# Patient Record
Sex: FEMALE | Race: WHITE | NOT HISPANIC OR LATINO | Employment: OTHER | URBAN - METROPOLITAN AREA
[De-identification: names, ages, dates, MRNs, and addresses within clinical notes are randomized per-mention and may not be internally consistent; named-entity substitution may affect disease eponyms.]

---

## 2017-01-13 ENCOUNTER — PATIENT MESSAGE (OUTPATIENT)
Dept: INTERNAL MEDICINE | Facility: CLINIC | Age: 52
End: 2017-01-13

## 2017-01-13 ENCOUNTER — TELEPHONE (OUTPATIENT)
Dept: INTERNAL MEDICINE | Facility: CLINIC | Age: 52
End: 2017-01-13

## 2017-01-13 NOTE — TELEPHONE ENCOUNTER
----- Message from Harjit Adhikari sent at 1/13/2017  1:47 PM CST -----  Contact: Jyothi from [facility]   States she is calling rg needing a 90 day rx on losartin 50mg and can be reached at [phone]//thanks/dbw

## 2017-01-13 NOTE — TELEPHONE ENCOUNTER
Pharmacy is calling stating that pt is requesting to get a 90 day supply on the losartan 50mg... Do not have this in her current meds. Show that she is on losartan-HCTZ 100-25mg... Which is she needing to be on?

## 2017-01-15 RX ORDER — LOSARTAN POTASSIUM 50 MG/1
50 TABLET ORAL DAILY
Qty: 90 TABLET | Refills: 3 | Status: SHIPPED | OUTPATIENT
Start: 2017-01-15 | End: 2017-11-15

## 2017-01-15 RX ORDER — LOSARTAN POTASSIUM AND HYDROCHLOROTHIAZIDE 25; 100 MG/1; MG/1
1 TABLET ORAL DAILY
Qty: 90 TABLET | Refills: 3 | Status: SHIPPED | OUTPATIENT
Start: 2017-01-15 | End: 2017-01-15

## 2017-01-15 NOTE — TELEPHONE ENCOUNTER
I sent in the losartan 100 with hctz 25 earlier, but I will discontinue and send in the losartan 50 now.

## 2017-02-02 DIAGNOSIS — M79.7 FIBROMYALGIA: ICD-10-CM

## 2017-02-02 RX ORDER — PREGABALIN 150 MG/1
CAPSULE ORAL
Qty: 90 CAPSULE | Refills: 5 | Status: SHIPPED | OUTPATIENT
Start: 2017-02-02 | End: 2017-07-29 | Stop reason: SDUPTHER

## 2017-03-07 DIAGNOSIS — J01.90 SINUSITIS, ACUTE: ICD-10-CM

## 2017-03-07 RX ORDER — AZELASTINE HYDROCHLORIDE AND FLUTICASONE PROPIONATE 137; 50 UG/1; UG/1
SPRAY, METERED NASAL
Qty: 23 G | Refills: 11 | Status: SHIPPED | OUTPATIENT
Start: 2017-03-07 | End: 2018-04-04 | Stop reason: SDUPTHER

## 2017-03-13 ENCOUNTER — PATIENT MESSAGE (OUTPATIENT)
Dept: INTERNAL MEDICINE | Facility: CLINIC | Age: 52
End: 2017-03-13

## 2017-03-17 ENCOUNTER — PATIENT MESSAGE (OUTPATIENT)
Dept: INTERNAL MEDICINE | Facility: CLINIC | Age: 52
End: 2017-03-17

## 2017-04-03 ENCOUNTER — OFFICE VISIT (OUTPATIENT)
Dept: GASTROENTEROLOGY | Facility: CLINIC | Age: 52
End: 2017-04-03
Payer: COMMERCIAL

## 2017-04-03 ENCOUNTER — LAB VISIT (OUTPATIENT)
Dept: LAB | Facility: HOSPITAL | Age: 52
End: 2017-04-03
Attending: INTERNAL MEDICINE
Payer: COMMERCIAL

## 2017-04-03 VITALS
WEIGHT: 186.75 LBS | HEIGHT: 68 IN | DIASTOLIC BLOOD PRESSURE: 82 MMHG | BODY MASS INDEX: 28.3 KG/M2 | HEART RATE: 66 BPM | SYSTOLIC BLOOD PRESSURE: 114 MMHG

## 2017-04-03 DIAGNOSIS — R14.0 ABDOMINAL BLOATING: ICD-10-CM

## 2017-04-03 DIAGNOSIS — K50.018 CROHN'S DISEASE OF SMALL INTESTINE WITH OTHER COMPLICATION: ICD-10-CM

## 2017-04-03 DIAGNOSIS — R12 HEARTBURN: ICD-10-CM

## 2017-04-03 DIAGNOSIS — K62.5 RECTAL BLEEDING: ICD-10-CM

## 2017-04-03 DIAGNOSIS — R19.7 DIARRHEA, UNSPECIFIED TYPE: Primary | ICD-10-CM

## 2017-04-03 LAB
ALBUMIN SERPL BCP-MCNC: 3.9 G/DL
ALP SERPL-CCNC: 97 U/L
ALT SERPL W/O P-5'-P-CCNC: 24 U/L
ANION GAP SERPL CALC-SCNC: 9 MMOL/L
AST SERPL-CCNC: 22 U/L
BASOPHILS # BLD AUTO: 0.04 K/UL
BASOPHILS NFR BLD: 0.7 %
BILIRUB SERPL-MCNC: 0.2 MG/DL
BUN SERPL-MCNC: 11 MG/DL
CALCIUM SERPL-MCNC: 9.3 MG/DL
CHLORIDE SERPL-SCNC: 90 MMOL/L
CO2 SERPL-SCNC: 27 MMOL/L
CREAT SERPL-MCNC: 0.9 MG/DL
CRP SERPL-MCNC: 5.9 MG/L
DIFFERENTIAL METHOD: NORMAL
EOSINOPHIL # BLD AUTO: 0.1 K/UL
EOSINOPHIL NFR BLD: 0.9 %
ERYTHROCYTE [DISTWIDTH] IN BLOOD BY AUTOMATED COUNT: 12.2 %
ERYTHROCYTE [SEDIMENTATION RATE] IN BLOOD BY WESTERGREN METHOD: 8 MM/HR
EST. GFR  (AFRICAN AMERICAN): >60 ML/MIN/1.73 M^2
EST. GFR  (NON AFRICAN AMERICAN): >60 ML/MIN/1.73 M^2
GLUCOSE SERPL-MCNC: 81 MG/DL
HCT VFR BLD AUTO: 37 %
HGB BLD-MCNC: 12.6 G/DL
LYMPHOCYTES # BLD AUTO: 1.9 K/UL
LYMPHOCYTES NFR BLD: 34.3 %
MCH RBC QN AUTO: 30.6 PG
MCHC RBC AUTO-ENTMCNC: 34.1 %
MCV RBC AUTO: 90 FL
MONOCYTES # BLD AUTO: 0.5 K/UL
MONOCYTES NFR BLD: 9.4 %
NEUTROPHILS # BLD AUTO: 3.1 K/UL
NEUTROPHILS NFR BLD: 54.5 %
PLATELET # BLD AUTO: 329 K/UL
PMV BLD AUTO: 9.6 FL
POTASSIUM SERPL-SCNC: 3.9 MMOL/L
PROT SERPL-MCNC: 7.3 G/DL
RBC # BLD AUTO: 4.12 M/UL
SODIUM SERPL-SCNC: 126 MMOL/L
WBC # BLD AUTO: 5.66 K/UL

## 2017-04-03 PROCEDURE — 3074F SYST BP LT 130 MM HG: CPT | Mod: S$GLB,,, | Performed by: INTERNAL MEDICINE

## 2017-04-03 PROCEDURE — 3079F DIAST BP 80-89 MM HG: CPT | Mod: S$GLB,,, | Performed by: INTERNAL MEDICINE

## 2017-04-03 PROCEDURE — 36415 COLL VENOUS BLD VENIPUNCTURE: CPT | Mod: PO

## 2017-04-03 PROCEDURE — 80053 COMPREHEN METABOLIC PANEL: CPT

## 2017-04-03 PROCEDURE — 86140 C-REACTIVE PROTEIN: CPT

## 2017-04-03 PROCEDURE — 99999 PR PBB SHADOW E&M-EST. PATIENT-LVL III: CPT | Mod: PBBFAC,,, | Performed by: INTERNAL MEDICINE

## 2017-04-03 PROCEDURE — 99214 OFFICE O/P EST MOD 30 MIN: CPT | Mod: S$GLB,,, | Performed by: INTERNAL MEDICINE

## 2017-04-03 PROCEDURE — 85025 COMPLETE CBC W/AUTO DIFF WBC: CPT

## 2017-04-03 PROCEDURE — 85651 RBC SED RATE NONAUTOMATED: CPT | Mod: PO

## 2017-04-03 PROCEDURE — 1160F RVW MEDS BY RX/DR IN RCRD: CPT | Mod: S$GLB,,, | Performed by: INTERNAL MEDICINE

## 2017-04-03 RX ORDER — PANTOPRAZOLE SODIUM 40 MG/1
40 TABLET, DELAYED RELEASE ORAL DAILY
Qty: 30 TABLET | Refills: 11 | Status: SHIPPED | OUTPATIENT
Start: 2017-04-03 | End: 2017-08-11 | Stop reason: SDUPTHER

## 2017-04-03 NOTE — PROGRESS NOTES
Clinic Follow Up:  [facility] Follow Up Note    Reason for Follow Up:  The primary encounter diagnosis was Diarrhea, unspecified type. Diagnoses of Heartburn, Crohn's disease of small intestine with other complication, Abdominal bloating, and Rectal bleeding were also pertinent to this visit.    PCP: Esthela Hu       HPI:  This is a 52 y.o. female here for follow up of the above issues.  This my first time seeing her in almost 9 months.  She has a long history of Crohn's disease.  She continues to have issues with diarrhea and abdominal pain.  She is taking Pentasa 1000mg 4 times daily.  She felt like this was helping for the first several months of taking it but over the last few months she hasn't had much improvement.  On bad days she is having up to 10 bowel movements a day.  She also frequently has right lower quadrant abdominal pain.  Recently she's been having more diarrhea and she feels like her hemorrhoids have become irritated and there has been a small amount of rectal bleeding.  Last year she was given Entocort which seemed to provide some improvement but she did not follow up to discuss other treatment options.  She also reports burning at the back of her tongue and in her throat when she eats.  She reports a burning sensation also in the upper chest.  She has tried omeprazole and Prevacid which helps some but she still has some symptoms.  Eating ice typically helps.  She has a frequent sour taste in the back of her mouth.  She has also noticed that she has a lot of abdominal distention after eating.  She has a very hard time tolerating high-fiber foods.    Review of Systems:  CONSTITUTIONAL: Denies weight change,  fatigue, fevers, chills, night sweats.  CARDIOVASCULAR: Denies chest pain, shortness of breath, orthopnea and edema.  RESPIRATORY: Denies cough, hemoptysis, dyspnea, and wheezing.  GI: See HPI.  : Denies dysuria and hematuria    Medical History:  Past Medical  History:   Diagnosis Date    Allergic rhinitis     Asthma     Crohn's disease     Ileal involvement, previously on Remicade, Asacol, Prednisone    Fibromyalgia     Migraine     Sciatica        Surgical History:   Past Surgical History:   Procedure Laterality Date    BLADDER SURGERY      sling was created by her muscles      SECTION      FINGER SURGERY      joint relpacement, left hand index finger    HYSTERECTOMY      WISDOM TOOTH EXTRACTION         Family History:   Family History   Problem Relation Age of Onset    Hypertension Mother     Kidney disease Father     Hypertension Brother     Cancer Paternal Grandmother 70     colon       Social History:   Social History   Substance Use Topics    Smoking status: Never Smoker    Smokeless tobacco: None    Alcohol use No       Allergies: Reviewed    Home Medications:  Medication List with Changes/Refills   New Medications    PANTOPRAZOLE (PROTONIX) 40 MG TABLET    Take 1 tablet (40 mg total) by mouth once daily.   Current Medications    ALBUTEROL 90 MCG/ACTUATION INHALER    Inhale 2 puffs into the lungs every 4 to 6 hours as needed for Wheezing.    BREO ELLIPTA 100-25 MCG/DOSE DISKUS INHALER        DULOXETINE (CYMBALTA) 60 MG CAPSULE    Take 60 mg by mouth 2 (two) times daily.    DYMISTA 137-50 MCG/SPRAY SPRY NASSAL SPRAY    instill 1 spray into each nostril twice a day    ELETRIPTAN (RELPAX) 40 MG TABLET    Take 1 tablet (40 mg total) by mouth as needed.    ESTRADIOL (ESTRACE) 2 MG TABLET    Take 2 mg by mouth once daily.    LOSARTAN (COZAAR) 50 MG TABLET    Take 1 tablet (50 mg total) by mouth once daily.    LYRICA 150 MG CAPSULE    take 1 capsule by mouth three times a day    MESALAMINE (PENTASA) 500 MG CR CAPSULE    Take 2 capsules (1,000 mg total) by mouth 4 (four) times daily.    MONTELUKAST (SINGULAIR) 10 MG TABLET    take 1 tablet by mouth every evening    NORTRIPTYLINE (PAMELOR) 25 MG CAPSULE    take 2 capsules by mouth once daily     "VERAPAMIL (VERELAN PM) 300 MG 24 HR CAPSULE    Take 300 mg by mouth once daily.    ZOLPIDEM (AMBIEN) 5 MG TAB    Take 1 tablet (5 mg total) by mouth nightly as needed.   Discontinued Medications    CLONIDINE (CATAPRES) 0.1 MG TABLET    Take 1 tablet (0.1 mg total) by mouth 2 (two) times daily as needed. For Top #'s >160       Physical Exam:  Vital Signs:  /82  Pulse 66  Ht 5' 8.4" (1.737 m)  Wt 84.7 kg (186 lb 11.7 oz)  BMI 28.06 kg/m2  Body mass index is 28.06 kg/(m^2).      GENERAL: No acute distress, Alert and oriented x 4  EYES: Anicteric, no pallor noted.  ENT: Oropharynx is clear  NECK: Supple, no masses, no thyromegally.  CHEST: Equal breath sounds bilaterally, no wheezing.  CARDIOVASCULAR: Regular rate and rhythm. Murmurs, rubs and gallops absent.  ABDOMEN: soft, non-tender, non-distended, normal bowel sounds.  EXTREMITIES: No clubbing, cyanosis or edema.      Labs: Pertinent labs reviewed.  No recent labs available    Endoscopy:  Never done    CRC Screening: Up-to-date    Assessment:  1. Diarrhea, unspecified type    2. Heartburn    3. Crohn's disease of small intestine with other complication    4. Abdominal bloating    5. Rectal bleeding        Recommendations:  1.  Crohn's disease: She had Crohn's-related ileitis that was fairly mild on colonoscopy but she continues to have a lot of symptoms.  In the past her ESR and CRP have been normal.  Today I recommend that we repeat labs including a CBC, CMP, ESR, CRP.  We will also arrange for a CT enterography given her significant abdominal bloating to rule out the possibility of a stricture.  This will also allow us to determine if there is evidence of active disease.  After these studies we will need to consider whether to proceed with a capsule endoscopy to evaluate the rest of her small bowel for active disease or if we need to consider a colonoscopy to better assess.  When we have all of the information available we will plan to develop a " treatment plan as I do not feel like Pentasa is keeping her symptoms under control.  She has been on Humira in the past with good results but she has a lot of concerns over side effect risks.  We also discussed the use of Entyvio which may be an alternative option with fewer potential side effects.  She has intermittent TPMT activity so immunomodulators are a possibility but would need to be monitored closely.    2.  Heartburn: Her symptoms are mostly consistent with reflux.  Today I sent in a prescription for pantoprazole 40 mg daily.  This could simply be regular reflux but given the significant abdominal bloating it raises the question of whether she may have some mild partially obstructive symptoms.  We will likely need to consider an upper endoscopy in the future but we will try to coordinate this with the remainder of her evaluation.    3.  Rectal bleeding: Likely from hemorrhoids and exacerbated by diarrhea.  We'll focus on her Crohn's disease.  We may need to perform a colonoscopy at some point in the near future.    Return to Clinic:    Follow up based on the above evaluation.

## 2017-04-03 NOTE — MR AVS SNAPSHOT
[facility]  [address] [place] [address]  Phone: [phone]  Fax: [phone]                  Jaylin Murguia   4/3/2017 10:00 AM   Office Visit    Description:  Female : 1965   Provider:  Kin Dyer MD   Department:  [facility] - Gastroenterology           Reason for Visit     Crohn's Disease     Abdominal Pain           Diagnoses this Visit        Comments    Diarrhea, unspecified type    -  Primary     Heartburn         Crohn's disease of small intestine with other complication         Abdominal bloating         Rectal bleeding                To Do List           Future Appointments        Provider Department Dept Phone    4/3/2017 11:45 AM LAB, SAME DAY [facility] [phone]    4/10/2017 9:30 AM SUM CT1 LIMIT 500 LBS [facility] [phone]    2017 2:00 PM [facility] [phone]      Goals (5 Years of Data)     None       These Medications        Disp Refills Start End    pantoprazole (PROTONIX) 40 MG tablet 30 tablet 11 4/3/2017 4/3/2018    Take 1 tablet (40 mg total) by mouth once daily. - Oral    [facility] [place] [address]  #: [phone]         [facility] On Call     [facility] Nurse Care Line - 24/ Assistance  Unless otherwise directed by your provider, please contact [facility], our nurse care line that is available for 24/ assistance.     Registered nurses in the [facility] provide: appointment scheduling, clinical advisement, health education, and other advisory services.  Call: [phone] (toll free)               Medications           START taking these NEW medications        Refills    pantoprazole (PROTONIX) 40 MG tablet 11    Sig: Take 1 tablet (40 mg total) by mouth once daily.    Class: Normal    Route: Oral      STOP taking these medications     cloNIDine (CATAPRES) 0.1  "MG tablet Take 1 tablet (0.1 mg total) by mouth 2 (two) times daily as needed. For Top #'s >160           Verify that the below list of medications is an accurate representation of the medications you are currently taking.  If none reported, the list may be blank. If incorrect, please contact your healthcare provider. Carry this list with you in case of emergency.           Current Medications     albuterol 90 mcg/actuation inhaler Inhale 2 puffs into the lungs every 4 to 6 hours as needed for Wheezing.    BREO ELLIPTA 100-25 mcg/dose diskus inhaler     duloxetine (CYMBALTA) 60 MG capsule Take 60 mg by mouth 2 (two) times daily.    DYMISTA 137-50 mcg/spray [place] nassal spray instill 1 spray into each nostril twice a day    eletriptan (RELPAX) 40 MG tablet Take 1 tablet (40 mg total) by mouth as needed.    estradiol (ESTRACE) 2 MG tablet Take 2 mg by mouth once daily.    losartan (COZAAR) 50 MG tablet Take 1 tablet (50 mg total) by mouth once daily.    LYRICA 150 mg capsule take 1 capsule by mouth three times a day    mesalamine (PENTASA) 500 MG CR capsule Take 2 capsules (1,000 mg total) by mouth 4 (four) times daily.    montelukast (SINGULAIR) 10 mg tablet take 1 tablet by mouth every evening    nortriptyline (PAMELOR) 25 MG capsule take 2 capsules by mouth once daily    verapamil (VERELAN PM) 300 mg 24 hr capsule Take 300 mg by mouth once daily.    zolpidem (AMBIEN) 5 MG Tab Take 1 tablet (5 mg total) by mouth nightly as needed.    pantoprazole (PROTONIX) 40 MG tablet Take 1 tablet (40 mg total) by mouth once daily.           Clinical Reference Information           Your Vitals Were     BP Pulse Height Weight BMI    114/82 66 5' 8.4" (1.737 m) 84.7 kg (186 lb 11.7 oz) 28.06 kg/m2      Blood Pressure          Most Recent Value    BP  114/82      Allergies as of 4/3/2017     No Known Allergies      Immunizations Administered on Date of Encounter - 4/3/2017     None      Orders Placed During Today's Visit     Future " Labs/Procedures Expected by Expires    C-reactive protein  4/3/2017 6/2/2018    CBC auto differential  4/3/2017 6/2/2018    Comprehensive metabolic panel  4/3/2017 6/2/2018    CT Enterography Abd_Pelvis With Contrast  4/3/2017 4/3/2018    ESR (SEDIMENTATION RATE, MANUAL)  4/3/2017 6/2/2018      Language Assistance Services     ATTENTION: Language assistance services are available, free of charge. Please call [phone].      ATENCIÓN: Si habla español, tiene a ann disposición servicios gratuitos de asistencia lingüística. Llame al [phone].     CHÚ Ý: N?u b?n nói Ti?ng Vi?t, có các d?ch v? h? tr? ngôn ng? mi?n phí dành cho b?n. G?i s? [phone].         [facility] complies with applicable Federal civil rights laws and does not discriminate on the basis of race, color, national origin, age, disability, or sex.

## 2017-04-04 ENCOUNTER — PATIENT MESSAGE (OUTPATIENT)
Dept: GASTROENTEROLOGY | Facility: HOSPITAL | Age: 52
End: 2017-04-04

## 2017-04-04 ENCOUNTER — TELEPHONE (OUTPATIENT)
Dept: GASTROENTEROLOGY | Facility: CLINIC | Age: 52
End: 2017-04-04

## 2017-04-04 DIAGNOSIS — E87.1 HYPONATREMIA: Primary | ICD-10-CM

## 2017-04-04 NOTE — TELEPHONE ENCOUNTER
Ms Murguia to get labs and urine tomorrow. Wanted me to let you know her father  of kidney failure, also has a nephew with a kidney disorder that she could not remember the name of.

## 2017-04-05 ENCOUNTER — LAB VISIT (OUTPATIENT)
Dept: LAB | Facility: HOSPITAL | Age: 52
End: 2017-04-05
Attending: INTERNAL MEDICINE
Payer: COMMERCIAL

## 2017-04-05 DIAGNOSIS — E87.1 HYPONATREMIA: ICD-10-CM

## 2017-04-05 LAB
ANION GAP SERPL CALC-SCNC: 8 MMOL/L
BUN SERPL-MCNC: 6 MG/DL
CALCIUM SERPL-MCNC: 9 MG/DL
CHLORIDE SERPL-SCNC: 94 MMOL/L
CO2 SERPL-SCNC: 26 MMOL/L
CORTIS SERPL-MCNC: 7.4 UG/DL
CREAT SERPL-MCNC: 0.8 MG/DL
EST. GFR  (AFRICAN AMERICAN): >60 ML/MIN/1.73 M^2
EST. GFR  (NON AFRICAN AMERICAN): >60 ML/MIN/1.73 M^2
GLUCOSE SERPL-MCNC: 67 MG/DL
OSMOLALITY SERPL: 262 MOSM/KG
POTASSIUM SERPL-SCNC: 4 MMOL/L
SODIUM SERPL-SCNC: 128 MMOL/L
TSH SERPL DL<=0.005 MIU/L-ACNC: 1.18 UIU/ML

## 2017-04-05 PROCEDURE — 80048 BASIC METABOLIC PNL TOTAL CA: CPT

## 2017-04-05 PROCEDURE — 84443 ASSAY THYROID STIM HORMONE: CPT

## 2017-04-05 PROCEDURE — 82533 TOTAL CORTISOL: CPT

## 2017-04-05 PROCEDURE — 36415 COLL VENOUS BLD VENIPUNCTURE: CPT | Mod: PO

## 2017-04-05 PROCEDURE — 83930 ASSAY OF BLOOD OSMOLALITY: CPT

## 2017-04-06 ENCOUNTER — PATIENT OUTREACH (OUTPATIENT)
Dept: ADMINISTRATIVE | Facility: HOSPITAL | Age: 52
End: 2017-04-06

## 2017-04-07 ENCOUNTER — TELEPHONE (OUTPATIENT)
Dept: RADIOLOGY | Facility: HOSPITAL | Age: 52
End: 2017-04-07

## 2017-04-07 ENCOUNTER — LAB VISIT (OUTPATIENT)
Dept: LAB | Facility: HOSPITAL | Age: 52
End: 2017-04-07
Attending: INTERNAL MEDICINE
Payer: COMMERCIAL

## 2017-04-07 DIAGNOSIS — Z01.419 ROUTINE GYNECOLOGICAL EXAMINATION: Primary | ICD-10-CM

## 2017-04-07 PROCEDURE — 36415 COLL VENOUS BLD VENIPUNCTURE: CPT | Mod: PO

## 2017-04-07 PROCEDURE — 83036 HEMOGLOBIN GLYCOSYLATED A1C: CPT

## 2017-04-08 LAB
ESTIMATED AVG GLUCOSE: 111 MG/DL
HBA1C MFR BLD HPLC: 5.5 %

## 2017-04-10 ENCOUNTER — PATIENT MESSAGE (OUTPATIENT)
Dept: GASTROENTEROLOGY | Facility: CLINIC | Age: 52
End: 2017-04-10

## 2017-04-10 DIAGNOSIS — E87.1 HYPONATREMIA WITH DECREASED SERUM OSMOLALITY: Primary | ICD-10-CM

## 2017-04-10 NOTE — TELEPHONE ENCOUNTER
Continued hypotonic hyponatremia. Normal TSH/Cortisol. Urine very dilute. Could be due to diarrhea, but no signs of significant volume depletion and would expect urine to be more concentrated. Will refer to nephrology for further evaluation.    Results sent to patient through [facility].

## 2017-04-11 ENCOUNTER — PATIENT MESSAGE (OUTPATIENT)
Dept: INTERNAL MEDICINE | Facility: CLINIC | Age: 52
End: 2017-04-11

## 2017-04-11 ENCOUNTER — TELEPHONE (OUTPATIENT)
Dept: GASTROENTEROLOGY | Facility: CLINIC | Age: 52
End: 2017-04-11

## 2017-04-12 ENCOUNTER — TELEPHONE (OUTPATIENT)
Dept: RADIOLOGY | Facility: HOSPITAL | Age: 52
End: 2017-04-12

## 2017-04-13 ENCOUNTER — HOSPITAL ENCOUNTER (OUTPATIENT)
Dept: RADIOLOGY | Facility: HOSPITAL | Age: 52
Discharge: HOME | End: 2017-04-13
Attending: INTERNAL MEDICINE
Payer: COMMERCIAL

## 2017-04-13 DIAGNOSIS — R14.0 ABDOMINAL BLOATING: ICD-10-CM

## 2017-04-13 DIAGNOSIS — K50.018 CROHN'S DISEASE OF SMALL INTESTINE WITH OTHER COMPLICATION: ICD-10-CM

## 2017-04-13 DIAGNOSIS — R19.7 DIARRHEA, UNSPECIFIED TYPE: ICD-10-CM

## 2017-04-13 PROCEDURE — 25500020 PHARM REV CODE 255: Mod: PO | Performed by: INTERNAL MEDICINE

## 2017-04-13 PROCEDURE — 74177 CT ABD & PELVIS W/CONTRAST: CPT | Mod: TC,PO

## 2017-04-13 PROCEDURE — 74177 CT ABD & PELVIS W/CONTRAST: CPT | Mod: 26,,, | Performed by: RADIOLOGY

## 2017-04-13 RX ADMIN — IOHEXOL 130 ML: 350 INJECTION, SOLUTION INTRAVENOUS at 10:04

## 2017-04-14 ENCOUNTER — PATIENT MESSAGE (OUTPATIENT)
Dept: GASTROENTEROLOGY | Facility: HOSPITAL | Age: 52
End: 2017-04-14

## 2017-04-14 DIAGNOSIS — K50.00 CROHN'S DISEASE OF SMALL INTESTINE WITHOUT COMPLICATION: ICD-10-CM

## 2017-04-14 DIAGNOSIS — R19.7 DIARRHEA, UNSPECIFIED TYPE: ICD-10-CM

## 2017-04-14 DIAGNOSIS — K76.9 LIVER LESION: Primary | ICD-10-CM

## 2017-04-14 RX ORDER — SODIUM, POTASSIUM,MAG SULFATES 17.5-3.13G
1 SOLUTION, RECONSTITUTED, ORAL ORAL ONCE
Qty: 1 BOTTLE | Refills: 0 | Status: SHIPPED | OUTPATIENT
Start: 2017-04-14 | End: 2017-04-14

## 2017-04-14 NOTE — TELEPHONE ENCOUNTER
CTE noted. Possible active dz at TI. Will get colonoscopy. Will get US to evaluate liver lesions. Given the questionable stricture in the mid ileum I would prefer to avoid capsule at this time, but may consider a SBFT in the future or try a sizing capsule first.  Results sent to patient through [facility].

## 2017-04-17 ENCOUNTER — PATIENT MESSAGE (OUTPATIENT)
Dept: INTERNAL MEDICINE | Facility: CLINIC | Age: 52
End: 2017-04-17

## 2017-04-17 ENCOUNTER — PATIENT MESSAGE (OUTPATIENT)
Dept: GASTROENTEROLOGY | Facility: CLINIC | Age: 52
End: 2017-04-17

## 2017-04-17 DIAGNOSIS — K50.00 CROHN'S DISEASE OF SMALL INTESTINE WITHOUT COMPLICATION: ICD-10-CM

## 2017-04-17 DIAGNOSIS — R19.7 DIARRHEA, UNSPECIFIED TYPE: ICD-10-CM

## 2017-04-17 DIAGNOSIS — R12 HEARTBURN: Primary | ICD-10-CM

## 2017-04-18 ENCOUNTER — PATIENT MESSAGE (OUTPATIENT)
Dept: NEPHROLOGY | Facility: CLINIC | Age: 52
End: 2017-04-18

## 2017-04-18 ENCOUNTER — TELEPHONE (OUTPATIENT)
Dept: RHEUMATOLOGY | Facility: CLINIC | Age: 52
End: 2017-04-18

## 2017-04-18 ENCOUNTER — OFFICE VISIT (OUTPATIENT)
Dept: NEPHROLOGY | Facility: CLINIC | Age: 52
End: 2017-04-18
Payer: COMMERCIAL

## 2017-04-18 VITALS
SYSTOLIC BLOOD PRESSURE: 130 MMHG | HEIGHT: 67 IN | WEIGHT: 189.38 LBS | HEART RATE: 80 BPM | BODY MASS INDEX: 29.72 KG/M2 | DIASTOLIC BLOOD PRESSURE: 70 MMHG

## 2017-04-18 DIAGNOSIS — R19.7 DIARRHEA DUE TO MALABSORPTION: ICD-10-CM

## 2017-04-18 DIAGNOSIS — K90.9 DIARRHEA DUE TO MALABSORPTION: ICD-10-CM

## 2017-04-18 DIAGNOSIS — I10 ESSENTIAL (PRIMARY) HYPERTENSION: ICD-10-CM

## 2017-04-18 DIAGNOSIS — R60.0 BILATERAL EDEMA OF LOWER EXTREMITY: ICD-10-CM

## 2017-04-18 DIAGNOSIS — M79.7 FIBROMYALGIA: ICD-10-CM

## 2017-04-18 DIAGNOSIS — E87.1 HYPONATREMIA: Primary | ICD-10-CM

## 2017-04-18 DIAGNOSIS — G43.109 MIGRAINE WITH AURA AND WITHOUT STATUS MIGRAINOSUS, NOT INTRACTABLE: ICD-10-CM

## 2017-04-18 PROCEDURE — 99999 PR PBB SHADOW E&M-EST. PATIENT-LVL IV: CPT | Mod: PBBFAC,,, | Performed by: INTERNAL MEDICINE

## 2017-04-18 PROCEDURE — 3078F DIAST BP <80 MM HG: CPT | Mod: S$GLB,,, | Performed by: INTERNAL MEDICINE

## 2017-04-18 PROCEDURE — 99354 PR PROLONGED SVC, OUPT, 1ST HR: CPT | Mod: S$GLB,,, | Performed by: INTERNAL MEDICINE

## 2017-04-18 PROCEDURE — 99204 OFFICE O/P NEW MOD 45 MIN: CPT | Mod: S$GLB,,, | Performed by: INTERNAL MEDICINE

## 2017-04-18 PROCEDURE — 3075F SYST BP GE 130 - 139MM HG: CPT | Mod: S$GLB,,, | Performed by: INTERNAL MEDICINE

## 2017-04-18 PROCEDURE — 1160F RVW MEDS BY RX/DR IN RCRD: CPT | Mod: S$GLB,,, | Performed by: INTERNAL MEDICINE

## 2017-04-18 RX ORDER — ERGOCALCIFEROL 1.25 MG/1
50000 CAPSULE ORAL
Qty: 12 CAPSULE | Refills: 5 | Status: SHIPPED | OUTPATIENT
Start: 2017-04-18 | End: 2017-07-17

## 2017-04-18 RX ORDER — SIMVASTATIN 20 MG/1
20 TABLET, FILM COATED ORAL NIGHTLY
Qty: 90 TABLET | Refills: 3 | Status: SHIPPED | OUTPATIENT
Start: 2017-04-18 | End: 2018-03-19 | Stop reason: SDUPTHER

## 2017-04-18 RX ORDER — SODIUM, POTASSIUM,MAG SULFATES 17.5-3.13G
SOLUTION, RECONSTITUTED, ORAL ORAL
Qty: 354 ML | Refills: 0 | Status: ON HOLD | OUTPATIENT
Start: 2017-04-18 | End: 2017-05-09 | Stop reason: HOSPADM

## 2017-04-18 RX ORDER — PROPRANOLOL HYDROCHLORIDE 40 MG/1
40 TABLET ORAL 3 TIMES DAILY
Qty: 90 TABLET | Refills: 11 | Status: SHIPPED | OUTPATIENT
Start: 2017-04-18 | End: 2018-05-03 | Stop reason: ALTCHOICE

## 2017-04-18 NOTE — TELEPHONE ENCOUNTER
----- Message from Lynda Jack LPN sent at 4/18/2017 12:14 PM CDT -----  Regarding: Consult  Dr. Pierce would like patient to be seen soon. First available is not until August. Do you have a sooner appointment?

## 2017-04-18 NOTE — MR AVS SNAPSHOT
[facility]  [address] [address]  Phone: [phone]  Fax: [phone]                  Jaylin Murguia   2017 11:00 AM   Office Visit    Description:  Female : 1965   Provider:  Asher Pierce MD   Department:  [facility] Ne[facility]rology           Reason for Visit     hyponatremia           Diagnoses this Visit        Comments    Hyponatremia    -  Primary     Essential (primary) hypertension         Fibromyalgia         Bilateral edema of lower extremity         Migraine with aura and without status migrainosus, not intractable         Diarrhea due to malabsorption                To Do List           Future Appointments        Provider Department Dept Phone    2017 9:15 AM [facility] US1 [facility] [phone]    2017 11:20 AM LABORATORY, [facility] [address] [facility] [phone]    2017 12:30 PM Asher Pierce MD [facility] Nephrology [phone]    2017 10:00 AM Virgil Edwarsd MD [facility] - Rheumatology [phone]      Your Future Surgeries/Procedures     May 09, 2017   Surgery with Kin Dyer MD   [facility]    [address] [address] [phone]              Goals (5 Years of Data)     None      Follow-Up and Disposition     Return in about 4 weeks (around 2017).       These Medications        Disp Refills Start End    propranolol (INDERAL) 40 MG tablet 90 tablet 11 2017    Take 1 tablet (40 mg total) by mouth 3 (three) times daily. - Oral    [facility] [place]  [facility]. Ph #: [phone]       ergocalciferol (ERGOCALCIFEROL) 50,000 unit Cap 12 capsule 5 2017    Take 1 capsule (50,000 Units total) by mouth every 7 days. - Oral    [facility] [place]  [facility] [place]. Ph #:  [phone]       simvastatin (ZOCOR) 20 MG tablet 90 tablet 3 4/18/2017 4/18/2018    Take 1 tablet (20 mg total) by mouth every evening. - Oral    [facility] [facility] - ALYSE BARAJAS - [address] [facility]. Ph #: [phone]         [facility] On Call     [facility] On Call Nurse Care Line - 24/7 Assistance  Unless otherwise directed by your provider, please contact [facility], our nurse care line that is available for 24/7 assistance.     Registered nurses in the [facility] provide: appointment scheduling, clinical advisement, health education, and other advisory services.  Call: [phone] (toll free)               Medications           START taking these NEW medications        Refills    propranolol (INDERAL) 40 MG tablet 11    Sig: Take 1 tablet (40 mg total) by mouth 3 (three) times daily.    Class: Normal    Route: Oral    ergocalciferol (ERGOCALCIFEROL) 50,000 unit Cap 5    Sig: Take 1 capsule (50,000 Units total) by mouth every 7 days.    Class: Normal    Route: Oral    simvastatin (ZOCOR) 20 MG tablet 3    Sig: Take 1 tablet (20 mg total) by mouth every evening.    Class: Normal    Route: Oral      STOP taking these medications     verapamil (VERELAN PM) 300 mg 24 hr capsule Take 300 mg by mouth once daily.           Verify that the below list of medications is an accurate representation of the medications you are currently taking.  If none reported, the list may be blank. If incorrect, please contact your healthcare provider. Carry this list with you in case of emergency.           Current Medications     BREO ELLIPTA 100-25 mcg/dose diskus inhaler     duloxetine (CYMBALTA) 60 MG capsule Take 60 mg by mouth 2 (two) times daily.    DYMISTA 137-50 mcg/spray [place] nassal spray instill 1 spray into each nostril twice a day    eletriptan (RELPAX) 40 MG tablet Take 1 tablet (40 mg total) by mouth as needed.    estradiol (ESTRACE) 2 MG tablet Take 2 mg by mouth once daily.     "losartan (COZAAR) 50 MG tablet Take 1 tablet (50 mg total) by mouth once daily.    LYRICA 150 mg capsule take 1 capsule by mouth three times a day    mesalamine (PENTASA) 500 MG CR capsule Take 2 capsules (1,000 mg total) by mouth 4 (four) times daily.    montelukast (SINGULAIR) 10 mg tablet take 1 tablet by mouth every evening    nortriptyline (PAMELOR) 25 MG capsule take 2 capsules by mouth once daily    pantoprazole (PROTONIX) 40 MG tablet Take 1 tablet (40 mg total) by mouth once daily.    sodium,potassium,mag sulfates (SUPREP BOWEL PREP KIT) 17.5-3.13-1.6 gram SolR As directed    zolpidem (AMBIEN) 5 MG Tab Take 1 tablet (5 mg total) by mouth nightly as needed.    albuterol 90 mcg/actuation inhaler Inhale 2 puffs into the lungs every 4 to 6 hours as needed for Wheezing.    ergocalciferol (ERGOCALCIFEROL) 50,000 unit Cap Take 1 capsule (50,000 Units total) by mouth every 7 days.    propranolol (INDERAL) 40 MG tablet Take 1 tablet (40 mg total) by mouth 3 (three) times daily.    simvastatin (ZOCOR) 20 MG tablet Take 1 tablet (20 mg total) by mouth every evening.           Clinical Reference Information           Your Vitals Were     BP Pulse Height Weight BMI    130/70 80 5' 7" (1.702 m) 85.9 kg (189 lb 6 oz) 29.66 kg/m2      Blood Pressure          Most Recent Value    BP  130/70      Allergies as of 4/18/2017     No Known Allergies      Immunizations Administered on Date of Encounter - 4/18/2017     None      Orders Placed During Today's Visit      Normal Orders This Visit    Ambulatory consult to Rheumatology     Future Labs/Procedures Expected by Expires    Vitamin D  4/18/2017 6/17/2018    Recurring Lab Work Interval Expires    Renal function panel   6/17/2018      Language Assistance Services     ATTENTION: Language assistance services are available, free of charge. Please call [phone].      ATENCIÓN: Si habla español, tiene a ann disposición servicios gratuitos de asistencia lingüística. Llame al " [phone].     MATA Ý: N?u b?n nói Ti?ng Vi?t, có các d?ch v? h? tr? ngôn ng? mi?n phí dành cho b?n. G?i s? [phone].         [facility] complies with applicable Federal civil rights laws and does not discriminate on the basis of race, color, national origin, age, disability, or sex.

## 2017-04-18 NOTE — LETTER
April 18, 2017      Kin Dyer MD  [address] [place] [address] [address] [place] [address]           [facility]  [address] [place] [address] [place] [address]  Phone: [phone]  Fax: [phone]          Patient: Jaylin Murguia   MR Number: [id number]   YOB: 1965   Date of Visit: 4/18/2017       Dear Dr. Kin Dyer:    Thank you for referring Jaylin Murguia to me for evaluation. Attached you will find relevant portions of my assessment and plan of care.    If you have questions, please do not hesitate to call me. I look forward to following Jaylin Murguia along with you.    Sincerely,    Asher Pierce MD    Enclosure  CC:  Esthela Hu MD    If you would like to receive this communication electronically, please contact [email or web address] or [phone] to request more information on [employer] Link access.    For providers and/or their staff who would like to refer a patient to [facility], please contact us through our one-stop-shop provider referral line, [facility], at [phone].    If you feel you have received this communication in error or would no longer like to receive these types of communications, please e-mail [email or web address]

## 2017-04-18 NOTE — TELEPHONE ENCOUNTER
Reviewed her chart and noted that she has difficult to treat fibromyalgia resistant to SNRI, Lyrica and Elavil. I wouldn't recommend her to wait to see us since our next available appointment in not until August - because of unexpected departure of providers.   I would request  to refer her to  at [facility] who treats our treatment resistant fibromyalgia patients with multiple interventions including Cognitive behavioral therapy and opioids.   Thanks for the referral.

## 2017-04-18 NOTE — PATIENT INSTRUCTIONS
1.  Hyponatremia with hypochloremia: Patient is not on any diuretics.  Most likely complication of diarrhea due to malabsorption/Crohn's disease.    2.  Essential hypertension with edema: Stop the calcium channel blocker.  Add Inderal for blood pressure control and migraine prophylaxis    3.  Recurrent migraine: Add Inderal, vitamin D and Zocor.  Lengthy discussion with the patient about prophylaxis against migraine.  Literature evidence for prophylactic role of Zocor and vitamin D provided to the patient from annals of neurology 2015.    4.  Fibromyalgia: Continue with Elavil, Cymbalta, Lyrica.  Consult rheumatology

## 2017-04-18 NOTE — PROGRESS NOTES
Subjective:       Patient ID: Jaylin Murguia is a 52 y.o. White female who presents for new evaluation of hyponatremia    HPI       Patient is a 52-year-old female with history of fibromyalgia, Crohn's disease and essential hypertension.  Had normal sodium last year.  Today comes in for consultation for new onset of hyponatremia which is accompanied by low levels of chloride.  No history of hypokalemia and no history of diuretic therapy      Review of Systems   Constitutional: Negative for activity change, appetite change, chills, diaphoresis, fatigue, fever and unexpected weight change.   HENT: Negative for congestion, dental problem, drooling, postnasal drip, rhinorrhea and voice change.    Eyes: Negative for discharge.   Respiratory: Negative for apnea, cough, choking, chest tightness, shortness of breath, wheezing and stridor.    Cardiovascular: Negative for chest pain, palpitations and leg swelling.   Gastrointestinal: Negative for abdominal distention, blood in stool, constipation, diarrhea, nausea, rectal pain and vomiting.   Endocrine: Negative for cold intolerance, heat intolerance, polydipsia and polyuria.   Genitourinary: Negative for decreased urine volume, difficulty urinating, dysuria, enuresis, flank pain, frequency, hematuria and urgency.   Musculoskeletal: Positive for arthralgias, back pain, myalgias and neck stiffness. Negative for gait problem and joint swelling.        Severe muscle and joint pains worse in the morning with early morning stiffness   Skin: Negative for rash.   Allergic/Immunologic: Negative for food allergies and immunocompromised state.   Neurological: Positive for headaches. Negative for dizziness, tremors, syncope and numbness.        Off-and-on migraine with aura at least 2 times a month   Hematological: Does not bruise/bleed easily.   Psychiatric/Behavioral: Positive for sleep disturbance. Negative for agitation, behavioral problems and self-injury. The patient is not  "nervous/anxious and is not hyperactive.         Severe insomnia with some help by taking Ambien or melatonin   All other systems reviewed and are negative.      Objective:   /70  Pulse 80  Ht 5' 7" (1.702 m)  Wt 85.9 kg (189 lb 6 oz)  BMI 29.66 kg/m2     Physical Exam   Constitutional: She is oriented to person, place, and time. No distress.   HENT:   Head: Normocephalic and atraumatic.   Nose: Nose normal.   Eyes: Conjunctivae and EOM are normal. Pupils are equal, round, and reactive to light.   Neck: Normal range of motion. No JVD present. No tracheal deviation present. No thyromegaly present.   Cardiovascular: Normal rate, regular rhythm, normal heart sounds and intact distal pulses.  Exam reveals no gallop and no friction rub.    No murmur heard.  Pulmonary/Chest: Effort normal and breath sounds normal. No respiratory distress. She has no wheezes. She has no rales. She exhibits no tenderness.   Abdominal: Soft. Bowel sounds are normal. She exhibits no distension and no mass. There is no tenderness. No hernia.   Musculoskeletal: Normal range of motion. She exhibits no edema, tenderness or deformity.   Neurological: She is alert and oriented to person, place, and time. She has normal reflexes. She displays normal reflexes. No cranial nerve deficit. She exhibits normal muscle tone. Coordination normal.   Skin: Skin is warm. She is not diaphoretic. No erythema. No pallor.   Psychiatric: She has a normal mood and affect. Her behavior is normal. Judgment and thought content normal.   Nursing note and vitals reviewed.        Lab Results   Component Value Date    CREATININE 0.8 04/05/2017    BUN 6 04/05/2017     (L) 04/05/2017    K 4.0 04/05/2017    CL 94 (L) 04/05/2017    CO2 26 04/05/2017     Lab Results   Component Value Date    WBC 5.66 04/03/2017    HGB 12.6 04/03/2017    HCT 37.0 04/03/2017    MCV 90 04/03/2017     04/03/2017     Lab Results   Component Value Date    CALCIUM 9.0 04/05/2017 "         Assessment:    )    1. Hyponatremia    2. Essential (primary) hypertension    3. Fibromyalgia    4. Bilateral edema of lower extremity    5. Migraine with aura and without status migrainosus, not intractable    6. Diarrhea due to malabsorption        Plan:         1.  Hyponatremia with hypochloremia: Patient is not on any diuretics.  Most likely complication of diarrhea due to malabsorption/Crohn's disease.    2.  Essential hypertension with edema: Stop the calcium channel blocker.  Add Inderal for blood pressure control and migraine prophylaxis    3.  Recurrent migraine: Add Inderal, vitamin D and Zocor.  Lengthy discussion with the patient about prophylaxis against migraine.  Literature evidence for prophylactic role of Zocor and vitamin D provided to the patient from annals of neurology 2015.    4.  Fibromyalgia: Continue with Elavil, Cymbalta, Lyrica.  Consult rheumatology      Extensive discussion about all the above medical problems including therapeutic options.  Extended time spent is about 45 minutes in addition to regular time required for consultation.  Majority of the extended time was to discuss therapeutic options.    Follow-up 1 month

## 2017-04-19 ENCOUNTER — HOSPITAL ENCOUNTER (OUTPATIENT)
Dept: RADIOLOGY | Facility: HOSPITAL | Age: 52
Discharge: HOME | End: 2017-04-19
Attending: INTERNAL MEDICINE
Payer: COMMERCIAL

## 2017-04-19 DIAGNOSIS — K76.9 LIVER LESION: ICD-10-CM

## 2017-04-19 PROCEDURE — 76705 ECHO EXAM OF ABDOMEN: CPT | Mod: 26,,, | Performed by: RADIOLOGY

## 2017-04-19 PROCEDURE — 76705 ECHO EXAM OF ABDOMEN: CPT | Mod: TC,PO

## 2017-05-08 ENCOUNTER — LAB VISIT (OUTPATIENT)
Dept: LAB | Facility: HOSPITAL | Age: 52
End: 2017-05-08
Attending: INTERNAL MEDICINE
Payer: COMMERCIAL

## 2017-05-08 DIAGNOSIS — E87.1 HYPONATREMIA: ICD-10-CM

## 2017-05-08 DIAGNOSIS — R19.7 DIARRHEA DUE TO MALABSORPTION: ICD-10-CM

## 2017-05-08 DIAGNOSIS — I10 ESSENTIAL (PRIMARY) HYPERTENSION: ICD-10-CM

## 2017-05-08 DIAGNOSIS — K90.9 DIARRHEA DUE TO MALABSORPTION: ICD-10-CM

## 2017-05-08 DIAGNOSIS — G43.109 MIGRAINE WITH AURA AND WITHOUT STATUS MIGRAINOSUS, NOT INTRACTABLE: ICD-10-CM

## 2017-05-08 DIAGNOSIS — R60.0 BILATERAL EDEMA OF LOWER EXTREMITY: ICD-10-CM

## 2017-05-08 DIAGNOSIS — M79.7 FIBROMYALGIA: ICD-10-CM

## 2017-05-08 LAB
25(OH)D3+25(OH)D2 SERPL-MCNC: 45 NG/ML
ALBUMIN SERPL BCP-MCNC: 3.6 G/DL
ANION GAP SERPL CALC-SCNC: 10 MMOL/L
BUN SERPL-MCNC: 8 MG/DL
CALCIUM SERPL-MCNC: 8.8 MG/DL
CHLORIDE SERPL-SCNC: 97 MMOL/L
CO2 SERPL-SCNC: 23 MMOL/L
CREAT SERPL-MCNC: 0.9 MG/DL
EST. GFR  (AFRICAN AMERICAN): >60 ML/MIN/1.73 M^2
EST. GFR  (NON AFRICAN AMERICAN): >60 ML/MIN/1.73 M^2
GLUCOSE SERPL-MCNC: 85 MG/DL
PHOSPHATE SERPL-MCNC: 2.8 MG/DL
POTASSIUM SERPL-SCNC: 3.5 MMOL/L
SODIUM SERPL-SCNC: 130 MMOL/L

## 2017-05-08 PROCEDURE — 80069 RENAL FUNCTION PANEL: CPT

## 2017-05-08 PROCEDURE — 82306 VITAMIN D 25 HYDROXY: CPT

## 2017-05-08 PROCEDURE — 36415 COLL VENOUS BLD VENIPUNCTURE: CPT | Mod: PO

## 2017-05-09 ENCOUNTER — ANESTHESIA (OUTPATIENT)
Dept: ENDOSCOPY | Facility: HOSPITAL | Age: 52
End: 2017-05-09
Payer: COMMERCIAL

## 2017-05-09 ENCOUNTER — HOSPITAL ENCOUNTER (OUTPATIENT)
Facility: HOSPITAL | Age: 52
Discharge: HOME | End: 2017-05-09
Attending: INTERNAL MEDICINE | Admitting: INTERNAL MEDICINE
Payer: COMMERCIAL

## 2017-05-09 ENCOUNTER — ANESTHESIA EVENT (OUTPATIENT)
Dept: ENDOSCOPY | Facility: HOSPITAL | Age: 52
End: 2017-05-09
Payer: COMMERCIAL

## 2017-05-09 ENCOUNTER — SURGERY (OUTPATIENT)
Age: 52
End: 2017-05-09

## 2017-05-09 VITALS
TEMPERATURE: 99 F | WEIGHT: 185 LBS | RESPIRATION RATE: 18 BRPM | HEART RATE: 72 BPM | SYSTOLIC BLOOD PRESSURE: 136 MMHG | DIASTOLIC BLOOD PRESSURE: 72 MMHG | HEIGHT: 67 IN | OXYGEN SATURATION: 92 % | BODY MASS INDEX: 29.03 KG/M2

## 2017-05-09 VITALS — RESPIRATION RATE: 11 BRPM

## 2017-05-09 DIAGNOSIS — R19.7 DIARRHEA: ICD-10-CM

## 2017-05-09 PROCEDURE — 88305 TISSUE EXAM BY PATHOLOGIST: CPT | Mod: 26,,, | Performed by: PATHOLOGY

## 2017-05-09 PROCEDURE — 88305 TISSUE EXAM BY PATHOLOGIST: CPT | Performed by: PATHOLOGY

## 2017-05-09 PROCEDURE — 37000009 HC ANESTHESIA EA ADD 15 MINS: Performed by: INTERNAL MEDICINE

## 2017-05-09 PROCEDURE — 43239 EGD BIOPSY SINGLE/MULTIPLE: CPT | Mod: 51,,, | Performed by: INTERNAL MEDICINE

## 2017-05-09 PROCEDURE — 45380 COLONOSCOPY AND BIOPSY: CPT | Performed by: INTERNAL MEDICINE

## 2017-05-09 PROCEDURE — 25000003 PHARM REV CODE 250: Performed by: NURSE ANESTHETIST, CERTIFIED REGISTERED

## 2017-05-09 PROCEDURE — 25000003 PHARM REV CODE 250: Performed by: INTERNAL MEDICINE

## 2017-05-09 PROCEDURE — 27201012 HC FORCEPS, HOT/COLD, DISP: Performed by: INTERNAL MEDICINE

## 2017-05-09 PROCEDURE — 37000008 HC ANESTHESIA 1ST 15 MINUTES: Performed by: INTERNAL MEDICINE

## 2017-05-09 PROCEDURE — 45380 COLONOSCOPY AND BIOPSY: CPT | Mod: ,,, | Performed by: INTERNAL MEDICINE

## 2017-05-09 PROCEDURE — 43239 EGD BIOPSY SINGLE/MULTIPLE: CPT | Performed by: INTERNAL MEDICINE

## 2017-05-09 PROCEDURE — 63600175 PHARM REV CODE 636 W HCPCS: Performed by: NURSE ANESTHETIST, CERTIFIED REGISTERED

## 2017-05-09 RX ORDER — PROPOFOL 10 MG/ML
VIAL (ML) INTRAVENOUS
Status: DISCONTINUED | OUTPATIENT
Start: 2017-05-09 | End: 2017-05-09

## 2017-05-09 RX ORDER — SODIUM CHLORIDE, SODIUM LACTATE, POTASSIUM CHLORIDE, CALCIUM CHLORIDE 600; 310; 30; 20 MG/100ML; MG/100ML; MG/100ML; MG/100ML
INJECTION, SOLUTION INTRAVENOUS CONTINUOUS
Status: DISCONTINUED | OUTPATIENT
Start: 2017-05-09 | End: 2017-05-09 | Stop reason: HOSPADM

## 2017-05-09 RX ORDER — LIDOCAINE HCL/PF 100 MG/5ML
SYRINGE (ML) INTRAVENOUS
Status: DISCONTINUED | OUTPATIENT
Start: 2017-05-09 | End: 2017-05-09

## 2017-05-09 RX ADMIN — PROPOFOL 60 MG: 10 INJECTION, EMULSION INTRAVENOUS at 02:05

## 2017-05-09 RX ADMIN — SODIUM CHLORIDE, SODIUM LACTATE, POTASSIUM CHLORIDE, AND CALCIUM CHLORIDE: 600; 310; 30; 20 INJECTION, SOLUTION INTRAVENOUS at 02:05

## 2017-05-09 RX ADMIN — PROPOFOL 30 MG: 10 INJECTION, EMULSION INTRAVENOUS at 02:05

## 2017-05-09 RX ADMIN — LIDOCAINE HYDROCHLORIDE 40 MG: 20 INJECTION, SOLUTION INTRAVENOUS at 02:05

## 2017-05-09 RX ADMIN — SODIUM CHLORIDE, SODIUM LACTATE, POTASSIUM CHLORIDE, AND CALCIUM CHLORIDE: 600; 310; 30; 20 INJECTION, SOLUTION INTRAVENOUS at 01:05

## 2017-05-09 RX ADMIN — PROPOFOL 120 MG: 10 INJECTION, EMULSION INTRAVENOUS at 02:05

## 2017-05-09 RX ADMIN — PROPOFOL 40 MG: 10 INJECTION, EMULSION INTRAVENOUS at 02:05

## 2017-05-09 NOTE — OR NURSING
Final time out is completed and agreed by all staff.  Patient is sedated adequately for procedure. EGD and Colonoscopy

## 2017-05-09 NOTE — DISCHARGE SUMMARY
[facility] -   Brief Operative Note     SUMMARY     Surgery Date: 5/9/2017     Surgeon(s) and Role:     * Kin Dyer MD - Primary    Assisting Surgeon: None    Pre-op Diagnosis:  Heartburn [R12]  Crohn's disease of small intestine without complication [K50.00]  Diarrhea, unspecified type [R19.7]    Post-op Diagnosis:  Post-Op Diagnosis Codes:     * Heartburn [R12]     * Crohn's disease of small intestine without complication [K50.00]     * Diarrhea, unspecified type [R19.7]    Procedure(s) (LRB):  ESOPHAGOGASTRODUODENOSCOPY (EGD) (N/A)  COLONOSCOPY (N/A)    Anesthesia: Monitor Anesthesia Care    Description of the findings of the procedure: Procedure completed. See Procedure note for details.     Findings/Key Components: Procedure completed. See Procedure note for details.     Prosthesis/Implants: None    Estimated Blood Loss: less than 10         Specimens:   Specimen (12h ago through future)    Start     Ordered    05/09/17 1422  Specimen to Pathology - Surgery  Once     Comments:  1. Duodenal erosions Bx  2. Gastric Bx R/O H Pylori  3. ileum Bx R/O  Ileitis  4. Colon Bx R/o colitis  5. Colon polyp    05/09/17 1441          Discharge Note    SUMMARY     Admit Date: 5/9/2017    Discharge Date and Time:  05/09/2017 2:42 PM    Hospital Course (synopsis of major diagnoses, care, treatment, and services provided during the course of the hospital stay): Procedure completed. See Procedure note for details.      Final Diagnosis: Post-Op Diagnosis Codes:     * Heartburn [R12]     * Crohn's disease of small intestine without complication [K50.00]     * Diarrhea, unspecified type [R19.7]    Disposition: Home or Self Care    Follow Up/Patient Instructions:     Medications:  Reconciled Home Medications:   Current Discharge Medication List      CONTINUE these medications which have NOT CHANGED    Details   BREO ELLIPTA 100-25 mcg/dose diskus inhaler Refills: 0      duloxetine (CYMBALTA) 60 MG capsule Take  60 mg by mouth 2 (two) times daily.      DYMISTA 137-50 mcg/spray [place] nassal spray instill 1 spray into each nostril twice a day  Qty: 23 g, Refills: 11    Associated Diagnoses: Sinusitis, acute      eletriptan (RELPAX) 40 MG tablet Take 1 tablet (40 mg total) by mouth as needed.  Qty: 12 tablet, Refills: 2      ergocalciferol (ERGOCALCIFEROL) 50,000 unit Cap Take 1 capsule (50,000 Units total) by mouth every 7 days.  Qty: 12 capsule, Refills: 5      estradiol (ESTRACE) 2 MG tablet Take 2 mg by mouth once daily.      losartan (COZAAR) 50 MG tablet Take 1 tablet (50 mg total) by mouth once daily.  Qty: 90 tablet, Refills: 3      LYRICA 150 mg capsule take 1 capsule by mouth three times a day  Qty: 90 capsule, Refills: 5    Associated Diagnoses: Fibromyalgia      mesalamine (PENTASA) 500 MG CR capsule Take 2 capsules (1,000 mg total) by mouth 4 (four) times daily.  Qty: 720 capsule, Refills: 3    Associated Diagnoses: Crohn's colitis, without complications      montelukast (SINGULAIR) 10 mg tablet take 1 tablet by mouth every evening  Qty: 90 tablet, Refills: 3      nortriptyline (PAMELOR) 25 MG capsule take 2 capsules by mouth once daily  Qty: 180 capsule, Refills: 1      pantoprazole (PROTONIX) 40 MG tablet Take 1 tablet (40 mg total) by mouth once daily.  Qty: 30 tablet, Refills: 11      propranolol (INDERAL) 40 MG tablet Take 1 tablet (40 mg total) by mouth 3 (three) times daily.  Qty: 90 tablet, Refills: 11      simvastatin (ZOCOR) 20 MG tablet Take 1 tablet (20 mg total) by mouth every evening.  Qty: 90 tablet, Refills: 3      zolpidem (AMBIEN) 5 MG Tab Take 1 tablet (5 mg total) by mouth nightly as needed.  Qty: 30 tablet, Refills: 5      albuterol 90 mcg/actuation inhaler Inhale 2 puffs into the lungs every 4 to 6 hours as needed for Wheezing.  Qty: 8.5 g, Refills: 0         STOP taking these medications       sodium,potassium,mag sulfates (SUPREP BOWEL PREP KIT) 17.5-3.13-1.6 gram SolR Comments:   Reason for  Stopping:               Discharge Procedure Orders  Diet general     Activity as tolerated       Follow-up Information     Follow up with Esthela Hu MD.    Specialty:  Family Medicine    Contact information:    [address] [address]  [place] [address] [phone]

## 2017-05-09 NOTE — IP AVS SNAPSHOT
[address] [place] [address]           Patient Discharge Instructions   Our goal is to set you up for success. This packet includes information on your condition, medications, and your home care.  It will help you care for yourself to prevent having to return to the hospital.     Please ask your nurse if you have any questions.      There are many details to remember when preparing to leave the hospital. Here is what you will need to do:    1. Take your medicine. If you are prescribed medications, review your Medication List on the following pages. You may have new medications to  at the pharmacy and others that you'll need to stop taking. Review the instructions for how and when to take your medications. Talk with your doctor or nurses if you are unsure of what to do.     2. Go to your follow-up appointments. Specific follow-up information is listed in the following pages. Your may be contacted by a nurse or clinical provider about future appointments. Be sure we have all of the phone numbers to reach you. Please contact your provider's office if you are unable to make an appointment.     3. Watch for warning signs. Your doctor or nurse will give you detailed warning signs to watch for and when to call for assistance. These instructions may also include educational information about your condition. If you experience any of warning signs to your health, call your doctor.               ** Verify the list of medication(s) below is accurate and up to date. Carry this with you in case of emergency. If your medications have changed, please notify your healthcare provider.             Medication List      CONTINUE taking these medications        Additional Info                      albuterol 90 mcg/actuation inhaler   Quantity:  8.5 g   Refills:  0   Dose:  2 puff    Instructions:  Inhale 2 puffs into the lungs every 4 to 6 hours as needed for Wheezing.     Begin  Date    AM    Noon    PM    Bedtime       BREO ELLIPTA 100-25 mcg/dose diskus inhaler   Refills:  0   Generic drug:  fluticasone-vilanterol      Begin Date    AM    Noon    PM    Bedtime       duloxetine 60 MG capsule   Commonly known as:  CYMBALTA   Refills:  0   Dose:  60 mg    Instructions:  Take 60 mg by mouth 2 (two) times daily.     Begin Date    AM    Noon    PM    Bedtime       DYMISTA 137-50 mcg/spray [place] nassal spray   Quantity:  23 g   Refills:  11   Generic drug:  azelastine-fluticasone    Instructions:  instill 1 spray into each nostril twice a day     Begin Date    AM    Noon    PM    Bedtime       eletriptan 40 MG tablet   Commonly known as:  RELPAX   Quantity:  12 tablet   Refills:  2   Dose:  40 mg    Instructions:  Take 1 tablet (40 mg total) by mouth as needed.     Begin Date    AM    Noon    PM    Bedtime       ergocalciferol 50,000 unit Cap   Commonly known as:  ERGOCALCIFEROL   Quantity:  12 capsule   Refills:  5   Dose:  32758 Units    Instructions:  Take 1 capsule (50,000 Units total) by mouth every 7 days.     Begin Date    AM    Noon    PM    Bedtime       estradiol 2 MG tablet   Commonly known as:  ESTRACE   Refills:  0   Dose:  2 mg    Instructions:  Take 2 mg by mouth once daily.     Begin Date    AM    Noon    PM    Bedtime       losartan 50 MG tablet   Commonly known as:  COZAAR   Quantity:  90 tablet   Refills:  3   Dose:  50 mg    Instructions:  Take 1 tablet (50 mg total) by mouth once daily.     Begin Date    AM    Noon    PM    Bedtime       LYRICA 150 MG capsule   Quantity:  90 capsule   Refills:  5   Generic drug:  pregabalin    Instructions:  take 1 capsule by mouth three times a day     Begin Date    AM    Noon    PM    Bedtime       mesalamine 500 MG CR capsule   Commonly known as:  PENTASA   Quantity:  720 capsule   Refills:  3   Dose:  1000 mg    Instructions:  Take 2 capsules (1,000 mg total) by mouth 4 (four) times daily.     Begin Date    AM    Noon    PM    Bedtime        montelukast 10 mg tablet   Commonly known as:  SINGULAIR   Quantity:  90 tablet   Refills:  3    Instructions:  take 1 tablet by mouth every evening     Begin Date    AM    Noon    PM    Bedtime       nortriptyline 25 MG capsule   Commonly known as:  PAMELOR   Quantity:  180 capsule   Refills:  1    Instructions:  take 2 capsules by mouth once daily     Begin Date    AM    Noon    PM    Bedtime       pantoprazole 40 MG tablet   Commonly known as:  PROTONIX   Quantity:  30 tablet   Refills:  11   Dose:  40 mg    Instructions:  Take 1 tablet (40 mg total) by mouth once daily.     Begin Date    AM    Noon    PM    Bedtime       propranolol 40 MG tablet   Commonly known as:  INDERAL   Quantity:  90 tablet   Refills:  11   Dose:  40 mg    Instructions:  Take 1 tablet (40 mg total) by mouth 3 (three) times daily.     Begin Date    AM    Noon    PM    Bedtime       simvastatin 20 MG tablet   Commonly known as:  ZOCOR   Quantity:  90 tablet   Refills:  3   Dose:  20 mg    Instructions:  Take 1 tablet (20 mg total) by mouth every evening.     Begin Date    AM    Noon    PM    Bedtime       zolpidem 5 MG Tab   Commonly known as:  AMBIEN   Quantity:  30 tablet   Refills:  5   Dose:  5 mg    Instructions:  Take 1 tablet (5 mg total) by mouth nightly as needed.     Begin Date    AM    Noon    PM    Bedtime         STOP taking these medications     sodium,potassium,mag sulfates 17.5-3.13-1.6 gram Solr   Commonly known as:  SUPREP BOWEL PREP KIT                  Please bring to all follow up appointments:    1. A copy of your discharge instructions.  2. All medicines you are currently taking in their original bottles.  3. Identification and insurance card.    Please arrive 15 minutes ahead of scheduled appointment time.    Please call 24 hours in advance if you must reschedule your appointment and/or time.        Your Scheduled Appointments     May 11, 2017 12:30 PM CDT   Established Patient Visit with Asher Pierce MD    [facility] ([facility])    [address]  [place] [address]   [phone]            Aug 04, 2017 10:00 AM CDT   Consult with Virgil Edwards MD   [facility] ([facility])    [address] [place] [id number] [phone]              Follow-up Information     Follow up with Esthela Hu MD.    Specialty:  Family Medicine    Contact information:    [address]  [place] [address]  [phone]          Discharge Instructions     Future Orders    Activity as tolerated     Diet general     Questions:    Total calories:      Fat restriction, if any:      Protein restriction, if any:      Na restriction, if any:      Fluid restriction:      Additional restrictions:          Discharge Instructions         Understanding Colon and Rectal Polyps    The colon (also called the large intestine) is a muscular tube that forms the last part of the digestive tract. It absorbs water and stores food waste. The colon is about 4 to 6 feet long. The rectum is the last 6 inches of the colon. The colon and rectum have a smooth lining composed of millions of cells. Changes in these cells can lead to growths in the colon that can become cancerous and should be removed. Multiple tests are available to screen for colon cancer, but the colonoscopy is the most recommended test. During colonoscopy, these polyps can be removed. How often you need this test depends on many things including your condition, your family history, symptoms, and what the findings were at the previous colonoscopy.   When the colon lining changes  Changes that happen in the cells that line the colon or rectum can lead to growths called polyps. Over a period of years, polyps can turn cancerous. Removing polyps early may prevent cancer from ever forming.  Polyps  Polyps are fleshy clumps of tissue that form on the lining of the colon or rectum. Small polyps are usually benign (not  cancerous). However, over time, cells in a polyp can change and become cancerous. Certain types of polyps known as adenomatous polyps are premalignant. The risk for invasive cancer increases with the size of the polyp and certain cell and gene features. This means that they can become cancerous if they're not removed. Hyperplastic polyps are benign. They can grow quite large and not turn cancerous.   Cancer  Almost all colorectal cancers start when polyp cells begin growing abnormally. As a cancerous tumor grows, it may involve more and more of the colon or rectum. In time, cancer can also grow beyond the colon or rectum and spread to nearby organs or to glands called lymph nodes. The cells can also travel to other parts of the body. This is known as metastasis. The earlier a cancerous tumor is removed, the better the chance of preventing its spread.    Date Last Reviewed: 8/1/2016  © 4426-7247 [employer]. [address] [place] [address]. All rights reserved. This information is not intended as a substitute for professional medical care. Always follow your healthcare professional's instructions.        Gastritis (Adult)    Gastritis is inflammation and irritation of the stomach lining. It can be present for a short time (acute) or be long lasting (chronic). Gastritis is often caused by infection with bacteria called H pylori. More than a third of people in the [place] have this bacteria in their bodies. In many cases, H pylori causes no problems or symptoms. In some people, though, the infection irritates the stomach lining and causes gastritis. Other causes of stomach irritation include drinking alcohol or taking pain-relieving medicines called NSAIDs (such as aspirin or ibuprofen).   Symptoms of gastritis can include:  · Abdominal pain or bloating  · Loss of appetite  · Nausea or vomiting  · Vomiting blood or having black stools  · Feeling more tired than usual  An inflamed and irritated  stomach lining is more likely to develop a sore called an ulcer. To help prevent this, gastritis should be treated.  Home care  If needed, medicines may be prescribed. If you have H pylori infection, treating it will likely relieve your symptoms. Other changes can help reduce stomach irritation and help it heal.  · If you have been prescribed medicines for H pylori infection, take them as directed. Take all of the medicine until it is finished or your healthcare provider tells you to stop, even if you feel better.  · Your healthcare provider may recommend avoiding NSAIDs. If you take daily aspirin for your heart or other medical reasons, do not stop without talking to your healthcare provider first.  · Avoid drinking alcohol.  · Stop smoking. Smoking can irritate the stomach and delay healing. As much as possible, stay away from second hand smoke.  Follow-up care  Follow up with your healthcare provider, or as advised by our staff. Testing may be needed to check for inflammation or an ulcer.  When to seek medical advice  Call your healthcare provider for any of the following:  · Stomach pain that gets worse or moves to the lower right abdomen (appendix area)  · Chest pain that appears or gets worse, or spreads to the back, neck, shoulder, or arm  · Frequent vomiting (cant keep down liquids)  · Blood in the stool or vomit (red or black in color)  · Feeling weak or dizzy  · Fever of 100.4ºF (38ºC) or higher, or as directed by your healthcare provider  Date Last Reviewed: 6/22/2015  © 7983-4598 [employer]. [address] [place] [address], [place] [address]. All rights reserved. This information is not intended as a substitute for professional medical care. Always follow your healthcare professional's instructions.            Admission Information     Date & Time Provider Department CSN    5/9/2017 12:25 PM Kin Dyer MD [facility] [id number]      Care Providers     Provider Role Specialty  "Primary office phone    Kin Dyer MD Attending Provider Gastroenterology [phone]    Kin Dyer MD Surgeon  Gastroenterology [phone]      Your Vitals Were     BP Pulse Temp Resp Height Weight    135/86 66 98.6 °F (37 °C) 14 5' 7" (1.702 m) 83.9 kg (185 lb)    SpO2 BMI             96% 28.98 kg/m2         Recent Lab Values        4/7/2017                          11:20 AM           A1C 5.5           Comment for A1C at 11:20 AM on 4/7/2017:  According to ADA guidelines, hemoglobin A1C <7.0% represents  optimal control in non-pregnant diabetic patients.  Different  metrics may apply to specific populations.   Standards of Medical Care in Diabetes - 2016.  For the purpose of screening for the presence of diabetes:  <5.7%     Consistent with the absence of diabetes  5.7-6.4%  Consistent with increasing risk for diabetes   (prediabetes)  >or=6.5%  Consistent with diabetes  Currently no consensus exists for use of hemoglobin A1C  for diagnosis of diabetes for children.        Pending Labs     Order Current Status    Specimen to Pathology - Surgery Collected (05/09/17 1441)      Allergies as of 5/9/2017     No Known Allergies      [facility]     [facility] Nurse Care Line - 24/7 Assistance  Unless otherwise directed by your provider, please contact [facility], our nurse care line that is available for 24/7 assistance.     Registered nurses in the [facility] provide clinical advisement, health education, appointment booking, and other advisory services.  Call for this free service at [phone].        Advance Directives     An advance directive is a document which, in the event you are no longer able to make decisions for yourself, tells your healthcare team what kind of treatment you do or do not want to receive, or who you would like to make those decisions for you.  If you do not currently have an advance directive, [facility] encourages you to create one.  For more " information call:  [phone]), [phone] ([phone]),  or log on to [email or web address].        Language Assistance Services     ATTENTION: Language assistance services are available, free of charge. Please call [phone].      ATENCIÓN: Si habla español, tiene a ann disposición servicios gratuitos de asistencia lingüística. Llame al [phone].     CHÚ Ý: N?u b?n nói Ti?ng Vi?t, có các d?ch v? h? tr? ngôn ng? mi?n phí dành cho b?n. G?i s? [phone].         [facility] complies with applicable Federal civil rights laws and does not discriminate on the basis of race, color, national origin, age, disability, or sex.

## 2017-05-09 NOTE — ANESTHESIA PREPROCEDURE EVALUATION
05/09/2017  Jaylin Murguia is a 52 y.o., female.    Anesthesia Evaluation    I have reviewed the Patient Summary Reports.    I have reviewed the Nursing Notes.   I have reviewed the Medications.     Review of Systems  Anesthesia Hx:  No problems with previous Anesthesia    Social:  Non-Smoker    Hematology/Oncology:  Hematology Normal   Oncology Normal     EENT/Dental:   chronic allergic rhinitis   Cardiovascular:   Hypertension, well controlled    Pulmonary:   Asthma moderate    Hepatic/GI:   Bowel Prep.  Bowel Conditions:  Inflammatory Bowel Disease, Crohns    Musculoskeletal:  Muscle Disorders: Fibromyalgia    Neurological:   Neuromuscular Disease, Headaches    Dermatological:  Skin Normal    Psych:  Psychiatric Normal           Physical Exam  General:  Obesity    Airway/Jaw/Neck:  Airway Findings: Mouth Opening: Normal Tongue: Normal  General Airway Assessment: Adult       Chest/Lungs:  Chest/Lungs Findings: Clear to auscultation     Heart/Vascular:  Heart Findings: Rate: Normal             Anesthesia Plan  Type of Anesthesia, risks & benefits discussed:  Anesthesia Type:  MAC  Patient's Preference:   Intra-op Monitoring Plan:   Intra-op Monitoring Plan Comments:   Post Op Pain Control Plan:   Post Op Pain Control Plan Comments:   Induction:   IV  Beta Blocker:  Patient is not currently on a Beta-Blocker (No further documentation required).       Informed Consent: Patient understands risks and agrees with Anesthesia plan.  Questions answered. Anesthesia consent signed with patient.  ASA Score: 2     Day of Surgery Review of History & Physical: I have interviewed and examined the patient. I have reviewed the patient's H&P dated:  There are no significant changes.          Ready For Surgery From Anesthesia Perspective.

## 2017-05-09 NOTE — ANESTHESIA RELEASE NOTE
"Anesthesia Release from PACU Note    Patient: Jaylin Murguia    Procedure(s) Performed: Procedure(s) (LRB):  ESOPHAGOGASTRODUODENOSCOPY (EGD) (N/A)  COLONOSCOPY (N/A)    Anesthesia type: MAC    Post pain: Adequate analgesia    Post assessment: no apparent anesthetic complications    Last Vitals:   Visit Vitals    /86    Pulse 66    Temp 37 °C (98.6 °F)    Resp 14    Ht 5' 7" (1.702 m)    Wt 83.9 kg (185 lb)    SpO2 96%    Breastfeeding No    BMI 28.98 kg/m2       Post vital signs: stable    Level of consciousness: awake    Nausea/Vomiting: no nausea/no vomiting    Complications: none    Airway Patency: patent    Respiratory: unassisted    Cardiovascular: stable and blood pressure at baseline    Hydration: euvolemic  "

## 2017-05-09 NOTE — OR NURSING
++++ EGD    Z-line at 38cm. Gastritis Bxs taken.Patient tolerated procedure well.      ++++Colonoscopy      3. ileum Bx R/O ileitis.4. Colon Bx R/o colitis. 5. Colon polyp. Patient tolerated procedure well.

## 2017-05-09 NOTE — H&P
Short Stay Endoscopy History and Physical    PCP - Esthela Hu MD    Procedure - EGD and colonoscopy  ASA - 2  Mallampati - per anesthesia  History of Anesthesia problems - no  Family history Anesthesia problems -  no     HPI:  This is a 52 y.o. female here for evaluation of :     EGD  Reflux - yes  Dysphagia - no  Abdominal pain - no  Diarrhea - no  Anemia - no  GI bleeding - no  Other - no    Colonoscopy  Screening - no  History of polyps - no  Diarrhea - yes  Anemia - no  Blood in stools - no  Abdominal pain - no  Other - no    ROS:  CONSTITUTIONAL: Denies weight change,  fatigue, fevers, chills, night sweats.  CARDIOVASCULAR: Denies chest pain, shortness of breath, orthopnea and edema.  RESPIRATORY: Denies cough, hemoptysis, dyspnea, and wheezing.  GI: See HPI.    Medical History:   Past Medical History:   Diagnosis Date    Allergic rhinitis     Asthma     Crohn's disease     Ileal involvement, previously on Remicade, Asacol, Prednisone    Fibromyalgia     Hypertension     Migraine     Sciatica        Surgical History:   Past Surgical History:   Procedure Laterality Date    BLADDER SURGERY      sling was created by her muscles      SECTION      FINGER SURGERY      joint relpacement, left hand index finger    HYSTERECTOMY      WISDOM TOOTH EXTRACTION         Family History:   Family History   Problem Relation Age of Onset    Hypertension Mother     Kidney disease Father     Scleroderma Father     Hypertension Brother     Cancer Paternal Grandmother 70     colon       Social History:   Social History   Substance Use Topics    Smoking status: Never Smoker    Smokeless tobacco: Never Used    Alcohol use No       Allergies: Reviewed    Medications:   No current facility-administered medications on file prior to encounter.      Current Outpatient Prescriptions on File Prior to Encounter   Medication Sig Dispense Refill    BREO ELLIPTA 100-25 mcg/dose diskus inhaler   0     duloxetine (CYMBALTA) 60 MG capsule Take 60 mg by mouth 2 (two) times daily.      DYMISTA 137-50 mcg/spray Kevin nassal spray instill 1 spray into each nostril twice a day 23 g 11    eletriptan (RELPAX) 40 MG tablet Take 1 tablet (40 mg total) by mouth as needed. 12 tablet 2    ergocalciferol (ERGOCALCIFEROL) 50,000 unit Cap Take 1 capsule (50,000 Units total) by mouth every 7 days. 12 capsule 5    estradiol (ESTRACE) 2 MG tablet Take 2 mg by mouth once daily.      losartan (COZAAR) 50 MG tablet Take 1 tablet (50 mg total) by mouth once daily. 90 tablet 3    LYRICA 150 mg capsule take 1 capsule by mouth three times a day 90 capsule 5    mesalamine (PENTASA) 500 MG CR capsule Take 2 capsules (1,000 mg total) by mouth 4 (four) times daily. 720 capsule 3    montelukast (SINGULAIR) 10 mg tablet take 1 tablet by mouth every evening 90 tablet 3    nortriptyline (PAMELOR) 25 MG capsule take 2 capsules by mouth once daily 180 capsule 1    pantoprazole (PROTONIX) 40 MG tablet Take 1 tablet (40 mg total) by mouth once daily. 30 tablet 11    propranolol (INDERAL) 40 MG tablet Take 1 tablet (40 mg total) by mouth 3 (three) times daily. 90 tablet 11    simvastatin (ZOCOR) 20 MG tablet Take 1 tablet (20 mg total) by mouth every evening. 90 tablet 3    sodium,potassium,mag sulfates (SUPREP BOWEL PREP KIT) 17.5-3.13-1.6 gram SolR As directed 354 mL 0    zolpidem (AMBIEN) 5 MG Tab Take 1 tablet (5 mg total) by mouth nightly as needed. 30 tablet 5    albuterol 90 mcg/actuation inhaler Inhale 2 puffs into the lungs every 4 to 6 hours as needed for Wheezing. 8.5 g 0       Physical Exam:  Vital Signs:   Vitals:    05/09/17 1314   BP: 117/75   Pulse: 60   Resp: 18   Temp: 97.9 °F (36.6 °C)     General Appearance: Well appearing in no acute distress  ENT: OP clear  Chest: CTA B  CV: RRR, no m/r/g  Abd: s/nt/nd/nabs  Ext: no edema    Labs:Reviewed    Plan:   I have explained the risks and benefits of upper endoscopy and  colonoscopy to the patient including but not limited to bleeding, perforation, infection, and death. The patient wishes to proceed.

## 2017-05-09 NOTE — DISCHARGE INSTRUCTIONS
Understanding Colon and Rectal Polyps    The colon (also called the large intestine) is a muscular tube that forms the last part of the digestive tract. It absorbs water and stores food waste. The colon is about 4 to 6 feet long. The rectum is the last 6 inches of the colon. The colon and rectum have a smooth lining composed of millions of cells. Changes in these cells can lead to growths in the colon that can become cancerous and should be removed. Multiple tests are available to screen for colon cancer, but the colonoscopy is the most recommended test. During colonoscopy, these polyps can be removed. How often you need this test depends on many things including your condition, your family history, symptoms, and what the findings were at the previous colonoscopy.   When the colon lining changes  Changes that happen in the cells that line the colon or rectum can lead to growths called polyps. Over a period of years, polyps can turn cancerous. Removing polyps early may prevent cancer from ever forming.  Polyps  Polyps are fleshy clumps of tissue that form on the lining of the colon or rectum. Small polyps are usually benign (not cancerous). However, over time, cells in a polyp can change and become cancerous. Certain types of polyps known as adenomatous polyps are premalignant. The risk for invasive cancer increases with the size of the polyp and certain cell and gene features. This means that they can become cancerous if they're not removed. Hyperplastic polyps are benign. They can grow quite large and not turn cancerous.   Cancer  Almost all colorectal cancers start when polyp cells begin growing abnormally. As a cancerous tumor grows, it may involve more and more of the colon or rectum. In time, cancer can also grow beyond the colon or rectum and spread to nearby organs or to glands called lymph nodes. The cells can also travel to other parts of the body. This is known as metastasis. The earlier a cancerous  tumor is removed, the better the chance of preventing its spread.    Date Last Reviewed: 8/1/2016  © 8253-3100 [employer]. [address] [place] [address], [place] [address]. All rights reserved. This information is not intended as a substitute for professional medical care. Always follow your healthcare professional's instructions.        Gastritis (Adult)    Gastritis is inflammation and irritation of the stomach lining. It can be present for a short time (acute) or be long lasting (chronic). Gastritis is often caused by infection with bacteria called H pylori. More than a third of people in the [place] have this bacteria in their bodies. In many cases, H pylori causes no problems or symptoms. In some people, though, the infection irritates the stomach lining and causes gastritis. Other causes of stomach irritation include drinking alcohol or taking pain-relieving medicines called NSAIDs (such as aspirin or ibuprofen).   Symptoms of gastritis can include:  · Abdominal pain or bloating  · Loss of appetite  · Nausea or vomiting  · Vomiting blood or having black stools  · Feeling more tired than usual  An inflamed and irritated stomach lining is more likely to develop a sore called an ulcer. To help prevent this, gastritis should be treated.  Home care  If needed, medicines may be prescribed. If you have H pylori infection, treating it will likely relieve your symptoms. Other changes can help reduce stomach irritation and help it heal.  · If you have been prescribed medicines for H pylori infection, take them as directed. Take all of the medicine until it is finished or your healthcare provider tells you to stop, even if you feel better.  · Your healthcare provider may recommend avoiding NSAIDs. If you take daily aspirin for your heart or other medical reasons, do not stop without talking to your healthcare provider first.  · Avoid drinking alcohol.  · Stop smoking. Smoking can irritate the stomach and delay  healing. As much as possible, stay away from second hand smoke.  Follow-up care  Follow up with your healthcare provider, or as advised by our staff. Testing may be needed to check for inflammation or an ulcer.  When to seek medical advice  Call your healthcare provider for any of the following:  · Stomach pain that gets worse or moves to the lower right abdomen (appendix area)  · Chest pain that appears or gets worse, or spreads to the back, neck, shoulder, or arm  · Frequent vomiting (cant keep down liquids)  · Blood in the stool or vomit (red or black in color)  · Feeling weak or dizzy  · Fever of 100.4ºF (38ºC) or higher, or as directed by your healthcare provider  Date Last Reviewed: 6/22/2015 © 2000-2016 [employer]. [address] [place] [address], [place] [address]. All rights reserved. This information is not intended as a substitute for professional medical care. Always follow your healthcare professional's instructions.

## 2017-05-09 NOTE — ANESTHESIA POSTPROCEDURE EVALUATION
"Anesthesia Post Evaluation    Patient: Jaylin Murguia    Procedure(s) Performed: Procedure(s) (LRB):  ESOPHAGOGASTRODUODENOSCOPY (EGD) (N/A)  COLONOSCOPY (N/A)    Final Anesthesia Type: MAC  Patient location during evaluation: PACU  Patient participation: Yes- Able to Participate  Level of consciousness: awake and alert  Post-procedure vital signs: reviewed and stable  Pain management: adequate  Airway patency: patent  PONV status at discharge: No PONV  Anesthetic complications: no      Cardiovascular status: blood pressure returned to baseline  Respiratory status: unassisted  Hydration status: euvolemic  Follow-up needed         Visit Vitals    /86    Pulse 66    Temp 37 °C (98.6 °F)    Resp 14    Ht 5' 7" (1.702 m)    Wt 83.9 kg (185 lb)    SpO2 96%    Breastfeeding No    BMI 28.98 kg/m2       Pain/Ramya Score: Pain Assessment Performed: Yes (5/9/2017  1:16 PM)  Presence of Pain: complains of pain/discomfort (5/9/2017  1:16 PM)  Ramya Score: 9 (5/9/2017  2:47 PM)      "

## 2017-05-16 ENCOUNTER — PATIENT MESSAGE (OUTPATIENT)
Dept: GASTROENTEROLOGY | Facility: HOSPITAL | Age: 52
End: 2017-05-16

## 2017-05-16 ENCOUNTER — PATIENT MESSAGE (OUTPATIENT)
Dept: INTERNAL MEDICINE | Facility: CLINIC | Age: 52
End: 2017-05-16

## 2017-05-16 DIAGNOSIS — K50.00 CROHN'S DISEASE OF SMALL INTESTINE WITHOUT COMPLICATION: Primary | ICD-10-CM

## 2017-05-16 NOTE — TELEPHONE ENCOUNTER
Bxs sent to pt. Recommend advancing tx. Pt expressed preference for Humira. Will need to get HBV serologies as well as PPD.  Results sent to patient through [facility].

## 2017-05-18 ENCOUNTER — TELEPHONE (OUTPATIENT)
Dept: GASTROENTEROLOGY | Facility: CLINIC | Age: 52
End: 2017-05-18

## 2017-05-18 ENCOUNTER — PATIENT MESSAGE (OUTPATIENT)
Dept: NEPHROLOGY | Facility: CLINIC | Age: 52
End: 2017-05-18

## 2017-05-19 ENCOUNTER — TELEPHONE (OUTPATIENT)
Dept: GASTROENTEROLOGY | Facility: CLINIC | Age: 52
End: 2017-05-19

## 2017-05-19 ENCOUNTER — TELEPHONE (OUTPATIENT)
Dept: GASTROENTEROLOGY | Facility: CLINIC | Age: 52
End: 2017-05-19
Payer: COMMERCIAL

## 2017-05-19 DIAGNOSIS — K50.00 CROHN'S DISEASE OF SMALL INTESTINE WITHOUT COMPLICATION: Primary | ICD-10-CM

## 2017-05-19 NOTE — TELEPHONE ENCOUNTER
----- Message from Erin Pan sent at 5/18/2017  2:56 PM CDT -----  Contact: pt  Pt states she is returning a missed call, pt can be reached at [phone]///thxMW

## 2017-05-22 ENCOUNTER — LAB VISIT (OUTPATIENT)
Dept: LAB | Facility: HOSPITAL | Age: 52
End: 2017-05-22
Attending: INTERNAL MEDICINE
Payer: COMMERCIAL

## 2017-05-22 ENCOUNTER — CLINICAL SUPPORT (OUTPATIENT)
Dept: INTERNAL MEDICINE | Facility: CLINIC | Age: 52
End: 2017-05-22
Payer: COMMERCIAL

## 2017-05-22 DIAGNOSIS — K50.00 CROHN'S DISEASE OF SMALL INTESTINE WITHOUT COMPLICATION: ICD-10-CM

## 2017-05-22 PROCEDURE — 86580 TB INTRADERMAL TEST: CPT | Mod: S$GLB,,,

## 2017-05-22 PROCEDURE — 36415 COLL VENOUS BLD VENIPUNCTURE: CPT | Mod: PO

## 2017-05-22 PROCEDURE — 99999 PR PBB SHADOW E&M-EST. PATIENT-LVL II: CPT | Mod: PBBFAC,,,

## 2017-05-22 PROCEDURE — 86706 HEP B SURFACE ANTIBODY: CPT

## 2017-05-22 PROCEDURE — 86704 HEP B CORE ANTIBODY TOTAL: CPT

## 2017-05-22 PROCEDURE — 87340 HEPATITIS B SURFACE AG IA: CPT

## 2017-05-22 PROCEDURE — 86790 VIRUS ANTIBODY NOS: CPT

## 2017-05-23 LAB
HBV CORE AB SERPL QL IA: NEGATIVE
HBV SURFACE AB SER-ACNC: NEGATIVE M[IU]/ML
HBV SURFACE AG SERPL QL IA: NEGATIVE
HEPATITIS A ANTIBODY, IGG: NEGATIVE

## 2017-05-23 NOTE — TELEPHONE ENCOUNTER
Spoke with patient. She states having the TB test done on 05/22/2017 at [facility] in [place] and will be resulted on 05/24/17. Will inform Dr. Dyer, she verbalized understanding, JIMMY.

## 2017-05-23 NOTE — TELEPHONE ENCOUNTER
Left message on answering machine to return a call to Dr. Dyer's office at [facility], schedule TB injection. JIMMY

## 2017-05-25 ENCOUNTER — CLINICAL SUPPORT (OUTPATIENT)
Dept: INTERNAL MEDICINE | Facility: CLINIC | Age: 52
End: 2017-05-25
Payer: COMMERCIAL

## 2017-05-25 LAB
TB INDURATION - 48 HR READ: 0 MM
TB INDURATION - 48 HR READ: 0 MM
TB INDURATION - 72 HR READ: 0 MM
TB INDURATION - 72 HR READ: 0 MM
TB SKIN TEST - 48 HR READ: NEGATIVE
TB SKIN TEST - 48 HR READ: NEGATIVE
TB SKIN TEST - 72 HR READ: NEGATIVE
TB SKIN TEST - 72 HR READ: NEGATIVE

## 2017-05-25 PROCEDURE — 86580 TB INTRADERMAL TEST: CPT | Mod: S$GLB,,, | Performed by: INTERNAL MEDICINE

## 2017-05-26 ENCOUNTER — PATIENT MESSAGE (OUTPATIENT)
Dept: GASTROENTEROLOGY | Facility: CLINIC | Age: 52
End: 2017-05-26

## 2017-05-27 NOTE — TELEPHONE ENCOUNTER
Long discussion of treatment options for Crohn's disease. Discussed the use of TNF inhibitors, Entyvio, Stelara. Risks of infection, malignancy, antibody development, injection reactions discussed with pt. She would like to try Humira. Rx sent to pharmacy for loading and for maintenance.

## 2017-05-29 ENCOUNTER — TELEPHONE (OUTPATIENT)
Dept: PHARMACY | Facility: CLINIC | Age: 52
End: 2017-05-29

## 2017-06-01 NOTE — TELEPHONE ENCOUNTER
FOR DOCUMENTATION ONLY:  Humira prior authorization approved x 3 fills  5/1/17 through 8/29/17  Case ID#: [id number]     Humira co pay card:  RxBIN: [id number]  RxPCN: [id number]  RxGRP: [id number]  RxID: [id number]  Suf: 01  $5 co pay     Patient must use [facility].   [phone] Fax

## 2017-06-06 NOTE — TELEPHONE ENCOUNTER
Spoke with patient. Informed her Dr. Dyer has sent in the necessary medical information for her medication,JIMMY.

## 2017-06-19 ENCOUNTER — PATIENT MESSAGE (OUTPATIENT)
Dept: GASTROENTEROLOGY | Facility: CLINIC | Age: 52
End: 2017-06-19

## 2017-06-21 ENCOUNTER — PATIENT MESSAGE (OUTPATIENT)
Dept: GASTROENTEROLOGY | Facility: CLINIC | Age: 52
End: 2017-06-21

## 2017-06-21 RX ORDER — BUTALBITAL, ACETAMINOPHEN AND CAFFEINE 50; 325; 40 MG/1; MG/1; MG/1
TABLET ORAL
Qty: 30 TABLET | Refills: 1 | Status: SHIPPED | OUTPATIENT
Start: 2017-06-21 | End: 2017-11-21 | Stop reason: SDUPTHER

## 2017-06-23 ENCOUNTER — TELEPHONE (OUTPATIENT)
Dept: NEPHROLOGY | Facility: CLINIC | Age: 52
End: 2017-06-23

## 2017-06-23 NOTE — TELEPHONE ENCOUNTER
----- Message from Harjit Adhikari sent at 6/23/2017  9:10 AM CDT -----  Contact: rachna - [facility]   States her insurance is req a 90 day refill propanolol 40mg and is calling to get that approved and can be reached at [phone]//thanks/dbw

## 2017-06-29 DIAGNOSIS — K50.019 CROHN'S DISEASE OF SMALL INTESTINE WITH COMPLICATION: Primary | ICD-10-CM

## 2017-07-05 ENCOUNTER — OFFICE VISIT (OUTPATIENT)
Dept: NEPHROLOGY | Facility: CLINIC | Age: 52
End: 2017-07-05
Payer: COMMERCIAL

## 2017-07-05 VITALS
BODY MASS INDEX: 31.56 KG/M2 | HEIGHT: 67 IN | SYSTOLIC BLOOD PRESSURE: 122 MMHG | HEART RATE: 74 BPM | WEIGHT: 201.06 LBS | DIASTOLIC BLOOD PRESSURE: 80 MMHG

## 2017-07-05 DIAGNOSIS — E87.1 HYPONATREMIA: Primary | ICD-10-CM

## 2017-07-05 DIAGNOSIS — R60.0 BILATERAL EDEMA OF LOWER EXTREMITY: ICD-10-CM

## 2017-07-05 DIAGNOSIS — I10 ESSENTIAL (PRIMARY) HYPERTENSION: ICD-10-CM

## 2017-07-05 DIAGNOSIS — G43.109 MIGRAINE WITH AURA AND WITHOUT STATUS MIGRAINOSUS, NOT INTRACTABLE: ICD-10-CM

## 2017-07-05 DIAGNOSIS — M79.7 FIBROMYALGIA: ICD-10-CM

## 2017-07-05 PROCEDURE — 99999 PR PBB SHADOW E&M-EST. PATIENT-LVL III: CPT | Mod: PBBFAC,,, | Performed by: INTERNAL MEDICINE

## 2017-07-05 PROCEDURE — 99214 OFFICE O/P EST MOD 30 MIN: CPT | Mod: S$GLB,,, | Performed by: INTERNAL MEDICINE

## 2017-07-05 NOTE — PROGRESS NOTES
Subjective:       Patient ID: Jaylin Murguia is a 52 y.o. White female who presents for follow up evaluation of hyponatremia; Headache; and Hypertension    Headache    Associated symptoms include back pain. Pertinent negatives include no coughing, dizziness, fever, nausea, numbness, rhinorrhea or vomiting.   Hypertension   Associated symptoms include headaches. Pertinent negatives include no chest pain, palpitations or shortness of breath.          Patient is a 52-year-old female with history of fibromyalgia, Crohn's disease and essential hypertension.  Had normal sodium last year.     4/2017  Today comes in for consultation for new onset of hyponatremia which is accompanied by low levels of chloride.  No history of hypokalemia and no history of diuretic therapy. CCB stopped. Inderal, Vit D and zocor added       7/2017 starting Humira for Crohn's --dr. Dyer     Review of Systems   Constitutional: Negative for activity change, appetite change, chills, diaphoresis, fatigue, fever and unexpected weight change.   HENT: Negative for congestion, dental problem, drooling, postnasal drip, rhinorrhea and voice change.    Eyes: Negative for discharge.   Respiratory: Negative for apnea, cough, choking, chest tightness, shortness of breath, wheezing and stridor.    Cardiovascular: Negative for chest pain, palpitations and leg swelling.   Gastrointestinal: Negative for abdominal distention, blood in stool, constipation, diarrhea, nausea, rectal pain and vomiting.   Endocrine: Negative for cold intolerance, heat intolerance, polydipsia and polyuria.   Genitourinary: Negative for decreased urine volume, difficulty urinating, dysuria, enuresis, flank pain, frequency, hematuria and urgency.   Musculoskeletal: Positive for arthralgias, back pain, myalgias and neck stiffness. Negative for gait problem and joint swelling.        Severe muscle and joint pains worse in the morning with early morning stiffness   Skin: Negative for  "rash.   Allergic/Immunologic: Negative for food allergies and immunocompromised state.   Neurological: Positive for headaches. Negative for dizziness, tremors, syncope and numbness.        Off-and-on migraine with aura at least 2 times a month which is now improved with no attack in last 3 months on current meds    Hematological: Does not bruise/bleed easily.   Psychiatric/Behavioral: Positive for sleep disturbance. Negative for agitation, behavioral problems and self-injury. The patient is not nervous/anxious and is not hyperactive.         Severe insomnia with some help by taking Ambien or melatonin   All other systems reviewed and are negative.      Objective:   /80   Pulse 74   Ht 5' 7" (1.702 m)   Wt 91.2 kg (201 lb 1 oz)   BMI 31.49 kg/m²      Physical Exam   Constitutional: She is oriented to person, place, and time. No distress.   HENT:   Head: Normocephalic and atraumatic.   Nose: Nose normal.   Eyes: Conjunctivae and EOM are normal. Pupils are equal, round, and reactive to light.   Neck: Normal range of motion. No JVD present. No tracheal deviation present. No thyromegaly present.   Cardiovascular: Normal rate, regular rhythm, normal heart sounds and intact distal pulses.  Exam reveals no gallop and no friction rub.    No murmur heard.  Pulmonary/Chest: Effort normal and breath sounds normal. No respiratory distress. She has no wheezes. She has no rales. She exhibits no tenderness.   Abdominal: Soft. Bowel sounds are normal. She exhibits no distension and no mass. There is no tenderness. No hernia.   Musculoskeletal: Normal range of motion. She exhibits no edema, tenderness or deformity.   Neurological: She is alert and oriented to person, place, and time. She has normal reflexes. She displays normal reflexes. No cranial nerve deficit. She exhibits normal muscle tone. Coordination normal.   Skin: Skin is warm. She is not diaphoretic. No erythema. No pallor.   Psychiatric: She has a normal mood " and affect. Her behavior is normal. Judgment and thought content normal.   Nursing note and vitals reviewed.        Lab Results   Component Value Date    CREATININE 0.9 05/08/2017    BUN 8 05/08/2017     (L) 05/08/2017    K 3.5 05/08/2017    CL 97 05/08/2017    CO2 23 05/08/2017     Lab Results   Component Value Date    WBC 5.66 04/03/2017    HGB 12.6 04/03/2017    HCT 37.0 04/03/2017    MCV 90 04/03/2017     04/03/2017     Lab Results   Component Value Date    CALCIUM 8.8 05/08/2017    PHOS 2.8 05/08/2017         Assessment:    )    1. Hyponatremia    2. Essential (primary) hypertension    3. Fibromyalgia    4. Bilateral edema of lower extremity    5. Migraine with aura and without status migrainosus, not intractable        Plan:         1.  Hyponatremia with hypochloremia: Patient is not on any diuretics.  Most likely complication of diarrhea due to malabsorption/Crohn's disease. GI evaluation noted. Plans for Humira noted.     2.  Essential hypertension with edema: controlled on Inderal.     3.  Recurrent migraine: Add Inderal, vitamin D and Zocor.  Much better     4.  Fibromyalgia: Continue with Elavil, Cymbalta, Lyrica. D/w patient about water therapy, Yoga and turmeric.   Consult rheumatology    5. Edema: much better off Norvasc       fup 6 months

## 2017-07-08 ENCOUNTER — PATIENT MESSAGE (OUTPATIENT)
Dept: GASTROENTEROLOGY | Facility: CLINIC | Age: 52
End: 2017-07-08

## 2017-07-10 RX ORDER — BUDESONIDE 3 MG/1
9 CAPSULE, COATED PELLETS ORAL DAILY
Qty: 90 CAPSULE | Refills: 1 | Status: SHIPPED | OUTPATIENT
Start: 2017-07-10 | End: 2017-08-09

## 2017-07-10 NOTE — TELEPHONE ENCOUNTER
Spoke with the pt. Will stop Pentasa and start Entocort 9mg for a month, 6mg for a month, then 3mg for a month. F/U in clinic in 2 months.

## 2017-07-11 ENCOUNTER — TELEPHONE (OUTPATIENT)
Dept: GASTROENTEROLOGY | Facility: CLINIC | Age: 52
End: 2017-07-11

## 2017-07-11 NOTE — TELEPHONE ENCOUNTER
----- Message from Jaswinder Holden sent at 7/11/2017 11:41 AM CDT -----  Pt is requesting a call from nurse to verify her medication direction.        Please call pt back at [phone]

## 2017-07-29 DIAGNOSIS — M79.7 FIBROMYALGIA: ICD-10-CM

## 2017-07-31 RX ORDER — PREGABALIN 150 MG/1
CAPSULE ORAL
Qty: 90 CAPSULE | Refills: 3 | Status: SHIPPED | OUTPATIENT
Start: 2017-07-31 | End: 2017-11-15 | Stop reason: SDUPTHER

## 2017-08-03 ENCOUNTER — PATIENT MESSAGE (OUTPATIENT)
Dept: GASTROENTEROLOGY | Facility: CLINIC | Age: 52
End: 2017-08-03

## 2017-08-03 DIAGNOSIS — R53.83 FATIGUE, UNSPECIFIED TYPE: Primary | ICD-10-CM

## 2017-08-03 DIAGNOSIS — K50.80 CROHN'S DISEASE OF BOTH SMALL AND LARGE INTESTINE WITHOUT COMPLICATION: ICD-10-CM

## 2017-08-04 ENCOUNTER — LAB VISIT (OUTPATIENT)
Dept: LAB | Facility: HOSPITAL | Age: 52
End: 2017-08-04
Attending: INTERNAL MEDICINE
Payer: COMMERCIAL

## 2017-08-04 ENCOUNTER — TELEPHONE (OUTPATIENT)
Dept: GASTROENTEROLOGY | Facility: CLINIC | Age: 52
End: 2017-08-04

## 2017-08-04 DIAGNOSIS — R53.83 FATIGUE, UNSPECIFIED TYPE: ICD-10-CM

## 2017-08-04 DIAGNOSIS — K50.80 CROHN'S DISEASE OF BOTH SMALL AND LARGE INTESTINE WITHOUT COMPLICATION: ICD-10-CM

## 2017-08-04 LAB
ALBUMIN SERPL BCP-MCNC: 3.8 G/DL
ALP SERPL-CCNC: 78 U/L
ALT SERPL W/O P-5'-P-CCNC: 23 U/L
ANION GAP SERPL CALC-SCNC: 12 MMOL/L
AST SERPL-CCNC: 25 U/L
BASOPHILS # BLD AUTO: 0.06 K/UL
BASOPHILS NFR BLD: 1 %
BILIRUB SERPL-MCNC: 0.2 MG/DL
BUN SERPL-MCNC: 11 MG/DL
CALCIUM SERPL-MCNC: 9 MG/DL
CHLORIDE SERPL-SCNC: 92 MMOL/L
CO2 SERPL-SCNC: 22 MMOL/L
CREAT SERPL-MCNC: 1 MG/DL
CRP SERPL-MCNC: 2.7 MG/L
DIFFERENTIAL METHOD: ABNORMAL
EOSINOPHIL # BLD AUTO: 0.2 K/UL
EOSINOPHIL NFR BLD: 2.9 %
ERYTHROCYTE [DISTWIDTH] IN BLOOD BY AUTOMATED COUNT: 12.2 %
ERYTHROCYTE [SEDIMENTATION RATE] IN BLOOD BY WESTERGREN METHOD: 7 MM/HR
EST. GFR  (AFRICAN AMERICAN): >60 ML/MIN/1.73 M^2
EST. GFR  (NON AFRICAN AMERICAN): >60 ML/MIN/1.73 M^2
FERRITIN SERPL-MCNC: 99 NG/ML
GLUCOSE SERPL-MCNC: 90 MG/DL
HCT VFR BLD AUTO: 34.9 %
HGB BLD-MCNC: 12.3 G/DL
IRON SERPL-MCNC: 61 UG/DL
LYMPHOCYTES # BLD AUTO: 3.2 K/UL
LYMPHOCYTES NFR BLD: 51.9 %
MCH RBC QN AUTO: 31.1 PG
MCHC RBC AUTO-ENTMCNC: 35.2 G/DL
MCV RBC AUTO: 88 FL
MONOCYTES # BLD AUTO: 0.6 K/UL
MONOCYTES NFR BLD: 10.3 %
NEUTROPHILS # BLD AUTO: 2.1 K/UL
NEUTROPHILS NFR BLD: 33.7 %
PLATELET # BLD AUTO: 294 K/UL
PMV BLD AUTO: 9.3 FL
POTASSIUM SERPL-SCNC: 3.2 MMOL/L
PROT SERPL-MCNC: 6.9 G/DL
RBC # BLD AUTO: 3.95 M/UL
SATURATED IRON: 18 %
SODIUM SERPL-SCNC: 126 MMOL/L
TOTAL IRON BINDING CAPACITY: 334 UG/DL
TRANSFERRIN SERPL-MCNC: 226 MG/DL
TSH SERPL DL<=0.005 MIU/L-ACNC: 1.71 UIU/ML
VIT B12 SERPL-MCNC: 1660 PG/ML
WBC # BLD AUTO: 6.21 K/UL

## 2017-08-04 PROCEDURE — 36415 COLL VENOUS BLD VENIPUNCTURE: CPT | Mod: PO

## 2017-08-04 PROCEDURE — 86140 C-REACTIVE PROTEIN: CPT

## 2017-08-04 PROCEDURE — 84443 ASSAY THYROID STIM HORMONE: CPT

## 2017-08-04 PROCEDURE — 83540 ASSAY OF IRON: CPT

## 2017-08-04 PROCEDURE — 85651 RBC SED RATE NONAUTOMATED: CPT

## 2017-08-04 PROCEDURE — 85025 COMPLETE CBC W/AUTO DIFF WBC: CPT

## 2017-08-04 PROCEDURE — 82607 VITAMIN B-12: CPT

## 2017-08-04 PROCEDURE — 82728 ASSAY OF FERRITIN: CPT

## 2017-08-04 PROCEDURE — 80053 COMPREHEN METABOLIC PANEL: CPT

## 2017-08-04 NOTE — TELEPHONE ENCOUNTER
Please set up her labs and see if she can come to [place] next Wed in the morning or come early on Monday. Thanks.

## 2017-08-09 ENCOUNTER — OFFICE VISIT (OUTPATIENT)
Dept: GASTROENTEROLOGY | Facility: CLINIC | Age: 52
End: 2017-08-09
Payer: COMMERCIAL

## 2017-08-09 VITALS
BODY MASS INDEX: 30.69 KG/M2 | DIASTOLIC BLOOD PRESSURE: 86 MMHG | WEIGHT: 195.56 LBS | SYSTOLIC BLOOD PRESSURE: 120 MMHG | HEART RATE: 76 BPM | HEIGHT: 67 IN

## 2017-08-09 DIAGNOSIS — R42 POSTURAL DIZZINESS WITH PRESYNCOPE: ICD-10-CM

## 2017-08-09 DIAGNOSIS — R51.9 HEADACHE, UNSPECIFIED HEADACHE TYPE: ICD-10-CM

## 2017-08-09 DIAGNOSIS — E87.1 HYPONATREMIA: ICD-10-CM

## 2017-08-09 DIAGNOSIS — K50.018 CROHN'S DISEASE OF SMALL INTESTINE WITH OTHER COMPLICATION: Primary | ICD-10-CM

## 2017-08-09 DIAGNOSIS — I10 ESSENTIAL HYPERTENSION: Primary | ICD-10-CM

## 2017-08-09 DIAGNOSIS — R55 POSTURAL DIZZINESS WITH PRESYNCOPE: ICD-10-CM

## 2017-08-09 DIAGNOSIS — R07.0 THROAT BURNING: ICD-10-CM

## 2017-08-09 PROCEDURE — 3008F BODY MASS INDEX DOCD: CPT | Mod: S$GLB,,, | Performed by: INTERNAL MEDICINE

## 2017-08-09 PROCEDURE — 99999 PR PBB SHADOW E&M-EST. PATIENT-LVL II: CPT | Mod: PBBFAC,,, | Performed by: INTERNAL MEDICINE

## 2017-08-09 PROCEDURE — 99214 OFFICE O/P EST MOD 30 MIN: CPT | Mod: S$GLB,,, | Performed by: INTERNAL MEDICINE

## 2017-08-09 PROCEDURE — 3079F DIAST BP 80-89 MM HG: CPT | Mod: S$GLB,,, | Performed by: INTERNAL MEDICINE

## 2017-08-09 PROCEDURE — 3074F SYST BP LT 130 MM HG: CPT | Mod: S$GLB,,, | Performed by: INTERNAL MEDICINE

## 2017-08-09 NOTE — Clinical Note
Wanted to touch base with you guys about her.   Her Crohn's is under much better control. She was started on Humira 4 weeks ago. Diarrhea has been gone for 4 weeks. She is having some other issues that I wanted to touch base with you guys about.  Miguelina: She has been having throat and tongue burning and facial flushing. This started before she started Humira but has been getting worse. I didn't see anything on physical exam. Wanted to get your input.  Naseer: In spite of her diarrhea improving, labs from last week show that her hyponatremia persists (Na = 126). Didn't know if you wanted to follow up with her or had any other thoughts.  Thanks, Woody

## 2017-08-09 NOTE — PROGRESS NOTES
Clinic Follow Up:  [facility] Follow Up Note    Reason for Follow Up:  The primary encounter diagnosis was Crohn's disease of small intestine with other complication. Diagnoses of Headache, unspecified headache type, Postural dizziness with presyncope, Throat burning, and Hyponatremia were also pertinent to this visit.    PCP: Esthela Hu       HPI:  This is a 52 y.o. female here for follow up of the above issues.  She's doing well from a Crohn's disease standpoint but has had multiple other issues recently.  She started Humira on July 11.  She took 160 mg between July 11 and July 12, then 80 mg on July 25 and took her first dose of 40 mg yesterday.  She reports that she was having headaches after the first 2 doses but has not had a headache today.  She also has been having issues with burning in her throat and tongue as well as flushing of her face that lasts for several hours.  She's been eating crushed ice to help with the discomfort.  She remembers the symptoms actually starting prior to beginning Humira but has noticed that they have gotten worse in the last few weeks.  Last week she also had an episode of dizziness and almost fainted when getting out of the bathtub.  She reports that her diarrhea has completely resolved.  This actually resolved prior to starting Humira after she was initiated on Entocort.  She continues take Entocort as well.  She is about to decrease the dose to 6 mg daily.  She actually has been having some constipation issues.  Her abdominal pain still persists slightly but is much better than it was prior to starting treatment.      Review of Systems:  CONSTITUTIONAL: Denies weight change,  fatigue, fevers, chills, night sweats.  CARDIOVASCULAR: Denies chest pain, shortness of breath, orthopnea and edema.  RESPIRATORY: Denies cough, hemoptysis, dyspnea, and wheezing.  GI: See HPI.  : Denies dysuria and hematuria    Medical History:  Past Medical History:    Diagnosis Date    Allergic rhinitis     Asthma     Crohn's disease     Ileal involvement, previously on Remicade, Asacol, Prednisone    Fibromyalgia     Hypertension     Migraine     Sciatica        Surgical History:   Past Surgical History:   Procedure Laterality Date    BLADDER SURGERY      sling was created by her muscles      SECTION      COLONOSCOPY N/A 2017    Procedure: COLONOSCOPY;  Surgeon: Kin Dyer MD;  Location: [facility];  Service: Endoscopy;  Laterality: N/A;    FINGER SURGERY      joint relpacement, left hand index finger    HYSTERECTOMY      WISDOM TOOTH EXTRACTION         Family History:   Family History   Problem Relation Age of Onset    Hypertension Mother     Kidney disease Father     Scleroderma Father     Hypertension Brother     Cancer Paternal Grandmother 70     colon       Social History:   Social History   Substance Use Topics    Smoking status: Never Smoker    Smokeless tobacco: Never Used    Alcohol use No       Allergies: Reviewed    Home Medications:  Medication List with Changes/Refills   Current Medications    ADALIMUMAB (HUMIRA PEN CROHN'S-UC-HS START) PNKT INJECTION    Take 80mg (2 shots) on day 1 and day 2, Then take 80mg (2 shots) on day 14.    ADALIMUMAB (HUMIRA) PNKT INJECTION    Inject 0.8 mLs (40 mg total) into the skin every 14 (fourteen) days.    ALBUTEROL 90 MCG/ACTUATION INHALER    Inhale 2 puffs into the lungs every 4 to 6 hours as needed for Wheezing.    BREO ELLIPTA 100-25 MCG/DOSE DISKUS INHALER        BUDESONIDE (ENTOCORT EC) 3 MG CAPSULE    Take 3 capsules (9 mg total) by mouth once daily.    BUTALBITAL-ACETAMINOPHEN-CAFFEINE -40 MG (FIORICET, ESGIC) -40 MG PER TABLET    TAKE 1 TABLET BY MOUTH EVERY 4 HOURS AS NEEDED FOR PAIN OR HEADACHES.    DULOXETINE (CYMBALTA) 60 MG CAPSULE    Take 60 mg by mouth 2 (two) times daily.    DYMISTA 137-50 MCG/SPRAY SPRY NASSAL SPRAY    instill 1 spray into each nostril twice a  "day    ELETRIPTAN (RELPAX) 40 MG TABLET    Take 1 tablet (40 mg total) by mouth as needed.    ESTRADIOL (ESTRACE) 2 MG TABLET    Take 2 mg by mouth once daily.    LOSARTAN (COZAAR) 50 MG TABLET    Take 1 tablet (50 mg total) by mouth once daily.    LYRICA 150 MG CAPSULE    take 1 capsule by mouth three times a day    MESALAMINE (PENTASA) 500 MG CR CAPSULE    Take 2 capsules (1,000 mg total) by mouth 4 (four) times daily.    MONTELUKAST (SINGULAIR) 10 MG TABLET    take 1 tablet by mouth every evening    NORTRIPTYLINE (PAMELOR) 25 MG CAPSULE    take 2 capsules by mouth once daily    PANTOPRAZOLE (PROTONIX) 40 MG TABLET    Take 1 tablet (40 mg total) by mouth once daily.    PROPRANOLOL (INDERAL) 40 MG TABLET    Take 1 tablet (40 mg total) by mouth 3 (three) times daily.    SIMVASTATIN (ZOCOR) 20 MG TABLET    Take 1 tablet (20 mg total) by mouth every evening.    ZOLPIDEM (AMBIEN) 5 MG TAB    Take 1 tablet (5 mg total) by mouth nightly as needed.       Physical Exam:  Vital Signs:  /86 (BP Location: Left arm, Patient Position: Sitting, BP Method: Manual)   Pulse 76   Ht 5' 7" (1.702 m)   Wt 88.7 kg (195 lb 8.8 oz)   BMI 30.63 kg/m²   Body mass index is 30.63 kg/m².  Orthostatic vital signs: Supine blood pressure 130/84, standing blood pressure 116/82    GENERAL: No acute distress, Alert and oriented x 4  EYES: Anicteric, no pallor noted.  ENT: Oropharynx is clear, right ear canal/TM normal.  NECK: Supple, no masses, no thyromegally.  CHEST: Equal breath sounds bilaterally, no wheezing.  CARDIOVASCULAR: Regular rate and rhythm. Murmurs, rubs and gallops absent.  ABDOMEN: soft, non-tender, non-distended, normal bowel sounds.  EXTREMITIES: No clubbing, cyanosis or edema.  SKIN: Without lesion.  LYMPH: No cervical, axillary lymphadenopathy palpable.   NEUROLOGICAL: Grossly normal.    Labs: Pertinent labs reviewed.  Normal CBC.  ESR and CRP are normal.  Normal B12.  Iron saturation minimally low.  Sodium remains " low.    Endoscopy:  Previous report reviewed    CRC Screening: Up-to-date    Assessment:  1. Crohn's disease of small intestine with other complication    2. Headache, unspecified headache type    3. Postural dizziness with presyncope    4. Throat burning    5. Hyponatremia        Recommendations:  1.  Crohn's disease: Symptoms are much improved on more aggressive treatment.  I recommend that she continue taking Humira.  Headaches can be a side effect of Humira but hopefully with the decreasing dose she will have less issues.  I did recommend that she start decreasing the dose of Entocort somewhat sooner.  I advised her to start 6 mg for 2 weeks followed by 3 mg for 2 weeks, then stop the medication.  We will follow-up in about 6 weeks and see how she is doing.  At that time it would probably be worthwhile to check Humira levels at lab core.  At some point in the future we may need to consider a small bowel follow-through to evaluate for any possible small bowel strictures and if this is unrevealing consider a capsule endoscopy to assess disease activity.    2.  Headache: Likely related to higher doses of Humira.  Seems to have improved with the lower dose of Humira.  Continue to monitor.    3.  Throat/tongue burning/facial flushing: I will notify the patient's primary care physician of this.  This does not seem to be a side effect of Humira as it was going on prior to taking Humira.    4.  Hyponatremia: This persists in spite of resolution of her diarrhea.  I will notify Dr. Pierce for another opinion.      Return to Clinic:    Return in about 6 weeks (around 9/20/2017).

## 2017-08-11 ENCOUNTER — TELEPHONE (OUTPATIENT)
Dept: GASTROENTEROLOGY | Facility: CLINIC | Age: 52
End: 2017-08-11

## 2017-08-11 DIAGNOSIS — R12 HEARTBURN: ICD-10-CM

## 2017-08-11 DIAGNOSIS — K50.919 CROHN'S DISEASE WITH COMPLICATION, UNSPECIFIED GASTROINTESTINAL TRACT LOCATION: Primary | ICD-10-CM

## 2017-08-11 DIAGNOSIS — K50.919 CROHN'S DISEASE WITH COMPLICATION, UNSPECIFIED GASTROINTESTINAL TRACT LOCATION: ICD-10-CM

## 2017-08-11 RX ORDER — PANTOPRAZOLE SODIUM 40 MG/1
40 TABLET, DELAYED RELEASE ORAL DAILY
Qty: 90 TABLET | Refills: 3 | Status: SHIPPED | OUTPATIENT
Start: 2017-08-11 | End: 2019-07-18 | Stop reason: SDUPTHER

## 2017-08-11 RX ORDER — PANTOPRAZOLE SODIUM 40 MG/1
TABLET, DELAYED RELEASE ORAL
Qty: 30 TABLET | Refills: 11 | Status: SHIPPED | OUTPATIENT
Start: 2017-08-11 | End: 2017-08-11 | Stop reason: SDUPTHER

## 2017-08-31 ENCOUNTER — LAB VISIT (OUTPATIENT)
Dept: LAB | Facility: HOSPITAL | Age: 52
End: 2017-08-31
Attending: INTERNAL MEDICINE
Payer: COMMERCIAL

## 2017-08-31 DIAGNOSIS — R60.0 BILATERAL EDEMA OF LOWER EXTREMITY: ICD-10-CM

## 2017-08-31 DIAGNOSIS — I10 ESSENTIAL (PRIMARY) HYPERTENSION: ICD-10-CM

## 2017-08-31 DIAGNOSIS — E87.1 HYPONATREMIA: ICD-10-CM

## 2017-08-31 DIAGNOSIS — I10 ESSENTIAL HYPERTENSION: ICD-10-CM

## 2017-08-31 DIAGNOSIS — K90.9 DIARRHEA DUE TO MALABSORPTION: ICD-10-CM

## 2017-08-31 DIAGNOSIS — R19.7 DIARRHEA DUE TO MALABSORPTION: ICD-10-CM

## 2017-08-31 DIAGNOSIS — G43.109 MIGRAINE WITH AURA AND WITHOUT STATUS MIGRAINOSUS, NOT INTRACTABLE: ICD-10-CM

## 2017-08-31 DIAGNOSIS — M79.7 FIBROMYALGIA: ICD-10-CM

## 2017-08-31 LAB
ALBUMIN SERPL BCP-MCNC: 3.8 G/DL
ALBUMIN SERPL BCP-MCNC: 3.8 G/DL
ANION GAP SERPL CALC-SCNC: 7 MMOL/L
ANION GAP SERPL CALC-SCNC: 7 MMOL/L
BUN SERPL-MCNC: 9 MG/DL
BUN SERPL-MCNC: 9 MG/DL
CALCIUM SERPL-MCNC: 9.4 MG/DL
CALCIUM SERPL-MCNC: 9.4 MG/DL
CHLORIDE SERPL-SCNC: 106 MMOL/L
CHLORIDE SERPL-SCNC: 106 MMOL/L
CO2 SERPL-SCNC: 25 MMOL/L
CO2 SERPL-SCNC: 25 MMOL/L
CREAT SERPL-MCNC: 1 MG/DL
CREAT SERPL-MCNC: 1 MG/DL
EST. GFR  (AFRICAN AMERICAN): >60 ML/MIN/1.73 M^2
EST. GFR  (AFRICAN AMERICAN): >60 ML/MIN/1.73 M^2
EST. GFR  (NON AFRICAN AMERICAN): >60 ML/MIN/1.73 M^2
EST. GFR  (NON AFRICAN AMERICAN): >60 ML/MIN/1.73 M^2
GLUCOSE SERPL-MCNC: 94 MG/DL
GLUCOSE SERPL-MCNC: 94 MG/DL
OSMOLALITY SERPL: 291 MOSM/KG
PHOSPHATE SERPL-MCNC: 3.3 MG/DL
PHOSPHATE SERPL-MCNC: 3.3 MG/DL
POTASSIUM SERPL-SCNC: 3.7 MMOL/L
POTASSIUM SERPL-SCNC: 3.7 MMOL/L
SODIUM SERPL-SCNC: 138 MMOL/L
SODIUM SERPL-SCNC: 138 MMOL/L
URATE SERPL-MCNC: 3.7 MG/DL

## 2017-08-31 PROCEDURE — 80069 RENAL FUNCTION PANEL: CPT

## 2017-08-31 PROCEDURE — 83930 ASSAY OF BLOOD OSMOLALITY: CPT

## 2017-08-31 PROCEDURE — 84550 ASSAY OF BLOOD/URIC ACID: CPT

## 2017-08-31 PROCEDURE — 36415 COLL VENOUS BLD VENIPUNCTURE: CPT | Mod: PO

## 2017-09-06 ENCOUNTER — PATIENT MESSAGE (OUTPATIENT)
Dept: GASTROENTEROLOGY | Facility: CLINIC | Age: 52
End: 2017-09-06

## 2017-09-11 ENCOUNTER — OFFICE VISIT (OUTPATIENT)
Dept: GASTROENTEROLOGY | Facility: CLINIC | Age: 52
End: 2017-09-11
Payer: COMMERCIAL

## 2017-09-11 VITALS
SYSTOLIC BLOOD PRESSURE: 130 MMHG | HEART RATE: 68 BPM | DIASTOLIC BLOOD PRESSURE: 78 MMHG | BODY MASS INDEX: 30.71 KG/M2 | WEIGHT: 202.63 LBS | HEIGHT: 68 IN

## 2017-09-11 DIAGNOSIS — J45.909 UNCOMPLICATED ASTHMA, UNSPECIFIED ASTHMA SEVERITY: ICD-10-CM

## 2017-09-11 DIAGNOSIS — R10.31 ABDOMINAL PAIN, RLQ (RIGHT LOWER QUADRANT): ICD-10-CM

## 2017-09-11 DIAGNOSIS — K50.018 CROHN'S DISEASE OF SMALL INTESTINE WITH OTHER COMPLICATION: Primary | ICD-10-CM

## 2017-09-11 DIAGNOSIS — R21 RASH: ICD-10-CM

## 2017-09-11 DIAGNOSIS — F32.A DEPRESSION, UNSPECIFIED DEPRESSION TYPE: ICD-10-CM

## 2017-09-11 DIAGNOSIS — R93.5 ABNORMAL CT OF THE ABDOMEN: ICD-10-CM

## 2017-09-11 PROCEDURE — 3008F BODY MASS INDEX DOCD: CPT | Mod: S$GLB,,, | Performed by: INTERNAL MEDICINE

## 2017-09-11 PROCEDURE — 3078F DIAST BP <80 MM HG: CPT | Mod: S$GLB,,, | Performed by: INTERNAL MEDICINE

## 2017-09-11 PROCEDURE — 3075F SYST BP GE 130 - 139MM HG: CPT | Mod: S$GLB,,, | Performed by: INTERNAL MEDICINE

## 2017-09-11 PROCEDURE — 99214 OFFICE O/P EST MOD 30 MIN: CPT | Mod: S$GLB,,, | Performed by: INTERNAL MEDICINE

## 2017-09-11 PROCEDURE — 99999 PR PBB SHADOW E&M-EST. PATIENT-LVL III: CPT | Mod: PBBFAC,,, | Performed by: INTERNAL MEDICINE

## 2017-09-11 RX ORDER — CETIRIZINE HYDROCHLORIDE 5 MG/1
5 TABLET, CHEWABLE ORAL 2 TIMES DAILY
COMMUNITY
End: 2020-07-08

## 2017-09-11 NOTE — PROGRESS NOTES
Clinic Follow Up:  [facility] Follow Up Note    Reason for Follow Up:  The primary encounter diagnosis was Crohn's disease of small intestine with other complication. Diagnoses of Rash, Abdominal pain, RLQ (right lower quadrant), Abnormal CT of the abdomen, Uncomplicated asthma, unspecified asthma severity, and Depression, unspecified depression type were also pertinent to this visit.    PCP: Esthela Hu       HPI:  This is a 52 y.o. female here for follow up of the above issues.  She's been doing pretty well.  She still has occasional intermittent diarrhea.  She typically has about 4 soft to pasty bowel movements per day.  She's noticed that her stool consistency has been a little bit looser and more frequent since she stop the Entocort about a week and a half ago.  She continues to have some discomfort in the right lower quadrant.  It is not as bad as prior to starting Humira but it does continue to be an issue.  She reports that after she eats most meals she has a lot of abdominal bloating.  She has also noticed that she is having some issues with injection site reactions.  She notes that there is a significant amount of swelling and erythema for a few days after her injections.  She's tried hydrocortisone creams as well as other topical therapies without any significant improvement.  She takes Zyrtec every day because of allergy issues.  The headaches she was having when she initially started on Humira have resolved.  She denies any new problems today.  She does report a lot of issues with depression.  She denies any suicidal or homicidal ideations.  She was curious as to whether Humira may cause issues with depression.  She also has ongoing issues with asthma.  She takes Breo twice a day on most days.  Occasionally she takes it only once a day.  She also takes a nasal steroid for allergy issues.  She recently stopped Entocort.    Review of Systems:  CONSTITUTIONAL: Denies weight change,   fatigue, fevers, chills, night sweats.  CARDIOVASCULAR: Denies chest pain, shortness of breath, orthopnea and edema.  RESPIRATORY: Denies cough, hemoptysis, dyspnea, and wheezing.  GI: See HPI.  : Denies dysuria and hematuria  MUSCULOSKELETAL: Denies joint pain or swelling, back pain and muscle pain.  SKIN: Denies rashes.  NEUROLOGIC: Denies headaches, seizures and numbness.  PSYCHIATRIC: Positive for depression.    Medical History:  Past Medical History:   Diagnosis Date    Allergic rhinitis     Asthma     Crohn's disease     Ileal involvement, previously on Remicade, Asacol, Prednisone    Fibromyalgia     Hypertension     Migraine     Sciatica        Surgical History:   Past Surgical History:   Procedure Laterality Date    BLADDER SURGERY      sling was created by her muscles      SECTION      COLONOSCOPY N/A 2017    Procedure: COLONOSCOPY;  Surgeon: Kin Dyer MD;  Location: [facility];  Service: Endoscopy;  Laterality: N/A;    FINGER SURGERY      joint relpacement, left hand index finger    HYSTERECTOMY      WISDOM TOOTH EXTRACTION         Family History:   Family History   Problem Relation Age of Onset    Hypertension Mother     Kidney disease Father     Scleroderma Father     Hypertension Brother     Cancer Paternal Grandmother 70     colon       Social History:   Social History   Substance Use Topics    Smoking status: Never Smoker    Smokeless tobacco: Never Used    Alcohol use No       Allergies: Reviewed    Home Medications:  Medication List with Changes/Refills   Current Medications    ADALIMUMAB (HUMIRA) PNKT INJECTION    Inject 0.8 mLs (40 mg total) into the skin every 14 (fourteen) days.    ALBUTEROL 90 MCG/ACTUATION INHALER    Inhale 2 puffs into the lungs every 4 to 6 hours as needed for Wheezing.    BREO ELLIPTA 100-25 MCG/DOSE DISKUS INHALER        BUTALBITAL-ACETAMINOPHEN-CAFFEINE -40 MG (FIORICET, ESGIC) -40 MG PER TABLET    TAKE 1 TABLET BY  "MOUTH EVERY 4 HOURS AS NEEDED FOR PAIN OR HEADACHES.    CETIRIZINE (ZYRTEC) 5 MG CHEWABLE TABLET    Take 5 mg by mouth once daily.    DULOXETINE (CYMBALTA) 60 MG CAPSULE    Take 60 mg by mouth 2 (two) times daily.    DYMISTA 137-50 MCG/SPRAY SPRY NASSAL SPRAY    instill 1 spray into each nostril twice a day    ELETRIPTAN (RELPAX) 40 MG TABLET    Take 1 tablet (40 mg total) by mouth as needed.    ESTRADIOL (ESTRACE) 2 MG TABLET    Take 2 mg by mouth once daily.    LOSARTAN (COZAAR) 50 MG TABLET    Take 1 tablet (50 mg total) by mouth once daily.    LYRICA 150 MG CAPSULE    take 1 capsule by mouth three times a day    MONTELUKAST (SINGULAIR) 10 MG TABLET    take 1 tablet by mouth every evening    NORTRIPTYLINE (PAMELOR) 25 MG CAPSULE    take 2 capsules by mouth once daily    PANTOPRAZOLE (PROTONIX) 40 MG TABLET    Take 1 tablet (40 mg total) by mouth once daily.    PROPRANOLOL (INDERAL) 40 MG TABLET    Take 1 tablet (40 mg total) by mouth 3 (three) times daily.    SIMVASTATIN (ZOCOR) 20 MG TABLET    Take 1 tablet (20 mg total) by mouth every evening.    ZOLPIDEM (AMBIEN) 5 MG TAB    Take 1 tablet (5 mg total) by mouth nightly as needed.   Discontinued Medications    ADALIMUMAB (HUMIRA PEN CROHN'S-UC-HS START) PNKT INJECTION    Take 80mg (2 shots) on day 1 and day 2, Then take 80mg (2 shots) on day 14.    MESALAMINE (PENTASA) 500 MG CR CAPSULE    Take 2 capsules (1,000 mg total) by mouth 4 (four) times daily.       Physical Exam:  Vital Signs:  /78   Pulse 68   Ht 5' 8.4" (1.737 m)   Wt 91.9 kg (202 lb 9.6 oz)   BMI 30.45 kg/m²   Body mass index is 30.45 kg/m².      GENERAL: No acute distress, Alert and oriented x 4  EYES: Anicteric, no pallor noted.  ENT: Oropharynx is clear  NECK: Supple, no masses, no thyromegally.  CHEST: Equal breath sounds bilaterally, no wheezing.  CARDIOVASCULAR: Regular rate and rhythm. Murmurs, rubs and gallops absent.  ABDOMEN: soft, non-tender, non-distended, normal bowel " sounds.  EXTREMITIES: No clubbing, cyanosis or edema.  SKIN: Small area of erythema around recent injection site  LYMPH: No cervical, axillary lymphadenopathy palpable.   NEUROLOGICAL: Grossly normal.    Labs: Pertinent labs reviewed.  Recent CBC, CMP, ESR, CRP, iron studies were essentially normal.    Endoscopy:  Not applicable    CRC Screening: Up-to-date.    Imaging: CT enterography report from earlier this year was reviewed.    Assessment:  1. Crohn's disease of small intestine with other complication    2. Rash    3. Abdominal pain, RLQ (right lower quadrant)    4. Abnormal CT of the abdomen    5. Uncomplicated asthma, unspecified asthma severity    6. Depression, unspecified depression type        Recommendations:  1.  Crohn's disease: It sounds like overall her symptoms are under fairly good control with Humira.  I recommend that she continue to take this.  Her injection site reaction is fairly minimal so I don't recommend stopping the medication for this reason.  She does have some continued ongoing right lower quadrant pain as well as bloating issues.  Her previous CT enterography raises the question of a possible stricture in the ileum.  She also had notable inflammatory changes in the TI but this was prior to advancing her therapy.  Today we discussed options for further evaluation of her disease activity.  Given her recent CTE I would recommend alternative imaging (to limit radiation exposure) such as MR enterography versus small bowel follow-through and if no stricture was noted a capsule endoscopy to visualize the mucosa.  I did explain to the patient that small bowel follow-through is not as good as MR enterography for identifying strictures and active inflammatory bowel disease and there would be a potential risk of the capsule not passing requiring possible surgery.  Based on this we will proceed with an MR enterography.  If there is evidence of active disease then we will check adalimumab levels  and antibodies to optimize dosing.  We may also need to consider adding an immunomodulator to her regimen.    2.  Asthma: Referral to Dr. Lira. I explained to her that she should only be using Breo once daily and if she feels that she needs more of this then she likely needs a change in her maintenance therapy.    3.  Depression: May be related to side effects of steroids. Entocort has been stopped. Consider stopping steroid nasal spray.    Return to Clinic:    Follow up based on the above evaluation.

## 2017-09-12 ENCOUNTER — TELEPHONE (OUTPATIENT)
Dept: PULMONOLOGY | Facility: CLINIC | Age: 52
End: 2017-09-12

## 2017-09-12 DIAGNOSIS — J45.20 MILD INTERMITTENT ASTHMA WITHOUT COMPLICATION: Primary | ICD-10-CM

## 2017-09-12 NOTE — TELEPHONE ENCOUNTER
----- Message from Neela Sexton LPN sent at 9/11/2017 12:18 PM CDT -----  Regarding: Need appt  Can you please schedule her an apt with Dr. Lira? Dr. Dyer wants her to be seen to re-look at her current asthma meds. She also needs to establish care. For some reason, I can't schedule it. Thank you.

## 2017-09-22 ENCOUNTER — PATIENT MESSAGE (OUTPATIENT)
Dept: INTERNAL MEDICINE | Facility: CLINIC | Age: 52
End: 2017-09-22

## 2017-09-22 RX ORDER — ZOLPIDEM TARTRATE 5 MG/1
5 TABLET ORAL NIGHTLY PRN
Qty: 30 TABLET | Refills: 5 | Status: SHIPPED | OUTPATIENT
Start: 2017-09-22 | End: 2018-04-16 | Stop reason: SDUPTHER

## 2017-09-22 RX ORDER — ALBUTEROL SULFATE 90 UG/1
2 AEROSOL, METERED RESPIRATORY (INHALATION)
Qty: 8.5 G | Refills: 1 | Status: SHIPPED | OUTPATIENT
Start: 2017-09-22 | End: 2017-10-11 | Stop reason: SDUPTHER

## 2017-09-30 RX ORDER — NORTRIPTYLINE HYDROCHLORIDE 25 MG/1
CAPSULE ORAL
Qty: 180 CAPSULE | Refills: 0 | Status: SHIPPED | OUTPATIENT
Start: 2017-09-30 | End: 2017-12-28 | Stop reason: SDUPTHER

## 2017-10-04 ENCOUNTER — TELEPHONE (OUTPATIENT)
Dept: GASTROENTEROLOGY | Facility: CLINIC | Age: 52
End: 2017-10-04

## 2017-10-06 ENCOUNTER — PATIENT MESSAGE (OUTPATIENT)
Dept: GASTROENTEROLOGY | Facility: CLINIC | Age: 52
End: 2017-10-06

## 2017-10-11 ENCOUNTER — OFFICE VISIT (OUTPATIENT)
Dept: URGENT CARE | Facility: CLINIC | Age: 52
End: 2017-10-11
Payer: COMMERCIAL

## 2017-10-11 VITALS
SYSTOLIC BLOOD PRESSURE: 132 MMHG | WEIGHT: 204.81 LBS | HEIGHT: 67 IN | BODY MASS INDEX: 32.15 KG/M2 | OXYGEN SATURATION: 99 % | DIASTOLIC BLOOD PRESSURE: 92 MMHG | TEMPERATURE: 98 F | HEART RATE: 68 BPM

## 2017-10-11 DIAGNOSIS — J32.9 SINUSITIS, UNSPECIFIED CHRONICITY, UNSPECIFIED LOCATION: Primary | ICD-10-CM

## 2017-10-11 DIAGNOSIS — J45.909 ASTHMA, UNSPECIFIED ASTHMA SEVERITY, UNSPECIFIED WHETHER COMPLICATED, UNSPECIFIED WHETHER PERSISTENT: ICD-10-CM

## 2017-10-11 PROCEDURE — 99999 PR PBB SHADOW E&M-EST. PATIENT-LVL V: CPT | Mod: PBBFAC,,, | Performed by: NURSE PRACTITIONER

## 2017-10-11 PROCEDURE — 99214 OFFICE O/P EST MOD 30 MIN: CPT | Mod: S$GLB,,, | Performed by: NURSE PRACTITIONER

## 2017-10-11 RX ORDER — LEVALBUTEROL INHALATION SOLUTION 1.25 MG/3ML
1 SOLUTION RESPIRATORY (INHALATION) EVERY 4 HOURS PRN
Qty: 30 ML | Refills: 0 | Status: SHIPPED | OUTPATIENT
Start: 2017-10-11 | End: 2018-08-30 | Stop reason: SDUPTHER

## 2017-10-11 RX ORDER — ALBUTEROL SULFATE 90 UG/1
2 AEROSOL, METERED RESPIRATORY (INHALATION)
Qty: 8.5 G | Refills: 1 | Status: SHIPPED | OUTPATIENT
Start: 2017-10-11 | End: 2019-03-13 | Stop reason: SDUPTHER

## 2017-10-11 RX ORDER — DOXYCYCLINE 100 MG/1
100 CAPSULE ORAL EVERY 12 HOURS
Qty: 14 CAPSULE | Refills: 0 | Status: SHIPPED | OUTPATIENT
Start: 2017-10-11 | End: 2017-10-18

## 2017-10-11 NOTE — PROGRESS NOTES
Subjective:       Patient ID: Jaylin Murguia is a 52 y.o. female.    Chief Complaint: Cough    Pt is a 52 year old female to clinic today with complaints of cough, HA and nasal congestion that began 5-7 days ago.       Cough   This is a new problem. The current episode started in the past 7 days. The problem has been gradually worsening. The problem occurs every few minutes. The cough is non-productive. Associated symptoms include chills, ear congestion, headaches, nasal congestion, postnasal drip, rhinorrhea, shortness of breath and wheezing. Pertinent negatives include no chest pain, ear pain, fever, heartburn, hemoptysis, myalgias, rash, sore throat, sweats or weight loss. The symptoms are aggravated by exercise. Treatments tried: xopenex. The treatment provided moderate relief. Her past medical history is significant for asthma and bronchitis.     Review of Systems   Constitutional: Positive for chills. Negative for diaphoresis, fatigue, fever and weight loss.   HENT: Positive for congestion, postnasal drip, rhinorrhea and sinus pressure. Negative for ear discharge, ear pain, sinus pain, sneezing, sore throat and trouble swallowing.    Eyes: Negative for pain.   Respiratory: Positive for cough, shortness of breath and wheezing. Negative for hemoptysis and chest tightness.    Cardiovascular: Negative for chest pain and palpitations.   Gastrointestinal: Negative for abdominal pain, diarrhea, heartburn, nausea and vomiting.   Genitourinary: Negative for dysuria.   Musculoskeletal: Negative for back pain, myalgias and neck pain.   Skin: Negative for rash.   Neurological: Positive for headaches. Negative for dizziness, light-headedness and numbness.       Objective:      Physical Exam   Constitutional: She is oriented to person, place, and time. She appears well-developed and well-nourished. No distress.   HENT:   Head: Normocephalic.   Right Ear: Tympanic membrane, external ear and ear canal normal.   Left Ear:  Tympanic membrane, external ear and ear canal normal.   Nose: Mucosal edema present. No rhinorrhea. Right sinus exhibits maxillary sinus tenderness and frontal sinus tenderness. Left sinus exhibits maxillary sinus tenderness and frontal sinus tenderness.   Mouth/Throat: Uvula is midline, oropharynx is clear and moist and mucous membranes are normal. No oropharyngeal exudate, posterior oropharyngeal edema or posterior oropharyngeal erythema.   Eyes: Conjunctivae and EOM are normal. Pupils are equal, round, and reactive to light.   Neck: Normal range of motion. Neck supple.   Cardiovascular: Normal rate, regular rhythm, S1 normal, S2 normal and normal heart sounds.  Exam reveals no gallop and no friction rub.    No murmur heard.  Pulmonary/Chest: Effort normal and breath sounds normal. No accessory muscle usage. No apnea, no tachypnea and no bradypnea. No respiratory distress. She has no decreased breath sounds. She has no wheezes. She has no rhonchi. She has no rales.   Lymphadenopathy:        Head (right side): No submental, no submandibular and no tonsillar adenopathy present.        Head (left side): No submental, no submandibular and no tonsillar adenopathy present.     She has no cervical adenopathy.   Neurological: She is alert and oriented to person, place, and time.   Skin: Skin is warm and dry. No rash noted. She is not diaphoretic.   Psychiatric: She has a normal mood and affect. Her speech is normal and behavior is normal. Thought content normal.   Nursing note and vitals reviewed.      Assessment:       1. Sinusitis, unspecified chronicity, unspecified location    2. Asthma, unspecified asthma severity, unspecified whether complicated, unspecified whether persistent        Plan:   Sinusitis, unspecified chronicity, unspecified location  -     doxycycline (VIBRAMYCIN) 100 MG Cap; Take 1 capsule (100 mg total) by mouth every 12 (twelve) hours.  Dispense: 14 capsule; Refill: 0    Asthma, unspecified asthma  severity, unspecified whether complicated, unspecified whether persistent  -     albuterol 90 mcg/actuation inhaler; Inhale 2 puffs into the lungs every 4 to 6 hours as needed for Wheezing.  Dispense: 8.5 g; Refill: 1  -     levalbuterol (XOPENEX) 1.25 mg/3 mL nebulizer solution; Take 3 mLs (1.25 mg total) by nebulization every 4 (four) hours as needed for Wheezing. Rescue  Dispense: 30 mL; Refill: 0      · Rest and increase fluids.   · May apply warm compresses as needed.   · Saline nasal spray or saline irrigation (Neti pot) to loosen nasal congestion.  · Flonase or Nasacort to reduce inflammation in the sinus cavities.  · Take antibiotics exactly as prescribed. Make sure to complete the entire course of antibiotics even if you start feeling better. This will prevent recurrence of your infection and bacterial resistance.   · Do not drive, drink alcohol, or take any other sedating medications or substances while taking cough syrup.   · Follow up with your primary care provider or with ENT if not improved within a few days or sooner for any new or worsening symptoms.   · Go to the ER for any fever that does not improve with Tylenol/Ibuprofen, neck stiffness, rash, severe headache, vision changes, shortness of breath, chest pain, severe facial pain or swelling, or for any other new and concerning symptoms.

## 2017-10-11 NOTE — PATIENT INSTRUCTIONS
Sinusitis (Antibiotic Treatment)    The sinuses are air-filled spaces within the bones of the face. They connect to the inside of the nose. Sinusitis is an inflammation of the tissue lining the sinus cavity. Sinus inflammation can occur during a cold. It can also be due to allergies to pollens and other particles in the air. Sinusitis can cause symptoms of sinus congestion and fullness. A sinus infection causes fever, headache and facial pain. There is often green or yellow drainage from the nose or into the back of the throat (post-nasal drip). You have been given antibiotics to treat this condition.  Home care:  · Take the full course of antibiotics as instructed. Do not stop taking them, even if you feel better.  · Drink plenty of water, hot tea, and other liquids. This may help thin mucus. It also may promote sinus drainage.  · Heat may help soothe painful areas of the face. Use a towel soaked in hot water. Or,  the shower and direct the hot spray onto your face. Using a vaporizer along with a menthol rub at night may also help.   · An expectorant containing guaifenesin may help thin the mucus and promote drainage from the sinuses.  · Over-the-counter decongestants may be used unless a similar medicine was prescribed. Nasal sprays work the fastest. Use one that contains phenylephrine or oxymetazoline. First blow the nose gently. Then use the spray. Do not use these medicines more often than directed on the label or symptoms may get worse. You may also use tablets containing pseudoephedrine. Avoid products that combine ingredients, because side effects may be increased. Read labels. You can also ask the pharmacist for help. (NOTE: Persons with high blood pressure should not use decongestants. They can raise blood pressure.)  · Over-the-counter antihistamines may help if allergies contributed to your sinusitis.    · Do not use nasal rinses or irrigation during an acute sinus infection, unless told to by  your health care provider. Rinsing may spread the infection to other sinuses.  · Use acetaminophen or ibuprofen to control pain, unless another pain medicine was prescribed. (If you have chronic liver or kidney disease or ever had a stomach ulcer, talk with your doctor before using these medicines. Aspirin should never be used in anyone under 18 years of age who is ill with a fever. It may cause severe liver damage.)  · Don't smoke. This can worsen symptoms.  Follow-up care  Follow up with your healthcare provider or our staff if you are not improving within the next week.  When to seek medical advice  Call your healthcare provider if any of these occur:  · Facial pain or headache becoming more severe  · Stiff neck  · Unusual drowsiness or confusion  · Swelling of the forehead or eyelids  · Vision problems, including blurred or double vision  · Fever of 100.4ºF (38ºC) or higher, or as directed by your healthcare provider  · Seizure  · Breathing problems  · Symptoms not resolving within 10 days  Date Last Reviewed: 4/13/2015  © 2456-2733 [employer] [address] [place] [address], [place] [address]. All rights reserved. This information is not intended as a substitute for professional medical care. Always follow your healthcare professional's instructions.

## 2017-10-13 ENCOUNTER — PATIENT MESSAGE (OUTPATIENT)
Dept: INTERNAL MEDICINE | Facility: CLINIC | Age: 52
End: 2017-10-13

## 2017-10-16 ENCOUNTER — PATIENT MESSAGE (OUTPATIENT)
Dept: GASTROENTEROLOGY | Facility: CLINIC | Age: 52
End: 2017-10-16

## 2017-10-16 RX ORDER — FLUCONAZOLE 150 MG/1
150 TABLET ORAL DAILY
Qty: 1 TABLET | Refills: 1 | Status: SHIPPED | OUTPATIENT
Start: 2017-10-16 | End: 2017-10-17

## 2017-10-26 RX ORDER — MONTELUKAST SODIUM 10 MG/1
TABLET ORAL
Qty: 90 TABLET | Refills: 3 | Status: SHIPPED | OUTPATIENT
Start: 2017-10-26 | End: 2019-02-04 | Stop reason: SDUPTHER

## 2017-10-26 NOTE — TELEPHONE ENCOUNTER
Called pt and let know that Dr. Hu filled medication, will need an appt prior to further refills.

## 2017-11-07 ENCOUNTER — PATIENT MESSAGE (OUTPATIENT)
Dept: INTERNAL MEDICINE | Facility: CLINIC | Age: 52
End: 2017-11-07

## 2017-11-07 DIAGNOSIS — E53.8 VITAMIN B12 DEFICIENCY DISEASE: ICD-10-CM

## 2017-11-07 DIAGNOSIS — Z00.00 WELLNESS EXAMINATION: ICD-10-CM

## 2017-11-07 DIAGNOSIS — M79.7 FIBROMYALGIA: ICD-10-CM

## 2017-11-07 DIAGNOSIS — G43.109 MIGRAINE WITH AURA AND WITHOUT STATUS MIGRAINOSUS, NOT INTRACTABLE: Primary | ICD-10-CM

## 2017-11-07 DIAGNOSIS — K50.018 CROHN'S DISEASE OF SMALL INTESTINE WITH OTHER COMPLICATION: ICD-10-CM

## 2017-11-07 DIAGNOSIS — G47.01 INSOMNIA DUE TO MEDICAL CONDITION: ICD-10-CM

## 2017-11-07 DIAGNOSIS — D84.9 IMMUNOCOMPROMISED: ICD-10-CM

## 2017-11-07 DIAGNOSIS — E61.1 IRON DEFICIENCY: ICD-10-CM

## 2017-11-07 DIAGNOSIS — E55.9 VITAMIN D DEFICIENCY DISEASE: ICD-10-CM

## 2017-11-07 DIAGNOSIS — I10 ESSENTIAL (PRIMARY) HYPERTENSION: ICD-10-CM

## 2017-11-08 NOTE — TELEPHONE ENCOUNTER
Can book for an early am appt in nov for physical. Labs canbe prior.    Not sure if she is needing refill on losartan or any other meds. Is maybe she wanting propranolol or clonidine for BP? Can message back.

## 2017-11-14 ENCOUNTER — LAB VISIT (OUTPATIENT)
Dept: LAB | Facility: HOSPITAL | Age: 52
End: 2017-11-14
Attending: FAMILY MEDICINE
Payer: COMMERCIAL

## 2017-11-14 DIAGNOSIS — Z00.00 WELLNESS EXAMINATION: ICD-10-CM

## 2017-11-14 DIAGNOSIS — G47.01 INSOMNIA DUE TO MEDICAL CONDITION: ICD-10-CM

## 2017-11-14 DIAGNOSIS — K50.018 CROHN'S DISEASE OF SMALL INTESTINE WITH OTHER COMPLICATION: ICD-10-CM

## 2017-11-14 DIAGNOSIS — D84.9 IMMUNOCOMPROMISED: ICD-10-CM

## 2017-11-14 DIAGNOSIS — E53.8 VITAMIN B12 DEFICIENCY DISEASE: ICD-10-CM

## 2017-11-14 DIAGNOSIS — G43.109 MIGRAINE WITH AURA AND WITHOUT STATUS MIGRAINOSUS, NOT INTRACTABLE: ICD-10-CM

## 2017-11-14 DIAGNOSIS — M79.7 FIBROMYALGIA: ICD-10-CM

## 2017-11-14 DIAGNOSIS — E55.9 VITAMIN D DEFICIENCY DISEASE: ICD-10-CM

## 2017-11-14 DIAGNOSIS — I10 ESSENTIAL (PRIMARY) HYPERTENSION: ICD-10-CM

## 2017-11-14 DIAGNOSIS — E61.1 IRON DEFICIENCY: ICD-10-CM

## 2017-11-14 LAB
25(OH)D3+25(OH)D2 SERPL-MCNC: 24 NG/ML
ALBUMIN SERPL BCP-MCNC: 3.6 G/DL
ALP SERPL-CCNC: 85 U/L
ALT SERPL W/O P-5'-P-CCNC: 43 U/L
ANION GAP SERPL CALC-SCNC: 7 MMOL/L
AST SERPL-CCNC: 37 U/L
BASOPHILS # BLD AUTO: 0.06 K/UL
BASOPHILS NFR BLD: 1.2 %
BILIRUB SERPL-MCNC: 0.4 MG/DL
BUN SERPL-MCNC: 9 MG/DL
CALCIUM SERPL-MCNC: 8.6 MG/DL
CHLORIDE SERPL-SCNC: 108 MMOL/L
CHOLEST SERPL-MCNC: 155 MG/DL
CHOLEST/HDLC SERPL: 2.6 {RATIO}
CO2 SERPL-SCNC: 24 MMOL/L
CREAT SERPL-MCNC: 0.8 MG/DL
DIFFERENTIAL METHOD: ABNORMAL
EOSINOPHIL # BLD AUTO: 0.2 K/UL
EOSINOPHIL NFR BLD: 4.6 %
ERYTHROCYTE [DISTWIDTH] IN BLOOD BY AUTOMATED COUNT: 12.7 %
EST. GFR  (AFRICAN AMERICAN): >60 ML/MIN/1.73 M^2
EST. GFR  (NON AFRICAN AMERICAN): >60 ML/MIN/1.73 M^2
ESTIMATED AVG GLUCOSE: 103 MG/DL
GLUCOSE SERPL-MCNC: 80 MG/DL
HBA1C MFR BLD HPLC: 5.2 %
HCT VFR BLD AUTO: 39.7 %
HDLC SERPL-MCNC: 59 MG/DL
HDLC SERPL: 38.1 %
HGB BLD-MCNC: 12.6 G/DL
IMM GRANULOCYTES # BLD AUTO: 0.01 K/UL
IMM GRANULOCYTES NFR BLD AUTO: 0.2 %
INSULIN COLLECTION INTERVAL: NORMAL
INSULIN SERPL-ACNC: 11.2 UU/ML
IRON SERPL-MCNC: 98 UG/DL
LDLC SERPL CALC-MCNC: 75.2 MG/DL
LYMPHOCYTES # BLD AUTO: 2.5 K/UL
LYMPHOCYTES NFR BLD: 50.1 %
MCH RBC QN AUTO: 31.5 PG
MCHC RBC AUTO-ENTMCNC: 31.7 G/DL
MCV RBC AUTO: 99 FL
MONOCYTES # BLD AUTO: 0.4 K/UL
MONOCYTES NFR BLD: 8.6 %
NEUTROPHILS # BLD AUTO: 1.8 K/UL
NEUTROPHILS NFR BLD: 35.3 %
NONHDLC SERPL-MCNC: 96 MG/DL
NRBC BLD-RTO: 0 /100 WBC
PLATELET # BLD AUTO: 255 K/UL
PMV BLD AUTO: 10.6 FL
POTASSIUM SERPL-SCNC: 3.9 MMOL/L
PROT SERPL-MCNC: 6.6 G/DL
RBC # BLD AUTO: 4 M/UL
SATURATED IRON: 29 %
SODIUM SERPL-SCNC: 139 MMOL/L
T4 FREE SERPL-MCNC: 0.82 NG/DL
TOTAL IRON BINDING CAPACITY: 333 UG/DL
TRANSFERRIN SERPL-MCNC: 225 MG/DL
TRIGL SERPL-MCNC: 104 MG/DL
TSH SERPL DL<=0.005 MIU/L-ACNC: 0.86 UIU/ML
VIT B12 SERPL-MCNC: 933 PG/ML
WBC # BLD AUTO: 4.99 K/UL

## 2017-11-14 PROCEDURE — 85025 COMPLETE CBC W/AUTO DIFF WBC: CPT

## 2017-11-14 PROCEDURE — 80061 LIPID PANEL: CPT

## 2017-11-14 PROCEDURE — 83525 ASSAY OF INSULIN: CPT

## 2017-11-14 PROCEDURE — 83036 HEMOGLOBIN GLYCOSYLATED A1C: CPT

## 2017-11-14 PROCEDURE — 83540 ASSAY OF IRON: CPT

## 2017-11-14 PROCEDURE — 82306 VITAMIN D 25 HYDROXY: CPT

## 2017-11-14 PROCEDURE — 84439 ASSAY OF FREE THYROXINE: CPT

## 2017-11-14 PROCEDURE — 84443 ASSAY THYROID STIM HORMONE: CPT

## 2017-11-14 PROCEDURE — 82607 VITAMIN B-12: CPT

## 2017-11-14 PROCEDURE — 36415 COLL VENOUS BLD VENIPUNCTURE: CPT | Mod: PO

## 2017-11-14 PROCEDURE — 80053 COMPREHEN METABOLIC PANEL: CPT

## 2017-11-15 ENCOUNTER — OFFICE VISIT (OUTPATIENT)
Dept: INTERNAL MEDICINE | Facility: CLINIC | Age: 52
End: 2017-11-15
Payer: COMMERCIAL

## 2017-11-15 VITALS
TEMPERATURE: 99 F | WEIGHT: 204.81 LBS | HEART RATE: 76 BPM | HEIGHT: 67 IN | BODY MASS INDEX: 32.15 KG/M2 | DIASTOLIC BLOOD PRESSURE: 82 MMHG | SYSTOLIC BLOOD PRESSURE: 134 MMHG

## 2017-11-15 DIAGNOSIS — G47.01 INSOMNIA DUE TO MEDICAL CONDITION: ICD-10-CM

## 2017-11-15 DIAGNOSIS — M79.7 FIBROMYALGIA: ICD-10-CM

## 2017-11-15 DIAGNOSIS — K50.018 CROHN'S DISEASE OF SMALL INTESTINE WITH OTHER COMPLICATION: ICD-10-CM

## 2017-11-15 DIAGNOSIS — G43.109 MIGRAINE WITH AURA AND WITHOUT STATUS MIGRAINOSUS, NOT INTRACTABLE: ICD-10-CM

## 2017-11-15 DIAGNOSIS — Z00.00 ROUTINE GENERAL MEDICAL EXAMINATION AT A HEALTH CARE FACILITY: Primary | ICD-10-CM

## 2017-11-15 DIAGNOSIS — I10 ESSENTIAL (PRIMARY) HYPERTENSION: ICD-10-CM

## 2017-11-15 DIAGNOSIS — Z79.890 HORMONE REPLACEMENT THERAPY (POSTMENOPAUSAL): ICD-10-CM

## 2017-11-15 DIAGNOSIS — Z79.60 LONG-TERM USE OF IMMUNOSUPPRESSANT MEDICATION: ICD-10-CM

## 2017-11-15 DIAGNOSIS — J30.89 CHRONIC NON-SEASONAL ALLERGIC RHINITIS, UNSPECIFIED TRIGGER: ICD-10-CM

## 2017-11-15 PROCEDURE — 90471 IMMUNIZATION ADMIN: CPT | Mod: S$GLB,,, | Performed by: FAMILY MEDICINE

## 2017-11-15 PROCEDURE — 99396 PREV VISIT EST AGE 40-64: CPT | Mod: 25,S$GLB,, | Performed by: FAMILY MEDICINE

## 2017-11-15 PROCEDURE — 90686 IIV4 VACC NO PRSV 0.5 ML IM: CPT | Mod: S$GLB,,, | Performed by: FAMILY MEDICINE

## 2017-11-15 PROCEDURE — 99999 PR PBB SHADOW E&M-EST. PATIENT-LVL III: CPT | Mod: PBBFAC,,, | Performed by: FAMILY MEDICINE

## 2017-11-15 RX ORDER — PREGABALIN 150 MG/1
150 CAPSULE ORAL 3 TIMES DAILY
Qty: 90 CAPSULE | Refills: 5 | Status: SHIPPED | OUTPATIENT
Start: 2017-11-15 | End: 2018-05-21 | Stop reason: SDUPTHER

## 2017-11-15 RX ORDER — LOSARTAN POTASSIUM AND HYDROCHLOROTHIAZIDE 12.5; 1 MG/1; MG/1
1 TABLET ORAL DAILY
Qty: 90 TABLET | Refills: 3 | Status: SHIPPED | OUTPATIENT
Start: 2017-11-15 | End: 2018-09-15 | Stop reason: SDUPTHER

## 2017-11-19 PROBLEM — Z79.60 LONG-TERM USE OF IMMUNOSUPPRESSANT MEDICATION: Status: ACTIVE | Noted: 2017-11-19

## 2017-11-19 PROBLEM — Z79.890 HORMONE REPLACEMENT THERAPY (POSTMENOPAUSAL): Status: ACTIVE | Noted: 2017-11-19

## 2017-11-19 NOTE — PROGRESS NOTES
Subjective:      Patient ID: Jaylin Murguia is a 52 y.o. female.    Chief Complaint: Annual Exam    Patient's coming in today for multiple issues and prevention exam.  She's now back in her house.  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  She really enjoys her job.  She has started wearing compression hose because she does note that sometimes her legs are getting more swollen.  She's also noted at times that her blood pressure has been higher.  She's been mainly on only the ARB without the diuretic.  She does have some however at home which she believes is a combination pill.  She's interested in using it if it's needed.    She currently still has fibromyalgia. Lately it's been more tolerable.  Overall she would like to get off some medication but currently she must take her Lyrica 3 times a day or she feels it.  She is also on high-dose Cymbalta at 120 mg.  She has to use Ambien to help her to sleep at night.     From her Crohn's disease she's doing much better now that she is on Humira.  She's been seeing Dr. Dyer.  She's uncertain who she will need to be able to follow-up with since he is leaving however she is happy to stay with him if he does take her insurance to [facility].     Blood pressures have been slightly elevated see above.  With also some swelling at times but improved with compression hose.     Chronic headaches have actually been better as well.  Currently on Fioricet.  Tolerating well.  With a history of migraines.     Does have seasonal ALLERGIES for which she will take Zyrtec.     She does recently feel however that she started wheezing.  She does have asthma.  She has her Breo at home.  She has been having use her inhalers a little bit more.          Lab Results   Component Value Date    WBC 4.99 11/14/2017    HGB 12.6 11/14/2017    HCT 39.7 11/14/2017     11/14/2017    CHOL 155 11/14/2017    TRIG 104 11/14/2017    HDL 59  11/14/2017    ALT 43 11/14/2017    AST 37 11/14/2017     11/14/2017    K 3.9 11/14/2017     11/14/2017    CREATININE 0.8 11/14/2017    BUN 9 11/14/2017    CO2 24 11/14/2017    TSH 0.863 11/14/2017    INR 1.0 11/20/2014    HGBA1C 5.2 11/14/2017       Review of Systems   Constitutional: Positive for activity change and unexpected weight change. Negative for appetite change, chills and fatigue.   HENT: Negative for hearing loss, rhinorrhea and trouble swallowing.    Eyes: Negative for discharge and visual disturbance.   Respiratory: Positive for chest tightness and wheezing.    Cardiovascular: Positive for leg swelling. Negative for chest pain and palpitations.   Gastrointestinal: Negative for blood in stool, constipation and diarrhea.   Endocrine: Positive for polydipsia and polyuria.   Genitourinary: Negative for difficulty urinating, dysuria, hematuria and menstrual problem.   Musculoskeletal: Positive for myalgias. Negative for arthralgias, back pain, gait problem and joint swelling.   Neurological: Positive for headaches. Negative for light-headedness.   Psychiatric/Behavioral: Positive for sleep disturbance. Negative for confusion and dysphoric mood. The patient is not nervous/anxious.      Objective:     Physical Exam   Constitutional: She is oriented to person, place, and time. She appears well-developed and well-nourished.   HENT:   Head: Normocephalic and atraumatic.   Right Ear: External ear normal.   Left Ear: External ear normal.   Mouth/Throat: Oropharynx is clear and moist.   Eyes: EOM are normal.   Neck: Normal range of motion. Neck supple. No thyromegaly present.   Cardiovascular: Normal rate and regular rhythm.  Exam reveals no gallop and no friction rub.    No murmur heard.  Pulmonary/Chest: Effort normal. No respiratory distress. She has no wheezes. She has no rales.   Abdominal: Soft. Bowel sounds are normal. She exhibits no distension. There is no tenderness. There is no rebound.    Musculoskeletal: Normal range of motion. She exhibits no edema.   Lymphadenopathy:     She has no cervical adenopathy.   Neurological: She is alert and oriented to person, place, and time.   Skin: Skin is warm and dry. No rash noted.   Psychiatric: She has a normal mood and affect. Her behavior is normal. Judgment and thought content normal.   Vitals reviewed.    Assessment:     1. Routine general medical examination at a health care facility    2. Fibromyalgia    3. Essential (primary) hypertension    4. Migraine with aura and without status migrainosus, not intractable    5. Insomnia due to medical condition    6. Crohn's disease of small intestine with other complication    7. Chronic non-seasonal allergic rhinitis, unspecified trigger    8. Hormone replacement therapy (postmenopausal)    9. Long-term use of immunosuppressant medication      Plan:   Jaylin was seen today for annual exam.    Diagnoses and all orders for this visit:    Routine general medical examination at a health care facility-labs reviewed discussed health maintenance issues up-to-date with flu shot today.    Fibromyalgia-stable currently using Lyrica 3 times daily and Cymbalta at 120 mg.  Continue on this regimen at this time also with Ambien at night help with sleep.  If symptoms and improvement of mood and daily activity improve over the next 3 months will start to work on decreasing dosing so we can help with some weight loss.  -     pregabalin (LYRICA) 150 MG capsule; Take 1 capsule (150 mg total) by mouth 3 (three) times daily.    Essential (primary) hypertension -not controlled will increase to losartan 112.5 mg diuretic.  Continue with compression hose to help with some swelling    Migraine with aura and without status migrainosus, not intractable-stable with intermittent Fioricet and migraine 5 Ht Maxalt.    Insomnia due to medical condition stable with use of Ambien    Crohn's disease of small intestine with other complication-stable  with [employer] will be interested in seeing Dr. Dyer at [facility] if he takes her insurance    Chronic non-seasonal allergic rhinitis, unspecified trigger-stable with ALLERGY medicine    Hormone replacement therapy (postmenopausal)-followed by her gynecologist    Long-term use of immunosuppressant medication-on Humira for Crohn's disease    Other orders  -     losartan-hydrochlorothiazide 100-12.5 mg (HYZAAR) 100-12.5 mg Tab; Take 1 tablet by mouth once daily.  -     Influenza - Quadrivalent (3 years & older) (PF)            Return in about 1 year (around 11/15/2018) for physical with Dr DAMON.

## 2017-11-22 RX ORDER — RIZATRIPTAN BENZOATE 10 MG/1
TABLET ORAL
Qty: 10 TABLET | Refills: 11 | Status: SHIPPED | OUTPATIENT
Start: 2017-11-22 | End: 2019-01-05 | Stop reason: SDUPTHER

## 2017-11-22 RX ORDER — BUTALBITAL, ACETAMINOPHEN AND CAFFEINE 50; 325; 40 MG/1; MG/1; MG/1
TABLET ORAL
Qty: 30 TABLET | Refills: 1 | Status: SHIPPED | OUTPATIENT
Start: 2017-11-22 | End: 2018-08-28 | Stop reason: SDUPTHER

## 2017-12-13 ENCOUNTER — OFFICE VISIT (OUTPATIENT)
Dept: URGENT CARE | Facility: CLINIC | Age: 52
End: 2017-12-13
Payer: COMMERCIAL

## 2017-12-13 VITALS
HEART RATE: 68 BPM | SYSTOLIC BLOOD PRESSURE: 128 MMHG | BODY MASS INDEX: 32.15 KG/M2 | TEMPERATURE: 98 F | DIASTOLIC BLOOD PRESSURE: 80 MMHG | OXYGEN SATURATION: 98 % | WEIGHT: 204.81 LBS | HEIGHT: 67 IN

## 2017-12-13 DIAGNOSIS — B37.9 ANTIBIOTIC-INDUCED YEAST INFECTION: ICD-10-CM

## 2017-12-13 DIAGNOSIS — R11.0 NAUSEA: ICD-10-CM

## 2017-12-13 DIAGNOSIS — G43.011 INTRACTABLE MIGRAINE WITHOUT AURA AND WITH STATUS MIGRAINOSUS: Primary | ICD-10-CM

## 2017-12-13 DIAGNOSIS — J01.90 ACUTE NON-RECURRENT SINUSITIS, UNSPECIFIED LOCATION: ICD-10-CM

## 2017-12-13 DIAGNOSIS — T36.95XA ANTIBIOTIC-INDUCED YEAST INFECTION: ICD-10-CM

## 2017-12-13 PROCEDURE — 96372 THER/PROPH/DIAG INJ SC/IM: CPT | Mod: S$GLB,,, | Performed by: FAMILY MEDICINE

## 2017-12-13 PROCEDURE — 99214 OFFICE O/P EST MOD 30 MIN: CPT | Mod: 25,S$GLB,, | Performed by: NURSE PRACTITIONER

## 2017-12-13 PROCEDURE — 99999 PR PBB SHADOW E&M-EST. PATIENT-LVL V: CPT | Mod: PBBFAC,,, | Performed by: NURSE PRACTITIONER

## 2017-12-13 RX ORDER — KETOROLAC TROMETHAMINE 30 MG/ML
60 INJECTION, SOLUTION INTRAMUSCULAR; INTRAVENOUS
Status: COMPLETED | OUTPATIENT
Start: 2017-12-13 | End: 2017-12-13

## 2017-12-13 RX ORDER — FLUCONAZOLE 150 MG/1
150 TABLET ORAL ONCE
Qty: 2 TABLET | Refills: 0 | Status: SHIPPED | OUTPATIENT
Start: 2017-12-13 | End: 2017-12-13

## 2017-12-13 RX ORDER — AMOXICILLIN AND CLAVULANATE POTASSIUM 875; 125 MG/1; MG/1
1 TABLET, FILM COATED ORAL 2 TIMES DAILY
Qty: 20 TABLET | Refills: 0 | Status: SHIPPED | OUTPATIENT
Start: 2017-12-13 | End: 2017-12-23

## 2017-12-13 RX ORDER — ONDANSETRON 4 MG/1
4 TABLET, ORALLY DISINTEGRATING ORAL EVERY 8 HOURS PRN
Qty: 6 TABLET | Refills: 0 | Status: SHIPPED | OUTPATIENT
Start: 2017-12-13 | End: 2018-02-09

## 2017-12-13 RX ADMIN — KETOROLAC TROMETHAMINE 60 MG: 30 INJECTION, SOLUTION INTRAMUSCULAR; INTRAVENOUS at 08:12

## 2017-12-13 NOTE — PROGRESS NOTES
"Subjective:       Patient ID: Jaylin Murguia is a 52 y.o. female.    Chief Complaint: Migraine ("migraine x's 3 days, meds not helping")    Migraine    This is a new problem. Episode onset: 3 days. The problem occurs constantly. The problem has been gradually worsening. The pain is located in the occipital and retro-orbital region. The pain does not radiate. The pain quality is similar to prior headaches. The quality of the pain is described as aching and stabbing. The pain is at a severity of 9/10. Associated symptoms include anorexia, nausea, phonophobia, photophobia and sinus pressure. Pertinent negatives include no abdominal pain, abnormal behavior, back pain, blurred vision, coughing, dizziness, drainage, ear pain, eye pain, eye redness, eye watering, facial sweating, fever, hearing loss, insomnia, loss of balance, muscle aches, neck pain, numbness, rhinorrhea, scalp tenderness, seizures, sore throat, swollen glands, tingling, tinnitus, visual change, vomiting, weakness or weight loss. The symptoms are aggravated by bright light and noise (exertion). Treatments tried: fioricet, maxalt. The treatment provided no relief. Her past medical history is significant for immunosuppression, migraine headaches and sinus disease.       /80 (BP Location: Right arm, Patient Position: Sitting, BP Method: Large (Manual))   Pulse 68   Temp 97.5 °F (36.4 °C) (Tympanic)   Ht 5' 7" (1.702 m)   Wt 92.9 kg (204 lb 12.9 oz)   SpO2 98%   BMI 32.08 kg/m²     Review of Systems   Constitutional: Positive for activity change and appetite change. Negative for chills, diaphoresis, fatigue, fever, unexpected weight change and weight loss.   HENT: Positive for congestion, postnasal drip, sinus pain and sinus pressure. Negative for dental problem, drooling, ear discharge, ear pain, hearing loss, mouth sores, nosebleeds, rhinorrhea, sneezing, sore throat, tinnitus, trouble swallowing and voice change.    Eyes: Positive for " photophobia. Negative for blurred vision, pain, discharge and redness.   Respiratory: Negative for cough, choking, chest tightness, shortness of breath and wheezing.    Cardiovascular: Negative for chest pain, palpitations and leg swelling.   Gastrointestinal: Positive for anorexia and nausea. Negative for abdominal pain, diarrhea and vomiting.   Endocrine: Negative for cold intolerance and heat intolerance.   Genitourinary: Negative for dysuria.   Musculoskeletal: Negative for arthralgias, back pain, joint swelling, myalgias and neck pain.   Skin: Negative for rash.   Allergic/Immunologic: Positive for environmental allergies. Negative for food allergies and immunocompromised state.   Neurological: Positive for headaches. Negative for dizziness, tingling, seizures, syncope, weakness, light-headedness, numbness and loss of balance.   Psychiatric/Behavioral: The patient does not have insomnia.        Objective:      Physical Exam   Constitutional: She is oriented to person, place, and time. She appears well-developed and well-nourished. No distress.   HENT:   Head: Normocephalic and atraumatic.   Right Ear: Tympanic membrane, external ear and ear canal normal.   Left Ear: Tympanic membrane, external ear and ear canal normal.   Nose: Mucosal edema present. No rhinorrhea. Right sinus exhibits maxillary sinus tenderness and frontal sinus tenderness. Left sinus exhibits maxillary sinus tenderness and frontal sinus tenderness.   Mouth/Throat: Uvula is midline, oropharynx is clear and moist and mucous membranes are normal. No oropharyngeal exudate, posterior oropharyngeal edema or posterior oropharyngeal erythema.   Eyes: Conjunctivae, EOM and lids are normal. Pupils are equal, round, and reactive to light. Right eye exhibits no discharge. Left eye exhibits no discharge.   Neck: Normal range of motion.   Cardiovascular: Normal rate, regular rhythm and normal heart sounds.    No murmur heard.  Pulmonary/Chest: Effort normal  and breath sounds normal. No accessory muscle usage. No respiratory distress. She has no decreased breath sounds. She has no wheezes. She has no rhonchi. She has no rales. She exhibits no tenderness.   Abdominal: Soft. She exhibits no distension.   Musculoskeletal: Normal range of motion.   Neurological: She is alert and oriented to person, place, and time. She has normal strength. She is not disoriented. No cranial nerve deficit or sensory deficit. Coordination normal.   Skin: Skin is warm and dry. She is not diaphoretic.   Psychiatric: She has a normal mood and affect. Her behavior is normal.   Nursing note and vitals reviewed.      Assessment:       1. Intractable migraine without aura and with status migrainosus    2. Acute non-recurrent sinusitis, unspecified location    3. Antibiotic-induced yeast infection    4. Nausea        Plan:       Jaylin was seen today for migraine.    Diagnoses and all orders for this visit:    Intractable migraine without aura and with status migrainosus  -     ketorolac injection 60 mg; Inject 2 mLs (60 mg total) into the muscle one time.    Acute non-recurrent sinusitis, unspecified location  -     amoxicillin-clavulanate 875-125mg (AUGMENTIN) 875-125 mg per tablet; Take 1 tablet by mouth 2 (two) times daily.    Antibiotic-induced yeast infection  -     fluconazole (DIFLUCAN) 150 MG Tab; Take 1 tablet (150 mg total) by mouth once. Repeat in 3 days if symptoms still persist.    Nausea  -     ondansetron (ZOFRAN-ODT) 4 MG TbDL; Take 1 tablet (4 mg total) by mouth every 8 (eight) hours as needed.    probiotics discussed  Rest  Drink plenty of clear fluids  Nasal saline spray to clear nasal drainage and help with nasal congestion  Zyrtec or Claritin to help dry mucus and post nasal drip  Mucinex or Mucinex DM for cough and chest congestion  No more ibuprofen today  Warm salt water gargles for throat comfort  Chloraseptic spray or lozenges for throat comfort  See Primary Care Physician  or go to ER if symptoms worsen of fail to improve with treatment.

## 2017-12-13 NOTE — PATIENT INSTRUCTIONS
Acute Sinusitis    Acute sinusitis is irritation and swelling of the sinuses. It is usually caused by a viral infection after a common cold. Your doctor can help you find relief.  What is acute sinusitis?  Sinuses are air-filled spaces in the skull behind the face. They are kept moist and clean by a lining of mucosa. Things such as pollen, smoke, and chemical fumes can irritate the mucosa. It can then swell up. As a response to irritation, the mucosa makes more mucus and other fluids. Tiny hairlike cilia cover the mucosa. Cilia help carry mucus toward the opening of the sinus. Too much mucus may cause the cilia to stop working. This blocks the sinus opening. A buildup of fluid in the sinuses then causes pain and pressure. It can also encourage bacteria to grow in the sinuses.  Common symptoms of acute sinusitis  You may have:  · Facial soreness pain  · Headache  · Fever  · Fluid draining in the back of the throat (postnasal drip)  · Congestion  · Drainage that is thick and colored, instead of clear  · Cough  Diagnosing acute sinusitis  Your doctor will ask about your symptoms and health history. He or she will look at your ear, nose, and throat. You usually won't need to have X-rays taken.    The doctor may take a sample of mucus to check for bacteria. If you have sinusitis that keeps coming back, you may need imaging tests such as X-rays or CAT scans. This will help your doctor check for a structural problem that may be causing the infection.  Treating acute sinusitis  Treatment is aimed at unblocking the sinus opening and helping the cilia work again. You may need to take antihistamine and decongestant medicine. These can reduce inflammation and decrease the amount of fluid your sinuses make. If you have a bacterial infection, you will need to take antibiotic medicine for 10 to 14 days. Take this medicine until it is gone, even if you feel better.  Date Last Reviewed: 10/1/2016  © 6605-9417 [employer] [address] [place] [address]. All rights reserved. This information is not intended as a substitute for professional medical care. Always follow your healthcare professional's instructions.

## 2017-12-28 RX ORDER — NORTRIPTYLINE HYDROCHLORIDE 25 MG/1
CAPSULE ORAL
Qty: 180 CAPSULE | Refills: 0 | Status: SHIPPED | OUTPATIENT
Start: 2017-12-28 | End: 2018-03-23 | Stop reason: SDUPTHER

## 2018-01-07 ENCOUNTER — PATIENT MESSAGE (OUTPATIENT)
Dept: INTERNAL MEDICINE | Facility: CLINIC | Age: 53
End: 2018-01-07

## 2018-01-08 RX ORDER — LOSARTAN POTASSIUM 50 MG/1
TABLET ORAL
Qty: 90 TABLET | Refills: 3 | OUTPATIENT
Start: 2018-01-08

## 2018-02-04 ENCOUNTER — OFFICE VISIT (OUTPATIENT)
Dept: URGENT CARE | Facility: CLINIC | Age: 53
End: 2018-02-04
Payer: COMMERCIAL

## 2018-02-04 VITALS
TEMPERATURE: 103 F | WEIGHT: 201.94 LBS | HEART RATE: 84 BPM | DIASTOLIC BLOOD PRESSURE: 72 MMHG | BODY MASS INDEX: 31.7 KG/M2 | OXYGEN SATURATION: 96 % | HEIGHT: 67 IN | SYSTOLIC BLOOD PRESSURE: 118 MMHG

## 2018-02-04 DIAGNOSIS — R50.9 FEVER, UNSPECIFIED FEVER CAUSE: ICD-10-CM

## 2018-02-04 DIAGNOSIS — J10.1 INFLUENZA B: ICD-10-CM

## 2018-02-04 DIAGNOSIS — J01.10 ACUTE FRONTAL SINUSITIS, RECURRENCE NOT SPECIFIED: ICD-10-CM

## 2018-02-04 PROCEDURE — 3008F BODY MASS INDEX DOCD: CPT | Mod: S$GLB,,, | Performed by: INTERNAL MEDICINE

## 2018-02-04 PROCEDURE — 99214 OFFICE O/P EST MOD 30 MIN: CPT | Mod: S$GLB,,, | Performed by: INTERNAL MEDICINE

## 2018-02-04 PROCEDURE — 99999 PR PBB SHADOW E&M-EST. PATIENT-LVL V: CPT | Mod: PBBFAC,,,

## 2018-02-04 RX ORDER — OSELTAMIVIR PHOSPHATE 75 MG/1
75 CAPSULE ORAL 2 TIMES DAILY
Qty: 10 CAPSULE | Refills: 0 | Status: SHIPPED | OUTPATIENT
Start: 2018-02-04 | End: 2018-02-09 | Stop reason: ALTCHOICE

## 2018-02-04 RX ORDER — FLUCONAZOLE 150 MG/1
150 TABLET ORAL DAILY
Qty: 1 TABLET | Refills: 0 | Status: SHIPPED | OUTPATIENT
Start: 2018-02-04 | End: 2018-02-05

## 2018-02-04 RX ORDER — AMOXICILLIN AND CLAVULANATE POTASSIUM 875; 125 MG/1; MG/1
1 TABLET, FILM COATED ORAL EVERY 12 HOURS
Qty: 14 TABLET | Refills: 0 | Status: SHIPPED | OUTPATIENT
Start: 2018-02-04 | End: 2018-02-09 | Stop reason: ALTCHOICE

## 2018-02-04 NOTE — PATIENT INSTRUCTIONS
The Flu (Influenza)     The virus that causes the flu spreads through the air in droplets when someone who has the flu coughs, sneezes, laughs, or talks.   The flu (influenza) is an infection that affects your respiratory tract. This tract is made up of your mouth, nose, and lungs, and the passages between them. Unlike a cold, the flu can make you very ill. And it can lead to pneumonia, a serious lung infection. The flu can have serious complications and even cause death.  Who is at risk for the flu?  Anyone can get the flu. But you are more likely to become infected if you:  · Have a weakened immune system  · Work in a healthcare setting where you may be exposed to flu germs  · Live or work with someone who has the flu  · Havent had an annual flu shot  How does the flu spread?  The flu is caused by a virus. The virus spreads through the air in droplets when someone who has the flu coughs, sneezes, laughs, or talks. You can become infected when you inhale these viruses directly. You can also become infected when you touch a surface on which the droplets have landed and then transfer the germs to your eyes, nose, or mouth. Touching used tissues, or sharing utensils, drinking glasses, or a toothbrush from an infected person can expose you to flu viruses, too.  What are the symptoms of the flu?  Flu symptoms tend to come on quickly and may last a few days to a few weeks. They include:  · Fever usually higher than 100.4°F  (38°C) and chills  · Sore throat and headache  · Dry cough  · Runny nose  · Tiredness and weakness  · Muscle aches  Who is at risk for flu complications?  For some people, the flu can be very serious. The risk for complications is greater for:  · Children younger than age 5  · Adults ages 65 and older  · People with a chronic illness such as diabetes or heart, kidney, or lung disease  · People who live in a nursing home or long-term care facility   How is the flu treated?  The flu usually gets  better after 7 days or so. In some cases, your healthcare provider may prescribe an antiviral medicine. This may help you get well a little sooner. For the medicine to help, you need to take it as soon as possible (ideally within 48 hours) after your symptoms start. If you develop pneumonia or other serious illness, you may need to stay in the hospital.  Easing flu symptoms  · Drink lots of fluids such as water, juice, and warm soup. A good rule is to drink enough so that you urinate your normal amount.  · Get plenty of rest.  · Ask your healthcare provider what to take for fever and pain.  · Call your provider if your fever is 100.4°F (38°C) or higher, or you become dizzy, lightheaded, or short of breath.  Taking steps to protect others  · Wash your hands often, especially after coughing or sneezing. Or clean your hands with an alcohol-based hand  containing at least 60% alcohol.  · Cough or sneeze into a tissue. Then throw the tissue away and wash your hands. If you dont have a tissue, cough and sneeze into your elbow.  · Stay home until at least 24 hours after you no longer have a fever or chills. Be sure the fever isnt being hidden by fever-reducing medicine.  · Dont share food, utensils, drinking glasses, or a toothbrush with others.  · Ask your healthcare provider if others in your household should get antiviral medicine to help them avoid infection.  How can the flu be prevented?  · One of the best ways to avoid the flu is to get a flu vaccine each year. The virus that causes the flu changes from year to year. For that reason, healthcare providers recommend getting the flu vaccine each year, as soon as it's available in your area. The vaccine is given as a shot. Your healthcare provider can tell you which vaccine is right for you. A nasal spray is also available but is not recommended for the 7832-2179 flu season. The CDC says this is because the nasal spray did not seem to protect against the flu  over the last several flu seasons. In the past, it was meant for people ages 2 to 49.  · Wash your hands often. Frequent handwashing is a proven way to help prevent infection.  · Carry an alcohol-based hand gel containing at least 60% alcohol. Use it when you can't use soap and water. Then wash your hands as soon as you can.  · Avoid touching your eyes, nose, and mouth.  · At home and work, clean phones, computer keyboards, and toys often with disinfectant wipes.  · If possible, avoid close contact with others who have the flu or symptoms of the flu.  Handwashing tips  Handwashing is one of the best ways to prevent many common infections. If you are caring for or visiting someone with the flu, wash your hands each time you enter and leave the room. Follow these steps:  · Use warm water and plenty of soap. Rub your hands together well.  · Clean the whole hand, including under your nails, between your fingers, and up the wrists.  · Wash for at least 15 seconds.  · Rinse, letting the water run down your fingers, not up your wrists.  · Dry your hands well. Use a paper towel to turn off the faucet and open the door.  Using alcohol-based hand   Alcohol-based hand  are also a good choice. Use them when you can't use soap and water. Follow these steps:  · Squeeze about a tablespoon of gel into the palm of one hand.  · Rub your hands together briskly, cleaning the backs of your hands, the palms, between your fingers, and up the wrists.  · Rub until the gel is gone and your hands are completely dry.  Preventing the flu in healthcare settings  The flu is a special concern for people in hospitals and long-term care facilities. To help prevent the spread of flu, many hospitals and nursing homes take these steps:  · Healthcare providers wash their hands or use an alcohol-based hand  before and after treating each patient.  · People with the flu have private rooms and bathrooms or share a room with someone  with the same infection.  · People who are at high risk for the flu but don't have it are encouraged to get the flu and pneumonia vaccines.  · All healthcare workers are encouraged or required to get flu shots.   Date Last Reviewed: 12/1/2016 © 2000-2017 [employer]. [address] [place] [address]. All rights reserved. This information is not intended as a substitute for professional medical care. Always follow your healthcare professional's instructions.        Understanding Your Sinuses  Your sinuses are air-filled spaces between the bones in your head. They have small openings that connect to the nasal cavity. The sinuses make mucus that drains into the nose. This helps keep the nose moist and free of dust and germs.      Parts of the nasal cavity  · The septum is the wall of cartilage and bone in the center of the nasal cavity.  · The middle meatus is the intersection between the sinuses.  · Turbinates are ridges on the sides of the nasal cavity.  Cilia keep sinuses clear    Air circulates freely though healthy sinuses. Tiny, hairlike structures called cilia line the sinuses. Cilia move the thin, watery mucus through the sinuses and into the nose. Sinuses are healthy when they drain freely. Sinus drainage can be blocked if the sinus lining is swollen or if mucus is too thick. Cilia that are damaged or dont work correctly can also lead to problems with drainage.  Date Last Reviewed: 10/1/2016  © 3089-9301 [employer]. [address] [place] [address]. All rights reserved. This information is not intended as a substitute for professional medical care. Always follow your healthcare professional's instructions.

## 2018-02-04 NOTE — PROGRESS NOTES
Subjective:    Patient ID:  Jaylin Murguia is a 52 y.o. female who presents for follow-up of Fever; Dizziness; and Generalized Body Aches      HPI   Ms Murguia is a 52 year old female who presents to Urgent care clinic today with complaints of Fever, Chills, Body aches and headache that started 2-3 days ago. Denies associated symptoms of chest pain, shortness of breath and palpitations. Tenp 102.7 today in clinic. She has been experiencing difficult sleeping as well.       Review of Systems   Constitution: Positive for chills, fever, weakness and malaise/fatigue. Negative for diaphoresis, weight gain and weight loss.   HENT: Positive for sore throat. Negative for congestion and nosebleeds.         Sinus pressure    Eyes: Negative for blurred vision and double vision.   Cardiovascular: Negative for chest pain, claudication, cyanosis, dyspnea on exertion, irregular heartbeat, leg swelling, near-syncope, orthopnea, palpitations, paroxysmal nocturnal dyspnea and syncope.   Respiratory: Negative for cough, hemoptysis, shortness of breath, sputum production and wheezing.    Hematologic/Lymphatic: Negative for bleeding problem. Does not bruise/bleed easily.   Skin: Negative for rash.   Musculoskeletal: Negative for back pain, falls, joint pain, joint swelling and neck pain.   Gastrointestinal: Negative for abdominal pain, heartburn and vomiting.   Genitourinary: Negative for dysuria, frequency and hematuria.   Neurological: Positive for dizziness and headaches. Negative for difficulty with concentration, focal weakness, light-headedness, numbness and seizures.   Psychiatric/Behavioral: Negative for depression, memory loss and substance abuse. The patient has insomnia.    Allergic/Immunologic: Negative for HIV exposure and hives.           Past Medical History:   Diagnosis Date    Allergic rhinitis     Asthma     Crohn's disease     Ileal involvement, previously on Remicade, Asacol, Prednisone    Fibromyalgia      Hypertension     Migraine     Sciatica      Past Surgical History:   Procedure Laterality Date    BLADDER SURGERY      sling was created by her muscles      SECTION      COLONOSCOPY N/A 2017    Procedure: COLONOSCOPY;  Surgeon: Kin Dyer MD;  Location: [facility];  Service: Endoscopy;  Laterality: N/A;    FINGER SURGERY      joint relpacement, left hand index finger    HYSTERECTOMY      WISDOM TOOTH EXTRACTION       Family History   Problem Relation Age of Onset    Hypertension Mother     Kidney disease Father     Scleroderma Father     Hypertension Brother     Cancer Paternal Grandmother 70     colon     Social History     Social History    Marital status:      Spouse name: N/A    Number of children: 2    Years of education: N/A     Occupational History    teacher      Social History Main Topics    Smoking status: Never Smoker    Smokeless tobacco: Never Used    Alcohol use No    Drug use: No    Sexual activity: Yes     Partners: Male     Other Topics Concern    None     Social History Narrative    None     Review of patient's allergies indicates:  No Known Allergies  Current Outpatient Prescriptions on File Prior to Visit   Medication Sig Dispense Refill    adalimumab (HUMIRA) PnKt injection Inject 0.8 mLs (40 mg total) into the skin every 14 (fourteen) days. 4.8 mL 3    BREO ELLIPTA 100-25 mcg/dose diskus inhaler   0    butalbital-acetaminophen-caffeine -40 mg (FIORICET, ESGIC) -40 mg per tablet take 1 tablet by mouth every 4 hours if needed for headache 30 tablet 1    cetirizine (ZYRTEC) 5 MG chewable tablet Take 5 mg by mouth once daily.      duloxetine (CYMBALTA) 60 MG capsule Take 60 mg by mouth 2 (two) times daily.      DYMISTA 137-50 mcg/spray [place] nassal spray instill 1 spray into each nostril twice a day 23 g 11    eletriptan (RELPAX) 40 MG tablet Take 1 tablet (40 mg total) by mouth as needed. 12 tablet 2    estradiol (ESTRACE) 2 MG  tablet Take 2 mg by mouth once daily.      levalbuterol (XOPENEX) 1.25 mg/3 mL nebulizer solution Take 3 mLs (1.25 mg total) by nebulization every 4 (four) hours as needed for Wheezing. Rescue 30 mL 0    losartan-hydrochlorothiazide 100-12.5 mg (HYZAAR) 100-12.5 mg Tab Take 1 tablet by mouth once daily. 90 tablet 3    montelukast (SINGULAIR) 10 mg tablet take 1 tablet by mouth every evening 90 tablet 3    nortriptyline (PAMELOR) 25 MG capsule TAKE 2 CAPSULES BY MOUTH ONCE DAILY 180 capsule 0    ondansetron (ZOFRAN-ODT) 4 MG TbDL Take 1 tablet (4 mg total) by mouth every 8 (eight) hours as needed. 6 tablet 0    pantoprazole (PROTONIX) 40 MG tablet Take 1 tablet (40 mg total) by mouth once daily. 90 tablet 3    pregabalin (LYRICA) 150 MG capsule Take 1 capsule (150 mg total) by mouth 3 (three) times daily. 90 capsule 5    propranolol (INDERAL) 40 MG tablet Take 1 tablet (40 mg total) by mouth 3 (three) times daily. 90 tablet 11    rizatriptan (MAXALT) 10 MG tablet take 1 tablet by mouth if needed for migraines MAX DOSE 2 TABLETS IN 24 HOURS 10 tablet 11    simvastatin (ZOCOR) 20 MG tablet Take 1 tablet (20 mg total) by mouth every evening. 90 tablet 3    zolpidem (AMBIEN) 5 MG Tab Take 1 tablet (5 mg total) by mouth nightly as needed. 30 tablet 5    albuterol 90 mcg/actuation inhaler Inhale 2 puffs into the lungs every 4 to 6 hours as needed for Wheezing. 8.5 g 1     No current facility-administered medications on file prior to visit.      .       Objective:    Physical Exam   Constitutional: She appears well-developed and well-nourished.   HENT:   Head: Normocephalic and atraumatic.   Nose: Right sinus exhibits frontal sinus tenderness. Left sinus exhibits maxillary sinus tenderness and frontal sinus tenderness.   Eyes: Right eye exhibits no discharge. Left eye exhibits no discharge.   Neck: No JVD present.   Cardiovascular: Exam reveals no gallop and no friction rub.    No murmur heard.  Pulmonary/Chest:  Effort normal and breath sounds normal. No respiratory distress. She has no wheezes. She has no rales. She exhibits no tenderness.   Abdominal: Soft. Bowel sounds are normal. She exhibits no distension and no mass. There is no tenderness. There is no rebound and no guarding.   Musculoskeletal: She exhibits deformity. She exhibits no edema or tenderness.   Nursing note and vitals reviewed.          Assessment:       1. Influenza B    2. Acute frontal sinusitis, recurrence not specified    3. Fever, unspecified fever cause         Plan:     Augmentin 875 BID   Tamaflu 75 mg BID   Tylenol 650 mg every 6-8 hours as needed temp     Rest and increase fluids.   Follow up with your primary care provider if symptoms do not improved  within a few days or sooner for any new or worsening symptoms.  Go to the ER for shortness of breath, chest pain, severe headache, vision changes severe pain or swelling, or for any other new and concerning symptoms.

## 2018-02-05 ENCOUNTER — PATIENT MESSAGE (OUTPATIENT)
Dept: INTERNAL MEDICINE | Facility: CLINIC | Age: 53
End: 2018-02-05

## 2018-02-08 ENCOUNTER — PATIENT MESSAGE (OUTPATIENT)
Dept: INTERNAL MEDICINE | Facility: CLINIC | Age: 53
End: 2018-02-08

## 2018-02-09 ENCOUNTER — OFFICE VISIT (OUTPATIENT)
Dept: INTERNAL MEDICINE | Facility: CLINIC | Age: 53
End: 2018-02-09
Payer: COMMERCIAL

## 2018-02-09 ENCOUNTER — TELEPHONE (OUTPATIENT)
Dept: GASTROENTEROLOGY | Facility: CLINIC | Age: 53
End: 2018-02-09

## 2018-02-09 ENCOUNTER — TELEPHONE (OUTPATIENT)
Dept: INTERNAL MEDICINE | Facility: CLINIC | Age: 53
End: 2018-02-09

## 2018-02-09 VITALS
TEMPERATURE: 99 F | HEIGHT: 67 IN | DIASTOLIC BLOOD PRESSURE: 80 MMHG | BODY MASS INDEX: 31.42 KG/M2 | HEART RATE: 72 BPM | SYSTOLIC BLOOD PRESSURE: 122 MMHG | WEIGHT: 200.19 LBS

## 2018-02-09 DIAGNOSIS — Z79.60 LONG-TERM USE OF IMMUNOSUPPRESSANT MEDICATION: ICD-10-CM

## 2018-02-09 DIAGNOSIS — B00.9 HSV INFECTION: Primary | ICD-10-CM

## 2018-02-09 PROCEDURE — 99999 PR PBB SHADOW E&M-EST. PATIENT-LVL IV: CPT | Mod: PBBFAC,,, | Performed by: PHYSICIAN ASSISTANT

## 2018-02-09 PROCEDURE — 3008F BODY MASS INDEX DOCD: CPT | Mod: S$GLB,,, | Performed by: PHYSICIAN ASSISTANT

## 2018-02-09 PROCEDURE — 99213 OFFICE O/P EST LOW 20 MIN: CPT | Mod: SA,S$GLB,, | Performed by: PHYSICIAN ASSISTANT

## 2018-02-09 RX ORDER — HYDROXYZINE PAMOATE 25 MG/1
25 CAPSULE ORAL EVERY 8 HOURS PRN
Qty: 20 CAPSULE | Refills: 0 | Status: SHIPPED | OUTPATIENT
Start: 2018-02-09 | End: 2018-07-27 | Stop reason: ALTCHOICE

## 2018-02-09 RX ORDER — VALACYCLOVIR HYDROCHLORIDE 1 G/1
1000 TABLET, FILM COATED ORAL 3 TIMES DAILY
Qty: 21 TABLET | Refills: 0 | Status: SHIPPED | OUTPATIENT
Start: 2018-02-09 | End: 2018-07-27 | Stop reason: ALTCHOICE

## 2018-02-09 NOTE — TELEPHONE ENCOUNTER
CHAUNCEY Story is requesting the patient be seen next week for eval of Crohns Disease.  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  Please contact patient to schedule a sooner appt.

## 2018-02-09 NOTE — TELEPHONE ENCOUNTER
I offered her to schedule a appointment withDr Dumont for the 19th and she stated that she was off and wanted to take care of this next week.

## 2018-02-12 ENCOUNTER — OFFICE VISIT (OUTPATIENT)
Dept: GASTROENTEROLOGY | Facility: CLINIC | Age: 53
End: 2018-02-12
Payer: COMMERCIAL

## 2018-02-12 ENCOUNTER — LAB VISIT (OUTPATIENT)
Dept: LAB | Facility: HOSPITAL | Age: 53
End: 2018-02-12
Attending: NURSE PRACTITIONER
Payer: COMMERCIAL

## 2018-02-12 VITALS
BODY MASS INDEX: 29.73 KG/M2 | SYSTOLIC BLOOD PRESSURE: 112 MMHG | DIASTOLIC BLOOD PRESSURE: 82 MMHG | WEIGHT: 196.19 LBS | HEART RATE: 66 BPM | HEIGHT: 68 IN

## 2018-02-12 DIAGNOSIS — R21 RASH: ICD-10-CM

## 2018-02-12 DIAGNOSIS — R19.7 DIARRHEA, UNSPECIFIED TYPE: ICD-10-CM

## 2018-02-12 DIAGNOSIS — K50.018 CROHN'S DISEASE OF SMALL INTESTINE WITH OTHER COMPLICATION: ICD-10-CM

## 2018-02-12 DIAGNOSIS — R21 RASH: Primary | ICD-10-CM

## 2018-02-12 LAB
ALBUMIN SERPL BCP-MCNC: 3 G/DL
ALP SERPL-CCNC: 153 U/L
ALT SERPL W/O P-5'-P-CCNC: 42 U/L
ANION GAP SERPL CALC-SCNC: 9 MMOL/L
AST SERPL-CCNC: 29 U/L
BASOPHILS # BLD AUTO: 0.04 K/UL
BASOPHILS NFR BLD: 0.4 %
BILIRUB SERPL-MCNC: 0.3 MG/DL
BUN SERPL-MCNC: 9 MG/DL
CALCIUM SERPL-MCNC: 9.2 MG/DL
CHLORIDE SERPL-SCNC: 101 MMOL/L
CO2 SERPL-SCNC: 25 MMOL/L
CREAT SERPL-MCNC: 0.9 MG/DL
CRP SERPL-MCNC: 53.4 MG/L
DIFFERENTIAL METHOD: ABNORMAL
EOSINOPHIL # BLD AUTO: 0.2 K/UL
EOSINOPHIL NFR BLD: 1.8 %
ERYTHROCYTE [DISTWIDTH] IN BLOOD BY AUTOMATED COUNT: 12.9 %
ERYTHROCYTE [SEDIMENTATION RATE] IN BLOOD BY WESTERGREN METHOD: 84 MM/HR
EST. GFR  (AFRICAN AMERICAN): >60 ML/MIN/1.73 M^2
EST. GFR  (NON AFRICAN AMERICAN): >60 ML/MIN/1.73 M^2
GLUCOSE SERPL-MCNC: 89 MG/DL
HCT VFR BLD AUTO: 36.9 %
HGB BLD-MCNC: 12.1 G/DL
IMM GRANULOCYTES # BLD AUTO: 0.07 K/UL
IMM GRANULOCYTES NFR BLD AUTO: 0.8 %
LYMPHOCYTES # BLD AUTO: 2.9 K/UL
LYMPHOCYTES NFR BLD: 31.4 %
MCH RBC QN AUTO: 30.6 PG
MCHC RBC AUTO-ENTMCNC: 32.8 G/DL
MCV RBC AUTO: 93 FL
MONOCYTES # BLD AUTO: 0.8 K/UL
MONOCYTES NFR BLD: 8.4 %
NEUTROPHILS # BLD AUTO: 5.3 K/UL
NEUTROPHILS NFR BLD: 57.2 %
NRBC BLD-RTO: 0 /100 WBC
PLATELET # BLD AUTO: 488 K/UL
PMV BLD AUTO: 9 FL
POTASSIUM SERPL-SCNC: 4.3 MMOL/L
PROT SERPL-MCNC: 7.7 G/DL
RBC # BLD AUTO: 3.95 M/UL
SODIUM SERPL-SCNC: 135 MMOL/L
WBC # BLD AUTO: 9.25 K/UL

## 2018-02-12 PROCEDURE — 86140 C-REACTIVE PROTEIN: CPT

## 2018-02-12 PROCEDURE — 99214 OFFICE O/P EST MOD 30 MIN: CPT | Mod: SA,S$GLB,, | Performed by: NURSE PRACTITIONER

## 2018-02-12 PROCEDURE — 36415 COLL VENOUS BLD VENIPUNCTURE: CPT

## 2018-02-12 PROCEDURE — 80053 COMPREHEN METABOLIC PANEL: CPT

## 2018-02-12 PROCEDURE — 3008F BODY MASS INDEX DOCD: CPT | Mod: S$GLB,,, | Performed by: NURSE PRACTITIONER

## 2018-02-12 PROCEDURE — 99999 PR PBB SHADOW E&M-EST. PATIENT-LVL III: CPT | Mod: PBBFAC,,, | Performed by: NURSE PRACTITIONER

## 2018-02-12 PROCEDURE — 85025 COMPLETE CBC W/AUTO DIFF WBC: CPT

## 2018-02-12 PROCEDURE — 85651 RBC SED RATE NONAUTOMATED: CPT

## 2018-02-12 NOTE — PROGRESS NOTES
Clinic Follow Up:  [facility] Follow Up Note    Reason for Follow Up:  The primary encounter diagnosis was Rash. A diagnosis of Crohn's disease of small intestine with other complication was also pertinent to this visit.    PCP: Esthela Hu   [address] / [place] [id number]    HPI:  This is a 52 y.o. female here for follow up of the above. She is a previous patient of Dr. Cabello but this is my first time seeing her. She has a history of Crohn's disease affecting the small bowel and is being treated with Humira every other week. She presents to clinic today with a potential allergic reaction to Humira. She was recently diagnosed with influenza associated with a high fever on 2/4/18. She was placed on Tamilfu and Amoxil. She developed a localized rash on right side of neck. Also noted on anterior bilateral lower extremities. Onset was sudden 4 days ago. Rash is associated with pruritis. She was seen by primary care on 2/9/18 who diagnosed her with an HSV rash secondary to being immunocompromised from Humira.     She has a long history of crohn's disease and has been under the care of Dr. Dyer. Previously, was a patient of Dr. Randall at [facility]. She was started on Humira in July 2017 after she had CT enterography that more extensive disease into the small bowel. It showed active inflammation in the TI and an area narrowed further into the small intestine the radiologist felt was most likely a contraction but could potentially be a stricture. colonoscopy was done in May 2017 (prior to starting Humira) and showed crohn's disease with ileitis. She was very hesitant to start Humira as it carries the risk for developing cancer. MR enterography was recommended for further evaluation of potential stricture in the ileum. This was never completed.   She reports having mild local reactions to Humira but feels that was improving. She reports having normal bowels the week after getting her  Humira injection but develops diarrhea the second week prior to getting infection. She is due for her next injection this Thursday. No hematochezia or melena. She is very concerned about having lymphoma from being on Humira. She believes she was on Remicade in the remote past but did not work.     Review of Systems   Constitutional: Negative for activity change and appetite change.        As per interval history above   HENT: Negative for sore throat and trouble swallowing.    Eyes: Negative for pain and discharge.   Respiratory: Negative for cough, chest tightness and shortness of breath.    Cardiovascular: Negative for chest pain and palpitations.   Gastrointestinal: Positive for abdominal pain and diarrhea. Negative for abdominal distention, anal bleeding, blood in stool, constipation, nausea and rectal pain.   Genitourinary: Negative for dysuria, frequency and hematuria.   Skin: Positive for rash. Negative for color change.   Neurological: Negative for speech difficulty, weakness and headaches.   Psychiatric/Behavioral: Negative for confusion and sleep disturbance.       Medical History:  Past Medical History:   Diagnosis Date    Allergic rhinitis     Asthma     Crohn's disease     Ileal involvement, previously on Remicade, Asacol, Prednisone    Fibromyalgia     Hypertension     Migraine     Sciatica        Surgical History:   Past Surgical History:   Procedure Laterality Date    BLADDER SURGERY      sling was created by her muscles      SECTION      COLONOSCOPY N/A 2017    Procedure: COLONOSCOPY;  Surgeon: Kin Dyer MD;  Location: [facility];  Service: Endoscopy;  Laterality: N/A;    FINGER SURGERY      joint relpacement, left hand index finger    HYSTERECTOMY      WISDOM TOOTH EXTRACTION         Family History:   Family History   Problem Relation Age of Onset    Hypertension Mother     Kidney disease Father     Scleroderma Father     Hypertension Brother     Cancer  Paternal Grandmother 70     colon       Social History:   Social History   Substance Use Topics    Smoking status: Never Smoker    Smokeless tobacco: Never Used    Alcohol use No       Allergies: Review of patient's allergies indicates:  No Known Allergies    Home Medications:  Current Outpatient Prescriptions on File Prior to Visit   Medication Sig Dispense Refill    adalimumab (HUMIRA) PnKt injection Inject 0.8 mLs (40 mg total) into the skin every 14 (fourteen) days. 4.8 mL 3    albuterol 90 mcg/actuation inhaler Inhale 2 puffs into the lungs every 4 to 6 hours as needed for Wheezing. 8.5 g 1    BREO ELLIPTA 100-25 mcg/dose diskus inhaler   0    butalbital-acetaminophen-caffeine -40 mg (FIORICET, ESGIC) -40 mg per tablet take 1 tablet by mouth every 4 hours if needed for headache 30 tablet 1    cetirizine (ZYRTEC) 5 MG chewable tablet Take 5 mg by mouth once daily.      duloxetine (CYMBALTA) 60 MG capsule Take 60 mg by mouth 2 (two) times daily.      DYMISTA 137-50 mcg/spray [place] nassal spray instill 1 spray into each nostril twice a day 23 g 11    eletriptan (RELPAX) 40 MG tablet Take 1 tablet (40 mg total) by mouth as needed. 12 tablet 2    estradiol (ESTRACE) 2 MG tablet Take 2 mg by mouth once daily.      hydrOXYzine pamoate (VISTARIL) 25 MG Cap Take 1 capsule (25 mg total) by mouth every 8 (eight) hours as needed. 20 capsule 0    levalbuterol (XOPENEX) 1.25 mg/3 mL nebulizer solution Take 3 mLs (1.25 mg total) by nebulization every 4 (four) hours as needed for Wheezing. Rescue 30 mL 0    losartan-hydrochlorothiazide 100-12.5 mg (HYZAAR) 100-12.5 mg Tab Take 1 tablet by mouth once daily. 90 tablet 3    montelukast (SINGULAIR) 10 mg tablet take 1 tablet by mouth every evening 90 tablet 3    nortriptyline (PAMELOR) 25 MG capsule TAKE 2 CAPSULES BY MOUTH ONCE DAILY 180 capsule 0    pantoprazole (PROTONIX) 40 MG tablet Take 1 tablet (40 mg total) by mouth once daily. 90 tablet 3     "pregabalin (LYRICA) 150 MG capsule Take 1 capsule (150 mg total) by mouth 3 (three) times daily. 90 capsule 5    propranolol (INDERAL) 40 MG tablet Take 1 tablet (40 mg total) by mouth 3 (three) times daily. 90 tablet 11    rizatriptan (MAXALT) 10 MG tablet take 1 tablet by mouth if needed for migraines MAX DOSE 2 TABLETS IN 24 HOURS 10 tablet 11    simvastatin (ZOCOR) 20 MG tablet Take 1 tablet (20 mg total) by mouth every evening. 90 tablet 3    valACYclovir (VALTREX) 1000 MG tablet Take 1 tablet (1,000 mg total) by mouth 3 (three) times daily. 21 tablet 0    zolpidem (AMBIEN) 5 MG Tab Take 1 tablet (5 mg total) by mouth nightly as needed. 30 tablet 5     No current facility-administered medications on file prior to visit.        Physical Exam:  Vital Signs:  /82   Pulse 66   Ht 5' 8.4" (1.737 m)   Wt 89 kg (196 lb 3.4 oz)   BMI 29.49 kg/m²   Body mass index is 29.49 kg/m².  Physical Exam   Constitutional: She is oriented to person, place, and time and well-developed, well-nourished, and in no distress. No distress.   HENT:   Head: Normocephalic.   Eyes: Conjunctivae are normal. Pupils are equal, round, and reactive to light.   Cardiovascular: Normal rate, regular rhythm and normal heart sounds.    Pulmonary/Chest: Effort normal and breath sounds normal. No respiratory distress.   Abdominal: Soft. Bowel sounds are normal. She exhibits no distension. There is tenderness in the right lower quadrant. There is no rebound and no guarding.   Neurological: She is alert and oriented to person, place, and time. No cranial nerve deficit.   Skin: Skin is warm and dry. No rash noted.   Erythematous rash noted to right side of neck and anterior lower extremities.    Psychiatric: Mood and affect normal.       Labs: Pertinent labs reviewed.    CRC Screening: See HPI    Assessment:  1. Rash    2. Crohn's disease of small intestine with other complication        Recommendations:  Rash  - unclear etiology of rash. " Possible HSV rash vs allergic reaction (Humira or other recent medications) vs viral with having the flu recently.   - will discuss case with collaborating physician to see if medication changes need to be made.   -     CBC auto differential; Future; Expected date: 02/12/2018  -     Comprehensive metabolic panel; Future; Expected date: 02/12/2018  -     ESR (SEDIMENTATION RATE, MANUAL); Future; Expected date: 02/12/2018  -     C-reactive protein; Future; Expected date: 02/12/2018    Crohn's disease of small intestine with other complication  - possible ileal stricture. Will get MR enterography for further evaluation.   - currently on Humira every other week  - having diarrhea the week before the next dose is due  - will get Humira antibodies and drug levels. Next Humira dose due Thursday so will have labs drawn on Wednesday  - she is concerned about having lymphoma and would not like to wait until wednesday to have all labs drawn.  Will draw labs minus the Humira levels today.   - if no stricture noted on MR enterography, will recommend VCE for evaluation of crohn's disease of small bowel.   - again, will discuss treatment of her crohn's disease with collaborating physician.   -     CBC auto differential; Future; Expected date: 02/12/2018  -     Comprehensive metabolic panel; Future; Expected date: 02/12/2018  -     ESR (SEDIMENTATION RATE, MANUAL); Future; Expected date: 02/12/2018  -     C-reactive protein; Future; Expected date: 02/12/2018  -     MRI ENTEROGRAPHY; Future; Expected date: 02/12/2018    Return to Clinic:  Follow up to be determined after results.    Thank you for the opportunity to participate in the care of this patient.  YURI Wharton

## 2018-02-12 NOTE — Clinical Note
I need consultation for a previous Crohn's patient of Dr. Cheung. Recent rash that was thought to be HSV rash secondary to immunosuppression with Humira. Unsure of etiology of rash. Also previously noted on CT enterography possible ileal stricture vs contractions. Getting MR enterography and if no stricture would recommend getting VCE for small bowel involvement. Also getting diarrhea week before infection due. Will get Humira labs on Wednesday as next injection is due Thursday. She is concerned about lymphoma and would like other labs be done today. Please advise on whether she should continue Humira until further evaluation can be done. Would you recommend anything else for evaluation of crohn's?

## 2018-02-12 NOTE — PROGRESS NOTES
Subjective:       Patient ID: Jaylin Murguia is a 52 y.o. female.    Chief Complaint: sores in throat, around neck/ on lip    HPI  Patient comes in today for lesions on lip throat and rash on neck     Started a few days ago   Rash is somewhat painful and somewhat itchy     She has crohn's disease and is on Humira     No f/c/ns. Vitals normal   No sore throat, no trouble swallowing. No lymphadenopathy, no neck pain     Health Maintenance Due   Topic Date Due    Mammogram  01/01/2018       Past Medical History:   Diagnosis Date    Allergic rhinitis     Asthma     Crohn's disease     Ileal involvement, previously on Remicade, Asacol, Prednisone    Fibromyalgia     Hypertension     Migraine     Sciatica        Current Outpatient Prescriptions   Medication Sig Dispense Refill    adalimumab (HUMIRA) PnKt injection Inject 0.8 mLs (40 mg total) into the skin every 14 (fourteen) days. 4.8 mL 3    BREO ELLIPTA 100-25 mcg/dose diskus inhaler   0    butalbital-acetaminophen-caffeine -40 mg (FIORICET, ESGIC) -40 mg per tablet take 1 tablet by mouth every 4 hours if needed for headache 30 tablet 1    cetirizine (ZYRTEC) 5 MG chewable tablet Take 5 mg by mouth once daily.      duloxetine (CYMBALTA) 60 MG capsule Take 60 mg by mouth 2 (two) times daily.      DYMISTA 137-50 mcg/spray [place] nassal spray instill 1 spray into each nostril twice a day 23 g 11    eletriptan (RELPAX) 40 MG tablet Take 1 tablet (40 mg total) by mouth as needed. 12 tablet 2    estradiol (ESTRACE) 2 MG tablet Take 2 mg by mouth once daily.      levalbuterol (XOPENEX) 1.25 mg/3 mL nebulizer solution Take 3 mLs (1.25 mg total) by nebulization every 4 (four) hours as needed for Wheezing. Rescue 30 mL 0    losartan-hydrochlorothiazide 100-12.5 mg (HYZAAR) 100-12.5 mg Tab Take 1 tablet by mouth once daily. 90 tablet 3    montelukast (SINGULAIR) 10 mg tablet take 1 tablet by mouth every evening 90 tablet 3    nortriptyline  "(PAMELOR) 25 MG capsule TAKE 2 CAPSULES BY MOUTH ONCE DAILY 180 capsule 0    pantoprazole (PROTONIX) 40 MG tablet Take 1 tablet (40 mg total) by mouth once daily. 90 tablet 3    pregabalin (LYRICA) 150 MG capsule Take 1 capsule (150 mg total) by mouth 3 (three) times daily. 90 capsule 5    propranolol (INDERAL) 40 MG tablet Take 1 tablet (40 mg total) by mouth 3 (three) times daily. 90 tablet 11    rizatriptan (MAXALT) 10 MG tablet take 1 tablet by mouth if needed for migraines MAX DOSE 2 TABLETS IN 24 HOURS 10 tablet 11    simvastatin (ZOCOR) 20 MG tablet Take 1 tablet (20 mg total) by mouth every evening. 90 tablet 3    zolpidem (AMBIEN) 5 MG Tab Take 1 tablet (5 mg total) by mouth nightly as needed. 30 tablet 5    albuterol 90 mcg/actuation inhaler Inhale 2 puffs into the lungs every 4 to 6 hours as needed for Wheezing. 8.5 g 1    hydrOXYzine pamoate (VISTARIL) 25 MG Cap Take 1 capsule (25 mg total) by mouth every 8 (eight) hours as needed. 20 capsule 0    valACYclovir (VALTREX) 1000 MG tablet Take 1 tablet (1,000 mg total) by mouth 3 (three) times daily. 21 tablet 0     No current facility-administered medications for this visit.        Review of Systems   Constitutional: Negative for fatigue, fever and unexpected weight change.   HENT: Negative for congestion.    Respiratory: Negative for cough, chest tightness and shortness of breath.    Cardiovascular: Negative for chest pain and palpitations.   Gastrointestinal: Negative for abdominal distention and abdominal pain.   Musculoskeletal: Positive for myalgias (fibro ).   Skin: Positive for rash.   Allergic/Immunologic: Positive for immunocompromised state.   Neurological: Negative for dizziness, speech difficulty and weakness.   Hematological: Negative for adenopathy. Does not bruise/bleed easily.       Objective:   /80   Pulse 72   Temp 99.2 °F (37.3 °C) (Tympanic)   Ht 5' 6.5" (1.689 m)   Wt 90.8 kg (200 lb 2.8 oz)   BMI 31.83 kg/m²    "   Physical Exam   Constitutional: She appears well-developed and well-nourished. No distress.   HENT:   Head: Normocephalic and atraumatic.   Right Ear: External ear normal.   Left Ear: External ear normal.   Mouth/Throat: Uvula is midline, oropharynx is clear and moist and mucous membranes are normal.       Neck:             Lab Results   Component Value Date    WBC 4.99 11/14/2017    HGB 12.6 11/14/2017    HCT 39.7 11/14/2017     11/14/2017    CHOL 155 11/14/2017    TRIG 104 11/14/2017    HDL 59 11/14/2017    ALT 43 11/14/2017    AST 37 11/14/2017     11/14/2017    K 3.9 11/14/2017     11/14/2017    CREATININE 0.8 11/14/2017    BUN 9 11/14/2017    CO2 24 11/14/2017    TSH 0.863 11/14/2017    INR 1.0 11/20/2014    HGBA1C 5.2 11/14/2017       Assessment:       1. HSV infection    2. Long-term use of immunosuppressant medication        Plan:   HSV infection    Long-term use of immunosuppressant medication    Other orders  -     hydrOXYzine pamoate (VISTARIL) 25 MG Cap; Take 1 capsule (25 mg total) by mouth every 8 (eight) hours as needed.  Dispense: 20 capsule; Refill: 0  -     valACYclovir (VALTREX) 1000 MG tablet; Take 1 tablet (1,000 mg total) by mouth 3 (three) times daily.  Dispense: 21 tablet; Refill: 0    seems to be HSV infection, exacerbated by humira use   Needs follow up next week to insure resolution   vistaril for itching     No Follow-up on file.

## 2018-02-12 NOTE — LETTER
February 12, 2018      CHAUNCEY Garcia  [address]  [facility] [address]           [facility]  [id number] [place] [id number]  Phone: [phone]  Fax: [phone]          Patient: Jaylin Murguia   MR Number: [id number]   YOB: 1965   Date of Visit: 2/12/2018       Dear Prabha Ontiveros:    Thank you for referring Jaylin Murguia to me for evaluation. Attached you will find relevant portions of my assessment and plan of care.    If you have questions, please do not hesitate to call me. I look forward to following Jaylin Murguia along with you.    Sincerely,    Bia Witt, NP    Enclosure  CC:  No Recipients    If you would like to receive this communication electronically, please contact [email or web address] or [phone] to request more information on [facility] Link access.    For providers and/or their staff who would like to refer a patient to [facility], please contact us through our one-stop-shop provider referral line, [facility] , at [phone].    If you feel you have received this communication in error or would no longer like to receive these types of communications, please e-mail [email or web address]

## 2018-02-13 ENCOUNTER — PATIENT MESSAGE (OUTPATIENT)
Dept: GASTROENTEROLOGY | Facility: CLINIC | Age: 53
End: 2018-02-13

## 2018-02-13 ENCOUNTER — OFFICE VISIT (OUTPATIENT)
Dept: URGENT CARE | Facility: CLINIC | Age: 53
End: 2018-02-13
Payer: COMMERCIAL

## 2018-02-13 ENCOUNTER — TELEPHONE (OUTPATIENT)
Dept: INTERNAL MEDICINE | Facility: CLINIC | Age: 53
End: 2018-02-13

## 2018-02-13 VITALS
OXYGEN SATURATION: 98 % | RESPIRATION RATE: 12 BRPM | HEIGHT: 67 IN | WEIGHT: 198 LBS | SYSTOLIC BLOOD PRESSURE: 112 MMHG | DIASTOLIC BLOOD PRESSURE: 70 MMHG | HEART RATE: 64 BPM | TEMPERATURE: 99 F | BODY MASS INDEX: 31.08 KG/M2

## 2018-02-13 DIAGNOSIS — R21 RASH: Primary | ICD-10-CM

## 2018-02-13 DIAGNOSIS — K50.80 CROHN'S DISEASE OF BOTH SMALL AND LARGE INTESTINE WITHOUT COMPLICATION: Primary | ICD-10-CM

## 2018-02-13 PROCEDURE — 3008F BODY MASS INDEX DOCD: CPT | Mod: S$GLB,,, | Performed by: NURSE PRACTITIONER

## 2018-02-13 PROCEDURE — 99999 PR PBB SHADOW E&M-EST. PATIENT-LVL V: CPT | Mod: PBBFAC,,, | Performed by: NURSE PRACTITIONER

## 2018-02-13 PROCEDURE — 99213 OFFICE O/P EST LOW 20 MIN: CPT | Mod: SA,S$GLB,, | Performed by: NURSE PRACTITIONER

## 2018-02-13 NOTE — PROGRESS NOTES
Subjective:       Patient ID: Jaylin Murguia is a 52 y.o. female.    Chief Complaint: Rash    52 year old female presents to Urgent Care with reports of rash to right neck area that is spreading just below her right eye. Patient denies any other problems or concerns at this time. But is concerned because she is immunocompromised.      Rash   This is a new problem. The current episode started in the past 7 days. The affected locations include the neck (just below right eye). The rash is characterized by redness and itchiness. She was exposed to nothing. Pertinent negatives include no diarrhea, fever, shortness of breath, sore throat or vomiting. Past treatments include nothing. Improvement on treatment: tried benadryl cream.     Review of Systems   Constitutional: Negative for appetite change, chills and fever.   HENT: Negative for ear pain, sinus pressure, sore throat and trouble swallowing.    Eyes: Negative for visual disturbance.   Respiratory: Negative for shortness of breath.    Cardiovascular: Negative for chest pain.   Gastrointestinal: Negative for abdominal pain, diarrhea, nausea and vomiting.   Endocrine: Negative for cold intolerance, polyphagia and polyuria.   Genitourinary: Negative for decreased urine volume and dysuria.   Musculoskeletal: Negative for back pain.   Skin: Positive for rash.   Allergic/Immunologic: Negative for environmental allergies and food allergies.   Neurological: Negative for dizziness, tremors, weakness and numbness.   Hematological: Does not bruise/bleed easily.   Psychiatric/Behavioral: Negative for confusion and hallucinations. The patient is not nervous/anxious and is not hyperactive.    All other systems reviewed and are negative.      Objective:     Physical Exam   Constitutional: She is oriented to person, place, and time. She appears well-developed and well-nourished.   HENT:   Head: Normocephalic and atraumatic.   Right Ear: External ear normal.   Left Ear: External  ear normal.   Nose: Nose normal.   Mouth/Throat: Oropharynx is clear and moist.   Eyes: Conjunctivae and EOM are normal. Pupils are equal, round, and reactive to light.   Neck: Normal range of motion. Neck supple.   Cardiovascular: Normal rate, regular rhythm, normal heart sounds and intact distal pulses.    No murmur heard.  Pulmonary/Chest: Effort normal and breath sounds normal. She has no wheezes.   Abdominal: Soft. Bowel sounds are normal. There is no tenderness.   Musculoskeletal: Normal range of motion.   Neurological: She is alert and oriented to person, place, and time. She has normal reflexes.   Skin: Skin is warm and dry. No rash noted.        Psychiatric: She has a normal mood and affect. Her behavior is normal. Judgment and thought content normal.   Nursing note and vitals reviewed.    Assessment:     No diagnosis found.  Plan:   There are no diagnoses linked to this encounter.

## 2018-02-13 NOTE — TELEPHONE ENCOUNTER
----- Message from Rosa Jama sent at 2/13/2018 11:08 AM CST -----  Contact: pt  States she saw Ms Bia Witt and she told her to f/u with her PCP today. Please call pt at [phone]. Thank you

## 2018-02-13 NOTE — TELEPHONE ENCOUNTER
Called pt, spoke with her. She has a rash on face and Bia Witt reccommended that she been seen today by primary care. Dr. Hu is out of office and other providers here today are completely booked. Offered to look at a different location. She declined and stated that she would come in and see UC here today.

## 2018-02-13 NOTE — PROGRESS NOTES
Per patient e-mail this AM, suspected rash has spread to face. She went to urgent care today but they were not sure what type of rash this was. She was referred to external dermatology. She has an appointment at 4:00 today. Humira should be held until rash resolves. Patient is scheduled for MR enterography tomorrow in [place]. She states she will not be able to get Humira labs drawn tomorrow but states she can do it Thursday morning. She was told to follow up with me in 1 week. Labs show some abnormalities. Will recheck in 1 month. Patient will update me on dermatologist input.

## 2018-02-14 ENCOUNTER — HOSPITAL ENCOUNTER (OUTPATIENT)
Dept: RADIOLOGY | Facility: HOSPITAL | Age: 53
Discharge: HOME | End: 2018-02-14
Attending: NURSE PRACTITIONER
Payer: COMMERCIAL

## 2018-02-14 DIAGNOSIS — K50.018 CROHN'S DISEASE OF SMALL INTESTINE WITH OTHER COMPLICATION: ICD-10-CM

## 2018-02-14 PROCEDURE — 72197 MRI PELVIS W/O & W/DYE: CPT | Mod: 26,,, | Performed by: RADIOLOGY

## 2018-02-14 PROCEDURE — 25500020 PHARM REV CODE 255: Performed by: NURSE PRACTITIONER

## 2018-02-14 PROCEDURE — A9585 GADOBUTROL INJECTION: HCPCS | Performed by: NURSE PRACTITIONER

## 2018-02-14 PROCEDURE — 74183 MRI ABD W/O CNTR FLWD CNTR: CPT | Mod: 26,,, | Performed by: RADIOLOGY

## 2018-02-14 PROCEDURE — 72197 MRI PELVIS W/O & W/DYE: CPT | Mod: TC

## 2018-02-14 RX ORDER — GADOBUTROL 604.72 MG/ML
10 INJECTION INTRAVENOUS
Status: COMPLETED | OUTPATIENT
Start: 2018-02-14 | End: 2018-02-14

## 2018-02-14 RX ADMIN — GADOBUTROL 10 ML: 604.72 INJECTION INTRAVENOUS at 11:02

## 2018-02-15 ENCOUNTER — LAB VISIT (OUTPATIENT)
Dept: LAB | Facility: HOSPITAL | Age: 53
End: 2018-02-15
Attending: NURSE PRACTITIONER
Payer: COMMERCIAL

## 2018-02-15 DIAGNOSIS — K50.80 CROHN'S DISEASE OF BOTH SMALL AND LARGE INTESTINE WITHOUT COMPLICATION: ICD-10-CM

## 2018-02-15 PROCEDURE — 36415 COLL VENOUS BLD VENIPUNCTURE: CPT | Mod: PO

## 2018-02-15 PROCEDURE — 80299 QUANTITATIVE ASSAY DRUG: CPT

## 2018-02-16 ENCOUNTER — PATIENT MESSAGE (OUTPATIENT)
Dept: GASTROENTEROLOGY | Facility: CLINIC | Age: 53
End: 2018-02-16

## 2018-02-16 ENCOUNTER — TELEPHONE (OUTPATIENT)
Dept: GASTROENTEROLOGY | Facility: CLINIC | Age: 53
End: 2018-02-16

## 2018-02-20 LAB — ADALIMUMAB CONCENTRATION & ANTI-ADALIMUMAB ANTIBODY: NORMAL

## 2018-02-22 ENCOUNTER — PATIENT MESSAGE (OUTPATIENT)
Dept: GASTROENTEROLOGY | Facility: CLINIC | Age: 53
End: 2018-02-22

## 2018-02-28 ENCOUNTER — PATIENT MESSAGE (OUTPATIENT)
Dept: GASTROENTEROLOGY | Facility: CLINIC | Age: 53
End: 2018-02-28

## 2018-02-28 DIAGNOSIS — K50.018 CROHN'S DISEASE OF SMALL INTESTINE WITH OTHER COMPLICATION: Primary | ICD-10-CM

## 2018-02-28 NOTE — PROGRESS NOTES
Case discussed with Dr. Dumont and Dr. Velarde. Recommendations as follows:  - rash should be completely resolved and no additional signs of infection  - will need a repeat colonoscopy to make sure no progression of active disease  - Humira labs showed low amount of antibodies to the drug. Humira should be increased to weekly dosing and the addition of 6-MP. Her last TPMT was done 2 years ago and was low. Will need to repeat lab prior to initiation of 6-MP and consider QOD dosing initially and frequent lab monitoring.   - CRP may have been elevated secondary to rash. Recommend repeating this to get true baseline.  - also will need Humira drug level and antibodies in 3 months.   - she has a follow up with the GI department next week to discuss further.   - attempted to contact patient to get labs ordered. Left voicemail to return call. Email was also sent.

## 2018-03-05 ENCOUNTER — LAB VISIT (OUTPATIENT)
Dept: LAB | Facility: HOSPITAL | Age: 53
End: 2018-03-05
Attending: NURSE PRACTITIONER
Payer: COMMERCIAL

## 2018-03-05 DIAGNOSIS — K50.018 CROHN'S DISEASE OF SMALL INTESTINE WITH OTHER COMPLICATION: ICD-10-CM

## 2018-03-05 LAB — CRP SERPL-MCNC: 6 MG/L

## 2018-03-05 PROCEDURE — 82657 ENZYME CELL ACTIVITY: CPT

## 2018-03-05 PROCEDURE — 86140 C-REACTIVE PROTEIN: CPT

## 2018-03-09 ENCOUNTER — PATIENT MESSAGE (OUTPATIENT)
Dept: ENDOSCOPY | Facility: HOSPITAL | Age: 53
End: 2018-03-09

## 2018-03-09 ENCOUNTER — LAB VISIT (OUTPATIENT)
Dept: LAB | Facility: HOSPITAL | Age: 53
End: 2018-03-09
Attending: INTERNAL MEDICINE
Payer: COMMERCIAL

## 2018-03-09 ENCOUNTER — OFFICE VISIT (OUTPATIENT)
Dept: GASTROENTEROLOGY | Facility: CLINIC | Age: 53
End: 2018-03-09
Payer: COMMERCIAL

## 2018-03-09 VITALS
DIASTOLIC BLOOD PRESSURE: 82 MMHG | HEIGHT: 67 IN | WEIGHT: 201.5 LBS | BODY MASS INDEX: 31.63 KG/M2 | SYSTOLIC BLOOD PRESSURE: 120 MMHG | HEART RATE: 60 BPM

## 2018-03-09 DIAGNOSIS — K50.00 CROHN'S DISEASE INVOLVING TERMINAL ILEUM: Primary | ICD-10-CM

## 2018-03-09 DIAGNOSIS — K50.00 CROHN'S DISEASE INVOLVING TERMINAL ILEUM: ICD-10-CM

## 2018-03-09 LAB
25(OH)D3+25(OH)D2 SERPL-MCNC: 33 NG/ML
6-METHYLMERCAPTOPURINE RIBOSIDE: 7.31 NMOL/ML/H (ref 5.04–9.57)
6-METHYLMERCAPTOPURINE: 3.21 NMOL/ML/H (ref 3–6.66)
6-METHYLTHIOGUANINE RIBOSIDE: 4.46 NMOL/ML/H (ref 2.7–5.84)
ALBUMIN SERPL BCP-MCNC: 3.8 G/DL
ALP SERPL-CCNC: 114 U/L
ALT SERPL W/O P-5'-P-CCNC: 62 U/L
ANION GAP SERPL CALC-SCNC: 9 MMOL/L
AST SERPL-CCNC: 40 U/L
BASOPHILS # BLD AUTO: 0.06 K/UL
BASOPHILS NFR BLD: 0.8 %
BILIRUB SERPL-MCNC: 0.4 MG/DL
BUN SERPL-MCNC: 12 MG/DL
CALCIUM SERPL-MCNC: 9.4 MG/DL
CHLORIDE SERPL-SCNC: 103 MMOL/L
CO2 SERPL-SCNC: 24 MMOL/L
CREAT SERPL-MCNC: 0.9 MG/DL
CRP SERPL-MCNC: 4.5 MG/L
DIFFERENTIAL METHOD: ABNORMAL
EOSINOPHIL # BLD AUTO: 0.2 K/UL
EOSINOPHIL NFR BLD: 2.2 %
ERYTHROCYTE [DISTWIDTH] IN BLOOD BY AUTOMATED COUNT: 14.2 %
ERYTHROCYTE [SEDIMENTATION RATE] IN BLOOD BY WESTERGREN METHOD: 13 MM/HR
EST. GFR  (AFRICAN AMERICAN): >60 ML/MIN/1.73 M^2
EST. GFR  (NON AFRICAN AMERICAN): >60 ML/MIN/1.73 M^2
FERRITIN SERPL-MCNC: 93 NG/ML
GLUCOSE SERPL-MCNC: 88 MG/DL
HCT VFR BLD AUTO: 39.2 %
HGB BLD-MCNC: 12.4 G/DL
IMM GRANULOCYTES # BLD AUTO: 0.03 K/UL
IMM GRANULOCYTES NFR BLD AUTO: 0.4 %
IRON SERPL-MCNC: 84 UG/DL
LYMPHOCYTES # BLD AUTO: 2.8 K/UL
LYMPHOCYTES NFR BLD: 36.3 %
MCH RBC QN AUTO: 29.9 PG
MCHC RBC AUTO-ENTMCNC: 31.6 G/DL
MCV RBC AUTO: 95 FL
MONOCYTES # BLD AUTO: 0.8 K/UL
MONOCYTES NFR BLD: 10.2 %
NEUTROPHILS # BLD AUTO: 3.8 K/UL
NEUTROPHILS NFR BLD: 50.1 %
NRBC BLD-RTO: 0 /100 WBC
PLATELET # BLD AUTO: 313 K/UL
PMV BLD AUTO: 9.8 FL
POTASSIUM SERPL-SCNC: 4.1 MMOL/L
PROT SERPL-MCNC: 7.6 G/DL
RBC # BLD AUTO: 4.15 M/UL
SATURATED IRON: 24 %
SODIUM SERPL-SCNC: 136 MMOL/L
TOTAL IRON BINDING CAPACITY: 352 UG/DL
TPMT INTERPRETATION: NORMAL
TPMT REVIEWED BY: NORMAL
TRANSFERRIN SERPL-MCNC: 238 MG/DL
WBC # BLD AUTO: 7.65 K/UL

## 2018-03-09 PROCEDURE — 82657 ENZYME CELL ACTIVITY: CPT

## 2018-03-09 PROCEDURE — 36415 COLL VENOUS BLD VENIPUNCTURE: CPT

## 2018-03-09 PROCEDURE — 83540 ASSAY OF IRON: CPT

## 2018-03-09 PROCEDURE — 99999 PR PBB SHADOW E&M-EST. PATIENT-LVL III: CPT | Mod: PBBFAC,,, | Performed by: INTERNAL MEDICINE

## 2018-03-09 PROCEDURE — 86140 C-REACTIVE PROTEIN: CPT

## 2018-03-09 PROCEDURE — 82728 ASSAY OF FERRITIN: CPT

## 2018-03-09 PROCEDURE — 3074F SYST BP LT 130 MM HG: CPT | Mod: S$GLB,,, | Performed by: INTERNAL MEDICINE

## 2018-03-09 PROCEDURE — 81479 UNLISTED MOLECULAR PATHOLOGY: CPT

## 2018-03-09 PROCEDURE — 99214 OFFICE O/P EST MOD 30 MIN: CPT | Mod: S$GLB,,, | Performed by: INTERNAL MEDICINE

## 2018-03-09 PROCEDURE — 85651 RBC SED RATE NONAUTOMATED: CPT

## 2018-03-09 PROCEDURE — 82306 VITAMIN D 25 HYDROXY: CPT

## 2018-03-09 PROCEDURE — 80053 COMPREHEN METABOLIC PANEL: CPT

## 2018-03-09 PROCEDURE — 3079F DIAST BP 80-89 MM HG: CPT | Mod: S$GLB,,, | Performed by: INTERNAL MEDICINE

## 2018-03-09 PROCEDURE — 85025 COMPLETE CBC W/AUTO DIFF WBC: CPT

## 2018-03-09 RX ORDER — SODIUM, POTASSIUM,MAG SULFATES 17.5-3.13G
1 SOLUTION, RECONSTITUTED, ORAL ORAL ONCE
Qty: 1 BOTTLE | Refills: 0 | Status: SHIPPED | OUTPATIENT
Start: 2018-03-09 | End: 2018-03-09

## 2018-03-09 NOTE — PROGRESS NOTES
Subjective:    PCP: Esthela Hu MD    Referring physician: Lynnereferral Self      Patient ID: Jaylin Murguia is a 53 y.o. female.    Chief Complaint: Results      HPI   Jaylin Murguia is a 53 y.o. /White female here today for follow up of Crohn's disease     She has been followed by Dr. Dyer and Bia Witt previously and is new to me.     She was diagnosed with small intestinal Crohn's about 18 yrs ago. In past she has been on Pentasa or Asacol until May 2017. She was started on Humira for active ileitis.   In last 2000's she had been on Remicade for about a year. Her symptoms improved but she had recurrent infections and subsequently built antibodies and was taken off of it.     She reports she was doing well (less pain and decrease in diarrhea) while on Humira until recently after she had the FLu and and a rash. She is concerned about oppurtunistic infections she has had while on Humira and previously on remicade.   Therefore she is hesitant to resume Humira. She has never taken Imuran or 6-MP    Has rash resolved about two weeks ago.  Her last dose of Humira was 2/1/18.     For the past week she reports a change in her bowel pattern. She has been alternating with constipation and diarrhea. Prior to this she was having 4-5 bowel movements daily, some nocturnal awakening with bowels for past year.   No blood in stool. No perianal discharge. She has right lower quadrant pain, intermittent, no radiation. On her last CT and recent MR enterography there has been concern for TI stricture.     Has fibromyalgia and no recent worsening.  No visual complaints    She denies NSAID intake. Non smoker      Past Medical History:   Diagnosis Date    Allergic rhinitis     Asthma     Crohn's disease     Ileal involvement, previously on Remicade, Asacol, Prednisone    Fibromyalgia     Hypertension     Migraine     Sciatica        Past Surgical History:   Procedure Laterality Date    BLADDER SURGERY       sling was created by her muscles      SECTION      COLONOSCOPY N/A 2017    Procedure: COLONOSCOPY;  Surgeon: Kin Dyer MD;  Location: [facility];  Service: Endoscopy;  Laterality: N/A;    FINGER SURGERY      joint relpacement, left hand index finger    HYSTERECTOMY      WISDOM TOOTH EXTRACTION         Family History   Problem Relation Age of Onset    Hypertension Mother     Kidney disease Father     Scleroderma Father     Hypertension Brother     Cancer Paternal Grandmother 70     colon       Social History     Social History    Marital status:      Spouse name: N/A    Number of children: 2    Years of education: N/A     Occupational History    teacher      Social History Main Topics    Smoking status: Never Smoker    Smokeless tobacco: Never Used    Alcohol use No    Drug use: No    Sexual activity: Yes     Partners: Male     Other Topics Concern    None     Social History Narrative    None       Review of patient's allergies indicates:  No Known Allergies    Current Outpatient Prescriptions   Medication Sig Dispense Refill    BREO ELLIPTA 100-25 mcg/dose diskus inhaler   0    butalbital-acetaminophen-caffeine -40 mg (FIORICET, ESGIC) -40 mg per tablet take 1 tablet by mouth every 4 hours if needed for headache 30 tablet 1    cetirizine (ZYRTEC) 5 MG chewable tablet Take 5 mg by mouth once daily.      duloxetine (CYMBALTA) 60 MG capsule Take 60 mg by mouth 2 (two) times daily.      DYMISTA 137-50 mcg/spray [place] nassal spray instill 1 spray into each nostril twice a day 23 g 11    eletriptan (RELPAX) 40 MG tablet Take 1 tablet (40 mg total) by mouth as needed. 12 tablet 2    estradiol (ESTRACE) 2 MG tablet Take 2 mg by mouth once daily.      hydrOXYzine pamoate (VISTARIL) 25 MG Cap Take 1 capsule (25 mg total) by mouth every 8 (eight) hours as needed. 20 capsule 0    losartan-hydrochlorothiazide 100-12.5 mg (HYZAAR) 100-12.5 mg Tab Take 1  tablet by mouth once daily. 90 tablet 3    montelukast (SINGULAIR) 10 mg tablet take 1 tablet by mouth every evening 90 tablet 3    nortriptyline (PAMELOR) 25 MG capsule TAKE 2 CAPSULES BY MOUTH ONCE DAILY 180 capsule 0    pantoprazole (PROTONIX) 40 MG tablet Take 1 tablet (40 mg total) by mouth once daily. 90 tablet 3    pregabalin (LYRICA) 150 MG capsule Take 1 capsule (150 mg total) by mouth 3 (three) times daily. 90 capsule 5    propranolol (INDERAL) 40 MG tablet Take 1 tablet (40 mg total) by mouth 3 (three) times daily. 90 tablet 11    rizatriptan (MAXALT) 10 MG tablet take 1 tablet by mouth if needed for migraines MAX DOSE 2 TABLETS IN 24 HOURS 10 tablet 11    simvastatin (ZOCOR) 20 MG tablet Take 1 tablet (20 mg total) by mouth every evening. 90 tablet 3    zolpidem (AMBIEN) 5 MG Tab Take 1 tablet (5 mg total) by mouth nightly as needed. 30 tablet 5    adalimumab (HUMIRA) PnKt injection Inject 0.8 mLs (40 mg total) into the skin every 14 (fourteen) days. 4.8 mL 3    albuterol 90 mcg/actuation inhaler Inhale 2 puffs into the lungs every 4 to 6 hours as needed for Wheezing. 8.5 g 1    levalbuterol (XOPENEX) 1.25 mg/3 mL nebulizer solution Take 3 mLs (1.25 mg total) by nebulization every 4 (four) hours as needed for Wheezing. Rescue 30 mL 0    mesalamine (PENTASA) 250 mg CpSR Take 4 capsules (1,000 mg total) by mouth 4 (four) times daily. 480 capsule 11    sodium,potassium,mag sulfates (SUPREP BOWEL PREP KIT) 17.5-3.13-1.6 gram SolR Take 1 kit by mouth once. For colonoscopy preparation 1 Bottle 0    valACYclovir (VALTREX) 1000 MG tablet Take 1 tablet (1,000 mg total) by mouth 3 (three) times daily. 21 tablet 0     No current facility-administered medications for this visit.        Review of Systems   Constitutional: Negative for activity change, appetite change, chills, diaphoresis, fatigue and fever.   HENT: Negative for congestion, ear pain, facial swelling, mouth sores, nosebleeds, rhinorrhea,  "sore throat and voice change.    Eyes: Negative for photophobia, pain, discharge, redness and visual disturbance.   Respiratory: Positive for shortness of breath. Negative for apnea, cough, choking, chest tightness and wheezing.    Cardiovascular: Negative for chest pain, palpitations and leg swelling.   Gastrointestinal:        As per HPI   Genitourinary: Negative for decreased urine volume, difficulty urinating, dysuria, frequency and hematuria.   Musculoskeletal: Positive for arthralgias. Negative for back pain, myalgias, neck pain and neck stiffness.   Skin: Negative for pallor and rash.   Neurological: Positive for headaches. Negative for tremors, seizures, syncope and weakness.   Hematological: Negative for adenopathy. Does not bruise/bleed easily.   Psychiatric/Behavioral: Negative for behavioral problems, confusion, hallucinations and sleep disturbance. The patient is not nervous/anxious.        Objective:   /82   Pulse 60   Ht 5' 7" (1.702 m)   Wt 91.4 kg (201 lb 8 oz)   BMI 31.56 kg/m²   Wt Readings from Last 1 Encounters:   03/09/18 0732 91.4 kg (201 lb 8 oz)       Physical Exam   Constitutional: She is oriented to person, place, and time. She appears well-developed and well-nourished. No distress.   HENT:   Head: Normocephalic and atraumatic.   Nose: Nose normal.   Mouth/Throat: Oropharynx is clear and moist.   Eyes: EOM are normal. Pupils are equal, round, and reactive to light. Right eye exhibits no discharge. Left eye exhibits no discharge. No scleral icterus.   Neck: Normal range of motion. Neck supple. No tracheal deviation present.   Cardiovascular: Normal rate, regular rhythm, normal heart sounds and intact distal pulses.  Exam reveals no gallop and no friction rub.    No murmur heard.  Pulmonary/Chest: Effort normal and breath sounds normal. No stridor. No respiratory distress. She has no wheezes. She has no rales. She exhibits no tenderness.   Abdominal: Soft. Bowel sounds are normal. " She exhibits no distension and no mass. There is no tenderness. There is no rebound and no guarding. No hernia.   Musculoskeletal: Normal range of motion. She exhibits no edema or tenderness.   Lymphadenopathy:     She has no cervical adenopathy.   Neurological: She is alert and oriented to person, place, and time. She has normal reflexes.   Skin: Skin is warm and dry. She is not diaphoretic.   Psychiatric: She has a normal mood and affect. Her behavior is normal. Judgment and thought content normal.       Lab Results   Component Value Date    WBC 9.25 02/12/2018    HGB 12.1 02/12/2018    HCT 36.9 (L) 02/12/2018    MCV 93 02/12/2018     (H) 02/12/2018       Chemistry        Component Value Date/Time     (L) 02/12/2018 0841    K 4.3 02/12/2018 0841     02/12/2018 0841    CO2 25 02/12/2018 0841    BUN 9 02/12/2018 0841    CREATININE 0.9 02/12/2018 0841    GLU 89 02/12/2018 0841        Component Value Date/Time    CALCIUM 9.2 02/12/2018 0841    ALKPHOS 153 (H) 02/12/2018 0841    AST 29 02/12/2018 0841    ALT 42 02/12/2018 0841    BILITOT 0.3 02/12/2018 0841    ESTGFRAFRICA >60.0 02/12/2018 0841    EGFRNONAA >60.0 02/12/2018 0841          No results found for: APTT  Lab Results   Component Value Date    INR 1.0 11/20/2014       No components found for: [id number]    Lab Results   Component Value Date    TSH 0.863 11/14/2017     Lab Results   Component Value Date    HGBA1C 5.2 11/14/2017       No results found for: AMYLASE  No results found for: LIPASE    Lab Results   Component Value Date    HEPBCAB Negative 05/22/2017     Lab Results   Component Value Date    PPD Negative 05/25/2017    PPD Negative 05/25/2017       No results found for: OCCULTBLOOD    Lab Results   Component Value Date    IRON 98 11/14/2017    TIBC 333 11/14/2017    FERRITIN 99 08/04/2017       Assessment:      1. Crohn's disease involving terminal ileum         Plan:     Crohn's disease involving terminal ileum  -     Case request GI:  COLONOSCOPY  -     CBC auto differential; Future; Expected date: 03/09/2018  -     Comprehensive metabolic panel; Future; Expected date: 03/09/2018  -     sodium,potassium,mag sulfates (SUPREP BOWEL PREP KIT) 17.5-3.13-1.6 gram SolR; Take 1 kit by mouth once. For colonoscopy preparation  Dispense: 1 Bottle; Refill: 0  -     Thiopurine Methyltrans, RBC; Future; Expected date: 03/09/2018  -     Thiopurine Methyltrans (TPMT) Genotyping; Future; Expected date: 03/09/2018  -     C-reactive protein; Future; Expected date: 03/09/2018  -     Sedimentation rate, manual; Future; Expected date: 03/09/2018  -     Quantiferon Gold TB; Future; Expected date: 03/09/2018  -     Vitamin D; Future; Expected date: 03/09/2018  -     IRON AND TIBC; Future; Expected date: 03/09/2018  -     FERRITIN; Future; Expected date: 03/09/2018  -     mesalamine (PENTASA) 250 mg CpSR; Take 4 capsules (1,000 mg total) by mouth 4 (four) times daily.  Dispense: 480 capsule; Refill: 11      Reassess disease activity with endoscopy   Low TPMT activity. Discussed risks such as Bone marrow suppression and hepatotoxicity with Imuran. May start low dose Imuran and check weekly labs  Restart pentasa since she is reluctant to resuming biologics at present. Readdress medications after colonoscopy.      Follow-up in about 4 weeks (around 4/6/2018), or if symptoms worsen or fail to improve.      Kyra Vallecillo MD

## 2018-03-13 ENCOUNTER — LAB VISIT (OUTPATIENT)
Dept: LAB | Facility: HOSPITAL | Age: 53
End: 2018-03-13
Attending: INTERNAL MEDICINE
Payer: COMMERCIAL

## 2018-03-13 DIAGNOSIS — K50.018 CROHN'S DISEASE OF SMALL INTESTINE WITH OTHER COMPLICATION: Primary | ICD-10-CM

## 2018-03-13 DIAGNOSIS — K50.018 CROHN'S DISEASE OF SMALL INTESTINE WITH OTHER COMPLICATION: ICD-10-CM

## 2018-03-13 PROCEDURE — 86480 TB TEST CELL IMMUN MEASURE: CPT

## 2018-03-13 PROCEDURE — 36415 COLL VENOUS BLD VENIPUNCTURE: CPT | Mod: PO

## 2018-03-14 LAB
NUDT15 GENOTYPE: NORMAL
NUDT15 PHENOTYPE: NORMAL
TPMT ADDITIONAL INFORMATION: NORMAL
TPMT DISCLAIMER: NORMAL
TPMT GENOTYPE RESULT: NORMAL
TPMT INTERPRETATION: NORMAL
TPMT METHOD: NORMAL
TPMT PHENOTYPE: NORMAL
TPMT REVIEWED BY: NORMAL

## 2018-03-15 LAB
6-METHYLMERCAPTOPURINE RIBOSIDE: 6.63 NMOL/ML/H (ref 5.04–9.57)
6-METHYLMERCAPTOPURINE: 3.36 NMOL/ML/H (ref 3–6.66)
6-METHYLTHIOGUANINE RIBOSIDE: 4.63 NMOL/ML/H (ref 2.7–5.84)
MITOGEN NIL: >10 IU/ML
NIL: 0.08 IU/ML
TB ANTIGEN NIL: -0.01 IU/ML
TB ANTIGEN: 0.07 IU/ML
TB GOLD: NEGATIVE
TPMT INTERPRETATION: NORMAL
TPMT REVIEWED BY: NORMAL

## 2018-03-19 RX ORDER — SIMVASTATIN 20 MG/1
TABLET, FILM COATED ORAL
Qty: 90 TABLET | Refills: 3 | Status: SHIPPED | OUTPATIENT
Start: 2018-03-19 | End: 2019-04-22 | Stop reason: SDUPTHER

## 2018-03-23 RX ORDER — NORTRIPTYLINE HYDROCHLORIDE 25 MG/1
50 CAPSULE ORAL DAILY
Qty: 180 CAPSULE | Refills: 2 | Status: SHIPPED | OUTPATIENT
Start: 2018-03-23 | End: 2019-01-23 | Stop reason: SDUPTHER

## 2018-03-27 ENCOUNTER — ANESTHESIA (OUTPATIENT)
Dept: ENDOSCOPY | Facility: HOSPITAL | Age: 53
End: 2018-03-27
Payer: COMMERCIAL

## 2018-03-27 ENCOUNTER — PATIENT MESSAGE (OUTPATIENT)
Dept: NEPHROLOGY | Facility: CLINIC | Age: 53
End: 2018-03-27

## 2018-03-27 ENCOUNTER — SURGERY (OUTPATIENT)
Age: 53
End: 2018-03-27

## 2018-03-27 ENCOUNTER — HOSPITAL ENCOUNTER (OUTPATIENT)
Facility: HOSPITAL | Age: 53
Discharge: HOME | End: 2018-03-27
Attending: INTERNAL MEDICINE | Admitting: INTERNAL MEDICINE
Payer: COMMERCIAL

## 2018-03-27 ENCOUNTER — ANESTHESIA EVENT (OUTPATIENT)
Dept: ENDOSCOPY | Facility: HOSPITAL | Age: 53
End: 2018-03-27
Payer: COMMERCIAL

## 2018-03-27 VITALS
HEIGHT: 67 IN | BODY MASS INDEX: 30.45 KG/M2 | RESPIRATION RATE: 18 BRPM | DIASTOLIC BLOOD PRESSURE: 85 MMHG | TEMPERATURE: 98 F | WEIGHT: 194 LBS | OXYGEN SATURATION: 100 % | HEART RATE: 57 BPM | SYSTOLIC BLOOD PRESSURE: 139 MMHG

## 2018-03-27 DIAGNOSIS — G47.33 OSA (OBSTRUCTIVE SLEEP APNEA): Primary | ICD-10-CM

## 2018-03-27 DIAGNOSIS — K50.90 CROHN'S DISEASE: ICD-10-CM

## 2018-03-27 PROCEDURE — 37000009 HC ANESTHESIA EA ADD 15 MINS: Performed by: INTERNAL MEDICINE

## 2018-03-27 PROCEDURE — 45380 COLONOSCOPY AND BIOPSY: CPT | Mod: ,,, | Performed by: INTERNAL MEDICINE

## 2018-03-27 PROCEDURE — 25000003 PHARM REV CODE 250: Performed by: INTERNAL MEDICINE

## 2018-03-27 PROCEDURE — 63600175 PHARM REV CODE 636 W HCPCS: Performed by: NURSE ANESTHETIST, CERTIFIED REGISTERED

## 2018-03-27 PROCEDURE — 27201012 HC FORCEPS, HOT/COLD, DISP: Performed by: INTERNAL MEDICINE

## 2018-03-27 PROCEDURE — 88305 TISSUE EXAM BY PATHOLOGIST: CPT | Performed by: PATHOLOGY

## 2018-03-27 PROCEDURE — 88305 TISSUE EXAM BY PATHOLOGIST: CPT | Mod: 26,,, | Performed by: PATHOLOGY

## 2018-03-27 PROCEDURE — 25000003 PHARM REV CODE 250: Performed by: NURSE ANESTHETIST, CERTIFIED REGISTERED

## 2018-03-27 PROCEDURE — 45380 COLONOSCOPY AND BIOPSY: CPT | Performed by: INTERNAL MEDICINE

## 2018-03-27 PROCEDURE — 37000008 HC ANESTHESIA 1ST 15 MINUTES: Performed by: INTERNAL MEDICINE

## 2018-03-27 RX ORDER — LIDOCAINE HCL/PF 100 MG/5ML
SYRINGE (ML) INTRAVENOUS
Status: DISCONTINUED | OUTPATIENT
Start: 2018-03-27 | End: 2018-03-27

## 2018-03-27 RX ORDER — PROPOFOL 10 MG/ML
INJECTION, EMULSION INTRAVENOUS
Status: DISCONTINUED | OUTPATIENT
Start: 2018-03-27 | End: 2018-03-27

## 2018-03-27 RX ORDER — SODIUM CHLORIDE, SODIUM LACTATE, POTASSIUM CHLORIDE, CALCIUM CHLORIDE 600; 310; 30; 20 MG/100ML; MG/100ML; MG/100ML; MG/100ML
INJECTION, SOLUTION INTRAVENOUS CONTINUOUS
Status: DISCONTINUED | OUTPATIENT
Start: 2018-03-27 | End: 2018-03-27 | Stop reason: HOSPADM

## 2018-03-27 RX ORDER — SODIUM CHLORIDE, SODIUM LACTATE, POTASSIUM CHLORIDE, CALCIUM CHLORIDE 600; 310; 30; 20 MG/100ML; MG/100ML; MG/100ML; MG/100ML
INJECTION, SOLUTION INTRAVENOUS CONTINUOUS PRN
Status: DISCONTINUED | OUTPATIENT
Start: 2018-03-27 | End: 2018-03-27

## 2018-03-27 RX ADMIN — PROPOFOL 40 MG: 10 INJECTION, EMULSION INTRAVENOUS at 09:03

## 2018-03-27 RX ADMIN — LIDOCAINE HYDROCHLORIDE 100 MG: 20 INJECTION, SOLUTION INTRAVENOUS at 09:03

## 2018-03-27 RX ADMIN — PROPOFOL 30 MG: 10 INJECTION, EMULSION INTRAVENOUS at 09:03

## 2018-03-27 RX ADMIN — SODIUM CHLORIDE, SODIUM LACTATE, POTASSIUM CHLORIDE, AND CALCIUM CHLORIDE: .6; .31; .03; .02 INJECTION, SOLUTION INTRAVENOUS at 08:03

## 2018-03-27 RX ADMIN — PROPOFOL 50 MG: 10 INJECTION, EMULSION INTRAVENOUS at 09:03

## 2018-03-27 RX ADMIN — PROPOFOL 140 MG: 10 INJECTION, EMULSION INTRAVENOUS at 09:03

## 2018-03-27 RX ADMIN — SODIUM CHLORIDE, SODIUM LACTATE, POTASSIUM CHLORIDE, AND CALCIUM CHLORIDE: 600; 310; 30; 20 INJECTION, SOLUTION INTRAVENOUS at 08:03

## 2018-03-27 RX ADMIN — PROPOFOL 60 MG: 10 INJECTION, EMULSION INTRAVENOUS at 09:03

## 2018-03-27 NOTE — INTERVAL H&P NOTE
The patient has been examined and the H&P has been reviewed:    I concur with the findings and no changes have occurred since H&P was written.    Anesthesia/Surgery risks, benefits and alternative options discussed and understood by patient/family.          Active Hospital Problems    Diagnosis  POA    Crohn's disease [K50.90]  Yes      Resolved Hospital Problems    Diagnosis Date Resolved POA   No resolved problems to display.

## 2018-03-27 NOTE — H&P (VIEW-ONLY)
Subjective:    PCP: Esthela Hu MD    Referring physician: Lynnereferral Self      Patient ID: Jaylin Murguia is a 53 y.o. female.    Chief Complaint: Results      HPI   Jaylin Murguia is a 53 y.o. /White female here today for follow up of Crohn's disease     She has been followed by Dr. Dyer and Bia Witt previously and is new to me.     She was diagnosed with small intestinal Crohn's about 18 yrs ago. In past she has been on Pentasa or Asacol until May 2017. She was started on Humira for active ileitis.   In last 2000's she had been on Remicade for about a year. Her symptoms improved but she had recurrent infections and subsequently built antibodies and was taken off of it.     She reports she was doing well (less pain and decrease in diarrhea) while on Humira until recently after she had the FLu and and a rash. She is concerned about oppurtunistic infections she has had while on Humira and previously on remicade.   Therefore she is hesitant to resume Humira. She has never taken Imuran or 6-MP    Has rash resolved about two weeks ago.  Her last dose of Humira was 2/1/18.     For the past week she reports a change in her bowel pattern. She has been alternating with constipation and diarrhea. Prior to this she was having 4-5 bowel movements daily, some nocturnal awakening with bowels for past year.   No blood in stool. No perianal discharge. She has right lower quadrant pain, intermittent, no radiation. On her last CT and recent MR enterography there has been concern for TI stricture.     Has fibromyalgia and no recent worsening.  No visual complaints    She denies NSAID intake. Non smoker      Past Medical History:   Diagnosis Date    Allergic rhinitis     Asthma     Crohn's disease     Ileal involvement, previously on Remicade, Asacol, Prednisone    Fibromyalgia     Hypertension     Migraine     Sciatica        Past Surgical History:   Procedure Laterality Date    BLADDER SURGERY       sling was created by her muscles      SECTION      COLONOSCOPY N/A 2017    Procedure: COLONOSCOPY;  Surgeon: Kin Dyer MD;  Location: [facility];  Service: Endoscopy;  Laterality: N/A;    FINGER SURGERY      joint relpacement, left hand index finger    HYSTERECTOMY      WISDOM TOOTH EXTRACTION         Family History   Problem Relation Age of Onset    Hypertension Mother     Kidney disease Father     Scleroderma Father     Hypertension Brother     Cancer Paternal Grandmother 70     colon       Social History     Social History    Marital status:      Spouse name: N/A    Number of children: 2    Years of education: N/A     Occupational History    teacher      Social History Main Topics    Smoking status: Never Smoker    Smokeless tobacco: Never Used    Alcohol use No    Drug use: No    Sexual activity: Yes     Partners: Male     Other Topics Concern    None     Social History Narrative    None       Review of patient's allergies indicates:  No Known Allergies    Current Outpatient Prescriptions   Medication Sig Dispense Refill    BREO ELLIPTA 100-25 mcg/dose diskus inhaler   0    butalbital-acetaminophen-caffeine -40 mg (FIORICET, ESGIC) -40 mg per tablet take 1 tablet by mouth every 4 hours if needed for headache 30 tablet 1    cetirizine (ZYRTEC) 5 MG chewable tablet Take 5 mg by mouth once daily.      duloxetine (CYMBALTA) 60 MG capsule Take 60 mg by mouth 2 (two) times daily.      DYMISTA 137-50 mcg/spray [place] nassal spray instill 1 spray into each nostril twice a day 23 g 11    eletriptan (RELPAX) 40 MG tablet Take 1 tablet (40 mg total) by mouth as needed. 12 tablet 2    estradiol (ESTRACE) 2 MG tablet Take 2 mg by mouth once daily.      hydrOXYzine pamoate (VISTARIL) 25 MG Cap Take 1 capsule (25 mg total) by mouth every 8 (eight) hours as needed. 20 capsule 0    losartan-hydrochlorothiazide 100-12.5 mg (HYZAAR) 100-12.5 mg Tab Take 1  tablet by mouth once daily. 90 tablet 3    montelukast (SINGULAIR) 10 mg tablet take 1 tablet by mouth every evening 90 tablet 3    nortriptyline (PAMELOR) 25 MG capsule TAKE 2 CAPSULES BY MOUTH ONCE DAILY 180 capsule 0    pantoprazole (PROTONIX) 40 MG tablet Take 1 tablet (40 mg total) by mouth once daily. 90 tablet 3    pregabalin (LYRICA) 150 MG capsule Take 1 capsule (150 mg total) by mouth 3 (three) times daily. 90 capsule 5    propranolol (INDERAL) 40 MG tablet Take 1 tablet (40 mg total) by mouth 3 (three) times daily. 90 tablet 11    rizatriptan (MAXALT) 10 MG tablet take 1 tablet by mouth if needed for migraines MAX DOSE 2 TABLETS IN 24 HOURS 10 tablet 11    simvastatin (ZOCOR) 20 MG tablet Take 1 tablet (20 mg total) by mouth every evening. 90 tablet 3    zolpidem (AMBIEN) 5 MG Tab Take 1 tablet (5 mg total) by mouth nightly as needed. 30 tablet 5    adalimumab (HUMIRA) PnKt injection Inject 0.8 mLs (40 mg total) into the skin every 14 (fourteen) days. 4.8 mL 3    albuterol 90 mcg/actuation inhaler Inhale 2 puffs into the lungs every 4 to 6 hours as needed for Wheezing. 8.5 g 1    levalbuterol (XOPENEX) 1.25 mg/3 mL nebulizer solution Take 3 mLs (1.25 mg total) by nebulization every 4 (four) hours as needed for Wheezing. Rescue 30 mL 0    mesalamine (PENTASA) 250 mg CpSR Take 4 capsules (1,000 mg total) by mouth 4 (four) times daily. 480 capsule 11    sodium,potassium,mag sulfates (SUPREP BOWEL PREP KIT) 17.5-3.13-1.6 gram SolR Take 1 kit by mouth once. For colonoscopy preparation 1 Bottle 0    valACYclovir (VALTREX) 1000 MG tablet Take 1 tablet (1,000 mg total) by mouth 3 (three) times daily. 21 tablet 0     No current facility-administered medications for this visit.        Review of Systems   Constitutional: Negative for activity change, appetite change, chills, diaphoresis, fatigue and fever.   HENT: Negative for congestion, ear pain, facial swelling, mouth sores, nosebleeds, rhinorrhea,  "sore throat and voice change.    Eyes: Negative for photophobia, pain, discharge, redness and visual disturbance.   Respiratory: Positive for shortness of breath. Negative for apnea, cough, choking, chest tightness and wheezing.    Cardiovascular: Negative for chest pain, palpitations and leg swelling.   Gastrointestinal:        As per HPI   Genitourinary: Negative for decreased urine volume, difficulty urinating, dysuria, frequency and hematuria.   Musculoskeletal: Positive for arthralgias. Negative for back pain, myalgias, neck pain and neck stiffness.   Skin: Negative for pallor and rash.   Neurological: Positive for headaches. Negative for tremors, seizures, syncope and weakness.   Hematological: Negative for adenopathy. Does not bruise/bleed easily.   Psychiatric/Behavioral: Negative for behavioral problems, confusion, hallucinations and sleep disturbance. The patient is not nervous/anxious.        Objective:   /82   Pulse 60   Ht 5' 7" (1.702 m)   Wt 91.4 kg (201 lb 8 oz)   BMI 31.56 kg/m²   Wt Readings from Last 1 Encounters:   03/09/18 0732 91.4 kg (201 lb 8 oz)       Physical Exam   Constitutional: She is oriented to person, place, and time. She appears well-developed and well-nourished. No distress.   HENT:   Head: Normocephalic and atraumatic.   Nose: Nose normal.   Mouth/Throat: Oropharynx is clear and moist.   Eyes: EOM are normal. Pupils are equal, round, and reactive to light. Right eye exhibits no discharge. Left eye exhibits no discharge. No scleral icterus.   Neck: Normal range of motion. Neck supple. No tracheal deviation present.   Cardiovascular: Normal rate, regular rhythm, normal heart sounds and intact distal pulses.  Exam reveals no gallop and no friction rub.    No murmur heard.  Pulmonary/Chest: Effort normal and breath sounds normal. No stridor. No respiratory distress. She has no wheezes. She has no rales. She exhibits no tenderness.   Abdominal: Soft. Bowel sounds are normal. " She exhibits no distension and no mass. There is no tenderness. There is no rebound and no guarding. No hernia.   Musculoskeletal: Normal range of motion. She exhibits no edema or tenderness.   Lymphadenopathy:     She has no cervical adenopathy.   Neurological: She is alert and oriented to person, place, and time. She has normal reflexes.   Skin: Skin is warm and dry. She is not diaphoretic.   Psychiatric: She has a normal mood and affect. Her behavior is normal. Judgment and thought content normal.       Lab Results   Component Value Date    WBC 9.25 02/12/2018    HGB 12.1 02/12/2018    HCT 36.9 (L) 02/12/2018    MCV 93 02/12/2018     (H) 02/12/2018       Chemistry        Component Value Date/Time     (L) 02/12/2018 0841    K 4.3 02/12/2018 0841     02/12/2018 0841    CO2 25 02/12/2018 0841    BUN 9 02/12/2018 0841    CREATININE 0.9 02/12/2018 0841    GLU 89 02/12/2018 0841        Component Value Date/Time    CALCIUM 9.2 02/12/2018 0841    ALKPHOS 153 (H) 02/12/2018 0841    AST 29 02/12/2018 0841    ALT 42 02/12/2018 0841    BILITOT 0.3 02/12/2018 0841    ESTGFRAFRICA >60.0 02/12/2018 0841    EGFRNONAA >60.0 02/12/2018 0841          No results found for: APTT  Lab Results   Component Value Date    INR 1.0 11/20/2014       No components found for: [id number]    Lab Results   Component Value Date    TSH 0.863 11/14/2017     Lab Results   Component Value Date    HGBA1C 5.2 11/14/2017       No results found for: AMYLASE  No results found for: LIPASE    Lab Results   Component Value Date    HEPBCAB Negative 05/22/2017     Lab Results   Component Value Date    PPD Negative 05/25/2017    PPD Negative 05/25/2017       No results found for: OCCULTBLOOD    Lab Results   Component Value Date    IRON 98 11/14/2017    TIBC 333 11/14/2017    FERRITIN 99 08/04/2017       Assessment:      1. Crohn's disease involving terminal ileum         Plan:     Crohn's disease involving terminal ileum  -     Case request GI:  COLONOSCOPY  -     CBC auto differential; Future; Expected date: 03/09/2018  -     Comprehensive metabolic panel; Future; Expected date: 03/09/2018  -     sodium,potassium,mag sulfates (SUPREP BOWEL PREP KIT) 17.5-3.13-1.6 gram SolR; Take 1 kit by mouth once. For colonoscopy preparation  Dispense: 1 Bottle; Refill: 0  -     Thiopurine Methyltrans, RBC; Future; Expected date: 03/09/2018  -     Thiopurine Methyltrans (TPMT) Genotyping; Future; Expected date: 03/09/2018  -     C-reactive protein; Future; Expected date: 03/09/2018  -     Sedimentation rate, manual; Future; Expected date: 03/09/2018  -     Quantiferon Gold TB; Future; Expected date: 03/09/2018  -     Vitamin D; Future; Expected date: 03/09/2018  -     IRON AND TIBC; Future; Expected date: 03/09/2018  -     FERRITIN; Future; Expected date: 03/09/2018  -     mesalamine (PENTASA) 250 mg CpSR; Take 4 capsules (1,000 mg total) by mouth 4 (four) times daily.  Dispense: 480 capsule; Refill: 11      Reassess disease activity with endoscopy   Low TPMT activity. Discussed risks such as Bone marrow suppression and hepatotoxicity with Imuran. May start low dose Imuran and check weekly labs  Restart pentasa since she is reluctant to resuming biologics at present. Readdress medications after colonoscopy.      Follow-up in about 4 weeks (around 4/6/2018), or if symptoms worsen or fail to improve.      Kyra Vallecillo MD

## 2018-03-27 NOTE — ANESTHESIA PREPROCEDURE EVALUATION
03/27/2018  Jaylin Murguia is a 53 y.o., female.    Anesthesia Evaluation    I have reviewed the Patient Summary Reports.    I have reviewed the Nursing Notes.   I have reviewed the Medications.     Review of Systems  Anesthesia Hx:  No problems with previous Anesthesia    Social:  Non-Smoker, No Alcohol Use    Hematology/Oncology:     Oncology Normal    -- Anemia:   EENT/Dental:   chronic allergic rhinitis   Cardiovascular:   Hypertension, well controlled ECG has been reviewed. Normal sinus rhythm  Normal ECG  No previous ECGs available  Confirmed by DARLING VELASQUEZ MD ([id number]) on 11/24/2014 4:44:21 PM   Pulmonary:   Asthma mild Snore   Renal/:  Renal/ Normal     Hepatic/GI:   Bowel Prep. 0530 last drink of fluid.   Musculoskeletal:  Musculoskeletal Normal    Neurological:   Neuromuscular Disease, Headaches    Endocrine:  Endocrine Normal    Dermatological:  Skin Normal    Psych:  Psychiatric Normal           Physical Exam  General:  Well nourished, Obesity    Airway/Jaw/Neck:  Airway Findings: Mallampati: III                Anesthesia Plan  Type of Anesthesia, risks & benefits discussed:  Anesthesia Type:  MAC  Patient's Preference:   Intra-op Monitoring Plan:   Intra-op Monitoring Plan Comments:   Post Op Pain Control Plan:   Post Op Pain Control Plan Comments:   Induction:   IV  Beta Blocker:  Patient is on a Beta-Blocker and has received one dose within the past 24 hours (No further documentation required).       Informed Consent: Patient understands risks and agrees with Anesthesia plan.  Questions answered. Anesthesia consent signed with patient.  ASA Score: 2     Day of Surgery Review of History & Physical: I have interviewed and examined the patient. I have reviewed the patient's H&P dated: 03/27/18. There are no significant changes.  H&P update referred to the provider.         Ready For Surgery  From Anesthesia Perspective.

## 2018-03-27 NOTE — ANESTHESIA RELEASE NOTE
"Anesthesia Release from PACU Note    Patient: Jaylin Murguia    Procedure(s) Performed: Procedure(s) (LRB):  COLONOSCOPY (N/A)    Anesthesia type: MAC    Post pain: Adequate analgesia    Post assessment: no apparent anesthetic complications, tolerated procedure well and no evidence of recall    Last Vitals:   Visit Vitals  /68 (BP Location: Left arm, Patient Position: Lying)   Pulse 75   Temp 36.8 °C (98.2 °F) (Oral)   Resp 17   Ht 5' 7" (1.702 m)   Wt 88 kg (194 lb)   SpO2 97%   Breastfeeding? No   BMI 30.38 kg/m²       Post vital signs: stable    Level of consciousness: awake, alert  and oriented    Nausea/Vomiting: no nausea/no vomiting    Complications: none    Airway Patency: patent    Respiratory: unassisted, spontaneous ventilation, room air    Cardiovascular: stable    Hydration: euvolemic  "

## 2018-03-27 NOTE — DISCHARGE INSTRUCTIONS

## 2018-03-27 NOTE — TRANSFER OF CARE
"Anesthesia Transfer of Care Note    Patient: Jaylin Murguia    Procedure(s) Performed: Procedure(s) (LRB):  COLONOSCOPY (N/A)    Patient location: PACU    Anesthesia Type: MAC    Transport from OR: Transported from OR on room air with adequate spontaneous ventilation    Post pain: adequate analgesia    Post assessment: no apparent anesthetic complications    Post vital signs: stable    Level of consciousness: awake and alert    Nausea/Vomiting: no nausea/vomiting    Complications: none    Transfer of care protocol was followed      Last vitals:   Visit Vitals  /68 (BP Location: Left arm, Patient Position: Lying)   Pulse 75   Temp 36.8 °C (98.2 °F) (Oral)   Resp 17   Ht 5' 7" (1.702 m)   Wt 88 kg (194 lb)   SpO2 97%   Breastfeeding? No   BMI 30.38 kg/m²     "

## 2018-03-27 NOTE — ANESTHESIA POSTPROCEDURE EVALUATION
"Anesthesia Post Evaluation    Patient: Jaylin Murguia    Procedure(s) Performed: Procedure(s) (LRB):  COLONOSCOPY (N/A)    Final Anesthesia Type: MAC  Patient location during evaluation: PACU  Patient participation: Yes- Able to Participate  Level of consciousness: awake and alert and oriented  Post-procedure vital signs: reviewed and stable  Pain management: adequate  Airway patency: patent  PONV status at discharge: No PONV  Anesthetic complications: no      Cardiovascular status: blood pressure returned to baseline  Respiratory status: unassisted, room air and spontaneous ventilation  Hydration status: euvolemic  Follow-up not needed.        Visit Vitals  /68 (BP Location: Left arm, Patient Position: Lying)   Pulse 75   Temp 36.8 °C (98.2 °F) (Oral)   Resp 17   Ht 5' 7" (1.702 m)   Wt 88 kg (194 lb)   SpO2 97%   Breastfeeding? No   BMI 30.38 kg/m²       Pain/Ramya Score: Ramya Score: 8 (3/27/2018  9:23 AM)      "

## 2018-04-03 DIAGNOSIS — G47.33 OSA (OBSTRUCTIVE SLEEP APNEA): Primary | ICD-10-CM

## 2018-04-04 ENCOUNTER — OFFICE VISIT (OUTPATIENT)
Dept: SLEEP MEDICINE | Facility: CLINIC | Age: 53
End: 2018-04-04
Payer: COMMERCIAL

## 2018-04-04 VITALS
WEIGHT: 203.69 LBS | RESPIRATION RATE: 18 BRPM | SYSTOLIC BLOOD PRESSURE: 117 MMHG | HEIGHT: 67 IN | HEART RATE: 77 BPM | OXYGEN SATURATION: 98 % | BODY MASS INDEX: 31.97 KG/M2 | DIASTOLIC BLOOD PRESSURE: 74 MMHG

## 2018-04-04 DIAGNOSIS — J01.90 SINUSITIS, ACUTE: ICD-10-CM

## 2018-04-04 DIAGNOSIS — E66.9 OBESITY, CLASS I, BMI 30-34.9: ICD-10-CM

## 2018-04-04 DIAGNOSIS — G47.33 OSA (OBSTRUCTIVE SLEEP APNEA): Primary | ICD-10-CM

## 2018-04-04 PROCEDURE — 99999 PR PBB SHADOW E&M-EST. PATIENT-LVL IV: CPT | Mod: PBBFAC,,, | Performed by: NURSE PRACTITIONER

## 2018-04-04 PROCEDURE — 99244 OFF/OP CNSLTJ NEW/EST MOD 40: CPT | Mod: SA,S$GLB,, | Performed by: NURSE PRACTITIONER

## 2018-04-04 RX ORDER — AZELASTINE HYDROCHLORIDE AND FLUTICASONE PROPIONATE 137; 50 UG/1; UG/1
SPRAY, METERED NASAL
Qty: 23 G | Refills: 11 | Status: SHIPPED | OUTPATIENT
Start: 2018-04-04 | End: 2019-04-23 | Stop reason: SDUPTHER

## 2018-04-04 NOTE — LETTER
April 4, 2018      Asher Pierce MD  [address] [facility] [place] [address]           [facility]  [address] [place] [address]  Phone: [phone]          Patient: Jaylin Murguia   MR Number: [id number]   YOB: 1965   Date of Visit: 4/4/2018       Dear Dr. Asher Pierce:    Thank you for referring Jaylin Murguia to me for evaluation. Attached you will find relevant portions of my assessment and plan of care.    If you have questions, please do not hesitate to call me. I look forward to following Jaylin Murguia along with you.    Sincerely,    Elizabeth Lejeune, NP    Enclosure  CC:  No Recipients    If you would like to receive this communication electronically, please contact [email or web address] or [phone] to request more information on [employer] access.    For providers and/or their staff who would like to refer a patient to [facility], please contact us through our one-stop-shop provider referral line, [facility], at [phone].    If you feel you have received this communication in error or would no longer like to receive these types of communications, please e-mail [email or web address]

## 2018-04-04 NOTE — PROGRESS NOTES
Subjective:      Patient ID: Jaylin Murguia is a 53 y.o. female.    Chief Complaint: sleep consult    Patient presents to the office today for evaluation of sleep apnea.  Patient had a recent colonoscopy and was told that she stop breathing oxygen level dropped.  Patient has a history of snoring and waking up gasping for air of the last year.  She states she gained 25 pounds with worsening of symptoms.  She sleeps 10 - 11 hours nightly with addition of naps during the day. She also worries at night about her family and pain which may affect her sleep.     Patient Active Problem List:     Fibromyalgia     Migraine     Crohn's disease of small intestine     Sciatica     Allergic rhinitis     Diarrhea     Essential (primary) hypertension     Insomnia due to medical condition     Hormone replacement therapy (postmenopausal)     Long-term use of immunosuppressant medication     Influenza B     Acute frontal sinusitis     Fever     Crohn's disease    STOP - BANG Questionnaire:     1. Snoring : Do you snore loudly ?    Yes    2. Tired : Do you often feel tired, fatigued, or sleepy during daytime? Yes    3. Observed: Has anyone observed you stop breathing during your sleep?   Yes     4. Blood pressure : Do you have or are you being treated for high blood pressure?   Yes    5. BMI :BMI more than 35 kg/m2?   No    6. Age : Age over 50 yr old?   Yes    7. Neck circumference: Neck circumference greater than 40 cm?   No    8. Gender: Gender male?   No    High risk of TAMIKA: Yes 5 - 8  Intermediate risk of TAMIKA: Yes 3 - 4  Low risk of TAMIKA: Yes 0 - 2      References:   STOP Questionnaire   A Tool to Screen Patients for Obstructive Sleep Apnea: SHIRIN Peralta.C.P.C., KAREN Dumont.B.B.S., Mick Nina M.D.,Naheed Weston, Ph.D., KAREN Mcmahon.B.B.S.,_ Senait Hernández.,_ Judie Espinoza M.D., Magdaleno Mcguire F.R.C.P.C.; Anesthesiology 2008; 108:812-21 Copyright © 2008, the American Society of  "Anesthesiologists, Inc. [employer] Inc[employer]              /74   Pulse 77   Resp 18   Ht 5' 7" (1.702 m)   Wt 92.4 kg (203 lb 11.3 oz)   SpO2 98%   BMI 31.90 kg/m²   Body mass index is 31.9 kg/m².    Review of Systems   Respiratory: Positive for apnea, snoring and asthma nighttime symptoms.    Musculoskeletal: Positive for arthralgias and myalgias.   Neurological: Positive for headaches.   Psychiatric/Behavioral: Positive for sleep disturbance. The patient is nervous/anxious.    All other systems reviewed and are negative.    Objective:      Physical Exam   Constitutional: She is oriented to person, place, and time. She appears well-developed and well-nourished.   HENT:   Head: Normocephalic and atraumatic.   Mouth/Throat: Oropharynx is clear and moist. No oropharyngeal exudate.   Eyes: Right eye exhibits no discharge. Left eye exhibits no discharge.   Neck: Normal range of motion. Neck supple. No tracheal deviation present. No thyromegaly present.   Cardiovascular: Normal rate, regular rhythm and normal heart sounds.  Exam reveals no gallop and no friction rub.    No murmur heard.  Pulmonary/Chest: Effort normal and breath sounds normal. No stridor. No respiratory distress. She has no wheezes. She has no rales.   Abdominal: Soft. Bowel sounds are normal. She exhibits no distension and no mass. There is no tenderness.   Musculoskeletal: Normal range of motion. She exhibits no edema.   Lymphadenopathy:     She has no cervical adenopathy.   Neurological: She is alert and oriented to person, place, and time. Coordination normal.   Skin: Skin is warm and dry. No rash noted.   Psychiatric: She has a normal mood and affect.       Assessment:       1. TAMIKA (obstructive sleep apnea)    2. Obesity, Class I, BMI 30-34.9        Outpatient Encounter Prescriptions as of 4/4/2018   Medication Sig Dispense Refill    albuterol 90 mcg/actuation inhaler Inhale 2 puffs into the lungs every 4 to 6 hours as " needed for Wheezing. 8.5 g 1    BREO ELLIPTA 100-25 mcg/dose diskus inhaler   0    butalbital-acetaminophen-caffeine -40 mg (FIORICET, ESGIC) -40 mg per tablet take 1 tablet by mouth every 4 hours if needed for headache 30 tablet 1    cetirizine (ZYRTEC) 5 MG chewable tablet Take 5 mg by mouth once daily.      duloxetine (CYMBALTA) 60 MG capsule Take 60 mg by mouth 2 (two) times daily.      DYMISTA 137-50 mcg/spray [place] nassal spray instill 1 spray into each nostril twice a day 23 g 11    eletriptan (RELPAX) 40 MG tablet Take 1 tablet (40 mg total) by mouth as needed. 12 tablet 2    estradiol (ESTRACE) 2 MG tablet Take 2 mg by mouth once daily.      hydrOXYzine pamoate (VISTARIL) 25 MG Cap Take 1 capsule (25 mg total) by mouth every 8 (eight) hours as needed. 20 capsule 0    levalbuterol (XOPENEX) 1.25 mg/3 mL nebulizer solution Take 3 mLs (1.25 mg total) by nebulization every 4 (four) hours as needed for Wheezing. Rescue 30 mL 0    losartan-hydrochlorothiazide 100-12.5 mg (HYZAAR) 100-12.5 mg Tab Take 1 tablet by mouth once daily. 90 tablet 3    mesalamine (PENTASA) 250 mg CpSR Take 4 capsules (1,000 mg total) by mouth 4 (four) times daily. 480 capsule 11    montelukast (SINGULAIR) 10 mg tablet take 1 tablet by mouth every evening 90 tablet 3    nortriptyline (PAMELOR) 25 MG capsule Take 2 capsules (50 mg total) by mouth once daily. 180 capsule 2    pantoprazole (PROTONIX) 40 MG tablet Take 1 tablet (40 mg total) by mouth once daily. 90 tablet 3    pregabalin (LYRICA) 150 MG capsule Take 1 capsule (150 mg total) by mouth 3 (three) times daily. 90 capsule 5    propranolol (INDERAL) 40 MG tablet Take 1 tablet (40 mg total) by mouth 3 (three) times daily. 90 tablet 11    rizatriptan (MAXALT) 10 MG tablet take 1 tablet by mouth if needed for migraines MAX DOSE 2 TABLETS IN 24 HOURS 10 tablet 11    simvastatin (ZOCOR) 20 MG tablet take 1 tablet by mouth every evening 90 tablet 3     valACYclovir (VALTREX) 1000 MG tablet Take 1 tablet (1,000 mg total) by mouth 3 (three) times daily. 21 tablet 0    zolpidem (AMBIEN) 5 MG Tab Take 1 tablet (5 mg total) by mouth nightly as needed. 30 tablet 5    adalimumab (HUMIRA) PnKt injection Inject 0.8 mLs (40 mg total) into the skin every 14 (fourteen) days. 4.8 mL 3    [DISCONTINUED] DYMISTA 137-50 mcg/spray [place] nassal spray instill 1 spray into each nostril twice a day 23 g 11     No facility-administered encounter medications on file as of 4/4/2018.      Orders Placed This Encounter   Procedures    Polysomnogram (CPAP will be added if patient meets diagnostic criteria.)     Standing Status:   Future     Standing Expiration Date:   4/4/2019     Plan:   Weight loss and exercise to improve overall health.  Formal polysomnogram for evaluation of sleep apnea. Return to clinic when study is available for review.

## 2018-04-10 ENCOUNTER — OFFICE VISIT (OUTPATIENT)
Dept: GASTROENTEROLOGY | Facility: CLINIC | Age: 53
End: 2018-04-10
Payer: COMMERCIAL

## 2018-04-10 VITALS
DIASTOLIC BLOOD PRESSURE: 100 MMHG | HEART RATE: 60 BPM | BODY MASS INDEX: 31.73 KG/M2 | WEIGHT: 202.19 LBS | SYSTOLIC BLOOD PRESSURE: 160 MMHG | HEIGHT: 67 IN

## 2018-04-10 DIAGNOSIS — K50.018 CROHN'S DISEASE OF SMALL INTESTINE WITH OTHER COMPLICATION: ICD-10-CM

## 2018-04-10 DIAGNOSIS — M19.90 ARTHRITIS: Primary | ICD-10-CM

## 2018-04-10 PROCEDURE — 3077F SYST BP >= 140 MM HG: CPT | Mod: CPTII,S$GLB,, | Performed by: INTERNAL MEDICINE

## 2018-04-10 PROCEDURE — 3080F DIAST BP >= 90 MM HG: CPT | Mod: CPTII,S$GLB,, | Performed by: INTERNAL MEDICINE

## 2018-04-10 PROCEDURE — 99999 PR PBB SHADOW E&M-EST. PATIENT-LVL III: CPT | Mod: PBBFAC,,, | Performed by: INTERNAL MEDICINE

## 2018-04-10 PROCEDURE — 99213 OFFICE O/P EST LOW 20 MIN: CPT | Mod: S$GLB,,, | Performed by: INTERNAL MEDICINE

## 2018-04-10 NOTE — PROGRESS NOTES
Subjective:    PCP: Esthela Hu MD    Referring physician: No ref. provider found      Patient ID: Jaylin Murguia is a 53 y.o. female.    Chief Complaint: Follow-up (crohn's)      HPI   Jaylin Murguia is a 53 y.o. /White female here today for follow up of Crohn's disease    She had a colonoscopy to assess fro changes noted on MRE concerning for TI stricture and inflammation. Colonoscopy was normal with normal TI: no evidence of ulcer or stricture.  She has stopped Humira due to frequent infections. She was then started on Pentasa.    She denies abdominal pain, nausea, vomiting, heartburn, dysphagia, odynophagia, jaundice, hematochezia, melena, hematemesis, constipation or diarrhea.    She complains of joint pains especially her hands and has not been evaluated for this previously.    Past Medical History:   Diagnosis Date    Allergic rhinitis     Asthma     Crohn's disease     Ileal involvement, previously on Remicade, Asacol, Prednisone    Fibromyalgia     Hypertension     Migraine     Sciatica        Past Surgical History:   Procedure Laterality Date    BLADDER SURGERY      sling was created by her muscles      SECTION      COLONOSCOPY N/A 2017    Procedure: COLONOSCOPY;  Surgeon: Kin Dyer MD;  Location: [facility] ENDO;  Service: Endoscopy;  Laterality: N/A;    COLONOSCOPY N/A 3/27/2018    Procedure: COLONOSCOPY;  Surgeon: Kyra Vallecillo MD;  Location: [facility] ENDO;  Service: Endoscopy;  Laterality: N/A;    FINGER SURGERY      joint relpacement, left hand index finger    HYSTERECTOMY      WISDOM TOOTH EXTRACTION         Family History   Problem Relation Age of Onset    Hypertension Mother     Kidney disease Father     Scleroderma Father     Hypertension Brother     Cancer Paternal Grandmother 70     colon       Social History     Social History    Marital status:      Spouse name: N/A    Number of children: 2    Years of education: N/A      Occupational History    teacher      Social History Main Topics    Smoking status: Never Smoker    Smokeless tobacco: Never Used    Alcohol use No    Drug use: No    Sexual activity: Yes     Partners: Male     Other Topics Concern    None     Social History Narrative    None       Review of patient's allergies indicates:  No Known Allergies    Current Outpatient Prescriptions   Medication Sig Dispense Refill    adalimumab (HUMIRA) PnKt injection Inject 0.8 mLs (40 mg total) into the skin every 14 (fourteen) days. 4.8 mL 3    BREO ELLIPTA 100-25 mcg/dose diskus inhaler   0    butalbital-acetaminophen-caffeine -40 mg (FIORICET, ESGIC) -40 mg per tablet take 1 tablet by mouth every 4 hours if needed for headache 30 tablet 1    cetirizine (ZYRTEC) 5 MG chewable tablet Take 5 mg by mouth once daily.      duloxetine (CYMBALTA) 60 MG capsule Take 60 mg by mouth 2 (two) times daily.      DYMISTA 137-50 mcg/spray [place] nassal spray instill 1 spray into each nostril twice a day 23 g 11    eletriptan (RELPAX) 40 MG tablet Take 1 tablet (40 mg total) by mouth as needed. 12 tablet 2    estradiol (ESTRACE) 2 MG tablet Take 2 mg by mouth once daily.      hydrOXYzine pamoate (VISTARIL) 25 MG Cap Take 1 capsule (25 mg total) by mouth every 8 (eight) hours as needed. 20 capsule 0    levalbuterol (XOPENEX) 1.25 mg/3 mL nebulizer solution Take 3 mLs (1.25 mg total) by nebulization every 4 (four) hours as needed for Wheezing. Rescue 30 mL 0    losartan-hydrochlorothiazide 100-12.5 mg (HYZAAR) 100-12.5 mg Tab Take 1 tablet by mouth once daily. 90 tablet 3    mesalamine (PENTASA) 250 mg CpSR Take 4 capsules (1,000 mg total) by mouth 4 (four) times daily. 480 capsule 11    montelukast (SINGULAIR) 10 mg tablet take 1 tablet by mouth every evening 90 tablet 3    nortriptyline (PAMELOR) 25 MG capsule Take 2 capsules (50 mg total) by mouth once daily. 180 capsule 2    pantoprazole (PROTONIX) 40 MG tablet  Take 1 tablet (40 mg total) by mouth once daily. 90 tablet 3    pregabalin (LYRICA) 150 MG capsule Take 1 capsule (150 mg total) by mouth 3 (three) times daily. 90 capsule 5    propranolol (INDERAL) 40 MG tablet Take 1 tablet (40 mg total) by mouth 3 (three) times daily. 90 tablet 11    rizatriptan (MAXALT) 10 MG tablet take 1 tablet by mouth if needed for migraines MAX DOSE 2 TABLETS IN 24 HOURS 10 tablet 11    simvastatin (ZOCOR) 20 MG tablet take 1 tablet by mouth every evening 90 tablet 3    zolpidem (AMBIEN) 5 MG Tab Take 1 tablet (5 mg total) by mouth nightly as needed. 30 tablet 5    albuterol 90 mcg/actuation inhaler Inhale 2 puffs into the lungs every 4 to 6 hours as needed for Wheezing. 8.5 g 1    valACYclovir (VALTREX) 1000 MG tablet Take 1 tablet (1,000 mg total) by mouth 3 (three) times daily. 21 tablet 0     No current facility-administered medications for this visit.        Review of Systems   Constitutional: Negative for activity change, appetite change, chills, diaphoresis, fatigue and fever.   HENT: Negative for congestion, ear pain, facial swelling, mouth sores, nosebleeds, rhinorrhea, sore throat and voice change.    Eyes: Negative for photophobia, pain, discharge, redness and visual disturbance.   Respiratory: Positive for cough and shortness of breath. Negative for apnea, choking, chest tightness and wheezing.    Cardiovascular: Negative for chest pain, palpitations and leg swelling.   Gastrointestinal:        As per HPI   Genitourinary: Positive for frequency. Negative for decreased urine volume, difficulty urinating, dysuria and hematuria.   Musculoskeletal: Positive for arthralgias and back pain. Negative for myalgias, neck pain and neck stiffness.   Skin: Negative for pallor and rash.   Neurological: Negative for tremors, seizures, syncope, weakness and headaches.   Hematological: Negative for adenopathy. Does not bruise/bleed easily.   Psychiatric/Behavioral: Negative for behavioral  "problems, confusion, hallucinations and sleep disturbance. The patient is not nervous/anxious.        Objective:   BP (!) 160/100   Pulse 60   Ht 5' 7" (1.702 m)   Wt 91.7 kg (202 lb 2.6 oz)   BMI 31.66 kg/m²   Wt Readings from Last 1 Encounters:   04/10/18 1035 91.7 kg (202 lb 2.6 oz)       Physical Exam   Constitutional: She is oriented to person, place, and time. She appears well-developed and well-nourished. No distress.   HENT:   Head: Normocephalic and atraumatic.   Nose: Nose normal.   Mouth/Throat: Oropharynx is clear and moist.   Eyes: EOM are normal. Pupils are equal, round, and reactive to light. Right eye exhibits no discharge. Left eye exhibits no discharge. No scleral icterus.   Neck: Normal range of motion. Neck supple. No tracheal deviation present.   Cardiovascular: Normal rate, regular rhythm, normal heart sounds and intact distal pulses.  Exam reveals no gallop and no friction rub.    No murmur heard.  Pulmonary/Chest: Effort normal and breath sounds normal. No stridor. No respiratory distress. She has no wheezes. She has no rales. She exhibits no tenderness.   Abdominal: Soft. Bowel sounds are normal. She exhibits no distension and no mass. There is no tenderness. There is no rebound and no guarding. No hernia.   Musculoskeletal: Normal range of motion. She exhibits no edema or tenderness.   Lymphadenopathy:     She has no cervical adenopathy.   Neurological: She is alert and oriented to person, place, and time. She has normal reflexes.   Skin: Skin is warm and dry. She is not diaphoretic.   Psychiatric: She has a normal mood and affect. Her behavior is normal. Judgment and thought content normal.       Lab Results   Component Value Date    WBC 7.65 03/09/2018    HGB 12.4 03/09/2018    HCT 39.2 03/09/2018    MCV 95 03/09/2018     03/09/2018       Chemistry        Component Value Date/Time     03/09/2018 0843    K 4.1 03/09/2018 0843     03/09/2018 0843    CO2 24 03/09/2018 " 0843    BUN 12 03/09/2018 0843    CREATININE 0.9 03/09/2018 0843    GLU 88 03/09/2018 0843        Component Value Date/Time    CALCIUM 9.4 03/09/2018 0843    ALKPHOS 114 03/09/2018 0843    AST 40 03/09/2018 0843    ALT 62 (H) 03/09/2018 0843    BILITOT 0.4 03/09/2018 0843    ESTGFRAFRICA >60.0 03/09/2018 0843    EGFRNONAA >60.0 03/09/2018 0843          No results found for: APTT  Lab Results   Component Value Date    INR 1.0 11/20/2014       No components found for: [id number]    Lab Results   Component Value Date    TSH 0.863 11/14/2017     Lab Results   Component Value Date    HGBA1C 5.2 11/14/2017       No results found for: AMYLASE  No results found for: LIPASE    Lab Results   Component Value Date    HEPBCAB Negative 05/22/2017     Lab Results   Component Value Date    PPD Negative 05/25/2017    PPD Negative 05/25/2017       No results found for: OCCULTBLOOD    Lab Results   Component Value Date    IRON 84 03/09/2018    TIBC 352 03/09/2018    FERRITIN 93 03/09/2018       Assessment:      1. Arthritis    2. Crohn's disease of small intestine with other complication         Plan:     Arthritis  -     Ambulatory consult to Rheumatology    Crohn's disease of small intestine with other complication    Currently her symptoms are in remission with endoscopic remission.   Discussed the likelihood of recurrence of disease on Pentasa   She is not inclined to resume immunosuppressants/ biologics at present due to side effects of oppurtunistic infections in past .  Will continue to monitor.       Follow-up in about 3 months (around 7/10/2018).      Kyra Vallecillo MD

## 2018-04-12 ENCOUNTER — TELEPHONE (OUTPATIENT)
Dept: INTERNAL MEDICINE | Facility: CLINIC | Age: 53
End: 2018-04-12

## 2018-04-12 ENCOUNTER — PATIENT MESSAGE (OUTPATIENT)
Dept: INTERNAL MEDICINE | Facility: CLINIC | Age: 53
End: 2018-04-12

## 2018-04-12 RX ORDER — HYDROCHLOROTHIAZIDE 12.5 MG/1
12.5 TABLET ORAL DAILY
Qty: 30 TABLET | Refills: 11 | Status: SHIPPED | OUTPATIENT
Start: 2018-04-12 | End: 2019-03-18

## 2018-04-16 ENCOUNTER — TELEPHONE (OUTPATIENT)
Dept: PULMONOLOGY | Facility: CLINIC | Age: 53
End: 2018-04-16

## 2018-04-16 ENCOUNTER — LAB VISIT (OUTPATIENT)
Dept: LAB | Facility: HOSPITAL | Age: 53
End: 2018-04-16
Attending: INTERNAL MEDICINE
Payer: COMMERCIAL

## 2018-04-16 DIAGNOSIS — G43.109 MIGRAINE WITH AURA AND WITHOUT STATUS MIGRAINOSUS, NOT INTRACTABLE: ICD-10-CM

## 2018-04-16 DIAGNOSIS — G47.33 OSA (OBSTRUCTIVE SLEEP APNEA): Primary | ICD-10-CM

## 2018-04-16 DIAGNOSIS — E87.1 HYPONATREMIA: ICD-10-CM

## 2018-04-16 DIAGNOSIS — R19.7 DIARRHEA DUE TO MALABSORPTION: ICD-10-CM

## 2018-04-16 DIAGNOSIS — R60.0 BILATERAL EDEMA OF LOWER EXTREMITY: ICD-10-CM

## 2018-04-16 DIAGNOSIS — M79.7 FIBROMYALGIA: ICD-10-CM

## 2018-04-16 DIAGNOSIS — I10 ESSENTIAL (PRIMARY) HYPERTENSION: ICD-10-CM

## 2018-04-16 DIAGNOSIS — K90.9 DIARRHEA DUE TO MALABSORPTION: ICD-10-CM

## 2018-04-16 LAB
ALBUMIN SERPL BCP-MCNC: 3.8 G/DL
ANION GAP SERPL CALC-SCNC: 9 MMOL/L
BUN SERPL-MCNC: 9 MG/DL
CALCIUM SERPL-MCNC: 9.5 MG/DL
CHLORIDE SERPL-SCNC: 98 MMOL/L
CO2 SERPL-SCNC: 25 MMOL/L
CREAT SERPL-MCNC: 0.9 MG/DL
EST. GFR  (AFRICAN AMERICAN): >60 ML/MIN/1.73 M^2
EST. GFR  (NON AFRICAN AMERICAN): >60 ML/MIN/1.73 M^2
GLUCOSE SERPL-MCNC: 95 MG/DL
PHOSPHATE SERPL-MCNC: 3.3 MG/DL
POTASSIUM SERPL-SCNC: 4.1 MMOL/L
SODIUM SERPL-SCNC: 132 MMOL/L

## 2018-04-16 PROCEDURE — 36415 COLL VENOUS BLD VENIPUNCTURE: CPT | Mod: PO

## 2018-04-16 PROCEDURE — 80069 RENAL FUNCTION PANEL: CPT

## 2018-04-16 RX ORDER — ZOLPIDEM TARTRATE 5 MG/1
5 TABLET ORAL NIGHTLY PRN
Qty: 30 TABLET | Refills: 5 | Status: SHIPPED | OUTPATIENT
Start: 2018-04-16 | End: 2018-10-15 | Stop reason: SDUPTHER

## 2018-04-23 ENCOUNTER — OFFICE VISIT (OUTPATIENT)
Dept: NEPHROLOGY | Facility: CLINIC | Age: 53
End: 2018-04-23
Payer: COMMERCIAL

## 2018-04-23 VITALS
HEART RATE: 70 BPM | SYSTOLIC BLOOD PRESSURE: 134 MMHG | BODY MASS INDEX: 31.63 KG/M2 | WEIGHT: 201.5 LBS | DIASTOLIC BLOOD PRESSURE: 90 MMHG | HEIGHT: 67 IN

## 2018-04-23 DIAGNOSIS — G43.109 MIGRAINE WITH AURA AND WITHOUT STATUS MIGRAINOSUS, NOT INTRACTABLE: ICD-10-CM

## 2018-04-23 DIAGNOSIS — I10 ESSENTIAL HYPERTENSION: Primary | ICD-10-CM

## 2018-04-23 DIAGNOSIS — E87.1 HYPONATREMIA: ICD-10-CM

## 2018-04-23 DIAGNOSIS — R60.0 BILATERAL EDEMA OF LOWER EXTREMITY: ICD-10-CM

## 2018-04-23 PROCEDURE — 99999 PR PBB SHADOW E&M-EST. PATIENT-LVL IV: CPT | Mod: PBBFAC,,, | Performed by: INTERNAL MEDICINE

## 2018-04-23 PROCEDURE — 3080F DIAST BP >= 90 MM HG: CPT | Mod: CPTII,S$GLB,, | Performed by: INTERNAL MEDICINE

## 2018-04-23 PROCEDURE — 3075F SYST BP GE 130 - 139MM HG: CPT | Mod: CPTII,S$GLB,, | Performed by: INTERNAL MEDICINE

## 2018-04-23 PROCEDURE — 99214 OFFICE O/P EST MOD 30 MIN: CPT | Mod: S$GLB,,, | Performed by: INTERNAL MEDICINE

## 2018-04-23 NOTE — PROGRESS NOTES
Subjective:       Patient ID: Jaylin Murguia is a 53 y.o. White female who presents for follow up evaluation of Hypertension    Headache    Associated symptoms include back pain. Pertinent negatives include no coughing, dizziness, fever, nausea, numbness, rhinorrhea or vomiting.   Hypertension   Associated symptoms include headaches. Pertinent negatives include no chest pain, palpitations or shortness of breath.          Patient is a 53 year-old female with history of fibromyalgia, Crohn's disease and essential hypertension.  Had normal sodium last year.     4/2017  Today comes in for consultation for new onset of hyponatremia which is accompanied by low levels of chloride.  No history of hypokalemia and no history of diuretic therapy. CCB stopped. Inderal, Vit D and zocor added       7/2017 starting Humira for Crohn's --dr. Dyer       April 2018 patient seen for follow-up.  Records reviewed by gastroenterology, noted rheumatology referral, noted blood pressure issues with Dr. ascencio, noted workup by pulmonary    Review of Systems   Constitutional: Negative for activity change, appetite change, chills, diaphoresis, fatigue, fever and unexpected weight change.   HENT: Negative for congestion, dental problem, drooling, postnasal drip, rhinorrhea and voice change.    Eyes: Negative for discharge.   Respiratory: Negative for apnea, cough, choking, chest tightness, shortness of breath, wheezing and stridor.    Cardiovascular: Negative for chest pain, palpitations and leg swelling.   Gastrointestinal: Negative for abdominal distention, blood in stool, constipation, diarrhea, nausea, rectal pain and vomiting.   Endocrine: Negative for cold intolerance, heat intolerance, polydipsia and polyuria.   Genitourinary: Negative for decreased urine volume, difficulty urinating, dysuria, enuresis, flank pain, frequency, hematuria and urgency.   Musculoskeletal: Positive for arthralgias, back pain, myalgias and neck stiffness.  "Negative for gait problem and joint swelling.        Severe muscle and joint pains worse in the morning with early morning stiffness   Skin: Negative for rash.   Allergic/Immunologic: Negative for food allergies and immunocompromised state.   Neurological: Positive for headaches. Negative for dizziness, tremors, syncope and numbness.        Off-and-on migraine with aura at least 2 times a month which is now improved with no attack in last 3 months on current meds    Hematological: Does not bruise/bleed easily.   Psychiatric/Behavioral: Positive for sleep disturbance. Negative for agitation, behavioral problems and self-injury. The patient is not nervous/anxious and is not hyperactive.         Severe insomnia with some help by taking Ambien or melatonin   All other systems reviewed and are negative.      Objective:   BP (!) 134/90   Pulse 70   Ht 5' 7" (1.702 m)   Wt 91.4 kg (201 lb 8 oz)   BMI 31.56 kg/m²      Physical Exam   Constitutional: She is oriented to person, place, and time. No distress.   HENT:   Head: Normocephalic and atraumatic.   Nose: Nose normal.   Eyes: Conjunctivae and EOM are normal. Pupils are equal, round, and reactive to light.   Neck: Normal range of motion. No JVD present. No tracheal deviation present. No thyromegaly present.   Cardiovascular: Normal rate, regular rhythm, normal heart sounds and intact distal pulses.  Exam reveals no gallop and no friction rub.    No murmur heard.  Pulmonary/Chest: Effort normal and breath sounds normal. No respiratory distress. She has no wheezes. She has no rales. She exhibits no tenderness.   Abdominal: Soft. Bowel sounds are normal. She exhibits no distension and no mass. There is no tenderness. No hernia.   Musculoskeletal: Normal range of motion. She exhibits no edema, tenderness or deformity.   Neurological: She is alert and oriented to person, place, and time. She has normal reflexes. She displays normal reflexes. No cranial nerve deficit. She " exhibits normal muscle tone. Coordination normal.   Skin: Skin is warm. She is not diaphoretic. No erythema. No pallor.   Psychiatric: She has a normal mood and affect. Her behavior is normal. Judgment and thought content normal.   Nursing note and vitals reviewed.        Lab Results   Component Value Date    CREATININE 0.9 04/16/2018    BUN 9 04/16/2018     (L) 04/16/2018    K 4.1 04/16/2018    CL 98 04/16/2018    CO2 25 04/16/2018     Lab Results   Component Value Date    WBC 7.65 03/09/2018    HGB 12.4 03/09/2018    HCT 39.2 03/09/2018    MCV 95 03/09/2018     03/09/2018     Lab Results   Component Value Date    CALCIUM 9.5 04/16/2018    PHOS 3.3 04/16/2018         Assessment:    )    1. Essential hypertension    2. Hyponatremia    3. Bilateral edema of lower extremity    4. Migraine with aura and without status migrainosus, not intractable        Plan:         1.  Hyponatremia with hypochloremia: Patient is on  diuretics.  Most likely complication of diarrhea due to malabsorption/Crohn's disease.   As long as sodium is > 125 I am ok with HCTZ     2.  Essential hypertension with edema: watch on HCTZ     3.  Recurrent migraine:  Better on  Inderal, vitamin D and Zocor.     4.  Fibromyalgia: Continue with Elavil, Cymbalta, Lyrica. D/w patient about water therapy, Yoga and turmeric.  Pending Consult rheumatology    5. Edema: We will watch on small dose if HCTZ       fup 6 months

## 2018-04-30 ENCOUNTER — PATIENT MESSAGE (OUTPATIENT)
Dept: SLEEP MEDICINE | Facility: CLINIC | Age: 53
End: 2018-04-30

## 2018-05-01 ENCOUNTER — PROCEDURE VISIT (OUTPATIENT)
Dept: SLEEP MEDICINE | Facility: CLINIC | Age: 53
End: 2018-05-01
Payer: COMMERCIAL

## 2018-05-01 DIAGNOSIS — G47.33 OSA (OBSTRUCTIVE SLEEP APNEA): ICD-10-CM

## 2018-05-01 PROCEDURE — 95806 SLEEP STUDY UNATT&RESP EFFT: CPT | Mod: S$GLB,,, | Performed by: INTERNAL MEDICINE

## 2018-05-01 PROCEDURE — 99499 UNLISTED E&M SERVICE: CPT | Mod: S$GLB,,, | Performed by: INTERNAL MEDICINE

## 2018-05-01 NOTE — PROCEDURES
Home Sleep Studies  Date/Time: 5/1/2018 2:45 PM  Performed by: LINDA WAY  Authorized by: LEJEUNE, ELIZABETH       Assessment and Recommendations  Test duration was 7 hours 23 minutes. 32 minutes of respiratory signal was excluded from analysis. Heart rate  variability was present. Lowest oxygen saturation was 87%. Snoring recorded above 50 dB 93% of the time.  Apnea-hypopnea index: 5.1 events per hour. Total events 38.  Mild/borderline obstructive sleep apnea syndrome.  Treatment recommendations:  CPAP would be the guideline recommendation for first-line treatment for obstructive sleep apnea.  Either order in lab CPAP titration or AutoPAP device.  Follow-up evaluation and treatment in the sleep disorder clinic.  Other modalities for treatment of obstructive sleep apnea may be explored in patients with intolerant to CPAP including evaluation by ear nose  and throat, Hypoglosal nerve stimulator ( INSPIRE) mandibular advancement device, nonsupine sleep positioning device.  Significant weight loss is recommended to normal ranges.  Close follow-up to ensure resolution of symptoms.

## 2018-05-01 NOTE — PROGRESS NOTES
Assessment and Recommendations  Test duration was 7 hours 23 minutes. 32 minutes of respiratory signal was excluded from analysis. Heart rate  variability was present. Lowest oxygen saturation was 87%. Snoring recorded above 50 dB 93% of the time.  Apnea-hypopnea index: 5.1 events per hour. Total events 38.  Mild/borderline obstructive sleep apnea syndrome.  Treatment recommendations:  CPAP would be the guideline recommendation for first-line treatment for obstructive sleep apnea.  Either order in lab CPAP titration or AutoPAP device.  Follow-up evaluation and treatment in the sleep disorder clinic.  Other modalities for treatment of obstructive sleep apnea may be explored in patients with intolerant to CPAP including evaluation by ear nose  and throat, Hypoglosal nerve stimulator ( INSPIRE) mandibular advancement device, nonsupine sleep positioning device.  Significant weight loss is recommended to normal ranges.  Close follow-up to ensure resolution of symptoms.

## 2018-05-03 ENCOUNTER — OFFICE VISIT (OUTPATIENT)
Dept: PULMONOLOGY | Facility: CLINIC | Age: 53
End: 2018-05-03
Payer: COMMERCIAL

## 2018-05-03 VITALS
HEART RATE: 62 BPM | HEIGHT: 67 IN | DIASTOLIC BLOOD PRESSURE: 82 MMHG | RESPIRATION RATE: 18 BRPM | OXYGEN SATURATION: 98 % | WEIGHT: 207.44 LBS | SYSTOLIC BLOOD PRESSURE: 124 MMHG | BODY MASS INDEX: 32.56 KG/M2

## 2018-05-03 DIAGNOSIS — G47.10 HYPERSOMNIA: ICD-10-CM

## 2018-05-03 DIAGNOSIS — G47.33 OSA (OBSTRUCTIVE SLEEP APNEA): Primary | ICD-10-CM

## 2018-05-03 PROCEDURE — 3008F BODY MASS INDEX DOCD: CPT | Mod: CPTII,S$GLB,, | Performed by: NURSE PRACTITIONER

## 2018-05-03 PROCEDURE — 3079F DIAST BP 80-89 MM HG: CPT | Mod: CPTII,S$GLB,, | Performed by: NURSE PRACTITIONER

## 2018-05-03 PROCEDURE — 3074F SYST BP LT 130 MM HG: CPT | Mod: CPTII,S$GLB,, | Performed by: NURSE PRACTITIONER

## 2018-05-03 PROCEDURE — 99214 OFFICE O/P EST MOD 30 MIN: CPT | Mod: SA,S$GLB,, | Performed by: NURSE PRACTITIONER

## 2018-05-03 PROCEDURE — 99999 PR PBB SHADOW E&M-EST. PATIENT-LVL V: CPT | Mod: PBBFAC,,, | Performed by: NURSE PRACTITIONER

## 2018-05-03 NOTE — PROGRESS NOTES
"Subjective:      Patient ID: Jaylin Murguia is a 53 y.o. female.    Chief Complaint: Sleep Apnea    Patient presents to the office today for evaluation of sleep apnea.  Patient had a recent colonoscopy and was told that she stop breathing oxygen level dropped.  Patient has a history of snoring and waking up gasping for air .  She states she gained 25 pounds with worsening of symptoms.  She sleeps 10 - 11 hours nightly with addition of naps during the day. She also worries at night about her family and pain which may affect her sleep.     Patient Active Problem List:     Fibromyalgia     Migraine     Crohn's disease of small intestine     Sciatica     Allergic rhinitis     Diarrhea     Essential (primary) hypertension     Insomnia due to medical condition     Hormone replacement therapy (postmenopausal)     Long-term use of immunosuppressant medication     Influenza B     Acute frontal sinusitis     Fever     Crohn's disease            /82   Pulse 62   Resp 18   Ht 5' 7" (1.702 m)   Wt 94.1 kg (207 lb 7.3 oz)   SpO2 98%   BMI 32.49 kg/m²   Body mass index is 32.49 kg/m².    Review of Systems   Constitutional: Positive for weight gain.   HENT: Negative.    Respiratory: Positive for snoring, dyspnea on extertion and somnolence.    Musculoskeletal: Positive for arthralgias and myalgias.   Neurological: Negative.    Psychiatric/Behavioral: Positive for sleep disturbance.     Objective:      Physical Exam   Constitutional: She is oriented to person, place, and time. She appears well-developed and well-nourished.   HENT:   Head: Normocephalic and atraumatic.   Mouth/Throat: Oropharynx is clear and moist.   Neck: Normal range of motion. Neck supple.   Cardiovascular: Normal rate and regular rhythm.  Exam reveals no gallop.    No murmur heard.  Pulmonary/Chest: Effort normal and breath sounds normal.   Abdominal: Soft.   Musculoskeletal: Normal range of motion. She exhibits no edema.   Neurological: She is " alert and oriented to person, place, and time.   Skin: Skin is warm and dry.   Psychiatric: She has a normal mood and affect.     Personal Diagnostic Review  HST positive for TAMIKA    Assessment:       1. TAMIKA (obstructive sleep apnea)    2. Hypersomnia        Outpatient Encounter Prescriptions as of 5/3/2018   Medication Sig Dispense Refill    albuterol 90 mcg/actuation inhaler Inhale 2 puffs into the lungs every 4 to 6 hours as needed for Wheezing. 8.5 g 1    BREO ELLIPTA 100-25 mcg/dose diskus inhaler   0    butalbital-acetaminophen-caffeine -40 mg (FIORICET, ESGIC) -40 mg per tablet take 1 tablet by mouth every 4 hours if needed for headache 30 tablet 1    cetirizine (ZYRTEC) 5 MG chewable tablet Take 5 mg by mouth once daily.      duloxetine (CYMBALTA) 60 MG capsule Take 60 mg by mouth 2 (two) times daily.      DYMISTA 137-50 mcg/spray [place] nassal spray instill 1 spray into each nostril twice a day 23 g 11    estradiol (ESTRACE) 2 MG tablet Take 2 mg by mouth once daily.      hydroCHLOROthiazide (HYDRODIURIL) 12.5 MG Tab Take 1 tablet (12.5 mg total) by mouth once daily. 30 tablet 11    hydrOXYzine pamoate (VISTARIL) 25 MG Cap Take 1 capsule (25 mg total) by mouth every 8 (eight) hours as needed. 20 capsule 0    levalbuterol (XOPENEX) 1.25 mg/3 mL nebulizer solution Take 3 mLs (1.25 mg total) by nebulization every 4 (four) hours as needed for Wheezing. Rescue 30 mL 0    losartan-hydrochlorothiazide 100-12.5 mg (HYZAAR) 100-12.5 mg Tab Take 1 tablet by mouth once daily. 90 tablet 3    mesalamine (PENTASA) 250 mg CpSR Take 4 capsules (1,000 mg total) by mouth 4 (four) times daily. 480 capsule 11    montelukast (SINGULAIR) 10 mg tablet take 1 tablet by mouth every evening 90 tablet 3    nortriptyline (PAMELOR) 25 MG capsule Take 2 capsules (50 mg total) by mouth once daily. 180 capsule 2    pantoprazole (PROTONIX) 40 MG tablet Take 1 tablet (40 mg total) by mouth once daily. 90 tablet 3     pregabalin (LYRICA) 150 MG capsule Take 1 capsule (150 mg total) by mouth 3 (three) times daily. 90 capsule 5    simvastatin (ZOCOR) 20 MG tablet take 1 tablet by mouth every evening 90 tablet 3    zolpidem (AMBIEN) 5 MG Tab Take 1 tablet (5 mg total) by mouth nightly as needed. 30 tablet 5    eletriptan (RELPAX) 40 MG tablet Take 1 tablet (40 mg total) by mouth as needed. 12 tablet 2    rizatriptan (MAXALT) 10 MG tablet take 1 tablet by mouth if needed for migraines MAX DOSE 2 TABLETS IN 24 HOURS 10 tablet 11    valACYclovir (VALTREX) 1000 MG tablet Take 1 tablet (1,000 mg total) by mouth 3 (three) times daily. 21 tablet 0    [DISCONTINUED] adalimumab (HUMIRA) PnKt injection Inject 0.8 mLs (40 mg total) into the skin every 14 (fourteen) days. 4.8 mL 3    [DISCONTINUED] propranolol (INDERAL) 40 MG tablet Take 1 tablet (40 mg total) by mouth 3 (three) times daily. 90 tablet 11     No facility-administered encounter medications on file as of 5/3/2018.      Orders Placed This Encounter   Procedures    CPAP FOR HOME USE     Order Specific Question:   Type:     Answer:   Auto CPAP     Order Specific Question:   Auto CPAP pressure setting range (cmH20):     Answer:   4-16     Order Specific Question:   Length of need (1-99 months):     Answer:   99     Order Specific Question:   Humidification:     Answer:   Heated     Order Specific Question:   Type of mask:     Answer:   Nasal     Order Specific Question:   Headgear?     Answer:   Yes     Order Specific Question:   Tubing?     Answer:   Yes     Order Specific Question:   Humidifier chamber?     Answer:   Yes     Order Specific Question:   Chin strap?     Answer:   Yes     Order Specific Question:   Filters?     Answer:   Yes    Spirometry with/without bronchodilator     Standing Status:   Future     Standing Expiration Date:   5/3/2019     Plan:   AutoPAP 4-16 cm H2O and follow up in 8 weeks with download of data card and review of symptoms.

## 2018-05-07 PROBLEM — J01.10 ACUTE FRONTAL SINUSITIS: Status: RESOLVED | Noted: 2018-02-04 | Resolved: 2018-05-07

## 2018-05-20 ENCOUNTER — PATIENT MESSAGE (OUTPATIENT)
Dept: INTERNAL MEDICINE | Facility: CLINIC | Age: 53
End: 2018-05-20

## 2018-05-21 DIAGNOSIS — M79.7 FIBROMYALGIA: ICD-10-CM

## 2018-05-21 RX ORDER — PREGABALIN 150 MG/1
CAPSULE ORAL
Qty: 90 CAPSULE | Refills: 5 | Status: SHIPPED | OUTPATIENT
Start: 2018-05-21 | End: 2018-10-16 | Stop reason: SDUPTHER

## 2018-05-22 ENCOUNTER — PATIENT MESSAGE (OUTPATIENT)
Dept: INTERNAL MEDICINE | Facility: CLINIC | Age: 53
End: 2018-05-22

## 2018-06-10 RX ORDER — PROPRANOLOL HYDROCHLORIDE 40 MG/1
TABLET ORAL
Qty: 270 TABLET | Refills: 3 | Status: SHIPPED | OUTPATIENT
Start: 2018-06-10 | End: 2019-03-20

## 2018-06-24 ENCOUNTER — PATIENT MESSAGE (OUTPATIENT)
Dept: GASTROENTEROLOGY | Facility: CLINIC | Age: 53
End: 2018-06-24

## 2018-06-25 ENCOUNTER — PATIENT MESSAGE (OUTPATIENT)
Dept: GASTROENTEROLOGY | Facility: CLINIC | Age: 53
End: 2018-06-25

## 2018-06-25 DIAGNOSIS — K50.00 CROHN'S DISEASE INVOLVING TERMINAL ILEUM: ICD-10-CM

## 2018-07-20 ENCOUNTER — PATIENT MESSAGE (OUTPATIENT)
Dept: INTERNAL MEDICINE | Facility: CLINIC | Age: 53
End: 2018-07-20

## 2018-07-27 ENCOUNTER — OFFICE VISIT (OUTPATIENT)
Dept: PULMONOLOGY | Facility: CLINIC | Age: 53
End: 2018-07-27
Payer: COMMERCIAL

## 2018-07-27 ENCOUNTER — OFFICE VISIT (OUTPATIENT)
Dept: ALLERGY | Facility: CLINIC | Age: 53
End: 2018-07-27
Payer: COMMERCIAL

## 2018-07-27 ENCOUNTER — HOSPITAL ENCOUNTER (OUTPATIENT)
Dept: RADIOLOGY | Facility: HOSPITAL | Age: 53
Discharge: HOME | End: 2018-07-27
Attending: ALLERGY & IMMUNOLOGY
Payer: COMMERCIAL

## 2018-07-27 VITALS
SYSTOLIC BLOOD PRESSURE: 108 MMHG | DIASTOLIC BLOOD PRESSURE: 78 MMHG | HEART RATE: 70 BPM | TEMPERATURE: 98 F | WEIGHT: 199.75 LBS | HEIGHT: 67 IN | RESPIRATION RATE: 15 BRPM | BODY MASS INDEX: 31.35 KG/M2

## 2018-07-27 VITALS
SYSTOLIC BLOOD PRESSURE: 128 MMHG | RESPIRATION RATE: 18 BRPM | HEIGHT: 67 IN | OXYGEN SATURATION: 95 % | WEIGHT: 199.94 LBS | DIASTOLIC BLOOD PRESSURE: 76 MMHG | BODY MASS INDEX: 31.38 KG/M2 | HEART RATE: 65 BPM

## 2018-07-27 DIAGNOSIS — K21.9 GASTROESOPHAGEAL REFLUX DISEASE, ESOPHAGITIS PRESENCE NOT SPECIFIED: ICD-10-CM

## 2018-07-27 DIAGNOSIS — R51.9 FACIAL PAIN: ICD-10-CM

## 2018-07-27 DIAGNOSIS — J31.0 CHRONIC RHINITIS: ICD-10-CM

## 2018-07-27 DIAGNOSIS — G47.33 OSA (OBSTRUCTIVE SLEEP APNEA): Primary | ICD-10-CM

## 2018-07-27 DIAGNOSIS — M79.7 FIBROMYALGIA: ICD-10-CM

## 2018-07-27 DIAGNOSIS — J31.0 CHRONIC RHINITIS: Primary | ICD-10-CM

## 2018-07-27 DIAGNOSIS — H69.93 DYSFUNCTION OF BOTH EUSTACHIAN TUBES: ICD-10-CM

## 2018-07-27 DIAGNOSIS — G47.33 OSA (OBSTRUCTIVE SLEEP APNEA): ICD-10-CM

## 2018-07-27 DIAGNOSIS — K50.10 CROHN'S DISEASE OF LARGE INTESTINE WITHOUT COMPLICATION: ICD-10-CM

## 2018-07-27 DIAGNOSIS — G43.809 OTHER MIGRAINE WITHOUT STATUS MIGRAINOSUS, NOT INTRACTABLE: ICD-10-CM

## 2018-07-27 PROCEDURE — 3074F SYST BP LT 130 MM HG: CPT | Mod: CPTII,S$GLB,, | Performed by: ALLERGY & IMMUNOLOGY

## 2018-07-27 PROCEDURE — 99204 OFFICE O/P NEW MOD 45 MIN: CPT | Mod: S$GLB,,, | Performed by: ALLERGY & IMMUNOLOGY

## 2018-07-27 PROCEDURE — 99999 PR PBB SHADOW E&M-EST. PATIENT-LVL III: CPT | Mod: PBBFAC,,, | Performed by: ALLERGY & IMMUNOLOGY

## 2018-07-27 PROCEDURE — 3078F DIAST BP <80 MM HG: CPT | Mod: CPTII,S$GLB,, | Performed by: NURSE PRACTITIONER

## 2018-07-27 PROCEDURE — 3078F DIAST BP <80 MM HG: CPT | Mod: CPTII,S$GLB,, | Performed by: ALLERGY & IMMUNOLOGY

## 2018-07-27 PROCEDURE — 70220 X-RAY EXAM OF SINUSES: CPT | Mod: TC,FY,PO

## 2018-07-27 PROCEDURE — 3008F BODY MASS INDEX DOCD: CPT | Mod: CPTII,S$GLB,, | Performed by: NURSE PRACTITIONER

## 2018-07-27 PROCEDURE — 99213 OFFICE O/P EST LOW 20 MIN: CPT | Mod: S$GLB,,, | Performed by: NURSE PRACTITIONER

## 2018-07-27 PROCEDURE — 99999 PR PBB SHADOW E&M-EST. PATIENT-LVL V: CPT | Mod: PBBFAC,,, | Performed by: NURSE PRACTITIONER

## 2018-07-27 PROCEDURE — 3008F BODY MASS INDEX DOCD: CPT | Mod: CPTII,S$GLB,, | Performed by: ALLERGY & IMMUNOLOGY

## 2018-07-27 PROCEDURE — 3074F SYST BP LT 130 MM HG: CPT | Mod: CPTII,S$GLB,, | Performed by: NURSE PRACTITIONER

## 2018-07-27 PROCEDURE — 70220 X-RAY EXAM OF SINUSES: CPT | Mod: 26,,, | Performed by: RADIOLOGY
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
## 2018-07-27 NOTE — PROGRESS NOTES
"Subjective:      Patient ID: Jaylin Murguia is a 53 y.o. female.    Chief Complaint: Sleep Apnea    Presents to office for review of AutoPAP therapy. Patient states improved symptoms with use of AutoPAP. Falling asleep easier and Waking up feeling more refreshed. Improved daytime sleepiness. Patient states she is benefiting from use of the AutoPAP but continues to wake up quite often to mask shifting or worrying.           /76   Pulse 65   Resp 18   Ht 5' 7" (1.702 m)   Wt 90.7 kg (199 lb 15.3 oz)   SpO2 95%   BMI 31.32 kg/m²   Body mass index is 31.32 kg/m².    Review of Systems   Constitutional: Negative.    HENT: Negative.    Respiratory: Negative.    Cardiovascular: Negative.    Musculoskeletal: Negative.    Gastrointestinal: Negative.    Neurological: Negative.    Psychiatric/Behavioral: Negative.      Objective:      Physical Exam   Constitutional: She is oriented to person, place, and time. She appears well-developed and well-nourished.   HENT:   Head: Normocephalic and atraumatic.   Neck: Normal range of motion. Neck supple.   Musculoskeletal: Normal range of motion.   Neurological: She is alert and oriented to person, place, and time.   Skin: Skin is warm and dry.   Psychiatric: She has a normal mood and affect.     Personal Diagnostic Review  Compliance Information  Jaylin Murguia  Device: DreamStation Auto CPAP (500X110) ([id number])  YOB: 1965  Mask:  Compliance Summary  6/26/2018 - 7/25/2018 (30 days)  Days with Device Usage 29 days  Days without Device Usage 1 day  Percent Days with Device Usage 96.7%  Cumulative Usage 7 days 19 hrs. 21 mins. 6 secs.  Maximum Usage (1 Day) 10 hrs. 11 mins. 49 secs.  Average Usage (All Days) 6 hrs. 14 mins. 42 secs.  Average Usage (Days Used) 6 hrs. 27 mins. 37 secs.  Minimum Usage (1 Day) 2 hrs. 44 mins. 47 secs.  Percent of Days with Usage >= 4 Hours 93.3%  Percent of Days with Usage < 4 Hours 6.7%  Date Range  Total " [place] Time 10 days 17 hrs. 2 mins. 14 secs.  Average AHI 3.8  Auto-CPAP Summary  Auto-CPAP Mean Pressure 4.9 cmH2O  Auto-CPAP Peak Average Pressure 7.5 cmH2O  Average Device Pressure <= 90% of Time 5.9 cmH2O  Average Time in Large Leak Per Day 13 mins. 25 secs.            Assessment:       1. TAMIKA (obstructive sleep apnea)        Outpatient Encounter Prescriptions as of 7/27/2018   Medication Sig Dispense Refill    albuterol 90 mcg/actuation inhaler Inhale 2 puffs into the lungs every 4 to 6 hours as needed for Wheezing. 8.5 g 1    BREO ELLIPTA 100-25 mcg/dose diskus inhaler   0    butalbital-acetaminophen-caffeine -40 mg (FIORICET, ESGIC) -40 mg per tablet take 1 tablet by mouth every 4 hours if needed for headache 30 tablet 1    cetirizine (ZYRTEC) 5 MG chewable tablet Take 5 mg by mouth once daily.      duloxetine (CYMBALTA) 60 MG capsule Take 60 mg by mouth 2 (two) times daily.      DYMISTA 137-50 mcg/spray [place] nassal spray instill 1 spray into each nostril twice a day 23 g 11    eletriptan (RELPAX) 40 MG tablet Take 1 tablet (40 mg total) by mouth as needed. 12 tablet 2    estradiol (ESTRACE) 2 MG tablet Take 2 mg by mouth once daily.      hydroCHLOROthiazide (HYDRODIURIL) 12.5 MG Tab Take 1 tablet (12.5 mg total) by mouth once daily. 30 tablet 11    levalbuterol (XOPENEX) 1.25 mg/3 mL nebulizer solution Take 3 mLs (1.25 mg total) by nebulization every 4 (four) hours as needed for Wheezing. Rescue 30 mL 0    losartan-hydrochlorothiazide 100-12.5 mg (HYZAAR) 100-12.5 mg Tab Take 1 tablet by mouth once daily. 90 tablet 3    LYRICA 150 mg capsule take 1 capsule by mouth three times a day 90 capsule 5    mesalamine (PENTASA) 250 mg CpSR Take 4 capsules (1,000 mg total) by mouth 4 (four) times daily. 1440 capsule 3    montelukast (SINGULAIR) 10 mg tablet take 1 tablet by mouth every evening 90 tablet 3    nortriptyline (PAMELOR) 25 MG capsule Take 2 capsules (50 mg total) by mouth once  daily. 180 capsule 2    pantoprazole (PROTONIX) 40 MG tablet Take 1 tablet (40 mg total) by mouth once daily. 90 tablet 3    propranolol (INDERAL) 40 MG tablet take 1 tablet by mouth three times a day 270 tablet 3    rizatriptan (MAXALT) 10 MG tablet take 1 tablet by mouth if needed for migraines MAX DOSE 2 TABLETS IN 24 HOURS 10 tablet 11    simvastatin (ZOCOR) 20 MG tablet take 1 tablet by mouth every evening 90 tablet 3    zolpidem (AMBIEN) 5 MG Tab Take 1 tablet (5 mg total) by mouth nightly as needed. 30 tablet 5    [DISCONTINUED] hydrOXYzine pamoate (VISTARIL) 25 MG Cap Take 1 capsule (25 mg total) by mouth every 8 (eight) hours as needed. 20 capsule 0    [DISCONTINUED] valACYclovir (VALTREX) 1000 MG tablet Take 1 tablet (1,000 mg total) by mouth 3 (three) times daily. 21 tablet 0     No facility-administered encounter medications on file as of 7/27/2018.      No orders of the defined types were placed in this encounter.    Plan:   Doing well on PAP settings. I will lower the top setting (4-12) -she feels like pressure goes too high at times. May want to try nasal wisp mask. Patient is compliant. Follow up in 6 months with PAP data download or call earlier if any problems.

## 2018-08-07 ENCOUNTER — TELEPHONE (OUTPATIENT)
Dept: INTERNAL MEDICINE | Facility: CLINIC | Age: 53
End: 2018-08-07

## 2018-08-07 NOTE — TELEPHONE ENCOUNTER
----- Message from Tanya Tiwari sent at 8/7/2018 10:47 AM CDT -----  Contact: pt  She's calling in regards to Valsartan medication recall, wants to get another Rx for different brand, please advise [phone] (home)

## 2018-08-07 NOTE — TELEPHONE ENCOUNTER
Pt stated that she was notified that her valsartan was recalled. She would like to know if you can change the medication for her?

## 2018-08-08 ENCOUNTER — PATIENT MESSAGE (OUTPATIENT)
Dept: INTERNAL MEDICINE | Facility: CLINIC | Age: 53
End: 2018-08-08

## 2018-08-13 ENCOUNTER — PATIENT MESSAGE (OUTPATIENT)
Dept: NEPHROLOGY | Facility: CLINIC | Age: 53
End: 2018-08-13

## 2018-08-25 ENCOUNTER — OFFICE VISIT (OUTPATIENT)
Dept: URGENT CARE | Facility: CLINIC | Age: 53
End: 2018-08-25
Payer: COMMERCIAL

## 2018-08-25 VITALS
HEART RATE: 65 BPM | SYSTOLIC BLOOD PRESSURE: 120 MMHG | OXYGEN SATURATION: 99 % | HEIGHT: 67 IN | DIASTOLIC BLOOD PRESSURE: 78 MMHG | BODY MASS INDEX: 31.42 KG/M2 | RESPIRATION RATE: 18 BRPM | TEMPERATURE: 97 F | WEIGHT: 200.19 LBS

## 2018-08-25 DIAGNOSIS — J40 BRONCHITIS: ICD-10-CM

## 2018-08-25 DIAGNOSIS — J32.9 SINUSITIS, UNSPECIFIED CHRONICITY, UNSPECIFIED LOCATION: ICD-10-CM

## 2018-08-25 DIAGNOSIS — J02.9 SORE THROAT: Primary | ICD-10-CM

## 2018-08-25 LAB
CTP QC/QA: YES
S PYO RRNA THROAT QL PROBE: NEGATIVE

## 2018-08-25 PROCEDURE — 3074F SYST BP LT 130 MM HG: CPT | Mod: CPTII,S$GLB,, | Performed by: NURSE PRACTITIONER

## 2018-08-25 PROCEDURE — 3078F DIAST BP <80 MM HG: CPT | Mod: CPTII,S$GLB,, | Performed by: NURSE PRACTITIONER

## 2018-08-25 PROCEDURE — 87081 CULTURE SCREEN ONLY: CPT

## 2018-08-25 PROCEDURE — 99214 OFFICE O/P EST MOD 30 MIN: CPT | Mod: S$GLB,,, | Performed by: NURSE PRACTITIONER

## 2018-08-25 PROCEDURE — 87880 STREP A ASSAY W/OPTIC: CPT | Mod: QW,S$GLB,, | Performed by: NURSE PRACTITIONER

## 2018-08-25 PROCEDURE — 3008F BODY MASS INDEX DOCD: CPT | Mod: CPTII,S$GLB,, | Performed by: NURSE PRACTITIONER

## 2018-08-25 PROCEDURE — 99999 PR PBB SHADOW E&M-EST. PATIENT-LVL IV: CPT | Mod: PBBFAC,,, | Performed by: NURSE PRACTITIONER

## 2018-08-25 RX ORDER — DOXYCYCLINE 100 MG/1
100 CAPSULE ORAL EVERY 12 HOURS
Qty: 14 CAPSULE | Refills: 0 | Status: SHIPPED | OUTPATIENT
Start: 2018-08-25 | End: 2018-09-01

## 2018-08-25 RX ORDER — BENZONATATE 200 MG/1
200 CAPSULE ORAL 3 TIMES DAILY PRN
Qty: 30 CAPSULE | Refills: 0 | Status: SHIPPED | OUTPATIENT
Start: 2018-08-25 | End: 2019-07-18

## 2018-08-25 NOTE — PROGRESS NOTES
Subjective:       Patient ID: Jaylin Murguia is a 53 y.o. female.    Chief Complaint: Sore Throat (w/ coughing, headache x 3 days )    URI    The current episode started in the past 7 days. The problem has been gradually worsening. Associated symptoms include congestion, coughing, headaches, rhinorrhea, sinus pain and a sore throat. Pertinent negatives include no chest pain, ear pain, sneezing or wheezing. She has tried inhaler use and antihistamine for the symptoms.     Review of Systems   Constitutional: Negative for chills, diaphoresis, fatigue and fever.   HENT: Positive for congestion, postnasal drip, rhinorrhea, sinus pain and sore throat. Negative for ear discharge, ear pain, sinus pressure and sneezing.    Respiratory: Positive for cough. Negative for shortness of breath and wheezing.    Cardiovascular: Negative for chest pain and palpitations.   Musculoskeletal: Negative for back pain and myalgias.   Neurological: Positive for headaches.       Objective:      Physical Exam   Constitutional: She is oriented to person, place, and time. She appears well-developed and well-nourished. No distress.   HENT:   Head: Normocephalic.   Right Ear: External ear and ear canal normal. A middle ear effusion is present.   Left Ear: External ear and ear canal normal. A middle ear effusion is present.   Nose: Mucosal edema present. No rhinorrhea. Right sinus exhibits maxillary sinus tenderness and frontal sinus tenderness. Left sinus exhibits maxillary sinus tenderness and frontal sinus tenderness.   Mouth/Throat: Uvula is midline, oropharynx is clear and moist and mucous membranes are normal. No oropharyngeal exudate, posterior oropharyngeal edema or posterior oropharyngeal erythema.   Eyes: Conjunctivae and EOM are normal.   Neck: Normal range of motion. Neck supple.   Cardiovascular: Normal rate, regular rhythm and normal heart sounds.   Pulmonary/Chest: Effort normal. No accessory muscle usage. No apnea, no tachypnea  and no bradypnea. No respiratory distress. She has no decreased breath sounds. She has wheezes. She has no rhonchi. She has no rales.   Lymphadenopathy:        Head (right side): No submental, no submandibular and no tonsillar adenopathy present.        Head (left side): No submental, no submandibular and no tonsillar adenopathy present.     She has no cervical adenopathy.   Neurological: She is alert and oriented to person, place, and time.   Skin: Skin is warm and dry. She is not diaphoretic.       Assessment:       1. Sore throat    2. Sinusitis, unspecified chronicity, unspecified location    3. Bronchitis        Plan:       *Jaylin was seen today for sore throat.    Diagnoses and all orders for this visit:    Sore throat  -     POCT rapid strep A  -     Strep A culture, throat  -     benzonatate (TESSALON) 200 MG capsule; Take 1 capsule (200 mg total) by mouth 3 (three) times daily as needed for Cough.    Sinusitis, unspecified chronicity, unspecified location  -     doxycycline (VIBRAMYCIN) 100 MG Cap; Take 1 capsule (100 mg total) by mouth every 12 (twelve) hours. Note to Pharmacy: Can substitute for Monodox if needed  for 7 days    Bronchitis  -     benzonatate (TESSALON) 200 MG capsule; Take 1 capsule (200 mg total) by mouth 3 (three) times daily as needed for Cough.        -     Diagnosis and treatment discussed, AVS provided  -     Follow up with PCP or ER immediately for worsening, new or no improvement of symptoms.   -     Patient understands and agrees with plan  *

## 2018-08-25 NOTE — LETTER
August 25, 2018      [facility]  [address] [place] [address]  Phone: [phone]  Fax: [phone]       Patient: Jaylin Murguia   YOB: 1965  Date of Visit: 08/25/2018    To Whom It May Concern:    Judi Murguia  was at [facility] on 08/25/2018. Please excuse for 8/27/18-8/28/18. If you have any questions or concerns, or if I can be of further assistance, please do not hesitate to contact me.    Sincerely,    Jojo Guaman NP

## 2018-08-25 NOTE — PROGRESS NOTES
"Subjective:         Patient ID: Jaylin Murguia is a 53 y.o. female.  Patient's current PCP is Esthela Hu MD.       Chief Complaint: Sore Throat (w/ coughing, headache x 3 days )    HPI  Jaylin Murguia is a 53 y.o. White female presenting for *** new consultation/follow up for diabetes. Patient has been diagnosed with diabetes since *** and has the following complications from diabetes: {Diagnoses; complications diabetes:1215}, HTN, Hyperlipidemia. Blood glucose testing {Home glucose monitorin}. In the past *** patient reports blood glucose values to have approximately ranged from *** - *** fasting and{Desc; before/after:38164} meals. Condition: {DIABETES CONTROL:93047} She {Reports/denies:14403} recent hospital admissions, {Reports/denies:34009} emergency room visits, {Reports/denies:24322} hypoglycemia.      Height: 5' 7" (170.2 cm)  //  Weight: 90.8 kg (200 lb 2.8 oz), Body mass index is 31.35 kg/m².  Home Blood Glucose reading this AM: *** mg/dl fasting.  Her blood sugar in clinic today is: No results found for: POCGLU    Labs reviewed and are noted below.    Her most recent A1C is:  Lab Results   Component Value Date    HGBA1C 5.2 2017    HGBA1C 5.5 2017     No results found for: CPEPTIDE  No results found for: GLUTAMICACID  Lab Results   Component Value Date    WBC 7.65 2018    HGB 12.4 2018    HCT 39.2 2018     2018    CHOL 155 2017    TRIG 104 2017    HDL 59 2017    ALT 62 (H) 2018    AST 40 2018     (L) 2018    K 4.1 2018    CL 98 2018    CREATININE 0.9 2018    BUN 9 2018    CO2 25 2018    TSH 0.863 2017    INR 1.0 2014    HGBA1C 5.2 2017     CMP  Sodium   Date Value Ref Range Status   2018 132 (L) 136 - 145 mmol/L Final     Potassium   Date Value Ref Range Status   2018 4.1 3.5 - 5.1 mmol/L Final     Chloride   Date Value Ref Range Status "   04/16/2018 98 95 - 110 mmol/L Final     CO2   Date Value Ref Range Status   04/16/2018 25 23 - 29 mmol/L Final     Glucose   Date Value Ref Range Status   04/16/2018 95 70 - 110 mg/dL Final     BUN, Bld   Date Value Ref Range Status   04/16/2018 9 6 - 20 mg/dL Final     Creatinine   Date Value Ref Range Status   04/16/2018 0.9 0.5 - 1.4 mg/dL Final     Calcium   Date Value Ref Range Status   04/16/2018 9.5 8.7 - 10.5 mg/dL Final     Total Protein   Date Value Ref Range Status   03/09/2018 7.6 6.0 - 8.4 g/dL Final     Albumin   Date Value Ref Range Status   04/16/2018 3.8 3.5 - 5.2 g/dL Final     Total Bilirubin   Date Value Ref Range Status   03/09/2018 0.4 0.1 - 1.0 mg/dL Final     Comment:     For infants and newborns, interpretation of results should be based  on gestational age, weight and in agreement with clinical  observations.  Premature Infant recommended reference ranges:  Up to 24 hours.............<8.0 mg/dL  Up to 48 hours............<12.0 mg/dL  3-5 days..................<15.0 mg/dL  6-29 days.................<15.0 mg/dL       Alkaline Phosphatase   Date Value Ref Range Status   03/09/2018 114 55 - 135 U/L Final     AST   Date Value Ref Range Status   03/09/2018 40 10 - 40 U/L Final     ALT   Date Value Ref Range Status   03/09/2018 62 (H) 10 - 44 U/L Final     Anion Gap   Date Value Ref Range Status   04/16/2018 9 8 - 16 mmol/L Final     eGFR if    Date Value Ref Range Status   04/16/2018 >60.0 >60 mL/min/1.73 m^2 Final     eGFR if non    Date Value Ref Range Status   04/16/2018 >60.0 >60 mL/min/1.73 m^2 Final     Comment:     Calculation used to obtain the estimated glomerular filtration  rate (eGFR) is the CKD-EPI equation.            CURRENT DM MEDICATIONS:   Current Outpatient Medications   Medication Sig Dispense Refill    butalbital-acetaminophen-caffeine -40 mg (FIORICET, ESGIC) -40 mg per tablet take 1 tablet by mouth every 4 hours if needed for  headache 30 tablet 1    cetirizine (ZYRTEC) 5 MG chewable tablet Take 5 mg by mouth once daily.      duloxetine (CYMBALTA) 60 MG capsule Take 60 mg by mouth 2 (two) times daily.      DYMISTA 137-50 mcg/spray [place] nassal spray instill 1 spray into each nostril twice a day 23 g 11    eletriptan (RELPAX) 40 MG tablet Take 1 tablet (40 mg total) by mouth as needed. 12 tablet 2    estradiol (ESTRACE) 2 MG tablet Take 2 mg by mouth once daily.      hydroCHLOROthiazide (HYDRODIURIL) 12.5 MG Tab Take 1 tablet (12.5 mg total) by mouth once daily. 30 tablet 11    levalbuterol (XOPENEX) 1.25 mg/3 mL nebulizer solution Take 3 mLs (1.25 mg total) by nebulization every 4 (four) hours as needed for Wheezing. Rescue 30 mL 0    losartan-hydrochlorothiazide 100-12.5 mg (HYZAAR) 100-12.5 mg Tab Take 1 tablet by mouth once daily. 90 tablet 3    LYRICA 150 mg capsule take 1 capsule by mouth three times a day 90 capsule 5    mesalamine (PENTASA) 250 mg CpSR Take 4 capsules (1,000 mg total) by mouth 4 (four) times daily. 1440 capsule 3    montelukast (SINGULAIR) 10 mg tablet take 1 tablet by mouth every evening 90 tablet 3    nortriptyline (PAMELOR) 25 MG capsule Take 2 capsules (50 mg total) by mouth once daily. 180 capsule 2    pantoprazole (PROTONIX) 40 MG tablet Take 1 tablet (40 mg total) by mouth once daily. 90 tablet 3    propranolol (INDERAL) 40 MG tablet take 1 tablet by mouth three times a day 270 tablet 3    rizatriptan (MAXALT) 10 MG tablet take 1 tablet by mouth if needed for migraines MAX DOSE 2 TABLETS IN 24 HOURS 10 tablet 11    simvastatin (ZOCOR) 20 MG tablet take 1 tablet by mouth every evening 90 tablet 3    zolpidem (AMBIEN) 5 MG Tab Take 1 tablet (5 mg total) by mouth nightly as needed. 30 tablet 5    albuterol 90 mcg/actuation inhaler Inhale 2 puffs into the lungs every 4 to 6 hours as needed for Wheezing. 8.5 g 1    BREO ELLIPTA 100-25 mcg/dose diskus inhaler   0     No current  "facility-administered medications for this visit.        Health Maintenance   Topic Date Due    Mammogram  01/01/2018    Influenza Vaccine  08/01/2018    Lipid Panel  11/14/2022    Colonoscopy  03/27/2023    TETANUS VACCINE  02/18/2024    Pneumococcal PPSV23 (Medium Risk) (2) 02/17/2030    Hepatitis C Screening  Completed       STANDARDS OF CARE:  Current Ophthalmologist/Optometrist: ***.  Last exam as noted.   Current Dentist: recommended annually   Current Podiatrist: ***.  Last examination in {MONTHS OF THE YEAR:24006} {YEAR:03990::"2016"}.  She  {Blank multiple:30932::"is to be enrolled in diabetes education classes","has attended diabetes education in the past"}.     LIFESTYLE:  ACTIVITY LEVEL: {Blank single:68871::"Sedentary","Rarely Active","Moderately Active","Very Active"}  EXERCISE:  {ACTIVITY LEVEL MNT:17726}  MEAL PLANNING: Patient reports number of meals per day to be {1-5:72657} and number of snacks per day to be {1-5:52253}  BLOOD GLUCOSE TESTING: Patient reports testing on average a total of {1-5:91346} times per day with {[facility] DM METER TYPE:73936}.       Review of Systems      Objective:      Physical Exam    Assessment:       No diagnosis found.    Plan:   There are no diagnoses linked to this encounter.      BP- {DIABETES CONTROL:98317}  LDL- {DIABETES CONTROL:23692}    Additional Plan Details:    1.) Patient was instructed to monitor blood glucose *** 2 - 3 or 5 more x daily, fasting and ac dinner or at bedtime. Discussed ADA goal for fasting blood sugar, 80 - 130mg/dL; pp blood sugars below 180 mg/dl. Also, discussed prevention of hypoglycemia and the need to adjust goals to higher levels if persistent hypoglycemia.  Reminded to bring BG records or meter to each visit for review. Patient instructed to send in log weekly for review and potential medication adjustments.  2.) Reviewed pathophysiology of Type {1 or 2:82209} diabetes, complications related to the disease, importance of annual " dilated eye exam and daily foot examination.  3.) We discussed the ADA recommendations, which are as follows:  Hemoglobin A1c below 7.0 %. All patients with diabetes should be on statins unless contraindicated.  ACE or ARB therapy if not contraindicated.    4.) Continue medications as prescribed.  My [facility] e-mail or phone review in one week with BG records for adjustment of medication.  5.) Meal planning teaching: Reviewed carb counting, portion control, importance of spacing meals throughout the day to prevent post prandial elevations.  6.) Discussed activity with related benefits, methods, and precautions. Recommended patient start or continue some form of exercise and increase as tolerated to 30 minutes per day to aid in control of BGs.  7.) A1C, TSH, Lipid Panel, CMP with eGFR and Micro/Creatinine are utd or were ordered per ADA protocol.  8.) Return to clinic in {numbers 1-12:15662} {WEEKS/MONTHS EC:55740} for follow up. The patient was explained the above plan and given opportunity to ask questions.  She understands, chooses and consents to this plan and accepts all the risks, which include but are not limited to the risks mentioned above. She understands the alternative of having no testing, interventions or treatments at this time. She left content and without further questions.     A total of {30 60 90:91444} minutes was spent in face to face time, of which over 50% was spent in counseling patient on disease process, complications, treatment, and side effects of medications.

## 2018-08-25 NOTE — PATIENT INSTRUCTIONS
Bronchitis with Wheezing (Viral or Bacterial: Adult)    Bronchitis is an infection of the air passages. It often occurs during a cold and is usually caused by a virus. Symptoms include cough with mucus (phlegm) and low-grade fever. This illness is contagious during the first few days and is spread through the air by coughing and sneezing, or by direct contact (touching the sick person and then touching your own eyes, nose, or mouth).  If there is a lot of inflammation, air flow is restricted. The air passages may also go into spasm, especially if you have asthma. This causes wheezing and difficulty breathing even in people who do not have asthma.  Bronchitis usually lasts 7 to 14 days. The wheezing should improve with treatment during the first week. An inhaler is often prescribed to relax the air passages and stop wheezing. Antibiotics will be prescribed if your doctor thinks there is also a secondary bacterial infection.  Home care  · If symptoms are severe, rest at home for the first 2 to 3 days. When you go back to your usual activities, don't let yourself get too tired.  · Do not smoke. Also avoid being exposed to secondhand smoke.  · You may use over-the-counter medicine to control fever or pain, unless another medicine was prescribed. Note: If you have chronic liver or kidney disease or have ever had a stomach ulcer or gastrointestinal bleeding, talk with your healthcare provider before using these medicines. Also talk to your provider if you are taking medicine to prevent blood clots.) Aspirin should never be given to anyone younger than 18 years of age who is ill with a viral infection or fever. It may cause severe liver or brain damage.  · Your appetite may be poor, so a light diet is fine. Avoid dehydration by drinking 6 to 8 glasses of fluids per day (such as water, soft drinks, sports drinks, juices, tea, or soup). Extra fluids will help loosen secretions in the nose and lungs.  · Over-the-counter  cough, cold, and sore-throat medicines will not shorten the length of the illness, but they may be helpful to reduce symptoms. (Note: Do not use decongestants if you have high blood pressure.)  · If you were given an inhaler, use it exactly as directed. If you need to use it more often than prescribed, your condition may be worsening. If this happens, contact your healthcare provider.  · If prescribed, finish all antibiotic medicine, even if you are feeling better after only a few days.  Follow-up care  Follow up with your healthcare provider, or as advised. If you had an X-ray or ECG (electrocardiogram), a specialist will review it. You will be notified of any new findings that may affect your care.  Note: If you are age 65 or older, or if you have a chronic lung disease or condition that affects your immune system, or you smoke, talk to your healthcare provider about having a pneumococcal vaccinations and a yearly influenza vaccination (flu shot).  When to seek medical advice  Call your healthcare provider right away if any of these occur:  · Fever of 100.4°F (38°C) or higher  · Coughing up increasing amounts of colored sputum  · Weakness, drowsiness, headache, facial pain, ear pain, or a stiff neck  Call 911, or get immediate medical care  Contact emergency services right away if any of these occur.  · Coughing up blood  · Worsening weakness, drowsiness, headache, or stiff neck  · Increased wheezing not helped with medication, shortness of breath, or pain with breathing  Date Last Reviewed: 9/13/2015  © 4633-5418 [employer]. [address] [place] [address], [place] [address]. All rights reserved. This information is not intended as a substitute for professional medical care. Always follow your healthcare professional's instructions.        Acute Sinusitis    Acute sinusitis is irritation and swelling of the sinuses. It is usually caused by a viral infection after a common cold. Your doctor can help you find  relief.  What is acute sinusitis?  Sinuses are air-filled spaces in the skull behind the face. They are kept moist and clean by a lining of mucosa. Things such as pollen, smoke, and chemical fumes can irritate the mucosa. It can then swell up. As a response to irritation, the mucosa makes more mucus and other fluids. Tiny hairlike cilia cover the mucosa. Cilia help carry mucus toward the opening of the sinus. Too much mucus may cause the cilia to stop working. This blocks the sinus opening. A buildup of fluid in the sinuses then causes pain and pressure. It can also encourage bacteria to grow in the sinuses.  Common symptoms of acute sinusitis  You may have:  · Facial soreness pain  · Headache  · Fever  · Fluid draining in the back of the throat (postnasal drip)  · Congestion  · Drainage that is thick and colored, instead of clear  · Cough  Diagnosing acute sinusitis  Your doctor will ask about your symptoms and health history. He or she will look at your ear, nose, and throat. You usually won't need to have X-rays taken.    The doctor may take a sample of mucus to check for bacteria. If you have sinusitis that keeps coming back, you may need imaging tests such as X-rays or CAT scans. This will help your doctor check for a structural problem that may be causing the infection.  Treating acute sinusitis  Treatment is aimed at unblocking the sinus opening and helping the cilia work again. You may need to take antihistamine and decongestant medicine. These can reduce inflammation and decrease the amount of fluid your sinuses make. If you have a bacterial infection, you will need to take antibiotic medicine for 10 to 14 days. Take this medicine until it is gone, even if you feel better.  Date Last Reviewed: 10/1/2016  © 4882-4804 [employer] [address] [place] [address], [place] [address]. All rights reserved. This information is not intended as a substitute for professional medical care. Always follow your  healthcare professional's instructions.

## 2018-08-27 ENCOUNTER — PATIENT MESSAGE (OUTPATIENT)
Dept: INTERNAL MEDICINE | Facility: CLINIC | Age: 53
End: 2018-08-27

## 2018-08-27 LAB — BACTERIA THROAT CULT: NORMAL

## 2018-08-28 ENCOUNTER — PATIENT MESSAGE (OUTPATIENT)
Dept: INTERNAL MEDICINE | Facility: CLINIC | Age: 53
End: 2018-08-28

## 2018-08-28 ENCOUNTER — OFFICE VISIT (OUTPATIENT)
Dept: INTERNAL MEDICINE | Facility: CLINIC | Age: 53
End: 2018-08-28
Payer: COMMERCIAL

## 2018-08-28 VITALS
HEART RATE: 60 BPM | WEIGHT: 200.19 LBS | TEMPERATURE: 97 F | OXYGEN SATURATION: 98 % | SYSTOLIC BLOOD PRESSURE: 142 MMHG | DIASTOLIC BLOOD PRESSURE: 90 MMHG | BODY MASS INDEX: 31.42 KG/M2 | HEIGHT: 67 IN

## 2018-08-28 DIAGNOSIS — J40 BRONCHITIS: ICD-10-CM

## 2018-08-28 DIAGNOSIS — R05.9 COUGH: Primary | ICD-10-CM

## 2018-08-28 DIAGNOSIS — R06.2 WHEEZING: ICD-10-CM

## 2018-08-28 DIAGNOSIS — Z79.60 LONG-TERM USE OF IMMUNOSUPPRESSANT MEDICATION: ICD-10-CM

## 2018-08-28 PROBLEM — J10.1 INFLUENZA B: Status: RESOLVED | Noted: 2018-02-04 | Resolved: 2018-08-28

## 2018-08-28 PROBLEM — R50.9 FEVER: Status: RESOLVED | Noted: 2018-02-04 | Resolved: 2018-08-28

## 2018-08-28 LAB
CTP QC/QA: YES
FLUAV AG NPH QL: NEGATIVE
FLUBV AG NPH QL: NEGATIVE

## 2018-08-28 PROCEDURE — 99214 OFFICE O/P EST MOD 30 MIN: CPT | Mod: 25,S$GLB,, | Performed by: PHYSICIAN ASSISTANT

## 2018-08-28 PROCEDURE — 96372 THER/PROPH/DIAG INJ SC/IM: CPT | Mod: S$GLB,,, | Performed by: PHYSICIAN ASSISTANT

## 2018-08-28 PROCEDURE — 3077F SYST BP >= 140 MM HG: CPT | Mod: CPTII,S$GLB,, | Performed by: PHYSICIAN ASSISTANT

## 2018-08-28 PROCEDURE — 87804 INFLUENZA ASSAY W/OPTIC: CPT | Mod: QW,S$GLB,, | Performed by: PHYSICIAN ASSISTANT

## 2018-08-28 PROCEDURE — 3008F BODY MASS INDEX DOCD: CPT | Mod: CPTII,S$GLB,, | Performed by: PHYSICIAN ASSISTANT

## 2018-08-28 PROCEDURE — 99999 PR PBB SHADOW E&M-EST. PATIENT-LVL V: CPT | Mod: PBBFAC,,, | Performed by: PHYSICIAN ASSISTANT

## 2018-08-28 PROCEDURE — 3080F DIAST BP >= 90 MM HG: CPT | Mod: CPTII,S$GLB,, | Performed by: PHYSICIAN ASSISTANT

## 2018-08-28 RX ORDER — BUTALBITAL, ACETAMINOPHEN AND CAFFEINE 50; 325; 40 MG/1; MG/1; MG/1
TABLET ORAL
Qty: 30 TABLET | Refills: 1 | Status: SHIPPED | OUTPATIENT
Start: 2018-08-28 | End: 2019-04-24 | Stop reason: SDUPTHER

## 2018-08-28 RX ORDER — FLUTICASONE FUROATE AND VILANTEROL TRIFENATATE 100; 25 UG/1; UG/1
1 POWDER RESPIRATORY (INHALATION) DAILY
Qty: 180 EACH | Refills: 3 | Status: SHIPPED | OUTPATIENT
Start: 2018-08-28 | End: 2019-07-18 | Stop reason: SDUPTHER

## 2018-08-28 RX ORDER — TRIAMCINOLONE ACETONIDE 40 MG/ML
60 INJECTION, SUSPENSION INTRA-ARTICULAR; INTRAMUSCULAR
Status: COMPLETED | OUTPATIENT
Start: 2018-08-28 | End: 2018-08-28

## 2018-08-28 RX ADMIN — TRIAMCINOLONE ACETONIDE 60 MG: 40 INJECTION, SUSPENSION INTRA-ARTICULAR; INTRAMUSCULAR at 12:08

## 2018-08-28 NOTE — TELEPHONE ENCOUNTER
Can put in appt for pt's son, can be private slot. The forward me the request for the sertraline    Sent in breo for her

## 2018-08-28 NOTE — PROGRESS NOTES
Subjective:       Patient ID: Jaylin Murguia is a 53 y.o. female.    Chief Complaint: Bronchitis    Cough   This is a recurrent problem. The current episode started in the past 7 days. The problem has been unchanged. The cough is non-productive. Associated symptoms include ear congestion, headaches and shortness of breath. Pertinent negatives include no chest pain, chills, fever, heartburn, hemoptysis, myalgias, nasal congestion, postnasal drip, rash, sore throat, weight loss or wheezing. Treatments tried: was seen at , on cough med and abx        Health Maintenance Due   Topic Date Due    Mammogram  01/01/2018    Influenza Vaccine  08/01/2018       Past Medical History:   Diagnosis Date    Allergic rhinitis     Asthma     Crohn's disease     Ileal involvement, previously on Remicade, Asacol, Prednisone    Fibromyalgia     Hypertension     Migraine     Sciatica        Current Outpatient Medications   Medication Sig Dispense Refill    albuterol 90 mcg/actuation inhaler Inhale 2 puffs into the lungs every 4 to 6 hours as needed for Wheezing. 8.5 g 1    benzonatate (TESSALON) 200 MG capsule Take 1 capsule (200 mg total) by mouth 3 (three) times daily as needed for Cough. 30 capsule 0    cetirizine (ZYRTEC) 5 MG chewable tablet Take 5 mg by mouth once daily.      duloxetine (CYMBALTA) 60 MG capsule Take 60 mg by mouth 2 (two) times daily.      DYMISTA 137-50 mcg/spray [place] nassal spray instill 1 spray into each nostril twice a day 23 g 11    eletriptan (RELPAX) 40 MG tablet Take 1 tablet (40 mg total) by mouth as needed. 12 tablet 2    estradiol (ESTRACE) 2 MG tablet Take 2 mg by mouth once daily.      hydroCHLOROthiazide (HYDRODIURIL) 12.5 MG Tab Take 1 tablet (12.5 mg total) by mouth once daily. 30 tablet 11    losartan-hydrochlorothiazide 100-12.5 mg (HYZAAR) 100-12.5 mg Tab Take 1 tablet by mouth once daily. 90 tablet 3    LYRICA 150 mg capsule take 1 capsule by mouth three times a day 90  capsule 5    mesalamine (PENTASA) 250 mg CpSR Take 4 capsules (1,000 mg total) by mouth 4 (four) times daily. 1440 capsule 3    montelukast (SINGULAIR) 10 mg tablet take 1 tablet by mouth every evening 90 tablet 3    nortriptyline (PAMELOR) 25 MG capsule Take 2 capsules (50 mg total) by mouth once daily. 180 capsule 2    pantoprazole (PROTONIX) 40 MG tablet Take 1 tablet (40 mg total) by mouth once daily. 90 tablet 3    propranolol (INDERAL) 40 MG tablet take 1 tablet by mouth three times a day 270 tablet 3    rizatriptan (MAXALT) 10 MG tablet take 1 tablet by mouth if needed for migraines MAX DOSE 2 TABLETS IN 24 HOURS 10 tablet 11    simvastatin (ZOCOR) 20 MG tablet take 1 tablet by mouth every evening 90 tablet 3    zolpidem (AMBIEN) 5 MG Tab Take 1 tablet (5 mg total) by mouth nightly as needed. 30 tablet 5    azithromycin (ZITHROMAX Z-PURA) 250 MG tablet Take as directed, 2 tab on Day 1, 1 tab each additional day 2-4 6 tablet 0    BREO ELLIPTA 100-25 mcg/dose diskus inhaler Inhale 1 puff into the lungs once daily. 180 each 3    butalbital-acetaminophen-caffeine -40 mg (FIORICET, ESGIC) -40 mg per tablet take 1 tablet by mouth every 4 hours if needed for headache 30 tablet 1    hydrocodone-chlorpheniramine (TUSSIONEX) 10-8 mg/5 mL suspension Take 5 mLs by mouth every 12 (twelve) hours as needed for Cough. 115 mL 0    levalbuterol (XOPENEX) 1.25 mg/3 mL nebulizer solution Take 3 mLs (1.25 mg total) by nebulization every 4 (four) hours. Rescue 1 Box 11    predniSONE (DELTASONE) 20 MG tablet 3 tab po daily x 3 days, 2 tab po daily x 3 days, 1 tab po daily x 3 days, then 1/2 tab po daily x 3 days 20 tablet 0     No current facility-administered medications for this visit.        Review of Systems   Constitutional: Negative for activity change, appetite change, chills, fever, unexpected weight change and weight loss.   HENT: Positive for congestion and sinus pressure. Negative for postnasal  "drip, sore throat, trouble swallowing and voice change.    Eyes: Negative for photophobia and visual disturbance.   Respiratory: Positive for cough and shortness of breath. Negative for apnea, hemoptysis, choking and wheezing.    Cardiovascular: Negative for chest pain, palpitations and leg swelling.   Gastrointestinal: Negative for abdominal distention, abdominal pain and heartburn.   Endocrine: Negative for cold intolerance and heat intolerance.   Genitourinary: Negative for difficulty urinating, dyspareunia, menstrual problem and pelvic pain.   Musculoskeletal: Negative for arthralgias, back pain and myalgias.   Skin: Negative for rash and wound.   Allergic/Immunologic: Negative for immunocompromised state.   Neurological: Positive for headaches. Negative for dizziness, syncope and weakness.   Hematological: Negative for adenopathy. Does not bruise/bleed easily.   Psychiatric/Behavioral: Negative for sleep disturbance and suicidal ideas.       Objective:   BP (!) 142/90   Pulse 60   Temp 97.1 °F (36.2 °C) (Tympanic)   Ht 5' 7" (1.702 m)   Wt 90.8 kg (200 lb 2.8 oz)   SpO2 98%   BMI 31.35 kg/m²      Physical Exam   Constitutional: She is oriented to person, place, and time. She appears well-developed and well-nourished. No distress.   HENT:   Head: Normocephalic and atraumatic.   Right Ear: Hearing, tympanic membrane, external ear and ear canal normal.   Left Ear: Hearing, tympanic membrane, external ear and ear canal normal.   Nose: Nose normal.   Mouth/Throat: Oropharynx is clear and moist. No oropharyngeal exudate.   Hoarseness    Eyes: Conjunctivae and EOM are normal. Pupils are equal, round, and reactive to light.   Neck: Normal range of motion. No thyromegaly present.   Cardiovascular: Normal rate, regular rhythm, normal heart sounds and intact distal pulses.   Pulmonary/Chest: Effort normal and breath sounds normal. She has no wheezes.   Abdominal: Soft. Bowel sounds are normal.   Musculoskeletal: " Normal range of motion.   Neurological: She is alert and oriented to person, place, and time. She has normal reflexes.   Skin: Skin is warm.   Psychiatric: She has a normal mood and affect. Her behavior is normal. Judgment and thought content normal.   Vitals reviewed.        Lab Results   Component Value Date    WBC 7.65 03/09/2018    HGB 12.4 03/09/2018    HCT 39.2 03/09/2018     03/09/2018    CHOL 155 11/14/2017    TRIG 104 11/14/2017    HDL 59 11/14/2017    ALT 62 (H) 03/09/2018    AST 40 03/09/2018     (L) 04/16/2018    K 4.1 04/16/2018    CL 98 04/16/2018    CREATININE 0.9 04/16/2018    BUN 9 04/16/2018    CO2 25 04/16/2018    TSH 0.863 11/14/2017    INR 1.0 11/20/2014    HGBA1C 5.2 11/14/2017       Assessment:       1. Cough    2. Bronchitis    3. Wheezing    4. Long-term use of immunosuppressant medication        Plan:   Cough  -     POCT Influenza A/B    Bronchitis  -     POCT Influenza A/B    Wheezing    Long-term use of immunosuppressant medication    Other orders  -     triamcinolone acetonide injection 60 mg; Inject 1.5 mLs (60 mg total) into the muscle one time.      No wheezing heard at this time   Cont nebs and will get steroid inj today   Negative flu   Cont abx and follow up if no improvement   No Follow-up on file.

## 2018-08-30 ENCOUNTER — OFFICE VISIT (OUTPATIENT)
Dept: INTERNAL MEDICINE | Facility: CLINIC | Age: 53
End: 2018-08-30
Payer: COMMERCIAL

## 2018-08-30 ENCOUNTER — HOSPITAL ENCOUNTER (OUTPATIENT)
Dept: RADIOLOGY | Facility: HOSPITAL | Age: 53
Discharge: HOME | End: 2018-08-30
Attending: FAMILY MEDICINE
Payer: COMMERCIAL

## 2018-08-30 ENCOUNTER — TELEPHONE (OUTPATIENT)
Dept: INTERNAL MEDICINE | Facility: CLINIC | Age: 53
End: 2018-08-30

## 2018-08-30 VITALS
DIASTOLIC BLOOD PRESSURE: 80 MMHG | OXYGEN SATURATION: 98 % | HEIGHT: 67 IN | TEMPERATURE: 98 F | SYSTOLIC BLOOD PRESSURE: 108 MMHG | BODY MASS INDEX: 30.97 KG/M2 | HEART RATE: 64 BPM | WEIGHT: 197.31 LBS

## 2018-08-30 DIAGNOSIS — R05.9 COUGH: ICD-10-CM

## 2018-08-30 DIAGNOSIS — K50.018 CROHN'S DISEASE OF SMALL INTESTINE WITH OTHER COMPLICATION: ICD-10-CM

## 2018-08-30 DIAGNOSIS — J45.909 ASTHMA, UNSPECIFIED ASTHMA SEVERITY, UNSPECIFIED WHETHER COMPLICATED, UNSPECIFIED WHETHER PERSISTENT: ICD-10-CM

## 2018-08-30 DIAGNOSIS — J45.901 SEVERE ASTHMA WITH EXACERBATION, UNSPECIFIED WHETHER PERSISTENT: Primary | ICD-10-CM

## 2018-08-30 PROCEDURE — 99214 OFFICE O/P EST MOD 30 MIN: CPT | Mod: 25,S$GLB,, | Performed by: FAMILY MEDICINE

## 2018-08-30 PROCEDURE — 71046 X-RAY EXAM CHEST 2 VIEWS: CPT | Mod: TC,FY,PO

## 2018-08-30 PROCEDURE — 3079F DIAST BP 80-89 MM HG: CPT | Mod: CPTII,S$GLB,, | Performed by: FAMILY MEDICINE

## 2018-08-30 PROCEDURE — 99999 PR PBB SHADOW E&M-EST. PATIENT-LVL V: CPT | Mod: PBBFAC,,, | Performed by: FAMILY MEDICINE

## 2018-08-30 PROCEDURE — 96372 THER/PROPH/DIAG INJ SC/IM: CPT | Mod: S$GLB,,, | Performed by: FAMILY MEDICINE

## 2018-08-30 PROCEDURE — 71046 X-RAY EXAM CHEST 2 VIEWS: CPT | Mod: 26,,, | Performed by: RADIOLOGY

## 2018-08-30 PROCEDURE — 94640 AIRWAY INHALATION TREATMENT: CPT | Mod: 59,S$GLB,, | Performed by: FAMILY MEDICINE

## 2018-08-30 PROCEDURE — 3074F SYST BP LT 130 MM HG: CPT | Mod: CPTII,S$GLB,, | Performed by: FAMILY MEDICINE

## 2018-08-30 PROCEDURE — 3008F BODY MASS INDEX DOCD: CPT | Mod: CPTII,S$GLB,, | Performed by: FAMILY MEDICINE

## 2018-08-30 RX ORDER — BETAMETHASONE SODIUM PHOSPHATE AND BETAMETHASONE ACETATE 3; 3 MG/ML; MG/ML
12 INJECTION, SUSPENSION INTRA-ARTICULAR; INTRALESIONAL; INTRAMUSCULAR; SOFT TISSUE ONCE
Status: COMPLETED | OUTPATIENT
Start: 2018-08-30 | End: 2018-08-30

## 2018-08-30 RX ORDER — AZITHROMYCIN 250 MG/1
TABLET, FILM COATED ORAL
Qty: 6 TABLET | Refills: 0 | Status: SHIPPED | OUTPATIENT
Start: 2018-08-30 | End: 2019-01-31

## 2018-08-30 RX ORDER — LEVALBUTEROL INHALATION SOLUTION 1.25 MG/3ML
1.25 SOLUTION RESPIRATORY (INHALATION)
Status: COMPLETED | OUTPATIENT
Start: 2018-08-30 | End: 2018-08-30

## 2018-08-30 RX ORDER — PREDNISONE 20 MG/1
TABLET ORAL
Qty: 20 TABLET | Refills: 0 | Status: SHIPPED | OUTPATIENT
Start: 2018-08-30 | End: 2018-12-14

## 2018-08-30 RX ORDER — HYDROCODONE POLISTIREX AND CHLORPHENIRAMINE POLISTIREX 10; 8 MG/5ML; MG/5ML
5 SUSPENSION, EXTENDED RELEASE ORAL EVERY 12 HOURS PRN
Qty: 115 ML | Refills: 0 | Status: SHIPPED | OUTPATIENT
Start: 2018-08-30 | End: 2019-03-13 | Stop reason: ALTCHOICE

## 2018-08-30 RX ORDER — LEVALBUTEROL INHALATION SOLUTION 1.25 MG/3ML
1 SOLUTION RESPIRATORY (INHALATION) EVERY 4 HOURS
Qty: 1 BOX | Refills: 11 | Status: SHIPPED | OUTPATIENT
Start: 2018-08-30 | End: 2019-03-13 | Stop reason: SDUPTHER

## 2018-08-30 RX ADMIN — LEVALBUTEROL INHALATION SOLUTION 1.25 MG: 1.25 SOLUTION RESPIRATORY (INHALATION) at 11:08

## 2018-08-30 RX ADMIN — BETAMETHASONE SODIUM PHOSPHATE AND BETAMETHASONE ACETATE 12 MG: 3; 3 INJECTION, SUSPENSION INTRA-ARTICULAR; INTRALESIONAL; INTRAMUSCULAR; SOFT TISSUE at 11:08

## 2018-08-30 NOTE — PROGRESS NOTES
Subjective:      Patient ID: Jaylin Murguia is a 53 y.o. female.    Chief Complaint: Cough    Disclaimer:  This note is prepared using voice recognition software and as such is likely to have errors and has not been proof read. Please contact me for questions.     Pt here for 2 day followup of asthma exacerbation. Still having persistent Cough and feeling bad. Started  2 weeks ago. Slightly better this am, and only has 2 days of medication left for doxycycline 100mg. Has known asthma and on breo, xopenex, and singulair. Also on flonase. Was treated initially with tessalon pearles and doxycycline. Then was getting steroid shot. Not much improvement.  Still wheezing and coughing a lot.  Keeping her up at night even sleeping upright with 6 pillows.  Has Xopenex nebulizers and has been using them every 6 hr but can't seem to find much benefit.  Also has an albuterol inhaler but not benefitting her either.  Also has Crohn's disease for which she is on immunosuppressant.  Did return back to teach school for about 12 weeks and this is when she noticed that it started to come back on when she went back to school.  Just very fatigued and tired. Is also taking mucinex.     Is supposed to be scheduled with Dr. parra to have another bladder surgery for bladder tacking due to incontinence issues but with the coughing is been extremely bad to the point that she feels like she is having to wear an adult diaper.        Lab Results   Component Value Date    WBC 7.65 03/09/2018    HGB 12.4 03/09/2018    HCT 39.2 03/09/2018     03/09/2018    CHOL 155 11/14/2017    TRIG 104 11/14/2017    HDL 59 11/14/2017    ALT 62 (H) 03/09/2018    AST 40 03/09/2018     (L) 04/16/2018    K 4.1 04/16/2018    CL 98 04/16/2018    CREATININE 0.9 04/16/2018    BUN 9 04/16/2018    CO2 25 04/16/2018    TSH 0.863 11/14/2017    INR 1.0 11/20/2014    HGBA1C 5.2 11/14/2017       Review of Systems   Constitutional: Positive for activity change,  "appetite change, chills and fatigue. Negative for fever.   HENT: Positive for congestion, ear pain, postnasal drip, rhinorrhea and sore throat. Negative for ear discharge, sinus pressure, trouble swallowing and voice change.    Respiratory: Positive for cough, chest tightness and wheezing. Negative for shortness of breath.    Cardiovascular: Negative for chest pain and palpitations.   Genitourinary: Positive for frequency and urgency.   Neurological: Negative for headaches.   Psychiatric/Behavioral: Positive for sleep disturbance.     Objective:     Vitals:    08/30/18 1055   BP: 108/80   Pulse: 64   Temp: 97.6 °F (36.4 °C)   SpO2: 98%   Weight: 89.5 kg (197 lb 5 oz)   Height: 5' 7" (1.702 m)     Physical Exam   Constitutional: She appears well-developed and well-nourished.   HENT:   Head: Normocephalic and atraumatic.   Right Ear: Tympanic membrane normal.   Left Ear: Tympanic membrane normal.   Nose: Mucosal edema and rhinorrhea present.   Mouth/Throat: Posterior oropharyngeal erythema present.   Eyes: Conjunctivae and EOM are normal.   Neck: Normal range of motion. Neck supple.   Cardiovascular: Normal rate and regular rhythm.   Pulmonary/Chest: Effort normal. She has wheezes.   Vitals reviewed.    Assessment:     1. Cough    2. Severe asthma with exacerbation, unspecified whether persistent    3. Asthma, unspecified asthma severity, unspecified whether complicated, unspecified whether persistent      Plan:   Jaylin was seen today for cough.    Diagnoses and all orders for this visit:    Cough  -     X-Ray Chest PA And Lateral; Future    Severe asthma with exacerbation, unspecified whether persistent-not improving with doxycycline nor her Xopenex nor after the steroid injection.  At this time she is still significantly wheezing.  Will give her Celestone 12 mg IM today and start prednisone taper starting with 60 mg tapering every 3 days.  Also will give her hydrocodone cough syrup to use at night to help with sleep " as well.  Needs to start using Xopenex every 4 hr to break the cycle continue with her Singulair and Breo and Flonase.  Will change her over from doxy to azithromycin as well since she has not had improvement as well.  Chest x-ray does show mucous plugging as well as bronchial areas suggestive of bronchiolitis.  Advised her to follow up if not improving in the next 48 hr.  She was in agreement the plan.    Asthma, unspecified asthma severity, unspecified whether complicated, unspecified whether persistent  -     levalbuterol (XOPENEX) 1.25 mg/3 mL nebulizer solution; Take 3 mLs (1.25 mg total) by nebulization every 4 (four) hours. Rescue  Also in the setting of her Crohn's disease she does need boost of steroids.  Other orders  -     betamethasone acetate-betamethasone sodium phosphate injection 12 mg; Inject 2 mLs (12 mg total) into the muscle once.  -     predniSONE (DELTASONE) 20 MG tablet; 3 tab po daily x 3 days, 2 tab po daily x 3 days, 1 tab po daily x 3 days, then 1/2 tab po daily x 3 days  -     hydrocodone-chlorpheniramine (TUSSIONEX) 10-8 mg/5 mL suspension; Take 5 mLs by mouth every 12 (twelve) hours as needed for Cough.  -     azithromycin (ZITHROMAX Z-PURA) 250 MG tablet; Take as directed, 2 tab on Day 1, 1 tab each additional day 2-4  -     levalbuterol nebulizer solution 1.25 mg; Take 3 mLs (1.25 mg total) by nebulization one time.          Time spent: 25 minutes in face to face discussion concerning diagnosis, prognosis, review of lab and test results, benefits of treatment as well as management of disease, counseling of patient and coordination of care between various health care providers . Greater than half the time spent was used for coordination of care and counseling of patient.     Follow-up if symptoms worsen or fail to improve.

## 2018-09-17 RX ORDER — LOSARTAN POTASSIUM AND HYDROCHLOROTHIAZIDE 12.5; 1 MG/1; MG/1
1 TABLET ORAL DAILY
Qty: 90 TABLET | Refills: 0 | Status: SHIPPED | OUTPATIENT
Start: 2018-09-17 | End: 2018-12-10 | Stop reason: SDUPTHER

## 2018-10-15 RX ORDER — ZOLPIDEM TARTRATE 5 MG/1
TABLET ORAL
Qty: 30 TABLET | Refills: 5 | Status: SHIPPED | OUTPATIENT
Start: 2018-10-15 | End: 2019-04-12 | Stop reason: SDUPTHER

## 2018-10-16 ENCOUNTER — PATIENT MESSAGE (OUTPATIENT)
Dept: INTERNAL MEDICINE | Facility: CLINIC | Age: 53
End: 2018-10-16

## 2018-10-16 DIAGNOSIS — M79.7 FIBROMYALGIA: ICD-10-CM

## 2018-10-17 RX ORDER — PREGABALIN 150 MG/1
150 CAPSULE ORAL 3 TIMES DAILY
Qty: 90 CAPSULE | Refills: 5 | Status: SHIPPED | OUTPATIENT
Start: 2018-10-17 | End: 2019-05-27 | Stop reason: SDUPTHER

## 2018-12-10 RX ORDER — LOSARTAN POTASSIUM AND HYDROCHLOROTHIAZIDE 12.5; 1 MG/1; MG/1
1 TABLET ORAL DAILY
Qty: 90 TABLET | Refills: 0 | Status: SHIPPED | OUTPATIENT
Start: 2018-12-10 | End: 2019-03-18

## 2018-12-13 ENCOUNTER — PATIENT MESSAGE (OUTPATIENT)
Dept: GASTROENTEROLOGY | Facility: CLINIC | Age: 53
End: 2018-12-13

## 2018-12-14 ENCOUNTER — LAB VISIT (OUTPATIENT)
Dept: LAB | Facility: HOSPITAL | Age: 53
End: 2018-12-14
Attending: PHYSICIAN ASSISTANT
Payer: COMMERCIAL

## 2018-12-14 ENCOUNTER — OFFICE VISIT (OUTPATIENT)
Dept: GASTROENTEROLOGY | Facility: CLINIC | Age: 53
End: 2018-12-14
Payer: COMMERCIAL

## 2018-12-14 VITALS
SYSTOLIC BLOOD PRESSURE: 126 MMHG | WEIGHT: 204.38 LBS | DIASTOLIC BLOOD PRESSURE: 76 MMHG | HEIGHT: 67 IN | BODY MASS INDEX: 32.08 KG/M2 | HEART RATE: 78 BPM

## 2018-12-14 DIAGNOSIS — G43.109 MIGRAINE WITH AURA AND WITHOUT STATUS MIGRAINOSUS, NOT INTRACTABLE: ICD-10-CM

## 2018-12-14 DIAGNOSIS — R60.0 BILATERAL EDEMA OF LOWER EXTREMITY: ICD-10-CM

## 2018-12-14 DIAGNOSIS — K50.011 CROHN'S DISEASE OF SMALL INTESTINE WITH RECTAL BLEEDING: Primary | ICD-10-CM

## 2018-12-14 DIAGNOSIS — I10 ESSENTIAL HYPERTENSION: ICD-10-CM

## 2018-12-14 DIAGNOSIS — E87.1 HYPONATREMIA: ICD-10-CM

## 2018-12-14 DIAGNOSIS — K50.011 CROHN'S DISEASE OF SMALL INTESTINE WITH RECTAL BLEEDING: ICD-10-CM

## 2018-12-14 LAB
ALBUMIN SERPL BCP-MCNC: 4 G/DL
ALBUMIN SERPL BCP-MCNC: 4 G/DL
ALP SERPL-CCNC: 95 U/L
ALT SERPL W/O P-5'-P-CCNC: 34 U/L
ANION GAP SERPL CALC-SCNC: 10 MMOL/L
ANION GAP SERPL CALC-SCNC: 10 MMOL/L
AST SERPL-CCNC: 32 U/L
BASOPHILS # BLD AUTO: 0.06 K/UL
BASOPHILS NFR BLD: 0.9 %
BILIRUB SERPL-MCNC: 0.5 MG/DL
BUN SERPL-MCNC: 8 MG/DL
BUN SERPL-MCNC: 8 MG/DL
CALCIUM SERPL-MCNC: 9.4 MG/DL
CALCIUM SERPL-MCNC: 9.4 MG/DL
CHLORIDE SERPL-SCNC: 98 MMOL/L
CHLORIDE SERPL-SCNC: 98 MMOL/L
CO2 SERPL-SCNC: 28 MMOL/L
CO2 SERPL-SCNC: 28 MMOL/L
CREAT SERPL-MCNC: 1 MG/DL
CREAT SERPL-MCNC: 1 MG/DL
CRP SERPL-MCNC: 7.9 MG/L
DIFFERENTIAL METHOD: ABNORMAL
EOSINOPHIL # BLD AUTO: 0.1 K/UL
EOSINOPHIL NFR BLD: 2.1 %
ERYTHROCYTE [DISTWIDTH] IN BLOOD BY AUTOMATED COUNT: 12.6 %
EST. GFR  (AFRICAN AMERICAN): >60 ML/MIN/1.73 M^2
EST. GFR  (AFRICAN AMERICAN): >60 ML/MIN/1.73 M^2
EST. GFR  (NON AFRICAN AMERICAN): >60 ML/MIN/1.73 M^2
EST. GFR  (NON AFRICAN AMERICAN): >60 ML/MIN/1.73 M^2
GLUCOSE SERPL-MCNC: 98 MG/DL
GLUCOSE SERPL-MCNC: 98 MG/DL
HCT VFR BLD AUTO: 41.3 %
HGB BLD-MCNC: 13.1 G/DL
IMM GRANULOCYTES # BLD AUTO: 0.01 K/UL
IMM GRANULOCYTES NFR BLD AUTO: 0.2 %
LYMPHOCYTES # BLD AUTO: 2 K/UL
LYMPHOCYTES NFR BLD: 30.7 %
MCH RBC QN AUTO: 30.5 PG
MCHC RBC AUTO-ENTMCNC: 31.7 G/DL
MCV RBC AUTO: 96 FL
MONOCYTES # BLD AUTO: 0.7 K/UL
MONOCYTES NFR BLD: 10.8 %
NEUTROPHILS # BLD AUTO: 3.7 K/UL
NEUTROPHILS NFR BLD: 55.3 %
NRBC BLD-RTO: 0 /100 WBC
PHOSPHATE SERPL-MCNC: 2.9 MG/DL
PLATELET # BLD AUTO: 381 K/UL
PMV BLD AUTO: 9.9 FL
POTASSIUM SERPL-SCNC: 4 MMOL/L
POTASSIUM SERPL-SCNC: 4 MMOL/L
PROT SERPL-MCNC: 7.6 G/DL
RBC # BLD AUTO: 4.3 M/UL
SODIUM SERPL-SCNC: 136 MMOL/L
SODIUM SERPL-SCNC: 136 MMOL/L
WBC # BLD AUTO: 6.65 K/UL

## 2018-12-14 PROCEDURE — 99213 OFFICE O/P EST LOW 20 MIN: CPT | Mod: S$GLB,,, | Performed by: PHYSICIAN ASSISTANT

## 2018-12-14 PROCEDURE — 99999 PR PBB SHADOW E&M-EST. PATIENT-LVL IV: CPT | Mod: PBBFAC,,, | Performed by: PHYSICIAN ASSISTANT

## 2018-12-14 PROCEDURE — 85025 COMPLETE CBC W/AUTO DIFF WBC: CPT

## 2018-12-14 PROCEDURE — 3008F BODY MASS INDEX DOCD: CPT | Mod: CPTII,S$GLB,, | Performed by: PHYSICIAN ASSISTANT

## 2018-12-14 PROCEDURE — 3078F DIAST BP <80 MM HG: CPT | Mod: CPTII,S$GLB,, | Performed by: PHYSICIAN ASSISTANT

## 2018-12-14 PROCEDURE — 84100 ASSAY OF PHOSPHORUS: CPT

## 2018-12-14 PROCEDURE — 80053 COMPREHEN METABOLIC PANEL: CPT

## 2018-12-14 PROCEDURE — 86140 C-REACTIVE PROTEIN: CPT

## 2018-12-14 PROCEDURE — 36415 COLL VENOUS BLD VENIPUNCTURE: CPT

## 2018-12-14 PROCEDURE — 3074F SYST BP LT 130 MM HG: CPT | Mod: CPTII,S$GLB,, | Performed by: PHYSICIAN ASSISTANT

## 2018-12-14 RX ORDER — PREDNISONE 10 MG/1
10 TABLET ORAL DAILY
Qty: 50 TABLET | Refills: 0 | Status: SHIPPED | OUTPATIENT
Start: 2018-12-14 | End: 2019-04-01 | Stop reason: ALTCHOICE

## 2018-12-14 NOTE — PROGRESS NOTES
Subjective:      Patient ID: Jaylin Murguia is a 53 y.o. female.    Chief Complaint: Crohn's Disease    HPI  The patient has a history of Crohn's disease. She was previously seen by other providers in our department but presents for acute symptoms. She is currently taking Pentasa. She reports worsening bleeding, mucous and RLQ pain over the last week. She denies nausea, vomiting or change in appetite. Are bowel habits are described as alternating. Two days ago she had 7-8 loose stools and yesterday one and now feels constipated.   She drinks 5 cups of half-caff coffee a day.   She is not eating dairy.   Her last colonoscopy was 03/2018. She had no signs of Crohn's and no stricture. She was on Humira at that time.     Previous treatments have included:  Pentasa - current treatment  Remicade - helped for awhile but the stopped.  Humira - helped for awhile but the stopped. She also had frequent infections and antibodies. This was stopped around August 2018.   Steroid tapers - last one about a year ago.   ?Other oral meds in the past she doesn't remember.     IBD Activity:  Fever: No  Abdominal pain: Yes  Blood in the stool: Yes  Weight loss: No  Joint pain: Yes    Lab Results   Component Value Date    CRP 4.5 03/09/2018     No results found for: CALPROTECTIN  No results found for: CBC  No components found for: CMP    Risk Assessment:  Age at initial diagnosis: in her 30s   Anatomic involvement: the terminal ileum  Perianal and/or severe rectal disease: No  Superficial ulcers or deep ulcers: Unknown  Prior surgical resection: No  Stricture and/or penetrating behavior: No    Review of Systems  As per HPI.   She reports generalized swelling that gets worse as the day goes on. She says she has addressed with Dr. Hu.     Objective:     Physical Exam   Constitutional: She is oriented to person, place, and time. She appears well-developed and well-nourished. No distress.   HENT:   Head: Normocephalic and atraumatic.    Eyes: EOM are normal.   Cardiovascular: Normal rate and regular rhythm.   Pulmonary/Chest: Effort normal and breath sounds normal. No respiratory distress. She has no wheezes.   Abdominal: Soft. Bowel sounds are normal. She exhibits no distension. There is tenderness (with deep palpation) in the right lower quadrant.   Neurological: She is alert and oriented to person, place, and time. No cranial nerve deficit.   Skin: She is not diaphoretic.   Psychiatric: Her behavior is normal.       Assessment:     1. Crohn's disease of small intestine with rectal bleeding        Plan:        Orders Placed This Encounter   Procedures    CBC auto differential    Comprehensive metabolic panel    Calprotectin    C-reactive protein     Medications Ordered This Encounter   Medications    predniSONE (DELTASONE) 10 MG tablet     Sig: Take 1 tablet (10 mg total) by mouth once daily. Take 40 mg for five days then decrease by 10 mg every 5 days until gone.     Dispense:  50 tablet     Refill:  0     Will discuss treatment options with GI team.     Thank you for the opportunity to participate in the care of this patient.   Brian Pearce PA-C.

## 2018-12-17 ENCOUNTER — LAB VISIT (OUTPATIENT)
Dept: LAB | Facility: HOSPITAL | Age: 53
End: 2018-12-17
Attending: PHYSICIAN ASSISTANT
Payer: COMMERCIAL

## 2018-12-17 DIAGNOSIS — K50.011 CROHN'S DISEASE OF SMALL INTESTINE WITH RECTAL BLEEDING: ICD-10-CM

## 2018-12-17 PROCEDURE — 83993 ASSAY FOR CALPROTECTIN FECAL: CPT

## 2018-12-22 LAB — CALPROTECTIN STL-MCNT: 55.5 MCG/G

## 2018-12-27 ENCOUNTER — PATIENT MESSAGE (OUTPATIENT)
Dept: INTERNAL MEDICINE | Facility: CLINIC | Age: 53
End: 2018-12-27

## 2019-01-04 ENCOUNTER — TELEPHONE (OUTPATIENT)
Dept: PULMONOLOGY | Facility: CLINIC | Age: 54
End: 2019-01-04

## 2019-01-07 RX ORDER — RIZATRIPTAN BENZOATE 10 MG/1
TABLET ORAL
Qty: 10 TABLET | Refills: 3 | Status: SHIPPED | OUTPATIENT
Start: 2019-01-07 | End: 2019-08-26 | Stop reason: SDUPTHER

## 2019-01-15 RX ORDER — PREDNISONE 10 MG/1
TABLET ORAL
Qty: 50 TABLET | Refills: 0 | OUTPATIENT
Start: 2019-01-15

## 2019-01-23 RX ORDER — NORTRIPTYLINE HYDROCHLORIDE 25 MG/1
CAPSULE ORAL
Qty: 60 CAPSULE | Refills: 1 | Status: SHIPPED | OUTPATIENT
Start: 2019-01-23 | End: 2019-03-04 | Stop reason: SDUPTHER

## 2019-01-29 ENCOUNTER — OFFICE VISIT (OUTPATIENT)
Dept: SLEEP MEDICINE | Facility: CLINIC | Age: 54
End: 2019-01-29
Payer: COMMERCIAL

## 2019-01-29 VITALS
HEIGHT: 67 IN | BODY MASS INDEX: 32.32 KG/M2 | HEART RATE: 61 BPM | OXYGEN SATURATION: 98 % | DIASTOLIC BLOOD PRESSURE: 84 MMHG | RESPIRATION RATE: 18 BRPM | SYSTOLIC BLOOD PRESSURE: 126 MMHG | WEIGHT: 205.94 LBS

## 2019-01-29 DIAGNOSIS — G47.33 OSA (OBSTRUCTIVE SLEEP APNEA): Primary | ICD-10-CM

## 2019-01-29 DIAGNOSIS — E66.9 OBESITY, CLASS I, BMI 30-34.9: ICD-10-CM

## 2019-01-29 PROCEDURE — 99999 PR PBB SHADOW E&M-EST. PATIENT-LVL V: CPT | Mod: PBBFAC,,, | Performed by: NURSE PRACTITIONER

## 2019-01-29 PROCEDURE — 3079F PR MOST RECENT DIASTOLIC BLOOD PRESSURE 80-89 MM HG: ICD-10-PCS | Mod: CPTII,S$GLB,, | Performed by: NURSE PRACTITIONER

## 2019-01-29 PROCEDURE — 3074F PR MOST RECENT SYSTOLIC BLOOD PRESSURE < 130 MM HG: ICD-10-PCS | Mod: CPTII,S$GLB,, | Performed by: NURSE PRACTITIONER

## 2019-01-29 PROCEDURE — 3079F DIAST BP 80-89 MM HG: CPT | Mod: CPTII,S$GLB,, | Performed by: NURSE PRACTITIONER

## 2019-01-29 PROCEDURE — 99999 PR PBB SHADOW E&M-EST. PATIENT-LVL V: ICD-10-PCS | Mod: PBBFAC,,, | Performed by: NURSE PRACTITIONER

## 2019-01-29 PROCEDURE — 3008F BODY MASS INDEX DOCD: CPT | Mod: CPTII,S$GLB,, | Performed by: NURSE PRACTITIONER

## 2019-01-29 PROCEDURE — 99213 OFFICE O/P EST LOW 20 MIN: CPT | Mod: S$GLB,,, | Performed by: NURSE PRACTITIONER

## 2019-01-29 PROCEDURE — 3074F SYST BP LT 130 MM HG: CPT | Mod: CPTII,S$GLB,, | Performed by: NURSE PRACTITIONER

## 2019-01-29 PROCEDURE — 3008F PR BODY MASS INDEX (BMI) DOCUMENTED: ICD-10-PCS | Mod: CPTII,S$GLB,, | Performed by: NURSE PRACTITIONER

## 2019-01-29 PROCEDURE — 99213 PR OFFICE/OUTPT VISIT, EST, LEVL III, 20-29 MIN: ICD-10-PCS | Mod: S$GLB,,, | Performed by: NURSE PRACTITIONER

## 2019-01-29 NOTE — PROGRESS NOTES
"Subjective:      Patient ID: Jaylin Murguia is a 53 y.o. female.    Chief Complaint: Sleep Apnea    HPI  Presents to office for review of AutoPAP therapy. Patient states improved symptoms with use of AutoPAP. Sleeping more soundly. Waking up feeling more refreshed. Improved daytime sleepiness. Patient states she is benefiting from use of the AutoPAP.     Patient Active Problem List   Diagnosis    Fibromyalgia    Migraine    Crohn's disease of small intestine    Sciatica    Allergic rhinitis    Diarrhea    Essential (primary) hypertension    Insomnia due to medical condition    Hormone replacement therapy (postmenopausal)    Long-term use of immunosuppressant medication    Crohn's disease    TAMIKA (obstructive sleep apnea)       /84   Pulse 61   Resp 18   Ht 5' 7" (1.702 m)   Wt 93.4 kg (205 lb 14.6 oz)   SpO2 98%   BMI 32.25 kg/m²   Body mass index is 32.25 kg/m².    Review of Systems   HENT: Positive for congestion.    All other systems reviewed and are negative.    Objective:      Physical Exam   Constitutional: She is oriented to person, place, and time. She appears well-developed and well-nourished.   Obese   HENT:   Head: Normocephalic and atraumatic.   Neck: Normal range of motion. Neck supple.   Cardiovascular: Normal rate and regular rhythm.   Pulmonary/Chest: Effort normal and breath sounds normal.   Abdominal: Soft. Bowel sounds are normal.   Musculoskeletal: Normal range of motion. She exhibits no edema.   Neurological: She is alert and oriented to person, place, and time.   Skin: Skin is warm and dry.   Psychiatric: She has a normal mood and affect.     Personal Diagnostic Review    Compliance Information  Jaylin Murguia  Device: DreamStation Auto CPAP (500X110) ([id number])  12/29/2018 - 1/27/2019  YOB: 1965  Mask:  Compliance Summary  12/29/2018 - 1/27/2019 (30 days)  Days with Device Usage 27 days  Days without Device Usage 3 days  Percent Days " with Device Usage 90.0%  Cumulative Usage 7 days 18 hrs. 41 mins. 52 secs.  Maximum Usage (1 Day) 10 hrs. 8 mins. 40 secs.  Average Usage (All Days) 6 hrs. 13 mins. 23 secs.  Average Usage (Days Used) 6 hrs. 54 mins. 53 secs.  Minimum Usage (1 Day) 40 mins. 38 secs.  Percent of Days with Usage >= 4 Hours 80.0%  Percent of Days with Usage < 4 Hours 20.0%  Date Range  Total Blower Time 7 days 23 hrs. 9 mins. 8 secs.  Average AHI 2.1  Auto-CPAP Summary  Auto-CPAP Mean Pressure 4.8 cmH2O  Auto-CPAP Peak Average Pressure 5.9 cmH2O  Average Device Pressure <= 90% of Time 6.1 cmH2O  Average Time in Large Leak Per Day 1 mins. 24 secs.    Assessment:       1. TAMIKA (obstructive sleep apnea)    2. Obesity, Class I, BMI 30-34.9        Outpatient Encounter Medications as of 1/29/2019   Medication Sig Dispense Refill    azithromycin (ZITHROMAX Z-PURA) 250 MG tablet Take as directed, 2 tab on Day 1, 1 tab each additional day 2-4 6 tablet 0    benzonatate (TESSALON) 200 MG capsule Take 1 capsule (200 mg total) by mouth 3 (three) times daily as needed for Cough. 30 capsule 0    BREO ELLIPTA 100-25 mcg/dose diskus inhaler Inhale 1 puff into the lungs once daily. 180 each 3    butalbital-acetaminophen-caffeine -40 mg (FIORICET, ESGIC) -40 mg per tablet take 1 tablet by mouth every 4 hours if needed for headache 30 tablet 1    cetirizine (ZYRTEC) 5 MG chewable tablet Take 5 mg by mouth once daily.      duloxetine (CYMBALTA) 60 MG capsule Take 60 mg by mouth 2 (two) times daily.      DYMISTA 137-50 mcg/spray [place] nassal spray instill 1 spray into each nostril twice a day 23 g 11    eletriptan (RELPAX) 40 MG tablet Take 1 tablet (40 mg total) by mouth as needed. 12 tablet 2    estradiol (ESTRACE) 2 MG tablet Take 2 mg by mouth once daily.      hydroCHLOROthiazide (HYDRODIURIL) 12.5 MG Tab Take 1 tablet (12.5 mg total) by mouth once daily. 30 tablet 11    levalbuterol (XOPENEX) 1.25 mg/3 mL nebulizer solution Take 3  mLs (1.25 mg total) by nebulization every 4 (four) hours. Rescue 1 Box 11    losartan-hydrochlorothiazide 100-12.5 mg (HYZAAR) 100-12.5 mg Tab TAKE 1 TABLET BY MOUTH ONCE DAILY 90 tablet 0    mesalamine (PENTASA) 250 mg CpSR Take 4 capsules (1,000 mg total) by mouth 4 (four) times daily. 1440 capsule 3    montelukast (SINGULAIR) 10 mg tablet take 1 tablet by mouth every evening 90 tablet 3    nortriptyline (PAMELOR) 25 MG capsule TAKE 2 CAPSULES BY MOUTH EVERY DAY 60 capsule 1    pantoprazole (PROTONIX) 40 MG tablet Take 1 tablet (40 mg total) by mouth once daily. 90 tablet 3    pregabalin (LYRICA) 150 MG capsule Take 1 capsule (150 mg total) by mouth 3 (three) times daily. 90 capsule 5    propranolol (INDERAL) 40 MG tablet take 1 tablet by mouth three times a day 270 tablet 3    rizatriptan (MAXALT) 10 MG tablet TAKE 1 TABLET BY MOUTH IF NEEDED FOR MIGRAINES MAX 2 TAB IN 24 HOURS 10 tablet 3    simvastatin (ZOCOR) 20 MG tablet take 1 tablet by mouth every evening 90 tablet 3    zolpidem (AMBIEN) 5 MG Tab TAKE 1 TABLET BY MOUTH AT BEDTIME AS NEEDED 30 tablet 5    albuterol 90 mcg/actuation inhaler Inhale 2 puffs into the lungs every 4 to 6 hours as needed for Wheezing. 8.5 g 1    hydrocodone-chlorpheniramine (TUSSIONEX) 10-8 mg/5 mL suspension Take 5 mLs by mouth every 12 (twelve) hours as needed for Cough. 115 mL 0    predniSONE (DELTASONE) 10 MG tablet Take 1 tablet (10 mg total) by mouth once daily. Take 40 mg for five days then decrease by 10 mg every 5 days until gone. 50 tablet 0     No facility-administered encounter medications on file as of 1/29/2019.      Orders Placed This Encounter   Procedures    CPAP/BIPAP SUPPLIES     Order Specific Question:   Type of mask:     Answer:   Nasal     Order Specific Question:   Headgear?     Answer:   Yes     Order Specific Question:   Tubing?     Answer:   Yes     Order Specific Question:   Humidifier chamber?     Answer:   Yes     Order Specific Question:    Chin strap?     Answer:   Yes     Order Specific Question:   Filters?     Answer:   Yes     Order Specific Question:   Length of need (1-99 months):     Answer:   99     Plan:      Weight loss and exercise to improve overall health.  Doing well on PAP settings. Patient is compliant. Follow up in 12 months with PAP data download or call earlier if any problems.

## 2019-01-31 ENCOUNTER — OFFICE VISIT (OUTPATIENT)
Dept: NEPHROLOGY | Facility: CLINIC | Age: 54
End: 2019-01-31
Payer: COMMERCIAL

## 2019-01-31 ENCOUNTER — PATIENT MESSAGE (OUTPATIENT)
Dept: NEPHROLOGY | Facility: CLINIC | Age: 54
End: 2019-01-31

## 2019-01-31 VITALS
HEIGHT: 67 IN | BODY MASS INDEX: 31.97 KG/M2 | SYSTOLIC BLOOD PRESSURE: 132 MMHG | DIASTOLIC BLOOD PRESSURE: 84 MMHG | WEIGHT: 203.69 LBS | HEART RATE: 70 BPM

## 2019-01-31 DIAGNOSIS — G47.33 OSA (OBSTRUCTIVE SLEEP APNEA): ICD-10-CM

## 2019-01-31 DIAGNOSIS — I10 ESSENTIAL HYPERTENSION: Primary | ICD-10-CM

## 2019-01-31 DIAGNOSIS — E87.1 HYPONATREMIA: ICD-10-CM

## 2019-01-31 DIAGNOSIS — R60.0 BILATERAL EDEMA OF LOWER EXTREMITY: ICD-10-CM

## 2019-01-31 DIAGNOSIS — G43.109 MIGRAINE WITH AURA AND WITHOUT STATUS MIGRAINOSUS, NOT INTRACTABLE: ICD-10-CM

## 2019-01-31 DIAGNOSIS — M79.7 FIBROMYALGIA: ICD-10-CM

## 2019-01-31 PROCEDURE — 3075F SYST BP GE 130 - 139MM HG: CPT | Mod: CPTII,S$GLB,, | Performed by: INTERNAL MEDICINE

## 2019-01-31 PROCEDURE — 3075F PR MOST RECENT SYSTOLIC BLOOD PRESS GE 130-139MM HG: ICD-10-PCS | Mod: CPTII,S$GLB,, | Performed by: INTERNAL MEDICINE

## 2019-01-31 PROCEDURE — 99999 PR PBB SHADOW E&M-EST. PATIENT-LVL IV: CPT | Mod: PBBFAC,,, | Performed by: INTERNAL MEDICINE

## 2019-01-31 PROCEDURE — 99214 OFFICE O/P EST MOD 30 MIN: CPT | Mod: S$GLB,,, | Performed by: INTERNAL MEDICINE

## 2019-01-31 PROCEDURE — 3008F PR BODY MASS INDEX (BMI) DOCUMENTED: ICD-10-PCS | Mod: CPTII,S$GLB,, | Performed by: INTERNAL MEDICINE

## 2019-01-31 PROCEDURE — 3079F DIAST BP 80-89 MM HG: CPT | Mod: CPTII,S$GLB,, | Performed by: INTERNAL MEDICINE

## 2019-01-31 PROCEDURE — 3079F PR MOST RECENT DIASTOLIC BLOOD PRESSURE 80-89 MM HG: ICD-10-PCS | Mod: CPTII,S$GLB,, | Performed by: INTERNAL MEDICINE

## 2019-01-31 PROCEDURE — 3008F BODY MASS INDEX DOCD: CPT | Mod: CPTII,S$GLB,, | Performed by: INTERNAL MEDICINE

## 2019-01-31 PROCEDURE — 99214 PR OFFICE/OUTPT VISIT, EST, LEVL IV, 30-39 MIN: ICD-10-PCS | Mod: S$GLB,,, | Performed by: INTERNAL MEDICINE

## 2019-01-31 PROCEDURE — 99999 PR PBB SHADOW E&M-EST. PATIENT-LVL IV: ICD-10-PCS | Mod: PBBFAC,,, | Performed by: INTERNAL MEDICINE

## 2019-01-31 RX ORDER — MODAFINIL 200 MG/1
200 TABLET ORAL DAILY
Qty: 30 TABLET | Refills: 0 | Status: SHIPPED | OUTPATIENT
Start: 2019-01-31 | End: 2019-04-16 | Stop reason: SDUPTHER

## 2019-01-31 NOTE — PROGRESS NOTES
Subjective:       Patient ID: Jaylin Murguia is a 53 y.o. White female who presents for follow up evaluation of Hypertension; Edema; and Hyponatremia    Hypertension   Associated symptoms include headaches. Pertinent negatives include no chest pain, palpitations or shortness of breath.   Headache    Associated symptoms include back pain. Pertinent negatives include no coughing, dizziness, fever, nausea, numbness, rhinorrhea or vomiting.   Edema   Associated symptoms include arthralgias, headaches and myalgias. Pertinent negatives include no chest pain, chills, congestion, coughing, diaphoresis, fatigue, fever, joint swelling, nausea, numbness, rash or vomiting.          Patient is a 53 year-old female with history of fibromyalgia, Crohn's disease and essential hypertension.  Had normal sodium last year.     4/2017  Today comes in for consultation for new onset of hyponatremia which is accompanied by low levels of chloride.  No history of hypokalemia and no history of diuretic therapy. CCB stopped. Inderal, Vit D and zocor added       7/2017 starting Humira for Crohn's --dr. Dyer       April 2018 patient seen for follow-up.  Records reviewed by gastroenterology, noted rheumatology referral, noted blood pressure issues with Dr. ascencio, noted workup by pulmonary    January 2019:  Records reviewed.  s/p prednisone 10 mg for Crohn's per Gastroenterology. Flare up is better     Review of Systems   Constitutional: Negative for activity change, appetite change, chills, diaphoresis, fatigue, fever and unexpected weight change.   HENT: Negative for congestion, dental problem, drooling, postnasal drip, rhinorrhea and voice change.    Eyes: Negative for discharge.   Respiratory: Negative for apnea, cough, choking, chest tightness, shortness of breath, wheezing and stridor.    Cardiovascular: Negative for chest pain, palpitations and leg swelling.   Gastrointestinal: Negative for abdominal distention, blood in stool,  "constipation, diarrhea, nausea, rectal pain and vomiting.   Endocrine: Negative for cold intolerance, heat intolerance, polydipsia and polyuria.   Genitourinary: Negative for decreased urine volume, difficulty urinating, dysuria, enuresis, flank pain, frequency, hematuria and urgency.   Musculoskeletal: Positive for arthralgias, back pain, myalgias and neck stiffness. Negative for gait problem and joint swelling.        Severe muscle and joint pains worse in the morning with early morning stiffness   Skin: Negative for rash.   Allergic/Immunologic: Negative for food allergies and immunocompromised state.   Neurological: Positive for headaches. Negative for dizziness, tremors, syncope and numbness.        Off-and-on migraine with aura at least 2 times a month which is now improved with no attack in last 3 months on current meds    Hematological: Does not bruise/bleed easily.   Psychiatric/Behavioral: Positive for sleep disturbance. Negative for agitation, behavioral problems and self-injury. The patient is not nervous/anxious and is not hyperactive.         Severe insomnia with some help by taking Ambien or melatonin   All other systems reviewed and are negative.      Objective:   /84   Pulse 70   Ht 5' 7" (1.702 m)   Wt 92.4 kg (203 lb 11.3 oz)   BMI 31.90 kg/m²      Physical Exam   Constitutional: She is oriented to person, place, and time. No distress.   HENT:   Head: Normocephalic and atraumatic.   Nose: Nose normal.   Eyes: Conjunctivae and EOM are normal. Pupils are equal, round, and reactive to light.   Neck: Normal range of motion. No JVD present. No tracheal deviation present. No thyromegaly present.   Cardiovascular: Normal rate, regular rhythm, normal heart sounds and intact distal pulses. Exam reveals no gallop and no friction rub.   No murmur heard.  Pulmonary/Chest: Effort normal and breath sounds normal. No respiratory distress. She has no wheezes. She has no rales. She exhibits no " tenderness.   Abdominal: Soft. Bowel sounds are normal. She exhibits no distension and no mass. There is no tenderness. No hernia.   Musculoskeletal: Normal range of motion. She exhibits no edema, tenderness or deformity.   Neurological: She is alert and oriented to person, place, and time. She has normal reflexes. She displays normal reflexes. No cranial nerve deficit. She exhibits normal muscle tone. Coordination normal.   Skin: Skin is warm. She is not diaphoretic. No erythema. No pallor.   Psychiatric: She has a normal mood and affect. Her behavior is normal. Judgment and thought content normal.   Nursing note and vitals reviewed.        Lab Results   Component Value Date    CREATININE 1.0 12/14/2018    CREATININE 1.0 12/14/2018    BUN 8 12/14/2018    BUN 8 12/14/2018     12/14/2018     12/14/2018    K 4.0 12/14/2018    K 4.0 12/14/2018    CL 98 12/14/2018    CL 98 12/14/2018    CO2 28 12/14/2018    CO2 28 12/14/2018     Lab Results   Component Value Date    WBC 6.65 12/14/2018    HGB 13.1 12/14/2018    HCT 41.3 12/14/2018    MCV 96 12/14/2018     (H) 12/14/2018     Lab Results   Component Value Date    CALCIUM 9.4 12/14/2018    CALCIUM 9.4 12/14/2018    PHOS 2.9 12/14/2018         Assessment:    )    1. Essential hypertension    2. Hyponatremia    3. Bilateral edema of lower extremity    4. Migraine with aura and without status migrainosus, not intractable    5. TAMIKA (obstructive sleep apnea)    6. Fibromyalgia        Plan:         1.  Hyponatremia with hypochloremia: Patient is on  diuretics.  Most likely complication of diarrhea due to malabsorption/Crohn's disease.   As long as sodium is > 125 I am ok with HCTZ     2.  Essential hypertension with edema: stable     3.  Recurrent migraine:  Better on  Inderal, vitamin D and Zocor.     4.  Fibromyalgia: Continue with Elavil, Cymbalta, Lyrica. D/w patient about water therapy, Yoga and turmeric.  Pending Consult rheumatology    5. Edema: We will  watch on small dose if HCTZ     6. Obesity and TAMIKA : Provigil 200 mg       fup 6 months

## 2019-02-01 ENCOUNTER — TELEPHONE (OUTPATIENT)
Dept: NEPHROLOGY | Facility: CLINIC | Age: 54
End: 2019-02-01

## 2019-02-04 ENCOUNTER — PATIENT MESSAGE (OUTPATIENT)
Dept: NEPHROLOGY | Facility: CLINIC | Age: 54
End: 2019-02-04

## 2019-02-04 ENCOUNTER — PATIENT MESSAGE (OUTPATIENT)
Dept: INTERNAL MEDICINE | Facility: CLINIC | Age: 54
End: 2019-02-04

## 2019-02-04 RX ORDER — MONTELUKAST SODIUM 10 MG/1
10 TABLET ORAL NIGHTLY
Qty: 90 TABLET | Refills: 3 | Status: SHIPPED | OUTPATIENT
Start: 2019-02-04 | End: 2019-07-18 | Stop reason: SDUPTHER

## 2019-02-22 DIAGNOSIS — R52 PAIN: Primary | ICD-10-CM

## 2019-02-23 ENCOUNTER — PATIENT MESSAGE (OUTPATIENT)
Dept: NEPHROLOGY | Facility: CLINIC | Age: 54
End: 2019-02-23

## 2019-02-26 ENCOUNTER — OFFICE VISIT (OUTPATIENT)
Dept: ORTHOPEDICS | Facility: CLINIC | Age: 54
End: 2019-02-26
Payer: COMMERCIAL

## 2019-02-26 ENCOUNTER — HOSPITAL ENCOUNTER (OUTPATIENT)
Dept: RADIOLOGY | Facility: HOSPITAL | Age: 54
Discharge: HOME | End: 2019-02-26
Attending: FAMILY MEDICINE
Payer: COMMERCIAL

## 2019-02-26 VITALS
SYSTOLIC BLOOD PRESSURE: 130 MMHG | DIASTOLIC BLOOD PRESSURE: 88 MMHG | HEART RATE: 61 BPM | HEIGHT: 67 IN | WEIGHT: 199 LBS | BODY MASS INDEX: 31.23 KG/M2

## 2019-02-26 DIAGNOSIS — M17.11 PRIMARY OSTEOARTHRITIS OF RIGHT KNEE: ICD-10-CM

## 2019-02-26 DIAGNOSIS — M17.12 PRIMARY OSTEOARTHRITIS OF LEFT KNEE: ICD-10-CM

## 2019-02-26 DIAGNOSIS — M17.0 BILATERAL PRIMARY OSTEOARTHRITIS OF KNEE: Primary | ICD-10-CM

## 2019-02-26 DIAGNOSIS — R52 PAIN: ICD-10-CM

## 2019-02-26 PROCEDURE — 99214 PR OFFICE/OUTPT VISIT, EST, LEVL IV, 30-39 MIN: ICD-10-PCS | Mod: 25,S$GLB,, | Performed by: FAMILY MEDICINE

## 2019-02-26 PROCEDURE — 73564 X-RAY EXAM KNEE 4 OR MORE: CPT | Mod: TC,50

## 2019-02-26 PROCEDURE — 73564 X-RAY EXAM KNEE 4 OR MORE: CPT | Mod: 26,RT,, | Performed by: RADIOLOGY

## 2019-02-26 PROCEDURE — 3075F SYST BP GE 130 - 139MM HG: CPT | Mod: CPTII,S$GLB,, | Performed by: FAMILY MEDICINE

## 2019-02-26 PROCEDURE — 99999 PR PBB SHADOW E&M-EST. PATIENT-LVL III: ICD-10-PCS | Mod: PBBFAC,,, | Performed by: FAMILY MEDICINE

## 2019-02-26 PROCEDURE — 99999 PR PBB SHADOW E&M-EST. PATIENT-LVL III: CPT | Mod: PBBFAC,,, | Performed by: FAMILY MEDICINE

## 2019-02-26 PROCEDURE — 73564 XR KNEE ORTHO BILAT WITH FLEXION: ICD-10-PCS | Mod: 26,LT,, | Performed by: RADIOLOGY

## 2019-02-26 PROCEDURE — 3075F PR MOST RECENT SYSTOLIC BLOOD PRESS GE 130-139MM HG: ICD-10-PCS | Mod: CPTII,S$GLB,, | Performed by: FAMILY MEDICINE

## 2019-02-26 PROCEDURE — 3079F DIAST BP 80-89 MM HG: CPT | Mod: CPTII,S$GLB,, | Performed by: FAMILY MEDICINE

## 2019-02-26 PROCEDURE — 73564 X-RAY EXAM KNEE 4 OR MORE: CPT | Mod: 26,LT,, | Performed by: RADIOLOGY

## 2019-02-26 PROCEDURE — 3008F PR BODY MASS INDEX (BMI) DOCUMENTED: ICD-10-PCS | Mod: CPTII,S$GLB,, | Performed by: FAMILY MEDICINE

## 2019-02-26 PROCEDURE — 20610 LARGE JOINT ASPIRATION/INJECTION: R KNEE: ICD-10-PCS | Mod: RT,S$GLB,, | Performed by: FAMILY MEDICINE

## 2019-02-26 PROCEDURE — 20610 DRAIN/INJ JOINT/BURSA W/O US: CPT | Mod: RT,S$GLB,, | Performed by: FAMILY MEDICINE

## 2019-02-26 PROCEDURE — 99214 OFFICE O/P EST MOD 30 MIN: CPT | Mod: 25,S$GLB,, | Performed by: FAMILY MEDICINE

## 2019-02-26 PROCEDURE — 3008F BODY MASS INDEX DOCD: CPT | Mod: CPTII,S$GLB,, | Performed by: FAMILY MEDICINE

## 2019-02-26 PROCEDURE — 3079F PR MOST RECENT DIASTOLIC BLOOD PRESSURE 80-89 MM HG: ICD-10-PCS | Mod: CPTII,S$GLB,, | Performed by: FAMILY MEDICINE

## 2019-02-26 RX ORDER — TRIAMCINOLONE ACETONIDE 40 MG/ML
80 INJECTION, SUSPENSION INTRA-ARTICULAR; INTRAMUSCULAR
Status: DISCONTINUED | OUTPATIENT
Start: 2019-02-26 | End: 2019-02-26 | Stop reason: HOSPADM

## 2019-02-26 RX ADMIN — TRIAMCINOLONE ACETONIDE 80 MG: 40 INJECTION, SUSPENSION INTRA-ARTICULAR; INTRAMUSCULAR at 10:02

## 2019-02-26 NOTE — PROGRESS NOTES
Subjective:     Patient ID: Jaylin Murguia is a 54 y.o. female.    Chief Complaint: Pain of the Right Knee and Pain of the Left Knee    Patient is a 54-year-old female who presents clinic today complaining of bilateral knee pain (right worse than left) for the past several months.  Patient states that she is a  and is on her feet throughout the day.  Patient states that her knees have begun ache and swell.  States swelling is worse on the right side.  Denies any injuries or falls.  States that she has been taking over-the-counter anti-inflammatories which have helped some, but is still having significant symptoms.  Patient source is trying to lose weight and exercise more, but states she has limited due to her pain.  Denies any previous injections, surgeries, physical therapy, MRIs, fever, or chills.        Past Medical History:   Diagnosis Date    Allergic rhinitis     Asthma     Crohn's disease     Ileal involvement, previously on Remicade, Asacol, Prednisone    Fibromyalgia     Hypertension     Migraine     Sciatica      Past Surgical History:   Procedure Laterality Date    BLADDER SURGERY      sling was created by her muscles      SECTION      COLONOSCOPY N/A 3/27/2018    Performed by [facility]illo [facility] at [facility] [place] [facility]    COLONOSCOPY N/A 2017    [facility] Kin [facility]yer [facility] at [facility] [place] [facility]    COLONOSCOPY N/A 2015    [facility] at [facility] [place] [facility]    ESOPHAGOGASTRODUODENOSCOPY (EGD) N/A 2017    Performed by Kin [facility] at [facility] [place] [facility]    FINGER SURGERY      joint relpacement, left hand index finger    HYSTERECTOMY      WISDOM TOOTH EXTRACTION       Family History   Problem Relation Age of Onset    Hypertension Mother     Kidney disease Father     Scleroderma Father     Hypertension Brother     Cancer Paternal Grandmother 70        colon     Social History     Socioeconomic History    Marital status:      Spouse name:  Not on file    Number of children: 2    Years of education: Not on file    Highest education level: Not on file   Social Needs    Financial resource strain: Not on file    Food insecurity - worry: Not on file    Food insecurity - inability: Not on file    Transportation needs - medical: Not on file    Transportation needs - non-medical: Not on file   Occupational History    Occupation: teacher   Tobacco Use    Smoking status: Never Smoker    Smokeless tobacco: Never Used   Substance and Sexual Activity    Alcohol use: No    Drug use: No    Sexual activity: Yes     Partners: Male   Other Topics Concern    Not on file   Social History Narrative    Not on file     Medication List with Changes/Refills   Current Medications    ALBUTEROL 90 MCG/ACTUATION INHALER    Inhale 2 puffs into the lungs every 4 to 6 hours as needed for Wheezing.    BENZONATATE (TESSALON) 200 MG CAPSULE    Take 1 capsule (200 mg total) by mouth 3 (three) times daily as needed for Cough.    BREO ELLIPTA 100-25 MCG/DOSE DISKUS INHALER    Inhale 1 puff into the lungs once daily.    BUTALBITAL-ACETAMINOPHEN-CAFFEINE -40 MG (FIORICET, ESGIC) -40 MG PER TABLET    take 1 tablet by mouth every 4 hours if needed for headache    CETIRIZINE (ZYRTEC) 5 MG CHEWABLE TABLET    Take 5 mg by mouth once daily.    DULOXETINE (CYMBALTA) 60 MG CAPSULE    Take 60 mg by mouth 2 (two) times daily.    DYMISTA 137-50 MCG/SPRAY SPRY NASSAL SPRAY    instill 1 spray into each nostril twice a day    ELETRIPTAN (RELPAX) 40 MG TABLET    Take 1 tablet (40 mg total) by mouth as needed.    ESTRADIOL (ESTRACE) 2 MG TABLET    Take 2 mg by mouth once daily.    HYDROCHLOROTHIAZIDE (HYDRODIURIL) 12.5 MG TAB    Take 1 tablet (12.5 mg total) by mouth once daily.    HYDROCODONE-CHLORPHENIRAMINE (TUSSIONEX) 10-8 MG/5 ML SUSPENSION    Take 5 mLs by mouth every 12 (twelve) hours as needed for Cough.    LEVALBUTEROL (XOPENEX) 1.25 MG/3 ML NEBULIZER SOLUTION    Take 3  "mLs (1.25 mg total) by nebulization every 4 (four) hours. Rescue    LOSARTAN-HYDROCHLOROTHIAZIDE 100-12.5 MG (HYZAAR) 100-12.5 MG TAB    TAKE 1 TABLET BY MOUTH ONCE DAILY    MESALAMINE (PENTASA) 250 MG CPSR    Take 4 capsules (1,000 mg total) by mouth 4 (four) times daily.    MODAFINIL (PROVIGIL) 200 MG TAB    Take 1 tablet (200 mg total) by mouth once daily.    MONTELUKAST (SINGULAIR) 10 MG TABLET    Take 1 tablet (10 mg total) by mouth every evening.    NORTRIPTYLINE (PAMELOR) 25 MG CAPSULE    TAKE 2 CAPSULES BY MOUTH EVERY DAY    PANTOPRAZOLE (PROTONIX) 40 MG TABLET    Take 1 tablet (40 mg total) by mouth once daily.    PREDNISONE (DELTASONE) 10 MG TABLET    Take 1 tablet (10 mg total) by mouth once daily. Take 40 mg for five days then decrease by 10 mg every 5 days until gone.    PREGABALIN (LYRICA) 150 MG CAPSULE    Take 1 capsule (150 mg total) by mouth 3 (three) times daily.    PROPRANOLOL (INDERAL) 40 MG TABLET    take 1 tablet by mouth three times a day    RIZATRIPTAN (MAXALT) 10 MG TABLET    TAKE 1 TABLET BY MOUTH IF NEEDED FOR MIGRAINES MAX 2 TAB IN 24 HOURS    SIMVASTATIN (ZOCOR) 20 MG TABLET    take 1 tablet by mouth every evening    ZOLPIDEM (AMBIEN) 5 MG TAB    TAKE 1 TABLET BY MOUTH AT BEDTIME AS NEEDED     Review of patient's allergies indicates:  No Known Allergies  Review of Systems   Constitutional: Negative for chills and fever.   Cardiovascular: Negative for leg swelling.   Gastrointestinal: Negative for nausea and vomiting.   Musculoskeletal: Positive for joint pain. Negative for back pain, falls and myalgias.   Skin: Negative for rash.   Neurological: Negative for tingling, sensory change, focal weakness and weakness.        Objective:   Body mass index is 31.17 kg/m².  Vitals:    02/26/19 0940   BP: 130/88   Pulse: 61   Weight: 90.3 kg (199 lb)   Height: 5' 7" (1.702 m)   PainSc:   6   PainLoc: Knee           General    Nursing note and vitals reviewed.  Constitutional: She is oriented to " person, place, and time. She appears well-developed and well-nourished. No distress.   Eyes: Conjunctivae are normal. No scleral icterus.   Pulmonary/Chest: Effort normal.   Neurological: She is alert and oriented to person, place, and time.   Psychiatric: She has a normal mood and affect. Her behavior is normal. Judgment and thought content normal.     General Musculoskeletal Exam   Gait: normal       Right Knee Exam     Inspection   Erythema: absent  Effusion: present (mild)  Deformity: absent    Tenderness   The patient is tender to palpation of the medial joint line.    Range of Motion   The patient has normal right knee ROM.    Tests   Meniscus   Gwen:  Medial - positive Lateral - negative  Patella   Passive Patellar Tilt: neutral    Other   Sensation: normal    Left Knee Exam     Inspection   Erythema: absent  Effusion: absent  Deformity: absent    Tenderness   The patient tender to palpation of the medial joint line.    Range of Motion   The patient has normal left knee ROM.    Tests   Meniscus   Gwen:  Medial - negative Lateral - negative  Patella   Passive Patellar Tilt: neutral    Other   Sensation: normal    Muscle Strength   Right Lower Extremity   Quadriceps:  5/5   Left Lower Extremity   Quadriceps:  5/5       EXAMINATION:  XR KNEE ORTHO BILAT WITH FLEXION    CLINICAL HISTORY:  Pain, unspecified    TECHNIQUE:  AP standing of both knees, PA flexion standing views of both knees, and Merchant views of both knees were performed.  Lateral views of both knees were also performed.    COMPARISON:  None    FINDINGS:  No fracture or dislocation.  Mild medial compartment joint space narrowing is seen on either side with tiny patellofemoral osteophytes noted.  There is a right knee joint effusion.  Soft tissues are symmetric in appearance      Impression       As above      Electronically signed by: Sawyer Funes MD  Date: 02/26/2019  Time: 10:32           Jaylin was seen today for pain and  pain.    Diagnoses and all orders for this visit:    Bilateral primary osteoarthritis of knee  -     Large Joint Aspiration/Injection: R knee    Primary osteoarthritis of right knee  -     Large Joint Aspiration/Injection: R knee    Primary osteoarthritis of left knee    -discussed the clinical course and nature of osteoarthritis with patient.  At this time patient would like to conservative approach with cortisone injections, Tylenol, and physical therapy.  If patient does not get significant relief, would recommend viscosupplementation.  Discussed with patient that the definitive treatment for knee osteoarthritis is total knee replacement.  -bilateral knee Xray images were independently viewed and read by me showing no acute fractures or dislocations.  Mild medial compartment joint space loss bilaterally.  Moderate osteophyte formation noted around the patella.  -Formal read by radiologist is as described above  -Discussed findings with patient  -Treatment options and alternatives were discussed with the patient. Patient expressed understanding. Patient was given the opportunity to ask questions and be an active participant in their medical care. Patient had no further questions or concerns at this time.   -Patient is an overall moderate risk for health complications from their medical conditions.

## 2019-02-26 NOTE — PROCEDURES
Large Joint Aspiration/Injection: R knee  Date/Time: 2/26/2019 10:29 AM  Performed by: Sarthak Vincent MD  Authorized by: Sarthak Vincent MD     Consent Done?:  Yes (Verbal)  Indications:  Pain, diagnostic evaluation and joint swelling  Procedure site marked: Yes    Timeout: Prior to procedure the correct patient, procedure, and site was verified      Location:  Knee  Site:  R knee  Prep: Patient was prepped and draped in usual sterile fashion    Needle size:  25 G  Approach:  Anterolateral  Medications:  80 mg triamcinolone acetonide 40 mg/mL  Patient tolerance:  Patient tolerated the procedure well with no immediate complications    Additional Comments: Patient experienced minimal blood loss (< 3 cc) and clean bandage was applied. Post procedure care was provided to the patient verbally and with their AVS. Patient was instructed to take it easy for the next 24-48 hours and was informed that their pain may increase once the anaesthesia wears off and before the steroid begins to work. Patient was instructed to ice area for 15 minutes and may take Tylenol PRN if pain worsens. Patient was informed to contact clinic or go to the ED if they develop any fever, chills, redness, swelling, or other complications.

## 2019-03-03 ENCOUNTER — PATIENT MESSAGE (OUTPATIENT)
Dept: INTERNAL MEDICINE | Facility: CLINIC | Age: 54
End: 2019-03-03

## 2019-03-05 RX ORDER — NORTRIPTYLINE HYDROCHLORIDE 25 MG/1
CAPSULE ORAL
Qty: 60 CAPSULE | Refills: 0 | Status: SHIPPED | OUTPATIENT
Start: 2019-03-05 | End: 2019-05-07 | Stop reason: SDUPTHER

## 2019-03-13 ENCOUNTER — TELEPHONE (OUTPATIENT)
Dept: ORTHOPEDICS | Facility: CLINIC | Age: 54
End: 2019-03-13

## 2019-03-13 ENCOUNTER — HOSPITAL ENCOUNTER (OUTPATIENT)
Dept: RADIOLOGY | Facility: HOSPITAL | Age: 54
Discharge: HOME | End: 2019-03-13
Attending: NURSE PRACTITIONER
Payer: COMMERCIAL

## 2019-03-13 ENCOUNTER — OFFICE VISIT (OUTPATIENT)
Dept: URGENT CARE | Facility: CLINIC | Age: 54
End: 2019-03-13
Payer: COMMERCIAL

## 2019-03-13 VITALS
HEIGHT: 67 IN | DIASTOLIC BLOOD PRESSURE: 86 MMHG | SYSTOLIC BLOOD PRESSURE: 124 MMHG | RESPIRATION RATE: 16 BRPM | OXYGEN SATURATION: 98 % | WEIGHT: 201.75 LBS | BODY MASS INDEX: 31.66 KG/M2 | TEMPERATURE: 99 F | HEART RATE: 67 BPM

## 2019-03-13 DIAGNOSIS — K12.0 APHTHOUS ULCER: ICD-10-CM

## 2019-03-13 DIAGNOSIS — H66.92 LEFT OTITIS MEDIA, UNSPECIFIED OTITIS MEDIA TYPE: ICD-10-CM

## 2019-03-13 DIAGNOSIS — J45.909 ASTHMA, UNSPECIFIED ASTHMA SEVERITY, UNSPECIFIED WHETHER COMPLICATED, UNSPECIFIED WHETHER PERSISTENT: ICD-10-CM

## 2019-03-13 DIAGNOSIS — R05.9 COUGH: ICD-10-CM

## 2019-03-13 DIAGNOSIS — J10.1 INFLUENZA A: Primary | ICD-10-CM

## 2019-03-13 LAB
CTP QC/QA: YES
POC MOLECULAR INFLUENZA A AGN: POSITIVE
POC MOLECULAR INFLUENZA B AGN: NEGATIVE

## 2019-03-13 PROCEDURE — 3079F PR MOST RECENT DIASTOLIC BLOOD PRESSURE 80-89 MM HG: ICD-10-PCS | Mod: CPTII,S$GLB,, | Performed by: NURSE PRACTITIONER

## 2019-03-13 PROCEDURE — 99214 PR OFFICE/OUTPT VISIT, EST, LEVL IV, 30-39 MIN: ICD-10-PCS | Mod: S$GLB,,, | Performed by: NURSE PRACTITIONER

## 2019-03-13 PROCEDURE — 3074F SYST BP LT 130 MM HG: CPT | Mod: CPTII,S$GLB,, | Performed by: NURSE PRACTITIONER

## 2019-03-13 PROCEDURE — 3079F DIAST BP 80-89 MM HG: CPT | Mod: CPTII,S$GLB,, | Performed by: NURSE PRACTITIONER

## 2019-03-13 PROCEDURE — 71046 X-RAY EXAM CHEST 2 VIEWS: CPT | Mod: 26,,, | Performed by: RADIOLOGY

## 2019-03-13 PROCEDURE — 99999 PR PBB SHADOW E&M-EST. PATIENT-LVL V: ICD-10-PCS | Mod: PBBFAC,,, | Performed by: NURSE PRACTITIONER

## 2019-03-13 PROCEDURE — 99214 OFFICE O/P EST MOD 30 MIN: CPT | Mod: S$GLB,,, | Performed by: NURSE PRACTITIONER

## 2019-03-13 PROCEDURE — 87502 INFLUENZA DNA AMP PROBE: CPT | Mod: QW,S$GLB,, | Performed by: NURSE PRACTITIONER

## 2019-03-13 PROCEDURE — 71046 X-RAY EXAM CHEST 2 VIEWS: CPT | Mod: TC,FY,PO

## 2019-03-13 PROCEDURE — 3008F BODY MASS INDEX DOCD: CPT | Mod: CPTII,S$GLB,, | Performed by: NURSE PRACTITIONER

## 2019-03-13 PROCEDURE — 3008F PR BODY MASS INDEX (BMI) DOCUMENTED: ICD-10-PCS | Mod: CPTII,S$GLB,, | Performed by: NURSE PRACTITIONER

## 2019-03-13 PROCEDURE — 3074F PR MOST RECENT SYSTOLIC BLOOD PRESSURE < 130 MM HG: ICD-10-PCS | Mod: CPTII,S$GLB,, | Performed by: NURSE PRACTITIONER

## 2019-03-13 PROCEDURE — 71046 XR CHEST PA AND LATERAL: ICD-10-PCS | Mod: 26,,, | Performed by: RADIOLOGY

## 2019-03-13 PROCEDURE — 99999 PR PBB SHADOW E&M-EST. PATIENT-LVL V: CPT | Mod: PBBFAC,,, | Performed by: NURSE PRACTITIONER

## 2019-03-13 PROCEDURE — 87502 POCT INFLUENZA A/B MOLECULAR: ICD-10-PCS | Mod: QW,S$GLB,, | Performed by: NURSE PRACTITIONER

## 2019-03-13 RX ORDER — PROMETHAZINE HYDROCHLORIDE AND DEXTROMETHORPHAN HYDROBROMIDE 6.25; 15 MG/5ML; MG/5ML
5 SYRUP ORAL NIGHTLY PRN
Qty: 180 ML | Refills: 0 | Status: SHIPPED | OUTPATIENT
Start: 2019-03-13 | End: 2019-03-23

## 2019-03-13 RX ORDER — LEVALBUTEROL INHALATION SOLUTION 1.25 MG/3ML
1 SOLUTION RESPIRATORY (INHALATION) EVERY 4 HOURS
Qty: 1 BOX | Refills: 11 | Status: SHIPPED | OUTPATIENT
Start: 2019-03-13 | End: 2020-11-09 | Stop reason: SDUPTHER

## 2019-03-13 RX ORDER — OSELTAMIVIR PHOSPHATE 75 MG/1
75 CAPSULE ORAL 2 TIMES DAILY
Qty: 10 CAPSULE | Refills: 0 | Status: SHIPPED | OUTPATIENT
Start: 2019-03-13 | End: 2019-03-18

## 2019-03-13 RX ORDER — TRIAMCINOLONE ACETONIDE 0.25 MG/G
CREAM TOPICAL 2 TIMES DAILY
Qty: 80 G | Refills: 0 | Status: SHIPPED | OUTPATIENT
Start: 2019-03-13 | End: 2019-08-26 | Stop reason: SDUPTHER

## 2019-03-13 RX ORDER — ALBUTEROL SULFATE 90 UG/1
2 AEROSOL, METERED RESPIRATORY (INHALATION)
Qty: 8.5 G | Refills: 1 | Status: SHIPPED | OUTPATIENT
Start: 2019-03-13 | End: 2019-06-19 | Stop reason: SDUPTHER

## 2019-03-13 RX ORDER — AMOXICILLIN 875 MG/1
875 TABLET, FILM COATED ORAL EVERY 12 HOURS
Qty: 20 TABLET | Refills: 0 | Status: SHIPPED | OUTPATIENT
Start: 2019-03-13 | End: 2019-03-23

## 2019-03-13 NOTE — PROGRESS NOTES
"Subjective:      Patient ID: Jaylin Murguia is a 54 y.o. female.    Chief Complaint: Generalized Body Aches    Influenza   This is a new problem. The current episode started yesterday. The problem occurs constantly. The problem has been gradually worsening. Associated symptoms include chills, congestion, coughing, fatigue, a fever (t-max = 100.8, started last night), headaches, myalgias and a sore throat. Pertinent negatives include no abdominal pain, change in bowel habit, nausea, rash, urinary symptoms or vomiting. Nothing aggravates the symptoms. She has tried acetaminophen and NSAIDs (neb treatments, albuterol inhaler, neti pot) for the symptoms. The treatment provided mild relief.     Review of Systems   Constitutional: Positive for chills, fatigue and fever (t-max = 100.8, started last night).   HENT: Positive for congestion, ear pain (started with intense left ear pain this morning after doing neti pot), sinus pressure and sore throat.    Eyes: Negative.    Respiratory: Positive for cough and wheezing.    Cardiovascular: Negative.    Gastrointestinal: Negative for abdominal pain, change in bowel habit, nausea and vomiting.   Musculoskeletal: Positive for myalgias.   Skin: Negative for rash.   Neurological: Positive for headaches.   Hematological: Negative.        Objective:   /86 (BP Location: Right arm, Patient Position: Sitting)   Pulse 67   Temp 99.4 °F (37.4 °C) (Oral)   Resp 16   Ht 5' 7" (1.702 m)   Wt 91.5 kg (201 lb 11.5 oz)   SpO2 98%   BMI 31.59 kg/m²   Physical Exam   Constitutional: She is oriented to person, place, and time. She appears well-developed and well-nourished. No distress.   HENT:   Head: Normocephalic and atraumatic.   Right Ear: Tympanic membrane is erythematous (mild). A middle ear effusion is present.   Left Ear: Tympanic membrane is erythematous and bulging. A middle ear effusion is present.   Nose: Nose normal.   Mouth/Throat: Oropharynx is clear and moist. "       Eyes: Conjunctivae are normal.   Neck: Normal range of motion. Neck supple.   Cardiovascular: Normal rate, regular rhythm and normal heart sounds.   Pulmonary/Chest: Effort normal. No respiratory distress. She has wheezes (diffuse expiratory wheezing). She has no rales.   Lymphadenopathy:     She has cervical adenopathy.   Neurological: She is alert and oriented to person, place, and time.   Skin: Skin is warm and dry. She is not diaphoretic.   Nursing note and vitals reviewed.    Assessment:      1. Influenza A    2. Asthma, unspecified asthma severity, unspecified whether complicated, unspecified whether persistent    3. Left otitis media, unspecified otitis media type    4. Aphthous ulcer       Plan:   Influenza A  -     X-Ray Chest PA And Lateral; Future; Expected date: 03/13/2019  -     POCT Influenza A/B Molecular  -     oseltamivir (TAMIFLU) 75 MG capsule; Take 1 capsule (75 mg total) by mouth 2 (two) times daily. for 5 days  Dispense: 10 capsule; Refill: 0  -     promethazine-dextromethorphan (PROMETHAZINE-DM) 6.25-15 mg/5 mL Syrp; Take 5 mLs by mouth nightly as needed.  Dispense: 180 mL; Refill: 0    Asthma, unspecified asthma severity, unspecified whether complicated, unspecified whether persistent  -     albuterol (PROVENTIL/VENTOLIN HFA) 90 mcg/actuation inhaler; Inhale 2 puffs into the lungs every 4 to 6 hours as needed for Wheezing.  Dispense: 8.5 g; Refill: 1  -     levalbuterol (XOPENEX) 1.25 mg/3 mL nebulizer solution; Take 3 mLs (1.25 mg total) by nebulization every 4 (four) hours. Rescue  Dispense: 1 Box; Refill: 11    Left otitis media, unspecified otitis media type  -     amoxicillin (AMOXIL) 875 MG tablet; Take 1 tablet (875 mg total) by mouth every 12 (twelve) hours. for 10 days  Dispense: 20 tablet; Refill: 0    Aphthous ulcer  -     triamcinolone acetonide 0.025% (KENALOG) 0.025 % cream; Apply topically 2 (two) times daily.  Dispense: 80 g; Refill: 0    Instructions, follow up, and  supportive care as per AVS.

## 2019-03-13 NOTE — TELEPHONE ENCOUNTER
Called the patient about their appointment on 5/27/19 with Dr. Vincent. Informed the patient that we have to reschedule their appointment due to Dr. Vincent being out of the office on that day. Left a message for the patient to give the office a call back to reschedule.FP

## 2019-03-13 NOTE — LETTER
March 13, 2019      [facility]  [address] [place] [address]  Phone: [phone]  Fax: [phone]       Patient: Jaylin Murguia   YOB: 1965  Date of Visit: 03/13/2019    To Whom It May Concern:    Judi Murguia  was at [facility] on 03/13/2019. She may return to work/school on 3/18/19 with no restrictions. If you have any questions or concerns, or if I can be of further assistance, please do not hesitate to contact me.    Sincerely,    Tanya Marc NP

## 2019-03-13 NOTE — PATIENT INSTRUCTIONS
· Your symptoms are caused by the influenza virus. Viral infections will not improve with antibiotics. If your symptoms persist >10 days without improvement or if you have any new or worsening symptoms, follow up with your primary care provider.  · You are considered contagious for up to 24 hours until after your fever is resolved. Fever is considered any reading > 100.4. Fever many times lasts between 3-7 days with flu viruses. It is very important to remain home from work/school until you are no longer contagious in order to help prevent spreading of the flu virus. Also make sure to take part in frequent hand washing and always cover your cough.  · Getting plenty of rest is very important to fighting infections.  · Increase fluids.   · May apply warm compresses as needed for facial pain and congestion.   · Saline nasal spray to loosen nasal congestion.  · Flonase or Nasacort to reduce inflammation in the sinus cavities.  · You may take an over the counter antihistamine for allergy symptoms such as sneezing, itchy/watery eyes, scratchy throat, or congestion.  · Take Tylenol or Ibuprofen as needed for sore throat, body aches, or fever (fever is considered any temperature > 100.4).  · Don't drive, drink alcohol, or take any other medication or substance that causes sedation while taking cough syrup.   · Go to the ER for any fever that does not improve with Tylenol/Ibuprofen, neck stiffness, rash, severe headache, vision changes, shortness of breath, chest pain, facial swelling, severe facial pain, or any other new and concerning symptoms.       Influenza (Adult)    Influenza is also called the flu. It is a viral illness that affects the air passages of your lungs. It is different from the common cold. The flu can easily be passed from one to person to another. It may be spread through the air by coughing and sneezing. Or it can be spread by touching the sick person and then touching your own eyes, nose, or  mouth.  The flu starts 1 to 3 days after you are exposed to the flu virus. It may last for 1 to 2 weeks but many people feel tired or fatigued for many weeks afterward. You usually dont need to take antibiotics unless you have a complication. This might be an ear or sinus infection or pneumonia.  Symptoms of the flu may be mild or severe. They can include extreme tiredness (wanting to stay in bed all day), chills, fevers, muscle aches, soreness with eye movement, headache, and a dry, hacking cough.  Home care  Follow these guidelines when caring for yourself at home:  · Avoid being around cigarette smoke, whether yours or other peoples.  · Acetaminophen or ibuprofen will help ease your fever, muscle aches, and headache. Dont give aspirin to anyone younger than 18 who has the flu. Aspirin can harm the liver.  · Nausea and loss of appetite are common with the flu. Eat light meals. Drink 6 to 8 glasses of liquids every day. Good choices are water, sport drinks, soft drinks without caffeine, juices, tea, and soup. Extra fluids will also help loosen secretions in your nose and lungs.  · Over-the-counter cold medicines will not make the flu go away faster. But the medicines may help with coughing, sore throat, and congestion in your nose and sinuses. Dont use a decongestant if you have high blood pressure.  · Stay home until your fever has been gone for at least 24 hours without using medicine to reduce fever.  Follow-up care  Follow up with your healthcare provider, or as advised, if you are not getting better over the next week.  If you are age 65 or older, talk with your provider about getting a pneumococcal vaccine every 5 years. You should also get this vaccine if you have chronic asthma or COPD. All adults should get a flu vaccine every fall. Ask your provider about this.  When to seek medical advice  Call your healthcare provider right away if any of these occur:  · Cough with lots of colored mucus (sputum) or  blood in your mucus  · Chest pain, shortness of breath, wheezing, or trouble breathing  · Severe headache, or face, neck, or ear pain  · New rash with fever  · Fever of 100.4°F (38°C) or higher, or as directed by your healthcare provider  · Confusion, behavior change, or seizure  · Severe weakness or dizziness  · You get a new fever or cough after getting better for a few days  Date Last Reviewed: 1/1/2017  © 7071-2912 [employer]. [address] [place] [address]. All rights reserved. This information is not intended as a substitute for professional medical care. Always follow your healthcare professional's instructions.

## 2019-03-15 ENCOUNTER — HOSPITAL ENCOUNTER (OUTPATIENT)
Dept: RADIOLOGY | Facility: HOSPITAL | Age: 54
Discharge: HOME | End: 2019-03-15
Attending: NURSE PRACTITIONER
Payer: COMMERCIAL

## 2019-03-15 ENCOUNTER — OFFICE VISIT (OUTPATIENT)
Dept: URGENT CARE | Facility: CLINIC | Age: 54
End: 2019-03-15
Payer: COMMERCIAL

## 2019-03-15 VITALS
SYSTOLIC BLOOD PRESSURE: 120 MMHG | BODY MASS INDEX: 31.35 KG/M2 | DIASTOLIC BLOOD PRESSURE: 84 MMHG | HEIGHT: 67 IN | TEMPERATURE: 98 F | WEIGHT: 199.75 LBS

## 2019-03-15 DIAGNOSIS — R05.9 COUGH: ICD-10-CM

## 2019-03-15 DIAGNOSIS — J10.1 INFLUENZA A: ICD-10-CM

## 2019-03-15 DIAGNOSIS — R05.9 COUGH: Primary | ICD-10-CM

## 2019-03-15 DIAGNOSIS — M94.0 COSTOCHONDRITIS: ICD-10-CM

## 2019-03-15 PROCEDURE — 99214 PR OFFICE/OUTPT VISIT, EST, LEVL IV, 30-39 MIN: ICD-10-PCS | Mod: 25,S$GLB,, | Performed by: NURSE PRACTITIONER

## 2019-03-15 PROCEDURE — 99999 PR PBB SHADOW E&M-EST. PATIENT-LVL V: ICD-10-PCS | Mod: PBBFAC,,, | Performed by: NURSE PRACTITIONER

## 2019-03-15 PROCEDURE — 3074F PR MOST RECENT SYSTOLIC BLOOD PRESSURE < 130 MM HG: ICD-10-PCS | Mod: CPTII,S$GLB,, | Performed by: NURSE PRACTITIONER

## 2019-03-15 PROCEDURE — 99999 PR PBB SHADOW E&M-EST. PATIENT-LVL V: CPT | Mod: PBBFAC,,, | Performed by: NURSE PRACTITIONER

## 2019-03-15 PROCEDURE — 99214 OFFICE O/P EST MOD 30 MIN: CPT | Mod: 25,S$GLB,, | Performed by: NURSE PRACTITIONER

## 2019-03-15 PROCEDURE — 96372 PR INJECTION,THERAP/PROPH/DIAG2ST, IM OR SUBCUT: ICD-10-PCS | Mod: S$GLB,,, | Performed by: NURSE PRACTITIONER

## 2019-03-15 PROCEDURE — 3074F SYST BP LT 130 MM HG: CPT | Mod: CPTII,S$GLB,, | Performed by: NURSE PRACTITIONER

## 2019-03-15 PROCEDURE — 96372 THER/PROPH/DIAG INJ SC/IM: CPT | Mod: S$GLB,,, | Performed by: NURSE PRACTITIONER

## 2019-03-15 PROCEDURE — 3008F PR BODY MASS INDEX (BMI) DOCUMENTED: ICD-10-PCS | Mod: CPTII,S$GLB,, | Performed by: NURSE PRACTITIONER

## 2019-03-15 PROCEDURE — 71046 X-RAY EXAM CHEST 2 VIEWS: CPT | Mod: TC,FY,PO

## 2019-03-15 PROCEDURE — 71046 XR CHEST PA AND LATERAL: ICD-10-PCS | Mod: 26,,, | Performed by: RADIOLOGY

## 2019-03-15 PROCEDURE — 3079F PR MOST RECENT DIASTOLIC BLOOD PRESSURE 80-89 MM HG: ICD-10-PCS | Mod: CPTII,S$GLB,, | Performed by: NURSE PRACTITIONER

## 2019-03-15 PROCEDURE — 71046 X-RAY EXAM CHEST 2 VIEWS: CPT | Mod: 26,,, | Performed by: RADIOLOGY

## 2019-03-15 PROCEDURE — 3079F DIAST BP 80-89 MM HG: CPT | Mod: CPTII,S$GLB,, | Performed by: NURSE PRACTITIONER

## 2019-03-15 PROCEDURE — 3008F BODY MASS INDEX DOCD: CPT | Mod: CPTII,S$GLB,, | Performed by: NURSE PRACTITIONER

## 2019-03-15 RX ORDER — KETOROLAC TROMETHAMINE 30 MG/ML
30 INJECTION, SOLUTION INTRAMUSCULAR; INTRAVENOUS
Status: COMPLETED | OUTPATIENT
Start: 2019-03-15 | End: 2019-03-15

## 2019-03-15 RX ADMIN — KETOROLAC TROMETHAMINE 30 MG: 30 INJECTION, SOLUTION INTRAMUSCULAR; INTRAVENOUS at 04:03

## 2019-03-15 NOTE — PATIENT INSTRUCTIONS
· Your symptoms are caused by the influenza virus. Viral infections will not improve with antibiotics. If your symptoms persist >10 days without improvement or if you have any new or worsening symptoms, follow up with your primary care provider.  · You are considered contagious for up to 24 hours until after your fever is resolved. Fever is considered any reading > 100.4. Fever many times lasts between 3-7 days with flu viruses. It is very important to remain home from work/school until you are no longer contagious in order to help prevent spreading of the flu virus. Also make sure to take part in frequent hand washing and always cover your cough.  · Getting plenty of rest is very important to fighting infections.  · Increase fluids.   · May apply warm compresses as needed for facial pain and congestion.   · Saline nasal spray to loosen nasal congestion.  · Flonase or Nasacort to reduce inflammation in the sinus cavities.  · You may take an over the counter antihistamine for allergy symptoms such as sneezing, itchy/watery eyes, scratchy throat, or congestion.  · Take Tylenol or Ibuprofen as needed for sore throat, body aches, or fever (fever is considered any temperature > 100.4).  · Don't drive, drink alcohol, or take any other medication or substance that causes sedation while taking cough syrup.   · Go to the ER for any fever that does not improve with Tylenol/Ibuprofen, neck stiffness, rash, severe headache, vision changes, shortness of breath, chest pain, facial swelling, severe facial pain, or any other new and concerning symptoms.       Influenza (Adult)    Influenza is also called the flu. It is a viral illness that affects the air passages of your lungs. It is different from the common cold. The flu can easily be passed from one to person to another. It may be spread through the air by coughing and sneezing. Or it can be spread by touching the sick person and then touching your own eyes, nose, or  mouth.  The flu starts 1 to 3 days after you are exposed to the flu virus. It may last for 1 to 2 weeks but many people feel tired or fatigued for many weeks afterward. You usually dont need to take antibiotics unless you have a complication. This might be an ear or sinus infection or pneumonia.  Symptoms of the flu may be mild or severe. They can include extreme tiredness (wanting to stay in bed all day), chills, fevers, muscle aches, soreness with eye movement, headache, and a dry, hacking cough.  Home care  Follow these guidelines when caring for yourself at home:  · Avoid being around cigarette smoke, whether yours or other peoples.  · Acetaminophen or ibuprofen will help ease your fever, muscle aches, and headache. Dont give aspirin to anyone younger than 18 who has the flu. Aspirin can harm the liver.  · Nausea and loss of appetite are common with the flu. Eat light meals. Drink 6 to 8 glasses of liquids every day. Good choices are water, sport drinks, soft drinks without caffeine, juices, tea, and soup. Extra fluids will also help loosen secretions in your nose and lungs.  · Over-the-counter cold medicines will not make the flu go away faster. But the medicines may help with coughing, sore throat, and congestion in your nose and sinuses. Dont use a decongestant if you have high blood pressure.  · Stay home until your fever has been gone for at least 24 hours without using medicine to reduce fever.  Follow-up care  Follow up with your healthcare provider, or as advised, if you are not getting better over the next week.  If you are age 65 or older, talk with your provider about getting a pneumococcal vaccine every 5 years. You should also get this vaccine if you have chronic asthma or COPD. All adults should get a flu vaccine every fall. Ask your provider about this.  When to seek medical advice  Call your healthcare provider right away if any of these occur:  · Cough with lots of colored mucus (sputum) or  blood in your mucus  · Chest pain, shortness of breath, wheezing, or trouble breathing  · Severe headache, or face, neck, or ear pain  · New rash with fever  · Fever of 100.4°F (38°C) or higher, or as directed by your healthcare provider  · Confusion, behavior change, or seizure  · Severe weakness or dizziness  · You get a new fever or cough after getting better for a few days  Date Last Reviewed: 1/1/2017  © 9230-4802 [employer]. [address] [place] [id number]. All rights reserved. This information is not intended as a substitute for professional medical care. Always follow your healthcare professional's instructions.        Discharge Instructions for Asthma  You have been diagnosed with an asthma attack. With the help of your healthcare provider, you can keep your asthma under control and have less emergency department visits and stays in the hospital.    Managing asthma  · Take your asthma medicines exactly as your provider tells you. Do this even if you feel that your athma is under control.  · Learn how to monitor your asthma. Some people watch for early changes of symptoms getting worse. Some use a peak flow meter. Your healthcare provider may decide to give you an asthma action plan.  · Be sure to always have a quick-relief inhaler with you. If you were given a prescription, make sure you go to a pharmacy to get it filled as soon as possible.  Controlling asthma triggers  Triggers are those things that make your asthma symptoms worse or cause asthma attacks. Many people with asthma have allergies that can be triggers. Your healthcare provider may have you get allergy testing to find out what you are allergic to. This can help you stay away from triggers.  Dust or dust mites are a common asthma trigger. To avoid a dust mites, do the following:  · Use dust-proof covers on your mattress and pillows. Wash the sheets and blankets on your bed once a week in very hot water.  · Dont sleep  or lie on cloth-covered cushions or furniture.  · Ask someone else to vacuum and dust your house.  · If you do vacuum and dust yourself, wear a dust mask. You can buy them from the hardware store.  · Use a vacuum with a double-layered bag or HEPA (high-efficiency particulate air) filter.  Pets with fur or feathers are triggers for some people. If you must have pets, take these precautions:  · Keep pets out of your bedroom and off your bed. Keep the bedroom door closed.  · Cover the air vents in your bedroom with heavy material to filter the air.  · Don't use carpets or cloth-covered furniture in your home. If this is not possible, keep pets out of rooms with these items.  · Have someone bathe your pets every week. And brush them often.  If you smoke, do your best to quit.  · Enroll in a stop-smoking program to increase your chance of success.  · Ask your healthcare provider about medicines or other methods to help you quit.  · Ask family members to quit smoking as well.  · Dont allow anyone to smoke in your home, in your car, or around you.  Other steps to take  · Make sure you know what to do if exercise is a trigger for you. Many people use quick-relief inhalers before exercise or physical activity.  · Get a flu shot every year and get pneumonia shots as advised by your healthcare provider.  · Try to keep your windows closed during pollen seasons and when mold counts are high.  · On cold or windy days, cover your nose and mouth with a scarf.  · Try to stay away from people who are sick with colds or the flu. Wash your hands often or use a hand . If respiratory infections like colds or flu trigger your asthma, use your quick-relief medicines as soon as you begin to notice respiratory symptoms. They may include a runny or stuffy nose, sore throat, or a cough.  Follow-up care  Make a follow-up appointment as directed by our staff. Follow your asthma action plan if you were given one.  When to seek medical  attention  Call 911 right away if you have:  · Severe wheezing  · Shortness of breath that is not relieved by your quick-relief medication  · Trouble walking or talking because of shortness of breath  · Blue lips or fingernails  · If you monitor symptoms with a peak flow meter, readings less than 50% of your personal best   Date Last Reviewed: 2/3/2017  © 8829-2298 [employer]. [address] [place] [address], [place] [id number]. All rights reserved. This information is not intended as a substitute for professional medical care. Always follow your healthcare professional's instructions.

## 2019-03-16 ENCOUNTER — PATIENT MESSAGE (OUTPATIENT)
Dept: INTERNAL MEDICINE | Facility: CLINIC | Age: 54
End: 2019-03-16

## 2019-03-16 ENCOUNTER — PATIENT MESSAGE (OUTPATIENT)
Dept: NEPHROLOGY | Facility: CLINIC | Age: 54
End: 2019-03-16

## 2019-03-16 ENCOUNTER — NURSE TRIAGE (OUTPATIENT)
Dept: ADMINISTRATIVE | Facility: CLINIC | Age: 54
End: 2019-03-16

## 2019-03-16 NOTE — PROGRESS NOTES
"Subjective:      Patient ID: Jaylin Murguia is a 54 y.o. female.    Chief Complaint: Cough    Diagnosed with flu 2 days ago. Chest wall pain and pain with deep breathing has gotten worse. Cough is also worsening. Wheezing is only temporarily improved with xopenex. + SOB with exertion. Sinus symptoms are improving for the most part. Still has intermittent chills but hasn't felt feverish today.       Cough   This is a new problem. The current episode started in the past 7 days. The problem has been gradually worsening. The problem occurs constantly. The cough is productive of sputum. Associated symptoms include chills, headaches, myalgias, nasal congestion, shortness of breath and wheezing. Pertinent negatives include no hemoptysis. Treatments tried: xopenex inhaler, promethazine DM, tamiflu. The treatment provided no relief. Her past medical history is significant for asthma and bronchitis.     Review of Systems   Constitutional: Positive for chills and fatigue.   HENT: Positive for congestion.    Eyes: Negative.    Respiratory: Positive for cough, shortness of breath and wheezing. Negative for hemoptysis.    Cardiovascular: Negative.    Gastrointestinal: Negative.    Genitourinary: Negative.    Musculoskeletal: Positive for myalgias.   Neurological: Positive for headaches.       Objective:   /84 (BP Location: Right arm, Patient Position: Sitting, BP Method: Large (Manual))   Temp 98.2 °F (36.8 °C) (Oral)   Ht 5' 6.5" (1.689 m)   Wt 90.6 kg (199 lb 11.8 oz)   BMI 31.76 kg/m²   Physical Exam   Constitutional: She is oriented to person, place, and time. She appears well-developed and well-nourished. No distress.   HENT:   Nose: Nose normal.   Mouth/Throat: Oropharynx is clear and moist.   Eyes: Conjunctivae are normal.   Neck: Normal range of motion. Neck supple.   Cardiovascular: Normal rate, regular rhythm and normal heart sounds.   Pulmonary/Chest: Effort normal. No accessory muscle usage. No tachypnea. " No respiratory distress. She has decreased breath sounds in the right lower field and the left lower field. She has wheezes.   Lymphadenopathy:     She has no cervical adenopathy.   Neurological: She is alert and oriented to person, place, and time.   Skin: Skin is warm and dry. She is not diaphoretic.   Nursing note and vitals reviewed.    Assessment:      1. Cough    2. Influenza A    3. Costochondritis       Plan:   Cough  -     X-Ray Chest PA And Lateral; Future; Expected date: 03/15/2019    Influenza A    Costochondritis  -     ketorolac injection 30 mg    No pneumonia on x-ray.   Ms. Murguia declines neb treatment at this time.  Toradol here in clinic, then OTC NSAIDs for costocondritis PRN.   Will hold off on steroids due to flu diagnosis.   ER for worsening. PCP if not improving.  Instructions, follow up, and supportive care as per AVS.

## 2019-03-16 NOTE — TELEPHONE ENCOUNTER
Reason for Disposition   Caller requesting lab results    Protocols used: ST PCP CALL - NO TRIAGE-A-AH    Patient reviewed the results about her chest xray with the part that talks about her thoracic aorta. Patient informed that RN cannot discuss lab results with her, but a message would be sent to Dr. Hu to discuss the issue with her. Also, she is coughing despite taking all her respiratory drugs, so she was advised to see if with the medications she is taking if she could take Mucinex to thin out secretions. She verbalized understanding.

## 2019-03-18 ENCOUNTER — PATIENT MESSAGE (OUTPATIENT)
Dept: ORTHOPEDICS | Facility: CLINIC | Age: 54
End: 2019-03-18

## 2019-03-18 ENCOUNTER — PATIENT MESSAGE (OUTPATIENT)
Dept: INTERNAL MEDICINE | Facility: CLINIC | Age: 54
End: 2019-03-18

## 2019-03-18 RX ORDER — LOSARTAN POTASSIUM AND HYDROCHLOROTHIAZIDE 25; 100 MG/1; MG/1
1 TABLET ORAL DAILY
Qty: 90 TABLET | Refills: 3 | Status: SHIPPED | OUTPATIENT
Start: 2019-03-18 | End: 2019-07-18 | Stop reason: SDUPTHER

## 2019-03-19 ENCOUNTER — TELEPHONE (OUTPATIENT)
Dept: ORTHOPEDICS | Facility: CLINIC | Age: 54
End: 2019-03-19

## 2019-03-29 DIAGNOSIS — I10 HTN (HYPERTENSION): ICD-10-CM

## 2019-03-29 DIAGNOSIS — I10 ESSENTIAL HYPERTENSION: Primary | ICD-10-CM

## 2019-04-01 ENCOUNTER — CLINICAL SUPPORT (OUTPATIENT)
Dept: CARDIOLOGY | Facility: CLINIC | Age: 54
End: 2019-04-01
Attending: INTERNAL MEDICINE
Payer: COMMERCIAL

## 2019-04-01 ENCOUNTER — OFFICE VISIT (OUTPATIENT)
Dept: CARDIOLOGY | Facility: CLINIC | Age: 54
End: 2019-04-01
Payer: COMMERCIAL

## 2019-04-01 ENCOUNTER — CLINICAL SUPPORT (OUTPATIENT)
Dept: CARDIOLOGY | Facility: CLINIC | Age: 54
End: 2019-04-01
Payer: COMMERCIAL

## 2019-04-01 VITALS
HEIGHT: 66 IN | BODY MASS INDEX: 32.47 KG/M2 | DIASTOLIC BLOOD PRESSURE: 86 MMHG | SYSTOLIC BLOOD PRESSURE: 136 MMHG | WEIGHT: 202 LBS | HEART RATE: 65 BPM

## 2019-04-01 DIAGNOSIS — I77.1 TORTUOUS AORTA: ICD-10-CM

## 2019-04-01 DIAGNOSIS — I10 ESSENTIAL (PRIMARY) HYPERTENSION: Primary | ICD-10-CM

## 2019-04-01 DIAGNOSIS — G47.33 OSA (OBSTRUCTIVE SLEEP APNEA): ICD-10-CM

## 2019-04-01 DIAGNOSIS — E78.49 OTHER HYPERLIPIDEMIA: ICD-10-CM

## 2019-04-01 DIAGNOSIS — I10 ESSENTIAL HYPERTENSION: ICD-10-CM

## 2019-04-01 DIAGNOSIS — I10 HTN (HYPERTENSION): ICD-10-CM

## 2019-04-01 DIAGNOSIS — I10 ESSENTIAL (PRIMARY) HYPERTENSION: ICD-10-CM

## 2019-04-01 LAB
AORTIC VALVE REGURGITATION: NORMAL
DIASTOLIC DYSFUNCTION: NO
ESTIMATED PA SYSTOLIC PRESSURE: 28
MITRAL VALVE MOBILITY: NORMAL
MITRAL VALVE REGURGITATION: NORMAL
RETIRED EF AND QEF - SEE NOTES: 60 (ref 55–65)

## 2019-04-01 PROCEDURE — 93000 EKG 12-LEAD: ICD-10-PCS | Mod: S$GLB,,, | Performed by: NUCLEAR MEDICINE

## 2019-04-01 PROCEDURE — 99999 PR PBB SHADOW E&M-EST. PATIENT-LVL III: ICD-10-PCS | Mod: PBBFAC,,, | Performed by: INTERNAL MEDICINE

## 2019-04-01 PROCEDURE — 3075F SYST BP GE 130 - 139MM HG: CPT | Mod: CPTII,S$GLB,, | Performed by: INTERNAL MEDICINE

## 2019-04-01 PROCEDURE — 99214 OFFICE O/P EST MOD 30 MIN: CPT | Mod: S$GLB,,, | Performed by: INTERNAL MEDICINE

## 2019-04-01 PROCEDURE — 3079F DIAST BP 80-89 MM HG: CPT | Mod: CPTII,S$GLB,, | Performed by: INTERNAL MEDICINE

## 2019-04-01 PROCEDURE — 3008F PR BODY MASS INDEX (BMI) DOCUMENTED: ICD-10-PCS | Mod: CPTII,S$GLB,, | Performed by: INTERNAL MEDICINE

## 2019-04-01 PROCEDURE — 93306 2D ECHO WITH COLOR FLOW DOPPLER: ICD-10-PCS | Mod: S$GLB,,, | Performed by: NUCLEAR MEDICINE

## 2019-04-01 PROCEDURE — 93000 ELECTROCARDIOGRAM COMPLETE: CPT | Mod: S$GLB,,, | Performed by: NUCLEAR MEDICINE

## 2019-04-01 PROCEDURE — 3079F PR MOST RECENT DIASTOLIC BLOOD PRESSURE 80-89 MM HG: ICD-10-PCS | Mod: CPTII,S$GLB,, | Performed by: INTERNAL MEDICINE

## 2019-04-01 PROCEDURE — 99214 PR OFFICE/OUTPT VISIT, EST, LEVL IV, 30-39 MIN: ICD-10-PCS | Mod: S$GLB,,, | Performed by: INTERNAL MEDICINE

## 2019-04-01 PROCEDURE — 3008F BODY MASS INDEX DOCD: CPT | Mod: CPTII,S$GLB,, | Performed by: INTERNAL MEDICINE

## 2019-04-01 PROCEDURE — 3075F PR MOST RECENT SYSTOLIC BLOOD PRESS GE 130-139MM HG: ICD-10-PCS | Mod: CPTII,S$GLB,, | Performed by: INTERNAL MEDICINE

## 2019-04-01 PROCEDURE — 99999 PR PBB SHADOW E&M-EST. PATIENT-LVL III: CPT | Mod: PBBFAC,,, | Performed by: INTERNAL MEDICINE

## 2019-04-01 PROCEDURE — 93306 TTE W/DOPPLER COMPLETE: CPT | Mod: S$GLB,,, | Performed by: NUCLEAR MEDICINE

## 2019-04-01 NOTE — PROGRESS NOTES
Subjective:   Patient ID:  Jaylin Murguia is a 54 y.o. female who presents for cardiac consult of Hypertension (tortuous hrt on xray) and Hyperlipidemia      HPI  The patient came in today for cardiac consult of Hypertension (tortuous hrt on xray) and Hyperlipidemia    19  Jaylin Murguia is a 54 y.o. female with current medical conditions HTN, HLD, TAMIKA, asthma, Crohn's presents for initial CV evaluation of tortuous aorta, enlarged heart.     She had recent CXR with findings of enlarged heart with tortuous of dec thoracic aorta. She has leg swelling for a while, joints and legs mostly. HTN diagnosed since her 40s. She has elevated BPs at times 150s/90, feels facial flushing sensation. She does get occ SOB/palpitations thought it was due to asthma. Compliant with CPAP.     Patient feels no chest pain, no PND, no dizziness, no syncope, no CNS symptoms.    Patient has fairly good exercise tolerance. 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     Patient is compliant with medications.    ECG - NSR 1st deg AVB    FH- father - had scleroderma/HTN; brother - HTN    CXR  The lungs are clear and free of infiltrate.  No pleural effusion or pneumothorax. The heart is borderline enlarged.  There is mild tortuosity of the descending thoracic aorta.      Impression       1.  No acute cardiopulmonary process.         Past Medical History:   Diagnosis Date    Allergic rhinitis     Asthma     Crohn's disease     Ileal involvement, previously on Remicade, Asacol, Prednisone    Fibromyalgia     Hyperlipidemia     Hypertension     Migraine     Sciatica     Sleep apnea        Past Surgical History:   Procedure Laterality Date    BLADDER SURGERY      sling was created by her muscles      SECTION      COLONOSCOPY N/A 3/27/2018    Performed [facility]    COLONOSCOPY N/A 2017    [facility]yer [facility] at [facility]    COLONOSCOPY N/A 2015    [facility]    ESOPHAGOGASTRODUODENOSCOPY (EGD) N/A 5/9/2017    [facility] at [facility] END[facility]    FINGER SURGERY      joint relpacement, left hand index finger    HYSTERECTOMY      WISDOM TOOTH EXTRACTION         Social History     Tobacco Use    Smoking status: Never Smoker    Smokeless tobacco: Never Used   Substance Use Topics    Alcohol use: No    Drug use: No       Family History   Problem Relation Age of Onset    Hypertension Mother     Kidney disease Father     Scleroderma Father     Hypertension Brother     Cancer Paternal Grandmother 70        colon       Patient's Medications   New Prescriptions    No medications on file   Previous Medications    ALBUTEROL (PROVENTIL/VENTOLIN HFA) 90 MCG/ACTUATION INHALER    Inhale 2 puffs into the lungs every 4 to 6 hours as needed for Wheezing.    BENZONATATE (TESSALON) 200 MG CAPSULE    Take 1 capsule (200 mg total) by mouth 3 (three) times daily as needed for Cough.    BREO ELLIPTA 100-25 MCG/DOSE DISKUS INHALER    Inhale 1 puff into the lungs once daily.    BUTALBITAL-ACETAMINOPHEN-CAFFEINE -40 MG (FIORICET, ESGIC) -40 MG PER TABLET    take 1 tablet by mouth every 4 hours if needed for headache    CETIRIZINE (ZYRTEC) 5 MG CHEWABLE TABLET    Take 5 mg by mouth once daily.    DULOXETINE (CYMBALTA) 60 MG CAPSULE    Take 60 mg by mouth 2 (two) times daily.    DYMISTA 137-50 MCG/SPRAY SPRY NASSAL SPRAY    instill 1 spray into each nostril twice a day    ELETRIPTAN (RELPAX) 40 MG TABLET    Take 1 tablet (40 mg total) by mouth as needed.    ESTRADIOL (ESTRACE) 2 MG TABLET    Take 2 mg by mouth once daily.    LEVALBUTEROL (XOPENEX) 1.25 MG/3 ML NEBULIZER SOLUTION    Take 3 mLs (1.25 mg total) by nebulization every 4 (four) hours. Rescue    LOSARTAN-HYDROCHLOROTHIAZIDE 100-25 MG (HYZAAR) 100-25 MG PER TABLET    Take 1 tablet by mouth once daily.    MESALAMINE (PENTASA) 250 MG CPSR    Take 4 capsules (1,000 mg total) by mouth 4 (four)  times daily.    MONTELUKAST (SINGULAIR) 10 MG TABLET    Take 1 tablet (10 mg total) by mouth every evening.    NORTRIPTYLINE (PAMELOR) 25 MG CAPSULE    TAKE 2 CAPSULES BY MOUTH EVERY DAY    PANTOPRAZOLE (PROTONIX) 40 MG TABLET    Take 1 tablet (40 mg total) by mouth once daily.    PREGABALIN (LYRICA) 150 MG CAPSULE    Take 1 capsule (150 mg total) by mouth 3 (three) times daily.    PROPRANOLOL (INNOPRAN XL) 80 MG 24 HR CAPSULE    Take 1 capsule (80 mg total) by mouth every evening.    RIZATRIPTAN (MAXALT) 10 MG TABLET    TAKE 1 TABLET BY MOUTH IF NEEDED FOR MIGRAINES MAX 2 TAB IN 24 HOURS    SIMVASTATIN (ZOCOR) 20 MG TABLET    take 1 tablet by mouth every evening    TRIAMCINOLONE ACETONIDE 0.025% (KENALOG) 0.025 % CREAM    Apply topically 2 (two) times daily.    ZOLPIDEM (AMBIEN) 5 MG TAB    TAKE 1 TABLET BY MOUTH AT BEDTIME AS NEEDED   Modified Medications    No medications on file   Discontinued Medications    PREDNISONE (DELTASONE) 10 MG TABLET    Take 1 tablet (10 mg total) by mouth once daily. Take 40 mg for five days then decrease by 10 mg every 5 days until gone.       Review of Systems   Constitutional: Negative.    HENT: Negative.    Eyes: Negative.    Respiratory: Positive for shortness of breath.    Cardiovascular: Positive for palpitations and leg swelling.   Gastrointestinal: Negative.    Genitourinary: Negative.    Musculoskeletal: Negative.    Skin: Negative.    Neurological: Negative.    Endo/Heme/Allergies: Negative.    Psychiatric/Behavioral: Negative.    All 12 systems otherwise negative.      Wt Readings from Last 3 Encounters:   04/01/19 91.6 kg (202 lb)   03/15/19 90.6 kg (199 lb 11.8 oz)   03/13/19 91.5 kg (201 lb 11.5 oz)     Temp Readings from Last 3 Encounters:   03/15/19 98.2 °F (36.8 °C) (Oral)   03/13/19 99.4 °F (37.4 °C) (Oral)   08/30/18 97.6 °F (36.4 °C)     BP Readings from Last 3 Encounters:   04/01/19 136/86   03/15/19 120/84   03/13/19 124/86     Pulse Readings from Last 3  "Encounters:   04/01/19 65   03/13/19 67   02/26/19 61       /86 (BP Method: Large (Manual))   Pulse 65   Ht 5' 6" (1.676 m)   Wt 91.6 kg (202 lb)   BMI 32.60 kg/m²     Objective:   Physical Exam   Constitutional: She is oriented to person, place, and time. She appears well-developed and well-nourished. No distress.   HENT:   Head: Normocephalic and atraumatic.   Nose: Nose normal.   Mouth/Throat: Oropharynx is clear and moist.   Eyes: Conjunctivae and EOM are normal. No scleral icterus.   Neck: Normal range of motion. Neck supple. No JVD present. No thyromegaly present.   Cardiovascular: Normal rate, regular rhythm, S1 normal and S2 normal. Exam reveals no gallop, no S3, no S4 and no friction rub.   No murmur heard.  Pulmonary/Chest: Effort normal and breath sounds normal. No stridor. No respiratory distress. She has no wheezes. She has no rales. She exhibits no tenderness.   Abdominal: Soft. Bowel sounds are normal. She exhibits no distension and no mass. There is no tenderness. There is no rebound.   Genitourinary:   Genitourinary Comments: Deferred   Musculoskeletal: Normal range of motion. She exhibits no edema, tenderness or deformity.   Lymphadenopathy:     She has no cervical adenopathy.   Neurological: She is alert and oriented to person, place, and time. She exhibits normal muscle tone. Coordination normal.   Skin: Skin is warm and dry. No rash noted. She is not diaphoretic. No erythema. No pallor.   Psychiatric: She has a normal mood and affect. Her behavior is normal. Judgment and thought content normal.   Nursing note and vitals reviewed.      Lab Results   Component Value Date     12/14/2018     12/14/2018    K 4.0 12/14/2018    K 4.0 12/14/2018    CL 98 12/14/2018    CL 98 12/14/2018    CO2 28 12/14/2018    CO2 28 12/14/2018    BUN 8 12/14/2018    BUN 8 12/14/2018    CREATININE 1.0 12/14/2018    CREATININE 1.0 12/14/2018    GLU 98 12/14/2018    GLU 98 12/14/2018    HGBA1C 5.2 " 11/14/2017    AST 32 12/14/2018    ALT 34 12/14/2018    ALBUMIN 4.0 12/14/2018    ALBUMIN 4.0 12/14/2018    PROT 7.6 12/14/2018    BILITOT 0.5 12/14/2018    WBC 6.65 12/14/2018    HGB 13.1 12/14/2018    HCT 41.3 12/14/2018    MCV 96 12/14/2018     (H) 12/14/2018    INR 1.0 11/20/2014    TSH 0.863 11/14/2017    CHOL 155 11/14/2017    HDL 59 11/14/2017    LDLCALC 75.2 11/14/2017    TRIG 104 11/14/2017     Assessment:      1. Essential (primary) hypertension    2. TAMIKA (obstructive sleep apnea)    3. Other hyperlipidemia    4. Tortuous aorta        Plan:   1. HTN  - cont meds  - low salt diet    2. Tortuous Aorta  - check 2D ECHO  - cont statin    3. HLD  - cont statin    4. TAMIKA  - cont CPAP    5. Palpitations/SOB  - cont BB  - Holter if more severe     Thank you for allowing me to participate in this patient's care. Please do not hesitate to contact me with any questions or concerns. Consult note has been forwarded to the referral physician.

## 2019-04-01 NOTE — PATIENT INSTRUCTIONS
Low-Salt Diet  This diet removes foods that are high in salt. It also limits the amount of salt you use when cooking. It is most often used for people with high blood pressure, edema (fluid retention), and kidney, liver, or heart disease.  Table salt contains the mineral sodium. Your body needs sodium to work normally. But too much sodium can make your health problems worse. Your healthcare provider is recommending a low-salt (also called low-sodium) diet for you. Your total daily allowance of salt is 1,500 to 2,300 milligrams (mg). It is less than 1 teaspoon of table salt. This means you can have only about 500 to 700 mg of sodium at each meal. People with certain health problems should limit salt intake to the lower end of the recommended range.    When you cook, dont add much salt. If you can cook without using salt, even better. Dont add salt to your food at the table.  When shopping, read food labels. Salt is often called sodium on the label. Choose foods that are salt-free, low salt, or very low salt. Note that foods with reduced salt may not lower your salt intake enough.    Beans, potatoes, and pasta  Ok: Dry beans, split peas, lentils, potatoes, rice, macaroni, pasta, spaghetti without added salt  Avoid: Potato chips, tortilla chips, and similar products  Breads and cereals  Ok: Low-sodium breads, rolls, cereals, and cakes; low-salt crackers, matzo crackers  Avoid: Salted crackers, pretzels, popcorn, Serbian toast, pancakes, muffins  Dairy  Ok: Milk, chocolate milk, hot chocolate mix, low-salt cheeses, and yogurt  Avoid: Processed cheese and cheese spreads; Roquefort, Camembert, and cottage cheese; buttermilk, instant breakfast drink  Desserts  Ok: Ice cream, frozen yogurt, juice bars, gelatin, cookies and pies, sugar, honey, jelly, hard candy  Avoid: Most pies, cakes and cookies prepared or processed with salt; instant pudding  Drinks  Ok: Tea, coffee, fizzy (carbonated) drinks, juices  Avoid: Flavored  coffees, electrolyte replacement drinks, sports drinks  Meats  Ok: All fresh meat, fish, poultry, low-salt tuna, eggs, egg substitute  Avoid: Smoked, pickled, brine-cured, or salted meats and fish. This includes vega, chipped beef, corned beef, hot dogs, deli meats, ham, kosher meats, salt pork, sausage, canned tuna, salted codfish, smoked salmon, herring, sardines, or anchovies.  Seasonings and spices  Ok: Most seasonings are okay. Good substitutes for salt include: fresh herb blends, hot sauce, lemon, garlic, barajas, vinegar, dry mustard, parsley, cilantro, horseradish, tomato paste, regular margarine, mayonnaise, unsalted butter, cream cheese, vegetable oil, cream, low-salt salad dressing and gravy.  Avoid: Regular ketchup, relishes, pickles, soy sauce, teriyaki sauce, Worcestershire sauce, BBQ sauce, tartar sauce, meat tenderizer, chili sauce, regular gravy, regular salad dressing, salted butter  Soups  Ok: Low-salt soups and broths made with allowed foods  Avoid: Bouillon cubes, soups with smoked or salted meats, regular soup and broth  Vegetables  Ok: Most vegetables are okay; also low-salt tomato and vegetable juices  Avoid: Sauerkraut and other brine-soaked vegetables; pickles and other pickled vegetables; tomato juice, olives  Date Last Reviewed: 8/1/2016 © 2000-2017 [employer]. [address] [place] [address]. All rights reserved. This information is not intended as a substitute for professional medical care. Always follow your healthcare professional's instructions.          Established High Blood Pressure    High blood pressure (hypertension) is a chronic disease. Often, healthcare providers dont know what causes it. But it can be caused by certain health conditions and medicines.  If you have high blood pressure, you may not have any symptoms. If you do have symptoms, they may include headache, dizziness, changes in your vision, chest pain, and shortness of breath. But even  without symptoms, high blood pressure thats not treated raises your risk for heart attack and stroke. High blood pressure is a serious health risk and shouldnt be ignored.  A blood pressure reading is made up of two numbers: a higher number over a lower number. The top number is the systolic pressure. The bottom number is the diastolic pressure. A normal blood pressure is a systolic pressure of  less than 120 over a diastolic pressure of less than 80. You will see your blood pressure readings written together. For example, a person with a systolic pressure of 188 and a diastolic pressure of 78 will have 118/78 written in the medical record.  High blood pressure is when either the top number is 140 or higher, or the bottom number is 90 or higher. This must be the result when taking your blood pressure a number of times. The blood pressures between normal and high are called prehypertension.  Home care  If you have high blood pressure, you should do what is listed below to lower your blood pressure. If you are taking medicines for high blood pressure, these methods may reduce or end your need for medicines in the future.  · Begin a weight-loss program if you are overweight.  · Cut back on how much salt you get in your diet. Heres how to do this:  ¨ Dont eat foods that have a lot of salt. These include olives, pickles, smoked meats, and salted potato chips.  ¨ Dont add salt to your food at the table.  ¨ Use only small amounts of salt when cooking.  · Start an exercise program. Talk with your healthcare provider about the type of exercise program that would be best for you. It doesn't have to be hard. Even brisk walking for 20 minutes 3 times a week is a good form of exercise.  · Dont take medicines that stimulate the heart. This includes many over-the-counter cold and sinus decongestant pills and sprays, as well as diet pills. Check the warnings about hypertension on the label. Before buying any over-the-counter  medicines or supplements, always ask the pharmacist about the product's potential interaction with your high blood pressure and your high blood pressure medicines.  · Stimulants such as amphetamine or cocaine could be deadly for someone with high blood pressure. Never take these.  · Limit how much caffeine you get in your diet. Switch to caffeine-free products.  · Stop smoking. If you are a long-time smoker, this can be hard. Talk to your healthcare provider about medicines and nicotine replacement options to help you. Also, enroll in a stop-smoking program to make it more likely that you will quit for good.  · Learn how to handle stress. This is an important part of any program to lower blood pressure. Learn about relaxation methods like meditation, yoga, or biofeedback.  · If your provider prescribed medicines, take them exactly as directed. Missing doses may cause your blood pressure get out of control.  · If you miss a dose or doses, check with your healthcare provider or pharmacist about what to do.  · Consider buying an automatic blood pressure machine. Ask your provider for a recommendation. You can get one of these at most pharmacies.     The American Heart Association recommends the following guidelines for home blood pressure monitoring:  · Don't smoke or drink coffee for 30 minutes before taking your blood pressure.  · Go to the bathroom before the test.  · Relax for 5 minutes before taking the measurement.  · Sit with your back supported (don't sit on a couch or soft chair); keep your feet on the floor uncrossed. Place your arm on a solid flat surface (like a table) with the upper part of the arm at heart level. Place the middle of the cuff directly above the eye of the elbow. Check the monitor's instruction manual for an illustration.  · Take multiple readings. When you measure, take 2 to 3 readings one minute apart and record all of the results.  · Take your blood pressure at the same time every day,  or as your healthcare provider recommends.  · Record the date, time, and blood pressure reading.  · Take the record with you to your next medical appointment. If your blood pressure monitor has a built-in memory, simply take the monitor with you to your next appointment.  · Call your provider if you have several high readings. Don't be frightened by a single high blood pressure reading, but if you get several high readings, check in with your healthcare provider.  · Note: When blood pressure reaches a systolic (top number) of 180 or higher OR diastolic (bottom number) of 110 or higher, seek emergency medical treatment.  Follow-up care  You will need to see your healthcare provider regularly. This is to check your blood pressure and to make changes to your medicines. Make a follow-up appointment as directed. Bring the record of your home blood pressure readings to the appointment.  When to seek medical advice  Call your healthcare provider right away if any of these occur:  · Blood pressure reaches a systolic (upper number) of 180 or higher OR a diastolic (bottom number) of 110 or higher  · Chest pain or shortness of breath  · Severe headache  · Throbbing or rushing sound in the ears  · Nosebleed  · Sudden severe pain in your belly (abdomen)  · Extreme drowsiness, confusion, or fainting  · Dizziness or spinning sensation (vertigo)  · Weakness of an arm or leg or one side of the face  · You have problems speaking or seeing   Date Last Reviewed: 12/1/2016  © 4772-7143 [employer]. [address] [place] [address], [place] [address]. All rights reserved. This information is not intended as a substitute for professional medical care. Always follow your healthcare professional's instructions.

## 2019-04-12 ENCOUNTER — PATIENT MESSAGE (OUTPATIENT)
Dept: INTERNAL MEDICINE | Facility: CLINIC | Age: 54
End: 2019-04-12

## 2019-04-12 RX ORDER — ZOLPIDEM TARTRATE 5 MG/1
TABLET ORAL
Qty: 30 TABLET | Refills: 5 | Status: SHIPPED | OUTPATIENT
Start: 2019-04-12 | End: 2019-07-18 | Stop reason: SDUPTHER

## 2019-04-12 RX ORDER — ZOLPIDEM TARTRATE 5 MG/1
5 TABLET ORAL NIGHTLY PRN
Qty: 30 TABLET | Refills: 5 | OUTPATIENT
Start: 2019-04-12

## 2019-04-17 RX ORDER — MODAFINIL 200 MG/1
TABLET ORAL
Qty: 30 TABLET | Refills: 2 | Status: SHIPPED | OUTPATIENT
Start: 2019-04-17 | End: 2019-05-06

## 2019-04-22 DIAGNOSIS — J01.90 SINUSITIS, ACUTE: ICD-10-CM

## 2019-04-22 RX ORDER — SIMVASTATIN 20 MG/1
20 TABLET, FILM COATED ORAL NIGHTLY
Qty: 90 TABLET | Refills: 3 | Status: SHIPPED | OUTPATIENT
Start: 2019-04-22 | End: 2019-06-18 | Stop reason: SDUPTHER

## 2019-04-23 RX ORDER — AZELASTINE HYDROCHLORIDE, FLUTICASONE PROPIONATE 137; 50 UG/1; UG/1
1 SPRAY, METERED NASAL 2 TIMES DAILY
Qty: 23 G | Refills: 11 | Status: SHIPPED | OUTPATIENT
Start: 2019-04-23 | End: 2019-07-18 | Stop reason: SDUPTHER

## 2019-04-25 RX ORDER — BUTALBITAL, ACETAMINOPHEN AND CAFFEINE 50; 325; 40 MG/1; MG/1; MG/1
TABLET ORAL
Qty: 30 TABLET | Refills: 1 | Status: SHIPPED | OUTPATIENT
Start: 2019-04-25 | End: 2019-07-18 | Stop reason: SDUPTHER

## 2019-05-01 NOTE — PROVATION PATIENT INSTRUCTIONS
Discharge Summary/Instructions after an Endoscopic Procedure  Patient Name: Jaylin Murguia  Patient MRN: [id number]  Patient YOB: 1965 Tuesday, March 27, 2018 Kyra Vallecillo MD  RESTRICTIONS:  During your procedure today, you received medications for sedation.  These   medications may affect your judgment, balance and coordination.  Therefore,   for 24 hours, you have the following restrictions:   - DO NOT drive a car, operate machinery, make legal/financial decisions,   sign important papers or drink alcohol.    ACTIVITY:  The following day: return to full activity including work, except no heavy   lifting, straining or running for 3 days if polyps were removed.  DIET:  Eat and drink normally unless instructed otherwise.     TREATMENT FOR COMMON SIDE EFFECTS:  - Mild abdominal pain, nausea, belching, bloating or excessive gas:  rest,   eat lightly and use a heating pad.  - Sore Throat: treat with throat lozenges and/or gargle with warm salt   water.  - Because air was used during the procedure, expelling large amounts of air   from your rectum or belching is normal.  - If a bowel prep was taken, you may not have a bowel movement for 1-3 days.    This is normal.  SYMPTOMS TO WATCH FOR AND REPORT TO YOUR PHYSICIAN:  1. Abdominal pain or bloating, other than gas cramps.  2. Chest pain.  3. Back pain.  4. Signs of infection such as: chills or fever occurring within 24 hours   after the procedure.  5. Rectal bleeding, which would show as bright red, maroon, or black stools.   (A tablespoon of blood from the rectum is not serious, especially if   hemorrhoids are present.)  6. Vomiting.  7. Weakness or dizziness.  GO DIRECTLY TO THE NEAREST EMERGENCY ROOM IF YOU HAVE ANY OF THE FOLLOWING:      Difficulty breathing              Chills and/or fever over 101 F   Persistent vomiting and/or vomiting blood   Severe abdominal pain   Severe chest pain   Black, tarry stools   Bleeding- more than one  tablespoon   Any other symptom or condition that you feel may need urgent attention  Your doctor recommends these additional instructions:  If any biopsies were taken, your doctors clinic will contact you in 1 to 2   weeks with any results.  You have a contact number available for emergencies.  The signs and symptoms   of potential delayed complications were discussed with you.  You may return   to normal activities tomorrow.  Written discharge instructions were   provided to you.   Resume your previous diet.   Continue your present medications.   Your physician has recommended a repeat colonoscopy in five years for   screening purposes.   Return to your primary care physician as previously scheduled.  For questions, problems or results please call your physician Kyra Vallecillo MD at Work:  [phone]  If you have any questions about the above instructions, call the GI   department at [phone] or call the endoscopy unit at [phone]   from 7am until 3 pm.  [facility] [place], EMERGENCY ROOM PHONE NUMBER:   [phone]  IF A COMPLICATION OR EMERGENCY SITUATION ARISES AND YOU ARE UNABLE TO REACH   YOUR PHYSICIAN - GO DIRECTLY TO THE EMERGENCY ROOM.  I have read or have had read to me these discharge instructions for my   procedure and have received a written copy.  I understand these   instructions and will follow-up with my physician if I have any questions.     __________________________________       _____________________________________  Nurse Signature                                          Patient/Designated   Responsible Party Signature  Kyra Vallecillo MD  3/27/2018 9:24:10 AM  This report has been verified and signed electronically.   Biopsy Method: Dermablade

## 2019-05-06 ENCOUNTER — OFFICE VISIT (OUTPATIENT)
Dept: CARDIOLOGY | Facility: CLINIC | Age: 54
End: 2019-05-06
Payer: COMMERCIAL

## 2019-05-06 VITALS
WEIGHT: 198.19 LBS | DIASTOLIC BLOOD PRESSURE: 82 MMHG | SYSTOLIC BLOOD PRESSURE: 126 MMHG | HEART RATE: 68 BPM | BODY MASS INDEX: 31.85 KG/M2 | HEIGHT: 66 IN

## 2019-05-06 DIAGNOSIS — I35.1 MILD AORTIC INSUFFICIENCY: ICD-10-CM

## 2019-05-06 DIAGNOSIS — I77.1 TORTUOUS AORTA: ICD-10-CM

## 2019-05-06 DIAGNOSIS — G47.33 OSA (OBSTRUCTIVE SLEEP APNEA): ICD-10-CM

## 2019-05-06 DIAGNOSIS — R06.09 DYSPNEA ON EXERTION: Primary | ICD-10-CM

## 2019-05-06 DIAGNOSIS — E78.49 OTHER HYPERLIPIDEMIA: ICD-10-CM

## 2019-05-06 DIAGNOSIS — I10 ESSENTIAL (PRIMARY) HYPERTENSION: ICD-10-CM

## 2019-05-06 PROCEDURE — 3074F SYST BP LT 130 MM HG: CPT | Mod: CPTII,S$GLB,, | Performed by: INTERNAL MEDICINE

## 2019-05-06 PROCEDURE — 99214 PR OFFICE/OUTPT VISIT, EST, LEVL IV, 30-39 MIN: ICD-10-PCS | Mod: S$GLB,,, | Performed by: INTERNAL MEDICINE

## 2019-05-06 PROCEDURE — 99999 PR PBB SHADOW E&M-EST. PATIENT-LVL III: ICD-10-PCS | Mod: PBBFAC,,, | Performed by: INTERNAL MEDICINE

## 2019-05-06 PROCEDURE — 99214 OFFICE O/P EST MOD 30 MIN: CPT | Mod: S$GLB,,, | Performed by: INTERNAL MEDICINE

## 2019-05-06 PROCEDURE — 3079F PR MOST RECENT DIASTOLIC BLOOD PRESSURE 80-89 MM HG: ICD-10-PCS | Mod: CPTII,S$GLB,, | Performed by: INTERNAL MEDICINE

## 2019-05-06 PROCEDURE — 3008F PR BODY MASS INDEX (BMI) DOCUMENTED: ICD-10-PCS | Mod: CPTII,S$GLB,, | Performed by: INTERNAL MEDICINE

## 2019-05-06 PROCEDURE — 3008F BODY MASS INDEX DOCD: CPT | Mod: CPTII,S$GLB,, | Performed by: INTERNAL MEDICINE

## 2019-05-06 PROCEDURE — 3079F DIAST BP 80-89 MM HG: CPT | Mod: CPTII,S$GLB,, | Performed by: INTERNAL MEDICINE

## 2019-05-06 PROCEDURE — 3074F PR MOST RECENT SYSTOLIC BLOOD PRESSURE < 130 MM HG: ICD-10-PCS | Mod: CPTII,S$GLB,, | Performed by: INTERNAL MEDICINE

## 2019-05-06 PROCEDURE — 99999 PR PBB SHADOW E&M-EST. PATIENT-LVL III: CPT | Mod: PBBFAC,,, | Performed by: INTERNAL MEDICINE

## 2019-05-06 NOTE — PROGRESS NOTES
Subjective:   Patient ID:  Jaylin Murguai is a 54 y.o. female who presents for cardiac consult of Hypertension and Hyperlipidemia      Hypertension   Associated symptoms include palpitations and shortness of breath.   Hyperlipidemia   Associated symptoms include shortness of breath.     The patient came in today for cardiac consult of Hypertension and Hyperlipidemia      Jaylin Murguia is a 54 y.o. female with current medical conditions HTN, HLD, TAMIKA, asthma, Crohn's presents for follow up CV evaluation.      4/1/19  She had recent CXR with findings of enlarged heart with tortuous of dec thoracic aorta. She has leg swelling for a while, joints and legs mostly. HTN diagnosed since her 40s. She has elevated BPs at times 150s/90, feels facial flushing sensation. She does get occ SOB/palpitations thought it was due to asthma. Compliant with CPAP.     5/6/19  2D ECHO overall normal but mild AI. Has been checking BP frequently, a few times BP 140s/90s but overall normal. Has been taking meds BID/TID depending on BP. Occ dizziness. Has no energy at times with exertion/walking, does feel more palpitations. Once she goes back inside symptoms.     Patient feels no chest pain, no PND, no dizziness, no syncope, no CNS symptoms.    Patient has fairly good exercise tolerance. 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     Patient is compliant with medications.    ECG - NSR 1st deg AVB    FH- father - had scleroderma/HTN; brother - HTN    CXR  The lungs are clear and free of infiltrate.  No pleural effusion or pneumothorax. The heart is borderline enlarged.  There is mild tortuosity of the descending thoracic aorta.      Impression       1.  No acute cardiopulmonary process.         Past Medical History:   Diagnosis Date    Allergic rhinitis     Asthma     Crohn's disease     Ileal involvement, previously on Remicade, Asacol, Prednisone    Fibromyalgia     Hyperlipidemia     Hypertension     Migraine     Sciatica     Sleep apnea         Past Surgical History:   Procedure Laterality Date    BLADDER SURGERY      sling was created by her muscles      SECTION      COLONOSCOPY N/A 3/27/2018    [facility]    COLONOSCOPY N/A 2017    Performed by Kin Dyer MD at [facility] ENDO    COLONOSCOPY N/A 2015    [facility]    ESOPHAGOGASTRODUODENOSCOPY (EGD) N/A 2017    Per[facility] at [facility] END[facility]    FINGER SURGERY      joint relpacement, left hand index finger    HYSTERECTOMY      WISDOM TOOTH EXTRACTION         Social History     Tobacco Use    Smoking status: Never Smoker    Smokeless tobacco: Never Used   Substance Use Topics    Alcohol use: No    Drug use: No       Family History   Problem Relation Age of Onset    Hypertension Mother     Kidney disease Father     Scleroderma Father     Hypertension Brother     Cancer Paternal Grandmother 70        colon       Patient's Medications   New Prescriptions    No medications on file   Previous Medications    ALBUTEROL (PROVENTIL/VENTOLIN HFA) 90 MCG/ACTUATION INHALER    Inhale 2 puffs into the lungs every 4 to 6 hours as needed for Wheezing.    AZELASTINE-FLUTICASONE (DYMISTA) 137-50 MCG/SPRAY SPRY NASSAL SPRAY    1 spray by Each Nare route 2 (two) times daily.    BENZONATATE (TESSALON) 200 MG CAPSULE    Take 1 capsule (200 mg total) by mouth 3 (three) times daily as needed for Cough.    BREO ELLIPTA 100-25 MCG/DOSE DISKUS INHALER    Inhale 1 puff into the lungs once daily.    BUTALBITAL-ACETAMINOPHEN-CAFFEINE -40 MG (FIORICET, ESGIC) -40 MG PER TABLET    TAKE 1 TABLET BY MOUTH EVERY 4 HOURS IF NEEDED FOR HEADACHE    CETIRIZINE (ZYRTEC) 5 MG CHEWABLE TABLET    Take 5 mg by mouth once daily.    DULOXETINE (CYMBALTA) 60 MG CAPSULE    Take 60 mg by mouth 2 (two) times daily.    ELETRIPTAN (RELPAX) 40 MG TABLET    Take 1 tablet (40 mg total) by mouth as needed.     ESTRADIOL (ESTRACE) 2 MG TABLET    Take 2 mg by mouth once daily.    LEVALBUTEROL (XOPENEX) 1.25 MG/3 ML NEBULIZER SOLUTION    Take 3 mLs (1.25 mg total) by nebulization every 4 (four) hours. Rescue    LOSARTAN-HYDROCHLOROTHIAZIDE 100-25 MG (HYZAAR) 100-25 MG PER TABLET    Take 1 tablet by mouth once daily.    MESALAMINE (PENTASA) 250 MG CPSR    Take 4 capsules (1,000 mg total) by mouth 4 (four) times daily.    MONTELUKAST (SINGULAIR) 10 MG TABLET    Take 1 tablet (10 mg total) by mouth every evening.    NORTRIPTYLINE (PAMELOR) 25 MG CAPSULE    TAKE 2 CAPSULES BY MOUTH EVERY DAY    PANTOPRAZOLE (PROTONIX) 40 MG TABLET    Take 1 tablet (40 mg total) by mouth once daily.    PREGABALIN (LYRICA) 150 MG CAPSULE    Take 1 capsule (150 mg total) by mouth 3 (three) times daily.    PROPRANOLOL (INNOPRAN XL) 80 MG 24 HR CAPSULE    Take 1 capsule (80 mg total) by mouth every evening.    RIZATRIPTAN (MAXALT) 10 MG TABLET    TAKE 1 TABLET BY MOUTH IF NEEDED FOR MIGRAINES MAX 2 TAB IN 24 HOURS    SIMVASTATIN (ZOCOR) 20 MG TABLET    Take 1 tablet (20 mg total) by mouth every evening.    TRIAMCINOLONE ACETONIDE 0.025% (KENALOG) 0.025 % CREAM    Apply topically 2 (two) times daily.    ZOLPIDEM (AMBIEN) 5 MG TAB    TAKE 1 TABLET BY MOUTH EVERY DAY AT BEDTIME AS NEEDED   Modified Medications    No medications on file   Discontinued Medications    MODAFINIL (PROVIGIL) 200 MG TAB    TAKE 1 TABLET BY MOUTH EVERY DAY       Review of Systems   Constitutional: Negative.    HENT: Negative.    Eyes: Negative.    Respiratory: Positive for shortness of breath.    Cardiovascular: Positive for palpitations and leg swelling.   Gastrointestinal: Negative.    Genitourinary: Negative.    Musculoskeletal: Negative.    Skin: Negative.    Neurological: Negative.    Endo/Heme/Allergies: Negative.    Psychiatric/Behavioral: Negative.    All 12 systems otherwise negative.      Wt Readings from Last 3 Encounters:   05/06/19 89.9 kg (198 lb 3.1 oz)  "  04/01/19 91.6 kg (202 lb)   03/15/19 90.6 kg (199 lb 11.8 oz)     Temp Readings from Last 3 Encounters:   03/15/19 98.2 °F (36.8 °C) (Oral)   03/13/19 99.4 °F (37.4 °C) (Oral)   08/30/18 97.6 °F (36.4 °C)     BP Readings from Last 3 Encounters:   05/06/19 126/82   04/01/19 136/86   03/15/19 120/84     Pulse Readings from Last 3 Encounters:   05/06/19 68   04/01/19 65   03/13/19 67       /82 (BP Location: Left arm, Patient Position: Sitting, BP Method: Large (Manual))   Pulse 68   Ht 5' 6" (1.676 m)   Wt 89.9 kg (198 lb 3.1 oz)   BMI 31.99 kg/m²     Objective:   Physical Exam   Constitutional: She is oriented to person, place, and time. She appears well-developed and well-nourished. No distress.   HENT:   Head: Normocephalic and atraumatic.   Nose: Nose normal.   Mouth/Throat: Oropharynx is clear and moist.   Eyes: Conjunctivae and EOM are normal. No scleral icterus.   Neck: Normal range of motion. Neck supple. No JVD present. No thyromegaly present.   Cardiovascular: Normal rate, regular rhythm, S1 normal and S2 normal. Exam reveals no gallop, no S3, no S4 and no friction rub.   No murmur heard.  Pulmonary/Chest: Effort normal and breath sounds normal. No stridor. No respiratory distress. She has no wheezes. She has no rales. She exhibits no tenderness.   Abdominal: Soft. Bowel sounds are normal. She exhibits no distension and no mass. There is no tenderness. There is no rebound.   Genitourinary:   Genitourinary Comments: Deferred   Musculoskeletal: Normal range of motion. She exhibits no edema, tenderness or deformity.   Lymphadenopathy:     She has no cervical adenopathy.   Neurological: She is alert and oriented to person, place, and time. She exhibits normal muscle tone. Coordination normal.   Skin: Skin is warm and dry. No rash noted. She is not diaphoretic. No erythema. No pallor.   Psychiatric: She has a normal mood and affect. Her behavior is normal. Judgment and thought content normal.   Nursing " note and vitals reviewed.      Lab Results   Component Value Date     12/14/2018     12/14/2018    K 4.0 12/14/2018    K 4.0 12/14/2018    CL 98 12/14/2018    CL 98 12/14/2018    CO2 28 12/14/2018    CO2 28 12/14/2018    BUN 8 12/14/2018    BUN 8 12/14/2018    CREATININE 1.0 12/14/2018    CREATININE 1.0 12/14/2018    GLU 98 12/14/2018    GLU 98 12/14/2018    HGBA1C 5.2 11/14/2017    AST 32 12/14/2018    ALT 34 12/14/2018    ALBUMIN 4.0 12/14/2018    ALBUMIN 4.0 12/14/2018    PROT 7.6 12/14/2018    BILITOT 0.5 12/14/2018    WBC 6.65 12/14/2018    HGB 13.1 12/14/2018    HCT 41.3 12/14/2018    MCV 96 12/14/2018     (H) 12/14/2018    INR 1.0 11/20/2014    TSH 0.863 11/14/2017    CHOL 155 11/14/2017    HDL 59 11/14/2017    LDLCALC 75.2 11/14/2017    TRIG 104 11/14/2017     Assessment:      1. Dyspnea on exertion    2. Essential (primary) hypertension    3. Other hyperlipidemia    4. Tortuous aorta    5. TAMIKA (obstructive sleep apnea)    6. Mild aortic insufficiency        Plan:   1. HTN  - cont meds  - low salt diet    2. Tortuous Aorta with mild AI   - 2D ECHO - overall normal, mild AI  - cont statin    3. HLD  - cont statin    4. TAMIKA  - cont CPAP    5. Palpitations/SOB  - cont BB  - Holter if more severe   - stress test ordered with oxygen sats    Thank you for allowing me to participate in this patient's care. Please do not hesitate to contact me with any questions or concerns. Consult note has been forwarded to the referral physician.

## 2019-05-07 RX ORDER — NORTRIPTYLINE HYDROCHLORIDE 25 MG/1
CAPSULE ORAL
Qty: 60 CAPSULE | Refills: 0 | Status: SHIPPED | OUTPATIENT
Start: 2019-05-07 | End: 2019-05-30 | Stop reason: SDUPTHER

## 2019-05-08 ENCOUNTER — PATIENT MESSAGE (OUTPATIENT)
Dept: INTERNAL MEDICINE | Facility: CLINIC | Age: 54
End: 2019-05-08

## 2019-05-08 ENCOUNTER — PATIENT MESSAGE (OUTPATIENT)
Dept: CARDIOLOGY | Facility: CLINIC | Age: 54
End: 2019-05-08

## 2019-05-14 ENCOUNTER — CLINICAL SUPPORT (OUTPATIENT)
Dept: CARDIOLOGY | Facility: CLINIC | Age: 54
End: 2019-05-14
Attending: INTERNAL MEDICINE
Payer: COMMERCIAL

## 2019-05-14 DIAGNOSIS — I10 ESSENTIAL (PRIMARY) HYPERTENSION: ICD-10-CM

## 2019-05-14 DIAGNOSIS — R06.09 DYSPNEA ON EXERTION: ICD-10-CM

## 2019-05-14 LAB — DIASTOLIC DYSFUNCTION: NO

## 2019-05-14 PROCEDURE — 93015 CARDIAC TREADMILL STRESS TEST: ICD-10-PCS | Mod: S$GLB,,, | Performed by: INTERNAL MEDICINE

## 2019-05-14 PROCEDURE — 93015 CV STRESS TEST SUPVJ I&R: CPT | Mod: S$GLB,,, | Performed by: INTERNAL MEDICINE

## 2019-05-20 RX ORDER — NORTRIPTYLINE HYDROCHLORIDE 25 MG/1
CAPSULE ORAL
Qty: 60 CAPSULE | Refills: 0 | Status: SHIPPED | OUTPATIENT
Start: 2019-05-20 | End: 2019-07-02 | Stop reason: SDUPTHER

## 2019-05-23 ENCOUNTER — PATIENT MESSAGE (OUTPATIENT)
Dept: CARDIOLOGY | Facility: CLINIC | Age: 54
End: 2019-05-23

## 2019-05-27 ENCOUNTER — PATIENT MESSAGE (OUTPATIENT)
Dept: INTERNAL MEDICINE | Facility: CLINIC | Age: 54
End: 2019-05-27

## 2019-05-27 DIAGNOSIS — M79.7 FIBROMYALGIA: ICD-10-CM

## 2019-05-27 RX ORDER — PREGABALIN 150 MG/1
150 CAPSULE ORAL 3 TIMES DAILY
Qty: 90 CAPSULE | Refills: 0 | Status: SHIPPED | OUTPATIENT
Start: 2019-05-27 | End: 2019-06-28 | Stop reason: SDUPTHER

## 2019-05-28 ENCOUNTER — OFFICE VISIT (OUTPATIENT)
Dept: CARDIOLOGY | Facility: CLINIC | Age: 54
End: 2019-05-28
Payer: COMMERCIAL

## 2019-05-28 VITALS
SYSTOLIC BLOOD PRESSURE: 124 MMHG | WEIGHT: 202.19 LBS | BODY MASS INDEX: 32.49 KG/M2 | HEIGHT: 66 IN | DIASTOLIC BLOOD PRESSURE: 86 MMHG | HEART RATE: 68 BPM

## 2019-05-28 DIAGNOSIS — I10 ESSENTIAL (PRIMARY) HYPERTENSION: Primary | ICD-10-CM

## 2019-05-28 DIAGNOSIS — I77.1 TORTUOUS AORTA: ICD-10-CM

## 2019-05-28 DIAGNOSIS — G47.33 OSA (OBSTRUCTIVE SLEEP APNEA): ICD-10-CM

## 2019-05-28 DIAGNOSIS — M79.7 FIBROMYALGIA: ICD-10-CM

## 2019-05-28 DIAGNOSIS — I35.1 MILD AORTIC INSUFFICIENCY: ICD-10-CM

## 2019-05-28 DIAGNOSIS — E78.49 OTHER HYPERLIPIDEMIA: ICD-10-CM

## 2019-05-28 PROCEDURE — 3079F PR MOST RECENT DIASTOLIC BLOOD PRESSURE 80-89 MM HG: ICD-10-PCS | Mod: CPTII,S$GLB,, | Performed by: PHYSICIAN ASSISTANT

## 2019-05-28 PROCEDURE — 3074F SYST BP LT 130 MM HG: CPT | Mod: CPTII,S$GLB,, | Performed by: PHYSICIAN ASSISTANT

## 2019-05-28 PROCEDURE — 3008F PR BODY MASS INDEX (BMI) DOCUMENTED: ICD-10-PCS | Mod: CPTII,S$GLB,, | Performed by: PHYSICIAN ASSISTANT

## 2019-05-28 PROCEDURE — 99999 PR PBB SHADOW E&M-EST. PATIENT-LVL IV: ICD-10-PCS | Mod: PBBFAC,,, | Performed by: PHYSICIAN ASSISTANT

## 2019-05-28 PROCEDURE — 99999 PR PBB SHADOW E&M-EST. PATIENT-LVL IV: CPT | Mod: PBBFAC,,, | Performed by: PHYSICIAN ASSISTANT

## 2019-05-28 PROCEDURE — 3074F PR MOST RECENT SYSTOLIC BLOOD PRESSURE < 130 MM HG: ICD-10-PCS | Mod: CPTII,S$GLB,, | Performed by: PHYSICIAN ASSISTANT

## 2019-05-28 PROCEDURE — 99214 PR OFFICE/OUTPT VISIT, EST, LEVL IV, 30-39 MIN: ICD-10-PCS | Mod: S$GLB,,, | Performed by: PHYSICIAN ASSISTANT

## 2019-05-28 PROCEDURE — 99214 OFFICE O/P EST MOD 30 MIN: CPT | Mod: S$GLB,,, | Performed by: PHYSICIAN ASSISTANT

## 2019-05-28 PROCEDURE — 3079F DIAST BP 80-89 MM HG: CPT | Mod: CPTII,S$GLB,, | Performed by: PHYSICIAN ASSISTANT

## 2019-05-28 PROCEDURE — 3008F BODY MASS INDEX DOCD: CPT | Mod: CPTII,S$GLB,, | Performed by: PHYSICIAN ASSISTANT

## 2019-05-28 RX ORDER — AMLODIPINE BESYLATE 2.5 MG/1
2.5 TABLET ORAL DAILY
Qty: 30 TABLET | Refills: 3 | Status: SHIPPED | OUTPATIENT
Start: 2019-05-28 | End: 2019-05-28 | Stop reason: SDUPTHER

## 2019-05-28 RX ORDER — AMLODIPINE BESYLATE 2.5 MG/1
2.5 TABLET ORAL DAILY
Qty: 30 TABLET | Refills: 3 | Status: SHIPPED | OUTPATIENT
Start: 2019-05-28 | End: 2019-06-11 | Stop reason: SDUPTHER

## 2019-05-28 NOTE — PROGRESS NOTES
Subjective:    Patient ID:  Jaylin Murguia is a 54 y.o. female who presents for follow-up of Hypertension       HPI   Ms. Murguia is a 54 year old female patient whose current medical conditions include HTN, hyperlipidemia, TAMIKA, asthma, and Crohn's disease who presents today for follow-up. She returns today and states she is doing ok. Complains of elevated BP over the past few weeks. Notices spikes mostly in afternoon after lunch. Reviewed log. BP ranging from 140's-150's/80's-90's. Generally feels flushed when BP spikes and gets migraines/heachaches. Other associated symptoms include fatigue. Other than the above, patient seems to be stable from CV standpoint. Does admit to some SOB/congestion secondary to asthma. No recent chest pain episodes. Occasional positional dizziness. No near syncope or syncope. BP ok today in office. Patient reports compliance with her medications and is being mindful of her salt intake. Stress test and cardiac treadmill stress test done earlier this year WNL.    Review of Systems   Constitution: Positive for malaise/fatigue. Negative for chills, decreased appetite and fever.   HENT: Positive for congestion. Negative for hoarse voice and sore throat.    Eyes: Negative for blurred vision and discharge.   Cardiovascular: Negative for chest pain, claudication, cyanosis, dyspnea on exertion, irregular heartbeat, leg swelling, near-syncope, orthopnea, palpitations and paroxysmal nocturnal dyspnea.   Respiratory: Negative for cough, hemoptysis, shortness of breath, snoring, sputum production and wheezing.    Endocrine: Negative for cold intolerance and heat intolerance.   Hematologic/Lymphatic: Negative for bleeding problem. Does not bruise/bleed easily.   Skin: Negative for rash.   Musculoskeletal: Negative for arthritis, back pain, joint pain, joint swelling, muscle cramps, muscle weakness and myalgias.   Gastrointestinal: Negative for abdominal pain, constipation, diarrhea, heartburn,  "melena and nausea.   Genitourinary: Negative for hematuria.   Neurological: Positive for headaches. Negative for dizziness, focal weakness, light-headedness, loss of balance, numbness, paresthesias, seizures and weakness.   Psychiatric/Behavioral: Negative for memory loss. The patient does not have insomnia.    Allergic/Immunologic: Negative for hives.     /86 (BP Location: Right arm, Patient Position: Sitting, BP Method: Large (Manual))   Pulse 68   Ht 5' 6" (1.676 m)   Wt 91.7 kg (202 lb 2.6 oz)   BMI 32.63 kg/m²     Objective:    Physical Exam   Constitutional: She is oriented to person, place, and time. She appears well-developed and well-nourished. No distress.   HENT:   Head: Normocephalic and atraumatic.   Eyes: Pupils are equal, round, and reactive to light. Right eye exhibits no discharge. Left eye exhibits no discharge.   Neck: Neck supple. No JVD present.   Cardiovascular: Normal rate, regular rhythm, S1 normal, S2 normal and normal heart sounds.   No murmur heard.  Pulmonary/Chest: Effort normal and breath sounds normal. No respiratory distress. She has no wheezes. She has no rales.   Abdominal: Soft. She exhibits no distension.   Musculoskeletal: She exhibits no edema.   Neurological: She is alert and oriented to person, place, and time.   Skin: Skin is warm and dry. She is not diaphoretic. No erythema.   Psychiatric: She has a normal mood and affect. Her behavior is normal. Thought content normal.   Nursing note and vitals reviewed.    2D Echo CONCLUSIONS     1 - Mild left atrial enlargement.     2 - Concentric hypertrophy.     3 - No wall motion abnormalities.     4 - Normal left ventricular systolic function (EF 60-65%).     5 - Normal left ventricular diastolic function.     6 - Normal right ventricular systolic function .     7 - The estimated PA systolic pressure is 28 mmHg.     8 - Trivial to mild aortic regurgitation.     9 - Trivial to mild mitral regurgitation.     . The EKG portion " of this study is negative for ischemia at a moderate workload, and peak heart rate of 127 bpm (80% of predicted).   2. Exercise capacity is average.   3. Blood pressure response to exercise was normal (Presenting BP: 129/84 Peak BP: 150/78).   4. No significant arrhythmias were present.   5. There were no symptoms of chest discomfort or significant dyspnea throughout the protocol.   6. The Duke treadmill score was 7 suggesting a low probability for future cardiovascular events.  Assessment:       1. Essential (primary) hypertension    2. Mild aortic insufficiency    3. Other hyperlipidemia    4. Tortuous aorta    5. TAMIKA (obstructive sleep apnea)    6. Fibromyalgia      Patient presents for f/u. Doing ok but having issues with fluctuating/elevated BP. Discussed watchful/waiting vs initiation of new med. Patient feels bad and would like to try new medication. Start amlodipine 2.5 mg daily around noontime and assess response. Continue other meds and low salt diet.  Plan:   -Add amlodipine 2.5 mg daily  -Continue other meds  -Cardiac low salt diet  -Continue exercising  -RTC 4-5 weeks for re-check, bring BP log    Chart reviewed. Dr. Clark agrees with plan as outlined above.

## 2019-05-30 RX ORDER — NORTRIPTYLINE HYDROCHLORIDE 25 MG/1
CAPSULE ORAL
Qty: 60 CAPSULE | Refills: 2 | Status: SHIPPED | OUTPATIENT
Start: 2019-05-30 | End: 2019-06-26 | Stop reason: SDUPTHER

## 2019-06-07 DIAGNOSIS — J10.1 INFLUENZA A: ICD-10-CM

## 2019-06-10 ENCOUNTER — PATIENT MESSAGE (OUTPATIENT)
Dept: INTERNAL MEDICINE | Facility: CLINIC | Age: 54
End: 2019-06-10

## 2019-06-10 RX ORDER — PROMETHAZINE HYDROCHLORIDE AND DEXTROMETHORPHAN HYDROBROMIDE 6.25; 15 MG/5ML; MG/5ML
5 SYRUP ORAL NIGHTLY PRN
Qty: 180 ML | Refills: 0 | OUTPATIENT
Start: 2019-06-10 | End: 2019-06-20

## 2019-06-11 ENCOUNTER — TELEPHONE (OUTPATIENT)
Dept: CARDIOLOGY | Facility: CLINIC | Age: 54
End: 2019-06-11

## 2019-06-11 ENCOUNTER — OFFICE VISIT (OUTPATIENT)
Dept: ALLERGY | Facility: CLINIC | Age: 54
End: 2019-06-11
Payer: COMMERCIAL

## 2019-06-11 VITALS
SYSTOLIC BLOOD PRESSURE: 100 MMHG | BODY MASS INDEX: 31.49 KG/M2 | DIASTOLIC BLOOD PRESSURE: 70 MMHG | HEART RATE: 70 BPM | WEIGHT: 200.63 LBS | HEIGHT: 67 IN | RESPIRATION RATE: 15 BRPM | TEMPERATURE: 97 F

## 2019-06-11 DIAGNOSIS — K50.90 CROHN'S DISEASE WITHOUT COMPLICATION, UNSPECIFIED GASTROINTESTINAL TRACT LOCATION: ICD-10-CM

## 2019-06-11 DIAGNOSIS — I10 ESSENTIAL (PRIMARY) HYPERTENSION: ICD-10-CM

## 2019-06-11 DIAGNOSIS — G47.33 OSA (OBSTRUCTIVE SLEEP APNEA): ICD-10-CM

## 2019-06-11 DIAGNOSIS — J30.89 NON-SEASONAL ALLERGIC RHINITIS DUE TO OTHER ALLERGIC TRIGGER: ICD-10-CM

## 2019-06-11 DIAGNOSIS — K21.9 GASTROESOPHAGEAL REFLUX DISEASE, ESOPHAGITIS PRESENCE NOT SPECIFIED: ICD-10-CM

## 2019-06-11 DIAGNOSIS — J45.30 MILD PERSISTENT ASTHMA WITHOUT STATUS ASTHMATICUS WITHOUT COMPLICATION: ICD-10-CM

## 2019-06-11 DIAGNOSIS — R05.9 COUGH: Primary | ICD-10-CM

## 2019-06-11 PROCEDURE — 3008F BODY MASS INDEX DOCD: CPT | Mod: CPTII,S$GLB,, | Performed by: ALLERGY & IMMUNOLOGY

## 2019-06-11 PROCEDURE — 3078F PR MOST RECENT DIASTOLIC BLOOD PRESSURE < 80 MM HG: ICD-10-PCS | Mod: CPTII,S$GLB,, | Performed by: ALLERGY & IMMUNOLOGY

## 2019-06-11 PROCEDURE — 99999 PR PBB SHADOW E&M-EST. PATIENT-LVL III: ICD-10-PCS | Mod: PBBFAC,,, | Performed by: ALLERGY & IMMUNOLOGY

## 2019-06-11 PROCEDURE — 3074F SYST BP LT 130 MM HG: CPT | Mod: CPTII,S$GLB,, | Performed by: ALLERGY & IMMUNOLOGY

## 2019-06-11 PROCEDURE — 99999 PR PBB SHADOW E&M-EST. PATIENT-LVL III: CPT | Mod: PBBFAC,,, | Performed by: ALLERGY & IMMUNOLOGY

## 2019-06-11 PROCEDURE — 3008F PR BODY MASS INDEX (BMI) DOCUMENTED: ICD-10-PCS | Mod: CPTII,S$GLB,, | Performed by: ALLERGY & IMMUNOLOGY

## 2019-06-11 PROCEDURE — 3078F DIAST BP <80 MM HG: CPT | Mod: CPTII,S$GLB,, | Performed by: ALLERGY & IMMUNOLOGY

## 2019-06-11 PROCEDURE — 3074F PR MOST RECENT SYSTOLIC BLOOD PRESSURE < 130 MM HG: ICD-10-PCS | Mod: CPTII,S$GLB,, | Performed by: ALLERGY & IMMUNOLOGY

## 2019-06-11 PROCEDURE — 99215 OFFICE O/P EST HI 40 MIN: CPT | Mod: S$GLB,,, | Performed by: ALLERGY & IMMUNOLOGY

## 2019-06-11 PROCEDURE — 99215 PR OFFICE/OUTPT VISIT, EST, LEVL V, 40-54 MIN: ICD-10-PCS | Mod: S$GLB,,, | Performed by: ALLERGY & IMMUNOLOGY

## 2019-06-11 RX ORDER — AMLODIPINE BESYLATE 2.5 MG/1
TABLET ORAL
Qty: 30 TABLET | Refills: 2 | Status: SHIPPED | OUTPATIENT
Start: 2019-06-11 | End: 2019-07-18 | Stop reason: SDUPTHER

## 2019-06-11 NOTE — PROGRESS NOTES
Chief complaint:  Allergies, troublesome respiratory symptoms, cough    This note was dictated using voice recognition software and may contain errors.    History:    She had a 9:00 a.m. appointment on Tuesday June 11, 2019.  Information in her medical record regarding her past medical history, family history, and social history was reviewed and updated today.  Significant additions if any are as noted below.    She had an appointment with me in July 27, 2018.  Information in my note was reviewed and updated today.  Significant additions if any are as noted below.    She is experiencing a troublesome cough.  The cough may be productive of green sputum in the morning.  As the day progresses the sputum may become yellow in upon occasion become white.    She stated asthma was diagnosed 15 years ago.  Currently she is using Breo on a daily basis.    She is taking medication for treatment of GE reflux.    She is receiving CPAP therapy for treatment of her obstructive sleep apnea.    She has Crohn's disease which currently is not causing many symptoms.  In the past she received treatment with Humira.    Family history is positive for rhinitis.  Her son had an appointment with me in December of 2018.  Laboratory tests performed for him revealed that he has Hashimoto's thyroid disorder.    Review of systems:  See above.  Her respiratory symptoms are her greatest concern at this time.  She stated her treatment for GE reflux is helpful.    Past medical history:  See above.  In the past she received allergen immunotherapy from approximately 2013 until 2017.  She thought immunotherapy was helpful.  She is currently taking propranolol for treatment of cardiovascular concerns and migraine prevention.    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    Exam:    In general she is in no distress.  She is not toxic.  She is alert oriented  well-developed in good mood and attentive in well groomed  Gait steady  Head no swelling noted  Skin no rash noted  Eyes scleral white conjunctiva pink  Nose patent no polyp seen  Mouth no inflammation or swelling of the lips tongue or in the throat noted  Ears not inflamed tympanic membranes not inflamed  Neck no masses or thyromegaly noted  Lymph nodes no significant cervical or epitrochlear lymphadenopathy noted  Lungs clear to auscultation.  Occasional inspiratory squeaks are heard posteriorly, bilaterally.  No wheezing heard.  One inspiratory squeak heard over the right anterior chest.  Good air exchange present  Heart regular rhythm no murmurs heard  Extremities no swelling or inflammation of the hands legs noted    Impression:    1.  Cough, possibly multifactorial in origin  2.  Bronchial asthma  3.  GE reflux  4.  Migraine headaches  5.  Obstructive sleep apnea being treated with CPAP therapy  6.  Other health concerns as noted in her medical record    Assessment plan:    I told her it is my recommendation that a CT scan of the paranasal sinuses be performed to document the presence or absence of sinus disease at this time.  I reviewed with her that sinus disease may provoke coughing and wheezing and may make it more difficult to control asthma.  When the results of the CT scan are available to review with her I will do so.  Additional recommendations may be made at that time.    I emphasized to her that GE reflux may provoke respiratory symptoms, both upper and lower and some individuals.  I reviewed with her that GE reflux may provoke asthma and some individuals.  I emphasized the importance of keeping her GE reflux under good control.    As noted above, she does use CPAP therapy for treatment of her obstructive sleep apnea.    Using anatomical teaching models of the head neck chest and abdomen I reviewed anatomy and pathophysiology with her.    Her appointment was 70 min in duration spent entirely in  face-to-face contact.  More than 50% of the visit was spent in counseling and coordination of care.

## 2019-06-12 NOTE — TELEPHONE ENCOUNTER
----- Message from Margaret Cm sent at 6/12/2019  2:17 PM CDT -----  Contact: Patient  Type:  Patient Returning Call    Who Called:Patient  Who Left Message for Patient:nurse  Does the patient know what this is regarding?:  Would the patient rather a call back or a response via [facility]? call  Best Call Back Number:[phone]  Additional Information:

## 2019-06-12 NOTE — TELEPHONE ENCOUNTER
Spoke with pt states bp seems to be doing fine states she did have a couple days when bp was elevated but states she feels she's on the correct dose of amlodipine.       Pt states highest bp has gotten was 138/92 states that day she had a lot going on.

## 2019-06-13 ENCOUNTER — HOSPITAL ENCOUNTER (OUTPATIENT)
Dept: RADIOLOGY | Facility: HOSPITAL | Age: 54
Discharge: HOME | End: 2019-06-13
Attending: ALLERGY & IMMUNOLOGY
Payer: COMMERCIAL

## 2019-06-13 ENCOUNTER — TELEPHONE (OUTPATIENT)
Dept: ALLERGY | Facility: CLINIC | Age: 54
End: 2019-06-13

## 2019-06-13 DIAGNOSIS — J30.89 NON-SEASONAL ALLERGIC RHINITIS DUE TO OTHER ALLERGIC TRIGGER: ICD-10-CM

## 2019-06-13 DIAGNOSIS — R05.9 COUGH: ICD-10-CM

## 2019-06-13 DIAGNOSIS — J45.30 MILD PERSISTENT ASTHMA WITHOUT STATUS ASTHMATICUS WITHOUT COMPLICATION: ICD-10-CM

## 2019-06-13 PROCEDURE — 70486 CT MAXILLOFACIAL W/O DYE: CPT | Mod: 26,,, | Performed by: RADIOLOGY

## 2019-06-13 PROCEDURE — 70486 CT SINUSES WITHOUT CONTRAST: ICD-10-PCS | Mod: 26,,, | Performed by: RADIOLOGY

## 2019-06-13 PROCEDURE — 70486 CT MAXILLOFACIAL W/O DYE: CPT | Mod: TC

## 2019-06-13 NOTE — TELEPHONE ENCOUNTER
I completed my telephone conversation with her at 1:55 p.m. on Thursday June 13, 2019.  I reviewed the results of the CT scan of the paranasal sinuses performed earlier today with her.  The scan was interpreted as revealing pan sinusitis.  I suggested she consider obtaining ENT consultation.  This is agreeable with her.  She requested that I assist in arranging an appointment for consultation.  I told her I would be happy to do so and will speak with Dr. Manish Shaffer.    This note was dictated using voice recognition software and may contain errors.

## 2019-06-14 ENCOUNTER — TELEPHONE (OUTPATIENT)
Dept: ALLERGY | Facility: CLINIC | Age: 54
End: 2019-06-14

## 2019-06-14 DIAGNOSIS — J32.4 CHRONIC PANSINUSITIS: Primary | ICD-10-CM

## 2019-06-14 RX ORDER — AMOXICILLIN AND CLAVULANATE POTASSIUM 875; 125 MG/1; MG/1
1 TABLET, FILM COATED ORAL EVERY 12 HOURS
Qty: 42 TABLET | Refills: 0 | Status: SHIPPED | OUTPATIENT
Start: 2019-06-14 | End: 2019-06-24 | Stop reason: SINTOL

## 2019-06-14 NOTE — TELEPHONE ENCOUNTER
This note was dictated using voice recognition software and may contain errors.    Please refer to my note of today's date, June 14, 2019.  I spoke with her on the telephone this morning and reviewed the results of the CT scan of the paranasal sinuses.  The scan was performed yesterday, June 13.    She is leaving town this weekend and will be returning on Sunday June 16.  We have agreed on June 17 she will begin antibiotic treatment for her sinusitis.    In the past she has taken amoxicillin and Augmentin and has tolerated these medications.  Potential side effects of Augmentin therapy were discussed.  She stated in the past she does have a tendency to develop vaginal yeast infections when she takes antibiotics.    She requested that I issue a prescription today.  At her request a prescription for Augmentin 875 mg, 42 pills will be transmitted to her pharmacy.  On June 17 she will begin taking 1 pill every 12 hr.  Additional recommendations will be made based upon her clinical course.    Should she have additional questions or concerns she was instructed to call.

## 2019-06-14 NOTE — TELEPHONE ENCOUNTER
I completed my telephone conversation with her at 9:53 a.m. on Friday June 14, 2019.  I told her I did have the opportunity to speak with Dr. Shaffer and review her CT scan of the paranasal sinuses with him.  Initially, he has suggested antibiotic therapy for treatment of sinusitis.

## 2019-06-17 ENCOUNTER — TELEPHONE (OUTPATIENT)
Dept: ALLERGY | Facility: CLINIC | Age: 54
End: 2019-06-17

## 2019-06-17 NOTE — TELEPHONE ENCOUNTER
This note was dictated using voice recognition software and may contain errors.    I completed my telephone conversation with her at 1:34 p.m. on Monday June 17, 2019.  She stated that she has obtained her prescription for Augmentin.  She would begin taking the antibiotic today.  I requested she call June 21 and report upon her status, sooner if needed.

## 2019-06-18 RX ORDER — SIMVASTATIN 20 MG/1
20 TABLET, FILM COATED ORAL NIGHTLY
Qty: 90 TABLET | Refills: 3 | Status: SHIPPED | OUTPATIENT
Start: 2019-06-18 | End: 2019-07-18 | Stop reason: SDUPTHER

## 2019-06-19 ENCOUNTER — TELEPHONE (OUTPATIENT)
Dept: ALLERGY | Facility: CLINIC | Age: 54
End: 2019-06-19

## 2019-06-19 ENCOUNTER — PATIENT MESSAGE (OUTPATIENT)
Dept: GASTROENTEROLOGY | Facility: CLINIC | Age: 54
End: 2019-06-19

## 2019-06-19 DIAGNOSIS — J45.909 ASTHMA, UNSPECIFIED ASTHMA SEVERITY, UNSPECIFIED WHETHER COMPLICATED, UNSPECIFIED WHETHER PERSISTENT: ICD-10-CM

## 2019-06-19 RX ORDER — NORTRIPTYLINE HYDROCHLORIDE 25 MG/1
CAPSULE ORAL
Qty: 60 CAPSULE | Refills: 0 | Status: SHIPPED | OUTPATIENT
Start: 2019-06-19 | End: 2019-07-02 | Stop reason: SDUPTHER

## 2019-06-19 RX ORDER — ALBUTEROL SULFATE 90 UG/1
AEROSOL, METERED RESPIRATORY (INHALATION)
Qty: 18 INHALER | Refills: 1 | Status: SHIPPED | OUTPATIENT
Start: 2019-06-19 | End: 2023-10-11

## 2019-06-19 NOTE — TELEPHONE ENCOUNTER
----- Message from Nicole Nowak sent at 6/19/2019 10:02 AM CDT -----  Contact: pt   Type:  Needs Medical Advice    Who Called: Jaylin Murguia   Symptoms (please be specific): upset stomach   How long has patient had these symptoms:  mONDAY  Pharmacy name and phone #:      [id number] - [place] - [address] [place] [address] [address]  Phone: [phone] Fax: [phone]  Would the patient rather a call back or a response via [facility]? Call  Best Call Back Number: [phone]  Additional Information: Pt believes it is because of the antibiotic

## 2019-06-19 NOTE — TELEPHONE ENCOUNTER
This note was dictated using voice recognition software and may contain errors.    I completed my telephone conversation with her at 5:37 p.m. on Wednesday June 19, 2019.    She began taking Augmentin Monday morning June 17.  She stated she began to experience diarrhea the late afternoon or evening of June 17.  Diarrhea has persisted.  She all this morning to informed me of the above.  I recommended that she stop taking Augmentin.  I informed her of my absence from the clinic on June 20.  I requested she call the morning of June 21 and report upon her status.  She stated that she is attempting to contact staff in the gastroenterology department regarding her diarrhea.  She has a history of Crohn's disease.

## 2019-06-21 ENCOUNTER — TELEPHONE (OUTPATIENT)
Dept: ALLERGY | Facility: CLINIC | Age: 54
End: 2019-06-21

## 2019-06-21 NOTE — TELEPHONE ENCOUNTER
----- Message from Ana María Mccoy sent at 6/21/2019  8:46 AM CDT -----  Contact: Pt   Pt is calling .Type:  Needs Medical Advice    Who Called:  Pt   Symptoms (please be specific):  Pt is calling with an update ion medication amoxicillin-clavulanate 875-125mg (AUGMENTIN) 875-125 mg per tablet, to which Dr. Meek requested that pt call to give an update on medication   How long has patient had these symptoms:  Pt states that she is doing much bnetter off of the amoxicillin-clavulanate 875-125mg (AUGMENTIN) 875-125 mg per tablet and would like to speak with nurse in regards to starting a new medication.   Pharmacy name and phone #:  .  [facility]/pharmacy #[id number] - [place] - [address] [place] [address] [address] [address]  Phone: [phone] Fax: [phone]  Would the patient rather a call back or a response via [facility]? Call Back   Best Call Back Number:  [phone]         .Thank You  Rosa Mccoy

## 2019-06-21 NOTE — TELEPHONE ENCOUNTER
She called this morning.  I called her 1:45 p.m. on June 21, 2019 and spoke with her.  She stated her diarrhea has decreased significantly.  She is feeling much better.  I suggested she continue to recover over the upcoming weekend.  I told her I will contact her next week to re-evaluate her situation.    This note was dictated using voice recognition software and may contain errors.

## 2019-06-24 ENCOUNTER — TELEPHONE (OUTPATIENT)
Dept: ALLERGY | Facility: CLINIC | Age: 54
End: 2019-06-24

## 2019-06-24 RX ORDER — PREDNISONE 5 MG/1
TABLET ORAL
Qty: 56 TABLET | Refills: 0 | Status: SHIPPED | OUTPATIENT
Start: 2019-06-24 | End: 2019-07-18

## 2019-06-24 NOTE — TELEPHONE ENCOUNTER
This note was dictated using voice recognition software and may contain errors.    Please refer to my recent notes in her medical record.  I had a 15 min telephone conversation with her shortly after 6:00 p.m. on June 24.  Last week she took Augmentin and did not tolerate it.  She developed diarrhea.  She stopped the drug June 19.  She has improved.  She is no longer having diarrhea.    She has significant sinusitis.  Please refer to the report of the CT scan that was recently performed.  In the past she has taken doxycycline and has no recollection of it causing side effects for her.  We have agreed that she will take doxycycline 100 mg every 12 hr for the next 21 days.    When I spoke with her this evening she stated that her nasal obstruction is extremely troublesome.  In the past she has tolerated oral prednisone and is found to be helpful as an anti inflammatory agent.  We have agreed that she will take a short course of prednisone starting this evening.  She will take 20 mg this evening followed by 40 mg for 2 days then 30 mg for 3 days then 20 mg for 3 days then 10 mg for 3 days and then stop taking it.  The prescription for prednisone was transmitted to her pharmacy.    I called her pharmacy, [phone],[facility] in [place] and spoke with the pharmacist.  I issue the prescription for the doxycycline when I spoke with the pharmacist on the telephone.    No refills were permitted on either medication.  I informed her of my upcoming absence from the clinic.  In the past she is developed vaginal yeast infections in association with taking different antibiotics.  Should this occur during her anticipated use of doxycycline I recommended that she contact Dr. Hu for evaluation and treatment if needed.    We discussed the fact that an appointment has been made for her to be evaluated by Dr. Shaffer in ENT.  This appointment is to occur on July 18, 2019..

## 2019-06-26 RX ORDER — NORTRIPTYLINE HYDROCHLORIDE 25 MG/1
CAPSULE ORAL
Qty: 60 CAPSULE | Refills: 2 | Status: SHIPPED | OUTPATIENT
Start: 2019-06-26 | End: 2019-07-18 | Stop reason: SDUPTHER

## 2019-06-28 DIAGNOSIS — M79.7 FIBROMYALGIA: ICD-10-CM

## 2019-06-28 RX ORDER — PREGABALIN 150 MG/1
CAPSULE ORAL
Qty: 90 CAPSULE | Refills: 0 | Status: SHIPPED | OUTPATIENT
Start: 2019-06-28 | End: 2019-07-18

## 2019-07-02 ENCOUNTER — OFFICE VISIT (OUTPATIENT)
Dept: CARDIOLOGY | Facility: CLINIC | Age: 54
End: 2019-07-02
Payer: COMMERCIAL

## 2019-07-02 VITALS
WEIGHT: 202.81 LBS | HEART RATE: 72 BPM | BODY MASS INDEX: 32.25 KG/M2 | DIASTOLIC BLOOD PRESSURE: 72 MMHG | SYSTOLIC BLOOD PRESSURE: 104 MMHG

## 2019-07-02 DIAGNOSIS — I35.1 MILD AORTIC INSUFFICIENCY: ICD-10-CM

## 2019-07-02 DIAGNOSIS — I77.1 TORTUOUS AORTA: ICD-10-CM

## 2019-07-02 DIAGNOSIS — I10 ESSENTIAL (PRIMARY) HYPERTENSION: Primary | ICD-10-CM

## 2019-07-02 DIAGNOSIS — K50.011 CROHN'S DISEASE OF SMALL INTESTINE WITH RECTAL BLEEDING: ICD-10-CM

## 2019-07-02 DIAGNOSIS — M79.7 FIBROMYALGIA: ICD-10-CM

## 2019-07-02 DIAGNOSIS — G47.33 OSA (OBSTRUCTIVE SLEEP APNEA): ICD-10-CM

## 2019-07-02 DIAGNOSIS — E78.49 OTHER HYPERLIPIDEMIA: ICD-10-CM

## 2019-07-02 PROCEDURE — 3074F PR MOST RECENT SYSTOLIC BLOOD PRESSURE < 130 MM HG: ICD-10-PCS | Mod: CPTII,S$GLB,, | Performed by: PHYSICIAN ASSISTANT

## 2019-07-02 PROCEDURE — 3008F BODY MASS INDEX DOCD: CPT | Mod: CPTII,S$GLB,, | Performed by: PHYSICIAN ASSISTANT

## 2019-07-02 PROCEDURE — 99999 PR PBB SHADOW E&M-EST. PATIENT-LVL IV: ICD-10-PCS | Mod: PBBFAC,,, | Performed by: PHYSICIAN ASSISTANT

## 2019-07-02 PROCEDURE — 3074F SYST BP LT 130 MM HG: CPT | Mod: CPTII,S$GLB,, | Performed by: PHYSICIAN ASSISTANT

## 2019-07-02 PROCEDURE — 99213 PR OFFICE/OUTPT VISIT, EST, LEVL III, 20-29 MIN: ICD-10-PCS | Mod: S$GLB,,, | Performed by: PHYSICIAN ASSISTANT

## 2019-07-02 PROCEDURE — 99999 PR PBB SHADOW E&M-EST. PATIENT-LVL IV: CPT | Mod: PBBFAC,,, | Performed by: PHYSICIAN ASSISTANT

## 2019-07-02 PROCEDURE — 99213 OFFICE O/P EST LOW 20 MIN: CPT | Mod: S$GLB,,, | Performed by: PHYSICIAN ASSISTANT

## 2019-07-02 PROCEDURE — 3008F PR BODY MASS INDEX (BMI) DOCUMENTED: ICD-10-PCS | Mod: CPTII,S$GLB,, | Performed by: PHYSICIAN ASSISTANT

## 2019-07-02 PROCEDURE — 3078F DIAST BP <80 MM HG: CPT | Mod: CPTII,S$GLB,, | Performed by: PHYSICIAN ASSISTANT

## 2019-07-02 PROCEDURE — 3078F PR MOST RECENT DIASTOLIC BLOOD PRESSURE < 80 MM HG: ICD-10-PCS | Mod: CPTII,S$GLB,, | Performed by: PHYSICIAN ASSISTANT

## 2019-07-02 RX ORDER — DOXYCYCLINE 100 MG/1
100 CAPSULE ORAL EVERY 12 HOURS
Refills: 0 | COMMUNITY
Start: 2019-06-24 | End: 2019-07-18

## 2019-07-02 NOTE — PROGRESS NOTES
Subjective:    Patient ID:  Jaylin Murguia is a 54 y.o. female who presents for follow-up of Hypertension       HPI  Ms. Murguia is a 54 year old female patient whose current medical conditions include HTN, hyperlipidemia, TAMIKA, asthma, and Crohn's disease who presents today for follow-up. Patient previously seen by me and started on amlodipine for improved BP control. She returns today and states she is doing ok. Having some sinus issues, seeing ENT. Will likely need surgery. Cardiac wise, no real complaints. Some mild SOB due to sinus congestion. No chest pain. No lightheadedness, dizziness, near syncope, or syncope. BP controlled. Reviewed home log, BP much improved. Patient will continue to monitor at home. Compliant with meds. Being very mindful of her salt intake. Compliant with CPAP.    2D echo and cardiac treadmill stress test earlier this year WNL.    Review of Systems   Constitution: Positive for malaise/fatigue. Negative for chills, decreased appetite and fever.   HENT: Positive for congestion. Negative for hoarse voice and sore throat.    Eyes: Negative for blurred vision and discharge.   Cardiovascular: Negative for chest pain, claudication, cyanosis, dyspnea on exertion, irregular heartbeat, leg swelling, near-syncope, orthopnea, palpitations and paroxysmal nocturnal dyspnea.   Respiratory: Negative for cough, hemoptysis, shortness of breath, snoring, sputum production and wheezing.    Endocrine: Negative for cold intolerance and heat intolerance.   Hematologic/Lymphatic: Negative for bleeding problem. Does not bruise/bleed easily.   Skin: Negative for rash.   Musculoskeletal: Negative for arthritis, back pain, joint pain, joint swelling, muscle cramps, muscle weakness and myalgias.   Gastrointestinal: Negative for abdominal pain, constipation, diarrhea, heartburn, melena and nausea.   Genitourinary: Negative for hematuria.   Neurological: Negative for dizziness, focal weakness, headaches,  light-headedness, loss of balance, numbness, paresthesias, seizures and weakness.   Psychiatric/Behavioral: Negative for memory loss. The patient does not have insomnia.    Allergic/Immunologic: Negative for hives.     /72 (BP Location: Right arm, Patient Position: Sitting, BP Method: Large (Manual))   Pulse 72   Wt 92 kg (202 lb 13.2 oz)   BMI 32.25 kg/m²     Objective:    Physical Exam   Constitutional: She is oriented to person, place, and time. She appears well-developed and well-nourished. No distress.   HENT:   Head: Normocephalic and atraumatic.   Eyes: Pupils are equal, round, and reactive to light. Right eye exhibits no discharge. Left eye exhibits no discharge.   Neck: Neck supple. No JVD present.   Cardiovascular: Normal rate, regular rhythm, S1 normal, S2 normal and normal heart sounds.   No murmur heard.  Pulmonary/Chest: Effort normal and breath sounds normal. No respiratory distress. She has no wheezes. She has no rales.   Abdominal: Soft. She exhibits no distension.   Musculoskeletal: She exhibits no edema.   Neurological: She is alert and oriented to person, place, and time.   Skin: Skin is warm and dry. She is not diaphoretic. No erythema.   Psychiatric: She has a normal mood and affect. Her behavior is normal. Thought content normal.   Nursing note and vitals reviewed.    2D Echo CONCLUSIONS     1 - Mild left atrial enlargement.     2 - Concentric hypertrophy.     3 - No wall motion abnormalities.     4 - Normal left ventricular systolic function (EF 60-65%).     5 - Normal left ventricular diastolic function.     6 - Normal right ventricular systolic function .     7 - The estimated PA systolic pressure is 28 mmHg.     8 - Trivial to mild aortic regurgitation.     9 - Trivial to mild mitral regurgitation.      . The EKG portion of this study is negative for ischemia at a moderate workload, and peak heart rate of 127 bpm (80% of predicted).   2. Exercise capacity is average.   3. Blood  pressure response to exercise was normal (Presenting BP: 129/84 Peak BP: 150/78).   4. No significant arrhythmias were present.   5. There were no symptoms of chest discomfort or significant dyspnea throughout the protocol.   6. The Duke treadmill score was 7 suggesting a low probability for future cardiovascular   Assessment:       1. Essential (primary) hypertension    2. Mild aortic insufficiency    3. Other hyperlipidemia    4. Tortuous aorta    5. Crohn's disease of small intestine with rectal bleeding    6. Fibromyalgia    7. TAMIKA (obstructive sleep apnea)      Patient presents for f/u. Doing clinically well from CV standpoint. BP better controlled. Continue same meds/mgmt. Continue low salt diet. Patient will message me if BP spikes again when school starts.   Plan:   -Continue current medical management and risk factor modification  -Cardiac, low salt diet  -Monitor BP, contact me if any issues  -RTC 6 months, sooner should issues arise  -Labs checked by PCP    Chart reviewed. Dr. Clark agrees with plan as outlined above.

## 2019-07-09 ENCOUNTER — TELEPHONE (OUTPATIENT)
Dept: ORTHOPEDICS | Facility: CLINIC | Age: 54
End: 2019-07-09
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
## 2019-07-09 NOTE — TELEPHONE ENCOUNTER
----- Message from Carmela Porter sent at 7/9/2019  1:12 PM CDT -----  Contact: pt  Please call pt @ [phone] regarding appt today, [facility], the  will be coming, pt need know if she can still come

## 2019-07-18 ENCOUNTER — LAB VISIT (OUTPATIENT)
Dept: LAB | Facility: HOSPITAL | Age: 54
End: 2019-07-18
Attending: OTOLARYNGOLOGY
Payer: COMMERCIAL

## 2019-07-18 ENCOUNTER — OFFICE VISIT (OUTPATIENT)
Dept: OTOLARYNGOLOGY | Facility: CLINIC | Age: 54
End: 2019-07-18
Payer: COMMERCIAL

## 2019-07-18 ENCOUNTER — HOSPITAL ENCOUNTER (OUTPATIENT)
Dept: PREADMISSION TESTING | Facility: HOSPITAL | Age: 54
Discharge: HOME | End: 2019-07-18
Attending: OTOLARYNGOLOGY
Payer: COMMERCIAL

## 2019-07-18 ENCOUNTER — OFFICE VISIT (OUTPATIENT)
Dept: INTERNAL MEDICINE | Facility: CLINIC | Age: 54
End: 2019-07-18
Payer: COMMERCIAL

## 2019-07-18 VITALS
OXYGEN SATURATION: 98 % | TEMPERATURE: 98 F | HEIGHT: 67 IN | DIASTOLIC BLOOD PRESSURE: 82 MMHG | HEART RATE: 64 BPM | BODY MASS INDEX: 31.83 KG/M2 | RESPIRATION RATE: 16 BRPM | SYSTOLIC BLOOD PRESSURE: 121 MMHG | WEIGHT: 202.81 LBS

## 2019-07-18 VITALS
SYSTOLIC BLOOD PRESSURE: 124 MMHG | HEART RATE: 76 BPM | DIASTOLIC BLOOD PRESSURE: 82 MMHG | BODY MASS INDEX: 31.87 KG/M2 | HEIGHT: 67 IN | WEIGHT: 203.06 LBS | TEMPERATURE: 97 F

## 2019-07-18 VITALS
TEMPERATURE: 98 F | WEIGHT: 202.81 LBS | BODY MASS INDEX: 32.25 KG/M2 | SYSTOLIC BLOOD PRESSURE: 119 MMHG | DIASTOLIC BLOOD PRESSURE: 85 MMHG | HEART RATE: 69 BPM

## 2019-07-18 DIAGNOSIS — J32.4 CHRONIC PANSINUSITIS: Primary | ICD-10-CM

## 2019-07-18 DIAGNOSIS — K50.011 CROHN'S DISEASE OF SMALL INTESTINE WITH RECTAL BLEEDING: Primary | ICD-10-CM

## 2019-07-18 DIAGNOSIS — I10 ESSENTIAL (PRIMARY) HYPERTENSION: ICD-10-CM

## 2019-07-18 DIAGNOSIS — G47.33 OSA (OBSTRUCTIVE SLEEP APNEA): ICD-10-CM

## 2019-07-18 DIAGNOSIS — K50.90 CROHN'S DISEASE WITHOUT COMPLICATION, UNSPECIFIED GASTROINTESTINAL TRACT LOCATION: ICD-10-CM

## 2019-07-18 DIAGNOSIS — G47.01 INSOMNIA DUE TO MEDICAL CONDITION: ICD-10-CM

## 2019-07-18 DIAGNOSIS — I77.1 TORTUOUS AORTA: ICD-10-CM

## 2019-07-18 DIAGNOSIS — J32.4 CHRONIC PANSINUSITIS: ICD-10-CM

## 2019-07-18 DIAGNOSIS — Z01.818 PREOP EXAM FOR INTERNAL MEDICINE: Primary | ICD-10-CM

## 2019-07-18 DIAGNOSIS — K50.00 CROHN'S DISEASE INVOLVING TERMINAL ILEUM: ICD-10-CM

## 2019-07-18 DIAGNOSIS — J30.89 NON-SEASONAL ALLERGIC RHINITIS, UNSPECIFIED TRIGGER: ICD-10-CM

## 2019-07-18 DIAGNOSIS — K50.011 CROHN'S DISEASE OF SMALL INTESTINE WITH RECTAL BLEEDING: ICD-10-CM

## 2019-07-18 DIAGNOSIS — Z79.890 HORMONE REPLACEMENT THERAPY (POSTMENOPAUSAL): ICD-10-CM

## 2019-07-18 DIAGNOSIS — M79.7 FIBROMYALGIA: ICD-10-CM

## 2019-07-18 DIAGNOSIS — K50.919 CROHN'S DISEASE WITH COMPLICATION, UNSPECIFIED GASTROINTESTINAL TRACT LOCATION: ICD-10-CM

## 2019-07-18 DIAGNOSIS — I35.1 MILD AORTIC INSUFFICIENCY: ICD-10-CM

## 2019-07-18 DIAGNOSIS — G43.809 OTHER MIGRAINE WITHOUT STATUS MIGRAINOSUS, NOT INTRACTABLE: ICD-10-CM

## 2019-07-18 DIAGNOSIS — E78.49 OTHER HYPERLIPIDEMIA: ICD-10-CM

## 2019-07-18 LAB
BASOPHILS # BLD AUTO: 0.03 K/UL (ref 0–0.2)
BASOPHILS NFR BLD: 0.4 % (ref 0–1.9)
DIFFERENTIAL METHOD: ABNORMAL
EOSINOPHIL # BLD AUTO: 0.1 K/UL (ref 0–0.5)
EOSINOPHIL NFR BLD: 1.6 % (ref 0–8)
ERYTHROCYTE [DISTWIDTH] IN BLOOD BY AUTOMATED COUNT: 12.9 % (ref 11.5–14.5)
HCT VFR BLD AUTO: 36.9 % (ref 37–48.5)
HGB BLD-MCNC: 11.9 G/DL (ref 12–16)
IMM GRANULOCYTES # BLD AUTO: 0.02 K/UL (ref 0–0.04)
IMM GRANULOCYTES NFR BLD AUTO: 0.3 % (ref 0–0.5)
LYMPHOCYTES # BLD AUTO: 1.8 K/UL (ref 1–4.8)
LYMPHOCYTES NFR BLD: 26.6 % (ref 18–48)
MCH RBC QN AUTO: 30.4 PG (ref 27–31)
MCHC RBC AUTO-ENTMCNC: 32.2 G/DL (ref 32–36)
MCV RBC AUTO: 94 FL (ref 82–98)
MONOCYTES # BLD AUTO: 0.4 K/UL (ref 0.3–1)
MONOCYTES NFR BLD: 5.9 % (ref 4–15)
NEUTROPHILS # BLD AUTO: 4.4 K/UL (ref 1.8–7.7)
NEUTROPHILS NFR BLD: 65.2 % (ref 38–73)
NRBC BLD-RTO: 0 /100 WBC
PLATELET # BLD AUTO: 328 K/UL (ref 150–350)
PMV BLD AUTO: 9.8 FL (ref 9.2–12.9)
RBC # BLD AUTO: 3.92 M/UL (ref 4–5.4)
WBC # BLD AUTO: 6.77 K/UL (ref 3.9–12.7)

## 2019-07-18 PROCEDURE — 3079F PR MOST RECENT DIASTOLIC BLOOD PRESSURE 80-89 MM HG: ICD-10-PCS | Mod: CPTII,S$GLB,, | Performed by: OTOLARYNGOLOGY

## 2019-07-18 PROCEDURE — 3008F PR BODY MASS INDEX (BMI) DOCUMENTED: ICD-10-PCS | Mod: CPTII,S$GLB,, | Performed by: OTOLARYNGOLOGY

## 2019-07-18 PROCEDURE — 3074F PR MOST RECENT SYSTOLIC BLOOD PRESSURE < 130 MM HG: ICD-10-PCS | Mod: CPTII,S$GLB,, | Performed by: OTOLARYNGOLOGY

## 2019-07-18 PROCEDURE — 3008F PR BODY MASS INDEX (BMI) DOCUMENTED: ICD-10-PCS | Mod: CPTII,S$GLB,, | Performed by: FAMILY MEDICINE

## 2019-07-18 PROCEDURE — 31231 NASAL ENDOSCOPY DX: CPT | Mod: S$GLB,,, | Performed by: OTOLARYNGOLOGY

## 2019-07-18 PROCEDURE — 99215 PR OFFICE/OUTPT VISIT, EST, LEVL V, 40-54 MIN: ICD-10-PCS | Mod: S$GLB,,, | Performed by: FAMILY MEDICINE

## 2019-07-18 PROCEDURE — 99999 PR PBB SHADOW E&M-EST. PATIENT-LVL III: ICD-10-PCS | Mod: PBBFAC,,, | Performed by: FAMILY MEDICINE

## 2019-07-18 PROCEDURE — 85025 COMPLETE CBC W/AUTO DIFF WBC: CPT

## 2019-07-18 PROCEDURE — 3074F PR MOST RECENT SYSTOLIC BLOOD PRESSURE < 130 MM HG: ICD-10-PCS | Mod: CPTII,S$GLB,, | Performed by: FAMILY MEDICINE

## 2019-07-18 PROCEDURE — 3008F BODY MASS INDEX DOCD: CPT | Mod: CPTII,S$GLB,, | Performed by: OTOLARYNGOLOGY

## 2019-07-18 PROCEDURE — 3008F BODY MASS INDEX DOCD: CPT | Mod: CPTII,S$GLB,, | Performed by: FAMILY MEDICINE

## 2019-07-18 PROCEDURE — 31231 PR NASAL ENDOSCOPY, DX: ICD-10-PCS | Mod: S$GLB,,, | Performed by: OTOLARYNGOLOGY

## 2019-07-18 PROCEDURE — 99999 PR PBB SHADOW E&M-EST. PATIENT-LVL IV: CPT | Mod: PBBFAC,,, | Performed by: OTOLARYNGOLOGY

## 2019-07-18 PROCEDURE — 99204 PR OFFICE/OUTPT VISIT, NEW, LEVL IV, 45-59 MIN: ICD-10-PCS | Mod: 25,S$GLB,, | Performed by: OTOLARYNGOLOGY

## 2019-07-18 PROCEDURE — 3074F SYST BP LT 130 MM HG: CPT | Mod: CPTII,S$GLB,, | Performed by: OTOLARYNGOLOGY

## 2019-07-18 PROCEDURE — 99999 PR PBB SHADOW E&M-EST. PATIENT-LVL IV: ICD-10-PCS | Mod: PBBFAC,,, | Performed by: OTOLARYNGOLOGY

## 2019-07-18 PROCEDURE — 99999 PR PBB SHADOW E&M-EST. PATIENT-LVL III: CPT | Mod: PBBFAC,,, | Performed by: FAMILY MEDICINE

## 2019-07-18 PROCEDURE — 3079F PR MOST RECENT DIASTOLIC BLOOD PRESSURE 80-89 MM HG: ICD-10-PCS | Mod: CPTII,S$GLB,, | Performed by: FAMILY MEDICINE

## 2019-07-18 PROCEDURE — 36415 COLL VENOUS BLD VENIPUNCTURE: CPT

## 2019-07-18 PROCEDURE — 3079F DIAST BP 80-89 MM HG: CPT | Mod: CPTII,S$GLB,, | Performed by: FAMILY MEDICINE

## 2019-07-18 PROCEDURE — 99204 OFFICE O/P NEW MOD 45 MIN: CPT | Mod: 25,S$GLB,, | Performed by: OTOLARYNGOLOGY

## 2019-07-18 PROCEDURE — 3079F DIAST BP 80-89 MM HG: CPT | Mod: CPTII,S$GLB,, | Performed by: OTOLARYNGOLOGY

## 2019-07-18 PROCEDURE — 86003 ALLG SPEC IGE CRUDE XTRC EA: CPT | Mod: 59

## 2019-07-18 PROCEDURE — 3074F SYST BP LT 130 MM HG: CPT | Mod: CPTII,S$GLB,, | Performed by: FAMILY MEDICINE

## 2019-07-18 PROCEDURE — 99215 OFFICE O/P EST HI 40 MIN: CPT | Mod: S$GLB,,, | Performed by: FAMILY MEDICINE

## 2019-07-18 PROCEDURE — 86003 ALLG SPEC IGE CRUDE XTRC EA: CPT

## 2019-07-18 RX ORDER — PANTOPRAZOLE SODIUM 40 MG/1
40 TABLET, DELAYED RELEASE ORAL NIGHTLY
Qty: 90 TABLET | Refills: 3 | Status: SHIPPED | OUTPATIENT
Start: 2019-07-18 | End: 2020-05-11

## 2019-07-18 RX ORDER — ELETRIPTAN HYDROBROMIDE 40 MG/1
40 TABLET, FILM COATED ORAL
Qty: 12 TABLET | Refills: 3 | Status: SHIPPED | OUTPATIENT
Start: 2019-07-18 | End: 2020-02-07 | Stop reason: SDUPTHER

## 2019-07-18 RX ORDER — DULOXETIN HYDROCHLORIDE 60 MG/1
60 CAPSULE, DELAYED RELEASE ORAL 2 TIMES DAILY
Qty: 180 CAPSULE | Refills: 3 | Status: SHIPPED | OUTPATIENT
Start: 2019-07-18 | End: 2020-07-15

## 2019-07-18 RX ORDER — ZOLPIDEM TARTRATE 5 MG/1
TABLET ORAL
Qty: 90 TABLET | Refills: 1 | Status: SHIPPED | OUTPATIENT
Start: 2019-07-18 | End: 2020-01-21 | Stop reason: SDUPTHER

## 2019-07-18 RX ORDER — AMLODIPINE BESYLATE 5 MG/1
5 TABLET ORAL DAILY
Qty: 90 TABLET | Refills: 3 | Status: SHIPPED | OUTPATIENT
Start: 2019-07-18 | End: 2020-04-20

## 2019-07-18 RX ORDER — MONTELUKAST SODIUM 10 MG/1
10 TABLET ORAL NIGHTLY
Qty: 90 TABLET | Refills: 3 | Status: SHIPPED | OUTPATIENT
Start: 2019-07-18 | End: 2020-05-21

## 2019-07-18 RX ORDER — FLUTICASONE FUROATE AND VILANTEROL TRIFENATATE 100; 25 UG/1; UG/1
1 POWDER RESPIRATORY (INHALATION) DAILY
Qty: 180 EACH | Refills: 3 | Status: SHIPPED | OUTPATIENT
Start: 2019-07-18 | End: 2019-10-02

## 2019-07-18 RX ORDER — LOSARTAN POTASSIUM AND HYDROCHLOROTHIAZIDE 25; 100 MG/1; MG/1
1 TABLET ORAL DAILY
Qty: 90 TABLET | Refills: 3 | Status: SHIPPED | OUTPATIENT
Start: 2019-07-18 | End: 2019-09-20 | Stop reason: SDUPTHER

## 2019-07-18 RX ORDER — AZELASTINE HYDROCHLORIDE, FLUTICASONE PROPIONATE 137; 50 UG/1; UG/1
1 SPRAY, METERED NASAL 2 TIMES DAILY
Qty: 3 BOTTLE | Refills: 3 | Status: SHIPPED | OUTPATIENT
Start: 2019-07-18 | End: 2019-10-12

## 2019-07-18 RX ORDER — BUTALBITAL, ACETAMINOPHEN AND CAFFEINE 50; 325; 40 MG/1; MG/1; MG/1
TABLET ORAL
Qty: 90 TABLET | Refills: 3 | Status: SHIPPED | OUTPATIENT
Start: 2019-07-18 | End: 2020-10-05

## 2019-07-18 RX ORDER — PREGABALIN 150 MG/1
CAPSULE ORAL
Qty: 270 CAPSULE | Refills: 1 | Status: SHIPPED | OUTPATIENT
Start: 2019-07-18 | End: 2020-05-27

## 2019-07-18 RX ORDER — SIMVASTATIN 20 MG/1
20 TABLET, FILM COATED ORAL NIGHTLY
Qty: 90 TABLET | Refills: 3 | Status: SHIPPED | OUTPATIENT
Start: 2019-07-18 | End: 2019-10-02 | Stop reason: ALTCHOICE

## 2019-07-18 RX ORDER — NORTRIPTYLINE HYDROCHLORIDE 25 MG/1
50 CAPSULE ORAL DAILY
Qty: 180 CAPSULE | Refills: 3 | Status: SHIPPED | OUTPATIENT
Start: 2019-07-18 | End: 2019-07-22 | Stop reason: SDUPTHER

## 2019-07-18 NOTE — DISCHARGE INSTRUCTIONS
To confirm, Your doctor has instructed you that surgery is scheduled for 07/31/2019.     Please report to [facility].  Pre admit office will call afternoon prior to surgery with final arrival time    INSTRUCTIONS IMPORTANT!!!   Do not eat, drink, or smoke after 12 midnight-including water. OK to brush teeth, no gum, candy or mints!    ¨ Take only these medicines with a small swallow of water-morning of surgery.  Norvasc  Breo Ellipta Inhaler  Zyrtec  Cymbalta  Lyrica  Pentasa    Pre operative instructions:  Please review the Pre-Operative Instruction booklet that you were given.        Bathing Instructions--See page 6 in the Pre-operative booklet.      Prevention of surgical site infections:     -Keep incisions clean and dry.   -Do not soak/submerge incisions in water until completely healed.   -Do not apply lotions, powders, creams, or deodorants to site.   -Always make sure hands are cleaned with antibacterial soap/ alcohol-based                 prior to touching the surgical site.  (This includes doctors,                 nurses, staff, and yourself.)    Signs and symptoms:   -Redness and pain around the area where you had surgery   -Drainage of cloudy fluid from your surgical wound   -Fever over 100.4       I have read or had read and explained to me, and understand the above information.  Additional comments or instructions:  Received a copy of Pre-operative instructions booklet, FAQ surgical site infection sheet, and packets of hibiclens (if indicated).

## 2019-07-18 NOTE — PATIENT INSTRUCTIONS
Understanding Nasal Anatomy: Inside View  There is a lot happening under the surface of the nose. The bone and cartilage under the skin give the nose most of its size and shape. Other structures inside and behind the nose help you breathe. Learning the anatomy of the nose can help you further understand how the nose works.                   Understanding Nasal Obstruction (Septal Deviation and/or Turbinate Hypertrophy    Nasal obstruction may due to a variety of reasons.  The most common is a combination of factors.  Patient have a deviated nasal septum.  The dividing wall between the right and left nasal cavities is no longer straight.  The may be from trauma (even minor trauma) or simply a product of how you grew.  Also, the nasal turbinates may be enlarged.  This can be because the bone beneath is large or angled incorrectly or because the soft tissue around the turbinate is swollen (frequently from allergies).          Understanding Nasal Allergies  Nasal allergies (also called allergic rhinitis) are a common health problem. They may be seasonal.This means they cause symptoms only at certain times of the year. Or they may be perennial.This means they cause symptoms all year long. Other health problems, such as asthma, often occur along with allergies as well.    What Is an allergic reaction?  An allergy is a reaction to a substance called an allergen. Common allergens include:  · Wind-borne pollen  · Mold  · Dust mites  · Furry and feathered animals  · Cockroaches  Normally, allergens are harmless. But when a person has allergies, their body thinks they are harmful. Their body then attacks allergens with antibodies. Antibodies are attached to special cells called mast cells. Allergens stick to the antibodies. This makes the mast cells release histamine and other chemicals. This is an allergic reaction. The chemicals irritate nearby nasal tissue. This causes nasal allergy symptoms.  Common nasal allergy  symptoms  Allergies can cause nasal tissue to swell. This makes the air passages smaller. The nose may feel stuffed up. The nose may also make extra mucus, which can plug the nasal passages or drip out of the nose. Mucus can drip down the back of the throat (postnasal drip) as well. Sinus tissue can swell. This may cause pain and headache. Common allergy symptoms include:  · Runny nose with clear, watery discharge  · Stuffy nose (nasal congestion)  · Drainage down your throat (postnasal drip)  · Sneezing  · Red, watery eyes  · Itchy nose, eyes, ears, and throat  · Plugged-up ears (ear congestion)  · Sore throat  · Coughing  · Sinus pain and swelling  · Headache  It may not be allergies  Other health problems can cause symptoms like those of nasal allergies. These include:  · Nonallergic rhinitis and viruses such as colds  · Irritants and pollutants, such as strong odors or smoke  · Certain medications  · Changes in the weather         Understanding Sinusitis      What is a sinus infection?  A sinus infection, or sinusitis ([id number]), is a swelling of the lining in the sinuses. Acute sinusitis lasts for less than four weeks. Chronic sinusitis lasts for more than 12 weeks.    What causes sinus infections?  The most common cause is a virus, such as the common cold. When you catch a cold, your mucus becomes thick and sticky, and doesn't drain well. Bacteria can grow in the mucus trapped in your sinuses. This can lead to a bacterial sinus infection.  For patient with symptoms less than a week, 80% of the infections are due to viruses and will resolve without antibiotics.  Infections lasting longer than 1 week are more likely to be due to bacteria, and antibiotics (sometimes with steroids) may be needed to stop the infection.  Patients who have symptoms lasting several weeks may need more aggressive medical therapy and/or procedures to restore healthy sinuses.    Who gets them?  Anyone can get a sinus infection, but  people with nasal allergies, hay fever, or asthma have an increased risk. Other risk factors include exposure to cigarette smoke, nasal polyps (ANA-ips), and changes in pressure (such as during flying or scuba diving). Sinus infections can also be caused by a deviated septum, which is when the part of your nose that separates the nostrils is out of place.    What are the symptoms?  Headache  Pain or pressure in the forehead, cheeks, nose, or between the eyes  Fever  Nasal congestion and runny nose  Cough that may be worse at night  Sore throat  Decreased sense of smell and taste  Tiredness  Bad breath  How are they treated?  Only about two out of 100 people with cold symptoms will get a bacterial sinus infection. Antibiotics can treat bacterial infections, but not viral infections. Most people do not need antibiotics. Having a green or yellow nasal discharge does not necessarily mean that you need antibiotics.    If you have had symptoms for less than one week, try the following:  Drink plenty of fluids to keep your mucus thin  Sleep with your head propped up, or with the pain-free side of your face on the pillow  Inhale steam three or four times a day (for example, sit in the bathroom with a hot shower running)  Use a salt-water nasal spray or a nasal cup to loosen mucus  Use over-the-counter pain medicine to help with pain and headaches  Put a warm, wet towel against your face to help with pain  Take an over-the-counter decongestant to help your sinuses drain, but avoid antihistamines, which make mucus thick.      Chronic sinusitis is a common condition in which the cavities around nasal passages (sinuses) become inflamed and swollen -- for at least eight weeks, despite treatment attempts.    Also known as chronic rhinosinusitis, this condition interferes with drainage and causes mucus to build up. If you have chronic sinusitis, it may be difficult to breathe through your nose. The area around your eyes and face  may feel swollen, and you may have throbbing facial pain or a headache.    Chronic sinusitis may be caused by an infection, but it can also be caused by growths in the sinuses (nasal polyps) or by a deviated nasal septum. Chronic sinusitis most commonly affects young and middle-aged adults, but it also can affect children.  Most patients with chronic sinusitis with need to have the nose inspected with a camera to see if there is any obvious blockage of the sinus opening or pus draining from the sinuses.   However; this usually does now allow your doctor to look INTO the sinuses.  The best method for evaluating this area is a CT scan.  The combination of the two is the best method for evaluating chronic sinusitis from chronic severe allergies.      What about Smoking?  The respiratory tract is lined with special cells that move mucus out of the sinus cavities by moving in a sweeping action.      When the cells are exposed to smoke, the cilia become paralyzed.   This results in a chronic buildup of mucus in the sinus cavities that results in increased congestion and inflammation over time.  Because of this, treatment of sinus disease without smoking cessation is very rarely effective.     PLEASE PERFORM SINUS RINSES 5-7 TIMES DAILY UNTIL YOUR FIRST POSTOPERATIVE VISIT.          DIRECTIONS FOR SINUS SALINE RINSE To see demonstration: Enter [email or web address] into the browser address box, or go to You tube, and under the search box, enter sinus rinse. Click on [facility] Sinus Rinse Video    Step 1    Step 1 Please wash your hands. Fill the clean bottle with the designated volume of warm distilled water, filtered water or previously boiled water. You may warm the water in a microwave but we recommend that you warm it in increments of five seconds. This is to avoid excessive heating of the water and damage to the device or scalding your nasal passage.    Step 2    Step 2 Cut the SINUS RINSE  mixture packet at the corner and pour its contents into the bottle. Tighten the cap & tube on the bottle securely, place one finger over the tip of the cap and shake the bottle gently to dissolve the mixture.      Step 3    Step 3 Standing in front of a sink, bend forward to your comfort level and tilt your head down. Keeping your mouth open without holding your breath, place cap snugly against your nasal passage and SQUEEZE BOTTLE GENTLY until the solution starts draining from the OPPOSITE nasal passage or from your mouth. Keep squeezing the bottle GENTLY until at least 1/4 to 1/2 (60 to 120 mL) of the bottle is used for a proper rinse. Do not swallow the solution.    Step 4    Blow your nose gently, without pinching your nose completely because this will apply pressure on the    eardrums. If tolerable, sniff in any residual solution remaining in the nasal passage once or twice prior to    blowing your nose as this may clean out the posterior nasopharyngeal area (the area at the back of your    nasal passage). Some solution will reach the back of the throat, so please spit it out. To help improve    drainage of any residual solution, blow your nose gently while tilting your head to the opposite side of    the nasal passage that you just rinsed.    Step 5    Now repeat steps 3 & 4 for your other nasal passage.    Step 6 Air dry the Sinus Rinse bottle, cap, and tube on a clean paper towel, a glass plate to store the bottle cap and tube. If there is any solution leftover, please discard it. We recommend you make a fresh solution each time you rinse. Rinse 5 times each day, OR as directed by your physician. Warnings: DO NOT RINSE IF NASAL PASSAGE IS COMPLETELY BLOCKED OR IF YOU HAVE AN EAR INFECTION OR BLOCKED EARS. If you have had ear surgery, please contact your physician prior to irrigation. If you experience any pressure in your ears, stop the rinse and get further directions from your physician or contact our  office during regular business hours. To avoid any ear discomfort: Heat the solution to lukewarm, do not use hot, boiling or cold water. Keep your mouth open. Do not hold your breath and if possible make the sound JOSE....JOSE... Make sure to take the position as shown. Gently squeeze 1/4 of the bottle at a time (60mL / 2 ounces). Stop the rinse if you feel any solution sensation near the ears. You may rinse with a partially blocked nasal passage. Please do not use for any other purposes. Please rinse at least ONE HOUR PRIOR to bedtime, in order to avoid any residual solution dripping in the throat.    >> Before using the SINUS RINSE kit, please inspect the cap, tube and bottle carefully for wear and    tear. If any of the components appear discolored or cracked, please contact [employer] to obtain a    replacement. You must follow the cleaning instructions provided in this brochure or cleaning instruction    card prior to each use.    >> The SINUS RINSE bottle and mixture are to be used only for nasalirrigation. Do not use for any other    purposes.    >> We recommend that you use the rinse ONE HOUR PRIOR to bedtime in order to avoid any residual    solution dripping in the throat.    Tips to avoid ear discomfort while rinsing    If you have had ear surgery, please contact your physician prior to irrigation. Do not use if you have an    ear infection or blocked ears. Rinse with lukewarm water. Keep your mouth open. Do not hold your    breath while rinsing. While rinsing, make sure to tilt your. Gently squeeze the bottle while rinsing; do    not squeeze the bottle very forcefully. Stop the rinse if you feel a sensation of fluid near your ears.    Tips to avoid unexpected drainage after rinsing    In rare situations, especially if you have had sinus surgery, the saline solution can pool in the sinus    cavities and nasal passages and then drip from your nostrils hours after rinsing. To avoid this harmless    but  annoying inconvenience, take one extra step after rinsing: lean forward, tilt your head sideways and    gently blow your nose. Then, tilt your head to the other side and blow again. You may need to repeat    this several times. This will help rid your nasal passages of any excess mucus and remaining saline    solution. If you find yourself experiencing delayed drainage often, do not rinse right before leaving your    house or going to bed.              ENDOSCOPIC SINUS SURGERY POST-OP INSTRUCTIONS    Rarely, is nasal packing (absorbent bandage) required (less than 5% of the time). This will be remove before you go home, or in some cases, on your first post-up appointment.    Slight trickling of blood in the back of your throat, or on your drip pad in front of your nose, and/or crusting of secretions for the first few days, is to be expected. You should change your drip pad periodically during the day as needed. This may be necessary for the first 3-5 days after surgery. You may also clean the hair-baring area of the nose daily, as needed, using a Q-tip and hydrogen peroxide. Keep your fingers out of your nose.    Diet: You may experience nausea after surgery. Avoid heavy meals on that day. Wait 24 hours to gradually resume your normal diet. Drink plenty of fluids to maintain hydration (6-8 glasses daily minimum). Avoid alcohol or caffeine the first week after surgery.    Restricted Activities: the first week after surgery, your sinuses will feel very full and congested, this is due to the accumulation of dried blood and/or mucus crust. Do not plan to blow your nose, bend over or lift heavy objects (over 10 lbs) for the first week after surgery. This may result in a nose bleed. Rest and do not exert or overheat yourself with exercise or other physical activity.    Post-Operative pain: pain medication (Percocet or Acetaminophen with codeine) and/or anti-inflammatory medication (prednisone) may be ordered for  significant post-operative pain, (FOR HEADACHES). You can still take extra-strength acetaminophen if the pain is not too uncomfortable, to avoid the occasional side-affects of nausea, constipation, or grogginess, associated with all narcotics. Use stool softener such as: dulcolax or Miralax to prevent constipation. Do not drive or handle heavy machinery while on narcotics. Throat discomfort is not too uncommon after endotracheal intubation during general anesthesia. This should resolve on it's own within 5 days. Throat lozenges or gargles tend to soothe the discomfort. Plane travel is allowed after your first post-op visit.    Do not take aspirin or anti-coagulant therapy 7-10 days after surgery unless otherwise indicated by your surgeon at time of discharge. If you are on Coumadin you will likely restart your normal dosage 24 hours after surgery. However, check with your doctor first.    Post-operative Office Visits: The first visit will be 7-10 days after surgery. At this time the physician will clean your sinus cavity by removing dried blood and/or mucus crusting to promote healing, to minimize the chance for secondary bacterial infection, and relieves congestion and/or headaches. It is suggested that you take a couple of narcotics pills 1 hour prior to your first post op appointment. This is especially important for your first debridement, when your internal nose is still slightly swollen and tender.    If indicated by the physician, patient may start nasal irrigations, such as Sinus Rinse, or resume any other previous treatment.    Signs and symptoms that MUST be reported to the physician/nurse immediately by [phone]. *Fever over 102 degree F. *Persistent bleeding with more that ¼ cup of bright red blood within a short period of time (half hour). *Severe pain unrelieved by prescribed medication. *Mental or visual changes (confusion, slurred speech, double- vision, visual loss).    * If you  experience difficulty breathing, shortness of breath, or severe bleeding, call 911 or go to the nearest emergency room.

## 2019-07-18 NOTE — PROGRESS NOTES
Subjective:      Patient ID: Jaylin Murguia is a 54 y.o. female.    Chief Complaint: Annual Exam    Disclaimer:  This note is prepared using voice recognition software and as such is likely to have errors and has not been proof read. Please contact me for questions.     Patient's coming in today for preop clearnace for sinus surgery. Setup for July 31st with Dr Shaffer. Just finished prednisone and antibiotics.  Went in today with scope still with pus pockets. Took 4 weeks of antibiotics.  Feels drained.  Has completed doxycycline.  Stomach also bothers her at times as well.  Hoping that this will improve her symptoms.  Also currently on a CPAP for obstructive sleep apnea now as well.  Has known issues with Crohn's as well.    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  Supposed to start a new job at begininng of the year.     Doing the dymista, singulair. Asthma has had ups and down. All currently in the head. Still on the breo.     Has seen cards in the psat year for enlarged heart from BP problems.    Walking 3 times a week. Swimming some.     From an asthma standpoint currently with Singulair and Breo needing a new nebulizer kit for the tubing.    From a fibromyalgia standpoint on Lyrica Cymbalta and nortriptyline.  Ultimately would like to get off some medications once she has her surgery.    From a Crohn's disease standpoint now on Pentasa.  Previously when she was on Humira she reports that she developed antibodies to it.  Uncertain what she would do next.    Blood pressures have actually been better controlled has seeing Cardiology as well and a nuclear stress testing.  Is now also on amlodipine 5 and propanolol.  Along with ARB and HCTZ.  Also with obstructive sleep apnea now doing his CPAP.    Migraines have still been present but some these feel like there contributed due to the chronic sinusitis as well.  Does use Fioricet occasionally will use Relpax.  Needing refills of  all of her medications.          Lab Results   Component Value Date    WBC 6.65 12/14/2018    HGB 13.1 12/14/2018    HCT 41.3 12/14/2018     (H) 12/14/2018    CHOL 155 11/14/2017    TRIG 104 11/14/2017    HDL 59 11/14/2017    ALT 34 12/14/2018    AST 32 12/14/2018     12/14/2018     12/14/2018    K 4.0 12/14/2018    K 4.0 12/14/2018    CL 98 12/14/2018    CL 98 12/14/2018    CREATININE 1.0 12/14/2018    CREATININE 1.0 12/14/2018    BUN 8 12/14/2018    BUN 8 12/14/2018    CO2 28 12/14/2018    CO2 28 12/14/2018    TSH 0.863 11/14/2017    INR 1.0 11/20/2014    HGBA1C 5.2 11/14/2017       CT Sinuses without Contrast  Narrative: EXAMINATION:  CT SINUSES WITHOUT CONTRAST    CLINICAL HISTORY:  cough, asthma, allergic rhinitis;  Cough    TECHNIQUE:  Axial low-dose images, coronal and sagittal reformations were performed through the paranasal sinuses.  Contrast was not administered.    COMPARISON:  Radiographs of the sinuses from 07/27/2018.    FINDINGS:  Streak artifact from dental hardware is noted.  Orbits and orbital contents are maintained.  Skull remains intact without evidence of acute osseous abnormality.    There is bilateral occlusion of the ostiomeatal units.  The is left sided spurring of the nasal septum.  There is a plastic appearance of the right frontal sinus.  Left frontal sinuses are clear.  There is complete opacification of the left ethmoid air cells and mild mucoperiosteal thickening involving the anterior right ethmoid air cells.  There is near complete opacification of the left maxillary sinus with a very small residual aerated portion seen at the upper medial aspect of the sinus.  There is diffuse chronic thickening of the right maxillary sinus opacifying more than 70% of the sinus volume.  Hyper attenuation is seen along the inferior aspect of the right maxillary sinus.  For example, see image 36 of series 2.  This is nonspecific but can see be seen with inspissated secretions as  "well as other conditions..  There appears to be a single large central sphenoid sinus which is nearly completely opacified with less than 20% aeration of the sinus seen.  Impression: Pansinusitis changes as above.    Electronically signed by: Sawyer Funes MD  Date:    06/13/2019  Time:    08:49        Review of Systems   Constitutional: Negative for activity change and unexpected weight change.   HENT: Positive for rhinorrhea and trouble swallowing. Negative for hearing loss.    Eyes: Negative for discharge and visual disturbance.   Respiratory: Positive for chest tightness and wheezing.    Cardiovascular: Negative for chest pain and palpitations.   Gastrointestinal: Negative for blood in stool, constipation, diarrhea and vomiting.   Endocrine: Negative for polydipsia and polyuria.   Genitourinary: Negative for difficulty urinating, dysuria, hematuria and menstrual problem.   Musculoskeletal: Positive for arthralgias and joint swelling. Negative for neck pain.   Neurological: Positive for weakness and headaches.   Psychiatric/Behavioral: Positive for dysphoric mood. Negative for confusion.     Objective:     Vitals:    07/18/19 1307   BP: 124/82   Pulse: 76   Temp: 97 °F (36.1 °C)   TempSrc: Tympanic   Weight: 92.1 kg (203 lb 0.7 oz)   Height: 5' 7" (1.702 m)     Physical Exam   Constitutional: She is oriented to person, place, and time. She appears well-developed and well-nourished.   HENT:   Head: Normocephalic and atraumatic.   Right Ear: Tympanic membrane and external ear normal.   Left Ear: Tympanic membrane and external ear normal.   Nose: Right sinus exhibits maxillary sinus tenderness and frontal sinus tenderness. Left sinus exhibits maxillary sinus tenderness and frontal sinus tenderness.   Mouth/Throat: Oropharynx is clear and moist.   Eyes: EOM are normal.   Neck: Normal range of motion. Neck supple. No thyromegaly present.   Cardiovascular: Normal rate and regular rhythm. Exam reveals no gallop and no " friction rub.   No murmur heard.  Pulmonary/Chest: Effort normal. No respiratory distress. She has no wheezes. She has no rales.   Abdominal: Soft. Bowel sounds are normal. She exhibits no distension. There is no tenderness. There is no rebound.   Musculoskeletal: Normal range of motion. She exhibits no edema.   Lymphadenopathy:     She has no cervical adenopathy.   Neurological: She is alert and oriented to person, place, and time.   Skin: Skin is warm and dry. No rash noted.   Psychiatric: She has a normal mood and affect. Her behavior is normal. Judgment and thought content normal.   Vitals reviewed.    Assessment:     1. Preop exam for internal medicine    2. Essential (primary) hypertension    3. Fibromyalgia    4. Crohn's disease involving terminal ileum    5. Crohn's disease with complication, unspecified gastrointestinal tract location    6. TAMIKA (obstructive sleep apnea)    7. Other hyperlipidemia    8. Chronic pansinusitis    9. Other migraine without status migrainosus, not intractable    10. Hormone replacement therapy (postmenopausal)    11. Insomnia due to medical condition    12. Crohn's disease of small intestine with rectal bleeding    13. Tortuous aorta    14. Mild aortic insufficiency      Plan:   Jaylin was seen today for annual exam.    Diagnoses and all orders for this visit:    Preop exam for internal medicine I reviewed the patient's past medical, surgical, social and family history and with  physical exam findings and the proposed surgery and I make the following recommendations:     From a cardiac standpoint the patient she has been cleared by Cardiology.  Recently with nuclear stress testing performed.    From a pulmonary standpoint the patient presents as a good candidate as well. The patient has no history of lung disease or pulmonary symptoms. Good pulmonary toilet, incentive spirometry, early ambulation are all recommended to improve the pulmonary outcome. No further pulmonary workup as  noted prior to surgery.   DVT prophylaxis should be per standard. Venous compression devices are recommended. Early ambulation. Patient has been educated on signs and symptoms of both DVT and pulmonary embolus and instructed on what to do if there are symptoms postop.     The patient has been instructed to take  blood pressure medication the morning of surgery with a sip of water. Avoid any aspirin or anti-inflammatories between now and surgery.     If there is any further I can do to assist in the care of this patient please not hesitate to contact me. I will forward the lab results upon my receipt.        Essential (primary) hypertension - stable, Continue with current medications and interventions. Labs reviewed.     -     amLODIPine (NORVASC) 5 MG tablet; Take 1 tablet (5 mg total) by mouth once daily.    Fibromyalgia - stable, Continue with current medications and interventions. Labs reviewed.   -     pregabalin (LYRICA) 150 MG capsule; TAKE 1 CAPSULE (150 MG TOTAL) BY MOUTH 3 (THREE) TIMES DAILY.    Crohn's disease involving terminal ileum - stable, Continue with current medications and interventions. Labs reviewed.   -     mesalamine (PENTASA) 250 mg CpSR; Take 4 capsules (1,000 mg total) by mouth 4 (four) times daily.    Crohn's disease with complication, unspecified gastrointestinal tract location - stable, Continue with current medications and interventions. Labs reviewed.   -     pantoprazole (PROTONIX) 40 MG tablet; Take 1 tablet (40 mg total) by mouth every evening.    TAMIKA (obstructive sleep apnea) - stable, Continue with current medications and interventions. Labs reviewed.     Other hyperlipidemia - stable, Continue with current medications and interventions. Labs reviewed.     Chronic pansinusitis- going for surgery.   -     azelastine-fluticasone (DYMISTA) 137-50 mcg/spray Spry nassal spray; 1 spray by Each Nare route 2 (two) times daily.    Other migraine without status migrainosus, not intractable   - stable, Continue with current medications and interventions. Labs reviewed.     Hormone replacement therapy (postmenopausal) - stable, Continue with current medications and interventions. Labs reviewed.     Insomnia due to medical condition - stable, Continue with current medications and interventions. Labs reviewed.     Crohn's disease of small intestine with rectal bleeding - stable, Continue with current medications and interventions. Labs reviewed.     Tortuous aorta - stable, Continue with current medications and interventions. Labs reviewed.     Mild aortic insufficiency - stable, Continue with current medications and interventions. Labs reviewed.       We discussed polypharmacy and goals to reduce medications after surgery.     Other orders  -     nortriptyline (PAMELOR) 25 MG capsule; Take 2 capsules (50 mg total) by mouth once daily.  -     losartan-hydrochlorothiazide 100-25 mg (HYZAAR) 100-25 mg per tablet; Take 1 tablet by mouth once daily.  -     BREO ELLIPTA 100-25 mcg/dose diskus inhaler; Inhale 1 puff into the lungs once daily.  -     butalbital-acetaminophen-caffeine -40 mg (FIORICET, ESGIC) -40 mg per tablet; Take 1 tab po q4 hrs prn headache  -     DULoxetine (CYMBALTA) 60 MG capsule; Take 1 capsule (60 mg total) by mouth 2 (two) times daily.  -     eletriptan (RELPAX) 40 MG tablet; Take 1 tablet (40 mg total) by mouth as needed.  -     montelukast (SINGULAIR) 10 mg tablet; Take 1 tablet (10 mg total) by mouth every evening.  -     propranolol (INNOPRAN XL) 80 mg 24 hr capsule; Take 1 capsule (80 mg total) by mouth every evening.  -     simvastatin (ZOCOR) 20 MG tablet; Take 1 tablet (20 mg total) by mouth every evening.  -     zolpidem (AMBIEN) 5 MG Tab; TAKE 1 TABLET BY MOUTH EVERY DAY AT BEDTIME AS NEEDED            Follow up in about 3 months (around 10/18/2019) for chronic issues Dr Hu.    There are no Patient Instructions on file for this visit.          Time spent: 40  minutes in face to face discussion concerning diagnosis, prognosis, review of lab and test results, benefits of treatment as well as management of disease, counseling of patient and coordination of care between various health care providers . Greater than half the time spent was used for coordination of care and counseling of patient.

## 2019-07-18 NOTE — ASSESSMENT & PLAN NOTE
Patient has chronic Pansinusitis and is having FESS by Dr. Shaffer on 7/31/19.   Known risk factors for perioperative complications: None    Difficulty with intubation is not anticipated. Dr. Leiva examined patient in pre-op clinic    Cardiac Risk Estimation: LOW    1.) Preoperative workup as follows: none.  2.) Change in medication regimen before surgery: none, continue medication regimen including morning of surgery, with sip of water.  3.) Prophylaxis for cardiac events with perioperative beta-blockers: On propranolol.  4.) Invasive hemodynamic monitoring perioperatively: not indicated.  5.) Deep vein thrombosis prophylaxis postoperatively: regimen to be chosen by surgical team.  6.) Surveillance for postoperative MI with ECG immediately postoperatively and on postoperati ve days 1 and 2 AND troponin levels 24 hours postoperatively and on day 4 or hospital discharge (whichever comes first): not indicated.  7.) Current medications which may produce withdrawal symptoms if withheld perioperatively: Betablockers  8.) Other measures: NONE

## 2019-07-18 NOTE — H&P
Preoperative History and Physical                                                             Hospital Medicine      Chief Complaint: Preoperative evaluation     History of Present Illness:      Jaylin Murguia is a 54 y.o. female with PMHx of fibromyalgia, Chron's disease, chronic Pansinusitis, who presents to the office today for a preoperative consultation at the request of Dr. Shaffer, who plans on performing FESS on 2019.     Functional Status:      The patient is able to climb a flight of stairs. The patient is able to ambulate without difficulty. The patient's functional status is affected by the surgical problem. The patient's functional status is not affected by shortness of breath, chest pain, dyspnea on exertion and fatigue.    MET score greater than 4    Past Medical History:      Past Medical History:   Diagnosis Date    Allergic rhinitis     Asthma     Chronic pansinusitis     Crohn's disease     Ileal involvement, previously on Remicade, Asacol, Prednisone    Fibromyalgia     Hyperlipidemia     Hypertension     Migraine     Obstructive sleep apnea     CPAP at night    Sciatica         Past Surgical History:      Past Surgical History:   Procedure Laterality Date    BLADDER SURGERY      sling was created by her muscles      SECTION      COLONOSCOPY N/A 3/27/2018    Performed by Kyra [facility]lecillo [facility] at [facility]O    COLONOSCOPY N/A 2017    Performed by Kin Dyer MD at [facility] END[facility]    COLONOSCOPY N/A 2015    Performed by Kin Dyer MD at [facility] END[facility]    ESOPHAGOGASTRODUODENOSCOPY (EGD) N/A 2017    Performed by Kin Dyer MD at [facility] ENDO    FINGER SURGERY      joint relpacement, left hand index finger    HYSTERECTOMY      WISDOM TOOTH EXTRACTION          Social History:      Social History     Socioeconomic History    Marital status:      Spouse name: Not on file    Number of  children: 2    Years of education: Not on file    Highest education level: Not on file   Occupational History    Occupation: teacher   Social Needs    Financial resource strain: Not very hard    Food insecurity:     Worry: Never true     Inability: Never true    Transportation needs:     Medical: No     Non-medical: No   Tobacco Use    Smoking status: Never Smoker    Smokeless tobacco: Never Used   Substance and Sexual Activity    Alcohol use: No     Frequency: Never     Drinks per session: Patient refused     Binge frequency: Never    Drug use: No    Sexual activity: Yes     Partners: Male   Lifestyle    Physical activity:     Days per week: 3 days     Minutes per session: 40 min    Stress: To some extent   Relationships    Social connections:     Talks on phone: More than three times a week     Gets together: Once a week     Attends [employer] service: Not on file     Active member of club or organization: Yes     Attends meetings of clubs or organizations: More than 4 times per year     Relationship status:    Other Topics Concern    Not on file   Social History Narrative    Surrogate Decision Maker: , Cristobal Murguia, [phone]        Family History:      Family History   Problem Relation Age of Onset    Hypertension Mother     Kidney disease Father 64        ESRD on HD    Scleroderma Father     Hypertension Brother     Cancer Paternal Grandmother 70        colon    Heart attack Maternal Grandmother     COPD Maternal Grandmother 72       Allergies:      Review of patient's allergies indicates:  No Known Allergies    Medications:      Current Outpatient Medications   Medication Sig    amLODIPine (NORVASC) 2.5 MG tablet TAKE 1 TABLET BY MOUTH EVERY DAY    azelastine-fluticasone (DYMISTA) 137-50 mcg/spray Spry nassal spray 1 spray by Each Nare route 2 (two) times daily.    BREO ELLIPTA 100-25 mcg/dose diskus inhaler Inhale 1 puff into the lungs once daily.     butalbital-acetaminophen-caffeine -40 mg (FIORICET, ESGIC) -40 mg per tablet TAKE 1 TABLET BY MOUTH EVERY 4 HOURS IF NEEDED FOR HEADACHE    cetirizine (ZYRTEC) 5 MG chewable tablet Take 5 mg by mouth 2 (two) times daily.     duloxetine (CYMBALTA) 60 MG capsule Take 60 mg by mouth 2 (two) times daily.    eletriptan (RELPAX) 40 MG tablet Take 1 tablet (40 mg total) by mouth as needed.    estradiol (ESTRACE) 2 MG tablet Take 2 mg by mouth every evening.     levalbuterol (XOPENEX) 1.25 mg/3 mL nebulizer solution Take 3 mLs (1.25 mg total) by nebulization every 4 (four) hours. Rescue    losartan-hydrochlorothiazide 100-25 mg (HYZAAR) 100-25 mg per tablet Take 1 tablet by mouth once daily.    LYRICA 150 mg capsule TAKE 1 CAPSULE (150 MG TOTAL) BY MOUTH 3 (THREE) TIMES DAILY.    mesalamine (PENTASA) 250 mg CpSR Take 4 capsules (1,000 mg total) by mouth 4 (four) times daily.    montelukast (SINGULAIR) 10 mg tablet Take 1 tablet (10 mg total) by mouth every evening.    nortriptyline (PAMELOR) 25 MG capsule TAKE 2 CAPSULES BY MOUTH EVERY DAY    pantoprazole (PROTONIX) 40 MG tablet Take 1 tablet (40 mg total) by mouth once daily. (Patient taking differently: Take 40 mg by mouth every evening. )    propranolol (INNOPRAN XL) 80 mg 24 hr capsule Take 1 capsule (80 mg total) by mouth every evening.    rizatriptan (MAXALT) 10 MG tablet TAKE 1 TABLET BY MOUTH IF NEEDED FOR MIGRAINES MAX 2 TAB IN 24 HOURS    simvastatin (ZOCOR) 20 MG tablet Take 1 tablet (20 mg total) by mouth every evening.    triamcinolone acetonide 0.025% (KENALOG) 0.025 % cream Apply topically 2 (two) times daily.    VENTOLIN HFA 90 mcg/actuation inhaler INHALE 2 PUFFS INTO THE LUNGS EVERY 4 TO 6 HOURS AS NEEDED FOR WHEEZING.    zolpidem (AMBIEN) 5 MG Tab TAKE 1 TABLET BY MOUTH EVERY DAY AT BEDTIME AS NEEDED    benzonatate (TESSALON) 200 MG capsule Take 1 capsule (200 mg total) by mouth 3 (three) times daily as needed for Cough.     doxycycline (MONODOX) 100 MG capsule Take 100 mg by mouth every 12 (twelve) hours.    predniSONE (DELTASONE) 5 MG tablet Take orally.  4 pills on June 24, then 4 pills bid x 2d, 3 bid x 3d, 2 bid x 3d, 2 in AM x 2 days, stop.     No current facility-administered medications for this encounter.        Vitals:      Vitals:    07/18/19 1016   BP: 121/82   Pulse: 64   Resp: 16   Temp: 97.5 °F (36.4 °C)       Review of Systems:       Constitutional: Negative for fever, chills, weight loss, malaise/fatigue and diaphoresis.   HENT: Negative for hearing loss, ear pain, nosebleeds, sore throat, neck pain, tinnitus and ear discharge. Positive congestion    Eyes: Negative for blurred vision, double vision, photophobia, pain, discharge and redness.   Respiratory: Negative for hemoptysis, sputum production, shortness of breath, wheezing and stridor. Positive Cough  Cardiovascular: Negative for chest pain, palpitations, orthopnea, claudication, leg swelling and PND.   Gastrointestinal: Negative for nausea, vomiting, abdominal pain, diarrhea, constipation, blood in stool and melena. Positive heartburn  Genitourinary: Negative for dysuria, urgency, frequency, hematuria and flank pain.   Musculoskeletal: Negative for myalgias, back pain, joint pain and falls.   Skin: Negative for itching and rash.   Neurological: Negative for dizziness, tingling, tremors, sensory change, speech change, focal weakness, seizures, loss of consciousness, weakness and headaches.   Endo/Heme/Allergies: Negative for environmental allergies and polydipsia. Does not bruise/bleed easily.   Psychiatric/Behavioral: Negative for depression, suicidal ideas, hallucinations, memory loss and substance abuse. The patient is not nervous/anxious and does not have insomnia.    All 14 systems reviewed and negative except as noted above.    Physical Exam:      B/P 121/82, HR 64, Resp 16, O2 sat 98%, Temp 97.5    Constitutional: Appears well-developed, well-nourished and  in no acute distress.  Patient is oriented to person, place, and time.   Head: Normocephalic and atraumatic. Mucous membranes moist.  Neck: Neck supple no mass.   Cardiovascular: Normal rate and regular rhythm.  S1 S2 appreciated by ascultation.  Pulmonary/Chest: Effort normal and clear to auscultation bilaterally. No respiratory distress.   Abdomen: Soft. Non-tender and non-distended. Bowel sounds are normal.   Neurological: Patient is alert and oriented to person, place and time. Moves all extremities.  Skin: Warm and dry. No lesions.  Extremities: No clubbing, cyanosis or edema.    Laboratory data:      Reviewed and noted in plan where applicable. Please see chart for full laboratory data.    No results for input(s): CPK, CPKMB, TROPONINI, MB in the last 24 hours. No results for input(s): POCTGLUCOSE in the last 24 hours.     Lab Results   Component Value Date    INR 1.0 11/20/2014       Lab Results   Component Value Date    WBC 6.65 12/14/2018    HGB 13.1 12/14/2018    HCT 41.3 12/14/2018    MCV 96 12/14/2018     (H) 12/14/2018     CBC Pending 7/18/19  No results for input(s): GLU, NA, K, CL, CO2, BUN, CREATININE, CALCIUM, MG in the last 24 hours.    Predictors of intubation difficulty:       Morbid obesity? yes    Anatomically abnormal facies? no   Prominent incisors? no   Receding mandible? no   Short, thick neck? yes - Dr. Leiva, examined pt   Neck range of motion: normal   Dentition: No chipped, loose, or missing teeth.    Cardiographics:      ECG: normal sinus rhythm, no blocks or conduction defects, no ischemic changes  Echocardiogram: [facility] LVEF     ECHO CONCLUSIONS     1 - Mild left atrial enlargement.     2 - Concentric hypertrophy.     3 - No wall motion abnormalities.     4 - Normal left ventricular systolic function (EF 60-65%).     5 - Normal left ventricular diastolic function.     6 - Normal right ventricular systolic function .     7 - The estimated PA systolic pressure is 28 mmHg.     8 -  Trivial to mild aortic regurgitation.     9 - Trivial to mild mitral regurgitation.     Treadmill Stress Test:  1. The EKG portion of this study is negative for ischemia at a moderate workload, and peak heart rate of 127 bpm (80% of predicted).   2. Exercise capacity is average.   3. Blood pressure response to exercise was normal (Presenting BP: 129/84 Peak BP: 150/78).   4. No significant arrhythmias were present.   5. There were no symptoms of chest discomfort or significant dyspnea throughout the protocol.   6. The Duke treadmill score was 7 suggesting a low probability for future cardiovascular events.    Imaging:      Chest x-ray: No acute cardiopulmonary process 3/15/19     Assessment and Plan:      Chronic pansinusitis  Patient has chronic Pansinusitis and is having FESS by Dr. Shaffer on 7/31/19.   Known risk factors for perioperative complications: None    Difficulty with intubation is not anticipated. Dr. Leiva examined patient in pre-op clinic    Cardiac Risk Estimation: LOW    1.) Preoperative workup as follows: none.  2.) Change in medication regimen before surgery: none, continue medication regimen including morning of surgery, with sip of water.  3.) Prophylaxis for cardiac events with perioperative beta-blockers: On propranolol.  4.) Invasive hemodynamic monitoring perioperatively: not indicated.  5.) Deep vein thrombosis prophylaxis postoperatively: regimen to be chosen by surgical team.  6.) Surveillance for postoperative MI with ECG immediately postoperatively and on postoperati ve days 1 and 2 AND troponin levels 24 hours postoperatively and on day 4 or hospital discharge (whichever comes first): not indicated.  7.) Current medications which may produce withdrawal symptoms if withheld perioperatively: Betablockers  8.) Other measures: NONE    TAMIKA (obstructive sleep apnea)  Bring CPAP morning of surgery    Crohn's disease  Outpatient managment

## 2019-07-18 NOTE — PROGRESS NOTES
Referring Provider:    Donita Self  No address on file  Subjective:   Patient: Jaylin Murguia [id number], :1965   Visit date:2019 8:44 AM    Chief Complaint:  Other (discuss surgery)    HPI:  Jaylin is a 54 y.o. female who I was asked to see in consultation for evaluation of the following issue(s):    Sinonasal / Allergy: Jaylin has bilateral moderate nasal obstruction. She reports the the following sinonasal symptoms: allergies, asthma, facial pain, facial pressure, headaches, post-nasal drip, reduced sense of smell and nasal obstruction. She has been  on medications for these symptoms. Medications: NASAL STEROID SPRAY(S), NASAL ANTIHISTAMINE SPRAY(S), ORAL ANTIBIOTIC(S), Zyrtec and ORAL ANTILEUKOTRIENE(S) (ie. Singulair) which has been ineffective.  She recently completed 4 weeks of continuous antibiotics and steroids.  She uses Dymista BID.  She also uses saline irrigations.  None of this has been effective.     She has had continuous sinus infections in the past 12 months.     She has severe allergic rhinitis with symptoms including coughing, nasal congestion, nasal itchiness, nasal rhinorrhea and wheezing.      Allergy testing for this patient was done many years ago and result was positive for dogs, cats, mold grasses.    Current smoker:  No       There has been a recent imaging study of the sinuses (CT sinuses).  Results for orders placed during the hospital encounter of 19   CT Sinuses without Contrast    Narrative EXAMINATION:  CT SINUSES WITHOUT CONTRAST    CLINICAL HISTORY:  cough, asthma, allergic rhinitis;  Cough    TECHNIQUE:  Axial low-dose images, coronal and sagittal reformations were performed through the paranasal sinuses.  Contrast was not administered.    COMPARISON:  Radiographs of the sinuses from 2018.    FINDINGS:  Streak artifact from dental hardware is noted.  Orbits and orbital contents are maintained.  Skull remains intact without evidence of acute osseous  abnormality.    There is bilateral occlusion of the ostiomeatal units.  The is left sided spurring of the nasal septum.  There is a plastic appearance of the right frontal sinus.  Left frontal sinuses are clear.  There is complete opacification of the left ethmoid air cells and mild mucoperiosteal thickening involving the anterior right ethmoid air cells.  There is near complete opacification of the left maxillary sinus with a very small residual aerated portion seen at the upper medial aspect of the sinus.  There is diffuse chronic thickening of the right maxillary sinus opacifying more than 70% of the sinus volume.  Hyper attenuation is seen along the inferior aspect of the right maxillary sinus.  For example, see image 36 of series 2.  This is nonspecific but can see be seen with inspissated secretions as well as other conditions..  There appears to be a single large central sphenoid sinus which is nearly completely opacified with less than 20% aeration of the sinus seen.      Impression Pansinusitis changes as above.      Electronically signed by: Sawyer Funes MD  Date:    06/13/2019  Time:    08:49     No results found for this or any previous visit.      Review of Systems:  Negative unless checked off.  Gen:  []fever   []fatigue  HENT:  []nosebleeds  []dental problem   Eyes:  []photophobia  []visual disturbance  Resp:  [x]chest tightness [x]wheezing  Card:  []chest pain  []leg swelling  GI:  []abdominal pain []blood in stool  :  []dysuria  []hematuria  Musc:  []joint swelling  []gait problem  Skin:  []color change  []pallor  Neuro:  []seizures  []numbness  Hem:  []bruise/bleed easily  Psych:  []hallucinations  []behavioral problems  Allergy/Imm: has No Known Allergies.    Her meds, allergies, medical, surgical, social & family histories were reviewed & updated:  -     She has a current medication list which includes the following prescription(s): amlodipine, azelastine-fluticasone, benzonatate, breo  ellipta, butalbital-acetaminophen-caffeine -40 mg, cetirizine, doxycycline, duloxetine, eletriptan, estradiol, levalbuterol, losartan-hydrochlorothiazide 100-25 mg, lyrica, mesalamine, montelukast, nortriptyline, pantoprazole, prednisone, propranolol, rizatriptan, simvastatin, triamcinolone acetonide 0.025%, ventolin hfa, and zolpidem.  -     She  has a past medical history of Allergic rhinitis, Asthma, Crohn's disease, Fibromyalgia, Hyperlipidemia, Hypertension, Migraine, Sciatica, and Sleep apnea.   -     She does not have any pertinent problems on file.   -     She  has a past surgical history that includes Hysterectomy;  section; [place] tooth extraction; Finger surgery; Bladder surgery; Colonoscopy (N/A, 2017); and Colonoscopy (N/A, 3/27/2018).  -     She  reports that she has never smoked. She has never used smokeless tobacco. She reports that she does not drink alcohol or use drugs.  -     Her family history includes Cancer (age of onset: 70) in her paternal grandmother; Hypertension in her brother and mother; Kidney disease in her father; Scleroderma in her father.  -     She has No Known Allergies.    Objective:     Physical Exam:  Vitals:  /85   Pulse 69   Temp 97.5 °F (36.4 °C) (Tympanic)   Wt 92 kg (202 lb 13.2 oz)   BMI 32.25 kg/m²   General appearance:  Well developed, well nourished    Eyes:  Extraocular motions intact, PERRL    Communication:  no hoarseness, no dysphonia    Ears:  Otoscopy of external auditory canals and tympanic membranes was normal, clinical speech reception thresholds grossly intact, no mass/lesion of auricle.  Nose:  No masses/lesions of external nose, nasal mucosa, septum, and turbinates were within normal limits.  Mouth:  No mass/lesion of lips, teeth, gums, hard/soft palate, tongue, tonsils, or oropharynx.    Cardiovascular:  No pedal edema; Radial Pulses +2     Neck & Lymphatics:  No cervical lymphadenopathy, no neck mass/crepitus/ asymmetry,  trachea is midline, no thyroid enlargement/tenderness/mass.    Psych: Oriented x3,  Alert with normal mood and affect.     Respiration/Chest:  Symmetric expansion during respiration, normal respiratory effort.    Skin:  Warm and intact. No ulcerations of face, scalp, neck.    Assessment & Plan:   Jaylin was seen today for other.    Diagnoses and all orders for this visit:    Chronic pansinusitis  -     Aspergillus fumagatus IgE; Future  -     Bermuda grass IgE; Future  -     Cat epithelium IgE; Future  -     Cladosporium IgE; Future  -     Cockroach, American IgE; Future  -     [place], bald IgE; Future  -     D. farinae IgE; Future  -     D. pteronyssinus IgE; Future  -     Dog dander IgE; Future  -     Plantain, English IgE; Future  -     Haider grass IgE; Future  -     Marsh elder, rough IgE; Future  -     Mugwort IgE; Future  -     Nettle IgE; Future  -     Oak, white IgE; Future  -     Penicillium IgE; Future  -     Ragweed, short, common IgE; Future  -     Scot IgE; Future  -     Allergen, Cocklebur; Future  -     Allergen, Elm Cedar; Future  -     Allergen, Meadow Grass ([place] Blue); Future  -     Allergen, Mucor Racemosus; Future  -     Allergen, Pecan [place] IgE; Future  -     Allergen, White Frank; Future  -     Allergen-Alternaria Alternata; Future  -     Allergen-Cedar; Future  -     Allergen-Common Pigweed; Future  -     Allergen-Silver Birch; Future  -     Feather Panel #2; Future  -     RAST Allergen for Eastern [place]; Future  -     RAST Allergen Maple ([place]); Future  -     RAST Allergen [place]; Future  -     RAST Allergen, Lamb's Quarters; Future  -     RAST Allergen, Sheep [place]); Future  -     CBC auto differential; Future  -     CT Medtronic Sinuses without; Future  -     Case Request Operating Room: FESS, USING COMPUTER-ASSISTED NAVIGATION    Non-seasonal allergic rhinitis, unspecified trigger  -     Aspergillus fumagatus IgE; Future  -     Bermuda grass IgE; Future  -      Cat epithelium IgE; Future  -     Cladosporium IgE; Future  -     Cockroach, American IgE; Future  -     [place], bald IgE; Future  -     D. farinae IgE; Future  -     D. pteronyssinus IgE; Future  -     Dog dander IgE; Future  -     Plantain, English IgE; Future  -     Haider grass IgE; Future  -     Marsh elder, rough IgE; Future  -     Mugwort IgE; Future  -     Nettle IgE; Future  -     Oak, white IgE; Future  -     Penicillium IgE; Future  -     Ragweed, short, common IgE; Future  -     Scot IgE; Future  -     Allergen, Cocklebur; Future  -     Allergen, Elm Cedar; Future  -     Allergen, Meadow Grass ([email or web address] Blue); Future  -     Allergen, Mucor Racemosus; Future  -     Allergen, Pecan [place] IgE; Future  -     Allergen, White Frank; Future  -     Allergen-Alternaria Alternata; Future  -     Allergen-Cedar; Future  -     Allergen-Common Pigweed; Future  -     Allergen-Silver Birch; Future  -     Feather Panel #2; Future  -     RAST Allergen for Eastern [place]; Future  -     RAST Allergen Maple ([place]); Future  -     RAST Allergen [place]; Future  -     RAST Allergen, Lamb's Quarters; Future  -     RAST Allergen, Sheep [place]ellow Dock); Future  -     CBC auto differential; Future  -     CT Medtronic Sinuses without; Future      -SINUSITIS/RHINITIS  Jaylin presents today for initial evaluation.  They have multiple sinonasal complaints and determination of the underlying etiology is a problem of moderate to high complexity.  Patients may present with sinonasal symptoms such as nasal obstruction as a primary anatomic disorder.  Patients may also present with recurrent or chronic inflammatory sinonasal symptoms.  Generally, patients can be stratified into one of several groups.      The first group represents patients who have frequent or recurrent upper respiratory infections, most frequently viral.  These patients most frequently have normally functioning immune system's but have high levels of  exposure.  This is commonly seen in nurses and schoolteachers as well as other groups who spend large amounts of time around sick patients and children.      Other patients may have isolated rhinitis without evidence of sinusitis.  The majority of these patients have ALLERGIC rhinitis however other groups may have more rare forms of rhinitis such as nonallergic rhinitis with eosinophilia syndrome.  As a screening evaluation, if the patient has had a normal endoscopy, from time to time a simple x-ray of the sinuses may be performed in combination with antigen specific ALLERGY testing.    The third group of patients present with significant evidence of chronic sinusitis.  Nasal endoscopy may reveal polyps or purulent drainage.  CT scan is an important aspect to determining the extent of disease.  Some patients with chronic sinusitis have an underlying etiology of ALLERGY leading to obstruction of the ostiomeatal units with furthering of the infection.  Others may have an acute infection that leads to stenosis of the sinonasal openings followed by a long, progressive course of infection.  Patients with unilateral disease who do not have inverting papilloma typically fall into this latter group.    CT scan of the sinuses has been performed.      Antigen specific allergy testing  has been performed.    Allergy treatment with topical steroids and/or antihistamines has been used with good compliance with symptoms unchanged.         My recommendation is ESS, full house.    We discussed at length the risk of sinus surgery including, but not limited to, recurrence of disease, bleeding, infection, septal perforation, tooth or cheek numbness, vision changes, orbital injury, CSF leak and changes in smell.  The patient had opportunity to ask questions and I answered all of them to their satisfaction.  We will proceed as planned.          We discussed her medical conditions, treatments and plan.  Jaylin should return to clinic if  any issues arise (symptoms worsen or persist), otherwise we will see her back in the clinic after surgery.    Thank you for allowing me to participate in the care of Jaylin.    Manish Shaffer MD      Patient: Jaylin Murguia [id number], :1965  Procedure date:2019  Patient's medications, allergies, past medical, surgical, social and family histories were reviewed and updated as appropriate.  Chief Complaint:  Other (discuss surgery)    HPI:  Jaylin is a 54 y.o. female with the history of present illness as discussed in the clinic note from today.  Anterior rhinoscopy was insufficient to explain symptoms and findings.     Procedure: Risks, benefits, and alternatives of the procedure were discussed with the patient, and the patient consented to the nasal endoscopy.  The nasal cavity was sprayed with a topical decongestant and anesthetic (if needed). The endoscope was passed into each nostril and each nasal cavity was visualized.  On each side the nasal cavity, sinuses (if open), turbinates, middle and superior meatus, sphenoethmoidal recess and septum were examined with the findings described below. At the end of the examination, the scope was removed. The patient tolerated the procedure well with no complications.       Endoscopic Sinonasal Exam Findings:  -     The right side has marked edema with polypoid changes  -     The left side has marked edema with polypoid changes  -     Nasal secretions: purulent secretions bilaterally  -     Nasal septum: no significant deviation or perforation appreciated   -     Inferior turbinate: hypertrophy or edema (Severe) bilaterally  -     Middle turbinate: hypertrophy or edema (Moderate) bilaterally  -     Other findings: No mass or obstructive lesion    Assessment & Plan:  - see today's clinic note

## 2019-07-22 ENCOUNTER — PATIENT MESSAGE (OUTPATIENT)
Dept: SURGERY | Facility: HOSPITAL | Age: 54
End: 2019-07-22

## 2019-07-22 ENCOUNTER — PATIENT MESSAGE (OUTPATIENT)
Dept: INTERNAL MEDICINE | Facility: CLINIC | Age: 54
End: 2019-07-22

## 2019-07-22 LAB
A ALTERNATA IGE QN: <0.35 KU/L
A FUMIGATUS IGE QN: <0.35 KU/L
ALLERGEN BOXELDER MAPLE TREE IGE: <0.35 KU/L
ALLERGEN MAPLE (BOX ELDER) CLASS: NORMAL
ALLERGEN MULBERRY CLASS: NORMAL
ALLERGEN MULBERRY TREE IGE: <0.35 KU/L
ALLERGEN PIGWEED IGE: <0.35 KU/L
ALLERGEN WHITE ASH TREE IGE: <0.35 KU/L
AMER SYCAMORE IGE QN: <0.35 KU/L
BALD CYPRESS IGE QN: <0.35 KU/L
BERMUDA GRASS IGE QN: <0.35 KU/L
C HERBARUM IGE QN: <0.35 KU/L
CAT DANDER IGE QN: <0.35 KU/L
CEDAR IGE QN: <0.35 KU/L
COCKLEBUR IGE QN: <0.35 KU/L
COMMON PIGWEED CLASS: NORMAL
COMMON RAGWEED IGE QN: <0.35 KU/L
D FARINAE IGE QN: <0.35 KU/L
D PTERONYSS IGE QN: <0.35 KU/L
DEPRECATED A ALTERNATA IGE RAST QL: NORMAL
DEPRECATED A FUMIGATUS IGE RAST QL: NORMAL
DEPRECATED BALD CYPRESS IGE RAST QL: NORMAL
DEPRECATED BERMUDA GRASS IGE RAST QL: NORMAL
DEPRECATED C HERBARUM IGE RAST QL: NORMAL
DEPRECATED CAT DANDER IGE RAST QL: NORMAL
DEPRECATED CEDAR IGE RAST QL: NORMAL
DEPRECATED COCKLEBUR IGE RAST QL: NORMAL
DEPRECATED COMMON RAGWEED IGE RAST QL: NORMAL
DEPRECATED D FARINAE IGE RAST QL: NORMAL
DEPRECATED D PTERONYSS IGE RAST QL: NORMAL
DEPRECATED DOG DANDER IGE RAST QL: NORMAL
DEPRECATED ELDER IGE RAST QL: NORMAL
DEPRECATED ENGL PLANTAIN IGE RAST QL: NORMAL
DEPRECATED GOOSEFOOT IGE RAST QL: NORMAL
DEPRECATED JOHNSON GRASS IGE RAST QL: NORMAL
DEPRECATED KENT BLUE GRASS IGE RAST QL: NORMAL
DEPRECATED M RACEMOSUS IGE RAST QL: NORMAL
DEPRECATED MUGWORT IGE RAST QL: NORMAL
DEPRECATED NETTLE IGE RAST QL: NORMAL
DEPRECATED P NOTATUM IGE RAST QL: NORMAL
DEPRECATED PECAN/HICK TREE IGE RAST QL: NORMAL
DEPRECATED ROACH IGE RAST QL: NORMAL
DEPRECATED SHEEP SORREL IGE RAST QL: NORMAL
DEPRECATED SILVER BIRCH IGE RAST QL: NORMAL
DEPRECATED TIMOTHY IGE RAST QL: NORMAL
DEPRECATED WHITE OAK IGE RAST QL: NORMAL
DOG DANDER IGE QN: <0.35 KU/L
ELDER IGE QN: <0.35 KU/L
ELM CEDAR CLASS: NORMAL
ELM CEDAR, IGE: <0.35 KU/L
ENGL PLANTAIN IGE QN: <0.35 KU/L
FEATHER PANEL #2: <0.35 KU/L
GOOSEFOOT IGE QN: <0.35 KU/L
JOHNSON GRASS IGE QN: <0.35 KU/L
KENT BLUE GRASS IGE QN: <0.35 KU/L
M RACEMOSUS IGE QN: <0.35 KU/L
MUGWORT IGE QN: <0.35 KU/L
NETTLE IGE QN: <0.35 KU/L
P NOTATUM IGE QN: <0.35 KU/L
PECAN/HICK TREE IGE QN: <0.35 KU/L
ROACH IGE QN: <0.35 KU/L
SHEEP SORREL IGE QN: <0.35 KU/L
SILVER BIRCH IGE QN: <0.35 KU/L
TIMOTHY IGE QN: <0.35 KU/L
WHITE ASH CLASS: NORMAL
WHITE OAK IGE QN: <0.35 KU/L

## 2019-07-22 RX ORDER — NORTRIPTYLINE HYDROCHLORIDE 25 MG/1
50 CAPSULE ORAL DAILY
Qty: 60 CAPSULE | Refills: 1 | Status: SHIPPED | OUTPATIENT
Start: 2019-07-22 | End: 2019-08-26 | Stop reason: SDUPTHER

## 2019-07-23 ENCOUNTER — TELEPHONE (OUTPATIENT)
Dept: INTERNAL MEDICINE | Facility: CLINIC | Age: 54
End: 2019-07-23

## 2019-07-23 ENCOUNTER — OFFICE VISIT (OUTPATIENT)
Dept: ORTHOPEDICS | Facility: CLINIC | Age: 54
End: 2019-07-23
Payer: COMMERCIAL

## 2019-07-23 VITALS
HEIGHT: 67 IN | BODY MASS INDEX: 31.87 KG/M2 | SYSTOLIC BLOOD PRESSURE: 137 MMHG | HEART RATE: 60 BPM | WEIGHT: 203.06 LBS | DIASTOLIC BLOOD PRESSURE: 74 MMHG

## 2019-07-23 DIAGNOSIS — M17.12 PRIMARY OSTEOARTHRITIS OF LEFT KNEE: ICD-10-CM

## 2019-07-23 DIAGNOSIS — S76.311A HAMSTRING STRAIN, RIGHT, INITIAL ENCOUNTER: ICD-10-CM

## 2019-07-23 DIAGNOSIS — I10 ESSENTIAL (PRIMARY) HYPERTENSION: ICD-10-CM

## 2019-07-23 DIAGNOSIS — Z79.60 LONG-TERM USE OF IMMUNOSUPPRESSANT MEDICATION: ICD-10-CM

## 2019-07-23 DIAGNOSIS — M17.0 BILATERAL PRIMARY OSTEOARTHRITIS OF KNEE: ICD-10-CM

## 2019-07-23 DIAGNOSIS — M17.11 PRIMARY OSTEOARTHRITIS OF RIGHT KNEE: Primary | ICD-10-CM

## 2019-07-23 DIAGNOSIS — K50.90 CROHN'S DISEASE WITHOUT COMPLICATION, UNSPECIFIED GASTROINTESTINAL TRACT LOCATION: ICD-10-CM

## 2019-07-23 PROCEDURE — 99214 OFFICE O/P EST MOD 30 MIN: CPT | Mod: 25,S$GLB,, | Performed by: FAMILY MEDICINE

## 2019-07-23 PROCEDURE — 3008F PR BODY MASS INDEX (BMI) DOCUMENTED: ICD-10-PCS | Mod: CPTII,S$GLB,, | Performed by: FAMILY MEDICINE

## 2019-07-23 PROCEDURE — 3078F PR MOST RECENT DIASTOLIC BLOOD PRESSURE < 80 MM HG: ICD-10-PCS | Mod: CPTII,S$GLB,, | Performed by: FAMILY MEDICINE

## 2019-07-23 PROCEDURE — 20610 DRAIN/INJ JOINT/BURSA W/O US: CPT | Mod: RT,S$GLB,, | Performed by: FAMILY MEDICINE

## 2019-07-23 PROCEDURE — 3008F BODY MASS INDEX DOCD: CPT | Mod: CPTII,S$GLB,, | Performed by: FAMILY MEDICINE

## 2019-07-23 PROCEDURE — 20610 LARGE JOINT ASPIRATION/INJECTION: R KNEE: ICD-10-PCS | Mod: RT,S$GLB,, | Performed by: FAMILY MEDICINE

## 2019-07-23 PROCEDURE — 3075F PR MOST RECENT SYSTOLIC BLOOD PRESS GE 130-139MM HG: ICD-10-PCS | Mod: CPTII,S$GLB,, | Performed by: FAMILY MEDICINE

## 2019-07-23 PROCEDURE — 99999 PR PBB SHADOW E&M-EST. PATIENT-LVL III: CPT | Mod: PBBFAC,,, | Performed by: FAMILY MEDICINE

## 2019-07-23 PROCEDURE — 3075F SYST BP GE 130 - 139MM HG: CPT | Mod: CPTII,S$GLB,, | Performed by: FAMILY MEDICINE

## 2019-07-23 PROCEDURE — 3078F DIAST BP <80 MM HG: CPT | Mod: CPTII,S$GLB,, | Performed by: FAMILY MEDICINE

## 2019-07-23 PROCEDURE — 99999 PR PBB SHADOW E&M-EST. PATIENT-LVL III: ICD-10-PCS | Mod: PBBFAC,,, | Performed by: FAMILY MEDICINE

## 2019-07-23 PROCEDURE — 99214 PR OFFICE/OUTPT VISIT, EST, LEVL IV, 30-39 MIN: ICD-10-PCS | Mod: 25,S$GLB,, | Performed by: FAMILY MEDICINE

## 2019-07-23 RX ORDER — TRIAMCINOLONE ACETONIDE 40 MG/ML
80 INJECTION, SUSPENSION INTRA-ARTICULAR; INTRAMUSCULAR
Status: DISCONTINUED | OUTPATIENT
Start: 2019-07-23 | End: 2019-07-23 | Stop reason: HOSPADM

## 2019-07-23 RX ADMIN — TRIAMCINOLONE ACETONIDE 80 MG: 40 INJECTION, SUSPENSION INTRA-ARTICULAR; INTRAMUSCULAR at 11:07

## 2019-07-23 NOTE — PROGRESS NOTES
Subjective:     Patient ID: Jaylin Murguia is a 54 y.o. female.    Chief Complaint: Pain of the Left Knee and Pain of the Right Knee    Patient is a 54-year-old female who presents clinic today follow up of bilateral knee pain (right worse than left).  Patient states the previous cortisone injection of right knee work for approximately 5-6 weeks.  Patient states that she is also having some posterior knee pain with ambulation.  Patient localizes the pain to her medial hamstring attachment.  Patient states he has not had any injuries or falls.  States that she would like to repeat a cortisone injection today.      Process note:  Patient states that she is a  and is on her feet throughout the day.  Patient states that her knees have begun ache and swell.  States swelling is worse on the right side.  Denies any injuries or falls.  States that she has been taking over-the-counter anti-inflammatories which have helped some, but is still having significant symptoms.  Patient source is trying to lose weight and exercise more, but states she has limited due to her pain.  Denies any previous injections, surgeries, physical therapy, MRIs, fever, or chills.    Pain   Associated symptoms include myalgias. Pertinent negatives include no chills, fever, nausea, rash, vomiting or weakness.       Past Medical History:   Diagnosis Date    Allergic rhinitis     Asthma     Chronic pansinusitis     Crohn's disease     Ileal involvement, previously on Remicade, Asacol, Prednisone    Fibromyalgia     Hyperlipidemia     Hypertension     Migraine     Obstructive sleep apnea     CPAP at night    Sciatica      Past Surgical History:   Procedure Laterality Date    BLADDER SURGERY      sling was created by her muscles      SECTION      COLONOSCOPY N/A 3/27/2018    [facility]    COLONOSCOPY N/A 2017    [facility]     COLONOSCOPY N/A 5/7/2015    [facility]    ESOPHAGOGASTRODUODENOSCOPY (EGD) N/A 5/9/2017    [facility]    FINGER SURGERY      joint relpacement, left hand index finger    HYSTERECTOMY      WISDOM TOOTH EXTRACTION       Family History   Problem Relation Age of Onset    Hypertension Mother     Kidney disease Father 64        ESRD on HD    Scleroderma Father     Hypertension Brother     Cancer Paternal Grandmother 70        colon    Heart attack Maternal Grandmother     COPD Maternal Grandmother 72     Social History     Socioeconomic History    Marital status:      Spouse name: Not on file    Number of children: 2    Years of education: Not on file    Highest education level: Not on file   Occupational History    Occupation: teacher   Social Needs    Financial resource strain: Not very hard    Food insecurity:     Worry: Never true     Inability: Never true    Transportation needs:     Medical: No     Non-medical: No   Tobacco Use    Smoking status: Never Smoker    Smokeless tobacco: Never Used   Substance and Sexual Activity    Alcohol use: No     Frequency: Never     Drinks per session: Patient refused     Binge frequency: Never    Drug use: No    Sexual activity: Yes     Partners: Male   Lifestyle    Physical activity:     Days per week: 3 days     Minutes per session: 40 min    Stress: To some extent   Relationships    Social connections:     Talks on phone: More than three times a week     Gets together: Once a week     Attends Muslim service: Not on file     Active member of club or organization: Yes     Attends meetings of clubs or organizations: More than 4 times per year     Relationship status:    Other Topics Concern    Not on file   Social History Narrative    Surrogate Decision Maker: , Cristobal Murguia, [phone]     Medication List with Changes/Refills   Current  Medications    AMLODIPINE (NORVASC) 5 MG TABLET    Take 1 tablet (5 mg total) by mouth once daily.    AZELASTINE-FLUTICASONE (DYMISTA) 137-50 MCG/SPRAY SPRY NASSAL SPRAY    1 spray by Each Nare route 2 (two) times daily.    BREO ELLIPTA 100-25 MCG/DOSE DISKUS INHALER    Inhale 1 puff into the lungs once daily.    BUTALBITAL-ACETAMINOPHEN-CAFFEINE -40 MG (FIORICET, ESGIC) -40 MG PER TABLET    Take 1 tab po q4 hrs prn headache    CETIRIZINE (ZYRTEC) 5 MG CHEWABLE TABLET    Take 5 mg by mouth 2 (two) times daily.     DULOXETINE (CYMBALTA) 60 MG CAPSULE    Take 1 capsule (60 mg total) by mouth 2 (two) times daily.    ELETRIPTAN (RELPAX) 40 MG TABLET    Take 1 tablet (40 mg total) by mouth as needed.    ESTRADIOL (ESTRACE) 2 MG TABLET    Take 2 mg by mouth every evening.     LEVALBUTEROL (XOPENEX) 1.25 MG/3 ML NEBULIZER SOLUTION    Take 3 mLs (1.25 mg total) by nebulization every 4 (four) hours. Rescue    LOSARTAN-HYDROCHLOROTHIAZIDE 100-25 MG (HYZAAR) 100-25 MG PER TABLET    Take 1 tablet by mouth once daily.    MESALAMINE (PENTASA) 250 MG CPSR    Take 4 capsules (1,000 mg total) by mouth 4 (four) times daily.    MONTELUKAST (SINGULAIR) 10 MG TABLET    Take 1 tablet (10 mg total) by mouth every evening.    NORTRIPTYLINE (PAMELOR) 25 MG CAPSULE    Take 2 capsules (50 mg total) by mouth once daily.    PANTOPRAZOLE (PROTONIX) 40 MG TABLET    Take 1 tablet (40 mg total) by mouth every evening.    PREGABALIN (LYRICA) 150 MG CAPSULE    TAKE 1 CAPSULE (150 MG TOTAL) BY MOUTH 3 (THREE) TIMES DAILY.    PROPRANOLOL (INNOPRAN XL) 80 MG 24 HR CAPSULE    Take 1 capsule (80 mg total) by mouth every evening.    RIZATRIPTAN (MAXALT) 10 MG TABLET    TAKE 1 TABLET BY MOUTH IF NEEDED FOR MIGRAINES MAX 2 TAB IN 24 HOURS    SIMVASTATIN (ZOCOR) 20 MG TABLET    Take 1 tablet (20 mg total) by mouth every evening.    TRIAMCINOLONE ACETONIDE 0.025% (KENALOG) 0.025 % CREAM    Apply topically 2 (two) times daily.    VENTOLIN HFA 90  "MCG/ACTUATION INHALER    INHALE 2 PUFFS INTO THE LUNGS EVERY 4 TO 6 HOURS AS NEEDED FOR WHEEZING.    ZOLPIDEM (AMBIEN) 5 MG TAB    TAKE 1 TABLET BY MOUTH EVERY DAY AT BEDTIME AS NEEDED     Review of patient's allergies indicates:  No Known Allergies  Review of Systems   Constitutional: Negative for chills, fever and malaise/fatigue.   HENT: Negative for hearing loss.    Eyes: Negative for redness.   Cardiovascular: Negative for leg swelling.   Gastrointestinal: Negative for nausea and vomiting.   Musculoskeletal: Positive for joint pain and myalgias. Negative for back pain and falls.   Skin: Negative for rash.   Neurological: Negative for tingling, sensory change, focal weakness and weakness.        Objective:   Body mass index is 31.8 kg/m².  Vitals:    07/23/19 1119   BP: 137/74   Pulse: 60   Weight: 92.1 kg (203 lb 0.7 oz)   Height: 5' 7" (1.702 m)   PainSc:   6   PainLoc: Knee           General    Nursing note and vitals reviewed.  Constitutional: She is oriented to person, place, and time. She appears well-developed and well-nourished. No distress.   Eyes: Conjunctivae are normal. No scleral icterus.   Pulmonary/Chest: Effort normal.   Neurological: She is alert and oriented to person, place, and time.   Psychiatric: She has a normal mood and affect. Her behavior is normal. Judgment and thought content normal.     General Musculoskeletal Exam   Gait: normal       Right Knee Exam     Inspection   Erythema: absent  Swelling: absent  Effusion: absent (mild)  Deformity: absent    Tenderness   The patient is tender to palpation of the medial joint line and medial hamstring.    Range of Motion   The patient has normal right knee ROM.    Tests   Meniscus   Gwen:  Medial - negative Lateral - negative  Patella   Passive Patellar Tilt: neutral    Other   Sensation: normal    Left Knee Exam     Inspection   Erythema: absent  Effusion: absent  Deformity: absent    Tenderness   The patient tender to palpation of the " medial joint line.    Range of Motion   The patient has normal left knee ROM.    Tests   Meniscus   Gwen:  Medial - negative Lateral - negative  Patella   Passive Patellar Tilt: neutral    Other   Sensation: normal    Muscle Strength   Right Lower Extremity   Quadriceps:  5/5   Hamstrin/5   Left Lower Extremity   Quadriceps:  5/5       EXAMINATION:  XR KNEE ORTHO BILAT WITH FLEXION    CLINICAL HISTORY:  Pain, unspecified    TECHNIQUE:  AP standing of both knees, PA flexion standing views of both knees, and Merchant views of both knees were performed.  Lateral views of both knees were also performed.    COMPARISON:  None    FINDINGS:  No fracture or dislocation.  Mild medial compartment joint space narrowing is seen on either side with tiny patellofemoral osteophytes noted.  There is a right knee joint effusion.  Soft tissues are symmetric in appearance      Impression       As above      Electronically signed by: Sawyer Funes MD  Date: 2019  Time: 10:32           Jaylin was seen today for pain and pain.    Diagnoses and all orders for this visit:    Primary osteoarthritis of right knee  -     Large Joint Aspiration/Injection: R knee    Hamstring strain, right, initial encounter    Bilateral primary osteoarthritis of knee    Primary osteoarthritis of left knee    Crohn's disease without complication, unspecified gastrointestinal tract location    Essential (primary) hypertension    Long-term use of immunosuppressant medication    -discussed the clinical course and nature of osteoarthritis and hamstring strain with patient.  Will repeat a cortisone injection today.  Offered physical therapy for patient's knee and hamstring strain, but patient deferred at this time.  Continue extra strength Tylenol as needed.  -bilateral knee Xray images were independently viewed and read by me showing no acute fractures or dislocations.  Mild medial compartment joint space loss bilaterally.  Moderate osteophyte  formation noted around the patella.  -Formal read by radiologist is as described above  -Discussed findings with patient    -Chronic hypertension.  Managed by patient's PCP.  -Chronic Crohn's disease with long-term immunosuppression medication.  Managed by patient's gastroenterologist.    -Treatment options and alternatives were discussed with the patient. Patient expressed understanding. Patient was given the opportunity to ask questions and be an active participant in their medical care. Patient had no further questions or concerns at this time.   -Patient is an overall moderate risk for health complications from their medical conditions.

## 2019-07-23 NOTE — PATIENT INSTRUCTIONS
Hamstring Stretch (Flexibility)    1. Sit on the floor with your right leg straight in front of you. Bend your left leg so the sole of your foot rests against the inside of your right thigh.  2. Reach toward your ankle. Keep your knee, neck, and back straight. Feel the stretch in the back of your thigh.  3. Hold for 30 to 60 seconds. Repeat 2 times, or as instructed.  4. Switch legs and repeat.  5. Repeat this exercise 3 times per day, or as instructed.     Tip: Dont bounce while youre stretching.   Date Last Reviewed: 3/10/2016  © 8586-6930 [employer]. [address] [place], [place] [id number]. All rights reserved. This information is not intended as a substitute for professional medical care. Always follow your healthcare professional's instructions.        Hamstring Stretch    Begin your rehabilitation with exercises that develop muscle control. These help you meet basic goals, like driving a car or going back to work. Exercise as often as youre advised. But stop right away if any exercise causes sharp or increasing pain. Icing your knee for 15 to 20 minutes after exercise can help prevent swelling and soreness.  · Lie on your back with your good knee bent. Put a towel around the back of your injured leg. Tighten your stomach muscles.  · Keeping the knee as straight as you can, slowly pull on the towel to bring your injured leg up. Raise it as far as you comfortably can.  · Hold for 30 to 60 seconds. Repeat 2 to 3 times.   Caution: If you feel tingling or pain in your back or legs, youre not yet ready for this exercise or are pulling too aggressively.   For your safety, check with your healthcare provider before starting an exercise program.   Date Last Reviewed: 8/16/2015  © 6232-4156 [employer]. [address] [place] [address]. All rights reserved. This information is not intended as a substitute for professional medical care. Always follow your healthcare  professional's instructions.        Hamstring Curl (Strength)    This exercise is for an injured right knee. Switch sides if the injury is to your left knee.  6. Tie the ends of an elastic exercise band or tubing into a large, strong knot. Close the door on the elastic band, so the knot is on one side and the loop of the band is on the other. The elastic band should be close to the floor. You should be on the side of the door with the loop.  7. Sit in a chair facing the closed door. Slip the loop of the elastic exercise band around the heel of your right leg.  8. Slowly pull the elastic band backward along the floor with your heel. Stop when you cant pull it any farther. Hold in place for 10 seconds. Slowly return your leg to the starting position.  9. Repeat 10 times, or as instructed.     Safety tip: Take it slowly. If you do too much too soon, you can create new knee problems, or reinjure your knee.   Date Last Reviewed: 3/10/2016  © 5859-6374 [employer]. [address] [place] [address]. All rights reserved. This information is not intended as a substitute for professional medical care. Always follow your healthcare professional's instructions.

## 2019-07-23 NOTE — PROCEDURES
Large Joint Aspiration/Injection: R knee  Date/Time: 7/23/2019 11:46 AM  Performed by: Sarthak Vincent MD  Authorized by: Sarthak Vincent MD     Consent Done?:  Yes (Verbal)  Indications:  Pain and diagnostic evaluation  Procedure site marked: Yes    Timeout: Prior to procedure the correct patient, procedure, and site was verified      Location:  Knee  Site:  R knee  Prep: Patient was prepped and draped in usual sterile fashion    Ultrasonic Guidance for needle placement: No  Needle size:  25 G  Approach:  Anterolateral  Medications:  80 mg triamcinolone acetonide 40 mg/mL  Patient tolerance:  Patient tolerated the procedure well with no immediate complications    Additional Comments: Verbal consent was obtained prior to the procedure.  Explained to the patient that there is a small risk of persistent pain, bleeding, and infection.  Discussed with the patient the steps of the procedure, including but not limited to, needle size and length, location of the injection, sterile technique using ChloraPrep and alcohol swabs, local anesthesia, risks/benefits, side effects, and alternatives including not performing the procedure.  Patient expressed understanding and verbally consented to the procedure after all pertinent questions related to the procedure were answered and explained before performing the procedure.    Patient experienced minimal blood loss (< 3 cc) and clean bandage was applied. Post procedure care was provided to the patient verbally and with their AVS. Patient was instructed to take it easy for the next 24-48 hours and was informed that their pain may increase once the anaesthesia wears off and before the steroid begins to work. Patient was instructed to ice area for 15 minutes and may take Tylenol PRN if pain worsens. Patient was informed to contact clinic or go to the ED if they develop any fever, chills, redness, swelling, or other complications.

## 2019-07-23 NOTE — TELEPHONE ENCOUNTER
----- Message from Krystal Pritchett sent at 7/23/2019  4:00 PM CDT -----  Contact: [facility]  Type:  Pharmacy Calling to Clarify an RX    Name of Caller: Page  Pharmacy Name: [facility]  Prescription Name: Dymista Nasal spray  What do they need to clarify?: Coverage issues  Best Call Back Number: Please call her at [phone] with ref# [id number]  Additional Information: n/a

## 2019-07-24 ENCOUNTER — HOSPITAL ENCOUNTER (OUTPATIENT)
Dept: RADIOLOGY | Facility: HOSPITAL | Age: 54
Discharge: HOME | End: 2019-07-24
Attending: OTOLARYNGOLOGY
Payer: COMMERCIAL

## 2019-07-24 ENCOUNTER — TELEPHONE (OUTPATIENT)
Dept: INTERNAL MEDICINE | Facility: CLINIC | Age: 54
End: 2019-07-24

## 2019-07-24 DIAGNOSIS — J32.4 CHRONIC PANSINUSITIS: ICD-10-CM

## 2019-07-24 DIAGNOSIS — J30.89 NON-SEASONAL ALLERGIC RHINITIS, UNSPECIFIED TRIGGER: ICD-10-CM

## 2019-07-24 PROCEDURE — 70486 CT MAXILLOFACIAL W/O DYE: CPT | Mod: 26,,, | Performed by: RADIOLOGY

## 2019-07-24 PROCEDURE — 70486 CT MAXILLOFACIAL W/O DYE: CPT | Mod: TC

## 2019-07-24 PROCEDURE — 70486 CT MEDTRONIC SINUSES WITHOUT: ICD-10-PCS | Mod: 26,,, | Performed by: RADIOLOGY

## 2019-07-24 NOTE — TELEPHONE ENCOUNTER
----- Message from Erin Pan sent at 7/24/2019  1:55 PM CDT -----  Contact: [facility]  States clinical question ar needed on the pt Nasal Spray, the rep states they have called three time with no response, can be reached at [phone] ref# [id number]

## 2019-07-25 RX ORDER — FLUTICASONE PROPIONATE 50 MCG
2 SPRAY, SUSPENSION (ML) NASAL 2 TIMES DAILY
Qty: 9.9 ML | Refills: 11 | Status: SHIPPED | OUTPATIENT
Start: 2019-07-25 | End: 2019-10-12 | Stop reason: CLARIF

## 2019-07-25 RX ORDER — AZELASTINE 1 MG/ML
1 SPRAY, METERED NASAL 2 TIMES DAILY
Qty: 30 ML | Refills: 11 | Status: SHIPPED | OUTPATIENT
Start: 2019-07-25 | End: 2022-01-07 | Stop reason: SDUPTHER

## 2019-07-25 NOTE — TELEPHONE ENCOUNTER
Pharmacy is stating that the dymista is not a preferred med that she would have to try other meds before this would be covered.

## 2019-07-25 NOTE — TELEPHONE ENCOUNTER
Can forward to Dr Shaffer as I just resent [facility] for convienence. He is writing the rx and treating for chronic sinusitis.

## 2019-07-26 ENCOUNTER — ANESTHESIA EVENT (OUTPATIENT)
Dept: SURGERY | Facility: HOSPITAL | Age: 54
End: 2019-07-26
Payer: COMMERCIAL

## 2019-07-26 NOTE — ANESTHESIA PREPROCEDURE EVALUATION
07/26/2019  Jaylin Murguia is a 54 y.o., female.    Anesthesia Evaluation    I have reviewed the Patient Summary Reports.    I have reviewed the Nursing Notes.   I have reviewed the Medications.     Review of Systems  Anesthesia Hx:  No problems with previous Anesthesia  Denies Family Hx of Anesthesia complications.   Denies Personal Hx of Anesthesia complications.   Social:  Non-Smoker    Hematology/Oncology:  Hematology Normal        EENT/Dental:   chronic allergic rhinitis   Cardiovascular:   Hypertension ECG has been reviewed.    Pulmonary:  Pulmonary Normal Asthma Sleep Apnea, CPAP    Renal/:  Renal/ Normal     Hepatic/GI:  Hepatic/GI Normal Crohn's Disease   Musculoskeletal:   Arthritis     Neurological:   Neuromuscular Disease, Headaches Fibromyalgia   Psych:  Psychiatric Normal           Physical Exam  General:  Obesity    Airway/Jaw/Neck:  Airway Findings: Mouth Opening: Normal Tongue: Normal  General Airway Assessment: Adult  Mallampati: II  TM Distance: Normal, at least 6 cm  Jaw/Neck Findings:  Neck ROM: Normal ROM  Neck Findings:     Eyes/Ears/Nose:  Eyes/Ears/Nose Findings:    Dental:  Dental Findings: In tact   Chest/Lungs:  Chest/Lungs Findings: Clear to auscultation, Normal Respiratory Rate     Heart/Vascular:  Heart Findings: Rate: Normal  Rhythm: Regular Rhythm  Sounds: Normal  Heart murmur: negative Vascular Findings: Normal    Abdomen:  Abdomen Findings: Normal    Musculoskeletal:  Musculoskeletal Findings: Normal   Skin:  Skin Findings: Normal    Mental Status:  Mental Status Findings:  Alert and Oriented         Anesthesia Plan  Type of Anesthesia, risks & benefits discussed:  Anesthesia Type:  general  Patient's Preference:   Intra-op Monitoring Plan:   Intra-op Monitoring Plan Comments:   Post Op Pain Control Plan: per primary service following discharge from PACU  Post Op Pain  Control Plan Comments:   Induction:   IV  Beta Blocker:  Patient is not currently on a Beta-Blocker (No further documentation required).       Informed Consent: Patient understands risks and agrees with Anesthesia plan.  Questions answered. Anesthesia consent signed with patient.  ASA Score: 2     Day of Surgery Review of History & Physical: I have interviewed and examined the patient. I have reviewed the patient's H&P dated:  There are no significant changes.          Ready For Surgery From Anesthesia Perspective.

## 2019-07-30 ENCOUNTER — TELEPHONE (OUTPATIENT)
Dept: OTOLARYNGOLOGY | Facility: CLINIC | Age: 54
End: 2019-07-30

## 2019-07-30 ENCOUNTER — PATIENT MESSAGE (OUTPATIENT)
Dept: SURGERY | Facility: HOSPITAL | Age: 54
End: 2019-07-30

## 2019-07-30 NOTE — H&P
Chief Complaint:  Other (discuss surgery)     HPI:  Jaylin is a 54 y.o. female who I was asked to see in consultation for evaluation of the following issue(s):     Sinonasal / Allergy: Jaylin has bilateral moderate nasal obstruction. She reports the the following sinonasal symptoms: allergies, asthma, facial pain, facial pressure, headaches, post-nasal drip, reduced sense of smell and nasal obstruction. She has been  on medications for these symptoms. Medications: NASAL STEROID SPRAY(S), NASAL ANTIHISTAMINE SPRAY(S), ORAL ANTIBIOTIC(S), Zyrtec and ORAL ANTILEUKOTRIENE(S) (ie. Singulair) which has been ineffective.  She recently completed 4 weeks of continuous antibiotics and steroids.  She uses Dymista BID.  She also uses saline irrigations.  None of this has been effective.      She has had continuous sinus infections in the past 12 months.      She has severe allergic rhinitis with symptoms including coughing, nasal congestion, nasal itchiness, nasal rhinorrhea and wheezing.       Allergy testing for this patient was done many years ago and result was positive for dogs, cats, mold grasses.     Current smoker:  No         There has been a recent imaging study of the sinuses (CT sinuses).       Results for orders placed during the hospital encounter of 06/13/19   CT Sinuses without Contrast     Narrative EXAMINATION:  CT SINUSES WITHOUT CONTRAST     CLINICAL HISTORY:  cough, asthma, allergic rhinitis;  Cough     TECHNIQUE:  Axial low-dose images, coronal and sagittal reformations were performed through the paranasal sinuses.  Contrast was not administered.     COMPARISON:  Radiographs of the sinuses from 07/27/2018.     FINDINGS:  Streak artifact from dental hardware is noted.  Orbits and orbital contents are maintained.  Skull remains intact without evidence of acute osseous abnormality.     There is bilateral occlusion of the ostiomeatal units.  The is left sided spurring of the nasal septum.  There is a plastic  appearance of the right frontal sinus.  Left frontal sinuses are clear.  There is complete opacification of the left ethmoid air cells and mild mucoperiosteal thickening involving the anterior right ethmoid air cells.  There is near complete opacification of the left maxillary sinus with a very small residual aerated portion seen at the upper medial aspect of the sinus.  There is diffuse chronic thickening of the right maxillary sinus opacifying more than 70% of the sinus volume.  Hyper attenuation is seen along the inferior aspect of the right maxillary sinus.  For example, see image 36 of series 2.  This is nonspecific but can see be seen with inspissated secretions as well as other conditions..  There appears to be a single large central sphenoid sinus which is nearly completely opacified with less than 20% aeration of the sinus seen.        Impression Pansinusitis changes as above.        Electronically signed by:       Sawyer Funes MD  Date:                                                06/13/2019  Time:                                                08:49      No results found for this or any previous visit.        Review of Systems:  Negative unless checked off.  Gen:    []fever                            []fatigue  HENT:             []nosebleeds                  []dental problem   Eyes:   []photophobia                []visual disturbance  Resp:   [x]chest tightness            [x]wheezing  Card:   []chest pain                    []leg swelling  GI:       []abdominal pain           []blood in stool  :      []dysuria             []hematuria  Musc:   []joint swelling                []gait problem  Skin:    []color change               []pallor  Neuro:             []seizures                       []numbness  Hem:    []bruise/bleed easily  Psych:             []hallucinations              []behavioral problems  Allergy/Imm: has No Known Allergies.     Her meds, allergies, medical, surgical, social & family  histories were reviewed & updated:  -     She has a current medication list which includes the following prescription(s): amlodipine, azelastine-fluticasone, benzonatate, breo ellipta, butalbital-acetaminophen-caffeine -40 mg, cetirizine, doxycycline, duloxetine, eletriptan, estradiol, levalbuterol, losartan-hydrochlorothiazide 100-25 mg, lyrica, mesalamine, montelukast, nortriptyline, pantoprazole, prednisone, propranolol, rizatriptan, simvastatin, triamcinolone acetonide 0.025%, ventolin hfa, and zolpidem.  -     She  has a past medical history of Allergic rhinitis, Asthma, Crohn's disease, Fibromyalgia, Hyperlipidemia, Hypertension, Migraine, Sciatica, and Sleep apnea.   -     She does not have any pertinent problems on file.   -     She  has a past surgical history that includes Hysterectomy;  section; [place] tooth extraction; Finger surgery; Bladder surgery; Colonoscopy (N/A, 2017); and Colonoscopy (N/A, 3/27/2018).  -     She  reports that she has never smoked. She has never used smokeless tobacco. She reports that she does not drink alcohol or use drugs.  -     Her family history includes Cancer (age of onset: 70) in her paternal grandmother; Hypertension in her brother and mother; Kidney disease in her father; Scleroderma in her father.  -     She has No Known Allergies.     Objective:      Physical Exam:  Vitals:  /85   Pulse 69   Temp 97.5 °F (36.4 °C) (Tympanic)   Wt 92 kg (202 lb 13.2 oz)   BMI 32.25 kg/m²   General appearance:  Well developed, well nourished     Eyes:  Extraocular motions intact, PERRL     Communication:  no hoarseness, no dysphonia     Ears:  Otoscopy of external auditory canals and tympanic membranes was normal, clinical speech reception thresholds grossly intact, no mass/lesion of auricle.  Nose:  No masses/lesions of external nose, nasal mucosa, septum, and turbinates were within normal limits.  Mouth:  No mass/lesion of lips, teeth, gums, hard/soft  palate, tongue, tonsils, or oropharynx.     Cardiovascular:  No pedal edema; Radial Pulses +2      Neck & Lymphatics:  No cervical lymphadenopathy, no neck mass/crepitus/ asymmetry, trachea is midline, no thyroid enlargement/tenderness/mass.     Psych: Oriented x3,  Alert with normal mood and affect.            Respiration/Chest:  Symmetric expansion during respiration, normal respiratory effort.     Skin:  Warm and intact. No ulcerations of face, scalp, neck.     Assessment & Plan:   Jaylin was seen today for other.     Diagnoses and all orders for this visit:     Chronic pansinusitis  -     Aspergillus fumagatus IgE; Future  -     Bermuda grass IgE; Future  -     Cat epithelium IgE; Future  -     Cladosporium IgE; Future  -     Cockroach, American IgE; Future  -     [place], bald IgE; Future  -     D. farinae IgE; Future  -     D. pteronyssinus IgE; Future  -     Dog dander IgE; Future  -     Plantain, English IgE; Future  -     Haider grass IgE; Future  -     Marsh elder, rough IgE; Future  -     Mugwort IgE; Future  -     Nettle IgE; Future  -     Oak, white IgE; Future  -     Penicillium IgE; Future  -     Ragweed, short, common IgE; Future  -     Scot IgE; Future  -     Allergen, Cocklebur; Future  -     Allergen, Elm Cedar; Future  -     Allergen, Meadow Grass ([facility]); Future  -     Allergen, Mucor Racemosus; Future  -     Allergen, Pecan [place] IgE; Future  -     Allergen, White Frank; Future  -     Allergen-Alternaria Alternata; Future  -     Allergen-Cedar; Future  -     Allergen-Common Pigweed; Future  -     Allergen-Silver Birch; Future  -     Feather Panel #2; Future  -     RAST Allergen for [place]; Future  -     RAST Allergen Maple ([place]); Future  -     RAST Allergen Mead; Future  -     RAST Allergen, Lamb's Quarters; Future  -     RAST Allergen, Sheep Corinna([place]); Future  -     CBC auto differential; Future  -     CT Medtronic Sinuses without; Future  -     Case  Request Operating Room: FESS, USING COMPUTER-ASSISTED NAVIGATION     Non-seasonal allergic rhinitis, unspecified trigger  -     Aspergillus fumagatus IgE; Future  -     Bermuda grass IgE; Future  -     Cat epithelium IgE; Future  -     Cladosporium IgE; Future  -     Cockroach, American IgE; Future  -     [place], bald IgE; Future  -     D. farinae IgE; Future  -     D. pteronyssinus IgE; Future  -     Dog dander IgE; Future  -     Plantain, English IgE; Future  -     Haider grass IgE; Future  -     Marsh elder, rough IgE; Future  -     Mugwort IgE; Future  -     Nettle IgE; Future  -     Oak, white IgE; Future  -     Penicillium IgE; Future  -     Ragweed, short, common IgE; Future  -     Scot IgE; Future  -     Allergen, Cocklebur; Future  -     Allergen, Elm Cedar; Future  -     Allergen, Meadow Grass ([employer] Blue); Future  -     Allergen, Mucor Racemosus; Future  -     Allergen, Pecan [place] IgE; Future  -     Allergen, White Frank; Future  -     Allergen-Alternaria Alternata; Future  -     Allergen-Cedar; Future  -     Allergen-Common Pigweed; Future  -     Allergen-Silver Birch; Future  -     Feather Panel #2; Future  -     RAST Allergen for [place]; Future  -     RAST Allergen Maple ([place]); Future  -     RAST Allergen [place]; Future  -     RAST Allergen, Lamb's Quarters; Future  -     RAST Allergen, Sheep [place]); Future  -     CBC auto differential; Future  -     CT Medtronic Sinuses without; Future        -SINUSITIS/RHINITIS  Jaylin presents today for initial evaluation.  They have multiple sinonasal complaints and determination of the underlying etiology is a problem of moderate to high complexity.  Patients may present with sinonasal symptoms such as nasal obstruction as a primary anatomic disorder.  Patients may also present with recurrent or chronic inflammatory sinonasal symptoms.  Generally, patients can be stratified into one of several groups.       The first group  represents patients who have frequent or recurrent upper respiratory infections, most frequently viral.  These patients most frequently have normally functioning immune system's but have high levels of exposure.  This is commonly seen in nurses and schoolteachers as well as other groups who spend large amounts of time around sick patients and children.       Other patients may have isolated rhinitis without evidence of sinusitis.  The majority of these patients have ALLERGIC rhinitis however other groups may have more rare forms of rhinitis such as nonallergic rhinitis with eosinophilia syndrome.  As a screening evaluation, if the patient has had a normal endoscopy, from time to time a simple x-ray of the sinuses may be performed in combination with antigen specific ALLERGY testing.     The third group of patients present with significant evidence of chronic sinusitis.  Nasal endoscopy may reveal polyps or purulent drainage.  CT scan is an important aspect to determining the extent of disease.  Some patients with chronic sinusitis have an underlying etiology of ALLERGY leading to obstruction of the ostiomeatal units with furthering of the infection.  Others may have an acute infection that leads to stenosis of the sinonasal openings followed by a long, progressive course of infection.  Patients with unilateral disease who do not have inverting papilloma typically fall into this latter group.     CT scan of the sinuses has been performed.       Antigen specific allergy testing  has been performed.     Allergy treatment with topical steroids and/or antihistamines has been used with good compliance with symptoms unchanged.           My recommendation is ESS, full house.     We discussed at length the risk of sinus surgery including, but not limited to, recurrence of disease, bleeding, infection, septal perforation, tooth or cheek numbness, vision changes, orbital injury, CSF leak and changes in smell.  The patient had  opportunity to ask questions and I answered all of them to their satisfaction.  We will proceed as planned.     We discussed her medical conditions, treatments and plan.  Jaylin should return to clinic if any issues arise (symptoms worsen or persist), otherwise we will see her back in the clinic after surgery.     Thank you for allowing me to participate in the care of Jaylin.     Manish Shaffer MD        Patient: Jaylin Murguia [id number], :1965                     Procedure date:2019  Patient's medications, allergies, past medical, surgical, social and family histories were reviewed and updated as appropriate.  Chief Complaint:  Other (discuss surgery)     HPI:  Jaylin is a 54 y.o. female with the history of present illness as discussed in the clinic note from today.  Anterior rhinoscopy was insufficient to explain symptoms and findings.      Procedure: Risks, benefits, and alternatives of the procedure were discussed with the patient, and the patient consented to the nasal endoscopy.  The nasal cavity was sprayed with a topical decongestant and anesthetic (if needed). The endoscope was passed into each nostril and each nasal cavity was visualized.  On each side the nasal cavity, sinuses (if open), turbinates, middle and superior meatus, sphenoethmoidal recess and septum were examined with the findings described below. At the end of the examination, the scope was removed. The patient tolerated the procedure well with no complications.         Endoscopic Sinonasal Exam Findings:  -     The right side has marked edema with polypoid changes  -     The left side has marked edema with polypoid changes  -     Nasal secretions: purulent secretions bilaterally  -     Nasal septum: no significant deviation or perforation appreciated   -     Inferior turbinate: hypertrophy or edema (Severe) bilaterally  -     Middle turbinate: hypertrophy or edema (Moderate) bilaterally  -     Other findings: No mass or obstructive  lesion     Assessment & Plan:  - see today's clinic note

## 2019-07-31 ENCOUNTER — ANESTHESIA (OUTPATIENT)
Dept: SURGERY | Facility: HOSPITAL | Age: 54
End: 2019-07-31
Payer: COMMERCIAL

## 2019-07-31 ENCOUNTER — HOSPITAL ENCOUNTER (OUTPATIENT)
Facility: HOSPITAL | Age: 54
Discharge: HOME | End: 2019-07-31
Attending: OTOLARYNGOLOGY | Admitting: OTOLARYNGOLOGY
Payer: COMMERCIAL

## 2019-07-31 VITALS
TEMPERATURE: 98 F | SYSTOLIC BLOOD PRESSURE: 115 MMHG | BODY MASS INDEX: 30.99 KG/M2 | HEART RATE: 66 BPM | RESPIRATION RATE: 16 BRPM | WEIGHT: 197.44 LBS | OXYGEN SATURATION: 94 % | HEIGHT: 67 IN | DIASTOLIC BLOOD PRESSURE: 74 MMHG

## 2019-07-31 DIAGNOSIS — J32.4 CHRONIC PANSINUSITIS: Primary | ICD-10-CM

## 2019-07-31 PROCEDURE — 25000003 PHARM REV CODE 250: Performed by: NURSE ANESTHETIST, CERTIFIED REGISTERED

## 2019-07-31 PROCEDURE — 63600175 PHARM REV CODE 636 W HCPCS: Performed by: ANESTHESIOLOGY

## 2019-07-31 PROCEDURE — 31255 PR NASAL/SINUS ENDOSCOPY,REMV TOTL ETHMOID: ICD-10-PCS | Mod: 59,RT,, | Performed by: OTOLARYNGOLOGY

## 2019-07-31 PROCEDURE — 36000711: Performed by: OTOLARYNGOLOGY

## 2019-07-31 PROCEDURE — 71000033 HC RECOVERY, INTIAL HOUR: Performed by: OTOLARYNGOLOGY

## 2019-07-31 PROCEDURE — 63600175 PHARM REV CODE 636 W HCPCS: Performed by: NURSE ANESTHETIST, CERTIFIED REGISTERED

## 2019-07-31 PROCEDURE — 31257 PR ENDOSCOPY, NASAL/SINUS, W/ETHMOIDECTOMY/SPHENOIDOTOMY: ICD-10-PCS | Mod: 51,LT,, | Performed by: OTOLARYNGOLOGY

## 2019-07-31 PROCEDURE — 36000710: Performed by: OTOLARYNGOLOGY

## 2019-07-31 PROCEDURE — D9220A PRA ANESTHESIA: Mod: ANES,,, | Performed by: ANESTHESIOLOGY

## 2019-07-31 PROCEDURE — 31255 NSL/SINS NDSC W/TOT ETHMDCT: CPT | Mod: 59,RT,, | Performed by: OTOLARYNGOLOGY

## 2019-07-31 PROCEDURE — 37000009 HC ANESTHESIA EA ADD 15 MINS: Performed by: OTOLARYNGOLOGY

## 2019-07-31 PROCEDURE — 31256 PR NASAL/SINUS ENDOSCOPY,OPEN MAXILL SINUS: ICD-10-PCS | Mod: 50,51,, | Performed by: OTOLARYNGOLOGY

## 2019-07-31 PROCEDURE — D9220A PRA ANESTHESIA: Mod: CRNA,,, | Performed by: NURSE ANESTHETIST, CERTIFIED REGISTERED

## 2019-07-31 PROCEDURE — 31256 EXPLORATION MAXILLARY SINUS: CPT | Mod: 50,51,, | Performed by: OTOLARYNGOLOGY

## 2019-07-31 PROCEDURE — 87070 CULTURE OTHR SPECIMN AEROBIC: CPT

## 2019-07-31 PROCEDURE — 61782 PR STEREOTACTIC COMP ASSIST PROC,CRANIAL,EXTRADURAL: ICD-10-PCS | Mod: ,,, | Performed by: OTOLARYNGOLOGY

## 2019-07-31 PROCEDURE — 30520 PR REPAIR, NASAL SEPTUM: ICD-10-PCS | Mod: ,,, | Performed by: OTOLARYNGOLOGY

## 2019-07-31 PROCEDURE — D9220A PRA ANESTHESIA: ICD-10-PCS | Mod: ANES,,, | Performed by: ANESTHESIOLOGY

## 2019-07-31 PROCEDURE — 61782 SCAN PROC CRANIAL EXTRA: CPT | Mod: ,,, | Performed by: OTOLARYNGOLOGY

## 2019-07-31 PROCEDURE — 87075 CULTR BACTERIA EXCEPT BLOOD: CPT

## 2019-07-31 PROCEDURE — 71000039 HC RECOVERY, EACH ADD'L HOUR: Performed by: OTOLARYNGOLOGY

## 2019-07-31 PROCEDURE — 87176 TISSUE HOMOGENIZATION CULTR: CPT

## 2019-07-31 PROCEDURE — 37000008 HC ANESTHESIA 1ST 15 MINUTES: Performed by: OTOLARYNGOLOGY

## 2019-07-31 PROCEDURE — D9220A PRA ANESTHESIA: ICD-10-PCS | Mod: CRNA,,, | Performed by: NURSE ANESTHETIST, CERTIFIED REGISTERED

## 2019-07-31 PROCEDURE — S0077 INJECTION, CLINDAMYCIN PHOSP: HCPCS | Performed by: NURSE ANESTHETIST, CERTIFIED REGISTERED

## 2019-07-31 PROCEDURE — 27201423 OPTIME MED/SURG SUP & DEVICES STERILE SUPPLY: Performed by: OTOLARYNGOLOGY

## 2019-07-31 PROCEDURE — 31257 NSL/SINS NDSC TOT W/SPHENDT: CPT | Mod: 51,LT,, | Performed by: OTOLARYNGOLOGY

## 2019-07-31 PROCEDURE — 71000015 HC POSTOP RECOV 1ST HR: Performed by: OTOLARYNGOLOGY

## 2019-07-31 PROCEDURE — 87076 CULTURE ANAEROBE IDENT EACH: CPT

## 2019-07-31 PROCEDURE — 30520 REPAIR OF NASAL SEPTUM: CPT | Mod: ,,, | Performed by: OTOLARYNGOLOGY

## 2019-07-31 PROCEDURE — 25000003 PHARM REV CODE 250: Performed by: OTOLARYNGOLOGY

## 2019-07-31 PROCEDURE — 25000003 PHARM REV CODE 250: Performed by: ANESTHESIOLOGY

## 2019-07-31 RX ORDER — ONDANSETRON 4 MG/1
4 TABLET, FILM COATED ORAL EVERY 8 HOURS PRN
Qty: 20 TABLET | Refills: 0 | Status: SHIPPED | OUTPATIENT
Start: 2019-07-31 | End: 2019-10-12 | Stop reason: CLARIF

## 2019-07-31 RX ORDER — GLYCOPYRROLATE 0.2 MG/ML
INJECTION INTRAMUSCULAR; INTRAVENOUS
Status: DISCONTINUED | OUTPATIENT
Start: 2019-07-31 | End: 2019-07-31

## 2019-07-31 RX ORDER — FENTANYL CITRATE 50 UG/ML
25 INJECTION, SOLUTION INTRAMUSCULAR; INTRAVENOUS EVERY 5 MIN PRN
Status: DISCONTINUED | OUTPATIENT
Start: 2019-07-31 | End: 2019-07-31 | Stop reason: HOSPADM

## 2019-07-31 RX ORDER — CLINDAMYCIN PHOSPHATE 900 MG/50ML
INJECTION, SOLUTION INTRAVENOUS
Status: COMPLETED
Start: 2019-07-31 | End: 2019-07-31

## 2019-07-31 RX ORDER — AMOXICILLIN AND CLAVULANATE POTASSIUM 875; 125 MG/1; MG/1
1 TABLET, FILM COATED ORAL EVERY 12 HOURS
Qty: 28 TABLET | Refills: 0 | Status: SHIPPED | OUTPATIENT
Start: 2019-07-31 | End: 2019-08-05 | Stop reason: ALTCHOICE

## 2019-07-31 RX ORDER — FENTANYL CITRATE 50 UG/ML
INJECTION, SOLUTION INTRAMUSCULAR; INTRAVENOUS
Status: DISCONTINUED | OUTPATIENT
Start: 2019-07-31 | End: 2019-07-31

## 2019-07-31 RX ORDER — MIDAZOLAM HYDROCHLORIDE 1 MG/ML
INJECTION, SOLUTION INTRAMUSCULAR; INTRAVENOUS
Status: DISCONTINUED | OUTPATIENT
Start: 2019-07-31 | End: 2019-07-31

## 2019-07-31 RX ORDER — OXYCODONE AND ACETAMINOPHEN 5; 325 MG/1; MG/1
1 TABLET ORAL EVERY 4 HOURS PRN
Qty: 30 TABLET | Refills: 0 | Status: SHIPPED | OUTPATIENT
Start: 2019-07-31 | End: 2019-10-12 | Stop reason: CLARIF

## 2019-07-31 RX ORDER — OXYMETAZOLINE HCL 0.05 %
SPRAY, NON-AEROSOL (ML) NASAL
Status: DISCONTINUED | OUTPATIENT
Start: 2019-07-31 | End: 2019-07-31 | Stop reason: HOSPADM

## 2019-07-31 RX ORDER — LIDOCAINE HCL/PF 100 MG/5ML
SYRINGE (ML) INTRAVENOUS
Status: DISCONTINUED | OUTPATIENT
Start: 2019-07-31 | End: 2019-07-31

## 2019-07-31 RX ORDER — SUCCINYLCHOLINE CHLORIDE 20 MG/ML
INJECTION INTRAMUSCULAR; INTRAVENOUS
Status: DISCONTINUED | OUTPATIENT
Start: 2019-07-31 | End: 2019-07-31

## 2019-07-31 RX ORDER — MUPIROCIN 20 MG/G
OINTMENT TOPICAL
Status: DISCONTINUED
Start: 2019-07-31 | End: 2019-07-31 | Stop reason: HOSPADM

## 2019-07-31 RX ORDER — ONDANSETRON 2 MG/ML
4 INJECTION INTRAMUSCULAR; INTRAVENOUS ONCE AS NEEDED
Status: DISCONTINUED | OUTPATIENT
Start: 2019-07-31 | End: 2019-07-31 | Stop reason: HOSPADM

## 2019-07-31 RX ORDER — SODIUM CHLORIDE, SODIUM LACTATE, POTASSIUM CHLORIDE, CALCIUM CHLORIDE 600; 310; 30; 20 MG/100ML; MG/100ML; MG/100ML; MG/100ML
INJECTION, SOLUTION INTRAVENOUS CONTINUOUS
Status: ACTIVE | OUTPATIENT
Start: 2019-07-31

## 2019-07-31 RX ORDER — MEPERIDINE HYDROCHLORIDE 25 MG/ML
12.5 INJECTION INTRAMUSCULAR; INTRAVENOUS; SUBCUTANEOUS ONCE
Status: DISCONTINUED | OUTPATIENT
Start: 2019-07-31 | End: 2019-07-31 | Stop reason: HOSPADM

## 2019-07-31 RX ORDER — DIPHENHYDRAMINE HYDROCHLORIDE 50 MG/ML
25 INJECTION INTRAMUSCULAR; INTRAVENOUS EVERY 6 HOURS PRN
Status: DISCONTINUED | OUTPATIENT
Start: 2019-07-31 | End: 2019-07-31 | Stop reason: HOSPADM

## 2019-07-31 RX ORDER — DEXAMETHASONE SODIUM PHOSPHATE 4 MG/ML
INJECTION, SOLUTION INTRA-ARTICULAR; INTRALESIONAL; INTRAMUSCULAR; INTRAVENOUS; SOFT TISSUE
Status: DISCONTINUED | OUTPATIENT
Start: 2019-07-31 | End: 2019-07-31

## 2019-07-31 RX ORDER — HYDROCODONE BITARTRATE AND ACETAMINOPHEN 5; 325 MG/1; MG/1
1 TABLET ORAL
Status: DISCONTINUED | OUTPATIENT
Start: 2019-07-31 | End: 2019-07-31 | Stop reason: HOSPADM

## 2019-07-31 RX ORDER — ACETAMINOPHEN 10 MG/ML
INJECTION, SOLUTION INTRAVENOUS
Status: DISCONTINUED | OUTPATIENT
Start: 2019-07-31 | End: 2019-07-31

## 2019-07-31 RX ORDER — PROPOFOL 10 MG/ML
VIAL (ML) INTRAVENOUS CONTINUOUS PRN
Status: DISCONTINUED | OUTPATIENT
Start: 2019-07-31 | End: 2019-07-31

## 2019-07-31 RX ORDER — NEOSTIGMINE METHYLSULFATE 1 MG/ML
INJECTION, SOLUTION INTRAVENOUS
Status: DISCONTINUED | OUTPATIENT
Start: 2019-07-31 | End: 2019-07-31

## 2019-07-31 RX ORDER — ALBUTEROL SULFATE 0.83 MG/ML
2.5 SOLUTION RESPIRATORY (INHALATION) EVERY 4 HOURS PRN
Status: DISCONTINUED | OUTPATIENT
Start: 2019-07-31 | End: 2019-07-31 | Stop reason: HOSPADM

## 2019-07-31 RX ORDER — PROPOFOL 10 MG/ML
VIAL (ML) INTRAVENOUS
Status: DISCONTINUED | OUTPATIENT
Start: 2019-07-31 | End: 2019-07-31

## 2019-07-31 RX ORDER — ROCURONIUM BROMIDE 10 MG/ML
INJECTION, SOLUTION INTRAVENOUS
Status: DISCONTINUED | OUTPATIENT
Start: 2019-07-31 | End: 2019-07-31

## 2019-07-31 RX ORDER — HYDROCODONE BITARTRATE AND ACETAMINOPHEN 5; 325 MG/1; MG/1
1 TABLET ORAL EVERY 4 HOURS PRN
Status: DISCONTINUED | OUTPATIENT
Start: 2019-07-31 | End: 2019-07-31 | Stop reason: HOSPADM

## 2019-07-31 RX ORDER — LIDOCAINE HYDROCHLORIDE 10 MG/ML
1 INJECTION, SOLUTION EPIDURAL; INFILTRATION; INTRACAUDAL; PERINEURAL ONCE
Status: ACTIVE | OUTPATIENT
Start: 2019-07-31

## 2019-07-31 RX ORDER — CLINDAMYCIN PHOSPHATE 900 MG/50ML
INJECTION, SOLUTION INTRAVENOUS
Status: DISCONTINUED | OUTPATIENT
Start: 2019-07-31 | End: 2019-07-31

## 2019-07-31 RX ORDER — KETOROLAC TROMETHAMINE 30 MG/ML
15 INJECTION, SOLUTION INTRAMUSCULAR; INTRAVENOUS EVERY 8 HOURS PRN
Status: DISCONTINUED | OUTPATIENT
Start: 2019-07-31 | End: 2019-07-31 | Stop reason: HOSPADM

## 2019-07-31 RX ORDER — ONDANSETRON 2 MG/ML
INJECTION INTRAMUSCULAR; INTRAVENOUS
Status: DISCONTINUED | OUTPATIENT
Start: 2019-07-31 | End: 2019-07-31

## 2019-07-31 RX ORDER — MUPIROCIN 20 MG/G
OINTMENT TOPICAL
Status: DISCONTINUED | OUTPATIENT
Start: 2019-07-31 | End: 2019-07-31 | Stop reason: HOSPADM

## 2019-07-31 RX ADMIN — GLYCOPYRROLATE 0.6 MG: 0.2 INJECTION INTRAMUSCULAR; INTRAVENOUS at 10:07

## 2019-07-31 RX ADMIN — ONDANSETRON 4 MG: 2 INJECTION, SOLUTION INTRAMUSCULAR; INTRAVENOUS at 08:07

## 2019-07-31 RX ADMIN — PROPOFOL 40 MG: 10 INJECTION, EMULSION INTRAVENOUS at 08:07

## 2019-07-31 RX ADMIN — ROCURONIUM BROMIDE 35 MG: 10 INJECTION, SOLUTION INTRAVENOUS at 08:07

## 2019-07-31 RX ADMIN — SODIUM CHLORIDE, SODIUM LACTATE, POTASSIUM CHLORIDE, AND CALCIUM CHLORIDE: 600; 310; 30; 20 INJECTION, SOLUTION INTRAVENOUS at 08:07

## 2019-07-31 RX ADMIN — CLINDAMYCIN PHOSPHATE 900 MG: 18 INJECTION, SOLUTION INTRAVENOUS at 08:07

## 2019-07-31 RX ADMIN — SUCCINYLCHOLINE CHLORIDE 160 MG: 20 INJECTION, SOLUTION INTRAMUSCULAR; INTRAVENOUS at 08:07

## 2019-07-31 RX ADMIN — DEXAMETHASONE SODIUM PHOSPHATE 12 MG: 4 INJECTION, SOLUTION INTRA-ARTICULAR; INTRALESIONAL; INTRAMUSCULAR; INTRAVENOUS; SOFT TISSUE at 08:07

## 2019-07-31 RX ADMIN — LIDOCAINE HYDROCHLORIDE 80 MG: 20 INJECTION, SOLUTION INTRAVENOUS at 08:07

## 2019-07-31 RX ADMIN — ROCURONIUM BROMIDE 5 MG: 10 INJECTION, SOLUTION INTRAVENOUS at 08:07

## 2019-07-31 RX ADMIN — FENTANYL CITRATE 25 MCG: 50 INJECTION INTRAMUSCULAR; INTRAVENOUS at 10:07

## 2019-07-31 RX ADMIN — ACETAMINOPHEN 1000 MG: 10 INJECTION, SOLUTION INTRAVENOUS at 09:07

## 2019-07-31 RX ADMIN — MIDAZOLAM 2 MG: 1 INJECTION INTRAMUSCULAR; INTRAVENOUS at 08:07

## 2019-07-31 RX ADMIN — PROPOFOL 200 MG: 10 INJECTION, EMULSION INTRAVENOUS at 08:07

## 2019-07-31 RX ADMIN — HYDROCODONE BITARTRATE AND ACETAMINOPHEN 1 TABLET: 5; 325 TABLET ORAL at 11:07

## 2019-07-31 RX ADMIN — FENTANYL CITRATE 150 MCG: 50 INJECTION, SOLUTION INTRAMUSCULAR; INTRAVENOUS at 08:07

## 2019-07-31 RX ADMIN — NEOSTIGMINE METHYLSULFATE 4 MG: 1 INJECTION INTRAVENOUS at 10:07

## 2019-07-31 RX ADMIN — PROPOFOL 200 MCG/KG/MIN: 10 INJECTION, EMULSION INTRAVENOUS at 08:07

## 2019-07-31 RX ADMIN — PROPOFOL 50 MG: 10 INJECTION, EMULSION INTRAVENOUS at 09:07

## 2019-07-31 NOTE — OP NOTE
Operative Note       Surgery Date: 7/31/2019     Surgeon(s) and Role:     * Manish Shaffer MD - Primary    Assistant:  None    Implants:  none    Pre-op Diagnosis:  Chronic pansinusitis [J32.4], septal deviation    Post-op Diagnosis: same    Anesthesia: GETA    Technical Procedures Used: 74500    RIGHT    -  right Total ethmoidectomy (CPT 95599)  -  right Maxillary antrostomy without removal of tissue (CPT 35556)        LEFT    -  left Total ethmoidectomy with sphenoid sinusotomy (CPT 28657)  -  left Maxillary antrostomy without removal of tissue (CPT 08708)      -  Sterotactic computer assisted (navigational) procedure; cranial, extradural (CPT 87649)  -  Septoplasty (CPT 09258)      Procedure in Detail/Findings:    Endoscopic Sinonasal Exam Findings at TIME OF SURGERY:  -     Sinuses examined: bilateral maxillary, bilateral ethmoid anterior/posterior and left sphenoid            The right sinus(es) have marked edema with polypoid changes            The left sinus(es) have marked edema with polypoid changes  -     Nasal secretions: purulent secretions bilaterally  -     Nasal septum: LEFT moderate deviation   -     Inferior turbinate: hypertrophy or edema (Mild) bilaterally  -     Middle turbinate: hypertrophy or edema (Moderate) on the left  -     Other findings: - no mass or obstructive lesion -        Complications: No    Estimated Blood Loss: 100cc           Specimens (From admission, onward)    None          Justification for the operation:     Jaylin has a history of chronic sinusitis without polyposis and has been on maximal medical therapy without significant clinical improvement.    We discussed at length the risk of sinus surgery including, but not limited to, recurrence of disease, bleeding, infection, septal perforation, tooth or cheek numbness, vision changes, orbital injury, CSF leak and changes in smell.  The patient had opportunity to ask questions and I answered all of them to their satisfaction.  We  will proceed as planned.    Procedure in Detail:      Prior to starting the case, the eyes were protected with OpSites and the nose was decongested bilaterally with Oxymetazoline-soaked cottonoids.  We used an endoscope to analyse the nasal cavities.     The navigation system was placed onto the patient, calibrated and confirmed to be working with anatomical landmarks.     We infiltrated 1% Lidocaine with 1:100,000 Epinephrine into the nasal septum, [facility] anterior & posterior & nasal turbinates on both sides.      Sinus specific cultureswere from maxillary, bilaterally.    We then performed a septoplasty.  The septum was found to have a LEFT moderate deviation.  A 15 blade was used to make an incision in the left septal mucosa in the form of a [place] incision (about 1cm from the caudal septal margin).  We elevated the mucoperichondrium away from the cartilage and bone.  The deviated septal cartilage and bone were removed.  Care was taken to leave at least 1 cm of caudal and dorsal cartilage intact for support.  The mucoperichondrial flap was repositioned in the midline.  Suture closure of the septal mucosa was not required / placed. Additional details/findings: septal spints placed    We then performed a right sided maxillary sinusotomy without tissue removal.  A limited reduction of the middle turbinate head was required for visualization and access.  The maxillary sinus was entered through the natural ostium with resection of uncinate tissue.  A wide maxillary antrostomy was performed.  The maxillary had marked edema with polypoid changes and purulent secretions.  Additional details/findings: none      The same procedure was performed on the other side describing a maxillary sinusotomy without tissue removal.  This maxillary had marked edema with polypoid changes and purulent secretions.  Additional details/findings: none    We then performed a right sided Total ethmoidectomy.  The ethmoid sinuses were entered  through the .  We used the microdebrider, curved suction, currette and non-thru cut forceps to remove ethmoid septations of the anterior and posterior ethmoid air cells.  Care was taken to not disrupt the lamina papyracea, fovea ethmoidalis, or middle/superior turbinate attachment to skull base during the complete dissection.  About <10% ethmoid mucosa was exenterated.  The ethmoids had moderate edema and purulent secretions.  Additional details/findings: none    The same procedure was performed on the other side describing a   Total  ethmoidectomy.  This ethmoid had marked edema with polypoid changes and purulent secretions.  Additional details/findings: none      We then performed a left sided sphenoid sinusotomy without tissue removal.  Of note, she has a common sphenoid without internal division.  A limited reduction of the middle turbinate tail was required for visualization and access.  The sphenoid sinus was entered via the landmarks of the choanae and superior turbinate.  We did palpate the back wall of the sphenoid to confirm our location.  A wide sphenoid sinusotomy was performed.  The sphenoid had moderate edema and purulent secretions.  Additional details/findings: none      The cavities were copiously irrigated and inspected for hemostasis.  Afrin soaked pleggets were then placed in the nasal cavities.  The patient was awoken and transferred to the recovery room in stable condition.             Disposition: PACU - hemodynamically stable.           Condition: Good    Attestation:  I was present and scrubbed for the entire procedure.

## 2019-07-31 NOTE — TRANSFER OF CARE
"Anesthesia Transfer of Care Note    Patient: Jaylin Murguia    Procedure(s) Performed: Procedure(s) (LRB):  FESS, USING COMPUTER-ASSISTED NAVIGATION (Bilateral)    Patient location: PACU    Anesthesia Type: general    Transport from OR: Transported from OR on room air with adequate spontaneous ventilation    Post pain: adequate analgesia    Post assessment: no apparent anesthetic complications    Post vital signs: stable    Level of consciousness: responds to stimulation and sedated    Nausea/Vomiting: no nausea/vomiting    Complications: none    Transfer of care protocol was followed      Last vitals:   Visit Vitals  /69 (BP Location: Right arm, Patient Position: Lying)   Pulse 66   Temp 36.5 °C (97.7 °F)   Resp 16   Ht 5' 7" (1.702 m)   Wt 89.5 kg (197 lb 6.8 oz)   SpO2 95%   Breastfeeding? No   BMI 30.92 kg/m²     "

## 2019-07-31 NOTE — DISCHARGE SUMMARY
[facility]  Discharge Note  Short Stay    Admit Date: 7/31/2019    Discharge Date and Time: 07/31/2019 1:52 PM     Attending Physician: Manish Shaffer MD     Discharge Provider:  Manish Shaffer MD    Diagnoses:  Active Hospital Problems    Diagnosis  POA    *Chronic pansinusitis [J32.4]  Yes      Resolved Hospital Problems   No resolved problems to display.       Discharged Condition: good    Hospital Course: Patient was admitted for an outpatient procedure and tolerated the procedure well with no complications.    Final Diagnoses: Same as principle problem    Disposition: Home or Self Care    Follow up/Patient Instructions:    Medications:  Reconciled Home Medications:   Discharge Medication List as of 7/31/2019 10:56 AM      START taking these medications    Details   amoxicillin-clavulanate 875-125mg (AUGMENTIN) 875-125 mg per tablet Take 1 tablet by mouth every 12 (twelve) hours. for 14 days, Starting Wed 7/31/2019, Until Wed 8/14/2019, Normal      ondansetron (ZOFRAN) 4 MG tablet Take 1 tablet (4 mg total) by mouth every 8 (eight) hours as needed., Starting Wed 7/31/2019, Normal      oxyCODONE-acetaminophen (PERCOCET) 5-325 mg per tablet Take 1 tablet by mouth every 4 (four) hours as needed for Pain., Starting Wed 7/31/2019, Normal         CONTINUE these medications which have NOT CHANGED    Details   amLODIPine (NORVASC) 5 MG tablet Take 1 tablet (5 mg total) by mouth once daily., Starting Thu 7/18/2019, Normal      azelastine (ASTELIN) 137 mcg (0.1 %) nasal spray 1 spray (137 mcg total) by Nasal route 2 (two) times daily., Starting Thu 7/25/2019, Until Fri 7/24/2020, Normal      azelastine-fluticasone (DYMISTA) 137-50 mcg/spray Spry nassal spray 1 spray by Each Nare route 2 (two) times daily., Starting Thu 7/18/2019, Normal      butalbital-acetaminophen-caffeine -40 mg (FIORICET, ESGIC) -40 mg per tablet Take 1 tab po q4 hrs prn headache, Normal      cetirizine (ZYRTEC) 5 MG chewable tablet  Take 5 mg by mouth 2 (two) times daily. , Historical Med      DULoxetine (CYMBALTA) 60 MG capsule Take 1 capsule (60 mg total) by mouth 2 (two) times daily., Starting Thu 7/18/2019, Normal      estradiol (ESTRACE) 2 MG tablet Take 2 mg by mouth every evening. , Historical Med      fluticasone propionate (FLONASE) 50 mcg/actuation nasal spray 2 sprays (100 mcg total) by Each Nare route 2 (two) times daily., Starting Thu 7/25/2019, Normal      levalbuterol (XOPENEX) 1.25 mg/3 mL nebulizer solution Take 3 mLs (1.25 mg total) by nebulization every 4 (four) hours. Rescue, Starting Wed 3/13/2019, Until Thu 3/12/2020, Normal      losartan-hydrochlorothiazide 100-25 mg (HYZAAR) 100-25 mg per tablet Take 1 tablet by mouth once daily., Starting Thu 7/18/2019, Until Fri 7/17/2020, Normal      mesalamine (PENTASA) 250 mg CpSR Take 4 capsules (1,000 mg total) by mouth 4 (four) times daily., Starting Thu 7/18/2019, Normal      montelukast (SINGULAIR) 10 mg tablet Take 1 tablet (10 mg total) by mouth every evening., Starting Thu 7/18/2019, Normal      nortriptyline (PAMELOR) 25 MG capsule Take 2 capsules (50 mg total) by mouth once daily., Starting Mon 7/22/2019, Normal      pantoprazole (PROTONIX) 40 MG tablet Take 1 tablet (40 mg total) by mouth every evening., Starting Thu 7/18/2019, Normal      pregabalin (LYRICA) 150 MG capsule TAKE 1 CAPSULE (150 MG TOTAL) BY MOUTH 3 (THREE) TIMES DAILY., Normal      propranolol (INNOPRAN XL) 80 mg 24 hr capsule Take 1 capsule (80 mg total) by mouth every evening., Starting Thu 7/18/2019, Normal      rizatriptan (MAXALT) 10 MG tablet TAKE 1 TABLET BY MOUTH IF NEEDED FOR MIGRAINES MAX 2 TAB IN 24 HOURS, Normal      simvastatin (ZOCOR) 20 MG tablet Take 1 tablet (20 mg total) by mouth every evening., Starting Thu 7/18/2019, Normal      triamcinolone acetonide 0.025% (KENALOG) 0.025 % cream Apply topically 2 (two) times daily., Starting Wed 3/13/2019, Normal      VENTOLIN HFA 90 mcg/actuation  inhaler INHALE 2 PUFFS INTO THE LUNGS EVERY 4 TO 6 HOURS AS NEEDED FOR WHEEZING., Normal      zolpidem (AMBIEN) 5 MG Tab TAKE 1 TABLET BY MOUTH EVERY DAY AT BEDTIME AS NEEDED, Normal      BREO ELLIPTA 100-25 mcg/dose diskus inhaler Inhale 1 puff into the lungs once daily., Starting Thu 7/18/2019, Normal      eletriptan (RELPAX) 40 MG tablet Take 1 tablet (40 mg total) by mouth as needed., Starting Th 7/18/2019, Normal                 Discharge Procedure Orders (must include Diet, Follow-up, Activity):   Discharge Procedure Orders (must include Diet, Follow-up, Activity)   Diet general     Call MD for:  temperature >100.4     Call MD for:  persistent nausea and vomiting     Call MD for:  severe uncontrolled pain     Call MD for:  difficulty breathing, headache or visual disturbances     No dressing needed        Follow-up Information     Jyothi Denise PA-C On 8/5/2019.    Specialty:  Otolaryngology  Why:  for nasal splint removal  Contact information:  [address]  [place] [address] [phone]

## 2019-07-31 NOTE — ANESTHESIA POSTPROCEDURE EVALUATION
Anesthesia Post Evaluation    Patient: Jaylin Murguia    Procedure(s) Performed: Procedure(s) (LRB):  FESS, USING COMPUTER-ASSISTED NAVIGATION (Bilateral)    Final Anesthesia Type: general  Patient location during evaluation: PACU  Patient participation: Yes- Able to Participate  Level of consciousness: awake and alert and oriented  Post-procedure vital signs: reviewed and stable  Pain management: adequate  Airway patency: patent  PONV status at discharge: No PONV  Anesthetic complications: no      Cardiovascular status: blood pressure returned to baseline, stable and hemodynamically stable  Respiratory status: unassisted  Hydration status: euvolemic  Follow-up not needed.          Vitals Value Taken Time   /74 7/31/2019 11:45 AM   Temp 36.8 °C (98.2 °F) 7/31/2019 11:30 AM   Pulse 66 7/31/2019 11:45 AM   Resp 16 7/31/2019 11:45 AM   SpO2 94 % 7/31/2019 11:45 AM         Event Time     Out of Recovery 11:36:00          Pain/Ramya Score: Pain Rating Prior to Med Admin: 6 (7/31/2019 11:17 AM)  Ramya Score: 10 (7/31/2019 11:45 AM)

## 2019-08-02 LAB
BACTERIA SPEC AEROBE CULT: NORMAL
BACTERIA SPEC AEROBE CULT: NORMAL

## 2019-08-05 ENCOUNTER — OFFICE VISIT (OUTPATIENT)
Dept: OTOLARYNGOLOGY | Facility: CLINIC | Age: 54
End: 2019-08-05
Payer: COMMERCIAL

## 2019-08-05 VITALS
BODY MASS INDEX: 30.97 KG/M2 | SYSTOLIC BLOOD PRESSURE: 113 MMHG | HEIGHT: 67 IN | DIASTOLIC BLOOD PRESSURE: 71 MMHG | HEART RATE: 71 BPM | TEMPERATURE: 98 F | WEIGHT: 197.31 LBS

## 2019-08-05 DIAGNOSIS — Z98.890 S/P FESS (FUNCTIONAL ENDOSCOPIC SINUS SURGERY): Primary | ICD-10-CM

## 2019-08-05 LAB
BACTERIA SPEC ANAEROBE CULT: NORMAL
BACTERIA SPEC ANAEROBE CULT: NORMAL

## 2019-08-05 PROCEDURE — 3078F PR MOST RECENT DIASTOLIC BLOOD PRESSURE < 80 MM HG: ICD-10-PCS | Mod: CPTII,S$GLB,, | Performed by: PHYSICIAN ASSISTANT

## 2019-08-05 PROCEDURE — 3078F DIAST BP <80 MM HG: CPT | Mod: CPTII,S$GLB,, | Performed by: PHYSICIAN ASSISTANT

## 2019-08-05 PROCEDURE — 3008F BODY MASS INDEX DOCD: CPT | Mod: CPTII,S$GLB,, | Performed by: PHYSICIAN ASSISTANT

## 2019-08-05 PROCEDURE — 99024 PR POST-OP FOLLOW-UP VISIT: ICD-10-PCS | Mod: S$GLB,,, | Performed by: PHYSICIAN ASSISTANT

## 2019-08-05 PROCEDURE — 99999 PR PBB SHADOW E&M-EST. PATIENT-LVL V: ICD-10-PCS | Mod: PBBFAC,,, | Performed by: PHYSICIAN ASSISTANT

## 2019-08-05 PROCEDURE — 99999 PR PBB SHADOW E&M-EST. PATIENT-LVL V: CPT | Mod: PBBFAC,,, | Performed by: PHYSICIAN ASSISTANT

## 2019-08-05 PROCEDURE — 99024 POSTOP FOLLOW-UP VISIT: CPT | Mod: S$GLB,,, | Performed by: PHYSICIAN ASSISTANT

## 2019-08-05 PROCEDURE — 3008F PR BODY MASS INDEX (BMI) DOCUMENTED: ICD-10-PCS | Mod: CPTII,S$GLB,, | Performed by: PHYSICIAN ASSISTANT

## 2019-08-05 PROCEDURE — 3074F PR MOST RECENT SYSTOLIC BLOOD PRESSURE < 130 MM HG: ICD-10-PCS | Mod: CPTII,S$GLB,, | Performed by: PHYSICIAN ASSISTANT

## 2019-08-05 PROCEDURE — 3074F SYST BP LT 130 MM HG: CPT | Mod: CPTII,S$GLB,, | Performed by: PHYSICIAN ASSISTANT

## 2019-08-05 RX ORDER — SULFAMETHOXAZOLE AND TRIMETHOPRIM 800; 160 MG/1; MG/1
1 TABLET ORAL 2 TIMES DAILY
Qty: 28 TABLET | Refills: 0 | Status: SHIPPED | OUTPATIENT
Start: 2019-08-05 | End: 2019-08-19

## 2019-08-05 NOTE — PROGRESS NOTES
Subjective:   Patient: Jaylin Murguia [id number], :1965   Visit date:2019 8:27 AM    Chief Complaint:  Post-op Evaluation (Splint removal )    HPI:  Jaylin is a 54 y.o. female who is here for follow-up after surgery:    Subjective: Patient is 5 days s/p FESS. Presented to clinic for splint removal. Started on Augmentin prophylactically post op. She has a hx of chron's and reported severe diarrhea with abx, stated she took 1/2 a dose yesterday. She has been performing nasal saline rinses daily. No other new complaints. Pt is wanting to return to work on Monday.     Surgery date: 19    Technical Procedures Used: FESS     RIGHT     -  right Total ethmoidectomy (CPT 83060)  -  right Maxillary antrostomy without removal of tissue (CPT 48651)           LEFT     -  left Total ethmoidectomy with sphenoid sinusotomy (CPT 69744)  -  left Maxillary antrostomy without removal of tissue (CPT 34078)        -  Sterotactic computer assisted (navigational) procedure; cranial, extradural (CPT 07562)  -  Septoplasty (CPT 52374)      Pathology:  Did not grow    Cultures:  NA    Review of Systems:  -     Allergic/Immunologic: has No Known Allergies..  -     Constitutional: Current temp: 97.6 °F (36.4 °C) (Tympanic)    Her meds, allergies, medical, surgical, social & family histories were reviewed & updated:  -     She has a current medication list which includes the following prescription(s): amlodipine, azelastine, azelastine-fluticasone, breo ellipta, butalbital-acetaminophen-caffeine -40 mg, cetirizine, duloxetine, eletriptan, estradiol, fluticasone propionate, levalbuterol, losartan-hydrochlorothiazide 100-25 mg, mesalamine, montelukast, nortriptyline, ondansetron, oxycodone-acetaminophen, pantoprazole, pregabalin, propranolol, rizatriptan, simvastatin, triamcinolone acetonide 0.025%, ventolin hfa, zolpidem, and sulfamethoxazole-trimethoprim 800-160mg, and the following Facility-Administered Medications:  "lactated ringers and lidocaine (pf) 10 mg/ml (1%).  -     She  has a past medical history of Allergic rhinitis, Asthma, Chronic pansinusitis, Crohn's disease, Fibromyalgia, Hyperlipidemia, Hypertension, Migraine, Obstructive sleep apnea, and Sciatica.   -     She does not have any pertinent problems on file.   -     She  has a past surgical history that includes Hysterectomy;  section; [place] tooth extraction; Finger surgery; Bladder surgery; Colonoscopy (N/A, 2017); Colonoscopy (N/A, 3/27/2018); and Functional endoscopic sinus surgery (FESS) using computer-assisted navigation (Bilateral, 2019).  -     She  reports that she has never smoked. She has never used smokeless tobacco. She reports that she does not drink alcohol or use drugs.  -     Her family history includes COPD (age of onset: 72) in her maternal grandmother; Cancer (age of onset: 70) in her paternal grandmother; Heart attack in her maternal grandmother; Hypertension in her brother and mother; Kidney disease (age of onset: 64) in her father; Scleroderma in her father.  -     She has No Known Allergies.    Objective:     Physical Exam:  Vitals:  /71   Pulse 71   Temp 97.6 °F (36.4 °C) (Tympanic)   Ht 5' 7" (1.702 m)   Wt 89.5 kg (197 lb 5 oz)   BMI 30.90 kg/m²   General appearance:  Well developed, well nourished, no apparent distress    Surgical site: Bilateral nasal splints in place over septum w/ prolene suture. Clear mucus.     Assessment & Plan:   Jaylin was seen today for post-op evaluation.    Diagnoses and all orders for this visit:    S/P FESS (functional endoscopic sinus surgery)    Other orders  -     sulfamethoxazole-trimethoprim 800-160mg (BACTRIM DS) 800-160 mg Tab; Take 1 tablet by mouth 2 (two) times daily. for 14 days    Splints removed w/o difficulty.   D/C Augmentin, start Bactrim. Advised pt to try probiotic with this.   Plan for appt with Dr. Shaffer this Friday to recheck and debridement    Over 25 minutes " were spent in face to face contact with the patient with greater than 50% spent discussing their diagnosis and coordination of care.       Jyothi Denise PA-C  [facility]   [facility]  [address]  [place] [address]  P: [phone]  F: [phone]

## 2019-08-09 ENCOUNTER — OFFICE VISIT (OUTPATIENT)
Dept: OTOLARYNGOLOGY | Facility: CLINIC | Age: 54
End: 2019-08-09
Payer: COMMERCIAL

## 2019-08-09 VITALS
TEMPERATURE: 99 F | BODY MASS INDEX: 31.18 KG/M2 | HEART RATE: 69 BPM | DIASTOLIC BLOOD PRESSURE: 69 MMHG | SYSTOLIC BLOOD PRESSURE: 99 MMHG | WEIGHT: 199.06 LBS

## 2019-08-09 DIAGNOSIS — J32.4 CHRONIC PANSINUSITIS: ICD-10-CM

## 2019-08-09 DIAGNOSIS — Z98.890 S/P FESS (FUNCTIONAL ENDOSCOPIC SINUS SURGERY): Primary | ICD-10-CM

## 2019-08-09 PROCEDURE — 3074F PR MOST RECENT SYSTOLIC BLOOD PRESSURE < 130 MM HG: ICD-10-PCS | Mod: CPTII,S$GLB,, | Performed by: OTOLARYNGOLOGY

## 2019-08-09 PROCEDURE — 3078F PR MOST RECENT DIASTOLIC BLOOD PRESSURE < 80 MM HG: ICD-10-PCS | Mod: CPTII,S$GLB,, | Performed by: OTOLARYNGOLOGY

## 2019-08-09 PROCEDURE — 31237 PR NASAL/SINUS ENDOSCOPY,BX/RMV POLYP/DEBRID: ICD-10-PCS | Mod: 50,79,S$GLB, | Performed by: OTOLARYNGOLOGY

## 2019-08-09 PROCEDURE — 99999 PR PBB SHADOW E&M-EST. PATIENT-LVL III: ICD-10-PCS | Mod: PBBFAC,,, | Performed by: OTOLARYNGOLOGY

## 2019-08-09 PROCEDURE — 99213 PR OFFICE/OUTPT VISIT, EST, LEVL III, 20-29 MIN: ICD-10-PCS | Mod: 25,24,S$GLB, | Performed by: OTOLARYNGOLOGY

## 2019-08-09 PROCEDURE — 99999 PR PBB SHADOW E&M-EST. PATIENT-LVL III: CPT | Mod: PBBFAC,,, | Performed by: OTOLARYNGOLOGY

## 2019-08-09 PROCEDURE — 99213 OFFICE O/P EST LOW 20 MIN: CPT | Mod: 25,24,S$GLB, | Performed by: OTOLARYNGOLOGY

## 2019-08-09 PROCEDURE — 3008F PR BODY MASS INDEX (BMI) DOCUMENTED: ICD-10-PCS | Mod: CPTII,S$GLB,, | Performed by: OTOLARYNGOLOGY

## 2019-08-09 PROCEDURE — 3074F SYST BP LT 130 MM HG: CPT | Mod: CPTII,S$GLB,, | Performed by: OTOLARYNGOLOGY

## 2019-08-09 PROCEDURE — 3008F BODY MASS INDEX DOCD: CPT | Mod: CPTII,S$GLB,, | Performed by: OTOLARYNGOLOGY

## 2019-08-09 PROCEDURE — 31237 NSL/SINS NDSC SURG BX POLYPC: CPT | Mod: 50,79,S$GLB, | Performed by: OTOLARYNGOLOGY

## 2019-08-09 PROCEDURE — 3078F DIAST BP <80 MM HG: CPT | Mod: CPTII,S$GLB,, | Performed by: OTOLARYNGOLOGY

## 2019-08-09 RX ORDER — MUPIROCIN 20 MG/G
OINTMENT TOPICAL 2 TIMES DAILY
Qty: 15 G | Refills: 3 | Status: SHIPPED | OUTPATIENT
Start: 2019-08-09 | End: 2019-08-19

## 2019-08-09 NOTE — PROGRESS NOTES
Subjective:   Patient: Jaylin Murguia [id number], :1965   Visit date:2019 8:35 AM    Chief Complaint:  No chief complaint on file.    HPI:  Jaylin is a 54 y.o. female who is here for follow-up after surgery:    Subjective: Pt is 1 week s/p FESS.     Surgery date: 19       RIGHT   -  right Total ethmoidectomy (CPT 96632)  -  right Maxillary antrostomy without removal of tissue (CPT 95398)     LEFT  -  left Total ethmoidectomy with sphenoid sinusotomy (CPT 23936)  -  left Maxillary antrostomy without removal of tissue (CPT 74971)      -  Sterotactic computer assisted (navigational) procedure; cranial, extradural (CPT 02975)  -  Septoplasty (CPT 05073)        Procedure in Detail/Findings:     Endoscopic Sinonasal Exam Findings at TIME OF SURGERY:  -     Sinuses examined: bilateral maxillary, bilateral ethmoid anterior/posterior and left sphenoid            The right sinus(es) have marked edema with polypoid changes            The left sinus(es) have marked edema with polypoid changes  -     Nasal secretions: purulent secretions bilaterally  -     Nasal septum: LEFT moderate deviation   -     Inferior turbinate: hypertrophy or edema (Mild) bilaterally  -     Middle turbinate: hypertrophy or edema (Moderate) on the left  -     Other findings: - no mass or obstructive lesion -Technical Procedures Used: 05256       Pathology:  NA    Cultures:  No growth    Review of Systems:  -     Allergic/Immunologic: has No Known Allergies..  -     Constitutional: Current temp:      Her meds, allergies, medical, surgical, social & family histories were reviewed & updated:  -     She has a current medication list which includes the following prescription(s): amlodipine, azelastine, azelastine-fluticasone, breo ellipta, butalbital-acetaminophen-caffeine -40 mg, cetirizine, duloxetine, eletriptan, estradiol, fluticasone propionate, levalbuterol, losartan-hydrochlorothiazide 100-25 mg, mesalamine, montelukast,  nortriptyline, ondansetron, oxycodone-acetaminophen, pantoprazole, pregabalin, propranolol, rizatriptan, simvastatin, sulfamethoxazole-trimethoprim 800-160mg, triamcinolone acetonide 0.025%, ventolin hfa, and zolpidem, and the following Facility-Administered Medications: lactated ringers and lidocaine (pf) 10 mg/ml (1%).  -     She  has a past medical history of Allergic rhinitis, Asthma, Chronic pansinusitis, Crohn's disease, Fibromyalgia, Hyperlipidemia, Hypertension, Migraine, Obstructive sleep apnea, and Sciatica.   -     She does not have any pertinent problems on file.   -     She  has a past surgical history that includes Hysterectomy;  section; [place] tooth extraction; Finger surgery; Bladder surgery; Colonoscopy (N/A, 2017); Colonoscopy (N/A, 3/27/2018); and Functional endoscopic sinus surgery (FESS) using computer-assisted navigation (Bilateral, 2019).  -     She  reports that she has never smoked. She has never used smokeless tobacco. She reports that she does not drink alcohol or use drugs.  -     Her family history includes COPD (age of onset: 72) in her maternal grandmother; Cancer (age of onset: 70) in her paternal grandmother; Heart attack in her maternal grandmother; Hypertension in her brother and mother; Kidney disease (age of onset: 64) in her father; Scleroderma in her father.  -     She has No Known Allergies.    Objective:     Physical Exam:  Vitals:  BP 99/69   Pulse 69   Temp 98.5 °F (36.9 °C) (Tympanic)   Wt 90.3 kg (199 lb 1.2 oz)   BMI 31.18 kg/m²   General appearance:  Well developed, well nourished    Eyes:  Extraocular motions intact, PERRL    Communication:  no hoarseness, no dysphonia    Ears:  Otoscopy of external auditory canals and tympanic membranes was normal, clinical speech reception thresholds grossly intact, no mass/lesion of auricle.  Nose:  No masses/lesions of external nose, nasal mucosa, septum, and turbinates were within normal limits.  Mouth:  No  mass/lesion of lips, teeth, gums, hard/soft palate, tongue, tonsils, or oropharynx.    Cardiovascular:  No pedal edema; Radial Pulses +2     Neck & Lymphatics:  No cervical lymphadenopathy, no neck mass/crepitus/ asymmetry, trachea is midline, no thyroid enlargement/tenderness/mass.    Psych: Oriented x3,  Alert with normal mood and affect.     Respiration/Chest:  Symmetric expansion during respiration, normal respiratory effort.    Skin:  Warm and intact. No ulcerations of face, scalp, neck.        Assessment & Plan:   Diagnoses and all orders for this visit:    S/P FESS (functional endoscopic sinus surgery)      Extensive debridement today  Microgen sent  Plan for topical and PO abx 1 month         Manish Shaffer MD       NASAL ENDOSCOPY WITH POLYPECTOMY &/OR DEBRIDEMENT  (cpt 59255)    Patient: Jaylin Murguia [id number], :1965  Procedure date:2019  Patient's medications, allergies, past medical, surgical, social and family histories were reviewed and updated as appropriate.  Chief Complaint:  No chief complaint on file.    HPI:  Jaylin is a 54 y.o. female with chronic sinusitis.    Procedure: Risks, benefits, and alternatives of the procedure were discussed with the patient, and the patient consented to the nasal endoscopy with debridement.  Anterior rhinoscopy was insufficient to explain patients symptoms.      The nasal cavity was sprayed with a topical decongestant and anesthetic . The endoscope was passed into each nostril and each nasal cavity was visualized.  On each side the nasal cavity, sinuses (if open), turbinates, and septum were examined with the findings described below. The turbinates, middle meatus, superior meatus and sphenoethmoidal recess was examined.  Crusting and polypoid material was removed with suction and straight non thru cut forceps.   At the end of the examination, the scope was removed. The patient tolerated the procedure well with no complications.     Endoscopic Sinonasal  Exam Findings:  -     Sinuses examined: bilateral maxillary and right ethmoid anterior            The right sinus(es) have moderate edema            The left sinus(es) have mild edema  -     Nasal secretions: dried crusts, dried blood, purulent secretions and thick glue-like mucous bilaterally  -     Nasal septum: no significant deviation and no perforation appreciated   -     Inferior turbinate: - normal mucosa without significant hypertrophy - bilaterally  -     Middle turbinate: - normal mucosa without significant hypertrophy - bilaterally  -     Other findings: - no mass or obstructive lesion -    Assessment & Plan:  See today's clinic note

## 2019-08-15 ENCOUNTER — PATIENT MESSAGE (OUTPATIENT)
Dept: NEPHROLOGY | Facility: CLINIC | Age: 54
End: 2019-08-15

## 2019-08-19 ENCOUNTER — PATIENT MESSAGE (OUTPATIENT)
Dept: OTOLARYNGOLOGY | Facility: CLINIC | Age: 54
End: 2019-08-19

## 2019-08-22 ENCOUNTER — TELEPHONE (OUTPATIENT)
Dept: OTOLARYNGOLOGY | Facility: CLINIC | Age: 54
End: 2019-08-22

## 2019-08-22 NOTE — TELEPHONE ENCOUNTER
Prescribed therapy alternate option leonor faxed back to [facility] for Doxy Mono 150 mg Cap/Budesonide 1 mg/2ml with 2 refills.

## 2019-08-25 ENCOUNTER — PATIENT MESSAGE (OUTPATIENT)
Dept: OTOLARYNGOLOGY | Facility: CLINIC | Age: 54
End: 2019-08-25

## 2019-08-26 DIAGNOSIS — K12.0 APHTHOUS ULCER: ICD-10-CM

## 2019-08-26 RX ORDER — NORTRIPTYLINE HYDROCHLORIDE 25 MG/1
50 CAPSULE ORAL DAILY
Qty: 60 CAPSULE | Refills: 1 | Status: SHIPPED | OUTPATIENT
Start: 2019-08-26 | End: 2020-01-13

## 2019-08-26 RX ORDER — RIZATRIPTAN BENZOATE 10 MG/1
TABLET ORAL
Qty: 30 TABLET | Refills: 3 | Status: SHIPPED | OUTPATIENT
Start: 2019-08-26 | End: 2020-10-05

## 2019-08-26 RX ORDER — TRIAMCINOLONE ACETONIDE 0.25 MG/G
CREAM TOPICAL 2 TIMES DAILY
Qty: 453 G | Refills: 3 | Status: SHIPPED | OUTPATIENT
Start: 2019-08-26 | End: 2021-02-03

## 2019-08-27 ENCOUNTER — TELEPHONE (OUTPATIENT)
Dept: OTOLARYNGOLOGY | Facility: CLINIC | Age: 54
End: 2019-08-27

## 2019-08-27 RX ORDER — LINEZOLID 600 MG/1
600 TABLET, FILM COATED ORAL EVERY 12 HOURS
Qty: 28 TABLET | Refills: 0 | Status: SHIPPED | OUTPATIENT
Start: 2019-08-27 | End: 2019-08-30 | Stop reason: SDUPTHER

## 2019-08-27 RX ORDER — CIPROFLOXACIN 500 MG/1
500 TABLET ORAL 2 TIMES DAILY
Qty: 28 TABLET | Refills: 0 | Status: SHIPPED | OUTPATIENT
Start: 2019-08-27 | End: 2019-08-30 | Stop reason: SDUPTHER

## 2019-08-27 NOTE — TELEPHONE ENCOUNTER
----- Message from Erin Pan sent at 8/27/2019 11:23 AM CDT -----  Contact: pt  The pt request a call concerning a medication she is taking, no additional info given and can be reached at [phone]///[email or web address]

## 2019-08-29 ENCOUNTER — PATIENT MESSAGE (OUTPATIENT)
Dept: OTOLARYNGOLOGY | Facility: CLINIC | Age: 54
End: 2019-08-29

## 2019-08-30 ENCOUNTER — PATIENT MESSAGE (OUTPATIENT)
Dept: CARDIOLOGY | Facility: CLINIC | Age: 54
End: 2019-08-30

## 2019-08-30 RX ORDER — LINEZOLID 600 MG/1
600 TABLET, FILM COATED ORAL EVERY 12 HOURS
Qty: 28 TABLET | Refills: 0 | Status: SHIPPED | OUTPATIENT
Start: 2019-08-30 | End: 2019-09-13

## 2019-08-30 RX ORDER — FLUCONAZOLE 100 MG/1
100 TABLET ORAL DAILY
Qty: 14 TABLET | Refills: 0 | Status: SHIPPED | OUTPATIENT
Start: 2019-08-30 | End: 2019-09-13

## 2019-08-30 RX ORDER — CIPROFLOXACIN 500 MG/1
500 TABLET ORAL 2 TIMES DAILY
Qty: 28 TABLET | Refills: 0 | Status: SHIPPED | OUTPATIENT
Start: 2019-08-30 | End: 2019-09-13

## 2019-09-03 ENCOUNTER — TELEPHONE (OUTPATIENT)
Dept: OTOLARYNGOLOGY | Facility: CLINIC | Age: 54
End: 2019-09-03

## 2019-09-03 NOTE — TELEPHONE ENCOUNTER
Received a call from [facility], due to interaction with Cymbalta and Pamelor medication Dr. Shaffer will change Zyvox to Bactrim DS 1 by mouth twice daily for 3 weeks.

## 2019-09-19 ENCOUNTER — PATIENT MESSAGE (OUTPATIENT)
Dept: GASTROENTEROLOGY | Facility: CLINIC | Age: 54
End: 2019-09-19

## 2019-09-19 RX ORDER — LOSARTAN POTASSIUM AND HYDROCHLOROTHIAZIDE 25; 100 MG/1; MG/1
1 TABLET ORAL DAILY
Qty: 90 TABLET | Refills: 3 | Status: CANCELLED | OUTPATIENT
Start: 2019-09-19 | End: 2020-09-18

## 2019-09-20 ENCOUNTER — OFFICE VISIT (OUTPATIENT)
Dept: OTOLARYNGOLOGY | Facility: CLINIC | Age: 54
End: 2019-09-20
Payer: COMMERCIAL

## 2019-09-20 ENCOUNTER — TELEPHONE (OUTPATIENT)
Dept: ALLERGY | Facility: CLINIC | Age: 54
End: 2019-09-20

## 2019-09-20 ENCOUNTER — PATIENT MESSAGE (OUTPATIENT)
Dept: INTERNAL MEDICINE | Facility: CLINIC | Age: 54
End: 2019-09-20

## 2019-09-20 VITALS
HEART RATE: 62 BPM | BODY MASS INDEX: 31.73 KG/M2 | SYSTOLIC BLOOD PRESSURE: 113 MMHG | DIASTOLIC BLOOD PRESSURE: 76 MMHG | WEIGHT: 202.63 LBS

## 2019-09-20 DIAGNOSIS — J32.4 CHRONIC PANSINUSITIS: Primary | ICD-10-CM

## 2019-09-20 PROCEDURE — 3074F SYST BP LT 130 MM HG: CPT | Mod: CPTII,S$GLB,, | Performed by: OTOLARYNGOLOGY

## 2019-09-20 PROCEDURE — 87077 CULTURE AEROBIC IDENTIFY: CPT

## 2019-09-20 PROCEDURE — 3008F PR BODY MASS INDEX (BMI) DOCUMENTED: ICD-10-PCS | Mod: CPTII,S$GLB,, | Performed by: OTOLARYNGOLOGY

## 2019-09-20 PROCEDURE — 3078F PR MOST RECENT DIASTOLIC BLOOD PRESSURE < 80 MM HG: ICD-10-PCS | Mod: CPTII,S$GLB,, | Performed by: OTOLARYNGOLOGY

## 2019-09-20 PROCEDURE — 87070 CULTURE OTHR SPECIMN AEROBIC: CPT

## 2019-09-20 PROCEDURE — 31237 PR NASAL/SINUS ENDOSCOPY,BX/RMV POLYP/DEBRID: ICD-10-PCS | Mod: 50,79,S$GLB, | Performed by: OTOLARYNGOLOGY

## 2019-09-20 PROCEDURE — 31237 NSL/SINS NDSC SURG BX POLYPC: CPT | Mod: 50,79,S$GLB, | Performed by: OTOLARYNGOLOGY

## 2019-09-20 PROCEDURE — 3078F DIAST BP <80 MM HG: CPT | Mod: CPTII,S$GLB,, | Performed by: OTOLARYNGOLOGY

## 2019-09-20 PROCEDURE — 99999 PR PBB SHADOW E&M-EST. PATIENT-LVL II: ICD-10-PCS | Mod: PBBFAC,,, | Performed by: OTOLARYNGOLOGY

## 2019-09-20 PROCEDURE — 99999 PR PBB SHADOW E&M-EST. PATIENT-LVL II: CPT | Mod: PBBFAC,,, | Performed by: OTOLARYNGOLOGY

## 2019-09-20 PROCEDURE — 3074F PR MOST RECENT SYSTOLIC BLOOD PRESSURE < 130 MM HG: ICD-10-PCS | Mod: CPTII,S$GLB,, | Performed by: OTOLARYNGOLOGY

## 2019-09-20 PROCEDURE — 99213 PR OFFICE/OUTPT VISIT, EST, LEVL III, 20-29 MIN: ICD-10-PCS | Mod: 25,24,S$GLB, | Performed by: OTOLARYNGOLOGY

## 2019-09-20 PROCEDURE — 3008F BODY MASS INDEX DOCD: CPT | Mod: CPTII,S$GLB,, | Performed by: OTOLARYNGOLOGY

## 2019-09-20 PROCEDURE — 99213 OFFICE O/P EST LOW 20 MIN: CPT | Mod: 25,24,S$GLB, | Performed by: OTOLARYNGOLOGY

## 2019-09-20 PROCEDURE — 87186 SC STD MICRODIL/AGAR DIL: CPT

## 2019-09-20 RX ORDER — LOSARTAN POTASSIUM AND HYDROCHLOROTHIAZIDE 25; 100 MG/1; MG/1
1 TABLET ORAL DAILY
Qty: 90 TABLET | Refills: 3 | Status: SHIPPED | OUTPATIENT
Start: 2019-09-20 | End: 2020-07-08

## 2019-09-20 NOTE — TELEPHONE ENCOUNTER
That is too much of the losartan then if she is taking an extra tablet in the afternoon. She could do the additional hctz alone as a twice a day medication or she could double up on the amlodpine from 5mg to bid dosing (total of 10mg).   Can see if she would like to do an ov with murphy or even a telemed visit with me to discuss further if she has a blood pressure cuff at home or in the digital htn program. Let me know which option she chooses.

## 2019-09-20 NOTE — PROGRESS NOTES
Subjective:   Patient: Jaylin Murguia [id number], :1965   Visit date:2019 8:35 AM    Chief Complaint:  Follow-up    HPI:  Jaylin is a 54 y.o. female who is here for follow-up after surgery:    Subjective: Overall doing somewhat better.  She reports that she is much better than baseline which had been pretty severe for several years prior to surgery.  Still having some discolored drainage. She has been on Bactrim and Cipro PO.  I had prescribed zyvox but there was concern for a medication interaction. She was also on topical vancomycin/doxycycline/budesonide.  She had to stop the doxy due to severe burning.  She is still on the vancomycin and budesonide topically.     Surgery date: 19       RIGHT   -  right Total ethmoidectomy (CPT 77035)  -  right Maxillary antrostomy without removal of tissue (CPT 20945)     LEFT  -  left Total ethmoidectomy with sphenoid sinusotomy (CPT 92089)  -  left Maxillary antrostomy without removal of tissue (CPT 34206)      -  Sterotactic computer assisted (navigational) procedure; cranial, extradural (CPT 98451)  -  Septoplasty (CPT 59764)        Procedure in Detail/Findings:     Endoscopic Sinonasal Exam Findings at TIME OF SURGERY:  -     Sinuses examined: bilateral maxillary, bilateral ethmoid anterior/posterior and left sphenoid            The right sinus(es) have marked edema with polypoid changes            The left sinus(es) have marked edema with polypoid changes  -     Nasal secretions: purulent secretions bilaterally  -     Nasal septum: LEFT moderate deviation   -     Inferior turbinate: hypertrophy or edema (Mild) bilaterally  -     Middle turbinate: hypertrophy or edema (Moderate) on the left  -     Other findings: - no mass or obstructive lesion -Technical Procedures Used: 12159       Pathology:  NA    Cultures:  No growth    Review of Systems:  -     Allergic/Immunologic: has No Known Allergies..  -     Constitutional: Current temp:      Her meds,  allergies, medical, surgical, social & family histories were reviewed & updated:  -     She has a current medication list which includes the following prescription(s): amlodipine, azelastine, azelastine-fluticasone, breo ellipta, butalbital-acetaminophen-caffeine -40 mg, cetirizine, duloxetine, eletriptan, estradiol, fluticasone propionate, levalbuterol, losartan-hydrochlorothiazide 100-25 mg, mesalamine, montelukast, nortriptyline, ondansetron, oxycodone-acetaminophen, pantoprazole, pregabalin, propranolol, rizatriptan, simvastatin, triamcinolone acetonide 0.025%, ventolin hfa, and zolpidem, and the following Facility-Administered Medications: lactated ringers and lidocaine (pf) 10 mg/ml (1%).  -     She  has a past medical history of Allergic rhinitis, Asthma, Chronic pansinusitis, Crohn's disease, Fibromyalgia, Hyperlipidemia, Hypertension, Migraine, Obstructive sleep apnea, and Sciatica.   -     She does not have any pertinent problems on file.   -     She  has a past surgical history that includes Hysterectomy;  section; Cope tooth extraction; Finger surgery; Bladder surgery; Colonoscopy (N/A, 2017); Colonoscopy (N/A, 3/27/2018); and Functional endoscopic sinus surgery (FESS) using computer-assisted navigation (Bilateral, 2019).  -     She  reports that she has never smoked. She has never used smokeless tobacco. She reports that she does not drink alcohol or use drugs.  -     Her family history includes COPD (age of onset: 72) in her maternal grandmother; Cancer (age of onset: 70) in her paternal grandmother; Heart attack in her maternal grandmother; Hypertension in her brother and mother; Kidney disease (age of onset: 64) in her father; Scleroderma in her father.  -     She has No Known Allergies.    Objective:     Physical Exam:  Vitals:  /76   Pulse 62   Wt 91.9 kg (202 lb 9.6 oz)   BMI 31.73 kg/m²   General appearance:  Well developed, well nourished    Eyes:  Extraocular  motions intact, PERRL    Communication:  no hoarseness, no dysphonia    Ears:  Otoscopy of external auditory canals and tympanic membranes was normal, clinical speech reception thresholds grossly intact, no mass/lesion of auricle.  Nose:  No masses/lesions of external nose, nasal mucosa, septum, and turbinates were within normal limits.  Mouth:  No mass/lesion of lips, teeth, gums, hard/soft palate, tongue, tonsils, or oropharynx.    Cardiovascular:  No pedal edema; Radial Pulses +2     Neck & Lymphatics:  No cervical lymphadenopathy, no neck mass/crepitus/ asymmetry, trachea is midline, no thyroid enlargement/tenderness/mass.    Psych: Oriented x3,  Alert with normal mood and affect.     Respiration/Chest:  Symmetric expansion during respiration, normal respiratory effort.    Skin:  Warm and intact. No ulcerations of face, scalp, neck.        Assessment & Plan:   Jaylin was seen today for follow-up.    Diagnoses and all orders for this visit:    Chronic pansinusitis  -     CULTURE, AEROBIC  (SPECIFY SOURCE)        Debrided again today.  The mucosa of the right maxillary is significantly improved.  The right ethmoid cavity is still fairly polypoid and irregular.  The infection appears to have extended into the septoplasty incision.  Her left maxillary and ethmoid cavity are severely polypoid and with purulence.  New cultures sent today.  Continue current meds but if there is no growth, I think that ID consult will be in order.     She is on chronic immunosuppression which may be contributing to the delay in her healing process.         Manish Shaffer MD       NASAL ENDOSCOPY WITH POLYPECTOMY &/OR DEBRIDEMENT  (cpt 37399)    Patient: Jaylin Murguia [id number], :1965  Procedure date:2019  Patient's medications, allergies, past medical, surgical, social and family histories were reviewed and updated as appropriate.  Chief Complaint:  Follow-up    HPI:  Jaylin is a 54 y.o. female with chronic  sinusitis.    Procedure: Risks, benefits, and alternatives of the procedure were discussed with the patient, and the patient consented to the nasal endoscopy with debridement.  Anterior rhinoscopy was insufficient to explain patients symptoms.      The nasal cavity was sprayed with a topical decongestant and anesthetic . The endoscope was passed into each nostril and each nasal cavity was visualized.  On each side the nasal cavity, sinuses (if open), turbinates, and septum were examined with the findings described below. The turbinates, middle meatus, superior meatus and sphenoethmoidal recess was examined.  Crusting and polypoid material was removed with suction and straight non thru cut forceps.   At the end of the examination, the scope was removed. The patient tolerated the procedure well with no complications.     Endoscopic Sinonasal Exam Findings:  -     Sinuses examined: bilateral maxillary and right ethmoid anterior            The right sinus(es): Maxillary with nearly normal mucosa.  Crusting and purulence present and removed.  Ethmoid cavity with purulence and moderate polypoid changes            The left sinus(es): Severe polypoid changes and purulence in maxillary and ethmoid  -     Nasal septum: Small perforation.  Crusting and purulence extending into the mucoperichondrial flap  -     Inferior turbinate: - normal mucosa without significant hypertrophy - bilaterally  -     Middle turbinate: - normal mucosa without significant hypertrophy - bilaterally  -     Other findings: - no mass or obstructive lesion -    Assessment & Plan:  See today's clinic note

## 2019-09-20 NOTE — TELEPHONE ENCOUNTER
I spoke with her when she was at the clinic today and subsequently called her and spoke with her on the telephone.  She stated she is no longer taking Humira.  She stated it was discontinued about 18 months ago.    She is recovering from her sinus surgery.  I spoke with  regarding her recovery.    I recommended that quantitative immunoglobulin levels be measured, IgG subclass levels be measured, a total complement level be measured, antigen specific antibody levels be measured and a total IgE be measured.  This is agreeable with her.  Arrangements will be made to have blood drawn.    When the results are available to review with her I will do so.  Additional recommendations may be made at that time.    This note was dictated using voice recognition software and may contain errors.

## 2019-09-21 ENCOUNTER — LAB VISIT (OUTPATIENT)
Dept: LAB | Facility: HOSPITAL | Age: 54
End: 2019-09-21
Attending: ALLERGY & IMMUNOLOGY
Payer: COMMERCIAL

## 2019-09-21 DIAGNOSIS — J32.4 CHRONIC PANSINUSITIS: ICD-10-CM

## 2019-09-21 LAB
IGA SERPL-MCNC: 275 MG/DL (ref 40–350)
IGE SERPL-ACNC: 81 IU/ML (ref 0–100)
IGG SERPL-MCNC: 1061 MG/DL (ref 650–1600)
IGM SERPL-MCNC: 152 MG/DL (ref 50–300)

## 2019-09-21 PROCEDURE — 82787 IGG 1 2 3 OR 4 EACH: CPT | Mod: 59

## 2019-09-21 PROCEDURE — 82784 ASSAY IGA/IGD/IGG/IGM EACH: CPT

## 2019-09-21 PROCEDURE — 82785 ASSAY OF IGE: CPT

## 2019-09-21 PROCEDURE — 86774 TETANUS ANTIBODY: CPT

## 2019-09-21 PROCEDURE — 86162 COMPLEMENT TOTAL (CH50): CPT

## 2019-09-21 PROCEDURE — 36415 COLL VENOUS BLD VENIPUNCTURE: CPT

## 2019-09-24 ENCOUNTER — TELEPHONE (OUTPATIENT)
Dept: OTOLARYNGOLOGY | Facility: CLINIC | Age: 54
End: 2019-09-24

## 2019-09-24 ENCOUNTER — PATIENT MESSAGE (OUTPATIENT)
Dept: OTOLARYNGOLOGY | Facility: CLINIC | Age: 54
End: 2019-09-24

## 2019-09-24 DIAGNOSIS — J32.4 CHRONIC PANSINUSITIS: Primary | ICD-10-CM

## 2019-09-24 DIAGNOSIS — Z98.890 S/P FESS (FUNCTIONAL ENDOSCOPIC SINUS SURGERY): ICD-10-CM

## 2019-09-24 RX ORDER — LEVOFLOXACIN 750 MG/1
750 TABLET ORAL DAILY
Qty: 14 TABLET | Refills: 0 | Status: SHIPPED | OUTPATIENT
Start: 2019-09-24 | End: 2019-10-02 | Stop reason: ALTCHOICE

## 2019-09-24 RX ORDER — FLUCONAZOLE 100 MG/1
100 TABLET ORAL DAILY
Qty: 14 TABLET | Refills: 0 | Status: SHIPPED | OUTPATIENT
Start: 2019-09-24 | End: 2019-10-02 | Stop reason: ALTCHOICE

## 2019-09-24 NOTE — TELEPHONE ENCOUNTER
Referral along with culture results sent to Dr. Florez and his staff for review and to contact patient for scheduling.

## 2019-09-24 NOTE — TELEPHONE ENCOUNTER
Attempted to contact pt to inform of culture results no answer left message to call back to review results.        MD RON Webber Staff             I sent Rx's for pt to start Levaquin and Diflucan based off of her culture (which grew both fungus and bacteria), please advise pt to start medications, these are both for 14 days. She needs a f/u once completing. We will also send an anti-fungal to add to her rinses, thank you!

## 2019-09-25 ENCOUNTER — TELEPHONE (OUTPATIENT)
Dept: OTOLARYNGOLOGY | Facility: CLINIC | Age: 54
End: 2019-09-25

## 2019-09-25 LAB
CH50 SERPL-ACNC: 63 U/ML (ref 42–95)
IGG1 SER-MCNC: 715 MG/DL (ref 382–929)
IGG2 SER-MCNC: 181 MG/DL (ref 242–700)
IGG3 SER-MCNC: 20 MG/DL (ref 22–176)
IGG4 SER-MCNC: 31 MG/DL (ref 4–86)

## 2019-09-25 NOTE — TELEPHONE ENCOUNTER
----- Message from Rosa Jama sent at 9/25/2019 12:28 PM CDT -----  Contact: pt  Sarah     Type:  Patient Returning Call    Who Called:pt  Who Left Message for Patient:Sarah  Does the patient know what this is regarding?:culture results  Would the patient rather a call back or a response via [facility]? Call back  Best Call Back Number:[phone]  Additional Information: .    Thank you

## 2019-09-25 NOTE — TELEPHONE ENCOUNTER
Returned call to patient regarding questions and concerns of culture results. Patient advised that she has an appointment with Dr. Florez already and will follow up with him.

## 2019-09-25 NOTE — TELEPHONE ENCOUNTER
Left message and call back number for pt to return call.        ----- Message from Kiki Pritchett sent at 9/25/2019 11:35 AM CDT -----  Contact: pt  Type:  Patient Returning Call    Who Called:pt   Who Left Message for Patient:nurse   Does the patient know what this is regarding?:  Would the patient rather a call back or a response via [facility]? Call back   Best Call Back Number: [phone] (home)   Additional Information:

## 2019-09-26 LAB
BACTERIA SPEC AEROBE CULT: ABNORMAL
BACTERIA SPEC AEROBE CULT: ABNORMAL

## 2019-09-30 LAB
C DIPHTHERIAE AB SER IA-ACNC: 0.18 IU/ML
C TETANI AB SER-ACNC: 3.86 IU/ML
DEPRECATED S PNEUM 1 IGG SER-MCNC: 0.4 MCG/ML
DEPRECATED S PNEUM12 IGG SER-MCNC: 1.8 MCG/ML
DEPRECATED S PNEUM14 IGG SER-MCNC: 2.4 MCG/ML
DEPRECATED S PNEUM19 IGG SER-MCNC: 4.3 MCG/ML
DEPRECATED S PNEUM23 IGG SER-MCNC: 0.4 MCG/ML
DEPRECATED S PNEUM3 IGG SER-MCNC: 1.2 MCG/ML
DEPRECATED S PNEUM4 IGG SER-MCNC: <0.3 MCG/ML
DEPRECATED S PNEUM5 IGG SER-MCNC: 0.5 MCG/ML
DEPRECATED S PNEUM8 IGG SER-MCNC: 1.6 MCG/ML
DEPRECATED S PNEUM9 IGG SER-MCNC: <0.3 MCG/ML
HAEM INFLU B IGG SER-MCNC: <0.15 MCG/ML
S PNEUM DA 18C IGG SER-MCNC: 0.6 MCG/ML
S PNEUM DA 6B IGG SER-MCNC: 2.3 MCG/ML
S PNEUM DA 7F IGG SER-MCNC: 1.7 MCG/ML
S PNEUM DA 9V IGG SER-MCNC: <0.3 MCG/ML

## 2019-10-03 ENCOUNTER — TELEPHONE (OUTPATIENT)
Dept: ALLERGY | Facility: CLINIC | Age: 54
End: 2019-10-03

## 2019-10-03 NOTE — TELEPHONE ENCOUNTER
I called her on the telephone at 3:59 p.m. on Thursday October 3, 2019.  She had sent a message on October 1.  When I called today I did not reach her and did not speak with her.  I did leave a message on the answering system of her telephone stating that I would try calling her tomorrow.

## 2019-10-04 ENCOUNTER — TELEPHONE (OUTPATIENT)
Dept: ALLERGY | Facility: CLINIC | Age: 54
End: 2019-10-04

## 2019-10-04 NOTE — TELEPHONE ENCOUNTER
She had communicated on October 1, 2019.  I called her on Friday afternoon October 4, 2019.  We finished our conversation at 4:28 p.m..  I made her aware of the fact that I was not in the clinic on September 30, October 1 or October 2.    I reviewed with her the results of the laboratory tests which I had ordered in September.    I recommended that in approximately 6-9 months the IgG subclass levels be measured again.    She has developed laryngitis in the past 2 or 3 days.  She stated that her cough is productive of green sputum.    I told her if her laryngitis does not begin to improve over the upcoming weekend that she contact  in ENT and make him aware of her troublesome laryngitis.    I made her aware of my long term later this year.  I told her Dr. Vicki Fletcher is working in the department.    This note was dictated using voice recognition software and may contain errors.

## 2019-10-06 ENCOUNTER — PATIENT MESSAGE (OUTPATIENT)
Dept: ALLERGY | Facility: CLINIC | Age: 54
End: 2019-10-06

## 2019-10-06 ENCOUNTER — PATIENT MESSAGE (OUTPATIENT)
Dept: OTOLARYNGOLOGY | Facility: CLINIC | Age: 54
End: 2019-10-06

## 2019-10-07 ENCOUNTER — PATIENT MESSAGE (OUTPATIENT)
Dept: INTERNAL MEDICINE | Facility: CLINIC | Age: 54
End: 2019-10-07

## 2019-10-08 ENCOUNTER — TELEPHONE (OUTPATIENT)
Dept: ALLERGY | Facility: CLINIC | Age: 54
End: 2019-10-08

## 2019-10-08 ENCOUNTER — OFFICE VISIT (OUTPATIENT)
Dept: ALLERGY | Facility: CLINIC | Age: 54
End: 2019-10-08
Payer: COMMERCIAL

## 2019-10-08 ENCOUNTER — HOSPITAL ENCOUNTER (OUTPATIENT)
Dept: RADIOLOGY | Facility: HOSPITAL | Age: 54
Discharge: HOME | End: 2019-10-08
Attending: ALLERGY & IMMUNOLOGY
Payer: COMMERCIAL

## 2019-10-08 VITALS
DIASTOLIC BLOOD PRESSURE: 64 MMHG | HEART RATE: 78 BPM | SYSTOLIC BLOOD PRESSURE: 100 MMHG | WEIGHT: 199.5 LBS | TEMPERATURE: 98 F | BODY MASS INDEX: 31.25 KG/M2 | RESPIRATION RATE: 15 BRPM

## 2019-10-08 DIAGNOSIS — R05.9 COUGH: Primary | ICD-10-CM

## 2019-10-08 DIAGNOSIS — M79.7 FIBROMYALGIA: ICD-10-CM

## 2019-10-08 DIAGNOSIS — R05.9 COUGH: ICD-10-CM

## 2019-10-08 DIAGNOSIS — J32.4 CHRONIC PANSINUSITIS: ICD-10-CM

## 2019-10-08 DIAGNOSIS — G47.33 OSA (OBSTRUCTIVE SLEEP APNEA): ICD-10-CM

## 2019-10-08 PROCEDURE — 3078F PR MOST RECENT DIASTOLIC BLOOD PRESSURE < 80 MM HG: ICD-10-PCS | Mod: CPTII,S$GLB,, | Performed by: ALLERGY & IMMUNOLOGY

## 2019-10-08 PROCEDURE — 3008F BODY MASS INDEX DOCD: CPT | Mod: CPTII,S$GLB,, | Performed by: ALLERGY & IMMUNOLOGY

## 2019-10-08 PROCEDURE — 71046 X-RAY EXAM CHEST 2 VIEWS: CPT | Mod: 26,,, | Performed by: RADIOLOGY

## 2019-10-08 PROCEDURE — 3074F PR MOST RECENT SYSTOLIC BLOOD PRESSURE < 130 MM HG: ICD-10-PCS | Mod: CPTII,S$GLB,, | Performed by: ALLERGY & IMMUNOLOGY

## 2019-10-08 PROCEDURE — 99215 OFFICE O/P EST HI 40 MIN: CPT | Mod: S$GLB,,, | Performed by: ALLERGY & IMMUNOLOGY

## 2019-10-08 PROCEDURE — 3074F SYST BP LT 130 MM HG: CPT | Mod: CPTII,S$GLB,, | Performed by: ALLERGY & IMMUNOLOGY

## 2019-10-08 PROCEDURE — 3078F DIAST BP <80 MM HG: CPT | Mod: CPTII,S$GLB,, | Performed by: ALLERGY & IMMUNOLOGY

## 2019-10-08 PROCEDURE — 99999 PR PBB SHADOW E&M-EST. PATIENT-LVL III: CPT | Mod: PBBFAC,,, | Performed by: ALLERGY & IMMUNOLOGY

## 2019-10-08 PROCEDURE — 99215 PR OFFICE/OUTPT VISIT, EST, LEVL V, 40-54 MIN: ICD-10-PCS | Mod: S$GLB,,, | Performed by: ALLERGY & IMMUNOLOGY

## 2019-10-08 PROCEDURE — 71046 X-RAY EXAM CHEST 2 VIEWS: CPT | Mod: TC

## 2019-10-08 PROCEDURE — 71046 XR CHEST PA AND LATERAL: ICD-10-PCS | Mod: 26,,, | Performed by: RADIOLOGY

## 2019-10-08 PROCEDURE — 3008F PR BODY MASS INDEX (BMI) DOCUMENTED: ICD-10-PCS | Mod: CPTII,S$GLB,, | Performed by: ALLERGY & IMMUNOLOGY

## 2019-10-08 PROCEDURE — 99999 PR PBB SHADOW E&M-EST. PATIENT-LVL III: ICD-10-PCS | Mod: PBBFAC,,, | Performed by: ALLERGY & IMMUNOLOGY

## 2019-10-08 RX ORDER — METHYLPREDNISOLONE 4 MG/1
TABLET ORAL
Qty: 66 TABLET | Refills: 0 | Status: SHIPPED | OUTPATIENT
Start: 2019-10-08 | End: 2019-10-14 | Stop reason: SDUPTHER

## 2019-10-08 NOTE — TELEPHONE ENCOUNTER
I called her on the telephone at 11:48 a.m. on October 8, 2019.  She had an 8:00 a.m. appointment with me today.  A chest x-ray was performed after her appointment with me.  I reviewed with her the radiologist's interpretation of her chest x-ray.  I told her the findings in the left lower lung could be consistent with mucus plugging some of her airways.  At this time, I have suggested she continue to take antibiotics as directed in begin taking Methylprednisolone as I directed at the time of her appointment today.  I reminded her to call tomorrow and report upon her status.    I told her the radiologist did recommend that a repeat chest x-ray be performed in the future to document resolution of the findings observed today.

## 2019-10-08 NOTE — PROGRESS NOTES
Chief complaint:  Cough    This note was dictated using voice recognition software and may contain errors.    History:    She had an 8:00 a.m. appointment on Tuesday, October 8, 2019.    Please refer to my previous notes.  Information in her medical record regarding her past medical history, family history, and social history, was reviewed and updated today.  Significant addition see if any are as noted below.  I spoke with her last on Friday October 4, 2019.  She stated that her laryngitis persists.  She stated yesterday she was experiencing a great deal of coughing.    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    She believes on Saturday October 5, 2019 she may have experienced some fever.    She stated during the past week she required the use of antacids upon about 2 occasions for relief of heartburn discomfort.    She stated that she hurts all over.  She stated that she does have fibromyalgia.    She stated she is coughing up mucus which is green and yellow in color.  She stated she is doing nasal irrigations twice a day as directed.    She stated last night she was awake most of the night due to coughing.  As result of the coughing she was not able to use her CPAP therapy for treatment of her obstructive sleep apnea.    Review of systems:  See above    Exam:    In general she is in no distress.  She is alert oriented well-developed in good mood and attentive  Gait steady  Skin no rash noted  Head no swelling noted  Eyes scleral white conjunctiva pink  Nose patent no polyp seen  Mouth no inflammation or exudate her swelling noted of the tongue lips or in the throat.  No thrush noted.  No vesicles noted  Ears not inflamed tympanic membranes not inflamed  Neck no masses or thyromegaly noted  Lymph nodes no significant cervical or epitrochlear lymphadenopathy noted  Lungs no wheezing heard.  No abnormal sounds heard.  Clear to  auscultation  Heart regular rhythm no murmurs heard  Extremities no swelling or inflammation of the hands legs noted    Impression:    1.  Cough, multifactorial in origin  2.  Laryngitis, exact cause uncertain, perhaps multifactorial in origin  3.  Obstructive sleep apnea  4.  Fibromyalgia  5.  Crohn's disease  6.  Sinusitis status post recent sinus surgery  7.  Multiple other health concerns as noted in her medical record    Assessment and plan:    Her appointment was 40 min in duration spent entirely in face-to-face contact.  More than 50% of the visit was spent in counseling and coordination of care.    While she was in the exam room today I called the pharmacy and inquired as to the ability methylprednisolone 4 mg tablets.  The medication was available to be prescribed.    I recommended that a chest x-ray be performed.  This is agreeable with her.  Arrangements were made to have the x-ray performed after her appointment with me this morning.  When the results are available to review with her I will do so.    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  I reviewed with her that laryngitis may occur in association with the development of a viral respiratory tract infectious agent.  I reviewed with her that GE reflux may provoke respiratory symptoms in some individuals including upper and lower respiratory tract symptoms.  I once again emphasized the importance of keeping her GE reflux under good control.    I have suggested that she take methylprednisolone 4 mg as an anti-inflammatory agent.  This is agreeable with her.  Potential side effects were reviewed.  In the past when she has taken prednisone she has tolerated it.  I recommended that she take Methylprednisolone 4 mg tablets, 4 pills a soon as possible today, 2 pills at 3:00 p.m. and 2 pills at 7:00 p.m. today.  I recommended that she  take 2 pills on the morning of October 9.  I requested she call tomorrow morning and report upon her status.  Additional recommendations regarding use of Methylprednisolone will be made tomorrow based upon her status.  At her request a prescription was transmitted to the pharmacy for methylprednisolone 4 mg, 66 pills with no refills.    She will continue to take other medications as directed.    She was given the office phone number.  She was given hand written directions regarding the use of Methylprednisolone.  Should she have additional questions or concerns she was instructed to call.    I told her I appreciated her frustration with her troublesome symptoms.  I told her it is my hope over the next 4-7 days she will begin to experience improvement.  Additional recommendations will be made based upon her clinical course and her response or lack of response to treatment suggestions made today.

## 2019-10-09 ENCOUNTER — TELEPHONE (OUTPATIENT)
Dept: ALLERGY | Facility: CLINIC | Age: 54
End: 2019-10-09

## 2019-10-09 ENCOUNTER — PATIENT MESSAGE (OUTPATIENT)
Dept: ALLERGY | Facility: CLINIC | Age: 54
End: 2019-10-09

## 2019-10-09 NOTE — TELEPHONE ENCOUNTER
She sent a message this morning as requested yesterday at the time of her appointment.  I called her on the telephone and spoke with her.  We completed our conversation at 11:15 a.m. on Wednesday October 9.    This morning she took methylprednisolone 4 mg 2 pills about 9:30 a.m..  She stated she is starting to cough up some yellow sputum.  She did experience a significant amount of coughing last evening.    I recommended that she take Methylprednisolone, 4 mg tablets, 6 pills at 3:00 p.m. today and an additional 2 pills at 7:00 p.m..  On the morning of Thursday October 10 she will take 2 pills up Medrol.  I requested she sent a message tomorrow morning so that we could review her status.    This note was dictated using voice recognition software and may contain errors.

## 2019-10-10 ENCOUNTER — TELEPHONE (OUTPATIENT)
Dept: ALLERGY | Facility: CLINIC | Age: 54
End: 2019-10-10

## 2019-10-10 ENCOUNTER — PATIENT MESSAGE (OUTPATIENT)
Dept: ALLERGY | Facility: CLINIC | Age: 54
End: 2019-10-10

## 2019-10-10 NOTE — TELEPHONE ENCOUNTER
I called her on the telephone on Thursday October 10, 2019.  We completed our conversation at 3:10 p.m..  I had called her at 9:58 a.m. and 2:15 p.m. and did not reach her when I called at those times.    She stated that she is feeling some improvement.  She took Medrol 4 mg 2 pills this morning.  She stated she still coughing up a significant amount of sputum.  I recommended that she take 8 Medrol pills at 3:00 p.m. today.  I recommended she take 2 Medrol pills at 8:00 p.m. today.    On Friday morning, October 11, I recommended she take Medrol 4 mg 2 pills.  I requested she call tomorrow and report upon her status.    We discussed the fact that her chest x-ray will need to be repeated in the future.    She stated she is experiencing less laryngitis today.    This note was dictated using voice recognition software and may contain errors.

## 2019-10-11 ENCOUNTER — TELEPHONE (OUTPATIENT)
Dept: ALLERGY | Facility: CLINIC | Age: 54
End: 2019-10-11

## 2019-10-11 ENCOUNTER — PATIENT MESSAGE (OUTPATIENT)
Dept: ALLERGY | Facility: CLINIC | Age: 54
End: 2019-10-11

## 2019-10-11 NOTE — TELEPHONE ENCOUNTER
She sent a message today as requested.  I called her on the telephone and spoke with her.  We completed our conversation at 2:07 p.m. on Friday October 11, 2019.  She stated that she has improved in the past 24 hr.  She stated about 3:30 a.m. today she was awakened by coughing and coughed out a great deal of mucus that was clear to slightly yellow.  She did not cough out dark yellow mucus or green mucus.    This morning she took Medrol 4 mg, 2 pills.  I recommended 3:00 p.m. today she take Medrol 4 mg, 8 pills.  At 8:00 p.m. today she will take 2 Medrol pills.  On the morning Saturday October 12 she will take Medrol 4 mg, 2 pills.  I told her I will contact her tomorrow.  Additional recommendations regarding treatment will be made based upon her status.    This note was dictated using voice recognition software and may contain errors.

## 2019-10-12 ENCOUNTER — HOSPITAL ENCOUNTER (EMERGENCY)
Facility: HOSPITAL | Age: 54
Discharge: HOME | End: 2019-10-12
Attending: EMERGENCY MEDICINE
Payer: COMMERCIAL

## 2019-10-12 ENCOUNTER — TELEPHONE (OUTPATIENT)
Dept: ALLERGY | Facility: CLINIC | Age: 54
End: 2019-10-12

## 2019-10-12 ENCOUNTER — OFFICE VISIT (OUTPATIENT)
Dept: URGENT CARE | Facility: CLINIC | Age: 54
End: 2019-10-12
Payer: COMMERCIAL

## 2019-10-12 VITALS
HEART RATE: 69 BPM | WEIGHT: 203.69 LBS | OXYGEN SATURATION: 100 % | RESPIRATION RATE: 18 BRPM | SYSTOLIC BLOOD PRESSURE: 136 MMHG | BODY MASS INDEX: 31.97 KG/M2 | TEMPERATURE: 98 F | HEIGHT: 67 IN | DIASTOLIC BLOOD PRESSURE: 87 MMHG

## 2019-10-12 VITALS
WEIGHT: 203.94 LBS | TEMPERATURE: 98 F | BODY MASS INDEX: 31.94 KG/M2 | DIASTOLIC BLOOD PRESSURE: 78 MMHG | SYSTOLIC BLOOD PRESSURE: 130 MMHG | RESPIRATION RATE: 18 BRPM

## 2019-10-12 DIAGNOSIS — R06.2 WHEEZING: Primary | ICD-10-CM

## 2019-10-12 DIAGNOSIS — J40 BRONCHITIS: Primary | ICD-10-CM

## 2019-10-12 DIAGNOSIS — I10 ELEVATED BLOOD PRESSURE READING WITH DIAGNOSIS OF HYPERTENSION: ICD-10-CM

## 2019-10-12 DIAGNOSIS — R05.9 COUGH: ICD-10-CM

## 2019-10-12 DIAGNOSIS — J32.4 CHRONIC PANSINUSITIS: ICD-10-CM

## 2019-10-12 PROCEDURE — 3078F DIAST BP <80 MM HG: CPT | Mod: CPTII,S$GLB,, | Performed by: NURSE PRACTITIONER

## 2019-10-12 PROCEDURE — 3075F SYST BP GE 130 - 139MM HG: CPT | Mod: CPTII,S$GLB,, | Performed by: NURSE PRACTITIONER

## 2019-10-12 PROCEDURE — 3008F BODY MASS INDEX DOCD: CPT | Mod: CPTII,S$GLB,, | Performed by: NURSE PRACTITIONER

## 2019-10-12 PROCEDURE — 3075F PR MOST RECENT SYSTOLIC BLOOD PRESS GE 130-139MM HG: ICD-10-PCS | Mod: CPTII,S$GLB,, | Performed by: NURSE PRACTITIONER

## 2019-10-12 PROCEDURE — 3008F PR BODY MASS INDEX (BMI) DOCUMENTED: ICD-10-PCS | Mod: CPTII,S$GLB,, | Performed by: NURSE PRACTITIONER

## 2019-10-12 PROCEDURE — 99214 OFFICE O/P EST MOD 30 MIN: CPT | Mod: S$GLB,,, | Performed by: NURSE PRACTITIONER

## 2019-10-12 PROCEDURE — 99999 PR PBB SHADOW E&M-EST. PATIENT-LVL II: CPT | Mod: PBBFAC,,, | Performed by: NURSE PRACTITIONER

## 2019-10-12 PROCEDURE — 3078F PR MOST RECENT DIASTOLIC BLOOD PRESSURE < 80 MM HG: ICD-10-PCS | Mod: CPTII,S$GLB,, | Performed by: NURSE PRACTITIONER

## 2019-10-12 PROCEDURE — 99214 PR OFFICE/OUTPT VISIT, EST, LEVL IV, 30-39 MIN: ICD-10-PCS | Mod: S$GLB,,, | Performed by: NURSE PRACTITIONER

## 2019-10-12 PROCEDURE — 99283 EMERGENCY DEPT VISIT LOW MDM: CPT | Mod: 25

## 2019-10-12 PROCEDURE — 99999 PR PBB SHADOW E&M-EST. PATIENT-LVL II: ICD-10-PCS | Mod: PBBFAC,,, | Performed by: NURSE PRACTITIONER

## 2019-10-12 RX ORDER — BENZONATATE 200 MG/1
200 CAPSULE ORAL 3 TIMES DAILY PRN
Qty: 30 CAPSULE | Refills: 0 | Status: SHIPPED | OUTPATIENT
Start: 2019-10-12 | End: 2019-10-22

## 2019-10-12 NOTE — ED PROVIDER NOTES
SCRIBE #1 NOTE: I, Sarthak Mendieta, am scribing for, and in the presence of, Kacie Wick PA-C. I have scribed the entire note.       History     Chief Complaint   Patient presents with    Cough     was told by MD that she may be developing pnuemonia, currently taking steriods and antibiotics.     Review of patient's allergies indicates:  No Known Allergies      History of Present Illness     HPI    10/12/2019, 3:53 PM  History obtained from the patient      History of Present Illness: Jyalin Murguia is a 54 y.o. female patient with a PMHx of HTN, HLD, allergic rhinitis, asthma, and obstructive sleep apnea who is s/p sinus surgery 6 weeks PTA who presents to the Emergency Department for evaluation of cough which onset gradually 4 days PTA. Pt states that she's currently taking steroids and abx, and she was sent after a chest x-ray on Tuesday looked like the beginnings of PNA. Pt states the mucus is starting to look thicker and yellow. Symptoms are intermittent and moderate in severity. No mitigating or exacerbating factors reported. Associated sxs include left-sided lower rib pain when coughing. Patient denies any fever, rhinorrhea, SOB, diaphoresis, palpitations, extremity weakness, leg swelling, dizziness, N/V, and all other sxs at this time. Prior Tx includes breathing treatments with minimal sx improvement. No further complaints or concerns at this time.         Arrival mode: Personal vehicle    PCP: Esthela Hu MD        Past Medical History:  Past Medical History:   Diagnosis Date    Allergic rhinitis     Asthma     Chronic pansinusitis     Crohn's disease     Ileal involvement, previously on Remicade, Asacol, Prednisone    Fibromyalgia     Hyperlipidemia     Hypertension     Migraine     Obstructive sleep apnea     CPAP at night    Sciatica        Past Surgical History:  Past Surgical History:   Procedure Laterality Date    BLADDER SURGERY      sling was created by her muscles       SECTION      COLONOSCOPY N/A 2017    Procedure: COLONOSCOPY;  Surgeon: Kin Dyer MD;  Location: [facility] ENDO;  Service: Endoscopy;  Laterality: N/A;    COLONOSCOPY N/A 3/27/2018    Procedure: COLONOSCOPY;  Surgeon: Kyra Vallecillo MD;  Location: [facility] ENDO;  Service: Endoscopy;  Laterality: N/A;    FINGER SURGERY      joint relpacement, left hand index finger    FUNCTIONAL ENDOSCOPIC SINUS SURGERY (FESS) USING COMPUTER-ASSISTED NAVIGATION Bilateral 2019    Procedure: FESS, USING COMPUTER-ASSISTED NAVIGATION;  Surgeon: Manish Shaffer MD;  Location: [facility] OR;  Service: ENT;  Laterality: Bilateral;    HYSTERECTOMY      WISDOM TOOTH EXTRACTION           Family History:  Family History   Problem Relation Age of Onset    Hypertension Mother     Kidney disease Father 64        ESRD on HD    Scleroderma Father     Hypertension Brother     Cancer Paternal Grandmother 70        colon    Heart attack Maternal Grandmother     COPD Maternal Grandmother 72       Social History:  Social History     Tobacco Use    Smoking status: Never Smoker    Smokeless tobacco: Never Used   Substance and Sexual Activity    Alcohol use: No     Frequency: Never     Drinks per session: Patient refused     Binge frequency: Never    Drug use: No    Sexual activity: Yes     Partners: Male        Review of Systems     Review of Systems   Constitutional: Negative for diaphoresis and fever.   HENT: Negative for congestion and rhinorrhea.    Respiratory: Positive for cough (productive; mucus is a little thick and starting to look yellow). Negative for shortness of breath.    Cardiovascular: Negative for chest pain, palpitations and leg swelling.   Gastrointestinal: Negative for nausea and vomiting.   Genitourinary: Negative for dysuria and frequency.   Musculoskeletal: Positive for myalgias (left lower rib pain with coughing). Negative for back pain.   Skin: Negative for rash and wound.   Neurological: Negative  "for dizziness and weakness.      Physical Exam     Initial Vitals [10/12/19 1548]   BP Pulse Resp Temp SpO2   136/87 69 18 97.7 °F (36.5 °C) 100 %      MAP       --          Physical Exam  Nursing Notes and Vital Signs Reviewed.  Constitutional: Patient is in no acute distress. Well-developed and well-nourished.  Head: Atraumatic. Normocephalic.  Eyes: PERRL. EOM intact. Conjunctivae are not pale. No scleral icterus.  ENT: Mucous membranes are moist. Oropharynx is clear and symmetric.    Neck: Supple. Full ROM. No lymphadenopathy.  Cardiovascular: Regular rate. Regular rhythm. No murmurs, rubs, or gallops. Distal pulses are 2+ and symmetric.  Pulmonary/Chest: No respiratory distress. Clear to auscultation bilaterally. No wheezing or rales. Cough noted.  Left lower rib pain reproduced with twisting of torso.   Abdominal: Soft and non-distended.  There is no tenderness.  No rebound, guarding, or rigidity. Good bowel sounds.  Genitourinary: No CVA tenderness  Musculoskeletal: Moves all extremities. No obvious deformities. No edema. No calf tenderness.  Skin: Warm and dry.  Neurological:  Alert, awake, and appropriate.  Normal speech.  No acute focal neurological deficits are appreciated.  Psychiatric: Normal affect. Good eye contact. Appropriate in content.     ED Course   Procedures  ED Vital Signs:  Vitals:    10/12/19 1548   BP: 136/87   Pulse: 69   Resp: 18   Temp: 97.7 °F (36.5 °C)   TempSrc: Oral   SpO2: 100%   Weight: 92.4 kg (203 lb 11.3 oz)   Height: 5' 7" (1.702 m)       Abnormal Lab Results:  Labs Reviewed - No data to display     All Lab Results:  None    Imaging Results:  Imaging Results          X-Ray Chest PA And Lateral (Final result)  Result time 10/12/19 16:16:31    Final result by Partha Chang MD (10/12/19 16:16:31)                 Impression:      Negative chest x-ray.      Electronically signed by: Partha Chang MD  Date:    10/12/2019  Time:    16:16             Narrative:    EXAMINATION:  XR " CHEST PA AND LATERAL    CLINICAL HISTORY:  Cough    FINDINGS:  Comparison study 10/08/2019.  Normal size heart.  No congestion.  The lungs are clear with interval clearing of the left lung base opacity.                                        The Emergency Provider reviewed the vital signs and test results, which are outlined above.     ED Discussion     4:49 PM: Reassessed pt at this time. Discussed with pt all pertinent ED information and results. Discussed pt dx and plan of tx. Gave pt all f/u and return to the ED instructions. All questions and concerns were addressed at this time. Pt expresses understanding of information and instructions, and is comfortable with plan to discharge. Pt is stable for discharge.    I discussed with patient and/or family/caretaker that evaluation in the ED does not suggest any emergent or life threatening medical conditions requiring immediate intervention beyond what was provided in the ED, and I believe patient is safe for discharge.  Regardless, an unremarkable evaluation in the ED does not preclude the development or presence of a serious of life threatening condition. As such, patient was instructed to return immediately for any worsening or change in current symptoms.       Medical Decision Making:   Clinical Tests:   Radiological Study: Ordered and Reviewed           ED Medication(s):  Medications - No data to display    Discharge Medication List as of 10/12/2019  4:53 PM      START taking these medications    Details   benzonatate (TESSALON) 200 MG capsule Take 1 capsule (200 mg total) by mouth 3 (three) times daily as needed for Cough., Starting Sat 10/12/2019, Until Tue 10/22/2019, Print             Follow-up Information     Esthela Hu MD. Schedule an appointment as soon as possible for a visit in 3 days.    Specialty:  Family Medicine  Contact information:  [id number]  [address]  [facility] [address] [phone]                       Scribe Attestation:    Scribe #1: I performed the above scribed service and the documentation accurately describes the services I performed. I attest to the accuracy of the note.     Attending:   Physician Attestation Statement for Scribe #1: I, Kacie Cameron PA-C, personally performed the services described in this documentation, as scribed by Sarthak Mendieta, in my presence, and it is both accurate and complete.           Clinical Impression       ICD-10-CM ICD-9-CM   1. Bronchitis J40 490   2. Cough R05 786.2   3. Elevated blood pressure reading with diagnosis of hypertension I10 401.9   4. Chronic pansinusitis J32.4 473.8       Disposition:   Disposition: Discharged  Condition: Stable         Kacie Cameron PA-C  10/12/19 2885

## 2019-10-12 NOTE — PROGRESS NOTES
"Subjective:       Patient ID: Jaylin Murguia is a 54 y.o. female.    Chief Complaint: Pneumonia    Patient is presenting as a follow-up to pneumonia. She reports that " she feels worse, is having left sided chest pain when breathing and productive cough has increased". Low grade temp. Was instructed to " have another  X-ray for comparison. On Bactimr and medrol dose pack.    Review of Systems   Constitutional: Positive for activity change, appetite change, chills, diaphoresis, fatigue and fever.   HENT: Positive for congestion, postnasal drip and rhinorrhea.    Respiratory: Positive for cough, chest tightness and shortness of breath.    Cardiovascular: Negative.    Psychiatric/Behavioral: Negative.        Objective:      /78 (BP Location: Left arm, Patient Position: Sitting, BP Method: Large (Manual))   Temp 98.4 °F (36.9 °C) (Tympanic)   Resp 18   Wt 92.5 kg (203 lb 14.8 oz)   BMI 31.94 kg/m²   Physical Exam   Constitutional: She appears distressed.   HENT:   Head: Normocephalic and atraumatic.   Cardiovascular: Normal rate, regular rhythm, normal heart sounds and intact distal pulses.   Pulmonary/Chest: She has wheezes.   Skin: She is diaphoretic.   Nursing note and vitals reviewed.      Assessment:       1. Wheezing        Plan:       Wheezing    Patient was sent to ED for additional evaluation. A chest x-ray in the urgent care would be be read until Monday. Patient needs repeat x-ray with reading today.   "

## 2019-10-13 ENCOUNTER — TELEPHONE (OUTPATIENT)
Dept: ALLERGY | Facility: CLINIC | Age: 54
End: 2019-10-13

## 2019-10-13 NOTE — TELEPHONE ENCOUNTER
I called her on the telephone on Sunday afternoon October 13, 2019 and spoke with her.  We completed our 24 minute telephone conversation at 4:13 p.m..    Please refer to notes in her medical record.  She did go to urgent care yesterday afternoon and it was recommended that she go to the emergency room which she did.  A chest x-ray was performed yesterday when she was at the emergency room.  The chest x-ray revealed improvement in the appearance her lower left lung.    She was discharged from the emergency room.  Last night and today she continues to experience coughing.  Last night she made the decision to reinstitute Breo.  It had been recommended that she stop using that medicine.  She has also been nebulizing bronchodilator on a regular basis.  It has been of benefit.    Today she stated the mucus she is coughing out is not as yellow and more clear in appearance.  She has not had any fever.    She did take Medrol 4 mg 2 pills this morning.  I recommended she take 6 pills this afternoon and 2 pills at 8:00 p.m..  On Monday morning October 14 she will take Medrol 4 mg 2 pills.  She stated she is going to need a new prescription for Medrol.    In the past she has seen Elizabeth Lejeune, NP, in Pulmonary.  I suggested she make Ms. Lejeune and Dr. Hu aware of her ongoing symptoms.  She has been taking Bactrim for treatment of her sinusitis.  She has improved some since her appointment with me on Tuesday, October 8.  I did not recommend treatment with additional antibiotics.  I did have her institute treatment with Medrol.    I reviewed with her sometimes viral illnesses may require intensive and prolonged treatment to bring about improvement.  I told her it would be important to also acknowledge the possibility of a mycoplasma respiratory infection which could cause some of the symptoms she has been experiencing.  I told her in order to treat a possible mycoplasma respiratory infection, reassessment of her  antibiotic therapy may need to be considered.    When I contact her tomorrow, Monday, October 14 to inquire of her status, I will issue a new prescription for Methylprednisolone 4 mg tablets.    This note was dictated using voice recognition software and may contain errors.

## 2019-10-14 ENCOUNTER — TELEPHONE (OUTPATIENT)
Dept: ALLERGY | Facility: CLINIC | Age: 54
End: 2019-10-14

## 2019-10-14 DIAGNOSIS — J40 BRONCHITIS: ICD-10-CM

## 2019-10-14 DIAGNOSIS — J32.8 OTHER CHRONIC SINUSITIS: ICD-10-CM

## 2019-10-14 DIAGNOSIS — R05.9 COUGH: Primary | ICD-10-CM

## 2019-10-14 RX ORDER — METHYLPREDNISOLONE 4 MG/1
TABLET ORAL
Qty: 100 TABLET | Refills: 0 | Status: SHIPPED | OUTPATIENT
Start: 2019-10-14 | End: 2020-01-28

## 2019-10-14 NOTE — TELEPHONE ENCOUNTER
I finished my 2nd telephone conversation with her this morning at 11:37 a.m. on Monday October 14, 2019.  After conclude in my 1st telephone conversation with her I called her [facility] pharmacy to be certain they had an adequate number of methylprednisolone 4 mg tablets to complete the prescription which I ordered.  This was the case.  A prescription was transmitted to her pharmacy for methylprednisolone 4 mg, 100 pills with no refills.    She had already taken 2 pills as directed this morning  Please refer to my recent notes including my instructions from Sunday, October 13.    I recommended that she take methylprednisolone 4 mg, 6 pills at 3:00 p.m. Today, 2 pills at 8:00 p.m., and 2 pills on the morning Tuesday October 15.    She stated this morning she feels about the same she did yesterday.  She stated she is not coughing up as much mucus.  The mucus she is expectorating is clear.    We reviewed her clinical course since her appointment with me on Tuesday October 8, 2019.  I recommended she obtain pulmonary consultation.  This is agreeable with her.  She stated she will investigate the scheduling of an appointment with Pulmonary today or in the immediate future, if not today.    I told her I will contact her tomorrow.    This note was dictated using voice recognition software and may contain errors.

## 2019-10-15 ENCOUNTER — TELEPHONE (OUTPATIENT)
Dept: ALLERGY | Facility: CLINIC | Age: 54
End: 2019-10-15

## 2019-10-16 ENCOUNTER — PATIENT MESSAGE (OUTPATIENT)
Dept: ALLERGY | Facility: CLINIC | Age: 54
End: 2019-10-16

## 2019-10-16 ENCOUNTER — OFFICE VISIT (OUTPATIENT)
Dept: SLEEP MEDICINE | Facility: CLINIC | Age: 54
End: 2019-10-16
Payer: COMMERCIAL

## 2019-10-16 ENCOUNTER — PATIENT MESSAGE (OUTPATIENT)
Dept: PULMONOLOGY | Facility: CLINIC | Age: 54
End: 2019-10-16

## 2019-10-16 ENCOUNTER — TELEPHONE (OUTPATIENT)
Dept: ALLERGY | Facility: CLINIC | Age: 54
End: 2019-10-16

## 2019-10-16 VITALS
WEIGHT: 198.63 LBS | RESPIRATION RATE: 17 BRPM | DIASTOLIC BLOOD PRESSURE: 84 MMHG | HEART RATE: 75 BPM | HEIGHT: 67 IN | SYSTOLIC BLOOD PRESSURE: 126 MMHG | OXYGEN SATURATION: 97 % | BODY MASS INDEX: 31.18 KG/M2

## 2019-10-16 DIAGNOSIS — J45.40 MODERATE PERSISTENT ASTHMA WITHOUT COMPLICATION: ICD-10-CM

## 2019-10-16 DIAGNOSIS — J32.4 CHRONIC PANSINUSITIS: ICD-10-CM

## 2019-10-16 DIAGNOSIS — J40 CHRONIC SINUSITIS WITH RECURRENT BRONCHITIS: Primary | ICD-10-CM

## 2019-10-16 DIAGNOSIS — J32.9 CHRONIC SINUSITIS WITH RECURRENT BRONCHITIS: Primary | ICD-10-CM

## 2019-10-16 DIAGNOSIS — R06.2 WHEEZING: ICD-10-CM

## 2019-10-16 PROCEDURE — 3079F DIAST BP 80-89 MM HG: CPT | Mod: CPTII,S$GLB,, | Performed by: NURSE PRACTITIONER

## 2019-10-16 PROCEDURE — 99214 OFFICE O/P EST MOD 30 MIN: CPT | Mod: S$GLB,,, | Performed by: NURSE PRACTITIONER

## 2019-10-16 PROCEDURE — 3008F PR BODY MASS INDEX (BMI) DOCUMENTED: ICD-10-PCS | Mod: CPTII,S$GLB,, | Performed by: NURSE PRACTITIONER

## 2019-10-16 PROCEDURE — 3008F BODY MASS INDEX DOCD: CPT | Mod: CPTII,S$GLB,, | Performed by: NURSE PRACTITIONER

## 2019-10-16 PROCEDURE — 3079F PR MOST RECENT DIASTOLIC BLOOD PRESSURE 80-89 MM HG: ICD-10-PCS | Mod: CPTII,S$GLB,, | Performed by: NURSE PRACTITIONER

## 2019-10-16 PROCEDURE — 99999 PR PBB SHADOW E&M-EST. PATIENT-LVL III: ICD-10-PCS | Mod: PBBFAC,,, | Performed by: NURSE PRACTITIONER

## 2019-10-16 PROCEDURE — 99999 PR PBB SHADOW E&M-EST. PATIENT-LVL III: CPT | Mod: PBBFAC,,, | Performed by: NURSE PRACTITIONER

## 2019-10-16 PROCEDURE — 99214 PR OFFICE/OUTPT VISIT, EST, LEVL IV, 30-39 MIN: ICD-10-PCS | Mod: S$GLB,,, | Performed by: NURSE PRACTITIONER

## 2019-10-16 PROCEDURE — 3074F SYST BP LT 130 MM HG: CPT | Mod: CPTII,S$GLB,, | Performed by: NURSE PRACTITIONER

## 2019-10-16 PROCEDURE — 3074F PR MOST RECENT SYSTOLIC BLOOD PRESSURE < 130 MM HG: ICD-10-PCS | Mod: CPTII,S$GLB,, | Performed by: NURSE PRACTITIONER

## 2019-10-16 RX ORDER — PROMETHAZINE HYDROCHLORIDE AND DEXTROMETHORPHAN HYDROBROMIDE 6.25; 15 MG/5ML; MG/5ML
5 SYRUP ORAL
Qty: 180 ML | Refills: 1 | Status: SHIPPED | OUTPATIENT
Start: 2019-10-16 | End: 2019-10-26

## 2019-10-16 RX ORDER — FLUTICASONE FUROATE AND VILANTEROL 200; 25 UG/1; UG/1
1 POWDER RESPIRATORY (INHALATION) DAILY
Qty: 1 EACH | Refills: 11 | Status: SHIPPED | OUTPATIENT
Start: 2019-10-16 | End: 2020-09-18

## 2019-10-16 NOTE — TELEPHONE ENCOUNTER
Please refer to my previous notes.  I called her on the telephone on Tuesday October 15 2019.  I completed my telephone conversation with her at 7:31 p.m..    She did take Medrol 4 mg 2 pills this morning.  She took 5 pills this afternoon.    She stated that she continues to experience a great deal of coughing and is experiencing some chest wall discomfort.    She has an appointment tomorrow morning for evaluation in Pulmonary.    I told her I will contact her tomorrow.    This note was dictated using voice recognition software and may contain errors.

## 2019-10-16 NOTE — PROGRESS NOTES
"Subjective:      Patient ID: Jaylin Murguia is a 54 y.o. female.    Chief Complaint: Sleep Apnea    HPI  Patient that I see for sleep apnea presents for new problem.  She chronic sinus disease. She has pansinusitis with recent procedure 09/20/2019 with Dr. Shaffer. She had positive cultures and is also following with Dr. Florez. She has follow up with Dr. Shaffer tomorrow.   She has associated cough and wheezing. She was told she had asthma approx 10 yrs ago. Denies past pft. She has been on BREO since that time.   Chest x-ray on 10/08/2019 with possible start of development of infiltrate left base.  10/12/2019 clear.  She is following with Dr. Merrill who is managing her Medrol dose daily.         ASPERGILLUS NIGER   Many      Aerobic Bacterial Culture Abnormal    ACHROMOBACTER XYLOSOXIDANS SUBSP. DENTRIFICANS   Moderate        /84   Pulse 75   Resp 17   Ht 5' 7" (1.702 m)   Wt 90.1 kg (198 lb 10.2 oz)   SpO2 97%   BMI 31.11 kg/m²   Body mass index is 31.11 kg/m².    Review of Systems   Constitutional: Positive for fatigue.   HENT: Positive for postnasal drip and congestion.    Respiratory: Positive for cough, wheezing and dyspnea on extertion.    Cardiovascular: Negative.    Musculoskeletal: Negative.    Gastrointestinal: Negative.    Neurological: Negative.    Psychiatric/Behavioral: Negative.      Objective:      Physical Exam   Constitutional: She is oriented to person, place, and time. She appears well-developed and well-nourished.   HENT:   Head: Normocephalic and atraumatic.   Mouth/Throat: Oropharynx is clear and moist.   Neck: Normal range of motion. Neck supple.   Cardiovascular: Normal rate and regular rhythm. Exam reveals no gallop.   No murmur heard.  Pulmonary/Chest: Effort normal. She has wheezes.   Abdominal: Soft. She exhibits no mass. There is no tenderness.   Musculoskeletal: Normal range of motion. She exhibits no edema.   Neurological: She is alert and oriented to person, place, and " time.   Skin: Skin is warm and dry.   Psychiatric: She has a normal mood and affect.     Personal Diagnostic Review    Results for orders placed during the hospital encounter of 10/12/19   X-Ray Chest PA And Lateral    Narrative EXAMINATION:  XR CHEST PA AND LATERAL    CLINICAL HISTORY:  Cough    FINDINGS:  Comparison study 10/08/2019.  Normal size heart.  No congestion.  The lungs are clear with interval clearing of the left lung base opacity.      Impression Negative chest x-ray.      Electronically signed by: Partha Chang MD  Date:    10/12/2019  Time:    16:16         Assessment:       1. Chronic sinusitis with recurrent bronchitis    2. Moderate persistent asthma without complication    3. Chronic pansinusitis    4. Wheezing        Outpatient Encounter Medications as of 10/16/2019   Medication Sig Dispense Refill    amLODIPine (NORVASC) 5 MG tablet Take 1 tablet (5 mg total) by mouth once daily. 90 tablet 3    azelastine (ASTELIN) 137 mcg (0.1 %) nasal spray 1 spray (137 mcg total) by Nasal route 2 (two) times daily. 30 mL 11    benzonatate (TESSALON) 200 MG capsule Take 1 capsule (200 mg total) by mouth 3 (three) times daily as needed for Cough. 30 capsule 0    budesonide 1 mg/2 mL NbSp MIX CONTENTS OF 1 RESPULE WITH STERILE DILUENT PROVIDED. POUR INTO NASONEB CUP & PERFORM TREATMENT TWICE DAILY.  2    butalbital-acetaminophen-caffeine -40 mg (FIORICET, ESGIC) -40 mg per tablet Take 1 tab po q4 hrs prn headache 90 tablet 3    cetirizine (ZYRTEC) 5 MG chewable tablet Take 5 mg by mouth 2 (two) times daily.       doxycycline monohydrate 150 mg Cap MIX CONTENTS OF 1 CAPSULE WITH STERILE DILUENT PROVIDED. POUR INTO NASONEB CUP AND PERFORM TREATMENT TWICE DAILY  2    DULoxetine (CYMBALTA) 60 MG capsule Take 1 capsule (60 mg total) by mouth 2 (two) times daily. 180 capsule 3    eletriptan (RELPAX) 40 MG tablet Take 1 tablet (40 mg total) by mouth as needed. 12 tablet 3    estradiol (ESTRACE) 2  MG tablet Take 2 mg by mouth every evening.       fluticasone furoate-vilanterol (BREO ELLIPTA) 200-25 mcg/dose DsDv diskus inhaler Inhale 1 puff into the lungs once daily. 1 each 11    levalbuterol (XOPENEX) 1.25 mg/3 mL nebulizer solution Take 3 mLs (1.25 mg total) by nebulization every 4 (four) hours. Rescue 1 Box 11    losartan-hydrochlorothiazide 100-25 mg (HYZAAR) 100-25 mg per tablet Take 1 tablet by mouth once daily. 90 tablet 3    mesalamine (PENTASA) 250 mg CpSR Take 4 capsules (1,000 mg total) by mouth 4 (four) times daily. 1440 capsule 3    methylPREDNISolone (MEDROL) 4 MG Tab Take orally as directed.  Start October 14:  12 pills x 3 days, 8 x 3d, 6 x 3d, 4 x 3d, 2x 3d, 1 x 4d, stop. 100 tablet 0    montelukast (SINGULAIR) 10 mg tablet Take 1 tablet (10 mg total) by mouth every evening. 90 tablet 3    nortriptyline (PAMELOR) 25 MG capsule Take 2 capsules (50 mg total) by mouth once daily. 60 capsule 1    pantoprazole (PROTONIX) 40 MG tablet Take 1 tablet (40 mg total) by mouth every evening. 90 tablet 3    pregabalin (LYRICA) 150 MG capsule TAKE 1 CAPSULE (150 MG TOTAL) BY MOUTH 3 (THREE) TIMES DAILY. 270 capsule 1    promethazine-dextromethorphan (PROMETHAZINE-DM) 6.25-15 mg/5 mL Syrp Take 5 mLs by mouth every 6 to 8 hours as needed. 180 mL 1    propranolol (INNOPRAN XL) 80 mg 24 hr capsule Take 1 capsule (80 mg total) by mouth every evening. 90 capsule 3    rizatriptan (MAXALT) 10 MG tablet TAKE 1 TABLET BY MOUTH IF NEEDED FOR MIGRAINES MAX 2 TAB IN 24 HOURS 30 tablet 3    sulfamethoxazole-trimethoprim 800-160mg (BACTRIM DS) 800-160 mg Tab Take 1 tablet by mouth 2 (two) times daily. for 21 days 42 tablet 1    tiotropium bromide (SPIRIVA RESPIMAT) 1.25 mcg/actuation Mist Inhale 2 puffs into the lungs once daily. 4 g 11    triamcinolone acetonide 0.025% (KENALOG) 0.025 % cream Apply topically 2 (two) times daily. 453 g 3    VENTOLIN HFA 90 mcg/actuation inhaler INHALE 2 PUFFS INTO THE  LUNGS EVERY 4 TO 6 HOURS AS NEEDED FOR WHEEZING. 18 Inhaler 1    zolpidem (AMBIEN) 5 MG Tab TAKE 1 TABLET BY MOUTH EVERY DAY AT BEDTIME AS NEEDED 90 tablet 1     Facility-Administered Encounter Medications as of 10/16/2019   Medication Dose Route Frequency Provider Last Rate Last Dose    lactated ringers infusion   Intravenous Continuous Mary Leiva MD        lidocaine (PF) 10 mg/ml (1%) injection 10 mg  1 mL Intradermal Once Mary Leiva MD         No orders of the defined types were placed in this encounter.    Plan:   Recurring bronchitis secondary to chronic sinusitis.  Discussed with patient we can do a complete evaluation for asthma when she is feeling well.  At this time will manage her symptoms until her sinus problems have resolved.  Increased Breo dosage 200  Add Spiriva  Continue Medrol dosing daily with Dr. Meek  The Xopenex nebs and Ventolin as needed  Promethazine DM if needed for continuous cough  Follow-up in 1 week  Continue sinus regimen. Follow with Dr. Shaffer tomorrow.       Problem List Items Addressed This Visit        ENT    Chronic pansinusitis       Pulmonary    Moderate persistent asthma without complication      Other Visit Diagnoses     Chronic sinusitis with recurrent bronchitis    -  Primary    Wheezing

## 2019-10-16 NOTE — TELEPHONE ENCOUNTER
I finished my telephone conversation with her on Wednesday October 16, 2019 at 1:43 p.m..    She had an appointment in pulmonary this morning.  She saw Elizabeth Lejeune, NP.  She has suggested that she begin using Spiriva.  She recommended reassessment with her in 1 week.    She stated last evening about 10:00 p.m. she did take Medrol 4 mg, 2 pills.    She took 2 pills as directed this morning, October 16.  We have agreed she will take 5 Medrol pills about 3:00 p.m. today and an additional 2 pills about 9:00 p.m. or 10:00 p.m. today.    On the morning of October 17 she will take Medrol 4 mg, 2 pills.    She has an appointment for re-evaluation tomorrow, October 17 with .  We have agreed that we will reassess her status late tomorrow afternoon.    This note was dictated using voice recognition software and may contain errors

## 2019-10-17 ENCOUNTER — TELEPHONE (OUTPATIENT)
Dept: ALLERGY | Facility: CLINIC | Age: 54
End: 2019-10-17

## 2019-10-17 ENCOUNTER — OFFICE VISIT (OUTPATIENT)
Dept: OTOLARYNGOLOGY | Facility: CLINIC | Age: 54
End: 2019-10-17
Payer: COMMERCIAL

## 2019-10-17 ENCOUNTER — HOSPITAL ENCOUNTER (OUTPATIENT)
Dept: RADIOLOGY | Facility: HOSPITAL | Age: 54
Discharge: HOME | End: 2019-10-17
Attending: OTOLARYNGOLOGY
Payer: COMMERCIAL

## 2019-10-17 ENCOUNTER — TELEPHONE (OUTPATIENT)
Dept: OTOLARYNGOLOGY | Facility: CLINIC | Age: 54
End: 2019-10-17

## 2019-10-17 ENCOUNTER — PATIENT MESSAGE (OUTPATIENT)
Dept: ALLERGY | Facility: CLINIC | Age: 54
End: 2019-10-17

## 2019-10-17 VITALS — BODY MASS INDEX: 31.8 KG/M2 | TEMPERATURE: 98 F | HEIGHT: 67 IN | WEIGHT: 202.63 LBS

## 2019-10-17 DIAGNOSIS — R05.9 COUGH: Primary | ICD-10-CM

## 2019-10-17 DIAGNOSIS — J40 BRONCHITIS: Primary | ICD-10-CM

## 2019-10-17 DIAGNOSIS — R05.9 COUGH: ICD-10-CM

## 2019-10-17 PROCEDURE — 71046 X-RAY EXAM CHEST 2 VIEWS: CPT | Mod: 26,,, | Performed by: RADIOLOGY

## 2019-10-17 PROCEDURE — 99999 PR PBB SHADOW E&M-EST. PATIENT-LVL V: ICD-10-PCS | Mod: PBBFAC,,, | Performed by: OTOLARYNGOLOGY

## 2019-10-17 PROCEDURE — 99999 PR PBB SHADOW E&M-EST. PATIENT-LVL V: CPT | Mod: PBBFAC,,, | Performed by: OTOLARYNGOLOGY

## 2019-10-17 PROCEDURE — 71046 XR CHEST PA AND LATERAL: ICD-10-PCS | Mod: 26,,, | Performed by: RADIOLOGY

## 2019-10-17 PROCEDURE — 71046 X-RAY EXAM CHEST 2 VIEWS: CPT | Mod: TC

## 2019-10-17 PROCEDURE — 3008F BODY MASS INDEX DOCD: CPT | Mod: CPTII,S$GLB,, | Performed by: OTOLARYNGOLOGY

## 2019-10-17 PROCEDURE — 99214 PR OFFICE/OUTPT VISIT, EST, LEVL IV, 30-39 MIN: ICD-10-PCS | Mod: 24,25,S$GLB, | Performed by: OTOLARYNGOLOGY

## 2019-10-17 PROCEDURE — 31237 NSL/SINS NDSC SURG BX POLYPC: CPT | Mod: 79,50,S$GLB, | Performed by: OTOLARYNGOLOGY

## 2019-10-17 PROCEDURE — 3008F PR BODY MASS INDEX (BMI) DOCUMENTED: ICD-10-PCS | Mod: CPTII,S$GLB,, | Performed by: OTOLARYNGOLOGY

## 2019-10-17 PROCEDURE — 99214 OFFICE O/P EST MOD 30 MIN: CPT | Mod: 24,25,S$GLB, | Performed by: OTOLARYNGOLOGY

## 2019-10-17 PROCEDURE — 31237 PR NASAL/SINUS ENDOSCOPY,BX/RMV POLYP/DEBRID: ICD-10-PCS | Mod: 79,50,S$GLB, | Performed by: OTOLARYNGOLOGY

## 2019-10-17 RX ORDER — AZITHROMYCIN 250 MG/1
TABLET, FILM COATED ORAL
Qty: 6 TABLET | Refills: 0 | Status: SHIPPED | OUTPATIENT
Start: 2019-10-17 | End: 2019-10-22

## 2019-10-17 NOTE — TELEPHONE ENCOUNTER
I spoke with her on the telephone on October 17, 2019.  We completed our telephone conversation at 4:23 p.m..    She had an appointment with  today.  He performed an endoscopy of her upper airway.  Please refer to his note.    He ordered a chest x-ray.  Please refer to the report of the x-ray.  The lungs were reported to be clear.    She stated that she was not able to obtain Spiriva.  She was informed that her health insurance company informed her that the cost was too high .    She stated she continues to experience coughing.  This morning she stated she was coughing thick green mucus.    She took Medrol 4 mg 2 pills this morning.  She will take 5 pills this afternoon and 2 pills this evening.  On October 18 she will take 2 pills of Medrol in the morning.  I requested that she call tomorrow and report upon her status.  Additional recommendations will be made based upon her status tomorrow.    If she has developed a mycoplasma infection, this could be a significant concern in route would require treatment with an appropriate antibiotic for that organism.    In the past she has taken Zithromax and has tolerated.  I recommended that she begin Zithromax 250 mg, 1 Z-Elie.  Potential side effects were discussed.  A prescription for this medication was transmitted to her pharmacy.  She stated she will try to start taking this medicine this evening.    She stated she his head to obtain a new phone number.  Her new phone number is [phone].    This note was dictated using voice recognition software and may contain errors.

## 2019-10-17 NOTE — TELEPHONE ENCOUNTER
Called and left detailed message informing that pts xray is clear.        ----- Message from Manish Shaffer MD sent at 10/17/2019  3:50 PM CDT -----  Please let pt know her chest xray is clear. Thank you

## 2019-10-17 NOTE — PROGRESS NOTES
Subjective:   Patient: Jaylin Murguia [id number], :1965   Visit date:10/17/2019 8:35 AM    Chief Complaint:  Other (laryngitis)    HPI:  Jaylin is a 54 y.o. female who is here for follow-up after surgery:    Subjective: She was last here Sep 20.  Since surgery, she has been on Bactrim and Cipro PO.  I had prescribed zyvox but there was concern for a medication interaction. She was also on topical vancomycin/doxycycline/budesonide.  She had to stop the doxy due to severe burning.  She continued the vancomycin and budesonide topically but finished that later. We had significant problems getting cultures on her to grow but ultimately the did.  She had a severe fungal infection in addition to bacteria.  She has been on Bactrim.  She is having significant coughing and wheezing.  She was given an inhaler by pulmonary but has run into authorization issues from [place].  From a sinus perspective, she does feel a little better.     Surgery date: 19       RIGHT   -  right Total ethmoidectomy (CPT 24505)  -  right Maxillary antrostomy without removal of tissue (CPT 13725)     LEFT  -  left Total ethmoidectomy with sphenoid sinusotomy (CPT 88472)  -  left Maxillary antrostomy without removal of tissue (CPT 10932)      -  Sterotactic computer assisted (navigational) procedure; cranial, extradural (CPT 30423)  -  Septoplasty (CPT 17258)        Procedure in Detail/Findings:     Endoscopic Sinonasal Exam Findings at TIME OF SURGERY:  -     Sinuses examined: bilateral maxillary, bilateral ethmoid anterior/posterior and left sphenoid            The right sinus(es) have marked edema with polypoid changes            The left sinus(es) have marked edema with polypoid changes  -     Nasal secretions: purulent secretions bilaterally  -     Nasal septum: LEFT moderate deviation   -     Inferior turbinate: hypertrophy or edema (Mild) bilaterally  -     Middle turbinate: hypertrophy or edema (Moderate) on the left  -     Other  findings: - no mass or obstructive lesion -Technical Procedures Used: 58339       Pathology:  NA    Cultures:    Contains abnormal data CULTURE, AEROBIC  (SPECIFY SOURCE)   Order: [id number]   Status:  Final result   Visible to patient:  Yes (Patient Portal) Next appt:  10/24/2019 at 08:20 AM in Pulmonology (Elizabeth Lejeune, NP) Dx:  Chronic pansinusitis   Specimen Information: Sinus        Component 3wk ago   Aerobic Bacterial Culture Abnormal    ASPERGILLUS NIGER   Many     Aerobic Bacterial Culture Abnormal    ACHROMOBACTER XYLOSOXIDANS SUBSP. DENTRIFICANS   Moderate     Resulting Agency [facility]   Susceptibility      Achromobacter xylosoxidans subsp. dentrificans     CULTURE, AEROBIC  (SPECIFY SOURCE)     Amikacin >32 mcg/mL Resistant     Cefepime 16 mcg/mL Intermediate     Ceftriaxone 32 mcg/mL Intermediate     Ciprofloxacin 2 mcg/mL Intermediate     Gentamicin >8 mcg/mL Resistant     Levofloxacin <=2 mcg/mL Sensitive     Piperacillin/Tazo <=16 mcg/mL Sensitive     Tetracycline >8 mcg/mL Resistant     Tobramycin >8 mcg/mL Resistant     Trimeth/Sulfa <=2/38 mcg/mL Sensitive            Linear View                  Review of Systems:  -     Allergic/Immunologic: has No Known Allergies..  -     Constitutional: Current temp: 97.6 °F (36.4 °C) (Tympanic)    Her meds, allergies, medical, surgical, social & family histories were reviewed & updated:  -     She has a current medication list which includes the following prescription(s): amlodipine, azelastine, benzonatate, budesonide, butalbital-acetaminophen-caffeine -40 mg, cetirizine, doxycycline monohydrate, duloxetine, eletriptan, estradiol, fluticasone furoate-vilanterol, levalbuterol, losartan-hydrochlorothiazide 100-25 mg, mesalamine, methylprednisolone, montelukast, nortriptyline, pantoprazole, pregabalin, promethazine-dextromethorphan, propranolol, rizatriptan, sulfamethoxazole-trimethoprim 800-160mg, tiotropium bromide, triamcinolone acetonide 0.025%,  "ventolin hfa, and zolpidem, and the following Facility-Administered Medications: lactated ringers and lidocaine (pf) 10 mg/ml (1%).  -     She  has a past medical history of Allergic rhinitis, Asthma, Chronic pansinusitis, Crohn's disease, Fibromyalgia, Hyperlipidemia, Hypertension, Migraine, Obstructive sleep apnea, and Sciatica.   -     She does not have any pertinent problems on file.   -     She  has a past surgical history that includes Hysterectomy;  section; Quinn tooth extraction; Finger surgery; Bladder surgery; Colonoscopy (N/A, 2017); Colonoscopy (N/A, 3/27/2018); and Functional endoscopic sinus surgery (FESS) using computer-assisted navigation (Bilateral, 2019).  -     She  reports that she has never smoked. She has never used smokeless tobacco. She reports that she does not drink alcohol or use drugs.  -     Her family history includes COPD (age of onset: 72) in her maternal grandmother; Cancer (age of onset: 70) in her paternal grandmother; Heart attack in her maternal grandmother; Hypertension in her brother and mother; Kidney disease (age of onset: 64) in her father; Scleroderma in her father.  -     She has No Known Allergies.    Objective:     Physical Exam:  Vitals:  Temp 97.6 °F (36.4 °C) (Tympanic)   Ht 5' 7" (1.702 m)   Wt 91.9 kg (202 lb 9.6 oz)   BMI 31.73 kg/m²   General appearance:  Well developed, well nourished    Eyes:  Extraocular motions intact, PERRL    Communication:  no hoarseness, no dysphonia    Ears:  Otoscopy of external auditory canals and tympanic membranes was normal, clinical speech reception thresholds grossly intact, no mass/lesion of auricle.  Nose:  No masses/lesions of external nose, nasal mucosa, septum, and turbinates were within normal limits.  Mouth:  No mass/lesion of lips, teeth, gums, hard/soft palate, tongue, tonsils, or oropharynx.    Cardiovascular:  No pedal edema; Radial Pulses +2     Neck & Lymphatics:  No cervical lymphadenopathy, no " neck mass/crepitus/ asymmetry, trachea is midline, no thyroid enlargement/tenderness/mass.    Psych: Oriented x3,  Alert with normal mood and affect.     Respiration/Chest:  Symmetric expansion during respiration, normal respiratory effort.    Skin:  Warm and intact. No ulcerations of face, scalp, neck.                        Assessment & Plan:   Jaylin was seen today for other.    Diagnoses and all orders for this visit:    Cough  -     X-Ray Chest PA And Lateral; Future    Other orders  -     tiotropium bromide (SPIRIVA RESPIMAT) 1.25 mcg/actuation Mist; Inhale 2 puffs into the lungs once daily.      She does actually look better on endoscopy than last visit but still clearly struggling with infection.   Topical vori, budesonide, levofloxacin  1 month    Manish Shaffer MD       NASAL ENDOSCOPY WITH POLYPECTOMY &/OR DEBRIDEMENT  (cpt 86881)    Patient: Jaylin Murguia [id number], :1965  Procedure date:10/17/2019  Patient's medications, allergies, past medical, surgical, social and family histories were reviewed and updated as appropriate.  Chief Complaint:  Other (laryngitis)    HPI:  Jaylin is a 54 y.o. female with chronic sinusitis.    Procedure: Risks, benefits, and alternatives of the procedure were discussed with the patient, and the patient consented to the nasal endoscopy with debridement.  Anterior rhinoscopy was insufficient to explain patients symptoms.      The nasal cavity was sprayed with a topical decongestant and anesthetic . The endoscope was passed into each nostril and each nasal cavity was visualized.  On each side the nasal cavity, sinuses (if open), turbinates, and septum were examined with the findings described below. The turbinates, middle meatus, superior meatus and sphenoethmoidal recess was examined.  Crusting and polypoid material was removed with suction and straight non thru cut forceps.   At the end of the examination, the scope was removed. The patient tolerated the procedure well  with no complications.     Endoscopic Sinonasal Exam Findings:  -     Sinuses examined: bilateral maxillary and right ethmoid anterior            The right sinus(es): Maxillary with nearly normal mucosa.  Crusting and purulence present and removed.  Ethmoid cavity with normal mucosa and moderate crusting.             The left sinus(es): Moderate polypoid changes and purulence in maxillary   -     Nasal septum: No perforation.  Much better mucosa.  Dramatically improved.   -     Inferior turbinate: - normal mucosa without significant hypertrophy - bilaterally  -     Middle turbinate: - normal mucosa without significant hypertrophy - bilaterally  -     Other findings: - no mass or obstructive lesion -    Assessment & Plan:  See today's clinic note

## 2019-10-18 ENCOUNTER — TELEPHONE (OUTPATIENT)
Dept: ALLERGY | Facility: CLINIC | Age: 54
End: 2019-10-18

## 2019-10-18 DIAGNOSIS — J45.41 MODERATE PERSISTENT ASTHMA WITHOUT STATUS ASTHMATICUS WITH ACUTE EXACERBATION: Primary | ICD-10-CM

## 2019-10-18 DIAGNOSIS — R05.9 COUGH: ICD-10-CM

## 2019-10-18 DIAGNOSIS — J44.9 CHRONIC OBSTRUCTIVE PULMONARY DISEASE, UNSPECIFIED COPD TYPE: ICD-10-CM

## 2019-10-18 RX ORDER — IPRATROPIUM BROMIDE 0.5 MG/2.5ML
SOLUTION RESPIRATORY (INHALATION)
Qty: 1 BOX | Refills: 0 | Status: SHIPPED | OUTPATIENT
Start: 2019-10-18 | End: 2020-08-26 | Stop reason: SDUPTHER

## 2019-10-18 NOTE — TELEPHONE ENCOUNTER
I called her on the telephone on Friday October 18, 2019.  We completed our telephone conversation at 9:50 a.m..  She stated this morning she is feeling a little better.    She was able start her Zithromax yesterday.    She is interested in evaluating Atrovent solution for nebulization.  Potential side effects were discussed.  A prescription was transmitted to her pharmacy at her request.  She may nebulize 2.5 mL every 6 hr if needed.    Atrovent will be evaluated in view of the fact that it has been difficult for her to obtain a prescription for Spiriva which was prescribed earlier this week.  At this time Spiriva is not available to her to be used.    She took Medrol 4 mg 2 pills this morning.  I recommended she take 4 pills at 3:00 p.m. today in 2 pills this evening.  On the morning of Saturday October 19 she will take Medrol 4 mg, 2 pills.  I told her I will contact her tomorrow to inquire of her status.  Additional recommendations will be made at that time.    This note was dictated using voice recognition software and may contain errors.

## 2019-10-19 ENCOUNTER — TELEPHONE (OUTPATIENT)
Dept: ALLERGY | Facility: CLINIC | Age: 54
End: 2019-10-19

## 2019-10-19 NOTE — TELEPHONE ENCOUNTER
I called her on the telephone on Saturday October 19, 2019.  We completed our telephone conversation at 11:31 a.m..    She stated that she did get significantly improved sleep and rest this past evening.  She began nebulization with Atrovent yesterday Zithromax was started October 17.    She stated this morning she has been experiencing more coughing and has coughed up some yellow and green sputum.    On Saturday October 19 she will totally take Medrol 2 pills in the morning for pills at 3:00 p.m. and 2 pills at 7:00 p.m..  Of the morning of Sunday October 20 she will take Medrol 4 mg 2 pills.  I told her I will contact her tomorrow afternoon so that we may review her status.  Additional recommendations will be made at that time.    This note was dictated using voice recognition software and may contain errors.

## 2019-10-20 ENCOUNTER — TELEPHONE (OUTPATIENT)
Dept: ALLERGY | Facility: CLINIC | Age: 54
End: 2019-10-20

## 2019-10-20 NOTE — TELEPHONE ENCOUNTER
I called her on the telephone Sunday October 20, 2019 and spoke with her.  We completed our telephone conversation at 12:57 p.m..    Since speaking with her yesterday she has continued to be symptomatic.  She stated that she did moderately well during the night.  She stated this morning she has experienced more coughing she is coughing sputum which she says is thick and yellow in color.  Yesterday she took methylprednisolone 4 mg, 8 pills.  She does believe that Atrovent nebulization has been of some benefit.    I told her I will confer with Elizabeth Lejeune, NP, in Pulmonary regarding whether or not she believes  obtaining a sputum culture and Gram stain would be recommended.    On Sunday, October 20, I recommended that she take methylprednisolone 4 mg, a total of 8 pills. On the morning of Monday, October 21, I recommended that she take methylprednisolone 4 mg 2 pills.  I requested that she call tomorrow and report upon her status.    This note was dictated using voice recognition software and may contain errors.

## 2019-10-21 ENCOUNTER — PATIENT MESSAGE (OUTPATIENT)
Dept: ALLERGY | Facility: CLINIC | Age: 54
End: 2019-10-21

## 2019-10-21 ENCOUNTER — TELEPHONE (OUTPATIENT)
Dept: ALLERGY | Facility: CLINIC | Age: 54
End: 2019-10-21

## 2019-10-21 NOTE — TELEPHONE ENCOUNTER
I called her on the telephone on Monday October 21, 2019.  We completed our telephone conversation at 10:56 a.m..  During the past 24 hr she stated that she has improved.  She stated last night was the best night she had had in many days.  She was able to get some sleep.  This morning she is breathing better.  She stated that she is not coughing as much.    She took Medrol 4 mg 2 pills this morning.  I recommended she take 3 pills this afternoon and 2 pills this evening, a total of 7 pills today.  On October 22 she will take Medrol 4 mg 2 pills in the morning.  I requested she call tomorrow and report upon her status.    Today, October 21 is the last day she will take Zithromax.  We will monitor her status this week.  I told her consideration may be given to treatment with another Z-Elie beginning October 27.    She believes that nebulization treatments of Atrovent are beneficial.    This note was dictated using voice recognition software and may contain errors.

## 2019-10-22 ENCOUNTER — PATIENT MESSAGE (OUTPATIENT)
Dept: ALLERGY | Facility: CLINIC | Age: 54
End: 2019-10-22

## 2019-10-22 ENCOUNTER — TELEPHONE (OUTPATIENT)
Dept: ALLERGY | Facility: CLINIC | Age: 54
End: 2019-10-22

## 2019-10-22 NOTE — TELEPHONE ENCOUNTER
I finished my telephone conversation with her at 11:18 a.m. on Tuesday, October 22, 2019.  She had sent a message this morning as requested.    She stated that her cough is better than it has been.  She stated unfortunately yesterday about 6:00 p.m. she developed significant coughing and stated that she provoke muscle pain on the right side of her chest.    She has an appointment on October 24 for re-evaluation and Pulmonary.    We have agreed today she will take methylprednisolone 4 mg tablets, either 7 tablets or 6 tablets depending upon how she feels this afternoon.    On the morning of October 23 she will take 2 pills.  I requested she call tomorrow and report upon her status.      This note was dictated using voice recognition software and may contain errors.

## 2019-10-23 ENCOUNTER — TELEPHONE (OUTPATIENT)
Dept: ALLERGY | Facility: CLINIC | Age: 54
End: 2019-10-23

## 2019-10-23 ENCOUNTER — PATIENT MESSAGE (OUTPATIENT)
Dept: ALLERGY | Facility: CLINIC | Age: 54
End: 2019-10-23

## 2019-10-23 ENCOUNTER — TELEPHONE (OUTPATIENT)
Dept: PHARMACY | Facility: CLINIC | Age: 54
End: 2019-10-23

## 2019-10-23 NOTE — TELEPHONE ENCOUNTER
Spoke w/ Pt notifying her PA for Spiriva Respimat approved resulting in a copayment of $20.00.  Copay card applied resulting in copay reduction to $0.00.    Patient was very thankful and will  on Thursday morning.    PA Information:  LUMA  [phone]  PA Case # [id number]  pa approval dates: 9/23/19-10/22/2020  Pa approved for Spiriva Respimat 4g per 30 days    Thank You!   Adamaris Dorsey CPhT, B.A  Patient Care Advocate   [facility]  [email or web address]  Phone: [phone] Ext [phone]  Fax: [phone]

## 2019-10-23 NOTE — TELEPHONE ENCOUNTER
She sent a message this morning.  I called her and spoke with her telephone, Wednesday, October 23, 2019..    Yesterday, Tuesday October 22 she took Medrol 4 mg, a total of 6 pills.    She reported that she believes are coughing has decreased some.  At this time her cough is not very productive.  She observed that her cough will worsen in the afternoon and at night.    She stated that her ribs are quite uncomfortable.    She has return appointment for re-evaluation tomorrow, Thursday October 24, with Elizabeth Lejeune, NP, and Pulmonary.    During our conversation on the telephone today I reviewed with her concerns and options to consider including whether not a sputum culture may be performed, whether not a CT scan of the chest and  bronchoscopy should be performed, whether she should have an appointment with 1 of the pulmonologists, in view of her clinical course in recent weeks and the fact that she continues to be symptomatic despite the use of multiple medications.    In view of her rib discomfort, I told her it may be reasonable to consider performing x-rays of her ribs to be certain that she has not fractured rib.    I suggested she discuss the above with Ms. Myrick tomorrow.    We discussed options for treatment.  She has elected to take methylprednisolone 4 mg a total of 6 pills today, October 23.  She will take 2 pills on the morning of Thursday October 24.  I requested that she inform me of her status tomorrow.  Additional treatment recommendations will be made tomorrow based upon her report.    This note was dictated using voice recognition software may contain errors.

## 2019-10-24 ENCOUNTER — HOSPITAL ENCOUNTER (OUTPATIENT)
Dept: RADIOLOGY | Facility: HOSPITAL | Age: 54
Discharge: HOME | End: 2019-10-24
Attending: NURSE PRACTITIONER
Payer: COMMERCIAL

## 2019-10-24 ENCOUNTER — TELEPHONE (OUTPATIENT)
Dept: ALLERGY | Facility: CLINIC | Age: 54
End: 2019-10-24

## 2019-10-24 ENCOUNTER — PATIENT MESSAGE (OUTPATIENT)
Dept: ALLERGY | Facility: CLINIC | Age: 54
End: 2019-10-24

## 2019-10-24 ENCOUNTER — OFFICE VISIT (OUTPATIENT)
Dept: PULMONOLOGY | Facility: CLINIC | Age: 54
End: 2019-10-24
Payer: COMMERCIAL

## 2019-10-24 VITALS
SYSTOLIC BLOOD PRESSURE: 120 MMHG | HEIGHT: 67 IN | WEIGHT: 202.38 LBS | BODY MASS INDEX: 31.76 KG/M2 | HEART RATE: 63 BPM | DIASTOLIC BLOOD PRESSURE: 78 MMHG | OXYGEN SATURATION: 99 % | RESPIRATION RATE: 18 BRPM

## 2019-10-24 DIAGNOSIS — J32.4 CHRONIC PANSINUSITIS: ICD-10-CM

## 2019-10-24 DIAGNOSIS — J45.40 MODERATE PERSISTENT ASTHMA WITHOUT COMPLICATION: ICD-10-CM

## 2019-10-24 DIAGNOSIS — G47.33 OSA (OBSTRUCTIVE SLEEP APNEA): ICD-10-CM

## 2019-10-24 DIAGNOSIS — R07.89 CHEST WALL PAIN: ICD-10-CM

## 2019-10-24 DIAGNOSIS — R07.89 CHEST WALL PAIN: Primary | ICD-10-CM

## 2019-10-24 PROCEDURE — 71100 X-RAY EXAM RIBS UNI 2 VIEWS: CPT | Mod: TC,LT

## 2019-10-24 PROCEDURE — 99999 PR PBB SHADOW E&M-EST. PATIENT-LVL III: ICD-10-PCS | Mod: PBBFAC,,, | Performed by: NURSE PRACTITIONER

## 2019-10-24 PROCEDURE — 3008F PR BODY MASS INDEX (BMI) DOCUMENTED: ICD-10-PCS | Mod: CPTII,S$GLB,, | Performed by: NURSE PRACTITIONER

## 2019-10-24 PROCEDURE — 71100 X-RAY EXAM RIBS UNI 2 VIEWS: CPT | Mod: 26,LT,, | Performed by: RADIOLOGY

## 2019-10-24 PROCEDURE — 99214 PR OFFICE/OUTPT VISIT, EST, LEVL IV, 30-39 MIN: ICD-10-PCS | Mod: S$GLB,,, | Performed by: NURSE PRACTITIONER

## 2019-10-24 PROCEDURE — 3074F PR MOST RECENT SYSTOLIC BLOOD PRESSURE < 130 MM HG: ICD-10-PCS | Mod: CPTII,S$GLB,, | Performed by: NURSE PRACTITIONER

## 2019-10-24 PROCEDURE — 99999 PR PBB SHADOW E&M-EST. PATIENT-LVL III: CPT | Mod: PBBFAC,,, | Performed by: NURSE PRACTITIONER

## 2019-10-24 PROCEDURE — 71100 XR RIBS 2 VIEW LEFT: ICD-10-PCS | Mod: 26,LT,, | Performed by: RADIOLOGY

## 2019-10-24 PROCEDURE — 99214 OFFICE O/P EST MOD 30 MIN: CPT | Mod: S$GLB,,, | Performed by: NURSE PRACTITIONER

## 2019-10-24 PROCEDURE — 3074F SYST BP LT 130 MM HG: CPT | Mod: CPTII,S$GLB,, | Performed by: NURSE PRACTITIONER

## 2019-10-24 PROCEDURE — 3008F BODY MASS INDEX DOCD: CPT | Mod: CPTII,S$GLB,, | Performed by: NURSE PRACTITIONER

## 2019-10-24 PROCEDURE — 3078F DIAST BP <80 MM HG: CPT | Mod: CPTII,S$GLB,, | Performed by: NURSE PRACTITIONER

## 2019-10-24 PROCEDURE — 3078F PR MOST RECENT DIASTOLIC BLOOD PRESSURE < 80 MM HG: ICD-10-PCS | Mod: CPTII,S$GLB,, | Performed by: NURSE PRACTITIONER

## 2019-10-24 NOTE — PROGRESS NOTES
"Subjective:      Patient ID: Jaylin Murguia is a 54 y.o. female.    Chief Complaint: Cough    HPI  Patient that I see for sleep apnea presents for new problem. Cough and wheezing with one week fu. She chronic sinus disease. She has pansinusitis with recent procedure 09/20/2019 with Dr. Shaffer. She had positive cultures and is also following with Dr. Florez. Dr. Shaffer started her on a prescription sinus rinse with improvement. He cough and wheezing is improving.   Spiriva has now been approved to add to her BREO.    She was told she had asthma approx 10 yrs ago.   Chest x-ray on 10/08/2019 with possible start of development of infiltrate left base.  10/12/2019 clear.  She is following with Dr. Merrill who is managing her Medrol dose daily.   She is worried she has cracked a lower left rib form coughing and hears a clicking           ASPERGILLUS NIGER   Many       Aerobic Bacterial Culture Abnormal    ACHROMOBACTER XYLOSOXIDANS SUBSP. DENTRIFICANS   Moderate            /78   Pulse 63   Resp 18   Ht 5' 7" (1.702 m)   Wt 91.8 kg (202 lb 6.1 oz)   SpO2 99%   BMI 31.70 kg/m²   Body mass index is 31.7 kg/m².    Review of Systems   Constitutional: Negative.    HENT:        See HPI   Respiratory:        See HPI   Cardiovascular: Negative.    Musculoskeletal: Negative.    Gastrointestinal: Negative.    Neurological: Negative.    Psychiatric/Behavioral: Negative.      Objective:      Physical Exam   Constitutional: She is oriented to person, place, and time. She appears well-developed and well-nourished.   HENT:   Head: Normocephalic and atraumatic.   Mouth/Throat: Oropharynx is clear and moist.   Neck: Normal range of motion. Neck supple.   Cardiovascular: Normal rate and regular rhythm. Exam reveals no gallop.   No murmur heard.  Pulmonary/Chest: Effort normal and breath sounds normal.   Abdominal: Soft. She exhibits no mass. There is no tenderness.   Musculoskeletal: Normal range of motion. She exhibits no " edema.   Neurological: She is alert and oriented to person, place, and time.   Skin: Skin is warm and dry.   Psychiatric: She has a normal mood and affect.         Assessment:       1. Chest wall pain    2. Moderate persistent asthma without complication    3. TAMIKA (obstructive sleep apnea)    4. Chronic pansinusitis        Outpatient Encounter Medications as of 10/24/2019   Medication Sig Dispense Refill    amLODIPine (NORVASC) 5 MG tablet Take 1 tablet (5 mg total) by mouth once daily. 90 tablet 3    azelastine (ASTELIN) 137 mcg (0.1 %) nasal spray 1 spray (137 mcg total) by Nasal route 2 (two) times daily. 30 mL 11    budesonide 1 mg/2 mL NbSp MIX CONTENTS OF 1 RESPULE WITH STERILE DILUENT PROVIDED. POUR INTO NASONEB CUP & PERFORM TREATMENT TWICE DAILY.  2    butalbital-acetaminophen-caffeine -40 mg (FIORICET, ESGIC) -40 mg per tablet Take 1 tab po q4 hrs prn headache 90 tablet 3    cetirizine (ZYRTEC) 5 MG chewable tablet Take 5 mg by mouth 2 (two) times daily.       doxycycline monohydrate 150 mg Cap MIX CONTENTS OF 1 CAPSULE WITH STERILE DILUENT PROVIDED. POUR INTO NASONEB CUP AND PERFORM TREATMENT TWICE DAILY  2    DULoxetine (CYMBALTA) 60 MG capsule Take 1 capsule (60 mg total) by mouth 2 (two) times daily. 180 capsule 3    eletriptan (RELPAX) 40 MG tablet Take 1 tablet (40 mg total) by mouth as needed. 12 tablet 3    estradiol (ESTRACE) 2 MG tablet Take 2 mg by mouth every evening.       fluticasone furoate-vilanterol (BREO ELLIPTA) 200-25 mcg/dose DsDv diskus inhaler Inhale 1 puff into the lungs once daily. 1 each 11    ipratropium (ATROVENT) 0.02 % nebulizer solution Nebulize 2.5 ml every 6 hours as directed, if needed 1 Box 0    levalbuterol (XOPENEX) 1.25 mg/3 mL nebulizer solution Take 3 mLs (1.25 mg total) by nebulization every 4 (four) hours. Rescue 1 Box 11    losartan-hydrochlorothiazide 100-25 mg (HYZAAR) 100-25 mg per tablet Take 1 tablet by mouth once daily. 90 tablet 3     mesalamine (PENTASA) 250 mg CpSR Take 4 capsules (1,000 mg total) by mouth 4 (four) times daily. 1440 capsule 3    montelukast (SINGULAIR) 10 mg tablet Take 1 tablet (10 mg total) by mouth every evening. 90 tablet 3    nortriptyline (PAMELOR) 25 MG capsule Take 2 capsules (50 mg total) by mouth once daily. 60 capsule 1    pantoprazole (PROTONIX) 40 MG tablet Take 1 tablet (40 mg total) by mouth every evening. 90 tablet 3    pregabalin (LYRICA) 150 MG capsule TAKE 1 CAPSULE (150 MG TOTAL) BY MOUTH 3 (THREE) TIMES DAILY. 270 capsule 1    promethazine-dextromethorphan (PROMETHAZINE-DM) 6.25-15 mg/5 mL Syrp Take 5 mLs by mouth every 6 to 8 hours as needed. 180 mL 1    propranolol (INNOPRAN XL) 80 mg 24 hr capsule Take 1 capsule (80 mg total) by mouth every evening. 90 capsule 3    rizatriptan (MAXALT) 10 MG tablet TAKE 1 TABLET BY MOUTH IF NEEDED FOR MIGRAINES MAX 2 TAB IN 24 HOURS 30 tablet 3    sulfamethoxazole-trimethoprim 800-160mg (BACTRIM DS) 800-160 mg Tab Take 1 tablet by mouth 2 (two) times daily. for 21 days 42 tablet 1    tiotropium bromide (SPIRIVA RESPIMAT) 1.25 mcg/actuation Mist Inhale 2 puffs into the lungs once daily. 4 g 11    triamcinolone acetonide 0.025% (KENALOG) 0.025 % cream Apply topically 2 (two) times daily. 453 g 3    VENTOLIN HFA 90 mcg/actuation inhaler INHALE 2 PUFFS INTO THE LUNGS EVERY 4 TO 6 HOURS AS NEEDED FOR WHEEZING. 18 Inhaler 1    zolpidem (AMBIEN) 5 MG Tab TAKE 1 TABLET BY MOUTH EVERY DAY AT BEDTIME AS NEEDED 90 tablet 1    methylPREDNISolone (MEDROL) 4 MG Tab Take orally as directed.  Start October 14:  12 pills x 3 days, 8 x 3d, 6 x 3d, 4 x 3d, 2x 3d, 1 x 4d, stop. (Patient not taking: Reported on 10/24/2019) 100 tablet 0     Facility-Administered Encounter Medications as of 10/24/2019   Medication Dose Route Frequency Provider Last Rate Last Dose    lactated ringers infusion   Intravenous Continuous Mary Leiva MD        lidocaine (PF) 10 mg/ml (1%)  injection 10 mg  1 mL Intradermal Once Mary Leiva MD         Orders Placed This Encounter   Procedures    X-Ray Ribs 2 View Left     Standing Status:   Future     Number of Occurrences:   1     Standing Expiration Date:   10/24/2020     Order Specific Question:   May the Radiologist modify the order per protocol to meet the clinical needs of the patient?     Answer:   Yes     Plan:      Symptoms slowly improving. No wheezing today.  Cough improving.   Left rib view xray  Problem List Items Addressed This Visit        ENT    Chronic pansinusitis       Pulmonary    Moderate persistent asthma without complication       Other    TAMIKA (obstructive sleep apnea)      Other Visit Diagnoses     Chest wall pain    -  Primary    Relevant Orders    X-Ray Ribs 2 View Left

## 2019-10-24 NOTE — TELEPHONE ENCOUNTER
She sent a message at 1:45 p.m. on Thursday October 24, 2019.  I called her on the telephone on October 24.  We finished our telephone conversation at 2:10 p.m..    She was seen for re-evaluation this morning by Elizabeth Lejeune, NP in pulmonary.  She ordered left rib x-rays.  A fracture has been discovered in the 9th rib.    She informed me that her health insurance company has approved her prescription to obtain Spiriva.  She is going to begin using this medication.  I recommended that she stop nebulizing Atrovent.    She reported she is improved.  She believes since she has started using medications prescribed by  for her upper respiratory tract she is better.    Yesterday she took Medrol 4 mg a total of 6 pills.  We have agreed today she will take a total of 5 Medrol pills ( 2-1-2 ).  On the morning of October 25 she will take Medrol 4 mg, 2 pills.    I requested she call tomorrow and report upon her status.    This note was dictated using voice recognition software and may contain errors.

## 2019-10-25 ENCOUNTER — PATIENT MESSAGE (OUTPATIENT)
Dept: ALLERGY | Facility: CLINIC | Age: 54
End: 2019-10-25

## 2019-10-25 ENCOUNTER — PATIENT MESSAGE (OUTPATIENT)
Dept: OTOLARYNGOLOGY | Facility: CLINIC | Age: 54
End: 2019-10-25

## 2019-10-25 ENCOUNTER — TELEPHONE (OUTPATIENT)
Dept: ALLERGY | Facility: CLINIC | Age: 54
End: 2019-10-25

## 2019-10-25 NOTE — TELEPHONE ENCOUNTER
She sent a message today.  I called her on the telephone and spoke with her on Friday October 25, 2019.  We completed our conversation on the phone at 1:28 p.m..    She stated she continues to feel improved.  She stated she feels about the same as she did yesterday.  She stated that she took her last oral Bactrim pill yesterday.  I requested that she contact Dr. Shaffer and make him aware her completing her Bactrim therapy which she had prescribed.    She stated today when she performed nasal rinse and irrigation that an increased amount of green and yellow mucus was expect old from her nasal passages.    She stated that she did not experience a great deal of coughing last night.  She stated that she is learning to cope with her fractured rib, left 9th.    We have agreed that she will take a total of 5 Medrol pills today.  Yesterday she also took 5 pills.  I told her I will contact her tomorrow and make additional recommendations.  Tomorrow morning she should take 2 steroid pills.    This note was dictated using voice recognition software and may contain errors.

## 2019-10-26 ENCOUNTER — TELEPHONE (OUTPATIENT)
Dept: ALLERGY | Facility: CLINIC | Age: 54
End: 2019-10-26

## 2019-10-26 DIAGNOSIS — J32.8 OTHER CHRONIC SINUSITIS: ICD-10-CM

## 2019-10-26 DIAGNOSIS — J40 BRONCHITIS: Primary | ICD-10-CM

## 2019-10-26 RX ORDER — AZITHROMYCIN 250 MG/1
TABLET, FILM COATED ORAL
Qty: 6 TABLET | Refills: 0 | Status: SHIPPED | OUTPATIENT
Start: 2019-10-26 | End: 2019-10-31

## 2019-10-26 NOTE — TELEPHONE ENCOUNTER
I called her on the telephone on Saturday October 26, 2019 and spoke with her.  We completed our conversation at 10:38 a.m..    Since talking with her yesterday she stated that she is feeling improved.  She is aware of some pain and discomfort originating from her fractured rib on the left.    For treatment possible mycoplasma, I have recommended that she take Zithromax 250 mg 1 Z-Elie starting on Sunday October 27.  This is agreeable with her.  She stated that she tolerates Zithromax.  At her request a prescription was transmitted to her pharmacy.    On Friday October 25 she took Medrol 4 mg, a total of 5 pills.  On Saturday, October 26 I recommended that she take a total of 4 Medrol pills, 2 in the morning, 1 at noon and 1 in the evening.  On Sunday, October 27 she will take Medrol 4 mg 4 pills or if doing well 3 pills.  If she takes 3 pills she will take 1 pill 3 times a day.  On Monday morning, October 28 she will take Medrol 4 mg 1 pill in the morning.  I requested she call on October 28 and report upon her status.    I told her we will continue to make every effort to decrease and discontinue her oral steroids.    This note was dictated using voice recognition software and may contain errors.

## 2019-10-28 ENCOUNTER — PATIENT MESSAGE (OUTPATIENT)
Dept: ALLERGY | Facility: CLINIC | Age: 54
End: 2019-10-28

## 2019-10-28 ENCOUNTER — TELEPHONE (OUTPATIENT)
Dept: ALLERGY | Facility: CLINIC | Age: 54
End: 2019-10-28

## 2019-10-28 NOTE — TELEPHONE ENCOUNTER
I finished my telephone conversation with her on Monday October 28, 2019 at 1:33 p.m..    On Sunday October 27 she took a total of 4 Medrol pills.  Today she will make a decision as to whether which she wishes to take 3 pills or 4 pills.    She is experiencing less coughing today.  She was more symptomatic yesterday.  She stated today respiratory secretions are clear.  Yesterday she stated she was coughing up green mucus.    I suggested she take 1 Medrol pill in the morning and call tomorrow and report upon her status.    She did begin taking the Zithromax Z-Elie yesterday.    This note was dictated using voice recognition software and may contain errors.

## 2019-10-29 ENCOUNTER — PATIENT MESSAGE (OUTPATIENT)
Dept: PULMONOLOGY | Facility: CLINIC | Age: 54
End: 2019-10-29

## 2019-10-29 ENCOUNTER — TELEPHONE (OUTPATIENT)
Dept: ALLERGY | Facility: CLINIC | Age: 54
End: 2019-10-29

## 2019-10-29 ENCOUNTER — PATIENT MESSAGE (OUTPATIENT)
Dept: ALLERGY | Facility: CLINIC | Age: 54
End: 2019-10-29

## 2019-10-29 DIAGNOSIS — J20.9 ACUTE BRONCHITIS, UNSPECIFIED ORGANISM: Primary | ICD-10-CM

## 2019-10-29 NOTE — TELEPHONE ENCOUNTER
I completed my telephone conversation with her on Tuesday October 29, 2019 at 2:28 p.m..    She sent a message at 10:50 a.m. today stating that this morning she awakened with a great deal of coughing.  She stated she coughed out a lot of green mucus this morning.  This afternoon she stated that her sputum is clear.    On Monday October 28 she took a total of 3 Medrol pills.  She took 1 Medrol pill this morning.  She will take 2 or 3 Medrol pills today depending upon her Assessment.    I informed her of my absence from the clinic tomorrow.    I informed her over the noon hour today I spoke with Elizabeth Lejeune, NP, in Pulmonary regarding the possibility obtaining a sputum culture.  I informed Ms. Murguia my conversation.  She stated she will communicate with Ms.Lejeune.    I told Ms. Murguia that I did speak with staff in the laboratory at the [facility].  I was informed that they would be able to accept a sputum specimen to culture.    I recommended that we speak on October 31.    This note was dictated using voice recognition software and may contain errors.

## 2019-10-30 ENCOUNTER — PATIENT MESSAGE (OUTPATIENT)
Dept: PULMONOLOGY | Facility: CLINIC | Age: 54
End: 2019-10-30

## 2019-10-31 ENCOUNTER — TELEPHONE (OUTPATIENT)
Dept: ALLERGY | Facility: CLINIC | Age: 54
End: 2019-10-31

## 2019-10-31 ENCOUNTER — PATIENT MESSAGE (OUTPATIENT)
Dept: ALLERGY | Facility: CLINIC | Age: 54
End: 2019-10-31

## 2019-10-31 ENCOUNTER — TELEPHONE (OUTPATIENT)
Dept: GASTROENTEROLOGY | Facility: CLINIC | Age: 54
End: 2019-10-31

## 2019-10-31 NOTE — TELEPHONE ENCOUNTER
----- Message from Brian Pearce PA-C sent at 10/31/2019  2:28 PM CDT -----  Please check on patient and offer her an appt with Bia next week if possible.   Thanks,   Brian    ----- Message -----  From: Manish Meek MD  Sent: 10/31/2019   2:17 PM CDT  To: Elizabeth Lejeune, NP, Brian Pearce PA-C, #

## 2019-10-31 NOTE — TELEPHONE ENCOUNTER
She communicated today.  I called her on the telephone on Thursday October 31, 2019.  We completed our telephone conversation shortly before 2:00 p.m..    During the past 48 hr she has improved some.  She was able to obtain specimen cups for sputum collection.  Yesterday and today she has not produced a lot of sputum.    She stated she is coughing less today.    She stated in recent days she has developed an abdominal pain which is sharp as though she is being stuck with a knife.  The pain is intermittent.  She stated she may have 4 or 5 episodes of pain per day.    In view of all of the medications which she has taken, including antibiotics and corticosteroids, I told her it is my recommendation that she contact her gastroenterologist and make the doctor aware.  She stated in the past she had seen  for treatment of her Crohn's disease.  She is aware that he is no longer here.  In the past she is seen Ms. Pearce and Ms. Witt in the gastroenterology department.    I told her I did not know with certainty the cause of her pain.  I stated there are a number of understandable concerns which may need to be addressed.  If any ulcer disease has developed, I told her endoscopy may be recommended.  As noted above, she does have Crohn's disease.  I am unaware as to whether not she has any history of gallbladder disease or pancreas disease    I told her it is my recommendation that she contact the gastroenterology department this afternoon.    I had a lengthy discussion with her regarding her use of corticosteroids in recent weeks.  I told her during the upcoming 12-18 months, 1 she is no longer taking oral corticosteroids, should she become critically ill, for example with a serious infectious illness or require general anesthesia and surgery, supplemental corticosteroids may be required at the time such stress.  I told her that she needs to make her  family members aware of this fact.  I told her that she  would need to inform her healthcare providers of the above.  I told her if she is not able to speak for herself that other family members will need to make her healthcare providers of the above.    This morning, she took 1 Medrol pill.  Later today she will decide whether or not she takes total of 2 pills or 3 pills today.  I reviewed with her that in view of her gradual improvement we will continue to attempt to gradually decrease in discontinue the use of oral steroids.    She stated that she only has 3 Medrol pills left.  At her request a new prescription for Methylprednisolone 4 mg, 100 pills with no refills was issued when I called her pharmacy, [phone] and spoke with the pharmacist.  Prior to calling her I called her pharmacy to be certain that pills were available to prescribe.  I was informed this was the case.  I made her aware of the availability of the tablets.    I requested she call tomorrow and report upon her status.    This note was dictated using voice recognition software and may contain errors.

## 2019-11-01 ENCOUNTER — PATIENT MESSAGE (OUTPATIENT)
Dept: ALLERGY | Facility: CLINIC | Age: 54
End: 2019-11-01

## 2019-11-01 ENCOUNTER — TELEPHONE (OUTPATIENT)
Dept: ALLERGY | Facility: CLINIC | Age: 54
End: 2019-11-01

## 2019-11-01 NOTE — TELEPHONE ENCOUNTER
This note was dictated using voice recognition software and may contain errors.    I spoke with her on the telephone upon 2 occasions on the afternoon of 2019.  Between my 2 phone calls to her I called the laboratory here at the [facility] and spoke with staff.  I was informed it would be possible for her to bring a sputum specimen to the [facility] on a Saturday between 8:00 a.m. and 1:00 p.m..    She stated she has improved.  She is not coughing as much in is not coughing up as much sputum.    Please refer to my note from yesterday, .  An appointment has been made for her to be evaluated in Gastroenterology on .  I told her should she become significantly symptomatic prior to that time with abdominal symptoms it is my recommendation she go to the emergency room for evaluation and possible treatment.    Over the next 3 days she will take methylprednisolone 4 mg 1 pill twice a day are 1 pill only once a day, depending upon how she feels.    She has Crohn's disease.  In the past she had a hysterectomy performed, a  performed, and bladder surgery performed.    I informed her of my absence from the clinic  until .    Should she have any additional questions or concerns she will call.

## 2019-11-03 ENCOUNTER — TELEPHONE (OUTPATIENT)
Dept: ALLERGY | Facility: CLINIC | Age: 54
End: 2019-11-03

## 2019-11-03 NOTE — TELEPHONE ENCOUNTER
I finished my telephone conversation with her on Tyrell November 3, 2019 at 3:25 p.m..    During the past 48 hr, since I last spoke with her on Friday November 1, she stated she has done well.    She stated today she is actually feeling better.  She is not coughing as much.  She is not blowing much mucus from her nose.    Yesterday, Saturday November 2 she did experience about 5 episodes of the abdominal pain which she has been experiencing in recent days.  She stated today she has only experienced 2 episodes of pain.    On Saturday November 2 she took methylprednisolone 4 mg, 2 pills.  Today she will take methylprednisolone 4 mg 1 pill twice a day.    On Monday, November 4, assuming that she continues to feel improve she will take methylprednisolone 4 mg 1 pill about 3:00 p.m..  I requested she call on Tuesday November 5 and report upon her status.    She stated she did obtain her new prescription for Methylprednisolone tablets.    This note was dictated using voice recognition software and may contain errors.

## 2019-11-05 ENCOUNTER — PATIENT MESSAGE (OUTPATIENT)
Dept: ALLERGY | Facility: CLINIC | Age: 54
End: 2019-11-05

## 2019-11-05 ENCOUNTER — TELEPHONE (OUTPATIENT)
Dept: ALLERGY | Facility: CLINIC | Age: 54
End: 2019-11-05

## 2019-11-05 NOTE — TELEPHONE ENCOUNTER
I finished my telephone conversation with her on Tuesday November 5, 2019 at 1:28 p.m..  She had sent a message at 11:58 a.m..  She stated that she is feeling better.  She has had 48 hr of feeling significantly improved.  She had been teaching school.  If she had developed a mycoplasma infectious illness, she is now received treatment with Zithromax.  Please refer to my previous notes.    She is taking 1 Medrol 4 mg tablet per day and will discontinue that tomorrow or the next day depending upon how she feels.    She is scheduled for evaluation with Gastroenterology on November 7.  She stated that she has continued to experience the abdominal pain but to a lesser degree.    I told her, it is my opinion, we should try not overlook any possible cause for her abdominal pain.  She has a history of Crohn's disease.  I told her it is possible that unexpected problem such as an internal hernia or congenital or developmental concern could exist.  We reviewed the fact that in recent weeks she has taken multiple medications including antibiotics and corticosteroids.  Again, I emphasized the importance of keeping an open mind as to all the possible causes of the abdominal pain that she has experienced recently.    I reviewed with her my absence from the clinic starting tomorrow.  I requested she call between November 13 and 15 and report upon her status.    This note was dictated using voice recognition software and may contain errors.

## 2019-11-07 ENCOUNTER — LAB VISIT (OUTPATIENT)
Dept: LAB | Facility: HOSPITAL | Age: 54
End: 2019-11-07
Attending: NURSE PRACTITIONER
Payer: COMMERCIAL

## 2019-11-07 ENCOUNTER — OFFICE VISIT (OUTPATIENT)
Dept: GASTROENTEROLOGY | Facility: CLINIC | Age: 54
End: 2019-11-07
Payer: COMMERCIAL

## 2019-11-07 VITALS
DIASTOLIC BLOOD PRESSURE: 70 MMHG | SYSTOLIC BLOOD PRESSURE: 110 MMHG | BODY MASS INDEX: 31.08 KG/M2 | HEIGHT: 67 IN | HEART RATE: 62 BPM | WEIGHT: 198 LBS

## 2019-11-07 DIAGNOSIS — K50.00 CROHN'S DISEASE OF SMALL INTESTINE WITHOUT COMPLICATION: ICD-10-CM

## 2019-11-07 DIAGNOSIS — R10.13 EPIGASTRIC PAIN: Primary | ICD-10-CM

## 2019-11-07 DIAGNOSIS — R10.31 RLQ ABDOMINAL PAIN: ICD-10-CM

## 2019-11-07 LAB
25(OH)D3+25(OH)D2 SERPL-MCNC: 29 NG/ML (ref 30–96)
CRP SERPL-MCNC: 4.6 MG/L (ref 0–8.2)
ERYTHROCYTE [SEDIMENTATION RATE] IN BLOOD BY WESTERGREN METHOD: 8 MM/HR (ref 0–36)
VIT B12 SERPL-MCNC: >2000 PG/ML (ref 210–950)

## 2019-11-07 PROCEDURE — 36415 COLL VENOUS BLD VENIPUNCTURE: CPT

## 2019-11-07 PROCEDURE — 3074F SYST BP LT 130 MM HG: CPT | Mod: CPTII,S$GLB,, | Performed by: NURSE PRACTITIONER

## 2019-11-07 PROCEDURE — 85652 RBC SED RATE AUTOMATED: CPT

## 2019-11-07 PROCEDURE — 3078F PR MOST RECENT DIASTOLIC BLOOD PRESSURE < 80 MM HG: ICD-10-PCS | Mod: CPTII,S$GLB,, | Performed by: NURSE PRACTITIONER

## 2019-11-07 PROCEDURE — 3008F PR BODY MASS INDEX (BMI) DOCUMENTED: ICD-10-PCS | Mod: CPTII,S$GLB,, | Performed by: NURSE PRACTITIONER

## 2019-11-07 PROCEDURE — 86704 HEP B CORE ANTIBODY TOTAL: CPT

## 2019-11-07 PROCEDURE — 86790 VIRUS ANTIBODY NOS: CPT

## 2019-11-07 PROCEDURE — 99214 PR OFFICE/OUTPT VISIT, EST, LEVL IV, 30-39 MIN: ICD-10-PCS | Mod: S$GLB,,, | Performed by: NURSE PRACTITIONER

## 2019-11-07 PROCEDURE — 99999 PR PBB SHADOW E&M-EST. PATIENT-LVL III: ICD-10-PCS | Mod: PBBFAC,,, | Performed by: NURSE PRACTITIONER

## 2019-11-07 PROCEDURE — 3074F PR MOST RECENT SYSTOLIC BLOOD PRESSURE < 130 MM HG: ICD-10-PCS | Mod: CPTII,S$GLB,, | Performed by: NURSE PRACTITIONER

## 2019-11-07 PROCEDURE — 82306 VITAMIN D 25 HYDROXY: CPT

## 2019-11-07 PROCEDURE — 3008F BODY MASS INDEX DOCD: CPT | Mod: CPTII,S$GLB,, | Performed by: NURSE PRACTITIONER

## 2019-11-07 PROCEDURE — 86140 C-REACTIVE PROTEIN: CPT

## 2019-11-07 PROCEDURE — 3078F DIAST BP <80 MM HG: CPT | Mod: CPTII,S$GLB,, | Performed by: NURSE PRACTITIONER

## 2019-11-07 PROCEDURE — 86480 TB TEST CELL IMMUN MEASURE: CPT

## 2019-11-07 PROCEDURE — 99999 PR PBB SHADOW E&M-EST. PATIENT-LVL III: CPT | Mod: PBBFAC,,, | Performed by: NURSE PRACTITIONER

## 2019-11-07 PROCEDURE — 99214 OFFICE O/P EST MOD 30 MIN: CPT | Mod: S$GLB,,, | Performed by: NURSE PRACTITIONER

## 2019-11-07 PROCEDURE — 86706 HEP B SURFACE ANTIBODY: CPT

## 2019-11-07 PROCEDURE — 87340 HEPATITIS B SURFACE AG IA: CPT

## 2019-11-07 PROCEDURE — 82607 VITAMIN B-12: CPT

## 2019-11-07 RX ORDER — PANTOPRAZOLE SODIUM 40 MG/1
40 TABLET, DELAYED RELEASE ORAL 2 TIMES DAILY
Qty: 60 TABLET | Refills: 1 | Status: SHIPPED | OUTPATIENT
Start: 2019-11-07 | End: 2019-11-26 | Stop reason: SDUPTHER

## 2019-11-07 NOTE — PROGRESS NOTES
Clinic Follow Up:  [facility] Follow Up Note    Reason for Follow Up:  The primary encounter diagnosis was Epigastric pain. Diagnoses of RLQ abdominal pain and Crohn's disease of small intestine without complication were also pertinent to this visit.    PCP: Esthela Hu       HPI:  This is a 54 y.o. female here for follow up of Crohn's disease.     IBD Histroy  - Type: Crohn's disease  - Diagnosed: early 2000s  - Disease Location: small intestine only.   - Surgeries related to IBD: none  - Extra-intestinal Manifestations:    Current Medication  Pentasa 1000 mg QID (started 4/2018)    Past Medication  Remicade (in remote past)-- ineffective  Asacol 4.8 gm-- ineffective  Entocort-- effective  Prednisone  Humira (started July 17, stopped 2/2018)-- stopped 2/2 multiple infections. Did develop low level ABX.     Endoscopy Reports  5/7/15 colonoscopy: poor prep. Skin tag. Crohn's disease with ileitis. Biopsied. Pathology: focal chronic active ileitis.   5/9//17: erythematous mucosa in the sigmoid, transverse and hepatic flexure. 2 mm polyp at hepatic flexure. Crohn's disease with ileitis. Pathology: colon and ileal bx normal colon mucosa. Polyp normal colon mucosa.   5/9/17 EGD: gastritis and duodenal erosions without bleeding. Pathology acute and chronic non-specific duodenitis with ulceration.   3/27/18 colonoscopy: 3 mm polyp in the sigmoid. No signs of active crohn's disease and no signs of stricture in the TI (advanced up to 15 cm). Pathology: hyperplastic polyp.     Interval History  Since my last visit with patient, she never restarted Humira and was placed on Pentasa 1000 mg QID. She had an MRE in April that showed no active inflammatory Crohn's disease, however there is bowel wall thickening noted in the distal ileum, with mild narrowing of the lumen at this level. These findings may reflect peristalsis vs stricture. She was also placed on a prednisone taper in December 2018 for rectal  bleeding and mucus and RLQ abdominal pain.     She has been having some sharp epigastric pains that is fairly new. She has been on Prednisone for 5 weeks for sinus issues. She continues with chronic abdominal pain over her RLQ. She reports having altered bowel pattern. She may have a few loose stools per day, constipation where she will go a few days without BM, and other days she may have 12-15 loose bowel movements with mucus. CRP has been normal in the past (besides in Feb 2018 but she also had other issues going on at that time).    Preventative Medicine  - Immunizations:    Influenza: due, recommend yearly   Pneumonia: PSV 23 10/29/13, needs PCV 13   Hepatitis B: not immune (labs in 2017)   Tdap: 2/18/14  - Date of last pap smear: hysterectomy   - Date of last skin cancer screening: Dermatology visit sometime this year  - Date of last eye exam: sometime this year  - Date of last surveillance colonoscopy: 3/27/18  - Date of last TB testing: 3/13/18  - TPMT status: 3/9/18 RBC normal, genotype normal metabolizer.   - NSAID use: none  - History of C. Diff: none  - Family planning:  NA    Review of Systems   Constitutional: Negative for activity change and appetite change.        As per interval history above   Respiratory: Negative for cough and shortness of breath.    Cardiovascular: Negative for chest pain.   Gastrointestinal: Positive for abdominal pain and diarrhea. Negative for blood in stool, constipation, nausea and vomiting.   Skin: Negative for color change and rash.       Medical History:  Past Medical History:   Diagnosis Date    Allergic rhinitis     Asthma     Chronic pansinusitis     Crohn's disease     Ileal involvement, previously on Remicade, Asacol, Prednisone    Fibromyalgia     Hyperlipidemia     Hypertension     Migraine     Obstructive sleep apnea     CPAP at night    Sciatica        Surgical History:   Past Surgical History:   Procedure Laterality Date    BLADDER SURGERY      sling  was created by her muscles      SECTION      COLONOSCOPY N/A 2017    Procedure: COLONOSCOPY;  Surgeon: Kin Dyer MD;  Location: [facility] ENDO;  Service: Endoscopy;  Laterality: N/A;    COLONOSCOPY N/A 3/27/2018    Procedure: COLONOSCOPY;  Surgeon: Kyra Vallecillo MD;  Location: [facility] ENDO;  Service: Endoscopy;  Laterality: N/A;    FINGER SURGERY      joint relpacement, left hand index finger    FUNCTIONAL ENDOSCOPIC SINUS SURGERY (FESS) USING COMPUTER-ASSISTED NAVIGATION Bilateral 2019    Procedure: FESS, USING COMPUTER-ASSISTED NAVIGATION;  Surgeon: Manish Shaffer MD;  Location: [facility] OR;  Service: ENT;  Laterality: Bilateral;    HYSTERECTOMY      WISDOM TOOTH EXTRACTION         Family History:   Family History   Problem Relation Age of Onset    Hypertension Mother     Kidney disease Father 64        ESRD on HD    Scleroderma Father     Hypertension Brother     Cancer Paternal Grandmother 70        colon    Heart attack Maternal Grandmother     COPD Maternal Grandmother 72       Social History:   Social History     Tobacco Use    Smoking status: Never Smoker    Smokeless tobacco: Never Used   Substance Use Topics    Alcohol use: No     Frequency: Never     Drinks per session: Patient refused     Binge frequency: Never    Drug use: No       Allergies: Review of patient's allergies indicates:  No Known Allergies    Home Medications:  Current Outpatient Medications on File Prior to Visit   Medication Sig Dispense Refill    amLODIPine (NORVASC) 5 MG tablet Take 1 tablet (5 mg total) by mouth once daily. 90 tablet 3    azelastine (ASTELIN) 137 mcg (0.1 %) nasal spray 1 spray (137 mcg total) by Nasal route 2 (two) times daily. 30 mL 11    budesonide 1 mg/2 mL NbSp MIX CONTENTS OF 1 RESPULE WITH STERILE DILUENT PROVIDED. POUR INTO NASONEB CUP & PERFORM TREATMENT TWICE DAILY.  2    butalbital-acetaminophen-caffeine -40 mg (FIORICET, ESGIC) -40 mg per tablet Take 1 tab  po q4 hrs prn headache 90 tablet 3    cetirizine (ZYRTEC) 5 MG chewable tablet Take 5 mg by mouth 2 (two) times daily.       doxycycline monohydrate 150 mg Cap MIX CONTENTS OF 1 CAPSULE WITH STERILE DILUENT PROVIDED. POUR INTO NASONEB CUP AND PERFORM TREATMENT TWICE DAILY  2    DULoxetine (CYMBALTA) 60 MG capsule Take 1 capsule (60 mg total) by mouth 2 (two) times daily. 180 capsule 3    eletriptan (RELPAX) 40 MG tablet Take 1 tablet (40 mg total) by mouth as needed. 12 tablet 3    estradiol (ESTRACE) 2 MG tablet Take 2 mg by mouth every evening.       fluticasone furoate-vilanterol (BREO ELLIPTA) 200-25 mcg/dose DsDv diskus inhaler Inhale 1 puff into the lungs once daily. 1 each 11    ipratropium (ATROVENT) 0.02 % nebulizer solution Nebulize 2.5 ml every 6 hours as directed, if needed 1 Box 0    levalbuterol (XOPENEX) 1.25 mg/3 mL nebulizer solution Take 3 mLs (1.25 mg total) by nebulization every 4 (four) hours. Rescue 1 Box 11    losartan-hydrochlorothiazide 100-25 mg (HYZAAR) 100-25 mg per tablet Take 1 tablet by mouth once daily. 90 tablet 3    mesalamine (PENTASA) 250 mg CpSR Take 4 capsules (1,000 mg total) by mouth 4 (four) times daily. 1440 capsule 3    methylPREDNISolone (MEDROL) 4 MG Tab Take orally as directed.  Start October 14:  12 pills x 3 days, 8 x 3d, 6 x 3d, 4 x 3d, 2x 3d, 1 x 4d, stop. 100 tablet 0    montelukast (SINGULAIR) 10 mg tablet Take 1 tablet (10 mg total) by mouth every evening. 90 tablet 3    nortriptyline (PAMELOR) 25 MG capsule Take 2 capsules (50 mg total) by mouth once daily. 60 capsule 1    pantoprazole (PROTONIX) 40 MG tablet Take 1 tablet (40 mg total) by mouth every evening. 90 tablet 3    pregabalin (LYRICA) 150 MG capsule TAKE 1 CAPSULE (150 MG TOTAL) BY MOUTH 3 (THREE) TIMES DAILY. 270 capsule 1    propranolol (INNOPRAN XL) 80 mg 24 hr capsule Take 1 capsule (80 mg total) by mouth every evening. 90 capsule 3    rizatriptan (MAXALT) 10 MG tablet TAKE 1 TABLET  "BY MOUTH IF NEEDED FOR MIGRAINES MAX 2 TAB IN 24 HOURS 30 tablet 3    tiotropium bromide (SPIRIVA RESPIMAT) 1.25 mcg/actuation Mist Inhale 2 puffs into the lungs once daily. 4 g 11    VENTOLIN HFA 90 mcg/actuation inhaler INHALE 2 PUFFS INTO THE LUNGS EVERY 4 TO 6 HOURS AS NEEDED FOR WHEEZING. 18 Inhaler 1    zolpidem (AMBIEN) 5 MG Tab TAKE 1 TABLET BY MOUTH EVERY DAY AT BEDTIME AS NEEDED 90 tablet 1    triamcinolone acetonide 0.025% (KENALOG) 0.025 % cream Apply topically 2 (two) times daily. (Patient not taking: Reported on 11/7/2019) 453 g 3     Current Facility-Administered Medications on File Prior to Visit   Medication Dose Route Frequency Provider Last Rate Last Dose    lactated ringers infusion   Intravenous Continuous Mary Leiva MD        lidocaine (PF) 10 mg/ml (1%) injection 10 mg  1 mL Intradermal Once Mary Leiva MD           /70   Pulse 62   Ht 5' 7" (1.702 m)   Wt 89.8 kg (197 lb 15.6 oz)   BMI 31.01 kg/m²   Body mass index is 31.01 kg/m².  Physical Exam   Constitutional: She is oriented to person, place, and time and well-developed, well-nourished, and in no distress. No distress.   HENT:   Head: Normocephalic.   Eyes: Pupils are equal, round, and reactive to light. Conjunctivae are normal.   Cardiovascular: Normal rate, regular rhythm and normal heart sounds.   Pulmonary/Chest: Effort normal and breath sounds normal. No respiratory distress.   Abdominal: Soft. Bowel sounds are normal. She exhibits no distension. There is no tenderness.   Neurological: She is alert and oriented to person, place, and time. No cranial nerve deficit.   Skin: Skin is warm and dry. No rash noted.   Psychiatric: Mood and affect normal.       Labs: Pertinent labs reviewed.    Assessment:  1. Epigastric pain    2. RLQ abdominal pain    3. Crohn's disease of small intestine without complication        Recommendations:  - Previous possible TI stricture on MRE in February 2018. Colonoscopy performed the " next month did not reveal stricture.   - no signs of active disease.   - she is currently on Pentasa 1000 mg QID. Having continued abdominal pain and diarrhea. Unsure if active crohn's disease vs superimposed IBS.   - she does not wish to start biologic therapy, however, we did discuss Entyvio vs Stelara as options and she is more open to discussion   - will recheck MRE since she is describing altered bowel pattern and now having some constipation.   - will also recheck CRP but has been negative in the past with active disease.   - will check lab needed if biologic therapy will be recommended  - will discuss with GI physician after MRE completed. Will either continue Pentasa or switch to Entvyio or Stelara   -     MRI ENTEROGRAPHY; Future; Expected date: 11/07/2019  -     pantoprazole (PROTONIX) 40 MG tablet; Take 1 tablet (40 mg total) by mouth 2 (two) times daily.  Dispense: 60 tablet; Refill: 1  -     QUANTIFERON GOLD TB; Future; Expected date: 11/07/2019  -     Hepatitis B surface antibody; Future; Expected date: 11/07/2019  -     Hepatitis B surface antigen; Future; Expected date: 11/07/2019  -     Hepatitis B core antibody, total; Future; Expected date: 11/07/2019  -     Hepatitis A antibody, IgG; Future; Expected date: 11/07/2019  -     Vitamin D; Future; Expected date: 11/07/2019  -     Vitamin B12; Future; Expected date: 11/07/2019  -     C-reactive protein; Future; Expected date: 11/07/2019  -     ESR (SEDIMENTATION RATE, MANUAL); Future; Expected date: 11/07/2019    Return to Clinic:  Follow up to be determined after results.    Thank you for the opportunity to participate in the care of this patient.  YURI Wharton

## 2019-11-11 DIAGNOSIS — Z23 NEED FOR PROPHYLACTIC VACCINATION AGAINST HEPATITIS A AND HEPATITIS B: Primary | ICD-10-CM

## 2019-11-11 DIAGNOSIS — K50.00 CROHN'S DISEASE OF SMALL INTESTINE WITHOUT COMPLICATION: ICD-10-CM

## 2019-11-11 LAB
GAMMA INTERFERON BACKGROUND BLD IA-ACNC: 0.03 IU/ML
M TB IFN-G CD4+ BCKGRND COR BLD-ACNC: -0.01 IU/ML
MITOGEN IGNF BCKGRD COR BLD-ACNC: >10 IU/ML
TB GOLD PLUS: NEGATIVE
TB2 - NIL: -0.01 IU/ML

## 2019-11-12 ENCOUNTER — PATIENT MESSAGE (OUTPATIENT)
Dept: GASTROENTEROLOGY | Facility: CLINIC | Age: 54
End: 2019-11-12

## 2019-11-13 ENCOUNTER — PATIENT MESSAGE (OUTPATIENT)
Dept: GASTROENTEROLOGY | Facility: CLINIC | Age: 54
End: 2019-11-13

## 2019-11-13 DIAGNOSIS — K50.00 CROHN'S DISEASE OF SMALL INTESTINE WITHOUT COMPLICATION: Primary | ICD-10-CM

## 2019-11-13 DIAGNOSIS — R10.31 RLQ ABDOMINAL PAIN: ICD-10-CM

## 2019-11-13 DIAGNOSIS — R10.13 EPIGASTRIC PAIN: ICD-10-CM

## 2019-11-13 RX ORDER — SODIUM, POTASSIUM,MAG SULFATES 17.5-3.13G
SOLUTION, RECONSTITUTED, ORAL ORAL
Qty: 354 ML | Refills: 0 | Status: ON HOLD | OUTPATIENT
Start: 2019-11-13 | End: 2020-03-12 | Stop reason: ALTCHOICE

## 2019-11-14 ENCOUNTER — PATIENT MESSAGE (OUTPATIENT)
Dept: GASTROENTEROLOGY | Facility: CLINIC | Age: 54
End: 2019-11-14

## 2019-11-18 ENCOUNTER — TELEPHONE (OUTPATIENT)
Dept: ALLERGY | Facility: CLINIC | Age: 54
End: 2019-11-18

## 2019-11-18 NOTE — TELEPHONE ENCOUNTER
Please refer to my previous notes.  When I spoke with her last on November 5, I requested she call November 13, 14, or 15 and report upon her status.  I did not receive any communication from her.  I called her on Friday afternoon November 15, 2019 but did not reach her and did not speak with her.  It was not possible to leave a message.    I called her on Monday, November 18, 2019 about 3:20 p.m..  I did not reach her and did not speak with her.  It was not possible to leave a message.    This note was dictated using voice recognition software and may contain errors.

## 2019-11-19 ENCOUNTER — TELEPHONE (OUTPATIENT)
Dept: OTOLARYNGOLOGY | Facility: CLINIC | Age: 54
End: 2019-11-19

## 2019-11-19 ENCOUNTER — TELEPHONE (OUTPATIENT)
Dept: ALLERGY | Facility: CLINIC | Age: 54
End: 2019-11-19

## 2019-11-19 NOTE — TELEPHONE ENCOUNTER
Spoke with pt and she was wanting to do some type of test that Dr Whittington told her about I didn't know exactly what she was talking about and informed that she may want to speak with Ms Alvina his nurse.      ----- Message from Jyothi Gotti sent at 11/19/2019  3:51 PM CST -----  Contact: Self  Type:  Patient Returning Call    Who Called:Jaylin  Who Left Message for Patient:  Does the patient know what this is regarding?:  Would the patient rather a call back or a response via [facility]? call  Best Call Back Number:[phone]  Additional Information:

## 2019-11-19 NOTE — TELEPHONE ENCOUNTER
Attempted to contact pt to inform that if she is having eye issues she needs to speak with an opthamologist and if she doesn't see one Jyothi said she can refer her. No answer left message to return call.        ----- Message from Jo Ann Han sent at 11/19/2019  3:09 PM CST -----  Contact: Pt  Pt is requesting call back in regards to possibly having infection in eye and sinus issues.          Pls call back at [phone]

## 2019-11-19 NOTE — TELEPHONE ENCOUNTER
----- Message from Isadora Seo sent at 11/19/2019  4:16 PM CST -----  Type:  Needs Medical Advice    Who Called:  Pt  Jaylin  Symptoms (please be specific):     How long has patient had these symptoms:     Pharmacy name and phone #:     Would the patient rather a call back or a response via [facility]?  Call back  Best Call Back Number:  [phone]  Additional Information:  States she is calling to speak with your office regarding///// she is seeing signs of infection again//please call asa//latoya//[facility]    Patient will wait to discuss with  in the morning since he is out the afternoon.

## 2019-11-20 ENCOUNTER — TELEPHONE (OUTPATIENT)
Dept: ALLERGY | Facility: CLINIC | Age: 54
End: 2019-11-20

## 2019-11-20 NOTE — TELEPHONE ENCOUNTER
I called her on Wednesday November 20, 2019 at 12:31 p.m..  I did not reach her and did not speak with her.  I did leave a message on the answering system of her telephone.    A message was received yesterday afternoon from her at 4:19 p.m. on November 19, 2019.  My nurse informed her that I was not in the clinic yesterday afternoon.    In the message which I left for her today on the answering system of her telephone I informed her that I a.m. not in the office this afternoon or the rest of this week.  I suggested she make Dr. Shaffer or Dr. Hu aware of her symptoms and health concerns.    Please refer to my previous notes.    This note was dictated using voice recognition software and may contain errors

## 2019-11-26 DIAGNOSIS — K50.00 CROHN'S DISEASE OF SMALL INTESTINE WITHOUT COMPLICATION: ICD-10-CM

## 2019-11-26 RX ORDER — PANTOPRAZOLE SODIUM 40 MG/1
40 TABLET, DELAYED RELEASE ORAL DAILY
Qty: 30 TABLET | Refills: 5 | Status: SHIPPED | OUTPATIENT
Start: 2019-11-26 | End: 2020-01-08 | Stop reason: SDUPTHER

## 2019-12-03 ENCOUNTER — PATIENT MESSAGE (OUTPATIENT)
Dept: SLEEP MEDICINE | Facility: CLINIC | Age: 54
End: 2019-12-03

## 2019-12-03 DIAGNOSIS — S22.32XS CLOSED FRACTURE OF ONE RIB OF LEFT SIDE, SEQUELA: Primary | ICD-10-CM

## 2019-12-04 ENCOUNTER — PATIENT MESSAGE (OUTPATIENT)
Dept: SLEEP MEDICINE | Facility: CLINIC | Age: 54
End: 2019-12-04

## 2019-12-04 ENCOUNTER — TELEPHONE (OUTPATIENT)
Dept: ENDOSCOPY | Facility: HOSPITAL | Age: 54
End: 2019-12-04

## 2019-12-04 ENCOUNTER — HOSPITAL ENCOUNTER (OUTPATIENT)
Dept: RADIOLOGY | Facility: HOSPITAL | Age: 54
Discharge: HOME | End: 2019-12-04
Attending: NURSE PRACTITIONER
Payer: COMMERCIAL

## 2019-12-04 DIAGNOSIS — S22.32XS CLOSED FRACTURE OF ONE RIB OF LEFT SIDE, SEQUELA: ICD-10-CM

## 2019-12-04 PROCEDURE — 71100 XR RIBS 2 VIEW LEFT: ICD-10-PCS | Mod: 26,LT,, | Performed by: RADIOLOGY

## 2019-12-04 PROCEDURE — 71100 X-RAY EXAM RIBS UNI 2 VIEWS: CPT | Mod: 26,LT,, | Performed by: RADIOLOGY

## 2019-12-04 PROCEDURE — 71100 X-RAY EXAM RIBS UNI 2 VIEWS: CPT | Mod: TC,FY,PO,LT

## 2019-12-04 NOTE — TELEPHONE ENCOUNTER
Attempted to reschedule procedure from 1-3-2020 to a different date d/t endoscopy schedule change.  Patient did not answer, left number to call office.

## 2019-12-10 ENCOUNTER — PATIENT MESSAGE (OUTPATIENT)
Dept: ALLERGY | Facility: CLINIC | Age: 54
End: 2019-12-10

## 2019-12-10 ENCOUNTER — TELEPHONE (OUTPATIENT)
Dept: ALLERGY | Facility: CLINIC | Age: 54
End: 2019-12-10

## 2019-12-10 NOTE — TELEPHONE ENCOUNTER
I completed my telephone conversation with her at 9:05 a.m. on Tuesday December 10, 2019.    She has an appointment for re-evaluation with Dr. Shaffer on Thursday December 12.  She stated recently she completed treatment with medications.  She stated she is beginning to feel more symptomatic despite using Astelin and Flonase.    She is aware of my [facility].  She is aware that Dr. Fletcher is working in the department.    This note was dictated using voice recognition software and may contain errors.

## 2019-12-12 ENCOUNTER — OFFICE VISIT (OUTPATIENT)
Dept: OTOLARYNGOLOGY | Facility: CLINIC | Age: 54
End: 2019-12-12
Payer: COMMERCIAL

## 2019-12-12 ENCOUNTER — LAB VISIT (OUTPATIENT)
Dept: LAB | Facility: HOSPITAL | Age: 54
End: 2019-12-12
Attending: ALLERGY & IMMUNOLOGY
Payer: COMMERCIAL

## 2019-12-12 ENCOUNTER — OFFICE VISIT (OUTPATIENT)
Dept: ALLERGY | Facility: CLINIC | Age: 54
End: 2019-12-12
Payer: COMMERCIAL

## 2019-12-12 ENCOUNTER — CLINICAL SUPPORT (OUTPATIENT)
Dept: GASTROENTEROLOGY | Facility: CLINIC | Age: 54
End: 2019-12-12
Payer: COMMERCIAL

## 2019-12-12 VITALS
WEIGHT: 205.69 LBS | BODY MASS INDEX: 32.28 KG/M2 | HEART RATE: 65 BPM | HEIGHT: 67 IN | SYSTOLIC BLOOD PRESSURE: 137 MMHG | TEMPERATURE: 98 F | DIASTOLIC BLOOD PRESSURE: 89 MMHG

## 2019-12-12 VITALS
SYSTOLIC BLOOD PRESSURE: 148 MMHG | HEART RATE: 72 BPM | WEIGHT: 192.44 LBS | DIASTOLIC BLOOD PRESSURE: 82 MMHG | BODY MASS INDEX: 30.14 KG/M2

## 2019-12-12 VITALS
SYSTOLIC BLOOD PRESSURE: 150 MMHG | DIASTOLIC BLOOD PRESSURE: 95 MMHG | HEART RATE: 68 BPM | TEMPERATURE: 99 F | WEIGHT: 205.69 LBS | HEIGHT: 67 IN | BODY MASS INDEX: 32.28 KG/M2

## 2019-12-12 DIAGNOSIS — J32.4 CHRONIC PANSINUSITIS: ICD-10-CM

## 2019-12-12 DIAGNOSIS — B99.9 RECURRENT INFECTIONS: ICD-10-CM

## 2019-12-12 DIAGNOSIS — Z98.890 S/P FESS (FUNCTIONAL ENDOSCOPIC SINUS SURGERY): ICD-10-CM

## 2019-12-12 DIAGNOSIS — Z79.60 LONG-TERM USE OF IMMUNOSUPPRESSANT MEDICATION: Primary | ICD-10-CM

## 2019-12-12 DIAGNOSIS — R09.82 POST-NASAL DRIP: ICD-10-CM

## 2019-12-12 DIAGNOSIS — B99.9 RECURRENT INFECTIONS: Primary | ICD-10-CM

## 2019-12-12 LAB — IGE SERPL-ACNC: <35 IU/ML (ref 0–100)

## 2019-12-12 PROCEDURE — 3008F BODY MASS INDEX DOCD: CPT | Mod: CPTII,S$GLB,, | Performed by: OTOLARYNGOLOGY

## 2019-12-12 PROCEDURE — 99214 OFFICE O/P EST MOD 30 MIN: CPT | Mod: 25,S$GLB,, | Performed by: OTOLARYNGOLOGY

## 2019-12-12 PROCEDURE — 86317 IMMUNOASSAY INFECTIOUS AGENT: CPT

## 2019-12-12 PROCEDURE — 36415 COLL VENOUS BLD VENIPUNCTURE: CPT

## 2019-12-12 PROCEDURE — 3008F BODY MASS INDEX DOCD: CPT | Mod: CPTII,S$GLB,, | Performed by: ALLERGY & IMMUNOLOGY

## 2019-12-12 PROCEDURE — 90471 HEPATITIS A HEPATITIS B COMBINED VACCINE IM: ICD-10-PCS | Mod: S$GLB,,, | Performed by: NURSE PRACTITIONER

## 2019-12-12 PROCEDURE — 3079F DIAST BP 80-89 MM HG: CPT | Mod: CPTII,S$GLB,, | Performed by: ALLERGY & IMMUNOLOGY

## 2019-12-12 PROCEDURE — 82784 ASSAY IGA/IGD/IGG/IGM EACH: CPT

## 2019-12-12 PROCEDURE — 86774 TETANUS ANTIBODY: CPT

## 2019-12-12 PROCEDURE — 99213 OFFICE O/P EST LOW 20 MIN: CPT | Mod: S$GLB,,, | Performed by: ALLERGY & IMMUNOLOGY

## 2019-12-12 PROCEDURE — 3078F DIAST BP <80 MM HG: CPT | Mod: CPTII,S$GLB,, | Performed by: OTOLARYNGOLOGY

## 2019-12-12 PROCEDURE — 82785 ASSAY OF IGE: CPT

## 2019-12-12 PROCEDURE — 99999 PR PBB SHADOW E&M-EST. PATIENT-LVL III: ICD-10-PCS | Mod: PBBFAC,,, | Performed by: ALLERGY & IMMUNOLOGY

## 2019-12-12 PROCEDURE — 99999 PR PBB SHADOW E&M-EST. PATIENT-LVL II: ICD-10-PCS | Mod: PBBFAC,,,

## 2019-12-12 PROCEDURE — 31231 NASAL ENDOSCOPY DX: CPT | Mod: S$GLB,,, | Performed by: OTOLARYNGOLOGY

## 2019-12-12 PROCEDURE — 86360 T CELL ABSOLUTE COUNT/RATIO: CPT

## 2019-12-12 PROCEDURE — 86355 B CELLS TOTAL COUNT: CPT

## 2019-12-12 PROCEDURE — 90471 IMMUNIZATION ADMIN: CPT | Mod: S$GLB,,, | Performed by: NURSE PRACTITIONER

## 2019-12-12 PROCEDURE — 3074F SYST BP LT 130 MM HG: CPT | Mod: CPTII,S$GLB,, | Performed by: OTOLARYNGOLOGY

## 2019-12-12 PROCEDURE — 3008F PR BODY MASS INDEX (BMI) DOCUMENTED: ICD-10-PCS | Mod: CPTII,S$GLB,, | Performed by: ALLERGY & IMMUNOLOGY

## 2019-12-12 PROCEDURE — 99214 PR OFFICE/OUTPT VISIT, EST, LEVL IV, 30-39 MIN: ICD-10-PCS | Mod: 25,S$GLB,, | Performed by: OTOLARYNGOLOGY

## 2019-12-12 PROCEDURE — 3079F PR MOST RECENT DIASTOLIC BLOOD PRESSURE 80-89 MM HG: ICD-10-PCS | Mod: CPTII,S$GLB,, | Performed by: ALLERGY & IMMUNOLOGY

## 2019-12-12 PROCEDURE — 3075F PR MOST RECENT SYSTOLIC BLOOD PRESS GE 130-139MM HG: ICD-10-PCS | Mod: CPTII,S$GLB,, | Performed by: ALLERGY & IMMUNOLOGY

## 2019-12-12 PROCEDURE — 90636 HEP A/HEP B VACC ADULT IM: CPT | Mod: S$GLB,,, | Performed by: NURSE PRACTITIONER

## 2019-12-12 PROCEDURE — 82787 IGG 1 2 3 OR 4 EACH: CPT | Mod: 59

## 2019-12-12 PROCEDURE — 86331 IMMUNODIFFUSION OUCHTERLONY: CPT | Mod: 91

## 2019-12-12 PROCEDURE — 31231 PR NASAL ENDOSCOPY, DX: ICD-10-PCS | Mod: S$GLB,,, | Performed by: OTOLARYNGOLOGY

## 2019-12-12 PROCEDURE — 99999 PR PBB SHADOW E&M-EST. PATIENT-LVL III: ICD-10-PCS | Mod: PBBFAC,,, | Performed by: OTOLARYNGOLOGY

## 2019-12-12 PROCEDURE — 99999 PR PBB SHADOW E&M-EST. PATIENT-LVL II: CPT | Mod: PBBFAC,,,

## 2019-12-12 PROCEDURE — 86003 ALLG SPEC IGE CRUDE XTRC EA: CPT

## 2019-12-12 PROCEDURE — 3008F PR BODY MASS INDEX (BMI) DOCUMENTED: ICD-10-PCS | Mod: CPTII,S$GLB,, | Performed by: OTOLARYNGOLOGY

## 2019-12-12 PROCEDURE — 86357 NK CELLS TOTAL COUNT: CPT

## 2019-12-12 PROCEDURE — 82784 ASSAY IGA/IGD/IGG/IGM EACH: CPT | Mod: 59

## 2019-12-12 PROCEDURE — 3074F PR MOST RECENT SYSTOLIC BLOOD PRESSURE < 130 MM HG: ICD-10-PCS | Mod: CPTII,S$GLB,, | Performed by: OTOLARYNGOLOGY

## 2019-12-12 PROCEDURE — 99213 PR OFFICE/OUTPT VISIT, EST, LEVL III, 20-29 MIN: ICD-10-PCS | Mod: S$GLB,,, | Performed by: ALLERGY & IMMUNOLOGY

## 2019-12-12 PROCEDURE — 90636 HEPATITIS A HEPATITIS B COMBINED VACCINE IM: ICD-10-PCS | Mod: S$GLB,,, | Performed by: NURSE PRACTITIONER

## 2019-12-12 PROCEDURE — 3075F SYST BP GE 130 - 139MM HG: CPT | Mod: CPTII,S$GLB,, | Performed by: ALLERGY & IMMUNOLOGY

## 2019-12-12 PROCEDURE — 99999 PR PBB SHADOW E&M-EST. PATIENT-LVL III: CPT | Mod: PBBFAC,,, | Performed by: ALLERGY & IMMUNOLOGY

## 2019-12-12 PROCEDURE — 86359 T CELLS TOTAL COUNT: CPT

## 2019-12-12 PROCEDURE — 3078F PR MOST RECENT DIASTOLIC BLOOD PRESSURE < 80 MM HG: ICD-10-PCS | Mod: CPTII,S$GLB,, | Performed by: OTOLARYNGOLOGY

## 2019-12-12 PROCEDURE — 99999 PR PBB SHADOW E&M-EST. PATIENT-LVL III: CPT | Mod: PBBFAC,,, | Performed by: OTOLARYNGOLOGY

## 2019-12-12 PROCEDURE — 86003 ALLG SPEC IGE CRUDE XTRC EA: CPT | Mod: 59

## 2019-12-12 PROCEDURE — 86317 IMMUNOASSAY INFECTIOUS AGENT: CPT | Mod: 59

## 2019-12-12 RX ORDER — FLUTICASONE PROPIONATE 50 MCG
SPRAY, SUSPENSION (ML) NASAL
COMMUNITY
Start: 2019-11-22 | End: 2021-02-10 | Stop reason: SDUPTHER

## 2019-12-12 RX ORDER — NORTRIPTYLINE HYDROCHLORIDE 25 MG/1
CAPSULE ORAL
Qty: 60 CAPSULE | Refills: 0 | Status: SHIPPED | OUTPATIENT
Start: 2019-12-12 | End: 2020-01-08 | Stop reason: SDUPTHER

## 2019-12-12 NOTE — PROGRESS NOTES
Subjective:   Patient: Jaylin Murguia [id number], :1965   Visit date:2019 8:35 AM    Chief Complaint:  Follow-up (medications )    HPI:  Jaylin is a 54 y.o. female who is here for follow-up after surgery:    Subjective: She was last here Sep 20.  Since surgery, she has been on Bactrim and Cipro PO.  I had prescribed zyvox but there was concern for a medication interaction. She was also on topical vancomycin/doxycycline/budesonide.  She had to stop the doxy due to severe burning.  She continued the vancomycin and budesonide topically but finished that later. We had significant problems getting cultures on her to grow but ultimately the did.  She had a severe fungal infection in addition to bacteria.  She has been on Bactrim.  She was last seen in October and her exam was improved but certainly still irregular.  She returns today 19.  Unfortunately during the course of all of this, she had a rib fracture due to severe coughing.  She has continued Topical vori, budesonide, levofloxacin.  She completed this about 5 days ago. Until stopping, she was doing much better. Since then, she has had some green drainage.  Using saline.  She is only on mesalamine.  Ig testing did show low IgG.           Surgery date: 19       RIGHT   -  right Total ethmoidectomy (CPT 99389)  -  right Maxillary antrostomy without removal of tissue (CPT 95199)     LEFT  -  left Total ethmoidectomy with sphenoid sinusotomy (CPT 65424)  -  left Maxillary antrostomy without removal of tissue (CPT 93926)      -  Sterotactic computer assisted (navigational) procedure; cranial, extradural (CPT 39873)  -  Septoplasty (CPT 62114)        Procedure in Detail/Findings:     Endoscopic Sinonasal Exam Findings at TIME OF SURGERY:  -     Sinuses examined: bilateral maxillary, bilateral ethmoid anterior/posterior and left sphenoid            The right sinus(es) have marked edema with polypoid changes            The left sinus(es) have  marked edema with polypoid changes  -     Nasal secretions: purulent secretions bilaterally  -     Nasal septum: LEFT moderate deviation   -     Inferior turbinate: hypertrophy or edema (Mild) bilaterally  -     Middle turbinate: hypertrophy or edema (Moderate) on the left  -     Other findings: - no mass or obstructive lesion -Technical Procedures Used: 67160       Pathology:  NA    Cultures:    Contains abnormal data CULTURE, AEROBIC  (SPECIFY SOURCE)   Order: [id number]   Status:  Final result   Visible to patient:  Yes (Patient Portal) Next appt:  10/24/2019 at 08:20 AM in Pulmonology (Elizabeth Lejeune, NP) Dx:  Chronic pansinusitis   Specimen Information: Sinus        Component 3wk ago   Aerobic Bacterial Culture Abnormal    ASPERGILLUS NIGER   Many     Aerobic Bacterial Culture Abnormal    ACHROMOBACTER XYLOSOXIDANS SUBSP. DENTRIFICANS   Moderate     Resulting Agency [facility]   Susceptibility      Achromobacter xylosoxidans subsp. dentrificans     CULTURE, AEROBIC  (SPECIFY SOURCE)     Amikacin >32 mcg/mL Resistant     Cefepime 16 mcg/mL Intermediate     Ceftriaxone 32 mcg/mL Intermediate     Ciprofloxacin 2 mcg/mL Intermediate     Gentamicin >8 mcg/mL Resistant     Levofloxacin <=2 mcg/mL Sensitive     Piperacillin/Tazo <=16 mcg/mL Sensitive     Tetracycline >8 mcg/mL Resistant     Tobramycin >8 mcg/mL Resistant     Trimeth/Sulfa <=2/38 mcg/mL Sensitive            Linear View                  Review of Systems:  -     Allergic/Immunologic: has No Known Allergies..  -     Constitutional: Current temp: 98.5 °F (36.9 °C) (Tympanic)    Her meds, allergies, medical, surgical, social & family histories were reviewed & updated:  -     She has a current medication list which includes the following prescription(s): amlodipine, azelastine, budesonide, butalbital-acetaminophen-caffeine -40 mg, cetirizine, doxycycline monohydrate, duloxetine, eletriptan, estradiol, fluticasone furoate-vilanterol, fluticasone  "propionate, ipratropium, levalbuterol, losartan-hydrochlorothiazide 100-25 mg, mesalamine, methylprednisolone, montelukast, nortriptyline, pantoprazole, pantoprazole, pregabalin, propranolol, rizatriptan, suprep bowel prep kit, tiotropium bromide, triamcinolone acetonide 0.025%, ventolin hfa, and zolpidem, and the following Facility-Administered Medications: lactated ringers and lidocaine (pf) 10 mg/ml (1%).  -     She  has a past medical history of Allergic rhinitis, Asthma, Chronic pansinusitis, Crohn's disease, Fibromyalgia, Hyperlipidemia, Hypertension, Migraine, Obstructive sleep apnea, and Sciatica.   -     She does not have any pertinent problems on file.   -     She  has a past surgical history that includes Hysterectomy;  section; Benjamin tooth extraction; Finger surgery; Bladder surgery; Colonoscopy (N/A, 2017); Colonoscopy (N/A, 3/27/2018); and Functional endoscopic sinus surgery (FESS) using computer-assisted navigation (Bilateral, 2019).  -     She  reports that she has never smoked. She has never used smokeless tobacco. She reports that she does not drink alcohol or use drugs.  -     Her family history includes COPD (age of onset: 72) in her maternal grandmother; Cancer (age of onset: 70) in her paternal grandmother; Heart attack in her maternal grandmother; Hypertension in her brother and mother; Kidney disease (age of onset: 64) in her father; Scleroderma in her father.  -     She has No Known Allergies.    Objective:     Physical Exam:  Vitals:  BP (!) 150/95 (BP Location: Right arm, Patient Position: Sitting, BP Method: Medium (Automatic))   Pulse 68   Temp 98.5 °F (36.9 °C) (Tympanic)   Ht 5' 7" (1.702 m)   Wt 93.3 kg (205 lb 11 oz)   BMI 32.22 kg/m²   General appearance:  Well developed, well nourished    Eyes:  Extraocular motions intact, PERRL    Communication:  no hoarseness, no dysphonia    Ears:  Otoscopy of external auditory canals and tympanic membranes was normal, " clinical speech reception thresholds grossly intact, no mass/lesion of auricle.  Nose:  No masses/lesions of external nose, nasal mucosa, septum, and turbinates were within normal limits.  Mouth:  No mass/lesion of lips, teeth, gums, hard/soft palate, tongue, tonsils, or oropharynx.    Cardiovascular:  No pedal edema; Radial Pulses +2     Neck & Lymphatics:  No cervical lymphadenopathy, no neck mass/crepitus/ asymmetry, trachea is midline, no thyroid enlargement/tenderness/mass.    Psych: Oriented x3,  Alert with normal mood and affect.     Respiration/Chest:  Symmetric expansion during respiration, normal respiratory effort.    Skin:  Warm and intact. No ulcerations of face, scalp, neck.        Assessment & Plan:   Jaylin was seen today for follow-up.    Diagnoses and all orders for this visit:    Long-term use of immunosuppressant medication    Chronic pansinusitis    S/P FESS (functional endoscopic sinus surgery)      Right side looks perfect now  Left maxillary looks excellent.  Edema in the ethmoid cavity.   Will have her see Dr. Fletcher for A/I eval. IgG were low in the past.  Consider repeat CT sinus.       Manish Shaffer MD       NASAL ENDOSCOPY     Patient: Jaylin Murguia [id number], :1965  Procedure date:2019  Patient's medications, allergies, past medical, surgical, social and family histories were reviewed and updated as appropriate.  Chief Complaint:  Follow-up (medications )    HPI:  Jaylin is a 54 y.o. female with chronic sinusitis.    Procedure: Risks, benefits, and alternatives of the procedure were discussed with the patient, and the patient consented to the nasal endoscopy with debridement.  Anterior rhinoscopy was insufficient to explain patients symptoms.      The nasal cavity was sprayed with a topical decongestant and anesthetic . The endoscope was passed into each nostril and each nasal cavity was visualized.  On each side the nasal cavity, sinuses (if open), turbinates, and septum were  examined with the findings described below. The turbinates, middle meatus, superior meatus and sphenoethmoidal recess was examined.  Crusting and polypoid material was removed with suction and straight non thru cut forceps.   At the end of the examination, the scope was removed. The patient tolerated the procedure well with no complications.     Endoscopic Sinonasal Exam Findings:  -     Sinuses examined: bilateral maxillary and right ethmoid anterior            The right sinus(es): Maxillary with normal mucosa.  No abnormal secretions            The left sinus(es): normal maxillary; ethmoid with some thick secretions and edema.   -     Nasal septum: Small mid septal perforation  -     Inferior turbinate: - normal mucosa without significant hypertrophy - bilaterally  -     Middle turbinate: - normal mucosa without significant hypertrophy - bilaterally  -     Other findings: - no mass or obstructive lesion -    Assessment & Plan:  See today's clinic note

## 2019-12-12 NOTE — PROGRESS NOTES
"Subjective:       Patient ID: Jaylin Murguia is a 54 y.o. female.    Chief Complaint:  Allergies      HPI:  54 year old female with a history of sinus surgery, crohn's disease and asthma. After her sinus surgery, she suffered from pneumonia. She is concerned about her immune system. She did not tolerate Humira and Remicade. She was allergy tested in the past and took allergy immunotherapy for three years. She does not feel that her symptoms improved. She reports a post nasal drip and itchy eyes. She denies runny nose or nasal congestion. She reports watery eyes and she does OTC eye drops prn. She takes Zyrtec BID, Flonase and Astelin, which she feels helps.   She does heartburn.  She denies skin infections.  Beside the episode of pneumonia mention previously, she denies any other episodes of pneumonia.  She has an intermittent rash, intermittently on her neck and arms. It is raised and "pinkish". She uses triamcinolone cream to treat these symptoms. It is pruritic.            Past Medical History:   Diagnosis Date    Allergic rhinitis     Asthma     Chronic pansinusitis     Crohn's disease     Ileal involvement, previously on Remicade, Asacol, Prednisone    Fibromyalgia     Hyperlipidemia     Hypertension     Migraine     Obstructive sleep apnea     CPAP at night    Sciatica      Family History   Problem Relation Age of Onset    Hypertension Mother     Kidney disease Father 64        ESRD on HD    Scleroderma Father     Hypertension Brother     Cancer Paternal Grandmother 70        colon    Heart attack Maternal Grandmother     COPD Maternal Grandmother 72     Review of patient's allergies indicates:  No Known Allergies   No food or insect sting allergy    Current Outpatient Medications on File Prior to Visit   Medication Sig Dispense Refill    amLODIPine (NORVASC) 5 MG tablet Take 1 tablet (5 mg total) by mouth once daily. 90 tablet 3    azelastine (ASTELIN) 137 mcg (0.1 %) nasal spray 1 spray " (137 mcg total) by Nasal route 2 (two) times daily. 30 mL 11    budesonide 1 mg/2 mL NbSp MIX CONTENTS OF 1 RESPULE WITH STERILE DILUENT PROVIDED. POUR INTO NASONEB CUP & PERFORM TREATMENT TWICE DAILY.  2    butalbital-acetaminophen-caffeine -40 mg (FIORICET, ESGIC) -40 mg per tablet Take 1 tab po q4 hrs prn headache 90 tablet 3    cetirizine (ZYRTEC) 5 MG chewable tablet Take 5 mg by mouth 2 (two) times daily.       doxycycline monohydrate 150 mg Cap MIX CONTENTS OF 1 CAPSULE WITH STERILE DILUENT PROVIDED. POUR INTO NASONEB CUP AND PERFORM TREATMENT TWICE DAILY  2    DULoxetine (CYMBALTA) 60 MG capsule Take 1 capsule (60 mg total) by mouth 2 (two) times daily. 180 capsule 3    eletriptan (RELPAX) 40 MG tablet Take 1 tablet (40 mg total) by mouth as needed. 12 tablet 3    estradiol (ESTRACE) 2 MG tablet Take 2 mg by mouth every evening.       fluticasone furoate-vilanterol (BREO ELLIPTA) 200-25 mcg/dose DsDv diskus inhaler Inhale 1 puff into the lungs once daily. 1 each 11    fluticasone propionate (FLONASE) 50 mcg/actuation nasal spray       ipratropium (ATROVENT) 0.02 % nebulizer solution Nebulize 2.5 ml every 6 hours as directed, if needed 1 Box 0    levalbuterol (XOPENEX) 1.25 mg/3 mL nebulizer solution Take 3 mLs (1.25 mg total) by nebulization every 4 (four) hours. Rescue 1 Box 11    losartan-hydrochlorothiazide 100-25 mg (HYZAAR) 100-25 mg per tablet Take 1 tablet by mouth once daily. 90 tablet 3    mesalamine (PENTASA) 250 mg CpSR Take 4 capsules (1,000 mg total) by mouth 4 (four) times daily. 1440 capsule 3    montelukast (SINGULAIR) 10 mg tablet Take 1 tablet (10 mg total) by mouth every evening. 90 tablet 3    nortriptyline (PAMELOR) 25 MG capsule Take 2 capsules (50 mg total) by mouth once daily. 60 capsule 1    pantoprazole (PROTONIX) 40 MG tablet Take 1 tablet (40 mg total) by mouth every evening. 90 tablet 3    pantoprazole (PROTONIX) 40 MG tablet Take 1 tablet (40 mg  total) by mouth once daily. 30 tablet 5    pregabalin (LYRICA) 150 MG capsule TAKE 1 CAPSULE (150 MG TOTAL) BY MOUTH 3 (THREE) TIMES DAILY. 270 capsule 1    propranolol (INNOPRAN XL) 80 mg 24 hr capsule Take 1 capsule (80 mg total) by mouth every evening. 90 capsule 3    rizatriptan (MAXALT) 10 MG tablet TAKE 1 TABLET BY MOUTH IF NEEDED FOR MIGRAINES MAX 2 TAB IN 24 HOURS 30 tablet 3    triamcinolone acetonide 0.025% (KENALOG) 0.025 % cream Apply topically 2 (two) times daily. 453 g 3    VENTOLIN HFA 90 mcg/actuation inhaler INHALE 2 PUFFS INTO THE LUNGS EVERY 4 TO 6 HOURS AS NEEDED FOR WHEEZING. 18 Inhaler 1    zolpidem (AMBIEN) 5 MG Tab TAKE 1 TABLET BY MOUTH EVERY DAY AT BEDTIME AS NEEDED 90 tablet 1    methylPREDNISolone (MEDROL) 4 MG Tab Take orally as directed.  Start October 14:  12 pills x 3 days, 8 x 3d, 6 x 3d, 4 x 3d, 2x 3d, 1 x 4d, stop. (Patient not taking: Reported on 12/12/2019) 100 tablet 0    SUPREP BOWEL PREP KIT 17.5-3.13-1.6 gram SolR Use as directed (Patient not taking: Reported on 12/12/2019) 354 mL 0    tiotropium bromide (SPIRIVA RESPIMAT) 1.25 mcg/actuation Mist Inhale 2 puffs into the lungs once daily. (Patient not taking: Reported on 12/12/2019) 4 g 11     Current Facility-Administered Medications on File Prior to Visit   Medication Dose Route Frequency Provider Last Rate Last Dose    lactated ringers infusion   Intravenous Continuous Mary Leiva MD        lidocaine (PF) 10 mg/ml (1%) injection 10 mg  1 mL Intradermal Once Mary Leiva MD             Environmental History:  Dog in the house. NO smoke exposure.  Review of Systems   Constitutional: Negative for chills and fever.   HENT: Positive for congestion, postnasal drip, sinus pressure and sinus pain. Negative for rhinorrhea.    Eyes: Positive for discharge and itching.   Respiratory: Negative for chest tightness, shortness of breath and wheezing.    Cardiovascular: Negative for chest pain.   Gastrointestinal:  Negative for constipation, diarrhea, nausea and vomiting.   Endocrine: Negative for cold intolerance and heat intolerance.   Genitourinary: Negative for dysuria and frequency.   Musculoskeletal: Positive for joint swelling. Negative for gait problem.   Skin: Positive for rash. Negative for wound.   Allergic/Immunologic: Positive for environmental allergies. Negative for food allergies.   Neurological: Positive for headaches. Negative for seizures.        She has a history of migraines and she is on medication.   Hematological: Negative for adenopathy. Does not bruise/bleed easily.   Psychiatric/Behavioral: Negative for agitation and confusion.        Objective:    Physical Exam   Constitutional: She is oriented to person, place, and time. She appears well-developed and well-nourished. No distress.   HENT:   Head: Normocephalic and atraumatic.   Right Ear: External ear normal.   Left Ear: External ear normal.   Nose: Nose normal.   Mouth/Throat: Oropharynx is clear and moist. No oropharyngeal exudate.   Eyes: Pupils are equal, round, and reactive to light. Right eye exhibits no discharge. Left eye exhibits no discharge. No scleral icterus.   Neck: Normal range of motion. Neck supple. No thyromegaly present.   Cardiovascular: Normal rate, regular rhythm and normal heart sounds.   No murmur heard.  Pulmonary/Chest: Effort normal and breath sounds normal. No stridor. No respiratory distress. She has no wheezes. She has no rales.   Abdominal: Soft. Bowel sounds are normal. She exhibits no distension. There is no tenderness. There is no guarding.   Musculoskeletal: Normal range of motion. She exhibits no edema.   Lymphadenopathy:     She has no cervical adenopathy.   Neurological: She is alert and oriented to person, place, and time.   Skin: Skin is warm. She is not diaphoretic. No erythema. No pallor.   Psychiatric: She has a normal mood and affect. Judgment and thought content normal.   Vitals reviewed.      She is  currently taking oral antihistamines, so unable to skin prick test.  Assessment:       1. Recurrent infections    2. Post-nasal drip         Plan:       Recurrent infections  -     FUNGAL PRECIPITINS (HYPERSENSITIVITY PNEUMONITISI); Future; Expected date: 12/12/2019  -     IgG 1, 2, 3, and 4; Future; Expected date: 12/12/2019  -     IgG; Future; Expected date: 12/12/2019  -     IgA; Future; Expected date: 12/12/2019  -     IgM; Future; Expected date: 12/12/2019  -     S.pneumoniae IgG Serotypes; Future; Expected date: 12/12/2019  -     Tetanus toxoid, IgG; Future; Expected date: 12/12/2019  -     Lymphocyte Profile II; Future; Expected date: 12/12/2019  -     DIPHTHERIA / TETANUS ANTIBODY PANEL; Future; Expected date: 12/12/2019    Post-nasal drip  -     IgE; Future; Expected date: 12/12/2019  -     Bahia grass IgE; Future; Expected date: 12/12/2019  -     Aspergillus fumagatus IgE; Future; Expected date: 12/12/2019  -     Chaetomium globosum IgE; Future; Expected date: 12/12/2019  -     Cockroach, American IgE; Future; Expected date: 12/12/2019  -     Cladosporium IgE; Future; Expected date: 12/12/2019  -     Curvularia lunata IgE; Future; Expected date: 12/12/2019  -     D. farinae IgE; Future; Expected date: 12/12/2019  -     D. pteronyssinus IgE; Future; Expected date: 12/12/2019  -     Dog dander IgE; Future; Expected date: 12/12/2019  -     Plantain, English IgE; Future; Expected date: 12/12/2019  -     Eucalyptus IgE; Future; Expected date: 12/12/2019  -     Marsh elder, rough IgE; Future; Expected date: 12/12/2019  -     Mugwort IgE; Future; Expected date: 12/12/2019  -     Nettle IgE; Future; Expected date: 12/12/2019  -     Orchard grass IgE; Future; Expected date: 12/12/2019  -     Nicollet, western white IgE; Future; Expected date: 12/12/2019  -     Ragweed, short, common IgE; Future; Expected date: 12/12/2019  -     Red top grass IgE; Future; Expected date: 12/12/2019  -     Rye grass, cultivated IgE;  Future; Expected date: 12/12/2019  -     Vivian, Russian IgE; Future; Expected date: 12/12/2019  -     Stemphyllium IgE; Future; Expected date: 12/12/2019  -     Scot IgE; Future; Expected date: 12/12/2019  -     Haider grass IgE; Future; Expected date: 12/12/2019  -     ALLERGEN CAT EPITHELLIUM; Future; Expected date: 12/12/2019  -     FUNGAL PRECIPITINS (HYPERSENSITIVITY PNEUMONITISI); Future; Expected date: 12/12/2019    labs ordered to evaluate the immune system. Medications used to treat her Crohn's disease are likely suppressing her immune system.  RTC 2-4 weeks or sooner, if needed.

## 2019-12-12 NOTE — PROGRESS NOTES
Administered twinrix after discussing the vaccination,  possible side effects, verifying allergies, no illness for 24 hours, patient tolerated without any ill effects

## 2019-12-13 ENCOUNTER — PATIENT MESSAGE (OUTPATIENT)
Dept: ALLERGY | Facility: CLINIC | Age: 54
End: 2019-12-13

## 2019-12-13 LAB
CD3+CD4+ CELLS # BLD: 1650 CELLS/UL (ref 300–1400)
CD3+CD4+ CELLS NFR BLD: 58.5 % (ref 28–57)
IGA SERPL-MCNC: 235 MG/DL (ref 40–350)
IGG SERPL-MCNC: 941 MG/DL (ref 650–1600)
IGM SERPL-MCNC: 133 MG/DL (ref 50–300)
LYMPHOCYTES NFR CSF MANUAL: 14.5 % (ref 6–19)
LYMPHOCYTES NFR CSF MANUAL: 2.4 % (ref 7–31)
LYMPHOCYTES NFR CSF MANUAL: 2.91 % (ref 0.9–3.6)
LYMPHOCYTES NFR CSF MANUAL: 20.1 % (ref 10–39)
LYMPHOCYTES NFR CSF MANUAL: 2267 CELLS/UL (ref 700–2100)
LYMPHOCYTES NFR CSF MANUAL: 385 CELLS/UL (ref 100–500)
LYMPHOCYTES NFR CSF MANUAL: 568 CELLS/UL (ref 200–900)
LYMPHOCYTES NFR CSF MANUAL: 63 CELLS/UL (ref 90–600)
LYMPHOCYTES NFR CSF MANUAL: 80.3 % (ref 55–83)

## 2019-12-16 ENCOUNTER — PATIENT MESSAGE (OUTPATIENT)
Dept: ALLERGY | Facility: CLINIC | Age: 54
End: 2019-12-16

## 2019-12-16 LAB
DIPHTHERIA IGG VALUE: 0.16 IU/ML
DIPHTHERIA TOXOID IGG ANTIBODY: POSITIVE
IGG1 SER-MCNC: 556 MG/DL (ref 382–929)
IGG2 SER-MCNC: 153 MG/DL (ref 242–700)
IGG3 SER-MCNC: 16 MG/DL (ref 22–176)
IGG4 SER-MCNC: 22 MG/DL (ref 4–86)
TETANUS TOXOID IGG AB: POSITIVE
TETANUS TOXOID IGG AB: POSITIVE
TETANUS TOXOID IGG VALUE: 0.98 IU/ML
TETANUS TOXOID IGG VALUE: 0.98 IU/ML

## 2019-12-17 LAB
A FUMIGATUS IGE QN: <0.1 KU/L
ALLERGEN CHAETOMIUM GLOBOSUM IGE: <0.1 KU/L
ALLERGEN WHITE PINE TREE IGE: <0.1 KU/L
BAHIA GRASS IGE QN: <0.1 KU/L
C HERBARUM IGE QN: <0.1 KU/L
C LUNATA IGE QN: <0.1 KU/L
CAT DANDER IGE QN: <0.1 KU/L
CHAETOMIUM GLOB. CLASS: NORMAL
COCKSFOOT IGE QN: <0.1 KU/L
COMMON RAGWEED IGE QN: <0.1 KU/L
D FARINAE IGE QN: <0.1 KU/L
D PTERONYSS IGE QN: <0.1 KU/L
DEPRECATED A FUMIGATUS IGE RAST QL: NORMAL
DEPRECATED BAHIA GRASS IGE RAST QL: NORMAL
DEPRECATED C HERBARUM IGE RAST QL: NORMAL
DEPRECATED C LUNATA IGE RAST QL: NORMAL
DEPRECATED CAT DANDER IGE RAST QL: NORMAL
DEPRECATED COCKSFOOT IGE RAST QL: NORMAL
DEPRECATED COMMON RAGWEED IGE RAST QL: NORMAL
DEPRECATED D FARINAE IGE RAST QL: NORMAL
DEPRECATED D PTERONYSS IGE RAST QL: NORMAL
DEPRECATED DOG DANDER IGE RAST QL: NORMAL
DEPRECATED ELDER IGE RAST QL: NORMAL
DEPRECATED ENGL PLANTAIN IGE RAST QL: NORMAL
DEPRECATED GUM-TREE IGE RAST QL: NORMAL
DEPRECATED JOHNSON GRASS IGE RAST QL: NORMAL
DEPRECATED MUGWORT IGE RAST QL: NORMAL
DEPRECATED NETTLE IGE RAST QL: NORMAL
DEPRECATED PER RYE GRASS IGE RAST QL: NORMAL
DEPRECATED RED TOP GRASS IGE RAST QL: NORMAL
DEPRECATED ROACH IGE RAST QL: NORMAL
DEPRECATED SALTWORT IGE RAST QL: NORMAL
DEPRECATED TIMOTHY IGE RAST QL: NORMAL
DEPRECATED WHITE OAK IGE RAST QL: NORMAL
DOG DANDER IGE QN: <0.1 KU/L
ELDER IGE QN: <0.1 KU/L
ENGL PLANTAIN IGE QN: <0.1 KU/L
GUM-TREE IGE QN: <0.1 KU/L
JOHNSON GRASS IGE QN: <0.1 KU/L
MUGWORT IGE QN: <0.1 KU/L
NETTLE IGE QN: <0.1 KU/L
PER RYE GRASS IGE QN: <0.1 KU/L
RED TOP GRASS IGE QN: <0.1 KU/L
ROACH IGE QN: <0.1 KU/L
SALTWORT IGE QN: <0.1 KU/L
STEMPHYLIUM HERBARUM CLASS: NORMAL
STEMPHYLLIUM, IGE: <0.1 KU/L
TIMOTHY IGE QN: <0.1 KU/L
WHITE OAK IGE QN: <0.1 KU/L
WHITE PINE CLASS: NORMAL

## 2019-12-17 NOTE — TELEPHONE ENCOUNTER
I spoke to patient . She said she rather not take prednisone now and that she has Migraine medicine if she needs to take it.She said to tell you thank you.

## 2019-12-18 ENCOUNTER — PATIENT MESSAGE (OUTPATIENT)
Dept: ALLERGY | Facility: CLINIC | Age: 54
End: 2019-12-18

## 2019-12-18 LAB
A FUMIGATUS1 AB SER QL ID: NORMAL
A FUMIGATUS6 AB SER QL ID: NORMAL
A PULLULANS AB SER QL ID: NORMAL
DEPRECATED S PNEUM 1 IGG SER-MCNC: <0.3 MCG/ML
DEPRECATED S PNEUM12 IGG SER-MCNC: 1.5 MCG/ML
DEPRECATED S PNEUM14 IGG SER-MCNC: 1.8 MCG/ML
DEPRECATED S PNEUM19 IGG SER-MCNC: 6.1 MCG/ML
DEPRECATED S PNEUM23 IGG SER-MCNC: 0.3 MCG/ML
DEPRECATED S PNEUM3 IGG SER-MCNC: 0.8 MCG/ML
DEPRECATED S PNEUM4 IGG SER-MCNC: <0.3 MCG/ML
DEPRECATED S PNEUM5 IGG SER-MCNC: 0.5 MCG/ML
DEPRECATED S PNEUM8 IGG SER-MCNC: 1.3 MCG/ML
DEPRECATED S PNEUM9 IGG SER-MCNC: <0.3 MCG/ML
PIGEON SERUM AB QL ID: NORMAL
S PNEUM DA 18C IGG SER-MCNC: 0.5 MCG/ML
S PNEUM DA 6B IGG SER-MCNC: 1.9 MCG/ML
S PNEUM DA 7F IGG SER-MCNC: 1.5 MCG/ML
S PNEUM DA 9V IGG SER-MCNC: <0.3 MCG/ML
S RECTIVIRGULA AB SER QL ID: NORMAL
T VULGARIS1 AB SER QL ID: NORMAL

## 2019-12-19 ENCOUNTER — PATIENT MESSAGE (OUTPATIENT)
Dept: NEPHROLOGY | Facility: CLINIC | Age: 54
End: 2019-12-19

## 2019-12-23 ENCOUNTER — OFFICE VISIT (OUTPATIENT)
Dept: ALLERGY | Facility: CLINIC | Age: 54
End: 2019-12-23
Payer: COMMERCIAL

## 2019-12-23 ENCOUNTER — TELEPHONE (OUTPATIENT)
Dept: ALLERGY | Facility: CLINIC | Age: 54
End: 2019-12-23

## 2019-12-23 VITALS
TEMPERATURE: 98 F | HEART RATE: 82 BPM | BODY MASS INDEX: 31.94 KG/M2 | WEIGHT: 203.94 LBS | DIASTOLIC BLOOD PRESSURE: 85 MMHG | SYSTOLIC BLOOD PRESSURE: 117 MMHG

## 2019-12-23 DIAGNOSIS — J31.0 RHINITIS, UNSPECIFIED TYPE: ICD-10-CM

## 2019-12-23 DIAGNOSIS — D80.1 HYPOGAMMAGLOBULINEMIA: Primary | ICD-10-CM

## 2019-12-23 DIAGNOSIS — J45.909 ASTHMA, UNSPECIFIED ASTHMA SEVERITY, UNSPECIFIED WHETHER COMPLICATED, UNSPECIFIED WHETHER PERSISTENT: ICD-10-CM

## 2019-12-23 PROCEDURE — 3079F DIAST BP 80-89 MM HG: CPT | Mod: CPTII,S$GLB,, | Performed by: ALLERGY & IMMUNOLOGY

## 2019-12-23 PROCEDURE — 99213 PR OFFICE/OUTPT VISIT, EST, LEVL III, 20-29 MIN: ICD-10-PCS | Mod: 25,S$GLB,, | Performed by: ALLERGY & IMMUNOLOGY

## 2019-12-23 PROCEDURE — 3074F SYST BP LT 130 MM HG: CPT | Mod: CPTII,S$GLB,, | Performed by: ALLERGY & IMMUNOLOGY

## 2019-12-23 PROCEDURE — 3074F PR MOST RECENT SYSTOLIC BLOOD PRESSURE < 130 MM HG: ICD-10-PCS | Mod: CPTII,S$GLB,, | Performed by: ALLERGY & IMMUNOLOGY

## 2019-12-23 PROCEDURE — 90471 PR IMMUNIZ ADMIN,1 SINGLE/COMB VAC/TOXOID: ICD-10-PCS | Mod: S$GLB,,, | Performed by: ALLERGY & IMMUNOLOGY

## 2019-12-23 PROCEDURE — 99999 PR PBB SHADOW E&M-EST. PATIENT-LVL III: CPT | Mod: PBBFAC,,, | Performed by: ALLERGY & IMMUNOLOGY

## 2019-12-23 PROCEDURE — 90670 PCV13 VACCINE IM: CPT | Mod: S$GLB,,, | Performed by: ALLERGY & IMMUNOLOGY

## 2019-12-23 PROCEDURE — 90670 PR PNEUMOCOCCAL CONJ VACCINE 13 VALENT IM: ICD-10-PCS | Mod: S$GLB,,, | Performed by: ALLERGY & IMMUNOLOGY

## 2019-12-23 PROCEDURE — 99999 PR PBB SHADOW E&M-EST. PATIENT-LVL III: ICD-10-PCS | Mod: PBBFAC,,, | Performed by: ALLERGY & IMMUNOLOGY

## 2019-12-23 PROCEDURE — 99213 OFFICE O/P EST LOW 20 MIN: CPT | Mod: 25,S$GLB,, | Performed by: ALLERGY & IMMUNOLOGY

## 2019-12-23 PROCEDURE — 3008F PR BODY MASS INDEX (BMI) DOCUMENTED: ICD-10-PCS | Mod: CPTII,S$GLB,, | Performed by: ALLERGY & IMMUNOLOGY

## 2019-12-23 PROCEDURE — 3079F PR MOST RECENT DIASTOLIC BLOOD PRESSURE 80-89 MM HG: ICD-10-PCS | Mod: CPTII,S$GLB,, | Performed by: ALLERGY & IMMUNOLOGY

## 2019-12-23 PROCEDURE — 90471 IMMUNIZATION ADMIN: CPT | Mod: S$GLB,,, | Performed by: ALLERGY & IMMUNOLOGY

## 2019-12-23 PROCEDURE — 3008F BODY MASS INDEX DOCD: CPT | Mod: CPTII,S$GLB,, | Performed by: ALLERGY & IMMUNOLOGY

## 2019-12-23 RX ORDER — LEVOCETIRIZINE DIHYDROCHLORIDE 5 MG/1
5 TABLET, FILM COATED ORAL NIGHTLY
Qty: 30 TABLET | Refills: 11 | Status: SHIPPED | OUTPATIENT
Start: 2019-12-23 | End: 2020-07-08

## 2019-12-23 NOTE — PROGRESS NOTES
"Subjective:       Patient ID: Jaylin Murguia is a 54 y.o. female.    Chief Complaint:  Follow-up      HPI: 12/12/2019 visit- 54 year old female with a history of sinus surgery, crohn's disease and asthma. After her sinus surgery, she suffered from pneumonia. She is concerned about her immune system. She did not tolerate Humira and Remicade. She was allergy tested in the past and took allergy immunotherapy for three years. She does not feel that her symptoms improved. She reports a post nasal drip and itchy eyes. She denies runny nose or nasal congestion. She reports watery eyes and she does OTC eye drops prn. She takes Zyrtec BID, Flonase and Astelin, which she feels helps.   She does heartburn.  She denies skin infections.  Beside the episode of pneumonia mention previously, she denies any other episodes of pneumonia.  She has an intermittent rash, intermittently on her neck and arms. It is raised and "pinkish". She uses triamcinolone cream to treat these symptoms. It is pruritic.    12/23/2019- HPI-no infections, since she was seen here previously.         Past Medical History:   Diagnosis Date    Allergic rhinitis     Asthma     Chronic pansinusitis     Crohn's disease     Ileal involvement, previously on Remicade, Asacol, Prednisone    Fibromyalgia     Hyperlipidemia     Hypertension     Migraine     Obstructive sleep apnea     CPAP at night    Sciatica      Family History   Problem Relation Age of Onset    Hypertension Mother     Kidney disease Father 64        ESRD on HD    Scleroderma Father     Hypertension Brother     Cancer Paternal Grandmother 70        colon    Heart attack Maternal Grandmother     COPD Maternal Grandmother 72     Review of patient's allergies indicates:  No Known Allergies   No food or insect sting allergy    Current Outpatient Medications on File Prior to Visit   Medication Sig Dispense Refill    amLODIPine (NORVASC) 5 MG tablet Take 1 tablet (5 mg total) by mouth " once daily. 90 tablet 3    azelastine (ASTELIN) 137 mcg (0.1 %) nasal spray 1 spray (137 mcg total) by Nasal route 2 (two) times daily. 30 mL 11    butalbital-acetaminophen-caffeine -40 mg (FIORICET, ESGIC) -40 mg per tablet Take 1 tab po q4 hrs prn headache 90 tablet 3    cetirizine (ZYRTEC) 5 MG chewable tablet Take 5 mg by mouth 2 (two) times daily.       DULoxetine (CYMBALTA) 60 MG capsule Take 1 capsule (60 mg total) by mouth 2 (two) times daily. 180 capsule 3    eletriptan (RELPAX) 40 MG tablet Take 1 tablet (40 mg total) by mouth as needed. 12 tablet 3    estradiol (ESTRACE) 2 MG tablet Take 2 mg by mouth every evening.       fluticasone furoate-vilanterol (BREO ELLIPTA) 200-25 mcg/dose DsDv diskus inhaler Inhale 1 puff into the lungs once daily. 1 each 11    fluticasone propionate (FLONASE) 50 mcg/actuation nasal spray       levalbuterol (XOPENEX) 1.25 mg/3 mL nebulizer solution Take 3 mLs (1.25 mg total) by nebulization every 4 (four) hours. Rescue 1 Box 11    losartan-hydrochlorothiazide 100-25 mg (HYZAAR) 100-25 mg per tablet Take 1 tablet by mouth once daily. 90 tablet 3    mesalamine (PENTASA) 250 mg CpSR Take 4 capsules (1,000 mg total) by mouth 4 (four) times daily. 1440 capsule 3    montelukast (SINGULAIR) 10 mg tablet Take 1 tablet (10 mg total) by mouth every evening. 90 tablet 3    nortriptyline (PAMELOR) 25 MG capsule Take 2 capsules (50 mg total) by mouth once daily. 60 capsule 1    nortriptyline (PAMELOR) 25 MG capsule TAKE 2 CAPSULES BY MOUTH EVERY DAY 60 capsule 0    pantoprazole (PROTONIX) 40 MG tablet Take 1 tablet (40 mg total) by mouth every evening. 90 tablet 3    pantoprazole (PROTONIX) 40 MG tablet Take 1 tablet (40 mg total) by mouth once daily. 30 tablet 5    pregabalin (LYRICA) 150 MG capsule TAKE 1 CAPSULE (150 MG TOTAL) BY MOUTH 3 (THREE) TIMES DAILY. 270 capsule 1    propranolol (INNOPRAN XL) 80 mg 24 hr capsule Take 1 capsule (80 mg total) by mouth  every evening. 90 capsule 3    rizatriptan (MAXALT) 10 MG tablet TAKE 1 TABLET BY MOUTH IF NEEDED FOR MIGRAINES MAX 2 TAB IN 24 HOURS 30 tablet 3    triamcinolone acetonide 0.025% (KENALOG) 0.025 % cream Apply topically 2 (two) times daily. 453 g 3    VENTOLIN HFA 90 mcg/actuation inhaler INHALE 2 PUFFS INTO THE LUNGS EVERY 4 TO 6 HOURS AS NEEDED FOR WHEEZING. 18 Inhaler 1    zolpidem (AMBIEN) 5 MG Tab TAKE 1 TABLET BY MOUTH EVERY DAY AT BEDTIME AS NEEDED 90 tablet 1    budesonide 1 mg/2 mL NbSp MIX CONTENTS OF 1 RESPULE WITH STERILE DILUENT PROVIDED. POUR INTO NASONEB CUP & PERFORM TREATMENT TWICE DAILY.  2    doxycycline monohydrate 150 mg Cap MIX CONTENTS OF 1 CAPSULE WITH STERILE DILUENT PROVIDED. POUR INTO NASONEB CUP AND PERFORM TREATMENT TWICE DAILY  2    ipratropium (ATROVENT) 0.02 % nebulizer solution Nebulize 2.5 ml every 6 hours as directed, if needed (Patient not taking: Reported on 12/23/2019) 1 Box 0    methylPREDNISolone (MEDROL) 4 MG Tab Take orally as directed.  Start October 14:  12 pills x 3 days, 8 x 3d, 6 x 3d, 4 x 3d, 2x 3d, 1 x 4d, stop. (Patient not taking: Reported on 12/12/2019) 100 tablet 0    SUPREP BOWEL PREP KIT 17.5-3.13-1.6 gram SolR Use as directed (Patient not taking: Reported on 12/12/2019) 354 mL 0    [DISCONTINUED] tiotropium bromide (SPIRIVA RESPIMAT) 1.25 mcg/actuation Mist Inhale 2 puffs into the lungs once daily. (Patient not taking: Reported on 12/12/2019) 4 g 11     Current Facility-Administered Medications on File Prior to Visit   Medication Dose Route Frequency Provider Last Rate Last Dose    lactated ringers infusion   Intravenous Continuous Mary Leiva MD        lidocaine (PF) 10 mg/ml (1%) injection 10 mg  1 mL Intradermal Once Mary Leiva MD             Environmental History:  Dog in the house. NO smoke exposure.  Review of Systems   Constitutional: Negative for chills and fever.   HENT: Positive for congestion, postnasal drip, sinus pressure and  sinus pain. Negative for rhinorrhea.    Eyes: Positive for discharge and itching.   Respiratory: Negative for chest tightness, shortness of breath and wheezing.    Cardiovascular: Negative for chest pain.   Gastrointestinal: Negative for constipation, diarrhea, nausea and vomiting.   Endocrine: Negative for cold intolerance and heat intolerance.   Genitourinary: Negative for dysuria and frequency.   Musculoskeletal: Positive for joint swelling. Negative for gait problem.   Skin: Positive for rash. Negative for wound.   Allergic/Immunologic: Positive for environmental allergies. Negative for food allergies.   Neurological: Positive for headaches. Negative for seizures.        She has a history of migraines and she is on medication.   Hematological: Negative for adenopathy. Does not bruise/bleed easily.   Psychiatric/Behavioral: Negative for agitation and confusion.        Objective:    Physical Exam   Constitutional: She is oriented to person, place, and time. She appears well-developed and well-nourished. No distress.   HENT:   Head: Normocephalic and atraumatic.   Neck: Normal range of motion. Neck supple.   Cardiovascular: Normal rate, regular rhythm and normal heart sounds.   No murmur heard.  Pulmonary/Chest: Effort normal and breath sounds normal. No stridor. No respiratory distress. She has no wheezes. She has no rales.   Abdominal: She exhibits no distension. There is no tenderness.   Musculoskeletal: Normal range of motion. She exhibits no edema.   Neurological: She is alert and oriented to person, place, and time.   Skin: Skin is warm. She is not diaphoretic.   Psychiatric: She has a normal mood and affect. Judgment and thought content normal.   Vitals reviewed.      Discussed lab results.  Assessment:       1. Hypogammaglobulinemia    2. Rhinitis, unspecified type    3. Asthma, unspecified asthma severity, unspecified whether complicated, unspecified whether persistent         Plan:            Hypogammaglobulinemia  -     pneumoc 13-wei conj-dip cr(PF) (PREVNAR 13 (PF)) 0.5 mL    Rhinitis, unspecified type  -     levocetirizine (XYZAL) 5 MG tablet; Take 1 tablet (5 mg total) by mouth every evening.  Dispense: 30 tablet; Refill: 11    Asthma, unspecified asthma severity, unspecified whether complicated, unspecified whether persistent  -     tiotropium bromide (SPIRIVA RESPIMAT) 1.25 mcg/actuation Mist; Inhale 2 puffs into the lungs once daily.  Dispense: 4 g; Refill: 11    Other orders  -     Cancel: (In Office Administered) Pneumococcal Conjugate Vaccine (13 Valent) (IM)          Low IgG2 and 3  Recommend influenza vaccine 48 hours after today (because she received the Prevnar vaccine)  Prevnar vaccine given today  Questions answered. Consent for IVIG signed. Advised that it is a blood product. Advised of risk associated with IVIG.   Will start IVIG  Spiriva refilled at her request.  Xyzal for rhinitis  RTC 5 weeks or sooner , if needed.

## 2019-12-24 ENCOUNTER — PATIENT MESSAGE (OUTPATIENT)
Dept: ENDOSCOPY | Facility: HOSPITAL | Age: 54
End: 2019-12-24

## 2019-12-24 PROBLEM — D80.1 HYPOGAMMAGLOBULINEMIA: Status: ACTIVE | Noted: 2019-12-24

## 2019-12-26 RX ORDER — HEPARIN 100 UNIT/ML
500 SYRINGE INTRAVENOUS
Status: CANCELLED | OUTPATIENT
Start: 2019-12-27

## 2019-12-26 RX ORDER — FAMOTIDINE 10 MG/ML
20 INJECTION INTRAVENOUS
Status: CANCELLED | OUTPATIENT
Start: 2019-12-27

## 2019-12-26 RX ORDER — ACETAMINOPHEN 325 MG/1
650 TABLET ORAL
Status: CANCELLED | OUTPATIENT
Start: 2019-12-27

## 2019-12-26 RX ORDER — SODIUM CHLORIDE 0.9 % (FLUSH) 0.9 %
10 SYRINGE (ML) INJECTION
Status: CANCELLED | OUTPATIENT
Start: 2019-12-27

## 2019-12-30 ENCOUNTER — PATIENT MESSAGE (OUTPATIENT)
Dept: ALLERGY | Facility: CLINIC | Age: 54
End: 2019-12-30

## 2020-01-08 ENCOUNTER — PATIENT MESSAGE (OUTPATIENT)
Dept: INTERNAL MEDICINE | Facility: CLINIC | Age: 55
End: 2020-01-08

## 2020-01-08 ENCOUNTER — TELEPHONE (OUTPATIENT)
Dept: INTERNAL MEDICINE | Facility: CLINIC | Age: 55
End: 2020-01-08

## 2020-01-08 ENCOUNTER — OFFICE VISIT (OUTPATIENT)
Dept: INTERNAL MEDICINE | Facility: CLINIC | Age: 55
End: 2020-01-08
Payer: COMMERCIAL

## 2020-01-08 VITALS
DIASTOLIC BLOOD PRESSURE: 82 MMHG | BODY MASS INDEX: 32.18 KG/M2 | HEIGHT: 67 IN | WEIGHT: 205 LBS | TEMPERATURE: 99 F | SYSTOLIC BLOOD PRESSURE: 114 MMHG | HEART RATE: 78 BPM

## 2020-01-08 DIAGNOSIS — Z79.60 LONG-TERM USE OF IMMUNOSUPPRESSANT MEDICATION: ICD-10-CM

## 2020-01-08 DIAGNOSIS — G43.809 OTHER MIGRAINE WITHOUT STATUS MIGRAINOSUS, NOT INTRACTABLE: ICD-10-CM

## 2020-01-08 DIAGNOSIS — I77.1 TORTUOUS AORTA: ICD-10-CM

## 2020-01-08 DIAGNOSIS — D80.1 HYPOGAMMAGLOBULINEMIA: ICD-10-CM

## 2020-01-08 DIAGNOSIS — J32.4 CHRONIC PANSINUSITIS: Primary | ICD-10-CM

## 2020-01-08 DIAGNOSIS — I10 ESSENTIAL (PRIMARY) HYPERTENSION: ICD-10-CM

## 2020-01-08 DIAGNOSIS — K50.00 CROHN'S DISEASE OF SMALL INTESTINE WITHOUT COMPLICATION: ICD-10-CM

## 2020-01-08 PROCEDURE — 99215 PR OFFICE/OUTPT VISIT, EST, LEVL V, 40-54 MIN: ICD-10-PCS | Mod: S$GLB,,, | Performed by: FAMILY MEDICINE

## 2020-01-08 PROCEDURE — 99215 OFFICE O/P EST HI 40 MIN: CPT | Mod: S$GLB,,, | Performed by: FAMILY MEDICINE

## 2020-01-08 PROCEDURE — 3074F PR MOST RECENT SYSTOLIC BLOOD PRESSURE < 130 MM HG: ICD-10-PCS | Mod: CPTII,S$GLB,, | Performed by: FAMILY MEDICINE

## 2020-01-08 PROCEDURE — 99999 PR PBB SHADOW E&M-EST. PATIENT-LVL III: ICD-10-PCS | Mod: PBBFAC,,, | Performed by: FAMILY MEDICINE

## 2020-01-08 PROCEDURE — 3074F SYST BP LT 130 MM HG: CPT | Mod: CPTII,S$GLB,, | Performed by: FAMILY MEDICINE

## 2020-01-08 PROCEDURE — 3008F BODY MASS INDEX DOCD: CPT | Mod: CPTII,S$GLB,, | Performed by: FAMILY MEDICINE

## 2020-01-08 PROCEDURE — 3079F DIAST BP 80-89 MM HG: CPT | Mod: CPTII,S$GLB,, | Performed by: FAMILY MEDICINE

## 2020-01-08 PROCEDURE — 99999 PR PBB SHADOW E&M-EST. PATIENT-LVL III: CPT | Mod: PBBFAC,,, | Performed by: FAMILY MEDICINE

## 2020-01-08 PROCEDURE — 3008F PR BODY MASS INDEX (BMI) DOCUMENTED: ICD-10-PCS | Mod: CPTII,S$GLB,, | Performed by: FAMILY MEDICINE

## 2020-01-08 PROCEDURE — 3079F PR MOST RECENT DIASTOLIC BLOOD PRESSURE 80-89 MM HG: ICD-10-PCS | Mod: CPTII,S$GLB,, | Performed by: FAMILY MEDICINE

## 2020-01-08 RX ORDER — METHYLPREDNISOLONE 4 MG/1
TABLET ORAL
Qty: 1 PACKAGE | Refills: 0 | Status: SHIPPED | OUTPATIENT
Start: 2020-01-08 | End: 2020-01-28

## 2020-01-08 RX ORDER — LEVOFLOXACIN 750 MG/1
750 TABLET ORAL DAILY
Qty: 10 TABLET | Refills: 0 | Status: SHIPPED | OUTPATIENT
Start: 2020-01-08 | End: 2020-01-08

## 2020-01-08 RX ORDER — CEFDINIR 300 MG/1
300 CAPSULE ORAL 2 TIMES DAILY
Qty: 20 CAPSULE | Refills: 0 | Status: SHIPPED | OUTPATIENT
Start: 2020-01-08 | End: 2020-01-18

## 2020-01-08 NOTE — TELEPHONE ENCOUNTER
----- Message from Toby Celeste sent at 1/8/2020  2:46 PM CST -----  Contact: [facility]  Please give the pharmacy a call at [phone] regarding a drug interaction    [id number] - [place] - [address] AT [place] [address] [address] [address]  Phone: [phone] Fax: [phone]

## 2020-01-08 NOTE — PATIENT INSTRUCTIONS
Meal Ideas for Regular Bariatric Diet/ or high protein diet plan from [facility]  *Recipes and products available at [email or web address]  Menu Plan: 8402-4610 Calories;  grams of Protein     DAY 1     Breakfast  ½ cup 2% cottage cheese (or  zero sugar oikos greek yogurt 6oz)  ¼ cup fruit (no sugar added)     Snack  2% mozzarella string cheese  10-20 grapes     Lunch  4-6oz Lean hamburger or turkey mark  1 slice low-fat cheese  ¼-1/2 cup green beans     Snack  200 calorie low-carb protein drink (4 grams sugar or less)     Dinner  4oz chicken thigh/ breast  ¼-1 cup cooked spinach      Snack  Atkins bar (15g protein)        DAY 2     Breakfast  1 -2egg with 1oz shredded cheddar cheese and 2T salsa     Snack  200 calorie low-carb protein drink (4 grams sugar or less)     Lunch  Lettuce Wraps: 4oz sliced turkey, 1 slice low-fat Swiss cheese, tomato, and mustard wrapped in a Jan lettuce leaf     Snack  ½ cup low-fat cottage cheese _(yogurt)  Pear cup (no sugar added)     Dinner  6oz baked fish  ½ cup cooked broccoli     Snack  Sugar-free pudding cup        DAY 3     Breakfast   ½ cup low-fat ricotta cheese w/ Splenda to lasha  ½ scoop Vanilla protein powder   ¼ cup fresh fruit     Snack  2% string cheese  20 unsalted almonds     Lunch  Tuna/Chicken Salad: 4-6oz canned tuna/chicken, 1 egg white, and 1 tsp light chu  Pineapple cup (no sugar added)     Snack  200 calorie low-carb protein drink (4 grams sugar or less)     Dinner  ½ -1baked pork chop   ¼ cup beans        DAY 4     Breakfast  200 calorie low-carb protein drink (4 grams sugar or less) ( premier protein)     Snack  Boiled egg 1-2     Lunch  ½-1 cup grilled shrimp  Salad w/ 2 tbsp crumbled fat-free feta  1 tbsp light vinaigrette     Snack  200 calorie low-carb protein drink (4 grams sugar or less)     Dinner  ¾ -1cup red beans     Snack  Mini Babybell light        DAY 5     Breakfast  [place] pie: 3oz Greek yogurt, 1 tbsp Splenda, ½  individual pack Crystal Light lemonade. Top with ¼ cup chopped walnuts      Snack  3-4 lean ham or turkey slices, ¼ - ½ cup fruit     Lunch  Fiesta Chicken: 2oz canned chicken, 1oz shredded cheddar cheese, ¼ cup black beans  Top with 2 tbsp salsa and a small dollop light sour cream     Snack  200 calorie low-carb protein drink (4 grams sugar or less)     Dinner  Omelette: ¼ cup Egg Beaters, 4 large (1oz) shrimp, 1oz shredded low-fat cheese. Add bell pepper, onion, mushrooms, green onions, or salsa, optional.        DAY 6     Breakfast  1 luciano or 2 links turkey sausage  ½ cup fruit     Snack  200 calorie low-carb protein drink (4 grams sugar or less)     Lunch  Grilled tilapia  Salad of baby spinach leaves with light dressing     Snack  200 calorie low-carb protein drink (4 grams sugar or less)     Dinner  Chicken thigh simmered in 98% fat free cream of mushroom soup  ½ -1cup cooked green beans     Meal Ideas for Regular Bariatric Diet  *Recipes and products available at [email or web address]        Breakfast: (15-20g protein)    - Egg white omelet: 2 egg whites or ½ cup Egg Beaters. (Optional proteins: cheese, shrimp, black beans, chicken, sliced turkey) (Optional veggies: tomatoes, salsa, spinach, mushrooms, onions, green peppers, or small slice avocado)     - Egg and sausage: 1 egg or ¼ cup Egg Beaters (any variety), with 1 luciano or 2 links of Turkey sausage or Veggie breakfast sausage (Donate Your Desktop or Interfolio)     - Crust-less breakfast quiche: To make a glass pie dish, mix 4oz part skim Ricotta, 1 cup skim milk, and 2 eggs as your base. Add protein: shredded cheese, sliced lean ham or turkey, turkey vega/sausage. Add veggies: tomato, onion, green onion, mushroom, green pepper, spinach, etc.     - Yogurt parfait: Mix 1 - 6oz container Dannon Light N Fit vanilla yogurt, with ¼ cup crushed unsalted nuts    - Cottage cheese and fruit: ½ cup part-skim cottage cheese or ricotta cheese topped with fresh fruit or  sugar free preserves     - Mary Randhawa's Vanilla Egg custard* (add 2 Tbsp instant coffee granules to make Cappuccino Custard*)    - Hi-Protein café latte (skim milk, decaf coffee, 1 scoop protein powder). Optional to add Sugar free syrup or extract flavoring.     - Breakfast Lox: spread fat free cream cheese on slices of smoked salmon. Serve over scrambled or egg over easy (sauteed with nonstick cookspray) OR on a cucumber slice     - Eggwhich: Scramble or cook 1 large egg over easy using nonstick cookspray. Place between 2 slices of Austrian vega and low fat cheese.     Lunch: (20-30g protein)    - ½ cup Black bean soup (Homemade or Progresso), with ¼ cup shredded low-fat cheese. Top with chopped tomato or fresh salsa.     - Lean deli turkey breast and low-fat sliced cheese, mustard or light chu to moisten, rolled up together, or wrapped in a Jan lettuce leaf    - Chicken salad made from dinner leftovers, moisten with low-fat salad dressing or light chu. Also try leftover salmon, shrimp, tuna or boiled eggs. Serve ½ cup over dark green salad     - Fat-free canned refried beans, topped with ¼ cup shredded low-fat cheese. Top with chopped tomato or fresh salsa.      - Greek salad: Top mixed greens with 1-2oz grilled chicken, tomatoes, red onions, 2-3 kalamata olives, and sprinkle lightly with feta cheese. Spritz with Balsamic vinegar to taste.      - Crust-less lunch quiche: To make a glass pie dish, mix 4oz part skim Ricotta, 1 cup skim milk, and 2 eggs as your base. Add protein: shredded cheese, sliced lean ham or turkey, shrimp, chicken. Add veggies: tomato, onion, green onion, mushroom, green pepper, spinach, artichoke, broccoli, etc.    - Pizza bake: spread a  melody isaiah mushroom with tomato sauce, low-fat shredded mozzarella and turkey pepperoni or [place] vega. Add any veggies. Roast for 10-15 minutes, until cheese melted.      - Cucumber crab bites: Spread ¼ cup crab dip (lump crabmeat + light cream  cheese and green onions) over sliced cucumber.      - Chicken with light spinach and artichoke dip*: Puree in : 6oz cooked and drained spinach, 2 cloves garlic, 1 can cannelloni beans, ½ cup chopped green onions, 1 can drained artichoke hearts (not marinated in oil), lemon juice and basil. Mix in 2oz chopped up chicken.     Supper: (20-30g protein)    - Serve grilled fish over dark green salad tossed with low-fat dressing, served with grilled asparagus ferreira      - Rotisserie chicken salad: served with sliced strawberries, walnuts, fat-free feta cheese crumbles and 1 tbsp Schmidts Own Light Raspberry [place] Vinaigrette    - Shrimp cocktail: Dip cold boiled shrimp in homemade low-sugar cocktail sauce (1/2 cup Vicky One Carb ketchup, 2 tbsp horseradish, 1/4 tsp hot sauce, 1 tsp Worcestershire sauce, 1 tbsp freshly-squeezed lemon juice). Serve with dark green salad, walnuts, and crumbled blue cheese drizzled with olive oil and Balsamic vinegar     - Tuna Melt: Spread tuna salad onto 2 thick slices of tomato. Top with low-fat cheese and broil until cheese is melted. May also be made with chicken salad of shrimp salad. [place] with different types of cheeses.     - Chicken or beef fajitas (no tortilla, rice, beans, chips). Top meat and veggies w/ fresh salsa, fat free sour cream.     - Homemade low-fat Chili using extra lean ground beef or ground turkey. Top with shredded cheese and salsa as desired. May add dollop fat-free sour cream if desired     - Chicken parmesan: Top chicken breast w/ low sugar marinara sauce, mozzarella cheese and bake until chicken reaches 165*.  Serve w/ spaghetti SQUASH or Georgian cut green beans     - Dinner Omelet with shrimp or chicken and onion, green peppers and chives.     - No noodle lasagna: Use sliced zucchini or eggplant in place of noodles.  Layer with part skim ricotta cheese and low sugar meat sauce (use very lean ground beef or ground turkey).     - Mexican  chicken bake: Bake chunks of chicken breast or thigh with taco seasoning, Pace brand enchilada sauce, green onions and low-fat cheese. Serve with ¼ cup black beans or fat free refried beans topped with chopped tomatoes or salsa.    - Eva frozen meatballs, simmered in Classico Marinara sauce. Different flavors of salsa or spaghetti sauce create different dishes! Sprinkle with parmesan cheese. Serve with grilled or steamed veggies, or a dark green salad.    - Simmer boneless skinless chicken thigh chunks in Classico Marinara sauce or roasted salsa until tender with chopped onion, bell pepper, garlic, mushrooms, spinach, etc.     - Hamburger or veggie burger, without the bun, dressed the way you like. Served with grilled or steamed veggies.     - Eggplant parmesan: Bake slices of eggplant at 350 degrees for 15 minutes. Layer tomato sauce, sliced eggplant and low-fat mozzarella cheese in a baking dish and cover with foil. Bake 30-40 more minutes or until bubbly. Uncover and bake at 400 degrees for about 15 more minutes, or until top is slightly crisp.     - Fish tacos: grilled/baked white fish, wrapped in Jan lettuce leaf, topped with salsa, shredded low-fat cheese, and light coleslaw.     - Chicken burke: Sprinkle chicken w/ 1 tsp of hidden [place] ranch dip mix. Then grill chicken and top with black beans, salsa and 1 tsp fat free sour cream.      - Cauliflower pizza crust: Use cauliflower as crust (see recipe on pinterest, no flour!). Top w/ low fat cheese, turkey pepperoni and veggies and bake again     - chicken or turkey crust pizza: use ground chicken or turkey instead of cauliflower, spread in [place] and bake at 350 for about 20-30 minutes(may want to add garlic, black pepper, oregano and other herbs to ground meat mixture).  Remove and top w/ low fat cheese, turkey pepperoni and veggies and bake again for another 10 minutes or until cheese is browned.      Snacks: (100-200 calories; >5g protein)      - 1 low-fat cheese stick with 8 cherry tomatoes or 1 serving fresh fruit  - 4 thin slices fat-free turkey breast and 1 slice low-fat cheese  - 4 thin slices fat-free honey ham with wedge of melon  - 6-8 edamame pods (equivalent to about 1/4 cup edamame without pods).   - 1/4 cup unsalted nuts with ½ cup fruit  - 6-oz container Dannon Light n Fit vanilla yogurt, topped with 1oz unsalted nuts         - apple, celery or baby carrots spread with 2 Tbsp PB2  - apple slices with 1 oz slice low-fat cheese  - Apple slices dipped in 2 Tbsp of PB2  - celery, cucumber, bell pepper or baby carrots dipped in ¼ cup hummus bean spread or light spinach and artichoke dip (*recipe in lunch section)  - celery, cucumber, baby carrots dipped in high protein greek yogurt (Mix 16 oz plain greek yogurt + 1 packet of hidden valley ranch dip mix)  - Pascual Links Beef Steak - 14g protein! (similar to beef jerky)  - 2 wedges Laughing Cow - Light Herb & Garlic Cheese with sliced cucumber or green bell pepper  - 1/2 cup low-fat cottage cheese with ¼ cup fruit or ¼ cup salsa  - RTD Protein drinks: Atkins, Low Carb Slim Fast, EAS light, Muscle Milk Light, etc.  - Homemade Protein drinks: GNC Soy95, Isopure, Nectar, UNJURY, Whey Gourmet, etc. Mix 1 scoop powder with 8oz skim/1% milk or light soymilk.  - Protein bars: Atkins, EAS, Pure Protein, Think Thin, Detour, etc. Must have 0-4 grams sugar - Read the label.     Takeout Options: No more than twice/week  Deli - Salads (no pasta or rice), meats, cheeses. Roasted chicken. Lox (salmon)     Mexican - Platters which don't include tortillas, chips, or rice. Go easy on the beans. Example: Fajitas without the tortillas. Ask the  not to bring chips to the table if they are too tempting.     Greek - Meat or fish and vegetable, but no bread or rice. Including hummus, baba ganoush, etc, is OK. Most sit-down Greek restaurants can provide you with cucumber slices for dipping instead of mariel bread.      Fast Food (Avoid as much as possible) - Salads (no croutons and limit salad dressing to 2 tbsp), grilled chicken sandwich without the bun and ask for no chu. Hiens low fat chili or Taco Bell pintos and cheese.     BBQ - The meats are fine if you ask for sauces on the side, but most of the traditional side dishes are loaded with carbs. Ramu slaw, baked beans and BBQ sauce are typically made with sugar.     Chinese - Nothing deep-fried, no rice or noodles. Many Chinese sauces have starch and sugar in them, so you'll have to use your judgement. If you find that these sauces trigger cravings, or cause Dumping, you can ask for the sauce to be made without sugar or just use soy sauce.

## 2020-01-08 NOTE — PROGRESS NOTES
Subjective:      Patient ID: Jaylin Murguia is a 54 y.o. female.    Chief Complaint: chronic sinusitis (ivig discussion, meds, ha, fibro)    Disclaimer:  This note is prepared using voice recognition software and as such is likely to have errors and has not been proof read. Please contact me for questions.     Patient is coming in today discussed several issues.  One she has been sick extensively throughout the past year.  Extremely followed by allergy immunology and ENT.  Ended up having surgery for pansinusitis and once again is going to have to have it again.  She has gone through several rounds of steroids in the past year as well.  Failed multiple antibiotics.  Once again feels like she is having a lot of discomfort and pressure on the left side of her face with sometimes some drainage other times not much on the left side of the maxillary sinus region.  Is been reviewed and discussed with her her recent lab work as she may benefit greatly from ivig. She has been diagnosed with hypogammulopathy.  She also has Crohn's disease. She also has fibromyalgia.  She has just been battling some any infection she does not know what it is like to feel well.  She has been battling a lot headaches also.  Recurrent bowel infections also.  She feels a lot of weight gain as well.  By about 2 or 3:00 a.m. in the afternoon her pressure is up as well she is more swollen and just cannot do much activity either.  She is uncertain what to do next.  She did not know if she could do antibiotics or steroids prior to doing the IVIG treatment which is scheduled right now for next week.  She has not yet heard approval from her insurance company at this time.    From the headache standpoint most of the time she will have take a Maxalt or even at and Fioricet.  She is already on propanolol.  She experiences a daily headache but can turn into more migraine headache with nausea and light sensitivity.    From a blood pressure standpoint she  has been taking the amlodipine 5 mid afternoon to help with fluctuations and elevations.  She was uncertain if she should take it any earlier at this time.    From the fibromyalgia standpoint she just typically hurts by the end of the day.  She has been experiencing a lot of pain and fatigue especially in the setting of the weight gain.  She can't really do a lot of activity at this time.  She is not currently working at this time either.      Lab Results   Component Value Date    WBC 6.77 07/18/2019    HGB 11.9 (L) 07/18/2019    HCT 36.9 (L) 07/18/2019     07/18/2019    CHOL 155 11/14/2017    TRIG 104 11/14/2017    HDL 59 11/14/2017    ALT 34 12/14/2018    AST 32 12/14/2018     12/14/2018     12/14/2018    K 4.0 12/14/2018    K 4.0 12/14/2018    CL 98 12/14/2018    CL 98 12/14/2018    CREATININE 1.0 12/14/2018    CREATININE 1.0 12/14/2018    BUN 8 12/14/2018    BUN 8 12/14/2018    CO2 28 12/14/2018    CO2 28 12/14/2018    TSH 0.863 11/14/2017    INR 1.0 11/20/2014    HGBA1C 5.2 11/14/2017       X-Ray Ribs 2 View Left  Narrative: EXAMINATION:  XR RIBS 2 VIEW LEFT    CLINICAL HISTORY:  Fracture of one rib, left side, sequela    TECHNIQUE:  Two views of the left ribs were performed.    COMPARISON:  10/24/2019    FINDINGS:  A left-sided 9th rib fracture with minimal displacement is again noted.  Callus formation about the fracture site is difficult to appreciate.  No new abnormality or detrimental change  Impression: As above    Electronically signed by: Sawyer Funes MD  Date:    12/04/2019  Time:    10:11        Review of Systems   Constitutional: Positive for activity change, appetite change, chills, fatigue and unexpected weight change. Negative for fever.   HENT: Positive for congestion, rhinorrhea, sinus pressure, sinus pain, sore throat and trouble swallowing. Negative for ear discharge, ear pain, facial swelling, postnasal drip, sneezing, tinnitus and voice change.    Eyes: Negative for  "visual disturbance.   Respiratory: Positive for cough. Negative for shortness of breath.    Cardiovascular: Negative for chest pain and palpitations.   Gastrointestinal: Negative for abdominal distention, abdominal pain, blood in stool, constipation, diarrhea, nausea and vomiting.   Musculoskeletal: Positive for arthralgias and myalgias.   Neurological: Positive for headaches.   Psychiatric/Behavioral: Positive for decreased concentration, dysphoric mood and sleep disturbance.     Objective:     Vitals:    01/08/20 0705   BP: 114/82   Pulse: 78   Temp: 98.7 °F (37.1 °C)   TempSrc: Oral   Weight: 93 kg (205 lb 0.4 oz)   Height: 5' 7" (1.702 m)     Physical Exam   Constitutional: She appears well-developed and well-nourished.   HENT:   Head: Normocephalic and atraumatic.   Right Ear: External ear normal. Tympanic membrane is erythematous.   Left Ear: External ear normal. Tympanic membrane is erythematous.   Nose: Mucosal edema and rhinorrhea present. Right sinus exhibits maxillary sinus tenderness and frontal sinus tenderness. Left sinus exhibits maxillary sinus tenderness and frontal sinus tenderness.   Mouth/Throat: Uvula is midline and mucous membranes are normal. Posterior oropharyngeal erythema present.   Eyes: Conjunctivae and EOM are normal.   Neck: Normal range of motion. Neck supple.   Cardiovascular: Normal rate and regular rhythm.   Pulmonary/Chest: Effort normal and breath sounds normal.   Psychiatric: Her speech is normal and behavior is normal. Judgment and thought content normal. Cognition and memory are normal. She exhibits a depressed mood.   Vitals reviewed.    Assessment:     1. Chronic pansinusitis    2. Crohn's disease of small intestine without complication    3. Hypogammaglobulinemia    4. Tortuous aorta    5. Long-term use of immunosuppressant medication    6. Essential (primary) hypertension    7. Other migraine without status migrainosus, not intractable      Plan:   Jaylin was seen today for " chronic sinusitis.    Diagnoses and all orders for this visit:    Chronic pansinusitis-at this time reached out to her immunologist who is okay with her doing antibiotics and steroids without contraindication for the IVIG.  This time will go ahead do Levaquin due to ease of dosing.  Also start Medrol Dosepak.  Follow-up with ENT and Allergy immunology    Crohn's disease of small intestine without complication-in the setting of having extensive amounts of steroids may benefit greatly also from IVIG    Hypogammaglobulinemia-discussed with the patient benefits of the IVIG as well as the recommendations from her immunologist.  Needs to go ahead and aggressively treat any infections at this time    Tortuous aorta-noted on previous imaging, on statin therapy    Long-term use of immunosuppressant medication-pulsing with Medrol at this time with sinusitis    Essential (primary) hypertension controlled at this time can't change the amlodipine from 3:00 p.m. dosing to noon dosing    Other migraine without status migrainosus, not intractable-worse recently due to sinusitis and fatigue.  Continue current medications.  Hopefully sinus infections improved with further treatment of antibiotics and the IVIG.    Fibromyalgia-once she is able to start feeling well and not be a sick with recurrent infections I believe this will help her fibromyalgia as well as she will be able to start working on weight loss and activity level.  Continue current medicines at this time.    Significant weight gain-provided patient copy of 2344-4747 calorie high-protein diet plan to begin.    Other orders  -     levoFLOXacin (LEVAQUIN) 750 MG tablet; Take 1 tablet (750 mg total) by mouth once daily.  -     methylPREDNISolone (MEDROL DOSEPACK) 4 mg tablet; use as directed      Time spent: 40 minutes in face to face discussion concerning diagnosis, prognosis, review of lab and test results, benefits of treatment as well as management of disease, counseling  of patient and coordination of care between various health care providers . Greater than half the time spent was used for coordination of care and counseling of patient.         Follow up in about 3 months (around 4/8/2020) for chronic issues Dr Hu.    Patient Instructions     Meal Ideas for Regular Bariatric Diet/ or high protein diet plan from [facility]  *Recipes and products available at [email or web address]  Menu Plan: 5106-0136 Calories;  grams of Protein     DAY 1     Breakfast  ½ cup 2% cottage cheese (or  zero sugar oikos greek yogurt 6oz)  ¼ cup fruit (no sugar added)     Snack  2% mozzarella string cheese  10-20 grapes     Lunch  4-6oz Lean hamburger or turkey mark  1 slice low-fat cheese  ¼-1/2 cup green beans     Snack  200 calorie low-carb protein drink (4 grams sugar or less)     Dinner  4oz chicken thigh/ breast  ¼-1 cup cooked spinach      Snack  Atkins bar (15g protein)        DAY 2     Breakfast  1 -2egg with 1oz shredded cheddar cheese and 2T salsa     Snack  200 calorie low-carb protein drink (4 grams sugar or less)     Lunch  Lettuce Wraps: 4oz sliced turkey, 1 slice low-fat Swiss cheese, tomato, and mustard wrapped in a Jan lettuce leaf     Snack  ½ cup low-fat cottage cheese _(yogurt)  Pear cup (no sugar added)     Dinner  6oz baked fish  ½ cup cooked broccoli     Snack  Sugar-free pudding cup        DAY 3     Breakfast   ½ cup low-fat ricotta cheese w/ Splenda to lasha  ½ scoop Vanilla protein powder   ¼ cup fresh fruit     Snack  2% string cheese  20 unsalted almonds     Lunch  Tuna/Chicken Salad: 4-6oz canned tuna/chicken, 1 egg white, and 1 tsp light chu  Pineapple cup (no sugar added)     Snack  200 calorie low-carb protein drink (4 grams sugar or less)     Dinner  ½ -1baked pork chop   ¼ cup beans        DAY 4     Breakfast  200 calorie low-carb protein drink (4 grams sugar or less) ( premier protein)     Snack  Boiled egg 1-2     Lunch  ½-1 cup grilled  shrimp  Salad w/ 2 tbsp crumbled fat-free feta  1 tbsp light vinaigrette     Snack  200 calorie low-carb protein drink (4 grams sugar or less)     Dinner  ¾ -1cup red beans     Snack  Mini Babybell light        DAY 5     Breakfast  [place] pie: 3oz Greek yogurt, 1 tbsp Splenda, ½ individual pack Crystal Light lemonade. Top with ¼ cup chopped walnuts      Snack  3-4 lean ham or turkey slices, ¼ - ½ cup fruit     Lunch  Fiesta Chicken: 2oz canned chicken, 1oz shredded cheddar cheese, ¼ cup black beans  Top with 2 tbsp salsa and a small dollop light sour cream     Snack  200 calorie low-carb protein drink (4 grams sugar or less)     Dinner  Omelette: ¼ cup Egg Beaters, 4 large (1oz) shrimp, 1oz shredded low-fat cheese. Add bell pepper, onion, mushrooms, green onions, or salsa, optional.        DAY 6     Breakfast  1 luciano or 2 links turkey sausage  ½ cup fruit     Snack  200 calorie low-carb protein drink (4 grams sugar or less)     Lunch  Grilled tilapia  Salad of baby spinach leaves with light dressing     Snack  200 calorie low-carb protein drink (4 grams sugar or less)     Dinner  Chicken thigh simmered in 98% fat free cream of mushroom soup  ½ -1cup cooked green beans     Meal Ideas for Regular Bariatric Diet  *Recipes and products available at [email or web address]        Breakfast: (15-20g protein)    - Egg white omelet: 2 egg whites or ½ cup Egg Beaters. (Optional proteins: cheese, shrimp, black beans, chicken, sliced turkey) (Optional veggies: tomatoes, salsa, spinach, mushrooms, onions, green peppers, or small slice avocado)     - Egg and sausage: 1 egg or ¼ cup Egg Beaters (any variety), with 1 luciano or 2 links of Turkey sausage or Veggie breakfast sausage (Denisse Farms or Boca)     - Crust-less breakfast quiche: To make a glass pie dish, mix 4oz part skim Ricotta, 1 cup skim milk, and 2 eggs as your base. Add protein: shredded cheese, sliced lean ham or turkey, turkey vega/sausage. Add veggies:  tomato, onion, green onion, mushroom, green pepper, spinach, etc.     - Yogurt parfait: Mix 1 - 6oz container Dannon Light N Fit vanilla yogurt, with ¼ cup crushed unsalted nuts    - Cottage cheese and fruit: ½ cup part-skim cottage cheese or ricotta cheese topped with fresh fruit or sugar free preserves     - Mary Randhawa's Vanilla Egg custard* (add 2 Tbsp instant coffee granules to make Cappuccino Custard*)    - Hi-Protein café latte (skim milk, decaf coffee, 1 scoop protein powder). Optional to add Sugar free syrup or extract flavoring.     - Breakfast Lox: spread fat free cream cheese on slices of smoked salmon. Serve over scrambled or egg over easy (sauteed with nonstick cookspray) OR on a cucumber slice     - Eggwhich: Scramble or cook 1 large egg over easy using nonstick cookspray. Place between 2 slices of Sammarinese vega and low fat cheese.     Lunch: (20-30g protein)    - ½ cup Black bean soup (Homemade or Progresso), with ¼ cup shredded low-fat cheese. Top with chopped tomato or fresh salsa.     - Lean deli turkey breast and low-fat sliced cheese, mustard or light chu to moisten, rolled up together, or wrapped in a Jan lettuce leaf    - Chicken salad made from dinner leftovers, moisten with low-fat salad dressing or light chu. Also try leftover salmon, shrimp, tuna or boiled eggs. Serve ½ cup over dark green salad     - Fat-free canned refried beans, topped with ¼ cup shredded low-fat cheese. Top with chopped tomato or fresh salsa.      - Greek salad: Top mixed greens with 1-2oz grilled chicken, tomatoes, red onions, 2-3 kalamata olives, and sprinkle lightly with feta cheese. Spritz with Balsamic vinegar to taste.      - Crust-less lunch quiche: To make a glass pie dish, mix 4oz part skim Ricotta, 1 cup skim milk, and 2 eggs as your base. Add protein: shredded cheese, sliced lean ham or turkey, shrimp, chicken. Add veggies: tomato, onion, green onion, mushroom, green pepper, spinach, artichoke,  broccoli, etc.    - Pizza bake: spread a  melody isaiah mushroom with tomato sauce, low-fat shredded mozzarella and turkey pepperoni or Zimbabwean vega. Add any veggies. Roast for 10-15 minutes, until cheese melted.      - Cucumber crab bites: Spread ¼ cup crab dip (lump crabmeat + light cream cheese and green onions) over sliced cucumber.      - Chicken with light spinach and artichoke dip*: Puree in : 6oz cooked and drained spinach, 2 cloves garlic, 1 can cannelloni beans, ½ cup chopped green onions, 1 can drained artichoke hearts (not marinated in oil), lemon juice and basil. Mix in 2oz chopped up chicken.     Supper: (20-30g protein)    - Serve grilled fish over dark green salad tossed with low-fat dressing, served with grilled asparagus ferreira      - Rotisserie chicken salad: served with sliced strawberries, walnuts, fat-free feta cheese crumbles and 1 tbsp Schmidts Own Light Raspberry [place] Vinaigrette    - Shrimp cocktail: Dip cold boiled shrimp in homemade low-sugar cocktail sauce (1/2 cup Vicky One Carb ketchup, 2 tbsp horseradish, 1/4 tsp hot sauce, 1 tsp Worcestershire sauce, 1 tbsp freshly-squeezed lemon juice). Serve with dark green salad, walnuts, and crumbled blue cheese drizzled with olive oil and Balsamic vinegar     - Tuna Melt: Spread tuna salad onto 2 thick slices of tomato. Top with low-fat cheese and broil until cheese is melted. May also be made with chicken salad of shrimp salad. [place] with different types of cheeses.     - Chicken or beef fajitas (no tortilla, rice, beans, chips). Top meat and veggies w/ fresh salsa, fat free sour cream.     - Homemade low-fat Chili using extra lean ground beef or ground turkey. Top with shredded cheese and salsa as desired. May add dollop fat-free sour cream if desired     - Chicken parmesan: Top chicken breast w/ low sugar marinara sauce, mozzarella cheese and bake until chicken reaches 165*.  Serve w/ spaghetti SQUASH or Hungarian cut green  beans     - Dinner Omelet with shrimp or chicken and onion, green peppers and chives.     - No noodle lasagna: Use sliced zucchini or eggplant in place of noodles.  Layer with part skim ricotta cheese and low sugar meat sauce (use very lean ground beef or ground turkey).     - Mexican chicken bake: Bake chunks of chicken breast or thigh with taco seasoning, Pace brand enchilada sauce, green onions and low-fat cheese. Serve with ¼ cup black beans or fat free refried beans topped with chopped tomatoes or salsa.    - Eva frozen meatballs, simmered in Classico Marinara sauce. Different flavors of salsa or spaghetti sauce create different dishes! Sprinkle with parmesan cheese. Serve with grilled or steamed veggies, or a dark green salad.    - Simmer boneless skinless chicken thigh chunks in Classico Marinara sauce or roasted salsa until tender with chopped onion, bell pepper, garlic, mushrooms, spinach, etc.     - Hamburger or veggie burger, without the bun, dressed the way you like. Served with grilled or steamed veggies.     - Eggplant parmesan: Bake slices of eggplant at 350 degrees for 15 minutes. Layer tomato sauce, sliced eggplant and low-fat mozzarella cheese in a baking dish and cover with foil. Bake 30-40 more minutes or until bubbly. Uncover and bake at 400 degrees for about 15 more minutes, or until top is slightly crisp.     - Fish tacos: grilled/baked white fish, wrapped in Jan lettuce leaf, topped with salsa, shredded low-fat cheese, and light coleslaw.     - Chicken burke: Sprinkle chicken w/ 1 tsp of hidden valley ranch dip mix. Then grill chicken and top with black beans, salsa and 1 tsp fat free sour cream.      - Cauliflower pizza crust: Use cauliflower as crust (see recipe on pinterest, no flour!). Top w/ low fat cheese, turkey pepperoni and veggies and bake again     - chicken or turkey crust pizza: use ground chicken or turkey instead of cauliflower, spread in [place] and bake at 350 for  about 20-30 minutes(may want to add garlic, black pepper, oregano and other herbs to ground meat mixture).  Remove and top w/ low fat cheese, turkey pepperoni and veggies and bake again for another 10 minutes or until cheese is browned.      Snacks: (100-200 calories; >5g protein)     - 1 low-fat cheese stick with 8 cherry tomatoes or 1 serving fresh fruit  - 4 thin slices fat-free turkey breast and 1 slice low-fat cheese  - 4 thin slices fat-free honey ham with wedge of melon  - 6-8 edamame pods (equivalent to about 1/4 cup edamame without pods).   - 1/4 cup unsalted nuts with ½ cup fruit  - 6-oz container Dannon Light n Fit vanilla yogurt, topped with 1oz unsalted nuts         - apple, celery or baby carrots spread with 2 Tbsp PB2  - apple slices with 1 oz slice low-fat cheese  - Apple slices dipped in 2 Tbsp of PB2  - celery, cucumber, bell pepper or baby carrots dipped in ¼ cup hummus bean spread or light spinach and artichoke dip (*recipe in lunch section)  - celery, cucumber, baby carrots dipped in high protein greek yogurt (Mix 16 oz plain greek yogurt + 1 packet of hidden [place] ranch dip mix)  - Pascual Links Beef Steak - 14g protein! (similar to beef jerky)  - 2 wedges Laughing Cow - Light Herb & Garlic Cheese with sliced cucumber or green bell pepper  - 1/2 cup low-fat cottage cheese with ¼ cup fruit or ¼ cup salsa  - RTD Protein drinks: Atkins, Low Carb Slim Fast, EAS light, Muscle Milk Light, etc.  - Homemade Protein drinks: GNC Soy95, Isopure, Nectar, UNJURY, Whey Gourmet, etc. Mix 1 scoop powder with 8oz skim/1% milk or light soymilk.  - Protein bars: Atkins, EAS, Pure Protein, Think Thin, Detour, etc. Must have 0-4 grams sugar - Read the label.     Takeout Options: No more than twice/week  Deli - Salads (no pasta or rice), meats, cheeses. Roasted chicken. Lox (salmon)     Mexican - Platters which don't include tortillas, chips, or rice. Go easy on the beans. Example: Fajitas without the tortillas. Ask  the  not to bring chips to the table if they are too tempting.     Greek - Meat or fish and vegetable, but no bread or rice. Including hummus, baba ganoush, etc, is OK. Most sit-down Greek restaurants can provide you with cucumber slices for dipping instead of mariel bread.     Fast Food (Avoid as much as possible) - Salads (no croutons and limit salad dressing to 2 tbsp), grilled chicken sandwich without the bun and ask for no chu. Hiens low fat chili or Taco Bell pintos and cheese.     BBQ - The meats are fine if you ask for sauces on the side, but most of the traditional side dishes are loaded with carbs. Ramu slaw, baked beans and BBQ sauce are typically made with sugar.     Chinese - Nothing deep-fried, no rice or noodles. Many Chinese sauces have starch and sugar in them, so you'll have to use your judgement. If you find that these sauces trigger cravings, or cause Dumping, you can ask for the sauce to be made without sugar or just use soy sauce.

## 2020-01-08 NOTE — TELEPHONE ENCOUNTER
----- Message from Jcarlos Keller sent at 1/8/2020  2:07 PM CST -----  Contact: Pharmacy    Caller called in regards to speaking with the staff in regards to a drug interaction. Caller can be reached at [phone].

## 2020-01-08 NOTE — TELEPHONE ENCOUNTER
See mychart message as well. Pharmacy stating that there could be an interaction between levaquin and then the nortriptyline.

## 2020-01-09 ENCOUNTER — TELEPHONE (OUTPATIENT)
Dept: ALLERGY | Facility: CLINIC | Age: 55
End: 2020-01-09

## 2020-01-09 ENCOUNTER — TELEPHONE (OUTPATIENT)
Dept: INFUSION THERAPY | Facility: HOSPITAL | Age: 55
End: 2020-01-09

## 2020-01-09 NOTE — TELEPHONE ENCOUNTER
Called pat back to let her know IVIG is till pending per Esha. She ill try again calling her insurance  And will keep in touch with Brenda with referrals

## 2020-01-09 NOTE — TELEPHONE ENCOUNTER
The following is the original message from preservice:      We are working on obtaining authorization for  Jaylin Murguia's injection/infusion scheduled for 01/13/2020 .  The request remains pending per the insurance company.  We will continue to work with the insurance company to obtain authorization and let you know the status.  If you have any questions please call me at extension [phone].     Thank you   Esha CUI LPN   Clinical Review Team   Pre[facility]

## 2020-01-10 ENCOUNTER — PATIENT MESSAGE (OUTPATIENT)
Dept: ALLERGY | Facility: CLINIC | Age: 55
End: 2020-01-10

## 2020-01-10 ENCOUNTER — TELEPHONE (OUTPATIENT)
Dept: INFUSION THERAPY | Facility: HOSPITAL | Age: 55
End: 2020-01-10

## 2020-01-13 RX ORDER — NORTRIPTYLINE HYDROCHLORIDE 25 MG/1
CAPSULE ORAL
Qty: 60 CAPSULE | Refills: 0 | Status: SHIPPED | OUTPATIENT
Start: 2020-01-13 | End: 2020-01-21 | Stop reason: SDUPTHER

## 2020-01-14 ENCOUNTER — TELEPHONE (OUTPATIENT)
Dept: ALLERGY | Facility: CLINIC | Age: 55
End: 2020-01-14

## 2020-01-14 NOTE — TELEPHONE ENCOUNTER
The message was attached to the referral.     Good morning,     Patient has received a gianna period for the administration of Privigen in an OP facility until 02/15/2020.  The gianna period is to be used to transition the members services to a network home infusion provider, physician office or freestanding ambulatory infusion center, if medically appropriate.   An outpatient facility associated with a hospital can not be used.     Thank you   Esha CUI   Clinical Review Team   PreService

## 2020-01-14 NOTE — TELEPHONE ENCOUNTER
----- Message from Vicki Fletcher MD sent at 1/14/2020  3:42 PM CST -----  Hi,    I have ordered the subcutaneous gammaglobulin.    JSR    P.S.  I can not send staff messages to a pool.

## 2020-01-14 NOTE — TELEPHONE ENCOUNTER
Perfect. Cn we start the process to have hr transferred to subcutaneous infusions? I will write an order.

## 2020-01-14 NOTE — TELEPHONE ENCOUNTER
[facility] in regards [facility] status of approval.

## 2020-01-16 ENCOUNTER — INFUSION (OUTPATIENT)
Dept: INFUSION THERAPY | Facility: HOSPITAL | Age: 55
End: 2020-01-16
Attending: ALLERGY & IMMUNOLOGY
Payer: COMMERCIAL

## 2020-01-16 VITALS
DIASTOLIC BLOOD PRESSURE: 78 MMHG | TEMPERATURE: 99 F | HEIGHT: 67 IN | RESPIRATION RATE: 18 BRPM | HEART RATE: 57 BPM | OXYGEN SATURATION: 95 % | WEIGHT: 205 LBS | SYSTOLIC BLOOD PRESSURE: 137 MMHG | BODY MASS INDEX: 32.18 KG/M2

## 2020-01-16 DIAGNOSIS — D80.1 HYPOGAMMAGLOBULINEMIA: Primary | ICD-10-CM

## 2020-01-16 PROCEDURE — 63600175 PHARM REV CODE 636 W HCPCS: Mod: JG | Performed by: ALLERGY & IMMUNOLOGY

## 2020-01-16 PROCEDURE — 25000003 PHARM REV CODE 250: Performed by: ALLERGY & IMMUNOLOGY

## 2020-01-16 PROCEDURE — S0028 INJECTION, FAMOTIDINE, 20 MG: HCPCS | Performed by: ALLERGY & IMMUNOLOGY

## 2020-01-16 PROCEDURE — 96365 THER/PROPH/DIAG IV INF INIT: CPT

## 2020-01-16 PROCEDURE — 96366 THER/PROPH/DIAG IV INF ADDON: CPT

## 2020-01-16 PROCEDURE — 96367 TX/PROPH/DG ADDL SEQ IV INF: CPT

## 2020-01-16 PROCEDURE — 96375 TX/PRO/DX INJ NEW DRUG ADDON: CPT

## 2020-01-16 RX ORDER — SODIUM CHLORIDE 0.9 % (FLUSH) 0.9 %
10 SYRINGE (ML) INJECTION
Status: CANCELLED | OUTPATIENT
Start: 2020-02-13

## 2020-01-16 RX ORDER — FAMOTIDINE 10 MG/ML
20 INJECTION INTRAVENOUS
Status: CANCELLED | OUTPATIENT
Start: 2020-02-13

## 2020-01-16 RX ORDER — ACETAMINOPHEN 325 MG/1
650 TABLET ORAL
Status: CANCELLED | OUTPATIENT
Start: 2020-02-13

## 2020-01-16 RX ORDER — HEPARIN 100 UNIT/ML
500 SYRINGE INTRAVENOUS
Status: CANCELLED | OUTPATIENT
Start: 2020-02-13

## 2020-01-16 RX ORDER — FAMOTIDINE 10 MG/ML
20 INJECTION INTRAVENOUS
Status: COMPLETED | OUTPATIENT
Start: 2020-01-16 | End: 2020-01-16

## 2020-01-16 RX ORDER — ACETAMINOPHEN 325 MG/1
650 TABLET ORAL
Status: COMPLETED | OUTPATIENT
Start: 2020-01-16 | End: 2020-01-16

## 2020-01-16 RX ADMIN — ACETAMINOPHEN 650 MG: 325 TABLET ORAL at 09:01

## 2020-01-16 RX ADMIN — FAMOTIDINE 20 MG: 10 INJECTION, SOLUTION INTRAVENOUS at 09:01

## 2020-01-16 RX ADMIN — DIPHENHYDRAMINE HYDROCHLORIDE 50 MG: 50 INJECTION, SOLUTION INTRAMUSCULAR; INTRAVENOUS at 09:01

## 2020-01-16 RX ADMIN — HUMAN IMMUNOGLOBULIN G 50 G: 40 LIQUID INTRAVENOUS at 09:01

## 2020-01-16 NOTE — DISCHARGE INSTRUCTIONS
.[facility]  [address] [facility]  [address]  [phone] phone     [phone] fax  Hours of Operation: Monday- Friday 8:00am- 5:00pm  After hours phone  [phone]  Hematology / Oncology Physicians on call      Dr. Ten Gonsalez, ARIS Lozano NP Tyesha Taylor, NP    Please call with any concerns regarding your appointment today.  .FALL PREVENTION   Falls often occur due to slipping, tripping or losing your balance. Here are ways to reduce your risk of falling again.   Was there anything that caused your fall that can be fixed, removed or replaced?   Make your home safe by keeping walkways clear of objects you may trip over.   Use non-slip pads under rugs.   Do not walk in poorly lit areas.   Do not stand on chairs or wobbly ladders.   Use caution when reaching overhead or looking upward. This position can cause a loss of balance.   Be sure your shoes fit properly, have non-slip bottoms and are in good condition.   Be cautious when going up and down stairs, curbs, and when walking on uneven sidewalks.   If your balance is poor, consider using a cane or walker.   If your fall was related to alcohol use, stop or limit alcohol intake.   If your fall was related to use of sleeping medicines, talk to your doctor about this. You may need to reduce your dosage at bedtime if you awaken during the night to go to the bathroom.   To reduce the need for nighttime bathroom trips:   Avoid drinking fluids for several hours before going to bed   Empty your bladder before going to bed   Men can keep a urinal at the bedside   © 4930-7486 [facility] [facility], [address] [place] [address], [place] [address]. All rights reserved. This information is not intended as a substitute for professional medical care. Always follow your healthcare professional's instructions.    .WAYS TO HELP PREVENT  INFECTION         WASH YOUR HANDS OFTEN DURING THE DAY, ESPECIALLY BEFORE YOU EAT, AFTER USING THE BATHROOM, AND AFTER TOUCHING ANIMALS     STAY AWAY FROM PEOPLE WHO HAVE ILLNESSES YOU CAN CATCH; SUCH AS COLDS, FLU, CHICKEN POX     TRY TO AVOID CROWDS     STAY AWAY FROM CHILDREN WHO RECENTLY HAVE RECEIVED LIVE VIRUS VACCINES     MAINTAIN GOOD MOUTH CARE     DO NOT SQUEEZE OR SCRATCH PIMPLES     CLEAN CUTS & SCRAPES RIGHT AWAY AND DAILY UNTIL HEALED WITH WARM WATER, SOAP & AN ANTISEPTIC     AVOID CONTACT WITH LITTER BOXES, BIRD CAGES, & FISH TANKS     AVOID STANDING WATER, IE., BIRD BATHS, FLOWER POTS/VASES, OR HUMIDIFIERS     WEAR GLOVES WHEN GARDENING OR CLEANING UP AFTER OTHERS, ESPECIALLY BABIES & SMALL CHILDREN     DO NOT EAT RAW FISH, SEAFOOD, MEAT, OR EGGS

## 2020-01-17 ENCOUNTER — PATIENT MESSAGE (OUTPATIENT)
Dept: ADMINISTRATIVE | Facility: OTHER | Age: 55
End: 2020-01-17

## 2020-01-17 ENCOUNTER — TELEPHONE (OUTPATIENT)
Dept: PHARMACY | Facility: CLINIC | Age: 55
End: 2020-01-17

## 2020-01-17 NOTE — TELEPHONE ENCOUNTER
Informed Patient  that [facility] received prescription for Hizantra and benefits investigation is required.  [facility] will be back in touch once insurance determination is received.

## 2020-01-19 ENCOUNTER — PATIENT MESSAGE (OUTPATIENT)
Dept: INTERNAL MEDICINE | Facility: CLINIC | Age: 55
End: 2020-01-19

## 2020-01-22 RX ORDER — NORTRIPTYLINE HYDROCHLORIDE 25 MG/1
50 CAPSULE ORAL DAILY
Qty: 180 CAPSULE | Refills: 3 | Status: SHIPPED | OUTPATIENT
Start: 2020-01-22 | End: 2021-03-31 | Stop reason: SDUPTHER

## 2020-01-22 RX ORDER — ZOLPIDEM TARTRATE 5 MG/1
TABLET ORAL
Qty: 90 TABLET | Refills: 1 | Status: SHIPPED | OUTPATIENT
Start: 2020-01-22 | End: 2020-07-14 | Stop reason: SDUPTHER

## 2020-01-28 ENCOUNTER — OFFICE VISIT (OUTPATIENT)
Dept: SLEEP MEDICINE | Facility: CLINIC | Age: 55
End: 2020-01-28
Payer: COMMERCIAL

## 2020-01-28 VITALS
DIASTOLIC BLOOD PRESSURE: 80 MMHG | HEIGHT: 67 IN | OXYGEN SATURATION: 97 % | RESPIRATION RATE: 16 BRPM | WEIGHT: 205.5 LBS | BODY MASS INDEX: 32.25 KG/M2 | HEART RATE: 69 BPM | SYSTOLIC BLOOD PRESSURE: 110 MMHG

## 2020-01-28 DIAGNOSIS — G47.33 OSA (OBSTRUCTIVE SLEEP APNEA): Primary | ICD-10-CM

## 2020-01-28 DIAGNOSIS — D80.1 HYPOGAMMAGLOBULINEMIA: ICD-10-CM

## 2020-01-28 DIAGNOSIS — J32.4 CHRONIC PANSINUSITIS: ICD-10-CM

## 2020-01-28 DIAGNOSIS — J45.40 MODERATE PERSISTENT ASTHMA WITHOUT COMPLICATION: ICD-10-CM

## 2020-01-28 PROCEDURE — 3074F PR MOST RECENT SYSTOLIC BLOOD PRESSURE < 130 MM HG: ICD-10-PCS | Mod: CPTII,S$GLB,, | Performed by: NURSE PRACTITIONER

## 2020-01-28 PROCEDURE — 90686 FLU VACCINE (QUAD) GREATER THAN OR EQUAL TO 3YO PRESERVATIVE FREE IM: ICD-10-PCS | Mod: S$GLB,,, | Performed by: NURSE PRACTITIONER

## 2020-01-28 PROCEDURE — 3079F DIAST BP 80-89 MM HG: CPT | Mod: CPTII,S$GLB,, | Performed by: NURSE PRACTITIONER

## 2020-01-28 PROCEDURE — 99214 PR OFFICE/OUTPT VISIT, EST, LEVL IV, 30-39 MIN: ICD-10-PCS | Mod: 25,S$GLB,, | Performed by: NURSE PRACTITIONER

## 2020-01-28 PROCEDURE — 99999 PR PBB SHADOW E&M-EST. PATIENT-LVL V: CPT | Mod: PBBFAC,,, | Performed by: NURSE PRACTITIONER

## 2020-01-28 PROCEDURE — 3008F BODY MASS INDEX DOCD: CPT | Mod: CPTII,S$GLB,, | Performed by: NURSE PRACTITIONER

## 2020-01-28 PROCEDURE — 99214 OFFICE O/P EST MOD 30 MIN: CPT | Mod: 25,S$GLB,, | Performed by: NURSE PRACTITIONER

## 2020-01-28 PROCEDURE — 3008F PR BODY MASS INDEX (BMI) DOCUMENTED: ICD-10-PCS | Mod: CPTII,S$GLB,, | Performed by: NURSE PRACTITIONER

## 2020-01-28 PROCEDURE — 90471 IMMUNIZATION ADMIN: CPT | Mod: S$GLB,,, | Performed by: NURSE PRACTITIONER

## 2020-01-28 PROCEDURE — 90471 FLU VACCINE (QUAD) GREATER THAN OR EQUAL TO 3YO PRESERVATIVE FREE IM: ICD-10-PCS | Mod: S$GLB,,, | Performed by: NURSE PRACTITIONER

## 2020-01-28 PROCEDURE — 90686 IIV4 VACC NO PRSV 0.5 ML IM: CPT | Mod: S$GLB,,, | Performed by: NURSE PRACTITIONER

## 2020-01-28 PROCEDURE — 99999 PR PBB SHADOW E&M-EST. PATIENT-LVL V: ICD-10-PCS | Mod: PBBFAC,,, | Performed by: NURSE PRACTITIONER

## 2020-01-28 PROCEDURE — 3079F PR MOST RECENT DIASTOLIC BLOOD PRESSURE 80-89 MM HG: ICD-10-PCS | Mod: CPTII,S$GLB,, | Performed by: NURSE PRACTITIONER

## 2020-01-28 PROCEDURE — 3074F SYST BP LT 130 MM HG: CPT | Mod: CPTII,S$GLB,, | Performed by: NURSE PRACTITIONER

## 2020-01-28 NOTE — PROGRESS NOTES
"Subjective:      Patient ID: Jaylin Murguia is a 54 y.o. female.    Chief Complaint: Sleep Apnea    HPI  Patient presents to the office today for evaluation of sleep apnea.  Patient benefits from use.  Patient had problems with cough and pansinusitis with positive cultures followed by Dr. Shaffer and Dr. Florez of [facility].  She had daily washes.  She was seen by Dr. Fletcher with noted low IgG.  She has ordered IV Ig infusion.  Patient has recurring bronchitis and wheezing.  She is on Breo and Spiriva.  She has a history of asthma.  Chronic cough with continued significant postnasal drip.  Possible infiltrate October 2019.  Fractured rib due to significant coughing and slow to heal.    /80   Pulse 69   Resp 16   Ht 5' 7" (1.702 m)   Wt 93.2 kg (205 lb 7.5 oz)   SpO2 97%   BMI 32.18 kg/m²   Body mass index is 32.18 kg/m².    Review of Systems   Constitutional: Positive for fatigue.   HENT: Positive for postnasal drip and congestion.    Respiratory: Positive for cough.    Psychiatric/Behavioral: Positive for sleep disturbance.   All other systems reviewed and are negative.    Objective:      Physical Exam   Constitutional: She is oriented to person, place, and time. She appears well-developed and well-nourished.   HENT:   Head: Normocephalic and atraumatic.   Mouth/Throat: Oropharynx is clear and moist.   Neck: Normal range of motion. Neck supple.   Cardiovascular: Normal rate and regular rhythm. Exam reveals no gallop.   No murmur heard.  Pulmonary/Chest: Effort normal and breath sounds normal.   Abdominal: Soft. She exhibits no mass. There is no tenderness.   Musculoskeletal: Normal range of motion. She exhibits no edema.   Neurological: She is alert and oriented to person, place, and time.   Skin: Skin is warm and dry.   Psychiatric: She has a normal mood and affect.     Personal Diagnostic Review    Review of PAP download. Special study media link attached to this encounter. Normal AHI and compliant. "     Assessment:       1. TAMIKA (obstructive sleep apnea)    2. Moderate persistent asthma without complication    3. Chronic pansinusitis    4. Hypogammaglobulinemia        Outpatient Encounter Medications as of 1/28/2020   Medication Sig Dispense Refill    amLODIPine (NORVASC) 5 MG tablet Take 1 tablet (5 mg total) by mouth once daily. 90 tablet 3    azelastine (ASTELIN) 137 mcg (0.1 %) nasal spray 1 spray (137 mcg total) by Nasal route 2 (two) times daily. 30 mL 11    butalbital-acetaminophen-caffeine -40 mg (FIORICET, ESGIC) -40 mg per tablet Take 1 tab po q4 hrs prn headache 90 tablet 3    cetirizine (ZYRTEC) 5 MG chewable tablet Take 5 mg by mouth 2 (two) times daily.       DULoxetine (CYMBALTA) 60 MG capsule Take 1 capsule (60 mg total) by mouth 2 (two) times daily. 180 capsule 3    eletriptan (RELPAX) 40 MG tablet Take 1 tablet (40 mg total) by mouth as needed. 12 tablet 3    estradiol (ESTRACE) 2 MG tablet Take 2 mg by mouth every evening.       fluticasone furoate-vilanterol (BREO ELLIPTA) 200-25 mcg/dose DsDv diskus inhaler Inhale 1 puff into the lungs once daily. 1 each 11    fluticasone propionate (FLONASE) 50 mcg/actuation nasal spray       immun glob G,IgG,-pro-IgA 0-50 (HIZENTRA) 10 gram/50 mL (20 %) Soln Inject 55 mLs (11 g total) into the skin every 7 days. 220 mL 10    ipratropium (ATROVENT) 0.02 % nebulizer solution Nebulize 2.5 ml every 6 hours as directed, if needed 1 Box 0    levalbuterol (XOPENEX) 1.25 mg/3 mL nebulizer solution Take 3 mLs (1.25 mg total) by nebulization every 4 (four) hours. Rescue 1 Box 11    levocetirizine (XYZAL) 5 MG tablet Take 1 tablet (5 mg total) by mouth every evening. 30 tablet 11    losartan-hydrochlorothiazide 100-25 mg (HYZAAR) 100-25 mg per tablet Take 1 tablet by mouth once daily. 90 tablet 3    mesalamine (PENTASA) 250 mg CpSR Take 4 capsules (1,000 mg total) by mouth 4 (four) times daily. 1440 capsule 3    montelukast (SINGULAIR) 10  mg tablet Take 1 tablet (10 mg total) by mouth every evening. 90 tablet 3    nortriptyline (PAMELOR) 25 MG capsule Take 2 capsules (50 mg total) by mouth once daily. 180 capsule 3    pantoprazole (PROTONIX) 40 MG tablet Take 1 tablet (40 mg total) by mouth every evening. 90 tablet 3    pregabalin (LYRICA) 150 MG capsule TAKE 1 CAPSULE (150 MG TOTAL) BY MOUTH 3 (THREE) TIMES DAILY. 270 capsule 1    propranolol (INNOPRAN XL) 80 mg 24 hr capsule Take 1 capsule (80 mg total) by mouth every evening. 90 capsule 3    rizatriptan (MAXALT) 10 MG tablet TAKE 1 TABLET BY MOUTH IF NEEDED FOR MIGRAINES MAX 2 TAB IN 24 HOURS 30 tablet 3    SUPREP BOWEL PREP KIT 17.5-3.13-1.6 gram SolR Use as directed 354 mL 0    tiotropium bromide (SPIRIVA RESPIMAT) 1.25 mcg/actuation Mist Inhale 2 puffs into the lungs once daily. 4 g 11    triamcinolone acetonide 0.025% (KENALOG) 0.025 % cream Apply topically 2 (two) times daily. 453 g 3    VENTOLIN HFA 90 mcg/actuation inhaler INHALE 2 PUFFS INTO THE LUNGS EVERY 4 TO 6 HOURS AS NEEDED FOR WHEEZING. 18 Inhaler 1    zolpidem (AMBIEN) 5 MG Tab TAKE 1 TABLET BY MOUTH EVERY DAY AT BEDTIME AS NEEDED 90 tablet 1    budesonide 1 mg/2 mL NbSp MIX CONTENTS OF 1 RESPULE WITH STERILE DILUENT PROVIDED. POUR INTO NASONEB CUP & PERFORM TREATMENT TWICE DAILY.  2    doxycycline monohydrate 150 mg Cap MIX CONTENTS OF 1 CAPSULE WITH STERILE DILUENT PROVIDED. POUR INTO NASONEB CUP AND PERFORM TREATMENT TWICE DAILY  2    [DISCONTINUED] albuterol (PROVENTIL/VENTOLIN HFA) 90 mcg/actuation inhaler Inhale 2 puffs into the lungs every 4 to 6 hours as needed for Wheezing. 8.5 g 1    [DISCONTINUED] azelastine-fluticasone (DYMISTA) 137-50 mcg/spray Spry nassal spray 1 spray by Each Nare route 2 (two) times daily. 23 g 11    [DISCONTINUED] benzonatate (TESSALON) 200 MG capsule Take 1 capsule (200 mg total) by mouth 3 (three) times daily as needed for Cough. 30 capsule 0    [DISCONTINUED] BREO ELLIPTA 100-25  mcg/dose diskus inhaler Inhale 1 puff into the lungs once daily. 180 each 3    [DISCONTINUED] butalbital-acetaminophen-caffeine -40 mg (FIORICET, ESGIC) -40 mg per tablet take 1 tablet by mouth every 4 hours if needed for headache 30 tablet 1    [DISCONTINUED] butalbital-acetaminophen-caffeine -40 mg (FIORICET, ESGIC) -40 mg per tablet TAKE 1 TABLET BY MOUTH EVERY 4 HOURS IF NEEDED FOR HEADACHE 30 tablet 1    [DISCONTINUED] duloxetine (CYMBALTA) 60 MG capsule Take 60 mg by mouth 2 (two) times daily.      [DISCONTINUED] DYMISTA 137-50 mcg/spray Nathrop nassal spray instill 1 spray into each nostril twice a day 23 g 11    [DISCONTINUED] eletriptan (RELPAX) 40 MG tablet Take 1 tablet (40 mg total) by mouth as needed. 12 tablet 2    [DISCONTINUED] losartan-hydrochlorothiazide 100-25 mg (HYZAAR) 100-25 mg per tablet Take 1 tablet by mouth once daily. 90 tablet 3    [DISCONTINUED] mesalamine (PENTASA) 250 mg CpSR Take 4 capsules (1,000 mg total) by mouth 4 (four) times daily. 1440 capsule 3    [DISCONTINUED] methylPREDNISolone (MEDROL DOSEPACK) 4 mg tablet use as directed (Patient not taking: Reported on 1/28/2020) 1 Package 0    [DISCONTINUED] methylPREDNISolone (MEDROL) 4 MG Tab Take orally as directed.  Start October 14:  12 pills x 3 days, 8 x 3d, 6 x 3d, 4 x 3d, 2x 3d, 1 x 4d, stop. (Patient not taking: Reported on 1/28/2020) 100 tablet 0    [DISCONTINUED] montelukast (SINGULAIR) 10 mg tablet Take 1 tablet (10 mg total) by mouth every evening. 90 tablet 3    [DISCONTINUED] nortriptyline (PAMELOR) 25 MG capsule TAKE 2 CAPSULES BY MOUTH EVERY DAY 60 capsule 0    [DISCONTINUED] nortriptyline (PAMELOR) 25 MG capsule TAKE 2 CAPSULES BY MOUTH EVERY DAY 60 capsule 0    [DISCONTINUED] pantoprazole (PROTONIX) 40 MG tablet Take 1 tablet (40 mg total) by mouth once daily. (Patient taking differently: Take 40 mg by mouth every evening. ) 90 tablet 3    [DISCONTINUED] predniSONE (DELTASONE) 10  MG tablet Take 1 tablet (10 mg total) by mouth once daily. Take 40 mg for five days then decrease by 10 mg every 5 days until gone. 50 tablet 0    [DISCONTINUED] pregabalin (LYRICA) 150 MG capsule Take 1 capsule (150 mg total) by mouth 3 (three) times daily. 90 capsule 5    [DISCONTINUED] propranolol (INNOPRAN XL) 80 mg 24 hr capsule Take 1 capsule (80 mg total) by mouth every evening. 30 capsule 11    [DISCONTINUED] propranolol (INNOPRAN XL) 80 mg 24 hr capsule Take 1 capsule (80 mg total) by mouth every evening. 30 capsule 2    [DISCONTINUED] rizatriptan (MAXALT) 10 MG tablet TAKE 1 TABLET BY MOUTH IF NEEDED FOR MIGRAINES MAX 2 TAB IN 24 HOURS 10 tablet 3    [DISCONTINUED] simvastatin (ZOCOR) 20 MG tablet take 1 tablet by mouth every evening 90 tablet 3    [DISCONTINUED] simvastatin (ZOCOR) 20 MG tablet Take 1 tablet (20 mg total) by mouth every evening. 90 tablet 3    [DISCONTINUED] triamcinolone acetonide 0.025% (KENALOG) 0.025 % cream Apply topically 2 (two) times daily. 80 g 0    [DISCONTINUED] zolpidem (AMBIEN) 5 MG Tab TAKE 1 TABLET BY MOUTH AT BEDTIME AS NEEDED 30 tablet 5    [DISCONTINUED] zolpidem (AMBIEN) 5 MG Tab TAKE 1 TABLET BY MOUTH EVERY DAY AT BEDTIME AS NEEDED 30 tablet 5     Facility-Administered Encounter Medications as of 1/28/2020   Medication Dose Route Frequency Provider Last Rate Last Dose    lactated ringers infusion   Intravenous Continuous Mary Leiva MD        lidocaine (PF) 10 mg/ml (1%) injection 10 mg  1 mL Intradermal Once Mary Leiva MD         Orders Placed This Encounter   Procedures    CPAP/BIPAP SUPPLIES     Order Specific Question:   Type of mask:     Answer:   Nasal     Order Specific Question:   Headgear?     Answer:   Yes     Order Specific Question:   Tubing?     Answer:   Yes     Order Specific Question:   Humidifier chamber?     Answer:   Yes     Order Specific Question:   Chin strap?     Answer:   Yes     Order Specific Question:   Filters?      "Answer:   Yes     Order Specific Question:   Cushions?     Answer:   Yes     Order Specific Question:   Length of need (1-99 months):     Answer:   99    NEBULIZER KIT (SUPPLIES) FOR HOME USE     Order Specific Question:   Height:     Answer:   5' 7" (1.702 m)     Order Specific Question:   Weight:     Answer:   93.2 kg (205 lb 7.5 oz)     Order Specific Question:   Length of need (1-99 months):     Answer:   99     Order Specific Question:   Mask or Mouthpiece?     Answer:   Mouthpiece    Spirometry without Bronchodilator     Standing Status:   Future     Standing Expiration Date:   1/28/2021     Plan:        Problem List Items Addressed This Visit        ENT    Chronic pansinusitis    Current Assessment & Plan     Per Dr. Shaffer. IVIG infusion ordered            Pulmonary    Moderate persistent asthma without complication    Relevant Orders    NEBULIZER KIT (SUPPLIES) FOR HOME USE       Immunology/Multi System    Hypogammaglobulinemia    Current Assessment & Plan     Per Dr. Fletcher.            Other    TAMIKA (obstructive sleep apnea) - Primary    Overview     Compliant with PAP and benefits from use. Follow up annually in the sleep clinic.           Relevant Orders    CPAP/BIPAP SUPPLIES    Spirometry without Bronchodilator        "

## 2020-01-31 ENCOUNTER — PATIENT MESSAGE (OUTPATIENT)
Dept: INTERNAL MEDICINE | Facility: CLINIC | Age: 55
End: 2020-01-31

## 2020-02-03 ENCOUNTER — PATIENT MESSAGE (OUTPATIENT)
Dept: INTERNAL MEDICINE | Facility: CLINIC | Age: 55
End: 2020-02-03

## 2020-02-03 ENCOUNTER — PATIENT MESSAGE (OUTPATIENT)
Dept: ALLERGY | Facility: CLINIC | Age: 55
End: 2020-02-03

## 2020-02-03 ENCOUNTER — PATIENT MESSAGE (OUTPATIENT)
Dept: CARDIOLOGY | Facility: CLINIC | Age: 55
End: 2020-02-03

## 2020-02-05 ENCOUNTER — PATIENT MESSAGE (OUTPATIENT)
Dept: GASTROENTEROLOGY | Facility: CLINIC | Age: 55
End: 2020-02-05

## 2020-02-05 ENCOUNTER — PATIENT MESSAGE (OUTPATIENT)
Dept: INTERNAL MEDICINE | Facility: CLINIC | Age: 55
End: 2020-02-05

## 2020-02-05 ENCOUNTER — TELEPHONE (OUTPATIENT)
Dept: INTERNAL MEDICINE | Facility: CLINIC | Age: 55
End: 2020-02-05

## 2020-02-05 NOTE — TELEPHONE ENCOUNTER
Patient's long-term disability paperwork requires to have copies of all medical records relating to her disabling condition.  This will need to be obtained through medical records because it is extensive amount.  Is okay to print out my last few office visit notes to attached to the initial form as well as the last 2-3 notes from [facility]ology [facility].  Please inform this to the patient that it is going to require a full copy of her medical record to complete.  She can then  the parts that we have at least initially printed out the initial part of the forms and provide it to her [employer] system.  Thank you.

## 2020-02-06 ENCOUNTER — TELEPHONE (OUTPATIENT)
Dept: PHARMACY | Facility: CLINIC | Age: 55
End: 2020-02-06

## 2020-02-06 ENCOUNTER — TELEPHONE (OUTPATIENT)
Dept: INFUSION THERAPY | Facility: HOSPITAL | Age: 55
End: 2020-02-06

## 2020-02-06 DIAGNOSIS — D80.1 HYPOGAMMAGLOBULINEMIA: Primary | ICD-10-CM

## 2020-02-06 NOTE — Clinical Note
Dr. Fletcher, please sign orders. I will send them to [facility] so they can get started on getting approval for Ms. Murguia to receive her IVIG at their facility. Her insurance gave approval for [facility] to administer IVIG through 2/15.  She will receive her 2nd dose on 2/13, then she will have it at a free standing infusion clinic. Not an out pt hospital infusion suite. Thanks, Sheryl

## 2020-02-06 NOTE — TELEPHONE ENCOUNTER
Pt called to report she has a headache since her last IVIG infusion. Her next infusion is due 2/13. She will then be receiving her infusions at a free standing infusion clinic. Dr. Fletcher, Do you want to add solumedrol as pre med/ prn to the therapy plan?

## 2020-02-07 ENCOUNTER — PATIENT MESSAGE (OUTPATIENT)
Dept: INTERNAL MEDICINE | Facility: CLINIC | Age: 55
End: 2020-02-07

## 2020-02-07 ENCOUNTER — TELEPHONE (OUTPATIENT)
Dept: ALLERGY | Facility: CLINIC | Age: 55
End: 2020-02-07

## 2020-02-07 RX ORDER — ELETRIPTAN HYDROBROMIDE 40 MG/1
40 TABLET, FILM COATED ORAL
Qty: 12 TABLET | Refills: 3 | Status: ON HOLD | OUTPATIENT
Start: 2020-02-07 | End: 2023-10-15 | Stop reason: HOSPADM

## 2020-02-07 NOTE — TELEPHONE ENCOUNTER
----- Message from Vicki Fletcher MD sent at 2/6/2020  4:08 PM CST -----  Can you print out the infusion plan. I can not print from here.  Vicki Black  ----- Message -----  From: Sheryl Montemayor RN  Sent: 2/6/2020   3:06 PM CST  To: MD Dr. Chance Martinez, please sign orders. I will send them to [facility] so they can get started on getting approval for Ms. Murguia to receive her IVIG at their facility. Her insurance gave approval for [facility] to administer IVIG through 2/15.  She will receive her 2nd dose on 2/13, then she will have it at a free standing infusion clinic. Not an out pt hospital infusion suite. Thanks, Sheryl

## 2020-02-12 ENCOUNTER — PATIENT MESSAGE (OUTPATIENT)
Dept: INTERNAL MEDICINE | Facility: CLINIC | Age: 55
End: 2020-02-12

## 2020-02-13 ENCOUNTER — INFUSION (OUTPATIENT)
Dept: INFUSION THERAPY | Facility: HOSPITAL | Age: 55
End: 2020-02-13
Attending: ALLERGY & IMMUNOLOGY
Payer: COMMERCIAL

## 2020-02-13 VITALS
HEART RATE: 61 BPM | DIASTOLIC BLOOD PRESSURE: 70 MMHG | TEMPERATURE: 99 F | WEIGHT: 205.5 LBS | OXYGEN SATURATION: 95 % | HEIGHT: 67 IN | BODY MASS INDEX: 32.25 KG/M2 | RESPIRATION RATE: 16 BRPM | SYSTOLIC BLOOD PRESSURE: 116 MMHG

## 2020-02-13 DIAGNOSIS — D80.1 HYPOGAMMAGLOBULINEMIA: Primary | ICD-10-CM

## 2020-02-13 PROCEDURE — 25000003 PHARM REV CODE 250: Performed by: ALLERGY & IMMUNOLOGY

## 2020-02-13 PROCEDURE — 63600175 PHARM REV CODE 636 W HCPCS: Mod: JG | Performed by: ALLERGY & IMMUNOLOGY

## 2020-02-13 PROCEDURE — 96367 TX/PROPH/DG ADDL SEQ IV INF: CPT

## 2020-02-13 PROCEDURE — 96366 THER/PROPH/DIAG IV INF ADDON: CPT

## 2020-02-13 PROCEDURE — S0028 INJECTION, FAMOTIDINE, 20 MG: HCPCS | Performed by: ALLERGY & IMMUNOLOGY

## 2020-02-13 PROCEDURE — 96375 TX/PRO/DX INJ NEW DRUG ADDON: CPT

## 2020-02-13 PROCEDURE — 96365 THER/PROPH/DIAG IV INF INIT: CPT

## 2020-02-13 RX ORDER — ACETAMINOPHEN 325 MG/1
650 TABLET ORAL
Status: COMPLETED | OUTPATIENT
Start: 2020-02-13 | End: 2020-02-13

## 2020-02-13 RX ORDER — ACETAMINOPHEN 325 MG/1
650 TABLET ORAL
Status: CANCELLED | OUTPATIENT
Start: 2020-03-12

## 2020-02-13 RX ORDER — SODIUM CHLORIDE 0.9 % (FLUSH) 0.9 %
10 SYRINGE (ML) INJECTION
Status: CANCELLED | OUTPATIENT
Start: 2020-03-12

## 2020-02-13 RX ORDER — FAMOTIDINE 10 MG/ML
20 INJECTION INTRAVENOUS
Status: CANCELLED | OUTPATIENT
Start: 2020-03-12

## 2020-02-13 RX ORDER — FAMOTIDINE 10 MG/ML
20 INJECTION INTRAVENOUS
Status: COMPLETED | OUTPATIENT
Start: 2020-02-13 | End: 2020-02-13

## 2020-02-13 RX ORDER — HEPARIN 100 UNIT/ML
500 SYRINGE INTRAVENOUS
Status: CANCELLED | OUTPATIENT
Start: 2020-03-12

## 2020-02-13 RX ORDER — SODIUM CHLORIDE 0.9 % (FLUSH) 0.9 %
10 SYRINGE (ML) INJECTION
Status: DISCONTINUED | OUTPATIENT
Start: 2020-02-13 | End: 2020-02-13 | Stop reason: HOSPADM

## 2020-02-13 RX ADMIN — HUMAN IMMUNOGLOBULIN G 50 G: 40 LIQUID INTRAVENOUS at 09:02

## 2020-02-13 RX ADMIN — SODIUM CHLORIDE: 9 INJECTION, SOLUTION INTRAVENOUS at 08:02

## 2020-02-13 RX ADMIN — ACETAMINOPHEN 650 MG: 325 TABLET ORAL at 09:02

## 2020-02-13 RX ADMIN — FAMOTIDINE 20 MG: 10 INJECTION, SOLUTION INTRAVENOUS at 09:02

## 2020-02-13 RX ADMIN — DIPHENHYDRAMINE HYDROCHLORIDE 50 MG: 50 INJECTION INTRAMUSCULAR; INTRAVENOUS at 09:02

## 2020-02-13 RX ADMIN — DEXTROSE: 5 SOLUTION INTRAVENOUS at 09:02

## 2020-02-15 ENCOUNTER — PATIENT MESSAGE (OUTPATIENT)
Dept: INTERNAL MEDICINE | Facility: CLINIC | Age: 55
End: 2020-02-15

## 2020-02-17 ENCOUNTER — TELEPHONE (OUTPATIENT)
Dept: ALLERGY | Facility: CLINIC | Age: 55
End: 2020-02-17

## 2020-02-17 NOTE — TELEPHONE ENCOUNTER
Shelby at [facility] notified.  ---Shelby Schneider from [facility] called wanting to know if you want them to administer Hizentra sub Q in 4 weeks,3/12/20.They received this order since her insurance will no longer pay for hospital based administration. Shelby said she checked with patient's infusion center and was told she received IVIG TX 2/13/20. Please advise, thank you.  -- Message from Genie Pascal sent at 2/17/2020 10:15 AM CST -----  Contact: [facility]-[phone]  Would like to very dosage of medication. Please call back at [phone] .   [place]

## 2020-02-18 ENCOUNTER — HOSPITAL ENCOUNTER (OUTPATIENT)
Dept: RADIOLOGY | Facility: HOSPITAL | Age: 55
Discharge: HOME | End: 2020-02-18
Attending: ALLERGY & IMMUNOLOGY
Payer: COMMERCIAL

## 2020-02-18 ENCOUNTER — OFFICE VISIT (OUTPATIENT)
Dept: ALLERGY | Facility: CLINIC | Age: 55
End: 2020-02-18
Payer: COMMERCIAL

## 2020-02-18 VITALS
DIASTOLIC BLOOD PRESSURE: 78 MMHG | BODY MASS INDEX: 32.21 KG/M2 | HEART RATE: 63 BPM | HEIGHT: 67 IN | TEMPERATURE: 97 F | SYSTOLIC BLOOD PRESSURE: 115 MMHG | WEIGHT: 205.25 LBS

## 2020-02-18 DIAGNOSIS — I82.90 VENOUS EMBOLISM AND THROMBOSIS: ICD-10-CM

## 2020-02-18 DIAGNOSIS — D80.3 IGG2 SUBCLASS DEFICIENCY: Primary | ICD-10-CM

## 2020-02-18 PROCEDURE — 3008F PR BODY MASS INDEX (BMI) DOCUMENTED: ICD-10-PCS | Mod: CPTII,S$GLB,, | Performed by: ALLERGY & IMMUNOLOGY

## 2020-02-18 PROCEDURE — 99214 OFFICE O/P EST MOD 30 MIN: CPT | Mod: S$GLB,,, | Performed by: ALLERGY & IMMUNOLOGY

## 2020-02-18 PROCEDURE — 99214 PR OFFICE/OUTPT VISIT, EST, LEVL IV, 30-39 MIN: ICD-10-PCS | Mod: S$GLB,,, | Performed by: ALLERGY & IMMUNOLOGY

## 2020-02-18 PROCEDURE — 3074F SYST BP LT 130 MM HG: CPT | Mod: CPTII,S$GLB,, | Performed by: ALLERGY & IMMUNOLOGY

## 2020-02-18 PROCEDURE — 71275 CT ANGIOGRAPHY CHEST: CPT | Mod: TC

## 2020-02-18 PROCEDURE — 71275 CT ANGIOGRAPHY CHEST: CPT | Mod: 26,,, | Performed by: RADIOLOGY

## 2020-02-18 PROCEDURE — 3078F DIAST BP <80 MM HG: CPT | Mod: CPTII,S$GLB,, | Performed by: ALLERGY & IMMUNOLOGY

## 2020-02-18 PROCEDURE — 3078F PR MOST RECENT DIASTOLIC BLOOD PRESSURE < 80 MM HG: ICD-10-PCS | Mod: CPTII,S$GLB,, | Performed by: ALLERGY & IMMUNOLOGY

## 2020-02-18 PROCEDURE — 99999 PR PBB SHADOW E&M-EST. PATIENT-LVL III: CPT | Mod: PBBFAC,,, | Performed by: ALLERGY & IMMUNOLOGY

## 2020-02-18 PROCEDURE — 3074F PR MOST RECENT SYSTOLIC BLOOD PRESSURE < 130 MM HG: ICD-10-PCS | Mod: CPTII,S$GLB,, | Performed by: ALLERGY & IMMUNOLOGY

## 2020-02-18 PROCEDURE — 71275 CTA CHEST NON CORONARY: ICD-10-PCS | Mod: 26,,, | Performed by: RADIOLOGY

## 2020-02-18 PROCEDURE — 99999 PR PBB SHADOW E&M-EST. PATIENT-LVL III: ICD-10-PCS | Mod: PBBFAC,,, | Performed by: ALLERGY & IMMUNOLOGY

## 2020-02-18 PROCEDURE — 3008F BODY MASS INDEX DOCD: CPT | Mod: CPTII,S$GLB,, | Performed by: ALLERGY & IMMUNOLOGY

## 2020-02-18 PROCEDURE — 25500020 PHARM REV CODE 255: Performed by: ALLERGY & IMMUNOLOGY

## 2020-02-18 RX ORDER — CETIRIZINE HYDROCHLORIDE 5 MG/1
5 TABLET ORAL DAILY
COMMUNITY
End: 2020-07-08

## 2020-02-18 RX ADMIN — IOHEXOL 100 ML: 350 INJECTION, SOLUTION INTRAVENOUS at 12:02

## 2020-02-18 NOTE — PATIENT INSTRUCTIONS
Avoid acidic foods, alcohol, spicy foods, caffeine, chocolate, peppermints, greasy foods, alcohol, large meals    No food three hours before laying flat

## 2020-02-19 ENCOUNTER — PATIENT MESSAGE (OUTPATIENT)
Dept: ALLERGY | Facility: CLINIC | Age: 55
End: 2020-02-19

## 2020-02-19 NOTE — TELEPHONE ENCOUNTER
I advised pt to go to the ER due to statement she feels a heavy weight on her chest and the swelling in her neck. Pt stated she is not able to go at this time, she has to wait until her  gets home. I advised her to call an ambulance. She stated understanding.    In addition she requested to be advised of the test results.

## 2020-02-25 ENCOUNTER — DOCUMENTATION ONLY (OUTPATIENT)
Dept: GASTROENTEROLOGY | Facility: CLINIC | Age: 55
End: 2020-02-25

## 2020-02-25 ENCOUNTER — PATIENT MESSAGE (OUTPATIENT)
Dept: GASTROENTEROLOGY | Facility: CLINIC | Age: 55
End: 2020-02-25

## 2020-02-25 NOTE — PROGRESS NOTES
Chart reviewed, UTD as of 2/25/20. No f/u necessary at this time.  Portal message sent regarding MRE. ROBERTA Mclaughlin

## 2020-03-04 ENCOUNTER — PATIENT MESSAGE (OUTPATIENT)
Dept: INTERNAL MEDICINE | Facility: CLINIC | Age: 55
End: 2020-03-04

## 2020-03-04 DIAGNOSIS — G43.809 OTHER MIGRAINE WITHOUT STATUS MIGRAINOSUS, NOT INTRACTABLE: Primary | ICD-10-CM

## 2020-03-04 RX ORDER — TOPIRAMATE 25 MG/1
TABLET ORAL
Qty: 60 TABLET | Refills: 11 | Status: SHIPPED | OUTPATIENT
Start: 2020-03-04 | End: 2020-08-03

## 2020-03-04 NOTE — TELEPHONE ENCOUNTER
Needs to see neurology. Placed referral. Can start back on topamax for now low dose in the interim. Will send in. Please inform.

## 2020-03-05 ENCOUNTER — PATIENT OUTREACH (OUTPATIENT)
Dept: ADMINISTRATIVE | Facility: OTHER | Age: 55
End: 2020-03-05

## 2020-03-09 ENCOUNTER — PATIENT MESSAGE (OUTPATIENT)
Dept: GASTROENTEROLOGY | Facility: CLINIC | Age: 55
End: 2020-03-09

## 2020-03-09 ENCOUNTER — OFFICE VISIT (OUTPATIENT)
Dept: GASTROENTEROLOGY | Facility: CLINIC | Age: 55
End: 2020-03-09
Payer: COMMERCIAL

## 2020-03-09 ENCOUNTER — LAB VISIT (OUTPATIENT)
Dept: LAB | Facility: HOSPITAL | Age: 55
End: 2020-03-09
Attending: INTERNAL MEDICINE
Payer: COMMERCIAL

## 2020-03-09 VITALS
WEIGHT: 202.38 LBS | HEIGHT: 67 IN | DIASTOLIC BLOOD PRESSURE: 82 MMHG | HEART RATE: 72 BPM | BODY MASS INDEX: 31.76 KG/M2 | SYSTOLIC BLOOD PRESSURE: 120 MMHG

## 2020-03-09 DIAGNOSIS — M81.0 AT RISK OF FRACTURE DUE TO OSTEOPOROSIS: ICD-10-CM

## 2020-03-09 DIAGNOSIS — K50.00 CROHN'S DISEASE OF ILEUM WITHOUT COMPLICATION: ICD-10-CM

## 2020-03-09 DIAGNOSIS — K50.00 CROHN'S DISEASE OF ILEUM WITHOUT COMPLICATION: Primary | ICD-10-CM

## 2020-03-09 DIAGNOSIS — Z91.89 AT RISK OF FRACTURE DUE TO OSTEOPOROSIS: ICD-10-CM

## 2020-03-09 LAB — ERYTHROCYTE [SEDIMENTATION RATE] IN BLOOD BY WESTERGREN METHOD: 17 MM/HR (ref 0–36)

## 2020-03-09 PROCEDURE — 82306 VITAMIN D 25 HYDROXY: CPT

## 2020-03-09 PROCEDURE — 3074F PR MOST RECENT SYSTOLIC BLOOD PRESSURE < 130 MM HG: ICD-10-PCS | Mod: CPTII,S$GLB,, | Performed by: INTERNAL MEDICINE

## 2020-03-09 PROCEDURE — 3008F BODY MASS INDEX DOCD: CPT | Mod: CPTII,S$GLB,, | Performed by: INTERNAL MEDICINE

## 2020-03-09 PROCEDURE — 3079F DIAST BP 80-89 MM HG: CPT | Mod: CPTII,S$GLB,, | Performed by: INTERNAL MEDICINE

## 2020-03-09 PROCEDURE — 3079F PR MOST RECENT DIASTOLIC BLOOD PRESSURE 80-89 MM HG: ICD-10-PCS | Mod: CPTII,S$GLB,, | Performed by: INTERNAL MEDICINE

## 2020-03-09 PROCEDURE — 85652 RBC SED RATE AUTOMATED: CPT

## 2020-03-09 PROCEDURE — 3074F SYST BP LT 130 MM HG: CPT | Mod: CPTII,S$GLB,, | Performed by: INTERNAL MEDICINE

## 2020-03-09 PROCEDURE — 86140 C-REACTIVE PROTEIN: CPT

## 2020-03-09 PROCEDURE — 99213 OFFICE O/P EST LOW 20 MIN: CPT | Mod: S$GLB,,, | Performed by: INTERNAL MEDICINE

## 2020-03-09 PROCEDURE — 3008F PR BODY MASS INDEX (BMI) DOCUMENTED: ICD-10-PCS | Mod: CPTII,S$GLB,, | Performed by: INTERNAL MEDICINE

## 2020-03-09 PROCEDURE — 99999 PR PBB SHADOW E&M-EST. PATIENT-LVL V: CPT | Mod: PBBFAC,,, | Performed by: INTERNAL MEDICINE

## 2020-03-09 PROCEDURE — 99999 PR PBB SHADOW E&M-EST. PATIENT-LVL V: ICD-10-PCS | Mod: PBBFAC,,, | Performed by: INTERNAL MEDICINE

## 2020-03-09 PROCEDURE — 99213 PR OFFICE/OUTPT VISIT, EST, LEVL III, 20-29 MIN: ICD-10-PCS | Mod: S$GLB,,, | Performed by: INTERNAL MEDICINE

## 2020-03-09 PROCEDURE — 36415 COLL VENOUS BLD VENIPUNCTURE: CPT

## 2020-03-09 NOTE — PROGRESS NOTES
GASTROENTEROLOGY IBD CONSULTATION:   Jaylin Murguia  MRN: [id number]  : 1965    Referring Provider: No ref. provider found  Primary Care Provider: Esthela Hu MD      CHIEF COMPLAINT:   Chief Complaint   Patient presents with    Crohn's Disease       History of Present Illness:  Ms. Murguia is a 55 y.o. female who is here today for     HPI   IBD Histroy  - Type: Crohn's disease  - Diagnosed: early   - Disease Location: small intestine only.   - Surgeries related to IBD: none  - Extra-intestinal Manifestations: oral ulcers; ?joint pain      Current Medication  Pentasa 1000 mg QID (started 2018)     Past Medication  Remicade (in remote past)-- ineffective  Asacol 4.8 gm-- ineffective  Entocort-- effective  Prednisone  Humira (started , stopped 2018)-- stopped  multiple infections. (2018) Humira level 2.7, intermediate antibody formation.      Endoscopy Reports  5/7/15 colonoscopy: poor prep. Skin tag. Crohn's disease with ileitis. Biopsied. Pathology: focal chronic active ileitis.   17: erythematous mucosa in the sigmoid, transverse and hepatic flexure. 2 mm polyp at hepatic flexure. Crohn's disease with ileitis. Pathology: colon and ileal bx normal colon mucosa. Polyp normal colon mucosa.   17 EGD: gastritis and duodenal erosions without bleeding. Pathology acute and chronic non-specific duodenitis with ulceration.   3/27/18 colonoscopy: 3 mm polyp in the sigmoid. No signs of active crohn's disease and no signs of stricture in the TI (advanced up to 15 cm). Pathology: hyperplastic polyp.        Interval History:  Pt has been on pentasa.  Continues with abdominal pain on right side, says it feels like it is at the terminal ileum.  This has been ongoing for years.  Fried food and spicy food, fruit, raw vegetables, read meat all worsen pain.  Has this type of pain up to 5 times per week, despite eating appropriately.  Also associates bloating with this.   New symptoms:  Reports  a sharp midepigastric pain within 10 minutes after eating that is new and started 3 months ago.  +borborgymi and nausea but no emesis.  States increased urgency, small amount of liquid stool and mucus.  Has been avoiding eating if she has to go somewhere because of this.  All of this started 3-4 months ago.    Denies hematochezia.      On and off high dose steroids (up to 4 times per year) mostly for sinus infections and upper resp infections.  Has low levels of IgG, started IVIG this year.    Does have history of pneumonia     PREVENTIVE MEDICINE:  Immunization History   Administered Date(s) Administered    Hepatitis A / Hepatitis B 12/12/2019    Influenza - Quadrivalent - PF (6 months and older) 11/15/2017, 01/28/2020    Influenza Split 10/29/2013    PPD Test 05/22/2017    Pneumococcal Conjugate - 13 Valent 12/23/2019    Pneumococcal Polysaccharide - 23 Valent 10/29/2013    Tdap 02/18/2014      - Immunizations:               Influenza: due, recommend yearly              Pneumonia: PSV 23 10/29/13, needs PCV 13              Hepatitis B: recently started vaccination              Tdap: 2/18/14  - Date of last pap smear: hysterectomy   - Date of last skin cancer screening: Dermatology visit sometime this year  - Date of last eye exam: sometime this year  - Date of last surveillance colonoscopy: 3/27/18  - Date of last TB testing: 3/13/18  - TPMT status: 3/9/18 RBC normal, genotype normal metabolizer.   - NSAID use: none  - History of C. Diff: none  - Family planning:  NA    Past Medical History:  Past Medical History:   Diagnosis Date    Allergic rhinitis     Asthma     Chronic pansinusitis     Crohn's disease     Ileal involvement, previously on Remicade, Asacol, Prednisone    Fibromyalgia     Hyperlipidemia     Hypertension     Migraine     Obstructive sleep apnea     CPAP at night    Sciatica      Past Surgical History:  Past Surgical History:   Procedure Laterality Date    BLADDER SURGERY       sling was created by her muscles      SECTION      COLONOSCOPY N/A 2017    Procedure: COLONOSCOPY;  Surgeon: Kin Dyer MD;  Location[facility] ENDO;  Service: Endoscopy;  Laterality: N/A;    COLONOSCOPY N/A 3/27/2018    Procedure: COLONOSCOPY;  Surgeon: Kyra Vallecillo MD;  Location: [facility] ENDO;  Service: Endoscopy;  Laterality: N/A;    FINGER SURGERY      joint relpacement, left hand index finger    FUNCTIONAL ENDOSCOPIC SINUS SURGERY (FESS) USING COMPUTER-ASSISTED NAVIGATION Bilateral 2019    Procedure: FESS, USING COMPUTER-ASSISTED NAVIGATION;  Surgeon: Manish Shaffer MD;  Location: [facility] OR;  Service: ENT;  Laterality: Bilateral;    HYSTERECTOMY      WISDOM TOOTH EXTRACTION       Current Medications:   Current Outpatient Medications   Medication Sig Dispense Refill    amLODIPine (NORVASC) 5 MG tablet Take 1 tablet (5 mg total) by mouth once daily. 90 tablet 3    azelastine (ASTELIN) 137 mcg (0.1 %) nasal spray 1 spray (137 mcg total) by Nasal route 2 (two) times daily. 30 mL 11    butalbital-acetaminophen-caffeine -40 mg (FIORICET, ESGIC) -40 mg per tablet Take 1 tab po q4 hrs prn headache 90 tablet 3    cetirizine (ZYRTEC) 5 MG chewable tablet Take 5 mg by mouth 2 (two) times daily.       DULoxetine (CYMBALTA) 60 MG capsule Take 1 capsule (60 mg total) by mouth 2 (two) times daily. 180 capsule 3    eletriptan (RELPAX) 40 MG tablet Take 1 tablet (40 mg total) by mouth as needed. 12 tablet 3    estradiol (ESTRACE) 2 MG tablet Take 2 mg by mouth every evening.       fluticasone furoate-vilanterol (BREO ELLIPTA) 200-25 mcg/dose DsDv diskus inhaler Inhale 1 puff into the lungs once daily. 1 each 11    fluticasone propionate (FLONASE) 50 mcg/actuation nasal spray       immun glob G,IgG,-pro-IgA 0-50 (HIZENTRA) 10 gram/50 mL (20 %) Soln Inject 55 mLs (11 g total) into the skin every 7 days. 220 mL 10    ipratropium (ATROVENT) 0.02 % nebulizer solution Nebulize 2.5 ml  every 6 hours as directed, if needed 1 Box 0    levalbuterol (XOPENEX) 1.25 mg/3 mL nebulizer solution Take 3 mLs (1.25 mg total) by nebulization every 4 (four) hours. Rescue 1 Box 11    losartan-hydrochlorothiazide 100-25 mg (HYZAAR) 100-25 mg per tablet Take 1 tablet by mouth once daily. 90 tablet 3    mesalamine (PENTASA) 250 mg CpSR Take 4 capsules (1,000 mg total) by mouth 4 (four) times daily. 1440 capsule 3    montelukast (SINGULAIR) 10 mg tablet Take 1 tablet (10 mg total) by mouth every evening. 90 tablet 3    nortriptyline (PAMELOR) 25 MG capsule Take 2 capsules (50 mg total) by mouth once daily. 180 capsule 3    pantoprazole (PROTONIX) 40 MG tablet Take 1 tablet (40 mg total) by mouth every evening. 90 tablet 3    pregabalin (LYRICA) 150 MG capsule TAKE 1 CAPSULE (150 MG TOTAL) BY MOUTH 3 (THREE) TIMES DAILY. 270 capsule 1    propranolol (INNOPRAN XL) 80 mg 24 hr capsule Take 1 capsule (80 mg total) by mouth every evening. 90 capsule 1    rizatriptan (MAXALT) 10 MG tablet TAKE 1 TABLET BY MOUTH IF NEEDED FOR MIGRAINES MAX 2 TAB IN 24 HOURS 30 tablet 3    SUPREP BOWEL PREP KIT 17.5-3.13-1.6 gram SolR Use as directed 354 mL 0    tiotropium bromide (SPIRIVA RESPIMAT) 1.25 mcg/actuation Mist Inhale 2 puffs into the lungs once daily. 4 g 11    topiramate (TOPAMAX) 25 MG tablet 1 tablet po qhs, x 1 week, then 1 tablet po bid. 60 tablet 11    triamcinolone acetonide 0.025% (KENALOG) 0.025 % cream Apply topically 2 (two) times daily. 453 g 3    zolpidem (AMBIEN) 5 MG Tab TAKE 1 TABLET BY MOUTH EVERY DAY AT BEDTIME AS NEEDED 90 tablet 1    budesonide 1 mg/2 mL NbSp MIX CONTENTS OF 1 RESPULE WITH STERILE DILUENT PROVIDED. POUR INTO NASONEB CUP & PERFORM TREATMENT TWICE DAILY.  2    cetirizine (ZYRTEC) 5 MG tablet Take 5 mg by mouth once daily.      doxycycline monohydrate 150 mg Cap MIX CONTENTS OF 1 CAPSULE WITH STERILE DILUENT PROVIDED. POUR INTO NASONEB CUP AND PERFORM TREATMENT TWICE DAILY  2     Immune Globulin G, IGG,-PRO-IGA 10 % injection, Privigen, (PRIVIGEN) 10 % Soln Medication:  Privigen 10% injection, 50 grams, IV every 4 weeks for hypogammaglobulinemia infuse per IVIG protocol.    Pre meds:   Acetaminophen 650 mg po x 1 dose every visit  Benadryl 25 mg IVP x 1 dose every visit  Famotidine PF 20 mg IVP x 1 dose every visit  Solumedrol 125 mg IV X 1 dose every visit  Flushes:   Sodium Chloride 0.9% 10 ml syringe  Sodium chloride 0.9% 250 ml flush bag  Heparin, porcine (PF) 100 units/ml injection flush 500 units.    Nursing orders:  Insert PIV and discontinue PIV when infusion complete  Access existing port or implanted device and de-access with completion of infusion.  May use facilities anaphylaxis protocol. (Patient not taking: Reported on 2/18/2020) 500 mL 12    levocetirizine (XYZAL) 5 MG tablet Take 1 tablet (5 mg total) by mouth every evening. (Patient not taking: Reported on 3/9/2020) 30 tablet 11    VENTOLIN HFA 90 mcg/actuation inhaler INHALE 2 PUFFS INTO THE LUNGS EVERY 4 TO 6 HOURS AS NEEDED FOR WHEEZING. (Patient not taking: Reported on 3/9/2020) 18 Inhaler 1     No current facility-administered medications for this visit.      Facility-Administered Medications Ordered in Other Visits   Medication Dose Route Frequency Provider Last Rate Last Dose    lactated ringers infusion   Intravenous Continuous Mary Leiva MD        lidocaine (PF) 10 mg/ml (1%) injection 10 mg  1 mL Intradermal Once Mary Leiva MD          Allergies:   Review of patient's allergies indicates:  No Known Allergies  Social History:  Social History     Socioeconomic History    Marital status:      Spouse name: Not on file    Number of children: 2    Years of education: Not on file    Highest education level: Not on file   Occupational History    Occupation: teacher   Social Needs    Financial resource strain: Not very hard    Food insecurity:     Worry: Never true     Inability: Never true     Transportation needs:     Medical: No     Non-medical: No   Tobacco Use    Smoking status: Never Smoker    Smokeless tobacco: Never Used   Substance and Sexual Activity    Alcohol use: No     Frequency: Never     Drinks per session: Patient refused     Binge frequency: Never    Drug use: No    Sexual activity: Yes     Partners: Male   Lifestyle    Physical activity:     Days per week: 3 days     Minutes per session: 40 min    Stress: To some extent   Relationships    Social connections:     Talks on phone: More than three times a week     Gets together: Once a week     Attends Faith service: Not on file     Active member of club or organization: Yes     Attends meetings of clubs or organizations: More than 4 times per year     Relationship status:    Other Topics Concern    Not on file   Social History Narrative    Surrogate Decision Maker: , Cristobal Murguia, [phone]     Family History:  Family History   Problem Relation Age of Onset    Hypertension Mother     Kidney disease Father 64        ESRD on HD    Scleroderma Father     Hypertension Brother     Cancer Paternal Grandmother 70        colon    Heart attack Maternal Grandmother     COPD Maternal Grandmother 72       Review of Systems:   CONSTITUTIONAL: Denies weight change,  fatigue, fevers, chills, night sweats.  EYES: No changes in vision.   ENT: No oral lesions or sore throat.  HEMATOLOGICAL/Lymph: Denies bleeding tendency, bruising tendency. No swellings or enlarged lymph nodes.  CARDIOVASCULAR: Denies chest pain, shortness of breath, orthopnea and edema.  RESPIRATORY: Denies cough, hemoptysis, dyspnea, and wheezing.  GI: See HPI.  : Denies dysuria and hematuria  MUSCULOSKELETAL: Denies joint pain or swelling, back pain and muscle pain.  SKIN: Denies rashes.  NEUROLOGIC: Denies headaches, seizures and numbness.  PSYCHIATRIC: Denies depression or anxiety.  ENDOCRINE: Denies heat or cold intolerance and  "excessive thirst or urination.    Physical Examination:       /82   Pulse 72   Ht 5' 7" (1.702 m)   Wt 91.8 kg (202 lb 6.1 oz)   BMI 31.70 kg/m²   General Appearance:  Alert, cooperative, no distress, appears stated age  Head:   Normocephalic, without obvious abnormality, atraumatic  Eyes, Nose, Mouth:  Mucous membranes moist, conjunctiva/corneas clear, EOMI ,no obvious lip or oral lesions noted  Neck: No cervical or supraclavicular lymphadenopathy or masses  Lungs: Clear to auscultation bilaterally, no wheezes, rales or rhonchi, no respiratory distress.  Normal effort.   Cardiac: Regular rhythm, no obvious murmurs  Abdomen: Soft, non-distended, no hernias, tender to palpation of RLQ and ROBERT, LUQ,  no hepatosplenomegally, no rebound, guarding or masses  Extremities: No edema, no clubbing, no palmar erythema  Skin: No other visible or palpable rashes   Neuro-Psychiatric: Mood, affect, memory and insight are normal.  No encephalopathy    Laboratory:  Lab Results   Component Value Date    WBC 6.77 07/18/2019    MCV 94 07/18/2019    RDW 12.9 07/18/2019     07/18/2019    INR 1.0 11/20/2014    BUN 8 12/14/2018    BUN 8 12/14/2018    GLU 98 12/14/2018    GLU 98 12/14/2018    HGBA1C 5.2 11/14/2017      Lab Results   Component Value Date    CRP 4.6 11/07/2019    TIBC 352 03/09/2018    IRON 84 03/09/2018    TSH 0.863 11/14/2017    CHOL 155 11/14/2017    TRIG 104 11/14/2017    HDL 59 11/14/2017     Lab Results   Component Value Date    PHOS 2.9 12/14/2018        Imaging Review:   Cta Chest Non Coronary    Result Date: 2/18/2020  EXAMINATION: CTA CHEST NON CORONARY CLINICAL HISTORY: possble subclaven thrombosis on IVIG and estrogen;Acute embolism and thrombosis of unspecified vein TECHNIQUE: CT of the chest was acquired helically utilizing a low-dose technique from the lung apices through the posterior costophrenic angles status post administration of 100 cc of Omnipaque 350.  Bolus timing was utilized to " optimize opacification of the pulmonary arterial system.  Additionally, more delayed images were also obtained to better opacify the venous structures.  3-D maximum intensity projection and multiplanar reconstructions were created from the source data set and interpreted. COMPARISON: None FINDINGS: No infiltrates or pleural effusions are identified.  No discrete pulmonary nodules or masses are identified. The aorta demonstrates normal caliber and contour. There is good opacification of the pulmonary arterial system. No intraluminal filling defects are notified within the pulmonary arterial system to suggest pulmonary embolism. There is no pericardial effusion.  No enlarged mediastinal, hilar or axillary lymph nodes are identified.  Delayed images demonstrate no obvious thrombus within the subclavian veins. The visualized upper abdomen is unremarkable in appearance. The osseous structures are unremarkable in appearance.     1. No evidence to suggest pulmonary embolism.  No obvious thrombus noted within the subclavian veins. 2.  No acute pathology noted within the chest. Electronically signed by: Kane Castellano DO Date:    02/18/2020 Time:    13:36          Health Maintenance Review:  Health Maintenance   Topic Date Due    Pneumococcal Vaccine (Highest Risk) (3 of 3 - PPSV23) 02/17/2020    Mammogram  05/31/2020    Lipid Panel  11/14/2022    Colonoscopy  03/27/2023    TETANUS VACCINE  02/18/2024    Hepatitis C Screening  Completed       ASSESSMENT:  55 y.o. Female with Crohn's ileitis, previously on biologic but now on pentasa with clinic symptoms and MRE showing possible active disease in the setting of normal inflammatory markers.  Her chronic abdominal symptoms are concerning for stricture.  Her new symptoms are concerning for upper GI issue.      PLAN:  - will do EGD to evaluate upper symptoms and rule out upper GI tract Crohn's  - colonoscopy with intubation of TI to evaluate findings on MRE  - will consider  repeat MRE after scopes  - check inflammatory markers and get calprotectin (even though no history of colon disease)    Health Maintenance:   Vitamin D-continue daily supplement, repeat level today   Bone Health: DEXA- due, will order today       Return to clinic: depending on scope findings    Nicole Leal  3/9/2020  11:25 AM

## 2020-03-10 ENCOUNTER — TELEPHONE (OUTPATIENT)
Dept: OTOLARYNGOLOGY | Facility: CLINIC | Age: 55
End: 2020-03-10

## 2020-03-10 LAB
25(OH)D3+25(OH)D2 SERPL-MCNC: 38 NG/ML (ref 30–96)
CRP SERPL-MCNC: 10.5 MG/L (ref 0–8.2)

## 2020-03-10 NOTE — TELEPHONE ENCOUNTER
Patient advised that disability form and printed records have been completed and ready for  on the 2nd floor/[facility] [address] at her convenience. Patient verbalized understanding.

## 2020-03-12 ENCOUNTER — HOSPITAL ENCOUNTER (OUTPATIENT)
Facility: HOSPITAL | Age: 55
Discharge: HOME | End: 2020-03-12
Attending: INTERNAL MEDICINE | Admitting: INTERNAL MEDICINE
Payer: COMMERCIAL

## 2020-03-12 ENCOUNTER — ANESTHESIA EVENT (OUTPATIENT)
Dept: ENDOSCOPY | Facility: HOSPITAL | Age: 55
End: 2020-03-12
Payer: COMMERCIAL

## 2020-03-12 ENCOUNTER — ANESTHESIA (OUTPATIENT)
Dept: ENDOSCOPY | Facility: HOSPITAL | Age: 55
End: 2020-03-12
Payer: COMMERCIAL

## 2020-03-12 VITALS
DIASTOLIC BLOOD PRESSURE: 68 MMHG | SYSTOLIC BLOOD PRESSURE: 107 MMHG | WEIGHT: 194 LBS | HEIGHT: 67 IN | HEART RATE: 61 BPM | RESPIRATION RATE: 17 BRPM | TEMPERATURE: 98 F | BODY MASS INDEX: 30.45 KG/M2 | OXYGEN SATURATION: 100 %

## 2020-03-12 DIAGNOSIS — R10.9 ABDOMINAL PAIN, UNSPECIFIED ABDOMINAL LOCATION: ICD-10-CM

## 2020-03-12 DIAGNOSIS — R10.9 ABDOMINAL PAIN: ICD-10-CM

## 2020-03-12 DIAGNOSIS — K50.00 CROHN'S DISEASE OF SMALL INTESTINE WITHOUT COMPLICATION: Primary | ICD-10-CM

## 2020-03-12 PROCEDURE — 45380 PR COLONOSCOPY,BIOPSY: ICD-10-PCS | Mod: ,,, | Performed by: INTERNAL MEDICINE

## 2020-03-12 PROCEDURE — 45380 COLONOSCOPY AND BIOPSY: CPT | Mod: ,,, | Performed by: INTERNAL MEDICINE

## 2020-03-12 PROCEDURE — 63600175 PHARM REV CODE 636 W HCPCS: Performed by: ANESTHESIOLOGY

## 2020-03-12 PROCEDURE — 43239 EGD BIOPSY SINGLE/MULTIPLE: CPT | Mod: 51,,, | Performed by: INTERNAL MEDICINE

## 2020-03-12 PROCEDURE — 63600175 PHARM REV CODE 636 W HCPCS: Performed by: NURSE ANESTHETIST, CERTIFIED REGISTERED

## 2020-03-12 PROCEDURE — 45380 COLONOSCOPY AND BIOPSY: CPT | Performed by: INTERNAL MEDICINE

## 2020-03-12 PROCEDURE — 43239 PR EGD, FLEX, W/BIOPSY, SGL/MULTI: ICD-10-PCS | Mod: 51,,, | Performed by: INTERNAL MEDICINE

## 2020-03-12 PROCEDURE — 25000003 PHARM REV CODE 250: Performed by: NURSE ANESTHETIST, CERTIFIED REGISTERED

## 2020-03-12 PROCEDURE — 88305 TISSUE EXAM BY PATHOLOGIST: ICD-10-PCS | Mod: 26,,, | Performed by: PATHOLOGY

## 2020-03-12 PROCEDURE — 27201012 HC FORCEPS, HOT/COLD, DISP: Performed by: INTERNAL MEDICINE

## 2020-03-12 PROCEDURE — 88305 TISSUE EXAM BY PATHOLOGIST: CPT | Mod: 26,,, | Performed by: PATHOLOGY

## 2020-03-12 PROCEDURE — 88305 TISSUE EXAM BY PATHOLOGIST: CPT | Performed by: PATHOLOGY

## 2020-03-12 PROCEDURE — 37000008 HC ANESTHESIA 1ST 15 MINUTES: Performed by: INTERNAL MEDICINE

## 2020-03-12 PROCEDURE — 37000009 HC ANESTHESIA EA ADD 15 MINS: Performed by: INTERNAL MEDICINE

## 2020-03-12 PROCEDURE — 43239 EGD BIOPSY SINGLE/MULTIPLE: CPT | Performed by: INTERNAL MEDICINE

## 2020-03-12 RX ORDER — PROPOFOL 10 MG/ML
VIAL (ML) INTRAVENOUS
Status: DISCONTINUED | OUTPATIENT
Start: 2020-03-12 | End: 2020-03-12

## 2020-03-12 RX ORDER — SODIUM CHLORIDE, SODIUM LACTATE, POTASSIUM CHLORIDE, CALCIUM CHLORIDE 600; 310; 30; 20 MG/100ML; MG/100ML; MG/100ML; MG/100ML
INJECTION, SOLUTION INTRAVENOUS CONTINUOUS
Status: DISCONTINUED | OUTPATIENT
Start: 2020-03-12 | End: 2020-03-12 | Stop reason: HOSPADM

## 2020-03-12 RX ORDER — LIDOCAINE HYDROCHLORIDE 10 MG/ML
INJECTION, SOLUTION EPIDURAL; INFILTRATION; INTRACAUDAL; PERINEURAL
Status: DISCONTINUED | OUTPATIENT
Start: 2020-03-12 | End: 2020-03-12

## 2020-03-12 RX ADMIN — PROPOFOL 50 MG: 10 INJECTION, EMULSION INTRAVENOUS at 10:03

## 2020-03-12 RX ADMIN — PROPOFOL 50 MG: 10 INJECTION, EMULSION INTRAVENOUS at 09:03

## 2020-03-12 RX ADMIN — PROPOFOL 120 MG: 10 INJECTION, EMULSION INTRAVENOUS at 09:03

## 2020-03-12 RX ADMIN — PROPOFOL 30 MG: 10 INJECTION, EMULSION INTRAVENOUS at 10:03

## 2020-03-12 RX ADMIN — LIDOCAINE HYDROCHLORIDE 50 MG: 10 INJECTION, SOLUTION EPIDURAL; INFILTRATION; INTRACAUDAL; PERINEURAL at 09:03

## 2020-03-12 RX ADMIN — SODIUM CHLORIDE, SODIUM LACTATE, POTASSIUM CHLORIDE, AND CALCIUM CHLORIDE: 600; 310; 30; 20 INJECTION, SOLUTION INTRAVENOUS at 09:03

## 2020-03-12 NOTE — ANESTHESIA RELEASE NOTE
"Anesthesia Release from PACU Note    Patient: Jaylin Murguia    Procedure(s) Performed: Procedure(s) (LRB):  ESOPHAGOGASTRODUODENOSCOPY (EGD) (N/A)  COLONOSCOPY (N/A)    Anesthesia type: MAC    Post pain: Adequate analgesia    Post assessment: no apparent anesthetic complications    Last Vitals:   Visit Vitals  /71 (BP Location: Left arm, Patient Position: Lying)   Pulse 69   Temp 36.6 °C (97.9 °F) (Temporal)   Resp 20   Ht 5' 7" (1.702 m)   Wt 88 kg (194 lb 0.1 oz)   SpO2 97%   Breastfeeding? No   BMI 30.39 kg/m²       Post vital signs: stable    Level of consciousness: awake    Nausea/Vomiting: no nausea/no vomiting    Complications: none    Airway Patency: patent    Respiratory: unassisted    Cardiovascular: stable and blood pressure at baseline    Hydration: euvolemic     "

## 2020-03-12 NOTE — DISCHARGE INSTRUCTIONS
Colonoscopy     A camera attached to a flexible tube with a viewing lens is used to take video pictures.     Colonoscopy is a test to view the inside of your lower digestive tract (colon and rectum). Sometimes it can show the last part of the small intestine (ileum). During the test, small pieces of tissue may be removed for testing. This is called a biopsy. Small growths, such as polyps, may also be removed.   Why is colonoscopy done?  The test is done to help look for colon cancer. And it can help find the source of abdominal pain, bleeding, and changes in bowel habits. It may be needed once a year, depending on factors such as your:  · Age  · Health history  · Family health history  · Symptoms  · Results from any prior colonoscopy  Risks and possible complications  These include:  · Bleeding               · A puncture or tear in the colon   · Risks of anesthesia  · A cancer lesion not being seen  Getting ready   To prepare for the test:  · Talk with your healthcare provider about the risks of the test (see below). Also ask your healthcare provider about alternatives to the test.  · Tell your healthcare provider about any medicines you take. Also tell him or her about any health conditions you may have.  · Make sure your rectum and colon are empty for the test. Follow the diet and bowel prep instructions exactly. If you dont, the test may need to be rescheduled.  · Plan for a friend or family member to drive you home after the test.     Colonoscopy provides an inside view of the entire colon.     You may discuss the results with your doctor right away or at a future visit.  During the test   The test is usually done in the hospital on an outpatient basis. This means you go home the same day. The procedure takes about 30 minutes. During that time:  · You are given relaxing (sedating) medicine through an IV line. You may be drowsy, or fully asleep.  · The healthcare provider will first give you a physical exam to  check for anal and rectal problems.  · Then the anus is lubricated and the scope inserted.  · If you are awake, you may have a feeling similar to needing to have a bowel movement. You may also feel pressure as air is pumped into the colon. Its OK to pass gas during the procedure.  · Biopsy, polyp removal, or other treatments may be done during the test.  After the test   You may have gas right after the test. It can help to try to pass it to help prevent later bloating. Your healthcare provider may discuss the results with you right away. Or you may need to schedule a follow-up visit to talk about the results. After the test, you can go back to your normal eating and other activities. You may be tired from the sedation and need to rest for a few hours.  Date Last Reviewed: 11/1/2016 © 2000-2017 [employer]. [address] [place] [address], [place] [address]. All rights reserved. This information is not intended as a substitute for professional medical care. Always follow your healthcare professional's instructions.

## 2020-03-12 NOTE — H&P
PRE PROCEDURE H&P    Patient Name: Jaylin Murguia  MRN: [id number]  : 1965  Date of Procedure:  3/12/2020  Referring Physician: Bia Witt NP  Primary Physician: Esthela Hu MD  Procedure Physician: Nicole Leal MD       Planned Procedure: Colonoscopy and EGD  Diagnosis: crohn's, abdominal pain   Chief Complaint: Same as above    HPI: Patient is an 55 y.o. female is here for the above.       Past Medical History:   Past Medical History:   Diagnosis Date    Allergic rhinitis     Asthma     Chronic pansinusitis     Crohn's disease     Ileal involvement, previously on Remicade, Asacol, Prednisone    Fibromyalgia     Hyperlipidemia     Hypertension     Migraine     Obstructive sleep apnea     CPAP at night    Sciatica         Past Surgical History:  Past Surgical History:   Procedure Laterality Date    BLADDER SURGERY      sling was created by her muscles      SECTION      COLONOSCOPY N/A 2017    Procedure: COLONOSCOPY;  Surgeon: Kin Dyer MD;  Location: [facility];  Service: Endoscopy;  Laterality: N/A;    COLONOSCOPY N/A 3/27/2018    Procedure: COLONOSCOPY;  Surgeon: Kyra Vallecillo MD;  Location: [facility];  Service: Endoscopy;  Laterality: N/A;    FINGER SURGERY      joint relpacement, left hand index finger    FUNCTIONAL ENDOSCOPIC SINUS SURGERY (FESS) USING COMPUTER-ASSISTED NAVIGATION Bilateral 2019    Procedure: FESS, USING COMPUTER-ASSISTED NAVIGATION;  Surgeon: Manish Shaffer MD;  Location: [facility];  Service: ENT;  Laterality: Bilateral;    HYSTERECTOMY      WISDOM TOOTH EXTRACTION          Home Medications:  Prior to Admission medications    Medication Sig Start Date End Date Taking? Authorizing Provider   amLODIPine (NORVASC) 5 MG tablet Take 1 tablet (5 mg total) by mouth once daily. 19  Yes Esthela Hu MD   azelastine (ASTELIN) 137 mcg (0.1 %) nasal spray 1 spray (137 mcg total) by Nasal route 2 (two) times daily. 19  Yes Jyothi Denise PA-C   cetirizine (ZYRTEC) 5 MG chewable tablet Take 5 mg by mouth 2 (two) times daily.    Yes Historical Provider, MD   cetirizine (ZYRTEC) 5 MG tablet Take 5 mg by mouth once daily.   Yes Historical Provider, MD   DULoxetine (CYMBALTA) 60 MG capsule Take 1 capsule (60 mg total) by mouth 2 (two) times daily. 7/18/19  Yes Esthela Hu MD   eletriptan (RELPAX) 40 MG tablet Take 1 tablet (40 mg total) by mouth as needed. 2/7/20  Yes sEthela Hu MD   estradiol (ESTRACE) 2 MG tablet Take 2 mg by mouth every evening.    Yes Historical Provider, MD   fluticasone furoate-vilanterol (BREO ELLIPTA) 200-25 mcg/dose DsDv diskus inhaler Inhale 1 puff into the lungs once daily. 10/16/19  Yes Elizabeth Lejeune, ARIS   fluticasone propionate (FLONASE) 50 mcg/actuation nasal spray  11/22/19  Yes Historical Provider, MD   immun glob G,IgG,-pro-IgA 0-50 (HIZENTRA) 10 gram/50 mL (20 %) Soln Inject 55 mLs (11 g total) into the skin every 7 days. 1/14/20  Yes Vicki Fletcher MD   Immune Globulin G, IGG,-PRO-IGA 10 % injection, Privigen, (PRIVIGEN) 10 % Soln Medication:  Privigen 10% injection, 50 grams, IV every 4 weeks for hypogammaglobulinemia infuse per IVIG protocol.    Pre meds:   Acetaminophen 650 mg po x 1 dose every visit  Benadryl 25 mg IVP x 1 dose every visit  Famotidine PF 20 mg IVP x 1 dose every visit  Solumedrol 125 mg IV X 1 dose every visit  Flushes:   Sodium Chloride 0.9% 10 ml syringe  Sodium chloride 0.9% 250 ml flush bag  Heparin, porcine (PF) 100 units/ml injection flush 500 units.    Nursing orders:  Insert PIV and discontinue PIV when infusion complete  Access existing port or implanted device and de-access with completion of infusion.  May use facilities anaphylaxis protocol. 3/9/20 3/31/21 Yes Vicki Fletcher MD   ipratropium (ATROVENT) 0.02 % nebulizer solution Nebulize 2.5 ml every 6 hours as directed, if needed 10/18/19  Yes Manish Meek MD   levalbuterol (XOPENEX) 1.25 mg/3 mL  nebulizer solution Take 3 mLs (1.25 mg total) by nebulization every 4 (four) hours. Rescue 3/13/19 3/12/20 Yes Tanya Marc NP   losartan-hydrochlorothiazide 100-25 mg (HYZAAR) 100-25 mg per tablet Take 1 tablet by mouth once daily. 9/20/19 9/19/20 Yes YURI Seymour   mesalamine (PENTASA) 250 mg CpSR Take 4 capsules (1,000 mg total) by mouth 4 (four) times daily. 7/18/19  Yes Esthela Hu MD   montelukast (SINGULAIR) 10 mg tablet Take 1 tablet (10 mg total) by mouth every evening. 7/18/19  Yes Esthela Hu MD   nortriptyline (PAMELOR) 25 MG capsule Take 2 capsules (50 mg total) by mouth once daily. 1/22/20  Yes Esthela Hu MD   pantoprazole (PROTONIX) 40 MG tablet Take 1 tablet (40 mg total) by mouth every evening. 7/18/19  Yes Esthela Hu MD   pregabalin (LYRICA) 150 MG capsule TAKE 1 CAPSULE (150 MG TOTAL) BY MOUTH 3 (THREE) TIMES DAILY. 7/18/19  Yes Esthela Hu MD   propranolol (INNOPRAN XL) 80 mg 24 hr capsule Take 1 capsule (80 mg total) by mouth every evening. 2/13/20  Yes Esthela Hu MD   rizatriptan (MAXALT) 10 MG tablet TAKE 1 TABLET BY MOUTH IF NEEDED FOR MIGRAINES MAX 2 TAB IN 24 HOURS 8/26/19  Yes Esthela Hu MD   tiotropium bromide (SPIRIVA RESPIMAT) 1.25 mcg/actuation Mist Inhale 2 puffs into the lungs once daily. 12/23/19  Yes Vicki Fletcher MD   topiramate (TOPAMAX) 25 MG tablet 1 tablet po qhs, x 1 week, then 1 tablet po bid. 3/4/20  Yes Esthela Hu MD   triamcinolone acetonide 0.025% (KENALOG) 0.025 % cream Apply topically 2 (two) times daily. 8/26/19  Yes Esthela Hu MD   VENTOLIN HFA 90 mcg/actuation inhaler INHALE 2 PUFFS INTO THE LUNGS EVERY 4 TO 6 HOURS AS NEEDED FOR WHEEZING. 6/19/19  Yes Tanya Marc NP   zolpidem (AMBIEN) 5 MG Tab TAKE 1 TABLET BY MOUTH EVERY DAY AT BEDTIME AS NEEDED 1/22/20  Yes Esthela Hu MD   SUPREP BOWEL PREP KIT 17.5-3.13-1.6 gram SolR Use as directed 11/13/19 3/12/20 Yes Bia Witt NP   budesonide 1 mg/2  mL NbSp MIX CONTENTS OF 1 RESPULE WITH STERILE DILUENT PROVIDED. POUR INTO NASONEB CUP & PERFORM TREATMENT TWICE DAILY. 8/22/19   Historical Provider, MD   butalbital-acetaminophen-caffeine -40 mg (FIORICET, ESGIC) -40 mg per tablet Take 1 tab po q4 hrs prn headache 7/18/19   Esthela Hu MD   doxycycline monohydrate 150 mg Cap MIX CONTENTS OF 1 CAPSULE WITH STERILE DILUENT PROVIDED. POUR INTO NASONEB CUP AND PERFORM TREATMENT TWICE DAILY 8/22/19   Historical Provider, MD   levocetirizine (XYZAL) 5 MG tablet Take 1 tablet (5 mg total) by mouth every evening. 12/23/19 12/22/20  Vicki Fletcher MD        Allergies:  Review of patient's allergies indicates:  No Known Allergies     Social History:   Social History     Socioeconomic History    Marital status:      Spouse name: Not on file    Number of children: 2    Years of education: Not on file    Highest education level: Not on file   Occupational History    Occupation: teacher   Social Needs    Financial resource strain: Not very hard    Food insecurity:     Worry: Never true     Inability: Never true    Transportation needs:     Medical: No     Non-medical: No   Tobacco Use    Smoking status: Never Smoker    Smokeless tobacco: Never Used   Substance and Sexual Activity    Alcohol use: No     Frequency: Never     Drinks per session: Patient refused     Binge frequency: Never    Drug use: No    Sexual activity: Yes     Partners: Male   Lifestyle    Physical activity:     Days per week: 3 days     Minutes per session: 40 min    Stress: To some extent   Relationships    Social connections:     Talks on phone: More than three times a week     Gets together: Once a week     Attends Islam service: Not on file     Active member of club or organization: Yes     Attends meetings of clubs or organizations: More than 4 times per year     Relationship status:    Other Topics Concern    Not on file   Social History Narrative     "Surrogate Decision Maker: , Cristobal Murguia, [phone]       Family History:  Family History   Problem Relation Age of Onset    Hypertension Mother     Kidney disease Father 64        ESRD on HD    Scleroderma Father     Hypertension Brother     Cancer Paternal Grandmother 70        colon    Heart attack Maternal Grandmother     COPD Maternal Grandmother 72       ROS: No acute cardiac events, no acute respiratory complaints.     Physical Exam (all patients):    /71 (BP Location: Left arm, Patient Position: Lying)   Pulse 69   Temp 97.9 °F (36.6 °C) (Temporal)   Resp 20   Ht 5' 7" (1.702 m)   Wt 88 kg (194 lb 0.1 oz)   SpO2 97%   Breastfeeding? No   BMI 30.39 kg/m²   Lungs: Clear to auscultation bilaterally, respirations unlabored  Heart: Regular rate and rhythm, S1 and S2 normal, no obvious murmurs  Abdomen:         Soft, non-tender, bowel sounds normal, no masses, no organomegaly    Lab Results   Component Value Date    WBC 6.77 07/18/2019    MCV 94 07/18/2019    RDW 12.9 07/18/2019     07/18/2019    INR 1.0 11/20/2014    GLU 98 12/14/2018    GLU 98 12/14/2018    HGBA1C 5.2 11/14/2017    BUN 8 12/14/2018    BUN 8 12/14/2018     12/14/2018     12/14/2018    K 4.0 12/14/2018    K 4.0 12/14/2018    CL 98 12/14/2018    CL 98 12/14/2018        SEDATION PLAN: per anesthesia      History reviewed, vital signs satisfactory, cardiopulmonary status satisfactory, sedation options, risks and plans have been discussed with the patient  All their questions were answered and the patient agrees to the sedation procedures as planned and the patient is deemed an appropriate candidate for the sedation as planned.    Procedure explained to patient, informed consent obtained and placed in chart.    Nicole Leal  3/12/2020  9:47 AM   "

## 2020-03-12 NOTE — ANESTHESIA PREPROCEDURE EVALUATION
03/12/2020  Jaylin Murguia is a 55 y.o., female.    Anesthesia Evaluation    I have reviewed the Patient Summary Reports.    I have reviewed the Nursing Notes.   I have reviewed the Medications.     Review of Systems  Anesthesia Hx:  No problems with previous Anesthesia    Social:  Non-Smoker    Hematology/Oncology:  Hematology Normal   Oncology Normal     EENT/Dental:EENT/Dental Normal   Cardiovascular:   Hypertension, well controlled hyperlipidemia    Pulmonary:   Asthma moderate Sleep Apnea    Renal/:  Renal/ Normal     Hepatic/GI:   Bowel Prep.  Bowel Conditions:  Inflammatory Bowel Disease, Crohns    Musculoskeletal:   Arthritis   Muscle Disorders: Fibromyalgia    Neurological:   Neuromuscular Disease, Headaches    Endocrine:  Endocrine Normal    Dermatological:  Skin Normal    Psych:  Psychiatric Normal           Physical Exam  General:  Obesity    Airway/Jaw/Neck:  Airway Findings: Mouth Opening: Normal Tongue: Normal  General Airway Assessment: Adult       Chest/Lungs:  Chest/Lungs Findings: Clear to auscultation     Heart/Vascular:  Heart Findings: Rate: Normal             Anesthesia Plan  Type of Anesthesia, risks & benefits discussed:  Anesthesia Type:  MAC  Patient's Preference:   Intra-op Monitoring Plan: standard ASA monitors  Intra-op Monitoring Plan Comments:   Post Op Pain Control Plan:   Post Op Pain Control Plan Comments:   Induction:   IV  Beta Blocker:  Patient is not currently on a Beta-Blocker (No further documentation required).       Informed Consent: Patient understands risks and agrees with Anesthesia plan.  Questions answered. Anesthesia consent signed with patient.  ASA Score: 2     Day of Surgery Review of History & Physical: I have interviewed and examined the patient. I have reviewed the patient's H&P dated:  There are no significant changes.          Ready For Surgery  From Anesthesia Perspective.

## 2020-03-12 NOTE — DISCHARGE SUMMARY
Endoscopy Discharge Summary      Admit Date: 3/12/2020    Discharge Date and Time:  3/12/2020 10:25 AM    Attending Physician: Nicole Leal MD     Discharge Physician: Nicole Leal MD     Principal Admitting Diagnoses: Abdominal pain         Discharge Diagnosis: The primary encounter diagnosis was Crohn's disease of small intestine without complication. Diagnoses of Abdominal pain, unspecified abdominal location and Abdominal pain were also pertinent to this visit.     Discharged Condition: Good    Indication for Admission: Abdominal pain     Hospital Course: Patient was admitted for an inpatient procedure and tolerated the procedure well with no complications.    Significant Diagnostic Studies: Colonoscopy with biopsy and EGD with biopsy    Pathology (if any):  Specimen (12h ago, onward)    None          Estimated Blood Loss: 1 ml.    Discussed with: patient.    Disposition: Home.    Follow Up/Patient Instructions:   Current Discharge Medication List      CONTINUE these medications which have NOT CHANGED    Details   amLODIPine (NORVASC) 5 MG tablet Take 1 tablet (5 mg total) by mouth once daily.  Qty: 90 tablet, Refills: 3    Comments: DX Code Needed  .  Associated Diagnoses: Essential (primary) hypertension      azelastine (ASTELIN) 137 mcg (0.1 %) nasal spray 1 spray (137 mcg total) by Nasal route 2 (two) times daily.  Qty: 30 mL, Refills: 11      cetirizine (ZYRTEC) 5 MG chewable tablet Take 5 mg by mouth 2 (two) times daily.       cetirizine (ZYRTEC) 5 MG tablet Take 5 mg by mouth once daily.      DULoxetine (CYMBALTA) 60 MG capsule Take 1 capsule (60 mg total) by mouth 2 (two) times daily.  Qty: 180 capsule, Refills: 3      eletriptan (RELPAX) 40 MG tablet Take 1 tablet (40 mg total) by mouth as needed.  Qty: 12 tablet, Refills: 3      estradiol (ESTRACE) 2 MG tablet Take 2 mg by mouth every evening.       fluticasone furoate-vilanterol (BREO ELLIPTA) 200-25 mcg/dose DsDv diskus inhaler Inhale 1 puff  into the lungs once daily.  Qty: 1 each, Refills: 11      fluticasone propionate (FLONASE) 50 mcg/actuation nasal spray       immun glob G,IgG,-pro-IgA 0-50 (HIZENTRA) 10 gram/50 mL (20 %) Soln Inject 55 mLs (11 g total) into the skin every 7 days.  Qty: 220 mL, Refills: 10      Immune Globulin G, IGG,-PRO-IGA 10 % injection, Privigen, (PRIVIGEN) 10 % Soln Medication:  Privigen 10% injection, 50 grams, IV every 4 weeks for hypogammaglobulinemia infuse per IVIG protocol.    Pre meds:   Acetaminophen 650 mg po x 1 dose every visit  Benadryl 25 mg IVP x 1 dose every visit  Famotidine PF 20 mg IVP x 1 dose every visit  Solumedrol 125 mg IV X 1 dose every visit  Flushes:   Sodium Chloride 0.9% 10 ml syringe  Sodium chloride 0.9% 250 ml flush bag  Heparin, porcine (PF) 100 units/ml injection flush 500 units.    Nursing orders:  Insert PIV and discontinue PIV when infusion complete  Access existing port or implanted device and de-access with completion of infusion.  May use facilities anaphylaxis protocol.  Qty: 500 mL, Refills: 12    Associated Diagnoses: Hypogammaglobulinemia      ipratropium (ATROVENT) 0.02 % nebulizer solution Nebulize 2.5 ml every 6 hours as directed, if needed  Qty: 1 Box, Refills: 0    Associated Diagnoses: Moderate persistent asthma without status asthmaticus with acute exacerbation; Cough; Chronic obstructive pulmonary disease, unspecified COPD type      levalbuterol (XOPENEX) 1.25 mg/3 mL nebulizer solution Take 3 mLs (1.25 mg total) by nebulization every 4 (four) hours. Rescue  Qty: 1 Box, Refills: 11    Associated Diagnoses: Asthma, unspecified asthma severity, unspecified whether complicated, unspecified whether persistent      losartan-hydrochlorothiazide 100-25 mg (HYZAAR) 100-25 mg per tablet Take 1 tablet by mouth once daily.  Qty: 90 tablet, Refills: 3      mesalamine (PENTASA) 250 mg CpSR Take 4 capsules (1,000 mg total) by mouth 4 (four) times daily.  Qty: 1440 capsule, Refills: 3     Associated Diagnoses: Crohn's disease involving terminal ileum      montelukast (SINGULAIR) 10 mg tablet Take 1 tablet (10 mg total) by mouth every evening.  Qty: 90 tablet, Refills: 3      nortriptyline (PAMELOR) 25 MG capsule Take 2 capsules (50 mg total) by mouth once daily.  Qty: 180 capsule, Refills: 3      pantoprazole (PROTONIX) 40 MG tablet Take 1 tablet (40 mg total) by mouth every evening.  Qty: 90 tablet, Refills: 3    Associated Diagnoses: Crohn's disease with complication, unspecified gastrointestinal tract location      pregabalin (LYRICA) 150 MG capsule TAKE 1 CAPSULE (150 MG TOTAL) BY MOUTH 3 (THREE) TIMES DAILY.  Qty: 270 capsule, Refills: 1    Comments: Not to exceed 5 additional fills before 11/23/2019  Associated Diagnoses: Fibromyalgia      propranolol (INNOPRAN XL) 80 mg 24 hr capsule Take 1 capsule (80 mg total) by mouth every evening.  Qty: 90 capsule, Refills: 1      rizatriptan (MAXALT) 10 MG tablet TAKE 1 TABLET BY MOUTH IF NEEDED FOR MIGRAINES MAX 2 TAB IN 24 HOURS  Qty: 30 tablet, Refills: 3      tiotropium bromide (SPIRIVA RESPIMAT) 1.25 mcg/actuation Mist Inhale 2 puffs into the lungs once daily.  Qty: 4 g, Refills: 11    Associated Diagnoses: Asthma, unspecified asthma severity, unspecified whether complicated, unspecified whether persistent      topiramate (TOPAMAX) 25 MG tablet 1 tablet po qhs, x 1 week, then 1 tablet po bid.  Qty: 60 tablet, Refills: 11      triamcinolone acetonide 0.025% (KENALOG) 0.025 % cream Apply topically 2 (two) times daily.  Qty: 453 g, Refills: 3    Associated Diagnoses: Aphthous ulcer      VENTOLIN HFA 90 mcg/actuation inhaler INHALE 2 PUFFS INTO THE LUNGS EVERY 4 TO 6 HOURS AS NEEDED FOR WHEEZING.  Qty: 18 Inhaler, Refills: 1    Associated Diagnoses: Asthma, unspecified asthma severity, unspecified whether complicated, unspecified whether persistent      zolpidem (AMBIEN) 5 MG Tab TAKE 1 TABLET BY MOUTH EVERY DAY AT BEDTIME AS NEEDED  Qty: 90 tablet,  Refills: 1    Comments: This request is for a new prescription for a controlled substance as required by Federal/State law.      budesonide 1 mg/2 mL NbSp MIX CONTENTS OF 1 RESPULE WITH STERILE DILUENT PROVIDED. POUR INTO NASONEB CUP & PERFORM TREATMENT TWICE DAILY.  Refills: 2      butalbital-acetaminophen-caffeine -40 mg (FIORICET, ESGIC) -40 mg per tablet Take 1 tab po q4 hrs prn headache  Qty: 90 tablet, Refills: 3      doxycycline monohydrate 150 mg Cap MIX CONTENTS OF 1 CAPSULE WITH STERILE DILUENT PROVIDED. POUR INTO NASONEB CUP AND PERFORM TREATMENT TWICE DAILY  Refills: 2      levocetirizine (XYZAL) 5 MG tablet Take 1 tablet (5 mg total) by mouth every evening.  Qty: 30 tablet, Refills: 11    Associated Diagnoses: Rhinitis, unspecified type             Discharge Procedure Orders   Diet general     Call MD for:  temperature >100.4     Call MD for:  persistent nausea and vomiting     Call MD for:  severe uncontrolled pain     Call MD for:  difficulty breathing, headache or visual disturbances     Activity as tolerated       Follow-up Information     Nicole Leal MD. Call in 1 week.    Specialty:  Gastroenterology  Why:  For pathology results  Contact information:  [id number] [address] [facility] [address] [phone]

## 2020-03-12 NOTE — ANESTHESIA POSTPROCEDURE EVALUATION
Anesthesia Post Evaluation    Patient: Jaylin Murguia    Procedure(s) Performed: Procedure(s) (LRB):  ESOPHAGOGASTRODUODENOSCOPY (EGD) (N/A)  COLONOSCOPY (N/A)    Final Anesthesia Type: MAC    Patient location during evaluation: PACU  Patient participation: Yes- Able to Participate  Level of consciousness: awake and alert  Post-procedure vital signs: reviewed and stable  Pain management: adequate  Airway patency: patent    PONV status at discharge: No PONV  Anesthetic complications: no      Cardiovascular status: blood pressure returned to baseline  Respiratory status: unassisted  Hydration status: euvolemic  Follow-up not needed.          Vitals Value Taken Time   /71 3/12/2020  9:09 AM   Temp 36.6 °C (97.9 °F) 3/12/2020  9:09 AM   Pulse 69 3/12/2020  9:09 AM   Resp 20 3/12/2020  9:09 AM   SpO2 97 % 3/12/2020  9:09 AM         No case tracking events are documented in the log.      Pain/Ramya Score: No data recorded

## 2020-03-12 NOTE — [employer] PATIENT INSTRUCTIONS
Discharge Summary/Instructions after an Endoscopic Procedure  Patient Name: Jaylin Murguia  Patient MRN: [id number]  Patient YOB: 1965 Thursday, March 12, 2020 Nicole Leal MD  RESTRICTIONS:  During your procedure today, you received medications for sedation.  These   medications may affect your judgment, balance and coordination.  Therefore,   for 24 hours, you have the following restrictions:   - DO NOT drive a car, operate machinery, make legal/financial decisions,   sign important papers or drink alcohol.    ACTIVITY:  Today: no heavy lifting, straining or running due to procedural   sedation/anesthesia.  The following day: return to full activity including work.  DIET:  Eat and drink normally unless instructed otherwise.     TREATMENT FOR COMMON SIDE EFFECTS:  - Mild abdominal pain, nausea, belching, bloating or excessive gas:  rest,   eat lightly and use a heating pad.  - Sore Throat: treat with throat lozenges and/or gargle with warm salt   water.  - Because air was used during the procedure, expelling large amounts of air   from your rectum or belching is normal.  - If a bowel prep was taken, you may not have a bowel movement for 1-3 days.    This is normal.  SYMPTOMS TO WATCH FOR AND REPORT TO YOUR PHYSICIAN:  1. Abdominal pain or bloating, other than gas cramps.  2. Chest pain.  3. Back pain.  4. Signs of infection such as: chills or fever occurring within 24 hours   after the procedure.  5. Rectal bleeding, which would show as bright red, maroon, or black stools.   (A tablespoon of blood from the rectum is not serious, especially if   hemorrhoids are present.)  6. Vomiting.  7. Weakness or dizziness.  GO DIRECTLY TO THE NEAREST EMERGENCY ROOM IF YOU HAVE ANY OF THE FOLLOWING:      Difficulty breathing              Chills and/or fever over 101 F   Persistent vomiting and/or vomiting blood   Severe abdominal pain   Severe chest pain   Black, tarry stools   Bleeding- more than one  tablespoon   Any other symptom or condition that you feel may need urgent attention  Your doctor recommends these additional instructions:  If any biopsies were taken, your doctors clinic will contact you in 1 to 2   weeks with any results.  - Patient has a contact number available for emergencies.  The signs and   symptoms of potential delayed complications were discussed with the   patient.  Return to normal activities tomorrow.  Written discharge   instructions were provided to the patient.   - Discharge patient to home (via wheelchair).   - Resume previous diet today.   - Continue present medications.   - Await pathology results.  For questions, problems or results please call your physician Nicole Leal MD at Work:  [phone]  If you have any questions about the above instructions, call the GI   department at [phone] or call the endoscopy unit at [phone]   from 7am until 3 pm.  [facility] [place], EMERGENCY ROOM PHONE NUMBER:   [phone]  IF A COMPLICATION OR EMERGENCY SITUATION ARISES AND YOU ARE UNABLE TO REACH   YOUR PHYSICIAN - GO DIRECTLY TO THE EMERGENCY ROOM.  I have read or have had read to me these discharge instructions for my   procedure and have received a written copy.  I understand these   instructions and will follow-up with my physician if I have any questions.     __________________________________       _____________________________________  Nurse Signature                                          Patient/Designated   Responsible Party Signature  MD Nicole Watkins MD  3/12/2020 10:39:20 AM  This report has been verified and signed electronically.  [employer]

## 2020-03-12 NOTE — PROVATION PATIENT INSTRUCTIONS
Discharge Summary/Instructions after an Endoscopic Procedure  Patient Name: Jaylin Murguia  Patient MRN: [id number]  Patient YOB: 1965 Thursday, March 12, 2020 Nicole Leal MD  RESTRICTIONS:  During your procedure today, you received medications for sedation.  These   medications may affect your judgment, balance and coordination.  Therefore,   for 24 hours, you have the following restrictions:   - DO NOT drive a car, operate machinery, make legal/financial decisions,   sign important papers or drink alcohol.    ACTIVITY:  Today: no heavy lifting, straining or running due to procedural   sedation/anesthesia.  The following day: return to full activity including work.  DIET:  Eat and drink normally unless instructed otherwise.     TREATMENT FOR COMMON SIDE EFFECTS:  - Mild abdominal pain, nausea, belching, bloating or excessive gas:  rest,   eat lightly and use a heating pad.  - Sore Throat: treat with throat lozenges and/or gargle with warm salt   water.  - Because air was used during the procedure, expelling large amounts of air   from your rectum or belching is normal.  - If a bowel prep was taken, you may not have a bowel movement for 1-3 days.    This is normal.  SYMPTOMS TO WATCH FOR AND REPORT TO YOUR PHYSICIAN:  1. Abdominal pain or bloating, other than gas cramps.  2. Chest pain.  3. Back pain.  4. Signs of infection such as: chills or fever occurring within 24 hours   after the procedure.  5. Rectal bleeding, which would show as bright red, maroon, or black stools.   (A tablespoon of blood from the rectum is not serious, especially if   hemorrhoids are present.)  6. Vomiting.  7. Weakness or dizziness.  GO DIRECTLY TO THE NEAREST EMERGENCY ROOM IF YOU HAVE ANY OF THE FOLLOWING:      Difficulty breathing              Chills and/or fever over 101 F   Persistent vomiting and/or vomiting blood   Severe abdominal pain   Severe chest pain   Black, tarry stools   Bleeding- more than one  tablespoon   Any other symptom or condition that you feel may need urgent attention  Your doctor recommends these additional instructions:  If any biopsies were taken, your doctors clinic will contact you in 1 to 2   weeks with any results.  - Patient has a contact number available for emergencies.  The signs and   symptoms of potential delayed complications were discussed with the   patient.  Return to normal activities tomorrow.  Written discharge   instructions were provided to the patient.   - Discharge patient to home (via wheelchair).   - Resume previous diet today.   - Continue present medications.   - Await pathology results.   - Repeat colonoscopy in 6 months - 1 year for disease activity assessment.  For questions, problems or results please call your physician Nicole Leal MD at Work:  [phone]  If you have any questions about the above instructions, call the GI   department at [phone] or call the endoscopy unit at [phone]   from 7am until 3 pm.  [facility] [place], EMERGENCY ROOM PHONE NUMBER:   [phone]  IF A COMPLICATION OR EMERGENCY SITUATION ARISES AND YOU ARE UNABLE TO REACH   YOUR PHYSICIAN - GO DIRECTLY TO THE EMERGENCY ROOM.  I have read or have had read to me these discharge instructions for my   procedure and have received a written copy.  I understand these   instructions and will follow-up with my physician if I have any questions.     __________________________________       _____________________________________  Nurse Signature                                          Patient/Designated   Responsible Party Signature  MD Nicole Watkins MD  3/12/2020 10:35:57 AM  This report has been verified and signed electronically.  PROVATION

## 2020-03-12 NOTE — PLAN OF CARE
D/C criteria met. D/C instructions given to pt and spouse. Demonstrated understanding by teach back method.

## 2020-03-12 NOTE — TRANSFER OF CARE
"Anesthesia Transfer of Care Note    Patient: Jaylin Murguia    Procedure(s) Performed: Procedure(s) (LRB):  ESOPHAGOGASTRODUODENOSCOPY (EGD) (N/A)  COLONOSCOPY (N/A)    Patient location: PACU    Anesthesia Type: MAC    Transport from OR: Transported from OR on room air with adequate spontaneous ventilation    Post pain: adequate analgesia    Post assessment: no apparent anesthetic complications    Post vital signs: stable    Level of consciousness: awake    Nausea/Vomiting: no nausea/vomiting    Complications: none    Transfer of care protocol was followed      Last vitals:   Visit Vitals  /71 (BP Location: Left arm, Patient Position: Lying)   Pulse 69   Temp 36.6 °C (97.9 °F) (Temporal)   Resp 20   Ht 5' 7" (1.702 m)   Wt 88 kg (194 lb 0.1 oz)   SpO2 97%   Breastfeeding? No   BMI 30.39 kg/m²     "

## 2020-03-13 ENCOUNTER — PATIENT MESSAGE (OUTPATIENT)
Dept: OTOLARYNGOLOGY | Facility: CLINIC | Age: 55
End: 2020-03-13

## 2020-03-13 ENCOUNTER — PATIENT MESSAGE (OUTPATIENT)
Dept: ALLERGY | Facility: CLINIC | Age: 55
End: 2020-03-13

## 2020-03-14 ENCOUNTER — PATIENT MESSAGE (OUTPATIENT)
Dept: INTERNAL MEDICINE | Facility: CLINIC | Age: 55
End: 2020-03-14

## 2020-03-18 ENCOUNTER — PATIENT MESSAGE (OUTPATIENT)
Dept: ALLERGY | Facility: CLINIC | Age: 55
End: 2020-03-18

## 2020-03-18 ENCOUNTER — PATIENT MESSAGE (OUTPATIENT)
Dept: GASTROENTEROLOGY | Facility: CLINIC | Age: 55
End: 2020-03-18

## 2020-03-18 ENCOUNTER — PATIENT MESSAGE (OUTPATIENT)
Dept: SLEEP MEDICINE | Facility: CLINIC | Age: 55
End: 2020-03-18

## 2020-03-19 RX ORDER — ERGOCALCIFEROL 1.25 MG/1
50000 CAPSULE ORAL
Qty: 4 CAPSULE | Refills: 1 | Status: SHIPPED | OUTPATIENT
Start: 2020-03-19 | End: 2020-04-13

## 2020-03-20 ENCOUNTER — TELEPHONE (OUTPATIENT)
Dept: PHARMACY | Facility: CLINIC | Age: 55
End: 2020-03-20

## 2020-03-20 LAB
FINAL PATHOLOGIC DIAGNOSIS: NORMAL
GROSS: NORMAL

## 2020-03-20 NOTE — TELEPHONE ENCOUNTER
Spoke to patient who confirms she is receiving the Hizentra through [employer]. Will close out at [facility].

## 2020-03-31 ENCOUNTER — PATIENT MESSAGE (OUTPATIENT)
Dept: SLEEP MEDICINE | Facility: CLINIC | Age: 55
End: 2020-03-31

## 2020-03-31 DIAGNOSIS — J45.909 ASTHMA, UNSPECIFIED ASTHMA SEVERITY, UNSPECIFIED WHETHER COMPLICATED, UNSPECIFIED WHETHER PERSISTENT: Primary | ICD-10-CM

## 2020-04-01 ENCOUNTER — PATIENT MESSAGE (OUTPATIENT)
Dept: GASTROENTEROLOGY | Facility: CLINIC | Age: 55
End: 2020-04-01

## 2020-04-01 RX ORDER — PREDNISONE 20 MG/1
TABLET ORAL
Qty: 21 TABLET | Refills: 0 | Status: SHIPPED | OUTPATIENT
Start: 2020-04-01 | End: 2020-05-04 | Stop reason: SDUPTHER

## 2020-04-01 RX ORDER — BENZONATATE 200 MG/1
200 CAPSULE ORAL 3 TIMES DAILY PRN
Qty: 45 CAPSULE | Refills: 2 | Status: SHIPPED | OUTPATIENT
Start: 2020-04-01 | End: 2020-04-11

## 2020-04-03 ENCOUNTER — PATIENT MESSAGE (OUTPATIENT)
Dept: GASTROENTEROLOGY | Facility: CLINIC | Age: 55
End: 2020-04-03

## 2020-04-03 ENCOUNTER — PATIENT MESSAGE (OUTPATIENT)
Dept: INTERNAL MEDICINE | Facility: CLINIC | Age: 55
End: 2020-04-03

## 2020-04-07 ENCOUNTER — PATIENT OUTREACH (OUTPATIENT)
Dept: ADMINISTRATIVE | Facility: OTHER | Age: 55
End: 2020-04-07

## 2020-04-07 ENCOUNTER — OFFICE VISIT (OUTPATIENT)
Dept: GASTROENTEROLOGY | Facility: CLINIC | Age: 55
End: 2020-04-07
Payer: COMMERCIAL

## 2020-04-07 DIAGNOSIS — K52.9 INFLAMMATORY BOWEL DISEASE: ICD-10-CM

## 2020-04-07 DIAGNOSIS — K50.00 CROHN'S DISEASE OF ILEUM WITHOUT COMPLICATION: Primary | ICD-10-CM

## 2020-04-07 PROCEDURE — 99213 PR OFFICE/OUTPT VISIT, EST, LEVL III, 20-29 MIN: ICD-10-PCS | Mod: GT,,, | Performed by: INTERNAL MEDICINE

## 2020-04-07 PROCEDURE — 99213 OFFICE O/P EST LOW 20 MIN: CPT | Mod: GT,,, | Performed by: INTERNAL MEDICINE

## 2020-04-08 ENCOUNTER — OFFICE VISIT (OUTPATIENT)
Dept: INTERNAL MEDICINE | Facility: CLINIC | Age: 55
End: 2020-04-08
Payer: COMMERCIAL

## 2020-04-08 DIAGNOSIS — J32.4 CHRONIC PANSINUSITIS: ICD-10-CM

## 2020-04-08 DIAGNOSIS — G47.33 OSA (OBSTRUCTIVE SLEEP APNEA): ICD-10-CM

## 2020-04-08 DIAGNOSIS — E78.49 OTHER HYPERLIPIDEMIA: ICD-10-CM

## 2020-04-08 DIAGNOSIS — Z79.60 LONG-TERM USE OF IMMUNOSUPPRESSANT MEDICATION: ICD-10-CM

## 2020-04-08 DIAGNOSIS — I77.1 TORTUOUS AORTA: ICD-10-CM

## 2020-04-08 DIAGNOSIS — D80.1 HYPOGAMMAGLOBULINEMIA: Primary | ICD-10-CM

## 2020-04-08 DIAGNOSIS — J45.40 MODERATE PERSISTENT ASTHMA WITHOUT COMPLICATION: ICD-10-CM

## 2020-04-08 DIAGNOSIS — I10 ESSENTIAL (PRIMARY) HYPERTENSION: ICD-10-CM

## 2020-04-08 DIAGNOSIS — M79.7 FIBROMYALGIA: ICD-10-CM

## 2020-04-08 DIAGNOSIS — K50.90 CROHN'S DISEASE WITHOUT COMPLICATION, UNSPECIFIED GASTROINTESTINAL TRACT LOCATION: ICD-10-CM

## 2020-04-08 DIAGNOSIS — G43.809 OTHER MIGRAINE WITHOUT STATUS MIGRAINOSUS, NOT INTRACTABLE: ICD-10-CM

## 2020-04-08 PROCEDURE — 99215 OFFICE O/P EST HI 40 MIN: CPT | Mod: GT,,, | Performed by: FAMILY MEDICINE

## 2020-04-08 PROCEDURE — 99215 PR OFFICE/OUTPT VISIT, EST, LEVL V, 40-54 MIN: ICD-10-PCS | Mod: GT,,, | Performed by: FAMILY MEDICINE

## 2020-04-08 NOTE — PROGRESS NOTES
Subjective:      Patient ID: Jaylin Murguia is a 55 y.o. female.    Chief Complaint: Follow-up (chronic issues)    Disclaimer:  This note is prepared using voice recognition software and as such is likely to have errors and has not been proof read. Please contact me for questions.     The patient location is:home  The chief complaint leading to consultation is: followup / my chart request  Visit type: Virtual visit with synchronous audio and video  Total time spent with patient:1049am-1120am  Each patient to whom he or she provides medical services by telemedicine is:  (1) informed of the relationship between the physician and patient and the respective role of any other health care provider with respect to management of the patient; and (2) notified that he or she may decline to receive medical services by telemedicine and may withdraw from such care at any time.    Notes:   Has been talking with Dr. Fletcher and Ms. Olivier.  Currently is staying in.  Is battling a lot right now not only with her suppressed immune status but also due battling a lot with still her chronic pansinusitis and her now severe allergies and asthma issues.  She has recently been started back again on some oral steroids.  Having a lot of wheezing and shortness of breath currently.  From the hypogammaglobinemia in she is still seeing our allergy and immunologist.  Is on the prednisone currently and was coughing a lot lately. Staying home currently.  Did change from IVIG to in the skin and doing at home and every Wed.  Started Subcut now 3 weeks.    Can't tell if it is working or not. Feels better for a few days with energy and not as congested with it but still with green sinus.     Also recently had repeat endoscopies of upper and lower issues by Dr. Leal.  She does have Crohn's disease.  In the past she was treated with Humira and Remicade however she buildup antibodies to then so she stopped.  They discussed alternative immunotherapies  but there having to work with potentially seeing a bow if she would have not adequate response with her immune system or possibly a rejection as well.  Currently still on her traditional supplements for the mesalamine as well at this time with the oral steroids.    From a migraine standpoint she was having much beer flare ups and we once again re-initiated back Topamax.  Her migraines had gone from being every 2 days and lasting anywhere from 2-5 days to now occurring every 10 days which is a huge improvement for her.  The migraines still last 2 days at length but is giving her several more days at a week that her symptom free.    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  A lot of her initial health conditions started from the time of 2008 in regards to her immune system in particular with her Crohn's disease.  While she was teaching she was constantly exposed to various allergens and conditions that were difficult on her immune system subsequently relating then to recurrent allergies then development of asthma and then development of chronic pansinusitis.  This also been started contribute to her conditions of migraines which have been very complex and debilitating as well as worsening of some fibromyalgia issues.  Due to all of these combined conditions she subsequently was not able to be as active and thus also with the setting of using medications quite often with prednisone cause he would stand to weight gain as well.  She also then battled with hypertension and hyperlipidemia also.  She still battles this today.  These all complex conditions then contributed to worsening of some of her other medical issues such as hercomplex migraines ,  fibromyalgia and then development of  osteoarthritis of both bilateral knees.  She also has obstructive sleep apnea as well.  She was diagnosed  most recently when seeing in the past few years for having a hypogammaglobinemia.  During this time she underwent several modalities of treatment including various prescription medications therapy options subsequent specialists immunosuppression drugs, surgeries for sinus which is been unsuccessful several endoscopies supple various immunomodulator medications including Remicade and Humira which pills of ultimately her body buildup antibodies to in the she had to stop and then her current use of oral steroids to control many of her conditions.  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  She then transition even from [employer] to private schools to see if this would be better hours are better conditions for her medical conditions however it did not prove to be successful.  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  She filed for disability in 2015 for [place] but was denied disability at that time.  They need a letter from her current treating doctor to state how she had gotten worse from the 2015 event as well.  Subsequently in 2015 she has then been tried on various other immunomodulator medications developed antibodies and now currently undergoing IVIG treatments to help with her poor immune status.  This is then complicated her asthma her Crohn's as well as her recurrent allergies and chronic pansinusitis issues as well.  There is been subsequent weight gain which is contributes to her hypertension hyperlipidemia her obstructive sleep apnea as well as her migraines.  Below is a copy of her active problem list.        Patient Active Problem List    Abdominal pain         Date Noted: 03/12/2020      Hypogammaglobulinemia         Date Noted: 12/24/2019      Moderate persistent asthma without  complication         Date Noted: 10/16/2019      Chronic pansinusitis         Date Noted: 07/18/2019      Mild aortic insufficiency         Date Noted: 05/06/2019      Other hyperlipidemia         Date Noted: 04/01/2019      Tortuous aorta         Date Noted: 04/01/2019      Bilateral primary osteoarthritis of knee         Date Noted: 02/26/2019      Primary osteoarthritis of right knee         Date Noted: 02/26/2019      Primary osteoarthritis of left knee         Date Noted: 02/26/2019      TAMIKA (obstructive sleep apnea)         Date Noted: 05/01/2018      Crohn's disease         Date Noted: 03/27/2018      Hormone replacement therapy (postmenopausal)         Date Noted: 11/19/2017      Long-term use of immunosuppressant medication         Date Noted: 11/19/2017      Insomnia due to medical condition         Date Noted: 10/20/2016      Diarrhea         Date Noted: 05/07/2015      Fibromyalgia      Migraine      Crohn's disease of small intestine      Sciatica      Allergic rhinitis      Essential (primary) hypertension         Date Noted: 07/30/2013            Lab Results   Component Value Date    WBC 6.77 07/18/2019    HGB 11.9 (L) 07/18/2019    HCT 36.9 (L) 07/18/2019     07/18/2019    CHOL 155 11/14/2017    TRIG 104 11/14/2017    HDL 59 11/14/2017    ALT 34 12/14/2018    AST 32 12/14/2018     12/14/2018     12/14/2018    K 4.0 12/14/2018    K 4.0 12/14/2018    CL 98 12/14/2018    CL 98 12/14/2018    CREATININE 1.0 12/14/2018    CREATININE 1.0 12/14/2018    BUN 8 12/14/2018    BUN 8 12/14/2018    CO2 28 12/14/2018    CO2 28 12/14/2018    TSH 0.863 11/14/2017    INR 1.0 11/20/2014    HGBA1C 5.2 11/14/2017       CTA Chest Non Coronary  Narrative: EXAMINATION:  CTA CHEST NON CORONARY    CLINICAL HISTORY:  possble subclaven thrombosis on IVIG and estrogen;Acute embolism and thrombosis of unspecified vein    TECHNIQUE:  CT of the chest was acquired helically utilizing a low-dose technique from the  lung apices through the posterior costophrenic angles status post administration of 100 cc of Omnipaque 350.  Bolus timing was utilized to optimize opacification of the pulmonary arterial system.  Additionally, more delayed images were also obtained to better opacify the venous structures.  3-D maximum intensity projection and multiplanar reconstructions were created from the source data set and interpreted.    COMPARISON:  None    FINDINGS:  No infiltrates or pleural effusions are identified.  No discrete pulmonary nodules or masses are identified.    The aorta demonstrates normal caliber and contour. There is good opacification of the pulmonary arterial system. No intraluminal filling defects are notified within the pulmonary arterial system to suggest pulmonary embolism. There is no pericardial effusion.  No enlarged mediastinal, hilar or axillary lymph nodes are identified.  Delayed images demonstrate no obvious thrombus within the subclavian veins.    The visualized upper abdomen is unremarkable in appearance.    The osseous structures are unremarkable in appearance.  Impression: 1. No evidence to suggest pulmonary embolism.  No obvious thrombus noted within the subclavian veins.    2.  No acute pathology noted within the chest.    Electronically signed by: Kane Castellano DO  Date:    02/18/2020  Time:    13:36        Review of Systems   Constitutional: Positive for activity change, appetite change and fatigue. Negative for unexpected weight change.   HENT: Positive for congestion. Negative for hearing loss, rhinorrhea and trouble swallowing.    Eyes: Negative for discharge and visual disturbance.   Respiratory: Positive for cough, shortness of breath and wheezing. Negative for chest tightness.    Cardiovascular: Negative for chest pain and palpitations.   Gastrointestinal: Negative for blood in stool, constipation, diarrhea and vomiting.   Endocrine: Negative for polydipsia and polyuria.   Genitourinary:  Negative for difficulty urinating, dysuria, hematuria and menstrual problem.   Musculoskeletal: Positive for arthralgias, back pain and myalgias. Negative for joint swelling and neck pain.   Neurological: Positive for headaches. Negative for weakness.   Psychiatric/Behavioral: Positive for sleep disturbance. Negative for confusion and dysphoric mood. The patient is nervous/anxious.      Objective:   There were no vitals filed for this visit.  Physical Exam   Constitutional: She appears well-developed and well-nourished. She is active and cooperative. She does not have a sickly appearance. She does not appear ill. No distress.   Lemons facies   HENT:   Head: Normocephalic and atraumatic.   Eyes: Conjunctivae are normal.   Pulmonary/Chest: Effort normal.   Neurological: She is alert.   Psychiatric: She has a normal mood and affect. Her behavior is normal. Judgment and thought content normal. Her mood appears not anxious. Her affect is not angry, not blunt, not labile and not inappropriate. Her speech is not rapid and/or pressured, not delayed, not tangential and not slurred. She is not agitated, not aggressive, not hyperactive, not slowed, not withdrawn, not actively hallucinating and not combative. Thought content is not paranoid and not delusional. Cognition and memory are normal. Cognition and memory are not impaired. She does not express impulsivity or inappropriate judgment. She does not exhibit a depressed mood. She expresses no homicidal and no suicidal ideation. She expresses no suicidal plans and no homicidal plans. She is communicative. She exhibits normal recent memory and normal remote memory. She is attentive.   Vitals reviewed.    Assessment:     1. Hypogammaglobulinemia    2. Crohn's disease without complication, unspecified gastrointestinal tract location    3. Moderate persistent asthma without complication    4. Other migraine without status migrainosus, not intractable    5. Fibromyalgia    6.  Essential (primary) hypertension    7. Long-term use of immunosuppressant medication    8. TAMIKA (obstructive sleep apnea)    9. Other hyperlipidemia    10. Tortuous aorta    11. Chronic pansinusitis      Plan:   Jaylin was seen today for follow-up.    Diagnoses and all orders for this visit:    Hypogammaglobulinemia  Crohn's disease without complication, unspecified gastrointestinal tract location    Moderate persistent asthma without complication    Other migraine without status migrainosus, not intractable    Fibromyalgia    Essential (primary) hypertension    Long-term use of immunosuppressant medication    TAMIKA (obstructive sleep apnea)    Other hyperlipidemia    Tortuous aorta    Chronic pansinusitis  -noted completed paperwork with the patient via face-to-face during the telemedicine visit as well as address several issues regards to statements for her disability and jail funds the state of [place].  Will provider copies as well as recommended she get copies of all of her medical records at this time.  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  She is not able to be really employ any other positions at this time due to her weakened immune system.  We discussed this in great detail today.  Answers to all questions were provided to the patient.        Time spent: 40 minutes in face to face discussion concerning diagnosis, prognosis, review of lab and test results, benefits of treatment as well as management of disease, counseling of patient and coordination of care between various health care providers . Greater than half the time spent was used for coordination of care and counseling of patient.         Follow up in about 3 months (around 7/8/2020) for chronic issues Dr Hu-  telemed.    Patient Instructions   Letter written also,.

## 2020-04-08 NOTE — LETTER
April 8, 2020    Jaylin Murguia  [id number]  [place] [address]         [facility]  [address] [place] [address]  Phone: [phone]  Fax: [phone] April 8, 2020     Patient: Jaylin Murguia   YOB: 1965   Date of Visit: 4/8/2020       To Whom It May Concern:    It is my medical opinion that Jaylin Murguia should be granted full disability benefits of [facility] from the [facility] teacher's [employer] system. She has been a patient of mine since 2014. I have been her primary care physician since that time. She applied for disability benefits and was denied them in 2015. 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  A lot of her initial health conditions were diagnosed in 2008 with regards to her immune system in particular with her Crohn's disease.  While she was teaching she was constantly exposed to various allergens, viruses, and bacterial conditions that were difficult on her immune system. She subsequently had worsening flareups of allergies, then development of asthma, and then development of chronic pansinusitis. These flareups then worsened her conditions of migraines which have been very complex and debilitating impacting her daily activities and ability to perform as a teacher. She also had worsening of  fibromyalgia issues.  Due to all of these combined conditions she was treated with various medications including oral steroids and subsequently gained significant weight and was not able to be as active. She then battled with controlling her blood pressure and cholesterol and she still has difficulty with controlling these conditions to this today. All these complex conditions contributed to worsening of some of her other medical issues  such as her complex migraines and fibromyalgia and development of  osteoarthritis of both bilateral knees and obstructive sleep apnea.     She was diagnosed most recently with a weakened immune system of hypogammaglobinemia.  During this time she underwent several modalities of treatment including various prescription medications , therapy options ,subsequent specialists 'visits including allergy and immunology, ENT, rheumatology, gastroenterology, pulmonaly and cardiology. She was placed of therapies including immunosuppression drugs, antibiotics and even had surgeries for chronic pansinusitis which have been unsuccessful. From a GI perspective she has had several endoscopies with use of various immune modulator medications including Remicade and Humira, which ultimately lead to her body to create and buildup antibodies these medications and thus having to stop those treatments. She is currently using oral steroids to control many of her conditions at this time along with IVIG therapy.     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  She then transition even from public to private educational opportunities to see if this would be better hours for her medical conditions, however it did not prove to be successful.  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  She filed for disability in 2015 for [place] but was denied disability at that time.   Subsequently in 2015 she has then been tried on various other immune modulator medications and is now currently undergoing IVIG treatments to help with her poor immune status.  These treatments have been complicated by her asthma, Crohn's disease, recurrent environmental allergies and chronic pansinusitis issues as well.  There is been subsequent weight gain which   contributes to her hypertension, hyperlipidemia, obstructive sleep apnea as well as her migraines treatments and therapies.         Please reconsider an evaluation on behalf of Jaylin Murguia's claim for disability halfway benefits from the [facility].      Sincerely,             Esthela Hu MD

## 2020-04-08 NOTE — PROGRESS NOTES
GASTROENTEROLOGY IBD CONSULTATION:   Jaylin Murguia  MRN: [id number]  : 1965    Referring Provider: No ref. provider found  Primary Care Provider: Esthela Hu MD      CHIEF COMPLAINT:   No chief complaint on file.      History of Present Illness:  Ms. Murguia is a 55 y.o. female who is here today for follow up after colonoscopy.      HPI   IBD Histroy  - Type: Crohn's disease  - Diagnosed: early   - Disease Location: small intestine only.   - Surgeries related to IBD: none  - Extra-intestinal Manifestations: oral ulcers; ?joint pain      Current Medication  Pentasa 1000 mg QID (started 2018)     Past Medication  Remicade (in remote past)-- ineffective  Asacol 4.8 gm-- ineffective  Entocort-- effective  Prednisone  Humira (started , stopped 2018)-- stopped  multiple infections. (2018) Humira level 2.7, intermediate antibody formation.      Endoscopy Reports  5/7/15 colonoscopy: poor prep. Skin tag. Crohn's disease with ileitis. Biopsied. Pathology: focal chronic active ileitis.   17: erythematous mucosa in the sigmoid, transverse and hepatic flexure. 2 mm polyp at hepatic flexure. Crohn's disease with ileitis. Pathology: colon and ileal bx normal colon mucosa. Polyp normal colon mucosa.   17 EGD: gastritis and duodenal erosions without bleeding. Pathology acute and chronic non-specific duodenitis with ulceration.   3/27/18 colonoscopy: 3 mm polyp in the sigmoid. No signs of active crohn's disease and no signs of stricture in the TI (advanced up to 15 cm). Pathology: hyperplastic polyp.        Interval History:  3/2020: Pt has been on pentasa.  Continues with abdominal pain on right side, says it feels like it is at the terminal ileum.  This has been ongoing for years.  Fried food and spicy food, fruit, raw vegetables, read meat all worsen pain.  Has this type of pain up to 5 times per week, despite eating appropriately.  Also associates bloating with this.   New symptoms:   Reports a sharp midepigastric pain within 10 minutes after eating that is new and started 3 months ago.  +borborgymi and nausea but no emesis.  States increased urgency, small amount of liquid stool and mucus.  Has been avoiding eating if she has to go somewhere because of this.  All of this started 3-4 months ago.    Denies hematochezia.      On and off high dose steroids (up to 4 times per year) mostly for sinus infections and upper resp infections.  Has low levels of IgG, started IVIG this year and now has switched to IM form of IgG   Does have history of pneumonia     4/2020:  Symptoms have not worsened since colonoscopy.  She is staying home and protecting herself due to COVID crisis . Denies fevers, chills, nausea, vomiting.      PREVENTIVE MEDICINE:  Immunization History   Administered Date(s) Administered    Hepatitis A / Hepatitis B 12/12/2019    Influenza 10/29/2013    Influenza - Quadrivalent - PF (6 months and older) 11/15/2017, 01/28/2020    Influenza Split 10/29/2013    PPD Test 05/22/2017    Pneumococcal Conjugate - 13 Valent 12/23/2019    Pneumococcal Polysaccharide - 23 Valent 10/29/2013    Tdap 02/18/2014      - Immunizations:               Influenza: due, recommend yearly              Pneumonia: PSV 23 10/29/13, needs PCV 13              Hepatitis B: recently started vaccination              Tdap: 2/18/14  - Date of last pap smear: hysterectomy   - Date of last skin cancer screening: Dermatology visit sometime this year  - Date of last eye exam: sometime this year  - Date of last surveillance colonoscopy: 3/27/18  - Date of last TB testing: 3/13/18  - TPMT status: 3/9/18 RBC normal, genotype normal metabolizer.   - NSAID use: none  - History of C. Diff: none  - Family planning:  NA    Past Medical History:  Past Medical History:   Diagnosis Date    Allergic rhinitis     Asthma     Chronic pansinusitis     Crohn's disease     Ileal involvement, previously on Remicade, Asacol, Prednisone     Fibromyalgia     Hyperlipidemia     Hypertension     Migraine     Obstructive sleep apnea     CPAP at night    Sciatica      Past Surgical History:  Past Surgical History:   Procedure Laterality Date    BLADDER SURGERY      sling was created by her muscles      SECTION      COLONOSCOPY N/A 2017    Procedure: COLONOSCOPY;  Surgeon: Kin Dyer MD;  Location: [facility] ENDO;  Service: Endoscopy;  Laterality: N/A;    COLONOSCOPY N/A 3/27/2018    Procedure: COLONOSCOPY;  Surgeon: Kyra Vallecillo MD;  Location: [facility] ENDO;  Service: Endoscopy;  Laterality: N/A;    COLONOSCOPY N/A 3/12/2020    Procedure: COLONOSCOPY;  Surgeon: Nicole Leal MD;  Location: [facility] ENDO;  Service: Endoscopy;  Laterality: N/A;    ESOPHAGOGASTRODUODENOSCOPY N/A 3/12/2020    Procedure: ESOPHAGOGASTRODUODENOSCOPY (EGD);  Surgeon: Nicole Leal MD;  Location: [facility];  Service: Endoscopy;  Laterality: N/A;    FINGER SURGERY      joint relpacement, left hand index finger    FUNCTIONAL ENDOSCOPIC SINUS SURGERY (FESS) USING COMPUTER-ASSISTED NAVIGATION Bilateral 2019    Procedure: FESS, USING COMPUTER-ASSISTED NAVIGATION;  Surgeon: Manish Shaffer MD;  Location: [facility] OR;  Service: ENT;  Laterality: Bilateral;    HYSTERECTOMY      WISDOM TOOTH EXTRACTION       Current Medications:   Current Outpatient Medications   Medication Sig Dispense Refill    amLODIPine (NORVASC) 5 MG tablet Take 1 tablet (5 mg total) by mouth once daily. 90 tablet 3    azelastine (ASTELIN) 137 mcg (0.1 %) nasal spray 1 spray (137 mcg total) by Nasal route 2 (two) times daily. 30 mL 11    benzonatate (TESSALON) 200 MG capsule Take 1 capsule (200 mg total) by mouth 3 (three) times daily as needed for Cough. 45 capsule 2    budesonide 1 mg/2 mL NbSp MIX CONTENTS OF 1 RESPULE WITH STERILE DILUENT PROVIDED. POUR INTO NASONEB CUP & PERFORM TREATMENT TWICE DAILY.  2    butalbital-acetaminophen-caffeine -40 mg (FIORICET, ESGIC)  -40 mg per tablet Take 1 tab po q4 hrs prn headache 90 tablet 3    cetirizine (ZYRTEC) 5 MG chewable tablet Take 5 mg by mouth 2 (two) times daily.       cetirizine (ZYRTEC) 5 MG tablet Take 5 mg by mouth once daily.      doxycycline monohydrate 150 mg Cap MIX CONTENTS OF 1 CAPSULE WITH STERILE DILUENT PROVIDED. POUR INTO NASONEB CUP AND PERFORM TREATMENT TWICE DAILY  2    DULoxetine (CYMBALTA) 60 MG capsule Take 1 capsule (60 mg total) by mouth 2 (two) times daily. 180 capsule 3    eletriptan (RELPAX) 40 MG tablet Take 1 tablet (40 mg total) by mouth as needed. 12 tablet 3    ergocalciferol (ERGOCALCIFEROL) 50,000 unit Cap Take 1 capsule (50,000 Units total) by mouth every 7 days. 4 capsule 1    estradiol (ESTRACE) 2 MG tablet Take 2 mg by mouth every evening.       fluticasone furoate-vilanterol (BREO ELLIPTA) 200-25 mcg/dose DsDv diskus inhaler Inhale 1 puff into the lungs once daily. 1 each 11    fluticasone propionate (FLONASE) 50 mcg/actuation nasal spray       immun glob G,IgG,-pro-IgA 0-50 (HIZENTRA) 10 gram/50 mL (20 %) Soln Inject 55 mLs (11 g total) into the skin every 7 days. 220 mL 10    Immune Globulin G, IGG,-PRO-IGA 10 % injection, Privigen, (PRIVIGEN) 10 % Soln Medication:  Privigen 10% injection, 50 grams, IV every 4 weeks for hypogammaglobulinemia infuse per IVIG protocol.    Pre meds:   Acetaminophen 650 mg po x 1 dose every visit  Benadryl 25 mg IVP x 1 dose every visit  Famotidine PF 20 mg IVP x 1 dose every visit  Solumedrol 125 mg IV X 1 dose every visit  Flushes:   Sodium Chloride 0.9% 10 ml syringe  Sodium chloride 0.9% 250 ml flush bag  Heparin, porcine (PF) 100 units/ml injection flush 500 units.    Nursing orders:  Insert PIV and discontinue PIV when infusion complete  Access existing port or implanted device and de-access with completion of infusion.  May use facilities anaphylaxis protocol. 500 mL 12    ipratropium (ATROVENT) 0.02 % nebulizer solution Nebulize 2.5 ml  every 6 hours as directed, if needed 1 Box 0    levalbuterol (XOPENEX) 1.25 mg/3 mL nebulizer solution Take 3 mLs (1.25 mg total) by nebulization every 4 (four) hours. Rescue 1 Box 11    levocetirizine (XYZAL) 5 MG tablet Take 1 tablet (5 mg total) by mouth every evening. 30 tablet 11    losartan-hydrochlorothiazide 100-25 mg (HYZAAR) 100-25 mg per tablet Take 1 tablet by mouth once daily. 90 tablet 3    mesalamine (PENTASA) 250 mg CpSR Take 4 capsules (1,000 mg total) by mouth 4 (four) times daily. 1440 capsule 3    montelukast (SINGULAIR) 10 mg tablet Take 1 tablet (10 mg total) by mouth every evening. 90 tablet 3    nortriptyline (PAMELOR) 25 MG capsule Take 2 capsules (50 mg total) by mouth once daily. 180 capsule 3    pantoprazole (PROTONIX) 40 MG tablet Take 1 tablet (40 mg total) by mouth every evening. 90 tablet 3    predniSONE (DELTASONE) 20 MG tablet 3 tablets for 3 days. 2 tablets for 3 days. 1 tablet for 3 days. One half tablet for 3 days. 21 tablet 0    pregabalin (LYRICA) 150 MG capsule TAKE 1 CAPSULE (150 MG TOTAL) BY MOUTH 3 (THREE) TIMES DAILY. 270 capsule 1    propranolol (INNOPRAN XL) 80 mg 24 hr capsule Take 1 capsule (80 mg total) by mouth every evening. 90 capsule 1    rizatriptan (MAXALT) 10 MG tablet TAKE 1 TABLET BY MOUTH IF NEEDED FOR MIGRAINES MAX 2 TAB IN 24 HOURS 30 tablet 3    tiotropium bromide (SPIRIVA RESPIMAT) 1.25 mcg/actuation Mist Inhale 2 puffs into the lungs once daily. 4 g 11    topiramate (TOPAMAX) 25 MG tablet 1 tablet po qhs, x 1 week, then 1 tablet po bid. 60 tablet 11    triamcinolone acetonide 0.025% (KENALOG) 0.025 % cream Apply topically 2 (two) times daily. 453 g 3    VENTOLIN HFA 90 mcg/actuation inhaler INHALE 2 PUFFS INTO THE LUNGS EVERY 4 TO 6 HOURS AS NEEDED FOR WHEEZING. 18 Inhaler 1    zolpidem (AMBIEN) 5 MG Tab TAKE 1 TABLET BY MOUTH EVERY DAY AT BEDTIME AS NEEDED 90 tablet 1     No current facility-administered medications for this visit.       Facility-Administered Medications Ordered in Other Visits   Medication Dose Route Frequency Provider Last Rate Last Dose    lactated ringers infusion   Intravenous Continuous Mary Leiva MD        lidocaine (PF) 10 mg/ml (1%) injection 10 mg  1 mL Intradermal Once Mary Leiva MD          Allergies:   Review of patient's allergies indicates:  No Known Allergies  Social History:  Social History     Socioeconomic History    Marital status:      Spouse name: Not on file    Number of children: 2    Years of education: Not on file    Highest education level: Not on file   Occupational History    Occupation: teacher   Social Needs    Financial resource strain: Not very hard    Food insecurity:     Worry: Never true     Inability: Never true    Transportation needs:     Medical: No     Non-medical: No   Tobacco Use    Smoking status: Never Smoker    Smokeless tobacco: Never Used   Substance and Sexual Activity    Alcohol use: No     Frequency: Never     Drinks per session: Patient refused     Binge frequency: Never    Drug use: No    Sexual activity: Yes     Partners: Male   Lifestyle    Physical activity:     Days per week: 3 days     Minutes per session: 40 min    Stress: To some extent   Relationships    Social connections:     Talks on phone: More than three times a week     Gets together: Once a week     Attends Gnosticist service: Not on file     Active member of club or organization: Yes     Attends meetings of clubs or organizations: More than 4 times per year     Relationship status:    Other Topics Concern    Not on file   Social History Narrative    Surrogate Decision Maker: , Cristobal Murguia, [phone]     Family History:  Family History   Problem Relation Age of Onset    Hypertension Mother     Kidney disease Father 64        ESRD on HD    Scleroderma Father     Hypertension Brother     Cancer Paternal Grandmother 70        colon    Heart attack  Maternal Grandmother     COPD Maternal Grandmother 72       Review of Systems:   CONSTITUTIONAL: Denies weight change,  fatigue, fevers, chills, night sweats.  EYES: No changes in vision.   ENT: No oral lesions or sore throat.  HEMATOLOGICAL/Lymph: Denies bleeding tendency, bruising tendency. No swellings or enlarged lymph nodes.  CARDIOVASCULAR: Denies chest pain, shortness of breath, orthopnea and edema.  RESPIRATORY: Denies cough, hemoptysis, dyspnea, and wheezing.  GI: See HPI.  : Denies dysuria and hematuria  MUSCULOSKELETAL: Denies joint pain or swelling, back pain and muscle pain.  SKIN: Denies rashes.  NEUROLOGIC: Denies headaches, seizures and numbness.  PSYCHIATRIC: Denies depression or anxiety.  ENDOCRINE: Denies heat or cold intolerance and excessive thirst or urination.    Physical Examination:       There were no vitals taken for this visit.    Laboratory:  Lab Results   Component Value Date    WBC 6.77 07/18/2019    MCV 94 07/18/2019    RDW 12.9 07/18/2019     07/18/2019    INR 1.0 11/20/2014    BUN 8 12/14/2018    BUN 8 12/14/2018    GLU 98 12/14/2018    GLU 98 12/14/2018    HGBA1C 5.2 11/14/2017      Lab Results   Component Value Date    CRP 10.5 (H) 03/09/2020    TIBC 352 03/09/2018    IRON 84 03/09/2018    TSH 0.863 11/14/2017    CHOL 155 11/14/2017    TRIG 104 11/14/2017    HDL 59 11/14/2017     Lab Results   Component Value Date    PHOS 2.9 12/14/2018        Imaging Review:   Cta Chest Non Coronary    Result Date: 2/18/2020  EXAMINATION: CTA CHEST NON CORONARY CLINICAL HISTORY: possble subclaven thrombosis on IVIG and estrogen;Acute embolism and thrombosis of unspecified vein TECHNIQUE: CT of the chest was acquired helically utilizing a low-dose technique from the lung apices through the posterior costophrenic angles status post administration of 100 cc of Omnipaque 350.  Bolus timing was utilized to optimize opacification of the pulmonary arterial system.  Additionally, more delayed  images were also obtained to better opacify the venous structures.  3-D maximum intensity projection and multiplanar reconstructions were created from the source data set and interpreted. COMPARISON: None FINDINGS: No infiltrates or pleural effusions are identified.  No discrete pulmonary nodules or masses are identified. The aorta demonstrates normal caliber and contour. There is good opacification of the pulmonary arterial system. No intraluminal filling defects are notified within the pulmonary arterial system to suggest pulmonary embolism. There is no pericardial effusion.  No enlarged mediastinal, hilar or axillary lymph nodes are identified.  Delayed images demonstrate no obvious thrombus within the subclavian veins. The visualized upper abdomen is unremarkable in appearance. The osseous structures are unremarkable in appearance.     1. No evidence to suggest pulmonary embolism.  No obvious thrombus noted within the subclavian veins. 2.  No acute pathology noted within the chest. Electronically signed by: Kane Castellano DO Date:    02/18/2020 Time:    13:36          Health Maintenance Review:  Health Maintenance   Topic Date Due    Pneumococcal Vaccine (Highest Risk) (3 of 3 - PPSV23) 02/17/2020    Mammogram  05/31/2020    Lipid Panel  11/14/2022    TETANUS VACCINE  02/18/2024    Colonoscopy  03/12/2025    Hepatitis C Screening  Completed       ASSESSMENT:  55 y.o. Female with Crohn's ileitis, previously on biologic but now on pentasa with clinic symptoms and MRE showing possible active disease in the setting of normal inflammatory markers and colonoscopy that shows ileitis.      PLAN:  - will bump up therapy.  Had long discussion with patient regarding options and I think at this time entyvio is our best option.  She has failed remicade (details are not clear).  She had low levels of humira with intermediate antibody formation.  This could possibly be overcome with addition of immunodulator, however she  did develop pneumonia while on humira.  In addition, given the current COVID crisis, I don't want to use a second agent thereby increasing risk because of immunosuppression.  Therefore, will start entyvio, standard induction and maintenance dosing.   - does not appear there are any interactions with immunoglobuin IM but will send a message to Dr. Fletcher (allergy/immunology) to confirm      Return to clinic: 2 months    Nicole Leal  4/8/2020  11:25 AM

## 2020-04-09 ENCOUNTER — PATIENT MESSAGE (OUTPATIENT)
Dept: INTERNAL MEDICINE | Facility: CLINIC | Age: 55
End: 2020-04-09

## 2020-04-13 RX ORDER — ERGOCALCIFEROL 1.25 MG/1
CAPSULE ORAL
Qty: 4 CAPSULE | Refills: 1 | Status: SHIPPED | OUTPATIENT
Start: 2020-04-13 | End: 2020-05-11

## 2020-04-15 ENCOUNTER — PATIENT MESSAGE (OUTPATIENT)
Dept: ALLERGY | Facility: CLINIC | Age: 55
End: 2020-04-15

## 2020-04-15 ENCOUNTER — PATIENT MESSAGE (OUTPATIENT)
Dept: GASTROENTEROLOGY | Facility: CLINIC | Age: 55
End: 2020-04-15

## 2020-04-15 DIAGNOSIS — D84.9 IMMUNODEFICIENCY: Primary | ICD-10-CM

## 2020-04-16 DIAGNOSIS — K50.00 CROHN'S DISEASE OF SMALL INTESTINE WITHOUT COMPLICATION: Primary | ICD-10-CM

## 2020-04-16 RX ORDER — SODIUM CHLORIDE 0.9 % (FLUSH) 0.9 %
10 SYRINGE (ML) INJECTION
Status: CANCELLED | OUTPATIENT
Start: 2020-04-16

## 2020-04-16 RX ORDER — IPRATROPIUM BROMIDE AND ALBUTEROL SULFATE 2.5; .5 MG/3ML; MG/3ML
3 SOLUTION RESPIRATORY (INHALATION)
Status: CANCELLED | OUTPATIENT
Start: 2020-04-16

## 2020-04-16 RX ORDER — EPINEPHRINE 1 MG/ML
0.3 INJECTION, SOLUTION, CONCENTRATE INTRAVENOUS
Status: CANCELLED | OUTPATIENT
Start: 2020-04-16

## 2020-04-16 RX ORDER — METHYLPREDNISOLONE SOD SUCC 125 MG
40 VIAL (EA) INJECTION
Status: CANCELLED | OUTPATIENT
Start: 2020-04-16

## 2020-04-16 RX ORDER — ACETAMINOPHEN 325 MG/1
650 TABLET ORAL
Status: CANCELLED | OUTPATIENT
Start: 2020-04-16

## 2020-04-16 RX ORDER — DIPHENHYDRAMINE HYDROCHLORIDE 50 MG/ML
25 INJECTION INTRAMUSCULAR; INTRAVENOUS
Status: CANCELLED | OUTPATIENT
Start: 2020-04-16

## 2020-04-17 ENCOUNTER — TELEPHONE (OUTPATIENT)
Dept: INFUSION THERAPY | Facility: HOSPITAL | Age: 55
End: 2020-04-17

## 2020-04-17 NOTE — TELEPHONE ENCOUNTER
----- Message from Luke Coburn RN sent at 4/16/2020  3:16 PM CDT -----  Entyvio plan placed, please let me know when she's scheduled    TR  ----- Message -----  From: Nicole Leal MD  Sent: 4/16/2020   3:14 PM CDT  To: Luke Coburn RN    I put therapy plan in for entyvio.  Please let infusion know.  The patient is a little reluctant to start since she is on IM IG medication.  It is completely fine for her to start entyvio and I have messaged that to her but she may want to wait, not sure.  I'm ok with her waiting a month but if longer than that I want to talk to her or see her for a visit.

## 2020-04-17 NOTE — TELEPHONE ENCOUNTER
Spoke to pat   She is working with Dr Fletcher as well as Dr Leal.  She will be getting some blood work and will contact us when she and Dr Fletcher feel she is ready for treatment .

## 2020-04-20 ENCOUNTER — CLINICAL SUPPORT (OUTPATIENT)
Dept: INTERNAL MEDICINE | Facility: CLINIC | Age: 55
End: 2020-04-20
Payer: COMMERCIAL

## 2020-04-20 ENCOUNTER — OFFICE VISIT (OUTPATIENT)
Dept: INTERNAL MEDICINE | Facility: CLINIC | Age: 55
End: 2020-04-20
Payer: COMMERCIAL

## 2020-04-20 ENCOUNTER — PATIENT MESSAGE (OUTPATIENT)
Dept: INTERNAL MEDICINE | Facility: CLINIC | Age: 55
End: 2020-04-20

## 2020-04-20 ENCOUNTER — LAB VISIT (OUTPATIENT)
Dept: LAB | Facility: HOSPITAL | Age: 55
End: 2020-04-20
Attending: ALLERGY & IMMUNOLOGY
Payer: COMMERCIAL

## 2020-04-20 VITALS
SYSTOLIC BLOOD PRESSURE: 116 MMHG | SYSTOLIC BLOOD PRESSURE: 120 MMHG | DIASTOLIC BLOOD PRESSURE: 80 MMHG | DIASTOLIC BLOOD PRESSURE: 76 MMHG | OXYGEN SATURATION: 98 % | HEART RATE: 88 BPM

## 2020-04-20 DIAGNOSIS — Z79.60 LONG-TERM USE OF IMMUNOSUPPRESSANT MEDICATION: ICD-10-CM

## 2020-04-20 DIAGNOSIS — D80.1 HYPOGAMMAGLOBULINEMIA: ICD-10-CM

## 2020-04-20 DIAGNOSIS — L25.9 CONTACT DERMATITIS, UNSPECIFIED CONTACT DERMATITIS TYPE, UNSPECIFIED TRIGGER: ICD-10-CM

## 2020-04-20 DIAGNOSIS — D84.9 IMMUNODEFICIENCY: ICD-10-CM

## 2020-04-20 DIAGNOSIS — J32.4 CHRONIC PANSINUSITIS: ICD-10-CM

## 2020-04-20 DIAGNOSIS — I95.9 HYPOTENSION, UNSPECIFIED HYPOTENSION TYPE: Primary | ICD-10-CM

## 2020-04-20 DIAGNOSIS — Z01.30 BLOOD PRESSURE CHECK: Primary | ICD-10-CM

## 2020-04-20 LAB
BASOPHILS # BLD AUTO: 0.05 K/UL (ref 0–0.2)
BASOPHILS NFR BLD: 0.7 % (ref 0–1.9)
DIFFERENTIAL METHOD: ABNORMAL
EOSINOPHIL # BLD AUTO: 0.3 K/UL (ref 0–0.5)
EOSINOPHIL NFR BLD: 3.3 % (ref 0–8)
ERYTHROCYTE [DISTWIDTH] IN BLOOD BY AUTOMATED COUNT: 12.9 % (ref 11.5–14.5)
HCT VFR BLD AUTO: 37 % (ref 37–48.5)
HGB BLD-MCNC: 11.7 G/DL (ref 12–16)
IGA SERPL-MCNC: 215 MG/DL (ref 40–350)
IGG SERPL-MCNC: 1285 MG/DL (ref 650–1600)
IGM SERPL-MCNC: 142 MG/DL (ref 50–300)
IMM GRANULOCYTES # BLD AUTO: 0.02 K/UL (ref 0–0.04)
IMM GRANULOCYTES NFR BLD AUTO: 0.3 % (ref 0–0.5)
LYMPHOCYTES # BLD AUTO: 1.4 K/UL (ref 1–4.8)
LYMPHOCYTES NFR BLD: 18.5 % (ref 18–48)
MCH RBC QN AUTO: 30.5 PG (ref 27–31)
MCHC RBC AUTO-ENTMCNC: 31.6 G/DL (ref 32–36)
MCV RBC AUTO: 96 FL (ref 82–98)
MONOCYTES # BLD AUTO: 0.7 K/UL (ref 0.3–1)
MONOCYTES NFR BLD: 9.9 % (ref 4–15)
NEUTROPHILS # BLD AUTO: 5.1 K/UL (ref 1.8–7.7)
NEUTROPHILS NFR BLD: 67.3 % (ref 38–73)
NRBC BLD-RTO: 0 /100 WBC
PLATELET # BLD AUTO: 313 K/UL (ref 150–350)
PMV BLD AUTO: 9.4 FL (ref 9.2–12.9)
RBC # BLD AUTO: 3.84 M/UL (ref 4–5.4)
WBC # BLD AUTO: 7.51 K/UL (ref 3.9–12.7)

## 2020-04-20 PROCEDURE — 99214 OFFICE O/P EST MOD 30 MIN: CPT | Mod: S$GLB,,, | Performed by: FAMILY MEDICINE

## 2020-04-20 PROCEDURE — 82784 ASSAY IGA/IGD/IGG/IGM EACH: CPT | Mod: 59

## 2020-04-20 PROCEDURE — 3078F PR MOST RECENT DIASTOLIC BLOOD PRESSURE < 80 MM HG: ICD-10-PCS | Mod: CPTII,S$GLB,, | Performed by: FAMILY MEDICINE

## 2020-04-20 PROCEDURE — 99999 PR PBB SHADOW E&M-EST. PATIENT-LVL I: CPT | Mod: PBBFAC,,,

## 2020-04-20 PROCEDURE — 99999 PR PBB SHADOW E&M-EST. PATIENT-LVL III: ICD-10-PCS | Mod: PBBFAC,,, | Performed by: FAMILY MEDICINE

## 2020-04-20 PROCEDURE — 85025 COMPLETE CBC W/AUTO DIFF WBC: CPT

## 2020-04-20 PROCEDURE — 82787 IGG 1 2 3 OR 4 EACH: CPT

## 2020-04-20 PROCEDURE — 3078F DIAST BP <80 MM HG: CPT | Mod: CPTII,S$GLB,, | Performed by: FAMILY MEDICINE

## 2020-04-20 PROCEDURE — 3074F PR MOST RECENT SYSTOLIC BLOOD PRESSURE < 130 MM HG: ICD-10-PCS | Mod: CPTII,S$GLB,, | Performed by: FAMILY MEDICINE

## 2020-04-20 PROCEDURE — 36415 COLL VENOUS BLD VENIPUNCTURE: CPT | Mod: PO

## 2020-04-20 PROCEDURE — 99999 PR PBB SHADOW E&M-EST. PATIENT-LVL III: CPT | Mod: PBBFAC,,, | Performed by: FAMILY MEDICINE

## 2020-04-20 PROCEDURE — 99999 PR PBB SHADOW E&M-EST. PATIENT-LVL I: ICD-10-PCS | Mod: PBBFAC,,,

## 2020-04-20 PROCEDURE — 3074F SYST BP LT 130 MM HG: CPT | Mod: CPTII,S$GLB,, | Performed by: FAMILY MEDICINE

## 2020-04-20 PROCEDURE — 99214 PR OFFICE/OUTPT VISIT, EST, LEVL IV, 30-39 MIN: ICD-10-PCS | Mod: S$GLB,,, | Performed by: FAMILY MEDICINE

## 2020-04-20 PROCEDURE — 86317 IMMUNOASSAY INFECTIOUS AGENT: CPT | Mod: 59

## 2020-04-20 PROCEDURE — 82784 ASSAY IGA/IGD/IGG/IGM EACH: CPT

## 2020-04-20 NOTE — PATIENT INSTRUCTIONS
Systolic blood pressure is above 130-140 on top tonight, restart propranolol only.     If tomorrow am if systolic blood pressure is greater than 130-140 on top then restart losartan hctz also.     Monitor your weight if your up more than 2 lb message me and I can send in HCTZ alone for the diuretic only if your blood pressure is not up as well which would indicate you would take the losartan and HCTZ together

## 2020-04-20 NOTE — PROGRESS NOTES
"Subjective:      Patient ID: Jaylin Murguia is a 55 y.o. female.    Chief Complaint: Hypotension    Disclaimer:  This note is prepared using voice recognition software and as such is likely to have errors and has not been proof read. Please contact me for questions.     Jaylin Murguia sent in the following patient message in regards to her blood pressure with this necessitated the need for her to be seen today and person.  She is coming in today to do lab work as well:      "Dr Hu---  I need to share....need your advice.     Last Friday, I felt weak, dizzy, lightheaded.  Same for Saturday, so I took my BP.  it was really low.   Running like   85/58, 88/60   it was midday, so I had taken one BP med- Losartin   I remained low all day, so I did not take any more meds that day     Sunday, It was low when I woke up, still felt dizzy and lightheaded.  Took no meds.   Throughout the day, It adriana a bit to somewhat normal 95/70  I felt a little better later in the day.     This morning it is 98/67  No meds.         So, Is it ok, just to take none of my BP meds.?     The only change i can think of is the Hizenta infusions that i take once per week.  I have lost a few pounds, but not enough to make a difference. What do you think?     My morning BP has the diuretic  in it.  I have noticed a little swelling .  Should i take a diuretic alone?     I will be in the office, doing bloodwork for Dr Fletcher at 11:00.  I was wondering if someone could check my BP?   thanks for your time,   Jaylin"    Reports for the last 48 hr he has been holding her propanolol and her losartan hydrochlorothiazide.  She had some significantly low blood pressures.  This morning she woke up without feeling dizzy or lightheaded though.  She feels okay at this time.  Blood pressure repeat today was 120/80 and then again on repeat 116/76.  She has been off the steroids now for approximately 1 week.  She has been using a CPAP machine at night as well.  " Reports she feels like she is getting a sinus infection though again.  She reports she had a lot of discharge in drainage coming up.  She does have underlying asthma as well which she is on the inhaled steroids.  Her breathing seems to be doing well.  Pulse ox today in the exam room today was at 98% on room air.    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    She is uncertain if she needs to be on the diuretic though because she does feel more swollen.  I advised her she could follow her weight each day and if she has upper down 2 lb and this would be a way to determine if she needs a diuretic alone as well.    Also she reports she had a skin biopsy done on her left forearm.  Has some redness and she is getting some sensitivity to where the tape and Band-Aids are.  She was told to put Neosporin on it but now she is uncertain if he needs to keep covering it at this time because it appears that she is having a little bit of an outbreak and some reddening of the skin as well with the tape and adhesive have been from the Band-Aids.      Lab Results   Component Value Date    WBC 6.77 07/18/2019    HGB 11.9 (L) 07/18/2019    HCT 36.9 (L) 07/18/2019     07/18/2019    CHOL 155 11/14/2017    TRIG 104 11/14/2017    HDL 59 11/14/2017    ALT 34 12/14/2018    AST 32 12/14/2018     12/14/2018     12/14/2018    K 4.0 12/14/2018    K 4.0 12/14/2018    CL 98 12/14/2018    CL 98 12/14/2018    CREATININE 1.0 12/14/2018    CREATININE 1.0 12/14/2018    BUN 8 12/14/2018    BUN 8 12/14/2018    CO2 28 12/14/2018    CO2 28 12/14/2018    TSH 0.863 11/14/2017    INR 1.0 11/20/2014    HGBA1C 5.2 11/14/2017       CTA Chest Non Coronary  Narrative: EXAMINATION:  CTA CHEST NON CORONARY    CLINICAL HISTORY:  possble subclaven thrombosis on IVIG and estrogen;Acute embolism and thrombosis of unspecified  vein    TECHNIQUE:  CT of the chest was acquired helically utilizing a low-dose technique from the lung apices through the posterior costophrenic angles status post administration of 100 cc of Omnipaque 350.  Bolus timing was utilized to optimize opacification of the pulmonary arterial system.  Additionally, more delayed images were also obtained to better opacify the venous structures.  3-D maximum intensity projection and multiplanar reconstructions were created from the source data set and interpreted.    COMPARISON:  None    FINDINGS:  No infiltrates or pleural effusions are identified.  No discrete pulmonary nodules or masses are identified.    The aorta demonstrates normal caliber and contour. There is good opacification of the pulmonary arterial system. No intraluminal filling defects are notified within the pulmonary arterial system to suggest pulmonary embolism. There is no pericardial effusion.  No enlarged mediastinal, hilar or axillary lymph nodes are identified.  Delayed images demonstrate no obvious thrombus within the subclavian veins.    The visualized upper abdomen is unremarkable in appearance.    The osseous structures are unremarkable in appearance.  Impression: 1. No evidence to suggest pulmonary embolism.  No obvious thrombus noted within the subclavian veins.    2.  No acute pathology noted within the chest.    Electronically signed by: Kane Castellano DO  Date:    02/18/2020  Time:    13:36        Review of Systems   Constitutional: Positive for activity change and fatigue. Negative for appetite change.   HENT: Positive for congestion, sinus pressure and sinus pain.    Respiratory: Positive for cough. Negative for shortness of breath.    Neurological: Positive for dizziness, light-headedness and headaches.     Objective:     Vitals:    04/20/20 1135 04/20/20 1140   BP: 120/80 116/76   Pulse:  88   SpO2:  98%     Physical Exam   Constitutional: She appears well-developed and well-nourished.    HENT:   Head: Normocephalic and atraumatic.   Right Ear: External ear normal.   Left Ear: External ear normal.   Eyes: Conjunctivae are normal.   Cardiovascular: Normal rate, regular rhythm and normal heart sounds.   Pulmonary/Chest: Effort normal and breath sounds normal.   Skin: Rash noted. There is erythema.   Contact irritant dermatitis on the left forearm secondary to Band-Aid adhesive;  healing skin biopsy noted to be at less than 5 mm   Vitals reviewed.    Assessment:     1. Hypotension, unspecified hypotension type    2. Contact dermatitis, unspecified contact dermatitis type, unspecified trigger    3. Chronic pansinusitis    4. Hypogammaglobulinemia    5. Long-term use of immunosuppressant medication      Plan:   Jaylin was seen today for hypotension.    Diagnoses and all orders for this visit:    Hypotension, unspecified hypotension type  Comments:  Need to hold propanolol and losartan hydrochlorothiazide if systolic blood pressure goes above 130 then can restart propanolol 1st    Contact dermatitis, unspecified contact dermatitis type, unspecified trigger  Comments:  -avoid Band-Aid adhesives at this time OK to leave area open and apply triple antibiotic ointment only    Chronic pansinusitis  Comments:  Follow-up based on lab results with allergy and immunology at this time.    Hypogammaglobulinemia  Comments:  High risk for infection high risk due to COVID-19 exposure    Long-term use of immunosuppressant medication  Comments:  Recently off steroids for the last 1 week monitor blood pressure closely            Follow up if symptoms worsen or fail to improve.    Patient Instructions   Systolic blood pressure is above 130-140 on top tonight, restart propranolol only.     If tomorrow am if systolic blood pressure is greater than 130-140 on top then restart losartan hctz also.     Monitor your weight if your up more than 2 lb message me and I can send in HCTZ alone for the diuretic only if your blood  pressure is not up as well which would indicate you would take the losartan and HCTZ together

## 2020-04-20 NOTE — PROGRESS NOTES
Pt cam in to have BP checked.  Said it has been running low, 70/50 on Friday, Sat was 80/60's , Sun 105/70   .  She hasn't been taking any BP medication within the last 48 hours.  Pt allowed to sit x 10 mins and was taken at 11:25 am, with adult x large cuff in left arm and was 120 80 .   She reports no dizziness, weakness.  Pt is instructed to sit for further instructions from Dr. Hu

## 2020-04-21 ENCOUNTER — PATIENT MESSAGE (OUTPATIENT)
Dept: INTERNAL MEDICINE | Facility: CLINIC | Age: 55
End: 2020-04-21

## 2020-04-21 ENCOUNTER — PATIENT MESSAGE (OUTPATIENT)
Dept: ALLERGY | Facility: CLINIC | Age: 55
End: 2020-04-21

## 2020-04-21 RX ORDER — AMOXICILLIN AND CLAVULANATE POTASSIUM 875; 125 MG/1; MG/1
1 TABLET, FILM COATED ORAL EVERY 12 HOURS
Qty: 20 TABLET | Refills: 0 | Status: SHIPPED | OUTPATIENT
Start: 2020-04-21 | End: 2020-07-08

## 2020-04-23 ENCOUNTER — PATIENT MESSAGE (OUTPATIENT)
Dept: INTERNAL MEDICINE | Facility: CLINIC | Age: 55
End: 2020-04-23

## 2020-04-23 NOTE — TELEPHONE ENCOUNTER
Please feel free to call him to ask and answer the questions. Or put down so I can answer also.      I believe he can be in phone contact with his close contacts such as his girlfriend and in letting his work know that he is positive for the COVID-19 however it is not necessary that he inform restaurants, grocery stores, etc.   Any friends or close family members that he has been near within the last 4-7 days would be helpful to let them know so that they to can get tested if they exhibit any signs or symptoms. ( fever, chills, loss of smell, cough, sob. )    He needs to be in quarantine initially for 7 days and thus I have set up a telemedicine meeting for him at that time to discuss next steps next week if still having symptoms. For him it was really only loss of smell.    Symptoms to watch out for are fever and chills or shortness of breath with talking or exertion.   If chance he has a pulse oximeter he can check it also. If less than 95 % at rest needs to let me know.     He should be enrolled in the symptom tracker now which will send him questions each AM if he is better, worse, the same on symptoms. If worse, then a NP or PA will reach out to him that day to guide him on what to do next.      I will be touching base with him for telemed visit next week also, but if there are other questions I'm happy to set up another video visit or phone call if needed.

## 2020-04-24 ENCOUNTER — PATIENT MESSAGE (OUTPATIENT)
Dept: INTERNAL MEDICINE | Facility: CLINIC | Age: 55
End: 2020-04-24

## 2020-04-24 ENCOUNTER — PATIENT MESSAGE (OUTPATIENT)
Dept: ALLERGY | Facility: CLINIC | Age: 55
End: 2020-04-24

## 2020-04-24 LAB
IGG1 SER-MCNC: 775 MG/DL (ref 382–929)
IGG2 SER-MCNC: 330 MG/DL (ref 242–700)
IGG3 SER-MCNC: 37 MG/DL (ref 22–176)
IGG4 SER-MCNC: 21 MG/DL (ref 4–86)

## 2020-04-27 ENCOUNTER — PATIENT MESSAGE (OUTPATIENT)
Dept: ALLERGY | Facility: CLINIC | Age: 55
End: 2020-04-27

## 2020-04-27 LAB
DEPRECATED S PNEUM 1 IGG SER-MCNC: 5.4 MCG/ML
DEPRECATED S PNEUM12 IGG SER-MCNC: 1.1 MCG/ML
DEPRECATED S PNEUM14 IGG SER-MCNC: 6 MCG/ML
DEPRECATED S PNEUM19 IGG SER-MCNC: 21.4 MCG/ML
DEPRECATED S PNEUM23 IGG SER-MCNC: 3.8 MCG/ML
DEPRECATED S PNEUM3 IGG SER-MCNC: 2.5 MCG/ML
DEPRECATED S PNEUM4 IGG SER-MCNC: 1.5 MCG/ML
DEPRECATED S PNEUM5 IGG SER-MCNC: 3.4 MCG/ML
DEPRECATED S PNEUM8 IGG SER-MCNC: 1.6 MCG/ML
DEPRECATED S PNEUM9 IGG SER-MCNC: 1.6 MCG/ML
S PNEUM DA 18C IGG SER-MCNC: 5 MCG/ML
S PNEUM DA 6B IGG SER-MCNC: 39.8 MCG/ML
S PNEUM DA 7F IGG SER-MCNC: 4.2 MCG/ML
S PNEUM DA 9V IGG SER-MCNC: 3.8 MCG/ML

## 2020-05-03 ENCOUNTER — PATIENT MESSAGE (OUTPATIENT)
Dept: ALLERGY | Facility: CLINIC | Age: 55
End: 2020-05-03

## 2020-05-04 ENCOUNTER — PATIENT MESSAGE (OUTPATIENT)
Dept: INFECTIOUS DISEASES | Facility: CLINIC | Age: 55
End: 2020-05-04

## 2020-05-04 DIAGNOSIS — J45.909 ASTHMA, UNSPECIFIED ASTHMA SEVERITY, UNSPECIFIED WHETHER COMPLICATED, UNSPECIFIED WHETHER PERSISTENT: ICD-10-CM

## 2020-05-04 RX ORDER — PREDNISONE 20 MG/1
TABLET ORAL
Qty: 21 TABLET | Refills: 0 | Status: SHIPPED | OUTPATIENT
Start: 2020-05-04 | End: 2020-05-25 | Stop reason: SDUPTHER

## 2020-05-05 ENCOUNTER — PATIENT MESSAGE (OUTPATIENT)
Dept: INTERNAL MEDICINE | Facility: CLINIC | Age: 55
End: 2020-05-05

## 2020-05-05 ENCOUNTER — PATIENT MESSAGE (OUTPATIENT)
Dept: ALLERGY | Facility: CLINIC | Age: 55
End: 2020-05-05

## 2020-05-05 RX ORDER — HYDROCHLOROTHIAZIDE 25 MG/1
25 TABLET ORAL DAILY
Qty: 90 TABLET | Refills: 3 | Status: SHIPPED | OUTPATIENT
Start: 2020-05-05 | End: 2021-09-08

## 2020-05-05 RX ORDER — BENZONATATE 100 MG/1
200 CAPSULE ORAL 3 TIMES DAILY PRN
Qty: 90 CAPSULE | Refills: 3 | Status: SHIPPED | OUTPATIENT
Start: 2020-05-05 | End: 2020-05-15

## 2020-05-11 DIAGNOSIS — K50.919 CROHN'S DISEASE WITH COMPLICATION, UNSPECIFIED GASTROINTESTINAL TRACT LOCATION: ICD-10-CM

## 2020-05-11 RX ORDER — PANTOPRAZOLE SODIUM 40 MG/1
TABLET, DELAYED RELEASE ORAL
Qty: 60 TABLET | Refills: 1 | Status: SHIPPED | OUTPATIENT
Start: 2020-05-11 | End: 2020-05-21

## 2020-05-11 RX ORDER — ERGOCALCIFEROL 1.25 MG/1
CAPSULE ORAL
Qty: 4 CAPSULE | Refills: 1 | Status: SHIPPED | OUTPATIENT
Start: 2020-05-11 | End: 2021-02-03

## 2020-05-20 DIAGNOSIS — K50.919 CROHN'S DISEASE WITH COMPLICATION, UNSPECIFIED GASTROINTESTINAL TRACT LOCATION: ICD-10-CM

## 2020-05-21 RX ORDER — MONTELUKAST SODIUM 10 MG/1
TABLET ORAL
Qty: 90 TABLET | Refills: 3 | Status: SHIPPED | OUTPATIENT
Start: 2020-05-21 | End: 2021-05-17

## 2020-05-21 RX ORDER — PANTOPRAZOLE SODIUM 40 MG/1
TABLET, DELAYED RELEASE ORAL
Qty: 90 TABLET | Refills: 3 | Status: SHIPPED | OUTPATIENT
Start: 2020-05-21 | End: 2020-06-08

## 2020-05-22 ENCOUNTER — PATIENT MESSAGE (OUTPATIENT)
Dept: ALLERGY | Facility: CLINIC | Age: 55
End: 2020-05-22

## 2020-05-24 ENCOUNTER — OFFICE VISIT (OUTPATIENT)
Dept: URGENT CARE | Facility: CLINIC | Age: 55
End: 2020-05-24
Payer: COMMERCIAL

## 2020-05-24 VITALS — TEMPERATURE: 98 F

## 2020-05-24 DIAGNOSIS — J02.9 PHARYNGITIS, UNSPECIFIED ETIOLOGY: ICD-10-CM

## 2020-05-24 DIAGNOSIS — R51.9 ACUTE NONINTRACTABLE HEADACHE, UNSPECIFIED HEADACHE TYPE: ICD-10-CM

## 2020-05-24 DIAGNOSIS — Z20.822 EXPOSURE TO COVID-19 VIRUS: Primary | ICD-10-CM

## 2020-05-24 DIAGNOSIS — R05.9 COUGH: ICD-10-CM

## 2020-05-24 LAB
CTP QC/QA: YES
MOLECULAR STREP A: NEGATIVE

## 2020-05-24 PROCEDURE — U0003 INFECTIOUS AGENT DETECTION BY NUCLEIC ACID (DNA OR RNA); SEVERE ACUTE RESPIRATORY SYNDROME CORONAVIRUS 2 (SARS-COV-2) (CORONAVIRUS DISEASE [COVID-19]), AMPLIFIED PROBE TECHNIQUE, MAKING USE OF HIGH THROUGHPUT TECHNOLOGIES AS DESCRIBED BY CMS-2020-01-R: HCPCS

## 2020-05-24 PROCEDURE — 99214 OFFICE O/P EST MOD 30 MIN: CPT | Mod: S$GLB,,, | Performed by: NURSE PRACTITIONER

## 2020-05-24 PROCEDURE — 87651 POCT STREP A MOLECULAR: ICD-10-PCS | Mod: QW,S$GLB,, | Performed by: NURSE PRACTITIONER

## 2020-05-24 PROCEDURE — 99214 PR OFFICE/OUTPT VISIT, EST, LEVL IV, 30-39 MIN: ICD-10-PCS | Mod: S$GLB,,, | Performed by: NURSE PRACTITIONER

## 2020-05-24 PROCEDURE — 87651 STREP A DNA AMP PROBE: CPT | Mod: QW,S$GLB,, | Performed by: NURSE PRACTITIONER

## 2020-05-24 RX ORDER — PROMETHAZINE HYDROCHLORIDE AND DEXTROMETHORPHAN HYDROBROMIDE 6.25; 15 MG/5ML; MG/5ML
5 SYRUP ORAL NIGHTLY PRN
Qty: 118 ML | Refills: 0 | Status: SHIPPED | OUTPATIENT
Start: 2020-05-24 | End: 2020-06-03

## 2020-05-24 RX ORDER — BENZONATATE 200 MG/1
200 CAPSULE ORAL 3 TIMES DAILY PRN
Qty: 30 CAPSULE | Refills: 0 | Status: SHIPPED | OUTPATIENT
Start: 2020-05-24 | End: 2020-06-03

## 2020-05-24 NOTE — PATIENT INSTRUCTIONS
· Getting plenty of rest is very important to fighting infections.  · Increase fluids.   · May apply warm compresses as needed for facial pain and congestion.   · Saline nasal spray to loosen nasal congestion.  · Albuterol nebulizer treatments and rescue inhaler as needed for shortness of breath.  · Warm salt water gargles, Cepacol throat lozenges, or Chloraseptic spray for sore throat.  · Take Tylenol 1000 mg every 6-8 hours as needed for sore throat, body aches, or fever.  · Continue taking mucinex for congestion.  · Don't drive, drink alcohol, or take any other medication or substance that causes sedation while taking cough syrup.   · Follow up with your primary care provider if symptoms persist >10 days or sooner for any new or worsening symptoms.   · Go to the ER for any fever that does not improve with Tylenol/Ibuprofen, neck stiffness, rash, severe headache, vision changes, difficulty breathing, chest pain, facial swelling, severe facial pain, or any other new and concerning symptoms.     *take benzonatate as prescribed during the day and promethazine dm at bedtime*

## 2020-05-24 NOTE — PROGRESS NOTES
"Subjective:       Patient ID: Jaylin Murguia is a 55 y.o. female.    Vitals:  tympanic temperature is 98 °F (36.7 °C).     Chief Complaint: COVID-19 Concerns    Pt states her son tested positive for covid 3 weeks ago. She began having symptoms of cough, SOB, headache, sore throat and body aches "earlier this week". Has been taking mucinex and tylenol as needed. Does use a rescue inhaler and albuterol nebs for asthma. Denies fever, nausea, vomiting, and diarrhea.    URI    This is a new problem. The current episode started in the past 7 days. The problem has been unchanged. There has been no fever. Associated symptoms include congestion, coughing, headaches and a sore throat. Pertinent negatives include no ear pain, nausea, rash, sinus pain, vomiting or wheezing. She has tried nothing for the symptoms. The treatment provided no relief.       Constitution: Negative for chills, sweating, fatigue and fever.   HENT: Positive for congestion and sore throat. Negative for ear pain, sinus pain, sinus pressure and voice change.    Neck: Negative for painful lymph nodes.   Eyes: Negative for eye redness.   Respiratory: Positive for cough and shortness of breath. Negative for chest tightness, sputum production, bloody sputum, COPD, stridor, wheezing and asthma.    Gastrointestinal: Negative for nausea and vomiting.   Musculoskeletal: Negative for muscle ache.   Skin: Negative for rash.   Allergic/Immunologic: Negative for seasonal allergies and asthma.   Neurological: Positive for headaches.   Hematologic/Lymphatic: Negative for swollen lymph nodes.       Objective:      Physical Exam   Constitutional: She is oriented to person, place, and time. She appears well-developed and well-nourished. She is cooperative.  Non-toxic appearance. She does not have a sickly appearance. She does not appear ill. No distress.   Pt seen in clinic in [facility] room virtually.   HENT:   Head: Normocephalic and atraumatic.   Right Ear: Hearing and " external ear normal.   Left Ear: Hearing and external ear normal.   Nose: Nose normal. No mucosal edema, rhinorrhea or nasal deformity. No epistaxis. Right sinus exhibits no maxillary sinus tenderness and no frontal sinus tenderness. Left sinus exhibits no maxillary sinus tenderness and no frontal sinus tenderness.   Mouth/Throat: Uvula is midline, oropharynx is clear and moist and mucous membranes are normal. No trismus in the jaw. Normal dentition. No uvula swelling. Cobblestoning present. No oropharyngeal exudate, posterior oropharyngeal edema or posterior oropharyngeal erythema.   Eyes: Conjunctivae, EOM and lids are normal. No scleral icterus.   Neck: Trachea normal, full passive range of motion without pain and phonation normal. Neck supple. No neck rigidity. No edema and no erythema present.   Cardiovascular: Normal rate, regular rhythm and normal heart sounds.   Pulmonary/Chest: Effort normal. No respiratory distress. She has no decreased breath sounds. She has wheezes (inspiratory, scattered throughout). She has no rhonchi.   Musculoskeletal: Normal range of motion. She exhibits no edema or deformity.   Neurological: She is alert and oriented to person, place, and time. She exhibits normal muscle tone. Coordination normal.   Skin: Skin is intact, not diaphoretic and not pale.   Psychiatric: She has a normal mood and affect. Her speech is normal and behavior is normal. Judgment and thought content normal. Cognition and memory are normal.   Nursing note and vitals reviewed.        Assessment:       1. Exposure to Covid-19 Virus    2. Pharyngitis, unspecified etiology    3. Cough    4. Acute nonintractable headache, unspecified headache type        Plan:         Exposure to Covid-19 Virus  -     COVID-19 Routine Screening; Future; Expected date: 05/24/2020    Pharyngitis, unspecified etiology  -     POCT Strep A, Molecular    Cough  -     benzonatate (TESSALON) 200 MG capsule; Take 1 capsule (200 mg total) by  mouth 3 (three) times daily as needed for Cough.  Dispense: 30 capsule; Refill: 0  -     promethazine-dextromethorphan (PROMETHAZINE-DM) 6.25-15 mg/5 mL Syrp; Take 5 mLs by mouth nightly as needed.  Dispense: 118 mL; Refill: 0    Acute nonintractable headache, unspecified headache type         Results for orders placed or performed in visit on 05/24/20   POCT Strep A, Molecular   Result Value Ref Range    Molecular Strep A, POC Negative Negative     Acceptable Yes      Lab results reviewed and discussed with patient.    · Getting plenty of rest is very important to fighting infections.  · Increase fluids.   · May apply warm compresses as needed for facial pain and congestion.   · Saline nasal spray to loosen nasal congestion.  · Albuterol nebulizer treatments and rescue inhaler as needed for shortness of breath.  · Warm salt water gargles, Cepacol throat lozenges, or Chloraseptic spray for sore throat.  · Take Tylenol 1000 mg every 6-8 hours as needed for sore throat, body aches, or fever.  · Continue taking mucinex for congestion.  · Don't drive, drink alcohol, or take any other medication or substance that causes sedation while taking cough syrup.   · Follow up with your primary care provider if symptoms persist >10 days or sooner for any new or worsening symptoms.   · Go to the ER for any fever that does not improve with Tylenol/Ibuprofen, neck stiffness, rash, severe headache, vision changes, difficulty breathing, chest pain, facial swelling, severe facial pain, or any other new and concerning symptoms.     COVID safety precautions discussed and printed via AVS.

## 2020-05-25 ENCOUNTER — PATIENT MESSAGE (OUTPATIENT)
Dept: ALLERGY | Facility: CLINIC | Age: 55
End: 2020-05-25

## 2020-05-25 ENCOUNTER — TELEPHONE (OUTPATIENT)
Dept: URGENT CARE | Facility: CLINIC | Age: 55
End: 2020-05-25

## 2020-05-25 DIAGNOSIS — J45.909 ASTHMA, UNSPECIFIED ASTHMA SEVERITY, UNSPECIFIED WHETHER COMPLICATED, UNSPECIFIED WHETHER PERSISTENT: ICD-10-CM

## 2020-05-25 LAB — SARS-COV-2 RNA RESP QL NAA+PROBE: NOT DETECTED

## 2020-05-25 RX ORDER — PREDNISONE 20 MG/1
TABLET ORAL
Qty: 21 TABLET | Refills: 0 | Status: SHIPPED | OUTPATIENT
Start: 2020-05-25 | End: 2020-07-29

## 2020-05-25 RX ORDER — DOXYCYCLINE 150 MG/1
150 TABLET ORAL 2 TIMES DAILY
Qty: 20 TABLET | Refills: 0 | Status: SHIPPED | OUTPATIENT
Start: 2020-05-25 | End: 2020-07-08

## 2020-05-25 NOTE — TELEPHONE ENCOUNTER
Attempted to notify patient of negative COVID-19 test result. Left message requesting a return call to the clinic.

## 2020-05-26 ENCOUNTER — TELEPHONE (OUTPATIENT)
Dept: URGENT CARE | Facility: CLINIC | Age: 55
End: 2020-05-26

## 2020-05-26 NOTE — TELEPHONE ENCOUNTER
Called and spoke with patient regarding COVID test results. Patient identified by name, date of birth and address. Patient notified of negative test results and verbalized understanding.

## 2020-05-26 NOTE — TELEPHONE ENCOUNTER
----- Message from Neha Soto PA-C sent at 5/26/2020  8:06 AM CDT -----  Please check on patient and inform of negative results. Thanks! Neha Soto PA-C

## 2020-05-27 ENCOUNTER — PATIENT MESSAGE (OUTPATIENT)
Dept: INTERNAL MEDICINE | Facility: CLINIC | Age: 55
End: 2020-05-27

## 2020-05-27 DIAGNOSIS — M79.7 FIBROMYALGIA: ICD-10-CM

## 2020-05-27 RX ORDER — PREGABALIN 150 MG/1
CAPSULE ORAL
Qty: 90 CAPSULE | Refills: 0 | Status: SHIPPED | OUTPATIENT
Start: 2020-05-27 | End: 2020-07-14

## 2020-05-29 ENCOUNTER — PATIENT MESSAGE (OUTPATIENT)
Dept: ALLERGY | Facility: CLINIC | Age: 55
End: 2020-05-29

## 2020-06-01 ENCOUNTER — PATIENT OUTREACH (OUTPATIENT)
Dept: ADMINISTRATIVE | Facility: OTHER | Age: 55
End: 2020-06-01

## 2020-06-02 ENCOUNTER — OFFICE VISIT (OUTPATIENT)
Dept: OTOLARYNGOLOGY | Facility: CLINIC | Age: 55
End: 2020-06-02
Payer: COMMERCIAL

## 2020-06-02 VITALS — WEIGHT: 203 LBS | TEMPERATURE: 99 F | HEIGHT: 67 IN | BODY MASS INDEX: 31.86 KG/M2

## 2020-06-02 DIAGNOSIS — R22.1 LOCALIZED SWELLING, MASS AND LUMP, NECK: ICD-10-CM

## 2020-06-02 DIAGNOSIS — J32.4 CHRONIC PANSINUSITIS: Primary | ICD-10-CM

## 2020-06-02 PROCEDURE — 3008F BODY MASS INDEX DOCD: CPT | Mod: CPTII,S$GLB,, | Performed by: OTOLARYNGOLOGY

## 2020-06-02 PROCEDURE — 99999 PR PBB SHADOW E&M-EST. PATIENT-LVL III: ICD-10-PCS | Mod: PBBFAC,,, | Performed by: OTOLARYNGOLOGY

## 2020-06-02 PROCEDURE — 87070 CULTURE OTHR SPECIMN AEROBIC: CPT

## 2020-06-02 PROCEDURE — 99214 PR OFFICE/OUTPT VISIT, EST, LEVL IV, 30-39 MIN: ICD-10-PCS | Mod: 25,S$GLB,, | Performed by: OTOLARYNGOLOGY

## 2020-06-02 PROCEDURE — 31231 PR NASAL ENDOSCOPY, DX: ICD-10-PCS | Mod: S$GLB,,, | Performed by: OTOLARYNGOLOGY

## 2020-06-02 PROCEDURE — 87186 SC STD MICRODIL/AGAR DIL: CPT

## 2020-06-02 PROCEDURE — 99999 PR PBB SHADOW E&M-EST. PATIENT-LVL III: CPT | Mod: PBBFAC,,, | Performed by: OTOLARYNGOLOGY

## 2020-06-02 PROCEDURE — 99214 OFFICE O/P EST MOD 30 MIN: CPT | Mod: 25,S$GLB,, | Performed by: OTOLARYNGOLOGY

## 2020-06-02 PROCEDURE — 87077 CULTURE AEROBIC IDENTIFY: CPT

## 2020-06-02 PROCEDURE — 31231 NASAL ENDOSCOPY DX: CPT | Mod: S$GLB,,, | Performed by: OTOLARYNGOLOGY

## 2020-06-02 PROCEDURE — 3008F PR BODY MASS INDEX (BMI) DOCUMENTED: ICD-10-PCS | Mod: CPTII,S$GLB,, | Performed by: OTOLARYNGOLOGY

## 2020-06-02 NOTE — PROGRESS NOTES
Subjective:   Patient: Jaylin Murguia [id number], :1965   Visit date:2020 8:35 AM    Chief Complaint:  Other (Pt states that dr Fletcher suggested she see dr Shaffer due to her lymphnodes swelling.)    HPI:  Jaylin is a 55 y.o. female who is here for follow-up after surgery:    Subjective: She was here Sep 20.  Since surgery, she had been on Bactrim and Cipro PO.  I had prescribed zyvox but there was concern for a medication interaction. She was also on topical vancomycin/doxycycline/budesonide.  She had to stop the doxy due to severe burning.  She continued the vancomycin and budesonide topically but finished that later. We had significant problems getting cultures on her to grow but ultimately the did.  She had a severe fungal infection in addition to bacteria.  She has been on Bactrim.  She was seen in October and her exam was improved but certainly still irregular.  She returned tod 19.  Unfortunately during the course of all of this, she had a rib fracture due to severe coughing.  She has continued Topical vori, budesonide, levofloxacin.  She completed this about 5 days ago. Until stopping, she was doing much better. Since then, she has had some green drainage.  Using saline.  She is only on mesalamine.  At her appointment in December, she had nearly completely normal sinuses on endoscopy.   Ig testing did show low IgG. She has been on Privigen for this.  Ultimately, she has never had sustained improvement. She is now with worsening symptoms.  Severe coughing.  Green mucus.  Facial pain and congestion.  RAST testing was negative for environmental allergies.  She has been on Augmentin and doxycycline as well.  She is on xopenex for asthma.  She has had some swelling in the left supraclavicular area.  Waxes and wanes.  Mild tenderness w/o richard pain.           Surgery date: 19       RIGHT   -  right Total ethmoidectomy (CPT 93005)  -  right Maxillary antrostomy without removal of tissue (CPT  60099)     LEFT  -  left Total ethmoidectomy with sphenoid sinusotomy (CPT 92864)  -  left Maxillary antrostomy without removal of tissue (CPT 19193)      -  Sterotactic computer assisted (navigational) procedure; cranial, extradural (CPT 79673)  -  Septoplasty (CPT 16153)        Procedure in Detail/Findings:     Endoscopic Sinonasal Exam Findings at TIME OF SURGERY:  -     Sinuses examined: bilateral maxillary, bilateral ethmoid anterior/posterior and left sphenoid            The right sinus(es) have marked edema with polypoid changes            The left sinus(es) have marked edema with polypoid changes  -     Nasal secretions: purulent secretions bilaterally  -     Nasal septum: LEFT moderate deviation   -     Inferior turbinate: hypertrophy or edema (Mild) bilaterally  -     Middle turbinate: hypertrophy or edema (Moderate) on the left  -     Other findings: - no mass or obstructive lesion -Technical Procedures Used: 99649       Pathology:  NA    Cultures:    Contains abnormal data CULTURE, AEROBIC  (SPECIFY SOURCE)   Order: [id number]   Status:  Final result   Visible to patient:  Yes (Patient Portal) Next appt:  10/24/2019 at 08:20 AM in Pulmonology (Elizabeth Lejeune, NP) Dx:  Chronic pansinusitis   Specimen Information: Sinus        Component 3wk ago   Aerobic Bacterial Culture Abnormal    ASPERGILLUS NIGER   Many     Aerobic Bacterial Culture Abnormal    ACHROMOBACTER XYLOSOXIDANS SUBSP. DENTRIFICANS   Moderate     Resulting Agency [facility]   Susceptibility      Achromobacter xylosoxidans subsp. dentrificans     CULTURE, AEROBIC  (SPECIFY SOURCE)     Amikacin >32 mcg/mL Resistant     Cefepime 16 mcg/mL Intermediate     Ceftriaxone 32 mcg/mL Intermediate     Ciprofloxacin 2 mcg/mL Intermediate     Gentamicin >8 mcg/mL Resistant     Levofloxacin <=2 mcg/mL Sensitive     Piperacillin/Tazo <=16 mcg/mL Sensitive     Tetracycline >8 mcg/mL Resistant     Tobramycin >8 mcg/mL Resistant     Trimeth/Sulfa <=2/38 mcg/mL  Sensitive            Linear View                  Review of Systems:  -     Allergic/Immunologic: has No Known Allergies..  -     Constitutional: Current temp: 98.7 °F (37.1 °C) (Tympanic)    Her meds, allergies, medical, surgical, social & family histories were reviewed & updated:  -     She has a current medication list which includes the following prescription(s): amoxicillin-clavulanate 875-125mg, azelastine, benzonatate, budesonide, butalbital-acetaminophen-caffeine -40 mg, cetirizine, cetirizine, doxycycline, duloxetine, eletriptan, ergocalciferol, estradiol, fluticasone furoate-vilanterol, fluticasone propionate, hydrochlorothiazide, immun glob g(igg)-pro-iga 0-50, immune globulin g (igg)-pro-iga 10 % injection (privigen), ipratropium, losartan-hydrochlorothiazide 100-25 mg, mesalamine, montelukast, nortriptyline, pantoprazole, prednisone, pregabalin, promethazine-dextromethorphan, propranolol, rizatriptan, tiotropium bromide, topiramate, triamcinolone acetonide 0.025%, ventolin hfa, zolpidem, doxycycline monohydrate, levalbuterol, and levocetirizine, and the following Facility-Administered Medications: lactated ringers and lidocaine (pf) 10 mg/ml (1%).  -     She  has a past medical history of Allergic rhinitis, Asthma, Chronic pansinusitis, Crohn's disease, Fibromyalgia, Hyperlipidemia, Hypertension, Migraine, Obstructive sleep apnea, and Sciatica.   -     She does not have any pertinent problems on file.   -     She  has a past surgical history that includes Hysterectomy;  section; [place] tooth extraction; Finger surgery; Bladder surgery; Colonoscopy (N/A, 2017); Colonoscopy (N/A, 3/27/2018); Functional endoscopic sinus surgery (FESS) using computer-assisted navigation (Bilateral, 2019); Esophagogastroduodenoscopy (N/A, 3/12/2020); and Colonoscopy (N/A, 3/12/2020).  -     She  reports that she has never smoked. She has never used smokeless tobacco. She reports that she does not drink  "alcohol or use drugs.  -     Her family history includes COPD (age of onset: 72) in her maternal grandmother; Cancer (age of onset: 70) in her paternal grandmother; Heart attack in her maternal grandmother; Hypertension in her brother and mother; Kidney disease (age of onset: 64) in her father; Scleroderma in her father.  -     She has No Known Allergies.    Objective:     Physical Exam:  Vitals:  Temp 98.7 °F (37.1 °C) (Tympanic)   Ht 5' 7" (1.702 m)   Wt 92.1 kg (203 lb)   BMI 31.79 kg/m²   General appearance:  Well developed, well nourished    Eyes:  Extraocular motions intact, PERRL    Communication:  no hoarseness, no dysphonia    Ears:  Otoscopy of external auditory canals and tympanic membranes was normal, clinical speech reception thresholds grossly intact, no mass/lesion of auricle.  Nose:  No masses/lesions of external nose, nasal mucosa, septum, and turbinates were within normal limits.  Mouth:  No mass/lesion of lips, teeth, gums, hard/soft palate, tongue, tonsils, or oropharynx.    Cardiovascular:  No pedal edema; Radial Pulses +2     Neck & Lymphatics:  No cervical lymphadenopathy, no neck mass/crepitus/ asymmetry, trachea is midline, no thyroid enlargement/tenderness/mass.    Psych: Oriented x3,  Alert with normal mood and affect.     Respiration/Chest:  Symmetric expansion during respiration, normal respiratory effort.    Skin:  Warm and intact. No ulcerations of face, scalp, neck.        Assessment & Plan:   Jaylin was seen today for other.    Diagnoses and all orders for this visit:    Chronic pansinusitis  -     Aerobic culture    Localized swelling, mass and lump, neck  -     US Soft Tissue Head Neck Thyroid; Future        New cultures taken today.   If she has had prior treatment for whatever grows, will have her see rhinology.  She states that she would prefer to stay in  if possible.      Manish Shaffer MD       NASAL ENDOSCOPY     Patient: Jaylin Murguia [id number], :1965  Procedure " date:6/2/2020  Patient's medications, allergies, past medical, surgical, social and family histories were reviewed and updated as appropriate.  Chief Complaint:  Other (Pt states that dr Fletcher suggested she see dr Shaffer due to her lymphnodes swelling.)    HPI:  Jaylin is a 55 y.o. female with chronic sinusitis.    Procedure: Risks, benefits, and alternatives of the procedure were discussed with the patient, and the patient consented to the nasal endoscopy with debridement.  Anterior rhinoscopy was insufficient to explain patients symptoms.      The nasal cavity was sprayed with a topical decongestant and anesthetic . The endoscope was passed into each nostril and each nasal cavity was visualized.  On each side the nasal cavity, sinuses (if open), turbinates, and septum were examined with the findings described below. The turbinates, middle meatus, superior meatus and sphenoethmoidal recess was examined.  Crusting and polypoid material was removed with suction and straight non thru cut forceps.   At the end of the examination, the scope was removed. The patient tolerated the procedure well with no complications.     Endoscopic Sinonasal Exam Findings:  -     Sinuses examined: bilateral maxillary and right ethmoid anterior            The right sinus(es): .  Thick crusts, purulence and moderate edema.             The left sinus(es):  Thick crusts, purulence and moderate edema.   -     Nasal septum: Small mid septal perforation  -     Inferior turbinate: - normal mucosa without significant hypertrophy - bilaterally  -     Middle turbinate: - normal mucosa without significant hypertrophy - bilaterally  -     Other findings: - no mass or obstructive lesion -    Assessment & Plan:  See today's clinic note

## 2020-06-03 ENCOUNTER — TELEPHONE (OUTPATIENT)
Dept: RADIOLOGY | Facility: HOSPITAL | Age: 55
End: 2020-06-03

## 2020-06-04 ENCOUNTER — HOSPITAL ENCOUNTER (OUTPATIENT)
Dept: RADIOLOGY | Facility: HOSPITAL | Age: 55
Discharge: HOME | End: 2020-06-04
Attending: OTOLARYNGOLOGY
Payer: COMMERCIAL

## 2020-06-04 DIAGNOSIS — R22.1 LOCALIZED SWELLING, MASS AND LUMP, NECK: ICD-10-CM

## 2020-06-04 PROCEDURE — 76536 US EXAM OF HEAD AND NECK: CPT | Mod: 26,,, | Performed by: RADIOLOGY

## 2020-06-04 PROCEDURE — 76536 US EXAM OF HEAD AND NECK: CPT | Mod: TC

## 2020-06-04 PROCEDURE — 76536 US SOFT TISSUE HEAD NECK THYROID: ICD-10-PCS | Mod: 26,,, | Performed by: RADIOLOGY

## 2020-06-05 ENCOUNTER — PATIENT MESSAGE (OUTPATIENT)
Dept: OTOLARYNGOLOGY | Facility: CLINIC | Age: 55
End: 2020-06-05

## 2020-06-06 LAB — BACTERIA SPEC AEROBE CULT: ABNORMAL

## 2020-06-10 ENCOUNTER — OFFICE VISIT (OUTPATIENT)
Dept: INFECTIOUS DISEASES | Facility: CLINIC | Age: 55
End: 2020-06-10
Payer: COMMERCIAL

## 2020-06-10 DIAGNOSIS — J32.4 CHRONIC PANSINUSITIS: ICD-10-CM

## 2020-06-10 DIAGNOSIS — K50.00 CROHN'S DISEASE OF SMALL INTESTINE WITHOUT COMPLICATION: ICD-10-CM

## 2020-06-10 DIAGNOSIS — D80.1 HYPOGAMMAGLOBULINEMIA: ICD-10-CM

## 2020-06-10 PROCEDURE — 99204 OFFICE O/P NEW MOD 45 MIN: CPT | Mod: 95,,, | Performed by: INTERNAL MEDICINE

## 2020-06-10 PROCEDURE — 99204 PR OFFICE/OUTPT VISIT, NEW, LEVL IV, 45-59 MIN: ICD-10-PCS | Mod: 95,,, | Performed by: INTERNAL MEDICINE

## 2020-06-10 NOTE — ASSESSMENT & PLAN NOTE
Her most recent  Sinus culture-pseudomonas   Will start IV Cefepime 2 gram every 8 hours for 3 weeks.  Will insert PICC line, will need weekly cbc, BMP  Contact bioscript

## 2020-06-10 NOTE — PROGRESS NOTES
Subjective:    LOCATION -HOME   Patient ID: Jaylin Murguia is a 55 y.o. female.    Chief Complaint: Sinus infection    HPI   54 year old woman with chron's diease referred by Dr Shaffer for chronic sinusitis .  Cultures --  Component 8d ago   Aerobic Bacterial Culture Abnormal    PSEUDOMONAS AERUGINOSA   Many     Resulting Agency [facility]   Susceptibility      Pseudomonas aeruginosa     CULTURE, AEROBIC  (SPECIFY SOURCE)     Amikacin <=16 mcg/mL Sensitive     Cefepime <=2 mcg/mL Sensitive     Ciprofloxacin <=1 mcg/mL Sensitive     Gentamicin <=4 mcg/mL Sensitive     Meropenem <=1 mcg/mL Sensitive     Piperacillin/Tazo <=16 mcg/mL Sensitive     Tobramycin <=4 mcg/mL Sensitive            Linear View        Component 8mo ago   Aerobic Bacterial Culture Abnormal    ASPERGILLUS NIGER   Many     Aerobic Bacterial Culture Abnormal    ACHROMOBACTER XYLOSOXIDANS SUBSP. DENTRIFICANS   Moderate      Previous therapy -bactrim,cipro,She was also on topical vancomycin/doxycycline/budesonide.      Past Medical History:   Diagnosis Date    Allergic rhinitis     Asthma     Chronic pansinusitis     Crohn's disease     Ileal involvement, previously on Remicade, Asacol, Prednisone    Fibromyalgia     Hyperlipidemia     Hypertension     Migraine     Obstructive sleep apnea     CPAP at night    Sciatica      Past Surgical History:   Procedure Laterality Date    BLADDER SURGERY      sling was created by her muscles      SECTION      COLONOSCOPY N/A 2017    Procedure: COLONOSCOPY;  Surgeon: Kin Dyer MD;  Location: [facility];  Service: Endoscopy;  Laterality: N/A;    COLONOSCOPY N/A 3/27/2018    Procedure: COLONOSCOPY;  Surgeon: Kyra Vallecillo MD;  Location: [facility];  Service: Endoscopy;  Laterality: N/A;    COLONOSCOPY N/A 3/12/2020    Procedure: COLONOSCOPY;  Surgeon: Nicole Leal MD;  Location: [facility];  Service: Endoscopy;  Laterality: N/A;    ESOPHAGOGASTRODUODENOSCOPY N/A 3/12/2020     Procedure: ESOPHAGOGASTRODUODENOSCOPY (EGD);  Surgeon: Nicole Leal MD;  [facility];  Service: Endoscopy;  Laterality: N/A;    FINGER SURGERY      joint relpacement, left hand index finger    FUNCTIONAL ENDOSCOPIC SINUS SURGERY (FESS) USING COMPUTER-ASSISTED NAVIGATION Bilateral 7/31/2019    Procedure: FESS, USING COMPUTER-ASSISTED NAVIGATION;  Surgeon: Manish Shaffer MD;  Location: [facility] OR;  Service: ENT;  Laterality: Bilateral;    HYSTERECTOMY      WISDOM TOOTH EXTRACTION       Family History   Problem Relation Age of Onset    Hypertension Mother     Kidney disease Father 64        ESRD on HD    Scleroderma Father     Hypertension Brother     Cancer Paternal Grandmother 70        colon    Heart attack Maternal Grandmother     COPD Maternal Grandmother 72     Social History     Socioeconomic History    Marital status:      Spouse name: Not on file    Number of children: 2    Years of education: Not on file    Highest education level: Not on file   Occupational History    Occupation: teacher   Social Needs    Financial resource strain: Not very hard    Food insecurity:     Worry: Never true     Inability: Never true    Transportation needs:     Medical: No     Non-medical: No   Tobacco Use    Smoking status: Never Smoker    Smokeless tobacco: Never Used   Substance and Sexual Activity    Alcohol use: No     Frequency: Never     Drinks per session: Patient refused     Binge frequency: Never    Drug use: No    Sexual activity: Yes     Partners: Male   Lifestyle    Physical activity:     Days per week: 3 days     Minutes per session: 40 min    Stress: To some extent   Relationships    Social connections:     Talks on phone: More than three times a week     Gets together: Once a week     Attends Hindu service: Not on file     Active member of club or organization: Yes     Attends meetings of clubs or organizations: More than 4 times per year     Relationship status:     Other Topics Concern    Not on file   Social History Narrative    Surrogate Decision Maker: , Cristobal Murguia, [phone]     Review of Systems   Constitutional: Positive for activity change. Negative for appetite change, chills and diaphoresis.   HENT:        GREENISH DISCHARGE -INTERMITTENT   Respiratory: Negative for apnea, cough, choking, chest tightness and shortness of breath.        Objective:      Physical Exam    Assessment:       1. Crohn's disease of small intestine without complication     2. Hypogammaglobulinemia     3. Chronic pansinusitis         Plan:             Problem List Items Addressed This Visit     Crohn's disease of small intestine     WILL CONTINUE GI FOLLOW UP          Chronic pansinusitis       Her most recent  Sinus culture-pseudomonas   Will start IV Cefepime 2 gram every 8 hours for 3 weeks.  Will insert PICC line, will need weekly cbc, BMP  Contact bioscript         Hypogammaglobulinemia     ON Privigen

## 2020-06-15 ENCOUNTER — LAB VISIT (OUTPATIENT)
Dept: LAB | Facility: HOSPITAL | Age: 55
End: 2020-06-15
Attending: INTERNAL MEDICINE
Payer: COMMERCIAL

## 2020-06-15 DIAGNOSIS — D80.1 COMMON VARIABLE AGAMMAGLOBULINEMIA: Primary | ICD-10-CM

## 2020-06-15 LAB
ANION GAP SERPL CALC-SCNC: 9 MMOL/L (ref 8–16)
BASOPHILS # BLD AUTO: 0.07 K/UL (ref 0–0.2)
BASOPHILS NFR BLD: 0.9 % (ref 0–1.9)
BUN SERPL-MCNC: 11 MG/DL (ref 6–20)
CALCIUM SERPL-MCNC: 8.7 MG/DL (ref 8.7–10.5)
CHLORIDE SERPL-SCNC: 102 MMOL/L (ref 95–110)
CO2 SERPL-SCNC: 21 MMOL/L (ref 23–29)
CREAT SERPL-MCNC: 0.8 MG/DL (ref 0.5–1.4)
CRP SERPL-MCNC: 8.9 MG/L (ref 0–8.2)
DIFFERENTIAL METHOD: ABNORMAL
EOSINOPHIL # BLD AUTO: 0.1 K/UL (ref 0–0.5)
EOSINOPHIL NFR BLD: 1.9 % (ref 0–8)
ERYTHROCYTE [DISTWIDTH] IN BLOOD BY AUTOMATED COUNT: 13.5 % (ref 11.5–14.5)
ERYTHROCYTE [SEDIMENTATION RATE] IN BLOOD BY WESTERGREN METHOD: 38 MM/HR (ref 0–36)
EST. GFR  (AFRICAN AMERICAN): >60 ML/MIN/1.73 M^2
EST. GFR  (NON AFRICAN AMERICAN): >60 ML/MIN/1.73 M^2
GLUCOSE SERPL-MCNC: 86 MG/DL (ref 70–110)
HCT VFR BLD AUTO: 36.1 % (ref 37–48.5)
HGB BLD-MCNC: 11.5 G/DL (ref 12–16)
IMM GRANULOCYTES # BLD AUTO: 0.02 K/UL (ref 0–0.04)
IMM GRANULOCYTES NFR BLD AUTO: 0.3 % (ref 0–0.5)
LYMPHOCYTES # BLD AUTO: 1.8 K/UL (ref 1–4.8)
LYMPHOCYTES NFR BLD: 24.2 % (ref 18–48)
MCH RBC QN AUTO: 30.6 PG (ref 27–31)
MCHC RBC AUTO-ENTMCNC: 31.9 G/DL (ref 32–36)
MCV RBC AUTO: 96 FL (ref 82–98)
MONOCYTES # BLD AUTO: 0.7 K/UL (ref 0.3–1)
MONOCYTES NFR BLD: 9.2 % (ref 4–15)
NEUTROPHILS # BLD AUTO: 4.7 K/UL (ref 1.8–7.7)
NEUTROPHILS NFR BLD: 63.5 % (ref 38–73)
NRBC BLD-RTO: 0 /100 WBC
PLATELET # BLD AUTO: 292 K/UL (ref 150–350)
PMV BLD AUTO: 10.5 FL (ref 9.2–12.9)
POTASSIUM SERPL-SCNC: 3.8 MMOL/L (ref 3.5–5.1)
RBC # BLD AUTO: 3.76 M/UL (ref 4–5.4)
SODIUM SERPL-SCNC: 132 MMOL/L (ref 136–145)
WBC # BLD AUTO: 7.43 K/UL (ref 3.9–12.7)

## 2020-06-15 PROCEDURE — 86140 C-REACTIVE PROTEIN: CPT

## 2020-06-15 PROCEDURE — 85025 COMPLETE CBC W/AUTO DIFF WBC: CPT

## 2020-06-15 PROCEDURE — 80048 BASIC METABOLIC PNL TOTAL CA: CPT

## 2020-06-15 PROCEDURE — 85652 RBC SED RATE AUTOMATED: CPT

## 2020-06-19 ENCOUNTER — DOCUMENTATION ONLY (OUTPATIENT)
Dept: GASTROENTEROLOGY | Facility: CLINIC | Age: 55
End: 2020-06-19

## 2020-06-19 NOTE — CLINICAL REVIEW
6/19/2020  CARE COORDINATION REVIEW - IBD    [facility]    LAST OV - 4/7/2020  NEXT OV DUE - NOW     MEDICATIONS:  -PENTASA  *NEEDS ENTYVIO*    IMMUNIZATION HISTORY:  MMR -   VARICELLA -   TDAP - UTD  HEPATITIS A -   HEPATITIS B - TBC  HERPES ZOSTER -   HPV -   INFLUENZA -   MENINGOCOCCAL -   PNEUMOCOCCAL - PCV 13 DUE    BONE HEALTH:  VITAMIN D - TAKING    CANCER PREVENTION:  LAST COLONSCOPY - DUE  DERMATOLOGY VISIT - UTD  EYE EXAM - UTD    LABS / SCANS:  DEXA - DUE  QUANT GOLD - 3/13/2018 - NEGATIVE  TPMT - 3/9/2018 - WNL    NOTES:  EIM - ORAL ULCERS, JOINT PAIN  HX MULTIPLE SINUS INFECTIONS, AWAITING RESPONSE TO START MEDICATION>NEEDS ENTYVIO

## 2020-06-22 ENCOUNTER — TELEPHONE (OUTPATIENT)
Dept: INFECTIOUS DISEASES | Facility: CLINIC | Age: 55
End: 2020-06-22

## 2020-06-22 ENCOUNTER — DOCUMENTATION ONLY (OUTPATIENT)
Dept: INFECTIOUS DISEASES | Facility: HOSPITAL | Age: 55
End: 2020-06-22

## 2020-06-22 ENCOUNTER — LAB VISIT (OUTPATIENT)
Dept: LAB | Facility: HOSPITAL | Age: 55
End: 2020-06-22
Attending: INTERNAL MEDICINE
Payer: COMMERCIAL

## 2020-06-22 DIAGNOSIS — D80.1 COMMON VARIABLE AGAMMAGLOBULINEMIA: Primary | ICD-10-CM

## 2020-06-22 LAB
BASOPHILS # BLD AUTO: 0.04 K/UL (ref 0–0.2)
BASOPHILS NFR BLD: 0.6 % (ref 0–1.9)
DIFFERENTIAL METHOD: ABNORMAL
EOSINOPHIL # BLD AUTO: 0.2 K/UL (ref 0–0.5)
EOSINOPHIL NFR BLD: 2.9 % (ref 0–8)
ERYTHROCYTE [DISTWIDTH] IN BLOOD BY AUTOMATED COUNT: 13.2 % (ref 11.5–14.5)
ERYTHROCYTE [SEDIMENTATION RATE] IN BLOOD BY WESTERGREN METHOD: 24 MM/HR (ref 0–36)
HCT VFR BLD AUTO: 34.1 % (ref 37–48.5)
HGB BLD-MCNC: 11.1 G/DL (ref 12–16)
IMM GRANULOCYTES # BLD AUTO: 0.01 K/UL (ref 0–0.04)
IMM GRANULOCYTES NFR BLD AUTO: 0.1 % (ref 0–0.5)
LYMPHOCYTES # BLD AUTO: 2.3 K/UL (ref 1–4.8)
LYMPHOCYTES NFR BLD: 33.8 % (ref 18–48)
MCH RBC QN AUTO: 30.8 PG (ref 27–31)
MCHC RBC AUTO-ENTMCNC: 32.6 G/DL (ref 32–36)
MCV RBC AUTO: 95 FL (ref 82–98)
MONOCYTES # BLD AUTO: 0.8 K/UL (ref 0.3–1)
MONOCYTES NFR BLD: 11 % (ref 4–15)
NEUTROPHILS # BLD AUTO: 3.5 K/UL (ref 1.8–7.7)
NEUTROPHILS NFR BLD: 51.6 % (ref 38–73)
NRBC BLD-RTO: 0 /100 WBC
PLATELET # BLD AUTO: 278 K/UL (ref 150–350)
PMV BLD AUTO: 10.6 FL (ref 9.2–12.9)
RBC # BLD AUTO: 3.6 M/UL (ref 4–5.4)
WBC # BLD AUTO: 6.8 K/UL (ref 3.9–12.7)

## 2020-06-22 PROCEDURE — 85025 COMPLETE CBC W/AUTO DIFF WBC: CPT

## 2020-06-22 PROCEDURE — 80048 BASIC METABOLIC PNL TOTAL CA: CPT

## 2020-06-22 PROCEDURE — 86140 C-REACTIVE PROTEIN: CPT

## 2020-06-22 PROCEDURE — 85652 RBC SED RATE AUTOMATED: CPT

## 2020-06-22 NOTE — TELEPHONE ENCOUNTER
----- Message from Manish Florez MD sent at 6/20/2020  6:57 PM CDT -----  Let us see her next week and also call her on Monday.  She has a rash with Cefepime

## 2020-06-22 NOTE — PROGRESS NOTES
I was informed that patient had a rash with Cefepime.  She also reports worsening nasal drainage.  Will switch regime to meropenem 2gram every 8 hours to complete the regime

## 2020-06-23 LAB
ANION GAP SERPL CALC-SCNC: 11 MMOL/L (ref 8–16)
BUN SERPL-MCNC: 11 MG/DL (ref 6–20)
CALCIUM SERPL-MCNC: 8.4 MG/DL (ref 8.7–10.5)
CHLORIDE SERPL-SCNC: 96 MMOL/L (ref 95–110)
CO2 SERPL-SCNC: 21 MMOL/L (ref 23–29)
CREAT SERPL-MCNC: 0.8 MG/DL (ref 0.5–1.4)
CRP SERPL-MCNC: 4.6 MG/L (ref 0–8.2)
EST. GFR  (AFRICAN AMERICAN): >60 ML/MIN/1.73 M^2
EST. GFR  (NON AFRICAN AMERICAN): >60 ML/MIN/1.73 M^2
GLUCOSE SERPL-MCNC: 86 MG/DL (ref 70–110)
POTASSIUM SERPL-SCNC: 3.5 MMOL/L (ref 3.5–5.1)
SODIUM SERPL-SCNC: 128 MMOL/L (ref 136–145)

## 2020-06-24 ENCOUNTER — PATIENT OUTREACH (OUTPATIENT)
Dept: ADMINISTRATIVE | Facility: HOSPITAL | Age: 55
End: 2020-06-24

## 2020-06-24 DIAGNOSIS — E55.9 VITAMIN D DEFICIENCY: ICD-10-CM

## 2020-06-24 DIAGNOSIS — Z00.00 WELLNESS EXAMINATION: ICD-10-CM

## 2020-06-24 DIAGNOSIS — E61.1 IRON DEFICIENCY: ICD-10-CM

## 2020-06-24 DIAGNOSIS — I10 ESSENTIAL (PRIMARY) HYPERTENSION: Primary | ICD-10-CM

## 2020-06-24 DIAGNOSIS — M79.7 FIBROMYALGIA: ICD-10-CM

## 2020-06-24 NOTE — PROGRESS NOTES
Talked to patient on phone, scheduled and linked labs including overdue lipid on 6/29/2020 at the [facility].  HM reviewed and updated. Immunizations abstracted.  Care Everywhere abstracted. CC note updated.  Health Maintenance Due   Topic Date Due    HIV Screening  02/17/1980    Shingles Vaccine (1 of 2) 02/17/2015    Lipid Panel  11/14/2018    Pneumococcal Vaccine (Highest Risk) (3 of 3 - PPSV23) 02/17/2020    Mammogram  05/31/2020     Previsit chart audit completed.  *KDL*

## 2020-06-29 ENCOUNTER — LAB VISIT (OUTPATIENT)
Dept: LAB | Facility: HOSPITAL | Age: 55
End: 2020-06-29
Attending: FAMILY MEDICINE
Payer: COMMERCIAL

## 2020-06-29 ENCOUNTER — LAB VISIT (OUTPATIENT)
Dept: LAB | Facility: HOSPITAL | Age: 55
End: 2020-06-29
Attending: INTERNAL MEDICINE
Payer: COMMERCIAL

## 2020-06-29 DIAGNOSIS — I10 ESSENTIAL (PRIMARY) HYPERTENSION: ICD-10-CM

## 2020-06-29 DIAGNOSIS — E61.1 IRON DEFICIENCY: ICD-10-CM

## 2020-06-29 DIAGNOSIS — D80.1 COMMON VARIABLE AGAMMAGLOBULINEMIA: Primary | ICD-10-CM

## 2020-06-29 DIAGNOSIS — M79.7 FIBROMYALGIA: ICD-10-CM

## 2020-06-29 DIAGNOSIS — Z00.00 WELLNESS EXAMINATION: ICD-10-CM

## 2020-06-29 DIAGNOSIS — E55.9 VITAMIN D DEFICIENCY: ICD-10-CM

## 2020-06-29 LAB
BASOPHILS # BLD AUTO: 0.09 K/UL (ref 0–0.2)
BASOPHILS NFR BLD: 1.4 % (ref 0–1.9)
CHOLEST SERPL-MCNC: 254 MG/DL (ref 120–199)
CHOLEST/HDLC SERPL: 3.8 {RATIO} (ref 2–5)
DIFFERENTIAL METHOD: ABNORMAL
EOSINOPHIL # BLD AUTO: 0.3 K/UL (ref 0–0.5)
EOSINOPHIL NFR BLD: 4 % (ref 0–8)
ERYTHROCYTE [DISTWIDTH] IN BLOOD BY AUTOMATED COUNT: 13.2 % (ref 11.5–14.5)
ERYTHROCYTE [SEDIMENTATION RATE] IN BLOOD BY WESTERGREN METHOD: 32 MM/HR (ref 0–36)
HCT VFR BLD AUTO: 38.5 % (ref 37–48.5)
HDLC SERPL-MCNC: 67 MG/DL (ref 40–75)
HDLC SERPL: 26.4 % (ref 20–50)
HGB BLD-MCNC: 12.6 G/DL (ref 12–16)
IMM GRANULOCYTES # BLD AUTO: 0.01 K/UL (ref 0–0.04)
IMM GRANULOCYTES NFR BLD AUTO: 0.2 % (ref 0–0.5)
IRON SERPL-MCNC: 42 UG/DL (ref 30–160)
LDLC SERPL CALC-MCNC: 153.2 MG/DL (ref 63–159)
LYMPHOCYTES # BLD AUTO: 1.8 K/UL (ref 1–4.8)
LYMPHOCYTES NFR BLD: 29.3 % (ref 18–48)
MCH RBC QN AUTO: 31.4 PG (ref 27–31)
MCHC RBC AUTO-ENTMCNC: 32.7 G/DL (ref 32–36)
MCV RBC AUTO: 96 FL (ref 82–98)
MONOCYTES # BLD AUTO: 1 K/UL (ref 0.3–1)
MONOCYTES NFR BLD: 15.8 % (ref 4–15)
NEUTROPHILS # BLD AUTO: 3.1 K/UL (ref 1.8–7.7)
NEUTROPHILS NFR BLD: 49.3 % (ref 38–73)
NONHDLC SERPL-MCNC: 187 MG/DL
NRBC BLD-RTO: 0 /100 WBC
PLATELET # BLD AUTO: 338 K/UL (ref 150–350)
PMV BLD AUTO: 10.8 FL (ref 9.2–12.9)
RBC # BLD AUTO: 4.01 M/UL (ref 4–5.4)
SATURATED IRON: 10 % (ref 20–50)
T4 FREE SERPL-MCNC: 0.83 NG/DL (ref 0.71–1.51)
TOTAL IRON BINDING CAPACITY: 406 UG/DL (ref 250–450)
TRANSFERRIN SERPL-MCNC: 274 MG/DL (ref 200–375)
TRIGL SERPL-MCNC: 169 MG/DL (ref 30–150)
TSH SERPL DL<=0.005 MIU/L-ACNC: 1.49 UIU/ML (ref 0.4–4)
WBC # BLD AUTO: 6.22 K/UL (ref 3.9–12.7)

## 2020-06-29 PROCEDURE — 36415 COLL VENOUS BLD VENIPUNCTURE: CPT

## 2020-06-29 PROCEDURE — 85652 RBC SED RATE AUTOMATED: CPT

## 2020-06-29 PROCEDURE — 80048 BASIC METABOLIC PNL TOTAL CA: CPT

## 2020-06-29 PROCEDURE — 83540 ASSAY OF IRON: CPT

## 2020-06-29 PROCEDURE — 85025 COMPLETE CBC W/AUTO DIFF WBC: CPT

## 2020-06-29 PROCEDURE — 86140 C-REACTIVE PROTEIN: CPT

## 2020-06-29 PROCEDURE — 84439 ASSAY OF FREE THYROXINE: CPT

## 2020-06-29 PROCEDURE — 84443 ASSAY THYROID STIM HORMONE: CPT

## 2020-06-29 PROCEDURE — 80061 LIPID PANEL: CPT

## 2020-06-30 ENCOUNTER — TELEPHONE (OUTPATIENT)
Dept: INFECTIOUS DISEASES | Facility: CLINIC | Age: 55
End: 2020-06-30

## 2020-06-30 LAB
ANION GAP SERPL CALC-SCNC: 9 MMOL/L (ref 8–16)
BUN SERPL-MCNC: 11 MG/DL (ref 6–20)
CALCIUM SERPL-MCNC: 9.2 MG/DL (ref 8.7–10.5)
CHLORIDE SERPL-SCNC: 108 MMOL/L (ref 95–110)
CO2 SERPL-SCNC: 21 MMOL/L (ref 23–29)
CREAT SERPL-MCNC: 0.9 MG/DL (ref 0.5–1.4)
CRP SERPL-MCNC: 4.1 MG/L (ref 0–8.2)
EST. GFR  (AFRICAN AMERICAN): >60 ML/MIN/1.73 M^2
EST. GFR  (NON AFRICAN AMERICAN): >60 ML/MIN/1.73 M^2
GLUCOSE SERPL-MCNC: 84 MG/DL (ref 70–110)
POTASSIUM SERPL-SCNC: 4.1 MMOL/L (ref 3.5–5.1)
SODIUM SERPL-SCNC: 138 MMOL/L (ref 136–145)

## 2020-06-30 NOTE — TELEPHONE ENCOUNTER
S/w pt.  She has an appt next week.  She finishes her meds tomorrow.  She has picc line and it has difficult for them to insert it.  Should she just keep the line until she sees you or have it pulled.  If there is a chance she needs more meds, she wants to keep it in.

## 2020-07-01 ENCOUNTER — TELEPHONE (OUTPATIENT)
Dept: INFECTIOUS DISEASES | Facility: CLINIC | Age: 55
End: 2020-07-01

## 2020-07-01 NOTE — TELEPHONE ENCOUNTER
----- Message from Manish Florez MD sent at 7/1/2020  8:18 AM CDT -----  Let us extend her therapy for one week

## 2020-07-07 ENCOUNTER — PATIENT OUTREACH (OUTPATIENT)
Dept: ADMINISTRATIVE | Facility: OTHER | Age: 55
End: 2020-07-07

## 2020-07-07 NOTE — PROGRESS NOTES
Requested updates within Care Everywhere.  Patient's chart was reviewed for overdue MARLYN topics.  Immunizations reconciled.

## 2020-07-08 ENCOUNTER — OFFICE VISIT (OUTPATIENT)
Dept: INFECTIOUS DISEASES | Facility: CLINIC | Age: 55
End: 2020-07-08
Payer: COMMERCIAL

## 2020-07-08 ENCOUNTER — OFFICE VISIT (OUTPATIENT)
Dept: PRIMARY CARE CLINIC | Facility: CLINIC | Age: 55
End: 2020-07-08
Payer: COMMERCIAL

## 2020-07-08 VITALS — HEART RATE: 62 BPM | DIASTOLIC BLOOD PRESSURE: 89 MMHG | SYSTOLIC BLOOD PRESSURE: 157 MMHG | TEMPERATURE: 98 F

## 2020-07-08 DIAGNOSIS — Z79.60 LONG-TERM USE OF IMMUNOSUPPRESSANT MEDICATION: ICD-10-CM

## 2020-07-08 DIAGNOSIS — M25.50 ARTHRALGIA, UNSPECIFIED JOINT: ICD-10-CM

## 2020-07-08 DIAGNOSIS — I77.1 TORTUOUS AORTA: ICD-10-CM

## 2020-07-08 DIAGNOSIS — R19.7 DIARRHEA, UNSPECIFIED TYPE: Primary | ICD-10-CM

## 2020-07-08 DIAGNOSIS — D80.1 HYPOGAMMAGLOBULINEMIA: ICD-10-CM

## 2020-07-08 DIAGNOSIS — K50.90 CROHN'S DISEASE WITHOUT COMPLICATION, UNSPECIFIED GASTROINTESTINAL TRACT LOCATION: ICD-10-CM

## 2020-07-08 DIAGNOSIS — I10 ESSENTIAL (PRIMARY) HYPERTENSION: ICD-10-CM

## 2020-07-08 DIAGNOSIS — R78.81 PSEUDOMONAL BACTEREMIA: Primary | ICD-10-CM

## 2020-07-08 DIAGNOSIS — M79.7 FIBROMYALGIA: ICD-10-CM

## 2020-07-08 DIAGNOSIS — J32.4 CHRONIC PANSINUSITIS: ICD-10-CM

## 2020-07-08 DIAGNOSIS — Q25.46 TORTUOUS AORTIC ARCH: ICD-10-CM

## 2020-07-08 DIAGNOSIS — B96.5 PSEUDOMONAL BACTEREMIA: Primary | ICD-10-CM

## 2020-07-08 DIAGNOSIS — E78.5 HYPERLIPIDEMIA, UNSPECIFIED HYPERLIPIDEMIA TYPE: ICD-10-CM

## 2020-07-08 DIAGNOSIS — G43.809 OTHER MIGRAINE WITHOUT STATUS MIGRAINOSUS, NOT INTRACTABLE: ICD-10-CM

## 2020-07-08 PROCEDURE — 99999 PR PBB SHADOW E&M-EST. PATIENT-LVL IV: ICD-10-PCS | Mod: PBBFAC,,, | Performed by: INTERNAL MEDICINE

## 2020-07-08 PROCEDURE — 99214 PR OFFICE/OUTPT VISIT, EST, LEVL IV, 30-39 MIN: ICD-10-PCS | Mod: S$GLB,,, | Performed by: INTERNAL MEDICINE

## 2020-07-08 PROCEDURE — 99214 OFFICE O/P EST MOD 30 MIN: CPT | Mod: S$GLB,,, | Performed by: INTERNAL MEDICINE

## 2020-07-08 PROCEDURE — 3077F SYST BP >= 140 MM HG: CPT | Mod: CPTII,S$GLB,, | Performed by: INTERNAL MEDICINE

## 2020-07-08 PROCEDURE — 3079F DIAST BP 80-89 MM HG: CPT | Mod: CPTII,S$GLB,, | Performed by: INTERNAL MEDICINE

## 2020-07-08 PROCEDURE — 99214 OFFICE O/P EST MOD 30 MIN: CPT | Mod: 95,,, | Performed by: FAMILY MEDICINE

## 2020-07-08 PROCEDURE — 99999 PR PBB SHADOW E&M-EST. PATIENT-LVL IV: CPT | Mod: PBBFAC,,, | Performed by: INTERNAL MEDICINE

## 2020-07-08 PROCEDURE — 99214 PR OFFICE/OUTPT VISIT, EST, LEVL IV, 30-39 MIN: ICD-10-PCS | Mod: 95,,, | Performed by: FAMILY MEDICINE

## 2020-07-08 PROCEDURE — 3079F PR MOST RECENT DIASTOLIC BLOOD PRESSURE 80-89 MM HG: ICD-10-PCS | Mod: CPTII,S$GLB,, | Performed by: INTERNAL MEDICINE

## 2020-07-08 PROCEDURE — 3077F PR MOST RECENT SYSTOLIC BLOOD PRESSURE >= 140 MM HG: ICD-10-PCS | Mod: CPTII,S$GLB,, | Performed by: INTERNAL MEDICINE

## 2020-07-08 RX ORDER — ROSUVASTATIN CALCIUM 5 MG/1
5 TABLET, COATED ORAL DAILY
Qty: 90 TABLET | Refills: 3 | Status: SHIPPED | OUTPATIENT
Start: 2020-07-08 | End: 2021-09-08

## 2020-07-08 RX ORDER — LOSARTAN POTASSIUM AND HYDROCHLOROTHIAZIDE 25; 100 MG/1; MG/1
0.5 TABLET ORAL DAILY
Qty: 45 TABLET | Refills: 3
Start: 2020-07-08 | End: 2020-10-14

## 2020-07-08 NOTE — PROGRESS NOTES
Subjective:      Patient ID: Jaylin Murguia is a 55 y.o. female.    Chief Complaint: Follow-up (chronic issues)    Disclaimer:  This note is prepared using voice recognition software and as such is likely to have errors and has not been proof read. Please contact me for questions.     The patient location is:home  The chief complaint leading to consultation is: followup / my chart request  Visit type: Virtual visit with synchronous audio and video  Total time spent with patient:25 min   Each patient to whom he or she provides medical services by telemedicine is:  (1) informed of the relationship between the physician and patient and the respective role of any other health care provider with respect to management of the patient; and (2) notified that he or she may decline to receive medical services by telemedicine and may withdraw from such care at any time.    Notes:       Had picc line in arm under Dr. Lou for 5.5 weeks.  Main problem was pseudomonas and definitely that can cause her BP to drop.  Systemically and noted in her blood and bacteremia and sinus.  Lives in old house.  Clean and redone.  Has well water and had her water checked. + for coliform and psuedomonas could be found in the water. Would use the ice and weakened immune system. They have a swimming pool.  Going to see Dr. Lou today to decide on length of  Treatment.      Still has a lump and sweeling on the left and right side of the supraclavicular region. Checked for blood clot and u/s to see if lymph node but neg. Hasn't gone away with IV.  On mirapenam.  Took 5 tries to get picc line in.  No homehealth currently. Neville her  is doing the treatments.      Blood pressure is taking 1/2 meds now.  On losartan hydrochlorothiazide and propanolol    Wants to see about seeing rheumatology because she is having lots of inflammation and now with the development of the autoimmune condition would like to see if she has any autoimmune arthritis  issues related versus just her fibromyalgia.  Inflammatory markers have improved with the use of IV antibiotics in the treatment of the Pseudomonas bacteremia.    Headaches are also improving as well.    Was previously on a statin therapy for tortuous aorta and elevated cholesterol but stop simvastatin 2019 due to a cardiologist stopping it.  Willing to try again.  Willing to start Crestor 5 mg.          Lab Results   Component Value Date    WBC 6.22 06/29/2020    HGB 12.6 06/29/2020    HCT 38.5 06/29/2020     06/29/2020    CHOL 254 (H) 06/29/2020    TRIG 169 (H) 06/29/2020    HDL 67 06/29/2020    ALT 34 12/14/2018    AST 32 12/14/2018     06/29/2020    K 4.1 06/29/2020     06/29/2020    CREATININE 0.9 06/29/2020    BUN 11 06/29/2020    CO2 21 (L) 06/29/2020    TSH 1.487 06/29/2020    INR 1.0 11/20/2014    HGBA1C 5.2 11/14/2017       US Soft Tissue Head Neck Thyroid  Narrative: EXAMINATION:  US SOFT TISSUE HEAD NECK THYROID    CLINICAL HISTORY:  Localized swelling, mass and lump, neck    TECHNIQUE:  Ultrasound of the bilateral supraclavicular regions were performed with grayscale and color Doppler utilized.    COMPARISON:  None.    FINDINGS:  Multiple scans through the bilateral supraclavicular region demonstrate no masses/lymph nodes, fluid collections, or other abnormality.  Further imaging or follow-up can be performed as clinically warranted  Impression: Unremarkable study.    Electronically signed by: Sawyer Funes MD  Date:    06/04/2020  Time:    12:14        Review of Systems   Constitutional: Negative for activity change and unexpected weight change.   HENT: Negative for hearing loss, rhinorrhea and trouble swallowing.    Eyes: Negative for discharge and visual disturbance.   Respiratory: Negative for chest tightness and wheezing.    Cardiovascular: Negative for chest pain and palpitations.   Gastrointestinal: Positive for constipation and diarrhea. Negative for blood in stool and  vomiting.   Endocrine: Negative for polydipsia and polyuria.   Genitourinary: Negative for difficulty urinating, dysuria, hematuria and menstrual problem.   Musculoskeletal: Positive for arthralgias and joint swelling. Negative for neck pain.   Neurological: Positive for headaches. Negative for weakness.   Psychiatric/Behavioral: Positive for dysphoric mood. Negative for confusion.     Objective:   There were no vitals filed for this visit.  Physical Exam  Vitals signs reviewed.   Constitutional:       General: She is not in acute distress.     Appearance: Normal appearance. She is well-developed. She is obese. She is not ill-appearing.   HENT:      Head: Normocephalic and atraumatic.      Right Ear: External ear normal.      Left Ear: External ear normal.   Neurological:      Mental Status: She is alert.   Psychiatric:         Attention and Perception: She is attentive.         Mood and Affect: Mood normal. Mood is not anxious or depressed. Affect is not labile, blunt, angry or inappropriate.         Speech: She is communicative. Speech is not rapid and pressured, delayed, slurred or tangential.         Behavior: Behavior normal. Behavior is not agitated, slowed, aggressive, withdrawn, hyperactive or combative. Behavior is cooperative.         Thought Content: Thought content normal. Thought content is not paranoid or delusional. Thought content does not include homicidal or suicidal ideation. Thought content does not include homicidal or suicidal plan.         Cognition and Memory: Memory is not impaired. She does not exhibit impaired recent memory or impaired remote memory.         Judgment: Judgment normal. Judgment is not impulsive or inappropriate.       Assessment:     1. Pseudomonal bacteremia    2. Hyperlipidemia, unspecified hyperlipidemia type    3. Tortuous aortic arch    4. Long-term use of immunosuppressant medication    5. Tortuous aorta    6. Hypogammaglobulinemia    7. Arthralgia, unspecified joint     8. Fibromyalgia    9. Other migraine without status migrainosus, not intractable    10. Essential (primary) hypertension      Plan:   Jaylin was seen today for follow-up.    Diagnoses and all orders for this visit:    Pseudomonal bacteremia  Comments:  on mirapem currently with IV PICC line x 5.5weeks. seeing Dr underwood today. occasionally with fevers. has helped the sinuses with the IV abx.     Hyperlipidemia, unspecified hyperlipidemia type  Comments:  Had been previously on simvastatin in 2019 but stop due to interactions restart Crestor 5 lab work again in 6 weeks  Orders:  -     rosuvastatin (CRESTOR) 5 MG tablet; Take 1 tablet (5 mg total) by mouth once daily.  -     Comprehensive metabolic panel; Future  -     Lipid Panel; Future    Tortuous aortic arch  Comments:  Noted previously on simvastatin before but stopped start Crestor 5  Orders:  -     rosuvastatin (CRESTOR) 5 MG tablet; Take 1 tablet (5 mg total) by mouth once daily.    Long-term use of immunosuppressant medication  Comments:  Currently on IVIG  Orders:  -     Ambulatory referral/consult to Rheumatology; Future    Tortuous aorta  Comments:  On statin prior restart Crestor    Hypogammaglobulinemia  Comments:  Followed by allergy immunology would like to be evaluated through rheumatology as well  Orders:  -     Ambulatory referral/consult to Rheumatology; Future  -     Comprehensive metabolic panel; Future  -     Lipid Panel; Future    Arthralgia, unspecified joint  Comments:  Would like to be evaluated through rheumatology due to concerns for possible autoimmune condition related to arthritis versus her fibromyalgia  Orders:  -     Ambulatory referral/consult to Rheumatology; Future    Fibromyalgia  Comments:  Still present.  Would like to see Rheumatology next    Other migraine without status migrainosus, not intractable  Comments:  Stable this time with the use of propanolol    Essential (primary) hypertension  Comments:  Improved with half tablet  of losartan HCTZ also doing propanolol for migraine prevention improved with IV antibiotics for Pseudomonas bacteremia    Other orders  -     losartan-hydrochlorothiazide 100-25 mg (HYZAAR) 100-25 mg per tablet; Take 0.5 tablets by mouth once daily.            Follow up in about 7 weeks (around 8/26/2020) for f/u telemed Dr Hu/ chol, rheum appt, ID appt .    There are no Patient Instructions on file for this visit.

## 2020-07-08 NOTE — PROGRESS NOTES
Subjective:      Patient ID: Jaylin Murguia is a 55 y.o. female.    Chief Complaint:Follow-up      History of Present Illness  54 year old woman with chronic sinusitis who was started on Cefepime for pseudomonal sinus infection . She developed a rash after some days and her therapy was switched to IV meropenem. She has completed total of 5 weeks of therapy (meropenem /cefepime) She reports less drainage  but coughs up light greenish sputum.  She is concerned about swelling over left supraclavicular region but neck ultrasound was negative for any lesion.    Review of Systems   Constitution: Negative for fever, malaise/fatigue, night sweats and weight loss.   HENT: Negative for congestion, ear discharge, ear pain and hearing loss.    Eyes: Negative for blurred vision, pain and redness.   Cardiovascular: Negative for chest pain, dyspnea on exertion and leg swelling.   Respiratory: Positive for cough. Negative for hemoptysis, shortness of breath and sputum production.    Endocrine: Negative for cold intolerance, heat intolerance and polydipsia.   Hematologic/Lymphatic: Negative for bleeding problem.   Musculoskeletal: Negative for back pain, falls and joint swelling.   Gastrointestinal: Positive for diarrhea. Negative for bloating, abdominal pain, dysphagia, nausea and vomiting.   Genitourinary: Negative for dysuria and flank pain.   Neurological: Negative for dizziness, headaches and light-headedness.   Psychiatric/Behavioral: Negative for altered mental status, depression and hallucinations.     Objective:   Physical Exam  Vitals signs and nursing note reviewed.   Constitutional:       Appearance: She is well-developed.   HENT:      Head: Normocephalic and atraumatic.   Eyes:      Pupils: Pupils are equal, round, and reactive to light.   Neck:      Musculoskeletal: Normal range of motion and neck supple.      Thyroid: No thyromegaly.      Trachea: No tracheal deviation.   Cardiovascular:      Rate and Rhythm:  Normal rate and regular rhythm.   Pulmonary:      Effort: No respiratory distress.      Breath sounds: No wheezing or rales.   Abdominal:      General: Bowel sounds are normal. There is no distension.      Palpations: Abdomen is soft.      Tenderness: There is no abdominal tenderness.   Skin:     General: Skin is warm and dry.      Coloration: Skin is not pale.   Neurological:      Mental Status: She is alert and oriented to person, place, and time.      Cranial Nerves: No cranial nerve deficit.      Coordination: Coordination normal.      Deep Tendon Reflexes: Reflexes are normal and symmetric.   Psychiatric:         Thought Content: Thought content normal.         Judgment: Judgment normal.       Assessment:         1. Diarrhea, unspecified type  Clostridium difficile EIA   2. Hypogammaglobulinemia  X-Ray Chest PA And Lateral   3. Chronic pansinusitis  X-Ray Chest PA And Lateral   4. Essential (primary) hypertension     5. Crohn's disease without complication, unspecified gastrointestinal tract location           Plan:       Problem List Items Addressed This Visit     Diarrhea - Primary     Will rule out C difficle ,          Relevant Orders    Clostridium difficile EIA    Essential (primary) hypertension     [facility]         Crohn's disease     Follow GI         Chronic pansinusitis     She has completed therapy , will pull PICC line   She has completed 5 weeks of IV Cefepime/meropenem          Relevant Orders    X-Ray Chest PA And Lateral    Hypogammaglobulinemia     Will continue IG as needed. Now off IVIG therapy          Relevant Orders    X-Ray Chest PA And Lateral        Cough - will do chest x-ray

## 2020-07-08 NOTE — ASSESSMENT & PLAN NOTE
She has completed therapy , will pull PICC line   She has completed 5 weeks of IV Cefepime/meropenem

## 2020-07-09 ENCOUNTER — HOSPITAL ENCOUNTER (OUTPATIENT)
Dept: RADIOLOGY | Facility: HOSPITAL | Age: 55
Discharge: HOME | End: 2020-07-09
Attending: INTERNAL MEDICINE
Payer: COMMERCIAL

## 2020-07-09 DIAGNOSIS — J32.4 CHRONIC PANSINUSITIS: ICD-10-CM

## 2020-07-09 DIAGNOSIS — D80.1 HYPOGAMMAGLOBULINEMIA: ICD-10-CM

## 2020-07-09 PROCEDURE — 71046 X-RAY EXAM CHEST 2 VIEWS: CPT | Mod: 26,,, | Performed by: RADIOLOGY

## 2020-07-09 PROCEDURE — 71046 X-RAY EXAM CHEST 2 VIEWS: CPT | Mod: TC

## 2020-07-09 PROCEDURE — 71046 XR CHEST PA AND LATERAL: ICD-10-PCS | Mod: 26,,, | Performed by: RADIOLOGY

## 2020-07-10 ENCOUNTER — TELEPHONE (OUTPATIENT)
Dept: ALLERGY | Facility: CLINIC | Age: 55
End: 2020-07-10

## 2020-07-10 NOTE — TELEPHONE ENCOUNTER
, patient told [facility] that she is no longer is taking Hizentra. They need a D/C order for this.

## 2020-07-13 NOTE — TELEPHONE ENCOUNTER
Hi,  I discontinued her privigen, but hizentra is not on her chart. Please have them fax whatever they need me to sign.  Thanks,

## 2020-07-13 NOTE — TELEPHONE ENCOUNTER
Hi,  I discontinued he Privigen, but Hizentra is not on her chart. Please have them fax me, whatever they want me to sign.  Thanks

## 2020-07-14 ENCOUNTER — PATIENT MESSAGE (OUTPATIENT)
Dept: PRIMARY CARE CLINIC | Facility: CLINIC | Age: 55
End: 2020-07-14

## 2020-07-14 RX ORDER — ZOLPIDEM TARTRATE 5 MG/1
TABLET ORAL
Qty: 90 TABLET | Refills: 1 | Status: SHIPPED | OUTPATIENT
Start: 2020-07-14 | End: 2022-09-16 | Stop reason: CLARIF

## 2020-07-20 ENCOUNTER — PATIENT MESSAGE (OUTPATIENT)
Dept: PRIMARY CARE CLINIC | Facility: CLINIC | Age: 55
End: 2020-07-20

## 2020-07-20 RX ORDER — NYSTATIN 100000 [USP'U]/ML
6 SUSPENSION ORAL 4 TIMES DAILY
Qty: 240 ML | Refills: 0 | Status: SHIPPED | OUTPATIENT
Start: 2020-07-20 | End: 2020-07-29

## 2020-07-22 DIAGNOSIS — J45.40 MODERATE PERSISTENT ASTHMA WITHOUT COMPLICATION: Primary | ICD-10-CM

## 2020-07-27 ENCOUNTER — TELEPHONE (OUTPATIENT)
Dept: PULMONOLOGY | Facility: CLINIC | Age: 55
End: 2020-07-27

## 2020-07-27 NOTE — TELEPHONE ENCOUNTER
Called pt to schedule pre-procedural Covid-19 testing, 72 hours prior to Spirometry. Pt would like to speak with provider regarding her appts. Pt will schedule with nurse, if she decides to keep appts.

## 2020-07-28 ENCOUNTER — PATIENT OUTREACH (OUTPATIENT)
Dept: ADMINISTRATIVE | Facility: OTHER | Age: 55
End: 2020-07-28

## 2020-07-29 ENCOUNTER — OFFICE VISIT (OUTPATIENT)
Dept: GASTROENTEROLOGY | Facility: CLINIC | Age: 55
End: 2020-07-29
Payer: COMMERCIAL

## 2020-07-29 VITALS
BODY MASS INDEX: 31.7 KG/M2 | DIASTOLIC BLOOD PRESSURE: 62 MMHG | WEIGHT: 201.94 LBS | SYSTOLIC BLOOD PRESSURE: 100 MMHG | HEIGHT: 67 IN | HEART RATE: 63 BPM

## 2020-07-29 DIAGNOSIS — R10.12 LEFT UPPER QUADRANT PAIN: ICD-10-CM

## 2020-07-29 DIAGNOSIS — K59.00 CONSTIPATION, UNSPECIFIED CONSTIPATION TYPE: ICD-10-CM

## 2020-07-29 DIAGNOSIS — R10.12 LEFT UPPER QUADRANT PAIN: Primary | ICD-10-CM

## 2020-07-29 DIAGNOSIS — K50.00 CROHN'S DISEASE OF SMALL INTESTINE WITHOUT COMPLICATION: Primary | ICD-10-CM

## 2020-07-29 PROCEDURE — 3008F BODY MASS INDEX DOCD: CPT | Mod: CPTII,S$GLB,, | Performed by: PHYSICIAN ASSISTANT

## 2020-07-29 PROCEDURE — 3074F SYST BP LT 130 MM HG: CPT | Mod: CPTII,S$GLB,, | Performed by: PHYSICIAN ASSISTANT

## 2020-07-29 PROCEDURE — 99214 PR OFFICE/OUTPT VISIT, EST, LEVL IV, 30-39 MIN: ICD-10-PCS | Mod: S$GLB,,, | Performed by: PHYSICIAN ASSISTANT

## 2020-07-29 PROCEDURE — 3008F PR BODY MASS INDEX (BMI) DOCUMENTED: ICD-10-PCS | Mod: CPTII,S$GLB,, | Performed by: PHYSICIAN ASSISTANT

## 2020-07-29 PROCEDURE — 99214 OFFICE O/P EST MOD 30 MIN: CPT | Mod: S$GLB,,, | Performed by: PHYSICIAN ASSISTANT

## 2020-07-29 PROCEDURE — 99999 PR PBB SHADOW E&M-EST. PATIENT-LVL V: ICD-10-PCS | Mod: PBBFAC,,, | Performed by: PHYSICIAN ASSISTANT

## 2020-07-29 PROCEDURE — 3074F PR MOST RECENT SYSTOLIC BLOOD PRESSURE < 130 MM HG: ICD-10-PCS | Mod: CPTII,S$GLB,, | Performed by: PHYSICIAN ASSISTANT

## 2020-07-29 PROCEDURE — 99999 PR PBB SHADOW E&M-EST. PATIENT-LVL V: CPT | Mod: PBBFAC,,, | Performed by: PHYSICIAN ASSISTANT

## 2020-07-29 PROCEDURE — 3078F PR MOST RECENT DIASTOLIC BLOOD PRESSURE < 80 MM HG: ICD-10-PCS | Mod: CPTII,S$GLB,, | Performed by: PHYSICIAN ASSISTANT

## 2020-07-29 PROCEDURE — 3078F DIAST BP <80 MM HG: CPT | Mod: CPTII,S$GLB,, | Performed by: PHYSICIAN ASSISTANT

## 2020-07-29 NOTE — PROGRESS NOTES
Subjective:      Patient ID: Jaylin Murguia is a 55 y.o. female.    Chief Complaint: Abdominal Pain (upper left quadrant)    HPI:  Patient reports today for follow up of the above. She has history of Crohn's of the small bowel and takes Pentasa. She typically sees Dr. Leal. At their last visit, they discussed starting Entyvio. This was postponed due to patient having pseudomonas infection requiring 8 weeks of IV antibiotics. She just completed this regimen 2-3 weeks ago. She is doing well and has follow up with Dr. Florez next week. She reports today due to continued LUQ abdominal pain. She feels as though this is different than her typical Crohn's pain. It is constant and can be severe. When it is severe, associated symptoms include severe nausea. She is used to having diarrhea, but feels as though she is very constipated. She will have pellet stools for about a week, and then have an urgent episode of large volume, watery stool. The LUQ pain is worse during this time. Worse with straining and turning to the right. She has been eating prunes and metamucil but no improvement. She is trying to eat smaller, healthy meals. Typically no blood in the stool but will see a small volume BRB occasionally. TI area will cramp, but nothing like the LUQ pain. Last colonoscopy 3/12/20:            - Simple Endoscopic Score for Crohn's Disease: 4,                         mucosal inflammatory changes secondary to Crohn's                         disease with ileitis. Biopsied.                         - The entire examined colon is normal.     Review of Systems   Constitutional: Negative for appetite change, chills, diaphoresis, fatigue, fever and unexpected weight change.   HENT: Negative for trouble swallowing.    Respiratory: Negative for cough and shortness of breath.    Cardiovascular: Negative for chest pain.   Gastrointestinal: Positive for abdominal pain, blood in stool (mild, occasional), constipation, diarrhea, nausea  and vomiting. Negative for abdominal distention.   Genitourinary: Negative for dysuria.   Neurological: Negative for dizziness, weakness and light-headedness.   Psychiatric/Behavioral: Negative for dysphoric mood. The patient is not nervous/anxious.        Medical History: Reviewed    Social History: Reviewed    Allergies: Reviewed    Objective:     Physical Exam  Constitutional:       General: She is not in acute distress.     Appearance: Normal appearance. She is not ill-appearing, toxic-appearing or diaphoretic.   HENT:      Head: Normocephalic and atraumatic.   Eyes:      General:         Right eye: No discharge.         Left eye: No discharge.      Extraocular Movements: Extraocular movements intact.   Neck:      Musculoskeletal: Normal range of motion.   Cardiovascular:      Rate and Rhythm: Normal rate and regular rhythm.      Heart sounds: Normal heart sounds.   Pulmonary:      Effort: Pulmonary effort is normal. No respiratory distress.      Breath sounds: Normal breath sounds.   Abdominal:      General: Bowel sounds are normal. There is no distension.      Palpations: Abdomen is soft. There is no mass.      Tenderness: There is abdominal tenderness (LUQ, RUQ). There is no guarding or rebound.   Musculoskeletal: Normal range of motion.   Skin:     General: Skin is warm and dry.      Coloration: Skin is not pale.      Findings: No erythema.   Neurological:      General: No focal deficit present.      Mental Status: She is alert and oriented to person, place, and time.   Psychiatric:         Mood and Affect: Mood normal.         Behavior: Behavior normal.         Thought Content: Thought content normal.         Judgment: Judgment normal.         Assessment:     1. Crohn's disease of small intestine without complication    2. Left upper quadrant pain    3. Constipation, unspecified constipation type        Plan:     -Will follow up with Dr. Leal for next steps. Patient is aware.    Jaylin was seen today for  abdominal pain.    Diagnoses and all orders for this visit:    Crohn's disease of small intestine without complication    Left upper quadrant pain    Constipation, unspecified constipation type        No follow-ups on file.    Thank you for the opportunity to participate in the care of this patient.   Page Rogers PA-C.

## 2020-07-30 ENCOUNTER — HOSPITAL ENCOUNTER (OUTPATIENT)
Dept: RADIOLOGY | Facility: HOSPITAL | Age: 55
Discharge: HOME | End: 2020-07-30
Attending: PHYSICIAN ASSISTANT
Payer: COMMERCIAL

## 2020-07-30 ENCOUNTER — DOCUMENTATION ONLY (OUTPATIENT)
Dept: GASTROENTEROLOGY | Facility: CLINIC | Age: 55
End: 2020-07-30

## 2020-07-30 DIAGNOSIS — R10.31 RLQ ABDOMINAL PAIN: ICD-10-CM

## 2020-07-30 DIAGNOSIS — R10.12 LEFT UPPER QUADRANT PAIN: ICD-10-CM

## 2020-07-30 DIAGNOSIS — K59.00 CONSTIPATION, UNSPECIFIED CONSTIPATION TYPE: Primary | ICD-10-CM

## 2020-07-30 DIAGNOSIS — K59.00 CONSTIPATION, UNSPECIFIED CONSTIPATION TYPE: ICD-10-CM

## 2020-07-30 PROCEDURE — 74019 RADEX ABDOMEN 2 VIEWS: CPT | Mod: TC,FY,PO

## 2020-07-30 PROCEDURE — 74019 XR ABDOMEN FLAT AND ERECT: ICD-10-PCS | Mod: 26,,, | Performed by: RADIOLOGY

## 2020-07-30 PROCEDURE — 74019 RADEX ABDOMEN 2 VIEWS: CPT | Mod: 26,,, | Performed by: RADIOLOGY

## 2020-07-30 RX ORDER — SODIUM, POTASSIUM,MAG SULFATES 17.5-3.13G
1 SOLUTION, RECONSTITUTED, ORAL ORAL DAILY
Qty: 1 KIT | Refills: 0 | Status: SHIPPED | OUTPATIENT
Start: 2020-07-30 | End: 2020-08-01

## 2020-07-30 NOTE — CLINICAL REVIEW
7/30/2020  CARE COORDINATION REVIEW - IBD    [facility]    LAST OV - 7/29/2020  NEXT OV DUE - TBD    MEDICATIONS:  -PENTASA    IMMUNIZATION HISTORY:  MMR -   VARICELLA -   TDAP - UTD  HEPATITIS A -   HEPATITIS B - TBC  HERPES ZOSTER -   HPV -   INFLUENZA -   MENINGOCOCCAL -   PNEUMOCOCCAL - PCV 13 DUE    BONE HEALTH:  VITAMIN D - TAKING    CANCER PREVENTION:  LAST COLONSCOPY - 3/12/20 - DUE  DERMATOLOGY VISIT - UTD  EYE EXAM - UTD    LABS / SCANS:  DEXA - DUE  QUANT GOLD - 3/13/2018 - NEGATIVE  TPMT - 3/9/2018 - WNL    NOTES:  EIM - ORAL ULCERS, JOINT PAIN  HX MULTIPLE SINUS INFECTIONS  NEEDS CT FOR FURTHER EVALUATION

## 2020-08-03 ENCOUNTER — PATIENT MESSAGE (OUTPATIENT)
Dept: PRIMARY CARE CLINIC | Facility: CLINIC | Age: 55
End: 2020-08-03

## 2020-08-03 RX ORDER — TOPIRAMATE 25 MG/1
TABLET ORAL
Qty: 180 TABLET | Refills: 3 | Status: SHIPPED | OUTPATIENT
Start: 2020-08-03 | End: 2020-09-03

## 2020-08-06 ENCOUNTER — PATIENT MESSAGE (OUTPATIENT)
Dept: OTOLARYNGOLOGY | Facility: CLINIC | Age: 55
End: 2020-08-06

## 2020-08-07 ENCOUNTER — CLINICAL SUPPORT (OUTPATIENT)
Dept: URGENT CARE | Facility: CLINIC | Age: 55
End: 2020-08-07
Payer: COMMERCIAL

## 2020-08-07 VITALS — HEART RATE: 61 BPM | TEMPERATURE: 98 F | RESPIRATION RATE: 18 BRPM | OXYGEN SATURATION: 97 %

## 2020-08-07 DIAGNOSIS — J45.40 MODERATE PERSISTENT ASTHMA WITHOUT COMPLICATION: ICD-10-CM

## 2020-08-07 PROCEDURE — U0003 INFECTIOUS AGENT DETECTION BY NUCLEIC ACID (DNA OR RNA); SEVERE ACUTE RESPIRATORY SYNDROME CORONAVIRUS 2 (SARS-COV-2) (CORONAVIRUS DISEASE [COVID-19]), AMPLIFIED PROBE TECHNIQUE, MAKING USE OF HIGH THROUGHPUT TECHNOLOGIES AS DESCRIBED BY CMS-2020-01-R: HCPCS

## 2020-08-08 LAB — SARS-COV-2 RNA RESP QL NAA+PROBE: NOT DETECTED

## 2020-08-09 ENCOUNTER — TELEPHONE (OUTPATIENT)
Dept: URGENT CARE | Facility: CLINIC | Age: 55
End: 2020-08-09

## 2020-08-10 ENCOUNTER — PATIENT OUTREACH (OUTPATIENT)
Dept: ADMINISTRATIVE | Facility: OTHER | Age: 55
End: 2020-08-10

## 2020-08-11 ENCOUNTER — OFFICE VISIT (OUTPATIENT)
Dept: OTOLARYNGOLOGY | Facility: CLINIC | Age: 55
End: 2020-08-11
Payer: COMMERCIAL

## 2020-08-11 VITALS — HEIGHT: 67 IN | WEIGHT: 198 LBS | BODY MASS INDEX: 31.08 KG/M2 | TEMPERATURE: 99 F

## 2020-08-11 DIAGNOSIS — J32.4 CHRONIC PANSINUSITIS: Primary | ICD-10-CM

## 2020-08-11 PROCEDURE — 99214 PR OFFICE/OUTPT VISIT, EST, LEVL IV, 30-39 MIN: ICD-10-PCS | Mod: 25,S$GLB,, | Performed by: OTOLARYNGOLOGY

## 2020-08-11 PROCEDURE — 3008F PR BODY MASS INDEX (BMI) DOCUMENTED: ICD-10-PCS | Mod: CPTII,S$GLB,, | Performed by: OTOLARYNGOLOGY

## 2020-08-11 PROCEDURE — 87186 SC STD MICRODIL/AGAR DIL: CPT

## 2020-08-11 PROCEDURE — 31231 NASAL ENDOSCOPY DX: CPT | Mod: S$GLB,,, | Performed by: OTOLARYNGOLOGY

## 2020-08-11 PROCEDURE — 99214 OFFICE O/P EST MOD 30 MIN: CPT | Mod: 25,S$GLB,, | Performed by: OTOLARYNGOLOGY

## 2020-08-11 PROCEDURE — 87070 CULTURE OTHR SPECIMN AEROBIC: CPT

## 2020-08-11 PROCEDURE — 31231 PR NASAL ENDOSCOPY, DX: ICD-10-PCS | Mod: S$GLB,,, | Performed by: OTOLARYNGOLOGY

## 2020-08-11 PROCEDURE — 99999 PR PBB SHADOW E&M-EST. PATIENT-LVL V: ICD-10-PCS | Mod: PBBFAC,,, | Performed by: OTOLARYNGOLOGY

## 2020-08-11 PROCEDURE — 87077 CULTURE AEROBIC IDENTIFY: CPT

## 2020-08-11 PROCEDURE — 3008F BODY MASS INDEX DOCD: CPT | Mod: CPTII,S$GLB,, | Performed by: OTOLARYNGOLOGY

## 2020-08-11 PROCEDURE — 99999 PR PBB SHADOW E&M-EST. PATIENT-LVL V: CPT | Mod: PBBFAC,,, | Performed by: OTOLARYNGOLOGY

## 2020-08-11 RX ORDER — CIPROFLOXACIN 500 MG/1
500 TABLET ORAL 2 TIMES DAILY
Qty: 42 TABLET | Refills: 0 | Status: SHIPPED | OUTPATIENT
Start: 2020-08-11 | End: 2020-09-01

## 2020-08-11 NOTE — Clinical Note
Ed,  I've tried everything I can possibly think of to clear up this lady's sinus disease.  She finished 8 weeks of meropenem about 5 weeks ago and is completely full of pus and crusting.  I took cultures and debrided today.  Could you get her in to see you in the near future?    Best,  Manish

## 2020-08-11 NOTE — PROGRESS NOTES
Subjective:   Patient: Jaylin Murguia [id number], :1965   Visit date:2020 8:35 AM    Chief Complaint:  Other (scope exam)    HPI:  Jaylin is a 55 y.o. female who is here for follow-up after surgery:    Subjective:     19  Since surgery, she had been on Bactrim and Cipro PO.  I had prescribed zyvox but there was concern for a medication interaction. She was also on topical vancomycin/doxycycline/budesonide.  She had to stop the doxy due to severe burning.  She continued the vancomycin and budesonide topically but finished that later. We had significant problems getting cultures on her to grow but ultimately the did.  She had a severe fungal infection in addition to bacteria.      10/17/19 Feeling somewhat better.  Still with significant cough and SOB.  Was given an inhaler by pulmonary.  Exam at this time still with fairly severe purulence but somewhat better than September.  She was placed on Topical vori, budesonide, levofloxacin.    19.  Unfortunately during the course of all of this, she had a rib fracture due to severe coughing.  She has continued  Topical vori, budesonide, levofloxacin.   She completed this about 5 days ago. Until stopping, she was doing much better. Since then, she has had some green drainage.  Using saline.  She is only on mesalamine.  At her appointment in December, she had nearly completely normal sinuses on endoscopy.      20  She was seen by allergy/immunology.  Ig testing did show low IgG. She has been on Privigen for this.  Ultimately, she has never had sustained improvement. She is now with worsening symptoms.  Severe coughing.  Green mucus.  Facial pain and congestion.  RAST testing was negative for environmental allergies.  She has been on Augmentin and doxycycline as well.  She is on xopenex for asthma.  She has had some swelling in the left supraclavicular area.  Waxes and wanes.  Mild tenderness w/o richard pain. New cultures were taken.      20 After  last visit, she was referred for ID consult.  She had been on Cipro and Bactrim prior and clx with pseudomonas. She was then placed on cefepime but had adverse effects and was then transitioned to meropenem.  She completed meropenem in early July.  She has had progressive green drainage and cough for the past 4-5 weeks.           Surgery date: 07/31/19       RIGHT   -  right Total ethmoidectomy (CPT 95273)  -  right Maxillary antrostomy without removal of tissue (CPT 48888)     LEFT  -  left Total ethmoidectomy with sphenoid sinusotomy (CPT 14539)  -  left Maxillary antrostomy without removal of tissue (CPT 30959)      -  Sterotactic computer assisted (navigational) procedure; cranial, extradural (CPT 28179)  -  Septoplasty (CPT 08614)        Procedure in Detail/Findings:     Endoscopic Sinonasal Exam Findings at TIME OF SURGERY:  -     Sinuses examined: bilateral maxillary, bilateral ethmoid anterior/posterior and left sphenoid            The right sinus(es) have marked edema with polypoid changes            The left sinus(es) have marked edema with polypoid changes  -     Nasal secretions: purulent secretions bilaterally  -     Nasal septum: LEFT moderate deviation   -     Inferior turbinate: hypertrophy or edema (Mild) bilaterally  -     Middle turbinate: hypertrophy or edema (Moderate) on the left  -     Other findings: - no mass or obstructive lesion -Technical Procedures Used: 87202       Pathology:  NA      Cultures:      PSEUDOMONAS AERUGINOSA   Many      Resulting Agency [facility]   Susceptibility     Pseudomonas aeruginosa     CULTURE, AEROBIC  (SPECIFY SOURCE)     Amikacin <=16 mcg/mL Sensitive     Cefepime <=2 mcg/mL Sensitive     Ciprofloxacin <=1 mcg/mL Sensitive     Gentamicin <=4 mcg/mL Sensitive     Meropenem <=1 mcg/mL Sensitive     Piperacillin/Tazo <=16 mcg/mL Sensitive     Tobramycin <=4 mcg/mL Sensitive                     Contains abnormal data CULTURE, AEROBIC  (SPECIFY SOURCE)   Order: [id number]    Status:  Final result   Visible to patient:  Yes (Patient Portal) Next appt:  10/24/2019 at 08:20 AM in Pulmonology (Elizabeth Lejeune, NP) Dx:  Chronic pansinusitis   Specimen Information: Sinus        Component 3wk ago   Aerobic Bacterial Culture Abnormal    ASPERGILLUS NIGER   Many     Aerobic Bacterial Culture Abnormal    ACHROMOBACTER XYLOSOXIDANS SUBSP. DENTRIFICANS   Moderate     Resulting Agency [facility]   Susceptibility      Achromobacter xylosoxidans subsp. dentrificans     CULTURE, AEROBIC  (SPECIFY SOURCE)     Amikacin >32 mcg/mL Resistant     Cefepime 16 mcg/mL Intermediate     Ceftriaxone 32 mcg/mL Intermediate     Ciprofloxacin 2 mcg/mL Intermediate     Gentamicin >8 mcg/mL Resistant     Levofloxacin <=2 mcg/mL Sensitive     Piperacillin/Tazo <=16 mcg/mL Sensitive     Tetracycline >8 mcg/mL Resistant     Tobramycin >8 mcg/mL Resistant     Trimeth/Sulfa <=2/38 mcg/mL Sensitive            Linear View                  Review of Systems:  -     Allergic/Immunologic: has No Known Allergies..  -     Constitutional: Current temp: 98.6 °F (37 °C) (Tympanic)    Her meds, allergies, medical, surgical, social & family histories were reviewed & updated:  -     She has a current medication list which includes the following prescription(s): amlodipine, butalbital-acetaminophen-caffeine -40 mg, duloxetine, eletriptan, ergocalciferol, estradiol, fluticasone furoate-vilanterol, fluticasone propionate, hydrochlorothiazide, ipratropium, losartan-hydrochlorothiazide 100-25 mg, mesalamine, montelukast, nortriptyline, pantoprazole, pregabalin, propranolol, rizatriptan, rosuvastatin, tiotropium bromide, topiramate, triamcinolone acetonide 0.025%, ventolin hfa, zolpidem, azelastine, ciprofloxacin hcl, and levalbuterol, and the following Facility-Administered Medications: lactated ringers and lidocaine (pf) 10 mg/ml (1%).  -     She  has a past medical history of Allergic rhinitis, Asthma, Chronic pansinusitis, Crohn's  "disease, Fibromyalgia, Hyperlipidemia, Hypertension, Migraine, Obstructive sleep apnea, and Sciatica.   -     She does not have any pertinent problems on file.   -     She  has a past surgical history that includes Hysterectomy;  section; [place] tooth extraction; Finger surgery; Bladder surgery; Colonoscopy (N/A, 2017); Colonoscopy (N/A, 3/27/2018); Functional endoscopic sinus surgery (FESS) using computer-assisted navigation (Bilateral, 2019); Esophagogastroduodenoscopy (N/A, 3/12/2020); and Colonoscopy (N/A, 3/12/2020).  -     She  reports that she has never smoked. She has never used smokeless tobacco. She reports that she does not drink alcohol or use drugs.  -     Her family history includes COPD (age of onset: 72) in her maternal grandmother; Cancer (age of onset: 70) in her paternal grandmother; Heart attack in her maternal grandmother; Hypertension in her brother and mother; Kidney disease (age of onset: 64) in her father; Scleroderma in her father.  -     She has No Known Allergies.    Objective:     Physical Exam:  Vitals:  Temp 98.6 °F (37 °C) (Tympanic)   Ht 5' 7" (1.702 m)   Wt 89.8 kg (198 lb)   BMI 31.01 kg/m²   General appearance:  Well developed, well nourished    Eyes:  Extraocular motions intact, PERRL    Communication:  no hoarseness, no dysphonia    Ears:  Otoscopy of external auditory canals and tympanic membranes was normal, clinical speech reception thresholds grossly intact, no mass/lesion of auricle.  Nose:  No masses/lesions of external nose, nasal mucosa, septum, and turbinates were within normal limits.  Mouth:  No mass/lesion of lips, teeth, gums, hard/soft palate, tongue, tonsils, or oropharynx.    Cardiovascular:  No pedal edema; Radial Pulses +2     Neck & Lymphatics:  No cervical lymphadenopathy, no neck mass/crepitus/ asymmetry, trachea is midline, no thyroid enlargement/tenderness/mass.    Psych: Oriented x3,  Alert with normal mood and " affect.     Respiration/Chest:  Symmetric expansion during respiration, normal respiratory effort.    Skin:  Warm and intact. No ulcerations of face, scalp, neck.        Assessment & Plan:   Jaylin was seen today for other.    Diagnoses and all orders for this visit:    Chronic pansinusitis  -     Aerobic culture    Other orders  -     ciprofloxacin HCl (CIPRO) 500 MG tablet; Take 1 tablet (500 mg total) by mouth 2 (two) times daily. for 21 days        New cultures taken today.   If she has had prior treatment for whatever grows, will have her see rhinology. In the past, she had wanted to stay in , but I discussed with her further today and she is open to traveling to [facility].  Will have her see Dr. Roach.       Manish Shaffer MD       NASAL ENDOSCOPY     Patient: Jaylin Murguia [id number], :1965  Procedure date:2020  Patient's medications, allergies, past medical, surgical, social and family histories were reviewed and updated as appropriate.  Chief Complaint:  Other (scope exam)    HPI:  Jaylin is a 55 y.o. female with chronic sinusitis.    Procedure: Risks, benefits, and alternatives of the procedure were discussed with the patient, and the patient consented to the nasal endoscopy with debridement.  Anterior rhinoscopy was insufficient to explain patients symptoms.      The nasal cavity was sprayed with a topical decongestant and anesthetic . The endoscope was passed into each nostril and each nasal cavity was visualized.  On each side the nasal cavity, sinuses (if open), turbinates, and septum were examined with the findings described below. The turbinates, middle meatus, superior meatus and sphenoethmoidal recess was examined.  Crusting and polypoid material was removed with suction and straight non thru cut forceps.   At the end of the examination, the scope was removed. The patient tolerated the procedure well with no complications.     Endoscopic Sinonasal Exam Findings:  -     Sinuses  examined: bilateral maxillary and ethmoid            The right sinus(es): .  Thick crusts, purulence and moderate edema.             The left sinus(es):  Thick crusts, purulence and moderate edema.   -     Nasal septum: Small mid septal perforation  -     Inferior turbinate: - normal mucosa without significant hypertrophy - bilaterally  -     Middle turbinate: - normal mucosa without significant hypertrophy - bilaterally  -     Other findings: - no mass or obstructive lesion -    Assessment & Plan:  See today's clinic note

## 2020-08-14 LAB — BACTERIA SPEC AEROBE CULT: ABNORMAL

## 2020-08-18 RX ORDER — NYSTATIN 100000 [USP'U]/ML
4 SUSPENSION ORAL 4 TIMES DAILY
Qty: 160 ML | Refills: 0 | Status: SHIPPED | OUTPATIENT
Start: 2020-08-18 | End: 2020-08-26

## 2020-08-18 RX ORDER — FLUCONAZOLE 100 MG/1
100 TABLET ORAL DAILY
Qty: 14 TABLET | Refills: 0 | Status: SHIPPED | OUTPATIENT
Start: 2020-08-18 | End: 2020-09-01

## 2020-08-21 ENCOUNTER — LAB VISIT (OUTPATIENT)
Dept: LAB | Facility: HOSPITAL | Age: 55
End: 2020-08-21
Attending: FAMILY MEDICINE
Payer: COMMERCIAL

## 2020-08-21 DIAGNOSIS — D80.1 HYPOGAMMAGLOBULINEMIA: ICD-10-CM

## 2020-08-21 DIAGNOSIS — E78.5 HYPERLIPIDEMIA, UNSPECIFIED HYPERLIPIDEMIA TYPE: ICD-10-CM

## 2020-08-21 LAB
ALBUMIN SERPL BCP-MCNC: 4 G/DL (ref 3.5–5.2)
ALP SERPL-CCNC: 115 U/L (ref 55–135)
ALT SERPL W/O P-5'-P-CCNC: 28 U/L (ref 10–44)
ANION GAP SERPL CALC-SCNC: 8 MMOL/L (ref 8–16)
AST SERPL-CCNC: 28 U/L (ref 10–40)
BILIRUB SERPL-MCNC: 0.2 MG/DL (ref 0.1–1)
BUN SERPL-MCNC: 15 MG/DL (ref 6–20)
CALCIUM SERPL-MCNC: 9.2 MG/DL (ref 8.7–10.5)
CHLORIDE SERPL-SCNC: 106 MMOL/L (ref 95–110)
CHOLEST SERPL-MCNC: 177 MG/DL (ref 120–199)
CHOLEST/HDLC SERPL: 2.8 {RATIO} (ref 2–5)
CO2 SERPL-SCNC: 23 MMOL/L (ref 23–29)
CREAT SERPL-MCNC: 1 MG/DL (ref 0.5–1.4)
EST. GFR  (AFRICAN AMERICAN): >60 ML/MIN/1.73 M^2
EST. GFR  (NON AFRICAN AMERICAN): >60 ML/MIN/1.73 M^2
GLUCOSE SERPL-MCNC: 94 MG/DL (ref 70–110)
HDLC SERPL-MCNC: 63 MG/DL (ref 40–75)
HDLC SERPL: 35.6 % (ref 20–50)
LDLC SERPL CALC-MCNC: 79.6 MG/DL (ref 63–159)
NONHDLC SERPL-MCNC: 114 MG/DL
POTASSIUM SERPL-SCNC: 4.3 MMOL/L (ref 3.5–5.1)
PROT SERPL-MCNC: 7.4 G/DL (ref 6–8.4)
SODIUM SERPL-SCNC: 137 MMOL/L (ref 136–145)
TRIGL SERPL-MCNC: 172 MG/DL (ref 30–150)

## 2020-08-21 PROCEDURE — 36415 COLL VENOUS BLD VENIPUNCTURE: CPT | Mod: PO

## 2020-08-21 PROCEDURE — 80061 LIPID PANEL: CPT

## 2020-08-21 PROCEDURE — 80053 COMPREHEN METABOLIC PANEL: CPT

## 2020-08-26 ENCOUNTER — OFFICE VISIT (OUTPATIENT)
Dept: PRIMARY CARE CLINIC | Facility: CLINIC | Age: 55
End: 2020-08-26
Payer: COMMERCIAL

## 2020-08-26 DIAGNOSIS — E78.49 OTHER HYPERLIPIDEMIA: ICD-10-CM

## 2020-08-26 DIAGNOSIS — J32.4 CHRONIC PANSINUSITIS: ICD-10-CM

## 2020-08-26 DIAGNOSIS — I10 ESSENTIAL (PRIMARY) HYPERTENSION: ICD-10-CM

## 2020-08-26 DIAGNOSIS — J45.41 MODERATE PERSISTENT ASTHMA WITHOUT STATUS ASTHMATICUS WITH ACUTE EXACERBATION: Primary | ICD-10-CM

## 2020-08-26 DIAGNOSIS — J44.9 CHRONIC OBSTRUCTIVE PULMONARY DISEASE, UNSPECIFIED COPD TYPE: ICD-10-CM

## 2020-08-26 DIAGNOSIS — D80.1 HYPOGAMMAGLOBULINEMIA: ICD-10-CM

## 2020-08-26 DIAGNOSIS — B96.5 PSEUDOMONAL BACTEREMIA: ICD-10-CM

## 2020-08-26 DIAGNOSIS — Z79.60 LONG-TERM USE OF IMMUNOSUPPRESSANT MEDICATION: ICD-10-CM

## 2020-08-26 DIAGNOSIS — R78.81 PSEUDOMONAL BACTEREMIA: ICD-10-CM

## 2020-08-26 DIAGNOSIS — R05.9 COUGH: ICD-10-CM

## 2020-08-26 PROBLEM — J45.51 SEVERE PERSISTENT ASTHMA WITH ACUTE EXACERBATION: Status: ACTIVE | Noted: 2019-10-16

## 2020-08-26 PROCEDURE — 99215 OFFICE O/P EST HI 40 MIN: CPT | Mod: 95,,, | Performed by: FAMILY MEDICINE

## 2020-08-26 PROCEDURE — 99215 PR OFFICE/OUTPT VISIT, EST, LEVL V, 40-54 MIN: ICD-10-PCS | Mod: 95,,, | Performed by: FAMILY MEDICINE

## 2020-08-26 RX ORDER — IPRATROPIUM BROMIDE 0.5 MG/2.5ML
SOLUTION RESPIRATORY (INHALATION)
Qty: 1 BOX | Refills: 11 | Status: SHIPPED | OUTPATIENT
Start: 2020-08-26 | End: 2020-11-09 | Stop reason: SDUPTHER

## 2020-08-28 DIAGNOSIS — Z12.39 BREAST CANCER SCREENING: ICD-10-CM

## 2020-09-03 ENCOUNTER — OFFICE VISIT (OUTPATIENT)
Dept: OTOLARYNGOLOGY | Facility: CLINIC | Age: 55
End: 2020-09-03
Payer: COMMERCIAL

## 2020-09-03 ENCOUNTER — PATIENT MESSAGE (OUTPATIENT)
Dept: PRIMARY CARE CLINIC | Facility: CLINIC | Age: 55
End: 2020-09-03

## 2020-09-03 VITALS — WEIGHT: 202.19 LBS | BODY MASS INDEX: 31.66 KG/M2

## 2020-09-03 DIAGNOSIS — D80.1 HYPOGAMMAGLOBULINEMIA: ICD-10-CM

## 2020-09-03 DIAGNOSIS — K50.00 CROHN'S DISEASE OF SMALL INTESTINE WITHOUT COMPLICATION: ICD-10-CM

## 2020-09-03 DIAGNOSIS — J45.51 SEVERE PERSISTENT ASTHMA WITH ACUTE EXACERBATION: ICD-10-CM

## 2020-09-03 DIAGNOSIS — G47.33 OSA (OBSTRUCTIVE SLEEP APNEA): ICD-10-CM

## 2020-09-03 DIAGNOSIS — J31.0 NONALLERGIC RHINITIS: Primary | ICD-10-CM

## 2020-09-03 DIAGNOSIS — Z79.60 LONG-TERM USE OF IMMUNOSUPPRESSANT MEDICATION: ICD-10-CM

## 2020-09-03 DIAGNOSIS — Z22.39: ICD-10-CM

## 2020-09-03 DIAGNOSIS — J32.4 CHRONIC PANSINUSITIS: ICD-10-CM

## 2020-09-03 PROCEDURE — 3008F PR BODY MASS INDEX (BMI) DOCUMENTED: ICD-10-PCS | Mod: CPTII,S$GLB,, | Performed by: OTOLARYNGOLOGY

## 2020-09-03 PROCEDURE — 99999 PR PBB SHADOW E&M-EST. PATIENT-LVL IV: CPT | Mod: PBBFAC,,, | Performed by: OTOLARYNGOLOGY

## 2020-09-03 PROCEDURE — 3008F BODY MASS INDEX DOCD: CPT | Mod: CPTII,S$GLB,, | Performed by: OTOLARYNGOLOGY

## 2020-09-03 PROCEDURE — 99214 PR OFFICE/OUTPT VISIT, EST, LEVL IV, 30-39 MIN: ICD-10-PCS | Mod: S$GLB,,, | Performed by: OTOLARYNGOLOGY

## 2020-09-03 PROCEDURE — 99214 OFFICE O/P EST MOD 30 MIN: CPT | Mod: S$GLB,,, | Performed by: OTOLARYNGOLOGY

## 2020-09-03 PROCEDURE — 99999 PR PBB SHADOW E&M-EST. PATIENT-LVL IV: ICD-10-PCS | Mod: PBBFAC,,, | Performed by: OTOLARYNGOLOGY

## 2020-09-03 RX ORDER — TOPIRAMATE 25 MG/1
TABLET ORAL
Qty: 180 TABLET | Refills: 3 | Status: SHIPPED | OUTPATIENT
Start: 2020-09-03 | End: 2021-09-08

## 2020-09-03 NOTE — LETTER
September 3, 2020    Esthela Hu MD  [address] [place] [address]           OTOLARYNGOLOGY AND COMMUNICATION SCIENCES    Mumtaz Wadsworth MD, FACS          SURGICAL AND MEDICAL DISEASES OF HEAD AND NECK  MD Mumtaz Jacobsen MD, FACS  Mario Pena III, MD    OTOLOGY, NEUROTOLOGY and SKULL-BASE SURGERY  Chapin Vance MD, FACS  Scot Lundberg MD, Director    PEDIATRIC OTOLARYNGOLOGY & OTOLOGY  TASIA Thomas MD, [facility]  Donna Izquierdo MD, FACS    FACIAL PLASTIC and RECONSTRUCTIVE SURGERY  GABRIEL Head III, MD, FACS    RHINOLOGY and SINUS SURGERY  Matthias Roach MD, MPH, FACS  Director   GABRIEL Head III, MD, FACS    LARYNGOLOGY  Maurizio Constantino MD    SPEECH-LANGUAGE PATHOLOGY  Eun Hernandez, PhD, [facility]-SLP  Anne Carrillo, MS, CCC-SLP  Tatianna Sterling, MS, CCC-SLP  Nuria Kay MA, [facility]-SLP    AUDIOLOGY SECTION  Susana Tesfaye, MS, CCC-A  LUCIANA Rutherford, CCC-A  Brad Maya, CCC-A  Brad Montoya, CCC-A  Manish Bird Jr., LUCIANA, CCA-A  LUCIANA Kennedy, CCC-A  Brad Ruiz, CCC-A    ADVANCED PRACTICE CLINICIANS  Head and Neck Surgical Oncology  MARIANELA Lay, FNP-C  Pedatric Otolaryngology  MARIANELA Infante, PNP-C       Re:  Jaylin Murguia  :  1965    Dear Dr. Hu,    Thank you for referring your patient, Jaylin Murguia, to us for evaluation and treatment.  I have enclosed a copy of my clinic note for your review and records.  If you have any questions please do not hesitate to contact our office.     Thank you for allowing me to participate in the care of your patient.    Sincerely,        Matthias Roach MD, MPH, FACS    Director, Rhinology and Sinus Surgery  Department of Otorhinolaryngology  [facility]    Encl:  Progress note     [facility] [address]  phone [phone]  fax [phone]   [email or web address]

## 2020-09-03 NOTE — Clinical Note
September 3, 2020      Esthela Hu MD  [address] [facility] [address]           [facility] - EarNoseThroat 4th Fl  [address]  [facility] [address]  Phone: [phone]  Fax: [phone]          Patient: Jaylin Murguia   MR Number: [id number]   YOB: 1965   Date of Visit: 9/3/2020       Dear Dr. Esthela Hu:    Thank you for referring Jaylin Murguia to me for evaluation. Attached you will find relevant portions of my assessment and plan of care.    If you have questions, please do not hesitate to call me. I look forward to following Jaylin Murguia along with you.    Sincerely,    Matthias Roach MD    Enclosure  CC:  No Recipients    If you would like to receive this communication electronically, please contact [email or web address] or [phone] to request more information on [employer] access.    For providers and/or their staff who would like to refer a patient to [facility], please contact us through our one-stop-shop provider referral line, [facility], at [phone].    If you feel you have received this communication in error or would no longer like to receive these types of communications, please e-mail [email or web address]

## 2020-09-03 NOTE — H&P (VIEW-ONLY)
Subjective:      Jaylin Murguia is a 55 y.o. female who was referred to me by Dr. Esthela Hu and Dr. Manish Shaffer in consultation for sinusitis.    She relates a long history for many years of chronic sinonasal issues including bilateral nasal congestion, cheek pressure, thick nasal discharge, postnasal drip and aural fullness, generally all worse on the left side.  This culminated in sinus surgery by Dr. Shaffer in July 2019 which afforded only a couple of months of relief.  Since then she has become anosmic and has continued to content with these symptoms, going so far as to have 8 weeks of IV cefipime but the infection seemed to return a couple of weeks after cessation of that treatment.  She has cultures that have grown Pseudomonas on multiple occasions and has been using saline rinses including topical vancomycin-doxycycline-budesonide.  She is understandably very frustrated by these circumstances.    Current sinonasal medications include Flonase, Astelin, Singulair and saline.  The last course of antibiotics is BID cipro which she is currently taking.  She does not regularly use nasal decongestant sprays.    She recalls previously having allergy testing, which resulted in a course of immunotherapy for 8 years in the past decade.  Last year she had repeat testing via immunocap that was reportedly all negative.    She relates a history of asthma which is currently managed with Breo and Spiriva.    She relates a history of reflux symptoms which is currently managed with Protonix.  She has not previously had an EGD.    She relates a diagnosis of obstructive sleep apnea.     She has previously had sinonasal surgery as above.  She has not had a tonsillectomy.    She does not recall a prior history of nasal trauma other than the sinonasal surgery.    SNOT-22 score: : (P) 61  NOSE score:: (P) 55%  ETDQ-7 score:: (P) 4.4      Past Medical History  She has a past medical history of Allergic rhinitis, Asthma, Chronic  pansinusitis, Crohn's disease, Fibromyalgia, Hyperlipidemia, Hypertension, Migraine, Obstructive sleep apnea, and Sciatica.    Past Surgical History  She has a past surgical history that includes Hysterectomy;  section; [place] tooth extraction; Finger surgery; Bladder surgery; Colonoscopy (N/A, 2017); Colonoscopy (N/A, 3/27/2018); Functional endoscopic sinus surgery (FESS) using computer-assisted navigation (Bilateral, 2019); Esophagogastroduodenoscopy (N/A, 3/12/2020); and Colonoscopy (N/A, 3/12/2020).    Family History  Her family history includes COPD (age of onset: 72) in her maternal grandmother; Cancer (age of onset: 70) in her paternal grandmother; Heart attack in her maternal grandmother; Hypertension in her brother and mother; Kidney disease (age of onset: 64) in her father; Scleroderma in her father.    Social History  She reports that she has never smoked. She has never used smokeless tobacco. She reports that she does not drink alcohol or use drugs.    Allergies  She has No Known Allergies.    Medications   She has a current medication list which includes the following prescription(s): amlodipine, butalbital-acetaminophen-caffeine -40 mg, duloxetine, eletriptan, ergocalciferol, estradiol, fluticasone furoate-vilanterol, fluticasone propionate, hydrochlorothiazide, ipratropium, losartan-hydrochlorothiazide 100-25 mg, mesalamine, montelukast, nortriptyline, pantoprazole, pregabalin, propranolol, rizatriptan, rosuvastatin, tiotropium bromide, topiramate, triamcinolone acetonide 0.025%, ventolin hfa, zolpidem, azelastine, and levalbuterol, and the following Facility-Administered Medications: lactated ringers and lidocaine (pf) 10 mg/ml (1%).    Review of Systems     Constitutional: Positive for fatigue.  Negative for appetite change, chills, fever and unexpected weight loss.      HENT: Positive for ear pain, facial swelling, postnasal drip, sinus infection, sinus pressure, sore throat  and voice change.  Negative for ear discharge, ear infection, hearing loss, mouth sores, nosebleeds, ringing in the ears, runny nose, stuffy nose, tonsil infection, dental problems and trouble swallowing.      Eyes:  Positive for photophobia. Negative for change in eyesight, eye drainage and eye itching.     Respiratory:  Positive for cough, shortness of breath, sleep apnea and wheezing. Negative for snoring.      Cardiovascular:  Negative for chest pain, foot swelling, irregular heartbeat and swollen veins.     Gastrointestinal:  Positive for abdominal pain, acid reflux, constipation, diarrhea and heartburn. Negative for vomiting.     Genitourinary: Negative for difficulty urinating, sexual problems and frequent urination.     Musc: Negative for aching joints, aching muscles, back pain and neck pain.     Skin: Negative for rash.     Allergy: Positive for seasonal allergies. Negative for food allergies.     Endocrine: Negative for cold intolerance and heat intolerance.      Neurological: Positive for dizziness, headaches and light-headedness. Negative for seizures and tremors.      Hematologic: Positive for bruises/bleeds easily. Negative for swollen glands.      Psychiatric: Negative for decreased concentration, depression, nervous/anxious and sleep disturbance.               Objective:     Wt 91.7 kg (202 lb 2.6 oz)   BMI 31.66 kg/m²        Constitutional:   She appears well-developed. She is cooperative. Normal speech.  No hoarse voice.      Head:  Normocephalic. Salivary glands normal.  Facial strength is normal.      Ears:    Right Ear: No drainage or tenderness. Tympanic membrane is not perforated. Tympanic membrane mobility is normal. No middle ear effusion. No decreased hearing is noted.   Left Ear: No drainage or tenderness. Tympanic membrane is not perforated. Tympanic membrane mobility is normal.  No middle ear effusion. No decreased hearing is noted.     Nose:  No mucosal edema, rhinorrhea, septal  deviation or polyps. No epistaxis. Turbinates normal, no turbinate masses and no turbinate hypertrophy.  Right sinus exhibits no maxillary sinus tenderness and no frontal sinus tenderness. Left sinus exhibits no maxillary sinus tenderness and no frontal sinus tenderness.   Thick crusts of green mucus in bilateral middle meatus  Shallow ulceration of bilateral anterior nasal septum  No polyps    Mouth/Throat  Oropharynx clear and moist without lesions or asymmetry and normal uvula midline. She does not have dentures. Normal dentition. No oral lesions or mucous membrane lesions. No oropharyngeal exudate or posterior oropharyngeal erythema. Mirror exam not performed due to patient tolerance.  Mirror exam not performed due to patient tolerance.      Neck:  Neck normal without thyromegaly masses, asymmetry, normal tracheal structure, crepitus, and tenderness, thyroid normal, trachea normal and no adenopathy. Normal range of motion present.     She has no cervical adenopathy.     Cardiovascular:   Regular rhythm.      Pulmonary/Chest:   Effort normal.     Psychiatric:   She has a normal mood and affect. Her speech is normal and behavior is normal.     Neurological:   No cranial nerve deficit.     Skin:   No rash noted.       Procedure    None        Data Reviewed    WBC (K/uL)   Date Value   06/29/2020 6.22     Eosinophil% (%)   Date Value   06/29/2020 4.0     Eos # (K/uL)   Date Value   06/29/2020 0.3     Platelets (K/uL)   Date Value   06/29/2020 338     Glucose (mg/dL)   Date Value   08/21/2020 94     IgE (IU/mL)   Date Value   12/12/2019 <35     Immunocaps all negative    IgG subtypes low x2    Cultures positive for pseudomonas x2, pansensitive    I independently reviewed the images of the CT sinuses dated 7/24/19. Pertinent findings include partial to complete opacification of all sinuses with hypoplastic frontals.       Assessment:     1. Nonallergic rhinitis    2. Chronic pansinusitis    3. Hypogammaglobulinemia     4. 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 TAMIKA (obstructive sleep apnea)    7. Long-term use of immunosuppressant medication    8. Crohn's disease of small intestine without complication         Plan:     I had a long discussion with the patient regarding her condition and the further workup and management options.  She has a recalcitrant manifestation of chronic pseudomonal sinusitis, which has resisted IV, oral and topical antibiotics.  I counseled her that she has a type 1 inflammatory pattern that is likely related to her immunosuppression and is most reliably managed by long-term culture-directed antibiotics with concurrent lavage, possibly topical antibiotics, and debridement as needed.  I suggested that she consider a debridement procedure (with possible surgical revision) under general anesthesia, followed by intensive topical lavage +/- antibiotics and ciprofloxacin for 3-6 months.  I do not advise additional IV antibiotics.  I would advise a CT scan prior to any surgical encounter.  I also advised clearing this plan for enteric antibiotics with her gastroenterologist in light of her Crohn's.  I advised that either I or Dr. Shaffer should be able to assist her with care moving forward.    Follow up if symptoms worsen or fail to improve.

## 2020-09-03 NOTE — PROGRESS NOTES
Subjective:      Jaylin Murguia is a 55 y.o. female who was referred to me by Dr. Esthela Hu and Dr. Manish Shaffer in consultation for sinusitis.    She relates a long history for many years of chronic sinonasal issues including bilateral nasal congestion, cheek pressure, thick nasal discharge, postnasal drip and aural fullness, generally all worse on the left side.  This culminated in sinus surgery by Dr. Shaffer in July 2019 which afforded only a couple of months of relief.  Since then she has become anosmic and has continued to content with these symptoms, going so far as to have 8 weeks of IV cefipime but the infection seemed to return a couple of weeks after cessation of that treatment.  She has cultures that have grown Pseudomonas on multiple occasions and has been using saline rinses including topical vancomycin-doxycycline-budesonide.  She is understandably very frustrated by these circumstances.    Current sinonasal medications include Flonase, Astelin, Singulair and saline.  The last course of antibiotics is BID cipro which she is currently taking.  She does not regularly use nasal decongestant sprays.    She recalls previously having allergy testing, which resulted in a course of immunotherapy for 8 years in the past decade.  Last year she had repeat testing via immunocap that was reportedly all negative.    She relates a history of asthma which is currently managed with Breo and Spiriva.    She relates a history of reflux symptoms which is currently managed with Protonix.  She has not previously had an EGD.    She relates a diagnosis of obstructive sleep apnea.     She has previously had sinonasal surgery as above.  She has not had a tonsillectomy.    She does not recall a prior history of nasal trauma other than the sinonasal surgery.    SNOT-22 score: : (P) 61  NOSE score:: (P) 55%  ETDQ-7 score:: (P) 4.4      Past Medical History  She has a past medical history of Allergic rhinitis, Asthma, Chronic  pansinusitis, Crohn's disease, Fibromyalgia, Hyperlipidemia, Hypertension, Migraine, Obstructive sleep apnea, and Sciatica.    Past Surgical History  She has a past surgical history that includes Hysterectomy;  section; [place] tooth extraction; Finger surgery; Bladder surgery; Colonoscopy (N/A, 2017); Colonoscopy (N/A, 3/27/2018); Functional endoscopic sinus surgery (FESS) using computer-assisted navigation (Bilateral, 2019); Esophagogastroduodenoscopy (N/A, 3/12/2020); and Colonoscopy (N/A, 3/12/2020).    Family History  Her family history includes COPD (age of onset: 72) in her maternal grandmother; Cancer (age of onset: 70) in her paternal grandmother; Heart attack in her maternal grandmother; Hypertension in her brother and mother; Kidney disease (age of onset: 64) in her father; Scleroderma in her father.    Social History  She reports that she has never smoked. She has never used smokeless tobacco. She reports that she does not drink alcohol or use drugs.    Allergies  She has No Known Allergies.    Medications   She has a current medication list which includes the following prescription(s): amlodipine, butalbital-acetaminophen-caffeine -40 mg, duloxetine, eletriptan, ergocalciferol, estradiol, fluticasone furoate-vilanterol, fluticasone propionate, hydrochlorothiazide, ipratropium, losartan-hydrochlorothiazide 100-25 mg, mesalamine, montelukast, nortriptyline, pantoprazole, pregabalin, propranolol, rizatriptan, rosuvastatin, tiotropium bromide, topiramate, triamcinolone acetonide 0.025%, ventolin hfa, zolpidem, azelastine, and levalbuterol, and the following Facility-Administered Medications: lactated ringers and lidocaine (pf) 10 mg/ml (1%).    Review of Systems     Constitutional: Positive for fatigue.  Negative for appetite change, chills, fever and unexpected weight loss.      HENT: Positive for ear pain, facial swelling, postnasal drip, sinus infection, sinus pressure, sore throat  and voice change.  Negative for ear discharge, ear infection, hearing loss, mouth sores, nosebleeds, ringing in the ears, runny nose, stuffy nose, tonsil infection, dental problems and trouble swallowing.      Eyes:  Positive for photophobia. Negative for change in eyesight, eye drainage and eye itching.     Respiratory:  Positive for cough, shortness of breath, sleep apnea and wheezing. Negative for snoring.      Cardiovascular:  Negative for chest pain, foot swelling, irregular heartbeat and swollen veins.     Gastrointestinal:  Positive for abdominal pain, acid reflux, constipation, diarrhea and heartburn. Negative for vomiting.     Genitourinary: Negative for difficulty urinating, sexual problems and frequent urination.     Musc: Negative for aching joints, aching muscles, back pain and neck pain.     Skin: Negative for rash.     Allergy: Positive for seasonal allergies. Negative for food allergies.     Endocrine: Negative for cold intolerance and heat intolerance.      Neurological: Positive for dizziness, headaches and light-headedness. Negative for seizures and tremors.      Hematologic: Positive for bruises/bleeds easily. Negative for swollen glands.      Psychiatric: Negative for decreased concentration, depression, nervous/anxious and sleep disturbance.               Objective:     Wt 91.7 kg (202 lb 2.6 oz)   BMI 31.66 kg/m²        Constitutional:   She appears well-developed. She is cooperative. Normal speech.  No hoarse voice.      Head:  Normocephalic. Salivary glands normal.  Facial strength is normal.      Ears:    Right Ear: No drainage or tenderness. Tympanic membrane is not perforated. Tympanic membrane mobility is normal. No middle ear effusion. No decreased hearing is noted.   Left Ear: No drainage or tenderness. Tympanic membrane is not perforated. Tympanic membrane mobility is normal.  No middle ear effusion. No decreased hearing is noted.     Nose:  No mucosal edema, rhinorrhea, septal  deviation or polyps. No epistaxis. Turbinates normal, no turbinate masses and no turbinate hypertrophy.  Right sinus exhibits no maxillary sinus tenderness and no frontal sinus tenderness. Left sinus exhibits no maxillary sinus tenderness and no frontal sinus tenderness.   Thick crusts of green mucus in bilateral middle meatus  Shallow ulceration of bilateral anterior nasal septum  No polyps    Mouth/Throat  Oropharynx clear and moist without lesions or asymmetry and normal uvula midline. She does not have dentures. Normal dentition. No oral lesions or mucous membrane lesions. No oropharyngeal exudate or posterior oropharyngeal erythema. Mirror exam not performed due to patient tolerance.  Mirror exam not performed due to patient tolerance.      Neck:  Neck normal without thyromegaly masses, asymmetry, normal tracheal structure, crepitus, and tenderness, thyroid normal, trachea normal and no adenopathy. Normal range of motion present.     She has no cervical adenopathy.     Cardiovascular:   Regular rhythm.      Pulmonary/Chest:   Effort normal.     Psychiatric:   She has a normal mood and affect. Her speech is normal and behavior is normal.     Neurological:   No cranial nerve deficit.     Skin:   No rash noted.       Procedure    None        Data Reviewed    WBC (K/uL)   Date Value   06/29/2020 6.22     Eosinophil% (%)   Date Value   06/29/2020 4.0     Eos # (K/uL)   Date Value   06/29/2020 0.3     Platelets (K/uL)   Date Value   06/29/2020 338     Glucose (mg/dL)   Date Value   08/21/2020 94     IgE (IU/mL)   Date Value   12/12/2019 <35     Immunocaps all negative    IgG subtypes low x2    Cultures positive for pseudomonas x2, pansensitive    I independently reviewed the images of the CT sinuses dated 7/24/19. Pertinent findings include partial to complete opacification of all sinuses with hypoplastic frontals.       Assessment:     1. Nonallergic rhinitis    2. Chronic pansinusitis    3. Hypogammaglobulinemia     4. 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 TAMIKA (obstructive sleep apnea)    7. Long-term use of immunosuppressant medication    8. Crohn's disease of small intestine without complication         Plan:     I had a long discussion with the patient regarding her condition and the further workup and management options.  She has a recalcitrant manifestation of chronic pseudomonal sinusitis, which has resisted IV, oral and topical antibiotics.  I counseled her that she has a type 1 inflammatory pattern that is likely related to her immunosuppression and is most reliably managed by long-term culture-directed antibiotics with concurrent lavage, possibly topical antibiotics, and debridement as needed.  I suggested that she consider a debridement procedure (with possible surgical revision) under general anesthesia, followed by intensive topical lavage +/- antibiotics and ciprofloxacin for 3-6 months.  I do not advise additional IV antibiotics.  I would advise a CT scan prior to any surgical encounter.  I also advised clearing this plan for enteric antibiotics with her gastroenterologist in light of her Crohn's.  I advised that either I or Dr. Shaffer should be able to assist her with care moving forward.    Follow up if symptoms worsen or fail to improve.

## 2020-09-03 NOTE — Clinical Note
Lydia Hammond-    She has obviously tough CRS but I think staying the course with enteric antibiotics and topicals with debridement as needed is the best path to eventual success.  See my note for full details.    I think a surgical debridement or surgical revision to kick things off would be justifiable.  Let me know if you don't feel comfortable with this or any other thoughts.    Thanks-   Ed

## 2020-09-07 ENCOUNTER — PATIENT MESSAGE (OUTPATIENT)
Dept: OTOLARYNGOLOGY | Facility: CLINIC | Age: 55
End: 2020-09-07

## 2020-09-07 DIAGNOSIS — J32.4 CHRONIC PANSINUSITIS: Primary | ICD-10-CM

## 2020-09-14 ENCOUNTER — HOSPITAL ENCOUNTER (OUTPATIENT)
Dept: RADIOLOGY | Facility: HOSPITAL | Age: 55
Discharge: HOME | End: 2020-09-14
Attending: OTOLARYNGOLOGY
Payer: COMMERCIAL

## 2020-09-14 DIAGNOSIS — J32.4 CHRONIC PANSINUSITIS: ICD-10-CM

## 2020-09-14 PROCEDURE — 70486 CT MAXILLOFACIAL W/O DYE: CPT | Mod: 26,,, | Performed by: RADIOLOGY

## 2020-09-14 PROCEDURE — 70486 CT MAXILLOFACIAL W/O DYE: CPT | Mod: TC

## 2020-09-14 PROCEDURE — 70486 CT MEDTRONIC SINUSES WITHOUT: ICD-10-PCS | Mod: 26,,, | Performed by: RADIOLOGY

## 2020-09-15 ENCOUNTER — PATIENT MESSAGE (OUTPATIENT)
Dept: OTOLARYNGOLOGY | Facility: CLINIC | Age: 55
End: 2020-09-15

## 2020-09-16 ENCOUNTER — TELEPHONE (OUTPATIENT)
Dept: OTOLARYNGOLOGY | Facility: CLINIC | Age: 55
End: 2020-09-16

## 2020-09-16 DIAGNOSIS — G47.33 OSA (OBSTRUCTIVE SLEEP APNEA): ICD-10-CM

## 2020-09-16 DIAGNOSIS — Z01.812 PRE-PROCEDURE LAB EXAM: ICD-10-CM

## 2020-09-16 DIAGNOSIS — J32.4 CHRONIC PANSINUSITIS: Primary | ICD-10-CM

## 2020-09-22 ENCOUNTER — LAB VISIT (OUTPATIENT)
Dept: URGENT CARE | Facility: CLINIC | Age: 55
End: 2020-09-22
Payer: COMMERCIAL

## 2020-09-22 VITALS — HEART RATE: 72 BPM | OXYGEN SATURATION: 98 % | TEMPERATURE: 97 F

## 2020-09-22 DIAGNOSIS — Z01.812 PRE-PROCEDURE LAB EXAM: ICD-10-CM

## 2020-09-22 PROCEDURE — U0003 INFECTIOUS AGENT DETECTION BY NUCLEIC ACID (DNA OR RNA); SEVERE ACUTE RESPIRATORY SYNDROME CORONAVIRUS 2 (SARS-COV-2) (CORONAVIRUS DISEASE [COVID-19]), AMPLIFIED PROBE TECHNIQUE, MAKING USE OF HIGH THROUGHPUT TECHNOLOGIES AS DESCRIBED BY CMS-2020-01-R: HCPCS

## 2020-09-23 ENCOUNTER — TELEPHONE (OUTPATIENT)
Dept: OTOLARYNGOLOGY | Facility: CLINIC | Age: 55
End: 2020-09-23

## 2020-09-23 LAB — SARS-COV-2 RNA RESP QL NAA+PROBE: NOT DETECTED

## 2020-09-24 ENCOUNTER — ANESTHESIA EVENT (OUTPATIENT)
Dept: SURGERY | Facility: HOSPITAL | Age: 55
End: 2020-09-24
Payer: COMMERCIAL

## 2020-09-24 NOTE — ANESTHESIA PAT ROS NOTE
"                                                                                                             09/24/2020  Jaylin Murguia is a 55 y.o., female.      Pre-op Assessment          Review of Systems  Anesthesia Hx:  Hx of Anesthetic complications  Denies Family Hx of Anesthesia complications.  Personal Hx of Anesthesia complications Pulmonary/Ventilatory Issues ("stopped breathing" during colonoscopy D/T  sleep apnea)   Pulmonary:  Obstructive Sleep Apnea (TAMIKA), CPAP used.            "

## 2020-09-24 NOTE — ANESTHESIA PREPROCEDURE EVALUATION
[facility]  Anesthesia Pre-Operative Evaluation         Patient Name: Jaylin Murguia  YOB: 1965  MRN: [id number]    SUBJECTIVE:     Pre-operative evaluation for Procedure(s) (LRB):  FESS, USING COMPUTER-ASSISTED NAVIGATION SPHENOID-LEFT (Bilateral)     09/24/2020    Jaylin Murguia is a 55 y.o. female w/ a significant PMHx of HTN, HLD, well controlled asthma [on spiriva], Crohn's disease, TAMIKA.    Patient now presents for the above procedure(s).    Prev airway [07/2019: Easy mask ventilation, DL with Riley 2, grade II view, without complications.       Patient Active Problem List   Diagnosis    Fibromyalgia    Migraine    Crohn's disease of small intestine    Sciatica    Allergic rhinitis    Diarrhea    Essential (primary) hypertension    Insomnia due to medical condition    Hormone replacement therapy (postmenopausal)    Long-term use of immunosuppressant medication    Crohn's disease    TAMIKA (obstructive sleep apnea)    Bilateral primary osteoarthritis of knee    Primary osteoarthritis of right knee    Primary osteoarthritis of left knee    Other hyperlipidemia    Tortuous aorta    Mild aortic insufficiency    Chronic pansinusitis    Severe persistent asthma with acute exacerbation    Hypogammaglobulinemia    Abdominal pain       Review of patient's allergies indicates:  No Known Allergies    Current Inpatient Medications:      Current Facility-Administered Medications on File Prior to Encounter   Medication Dose Route Frequency Provider Last Rate Last Dose    lactated ringers infusion   Intravenous Continuous Mary Leiva MD        lidocaine (PF) 10 mg/ml (1%) injection 10 mg  1 mL Intradermal Once Mary Leiva MD         Current Outpatient Medications on File Prior to Encounter   Medication Sig Dispense Refill    amLODIPine (NORVASC) 5 MG tablet TAKE 1 TABLET DAILY 90 tablet 3    azelastine (ASTELIN) 137 mcg (0.1 %) nasal spray 1 spray (137 mcg  total) by Nasal route 2 (two) times daily. 30 mL 11    butalbital-acetaminophen-caffeine -40 mg (FIORICET, ESGIC) -40 mg per tablet Take 1 tab po q4 hrs prn headache 90 tablet 3    DULoxetine (CYMBALTA) 60 MG capsule TAKE 1 CAPSULE TWICE A  capsule 3    eletriptan (RELPAX) 40 MG tablet Take 1 tablet (40 mg total) by mouth as needed. 12 tablet 3    ergocalciferol (ERGOCALCIFEROL) 50,000 unit Cap TAKE 1 CAPSULE BY MOUTH ONE TIME PER WEEK 4 capsule 1    estradiol (ESTRACE) 2 MG tablet Take 2 mg by mouth every evening.       fluticasone propionate (FLONASE) 50 mcg/actuation nasal spray       hydroCHLOROthiazide (HYDRODIURIL) 25 MG tablet Take 1 tablet (25 mg total) by mouth once daily. 90 tablet 3    ipratropium (ATROVENT) 0.02 % nebulizer solution Nebulize 2.5 ml every 6 hours as directed, if needed 1 Box 11    levalbuterol (XOPENEX) 1.25 mg/3 mL nebulizer solution Take 3 mLs (1.25 mg total) by nebulization every 4 (four) hours. Rescue 1 Box 11    losartan-hydrochlorothiazide 100-25 mg (HYZAAR) 100-25 mg per tablet Take 0.5 tablets by mouth once daily. 45 tablet 3    mesalamine (PENTASA) 250 mg CpSR Take 4 capsules (1,000 mg total) by mouth 4 (four) times daily. 1440 capsule 3    montelukast (SINGULAIR) 10 mg tablet TAKE 1 TABLET EVERY EVENING 90 tablet 3    nortriptyline (PAMELOR) 25 MG capsule Take 2 capsules (50 mg total) by mouth once daily. 180 capsule 3    pantoprazole (PROTONIX) 40 MG tablet Take 1 tablet (40 mg total) by mouth once daily. Do not crush, break, chew 90 tablet 1    pregabalin (LYRICA) 150 MG capsule TAKE 1 CAPSULE BY MOUTH THREE TIMES A DAY 90 capsule 5    propranoloL (INNOPRAN XL) 80 mg 24 hr capsule Take 1 capsule (80 mg total) by mouth every evening. 90 capsule 3    rizatriptan (MAXALT) 10 MG tablet TAKE 1 TABLET BY MOUTH IF NEEDED FOR MIGRAINES MAX 2 TAB IN 24 HOURS 30 tablet 3    rosuvastatin (CRESTOR) 5 MG tablet Take 1 tablet (5 mg total) by mouth once  daily. 90 tablet 3    tiotropium bromide (SPIRIVA RESPIMAT) 1.25 mcg/actuation Mist Inhale 2 puffs into the lungs once daily. 4 g 11    topiramate (TOPAMAX) 25 MG tablet 1 TABLET TWICE DAILY 180 tablet 3    triamcinolone acetonide 0.025% (KENALOG) 0.025 % cream Apply topically 2 (two) times daily. 453 g 3    VENTOLIN HFA 90 mcg/actuation inhaler INHALE 2 PUFFS INTO THE LUNGS EVERY 4 TO 6 HOURS AS NEEDED FOR WHEEZING. 18 Inhaler 1    zolpidem (AMBIEN) 5 MG Tab TAKE 1 TABLET BY MOUTH EVERY DAY AT BEDTIME AS NEEDED 90 tablet 1       Past Surgical History:   Procedure Laterality Date    BLADDER SURGERY      sling was created by her muscles      SECTION      COLONOSCOPY N/A 2017    Procedure: COLONOSCOPY;  Surgeon: Kin Dyer MD;  Location: [facility];  Service: Endoscopy;  Laterality: N/A;    COLONOSCOPY N/A 3/27/2018    Procedure: COLONOSCOPY;  Surgeon: Kyra Vallecillo MD;  Location: [facility];  Service: Endoscopy;  Laterality: N/A;    COLONOSCOPY N/A 3/12/2020    Procedure: COLONOSCOPY;  Surgeon: Nicole Leal MD;  Location: [facility];  Service: Endoscopy;  Laterality: N/A;    ESOPHAGOGASTRODUODENOSCOPY N/A 3/12/2020    Procedure: ESOPHAGOGASTRODUODENOSCOPY (EGD);  Surgeon: Nicole Leal MD;  Location: [facility];  Service: Endoscopy;  Laterality: N/A;    FINGER SURGERY      joint relpacement, left hand index finger    FUNCTIONAL ENDOSCOPIC SINUS SURGERY (FESS) USING COMPUTER-ASSISTED NAVIGATION Bilateral 2019    Procedure: FESS, USING COMPUTER-ASSISTED NAVIGATION;  Surgeon: Manish Shaffer MD;  Location: [facility] OR;  Service: ENT;  Laterality: Bilateral;    HYSTERECTOMY      WISDOM TOOTH EXTRACTION         Social History     Socioeconomic History    Marital status:      Spouse name: Not on file    Number of children: 2    Years of education: Not on file    Highest education level: Not on file   Occupational History    Occupation: teacher   Social Needs     Financial resource strain: Not very hard    Food insecurity     Worry: Never true     Inability: Never true    Transportation needs     Medical: No     Non-medical: No   Tobacco Use    Smoking status: Never Smoker    Smokeless tobacco: Never Used   Substance and Sexual Activity    Alcohol use: No     Frequency: Never     Drinks per session: Patient refused     Binge frequency: Never    Drug use: No    Sexual activity: Yes     Partners: Male   Lifestyle    Physical activity     Days per week: 3 days     Minutes per session: 40 min    Stress: To some extent   Relationships    Social connections     Talks on phone: More than three times a week     Gets together: Once a week     Attends Anabaptism service: Not on file     Active member of club or organization: Yes     Attends meetings of clubs or organizations: More than 4 times per year     Relationship status:    Other Topics Concern    Not on file   Social History Narrative    Surrogate Decision Maker: , Cristobal Murguia, [phone]       OBJECTIVE:     Vital Signs Range (Last 24H):         Significant Labs:  Lab Results   Component Value Date    WBC 6.22 06/29/2020    HGB 12.6 06/29/2020    HCT 38.5 06/29/2020     06/29/2020    CHOL 177 08/21/2020    TRIG 172 (H) 08/21/2020    HDL 63 08/21/2020    ALT 28 08/21/2020    AST 28 08/21/2020     08/21/2020    K 4.3 08/21/2020     08/21/2020    CREATININE 1.0 08/21/2020    BUN 15 08/21/2020    CO2 23 08/21/2020    TSH 1.487 06/29/2020    INR 1.0 11/20/2014    HGBA1C 5.2 11/14/2017       Diagnostic Studies: No relevant studies.    EKG:   Results for orders placed or performed in visit on 04/01/19   SCHEDULED EKG 12-LEAD (to Muse)    Collection Time: 04/01/19  8:12 AM    Narrative    Test Reason : I10    Vent. Rate : 065 BPM     Atrial Rate : 065 BPM     P-R Int : 218 ms          QRS Dur : 080 ms      QT Int : 420 ms       P-R-T Axes : 057 025 061 degrees     QTc Int : 436  ms    Sinus rhythm with 1st degree A-V block  Otherwise normal ECG  When compared with ECG of 20-NOV-2014 11:51,  No significant change was found  Confirmed by DARLEEN PEARL MD ([id number]) on 4/1/2019 5:13:04 PM    Referred By: MONTRELL RODRIGUEZ           Confirmed By:DARLEEN PEARL MD       2D ECHO:  TTE [04/01/2019]:  CONCLUSIONS     1 - Mild left atrial enlargement.     2 - Concentric hypertrophy.     3 - No wall motion abnormalities.     4 - Normal left ventricular systolic function (EF 60-65%).     5 - Normal left ventricular diastolic function.     6 - Normal right ventricular systolic function .     7 - The estimated PA systolic pressure is 28 mmHg.     8 - Trivial to mild aortic regurgitation.     9 - Trivial to mild mitral regurgitation.     Stress Echo [05/14/2019]  1. The EKG portion of this study is negative for ischemia at a moderate workload, and peak heart rate of 127 bpm (80% of predicted).   2. Exercise capacity is average.   3. Blood pressure response to exercise was normal (Presenting BP: 129/84 Peak BP: 150/78).   4. No significant arrhythmias were present.   5. There were no symptoms of chest discomfort or significant dyspnea throughout the protocol.   6. The Duke treadmill score was 7 suggesting a low probability for future cardiovascular events.     ASSESSMENT/PLAN:       Anesthesia Evaluation    I have reviewed the Patient Summary Reports.    I have reviewed the Nursing Notes.    I have reviewed the Medications.     Review of Systems  Anesthesia Hx:  No problems with previous Anesthesia Denies Hx of Anesthetic complications  Denies Family Hx of Anesthesia complications.   Denies Personal Hx of Anesthesia complications.   Social:  Non-Smoker    Hematology/Oncology:  Hematology Normal   Oncology Normal     EENT/Dental:EENT/Dental Normal   Cardiovascular:   Hypertension, well controlled hyperlipidemia    Pulmonary:   Asthma moderate Sleep Apnea    Renal/:  Renal/ Normal     Hepatic/GI:  Bowel  Conditions:  Inflammatory Bowel Disease, Crohns    Musculoskeletal:   Arthritis   Muscle Disorders: Fibromyalgia    Neurological:   Neuromuscular Disease, Headaches    Endocrine:  Endocrine Normal    Dermatological:  Skin Normal    Psych:  Psychiatric Normal           Physical Exam  General:  Obesity    Airway/Jaw/Neck:  Airway Findings: Mouth Opening: Normal Tongue: Normal  General Airway Assessment: Adult  Mallampati: II  TM Distance: Normal, at least 6 cm  Jaw/Neck Findings:  Neck ROM: Normal ROM  Neck Findings:     Eyes/Ears/Nose:  Eyes/Ears/Nose Findings:    Dental:  Dental Findings: In tact   Chest/Lungs:  Chest/Lungs Findings: Normal Respiratory Rate     Heart/Vascular:  Heart Findings: Rate: Normal  Sounds: Normal  Heart murmur: negative Vascular Findings: Normal    Abdomen:  Abdomen Findings: Normal    Musculoskeletal:  Musculoskeletal Findings: Normal   Skin:  Skin Findings: Normal    Mental Status:  Mental Status Findings:  Alert and Oriented         Anesthesia Plan  Type of Anesthesia, risks & benefits discussed:  Anesthesia Type:  general  Patient's Preference:   Intra-op Monitoring Plan: standard ASA monitors  Intra-op Monitoring Plan Comments:   Post Op Pain Control Plan: multimodal analgesia, IV/PO Opioids PRN and per primary service following discharge from PACU  Post Op Pain Control Plan Comments:   Induction:   IV  Beta Blocker:  Patient is on a Beta-Blocker and has received one dose within the past 24 hours (No further documentation required).       Informed Consent: Patient understands risks and agrees with Anesthesia plan.  Questions answered. Anesthesia consent signed with patient.  ASA Score: 2     Day of Surgery Review of History & Physical:    H&P update referred to the surgeon.         Ready For Surgery From Anesthesia Perspective.

## 2020-09-25 ENCOUNTER — HOSPITAL ENCOUNTER (OUTPATIENT)
Facility: HOSPITAL | Age: 55
Discharge: HOME | End: 2020-09-25
Attending: OTOLARYNGOLOGY | Admitting: OTOLARYNGOLOGY
Payer: COMMERCIAL

## 2020-09-25 ENCOUNTER — ANESTHESIA (OUTPATIENT)
Dept: SURGERY | Facility: HOSPITAL | Age: 55
End: 2020-09-25
Payer: COMMERCIAL

## 2020-09-25 VITALS
HEIGHT: 67 IN | OXYGEN SATURATION: 99 % | BODY MASS INDEX: 31.73 KG/M2 | TEMPERATURE: 98 F | DIASTOLIC BLOOD PRESSURE: 80 MMHG | SYSTOLIC BLOOD PRESSURE: 155 MMHG | RESPIRATION RATE: 20 BRPM | HEART RATE: 66 BPM | WEIGHT: 202.19 LBS

## 2020-09-25 DIAGNOSIS — J32.9 CHRONIC RHINOSINUSITIS: Primary | ICD-10-CM

## 2020-09-25 PROCEDURE — 31000 IRRIGATION MAXILLARY SINUS: CPT | Mod: 50,51,, | Performed by: OTOLARYNGOLOGY

## 2020-09-25 PROCEDURE — 37000009 HC ANESTHESIA EA ADD 15 MINS: Performed by: OTOLARYNGOLOGY

## 2020-09-25 PROCEDURE — 25000003 PHARM REV CODE 250: Performed by: STUDENT IN AN ORGANIZED HEALTH CARE EDUCATION/TRAINING PROGRAM

## 2020-09-25 PROCEDURE — 36000710: Performed by: OTOLARYNGOLOGY

## 2020-09-25 PROCEDURE — 88305 TISSUE EXAM BY PATHOLOGIST: CPT | Performed by: PATHOLOGY

## 2020-09-25 PROCEDURE — 31257 NSL/SINS NDSC TOT W/SPHENDT: CPT | Mod: LT,,, | Performed by: OTOLARYNGOLOGY

## 2020-09-25 PROCEDURE — 87070 CULTURE OTHR SPECIMN AEROBIC: CPT

## 2020-09-25 PROCEDURE — 87186 SC STD MICRODIL/AGAR DIL: CPT

## 2020-09-25 PROCEDURE — 71000044 HC DOSC ROUTINE RECOVERY FIRST HOUR: Performed by: OTOLARYNGOLOGY

## 2020-09-25 PROCEDURE — 88305 TISSUE EXAM BY PATHOLOGIST: ICD-10-PCS | Mod: 26,,, | Performed by: PATHOLOGY

## 2020-09-25 PROCEDURE — 71000015 HC POSTOP RECOV 1ST HR: Performed by: OTOLARYNGOLOGY

## 2020-09-25 PROCEDURE — 71000045 HC DOSC ROUTINE RECOVERY EA ADD'L HR: Performed by: OTOLARYNGOLOGY

## 2020-09-25 PROCEDURE — 87075 CULTR BACTERIA EXCEPT BLOOD: CPT

## 2020-09-25 PROCEDURE — 31255 PR NASAL/SINUS ENDOSCOPY,REMV TOTL ETHMOID: ICD-10-PCS | Mod: 59,RT,, | Performed by: OTOLARYNGOLOGY

## 2020-09-25 PROCEDURE — 88312 SPECIAL STAINS GROUP 1: CPT | Mod: 26,,, | Performed by: PATHOLOGY

## 2020-09-25 PROCEDURE — 27201423 OPTIME MED/SURG SUP & DEVICES STERILE SUPPLY: Performed by: OTOLARYNGOLOGY

## 2020-09-25 PROCEDURE — 87077 CULTURE AEROBIC IDENTIFY: CPT

## 2020-09-25 PROCEDURE — 36000711: Performed by: OTOLARYNGOLOGY

## 2020-09-25 PROCEDURE — 61782 PR STEREOTACTIC COMP ASSIST PROC,CRANIAL,EXTRADURAL: ICD-10-PCS | Mod: ,,, | Performed by: OTOLARYNGOLOGY

## 2020-09-25 PROCEDURE — 88312 SPECIAL STAINS GROUP 1: CPT | Performed by: PATHOLOGY

## 2020-09-25 PROCEDURE — 37000008 HC ANESTHESIA 1ST 15 MINUTES: Performed by: OTOLARYNGOLOGY

## 2020-09-25 PROCEDURE — 31257 PR ENDOSCOPY, NASAL/SINUS, W/ETHMOIDECTOMY/SPHENOIDOTOMY: ICD-10-PCS | Mod: LT,,, | Performed by: OTOLARYNGOLOGY

## 2020-09-25 PROCEDURE — 31255 NSL/SINS NDSC W/TOT ETHMDCT: CPT | Mod: 59,RT,, | Performed by: OTOLARYNGOLOGY

## 2020-09-25 PROCEDURE — 31000 PR IRRIGATION MAXILLARY SINUS: ICD-10-PCS | Mod: 50,51,, | Performed by: OTOLARYNGOLOGY

## 2020-09-25 PROCEDURE — 25000003 PHARM REV CODE 250: Performed by: OTOLARYNGOLOGY

## 2020-09-25 PROCEDURE — 63600175 PHARM REV CODE 636 W HCPCS: Performed by: STUDENT IN AN ORGANIZED HEALTH CARE EDUCATION/TRAINING PROGRAM

## 2020-09-25 PROCEDURE — D9220A PRA ANESTHESIA: ICD-10-PCS | Mod: ,,, | Performed by: ANESTHESIOLOGY

## 2020-09-25 PROCEDURE — 88305 TISSUE EXAM BY PATHOLOGIST: CPT | Mod: 26,,, | Performed by: PATHOLOGY

## 2020-09-25 PROCEDURE — 63600175 PHARM REV CODE 636 W HCPCS: Performed by: OTOLARYNGOLOGY

## 2020-09-25 PROCEDURE — 61782 SCAN PROC CRANIAL EXTRA: CPT | Mod: ,,, | Performed by: OTOLARYNGOLOGY

## 2020-09-25 PROCEDURE — 88312 PR  SPECIAL STAINS,GROUP I: ICD-10-PCS | Mod: 26,,, | Performed by: PATHOLOGY

## 2020-09-25 PROCEDURE — D9220A PRA ANESTHESIA: Mod: ,,, | Performed by: ANESTHESIOLOGY

## 2020-09-25 RX ORDER — DEXAMETHASONE SODIUM PHOSPHATE 4 MG/ML
INJECTION, SOLUTION INTRA-ARTICULAR; INTRALESIONAL; INTRAMUSCULAR; INTRAVENOUS; SOFT TISSUE
Status: DISCONTINUED | OUTPATIENT
Start: 2020-09-25 | End: 2020-09-25

## 2020-09-25 RX ORDER — CEFAZOLIN SODIUM 1 G/3ML
INJECTION, POWDER, FOR SOLUTION INTRAMUSCULAR; INTRAVENOUS
Status: DISCONTINUED | OUTPATIENT
Start: 2020-09-25 | End: 2020-09-25

## 2020-09-25 RX ORDER — ROCURONIUM BROMIDE 10 MG/ML
INJECTION, SOLUTION INTRAVENOUS
Status: DISCONTINUED | OUTPATIENT
Start: 2020-09-25 | End: 2020-09-25

## 2020-09-25 RX ORDER — SODIUM CHLORIDE 0.9 % (FLUSH) 0.9 %
10 SYRINGE (ML) INJECTION
Status: DISCONTINUED | OUTPATIENT
Start: 2020-09-25 | End: 2020-09-25 | Stop reason: HOSPADM

## 2020-09-25 RX ORDER — FENTANYL CITRATE 50 UG/ML
INJECTION, SOLUTION INTRAMUSCULAR; INTRAVENOUS
Status: DISCONTINUED | OUTPATIENT
Start: 2020-09-25 | End: 2020-09-25

## 2020-09-25 RX ORDER — LIDOCAINE HCL/PF 100 MG/5ML
SYRINGE (ML) INTRAVENOUS
Status: DISCONTINUED | OUTPATIENT
Start: 2020-09-25 | End: 2020-09-25

## 2020-09-25 RX ORDER — KETAMINE HCL IN 0.9 % NACL 50 MG/5 ML
SYRINGE (ML) INTRAVENOUS
Status: DISCONTINUED | OUTPATIENT
Start: 2020-09-25 | End: 2020-09-25

## 2020-09-25 RX ORDER — ACETAMINOPHEN 500 MG
1000 TABLET ORAL
Status: COMPLETED | OUTPATIENT
Start: 2020-09-25 | End: 2020-09-25

## 2020-09-25 RX ORDER — PROPOFOL 10 MG/ML
VIAL (ML) INTRAVENOUS CONTINUOUS PRN
Status: DISCONTINUED | OUTPATIENT
Start: 2020-09-25 | End: 2020-09-25

## 2020-09-25 RX ORDER — IPRATROPIUM BROMIDE AND ALBUTEROL SULFATE 2.5; .5 MG/3ML; MG/3ML
3 SOLUTION RESPIRATORY (INHALATION) EVERY 4 HOURS PRN
Status: DISCONTINUED | OUTPATIENT
Start: 2020-09-25 | End: 2020-09-25 | Stop reason: HOSPADM

## 2020-09-25 RX ORDER — ONDANSETRON 2 MG/ML
4 INJECTION INTRAMUSCULAR; INTRAVENOUS DAILY PRN
Status: DISCONTINUED | OUTPATIENT
Start: 2020-09-25 | End: 2020-09-25 | Stop reason: HOSPADM

## 2020-09-25 RX ORDER — MIDAZOLAM HYDROCHLORIDE 1 MG/ML
INJECTION, SOLUTION INTRAMUSCULAR; INTRAVENOUS
Status: DISCONTINUED | OUTPATIENT
Start: 2020-09-25 | End: 2020-09-25

## 2020-09-25 RX ORDER — SODIUM CHLORIDE 9 MG/ML
INJECTION, SOLUTION INTRAVENOUS CONTINUOUS PRN
Status: DISCONTINUED | OUTPATIENT
Start: 2020-09-25 | End: 2020-09-25

## 2020-09-25 RX ORDER — CIPROFLOXACIN 500 MG/1
500 TABLET ORAL EVERY 12 HOURS
Qty: 180 TABLET | Refills: 0 | Status: SHIPPED | OUTPATIENT
Start: 2020-09-25 | End: 2020-11-27

## 2020-09-25 RX ORDER — PROPOFOL 10 MG/ML
VIAL (ML) INTRAVENOUS
Status: DISCONTINUED | OUTPATIENT
Start: 2020-09-25 | End: 2020-09-25

## 2020-09-25 RX ORDER — ACETAMINOPHEN 325 MG/1
650 TABLET ORAL EVERY 6 HOURS PRN
Status: DISCONTINUED | OUTPATIENT
Start: 2020-09-25 | End: 2020-09-25 | Stop reason: HOSPADM

## 2020-09-25 RX ORDER — IBUPROFEN 600 MG/1
600 TABLET ORAL EVERY 6 HOURS PRN
COMMUNITY
Start: 2020-09-25 | End: 2020-12-17 | Stop reason: CLARIF

## 2020-09-25 RX ORDER — EPINEPHRINE 1 MG/ML
INJECTION, SOLUTION INTRACARDIAC; INTRAMUSCULAR; INTRAVENOUS; SUBCUTANEOUS
Status: DISCONTINUED | OUTPATIENT
Start: 2020-09-25 | End: 2020-09-25 | Stop reason: HOSPADM

## 2020-09-25 RX ORDER — HYDROMORPHONE HYDROCHLORIDE 1 MG/ML
0.2 INJECTION, SOLUTION INTRAMUSCULAR; INTRAVENOUS; SUBCUTANEOUS EVERY 5 MIN PRN
Status: DISCONTINUED | OUTPATIENT
Start: 2020-09-25 | End: 2020-09-25 | Stop reason: HOSPADM

## 2020-09-25 RX ORDER — ACETAMINOPHEN 325 MG/1
325 TABLET ORAL EVERY 6 HOURS PRN
Refills: 0 | COMMUNITY
Start: 2020-09-25 | End: 2021-02-03

## 2020-09-25 RX ADMIN — LIDOCAINE HYDROCHLORIDE 60 MG: 20 INJECTION, SOLUTION INTRAVENOUS at 10:09

## 2020-09-25 RX ADMIN — HYDROMORPHONE HYDROCHLORIDE 0.2 MG: 1 INJECTION, SOLUTION INTRAMUSCULAR; INTRAVENOUS; SUBCUTANEOUS at 01:09

## 2020-09-25 RX ADMIN — SUGAMMADEX 200 MG: 100 INJECTION, SOLUTION INTRAVENOUS at 11:09

## 2020-09-25 RX ADMIN — HYDROMORPHONE HYDROCHLORIDE 0.2 MG: 1 INJECTION, SOLUTION INTRAMUSCULAR; INTRAVENOUS; SUBCUTANEOUS at 12:09

## 2020-09-25 RX ADMIN — ACETAMINOPHEN 650 MG: 325 TABLET ORAL at 01:09

## 2020-09-25 RX ADMIN — PROPOFOL 150 MCG/KG/MIN: 10 INJECTION, EMULSION INTRAVENOUS at 10:09

## 2020-09-25 RX ADMIN — ACETAMINOPHEN 1000 MG: 500 TABLET ORAL at 09:09

## 2020-09-25 RX ADMIN — DEXAMETHASONE SODIUM PHOSPHATE 4 MG: 4 INJECTION, SOLUTION INTRAMUSCULAR; INTRAVENOUS at 10:09

## 2020-09-25 RX ADMIN — REMIFENTANIL HYDROCHLORIDE 0.1 MCG/KG/MIN: 1 INJECTION, POWDER, LYOPHILIZED, FOR SOLUTION INTRAVENOUS at 10:09

## 2020-09-25 RX ADMIN — CEFAZOLIN 2 G: 330 INJECTION, POWDER, FOR SOLUTION INTRAMUSCULAR; INTRAVENOUS at 10:09

## 2020-09-25 RX ADMIN — Medication 10 MG: at 11:09

## 2020-09-25 RX ADMIN — ROCURONIUM BROMIDE 40 MG: 10 INJECTION, SOLUTION INTRAVENOUS at 10:09

## 2020-09-25 RX ADMIN — MIDAZOLAM HYDROCHLORIDE 2 MG: 1 INJECTION, SOLUTION INTRAMUSCULAR; INTRAVENOUS at 10:09

## 2020-09-25 RX ADMIN — SODIUM CHLORIDE: 0.9 INJECTION, SOLUTION INTRAVENOUS at 09:09

## 2020-09-25 RX ADMIN — FENTANYL CITRATE 100 MCG: 50 INJECTION, SOLUTION INTRAMUSCULAR; INTRAVENOUS at 10:09

## 2020-09-25 RX ADMIN — PROPOFOL 180 MG: 10 INJECTION, EMULSION INTRAVENOUS at 10:09

## 2020-09-25 RX ADMIN — Medication 20 MG: at 10:09

## 2020-09-25 RX ADMIN — ONDANSETRON 4 MG: 2 INJECTION, SOLUTION INTRAMUSCULAR; INTRAVENOUS at 12:09

## 2020-09-25 NOTE — TRANSFER OF CARE
"Anesthesia Transfer of Care Note    Patient: Jaylin Murguia    Procedure(s) Performed: Procedure(s) (LRB):  FESS, USING COMPUTER-ASSISTED NAVIGATION SPHENOID (Bilateral)    Patient location: PACU    Anesthesia Type: general    Transport from OR: Transported from OR on 6-10 L/min O2 by face mask with adequate spontaneous ventilation    Post pain: adequate analgesia    Post assessment: no apparent anesthetic complications    Post vital signs: stable    Level of consciousness: awake    Nausea/Vomiting: no nausea/vomiting    Complications: none    Transfer of care protocol was followed      Last vitals:   Visit Vitals  /74 (BP Location: Right arm, Patient Position: Lying)   Pulse 74   Temp 36.8 °C (98.3 °F) (Oral)   Resp 20   Ht 5' 7" (1.702 m)   Wt 91.7 kg (202 lb 2.6 oz)   SpO2 100%   Breastfeeding No   BMI 31.66 kg/m²     "

## 2020-09-25 NOTE — ANESTHESIA PROCEDURE NOTES
Intubation  Performed by: Emilee Love MD  Authorized by: Esthela Dumont MD     Intubation:     Induction:  Intravenous    Intubated:  Postinduction    Mask Ventilation:  Easy mask    Attempts:  1    Attempted By:  Resident anesthesiologist    Method of Intubation:  Direct    Blade:  Eli 3    Laryngeal View Grade: Grade I - full view of chords      Difficult Airway Encountered?: No      Complications:  None    Airway Device:  Oral endotracheal tube    Airway Device Size:  7.0    Style/Cuff Inflation:  Cuffed    Inflation Amount (mL):  6    Tube secured:  22    Secured at:  The lips    Placement Verified By:  Capnometry    Complicating Factors:  None    Findings Post-Intubation:  BS equal bilateral and atraumatic/condition of teeth unchanged

## 2020-09-25 NOTE — INTERVAL H&P NOTE
The patient has been examined and the H&P has been reviewed:    I concur with the findings and no changes have occurred since H&P was written.    Surgery risks, benefits and alternative options discussed and understood by patient/family.          Active Hospital Problems    Diagnosis  POA    Chronic rhinosinusitis [J32.9]  Yes      Resolved Hospital Problems   No resolved problems to display.

## 2020-09-25 NOTE — BRIEF OP NOTE
[facility]  Brief Operative Note    Surgery Date: 9/25/2020     Surgeon(s) and Role:     * Matthias Roach MD - Primary     * Daniel Milton MD - Resident - Assisting        Pre-op Diagnosis:  Chronic pansinusitis [J32.4]  TAMIKA (obstructive sleep apnea) [G47.33]    Post-op Diagnosis:  Post-Op Diagnosis Codes:     * Chronic pansinusitis [J32.4]     * TAMIKA (obstructive sleep apnea) [G47.33]    Procedure(s) (LRB):  FESS, USING COMPUTER-ASSISTED NAVIGATION SPHENOID (Bilateral)    Anesthesia: General    Description of the findings of the procedure(s): Bilateral Maxillary sinus washout, Bilateral revision ethmoidectomy, revision sphenoidotomy, polypectomy    Estimated Blood Loss: 20 cc         Specimens:   Specimen (12h ago, onward)    None            Discharge Note    OUTCOME: Patient tolerated treatment/procedure well without complication and is now ready for discharge.    DISPOSITION: Home or Self Care    FINAL DIAGNOSIS:  Chronic rhinosinusitis    FOLLOWUP: In clinic    DISCHARGE INSTRUCTIONS:    Discharge Procedure Orders   Diet Adult Regular     Notify your health care provider if you experience any of the following:  temperature >100.4     Notify your health care provider if you experience any of the following:  persistent nausea and vomiting or diarrhea     Notify your health care provider if you experience any of the following:  severe uncontrolled pain     Notify your health care provider if you experience any of the following:  redness, tenderness, or signs of infection (pain, swelling, redness, odor or green/yellow discharge around incision site)     Notify your health care provider if you experience any of the following:  difficulty breathing or increased cough     Notify your health care provider if you experience any of the following:  severe persistent headache     Notify your health care provider if you experience any of the following:  worsening rash     Notify your health care  provider if you experience any of the following:  persistent dizziness, light-headedness, or visual disturbances     Notify your health care provider if you experience any of the following:  increased confusion or weakness     Change dressing (specify)   Order Comments: Can change mustache dressing as needed.     Activity as tolerated

## 2020-09-25 NOTE — DISCHARGE INSTRUCTIONS
Nasal Surgery: Your Recovery  Youve just had nasal surgery. During the first weeks after surgery, be sure to follow the advice of your doctor. The tips on this sheet can help speed your recovery.  Tips for healing  · Dont take medicines containing aspirin or ibuprofen.  · Don't bump your nose or touch the splint or packing.  · Don't bend or lift.  · Sneeze or cough with your mouth open to reduce pressure inside your nose.  · Keep from blowing your nose until youre told its OK to do so.  · Keep eyeglasses from resting on your nose by taping them above the nose.  · Protect your nose from the sun.  · After packing is removed, start saltwater rinses if prescribed.  · Keep follow-up appointments with your doctor.  Follow-up visits  Your doctor will most likely want to see you within a week to check your healing. Any packing, splint, or dressings will probably be removed at that time. You may feel slight discomfort and bleed a little when this is done.  Assessing the results  During later follow-up visits, your doctor will assess your healing and the results of your surgery. Talk with your doctor about any problems or concerns you may have.  When to call the doctor  Call the doctor if you notice any of the following:  · Sudden increase in pain, swelling, or bruising  · Fever  · Heavy bleeding  · Yellow or greenish drainage from the nose  · Unrelieved headache  · Decreased or double vision  · Stiff neck or very tired feeling   Date Last Reviewed: 11/1/2016  © 8087-5145 [employer]. [address] [place] [address], [place] [address]. All rights reserved. This information is not intended as a substitute for professional medical care. Always follow your healthcare professional's instructions.

## 2020-09-25 NOTE — PLAN OF CARE
Patient and family state they are ready to be discharged. Instructions and prescription (delivered to bedside) given to patient and family. Both verbalize understanding. Patient tolerating po liquids with no difficulty. Patient states pain is at a tolerable level for them. Anesthesia consent and surgical consent in chart upon patient's discharge from [facility].

## 2020-09-28 LAB — BACTERIA SPEC AEROBE CULT: ABNORMAL

## 2020-09-28 NOTE — ANESTHESIA POSTPROCEDURE EVALUATION
Anesthesia Post Evaluation    Patient: Jaylin Murguia    Procedure(s) Performed: Procedure(s) (LRB):  FESS, USING COMPUTER-ASSISTED NAVIGATION SPHENOID (Bilateral)    Final Anesthesia Type: general    Patient location during evaluation: PACU  Patient participation: Yes- Able to Participate  Level of consciousness: awake and alert and oriented  Post-procedure vital signs: reviewed and stable  Pain management: adequate  Airway patency: patent    PONV status at discharge: No PONV  Anesthetic complications: no      Cardiovascular status: hemodynamically stable  Respiratory status: unassisted and spontaneous ventilation  Hydration status: euvolemic  Follow-up not needed.          Vitals Value Taken Time   /80 09/25/20 1345   Temp 36.4 °C (97.5 °F) 09/25/20 1345   Pulse 66 09/25/20 1345   Resp 20 09/25/20 1345   SpO2 99 % 09/25/20 1345         No case tracking events are documented in the log.      Pain/Ramya Score: No data recorded

## 2020-09-29 NOTE — OP NOTE
DATE OF OPERATION: 9/25/2020    SURGEON:  Matthias Roach MD     ASSISTANT SURGEON:  Sherwin Daniels MD     OPERATION:     1. Bilateral image-guided endoscopic total ethmoidectomy.  2. Bilateral image-guided endoscopic maxillary sinus lavage.  3. Left image-guided endoscopic sphenoidotomy.     PREOPERATIVE DIAGNOSIS:      1. Chronic rhinosinusitis.  2. Sinonasal polyposis.     POSTOPERATIVE DIAGNOSIS:      1. Chronic rhinosinusitis.  2. Sinonasal polyposis.     ANESTHESIA: General.     COMPLICATIONS: None.     ESTIMATED BLOOD LOSS: 100 mL     SPECIMEN: Bilateral ethmoid.  Left maxillary sinus cultures for bacteria and fungus.     WOUND EXPECTANCY: Infected.    DRESSING: No stent in the bilateral middle meatus. No nasal packing.     FINDINGS: Diffuse moderate-size mucosal polyposis involving all sinuses with interspersed diffuse purulent matter.  Prior maxillary antrostomies bilaterally with partial ethmoidectomy.  Dominant left-sided sphenoid sinus.  Markedly hypoplastic frontal sinuses.     INDICATIONS: Chronic rhinosinusitis, not controlled with maximal medical therapy.     I discussed the risks, benefits and alternatives of surgical correction of the chronically obstructed sinuses and associated turbinate hypertrophy with the patient as well as the expected postoperative course. I gave her the opportunity to ask questions and I answered all of them. On the morning of surgery I again met with the patient and reviewed the indications for surgery and she consented to proceed.     DESCRIPTION OF PROCEDURE: The patient was brought to the operating room and placed supine on the operating table. The patient was placed under general anesthesia and intubated. The patient was positioned with a donut under the head and the image-guidance headset for the [employer] system was applied.  Image-guided navigation was indicated to facilitate exenteration of all ethmoid cells and the extent of sinusotomy.  The CT scan disc was  loaded into the image-guidance system and registered with the patient tracking system according to the 's instructions. The pointer was calibrated and registration was verified using predefined landmarks.  Cottonoid pledgets soaked with 4% cocaine were placed into the nasal cavity bilaterally for mucosal decongestion.  Prophylactic cefazolin was given prior to the surgery start. A time-out was performed to confirm the proper patient, site and procedure.  The CT images were again reviewed prior to surgical start.  The patient was prepped and draped in the usual fashion.  The bed was placed in 20-degree reverse Trendelenberg position.     A 0-degree endoscope was used to examine the nasal cavity.  Local injections of 1% lidocaine with 1:100,000 parts epinephrine were then performed at the sphenopalatine ganglion region bilaterally.     Attention was then turned to the sinuses.   The left middle meatus was topically decongested using pledgets containing 10,000 units of thrombin with 1:10,000 parts epinephrine.  Partial uncinectomy was apparent, with superior residuals present.  The uncinate process was then visualized and a micro-tiffany backbiter was used to incise the uncinate process. The uncinate was dissected to its superior attachment and removed using cutting forceps.  A campos bullosa was not present.     A prior maxillary antrostomy was evident, with a patent lumen but filled with copious purulent matter.  Cultures were taken for bacteria and fungus.  Then the sinus was irrigated and lavaged copiously with saline.     The ethmoid bulla was entered with non-powered instrumentation and anterior ethmoidectomy was performed.  The roof of the bulla was removed with forceps and the suprabullar recess was exposed. The grand lamella of the middle turbinate was then traversed and posterior ethmoidectomy was performed.  An Onodi cell was not present.  The mucosa was hypertrophic throughout the sinuses,  indicative of chronic inflammation.  A microdebrider was used sparingly to remove residual mucosal fragments.  Topical hemostatic agents on pledgets were then placed into the ethmoid cavity for hemostasis.    The sphenoid sinus rostrum was identified and the sinus was entered in a low and medial fashion, adjacent to the superior turbinate. This sphenoidotomy was enlarged to include the posterior segment of this superior turbinate and the natural ostium of the sphenoid sinus.  Purulent exudate was present within the sphenoid sinus, which was evacuated and irrigated.       Inspection was performed at the site of the nasofrontal recess.  The left frontal sinus was markedly hypoplastic and ended in a small blind pouch that did not require additional dissection.     Attention was then turned to the right side of the sinonasal tract.  A similar procedure was performed on this side, including uncinectomy, maxillary lavage and total ethmoidectomy.  He sphenoid was rudimentary and the frontal sinus was aplastic.     At the conclusion of these procedures, the image-guidance probe was used to verify that all ethmoid cells had been properly opened and that the skull base was visible and that the lamina papyracea had not been traversed.  All sinuses were copiously irrigated with warm normal saline solution.     At this point, the pledgets were all removed. Ger hemostatic agent was placed into the surgical sites. No stent was placed into the bilateral ethmoid cavities.  The nasal cavity was not packed.  Mupirocin ointment was applied to the vestibule bilaterally.  At this point, the headset was removed and the drapes were taken down. Intravenous dexamethasone was given toward the end of the case. The patient was turned back toward the anesthesiologist and awakened from anesthesia, extubated and transferred to the recovery room in stable condition.      POSTOPERATIVE PLAN:  Culture-directed antibiotics for a prolonged course.

## 2020-09-30 LAB — BACTERIA SPEC ANAEROBE CULT: NORMAL

## 2020-10-01 ENCOUNTER — OFFICE VISIT (OUTPATIENT)
Dept: OTOLARYNGOLOGY | Facility: CLINIC | Age: 55
End: 2020-10-01
Payer: COMMERCIAL

## 2020-10-01 VITALS — SYSTOLIC BLOOD PRESSURE: 133 MMHG | HEART RATE: 64 BPM | DIASTOLIC BLOOD PRESSURE: 87 MMHG

## 2020-10-01 DIAGNOSIS — J31.0 NONALLERGIC RHINITIS: Primary | ICD-10-CM

## 2020-10-01 DIAGNOSIS — Z01.812 PRE-PROCEDURE LAB EXAM: ICD-10-CM

## 2020-10-01 DIAGNOSIS — J32.4 CHRONIC PANSINUSITIS: ICD-10-CM

## 2020-10-01 DIAGNOSIS — Z22.39: ICD-10-CM

## 2020-10-01 LAB
FINAL PATHOLOGIC DIAGNOSIS: NORMAL
GROSS: NORMAL

## 2020-10-01 PROCEDURE — 31237 NASAL/SINUS ENDOSCOPY: ICD-10-PCS | Mod: 50,58,S$GLB, | Performed by: OTOLARYNGOLOGY

## 2020-10-01 PROCEDURE — 99999 PR PBB SHADOW E&M-EST. PATIENT-LVL III: ICD-10-PCS | Mod: PBBFAC,,, | Performed by: OTOLARYNGOLOGY

## 2020-10-01 PROCEDURE — 3079F PR MOST RECENT DIASTOLIC BLOOD PRESSURE 80-89 MM HG: ICD-10-PCS | Mod: CPTII,S$GLB,, | Performed by: OTOLARYNGOLOGY

## 2020-10-01 PROCEDURE — 3079F DIAST BP 80-89 MM HG: CPT | Mod: CPTII,S$GLB,, | Performed by: OTOLARYNGOLOGY

## 2020-10-01 PROCEDURE — 99024 POSTOP FOLLOW-UP VISIT: CPT | Mod: S$GLB,,, | Performed by: OTOLARYNGOLOGY

## 2020-10-01 PROCEDURE — 3075F PR MOST RECENT SYSTOLIC BLOOD PRESS GE 130-139MM HG: ICD-10-PCS | Mod: CPTII,S$GLB,, | Performed by: OTOLARYNGOLOGY

## 2020-10-01 PROCEDURE — 99024 PR POST-OP FOLLOW-UP VISIT: ICD-10-PCS | Mod: S$GLB,,, | Performed by: OTOLARYNGOLOGY

## 2020-10-01 PROCEDURE — 99999 PR PBB SHADOW E&M-EST. PATIENT-LVL III: CPT | Mod: PBBFAC,,, | Performed by: OTOLARYNGOLOGY

## 2020-10-01 PROCEDURE — 31237 NSL/SINS NDSC SURG BX POLYPC: CPT | Mod: 50,58,S$GLB, | Performed by: OTOLARYNGOLOGY

## 2020-10-01 PROCEDURE — 3075F SYST BP GE 130 - 139MM HG: CPT | Mod: CPTII,S$GLB,, | Performed by: OTOLARYNGOLOGY

## 2020-10-01 NOTE — PROCEDURES
Nasal/sinus endoscopy    Date/Time: 10/1/2020 11:30 AM  Performed by: Matthias Roach MD  Authorized by: Matthias Roach MD     Consent Done?:  Yes (Verbal)  Anesthesia:     Local anesthetic:  Lidocaine 4% and Jose-Synephrine 1/2%    Patient tolerance:  Patient tolerated the procedure well with no immediate complications  Nose:     Procedure Performed:  Removal of Debridement  External:      No external nasal deformity  Intranasal:      Mucosa no polyps     Mucosa ulcers not present     No mucosa lesions present     Turbinates not enlarged     No septum gross deformity  Nasopharynx:      No mucosa lesions     Adenoids not present     Posterior choanae patent     Eustachian tube patent     Debridement of both ethmoid cavities performed with Galindo forceps.  Sinuses otherwise patent.  No richard purulence.

## 2020-10-01 NOTE — PROGRESS NOTES
Subjective:      Jaylin Murguia is a 55 y.o. female who comes for follow-up 1 week status-post endoscopic sinus surgery.  Her last visit with me was on 9/3/2020.  Doing fine, some congestion, bleeding, diminishing pain, more on left side.  Using saline rinse TID, taking cipro orally.    SNOT-22 score: : (P) 49  NOSE score:: (P) 45%  ETDQ-7 score:: (P) 2.7        Objective:     /87   Pulse 64      General:   not in distress   Nasal:  edematous mucosa   no septal hematoma   no bleeding   Oral Cavity:   clear   Oropharynx:   no bleeding   Neck:   nontender       Procedure    Endoscopic debridement performed.  See procedure note.        Data Reviewed    Pathology report indicated non-eosinophilic chronic inflammation.  Cultures showed Pseudomonas with intermediate sensitivity to cipro.      Assessment:     Doing well following bilateral endoscopic sinus surgery.       1. Nonallergic rhinitis    2. 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  Continue oral cipro.  Saline rinse 3-4 times a day as tolerated.  Follow up in about 1 month (around 11/1/2020) for nasal endoscopy.

## 2020-10-05 ENCOUNTER — PATIENT MESSAGE (OUTPATIENT)
Dept: PRIMARY CARE CLINIC | Facility: CLINIC | Age: 55
End: 2020-10-05

## 2020-10-27 ENCOUNTER — PATIENT MESSAGE (OUTPATIENT)
Dept: OTOLARYNGOLOGY | Facility: CLINIC | Age: 55
End: 2020-10-27

## 2020-11-07 ENCOUNTER — OFFICE VISIT (OUTPATIENT)
Dept: URGENT CARE | Facility: CLINIC | Age: 55
End: 2020-11-07
Payer: COMMERCIAL

## 2020-11-07 VITALS
HEART RATE: 62 BPM | DIASTOLIC BLOOD PRESSURE: 76 MMHG | TEMPERATURE: 98 F | OXYGEN SATURATION: 99 % | WEIGHT: 202 LBS | RESPIRATION RATE: 16 BRPM | SYSTOLIC BLOOD PRESSURE: 118 MMHG | BODY MASS INDEX: 31.71 KG/M2 | HEIGHT: 67 IN

## 2020-11-07 DIAGNOSIS — Z20.822 CLOSE EXPOSURE TO COVID-19 VIRUS: Primary | ICD-10-CM

## 2020-11-07 LAB
CTP QC/QA: YES
CTP QC/QA: YES
FLUAV AG NPH QL: NEGATIVE
FLUBV AG NPH QL: NEGATIVE
SARS-COV-2 RDRP RESP QL NAA+PROBE: POSITIVE

## 2020-11-07 PROCEDURE — 87804 INFLUENZA ASSAY W/OPTIC: CPT | Mod: QW,S$GLB,, | Performed by: NURSE PRACTITIONER

## 2020-11-07 PROCEDURE — U0002 COVID-19 LAB TEST NON-CDC: HCPCS | Mod: QW,S$GLB,, | Performed by: NURSE PRACTITIONER

## 2020-11-07 PROCEDURE — U0002: ICD-10-PCS | Mod: QW,S$GLB,, | Performed by: NURSE PRACTITIONER

## 2020-11-07 PROCEDURE — 87804 POCT INFLUENZA A/B: ICD-10-PCS | Mod: 59,QW,S$GLB, | Performed by: NURSE PRACTITIONER

## 2020-11-07 PROCEDURE — 99214 PR OFFICE/OUTPT VISIT, EST, LEVL IV, 30-39 MIN: ICD-10-PCS | Mod: 25,S$GLB,CS, | Performed by: NURSE PRACTITIONER

## 2020-11-07 PROCEDURE — 99214 OFFICE O/P EST MOD 30 MIN: CPT | Mod: 25,S$GLB,CS, | Performed by: NURSE PRACTITIONER

## 2020-11-07 NOTE — PATIENT INSTRUCTIONS
You have tested positive for COVID-19 today. Per the CDC, you are now in a 10 day isolation.  This isolation starts from the day you first developed symptoms, not the day of your positive test. For example, if your symptoms began on a Monday, and you waited until Friday of the same week to get tested, and it was positive, your 10 day isolation begins from that Monday, not the Friday you tested positive.  However, if you are asymptomatic (a person who does not have any symptoms), and received a COVID-19 test that was positive, your 10 day isolation begins on the day you tested positive. This is regardless if you were exposed to a known positive days earlier. So for example, if you test positive as an asymptomatic on day 7 from exposure, you have now extended your 14 day quarantine to a 17 day quarantine.  Also, per the CDC guidelines, once your 10 days have passed, and you have not had fever greater than 100.4F in the last 24 hours without taking any fever reducers such as Tylenol (Acetaminophen) or Motrin (Ibuprofen), you may return to your normal activities including social distancing, wearing masks, and frequent handwashing - YOU DO NOT NEED ANOTHER TEST, OR TO TEST NEGATIVE, IN ORDER TO END YOUR QUARANTINE!      Instructions for Patients with Confirmed or Suspected COVID-19     Stay home and stay away from family members and friends. The CDC says, you can leave home after these three things have happened: 1) You have had no fever for at least 72 hours (that is three full days of no fever without the use of medicine that reduces fevers) 2) AND other symptoms have improved (for example, when your cough or shortness of breath have improved) 3) AND at least 10 days have passed since your symptoms first appeared.   Separate yourself from other people and animals in your home.   Call ahead before visiting your doctor.   Wear a facemask.   Cover your coughs and sneezes.   Wash your hands often with soap and water;  hand  can be used, too.   Avoid sharing personal household items.   Wipe down surfaces used daily.   Monitor your symptoms. Seek prompt medical attention if your illness is worsening (e.g., difficulty breathing).    Before seeking care, call your healthcare provider.   If you have a medical emergency and need to call 911, notify the dispatch personnel that you have, or are being evaluated for COVID-19. If possible, put on a facemask before emergency medical services arrive.        Recommended precautions for household members, intimate partners, and caregivers in a home setting of a patient with symptomatic laboratory-confirmed COVID-19 or a patient under investigation.  Household members, intimate partners, and caregivers in the home setting awaiting tests results have close contact with a person with symptomatic, laboratory-confirmed COVID-19 or a person under investigation. Close contacts should monitor their health; they should call their provider right away if they develop symptoms suggestive of COVID-19 (e.g., fever, cough, shortness of breath).    Close contacts should also follow these recommendations:   Make sure that you understand and can help the patient follow their provider's instructions for medication(s) and care. You should help the patient with basic needs in the home and provide support for getting groceries, prescriptions, and other personal needs.   Monitor the patient's symptoms. If the patient is getting sicker, call his or her healthcare provider and tell them that the patient has laboratory-confirmed COVID-19. If the patient has a medical emergency and you need to call 911, notify the dispatch personnel that the patient has, or is being evaluated for COVID-19.   Household members should stay in another room or be  from the patient. Household members should use a separate bedroom and bathroom, if available.   Prohibit visitors.   Household members should care for  any pets in the home.   Make sure that shared spaces in the home have good air flow, such as by an air conditioner or an opened window, weather permitting.   Perform hand hygiene frequently. Wash your hands often with soap and water for at least 20 seconds or use an alcohol-based hand  (that contains > 60% alcohol) covering all surfaces of your hands and rubbing them together until they feel dry. Soap and water should be used preferentially.   Avoid touching your eyes, nose, and mouth.   The patient should wear a facemask. If the patient is not able to wear a facemask (for example, because it causes trouble breathing), caregivers should wear a mask when they are in the same room as the patient.   Wear a disposable facemask and gloves when you touch or have contact with the patient's blood, stool, or body fluids, such as saliva, sputum, nasal mucus, vomit, urine.  o Throw out disposable facemasks and gloves after using them. Do not reuse.  o When removing personal protective equipment, first remove and dispose of gloves. Then, immediately clean your hands with soap and water or alcohol-based hand . Next, remove and dispose of facemask, and immediately clean your hands again with soap and water or alcohol-based hand .   You should not share dishes, drinking glasses, cups, eating utensils, towels, bedding, or other items with the patient. After the patient uses these items, you should wash them thoroughly (see below Wash laundry thoroughly).   Clean all high-touch surfaces, such as counters, tabletops, doorknobs, bathroom fixtures, toilets, phones, keyboards, tablets, and bedside tables, every day. Also, clean any surfaces that may have blood, stool, or body fluids on them.   Use a household cleaning spray or wipe, according to the label instructions. Labels contain instructions for safe and effective use of the cleaning product including precautions you should take when applying  the product, such as wearing gloves and making sure you have good ventilation during use of the product.   Wash laundry thoroughly.  o Immediately remove and wash clothes or bedding that have blood, stool, or body fluids on them.  o Wear disposable gloves while handling soiled items and keep soiled items away from your body. Clean your hands (with soap and water or an alcohol-based hand ) immediately after removing your gloves.  o Read and follow directions on labels of laundry or clothing items and detergent. In general, using a normal laundry detergent according to washing machine instructions and dry thoroughly using the warmest temperatures recommended on the clothing label.   Place all used disposable gloves, facemasks, and other contaminated items in a lined container before disposing of them with other household waste. Clean your hands (with soap and water or an alcohol-based hand ) immediately after handling these items. Soap and water should be used preferentially if hands are visibly dirty.   Discuss any additional questions with your state or local health department or healthcare provider. Check available hours when contacting your local health department.    For more information see CDC link below.      https://www.cdc.gov/coronavirus/2019-ncov/hcp/guidance-prevent-spread.html#precautions        Sources:  [facility], [facility] and [facility]    If you were prescribed a narcotic or controlled medication, do not drive or operate heavy equipment or machinery while taking these medications.  You must understand that you've received an Urgent Care treatment only and that you may be released before all your medical problems are known or treated. You, the patient, will arrange for follow up care as instructed.  Follow up with your PCP or specialty clinic as directed within 2-5 days if not improved or as needed.  You can call [phone] to schedule an appointment with the  appropriate provider.  If your condition worsens we recommend that you receive another evaluation at the emergency room immediately or contact your primary medical clinics after hours call service to discuss your concerns.  Please return here or go to the Emergency Department for any concerns or worsening of condition.

## 2020-11-07 NOTE — PROGRESS NOTES
"Subjective:       Patient ID: Jaylin Murguia is a 55 y.o. female.    Vitals:  height is 5' 7" (1.702 m) and weight is 91.6 kg (202 lb). Her temperature is 98.3 °F (36.8 °C). Her blood pressure is 118/76 and her pulse is 62. Her respiration is 16 and oxygen saturation is 99%.     Chief Complaint: COVID-19 Concerns    55 yr old female presents to the Urgent Care presents to the Urgent Care with concerns for Covid. Patient son tested positive for Covid.     URI   This is a new problem. Episode onset: x 2 days. The problem has been unchanged. There has been no fever. Associated symptoms include congestion, coughing, diarrhea (hx of Crohns ), ear pain, headaches and sinus pain. Pertinent negatives include no abdominal pain, chest pain, dysuria, joint pain, joint swelling, nausea, neck pain, plugged ear sensation, rash, rhinorrhea, sneezing, sore throat, swollen glands, vomiting or wheezing.       Constitution: Positive for sweating, fatigue and generalized weakness. Negative for chills, fever, unexpected weight change and international travel in last 60 days.   HENT: Positive for ear pain, congestion, postnasal drip, sinus pain and sinus pressure. Negative for sore throat and voice change.    Neck: Negative for neck pain and painful lymph nodes.   Cardiovascular: Negative for chest pain.   Eyes: Negative for eye redness.   Respiratory: Positive for cough, sputum production and asthma (hx). Negative for chest tightness, bloody sputum, COPD, shortness of breath, stridor and wheezing.    Gastrointestinal: Positive for diarrhea (hx of Crohns ). Negative for abdominal pain, nausea and vomiting.   Genitourinary: Negative for dysuria.   Musculoskeletal: Positive for muscle ache.   Skin: Negative for rash.   Allergic/Immunologic: Positive for asthma (hx). Negative for seasonal allergies and sneezing.   Neurological: Positive for headaches. Negative for dizziness, history of vertigo, light-headedness and passing out. "   Hematologic/Lymphatic: Negative for swollen lymph nodes.       Objective:      Physical Exam   Patient was seen remotely due to State of Emergency for the COVID-19 outbreak.  Counseled patient and answered questions in regards to COVID-19 testing and diagnosis. Patient agreed to remote visit. Patient spoke clear and without difficulty via remote visit. Patient denies any distress at this time. ER precautions discussed. Patient verbalized understanding.         Assessment:       1. Close exposure to COVID-19 virus        Results for orders placed or performed in visit on 11/07/20   POCT COVID-19 Rapid Screening   Result Value Ref Range    POC Rapid COVID Positive (A) Negative     Acceptable Yes    POCT Influenza A/B   Result Value Ref Range    Rapid Influenza A Ag Negative Negative    Rapid Influenza B Ag Negative Negative     Acceptable Yes        Plan:       Patient requested Flu test although educated on the high possibility of Covid due to recent exposure. Flu Negative. Covid Positive.     Close exposure to COVID-19 virus  -     POCT COVID-19 Rapid Screening  -     POCT Influenza A/B      Patient Instructions   You have tested positive for COVID-19 today. Per the CDC, you are now in a 10 day isolation.  This isolation starts from the day you first developed symptoms, not the day of your positive test. For example, if your symptoms began on a Monday, and you waited until Friday of the same week to get tested, and it was positive, your 10 day isolation begins from that Monday, not the Friday you tested positive.  However, if you are asymptomatic (a person who does not have any symptoms), and received a COVID-19 test that was positive, your 10 day isolation begins on the day you tested positive. This is regardless if you were exposed to a known positive days earlier. So for example, if you test positive as an asymptomatic on day 7 from exposure, you have now extended your 14 day  quarantine to a 17 day quarantine.  Also, per the CDC guidelines, once your 10 days have passed, and you have not had fever greater than 100.4F in the last 24 hours without taking any fever reducers such as Tylenol (Acetaminophen) or Motrin (Ibuprofen), you may return to your normal activities including social distancing, wearing masks, and frequent handwashing - YOU DO NOT NEED ANOTHER TEST, OR TO TEST NEGATIVE, IN ORDER TO END YOUR QUARANTINE!      Instructions for Patients with Confirmed or Suspected COVID-19     Stay home and stay away from family members and friends. The CDC says, you can leave home after these three things have happened: 1) You have had no fever for at least 72 hours (that is three full days of no fever without the use of medicine that reduces fevers) 2) AND other symptoms have improved (for example, when your cough or shortness of breath have improved) 3) AND at least 10 days have passed since your symptoms first appeared.   Separate yourself from other people and animals in your home.   Call ahead before visiting your doctor.   Wear a facemask.   Cover your coughs and sneezes.   Wash your hands often with soap and water; hand  can be used, too.   Avoid sharing personal household items.   Wipe down surfaces used daily.   Monitor your symptoms. Seek prompt medical attention if your illness is worsening (e.g., difficulty breathing).    Before seeking care, call your healthcare provider.   If you have a medical emergency and need to call 911, notify the dispatch personnel that you have, or are being evaluated for COVID-19. If possible, put on a facemask before emergency medical services arrive.        Recommended precautions for household members, intimate partners, and caregivers in a home setting of a patient with symptomatic laboratory-confirmed COVID-19 or a patient under investigation.  Household members, intimate partners, and caregivers in the home setting awaiting  tests results have close contact with a person with symptomatic, laboratory-confirmed COVID-19 or a person under investigation. Close contacts should monitor their health; they should call their provider right away if they develop symptoms suggestive of COVID-19 (e.g., fever, cough, shortness of breath).    Close contacts should also follow these recommendations:   Make sure that you understand and can help the patient follow their provider's instructions for medication(s) and care. You should help the patient with basic needs in the home and provide support for getting groceries, prescriptions, and other personal needs.   Monitor the patient's symptoms. If the patient is getting sicker, call his or her healthcare provider and tell them that the patient has laboratory-confirmed COVID-19. If the patient has a medical emergency and you need to call 911, notify the dispatch personnel that the patient has, or is being evaluated for COVID-19.   Household members should stay in another room or be  from the patient. Household members should use a separate bedroom and bathroom, if available.   Prohibit visitors.   Household members should care for any pets in the home.   Make sure that shared spaces in the home have good air flow, such as by an air conditioner or an opened window, weather permitting.   Perform hand hygiene frequently. Wash your hands often with soap and water for at least 20 seconds or use an alcohol-based hand  (that contains > 60% alcohol) covering all surfaces of your hands and rubbing them together until they feel dry. Soap and water should be used preferentially.   Avoid touching your eyes, nose, and mouth.   The patient should wear a facemask. If the patient is not able to wear a facemask (for example, because it causes trouble breathing), caregivers should wear a mask when they are in the same room as the patient.   Wear a disposable facemask and gloves when you touch or  have contact with the patient's blood, stool, or body fluids, such as saliva, sputum, nasal mucus, vomit, urine.  o Throw out disposable facemasks and gloves after using them. Do not reuse.  o When removing personal protective equipment, first remove and dispose of gloves. Then, immediately clean your hands with soap and water or alcohol-based hand . Next, remove and dispose of facemask, and immediately clean your hands again with soap and water or alcohol-based hand .   You should not share dishes, drinking glasses, cups, eating utensils, towels, bedding, or other items with the patient. After the patient uses these items, you should wash them thoroughly (see below Wash laundry thoroughly).   Clean all high-touch surfaces, such as counters, tabletops, doorknobs, bathroom fixtures, toilets, phones, keyboards, tablets, and bedside tables, every day. Also, clean any surfaces that may have blood, stool, or body fluids on them.   Use a household cleaning spray or wipe, according to the label instructions. Labels contain instructions for safe and effective use of the cleaning product including precautions you should take when applying the product, such as wearing gloves and making sure you have good ventilation during use of the product.   Wash laundry thoroughly.  o Immediately remove and wash clothes or bedding that have blood, stool, or body fluids on them.  o Wear disposable gloves while handling soiled items and keep soiled items away from your body. Clean your hands (with soap and water or an alcohol-based hand ) immediately after removing your gloves.  o Read and follow directions on labels of laundry or clothing items and detergent. In general, using a normal laundry detergent according to washing machine instructions and dry thoroughly using the warmest temperatures recommended on the clothing label.   Place all used disposable gloves, facemasks, and other contaminated items  in a lined container before disposing of them with other household waste. Clean your hands (with soap and water or an alcohol-based hand ) immediately after handling these items. Soap and water should be used preferentially if hands are visibly dirty.   Discuss any additional questions with your state or local health department or healthcare provider. Check available hours when contacting your local health department.    For more information see CDC link below.      https://www.cdc.gov/coronavirus/2019-ncov/hcp/guidance-prevent-spread.html#precautions        Sources:  [facility], [place] [facility] and [facility]    If you were prescribed a narcotic or controlled medication, do not drive or operate heavy equipment or machinery while taking these medications.  You must understand that you've received an Urgent Care treatment only and that you may be released before all your medical problems are known or treated. You, the patient, will arrange for follow up care as instructed.  Follow up with your PCP or specialty clinic as directed within 2-5 days if not improved or as needed.  You can call [phone] to schedule an appointment with the appropriate provider.  If your condition worsens we recommend that you receive another evaluation at the emergency room immediately or contact your primary medical clinics after hours call service to discuss your concerns.  Please return here or go to the Emergency Department for any concerns or worsening of condition.

## 2020-11-08 ENCOUNTER — PATIENT MESSAGE (OUTPATIENT)
Dept: OTOLARYNGOLOGY | Facility: CLINIC | Age: 55
End: 2020-11-08

## 2020-11-08 ENCOUNTER — PATIENT MESSAGE (OUTPATIENT)
Dept: PRIMARY CARE CLINIC | Facility: CLINIC | Age: 55
End: 2020-11-08

## 2020-11-09 ENCOUNTER — PATIENT MESSAGE (OUTPATIENT)
Dept: PRIMARY CARE CLINIC | Facility: CLINIC | Age: 55
End: 2020-11-09

## 2020-11-09 ENCOUNTER — OFFICE VISIT (OUTPATIENT)
Dept: PRIMARY CARE CLINIC | Facility: CLINIC | Age: 55
End: 2020-11-09
Payer: COMMERCIAL

## 2020-11-09 ENCOUNTER — TELEPHONE (OUTPATIENT)
Dept: URGENT CARE | Facility: CLINIC | Age: 55
End: 2020-11-09

## 2020-11-09 DIAGNOSIS — J45.41 MODERATE PERSISTENT ASTHMA WITHOUT STATUS ASTHMATICUS WITH ACUTE EXACERBATION: ICD-10-CM

## 2020-11-09 DIAGNOSIS — R05.9 COUGH: ICD-10-CM

## 2020-11-09 DIAGNOSIS — J44.9 CHRONIC OBSTRUCTIVE PULMONARY DISEASE, UNSPECIFIED COPD TYPE: ICD-10-CM

## 2020-11-09 DIAGNOSIS — Z79.60 LONG-TERM USE OF IMMUNOSUPPRESSANT MEDICATION: ICD-10-CM

## 2020-11-09 DIAGNOSIS — J45.909 ASTHMA, UNSPECIFIED ASTHMA SEVERITY, UNSPECIFIED WHETHER COMPLICATED, UNSPECIFIED WHETHER PERSISTENT: ICD-10-CM

## 2020-11-09 DIAGNOSIS — K50.00 CROHN'S DISEASE OF SMALL INTESTINE WITHOUT COMPLICATION: ICD-10-CM

## 2020-11-09 DIAGNOSIS — U07.1 ACUTE RESPIRATORY DISEASE DUE TO COVID-19 VIRUS: Primary | ICD-10-CM

## 2020-11-09 DIAGNOSIS — J06.9 ACUTE RESPIRATORY DISEASE DUE TO COVID-19 VIRUS: Primary | ICD-10-CM

## 2020-11-09 PROCEDURE — 99214 PR OFFICE/OUTPT VISIT, EST, LEVL IV, 30-39 MIN: ICD-10-PCS | Mod: 95,,, | Performed by: FAMILY MEDICINE

## 2020-11-09 PROCEDURE — 99214 OFFICE O/P EST MOD 30 MIN: CPT | Mod: 95,,, | Performed by: FAMILY MEDICINE

## 2020-11-09 RX ORDER — LEVALBUTEROL INHALATION SOLUTION 1.25 MG/3ML
1 SOLUTION RESPIRATORY (INHALATION) EVERY 4 HOURS
Qty: 1 BOX | Refills: 11 | Status: SHIPPED | OUTPATIENT
Start: 2020-11-09 | End: 2022-06-02

## 2020-11-09 RX ORDER — METHYLPREDNISOLONE 4 MG/1
TABLET ORAL
Qty: 1 PACKAGE | Refills: 0 | Status: ON HOLD | OUTPATIENT
Start: 2020-11-09 | End: 2020-12-21 | Stop reason: HOSPADM

## 2020-11-09 RX ORDER — IPRATROPIUM BROMIDE 0.5 MG/2.5ML
SOLUTION RESPIRATORY (INHALATION)
Qty: 1 BOX | Refills: 11 | Status: SHIPPED | OUTPATIENT
Start: 2020-11-09 | End: 2021-11-01 | Stop reason: SDUPTHER

## 2020-11-09 NOTE — PATIENT INSTRUCTIONS
Your test was POSITIVE for COVID-19 (coronavirus).       Please isolate yourself at home.  You may leave home and/or return to work once the following conditions are met:    If you were not hospitalized and are not severely immunocompromised*:   More than 10 days since symptoms first appeared AND   More than 24 hours fever free without medications AND   Symptoms have improved     If you were hospitalized OR are severely immunocompromised*:   More than 20 days since symptoms first appeared   More than 24 hours fever free without medications   Symptoms have improved    If you had no symptoms but tested positive:   More than 10 days since the date of the first positive test (20 days if severely immunocompromised).   If you develop symptoms, then use the guidelines above.     *Definition of severely immunocompromised:  - Current chemotherapy for cancer  - Untreated HIV with CD4 count less than 200  - Combined primary immunodeficiency disorder  - Prednisone more than 20 mg per day for more than 14 days  - Post-transplant patients    Additional instructions:   Separate yourself from other people and animals in your home.   Call ahead before visiting your doctor.   Wear a facemask when around others.   Cover your coughs and sneezes.   Wash your hands often with soap and water; hand  can be used, too.   Avoid sharing personal household items.   Wipe down surfaces used daily.   Monitor your symptoms. Seek prompt medical attention if your illness is worsening (e.g., difficulty breathing).    Before seeking care, call your healthcare provider.   If you have a medical emergency and need to call 911, notify the dispatch personnel that you have, or are being evaluated for COVID-19. If possible, put on a facemask before emergency medical services arrive.        Contact Tracing    As one of the next steps, you will receive a call or text from the [place] [facility] ([facility]) COVID-19 Tracing Team.  See the contact information below so you know not to ignore the health departments call. It is important that you contact them back immediately so they can help.      Contact Tracer Number:  [phone]  Caller ID for most carriers: [place] Dept [facility]     What is contact tracing?  · Contact tracing is a process that helps identify everyone who has been in close contact with an infected person. Contact tracers let those people know they may have been exposed and guide them on next steps. Confidentiality is important for everyone; no one will be told who may have exposed them to the virus.  · Your involvement is important. The more we know about where and how this virus is spreading, the better chance we have at stopping it from spreading further.  What does exposure mean?  · Exposure means you have been within 6 feet for more than 15 minutes with a person who has or had COVID-19.  What kind of questions do the contact tracers ask?  · A contact tracer will confirm your basic contact information including name, address, phone number, and next of kin, as well as asking about any symptoms you may have had. Theyll also ask you how you think you may have gotten sick, such as places where you may have been exposed to the virus, and people you were with. Those names will never be shared with anyone outside of that call, and will only be used to help trace and stop the spread of the virus.   I have privacy concerns. How will the state use my information?  · Your privacy about your health is important. All calls are completed using call centers that use the appropriate health privacy protection measures (HIPAA compliance), meaning that your patient information is safe. No one will ever ask you any questions related to immigration status. Your health comes first.   Do I have to participate?  · You do not have to participate, but we strongly encourage you to. Contact tracing can help us catch and control new outbreaks as  theyre developing to keep your friends and family safe.   What if I dont hear from anyone?  · If you dont receive a call within 24 hours, you can call the number above right away to inquire about your status. That line is open from 8:00 am - 8:00 p.m., 7 days a week.  Contact tracing saves lives! Together, we have the power to beat this virus and keep our loved ones and neighbors safe.    For more information see CDC link below.      https://www.cdc.gov/coronavirus/2019-ncov/hcp/guidance-prevent-spread.html#precautions        Sources:  [facility], [place] [facility] and [facility]           Sincerely,     Esthela Hu MD

## 2020-11-09 NOTE — PROGRESS NOTES
Subjective:      Patient ID: Jaylin Murguia is a 55 y.o. female.    Chief Complaint: COVID-19 Concerns    Disclaimer:  This note is prepared using voice recognition software and as such is likely to have errors and has not been proof read. Please contact me for questions.     The patient location is:home  The chief complaint leading to consultation is: followup / my chart request  Visit type: Virtual visit with synchronous audio and video  Total time spent with patient:1220pm -1235pm   Each patient to whom he or she provides medical services by telemedicine is:  (1) informed of the relationship between the physician and patient and the respective role of any other health care provider with respect to management of the patient; and (2) notified that he or she may decline to receive medical services by telemedicine and may withdraw from such care at any time.    Notes:   Was + for covid-19. On 11/7/2020. Her son , Geovanni went to [employer] and 8 kids were positive at the party.  Geovanni has it now and only with loss of smell and taste.  Has been also has it with a cough.  They are both staying home.  She herself is high risk due to being asthmatic Crohn's disease and immunosuppression.  She does have ENT through [facility] in [place].  Recently had some medications changed.  Does have significant aches and pains but is staying hydrated.  Her cough is quite wet with production of whitish to yellowish mucus production.  She is trying to remain more upright.  We did discuss the nature of the asthma in the setting with COVID.  We also discussed that she can do up to 4000 mg of Tylenol in a day.  She was only doing 2000 mg thus far.  It does seem to help her a good bit.  Regularly she does take Spiriva and Breo for her asthma.  Usually she only does Xopenex for rescue.  Did discuss the nature of even doing steroids at this time cannot do ibuprofen without significant upset stomach.  Is currently with diarrhea at this  time which is typical for her at this point.  Was on cipro for nasal sinus issues.   Is doing the tylenol around the clock. Every finger hurts in the am.  Bigger joints.          Lab Results   Component Value Date    WBC 6.22 06/29/2020    HGB 12.6 06/29/2020    HCT 38.5 06/29/2020     06/29/2020    CHOL 177 08/21/2020    TRIG 172 (H) 08/21/2020    HDL 63 08/21/2020    ALT 28 08/21/2020    AST 28 08/21/2020     08/21/2020    K 4.3 08/21/2020     08/21/2020    CREATININE 1.0 08/21/2020    BUN 15 08/21/2020    CO2 23 08/21/2020    TSH 1.487 06/29/2020    INR 1.0 11/20/2014    HGBA1C 5.2 11/14/2017       CT Inxerotronic Sinuses without  Narrative: EXAMINATION:  CT MEDTRONIC SINUSES WITHOUT    CLINICAL HISTORY:  Chronic sinusitis;  Chronic pansinusitis    TECHNIQUE:  Examination performed utilizing [employer] snap occasional protocol.  No intravenous contrast utilized.    COMPARISON:  July 24, 2019    FINDINGS:  Postsurgical changes FESS since prior exam July 24, 2019.    Frontal sinuses are again noted to be minimally developed with opacification on the left.  There is prominent mucosal thickening bilaterally within the ethmoid air cells with few air-fluid levels bilaterally.  Prominent circumferential mucosal thickening within the maxillary sinuses with equivocal air-fluid levels the dependent location.  Similar circumferential mucosal thickening within the sphenoid sinus.  Diffuse wall thickening the sinuses bilaterally.  Findings consistent with chronic pansinusitis similar to previous exam.  External auditory canals clear.  Mastoid air cells symmetrically well developed and aerated.    No sinus expansion or bony erosion.  Midline bony septum.  Impression: S/p FESS.    Pansinusitis with air-fluid levels noted within bilateral ethmoid and maxillary sinuses as well as suspected small air-fluid level within the sphenoid sinus.    Additional findings as detailed above.    Electronically signed by: Frandy  MD Bridger  Date:    09/14/2020  Time:    15:03        Review of Systems   Constitutional: Positive for appetite change, fatigue and fever. Negative for activity change and unexpected weight change.   HENT: Positive for congestion. Negative for hearing loss, rhinorrhea and trouble swallowing.    Eyes: Negative for discharge and visual disturbance.   Respiratory: Positive for cough, shortness of breath and wheezing. Negative for chest tightness.    Cardiovascular: Negative for chest pain and palpitations.   Gastrointestinal: Positive for diarrhea. Negative for blood in stool, constipation and vomiting.   Endocrine: Negative for polydipsia and polyuria.   Genitourinary: Negative for difficulty urinating, dysuria, hematuria and menstrual problem.   Musculoskeletal: Positive for arthralgias, myalgias and neck pain. Negative for joint swelling.   Neurological: Positive for weakness and headaches.   Psychiatric/Behavioral: Positive for sleep disturbance. Negative for confusion and dysphoric mood.     Objective:   There were no vitals filed for this visit.  Physical Exam  Constitutional:       General: She is not in acute distress.     Appearance: Normal appearance. She is well-developed. She is not ill-appearing.   HENT:      Head: Normocephalic and atraumatic.      Right Ear: External ear normal.      Left Ear: External ear normal.      Nose:      Right Sinus: Maxillary sinus tenderness and frontal sinus tenderness present.      Left Sinus: Maxillary sinus tenderness and frontal sinus tenderness present.   Eyes:      Conjunctiva/sclera: Conjunctivae normal.   Pulmonary:      Effort: No respiratory distress.      Comments: Increased effort with breathing, no retractions. Coughing throughout exam.   Skin:     Findings: No rash.   Neurological:      Mental Status: She is alert.   Psychiatric:         Attention and Perception: Attention normal. She is attentive.         Mood and Affect: Mood and affect normal. Mood is not  anxious, depressed or elated. Affect is not labile, blunt, angry or inappropriate.         Speech: Speech normal. She is communicative. Speech is not rapid and pressured, delayed, slurred or tangential.         Behavior: Behavior normal. Behavior is not agitated, slowed, aggressive, withdrawn, hyperactive or combative.         Thought Content: Thought content normal.         Cognition and Memory: Cognition normal. Memory is not impaired. She does not exhibit impaired recent memory or impaired remote memory.         Judgment: Judgment normal. Judgment is not impulsive or inappropriate.       Assessment:     1. Acute respiratory disease due to COVID-19 virus    2. Moderate persistent asthma without status asthmaticus with acute exacerbation    3. Cough    4. Chronic obstructive pulmonary disease, unspecified COPD type    5. Asthma, unspecified asthma severity, unspecified whether complicated, unspecified whether persistent    6. Crohn's disease of small intestine without complication    7. Long-term use of immunosuppressant medication      Plan:   Jaylin was seen today for covid-19 concerns.    Diagnoses and all orders for this visit:    Acute respiratory disease due to COVID-19 virus  Comments:  New, send in Medrol Dosepak refilled Xopenex ipratropium am okay to use every 4-6 hours continue maintenance asthma medicines increase Tylenol to 4000 mg a day    Moderate persistent asthma without status asthmaticus with acute exacerbation  Comments:  Worse recently with covid19, add ipratropium, continue with Xopenex continue with Breo continue with Singulair continue with Spiriva  Orders:  -     ipratropium (ATROVENT) 0.02 % nebulizer solution; Nebulize 2.5 ml every 4-6 hours as directed, if needed    Cough  Comments:  worse with covid 19  Orders:  -     ipratropium (ATROVENT) 0.02 % nebulizer solution; Nebulize 2.5 ml every 4-6 hours as directed, if needed    Chronic obstructive pulmonary disease, unspecified COPD  type  Comments:  worse with covid 19 high risk.   Orders:  -     ipratropium (ATROVENT) 0.02 % nebulizer solution; Nebulize 2.5 ml every 4-6 hours as directed, if needed    Asthma, unspecified asthma severity, unspecified whether complicated, unspecified whether persistent  -     levalbuterol (XOPENEX) 1.25 mg/3 mL nebulizer solution; Take 3 mLs (1.25 mg total) by nebulization every 4 (four) hours. Rescue    Crohn's disease of small intestine without complication  Comments:  Push fluids sending and Medrol Dosepak.    Long-term use of immunosuppressant medication  Comments:  Sending a Medrol Dosepak monitor closely.    Other orders  -     methylPREDNISolone (MEDROL DOSEPACK) 4 mg tablet; use as directed            Follow up in about 1 week (around 11/16/2020) for f/u Telemed Dr Hu f/u covid .    Patient Instructions   Your test was POSITIVE for COVID-19 (coronavirus).       Please isolate yourself at home.  You may leave home and/or return to work once the following conditions are met:    If you were not hospitalized and are not severely immunocompromised*:   More than 10 days since symptoms first appeared AND   More than 24 hours fever free without medications AND   Symptoms have improved     If you were hospitalized OR are severely immunocompromised*:   More than 20 days since symptoms first appeared   More than 24 hours fever free without medications   Symptoms have improved    If you had no symptoms but tested positive:   More than 10 days since the date of the first positive test (20 days if severely immunocompromised).   If you develop symptoms, then use the guidelines above.     *Definition of severely immunocompromised:  - Current chemotherapy for cancer  - Untreated HIV with CD4 count less than 200  - Combined primary immunodeficiency disorder  - Prednisone more than 20 mg per day for more than 14 days  - Post-transplant patients    Additional instructions:   Separate yourself from other people and  animals in your home.   Call ahead before visiting your doctor.   Wear a facemask when around others.   Cover your coughs and sneezes.   Wash your hands often with soap and water; hand  can be used, too.   Avoid sharing personal household items.   Wipe down surfaces used daily.   Monitor your symptoms. Seek prompt medical attention if your illness is worsening (e.g., difficulty breathing).    Before seeking care, call your healthcare provider.   If you have a medical emergency and need to call 911, notify the dispatch personnel that you have, or are being evaluated for COVID-19. If possible, put on a facemask before emergency medical services arrive.        Contact Tracing    As one of the next steps, you will receive a call or text from the [place] Department of Health ([facility]) COVID-19 Tracing Team. See the contact information below so you know not to ignore the health departments call. It is important that you contact them back immediately so they can help.      Contact Tracer Number:  [phone]  Caller ID for most carriers: [facility]     What is contact tracing?  · Contact tracing is a process that helps identify everyone who has been in close contact with an infected person. Contact tracers let those people know they may have been exposed and guide them on next steps. Confidentiality is important for everyone; no one will be told who may have exposed them to the virus.  · Your involvement is important. The more we know about where and how this virus is spreading, the better chance we have at stopping it from spreading further.  What does exposure mean?  · Exposure means you have been within 6 feet for more than 15 minutes with a person who has or had COVID-19.  What kind of questions do the contact tracers ask?  · A contact tracer will confirm your basic contact information including name, address, phone number, and next of kin, as well as asking about any symptoms you may have had.  Theyll also ask you how you think you may have gotten sick, such as places where you may have been exposed to the virus, and people you were with. Those names will never be shared with anyone outside of that call, and will only be used to help trace and stop the spread of the virus.   I have privacy concerns. How will the state use my information?  · Your privacy about your health is important. All calls are completed using call centers that use the appropriate health privacy protection measures (HIPAA compliance), meaning that your patient information is safe. No one will ever ask you any questions related to immigration status. Your health comes first.   Do I have to participate?  · You do not have to participate, but we strongly encourage you to. Contact tracing can help us catch and control new outbreaks as theyre developing to keep your friends and family safe.   What if I dont hear from anyone?  · If you dont receive a call within 24 hours, you can call the number above right away to inquire about your status. That line is open from 8:00 am - 8:00 p.m., 7 days a week.  Contact tracing saves lives! Together, we have the power to beat this virus and keep our loved ones and neighbors safe.    For more information see CDC link below.      https://www.cdc.gov/coronavirus/2019-ncov/hcp/guidance-prevent-spread.html#precautions        Sources:  [facility], [place] [facility]           Sincerely,     Esthela Hu MD

## 2020-11-16 ENCOUNTER — OFFICE VISIT (OUTPATIENT)
Dept: PRIMARY CARE CLINIC | Facility: CLINIC | Age: 55
End: 2020-11-16
Payer: COMMERCIAL

## 2020-11-16 ENCOUNTER — PATIENT MESSAGE (OUTPATIENT)
Dept: OTOLARYNGOLOGY | Facility: CLINIC | Age: 55
End: 2020-11-16

## 2020-11-16 DIAGNOSIS — D80.1 HYPOGAMMAGLOBULINEMIA: ICD-10-CM

## 2020-11-16 DIAGNOSIS — Z79.60 LONG-TERM USE OF IMMUNOSUPPRESSANT MEDICATION: ICD-10-CM

## 2020-11-16 DIAGNOSIS — J06.9 ACUTE RESPIRATORY DISEASE DUE TO COVID-19 VIRUS: Primary | ICD-10-CM

## 2020-11-16 DIAGNOSIS — U07.1 ACUTE RESPIRATORY DISEASE DUE TO COVID-19 VIRUS: Primary | ICD-10-CM

## 2020-11-16 DIAGNOSIS — J45.51 SEVERE PERSISTENT ASTHMA WITH ACUTE EXACERBATION: ICD-10-CM

## 2020-11-16 PROCEDURE — 99214 OFFICE O/P EST MOD 30 MIN: CPT | Mod: 95,,, | Performed by: FAMILY MEDICINE

## 2020-11-16 PROCEDURE — 99214 PR OFFICE/OUTPT VISIT, EST, LEVL IV, 30-39 MIN: ICD-10-PCS | Mod: 95,,, | Performed by: FAMILY MEDICINE

## 2020-11-16 NOTE — PROGRESS NOTES
Subjective:      Patient ID: Jaylin Murguia is a 55 y.o. female.    Chief Complaint: COVID-19 Concerns    Disclaimer:  This note is prepared using voice recognition software and as such is likely to have errors and has not been proof read. Please contact me for questions.     The patient location is:home  The chief complaint leading to consultation is: followup / my chart request  Visit type: Virtual visit with synchronous audio and video  Total time spent with patient:1125am -1134am   Each patient to whom he or she provides medical services by telemedicine is:  (1) informed of the relationship between the physician and patient and the respective role of any other health care provider with respect to management of the patient; and (2) notified that he or she may decline to receive medical services by telemedicine and may withdraw from such care at any time.    Notes:   Was + for covid-19. On 11/7/2020. Her son , Geovanni went to [employer] and 8 kids were positive at the party.  Did complete the medrol Dosepak.  Actually did also bump up her nebulized which helped dramatically.  Just completed the steroids yesterday.  Feeling every day a little bit better.  This a.m. she did do.  Nebs xopenex this am and breo this am and that all.  Trying not to do the breathing treatments if not needed.  But finding that she usually has to use it at least twice a day.    Tylenol is still using. Still having some pain with it.  Felt like the flu.  She is immunocompromised already.  Already had loss of taste and smell from prior sinus surgeries.  Is eating okay.  Is trying to get in touch with her ENT to find out about rescheduling it with Dr. Wilson.    bp is staying low around 128/78.  No pulse ox.  Didn't feel like she needed it.   Diarrhea is better.   Was on cipro for nasal sinus issues.        Cough  This is a recurrent problem. The current episode started 1 to 4 weeks ago. The problem has been waxing and waning. The  problem occurs every few minutes. The cough is productive of sputum. Associated symptoms include ear congestion, ear pain, headaches, myalgias, nasal congestion, postnasal drip and shortness of breath. Pertinent negatives include no chest pain, chills, fever, heartburn, hemoptysis, rash, rhinorrhea, sore throat, sweats, weight loss or wheezing. The symptoms are aggravated by animals, cold air and pollens. Risk factors for lung disease include animal exposure. She has tried OTC cough suppressant, a beta-agonist inhaler, ipratropium inhaler, leukotriene antagonists, rest and steroid inhaler for the symptoms. The treatment provided mild relief. Her past medical history is significant for asthma, bronchiectasis, bronchitis, environmental allergies and pneumonia. There is no history of COPD or emphysema.       Lab Results   Component Value Date    WBC 6.22 06/29/2020    HGB 12.6 06/29/2020    HCT 38.5 06/29/2020     06/29/2020    CHOL 177 08/21/2020    TRIG 172 (H) 08/21/2020    HDL 63 08/21/2020    ALT 28 08/21/2020    AST 28 08/21/2020     08/21/2020    K 4.3 08/21/2020     08/21/2020    CREATININE 1.0 08/21/2020    BUN 15 08/21/2020    CO2 23 08/21/2020    TSH 1.487 06/29/2020    INR 1.0 11/20/2014    HGBA1C 5.2 11/14/2017       CT [employer] Sinuses without  Narrative: EXAMINATION:  CT [employer] SINUSES WITHOUT    CLINICAL HISTORY:  Chronic sinusitis;  Chronic pansinusitis    TECHNIQUE:  Examination performed utilizing [employer] snap occasional protocol.  No intravenous contrast utilized.    COMPARISON:  July 24, 2019    FINDINGS:  Postsurgical changes FESS since prior exam July 24, 2019.    Frontal sinuses are again noted to be minimally developed with opacification on the left.  There is prominent mucosal thickening bilaterally within the ethmoid air cells with few air-fluid levels bilaterally.  Prominent circumferential mucosal thickening within the maxillary sinuses with equivocal air-fluid  levels the dependent location.  Similar circumferential mucosal thickening within the sphenoid sinus.  Diffuse wall thickening the sinuses bilaterally.  Findings consistent with chronic pansinusitis similar to previous exam.  External auditory canals clear.  Mastoid air cells symmetrically well developed and aerated.    No sinus expansion or bony erosion.  Midline bony septum.  Impression: S/p FESS.    Pansinusitis with air-fluid levels noted within bilateral ethmoid and maxillary sinuses as well as suspected small air-fluid level within the sphenoid sinus.    Additional findings as detailed above.    Electronically signed by: Frandy Sparks MD  Date:    09/14/2020  Time:    15:03        Review of Systems   Constitutional: Positive for activity change, appetite change and fatigue. Negative for chills, fever, unexpected weight change and weight loss.   HENT: Positive for ear pain and postnasal drip. Negative for rhinorrhea and sore throat.    Respiratory: Positive for cough and shortness of breath. Negative for hemoptysis and wheezing.    Cardiovascular: Negative for chest pain.   Gastrointestinal: Negative for abdominal distention, abdominal pain and heartburn.   Musculoskeletal: Positive for myalgias. Negative for arthralgias.   Skin: Negative for rash.   Allergic/Immunologic: Positive for environmental allergies.   Neurological: Positive for headaches.   Psychiatric/Behavioral: Negative for decreased concentration and dysphoric mood. The patient is not nervous/anxious.      Objective:   There were no vitals filed for this visit.  Physical Exam  Vitals signs reviewed.   Constitutional:       General: She is awake. She is not in acute distress.     Appearance: Normal appearance. She is well-developed and well-groomed. She is obese. She is not ill-appearing.   HENT:      Head: Normocephalic and atraumatic.      Right Ear: External ear normal.      Left Ear: External ear normal.      Nose: Nose normal.       Mouth/Throat:      Lips: Pink.   Eyes:      Conjunctiva/sclera: Conjunctivae normal.   Pulmonary:      Effort: Pulmonary effort is normal.   Neurological:      Mental Status: She is alert.   Psychiatric:         Attention and Perception: Attention and perception normal. She is attentive.         Mood and Affect: Mood and affect normal. Mood is not anxious or depressed. Affect is not labile, blunt, angry or inappropriate.         Speech: Speech normal. She is communicative. Speech is not rapid and pressured, delayed, slurred or tangential.         Behavior: Behavior normal. Behavior is not agitated, slowed, aggressive, withdrawn, hyperactive or combative. Behavior is cooperative.         Thought Content: Thought content normal. Thought content is not paranoid or delusional. Thought content does not include homicidal or suicidal ideation. Thought content does not include homicidal or suicidal plan.         Cognition and Memory: Cognition and memory normal. Memory is not impaired. She does not exhibit impaired recent memory or impaired remote memory.         Judgment: Judgment normal. Judgment is not impulsive or inappropriate.       Assessment:     1. Acute respiratory disease due to COVID-19 virus    2. Long-term use of immunosuppressant medication    3. Severe persistent asthma with acute exacerbation    4. Hypogammaglobulinemia      Plan:   Jaylin was seen today for covid-19 concerns.    Diagnoses and all orders for this visit:    Acute respiratory disease due to COVID-19 virus  Comments:  Advised to continue to use nebulizer treatments monitor for signs and symptoms okay to return and go back to doctors visits since 10 days out.    Long-term use of immunosuppressant medication  Comments:  Completed Medrol Dosepak.  Push fluids rest.  Follow-up if has worsening symptoms    Severe persistent asthma with acute exacerbation  Comments:  Improving continue with nebulizer treatments b.i.d. and can increase if necessary  completed Medrol    Hypogammaglobulinemia  Comments:  Follow-up in 1 month for lab testing to see if created antibodies for COVID.  Patient to message us for testing date            No follow-ups on file.    There are no Patient Instructions on file for this visit.

## 2020-11-18 DIAGNOSIS — Z01.812 PRE-PROCEDURE LAB EXAM: Primary | ICD-10-CM

## 2020-11-24 ENCOUNTER — PROCEDURE VISIT (OUTPATIENT)
Dept: OTOLARYNGOLOGY | Facility: CLINIC | Age: 55
End: 2020-11-24
Payer: COMMERCIAL

## 2020-11-24 VITALS — HEART RATE: 62 BPM | DIASTOLIC BLOOD PRESSURE: 87 MMHG | SYSTOLIC BLOOD PRESSURE: 134 MMHG

## 2020-11-24 DIAGNOSIS — G47.33 OSA (OBSTRUCTIVE SLEEP APNEA): ICD-10-CM

## 2020-11-24 DIAGNOSIS — J32.4 CHRONIC PANSINUSITIS: ICD-10-CM

## 2020-11-24 DIAGNOSIS — J31.0 NONALLERGIC RHINITIS: Primary | ICD-10-CM

## 2020-11-24 PROCEDURE — 31231 NASAL/SINUS ENDOSCOPY: ICD-10-PCS | Mod: S$GLB,,, | Performed by: OTOLARYNGOLOGY

## 2020-11-24 PROCEDURE — 87075 CULTR BACTERIA EXCEPT BLOOD: CPT

## 2020-11-24 PROCEDURE — 87186 SC STD MICRODIL/AGAR DIL: CPT

## 2020-11-24 PROCEDURE — 87077 CULTURE AEROBIC IDENTIFY: CPT

## 2020-11-24 PROCEDURE — 87070 CULTURE OTHR SPECIMN AEROBIC: CPT

## 2020-11-24 PROCEDURE — 31231 NASAL ENDOSCOPY DX: CPT | Mod: S$GLB,,, | Performed by: OTOLARYNGOLOGY

## 2020-11-24 NOTE — PROCEDURES
Nasal/sinus endoscopy    Date/Time: 11/24/2020 10:00 AM  Performed by: Matthias Roach MD  Authorized by: Matthias Roach MD     Consent Done?:  Yes (Verbal)  Anesthesia:     Local anesthetic:  Lidocaine 4% and Jose-Synephrine 1/2%    Patient tolerance:  Patient tolerated the procedure well with no immediate complications  Nose:     Procedure Performed:  Nasal Endoscopy  External:      No external nasal deformity  Intranasal:      Mucosa no polyps     Mucosa ulcers not present     No mucosa lesions present     Turbinates not enlarged     No septum gross deformity  Nasopharynx:      No mucosa lesions     Adenoids not present     Posterior choanae patent     Eustachian tube patent     Sinuses all patent  Diffuse scattered purulence and crusts, greater on left  No major wads  No polyps

## 2020-11-24 NOTE — PROCEDURES
Subjective:      Jaylin is a 55 y.o. female who comes for follow-up of sinusitis.  Her last visit with me was on 10/1/2020.  Now 8 weeks status-post endoscopic sinus surgery.   Doing fine, had noted near-resolution of discolored mucus discharge.  Last weekend went away, skipped 2 doses of oral cipro, noted immediate return of thick purulent green-black mucus, more from left.  Also variable aural fullness, more on left.  Ran out of gentamicin rinse last week.  Near-constant anosmia still.        %       The patient's medications, allergies, past medical, surgical, social and family histories were reviewed and updated as appropriate.    A detailed review of systems was obtained with pertinent positives as per the above HPI, and otherwise negative.        Objective:     /87   Pulse 62        Constitutional:   She appears well-developed. She is cooperative. Normal speech.  No hoarse voice.      Head:  Normocephalic. Salivary glands normal.  Facial strength is normal.      Ears:    Right Ear: No drainage or tenderness. Tympanic membrane is not perforated. Tympanic membrane mobility is abnormal. A middle ear effusion is present. No decreased hearing is noted.   Left Ear: No drainage or tenderness. Tympanic membrane is not perforated. Tympanic membrane mobility is normal.  No middle ear effusion. No decreased hearing is noted.     Nose:  No mucosal edema, rhinorrhea, septal deviation or polyps. No epistaxis. Turbinates normal, no turbinate masses and no turbinate hypertrophy.  Right sinus exhibits no maxillary sinus tenderness and no frontal sinus tenderness. Left sinus exhibits no maxillary sinus tenderness and no frontal sinus tenderness.     Mouth/Throat  Oropharynx clear and moist without lesions or asymmetry and normal uvula midline. She does not have dentures. Normal dentition. No oral lesions or mucous membrane lesions. No oropharyngeal exudate or posterior oropharyngeal erythema. Mirror exam not performed  due to patient tolerance.  Mirror exam not performed due to patient tolerance.      Neck:  Neck normal without thyromegaly masses, asymmetry, normal tracheal structure, crepitus, and tenderness, thyroid normal, trachea normal and no adenopathy. Normal range of motion present.     She has no cervical adenopathy.     Cardiovascular:   Regular rhythm.      Pulmonary/Chest:   Effort normal.     Psychiatric:   She has a normal mood and affect. Her speech is normal and behavior is normal.     Neurological:   No cranial nerve deficit.     Skin:   No rash noted.       Procedure    Nasal endoscopy performed.  See procedure note.        Data Reviewed    WBC (K/uL)   Date Value   06/29/2020 6.22     Eosinophil % (%)   Date Value   06/29/2020 4.0     Eos # (K/uL)   Date Value   06/29/2020 0.3     Platelets (K/uL)   Date Value   06/29/2020 338     Glucose (mg/dL)   Date Value   08/21/2020 94     IgE (IU/mL)   Date Value   12/12/2019 <35       Pathology report indicated chronic inflammation with eosinophilia.    Cultures showed Pseudomonas.      Assessment:     1. Nonallergic rhinitis    2. Chronic pansinusitis    3. TAMIKA (obstructive sleep apnea)         Plan:     New cultures taken, suspect polymicrobial infection.  If confirmed, will tailor new topical antibiotics.  Wish to minimize oral antibiotics due to GI upset.  If cultures unrevealing, will consider debridement under anesthesia in December.  Follow up for test results.

## 2020-11-25 ENCOUNTER — PATIENT MESSAGE (OUTPATIENT)
Dept: OTOLARYNGOLOGY | Facility: CLINIC | Age: 55
End: 2020-11-25

## 2020-11-27 ENCOUNTER — PATIENT MESSAGE (OUTPATIENT)
Dept: OTOLARYNGOLOGY | Facility: CLINIC | Age: 55
End: 2020-11-27

## 2020-11-27 DIAGNOSIS — J32.4 CHRONIC PANSINUSITIS: Primary | ICD-10-CM

## 2020-11-27 LAB — BACTERIA SPEC AEROBE CULT: ABNORMAL

## 2020-11-27 RX ORDER — SULFAMETHOXAZOLE AND TRIMETHOPRIM 800; 160 MG/1; MG/1
1 TABLET ORAL 2 TIMES DAILY
Qty: 56 TABLET | Refills: 0 | Status: SHIPPED | OUTPATIENT
Start: 2020-11-27 | End: 2020-12-25

## 2020-11-30 LAB — BACTERIA SPEC ANAEROBE CULT: NORMAL

## 2020-12-01 ENCOUNTER — PATIENT MESSAGE (OUTPATIENT)
Dept: OTOLARYNGOLOGY | Facility: CLINIC | Age: 55
End: 2020-12-01

## 2020-12-04 DIAGNOSIS — J32.4 CHRONIC PANSINUSITIS: Primary | ICD-10-CM

## 2020-12-04 DIAGNOSIS — Z01.812 PRE-PROCEDURE LAB EXAM: ICD-10-CM

## 2020-12-14 ENCOUNTER — TELEPHONE (OUTPATIENT)
Dept: OTOLARYNGOLOGY | Facility: CLINIC | Age: 55
End: 2020-12-14

## 2020-12-17 ENCOUNTER — ANESTHESIA EVENT (OUTPATIENT)
Dept: SURGERY | Facility: HOSPITAL | Age: 55
End: 2020-12-17
Payer: COMMERCIAL

## 2020-12-17 ENCOUNTER — TELEPHONE (OUTPATIENT)
Dept: OTOLARYNGOLOGY | Facility: CLINIC | Age: 55
End: 2020-12-17

## 2020-12-17 NOTE — PRE-PROCEDURE INSTRUCTIONS
PREOP INSTRUCTIONS:    No solid food ,milk or milk products for 8 hours prior to procedure.Clear liquids are allowed up to 2 hours before procedure.Clear liquids are:water,apple juice,gatorade & powerade.Instructed to follow the surgeon's instructions if they differ from these.Shower instructions as well as directions to the Surgery Center were given.Encouraged to wear loose fitting,comfortable clothing.Medication instructions for pm prior to and am of procedure reviewed.Instructed to avoid taking vitamins,supplements,aspirin and ibuprofen the morning of surgery. Patient's questions and concerns addressed .Patient informed of the current visitor policy and advised patient that one visitor may accompany each patient into the hospital and wait (socially distanced) until a member of the medical team provides an update at the conclusion of the procedure.When they enter the hospital both patient and visitor will have their temperature checked.All visitors are asked to arrive with a mask and to keep their mask on throughout the visit.      Pt reports that she was told per Anesthesia that she stopped breathing during a Colonoscopy. Performed in [facility].      Patient does not know arrival time.Explained that this information comes from the surgeon's office and if they haven't heard from them by 2 or 3 pm to call the office.Patient stated an understanding.      - [facility] WILL BE PROVIDING TRANSPORTATION HOME UPON DISCHARGE.    TAMIKA - CPAP

## 2020-12-17 NOTE — ANESTHESIA PREPROCEDURE EVALUATION
Pre-operative evaluation for Procedure(s) (LRB):  DEBRIDEMENT (Bilateral)    Jaylin Murguia is a 55 y.o. female with pmh of HTN, HLD, well controlled asthma (on spiriva), crohns, TAMIKA who presents after FESS 9/25/2020 for further debridement. Plan for the above procedure.     2D Echo:  4/2019:  CONCLUSIONS     1 - Mild left atrial enlargement.     2 - Concentric hypertrophy.     3 - No wall motion abnormalities.     4 - Normal left ventricular systolic function (EF 60-65%).     5 - Normal left ventricular diastolic function.     6 - Normal right ventricular systolic function .     7 - The estimated PA systolic pressure is 28 mmHg.     8 - Trivial to mild aortic regurgitation.     9 - Trivial to mild mitral regurgitation.     Patient Active Problem List   Diagnosis    Fibromyalgia    Migraine    Crohn's disease of small intestine    Sciatica    Allergic rhinitis    Diarrhea    Essential (primary) hypertension    Insomnia due to medical condition    Hormone replacement therapy (postmenopausal)    Long-term use of immunosuppressant medication    Crohn's disease    TAMIKA (obstructive sleep apnea)    Bilateral primary osteoarthritis of knee    Primary osteoarthritis of right knee    Primary osteoarthritis of left knee    Other hyperlipidemia    Tortuous aorta    Mild aortic insufficiency    Chronic pansinusitis    Severe persistent asthma with acute exacerbation    Hypogammaglobulinemia    Abdominal pain    Chronic rhinosinusitis        Current Facility-Administered Medications on File Prior to Encounter   Medication Dose Route Frequency Provider Last Rate Last Dose    lactated ringers infusion   Intravenous Continuous Mary Leiva MD        lidocaine (PF) 10 mg/ml (1%) injection 10 mg  1 mL Intradermal Once Mary Leiva MD         Current Outpatient Medications on File Prior to Encounter   Medication Sig Dispense Refill    amLODIPine (NORVASC) 5 MG tablet TAKE 1 TABLET DAILY  (Patient taking differently: Take in am of surgery) 90 tablet 3    azelastine (ASTELIN) 137 mcg (0.1 %) nasal spray 1 spray (137 mcg total) by Nasal route 2 (two) times daily. 30 mL 11    butalbital-acetaminophen-caffeine -40 mg (FIORICET, ESGIC) -40 mg per tablet TAKE 1 TABLET EVERY 4 HOURS AS NEEDED FOR HEADACHE 90 tablet 3    cetirizine (ZYRTEC) 10 MG tablet Take 10 mg by mouth 2 (two) times a day.      DULoxetine (CYMBALTA) 60 MG capsule TAKE 1 CAPSULE TWICE A DAY (Patient taking differently: Take 60 mg by mouth 2 (two) times daily. Take if needed) 180 capsule 3    estradiol (ESTRACE) 2 MG tablet Take 2 mg by mouth every evening.       fluticasone propionate (FLONASE) 50 mcg/actuation nasal spray Take if needed      hydroCHLOROthiazide (HYDRODIURIL) 25 MG tablet Take 1 tablet (25 mg total) by mouth once daily. (Patient taking differently: Take 25 mg by mouth once daily. Hold am of surgery) 90 tablet 3    levalbuterol (XOPENEX) 1.25 mg/3 mL nebulizer solution Take 3 mLs (1.25 mg total) by nebulization every 4 (four) hours. Rescue 1 Box 11    losartan-hydrochlorothiazide 100-25 mg (HYZAAR) 100-25 mg per tablet TAKE 1 TABLET BY MOUTH EVERY DAY 90 tablet 3    montelukast (SINGULAIR) 10 mg tablet TAKE 1 TABLET EVERY EVENING (Patient taking differently: every evening. Takes at night) 90 tablet 3    nortriptyline (PAMELOR) 25 MG capsule Take 2 capsules (50 mg total) by mouth once daily. (Patient taking differently: Take 25 mg by mouth every evening. ) 180 capsule 3    pregabalin (LYRICA) 150 MG capsule TAKE 1 CAPSULE BY MOUTH THREE TIMES A DAY (Patient taking differently: Take 150 mg by mouth 3 (three) times daily. TAKE 1 CAPSULE BY MOUTH THREE TIMES A DAY  Take if needed) 90 capsule 5    propranoloL (INNOPRAN XL) 80 mg 24 hr capsule Take 1 capsule (80 mg total) by mouth every evening. (Patient taking differently: Take 80 mg by mouth every evening. Takes at night) 90 capsule 3    rosuvastatin  (CRESTOR) 5 MG tablet Take 1 tablet (5 mg total) by mouth once daily. (Patient taking differently: Take 5 mg by mouth every evening. Take in am of surgery) 90 tablet 3    tiotropium bromide (SPIRIVA RESPIMAT) 1.25 mcg/actuation Mist Inhale 2 puffs into the lungs once daily. 4 g 11    topiramate (TOPAMAX) 25 MG tablet 1 TABLET TWICE DAILY (Patient taking differently: 50 mg every evening. Takes at night) 180 tablet 3    acetaminophen (TYLENOL) 325 MG tablet Take 1 tablet (325 mg total) by mouth every 6 (six) hours as needed for Pain. (Patient not taking: Reported on 11/7/2020)  0    BREO ELLIPTA 200-25 mcg/dose DsDv diskus inhaler INHALE 1 PUFF INTO THE LUNGS ONCE DAILY. (Patient taking differently: Take if needed & bring) 180 each 3    eletriptan (RELPAX) 40 MG tablet Take 1 tablet (40 mg total) by mouth as needed. (Patient taking differently: Take 40 mg by mouth as needed. Take if needed) 12 tablet 3    ergocalciferol (ERGOCALCIFEROL) 50,000 unit Cap TAKE 1 CAPSULE BY MOUTH ONE TIME PER WEEK 4 capsule 1    ipratropium (ATROVENT) 0.02 % nebulizer solution Nebulize 2.5 ml every 4-6 hours as directed, if needed 1 Box 11    mesalamine (PENTASA) 250 mg CpSR Take 4 capsules (1,000 mg total) by mouth 4 (four) times daily. (Patient not taking: Reported on 11/7/2020) 1440 capsule 3    methylPREDNISolone (MEDROL DOSEPACK) 4 mg tablet use as directed 1 Package 0    pantoprazole (PROTONIX) 40 MG tablet Take 1 tablet (40 mg total) by mouth once daily. Do not crush, break, chew (Patient not taking: Reported on 11/7/2020) 90 tablet 1    rizatriptan (MAXALT) 10 MG tablet TAKE 1 TABLET IF NEEDED FOR MIGRAINES. MAX 2 TABLETS IN 24 HOURS. 30 tablet 8    sulfamethoxazole-trimethoprim 800-160mg (BACTRIM DS) 800-160 mg Tab Take 1 tablet by mouth 2 (two) times daily. 56 tablet 0    triamcinolone acetonide 0.025% (KENALOG) 0.025 % cream Apply topically 2 (two) times daily. (Patient taking differently: Apply topically 2 (two)  times daily. Hold am of surgery) 453 g 3    VENTOLIN HFA 90 mcg/actuation inhaler INHALE 2 PUFFS INTO THE LUNGS EVERY 4 TO 6 HOURS AS NEEDED FOR WHEEZING. (Patient taking differently: Take if needed & bring) 18 Inhaler 1    zolpidem (AMBIEN) 5 MG Tab TAKE 1 TABLET BY MOUTH EVERY DAY AT BEDTIME AS NEEDED (Patient not taking: Reported on 2020) 90 tablet 1       Past Surgical History:   Procedure Laterality Date    BLADDER SURGERY      sling was created by her muscles      SECTION      COLONOSCOPY N/A 2017    Procedure: COLONOSCOPY;  Surgeon: Kin Dyer MD;  Location: [facility];  Service: Endoscopy;  Laterality: N/A;    COLONOSCOPY N/A 3/27/2018    Procedure: COLONOSCOPY;  Surgeon: Kyra Vallecillo MD;  Location: [facility];  Service: Endoscopy;  Laterality: N/A;    COLONOSCOPY N/A 3/12/2020    Procedure: COLONOSCOPY;  Surgeon: Nicole Leal MD;  Location: [facility];  Service: Endoscopy;  Laterality: N/A;    ESOPHAGOGASTRODUODENOSCOPY N/A 3/12/2020    Procedure: ESOPHAGOGASTRODUODENOSCOPY (EGD);  Surgeon: Nicole Leal MD;  Location: [facility];  Service: Endoscopy;  Laterality: N/A;    FINGER SURGERY      joint relpacement, left hand index finger    FUNCTIONAL ENDOSCOPIC SINUS SURGERY (FESS) USING COMPUTER-ASSISTED NAVIGATION Bilateral 2019    Procedure: FESS, USING COMPUTER-ASSISTED NAVIGATION;  Surgeon: Manish Shaffer MD;  Location: [facility] OR;  Service: ENT;  Laterality: Bilateral;    FUNCTIONAL ENDOSCOPIC SINUS SURGERY (FESS) USING COMPUTER-ASSISTED NAVIGATION Bilateral 2020    Procedure: FESS, USING COMPUTER-ASSISTED NAVIGATION SPHENOID;  Surgeon: Matthias Roach MD;  Location: [address] FLR;  Service: ENT;  Laterality: Bilateral;  TIVA    HYSTERECTOMY      WISDOM TOOTH EXTRACTION           Anesthesia Evaluation    I have reviewed the Patient Summary Reports.    I have reviewed the Nursing Notes.    I have reviewed the Medications.     Review of  Systems  Anesthesia Hx:  No problems with previous Anesthesia Denies Hx of Anesthetic complications  History of prior surgery of interest to airway management or planning: Denies Family Hx of Anesthesia complications.   Denies Personal Hx of Anesthesia complications.   Social:  Non-Smoker    Hematology/Oncology:  Hematology Normal   Oncology Normal     EENT/Dental:EENT/Dental Normal   Cardiovascular:   Hypertension, well controlled hyperlipidemia    Pulmonary:   Asthma moderate and mild Sleep Apnea    Renal/:  Renal/ Normal     Hepatic/GI:  Bowel Conditions:  Inflammatory Bowel Disease, Crohns    Musculoskeletal:   Arthritis   Muscle Disorders: Fibromyalgia    Neurological:   Neuromuscular Disease, Headaches    Endocrine:  Endocrine Normal    Dermatological:  Skin Normal    Psych:  Psychiatric Normal           Physical Exam  General:  Obesity    Airway/Jaw/Neck:  Airway Findings: Mouth Opening: Normal Tongue: Normal  General Airway Assessment: Adult  Mallampati: II  TM Distance: Normal, at least 6 cm  Jaw/Neck Findings:  Neck ROM: Normal ROM  Neck Findings:     Eyes/Ears/Nose:  Eyes/Ears/Nose Findings:    Dental:  Dental Findings: In tact   Chest/Lungs:  Chest/Lungs Findings: Clear to auscultation     Heart/Vascular:  Heart Findings: Rate: Normal  Sounds: Normal  Heart murmur: negative Vascular Findings: Normal    Abdomen:  Abdomen Findings: Normal    Musculoskeletal:  Musculoskeletal Findings: Normal   Skin:  Skin Findings: Normal    Mental Status:  Mental Status Findings:  Alert and Oriented         Anesthesia Plan  Type of Anesthesia, risks & benefits discussed:  Anesthesia Type:  general  Patient's Preference:   Intra-op Monitoring Plan: standard ASA monitors  Intra-op Monitoring Plan Comments:   Post Op Pain Control Plan: per primary service following discharge from PACU  Post Op Pain Control Plan Comments:   Induction:   IV  Beta Blocker:  Patient is on a Beta-Blocker and has received one dose within the  past 24 hours (No further documentation required).       Informed Consent: Patient understands risks and agrees with Anesthesia plan.  Questions answered. Anesthesia consent signed with patient.  ASA Score: 2     Day of Surgery Review of History & Physical:    H&P update referred to the surgeon.         Ready For Surgery From Anesthesia Perspective.     Pt reports that she was told per Anesthesia that she stopped breathing during a Colonoscopy. Performed in [facility].

## 2020-12-18 ENCOUNTER — LAB VISIT (OUTPATIENT)
Dept: OTOLARYNGOLOGY | Facility: CLINIC | Age: 55
End: 2020-12-18
Payer: COMMERCIAL

## 2020-12-18 ENCOUNTER — PATIENT MESSAGE (OUTPATIENT)
Dept: PRIMARY CARE CLINIC | Facility: CLINIC | Age: 55
End: 2020-12-18

## 2020-12-18 DIAGNOSIS — Z01.812 PRE-PROCEDURE LAB EXAM: ICD-10-CM

## 2020-12-18 PROCEDURE — U0003 INFECTIOUS AGENT DETECTION BY NUCLEIC ACID (DNA OR RNA); SEVERE ACUTE RESPIRATORY SYNDROME CORONAVIRUS 2 (SARS-COV-2) (CORONAVIRUS DISEASE [COVID-19]), AMPLIFIED PROBE TECHNIQUE, MAKING USE OF HIGH THROUGHPUT TECHNOLOGIES AS DESCRIBED BY CMS-2020-01-R: HCPCS

## 2020-12-18 RX ORDER — CETIRIZINE HYDROCHLORIDE 10 MG/1
10 TABLET ORAL 2 TIMES DAILY
COMMUNITY
End: 2021-11-09

## 2020-12-19 LAB — SARS-COV-2 RNA RESP QL NAA+PROBE: NOT DETECTED

## 2020-12-21 ENCOUNTER — ANESTHESIA (OUTPATIENT)
Dept: SURGERY | Facility: HOSPITAL | Age: 55
End: 2020-12-21
Payer: COMMERCIAL

## 2020-12-21 ENCOUNTER — HOSPITAL ENCOUNTER (OUTPATIENT)
Facility: HOSPITAL | Age: 55
Discharge: HOME | End: 2020-12-21
Attending: OTOLARYNGOLOGY | Admitting: OTOLARYNGOLOGY
Payer: COMMERCIAL

## 2020-12-21 VITALS
SYSTOLIC BLOOD PRESSURE: 146 MMHG | RESPIRATION RATE: 20 BRPM | BODY MASS INDEX: 29.03 KG/M2 | HEIGHT: 67 IN | WEIGHT: 185 LBS | TEMPERATURE: 98 F | OXYGEN SATURATION: 99 % | HEART RATE: 56 BPM | DIASTOLIC BLOOD PRESSURE: 87 MMHG

## 2020-12-21 DIAGNOSIS — J32.4 CHRONIC PANSINUSITIS: Primary | ICD-10-CM

## 2020-12-21 PROCEDURE — 37000008 HC ANESTHESIA 1ST 15 MINUTES: Performed by: OTOLARYNGOLOGY

## 2020-12-21 PROCEDURE — 63600175 PHARM REV CODE 636 W HCPCS: Performed by: STUDENT IN AN ORGANIZED HEALTH CARE EDUCATION/TRAINING PROGRAM

## 2020-12-21 PROCEDURE — 87186 SC STD MICRODIL/AGAR DIL: CPT | Mod: 59

## 2020-12-21 PROCEDURE — 36000707: Performed by: OTOLARYNGOLOGY

## 2020-12-21 PROCEDURE — 71000016 HC POSTOP RECOV ADDL HR: Performed by: OTOLARYNGOLOGY

## 2020-12-21 PROCEDURE — 31237 PR NASAL/SINUS ENDOSCOPY,BX/RMV POLYP/DEBRID: ICD-10-PCS | Mod: 59,LT,, | Performed by: OTOLARYNGOLOGY

## 2020-12-21 PROCEDURE — 31237 NSL/SINS NDSC SURG BX POLYPC: CPT | Mod: RT,,, | Performed by: OTOLARYNGOLOGY

## 2020-12-21 PROCEDURE — 87075 CULTR BACTERIA EXCEPT BLOOD: CPT

## 2020-12-21 PROCEDURE — 25000003 PHARM REV CODE 250: Performed by: ANESTHESIOLOGY

## 2020-12-21 PROCEDURE — 37000009 HC ANESTHESIA EA ADD 15 MINS: Performed by: OTOLARYNGOLOGY

## 2020-12-21 PROCEDURE — 87077 CULTURE AEROBIC IDENTIFY: CPT

## 2020-12-21 PROCEDURE — D9220A PRA ANESTHESIA: ICD-10-PCS | Mod: ANES,,, | Performed by: ANESTHESIOLOGY

## 2020-12-21 PROCEDURE — D9220A PRA ANESTHESIA: Mod: CRNA,,, | Performed by: STUDENT IN AN ORGANIZED HEALTH CARE EDUCATION/TRAINING PROGRAM

## 2020-12-21 PROCEDURE — 87070 CULTURE OTHR SPECIMN AEROBIC: CPT

## 2020-12-21 PROCEDURE — 71000015 HC POSTOP RECOV 1ST HR: Performed by: OTOLARYNGOLOGY

## 2020-12-21 PROCEDURE — 25000003 PHARM REV CODE 250: Performed by: OTOLARYNGOLOGY

## 2020-12-21 PROCEDURE — D9220A PRA ANESTHESIA: ICD-10-PCS | Mod: CRNA,,, | Performed by: STUDENT IN AN ORGANIZED HEALTH CARE EDUCATION/TRAINING PROGRAM

## 2020-12-21 PROCEDURE — 27201423 OPTIME MED/SURG SUP & DEVICES STERILE SUPPLY: Performed by: OTOLARYNGOLOGY

## 2020-12-21 PROCEDURE — 36000706: Performed by: OTOLARYNGOLOGY

## 2020-12-21 PROCEDURE — 25000003 PHARM REV CODE 250: Performed by: STUDENT IN AN ORGANIZED HEALTH CARE EDUCATION/TRAINING PROGRAM

## 2020-12-21 PROCEDURE — D9220A PRA ANESTHESIA: Mod: ANES,,, | Performed by: ANESTHESIOLOGY

## 2020-12-21 PROCEDURE — 71000044 HC DOSC ROUTINE RECOVERY FIRST HOUR: Performed by: OTOLARYNGOLOGY

## 2020-12-21 RX ORDER — ONDANSETRON 4 MG/1
4 TABLET, ORALLY DISINTEGRATING ORAL EVERY 6 HOURS PRN
Qty: 30 TABLET | Refills: 0 | Status: SHIPPED | OUTPATIENT
Start: 2020-12-21 | End: 2021-02-03

## 2020-12-21 RX ORDER — ACETAMINOPHEN 500 MG
1000 TABLET ORAL ONCE
Status: COMPLETED | OUTPATIENT
Start: 2020-12-21 | End: 2020-12-21

## 2020-12-21 RX ORDER — LIDOCAINE HYDROCHLORIDE 20 MG/ML
INJECTION, SOLUTION EPIDURAL; INFILTRATION; INTRACAUDAL; PERINEURAL
Status: DISCONTINUED | OUTPATIENT
Start: 2020-12-21 | End: 2020-12-21

## 2020-12-21 RX ORDER — CEFAZOLIN SODIUM 1 G/3ML
INJECTION, POWDER, FOR SOLUTION INTRAMUSCULAR; INTRAVENOUS
Status: DISCONTINUED | OUTPATIENT
Start: 2020-12-21 | End: 2020-12-21

## 2020-12-21 RX ORDER — SODIUM CHLORIDE 9 MG/ML
INJECTION, SOLUTION INTRAVENOUS CONTINUOUS PRN
Status: DISCONTINUED | OUTPATIENT
Start: 2020-12-21 | End: 2020-12-21

## 2020-12-21 RX ORDER — SODIUM CHLORIDE 0.9 % (FLUSH) 0.9 %
3 SYRINGE (ML) INJECTION EVERY 4 HOURS PRN
Status: DISCONTINUED | OUTPATIENT
Start: 2020-12-21 | End: 2020-12-21 | Stop reason: HOSPADM

## 2020-12-21 RX ORDER — ROCURONIUM BROMIDE 10 MG/ML
INJECTION, SOLUTION INTRAVENOUS
Status: DISCONTINUED | OUTPATIENT
Start: 2020-12-21 | End: 2020-12-21

## 2020-12-21 RX ORDER — HYDROMORPHONE HYDROCHLORIDE 1 MG/ML
0.2 INJECTION, SOLUTION INTRAMUSCULAR; INTRAVENOUS; SUBCUTANEOUS EVERY 5 MIN PRN
Status: COMPLETED | OUTPATIENT
Start: 2020-12-21 | End: 2020-12-21

## 2020-12-21 RX ORDER — LABETALOL HYDROCHLORIDE 5 MG/ML
INJECTION, SOLUTION INTRAVENOUS
Status: DISCONTINUED | OUTPATIENT
Start: 2020-12-21 | End: 2020-12-21

## 2020-12-21 RX ORDER — ONDANSETRON 2 MG/ML
INJECTION INTRAMUSCULAR; INTRAVENOUS
Status: DISCONTINUED | OUTPATIENT
Start: 2020-12-21 | End: 2020-12-21

## 2020-12-21 RX ORDER — OXYMETAZOLINE HCL 0.05 %
SPRAY, NON-AEROSOL (ML) NASAL
Status: DISCONTINUED | OUTPATIENT
Start: 2020-12-21 | End: 2020-12-21 | Stop reason: HOSPADM

## 2020-12-21 RX ORDER — DEXAMETHASONE SODIUM PHOSPHATE 4 MG/ML
INJECTION, SOLUTION INTRA-ARTICULAR; INTRALESIONAL; INTRAMUSCULAR; INTRAVENOUS; SOFT TISSUE
Status: DISCONTINUED | OUTPATIENT
Start: 2020-12-21 | End: 2020-12-21

## 2020-12-21 RX ORDER — FENTANYL CITRATE 50 UG/ML
INJECTION, SOLUTION INTRAMUSCULAR; INTRAVENOUS
Status: DISCONTINUED | OUTPATIENT
Start: 2020-12-21 | End: 2020-12-21

## 2020-12-21 RX ORDER — MIDAZOLAM HYDROCHLORIDE 1 MG/ML
INJECTION INTRAMUSCULAR; INTRAVENOUS
Status: DISCONTINUED | OUTPATIENT
Start: 2020-12-21 | End: 2020-12-21

## 2020-12-21 RX ORDER — HALOPERIDOL 5 MG/ML
0.5 INJECTION INTRAMUSCULAR EVERY 10 MIN PRN
Status: DISCONTINUED | OUTPATIENT
Start: 2020-12-21 | End: 2020-12-21 | Stop reason: HOSPADM

## 2020-12-21 RX ORDER — PROPOFOL 10 MG/ML
VIAL (ML) INTRAVENOUS
Status: DISCONTINUED | OUTPATIENT
Start: 2020-12-21 | End: 2020-12-21

## 2020-12-21 RX ORDER — OXYCODONE HYDROCHLORIDE 5 MG/1
5 TABLET ORAL ONCE
Status: COMPLETED | OUTPATIENT
Start: 2020-12-21 | End: 2020-12-21

## 2020-12-21 RX ADMIN — ROCURONIUM BROMIDE 40 MG: 10 INJECTION, SOLUTION INTRAVENOUS at 12:12

## 2020-12-21 RX ADMIN — HYDROMORPHONE HYDROCHLORIDE 0.2 MG: 1 INJECTION, SOLUTION INTRAMUSCULAR; INTRAVENOUS; SUBCUTANEOUS at 01:12

## 2020-12-21 RX ADMIN — PROPOFOL 150 MG: 10 INJECTION, EMULSION INTRAVENOUS at 12:12

## 2020-12-21 RX ADMIN — MIDAZOLAM HYDROCHLORIDE 2 MG: 1 INJECTION, SOLUTION INTRAMUSCULAR; INTRAVENOUS at 12:12

## 2020-12-21 RX ADMIN — OXYCODONE HYDROCHLORIDE 5 MG: 5 TABLET ORAL at 02:12

## 2020-12-21 RX ADMIN — ONDANSETRON 4 MG: 2 INJECTION INTRAMUSCULAR; INTRAVENOUS at 12:12

## 2020-12-21 RX ADMIN — FENTANYL CITRATE 75 MCG: 50 INJECTION, SOLUTION INTRAMUSCULAR; INTRAVENOUS at 12:12

## 2020-12-21 RX ADMIN — SODIUM CHLORIDE: 0.9 INJECTION, SOLUTION INTRAVENOUS at 12:12

## 2020-12-21 RX ADMIN — ACETAMINOPHEN 1000 MG: 500 TABLET ORAL at 01:12

## 2020-12-21 RX ADMIN — LIDOCAINE HYDROCHLORIDE 60 MG: 20 INJECTION, SOLUTION EPIDURAL; INFILTRATION; INTRACAUDAL at 12:12

## 2020-12-21 RX ADMIN — CEFAZOLIN 2 G: 330 INJECTION, POWDER, FOR SOLUTION INTRAMUSCULAR; INTRAVENOUS at 12:12

## 2020-12-21 RX ADMIN — FENTANYL CITRATE 25 MCG: 50 INJECTION, SOLUTION INTRAMUSCULAR; INTRAVENOUS at 12:12

## 2020-12-21 RX ADMIN — DEXAMETHASONE SODIUM PHOSPHATE 4 MG: 4 INJECTION INTRA-ARTICULAR; INTRALESIONAL; INTRAMUSCULAR; INTRAVENOUS; SOFT TISSUE at 12:12

## 2020-12-21 RX ADMIN — Medication 5 MG: at 12:12

## 2020-12-21 NOTE — OP NOTE
DATE OF OPERATION: 12/21/2020     SURGEON:  Matthias Roach MD     ASSISTANT SURGEON:  Luisito Beth MD     OPERATION:     Bilateral endoscopic sinus debridement.     PREOPERATIVE DIAGNOSIS:      Chronic rhinosinusitis.     POSTOPERATIVE DIAGNOSIS:      Chronic rhinosinusitis.     ANESTHESIA: General.     COMPLICATIONS: None.     ESTIMATED BLOOD LOSS: None     SPECIMEN:  Bilateral maxillary sinus cultures for aerobic and anaerobic bacteria.     WOUND EXPECTANCY: Infected.     DRESSING:  None     FINDINGS: Diffuse purulence and crusting in the bilateral nasal cavity and maxillary sinus lumens.  Other sinuses edematous but clear.     INDICATIONS: Chronic rhinosinusitis and nasal obstruction, not controlled with maximal medical therapy.  Required general anesthesia due to deep extent of crusting and intolerance of manipulation while awake.     I discussed the risks, benefits and alternatives of surgical correction of the chronically obstructed sinuses and associated turbinate hypertrophy with the patient as well as the expected postoperative course. I gave him the opportunity to ask questions and I answered all of them. On the morning of surgery I again met with the patient and reviewed the indications for surgery and he consented to proceed.  COVID-19 test was negative within 48 hours of the surgery.     DESCRIPTION OF PROCEDURE: The patient was brought to the operating room and placed supine on the operating table. The patient was placed under general anesthesia and intubated. The patient was positioned with a donut under the head.  A time-out was performed to confirm the proper patient, site and procedure.  The patient was prepped and draped in the usual fashion.  The bed was placed in 20-degree reverse Trendelenberg position.     A 0-degree endoscope was used to examine the nasal cavity.  The nasal cavity and sinuses were copiously irrigated with diluted betadine solution with a 60-cc syringe.  A  patent right-sided claudia-antrostomy were appreciated.  There were yellow crusts and purulence in the right nasal cavity and maxillary sinus, which were removed with forceps, suction and irrigation. The mucosa was moderately edematous but not polypoid.  The ethmoid, frontal and sphenoid appeared clear.  The left side was also purulent with similar findings.  Cultures were taken from this side as well, debrided and irrigated with saline.     At this point, the drapes were taken down. The patient was turned back toward the anesthesiologist and awakened from anesthesia, extubated and transferred to the recovery room in stable condition.

## 2020-12-21 NOTE — OR NURSING
All dc instructions reviewed with spouse and pt. All questions answered. Ent resident by earlier to see pt. Persistent headache reported to anest, additional pain meds given.

## 2020-12-21 NOTE — ANESTHESIA PROCEDURE NOTES
Intubation  Performed by: Iris Palacios  Authorized by: Rajni Stovall MD     Intubation:     Induction:  Intravenous    Intubated:  Postinduction    Mask Ventilation:  Easy mask    Attempts:  1    Attempted By:  CRNA    Method of Intubation:  Direct    Blade:  Riley 2    Laryngeal View Grade: Grade IIA - cords partially seen      Difficult Airway Encountered?: No      Complications:  None    Airway Device:  Oral matthieu    Airway Device Size:  7.5    Style/Cuff Inflation:  Cuffed (inflated to minimal occlusive pressure)    Tube secured:  23    Secured at:  The lips    Placement Verified By:  Capnometry    Complicating Factors:  Short neck    Findings Post-Intubation:  BS equal bilateral and atraumatic/condition of teeth unchanged

## 2020-12-21 NOTE — TRANSFER OF CARE
"Anesthesia Transfer of Care Note    Patient: Jaylin Murguia    Procedure(s) Performed: Procedure(s) (LRB):  DEBRIDEMENT (Bilateral)    Patient location: [facility]    Anesthesia Type: general    Transport from OR: Transported from OR on 6-10 L/min O2 by face mask with adequate spontaneous ventilation    Post pain: adequate analgesia    Post assessment: no apparent anesthetic complications and tolerated procedure well    Post vital signs: stable    Level of consciousness: awake and oriented    Nausea/Vomiting: no nausea/vomiting    Complications: none    Transfer of care protocol was followed      Last vitals:   Visit Vitals  /61 (BP Location: Right arm, Patient Position: Lying)   Pulse 67   Temp 36.2 °C (97.2 °F) (Temporal)   Resp 17   Ht 5' 7" (1.702 m)   Wt 83.9 kg (185 lb)   SpO2 100%   Breastfeeding No   BMI 28.98 kg/m²     "

## 2020-12-21 NOTE — DISCHARGE SUMMARY
[facility]  Discharge Summary    Admit Date: 12/21/2020    Discharge Date and Time:  12/21/2020 1:16 PM      Discharge Attending Physician: Matthias Roach MD     Hospital Course: s/p bilateral nasal cavity debridement. Stable for discharge home.    Final Diagnoses:    Principal Problem: Chronic pansinusitis    Discharged Condition: good    Disposition: Home or Self Care    Follow up/Patient Instructions:    Medications:  Reconciled Home Medications:      Medication List      START taking these medications    ondansetron 4 MG Tbdl  Commonly known as: ZOFRAN-ODT  Take 1 tablet (4 mg total) by mouth every 6 (six) hours as needed.        CHANGE how you take these medications    amLODIPine 5 MG tablet  Commonly known as: NORVASC  TAKE 1 TABLET DAILY  What changed:   · how much to take  · how to take this  · when to take this  · additional instructions     BREO ELLIPTA 200-25 mcg/dose Dsdv diskus inhaler  Generic drug: fluticasone furoate-vilanteroL  INHALE 1 PUFF INTO THE LUNGS ONCE DAILY.  What changed:   · how much to take  · how to take this  · when to take this  · additional instructions     DULoxetine 60 MG capsule  Commonly known as: CYMBALTA  TAKE 1 CAPSULE TWICE A DAY  What changed: additional instructions     eletriptan 40 MG tablet  Commonly known as: RELPAX  Take 1 tablet (40 mg total) by mouth as needed.  What changed: additional instructions     hydroCHLOROthiazide 25 MG tablet  Commonly known as: HYDRODIURIL  Take 1 tablet (25 mg total) by mouth once daily.  What changed: additional instructions     montelukast 10 mg tablet  Commonly known as: SINGULAIR  TAKE 1 TABLET EVERY EVENING  What changed:   · how much to take  · how to take this  · additional instructions     nortriptyline 25 MG capsule  Commonly known as: PAMELOR  Take 2 capsules (50 mg total) by mouth once daily.  What changed:   · how much to take  · when to take this     pregabalin 150 MG capsule  Commonly known as:  LYRICA  TAKE 1 CAPSULE BY MOUTH THREE TIMES A DAY  What changed: additional instructions     propranoloL 80 mg 24 hr capsule  Commonly known as: INNOPRAN XL  Take 1 capsule (80 mg total) by mouth every evening.  What changed: additional instructions     rosuvastatin 5 MG tablet  Commonly known as: CRESTOR  Take 1 tablet (5 mg total) by mouth once daily.  What changed:   · when to take this  · additional instructions     topiramate 25 MG tablet  Commonly known as: TOPAMAX  1 TABLET TWICE DAILY  What changed:   · how much to take  · when to take this  · additional instructions     triamcinolone acetonide 0.025% 0.025 % cream  Commonly known as: KENALOG  Apply topically 2 (two) times daily.  What changed: additional instructions     VENTOLIN HFA 90 mcg/actuation inhaler  Generic drug: albuterol  INHALE 2 PUFFS INTO THE LUNGS EVERY 4 TO 6 HOURS AS NEEDED FOR WHEEZING.  What changed: See the new instructions.        CONTINUE taking these medications    acetaminophen 325 MG tablet  Commonly known as: TYLENOL  Take 1 tablet (325 mg total) by mouth every 6 (six) hours as needed for Pain.     azelastine 137 mcg (0.1 %) nasal spray  Commonly known as: ASTELIN  1 spray (137 mcg total) by Nasal route 2 (two) times daily.     butalbital-acetaminophen-caffeine -40 mg -40 mg per tablet  Commonly known as: FIORICET, ESGIC  TAKE 1 TABLET EVERY 4 HOURS AS NEEDED FOR HEADACHE     cetirizine 10 MG tablet  Commonly known as: ZYRTEC  Take 10 mg by mouth 2 (two) times a day.     ergocalciferol 50,000 unit Cap  Commonly known as: ERGOCALCIFEROL  TAKE 1 CAPSULE BY MOUTH ONE TIME PER WEEK     estradioL 2 MG tablet  Commonly known as: ESTRACE  Take 2 mg by mouth every evening.     fluticasone propionate 50 mcg/actuation nasal spray  Commonly known as: FLONASE  Take if needed     ipratropium 0.02 % nebulizer solution  Commonly known as: ATROVENT  Nebulize 2.5 ml every 4-6 hours as directed, if needed     levalbuterol 1.25 mg/3 mL  nebulizer solution  Commonly known as: XOPENEX  Take 3 mLs (1.25 mg total) by nebulization every 4 (four) hours. Rescue     losartan-hydrochlorothiazide 100-25 mg 100-25 mg per tablet  Commonly known as: HYZAAR  TAKE 1 TABLET BY MOUTH EVERY DAY     mesalamine 250 mg Cpsr  Commonly known as: PENTASA  Take 4 capsules (1,000 mg total) by mouth 4 (four) times daily.     pantoprazole 40 MG tablet  Commonly known as: PROTONIX  Take 1 tablet (40 mg total) by mouth once daily. Do not crush, break, chew     rizatriptan 10 MG tablet  Commonly known as: MAXALT  TAKE 1 TABLET IF NEEDED FOR MIGRAINES. MAX 2 TABLETS IN 24 HOURS.     sulfamethoxazole-trimethoprim 800-160mg 800-160 mg Tab  Commonly known as: BACTRIM DS  Take 1 tablet by mouth 2 (two) times daily.     tiotropium bromide 1.25 mcg/actuation inhaler  Commonly known as: SPIRIVA RESPIMAT  Inhale 2 puffs into the lungs once daily.     zolpidem 5 MG Tab  Commonly known as: AMBIEN  TAKE 1 TABLET BY MOUTH EVERY DAY AT BEDTIME AS NEEDED        STOP taking these medications    methylPREDNISolone 4 mg tablet  Commonly known as: MEDROL DOSEPACK          Discharge Procedure Orders   Diet Adult Regular     Activity as tolerated

## 2020-12-21 NOTE — PLAN OF CARE
Pt doing well in pacu, headache treated with tylenol. Please see doc flow sheet for full assessment.

## 2020-12-21 NOTE — PLAN OF CARE
Patient states that she has relief from her headache. Pt meets criteria for discharge. AAO, tolerating po liquids. Discharge instructions given and reviewed. Patient discharged to home.

## 2020-12-21 NOTE — H&P
Subjective:      Jaylin is a 55 y.o. female who comes for follow-up of sinusitis.  Her last visit with me was on 10/1/2020.  Now 8 weeks status-post endoscopic sinus surgery.   Doing fine, had noted near-resolution of discolored mucus discharge.  Last weekend went away, skipped 2 doses of oral cipro, noted immediate return of thick purulent green-black mucus, more from left.  Also variable aural fullness, more on left.  Ran out of gentamicin rinse last week.  Near-constant anosmia still.      %     The patient's medications, allergies, past medical, surgical, social and family histories were reviewed and updated as appropriate.     A detailed review of systems was obtained with pertinent positives as per the above HPI, and otherwise negative.         Objective:      /87   Pulse 62          Constitutional:   She appears well-developed. She is cooperative. Normal speech.  No hoarse voice.       Head:  Normocephalic. Salivary glands normal.  Facial strength is normal.       Ears:    Right Ear: No drainage or tenderness. Tympanic membrane is not perforated. Tympanic membrane mobility is abnormal. A middle ear effusion is present. No decreased hearing is noted.   Left Ear: No drainage or tenderness. Tympanic membrane is not perforated. Tympanic membrane mobility is normal.  No middle ear effusion. No decreased hearing is noted.      Nose:  No mucosal edema, rhinorrhea, septal deviation or polyps. No epistaxis. Turbinates normal, no turbinate masses and no turbinate hypertrophy.  Right sinus exhibits no maxillary sinus tenderness and no frontal sinus tenderness. Left sinus exhibits no maxillary sinus tenderness and no frontal sinus tenderness.      Mouth/Throat  Oropharynx clear and moist without lesions or asymmetry and normal uvula midline. She does not have dentures. Normal dentition. No oral lesions or mucous membrane lesions. No oropharyngeal exudate or posterior oropharyngeal erythema. Mirror exam not  performed due to patient tolerance.  Mirror exam not performed due to patient tolerance.       Neck:  Neck normal without thyromegaly masses, asymmetry, normal tracheal structure, crepitus, and tenderness, thyroid normal, trachea normal and no adenopathy. Normal range of motion present.     She has no cervical adenopathy.      Cardiovascular:   Regular rhythm.       Pulmonary/Chest:   Effort normal.      Psychiatric:   She has a normal mood and affect. Her speech is normal and behavior is normal.      Neurological:   No cranial nerve deficit.      Skin:   No rash noted.         Procedure     Nasal endoscopy performed.  See procedure note.           Data Reviewed         WBC (K/uL)   Date Value   06/29/2020 6.22          Eosinophil % (%)   Date Value   06/29/2020 4.0          Eos # (K/uL)   Date Value   06/29/2020 0.3          Platelets (K/uL)   Date Value   06/29/2020 338          Glucose (mg/dL)   Date Value   08/21/2020 94          IgE (IU/mL)   Date Value   12/12/2019 <35         Pathology report indicated chronic inflammation with eosinophilia.    Cultures showed Pseudomonas.        Assessment:      1. Nonallergic rhinitis    2. Chronic pansinusitis    3. TAMIKA (obstructive sleep apnea)          Plan:      New cultures taken, suspect polymicrobial infection. Will proceed with debridement today.

## 2020-12-21 NOTE — BRIEF OP NOTE
[facility]  Brief Operative Note    Surgery Date: 12/21/2020     Surgeon(s) and Role:     * Matthias Roach MD - Primary     * Luisito Beth MD - Resident - Assisting        Pre-op Diagnosis:  Chronic pansinusitis [J32.4]    Post-op Diagnosis:  Post-Op Diagnosis Codes:     * Chronic pansinusitis [J32.4]    Procedure(s) (LRB):  DEBRIDEMENT (Bilateral)    Anesthesia: General    Description of the findings of the procedure(s): See Op Note    Estimated Blood Loss: * No values recorded between 12/21/2020 12:35 PM and 12/21/2020  1:09 PM *         Specimens:   Specimen (12h ago, onward)    None            Discharge Note    OUTCOME: Patient tolerated treatment/procedure well without complication and is now ready for discharge.    DISPOSITION: Home or Self Care    FINAL DIAGNOSIS:  Chronic pansinusitis    FOLLOWUP: In clinic    DISCHARGE INSTRUCTIONS:    Discharge Procedure Orders   Diet Adult Regular     Activity as tolerated

## 2020-12-22 ENCOUNTER — PATIENT MESSAGE (OUTPATIENT)
Dept: OTOLARYNGOLOGY | Facility: CLINIC | Age: 55
End: 2020-12-22

## 2020-12-22 NOTE — ANESTHESIA POSTPROCEDURE EVALUATION
Anesthesia Post Evaluation    Patient: Jaylin Murguia    Procedure(s) Performed: Procedure(s) (LRB):  DEBRIDEMENT (Bilateral)    Final Anesthesia Type: general      Patient location during evaluation: PACU  Patient participation: Yes- Able to Participate  Level of consciousness: awake and alert  Post-procedure vital signs: reviewed and stable  Pain management: adequate  Airway patency: patent    PONV status at discharge: No PONV  Anesthetic complications: no      Cardiovascular status: stable  Respiratory status: unassisted and spontaneous ventilation  Hydration status: euvolemic  Follow-up not needed.          Vitals Value Taken Time   /87 12/21/20 1500   Temp 36.7 °C (98.1 °F) 12/21/20 1500   Pulse 56 12/21/20 1500   Resp 20 12/21/20 1500   SpO2 99 % 12/21/20 1500         No case tracking events are documented in the log.      Pain/Ramya Score: Pain Rating Prior to Med Admin: 7 (12/21/2020  2:20 PM)  Pain Rating Post Med Admin: 3 (tolerable) (12/21/2020  3:00 PM)  Ramya Score: 10 (12/21/2020  2:00 PM)

## 2020-12-22 NOTE — TELEPHONE ENCOUNTER
I spoke with her.  Onset after blowing nose, right cheek swelling and crepitus, painful to open mouth, gradually diminishing.  Prior CT scan shows idiopathic defect in posterior right maxillary sinus wall, likely site of air entry.  Reassurance given, expect to resolve spontaneously with observation.  Urged to continue saline rinse.  Will f/u cultures and f/u as planned.

## 2020-12-24 ENCOUNTER — PATIENT MESSAGE (OUTPATIENT)
Dept: PRIMARY CARE CLINIC | Facility: CLINIC | Age: 55
End: 2020-12-24

## 2020-12-24 LAB
BACTERIA SPEC AEROBE CULT: ABNORMAL

## 2020-12-27 ENCOUNTER — PATIENT MESSAGE (OUTPATIENT)
Dept: OTOLARYNGOLOGY | Facility: CLINIC | Age: 55
End: 2020-12-27

## 2020-12-28 ENCOUNTER — PATIENT MESSAGE (OUTPATIENT)
Dept: OTOLARYNGOLOGY | Facility: CLINIC | Age: 55
End: 2020-12-28

## 2020-12-28 ENCOUNTER — TELEPHONE (OUTPATIENT)
Dept: OTOLARYNGOLOGY | Facility: CLINIC | Age: 55
End: 2020-12-28

## 2020-12-28 DIAGNOSIS — J32.4 CHRONIC PANSINUSITIS: Primary | ICD-10-CM

## 2020-12-28 LAB
BACTERIA SPEC ANAEROBE CULT: NORMAL
BACTERIA SPEC ANAEROBE CULT: NORMAL

## 2020-12-28 NOTE — TELEPHONE ENCOUNTER
I spoke with her.  Cheek crepitus is resolving.  Reviewed culture results.  Will prescribe topical gentamicin-clindamycin from PAP for sinus rinse.  Stop cipro since intermediate sensitivity.  Will consider adding doxycycline.  F/U in 3 days as planned.

## 2020-12-31 ENCOUNTER — OFFICE VISIT (OUTPATIENT)
Dept: OTOLARYNGOLOGY | Facility: CLINIC | Age: 55
End: 2020-12-31
Payer: COMMERCIAL

## 2020-12-31 VITALS — SYSTOLIC BLOOD PRESSURE: 132 MMHG | HEART RATE: 58 BPM | DIASTOLIC BLOOD PRESSURE: 85 MMHG

## 2020-12-31 DIAGNOSIS — Z01.812 PRE-PROCEDURE LAB EXAM: ICD-10-CM

## 2020-12-31 DIAGNOSIS — J32.4 CHRONIC PANSINUSITIS: Primary | ICD-10-CM

## 2020-12-31 DIAGNOSIS — D80.1 HYPOGAMMAGLOBULINEMIA: ICD-10-CM

## 2020-12-31 PROCEDURE — 99213 OFFICE O/P EST LOW 20 MIN: CPT | Mod: S$GLB,,, | Performed by: OTOLARYNGOLOGY

## 2020-12-31 PROCEDURE — 99999 PR PBB SHADOW E&M-EST. PATIENT-LVL II: ICD-10-PCS | Mod: PBBFAC,,, | Performed by: OTOLARYNGOLOGY

## 2020-12-31 PROCEDURE — 99999 PR PBB SHADOW E&M-EST. PATIENT-LVL II: CPT | Mod: PBBFAC,,, | Performed by: OTOLARYNGOLOGY

## 2020-12-31 PROCEDURE — 3075F PR MOST RECENT SYSTOLIC BLOOD PRESS GE 130-139MM HG: ICD-10-PCS | Mod: CPTII,S$GLB,, | Performed by: OTOLARYNGOLOGY

## 2020-12-31 PROCEDURE — 1126F AMNT PAIN NOTED NONE PRSNT: CPT | Mod: S$GLB,,, | Performed by: OTOLARYNGOLOGY

## 2020-12-31 PROCEDURE — 3079F DIAST BP 80-89 MM HG: CPT | Mod: CPTII,S$GLB,, | Performed by: OTOLARYNGOLOGY

## 2020-12-31 PROCEDURE — 99213 PR OFFICE/OUTPT VISIT, EST, LEVL III, 20-29 MIN: ICD-10-PCS | Mod: S$GLB,,, | Performed by: OTOLARYNGOLOGY

## 2020-12-31 PROCEDURE — 1126F PR PAIN SEVERITY QUANTIFIED, NO PAIN PRESENT: ICD-10-PCS | Mod: S$GLB,,, | Performed by: OTOLARYNGOLOGY

## 2020-12-31 PROCEDURE — 3079F PR MOST RECENT DIASTOLIC BLOOD PRESSURE 80-89 MM HG: ICD-10-PCS | Mod: CPTII,S$GLB,, | Performed by: OTOLARYNGOLOGY

## 2020-12-31 PROCEDURE — 3075F SYST BP GE 130 - 139MM HG: CPT | Mod: CPTII,S$GLB,, | Performed by: OTOLARYNGOLOGY

## 2020-12-31 NOTE — PROGRESS NOTES
Subjective:      Jaylin Murguia is a 55 y.o. female who comes for follow-up 1 week status-post endoscopic sinus surgery.  Her last visit with me was on 11/24/2020.  Right cheek crepitus and swelling mostly resolved, only has some pain with lateral translocation of jaw, no trismus.  Using saline rinse with gent-clinda, discolored debris comes out each time.  Concerned about staph in culture, taking hygienic measures at home.  No facial pressure.        %       Objective:     /85   Pulse (!) 58      General:   not in distress   Nasal:  edematous mucosa   no septal hematoma   no bleeding   no accumulation of debris in middle meatus on either side   Oral Cavity:   clear   Oropharynx:   no bleeding   Mild crepitus to palpation of right soft palate   Neck:   nontender       Procedure    None        Data Reviewed     Cultures showed non-resistant staph and pseudomonas (intermed to cipro).      Assessment:     Perimaxillary emphysema improving following bilateral endoscopic sinus debridement.       1. Chronic pansinusitis    2. Hypogammaglobulinemia         Plan:     So far no reaccumulation.  Continue clinda-gent sinus rinse BID.  To see Dr. Bender on Jan 12.  Follow up in about 2 weeks (around 1/14/2021) for nasal endoscopy.

## 2021-01-11 ENCOUNTER — PATIENT MESSAGE (OUTPATIENT)
Dept: ADMINISTRATIVE | Facility: OTHER | Age: 56
End: 2021-01-11

## 2021-01-11 ENCOUNTER — PATIENT OUTREACH (OUTPATIENT)
Dept: ADMINISTRATIVE | Facility: OTHER | Age: 56
End: 2021-01-11

## 2021-01-12 ENCOUNTER — TELEPHONE (OUTPATIENT)
Dept: OTOLARYNGOLOGY | Facility: CLINIC | Age: 56
End: 2021-01-12

## 2021-01-12 ENCOUNTER — OFFICE VISIT (OUTPATIENT)
Dept: OTOLARYNGOLOGY | Facility: CLINIC | Age: 56
End: 2021-01-12
Payer: COMMERCIAL

## 2021-01-12 VITALS — SYSTOLIC BLOOD PRESSURE: 145 MMHG | HEART RATE: 56 BPM | DIASTOLIC BLOOD PRESSURE: 96 MMHG

## 2021-01-12 DIAGNOSIS — J32.4 CHRONIC PANSINUSITIS: Primary | ICD-10-CM

## 2021-01-12 PROCEDURE — 99999 PR PBB SHADOW E&M-EST. PATIENT-LVL III: CPT | Mod: PBBFAC,,, | Performed by: OTOLARYNGOLOGY

## 2021-01-12 PROCEDURE — 1126F AMNT PAIN NOTED NONE PRSNT: CPT | Mod: S$GLB,,, | Performed by: OTOLARYNGOLOGY

## 2021-01-12 PROCEDURE — 31231 NASAL/SINUS ENDOSCOPY: ICD-10-PCS | Mod: S$GLB,,, | Performed by: OTOLARYNGOLOGY

## 2021-01-12 PROCEDURE — 99999 PR PBB SHADOW E&M-EST. PATIENT-LVL III: ICD-10-PCS | Mod: PBBFAC,,, | Performed by: OTOLARYNGOLOGY

## 2021-01-12 PROCEDURE — 31231 NASAL ENDOSCOPY DX: CPT | Mod: S$GLB,,, | Performed by: OTOLARYNGOLOGY

## 2021-01-12 PROCEDURE — 99024 PR POST-OP FOLLOW-UP VISIT: ICD-10-PCS | Mod: S$GLB,,, | Performed by: OTOLARYNGOLOGY

## 2021-01-12 PROCEDURE — 1126F PR PAIN SEVERITY QUANTIFIED, NO PAIN PRESENT: ICD-10-PCS | Mod: S$GLB,,, | Performed by: OTOLARYNGOLOGY

## 2021-01-12 PROCEDURE — 99024 POSTOP FOLLOW-UP VISIT: CPT | Mod: S$GLB,,, | Performed by: OTOLARYNGOLOGY

## 2021-01-13 ENCOUNTER — PATIENT MESSAGE (OUTPATIENT)
Dept: GASTROENTEROLOGY | Facility: CLINIC | Age: 56
End: 2021-01-13

## 2021-01-20 ENCOUNTER — TELEPHONE (OUTPATIENT)
Dept: OTOLARYNGOLOGY | Facility: CLINIC | Age: 56
End: 2021-01-20

## 2021-01-20 ENCOUNTER — PATIENT MESSAGE (OUTPATIENT)
Dept: OTOLARYNGOLOGY | Facility: CLINIC | Age: 56
End: 2021-01-20

## 2021-01-21 ENCOUNTER — TELEPHONE (OUTPATIENT)
Dept: OTOLARYNGOLOGY | Facility: CLINIC | Age: 56
End: 2021-01-21

## 2021-01-29 ENCOUNTER — PATIENT MESSAGE (OUTPATIENT)
Dept: OTOLARYNGOLOGY | Facility: CLINIC | Age: 56
End: 2021-01-29

## 2021-01-29 DIAGNOSIS — J32.4 CHRONIC PANSINUSITIS: Primary | ICD-10-CM

## 2021-01-29 RX ORDER — LEVOFLOXACIN 500 MG/1
500 TABLET, FILM COATED ORAL DAILY
Qty: 10 TABLET | Refills: 0 | Status: SHIPPED | OUTPATIENT
Start: 2021-01-29 | End: 2021-02-08

## 2021-01-29 NOTE — PROGRESS NOTES
"Subjective:       Patient ID: Jaylin Murguia is a 55 y.o. female.    Chief Complaint:  Immunotherapy (swelling post IVIG treatment)      HPI: 12/12/2019 visit- 54 year old female with a history of sinus surgery, crohn's disease and asthma. After her sinus surgery, she suffered from pneumonia. She is concerned about her immune system. She did not tolerate Humira and Remicade. She was allergy tested in the past and took allergy immunotherapy for three years. She does not feel that her symptoms improved. She reports a post nasal drip and itchy eyes. She denies runny nose or nasal congestion. She reports watery eyes and she does OTC eye drops prn. She takes Zyrtec BID, Flonase and Astelin, which she feels helps.   She does heartburn.  She denies skin infections.  Beside the episode of pneumonia mention previously, she denies any other episodes of pneumonia.  She has an intermittent rash, intermittently on her neck and arms. It is raised and "pinkish". She uses triamcinolone cream to treat these symptoms. It is pruritic.    HPI 12/23/2019- HPI-no infections, since she was seen here previously.     HPI today: IgG2 and 3 are below the normal range. She was started on IVIG this year.She has received two doses and was switched to subcutaneous gammaglobulin. She reports swelling in the left clavicular area after her IVIG infusion.      Past Medical History:   Diagnosis Date    Allergic rhinitis     Asthma     Chronic pansinusitis     Crohn's disease     Ileal involvement, previously on Remicade, Asacol, Prednisone    Fibromyalgia     Hyperlipidemia     Hypertension     Migraine     Obstructive sleep apnea     CPAP at night    Sciatica      Family History   Problem Relation Age of Onset    Hypertension Mother     Kidney disease Father 64        ESRD on HD    Scleroderma Father     Hypertension Brother     Cancer Paternal Grandmother 70        colon    Heart attack Maternal Grandmother     COPD Maternal " Patient needs STR and has referrals pending in [facility]  Patient pending medical decision and clearance  CM will continue to follow  Grandmother 72     Review of patient's allergies indicates:  No Known Allergies   No food or insect sting allergy    Current Outpatient Medications on File Prior to Visit   Medication Sig Dispense Refill    amLODIPine (NORVASC) 5 MG tablet Take 1 tablet (5 mg total) by mouth once daily. 90 tablet 3    azelastine (ASTELIN) 137 mcg (0.1 %) nasal spray 1 spray (137 mcg total) by Nasal route 2 (two) times daily. 30 mL 11    butalbital-acetaminophen-caffeine -40 mg (FIORICET, ESGIC) -40 mg per tablet Take 1 tab po q4 hrs prn headache 90 tablet 3    cetirizine (ZYRTEC) 5 MG tablet Take 5 mg by mouth once daily.      DULoxetine (CYMBALTA) 60 MG capsule Take 1 capsule (60 mg total) by mouth 2 (two) times daily. 180 capsule 3    eletriptan (RELPAX) 40 MG tablet Take 1 tablet (40 mg total) by mouth as needed. 12 tablet 3    estradiol (ESTRACE) 2 MG tablet Take 2 mg by mouth every evening.       fluticasone furoate-vilanterol (BREO ELLIPTA) 200-25 mcg/dose DsDv diskus inhaler Inhale 1 puff into the lungs once daily. 1 each 11    fluticasone propionate (FLONASE) 50 mcg/actuation nasal spray       immun glob G,IgG,-pro-IgA 0-50 (HIZENTRA) 10 gram/50 mL (20 %) Soln Inject 55 mLs (11 g total) into the skin every 7 days. 220 mL 10    ipratropium (ATROVENT) 0.02 % nebulizer solution Nebulize 2.5 ml every 6 hours as directed, if needed 1 Box 0    levalbuterol (XOPENEX) 1.25 mg/3 mL nebulizer solution Take 3 mLs (1.25 mg total) by nebulization every 4 (four) hours. Rescue 1 Box 11    levocetirizine (XYZAL) 5 MG tablet Take 1 tablet (5 mg total) by mouth every evening. 30 tablet 11    losartan-hydrochlorothiazide 100-25 mg (HYZAAR) 100-25 mg per tablet Take 1 tablet by mouth once daily. 90 tablet 3    mesalamine (PENTASA) 250 mg CpSR Take 4 capsules (1,000 mg total) by mouth 4 (four) times daily. 1440 capsule 3    montelukast (SINGULAIR) 10 mg tablet Take 1 tablet (10 mg total) by mouth every evening. 90  tablet 3    nortriptyline (PAMELOR) 25 MG capsule Take 2 capsules (50 mg total) by mouth once daily. 180 capsule 3    pantoprazole (PROTONIX) 40 MG tablet Take 1 tablet (40 mg total) by mouth every evening. 90 tablet 3    pregabalin (LYRICA) 150 MG capsule TAKE 1 CAPSULE (150 MG TOTAL) BY MOUTH 3 (THREE) TIMES DAILY. 270 capsule 1    propranolol (INNOPRAN XL) 80 mg 24 hr capsule Take 1 capsule (80 mg total) by mouth every evening. 90 capsule 1    rizatriptan (MAXALT) 10 MG tablet TAKE 1 TABLET BY MOUTH IF NEEDED FOR MIGRAINES MAX 2 TAB IN 24 HOURS 30 tablet 3    tiotropium bromide (SPIRIVA RESPIMAT) 1.25 mcg/actuation Mist Inhale 2 puffs into the lungs once daily. 4 g 11    triamcinolone acetonide 0.025% (KENALOG) 0.025 % cream Apply topically 2 (two) times daily. 453 g 3    VENTOLIN HFA 90 mcg/actuation inhaler INHALE 2 PUFFS INTO THE LUNGS EVERY 4 TO 6 HOURS AS NEEDED FOR WHEEZING. 18 Inhaler 1    zolpidem (AMBIEN) 5 MG Tab TAKE 1 TABLET BY MOUTH EVERY DAY AT BEDTIME AS NEEDED 90 tablet 1    budesonide 1 mg/2 mL NbSp MIX CONTENTS OF 1 RESPULE WITH STERILE DILUENT PROVIDED. POUR INTO NASONEB CUP & PERFORM TREATMENT TWICE DAILY.  2    cetirizine (ZYRTEC) 5 MG chewable tablet Take 5 mg by mouth 2 (two) times daily.       doxycycline monohydrate 150 mg Cap MIX CONTENTS OF 1 CAPSULE WITH STERILE DILUENT PROVIDED. POUR INTO NASONEB CUP AND PERFORM TREATMENT TWICE DAILY  2    [START ON 3/9/2020] Immune Globulin G, IGG,-PRO-IGA 10 % injection, Privigen, (PRIVIGEN) 10 % Soln Medication:  Privigen 10% injection, 50 grams, IV every 4 weeks for hypogammaglobulinemia infuse per IVIG protocol.    Pre meds:   Acetaminophen 650 mg po x 1 dose every visit  Benadryl 25 mg IVP x 1 dose every visit  Famotidine PF 20 mg IVP x 1 dose every visit  Solumedrol 125 mg IV X 1 dose every visit  Flushes:   Sodium Chloride 0.9% 10 ml syringe  Sodium chloride 0.9% 250 ml flush bag  Heparin, porcine (PF) 100 units/ml injection flush  500 units.    Nursing orders:  Insert PIV and discontinue PIV when infusion complete  Access existing port or implanted device and de-access with completion of infusion.  May use facilities anaphylaxis protocol. (Patient not taking: Reported on 2/18/2020) 500 mL 12    SUPREP BOWEL PREP KIT 17.5-3.13-1.6 gram SolR Use as directed (Patient not taking: Reported on 2/18/2020) 354 mL 0     Current Facility-Administered Medications on File Prior to Visit   Medication Dose Route Frequency Provider Last Rate Last Dose    lactated ringers infusion   Intravenous Continuous Mary Leiva MD        lidocaine (PF) 10 mg/ml (1%) injection 10 mg  1 mL Intradermal Once Mary Leiva MD             Environmental History:  Dog in the house. NO smoke exposure.  Review of Systems   Constitutional: Negative for chills and fever.   HENT: Positive for congestion, postnasal drip, sinus pressure and sinus pain. Negative for rhinorrhea.    Eyes: Positive for discharge and itching.   Respiratory: Negative for chest tightness, shortness of breath and wheezing.    Cardiovascular: Negative for chest pain.   Gastrointestinal: Negative for constipation, diarrhea, nausea and vomiting.   Endocrine: Negative for cold intolerance and heat intolerance.   Genitourinary: Negative for dysuria and frequency.   Musculoskeletal: Positive for joint swelling. Negative for gait problem.   Skin: Positive for rash. Negative for wound.   Allergic/Immunologic: Positive for environmental allergies. Negative for food allergies.   Neurological: Positive for headaches. Negative for seizures.        She has a history of migraines and she is on medication.   Hematological: Negative for adenopathy. Does not bruise/bleed easily.   Psychiatric/Behavioral: Negative for agitation and confusion.        Objective:    Physical Exam   Constitutional: She is oriented to person, place, and time. She appears well-developed and well-nourished. No distress.   HENT:   Head:  Normocephalic and atraumatic.   Neck: Normal range of motion. Neck supple.   Cardiovascular: Normal rate, regular rhythm and normal heart sounds.   No murmur heard.  Pulmonary/Chest: Effort normal and breath sounds normal. No stridor. No respiratory distress. She has no wheezes. She has no rales.   Abdominal: She exhibits no distension. There is no tenderness.   Musculoskeletal: Normal range of motion. She exhibits no edema.   Neurological: She is alert and oriented to person, place, and time.   Skin: Skin is warm. She is not diaphoretic.   Psychiatric: She has a normal mood and affect. Judgment and thought content normal.   Vitals reviewed.      Assessment:       1. IgG2 subclass deficiency    2. Venous embolism and thrombosis         Plan:         IgG2 subclass deficiency    Venous embolism and thrombosis  -     Cancel: CT Neck Chest Without Contrast (XPD); Future; Expected date: 02/18/2020  -     CTA Chest Non Coronary; Future; Expected date: 02/18/2020    CT A ordered out of concerns of a possible clot.  RTC after CTA to discuss results.

## 2021-02-03 ENCOUNTER — LAB VISIT (OUTPATIENT)
Dept: LAB | Facility: HOSPITAL | Age: 56
End: 2021-02-03
Attending: ALLERGY & IMMUNOLOGY
Payer: COMMERCIAL

## 2021-02-03 ENCOUNTER — OFFICE VISIT (OUTPATIENT)
Dept: ALLERGY | Facility: CLINIC | Age: 56
End: 2021-02-03
Payer: COMMERCIAL

## 2021-02-03 VITALS — WEIGHT: 192.88 LBS | BODY MASS INDEX: 30.27 KG/M2 | HEIGHT: 67 IN

## 2021-02-03 DIAGNOSIS — D80.6 ANTI-POLYSACCHARIDE ANTIBODY DEFICIENCY: ICD-10-CM

## 2021-02-03 DIAGNOSIS — D80.3 IGG2 SUBCLASS DEFICIENCY: ICD-10-CM

## 2021-02-03 DIAGNOSIS — J32.9 RECURRENT SINUSITIS: ICD-10-CM

## 2021-02-03 DIAGNOSIS — D80.3 IGG2 SUBCLASS DEFICIENCY: Primary | ICD-10-CM

## 2021-02-03 LAB
BASOPHILS # BLD AUTO: 0.06 K/UL (ref 0–0.2)
BASOPHILS NFR BLD: 0.8 % (ref 0–1.9)
DIFFERENTIAL METHOD: NORMAL
EOSINOPHIL # BLD AUTO: 0.1 K/UL (ref 0–0.5)
EOSINOPHIL NFR BLD: 1.5 % (ref 0–8)
ERYTHROCYTE [DISTWIDTH] IN BLOOD BY AUTOMATED COUNT: 13.5 % (ref 11.5–14.5)
HCT VFR BLD AUTO: 40.9 % (ref 37–48.5)
HGB BLD-MCNC: 13.2 G/DL (ref 12–16)
IMM GRANULOCYTES # BLD AUTO: 0.02 K/UL (ref 0–0.04)
IMM GRANULOCYTES NFR BLD AUTO: 0.3 % (ref 0–0.5)
LYMPHOCYTES # BLD AUTO: 2.3 K/UL (ref 1–4.8)
LYMPHOCYTES NFR BLD: 31 % (ref 18–48)
MCH RBC QN AUTO: 30.5 PG (ref 27–31)
MCHC RBC AUTO-ENTMCNC: 32.3 G/DL (ref 32–36)
MCV RBC AUTO: 95 FL (ref 82–98)
MONOCYTES # BLD AUTO: 0.6 K/UL (ref 0.3–1)
MONOCYTES NFR BLD: 8.5 % (ref 4–15)
NEUTROPHILS # BLD AUTO: 4.2 K/UL (ref 1.8–7.7)
NEUTROPHILS NFR BLD: 57.9 % (ref 38–73)
NRBC BLD-RTO: 0 /100 WBC
PLATELET # BLD AUTO: 282 K/UL (ref 150–350)
PMV BLD AUTO: 10.6 FL (ref 9.2–12.9)
RBC # BLD AUTO: 4.33 M/UL (ref 4–5.4)
WBC # BLD AUTO: 7.26 K/UL (ref 3.9–12.7)

## 2021-02-03 PROCEDURE — 86360 T CELL ABSOLUTE COUNT/RATIO: CPT

## 2021-02-03 PROCEDURE — 86359 T CELLS TOTAL COUNT: CPT

## 2021-02-03 PROCEDURE — 86355 B CELLS TOTAL COUNT: CPT

## 2021-02-03 PROCEDURE — 1126F AMNT PAIN NOTED NONE PRSNT: CPT | Mod: S$GLB,,, | Performed by: ALLERGY & IMMUNOLOGY

## 2021-02-03 PROCEDURE — 99999 PR PBB SHADOW E&M-EST. PATIENT-LVL IV: CPT | Mod: PBBFAC,,, | Performed by: ALLERGY & IMMUNOLOGY

## 2021-02-03 PROCEDURE — 99999 PR PBB SHADOW E&M-EST. PATIENT-LVL IV: ICD-10-PCS | Mod: PBBFAC,,, | Performed by: ALLERGY & IMMUNOLOGY

## 2021-02-03 PROCEDURE — 99214 PR OFFICE/OUTPT VISIT, EST, LEVL IV, 30-39 MIN: ICD-10-PCS | Mod: S$GLB,,, | Performed by: ALLERGY & IMMUNOLOGY

## 2021-02-03 PROCEDURE — 3008F PR BODY MASS INDEX (BMI) DOCUMENTED: ICD-10-PCS | Mod: CPTII,S$GLB,, | Performed by: ALLERGY & IMMUNOLOGY

## 2021-02-03 PROCEDURE — 1126F PR PAIN SEVERITY QUANTIFIED, NO PAIN PRESENT: ICD-10-PCS | Mod: S$GLB,,, | Performed by: ALLERGY & IMMUNOLOGY

## 2021-02-03 PROCEDURE — 82787 IGG 1 2 3 OR 4 EACH: CPT | Mod: 59

## 2021-02-03 PROCEDURE — 86357 NK CELLS TOTAL COUNT: CPT

## 2021-02-03 PROCEDURE — 82785 ASSAY OF IGE: CPT

## 2021-02-03 PROCEDURE — 82784 ASSAY IGA/IGD/IGG/IGM EACH: CPT | Mod: 59

## 2021-02-03 PROCEDURE — 86317 IMMUNOASSAY INFECTIOUS AGENT: CPT

## 2021-02-03 PROCEDURE — 85025 COMPLETE CBC W/AUTO DIFF WBC: CPT

## 2021-02-03 PROCEDURE — 99214 OFFICE O/P EST MOD 30 MIN: CPT | Mod: S$GLB,,, | Performed by: ALLERGY & IMMUNOLOGY

## 2021-02-03 PROCEDURE — 3008F BODY MASS INDEX DOCD: CPT | Mod: CPTII,S$GLB,, | Performed by: ALLERGY & IMMUNOLOGY

## 2021-02-03 RX ORDER — HUMAN IMMUNOGLOBULIN G 0.2 G/ML
10 LIQUID SUBCUTANEOUS
Qty: 200 ML | Refills: 11 | Status: SHIPPED | OUTPATIENT
Start: 2021-02-03 | End: 2021-02-03 | Stop reason: CLARIF

## 2021-02-03 RX ORDER — GENTAMICIN SULFATE 40 MG/ML
INJECTION, SOLUTION INTRAMUSCULAR; INTRAVENOUS
Status: ON HOLD | COMMUNITY
Start: 2021-01-29 | End: 2022-09-30 | Stop reason: ALTCHOICE

## 2021-02-03 RX ORDER — CLINDAMYCIN PHOSPHATE 150 MG/ML
INJECTION, SOLUTION INTRAVENOUS
Status: ON HOLD | COMMUNITY
Start: 2021-01-29 | End: 2022-09-30 | Stop reason: ALTCHOICE

## 2021-02-03 RX ORDER — HUMAN IMMUNOGLOBULIN G 0.2 G/ML
10 LIQUID SUBCUTANEOUS
Qty: 200 ML | Refills: 11 | Status: SHIPPED | OUTPATIENT
Start: 2021-02-03 | End: 2021-09-23 | Stop reason: SDUPTHER

## 2021-02-03 RX ORDER — SOD CHLOR,BICARB/SQUEEZ BOTTLE
PACKET, WITH RINSE DEVICE NASAL
COMMUNITY
Start: 2020-12-28 | End: 2022-09-16 | Stop reason: CLARIF

## 2021-02-04 ENCOUNTER — PATIENT MESSAGE (OUTPATIENT)
Dept: ALLERGY | Facility: CLINIC | Age: 56
End: 2021-02-04

## 2021-02-04 LAB
CD3+CD4+ CELLS # BLD: 1294 CELLS/UL (ref 300–1400)
CD3+CD4+ CELLS NFR BLD: 50.4 % (ref 28–57)
IGA SERPL-MCNC: 283 MG/DL (ref 40–350)
IGE SERPL-ACNC: <35 IU/ML (ref 0–100)
IGG SERPL-MCNC: 991 MG/DL (ref 650–1600)
IGM SERPL-MCNC: 160 MG/DL (ref 50–300)
LYMPHOCYTES NFR CSF MANUAL: 122 CELLS/UL (ref 90–600)
LYMPHOCYTES NFR CSF MANUAL: 1902 CELLS/UL (ref 700–2100)
LYMPHOCYTES NFR CSF MANUAL: 2.46 % (ref 0.9–3.6)
LYMPHOCYTES NFR CSF MANUAL: 20.3 % (ref 6–19)
LYMPHOCYTES NFR CSF MANUAL: 20.5 % (ref 10–39)
LYMPHOCYTES NFR CSF MANUAL: 484 CELLS/UL (ref 100–500)
LYMPHOCYTES NFR CSF MANUAL: 5.1 % (ref 7–31)
LYMPHOCYTES NFR CSF MANUAL: 526 CELLS/UL (ref 200–900)
LYMPHOCYTES NFR CSF MANUAL: 74 % (ref 55–83)

## 2021-02-05 ENCOUNTER — PATIENT MESSAGE (OUTPATIENT)
Dept: PRIMARY CARE CLINIC | Facility: CLINIC | Age: 56
End: 2021-02-05

## 2021-02-08 ENCOUNTER — LAB VISIT (OUTPATIENT)
Dept: OTOLARYNGOLOGY | Facility: CLINIC | Age: 56
End: 2021-02-08
Payer: COMMERCIAL

## 2021-02-08 DIAGNOSIS — Z01.812 PRE-PROCEDURE LAB EXAM: ICD-10-CM

## 2021-02-08 LAB
IGG1 SER-MCNC: 648 MG/DL (ref 382–929)
IGG2 SER-MCNC: 174 MG/DL (ref 242–700)
IGG3 SER-MCNC: 22 MG/DL (ref 22–176)
IGG4 SER-MCNC: 23 MG/DL (ref 4–86)

## 2021-02-08 PROCEDURE — U0005 INFEC AGEN DETEC AMPLI PROBE: HCPCS

## 2021-02-08 PROCEDURE — U0003 INFECTIOUS AGENT DETECTION BY NUCLEIC ACID (DNA OR RNA); SEVERE ACUTE RESPIRATORY SYNDROME CORONAVIRUS 2 (SARS-COV-2) (CORONAVIRUS DISEASE [COVID-19]), AMPLIFIED PROBE TECHNIQUE, MAKING USE OF HIGH THROUGHPUT TECHNOLOGIES AS DESCRIBED BY CMS-2020-01-R: HCPCS

## 2021-02-08 RX ORDER — PROPRANOLOL HYDROCHLORIDE 80 MG/1
80 CAPSULE, EXTENDED RELEASE ORAL DAILY
Qty: 90 CAPSULE | Refills: 3 | Status: SHIPPED | OUTPATIENT
Start: 2021-02-08 | End: 2021-02-08

## 2021-02-08 RX ORDER — PROPRANOLOL HYDROCHLORIDE 80 MG/1
80 CAPSULE, EXTENDED RELEASE ORAL DAILY
Qty: 30 CAPSULE | Refills: 11 | Status: SHIPPED | OUTPATIENT
Start: 2021-02-08 | End: 2022-05-09

## 2021-02-09 LAB
DEPRECATED S PNEUM12 IGG SER-MCNC: 1.5 UG/ML
DEPRECATED S PNEUM23 IGG SER-MCNC: 2.8 UG/ML
DEPRECATED S PNEUM4 IGG SER-MCNC: 1.2 UG/ML
DEPRECATED S PNEUM8 IGG SER-MCNC: 1.3 UG/ML
DEPRECATED S PNEUM9 IGG SER-MCNC: 2.4 UG/ML
S PN DA SERO 19F IGG SER-MCNC: 28.8 UG/ML
S PNEUM DA 1 IGG SER-MCNC: 6.3 UG/ML
S PNEUM DA 14 IGG SER-MCNC: 4.7
S PNEUM DA 18C IGG SER-MCNC: 6.8
S PNEUM DA 3 IGG SER-MCNC: 3.6 UG/ML
S PNEUM DA 5 IGG SER-MCNC: 7.2 UG/ML
S PNEUM DA 6B IGG SER-MCNC: 52.5 UG/ML
S PNEUM DA 7F IGG SER-MCNC: 5.9 UG/ML
S PNEUM DA 9V IGG SER-MCNC: 5.4 UG/ML
SARS-COV-2 RNA RESP QL NAA+PROBE: NOT DETECTED

## 2021-02-10 ENCOUNTER — PATIENT MESSAGE (OUTPATIENT)
Dept: ALLERGY | Facility: CLINIC | Age: 56
End: 2021-02-10

## 2021-02-10 ENCOUNTER — PATIENT MESSAGE (OUTPATIENT)
Dept: PRIMARY CARE CLINIC | Facility: CLINIC | Age: 56
End: 2021-02-10

## 2021-02-10 RX ORDER — FLUTICASONE PROPIONATE 50 MCG
2 SPRAY, SUSPENSION (ML) NASAL DAILY
Qty: 16 G | Refills: 11 | Status: SHIPPED | OUTPATIENT
Start: 2021-02-10 | End: 2021-05-26

## 2021-02-10 RX ORDER — FLUTICASONE FUROATE AND VILANTEROL TRIFENATATE 200; 25 UG/1; UG/1
1 POWDER RESPIRATORY (INHALATION) DAILY
Qty: 180 EACH | Refills: 3 | Status: SHIPPED | OUTPATIENT
Start: 2021-02-10 | End: 2021-07-22

## 2021-02-11 ENCOUNTER — OFFICE VISIT (OUTPATIENT)
Dept: OTOLARYNGOLOGY | Facility: CLINIC | Age: 56
End: 2021-02-11
Payer: COMMERCIAL

## 2021-02-11 VITALS — HEART RATE: 58 BPM | SYSTOLIC BLOOD PRESSURE: 118 MMHG | DIASTOLIC BLOOD PRESSURE: 79 MMHG

## 2021-02-11 DIAGNOSIS — D80.6 ANTI-POLYSACCHARIDE ANTIBODY DEFICIENCY: ICD-10-CM

## 2021-02-11 DIAGNOSIS — D80.1 HYPOGAMMAGLOBULINEMIA: ICD-10-CM

## 2021-02-11 DIAGNOSIS — J31.0 NONALLERGIC RHINITIS: ICD-10-CM

## 2021-02-11 DIAGNOSIS — Z01.812 PRE-PROCEDURE LAB EXAM: ICD-10-CM

## 2021-02-11 DIAGNOSIS — J32.4 CHRONIC PANSINUSITIS: Primary | ICD-10-CM

## 2021-02-11 PROCEDURE — 99213 OFFICE O/P EST LOW 20 MIN: CPT | Mod: 25,S$GLB,, | Performed by: OTOLARYNGOLOGY

## 2021-02-11 PROCEDURE — 3074F SYST BP LT 130 MM HG: CPT | Mod: CPTII,S$GLB,, | Performed by: OTOLARYNGOLOGY

## 2021-02-11 PROCEDURE — 1126F PR PAIN SEVERITY QUANTIFIED, NO PAIN PRESENT: ICD-10-PCS | Mod: S$GLB,,, | Performed by: OTOLARYNGOLOGY

## 2021-02-11 PROCEDURE — 1126F AMNT PAIN NOTED NONE PRSNT: CPT | Mod: S$GLB,,, | Performed by: OTOLARYNGOLOGY

## 2021-02-11 PROCEDURE — 3078F PR MOST RECENT DIASTOLIC BLOOD PRESSURE < 80 MM HG: ICD-10-PCS | Mod: CPTII,S$GLB,, | Performed by: OTOLARYNGOLOGY

## 2021-02-11 PROCEDURE — 31231 NASAL/SINUS ENDOSCOPY: ICD-10-PCS | Mod: S$GLB,,, | Performed by: OTOLARYNGOLOGY

## 2021-02-11 PROCEDURE — 99999 PR PBB SHADOW E&M-EST. PATIENT-LVL III: ICD-10-PCS | Mod: PBBFAC,,, | Performed by: OTOLARYNGOLOGY

## 2021-02-11 PROCEDURE — 99999 PR PBB SHADOW E&M-EST. PATIENT-LVL III: CPT | Mod: PBBFAC,,, | Performed by: OTOLARYNGOLOGY

## 2021-02-11 PROCEDURE — 31231 NASAL ENDOSCOPY DX: CPT | Mod: S$GLB,,, | Performed by: OTOLARYNGOLOGY

## 2021-02-11 PROCEDURE — 99213 PR OFFICE/OUTPT VISIT, EST, LEVL III, 20-29 MIN: ICD-10-PCS | Mod: 25,S$GLB,, | Performed by: OTOLARYNGOLOGY

## 2021-02-11 PROCEDURE — 3074F PR MOST RECENT SYSTOLIC BLOOD PRESSURE < 130 MM HG: ICD-10-PCS | Mod: CPTII,S$GLB,, | Performed by: OTOLARYNGOLOGY

## 2021-02-11 PROCEDURE — 3078F DIAST BP <80 MM HG: CPT | Mod: CPTII,S$GLB,, | Performed by: OTOLARYNGOLOGY

## 2021-02-12 ENCOUNTER — IMMUNIZATION (OUTPATIENT)
Dept: PHARMACY | Facility: CLINIC | Age: 56
End: 2021-02-12
Payer: COMMERCIAL

## 2021-02-17 ENCOUNTER — PATIENT MESSAGE (OUTPATIENT)
Dept: OTOLARYNGOLOGY | Facility: CLINIC | Age: 56
End: 2021-02-17

## 2021-02-19 ENCOUNTER — TELEPHONE (OUTPATIENT)
Dept: OTOLARYNGOLOGY | Facility: CLINIC | Age: 56
End: 2021-02-19

## 2021-02-19 DIAGNOSIS — J32.4 CHRONIC PANSINUSITIS: Primary | ICD-10-CM

## 2021-02-19 RX ORDER — LEVOFLOXACIN 500 MG/1
500 TABLET, FILM COATED ORAL DAILY
Qty: 10 TABLET | Refills: 0 | Status: SHIPPED | OUTPATIENT
Start: 2021-02-19 | End: 2021-03-01

## 2021-02-20 ENCOUNTER — PATIENT MESSAGE (OUTPATIENT)
Dept: PRIMARY CARE CLINIC | Facility: CLINIC | Age: 56
End: 2021-02-20

## 2021-02-20 DIAGNOSIS — M79.7 FIBROMYALGIA: ICD-10-CM

## 2021-02-22 RX ORDER — PREGABALIN 150 MG/1
150 CAPSULE ORAL 3 TIMES DAILY
Qty: 90 CAPSULE | Refills: 2 | Status: SHIPPED | OUTPATIENT
Start: 2021-02-22 | End: 2021-06-07 | Stop reason: SDUPTHER

## 2021-02-26 ENCOUNTER — PATIENT MESSAGE (OUTPATIENT)
Dept: ALLERGY | Facility: CLINIC | Age: 56
End: 2021-02-26

## 2021-03-04 ENCOUNTER — PATIENT MESSAGE (OUTPATIENT)
Dept: ALLERGY | Facility: CLINIC | Age: 56
End: 2021-03-04

## 2021-03-04 ENCOUNTER — TELEPHONE (OUTPATIENT)
Dept: ALLERGY | Facility: CLINIC | Age: 56
End: 2021-03-04

## 2021-03-08 ENCOUNTER — PATIENT MESSAGE (OUTPATIENT)
Dept: OTOLARYNGOLOGY | Facility: CLINIC | Age: 56
End: 2021-03-08

## 2021-03-19 ENCOUNTER — TELEPHONE (OUTPATIENT)
Dept: ALLERGY | Facility: CLINIC | Age: 56
End: 2021-03-19

## 2021-03-22 ENCOUNTER — PATIENT MESSAGE (OUTPATIENT)
Dept: PULMONOLOGY | Facility: CLINIC | Age: 56
End: 2021-03-22

## 2021-03-22 ENCOUNTER — PATIENT MESSAGE (OUTPATIENT)
Dept: ALLERGY | Facility: CLINIC | Age: 56
End: 2021-03-22

## 2021-03-23 ENCOUNTER — PATIENT MESSAGE (OUTPATIENT)
Dept: ALLERGY | Facility: CLINIC | Age: 56
End: 2021-03-23

## 2021-03-24 ENCOUNTER — TELEPHONE (OUTPATIENT)
Dept: ALLERGY | Facility: CLINIC | Age: 56
End: 2021-03-24

## 2021-03-25 ENCOUNTER — TELEPHONE (OUTPATIENT)
Dept: ALLERGY | Facility: CLINIC | Age: 56
End: 2021-03-25

## 2021-03-25 ENCOUNTER — PATIENT OUTREACH (OUTPATIENT)
Dept: ADMINISTRATIVE | Facility: OTHER | Age: 56
End: 2021-03-25

## 2021-03-27 ENCOUNTER — LAB VISIT (OUTPATIENT)
Dept: OTOLARYNGOLOGY | Facility: CLINIC | Age: 56
End: 2021-03-27
Payer: COMMERCIAL

## 2021-03-27 DIAGNOSIS — Z01.812 PRE-PROCEDURE LAB EXAM: ICD-10-CM

## 2021-03-27 PROCEDURE — U0005 INFEC AGEN DETEC AMPLI PROBE: HCPCS | Performed by: OTOLARYNGOLOGY

## 2021-03-27 PROCEDURE — U0003 INFECTIOUS AGENT DETECTION BY NUCLEIC ACID (DNA OR RNA); SEVERE ACUTE RESPIRATORY SYNDROME CORONAVIRUS 2 (SARS-COV-2) (CORONAVIRUS DISEASE [COVID-19]), AMPLIFIED PROBE TECHNIQUE, MAKING USE OF HIGH THROUGHPUT TECHNOLOGIES AS DESCRIBED BY CMS-2020-01-R: HCPCS | Performed by: OTOLARYNGOLOGY

## 2021-03-28 LAB — SARS-COV-2 RNA RESP QL NAA+PROBE: NOT DETECTED

## 2021-03-30 ENCOUNTER — OFFICE VISIT (OUTPATIENT)
Dept: OTOLARYNGOLOGY | Facility: CLINIC | Age: 56
End: 2021-03-30
Payer: COMMERCIAL

## 2021-03-30 VITALS
BODY MASS INDEX: 28.72 KG/M2 | HEIGHT: 67 IN | DIASTOLIC BLOOD PRESSURE: 81 MMHG | SYSTOLIC BLOOD PRESSURE: 114 MMHG | HEART RATE: 58 BPM | WEIGHT: 183 LBS

## 2021-03-30 DIAGNOSIS — D80.1 HYPOGAMMAGLOBULINEMIA: ICD-10-CM

## 2021-03-30 DIAGNOSIS — J31.0 NONALLERGIC RHINITIS: Primary | ICD-10-CM

## 2021-03-30 DIAGNOSIS — J32.4 CHRONIC PANSINUSITIS: ICD-10-CM

## 2021-03-30 PROCEDURE — 31231 NASAL ENDOSCOPY DX: CPT | Mod: S$GLB,,, | Performed by: OTOLARYNGOLOGY

## 2021-03-30 PROCEDURE — 3008F BODY MASS INDEX DOCD: CPT | Mod: CPTII,S$GLB,, | Performed by: OTOLARYNGOLOGY

## 2021-03-30 PROCEDURE — 3074F PR MOST RECENT SYSTOLIC BLOOD PRESSURE < 130 MM HG: ICD-10-PCS | Mod: CPTII,S$GLB,, | Performed by: OTOLARYNGOLOGY

## 2021-03-30 PROCEDURE — 3008F PR BODY MASS INDEX (BMI) DOCUMENTED: ICD-10-PCS | Mod: CPTII,S$GLB,, | Performed by: OTOLARYNGOLOGY

## 2021-03-30 PROCEDURE — 99213 PR OFFICE/OUTPT VISIT, EST, LEVL III, 20-29 MIN: ICD-10-PCS | Mod: 25,S$GLB,, | Performed by: OTOLARYNGOLOGY

## 2021-03-30 PROCEDURE — 31231 NASAL/SINUS ENDOSCOPY: ICD-10-PCS | Mod: S$GLB,,, | Performed by: OTOLARYNGOLOGY

## 2021-03-30 PROCEDURE — 99213 OFFICE O/P EST LOW 20 MIN: CPT | Mod: 25,S$GLB,, | Performed by: OTOLARYNGOLOGY

## 2021-03-30 PROCEDURE — 3079F PR MOST RECENT DIASTOLIC BLOOD PRESSURE 80-89 MM HG: ICD-10-PCS | Mod: CPTII,S$GLB,, | Performed by: OTOLARYNGOLOGY

## 2021-03-30 PROCEDURE — 3074F SYST BP LT 130 MM HG: CPT | Mod: CPTII,S$GLB,, | Performed by: OTOLARYNGOLOGY

## 2021-03-30 PROCEDURE — 99999 PR PBB SHADOW E&M-EST. PATIENT-LVL III: CPT | Mod: PBBFAC,,, | Performed by: OTOLARYNGOLOGY

## 2021-03-30 PROCEDURE — 99999 PR PBB SHADOW E&M-EST. PATIENT-LVL III: ICD-10-PCS | Mod: PBBFAC,,, | Performed by: OTOLARYNGOLOGY

## 2021-03-30 PROCEDURE — 1126F PR PAIN SEVERITY QUANTIFIED, NO PAIN PRESENT: ICD-10-PCS | Mod: S$GLB,,, | Performed by: OTOLARYNGOLOGY

## 2021-03-30 PROCEDURE — 3079F DIAST BP 80-89 MM HG: CPT | Mod: CPTII,S$GLB,, | Performed by: OTOLARYNGOLOGY

## 2021-03-30 PROCEDURE — 1126F AMNT PAIN NOTED NONE PRSNT: CPT | Mod: S$GLB,,, | Performed by: OTOLARYNGOLOGY

## 2021-04-05 ENCOUNTER — LAB VISIT (OUTPATIENT)
Dept: LAB | Facility: HOSPITAL | Age: 56
End: 2021-04-05
Attending: INTERNAL MEDICINE
Payer: COMMERCIAL

## 2021-04-05 ENCOUNTER — OFFICE VISIT (OUTPATIENT)
Dept: GASTROENTEROLOGY | Facility: CLINIC | Age: 56
End: 2021-04-05
Payer: COMMERCIAL

## 2021-04-05 VITALS
HEART RATE: 60 BPM | BODY MASS INDEX: 30.49 KG/M2 | HEIGHT: 67 IN | WEIGHT: 194.25 LBS | OXYGEN SATURATION: 98 % | SYSTOLIC BLOOD PRESSURE: 120 MMHG | DIASTOLIC BLOOD PRESSURE: 80 MMHG

## 2021-04-05 DIAGNOSIS — K50.00 CROHN'S DISEASE OF SMALL INTESTINE WITHOUT COMPLICATION: Primary | ICD-10-CM

## 2021-04-05 DIAGNOSIS — K50.00 CROHN'S DISEASE OF SMALL INTESTINE WITHOUT COMPLICATION: ICD-10-CM

## 2021-04-05 LAB
25(OH)D3+25(OH)D2 SERPL-MCNC: 26 NG/ML (ref 30–96)
CRP SERPL-MCNC: 3.4 MG/L (ref 0–8.2)
ERYTHROCYTE [SEDIMENTATION RATE] IN BLOOD BY WESTERGREN METHOD: 9 MM/HR (ref 0–36)
FOLATE SERPL-MCNC: 10.3 NG/ML (ref 4–24)

## 2021-04-05 PROCEDURE — 3008F PR BODY MASS INDEX (BMI) DOCUMENTED: ICD-10-PCS | Mod: CPTII,S$GLB,, | Performed by: INTERNAL MEDICINE

## 2021-04-05 PROCEDURE — 36415 COLL VENOUS BLD VENIPUNCTURE: CPT | Performed by: INTERNAL MEDICINE

## 2021-04-05 PROCEDURE — 82306 VITAMIN D 25 HYDROXY: CPT | Performed by: INTERNAL MEDICINE

## 2021-04-05 PROCEDURE — 1126F AMNT PAIN NOTED NONE PRSNT: CPT | Mod: S$GLB,,, | Performed by: INTERNAL MEDICINE

## 2021-04-05 PROCEDURE — 99999 PR PBB SHADOW E&M-EST. PATIENT-LVL V: CPT | Mod: PBBFAC,,, | Performed by: INTERNAL MEDICINE

## 2021-04-05 PROCEDURE — 85652 RBC SED RATE AUTOMATED: CPT | Performed by: INTERNAL MEDICINE

## 2021-04-05 PROCEDURE — 1126F PR PAIN SEVERITY QUANTIFIED, NO PAIN PRESENT: ICD-10-PCS | Mod: S$GLB,,, | Performed by: INTERNAL MEDICINE

## 2021-04-05 PROCEDURE — 3079F DIAST BP 80-89 MM HG: CPT | Mod: CPTII,S$GLB,, | Performed by: INTERNAL MEDICINE

## 2021-04-05 PROCEDURE — 3008F BODY MASS INDEX DOCD: CPT | Mod: CPTII,S$GLB,, | Performed by: INTERNAL MEDICINE

## 2021-04-05 PROCEDURE — 86140 C-REACTIVE PROTEIN: CPT | Performed by: INTERNAL MEDICINE

## 2021-04-05 PROCEDURE — 82746 ASSAY OF FOLIC ACID SERUM: CPT | Performed by: INTERNAL MEDICINE

## 2021-04-05 PROCEDURE — 99214 PR OFFICE/OUTPT VISIT, EST, LEVL IV, 30-39 MIN: ICD-10-PCS | Mod: S$GLB,,, | Performed by: INTERNAL MEDICINE

## 2021-04-05 PROCEDURE — 3079F PR MOST RECENT DIASTOLIC BLOOD PRESSURE 80-89 MM HG: ICD-10-PCS | Mod: CPTII,S$GLB,, | Performed by: INTERNAL MEDICINE

## 2021-04-05 PROCEDURE — 99214 OFFICE O/P EST MOD 30 MIN: CPT | Mod: S$GLB,,, | Performed by: INTERNAL MEDICINE

## 2021-04-05 PROCEDURE — 99999 PR PBB SHADOW E&M-EST. PATIENT-LVL V: ICD-10-PCS | Mod: PBBFAC,,, | Performed by: INTERNAL MEDICINE

## 2021-04-05 PROCEDURE — 3074F PR MOST RECENT SYSTOLIC BLOOD PRESSURE < 130 MM HG: ICD-10-PCS | Mod: CPTII,S$GLB,, | Performed by: INTERNAL MEDICINE

## 2021-04-05 PROCEDURE — 3074F SYST BP LT 130 MM HG: CPT | Mod: CPTII,S$GLB,, | Performed by: INTERNAL MEDICINE

## 2021-04-06 ENCOUNTER — HOSPITAL ENCOUNTER (OUTPATIENT)
Dept: RADIOLOGY | Facility: HOSPITAL | Age: 56
Discharge: HOME | End: 2021-04-06
Attending: INTERNAL MEDICINE
Payer: COMMERCIAL

## 2021-04-06 ENCOUNTER — DOCUMENTATION ONLY (OUTPATIENT)
Dept: GASTROENTEROLOGY | Facility: CLINIC | Age: 56
End: 2021-04-06

## 2021-04-06 DIAGNOSIS — K50.00 CROHN'S DISEASE OF SMALL INTESTINE WITHOUT COMPLICATION: ICD-10-CM

## 2021-04-06 PROCEDURE — A9698 NON-RAD CONTRAST MATERIALNOC: HCPCS | Performed by: INTERNAL MEDICINE

## 2021-04-06 PROCEDURE — 74177 CT ABD & PELVIS W/CONTRAST: CPT | Mod: 26,,, | Performed by: RADIOLOGY

## 2021-04-06 PROCEDURE — 74177 CT ABD & PELVIS W/CONTRAST: CPT | Mod: TC

## 2021-04-06 PROCEDURE — 25500020 PHARM REV CODE 255: Performed by: INTERNAL MEDICINE

## 2021-04-06 PROCEDURE — 74177 CT ENTEROGRAPHY ABD_PELVIS WITH CONTRAST: ICD-10-PCS | Mod: 26,,, | Performed by: RADIOLOGY

## 2021-04-06 RX ORDER — ERGOCALCIFEROL 1.25 MG/1
50000 CAPSULE ORAL EVERY OTHER DAY
Qty: 15 CAPSULE | Refills: 3 | Status: SHIPPED | OUTPATIENT
Start: 2021-04-06 | End: 2021-05-05

## 2021-04-06 RX ADMIN — IOHEXOL 120 ML: 350 INJECTION, SOLUTION INTRAVENOUS at 11:04

## 2021-04-06 RX ADMIN — BARIUM SULFATE 1350 ML: 1 SUSPENSION ORAL at 09:04

## 2021-04-14 ENCOUNTER — PATIENT MESSAGE (OUTPATIENT)
Dept: GASTROENTEROLOGY | Facility: CLINIC | Age: 56
End: 2021-04-14

## 2021-04-14 ENCOUNTER — PATIENT MESSAGE (OUTPATIENT)
Dept: OTOLARYNGOLOGY | Facility: CLINIC | Age: 56
End: 2021-04-14

## 2021-04-14 ENCOUNTER — TELEPHONE (OUTPATIENT)
Dept: ALLERGY | Facility: CLINIC | Age: 56
End: 2021-04-14

## 2021-05-11 ENCOUNTER — HOSPITAL ENCOUNTER (OUTPATIENT)
Dept: RADIOLOGY | Facility: HOSPITAL | Age: 56
Discharge: HOME | End: 2021-05-11
Attending: INTERNAL MEDICINE
Payer: COMMERCIAL

## 2021-05-11 ENCOUNTER — OFFICE VISIT (OUTPATIENT)
Dept: RHEUMATOLOGY | Facility: CLINIC | Age: 56
End: 2021-05-11
Payer: COMMERCIAL

## 2021-05-11 VITALS
WEIGHT: 192.25 LBS | HEIGHT: 67 IN | DIASTOLIC BLOOD PRESSURE: 92 MMHG | SYSTOLIC BLOOD PRESSURE: 143 MMHG | HEART RATE: 67 BPM | BODY MASS INDEX: 30.17 KG/M2

## 2021-05-11 DIAGNOSIS — M15.9 PRIMARY OSTEOARTHRITIS INVOLVING MULTIPLE JOINTS: ICD-10-CM

## 2021-05-11 DIAGNOSIS — M67.90 TENDINOPATHY: ICD-10-CM

## 2021-05-11 DIAGNOSIS — Z71.89 COUNSELING ON HEALTH PROMOTION AND DISEASE PREVENTION: ICD-10-CM

## 2021-05-11 DIAGNOSIS — M15.9 PRIMARY OSTEOARTHRITIS INVOLVING MULTIPLE JOINTS: Primary | ICD-10-CM

## 2021-05-11 DIAGNOSIS — K50.00 CROHN'S DISEASE OF SMALL INTESTINE WITHOUT COMPLICATION: ICD-10-CM

## 2021-05-11 PROCEDURE — 99205 OFFICE O/P NEW HI 60 MIN: CPT | Mod: S$GLB,,, | Performed by: INTERNAL MEDICINE

## 2021-05-11 PROCEDURE — 72200 X-RAY EXAM SI JOINTS: CPT | Mod: 26,,, | Performed by: RADIOLOGY

## 2021-05-11 PROCEDURE — 99999 PR PBB SHADOW E&M-EST. PATIENT-LVL V: CPT | Mod: PBBFAC,,, | Performed by: INTERNAL MEDICINE

## 2021-05-11 PROCEDURE — 3008F BODY MASS INDEX DOCD: CPT | Mod: CPTII,S$GLB,, | Performed by: INTERNAL MEDICINE

## 2021-05-11 PROCEDURE — 72200 X-RAY EXAM SI JOINTS: CPT | Mod: TC,FY,PO

## 2021-05-11 PROCEDURE — 72200 XR SACROILIAC JOINTS 3 VIEWS: ICD-10-PCS | Mod: 26,,, | Performed by: RADIOLOGY

## 2021-05-11 PROCEDURE — 1125F AMNT PAIN NOTED PAIN PRSNT: CPT | Mod: S$GLB,,, | Performed by: INTERNAL MEDICINE

## 2021-05-11 PROCEDURE — 99205 PR OFFICE/OUTPT VISIT, NEW, LEVL V, 60-74 MIN: ICD-10-PCS | Mod: S$GLB,,, | Performed by: INTERNAL MEDICINE

## 2021-05-11 PROCEDURE — 3008F PR BODY MASS INDEX (BMI) DOCUMENTED: ICD-10-PCS | Mod: CPTII,S$GLB,, | Performed by: INTERNAL MEDICINE

## 2021-05-11 PROCEDURE — 1125F PR PAIN SEVERITY QUANTIFIED, PAIN PRESENT: ICD-10-PCS | Mod: S$GLB,,, | Performed by: INTERNAL MEDICINE

## 2021-05-11 PROCEDURE — 99999 PR PBB SHADOW E&M-EST. PATIENT-LVL V: ICD-10-PCS | Mod: PBBFAC,,, | Performed by: INTERNAL MEDICINE

## 2021-05-11 RX ORDER — CLINDAMYCIN HYDROCHLORIDE 300 MG/1
600 CAPSULE ORAL 2 TIMES DAILY
COMMUNITY
Start: 2021-04-19 | End: 2022-03-28

## 2021-05-13 ENCOUNTER — OFFICE VISIT (OUTPATIENT)
Dept: OTOLARYNGOLOGY | Facility: CLINIC | Age: 56
End: 2021-05-13
Payer: COMMERCIAL

## 2021-05-13 VITALS — WEIGHT: 193.13 LBS | BODY MASS INDEX: 30.25 KG/M2

## 2021-05-13 DIAGNOSIS — D80.1 HYPOGAMMAGLOBULINEMIA: ICD-10-CM

## 2021-05-13 DIAGNOSIS — J31.0 NONALLERGIC RHINITIS: Primary | ICD-10-CM

## 2021-05-13 DIAGNOSIS — J32.4 CHRONIC PANSINUSITIS: ICD-10-CM

## 2021-05-13 PROCEDURE — 3008F BODY MASS INDEX DOCD: CPT | Mod: CPTII,S$GLB,, | Performed by: OTOLARYNGOLOGY

## 2021-05-13 PROCEDURE — 1126F AMNT PAIN NOTED NONE PRSNT: CPT | Mod: S$GLB,,, | Performed by: OTOLARYNGOLOGY

## 2021-05-13 PROCEDURE — 3008F PR BODY MASS INDEX (BMI) DOCUMENTED: ICD-10-PCS | Mod: CPTII,S$GLB,, | Performed by: OTOLARYNGOLOGY

## 2021-05-13 PROCEDURE — 99213 PR OFFICE/OUTPT VISIT, EST, LEVL III, 20-29 MIN: ICD-10-PCS | Mod: 25,S$GLB,, | Performed by: OTOLARYNGOLOGY

## 2021-05-13 PROCEDURE — 31231 NASAL/SINUS ENDOSCOPY: ICD-10-PCS | Mod: S$GLB,,, | Performed by: OTOLARYNGOLOGY

## 2021-05-13 PROCEDURE — 99999 PR PBB SHADOW E&M-EST. PATIENT-LVL V: CPT | Mod: PBBFAC,,, | Performed by: OTOLARYNGOLOGY

## 2021-05-13 PROCEDURE — 99213 OFFICE O/P EST LOW 20 MIN: CPT | Mod: 25,S$GLB,, | Performed by: OTOLARYNGOLOGY

## 2021-05-13 PROCEDURE — 99999 PR PBB SHADOW E&M-EST. PATIENT-LVL V: ICD-10-PCS | Mod: PBBFAC,,, | Performed by: OTOLARYNGOLOGY

## 2021-05-13 PROCEDURE — 1126F PR PAIN SEVERITY QUANTIFIED, NO PAIN PRESENT: ICD-10-PCS | Mod: S$GLB,,, | Performed by: OTOLARYNGOLOGY

## 2021-05-13 PROCEDURE — 31231 NASAL ENDOSCOPY DX: CPT | Mod: S$GLB,,, | Performed by: OTOLARYNGOLOGY

## 2021-05-25 ENCOUNTER — PATIENT MESSAGE (OUTPATIENT)
Dept: PRIMARY CARE CLINIC | Facility: CLINIC | Age: 56
End: 2021-05-25

## 2021-05-27 ENCOUNTER — TELEPHONE (OUTPATIENT)
Dept: GASTROENTEROLOGY | Facility: CLINIC | Age: 56
End: 2021-05-27

## 2021-05-27 RX ORDER — TOPIRAMATE 100 MG/1
100 TABLET, FILM COATED ORAL 2 TIMES DAILY
Qty: 180 TABLET | Refills: 3 | Status: SHIPPED | OUTPATIENT
Start: 2021-05-27 | End: 2022-06-08

## 2021-05-31 RX ORDER — ERGOCALCIFEROL 1.25 MG/1
50000 CAPSULE ORAL
Qty: 12 CAPSULE | Refills: 1 | Status: SHIPPED | OUTPATIENT
Start: 2021-05-31 | End: 2021-08-17

## 2021-06-06 DIAGNOSIS — M79.7 FIBROMYALGIA: ICD-10-CM

## 2021-06-07 ENCOUNTER — TELEPHONE (OUTPATIENT)
Dept: PRIMARY CARE CLINIC | Facility: CLINIC | Age: 56
End: 2021-06-07

## 2021-06-07 DIAGNOSIS — M79.7 FIBROMYALGIA: ICD-10-CM

## 2021-06-07 RX ORDER — PREGABALIN 150 MG/1
150 CAPSULE ORAL 3 TIMES DAILY
Qty: 90 CAPSULE | Refills: 2 | Status: CANCELLED | OUTPATIENT
Start: 2021-06-07

## 2021-06-07 RX ORDER — PREGABALIN 150 MG/1
150 CAPSULE ORAL 3 TIMES DAILY
Qty: 270 CAPSULE | Refills: 1 | Status: SHIPPED | OUTPATIENT
Start: 2021-06-07 | End: 2021-08-20

## 2021-06-07 RX ORDER — DULOXETIN HYDROCHLORIDE 60 MG/1
60 CAPSULE, DELAYED RELEASE ORAL DAILY
Qty: 30 CAPSULE | Refills: 11 | Status: SHIPPED | OUTPATIENT
Start: 2021-06-07 | End: 2021-09-08

## 2021-06-07 RX ORDER — AMLODIPINE BESYLATE 5 MG/1
TABLET ORAL
Qty: 90 TABLET | Refills: 3 | Status: SHIPPED | OUTPATIENT
Start: 2021-06-07 | End: 2021-09-08

## 2021-06-23 ENCOUNTER — PATIENT MESSAGE (OUTPATIENT)
Dept: PULMONOLOGY | Facility: CLINIC | Age: 56
End: 2021-06-23

## 2021-07-15 ENCOUNTER — OFFICE VISIT (OUTPATIENT)
Dept: OTOLARYNGOLOGY | Facility: CLINIC | Age: 56
End: 2021-07-15
Payer: COMMERCIAL

## 2021-07-15 VITALS — HEIGHT: 67 IN | BODY MASS INDEX: 29.93 KG/M2 | WEIGHT: 190.69 LBS

## 2021-07-15 DIAGNOSIS — D80.1 HYPOGAMMAGLOBULINEMIA: ICD-10-CM

## 2021-07-15 DIAGNOSIS — J32.4 CHRONIC PANSINUSITIS: ICD-10-CM

## 2021-07-15 DIAGNOSIS — J31.0 NONALLERGIC RHINITIS: Primary | ICD-10-CM

## 2021-07-15 PROCEDURE — 1126F AMNT PAIN NOTED NONE PRSNT: CPT | Mod: S$GLB,,, | Performed by: OTOLARYNGOLOGY

## 2021-07-15 PROCEDURE — 99214 PR OFFICE/OUTPT VISIT, EST, LEVL IV, 30-39 MIN: ICD-10-PCS | Mod: 25,S$GLB,, | Performed by: OTOLARYNGOLOGY

## 2021-07-15 PROCEDURE — 99214 OFFICE O/P EST MOD 30 MIN: CPT | Mod: 25,S$GLB,, | Performed by: OTOLARYNGOLOGY

## 2021-07-15 PROCEDURE — 99999 PR PBB SHADOW E&M-EST. PATIENT-LVL V: CPT | Mod: PBBFAC,,, | Performed by: OTOLARYNGOLOGY

## 2021-07-15 PROCEDURE — 31231 NASAL/SINUS ENDOSCOPY: ICD-10-PCS | Mod: S$GLB,,, | Performed by: OTOLARYNGOLOGY

## 2021-07-15 PROCEDURE — 1126F PR PAIN SEVERITY QUANTIFIED, NO PAIN PRESENT: ICD-10-PCS | Mod: S$GLB,,, | Performed by: OTOLARYNGOLOGY

## 2021-07-15 PROCEDURE — 3008F BODY MASS INDEX DOCD: CPT | Mod: CPTII,S$GLB,, | Performed by: OTOLARYNGOLOGY

## 2021-07-15 PROCEDURE — 31231 NASAL ENDOSCOPY DX: CPT | Mod: S$GLB,,, | Performed by: OTOLARYNGOLOGY

## 2021-07-15 PROCEDURE — 99999 PR PBB SHADOW E&M-EST. PATIENT-LVL V: ICD-10-PCS | Mod: PBBFAC,,, | Performed by: OTOLARYNGOLOGY

## 2021-07-15 PROCEDURE — 3008F PR BODY MASS INDEX (BMI) DOCUMENTED: ICD-10-PCS | Mod: CPTII,S$GLB,, | Performed by: OTOLARYNGOLOGY

## 2021-07-15 RX ORDER — LEVOFLOXACIN 500 MG/1
500 TABLET, FILM COATED ORAL DAILY
Qty: 10 TABLET | Refills: 0 | Status: SHIPPED | OUTPATIENT
Start: 2021-07-15 | End: 2021-07-25

## 2021-07-22 ENCOUNTER — PATIENT MESSAGE (OUTPATIENT)
Dept: ADMINISTRATIVE | Facility: HOSPITAL | Age: 56
End: 2021-07-22

## 2021-07-22 ENCOUNTER — PATIENT OUTREACH (OUTPATIENT)
Dept: ADMINISTRATIVE | Facility: HOSPITAL | Age: 56
End: 2021-07-22

## 2021-08-03 ENCOUNTER — PATIENT MESSAGE (OUTPATIENT)
Dept: OTOLARYNGOLOGY | Facility: CLINIC | Age: 56
End: 2021-08-03

## 2021-08-04 ENCOUNTER — PATIENT MESSAGE (OUTPATIENT)
Dept: PRIMARY CARE CLINIC | Facility: CLINIC | Age: 56
End: 2021-08-04

## 2021-08-04 DIAGNOSIS — Z20.822 ENCOUNTER FOR LABORATORY TESTING FOR COVID-19 VIRUS: ICD-10-CM

## 2021-08-04 DIAGNOSIS — R05.9 COUGH: ICD-10-CM

## 2021-08-06 ENCOUNTER — PATIENT MESSAGE (OUTPATIENT)
Dept: PRIMARY CARE CLINIC | Facility: CLINIC | Age: 56
End: 2021-08-06

## 2021-08-20 ENCOUNTER — PATIENT MESSAGE (OUTPATIENT)
Dept: PRIMARY CARE CLINIC | Facility: CLINIC | Age: 56
End: 2021-08-20

## 2021-08-20 DIAGNOSIS — M79.7 FIBROMYALGIA: ICD-10-CM

## 2021-08-20 RX ORDER — PREGABALIN 150 MG/1
150 CAPSULE ORAL 3 TIMES DAILY
Qty: 270 CAPSULE | Refills: 1 | OUTPATIENT
Start: 2021-08-20

## 2021-08-23 ENCOUNTER — PATIENT MESSAGE (OUTPATIENT)
Dept: ALLERGY | Facility: CLINIC | Age: 56
End: 2021-08-23

## 2021-08-24 ENCOUNTER — TELEPHONE (OUTPATIENT)
Dept: ALLERGY | Facility: CLINIC | Age: 56
End: 2021-08-24

## 2021-08-24 ENCOUNTER — PATIENT MESSAGE (OUTPATIENT)
Dept: PRIMARY CARE CLINIC | Facility: CLINIC | Age: 56
End: 2021-08-24

## 2021-08-24 DIAGNOSIS — Q25.46 TORTUOUS AORTIC ARCH: ICD-10-CM

## 2021-08-24 DIAGNOSIS — J06.9 ACUTE RESPIRATORY DISEASE DUE TO COVID-19 VIRUS: ICD-10-CM

## 2021-08-24 DIAGNOSIS — J45.51 SEVERE PERSISTENT ASTHMA WITH ACUTE EXACERBATION: ICD-10-CM

## 2021-08-24 DIAGNOSIS — J45.41 MODERATE PERSISTENT ASTHMA WITHOUT STATUS ASTHMATICUS WITH ACUTE EXACERBATION: ICD-10-CM

## 2021-08-24 DIAGNOSIS — U07.1 ACUTE RESPIRATORY DISEASE DUE TO COVID-19 VIRUS: ICD-10-CM

## 2021-08-24 DIAGNOSIS — I10 ESSENTIAL (PRIMARY) HYPERTENSION: ICD-10-CM

## 2021-08-24 DIAGNOSIS — M79.7 FIBROMYALGIA: ICD-10-CM

## 2021-08-24 DIAGNOSIS — Z00.00 ROUTINE GENERAL MEDICAL EXAMINATION AT A HEALTH CARE FACILITY: Primary | ICD-10-CM

## 2021-08-24 DIAGNOSIS — D80.1 HYPOGAMMAGLOBULINEMIA: ICD-10-CM

## 2021-08-24 DIAGNOSIS — E55.9 VITAMIN D DEFICIENCY: ICD-10-CM

## 2021-08-24 DIAGNOSIS — K50.00 CROHN'S DISEASE OF SMALL INTESTINE WITHOUT COMPLICATION: ICD-10-CM

## 2021-08-24 DIAGNOSIS — Z79.60 LONG-TERM USE OF IMMUNOSUPPRESSANT MEDICATION: ICD-10-CM

## 2021-08-24 DIAGNOSIS — E78.49 OTHER HYPERLIPIDEMIA: ICD-10-CM

## 2021-08-25 ENCOUNTER — PATIENT MESSAGE (OUTPATIENT)
Dept: PRIMARY CARE CLINIC | Facility: CLINIC | Age: 56
End: 2021-08-25

## 2021-09-02 ENCOUNTER — LAB VISIT (OUTPATIENT)
Dept: LAB | Facility: HOSPITAL | Age: 56
End: 2021-09-02
Attending: FAMILY MEDICINE
Payer: COMMERCIAL

## 2021-09-02 ENCOUNTER — LAB VISIT (OUTPATIENT)
Dept: LAB | Facility: HOSPITAL | Age: 56
End: 2021-09-02
Payer: COMMERCIAL

## 2021-09-02 DIAGNOSIS — Z79.60 LONG-TERM USE OF IMMUNOSUPPRESSANT MEDICATION: ICD-10-CM

## 2021-09-02 DIAGNOSIS — D80.1 HYPOGAMMAGLOBULINEMIA: ICD-10-CM

## 2021-09-02 DIAGNOSIS — E55.9 VITAMIN D DEFICIENCY: ICD-10-CM

## 2021-09-02 DIAGNOSIS — Q25.46 TORTUOUS AORTIC ARCH: ICD-10-CM

## 2021-09-02 DIAGNOSIS — E78.49 OTHER HYPERLIPIDEMIA: ICD-10-CM

## 2021-09-02 DIAGNOSIS — K50.00 CROHN'S DISEASE OF SMALL INTESTINE WITHOUT COMPLICATION: ICD-10-CM

## 2021-09-02 DIAGNOSIS — J06.9 ACUTE RESPIRATORY DISEASE DUE TO COVID-19 VIRUS: ICD-10-CM

## 2021-09-02 DIAGNOSIS — I10 ESSENTIAL (PRIMARY) HYPERTENSION: ICD-10-CM

## 2021-09-02 DIAGNOSIS — Z00.00 ROUTINE GENERAL MEDICAL EXAMINATION AT A HEALTH CARE FACILITY: ICD-10-CM

## 2021-09-02 DIAGNOSIS — J45.51 SEVERE PERSISTENT ASTHMA WITH ACUTE EXACERBATION: ICD-10-CM

## 2021-09-02 DIAGNOSIS — U07.1 ACUTE RESPIRATORY DISEASE DUE TO COVID-19 VIRUS: ICD-10-CM

## 2021-09-02 DIAGNOSIS — M79.7 FIBROMYALGIA: ICD-10-CM

## 2021-09-02 DIAGNOSIS — J45.41 MODERATE PERSISTENT ASTHMA WITHOUT STATUS ASTHMATICUS WITH ACUTE EXACERBATION: ICD-10-CM

## 2021-09-02 LAB
25(OH)D3+25(OH)D2 SERPL-MCNC: 53 NG/ML (ref 30–96)
ALBUMIN SERPL BCP-MCNC: 3.8 G/DL (ref 3.5–5.2)
ALBUMIN/CREAT UR: 20.6 UG/MG (ref 0–30)
ALP SERPL-CCNC: 83 U/L (ref 55–135)
ALT SERPL W/O P-5'-P-CCNC: 20 U/L (ref 10–44)
ANION GAP SERPL CALC-SCNC: 9 MMOL/L (ref 8–16)
AST SERPL-CCNC: 23 U/L (ref 10–40)
BASOPHILS # BLD AUTO: 0.04 K/UL (ref 0–0.2)
BASOPHILS NFR BLD: 1.2 % (ref 0–1.9)
BILIRUB SERPL-MCNC: 0.4 MG/DL (ref 0.1–1)
BUN SERPL-MCNC: 11 MG/DL (ref 6–20)
CALCIUM SERPL-MCNC: 9.1 MG/DL (ref 8.7–10.5)
CHLORIDE SERPL-SCNC: 107 MMOL/L (ref 95–110)
CHOLEST SERPL-MCNC: 229 MG/DL (ref 120–199)
CHOLEST/HDLC SERPL: 3.7 {RATIO} (ref 2–5)
CO2 SERPL-SCNC: 21 MMOL/L (ref 23–29)
CREAT SERPL-MCNC: 0.9 MG/DL (ref 0.5–1.4)
CREAT UR-MCNC: 34 MG/DL (ref 15–325)
DIFFERENTIAL METHOD: ABNORMAL
EOSINOPHIL # BLD AUTO: 0 K/UL (ref 0–0.5)
EOSINOPHIL NFR BLD: 0.6 % (ref 0–8)
ERYTHROCYTE [DISTWIDTH] IN BLOOD BY AUTOMATED COUNT: 12.5 % (ref 11.5–14.5)
EST. GFR  (AFRICAN AMERICAN): >60 ML/MIN/1.73 M^2
EST. GFR  (NON AFRICAN AMERICAN): >60 ML/MIN/1.73 M^2
ESTIMATED AVG GLUCOSE: 105 MG/DL (ref 68–131)
FERRITIN SERPL-MCNC: 59 NG/ML (ref 20–300)
GLUCOSE SERPL-MCNC: 94 MG/DL (ref 70–110)
HBA1C MFR BLD: 5.3 % (ref 4–5.6)
HCT VFR BLD AUTO: 43.6 % (ref 37–48.5)
HDLC SERPL-MCNC: 62 MG/DL (ref 40–75)
HDLC SERPL: 27.1 % (ref 20–50)
HGB BLD-MCNC: 14.3 G/DL (ref 12–16)
IMM GRANULOCYTES # BLD AUTO: 0.01 K/UL (ref 0–0.04)
IMM GRANULOCYTES NFR BLD AUTO: 0.3 % (ref 0–0.5)
IRON SERPL-MCNC: 77 UG/DL (ref 30–160)
LDLC SERPL CALC-MCNC: 141.8 MG/DL (ref 63–159)
LYMPHOCYTES # BLD AUTO: 1.2 K/UL (ref 1–4.8)
LYMPHOCYTES NFR BLD: 36.6 % (ref 18–48)
MCH RBC QN AUTO: 30.9 PG (ref 27–31)
MCHC RBC AUTO-ENTMCNC: 32.8 G/DL (ref 32–36)
MCV RBC AUTO: 94 FL (ref 82–98)
MICROALBUMIN UR DL<=1MG/L-MCNC: 7 UG/ML
MONOCYTES # BLD AUTO: 0.4 K/UL (ref 0.3–1)
MONOCYTES NFR BLD: 12.3 % (ref 4–15)
NEUTROPHILS # BLD AUTO: 1.6 K/UL (ref 1.8–7.7)
NEUTROPHILS NFR BLD: 49 % (ref 38–73)
NONHDLC SERPL-MCNC: 167 MG/DL
NRBC BLD-RTO: 0 /100 WBC
PLATELET # BLD AUTO: 249 K/UL (ref 150–450)
PMV BLD AUTO: 11.3 FL (ref 9.2–12.9)
POTASSIUM SERPL-SCNC: 3.8 MMOL/L (ref 3.5–5.1)
PROT SERPL-MCNC: 7.9 G/DL (ref 6–8.4)
RBC # BLD AUTO: 4.63 M/UL (ref 4–5.4)
SATURATED IRON: 22 % (ref 20–50)
SODIUM SERPL-SCNC: 137 MMOL/L (ref 136–145)
T4 FREE SERPL-MCNC: 0.77 NG/DL (ref 0.71–1.51)
TOTAL IRON BINDING CAPACITY: 355 UG/DL (ref 250–450)
TRANSFERRIN SERPL-MCNC: 240 MG/DL (ref 200–375)
TRIGL SERPL-MCNC: 126 MG/DL (ref 30–150)
TSH SERPL DL<=0.005 MIU/L-ACNC: 0.89 UIU/ML (ref 0.4–4)
WBC # BLD AUTO: 3.25 K/UL (ref 3.9–12.7)

## 2021-09-02 PROCEDURE — 85025 COMPLETE CBC W/AUTO DIFF WBC: CPT | Performed by: FAMILY MEDICINE

## 2021-09-02 PROCEDURE — 83036 HEMOGLOBIN GLYCOSYLATED A1C: CPT | Performed by: FAMILY MEDICINE

## 2021-09-02 PROCEDURE — 82728 ASSAY OF FERRITIN: CPT | Performed by: FAMILY MEDICINE

## 2021-09-02 PROCEDURE — 84439 ASSAY OF FREE THYROXINE: CPT | Performed by: FAMILY MEDICINE

## 2021-09-02 PROCEDURE — 83525 ASSAY OF INSULIN: CPT | Performed by: FAMILY MEDICINE

## 2021-09-02 PROCEDURE — 36415 COLL VENOUS BLD VENIPUNCTURE: CPT | Performed by: FAMILY MEDICINE

## 2021-09-02 PROCEDURE — 82607 VITAMIN B-12: CPT | Performed by: FAMILY MEDICINE

## 2021-09-02 PROCEDURE — 84466 ASSAY OF TRANSFERRIN: CPT | Performed by: FAMILY MEDICINE

## 2021-09-02 PROCEDURE — 80053 COMPREHEN METABOLIC PANEL: CPT | Performed by: FAMILY MEDICINE

## 2021-09-02 PROCEDURE — 80061 LIPID PANEL: CPT | Performed by: FAMILY MEDICINE

## 2021-09-02 PROCEDURE — 82306 VITAMIN D 25 HYDROXY: CPT | Performed by: FAMILY MEDICINE

## 2021-09-02 PROCEDURE — 82570 ASSAY OF URINE CREATININE: CPT | Performed by: FAMILY MEDICINE

## 2021-09-02 PROCEDURE — 84443 ASSAY THYROID STIM HORMONE: CPT | Performed by: FAMILY MEDICINE

## 2021-09-03 LAB
INSULIN COLLECTION INTERVAL: NORMAL
INSULIN SERPL-ACNC: 6.5 UU/ML
VIT B12 SERPL-MCNC: 848 PG/ML (ref 210–950)

## 2021-09-08 ENCOUNTER — OFFICE VISIT (OUTPATIENT)
Dept: PRIMARY CARE CLINIC | Facility: CLINIC | Age: 56
End: 2021-09-08
Payer: COMMERCIAL

## 2021-09-08 VITALS
DIASTOLIC BLOOD PRESSURE: 82 MMHG | BODY MASS INDEX: 29.61 KG/M2 | TEMPERATURE: 97 F | OXYGEN SATURATION: 95 % | HEART RATE: 58 BPM | WEIGHT: 189.06 LBS | SYSTOLIC BLOOD PRESSURE: 126 MMHG

## 2021-09-08 DIAGNOSIS — Z79.890 HORMONE REPLACEMENT THERAPY (POSTMENOPAUSAL): ICD-10-CM

## 2021-09-08 DIAGNOSIS — J45.51 SEVERE PERSISTENT ASTHMA WITH ACUTE EXACERBATION: ICD-10-CM

## 2021-09-08 DIAGNOSIS — G47.33 OSA (OBSTRUCTIVE SLEEP APNEA): ICD-10-CM

## 2021-09-08 DIAGNOSIS — E78.49 OTHER HYPERLIPIDEMIA: ICD-10-CM

## 2021-09-08 DIAGNOSIS — G47.01 INSOMNIA DUE TO MEDICAL CONDITION: ICD-10-CM

## 2021-09-08 DIAGNOSIS — D80.1 HYPOGAMMAGLOBULINEMIA: ICD-10-CM

## 2021-09-08 DIAGNOSIS — K50.90 CROHN'S DISEASE WITHOUT COMPLICATION, UNSPECIFIED GASTROINTESTINAL TRACT LOCATION: ICD-10-CM

## 2021-09-08 DIAGNOSIS — Z00.00 ROUTINE GENERAL MEDICAL EXAMINATION AT A HEALTH CARE FACILITY: Primary | ICD-10-CM

## 2021-09-08 DIAGNOSIS — M79.622 LEFT UPPER ARM PAIN: ICD-10-CM

## 2021-09-08 DIAGNOSIS — Z79.60 LONG-TERM USE OF IMMUNOSUPPRESSANT MEDICATION: ICD-10-CM

## 2021-09-08 DIAGNOSIS — K50.00 CROHN'S DISEASE OF SMALL INTESTINE WITHOUT COMPLICATION: ICD-10-CM

## 2021-09-08 DIAGNOSIS — I10 ESSENTIAL (PRIMARY) HYPERTENSION: ICD-10-CM

## 2021-09-08 DIAGNOSIS — G43.809 OTHER MIGRAINE WITHOUT STATUS MIGRAINOSUS, NOT INTRACTABLE: ICD-10-CM

## 2021-09-08 DIAGNOSIS — J44.9 CHRONIC OBSTRUCTIVE PULMONARY DISEASE, UNSPECIFIED COPD TYPE: ICD-10-CM

## 2021-09-08 DIAGNOSIS — M79.7 FIBROMYALGIA: ICD-10-CM

## 2021-09-08 PROCEDURE — 3079F DIAST BP 80-89 MM HG: CPT | Mod: CPTII,S$GLB,, | Performed by: FAMILY MEDICINE

## 2021-09-08 PROCEDURE — 3066F NEPHROPATHY DOC TX: CPT | Mod: CPTII,S$GLB,, | Performed by: FAMILY MEDICINE

## 2021-09-08 PROCEDURE — 1159F MED LIST DOCD IN RCRD: CPT | Mod: CPTII,S$GLB,, | Performed by: FAMILY MEDICINE

## 2021-09-08 PROCEDURE — 3079F PR MOST RECENT DIASTOLIC BLOOD PRESSURE 80-89 MM HG: ICD-10-PCS | Mod: CPTII,S$GLB,, | Performed by: FAMILY MEDICINE

## 2021-09-08 PROCEDURE — 1160F RVW MEDS BY RX/DR IN RCRD: CPT | Mod: CPTII,S$GLB,, | Performed by: FAMILY MEDICINE

## 2021-09-08 PROCEDURE — 3044F HG A1C LEVEL LT 7.0%: CPT | Mod: CPTII,S$GLB,, | Performed by: FAMILY MEDICINE

## 2021-09-08 PROCEDURE — 3066F PR DOCUMENTATION OF TREATMENT FOR NEPHROPATHY: ICD-10-PCS | Mod: CPTII,S$GLB,, | Performed by: FAMILY MEDICINE

## 2021-09-08 PROCEDURE — 1159F PR MEDICATION LIST DOCUMENTED IN MEDICAL RECORD: ICD-10-PCS | Mod: CPTII,S$GLB,, | Performed by: FAMILY MEDICINE

## 2021-09-08 PROCEDURE — 3074F PR MOST RECENT SYSTOLIC BLOOD PRESSURE < 130 MM HG: ICD-10-PCS | Mod: CPTII,S$GLB,, | Performed by: FAMILY MEDICINE

## 2021-09-08 PROCEDURE — 3061F PR NEG MICROALBUMINURIA RESULT DOCUMENTED/REVIEW: ICD-10-PCS | Mod: CPTII,S$GLB,, | Performed by: FAMILY MEDICINE

## 2021-09-08 PROCEDURE — 3074F SYST BP LT 130 MM HG: CPT | Mod: CPTII,S$GLB,, | Performed by: FAMILY MEDICINE

## 2021-09-08 PROCEDURE — 1160F PR REVIEW ALL MEDS BY PRESCRIBER/CLIN PHARMACIST DOCUMENTED: ICD-10-PCS | Mod: CPTII,S$GLB,, | Performed by: FAMILY MEDICINE

## 2021-09-08 PROCEDURE — 99396 PREV VISIT EST AGE 40-64: CPT | Mod: S$GLB,,, | Performed by: FAMILY MEDICINE

## 2021-09-08 PROCEDURE — 3008F PR BODY MASS INDEX (BMI) DOCUMENTED: ICD-10-PCS | Mod: CPTII,S$GLB,, | Performed by: FAMILY MEDICINE

## 2021-09-08 PROCEDURE — 3008F BODY MASS INDEX DOCD: CPT | Mod: CPTII,S$GLB,, | Performed by: FAMILY MEDICINE

## 2021-09-08 PROCEDURE — 3061F NEG MICROALBUMINURIA REV: CPT | Mod: CPTII,S$GLB,, | Performed by: FAMILY MEDICINE

## 2021-09-08 PROCEDURE — 3044F PR MOST RECENT HEMOGLOBIN A1C LEVEL <7.0%: ICD-10-PCS | Mod: CPTII,S$GLB,, | Performed by: FAMILY MEDICINE

## 2021-09-08 PROCEDURE — 99999 PR PBB SHADOW E&M-EST. PATIENT-LVL V: CPT | Mod: PBBFAC,,, | Performed by: FAMILY MEDICINE

## 2021-09-08 PROCEDURE — 99396 PR PREVENTIVE VISIT,EST,40-64: ICD-10-PCS | Mod: S$GLB,,, | Performed by: FAMILY MEDICINE

## 2021-09-08 PROCEDURE — 99999 PR PBB SHADOW E&M-EST. PATIENT-LVL V: ICD-10-PCS | Mod: PBBFAC,,, | Performed by: FAMILY MEDICINE

## 2021-09-08 RX ORDER — DULOXETIN HYDROCHLORIDE 60 MG/1
60 CAPSULE, DELAYED RELEASE ORAL 2 TIMES DAILY
Qty: 180 CAPSULE | Refills: 3
Start: 2021-09-08 | End: 2022-02-07

## 2021-09-08 RX ORDER — TRIAMCINOLONE ACETONIDE 0.25 MG/G
1 CREAM TOPICAL DAILY
COMMUNITY
Start: 2021-08-26 | End: 2023-10-11

## 2021-09-08 RX ORDER — ATORVASTATIN CALCIUM 10 MG/1
10 TABLET, FILM COATED ORAL DAILY
Qty: 90 TABLET | Refills: 3 | Status: ON HOLD | OUTPATIENT
Start: 2021-09-08 | End: 2023-02-02 | Stop reason: CLARIF

## 2021-09-08 RX ORDER — FLUCONAZOLE 200 MG/1
200 TABLET ORAL EVERY OTHER DAY
COMMUNITY
Start: 2021-08-26 | End: 2022-01-25

## 2021-09-11 ENCOUNTER — PATIENT OUTREACH (OUTPATIENT)
Dept: ADMINISTRATIVE | Facility: OTHER | Age: 56
End: 2021-09-11

## 2021-09-13 ENCOUNTER — HOSPITAL ENCOUNTER (OUTPATIENT)
Dept: RADIOLOGY | Facility: HOSPITAL | Age: 56
Discharge: HOME | End: 2021-09-13
Attending: FAMILY MEDICINE
Payer: COMMERCIAL

## 2021-09-13 DIAGNOSIS — Z01.818 PRE-OP TESTING: ICD-10-CM

## 2021-09-13 DIAGNOSIS — M79.622 LEFT UPPER ARM PAIN: ICD-10-CM

## 2021-09-13 PROCEDURE — 93971 EXTREMITY STUDY: CPT | Mod: TC,LT

## 2021-09-13 PROCEDURE — 93971 US UPPER EXTREMITY VEINS LEFT: ICD-10-PCS | Mod: 26,LT,, | Performed by: RADIOLOGY

## 2021-09-13 PROCEDURE — 93971 EXTREMITY STUDY: CPT | Mod: 26,LT,, | Performed by: RADIOLOGY

## 2021-09-14 ENCOUNTER — OFFICE VISIT (OUTPATIENT)
Dept: PHYSICAL MEDICINE AND REHAB | Facility: CLINIC | Age: 56
End: 2021-09-14
Payer: COMMERCIAL

## 2021-09-14 VITALS
HEART RATE: 60 BPM | RESPIRATION RATE: 14 BRPM | WEIGHT: 189 LBS | SYSTOLIC BLOOD PRESSURE: 148 MMHG | DIASTOLIC BLOOD PRESSURE: 88 MMHG | HEIGHT: 67 IN | BODY MASS INDEX: 29.66 KG/M2

## 2021-09-14 DIAGNOSIS — R29.898 ARM WEAKNESS: ICD-10-CM

## 2021-09-14 DIAGNOSIS — M79.18 DIFFUSE MYOFASCIAL PAIN SYNDROME: Primary | ICD-10-CM

## 2021-09-14 DIAGNOSIS — R20.2 PARESTHESIAS: ICD-10-CM

## 2021-09-14 DIAGNOSIS — M79.622 LEFT UPPER ARM PAIN: ICD-10-CM

## 2021-09-14 PROCEDURE — 3044F HG A1C LEVEL LT 7.0%: CPT | Mod: CPTII,S$GLB,, | Performed by: PHYSICAL MEDICINE & REHABILITATION

## 2021-09-14 PROCEDURE — 3044F PR MOST RECENT HEMOGLOBIN A1C LEVEL <7.0%: ICD-10-PCS | Mod: CPTII,S$GLB,, | Performed by: PHYSICAL MEDICINE & REHABILITATION

## 2021-09-14 PROCEDURE — 3008F PR BODY MASS INDEX (BMI) DOCUMENTED: ICD-10-PCS | Mod: CPTII,S$GLB,, | Performed by: PHYSICAL MEDICINE & REHABILITATION

## 2021-09-14 PROCEDURE — 3066F PR DOCUMENTATION OF TREATMENT FOR NEPHROPATHY: ICD-10-PCS | Mod: CPTII,S$GLB,, | Performed by: PHYSICAL MEDICINE & REHABILITATION

## 2021-09-14 PROCEDURE — 3079F PR MOST RECENT DIASTOLIC BLOOD PRESSURE 80-89 MM HG: ICD-10-PCS | Mod: CPTII,S$GLB,, | Performed by: PHYSICAL MEDICINE & REHABILITATION

## 2021-09-14 PROCEDURE — 3077F PR MOST RECENT SYSTOLIC BLOOD PRESSURE >= 140 MM HG: ICD-10-PCS | Mod: CPTII,S$GLB,, | Performed by: PHYSICAL MEDICINE & REHABILITATION

## 2021-09-14 PROCEDURE — 3066F NEPHROPATHY DOC TX: CPT | Mod: CPTII,S$GLB,, | Performed by: PHYSICAL MEDICINE & REHABILITATION

## 2021-09-14 PROCEDURE — 3008F BODY MASS INDEX DOCD: CPT | Mod: CPTII,S$GLB,, | Performed by: PHYSICAL MEDICINE & REHABILITATION

## 2021-09-14 PROCEDURE — 1160F RVW MEDS BY RX/DR IN RCRD: CPT | Mod: CPTII,S$GLB,, | Performed by: PHYSICAL MEDICINE & REHABILITATION

## 2021-09-14 PROCEDURE — 1159F PR MEDICATION LIST DOCUMENTED IN MEDICAL RECORD: ICD-10-PCS | Mod: CPTII,S$GLB,, | Performed by: PHYSICAL MEDICINE & REHABILITATION

## 2021-09-14 PROCEDURE — 99999 PR PBB SHADOW E&M-EST. PATIENT-LVL IV: ICD-10-PCS | Mod: PBBFAC,,, | Performed by: PHYSICAL MEDICINE & REHABILITATION

## 2021-09-14 PROCEDURE — 3077F SYST BP >= 140 MM HG: CPT | Mod: CPTII,S$GLB,, | Performed by: PHYSICAL MEDICINE & REHABILITATION

## 2021-09-14 PROCEDURE — 1159F MED LIST DOCD IN RCRD: CPT | Mod: CPTII,S$GLB,, | Performed by: PHYSICAL MEDICINE & REHABILITATION

## 2021-09-14 PROCEDURE — 3061F PR NEG MICROALBUMINURIA RESULT DOCUMENTED/REVIEW: ICD-10-PCS | Mod: CPTII,S$GLB,, | Performed by: PHYSICAL MEDICINE & REHABILITATION

## 2021-09-14 PROCEDURE — 99999 PR PBB SHADOW E&M-EST. PATIENT-LVL IV: CPT | Mod: PBBFAC,,, | Performed by: PHYSICAL MEDICINE & REHABILITATION

## 2021-09-14 PROCEDURE — 99203 PR OFFICE/OUTPT VISIT, NEW, LEVL III, 30-44 MIN: ICD-10-PCS | Mod: S$GLB,,, | Performed by: PHYSICAL MEDICINE & REHABILITATION

## 2021-09-14 PROCEDURE — 99203 OFFICE O/P NEW LOW 30 MIN: CPT | Mod: S$GLB,,, | Performed by: PHYSICAL MEDICINE & REHABILITATION

## 2021-09-14 PROCEDURE — 1160F PR REVIEW ALL MEDS BY PRESCRIBER/CLIN PHARMACIST DOCUMENTED: ICD-10-PCS | Mod: CPTII,S$GLB,, | Performed by: PHYSICAL MEDICINE & REHABILITATION

## 2021-09-14 PROCEDURE — 3079F DIAST BP 80-89 MM HG: CPT | Mod: CPTII,S$GLB,, | Performed by: PHYSICAL MEDICINE & REHABILITATION

## 2021-09-14 PROCEDURE — 3061F NEG MICROALBUMINURIA REV: CPT | Mod: CPTII,S$GLB,, | Performed by: PHYSICAL MEDICINE & REHABILITATION

## 2021-09-16 ENCOUNTER — OFFICE VISIT (OUTPATIENT)
Dept: OTOLARYNGOLOGY | Facility: CLINIC | Age: 56
End: 2021-09-16
Payer: COMMERCIAL

## 2021-09-16 VITALS — BODY MASS INDEX: 29.69 KG/M2 | WEIGHT: 189.63 LBS

## 2021-09-16 DIAGNOSIS — J32.4 CHRONIC PANSINUSITIS: ICD-10-CM

## 2021-09-16 DIAGNOSIS — J31.0 NONALLERGIC RHINITIS: Primary | ICD-10-CM

## 2021-09-16 DIAGNOSIS — D80.1 HYPOGAMMAGLOBULINEMIA: ICD-10-CM

## 2021-09-16 PROCEDURE — 99999 PR PBB SHADOW E&M-EST. PATIENT-LVL III: ICD-10-PCS | Mod: PBBFAC,,, | Performed by: OTOLARYNGOLOGY

## 2021-09-16 PROCEDURE — 99213 OFFICE O/P EST LOW 20 MIN: CPT | Mod: 25,S$GLB,, | Performed by: OTOLARYNGOLOGY

## 2021-09-16 PROCEDURE — 3066F NEPHROPATHY DOC TX: CPT | Mod: CPTII,S$GLB,, | Performed by: OTOLARYNGOLOGY

## 2021-09-16 PROCEDURE — 99213 PR OFFICE/OUTPT VISIT, EST, LEVL III, 20-29 MIN: ICD-10-PCS | Mod: 25,S$GLB,, | Performed by: OTOLARYNGOLOGY

## 2021-09-16 PROCEDURE — 1159F PR MEDICATION LIST DOCUMENTED IN MEDICAL RECORD: ICD-10-PCS | Mod: CPTII,S$GLB,, | Performed by: OTOLARYNGOLOGY

## 2021-09-16 PROCEDURE — 3044F HG A1C LEVEL LT 7.0%: CPT | Mod: CPTII,S$GLB,, | Performed by: OTOLARYNGOLOGY

## 2021-09-16 PROCEDURE — 3008F PR BODY MASS INDEX (BMI) DOCUMENTED: ICD-10-PCS | Mod: CPTII,S$GLB,, | Performed by: OTOLARYNGOLOGY

## 2021-09-16 PROCEDURE — 1160F PR REVIEW ALL MEDS BY PRESCRIBER/CLIN PHARMACIST DOCUMENTED: ICD-10-PCS | Mod: CPTII,S$GLB,, | Performed by: OTOLARYNGOLOGY

## 2021-09-16 PROCEDURE — 1159F MED LIST DOCD IN RCRD: CPT | Mod: CPTII,S$GLB,, | Performed by: OTOLARYNGOLOGY

## 2021-09-16 PROCEDURE — 31231 NASAL ENDOSCOPY DX: CPT | Mod: S$GLB,,, | Performed by: OTOLARYNGOLOGY

## 2021-09-16 PROCEDURE — 1160F RVW MEDS BY RX/DR IN RCRD: CPT | Mod: CPTII,S$GLB,, | Performed by: OTOLARYNGOLOGY

## 2021-09-16 PROCEDURE — 31231 NASAL/SINUS ENDOSCOPY: ICD-10-PCS | Mod: S$GLB,,, | Performed by: OTOLARYNGOLOGY

## 2021-09-16 PROCEDURE — 3061F PR NEG MICROALBUMINURIA RESULT DOCUMENTED/REVIEW: ICD-10-PCS | Mod: CPTII,S$GLB,, | Performed by: OTOLARYNGOLOGY

## 2021-09-16 PROCEDURE — 99999 PR PBB SHADOW E&M-EST. PATIENT-LVL III: CPT | Mod: PBBFAC,,, | Performed by: OTOLARYNGOLOGY

## 2021-09-16 PROCEDURE — 3066F PR DOCUMENTATION OF TREATMENT FOR NEPHROPATHY: ICD-10-PCS | Mod: CPTII,S$GLB,, | Performed by: OTOLARYNGOLOGY

## 2021-09-16 PROCEDURE — 3044F PR MOST RECENT HEMOGLOBIN A1C LEVEL <7.0%: ICD-10-PCS | Mod: CPTII,S$GLB,, | Performed by: OTOLARYNGOLOGY

## 2021-09-16 PROCEDURE — 3061F NEG MICROALBUMINURIA REV: CPT | Mod: CPTII,S$GLB,, | Performed by: OTOLARYNGOLOGY

## 2021-09-16 PROCEDURE — 3008F BODY MASS INDEX DOCD: CPT | Mod: CPTII,S$GLB,, | Performed by: OTOLARYNGOLOGY

## 2021-09-23 ENCOUNTER — OFFICE VISIT (OUTPATIENT)
Dept: ALLERGY | Facility: CLINIC | Age: 56
End: 2021-09-23
Payer: COMMERCIAL

## 2021-09-23 DIAGNOSIS — K50.90 CROHN'S DISEASE WITHOUT COMPLICATION, UNSPECIFIED GASTROINTESTINAL TRACT LOCATION: ICD-10-CM

## 2021-09-23 DIAGNOSIS — D80.3 IGG2 SUBCLASS DEFICIENCY: ICD-10-CM

## 2021-09-23 DIAGNOSIS — D80.6 ANTI-POLYSACCHARIDE ANTIBODY DEFICIENCY: Primary | ICD-10-CM

## 2021-09-23 DIAGNOSIS — J32.9 RECURRENT SINUSITIS: ICD-10-CM

## 2021-09-23 PROCEDURE — 3044F PR MOST RECENT HEMOGLOBIN A1C LEVEL <7.0%: ICD-10-PCS | Mod: CPTII,95,, | Performed by: ALLERGY & IMMUNOLOGY

## 2021-09-23 PROCEDURE — 99214 OFFICE O/P EST MOD 30 MIN: CPT | Mod: 95,,, | Performed by: ALLERGY & IMMUNOLOGY

## 2021-09-23 PROCEDURE — 99214 PR OFFICE/OUTPT VISIT, EST, LEVL IV, 30-39 MIN: ICD-10-PCS | Mod: 95,,, | Performed by: ALLERGY & IMMUNOLOGY

## 2021-09-23 PROCEDURE — 3061F NEG MICROALBUMINURIA REV: CPT | Mod: CPTII,95,, | Performed by: ALLERGY & IMMUNOLOGY

## 2021-09-23 PROCEDURE — 3066F PR DOCUMENTATION OF TREATMENT FOR NEPHROPATHY: ICD-10-PCS | Mod: CPTII,95,, | Performed by: ALLERGY & IMMUNOLOGY

## 2021-09-23 PROCEDURE — 3044F HG A1C LEVEL LT 7.0%: CPT | Mod: CPTII,95,, | Performed by: ALLERGY & IMMUNOLOGY

## 2021-09-23 PROCEDURE — 3061F PR NEG MICROALBUMINURIA RESULT DOCUMENTED/REVIEW: ICD-10-PCS | Mod: CPTII,95,, | Performed by: ALLERGY & IMMUNOLOGY

## 2021-09-23 PROCEDURE — 3066F NEPHROPATHY DOC TX: CPT | Mod: CPTII,95,, | Performed by: ALLERGY & IMMUNOLOGY

## 2021-09-23 RX ORDER — HUMAN IMMUNOGLOBULIN G 0.2 G/ML
12 LIQUID SUBCUTANEOUS
Qty: 240 ML | Refills: 11 | Status: SHIPPED | OUTPATIENT
Start: 2021-09-23 | End: 2021-10-12 | Stop reason: SDUPTHER

## 2021-09-27 ENCOUNTER — HOSPITAL ENCOUNTER (OUTPATIENT)
Dept: RADIOLOGY | Facility: HOSPITAL | Age: 56
Discharge: HOME | End: 2021-09-27
Attending: PHYSICAL MEDICINE & REHABILITATION
Payer: COMMERCIAL

## 2021-09-27 ENCOUNTER — OFFICE VISIT (OUTPATIENT)
Dept: PHYSICAL MEDICINE AND REHAB | Facility: CLINIC | Age: 56
End: 2021-09-27
Payer: COMMERCIAL

## 2021-09-27 VITALS
BODY MASS INDEX: 29.66 KG/M2 | WEIGHT: 189 LBS | SYSTOLIC BLOOD PRESSURE: 158 MMHG | RESPIRATION RATE: 15 BRPM | HEART RATE: 54 BPM | DIASTOLIC BLOOD PRESSURE: 92 MMHG | HEIGHT: 67 IN

## 2021-09-27 DIAGNOSIS — M54.16 LUMBAR RADICULITIS: ICD-10-CM

## 2021-09-27 DIAGNOSIS — M54.12 CERVICAL RADICULOPATHY: ICD-10-CM

## 2021-09-27 DIAGNOSIS — M54.12 CERVICAL RADICULOPATHY: Primary | ICD-10-CM

## 2021-09-27 DIAGNOSIS — G56.03 BILATERAL CARPAL TUNNEL SYNDROME: ICD-10-CM

## 2021-09-27 PROCEDURE — 99999 PR PBB SHADOW E&M-EST. PATIENT-LVL IV: CPT | Mod: PBBFAC,,, | Performed by: PHYSICAL MEDICINE & REHABILITATION

## 2021-09-27 PROCEDURE — 99999 PR PBB SHADOW E&M-EST. PATIENT-LVL IV: ICD-10-PCS | Mod: PBBFAC,,, | Performed by: PHYSICAL MEDICINE & REHABILITATION

## 2021-09-27 PROCEDURE — 99499 UNLISTED E&M SERVICE: CPT | Mod: S$GLB,,, | Performed by: PHYSICAL MEDICINE & REHABILITATION

## 2021-09-27 PROCEDURE — 72050 X-RAY EXAM NECK SPINE 4/5VWS: CPT | Mod: TC

## 2021-09-27 PROCEDURE — 3066F NEPHROPATHY DOC TX: CPT | Mod: CPTII,S$GLB,, | Performed by: PHYSICAL MEDICINE & REHABILITATION

## 2021-09-27 PROCEDURE — 3061F NEG MICROALBUMINURIA REV: CPT | Mod: CPTII,S$GLB,, | Performed by: PHYSICAL MEDICINE & REHABILITATION

## 2021-09-27 PROCEDURE — 72050 XR CERVICAL SPINE COMPLETE 5 VIEW: ICD-10-PCS | Mod: 26,,, | Performed by: RADIOLOGY

## 2021-09-27 PROCEDURE — 3077F SYST BP >= 140 MM HG: CPT | Mod: CPTII,S$GLB,, | Performed by: PHYSICAL MEDICINE & REHABILITATION

## 2021-09-27 PROCEDURE — 3061F PR NEG MICROALBUMINURIA RESULT DOCUMENTED/REVIEW: ICD-10-PCS | Mod: CPTII,S$GLB,, | Performed by: PHYSICAL MEDICINE & REHABILITATION

## 2021-09-27 PROCEDURE — 3008F PR BODY MASS INDEX (BMI) DOCUMENTED: ICD-10-PCS | Mod: CPTII,S$GLB,, | Performed by: PHYSICAL MEDICINE & REHABILITATION

## 2021-09-27 PROCEDURE — 3044F HG A1C LEVEL LT 7.0%: CPT | Mod: CPTII,S$GLB,, | Performed by: PHYSICAL MEDICINE & REHABILITATION

## 2021-09-27 PROCEDURE — 95912 PR NERVE CONDUCTION STUDY; 11 -12 STUDIES: ICD-10-PCS | Mod: S$GLB,,, | Performed by: PHYSICAL MEDICINE & REHABILITATION

## 2021-09-27 PROCEDURE — 3008F BODY MASS INDEX DOCD: CPT | Mod: CPTII,S$GLB,, | Performed by: PHYSICAL MEDICINE & REHABILITATION

## 2021-09-27 PROCEDURE — 3080F PR MOST RECENT DIASTOLIC BLOOD PRESSURE >= 90 MM HG: ICD-10-PCS | Mod: CPTII,S$GLB,, | Performed by: PHYSICAL MEDICINE & REHABILITATION

## 2021-09-27 PROCEDURE — 3044F PR MOST RECENT HEMOGLOBIN A1C LEVEL <7.0%: ICD-10-PCS | Mod: CPTII,S$GLB,, | Performed by: PHYSICAL MEDICINE & REHABILITATION

## 2021-09-27 PROCEDURE — 1160F PR REVIEW ALL MEDS BY PRESCRIBER/CLIN PHARMACIST DOCUMENTED: ICD-10-PCS | Mod: CPTII,S$GLB,, | Performed by: PHYSICAL MEDICINE & REHABILITATION

## 2021-09-27 PROCEDURE — 3077F PR MOST RECENT SYSTOLIC BLOOD PRESSURE >= 140 MM HG: ICD-10-PCS | Mod: CPTII,S$GLB,, | Performed by: PHYSICAL MEDICINE & REHABILITATION

## 2021-09-27 PROCEDURE — 95912 NRV CNDJ TEST 11-12 STUDIES: CPT | Mod: S$GLB,,, | Performed by: PHYSICAL MEDICINE & REHABILITATION

## 2021-09-27 PROCEDURE — 95886 PR EMG COMPLETE, W/ NERVE CONDUCTION STUDIES, 5+ MUSCLES: ICD-10-PCS | Mod: S$GLB,,, | Performed by: PHYSICAL MEDICINE & REHABILITATION

## 2021-09-27 PROCEDURE — 1159F MED LIST DOCD IN RCRD: CPT | Mod: CPTII,S$GLB,, | Performed by: PHYSICAL MEDICINE & REHABILITATION

## 2021-09-27 PROCEDURE — 72050 X-RAY EXAM NECK SPINE 4/5VWS: CPT | Mod: 26,,, | Performed by: RADIOLOGY

## 2021-09-27 PROCEDURE — 1159F PR MEDICATION LIST DOCUMENTED IN MEDICAL RECORD: ICD-10-PCS | Mod: CPTII,S$GLB,, | Performed by: PHYSICAL MEDICINE & REHABILITATION

## 2021-09-27 PROCEDURE — 99499 NO LOS: ICD-10-PCS | Mod: S$GLB,,, | Performed by: PHYSICAL MEDICINE & REHABILITATION

## 2021-09-27 PROCEDURE — 3080F DIAST BP >= 90 MM HG: CPT | Mod: CPTII,S$GLB,, | Performed by: PHYSICAL MEDICINE & REHABILITATION

## 2021-09-27 PROCEDURE — 1160F RVW MEDS BY RX/DR IN RCRD: CPT | Mod: CPTII,S$GLB,, | Performed by: PHYSICAL MEDICINE & REHABILITATION

## 2021-09-27 PROCEDURE — 3066F PR DOCUMENTATION OF TREATMENT FOR NEPHROPATHY: ICD-10-PCS | Mod: CPTII,S$GLB,, | Performed by: PHYSICAL MEDICINE & REHABILITATION

## 2021-09-27 PROCEDURE — 95886 MUSC TEST DONE W/N TEST COMP: CPT | Mod: S$GLB,,, | Performed by: PHYSICAL MEDICINE & REHABILITATION

## 2021-10-06 ENCOUNTER — TELEPHONE (OUTPATIENT)
Dept: RHEUMATOLOGY | Facility: CLINIC | Age: 56
End: 2021-10-06

## 2021-10-12 DIAGNOSIS — D80.3 IGG2 SUBCLASS DEFICIENCY: ICD-10-CM

## 2021-10-12 DIAGNOSIS — D80.6 ANTI-POLYSACCHARIDE ANTIBODY DEFICIENCY: ICD-10-CM

## 2021-10-12 RX ORDER — HUMAN IMMUNOGLOBULIN G 0.2 G/ML
12 LIQUID SUBCUTANEOUS
Qty: 240 ML | Refills: 11 | Status: SHIPPED | OUTPATIENT
Start: 2021-10-12 | End: 2022-07-10 | Stop reason: SDUPTHER

## 2021-11-01 DIAGNOSIS — R05.9 COUGH: ICD-10-CM

## 2021-11-01 DIAGNOSIS — J44.9 CHRONIC OBSTRUCTIVE PULMONARY DISEASE, UNSPECIFIED COPD TYPE: ICD-10-CM

## 2021-11-01 DIAGNOSIS — J45.41 MODERATE PERSISTENT ASTHMA WITHOUT STATUS ASTHMATICUS WITH ACUTE EXACERBATION: ICD-10-CM

## 2021-11-01 DIAGNOSIS — M79.7 FIBROMYALGIA: ICD-10-CM

## 2021-11-01 RX ORDER — PREGABALIN 150 MG/1
150 CAPSULE ORAL 3 TIMES DAILY
Qty: 270 CAPSULE | Refills: 1 | Status: SHIPPED | OUTPATIENT
Start: 2021-11-01 | End: 2022-12-13 | Stop reason: SDUPTHER

## 2021-11-01 RX ORDER — IPRATROPIUM BROMIDE 0.5 MG/2.5ML
SOLUTION RESPIRATORY (INHALATION)
Qty: 90 EACH | Refills: 11 | Status: SHIPPED | OUTPATIENT
Start: 2021-11-01 | End: 2022-06-02 | Stop reason: SDUPTHER

## 2021-11-01 RX ORDER — IPRATROPIUM BROMIDE 0.5 MG/2.5ML
SOLUTION RESPIRATORY (INHALATION)
Qty: 75 ML | Refills: 0 | OUTPATIENT
Start: 2021-11-01

## 2021-11-04 DIAGNOSIS — Z12.31 OTHER SCREENING MAMMOGRAM: ICD-10-CM

## 2021-11-09 ENCOUNTER — OFFICE VISIT (OUTPATIENT)
Dept: OTOLARYNGOLOGY | Facility: CLINIC | Age: 56
End: 2021-11-09
Payer: COMMERCIAL

## 2021-11-09 ENCOUNTER — TELEPHONE (OUTPATIENT)
Dept: ALLERGY | Facility: CLINIC | Age: 56
End: 2021-11-09
Payer: COMMERCIAL

## 2021-11-09 VITALS — BODY MASS INDEX: 29.9 KG/M2 | WEIGHT: 190.5 LBS | HEIGHT: 67 IN

## 2021-11-09 DIAGNOSIS — J32.4 CHRONIC PANSINUSITIS: ICD-10-CM

## 2021-11-09 DIAGNOSIS — R05.9 COUGH: ICD-10-CM

## 2021-11-09 DIAGNOSIS — D80.1 HYPOGAMMAGLOBULINEMIA: ICD-10-CM

## 2021-11-09 DIAGNOSIS — J31.0 NONALLERGIC RHINITIS: Primary | ICD-10-CM

## 2021-11-09 PROCEDURE — 3061F PR NEG MICROALBUMINURIA RESULT DOCUMENTED/REVIEW: ICD-10-PCS | Mod: CPTII,S$GLB,, | Performed by: OTOLARYNGOLOGY

## 2021-11-09 PROCEDURE — 99999 PR PBB SHADOW E&M-EST. PATIENT-LVL III: ICD-10-PCS | Mod: PBBFAC,,, | Performed by: OTOLARYNGOLOGY

## 2021-11-09 PROCEDURE — 3044F HG A1C LEVEL LT 7.0%: CPT | Mod: CPTII,S$GLB,, | Performed by: OTOLARYNGOLOGY

## 2021-11-09 PROCEDURE — 3066F NEPHROPATHY DOC TX: CPT | Mod: CPTII,S$GLB,, | Performed by: OTOLARYNGOLOGY

## 2021-11-09 PROCEDURE — 3008F PR BODY MASS INDEX (BMI) DOCUMENTED: ICD-10-PCS | Mod: CPTII,S$GLB,, | Performed by: OTOLARYNGOLOGY

## 2021-11-09 PROCEDURE — 1160F PR REVIEW ALL MEDS BY PRESCRIBER/CLIN PHARMACIST DOCUMENTED: ICD-10-PCS | Mod: CPTII,S$GLB,, | Performed by: OTOLARYNGOLOGY

## 2021-11-09 PROCEDURE — 31575 LARYNGOSCOPY: ICD-10-PCS | Mod: S$GLB,,, | Performed by: OTOLARYNGOLOGY

## 2021-11-09 PROCEDURE — 3066F PR DOCUMENTATION OF TREATMENT FOR NEPHROPATHY: ICD-10-PCS | Mod: CPTII,S$GLB,, | Performed by: OTOLARYNGOLOGY

## 2021-11-09 PROCEDURE — 1159F PR MEDICATION LIST DOCUMENTED IN MEDICAL RECORD: ICD-10-PCS | Mod: CPTII,S$GLB,, | Performed by: OTOLARYNGOLOGY

## 2021-11-09 PROCEDURE — 3044F PR MOST RECENT HEMOGLOBIN A1C LEVEL <7.0%: ICD-10-PCS | Mod: CPTII,S$GLB,, | Performed by: OTOLARYNGOLOGY

## 2021-11-09 PROCEDURE — 99999 PR PBB SHADOW E&M-EST. PATIENT-LVL III: CPT | Mod: PBBFAC,,, | Performed by: OTOLARYNGOLOGY

## 2021-11-09 PROCEDURE — 99214 PR OFFICE/OUTPT VISIT, EST, LEVL IV, 30-39 MIN: ICD-10-PCS | Mod: 25,S$GLB,, | Performed by: OTOLARYNGOLOGY

## 2021-11-09 PROCEDURE — 3008F BODY MASS INDEX DOCD: CPT | Mod: CPTII,S$GLB,, | Performed by: OTOLARYNGOLOGY

## 2021-11-09 PROCEDURE — 1159F MED LIST DOCD IN RCRD: CPT | Mod: CPTII,S$GLB,, | Performed by: OTOLARYNGOLOGY

## 2021-11-09 PROCEDURE — 3061F NEG MICROALBUMINURIA REV: CPT | Mod: CPTII,S$GLB,, | Performed by: OTOLARYNGOLOGY

## 2021-11-09 PROCEDURE — 1160F RVW MEDS BY RX/DR IN RCRD: CPT | Mod: CPTII,S$GLB,, | Performed by: OTOLARYNGOLOGY

## 2021-11-09 PROCEDURE — 99214 OFFICE O/P EST MOD 30 MIN: CPT | Mod: 25,S$GLB,, | Performed by: OTOLARYNGOLOGY

## 2021-11-09 PROCEDURE — 31575 DIAGNOSTIC LARYNGOSCOPY: CPT | Mod: S$GLB,,, | Performed by: OTOLARYNGOLOGY

## 2021-11-09 RX ORDER — LEVOCETIRIZINE DIHYDROCHLORIDE 5 MG/1
5 TABLET, FILM COATED ORAL NIGHTLY
Qty: 30 TABLET | Refills: 11 | Status: SHIPPED | OUTPATIENT
Start: 2021-11-09 | End: 2021-12-08 | Stop reason: ALTCHOICE

## 2021-11-11 ENCOUNTER — TELEPHONE (OUTPATIENT)
Dept: FAMILY MEDICINE | Facility: CLINIC | Age: 56
End: 2021-11-11
Payer: COMMERCIAL

## 2021-11-12 ENCOUNTER — PATIENT MESSAGE (OUTPATIENT)
Dept: ALLERGY | Facility: CLINIC | Age: 56
End: 2021-11-12
Payer: COMMERCIAL

## 2021-11-17 ENCOUNTER — TELEPHONE (OUTPATIENT)
Dept: ALLERGY | Facility: CLINIC | Age: 56
End: 2021-11-17
Payer: COMMERCIAL

## 2021-11-29 ENCOUNTER — PATIENT MESSAGE (OUTPATIENT)
Dept: PRIMARY CARE CLINIC | Facility: CLINIC | Age: 56
End: 2021-11-29
Payer: COMMERCIAL

## 2021-12-06 ENCOUNTER — LAB VISIT (OUTPATIENT)
Dept: LAB | Facility: HOSPITAL | Age: 56
End: 2021-12-06
Attending: FAMILY MEDICINE
Payer: COMMERCIAL

## 2021-12-06 DIAGNOSIS — D80.3 IGG2 SUBCLASS DEFICIENCY: ICD-10-CM

## 2021-12-06 DIAGNOSIS — D80.6 ANTI-POLYSACCHARIDE ANTIBODY DEFICIENCY: ICD-10-CM

## 2021-12-06 DIAGNOSIS — E78.49 OTHER HYPERLIPIDEMIA: ICD-10-CM

## 2021-12-06 LAB
ALBUMIN SERPL BCP-MCNC: 3.9 G/DL (ref 3.5–5.2)
ALBUMIN SERPL BCP-MCNC: 3.9 G/DL (ref 3.5–5.2)
ALP SERPL-CCNC: 87 U/L (ref 55–135)
ALP SERPL-CCNC: 87 U/L (ref 55–135)
ALT SERPL W/O P-5'-P-CCNC: 30 U/L (ref 10–44)
ALT SERPL W/O P-5'-P-CCNC: 30 U/L (ref 10–44)
ANION GAP SERPL CALC-SCNC: 13 MMOL/L (ref 8–16)
ANION GAP SERPL CALC-SCNC: 13 MMOL/L (ref 8–16)
AST SERPL-CCNC: 27 U/L (ref 10–40)
AST SERPL-CCNC: 27 U/L (ref 10–40)
BASOPHILS # BLD AUTO: 0.06 K/UL (ref 0–0.2)
BASOPHILS # BLD AUTO: 0.06 K/UL (ref 0–0.2)
BASOPHILS NFR BLD: 1.3 % (ref 0–1.9)
BASOPHILS NFR BLD: 1.3 % (ref 0–1.9)
BILIRUB SERPL-MCNC: 0.5 MG/DL (ref 0.1–1)
BILIRUB SERPL-MCNC: 0.5 MG/DL (ref 0.1–1)
BUN SERPL-MCNC: 11 MG/DL (ref 6–20)
BUN SERPL-MCNC: 11 MG/DL (ref 6–20)
CALCIUM SERPL-MCNC: 9.3 MG/DL (ref 8.7–10.5)
CALCIUM SERPL-MCNC: 9.3 MG/DL (ref 8.7–10.5)
CHLORIDE SERPL-SCNC: 104 MMOL/L (ref 95–110)
CHLORIDE SERPL-SCNC: 104 MMOL/L (ref 95–110)
CHOLEST SERPL-MCNC: 172 MG/DL (ref 120–199)
CHOLEST/HDLC SERPL: 2.4 {RATIO} (ref 2–5)
CO2 SERPL-SCNC: 19 MMOL/L (ref 23–29)
CO2 SERPL-SCNC: 19 MMOL/L (ref 23–29)
CREAT SERPL-MCNC: 1.1 MG/DL (ref 0.5–1.4)
CREAT SERPL-MCNC: 1.1 MG/DL (ref 0.5–1.4)
DIFFERENTIAL METHOD: ABNORMAL
DIFFERENTIAL METHOD: ABNORMAL
EOSINOPHIL # BLD AUTO: 0 K/UL (ref 0–0.5)
EOSINOPHIL # BLD AUTO: 0 K/UL (ref 0–0.5)
EOSINOPHIL NFR BLD: 0.9 % (ref 0–8)
EOSINOPHIL NFR BLD: 0.9 % (ref 0–8)
ERYTHROCYTE [DISTWIDTH] IN BLOOD BY AUTOMATED COUNT: 12.4 % (ref 11.5–14.5)
ERYTHROCYTE [DISTWIDTH] IN BLOOD BY AUTOMATED COUNT: 12.4 % (ref 11.5–14.5)
EST. GFR  (AFRICAN AMERICAN): >60 ML/MIN/1.73 M^2
EST. GFR  (AFRICAN AMERICAN): >60 ML/MIN/1.73 M^2
EST. GFR  (NON AFRICAN AMERICAN): 56.3 ML/MIN/1.73 M^2
EST. GFR  (NON AFRICAN AMERICAN): 56.3 ML/MIN/1.73 M^2
GLUCOSE SERPL-MCNC: 78 MG/DL (ref 70–110)
GLUCOSE SERPL-MCNC: 78 MG/DL (ref 70–110)
HCT VFR BLD AUTO: 42.1 % (ref 37–48.5)
HCT VFR BLD AUTO: 42.1 % (ref 37–48.5)
HDLC SERPL-MCNC: 73 MG/DL (ref 40–75)
HDLC SERPL: 42.4 % (ref 20–50)
HGB BLD-MCNC: 13.6 G/DL (ref 12–16)
HGB BLD-MCNC: 13.6 G/DL (ref 12–16)
IGA SERPL-MCNC: 301 MG/DL (ref 40–350)
IGG SERPL-MCNC: 1641 MG/DL (ref 650–1600)
IGM SERPL-MCNC: 179 MG/DL (ref 50–300)
IMM GRANULOCYTES # BLD AUTO: 0.01 K/UL (ref 0–0.04)
IMM GRANULOCYTES # BLD AUTO: 0.01 K/UL (ref 0–0.04)
IMM GRANULOCYTES NFR BLD AUTO: 0.2 % (ref 0–0.5)
IMM GRANULOCYTES NFR BLD AUTO: 0.2 % (ref 0–0.5)
LDLC SERPL CALC-MCNC: 84.8 MG/DL (ref 63–159)
LYMPHOCYTES # BLD AUTO: 1.3 K/UL (ref 1–4.8)
LYMPHOCYTES # BLD AUTO: 1.3 K/UL (ref 1–4.8)
LYMPHOCYTES NFR BLD: 29.6 % (ref 18–48)
LYMPHOCYTES NFR BLD: 29.6 % (ref 18–48)
MCH RBC QN AUTO: 31.1 PG (ref 27–31)
MCH RBC QN AUTO: 31.1 PG (ref 27–31)
MCHC RBC AUTO-ENTMCNC: 32.3 G/DL (ref 32–36)
MCHC RBC AUTO-ENTMCNC: 32.3 G/DL (ref 32–36)
MCV RBC AUTO: 96 FL (ref 82–98)
MCV RBC AUTO: 96 FL (ref 82–98)
MONOCYTES # BLD AUTO: 0.5 K/UL (ref 0.3–1)
MONOCYTES # BLD AUTO: 0.5 K/UL (ref 0.3–1)
MONOCYTES NFR BLD: 10.2 % (ref 4–15)
MONOCYTES NFR BLD: 10.2 % (ref 4–15)
NEUTROPHILS # BLD AUTO: 2.6 K/UL (ref 1.8–7.7)
NEUTROPHILS # BLD AUTO: 2.6 K/UL (ref 1.8–7.7)
NEUTROPHILS NFR BLD: 57.8 % (ref 38–73)
NEUTROPHILS NFR BLD: 57.8 % (ref 38–73)
NONHDLC SERPL-MCNC: 99 MG/DL
NRBC BLD-RTO: 0 /100 WBC
NRBC BLD-RTO: 0 /100 WBC
PLATELET # BLD AUTO: 299 K/UL (ref 150–450)
PLATELET # BLD AUTO: 299 K/UL (ref 150–450)
PMV BLD AUTO: 10.6 FL (ref 9.2–12.9)
PMV BLD AUTO: 10.6 FL (ref 9.2–12.9)
POTASSIUM SERPL-SCNC: 3.8 MMOL/L (ref 3.5–5.1)
POTASSIUM SERPL-SCNC: 3.8 MMOL/L (ref 3.5–5.1)
PROT SERPL-MCNC: 7.9 G/DL (ref 6–8.4)
PROT SERPL-MCNC: 7.9 G/DL (ref 6–8.4)
RBC # BLD AUTO: 4.37 M/UL (ref 4–5.4)
RBC # BLD AUTO: 4.37 M/UL (ref 4–5.4)
SODIUM SERPL-SCNC: 136 MMOL/L (ref 136–145)
SODIUM SERPL-SCNC: 136 MMOL/L (ref 136–145)
TRIGL SERPL-MCNC: 71 MG/DL (ref 30–150)
WBC # BLD AUTO: 4.5 K/UL (ref 3.9–12.7)
WBC # BLD AUTO: 4.5 K/UL (ref 3.9–12.7)

## 2021-12-06 PROCEDURE — 36415 COLL VENOUS BLD VENIPUNCTURE: CPT | Mod: PO | Performed by: FAMILY MEDICINE

## 2021-12-06 PROCEDURE — 80053 COMPREHEN METABOLIC PANEL: CPT | Performed by: FAMILY MEDICINE

## 2021-12-06 PROCEDURE — 82784 ASSAY IGA/IGD/IGG/IGM EACH: CPT | Performed by: ALLERGY & IMMUNOLOGY

## 2021-12-06 PROCEDURE — 85025 COMPLETE CBC W/AUTO DIFF WBC: CPT | Performed by: FAMILY MEDICINE

## 2021-12-06 PROCEDURE — 80061 LIPID PANEL: CPT | Performed by: FAMILY MEDICINE

## 2021-12-08 ENCOUNTER — OFFICE VISIT (OUTPATIENT)
Dept: PRIMARY CARE CLINIC | Facility: CLINIC | Age: 56
End: 2021-12-08
Payer: COMMERCIAL

## 2021-12-08 DIAGNOSIS — G47.01 INSOMNIA DUE TO MEDICAL CONDITION: ICD-10-CM

## 2021-12-08 DIAGNOSIS — J32.4 CHRONIC PANSINUSITIS: ICD-10-CM

## 2021-12-08 DIAGNOSIS — Z79.890 HORMONE REPLACEMENT THERAPY (POSTMENOPAUSAL): ICD-10-CM

## 2021-12-08 DIAGNOSIS — E78.49 OTHER HYPERLIPIDEMIA: ICD-10-CM

## 2021-12-08 DIAGNOSIS — J45.51 SEVERE PERSISTENT ASTHMA WITH ACUTE EXACERBATION: ICD-10-CM

## 2021-12-08 DIAGNOSIS — Z79.60 LONG-TERM USE OF IMMUNOSUPPRESSANT MEDICATION: ICD-10-CM

## 2021-12-08 DIAGNOSIS — M79.7 FIBROMYALGIA: ICD-10-CM

## 2021-12-08 DIAGNOSIS — I77.1 TORTUOUS AORTA: ICD-10-CM

## 2021-12-08 DIAGNOSIS — J32.9 CHRONIC RHINOSINUSITIS: ICD-10-CM

## 2021-12-08 DIAGNOSIS — G43.809 OTHER MIGRAINE WITHOUT STATUS MIGRAINOSUS, NOT INTRACTABLE: Primary | ICD-10-CM

## 2021-12-08 DIAGNOSIS — D80.1 HYPOGAMMAGLOBULINEMIA: ICD-10-CM

## 2021-12-08 DIAGNOSIS — J31.0 NONALLERGIC RHINITIS: ICD-10-CM

## 2021-12-08 DIAGNOSIS — K50.00 CROHN'S DISEASE OF SMALL INTESTINE WITHOUT COMPLICATION: ICD-10-CM

## 2021-12-08 DIAGNOSIS — M17.0 BILATERAL PRIMARY OSTEOARTHRITIS OF KNEE: ICD-10-CM

## 2021-12-08 DIAGNOSIS — G47.33 OSA (OBSTRUCTIVE SLEEP APNEA): ICD-10-CM

## 2021-12-08 DIAGNOSIS — I10 ESSENTIAL HYPERTENSION: ICD-10-CM

## 2021-12-08 PROCEDURE — 3061F NEG MICROALBUMINURIA REV: CPT | Mod: CPTII,95,, | Performed by: FAMILY MEDICINE

## 2021-12-08 PROCEDURE — 3066F PR DOCUMENTATION OF TREATMENT FOR NEPHROPATHY: ICD-10-PCS | Mod: CPTII,95,, | Performed by: FAMILY MEDICINE

## 2021-12-08 PROCEDURE — 99214 OFFICE O/P EST MOD 30 MIN: CPT | Mod: 95,,, | Performed by: FAMILY MEDICINE

## 2021-12-08 PROCEDURE — 99214 PR OFFICE/OUTPT VISIT, EST, LEVL IV, 30-39 MIN: ICD-10-PCS | Mod: 95,,, | Performed by: FAMILY MEDICINE

## 2021-12-08 PROCEDURE — 3066F NEPHROPATHY DOC TX: CPT | Mod: CPTII,95,, | Performed by: FAMILY MEDICINE

## 2021-12-08 PROCEDURE — 3061F PR NEG MICROALBUMINURIA RESULT DOCUMENTED/REVIEW: ICD-10-PCS | Mod: CPTII,95,, | Performed by: FAMILY MEDICINE

## 2021-12-08 RX ORDER — NORTRIPTYLINE HYDROCHLORIDE 25 MG/1
25 CAPSULE ORAL NIGHTLY
Qty: 90 CAPSULE | Refills: 3 | Status: SHIPPED | OUTPATIENT
Start: 2021-12-08 | End: 2023-04-04 | Stop reason: SDUPTHER

## 2021-12-08 RX ORDER — DESLORATADINE 5 MG/1
5 TABLET ORAL DAILY
Qty: 90 TABLET | Refills: 3 | Status: SHIPPED | OUTPATIENT
Start: 2021-12-08 | End: 2022-07-10

## 2021-12-15 RX ORDER — FLUTICASONE PROPIONATE 50 MCG
SPRAY, SUSPENSION (ML) NASAL
Refills: 0 | OUTPATIENT
Start: 2021-12-15

## 2021-12-27 ENCOUNTER — DOCUMENTATION ONLY (OUTPATIENT)
Dept: ALLERGY | Facility: CLINIC | Age: 56
End: 2021-12-27
Payer: COMMERCIAL

## 2022-01-12 ENCOUNTER — PATIENT MESSAGE (OUTPATIENT)
Dept: OTOLARYNGOLOGY | Facility: CLINIC | Age: 57
End: 2022-01-12
Payer: COMMERCIAL

## 2022-01-17 ENCOUNTER — PATIENT MESSAGE (OUTPATIENT)
Dept: OTOLARYNGOLOGY | Facility: CLINIC | Age: 57
End: 2022-01-17
Payer: COMMERCIAL

## 2022-01-18 ENCOUNTER — PATIENT MESSAGE (OUTPATIENT)
Dept: GASTROENTEROLOGY | Facility: CLINIC | Age: 57
End: 2022-01-18
Payer: COMMERCIAL

## 2022-01-24 ENCOUNTER — OFFICE VISIT (OUTPATIENT)
Dept: GASTROENTEROLOGY | Facility: CLINIC | Age: 57
End: 2022-01-24
Payer: COMMERCIAL

## 2022-01-24 DIAGNOSIS — K50.00 CROHN'S DISEASE OF SMALL INTESTINE WITHOUT COMPLICATION: Primary | ICD-10-CM

## 2022-01-24 DIAGNOSIS — J32.4 CHRONIC PANSINUSITIS: ICD-10-CM

## 2022-01-24 PROCEDURE — 99214 PR OFFICE/OUTPT VISIT, EST, LEVL IV, 30-39 MIN: ICD-10-PCS | Mod: 95,,, | Performed by: INTERNAL MEDICINE

## 2022-01-24 PROCEDURE — 99214 OFFICE O/P EST MOD 30 MIN: CPT | Mod: 95,,, | Performed by: INTERNAL MEDICINE

## 2022-01-24 RX ORDER — SOD SULF/POT CHLORIDE/MAG SULF 1.479 G
12 TABLET ORAL DAILY
Qty: 24 TABLET | Refills: 0 | Status: SHIPPED | OUTPATIENT
Start: 2022-01-24 | End: 2022-05-16

## 2022-01-24 NOTE — PROGRESS NOTES
Clinic Consult:  [facility] Consultation Note    Reason for Consult:  The encounter diagnosis was Crohn's disease of small intestine without complication.    PCP: Esthela Hu       HPI:  This is a 56 y.o. female here for evaluation of Crohn's       IBD History  - Type: crohn's disease  - Disease Location: small bowel  - Phenotype: stricture   - Diagnosed: 2000  - Surgeries related to IBD: none  - Extra-intestinal Manifestations: oral aphthous ulcers     Current Medications  Pentasa 1000 mg QID (started 4/2018)     Past Medications  Remicade (in remote past)-- ineffective  Asacol 4.8 gm-- ineffective  Entocort-- effective  Prednisone  Humira (started July 17, stopped 2/2018)-- stopped 2/2 multiple infections.      Drug and Antibody Levels    (2018) Humira level 2.7, intermediate antibody formation.     Endoscopy Reports  5/7/15 colonoscopy: poor prep. Skin tag. Crohn's disease with ileitis. Biopsied. Pathology: focal chronic active ileitis.   5/9//17: erythematous mucosa in the sigmoid, transverse and hepatic flexure. 2 mm polyp at hepatic flexure. Crohn's disease with ileitis. Pathology: colon and ileal bx normal colon mucosa. Polyp normal colon mucosa.   5/9/17 EGD: gastritis and duodenal erosions without bleeding. Pathology acute and chronic non-specific duodenitis with ulceration.   3/27/18 colonoscopy: 3 mm polyp in the sigmoid. No signs of active crohn's disease and no signs of stricture in the TI (advanced up to 15 cm). Pathology: hyperplastic polyp.   3/2020 colonoscopy: SESCD 4, ileitis      Interval History  Has had issues with recurrent sinusitis with pseudonomonas.  Is being followed by ENT and allergists in [place].  On IgG, on genta-clinda    Says she has watery diarrhea, has 2-5 BMs.  Described as watery. Reports nocturnal BM most nights if she eats dinner.  If she skips dinner, won't have nocturnal BM.     Reports constipation -- Says she sometimes feels like she has to strain and  then passes small pellets, this often happens 4-5 hours after having diarrhea.  Has pain in rectum with constipation as well as hemorrhoids.      Denies weight loss. Appetite is good.         Preventative Medicine     Immunizations  - Influenza:   - Pnemococcal: PCV-13: 2019; PSV-23   - Hepatitis A/B: 2019  - Herpes Zoster: 2015  - COVID-19: 2012 - rec'd first dose      Cancer Prevention   - Date of last pap smear: ?  - Date of last skin cancer screening: due   - Date of last surveillance colonoscopy: n/a     Bone Health  - Vitamin D level: ?  - Date of last DEXA: ?     Therapy Related Testing  - Date of last TB testin  - TPMT status: ?     Miscellaneous  - Vitamin B 12 level (if ileal disease or resection):   - Smoking status: no  - NSAID use: no  - History of C. Diff: no  - Family planning: n/a    ROS:  CONSTITUTIONAL: Denies weight change,  fatigue, fevers, chills, night sweats.  EYES: No changes in vision.   ENT: No oral lesions or sore throat.  HEMATOLOGICAL/Lymph: Denies bleeding tendency, bruising tendency. No swellings or enlarged lymph nodes.  CARDIOVASCULAR: Denies chest pain, shortness of breath, orthopnea and edema.  RESPIRATORY: Denies cough, hemoptysis, dyspnea, and wheezing.  GI: See HPI.  : Denies dysuria and hematuria  MUSCULOSKELETAL: Denies joint pain or swelling, back pain and muscle pain.  SKIN: Denies rashes.  NEUROLOGIC: Denies headaches, seizures and numbness.  PSYCHIATRIC: Denies depression or anxiety.  ENDOCRINE: Denies heat or cold intolerance and excessive thirst or urination.    Medical History:   Past Medical History:   Diagnosis Date    Allergic rhinitis     Asthma     Chronic pansinusitis     Crohn's disease     Ileal involvement, previously on Remicade, Asacol, Prednisone    Fibromyalgia     Hyperlipidemia     Hypertension     Migraine     Obstructive sleep apnea     CPAP at night    Sciatica        Surgical History:  Past Surgical History:   Procedure  Laterality Date    BLADDER SURGERY      sling was created by her muscles      SECTION      COLONOSCOPY N/A 2017    Procedure: COLONOSCOPY;  Surgeon: Kin Dyer MD;  Location: [facility];  Service: Endoscopy;  Laterality: N/A;    COLONOSCOPY N/A 3/27/2018    Procedure: COLONOSCOPY;  Surgeon: Kyra Vallecillo MD;  Location: [facility];  Service: Endoscopy;  Laterality: N/A;    COLONOSCOPY N/A 3/12/2020    Procedure: COLONOSCOPY;  Surgeon: Nicole Leal MD;  Location: [facility];  Service: Endoscopy;  Laterality: N/A;    DEBRIDEMENT Bilateral 2020    Procedure: DEBRIDEMENT;  Surgeon: Matthias Roach MD;  Location: [facility] OR [address] [place] [address];  Service: ENT;  Laterality: Bilateral;    ESOPHAGOGASTRODUODENOSCOPY N/A 3/12/2020    Procedure: ESOPHAGOGASTRODUODENOSCOPY (EGD);  Surgeon: Nicole Leal MD;  Location: [facility];  Service: Endoscopy;  Laterality: N/A;    FINGER SURGERY      joint relpacement, left hand index finger    FUNCTIONAL ENDOSCOPIC SINUS SURGERY (FESS) USING COMPUTER-ASSISTED NAVIGATION Bilateral 2019    Procedure: FESS, USING COMPUTER-ASSISTED NAVIGATION;  Surgeon: Manish Shaffer MD;  Location: [facility];  Service: ENT;  Laterality: Bilateral;    FUNCTIONAL ENDOSCOPIC SINUS SURGERY (FESS) USING COMPUTER-ASSISTED NAVIGATION Bilateral 2020    Procedure: FESS, USING COMPUTER-ASSISTED NAVIGATION SPHENOID;  Surgeon: Matthias Roach MD;  Location: [facility] OR [address] [place] [address];  Service: ENT;  Laterality: Bilateral;  TIVA    HYSTERECTOMY      SINUS SURGERY      WISDOM TOOTH EXTRACTION         Family History:   Family History   Problem Relation Age of Onset    Hypertension Mother     Allergies Mother     Kidney disease Father 64        ESRD on HD    Scleroderma Father     Hypertension Brother     Cancer Paternal Grandmother 70        colon    Heart attack Maternal Grandmother     COPD Maternal Grandmother 72       Social History:   Social History     Tobacco Use    Smoking  status: Never Smoker    Smokeless tobacco: Never Used   Substance Use Topics    Alcohol use: No    Drug use: No       Allergies: Reviewed    Home Medications:   Medication List with Changes/Refills   New Medications    SOD SULF-POT CHLORIDE-MAG SULF (SUTAB) 1.479-0.188- 0.225 GRAM TABLET    Take 12 tablets by mouth once daily. Take according to package instructions with indicated amount of water.   Current Medications    ATORVASTATIN (LIPITOR) 10 MG TABLET    Take 1 tablet (10 mg total) by mouth once daily.    AZELASTINE (ASTELIN) 137 MCG (0.1 %) NASAL SPRAY    1 spray (137 mcg total) by Nasal route 2 (two) times daily.    BUTALBITAL-ACETAMINOPHEN-CAFFEINE -40 MG (FIORICET, ESGIC) -40 MG PER TABLET    TAKE 1 TABLET EVERY 4 HOURS AS NEEDED FOR HEADACHE    CLINDAMYCIN (CLEOCIN) 150 MG/ML INJECTION    EMPTY CONTENTS OF 1 VIAL INTO NASAL IRRIGATION SYSTEM, ADD DISTILLED WATER, SALT PACK, MIX & IRRIGATE PERFORM 2 TIMES DAILY    CLINDAMYCIN (CLEOCIN) 300 MG CAPSULE    Take 600 mg by mouth 2 (two) times daily.    DESLORATADINE (CLARINEX) 5 MG TABLET    Take 1 tablet (5 mg total) by mouth once daily.    DULOXETINE (CYMBALTA) 60 MG CAPSULE    Take 1 capsule (60 mg total) by mouth 2 (two) times daily.    ELETRIPTAN (RELPAX) 40 MG TABLET    Take 1 tablet (40 mg total) by mouth as needed.    ESTRADIOL (ESTRACE) 2 MG TABLET    Take 2 mg by mouth every evening.     FLUTICASONE FUROATE-VILANTEROL (BREO ELLIPTA) 200-25 MCG/DOSE DSDV DISKUS INHALER    Inhale 1 puff into the lungs once daily.    FLUTICASONE PROPIONATE (FLONASE) 50 MCG/ACTUATION NASAL SPRAY    2 sprays (100 mcg total) by Each Nostril route once daily.    GENTAMICIN (GARAMYCIN) 40 MG/ML INJECTION    EMPTY CONTENTS OF 1 VIAL INTO NASAL IRRIGATION SYSTEM, ADD DISTILLED WATER, SALT PACK, MIX & IRRIGATE PERFORM 2 TIMES DAILY    GENTAMICIN SULFATE (GENTAMICIN, BULK, MISC)    EMPTY CONTENTS OF 1 CAPSULE INTO NASAL IRRIGATION SYSTEM, ADD DISTILLED WATER, SALT  PACK, MIX & IRRIGATE. PERFORM 2 TIMES DAILY    IMMUN GLOB G,IGG,-PRO-IGA 0-50 (HIZENTRA) 10 GRAM/50 ML (20 %) SOLN    Inject 60 mLs (12 g total) into the skin every 7 days.    IPRATROPIUM (ATROVENT) 0.02 % NEBULIZER SOLUTION    Nebulize 2.5 ml every 4-6 hours as directed, if needed    LEVALBUTEROL (XOPENEX) 1.25 MG/3 ML NEBULIZER SOLUTION    Take 3 mLs (1.25 mg total) by nebulization every 4 (four) hours. Rescue    LOSARTAN-HYDROCHLOROTHIAZIDE 100-25 MG (HYZAAR) 100-25 MG PER TABLET    TAKE 1 TABLET BY MOUTH EVERY DAY    MONTELUKAST (SINGULAIR) 10 MG TABLET    TAKE 1 TABLET EVERY EVENING    NEILMED SINUS RINSE COMPLETE PKDV    use as directed    NORTRIPTYLINE (PAMELOR) 25 MG CAPSULE    Take 1 capsule (25 mg total) by mouth every evening.    PENTASA 250 MG CPSR    TAKE 4 CAPSULES (1000 MG TOTAL) FOUR TIMES A DAY    PREGABALIN (LYRICA) 150 MG CAPSULE    Take 1 capsule (150 mg total) by mouth 3 (three) times daily.    PROPRANOLOL (INDERAL LA) 80 MG 24 HR CAPSULE    Take 1 capsule (80 mg total) by mouth once daily.    RIZATRIPTAN (MAXALT) 10 MG TABLET    TAKE 1 TABLET IF NEEDED FOR MIGRAINES. MAX 2 TABLETS IN 24 HOURS.    TIOTROPIUM BROMIDE (SPIRIVA RESPIMAT) 1.25 MCG/ACTUATION MIST    Inhale 2 puffs into the lungs once daily.    TOPIRAMATE (TOPAMAX) 100 MG TABLET    Take 1 tablet (100 mg total) by mouth 2 (two) times daily.    TRIAMCINOLONE ACETONIDE 0.025% (KENALOG) 0.025 % CREAM    1 application once daily. Apply to affected area    VENTOLIN HFA 90 MCG/ACTUATION INHALER    INHALE 2 PUFFS INTO THE LUNGS EVERY 4 TO 6 HOURS AS NEEDED FOR WHEEZING.    ZOLPIDEM (AMBIEN) 5 MG TAB    TAKE 1 TABLET BY MOUTH EVERY DAY AT BEDTIME AS NEEDED   Discontinued Medications    FLUCONAZOLE (DIFLUCAN) 200 MG TAB    Take 200 mg by mouth every other day.         Physical Exam:  Vital Signs:  There were no vitals taken for this visit.  There is no height or weight on file to calculate BMI.        Labs: Pertinent labs  reviewed.    Assessment and Plan:  Crohn's disease of small intestine without complication  Pt not in remission but has not been on therapy due to infections (and follow up).  Need to repeat colonoscopy and make decisions regarding therapy.  I discussed the mechanism of action of stelara, anti-tnfs and that this can be discussed with her immunologist.    -     Calprotectin, Stool; Future; Expected date: 01/24/2022  -     Clostridium difficile EIA; Future; Expected date: 01/24/2022  -     C-reactive protein; Future; Expected date: 01/24/2022  -     Sedimentation rate; Future; Expected date: 01/24/2022  -     Case Request Endoscopy: COLONOSCOPY, EGD (ESOPHAGOGASTRODUODENOSCOPY)  -     sod sulf-pot chloride-mag sulf (SUTAB) 1.479-0.188- 0.225 gram tablet; Take 12 tablets by mouth once daily. Take according to package instructions with indicated amount of water.  Dispense: 24 tablet; Refill: 0          Thank you so much for allowing me to participate in the care of Jaylin Leal MD

## 2022-01-25 ENCOUNTER — OFFICE VISIT (OUTPATIENT)
Dept: OTOLARYNGOLOGY | Facility: CLINIC | Age: 57
End: 2022-01-25
Payer: COMMERCIAL

## 2022-01-25 VITALS — HEIGHT: 67 IN | WEIGHT: 175 LBS | BODY MASS INDEX: 27.47 KG/M2

## 2022-01-25 DIAGNOSIS — D80.1 HYPOGAMMAGLOBULINEMIA: ICD-10-CM

## 2022-01-25 DIAGNOSIS — J31.0 NONALLERGIC RHINITIS: Primary | ICD-10-CM

## 2022-01-25 DIAGNOSIS — J32.4 CHRONIC PANSINUSITIS: ICD-10-CM

## 2022-01-25 PROCEDURE — 87075 CULTR BACTERIA EXCEPT BLOOD: CPT | Performed by: OTOLARYNGOLOGY

## 2022-01-25 PROCEDURE — 99214 PR OFFICE/OUTPT VISIT, EST, LEVL IV, 30-39 MIN: ICD-10-PCS | Mod: 25,S$GLB,, | Performed by: OTOLARYNGOLOGY

## 2022-01-25 PROCEDURE — 87070 CULTURE OTHR SPECIMN AEROBIC: CPT | Performed by: OTOLARYNGOLOGY

## 2022-01-25 PROCEDURE — 99999 PR PBB SHADOW E&M-EST. PATIENT-LVL V: CPT | Mod: PBBFAC,,, | Performed by: OTOLARYNGOLOGY

## 2022-01-25 PROCEDURE — 87186 SC STD MICRODIL/AGAR DIL: CPT | Mod: 59 | Performed by: OTOLARYNGOLOGY

## 2022-01-25 PROCEDURE — 3008F PR BODY MASS INDEX (BMI) DOCUMENTED: ICD-10-PCS | Mod: CPTII,S$GLB,, | Performed by: OTOLARYNGOLOGY

## 2022-01-25 PROCEDURE — 1159F PR MEDICATION LIST DOCUMENTED IN MEDICAL RECORD: ICD-10-PCS | Mod: CPTII,S$GLB,, | Performed by: OTOLARYNGOLOGY

## 2022-01-25 PROCEDURE — 99999 PR PBB SHADOW E&M-EST. PATIENT-LVL V: ICD-10-PCS | Mod: PBBFAC,,, | Performed by: OTOLARYNGOLOGY

## 2022-01-25 PROCEDURE — 31237 NASAL/SINUS ENDOSCOPY: ICD-10-PCS | Mod: 50,S$GLB,, | Performed by: OTOLARYNGOLOGY

## 2022-01-25 PROCEDURE — 87077 CULTURE AEROBIC IDENTIFY: CPT | Performed by: OTOLARYNGOLOGY

## 2022-01-25 PROCEDURE — 3008F BODY MASS INDEX DOCD: CPT | Mod: CPTII,S$GLB,, | Performed by: OTOLARYNGOLOGY

## 2022-01-25 PROCEDURE — 1160F RVW MEDS BY RX/DR IN RCRD: CPT | Mod: CPTII,S$GLB,, | Performed by: OTOLARYNGOLOGY

## 2022-01-25 PROCEDURE — 1160F PR REVIEW ALL MEDS BY PRESCRIBER/CLIN PHARMACIST DOCUMENTED: ICD-10-PCS | Mod: CPTII,S$GLB,, | Performed by: OTOLARYNGOLOGY

## 2022-01-25 PROCEDURE — 99214 OFFICE O/P EST MOD 30 MIN: CPT | Mod: 25,S$GLB,, | Performed by: OTOLARYNGOLOGY

## 2022-01-25 PROCEDURE — 31237 NSL/SINS NDSC SURG BX POLYPC: CPT | Mod: 50,S$GLB,, | Performed by: OTOLARYNGOLOGY

## 2022-01-25 PROCEDURE — 1159F MED LIST DOCD IN RCRD: CPT | Mod: CPTII,S$GLB,, | Performed by: OTOLARYNGOLOGY

## 2022-01-25 NOTE — PROGRESS NOTES
"  Subjective:      Jaylin is a 56 y.o. female who comes for follow-up of sinusitis.  Her last visit with me was on 11/9/2021.  Now over 1 year status-post endoscopic sinus debridement.  Past 6 weeks has intermittent nasal congestion, increase green nasal discharge.  Had 1-week lapse between refills of genta-clinda sinus rinse, seemed to flare after that.  Very [employer] about medication usage.  Receiving Hizentra for 8 months, reportedly recently went up on dose.  More frequent wheezing now too.    SNOT-22 score: : (P) 53  NOSE score:: (P) 30%  ETDQ-7 score:: (P) 1.1    The patient's medications, allergies, past medical, surgical, social and family histories were reviewed and updated as appropriate.    A detailed review of systems was obtained with pertinent positives as per the above HPI, and otherwise negative.        Objective:     Ht 5' 7" (1.702 m)   Wt 79.4 kg (175 lb)   BMI 27.41 kg/m²        Constitutional:   She appears well-developed. She is cooperative.     Head:  Normocephalic.     Nose:  No mucosal edema, rhinorrhea, septal deviation or polyps. No epistaxis. Turbinates normal, no turbinate masses and no turbinate hypertrophy.  Right sinus exhibits no maxillary sinus tenderness and no frontal sinus tenderness. Left sinus exhibits no maxillary sinus tenderness and no frontal sinus tenderness.     Mouth/Throat  Oropharynx clear and moist without lesions or asymmetry. No oropharyngeal exudate or posterior oropharyngeal erythema.     Neck:  No adenopathy. Normal range of motion present.     She has no cervical adenopathy.       Procedure    Nasal endoscopy with debridement performed.  See procedure note.     Left maxillary     Left sphenoid     Right maxillary        Data Reviewed    WBC (K/uL)   Date Value   12/06/2021 4.50   12/06/2021 4.50     Eosinophil % (%)   Date Value   12/06/2021 0.9   12/06/2021 0.9     Eos # (K/uL)   Date Value   12/06/2021 0.0   12/06/2021 0.0     Platelets (K/uL)   Date Value "   12/06/2021 299   12/06/2021 299     Glucose (mg/dL)   Date Value   12/06/2021 78   12/06/2021 78     IgE (IU/mL)   Date Value   02/03/2021 <35       Pathology report indicated chronic inflammation with eosinophilia.    Cultures showed Staph and Pseudomonas at prior surgery.      Assessment:     1. Nonallergic rhinitis    2. Chronic pansinusitis    3. Hypogammaglobulinemia         Plan:     Cultures taken, will consider medication change.  Debridement performed, resume saline with clinda-genta topical rinse.  Denae per Dr. Bender.  Follow up for test results.

## 2022-01-25 NOTE — PROCEDURES
Nasal/sinus endoscopy    Date/Time: 1/25/2022 11:30 AM  Performed by: Matthias Roach MD  Authorized by: Matthias Roach MD     Consent Done?:  Yes (Verbal)  Anesthesia:     Local anesthetic:  Lidocaine 4% and Jose-Synephrine 1/2%    Patient tolerance:  Patient tolerated the procedure well with no immediate complications  Nose:     Procedure Performed:  Removal of Debridement  External:      No external nasal deformity  Intranasal:      Mucosa no polyps     Mucosa ulcers not present     No mucosa lesions present     Turbinates not enlarged     No septum gross deformity  Nasopharynx:      No mucosa lesions     Adenoids not present     Posterior choanae patent     Eustachian tube patent     Sinuses patent bilaterally  Focal green mucus and crusts at left maxillary rim, left sphenoid lumen, and right posterior ethmoid  Left side swabbed for culture

## 2022-01-26 ENCOUNTER — PATIENT MESSAGE (OUTPATIENT)
Dept: GASTROENTEROLOGY | Facility: CLINIC | Age: 57
End: 2022-01-26
Payer: COMMERCIAL

## 2022-01-28 ENCOUNTER — LAB VISIT (OUTPATIENT)
Dept: LAB | Facility: HOSPITAL | Age: 57
End: 2022-01-28
Attending: INTERNAL MEDICINE
Payer: COMMERCIAL

## 2022-01-28 DIAGNOSIS — K50.00 CROHN'S DISEASE OF SMALL INTESTINE WITHOUT COMPLICATION: ICD-10-CM

## 2022-01-28 LAB
CRP SERPL-MCNC: 3.2 MG/L (ref 0–8.2)
ERYTHROCYTE [SEDIMENTATION RATE] IN BLOOD BY WESTERGREN METHOD: 8 MM/HR (ref 0–36)

## 2022-01-28 PROCEDURE — 85652 RBC SED RATE AUTOMATED: CPT | Performed by: INTERNAL MEDICINE

## 2022-01-28 PROCEDURE — 36415 COLL VENOUS BLD VENIPUNCTURE: CPT | Mod: PO | Performed by: INTERNAL MEDICINE

## 2022-01-28 PROCEDURE — 86140 C-REACTIVE PROTEIN: CPT | Performed by: INTERNAL MEDICINE

## 2022-01-29 LAB
BACTERIA SPEC AEROBE CULT: ABNORMAL
BACTERIA SPEC AEROBE CULT: ABNORMAL

## 2022-01-31 ENCOUNTER — TELEPHONE (OUTPATIENT)
Dept: GASTROENTEROLOGY | Facility: CLINIC | Age: 57
End: 2022-01-31
Payer: COMMERCIAL

## 2022-01-31 ENCOUNTER — PATIENT MESSAGE (OUTPATIENT)
Dept: OTOLARYNGOLOGY | Facility: CLINIC | Age: 57
End: 2022-01-31
Payer: COMMERCIAL

## 2022-01-31 ENCOUNTER — TELEPHONE (OUTPATIENT)
Dept: INFECTIOUS DISEASES | Facility: CLINIC | Age: 57
End: 2022-01-31
Payer: COMMERCIAL

## 2022-01-31 LAB — BACTERIA SPEC ANAEROBE CULT: NORMAL

## 2022-01-31 RX ORDER — POLYETHYLENE GLYCOL 3350, SODIUM SULFATE ANHYDROUS, SODIUM BICARBONATE, SODIUM CHLORIDE, POTASSIUM CHLORIDE 236; 22.74; 6.74; 5.86; 2.97 G/4L; G/4L; G/4L; G/4L; G/4L
4 POWDER, FOR SOLUTION ORAL ONCE
Qty: 4000 ML | Refills: 0 | Status: SHIPPED | OUTPATIENT
Start: 2022-01-31 | End: 2022-01-31

## 2022-01-31 RX ORDER — POLYETHYLENE GLYCOL 3350, SODIUM SULFATE, SODIUM CHLORIDE, POTASSIUM CHLORIDE, SODIUM ASCORBATE, AND ASCORBIC ACID 7.5-2.691G
2000 KIT ORAL ONCE
Qty: 2000 ML | Refills: 0 | Status: SHIPPED | OUTPATIENT
Start: 2022-01-31 | End: 2022-01-31

## 2022-01-31 RX ORDER — SOD SULF/POT CHLORIDE/MAG SULF 1.479 G
12 TABLET ORAL DAILY
Qty: 24 TABLET | Refills: 0 | Status: SHIPPED | OUTPATIENT
Start: 2022-01-31 | End: 2022-01-31

## 2022-01-31 NOTE — TELEPHONE ENCOUNTER
----- Message from Alana Nichole LPN sent at 1/28/2022  4:15 PM CST -----  Contact: Terrell with [facility] phone [phone]    ----- Message -----  From: Isadora Yeung  Sent: 1/28/2022   3:42 PM CST  To: Elvis Rivera Staff    Calling because Rx sod sulf-pot chloride-mag sulf (SUTAB) 1.479-0.188- 0.225 gram tablet is not covered buy the patient's insurance, [employer] is covered. Please advise. Thanks.

## 2022-01-31 NOTE — TELEPHONE ENCOUNTER
----- Message from Ivan Cabrera MD sent at 1/31/2022  4:02 PM CST -----  Regarding: RE: Pseudomonas NATE Rivas can you please get her in for next available provider? Thanks    ----- Message -----  From: Matthias Roach MD  Sent: 1/31/2022  11:52 AM CST  To: Ivan Cabrera MD  Subject: Pseudomonas                                   Lydia Love-    I need a curbside consult.  This lady has had sinus surgery and persistent pseudomonal sinusitis.  She's been on topical ciprofloxacin sinus rinses for several months, was doing very well for a while, now has worsening purulent sinusitis.  New nasal swab last week showed Pseudomonas aeruginosa , sensitive only to colistin and ceftazidime/avibactam.  I'm not savvy with either of these so could use some guidance on how to proceed, either from you or a trusted colleague.  Any thoughts?    I appreciate it!    Ed

## 2022-01-31 NOTE — TELEPHONE ENCOUNTER
----- Message from Alana Nichole LPN sent at 1/28/2022  4:15 PM CST -----  Contact: Terrell with [facility] phone [phone]    ----- Message -----  From: Isadora Yeung  Sent: 1/28/2022   3:42 PM CST  To: Elvis Rivera Staff    Calling because Rx sod sulf-pot chloride-mag sulf (SUTAB) 1.479-0.188- 0.225 gram tablet is not covered buy the patient's insurance, [employer] is covered. Please advise. Thanks.

## 2022-01-31 NOTE — TELEPHONE ENCOUNTER
Called patient with no answer. Left voicemail of scheduled appointment and to call back with any questions.

## 2022-01-31 NOTE — TELEPHONE ENCOUNTER
----- Message from Nadine Snow sent at 1/31/2022  4:20 PM CST -----  Contact: Fatimah with [facility]  ref#[id number]  Pharmacy is calling to clarify an RX.  RX name:  sod sulf-pot chloride-mag sulf (SUTAB) 1.479-0.188- 0.225 gram tablet  What do they need to clarify:  not covered; medication covered is PEG 3350(gavil-gso) w/fl pk 4 l  Comments:

## 2022-02-01 ENCOUNTER — PATIENT MESSAGE (OUTPATIENT)
Dept: OTOLARYNGOLOGY | Facility: CLINIC | Age: 57
End: 2022-02-01
Payer: COMMERCIAL

## 2022-02-01 ENCOUNTER — TELEPHONE (OUTPATIENT)
Dept: INFECTIOUS DISEASES | Facility: CLINIC | Age: 57
End: 2022-02-01
Payer: COMMERCIAL

## 2022-02-01 NOTE — TELEPHONE ENCOUNTER
Called patient and rescheduled appointment per patient time preference. Patient agreed to new appointment date/time/location.

## 2022-02-02 ENCOUNTER — LAB VISIT (OUTPATIENT)
Dept: LAB | Facility: HOSPITAL | Age: 57
End: 2022-02-02
Attending: INTERNAL MEDICINE
Payer: COMMERCIAL

## 2022-02-02 DIAGNOSIS — K50.00 CROHN'S DISEASE OF SMALL INTESTINE WITHOUT COMPLICATION: ICD-10-CM

## 2022-02-02 PROCEDURE — 87449 NOS EACH ORGANISM AG IA: CPT | Performed by: INTERNAL MEDICINE

## 2022-02-02 PROCEDURE — 83993 ASSAY FOR CALPROTECTIN FECAL: CPT | Performed by: INTERNAL MEDICINE

## 2022-02-03 LAB
C DIFF GDH STL QL: NEGATIVE
C DIFF TOX A+B STL QL IA: NEGATIVE

## 2022-02-05 NOTE — TELEPHONE ENCOUNTER
No new care gaps identified.  Powered by [employer]. Reference number: [id number].   2/05/2022 12:23:55 PM CST

## 2022-02-07 RX ORDER — DULOXETIN HYDROCHLORIDE 60 MG/1
60 CAPSULE, DELAYED RELEASE ORAL 2 TIMES DAILY
Qty: 180 CAPSULE | Refills: 3 | Status: SHIPPED | OUTPATIENT
Start: 2022-02-07 | End: 2022-07-13

## 2022-02-08 LAB — CALPROTECTIN STL-MCNT: <27.1 MCG/G

## 2022-02-10 ENCOUNTER — OFFICE VISIT (OUTPATIENT)
Dept: INFECTIOUS DISEASES | Facility: CLINIC | Age: 57
End: 2022-02-10
Payer: COMMERCIAL

## 2022-02-10 ENCOUNTER — HOSPITAL ENCOUNTER (OUTPATIENT)
Dept: RADIOLOGY | Facility: HOSPITAL | Age: 57
Discharge: HOME | End: 2022-02-10
Attending: INTERNAL MEDICINE
Payer: COMMERCIAL

## 2022-02-10 VITALS
SYSTOLIC BLOOD PRESSURE: 132 MMHG | HEART RATE: 58 BPM | BODY MASS INDEX: 29.62 KG/M2 | DIASTOLIC BLOOD PRESSURE: 77 MMHG | HEIGHT: 67 IN | TEMPERATURE: 98 F | WEIGHT: 188.69 LBS

## 2022-02-10 DIAGNOSIS — Z16.20 INFECTION DUE TO HIGHLY ANTIBIOTIC-RESISTANT PSEUDOMONAS: Primary | ICD-10-CM

## 2022-02-10 DIAGNOSIS — A49.8 INFECTION DUE TO HIGHLY ANTIBIOTIC-RESISTANT PSEUDOMONAS: Primary | ICD-10-CM

## 2022-02-10 DIAGNOSIS — J32.4 CHRONIC PANSINUSITIS: ICD-10-CM

## 2022-02-10 DIAGNOSIS — R05.9 COUGH: ICD-10-CM

## 2022-02-10 DIAGNOSIS — Z51.81 THERAPEUTIC DRUG MONITORING: ICD-10-CM

## 2022-02-10 DIAGNOSIS — Z78.9 HEALTH CARE HOME, ACTIVE CARE COORDINATION: ICD-10-CM

## 2022-02-10 DIAGNOSIS — Z79.2 RECEIVING INTRAVENOUS ANTIBIOTIC TREATMENT AT HOME: ICD-10-CM

## 2022-02-10 PROCEDURE — 3075F PR MOST RECENT SYSTOLIC BLOOD PRESS GE 130-139MM HG: ICD-10-PCS | Mod: CPTII,S$GLB,, | Performed by: INTERNAL MEDICINE

## 2022-02-10 PROCEDURE — 3078F PR MOST RECENT DIASTOLIC BLOOD PRESSURE < 80 MM HG: ICD-10-PCS | Mod: CPTII,S$GLB,, | Performed by: INTERNAL MEDICINE

## 2022-02-10 PROCEDURE — 99999 PR PBB SHADOW E&M-EST. PATIENT-LVL V: ICD-10-PCS | Mod: PBBFAC,,, | Performed by: INTERNAL MEDICINE

## 2022-02-10 PROCEDURE — 71046 X-RAY EXAM CHEST 2 VIEWS: CPT | Mod: TC,FY

## 2022-02-10 PROCEDURE — 1159F PR MEDICATION LIST DOCUMENTED IN MEDICAL RECORD: ICD-10-PCS | Mod: CPTII,S$GLB,, | Performed by: INTERNAL MEDICINE

## 2022-02-10 PROCEDURE — 3075F SYST BP GE 130 - 139MM HG: CPT | Mod: CPTII,S$GLB,, | Performed by: INTERNAL MEDICINE

## 2022-02-10 PROCEDURE — 99999 PR PBB SHADOW E&M-EST. PATIENT-LVL V: CPT | Mod: PBBFAC,,, | Performed by: INTERNAL MEDICINE

## 2022-02-10 PROCEDURE — 3008F PR BODY MASS INDEX (BMI) DOCUMENTED: ICD-10-PCS | Mod: CPTII,S$GLB,, | Performed by: INTERNAL MEDICINE

## 2022-02-10 PROCEDURE — 1159F MED LIST DOCD IN RCRD: CPT | Mod: CPTII,S$GLB,, | Performed by: INTERNAL MEDICINE

## 2022-02-10 PROCEDURE — 87077 CULTURE AEROBIC IDENTIFY: CPT | Performed by: INTERNAL MEDICINE

## 2022-02-10 PROCEDURE — 87070 CULTURE OTHR SPECIMN AEROBIC: CPT | Performed by: INTERNAL MEDICINE

## 2022-02-10 PROCEDURE — 71046 XR CHEST PA AND LATERAL: ICD-10-PCS | Mod: 26,,, | Performed by: RADIOLOGY

## 2022-02-10 PROCEDURE — 3078F DIAST BP <80 MM HG: CPT | Mod: CPTII,S$GLB,, | Performed by: INTERNAL MEDICINE

## 2022-02-10 PROCEDURE — 3008F BODY MASS INDEX DOCD: CPT | Mod: CPTII,S$GLB,, | Performed by: INTERNAL MEDICINE

## 2022-02-10 PROCEDURE — 87186 SC STD MICRODIL/AGAR DIL: CPT | Performed by: INTERNAL MEDICINE

## 2022-02-10 PROCEDURE — 99215 PR OFFICE/OUTPT VISIT, EST, LEVL V, 40-54 MIN: ICD-10-PCS | Mod: S$GLB,,, | Performed by: INTERNAL MEDICINE

## 2022-02-10 PROCEDURE — 71046 X-RAY EXAM CHEST 2 VIEWS: CPT | Mod: 26,,, | Performed by: RADIOLOGY

## 2022-02-10 PROCEDURE — 99215 OFFICE O/P EST HI 40 MIN: CPT | Mod: S$GLB,,, | Performed by: INTERNAL MEDICINE

## 2022-02-10 PROCEDURE — 87205 SMEAR GRAM STAIN: CPT | Performed by: INTERNAL MEDICINE

## 2022-02-10 NOTE — PROGRESS NOTES
INFECTIOUS DISEASE CLINIC  02/10/2022 9:11 AM    Subjective:      Chief Complaint: pseudomonas in sinus culture    History of Present Illness:    Patient Jaylin Murguia is a 56 y.o. female who presents today for referral from ENT for  pseudomonas that grew in sinus culture. Pt reports sinus infection for about 2.5 years. 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 Reports compliance with nasal flushes--- Using gent and clindamycin rinses twice a day for about a year - rinse system. Says it had been helping, but doesn't think it seems to be anymore. Worried about being contagious to other people. Added Uv filtration system for well water. Has a swimming pool, but hasn't used recently. Reports wheezing over past few weeks- uses inhalers. Thinks she has a chrons flare. Has been on IV antibiotics in the past per Dr Florez, but not recently. S/p debridement of sinus with ENT on 1/25              Aerobic Bacterial Culture  Abnormal   PSEUDOMONAS AERUGINOSA    Many     Aerobic Bacterial Culture  Abnormal   PSEUDOMONAS AERUGINOSA   Many     Resulting Agency [facility]          Susceptibility     PSEUDOMONAS AERUGINOSA  Pseudomonas aeruginosa     CULTURE, AEROBIC  (SPECIFY SOURCE) CULTURE, AEROBIC  (SPECIFY SOURCE)     Amikacin >32 mcg/mL Resistant <=16 mcg/mL Sensitive     Cefepime >16 mcg/mL Resistant <=2 mcg/mL Sensitive     Ceftazidime/Avibactam 2 mcg/mL Sensitive       Ciprofloxacin >2 mcg/mL Resistant <=1 mcg/mL Sensitive     Colistin 1.0 mcg/mL Sensitive 1       Gentamicin >8 mcg/mL Resistant <=4 mcg/mL Sensitive     Meropenem >8 mcg/mL Resistant <=1 mcg/mL Sensitive     Piperacillin/Tazo >64 mcg/mL Resistant <=16 mcg/mL Sensitive     Tobramycin >8 mcg/mL Resistant <=4 mcg/mL Sensitive                         Review of Symptoms:  Constitutional: Denies fevers, chills, or weakness.  ENT: as per hpi  Cardiovascular: Denies chest pain, palpitations, orthopnea, or claudication.  Respiratory:  Denies shortness of breath, cough, hemoptysis. + wheezing  GI: Denies nausea/vomitting, hematochezia, melena, abd pain, or changes in appetite.  : Denies dysuria, incontinence, or hematuria.  Musculoskeletal: Denies joint pain or myalgias.  Skin/breast: Denies rashes, lumps, lesions, or discharge.  Neurologic: Denies headache, dizziness, vertigo, or paresthesias.    Past Medical History:   Diagnosis Date    Allergic rhinitis     Asthma     Chronic pansinusitis     Crohn's disease     Ileal involvement, previously on Remicade, Asacol, Prednisone    Fibromyalgia     Hyperlipidemia     Hypertension     Migraine     Obstructive sleep apnea     CPAP at night    Sciatica        Past Surgical History:   Procedure Laterality Date    BLADDER SURGERY      sling was created by her muscles      SECTION      COLONOSCOPY N/A 2017    Procedure: COLONOSCOPY;  Surgeon: Kin Dyer MD;  Location: [facility];  Service: Endoscopy;  Laterality: N/A;    COLONOSCOPY N/A 3/27/2018    Procedure: COLONOSCOPY;  Surgeon: Kyra Vallecillo MD;  Location: [facility];  Service: Endoscopy;  Laterality: N/A;    COLONOSCOPY N/A 3/12/2020    Procedure: COLONOSCOPY;  Surgeon: Nicole Leal MD;  Location: [facility];  Service: Endoscopy;  Laterality: N/A;    DEBRIDEMENT Bilateral 2020    Procedure: DEBRIDEMENT;  Surgeon: Matthias Roach MD;  Location: [address];  Service: ENT;  Laterality: Bilateral;    ESOPHAGOGASTRODUODENOSCOPY N/A 3/12/2020    Procedure: ESOPHAGOGASTRODUODENOSCOPY (EGD);  Surgeon: Nicole Leal MD;  Location: [facility];  Service: Endoscopy;  Laterality: N/A;    FINGER SURGERY      joint relpacement, left hand index finger    FUNCTIONAL ENDOSCOPIC SINUS SURGERY (FESS) USING COMPUTER-ASSISTED NAVIGATION Bilateral 2019    Procedure: FESS, USING COMPUTER-ASSISTED NAVIGATION;  Surgeon: Manish Shaffer MD;  Location: [facility];  Service: ENT;  Laterality: Bilateral;    FUNCTIONAL  ENDOSCOPIC SINUS SURGERY (FESS) USING COMPUTER-ASSISTED NAVIGATION Bilateral 9/25/2020    Procedure: FESS, USING COMPUTER-ASSISTED NAVIGATION SPHENOID;  Surgeon: Matthias Roach MD;  Location: [facility] OR [address] [place] [address];  Service: ENT;  Laterality: Bilateral;  TIVA    HYSTERECTOMY      SINUS SURGERY      WISDOM TOOTH EXTRACTION         Family History   Problem Relation Age of Onset    Hypertension Mother     Allergies Mother     Kidney disease Father 64        ESRD on HD    Scleroderma Father     Hypertension Brother     Cancer Paternal Grandmother 70        colon    Heart attack Maternal Grandmother     COPD Maternal Grandmother 72       Social History     Socioeconomic History    Marital status:     Number of children: 2   Occupational History    Occupation: teacher   Tobacco Use    Smoking status: Never Smoker    Smokeless tobacco: Never Used   Substance and Sexual Activity    Alcohol use: No    Drug use: No    Sexual activity: Yes     Partners: Male   Social History Narrative    Surrogate Decision Maker: , Cristobal Murguia, [phone]     Social Determinants of Health     Financial Resource Strain: Low Risk     Difficulty of Paying Living Expenses: Not very hard   Food Insecurity: No Food Insecurity    Worried About Running Out of Food in the Last Year: Never true    Ran Out of Food in the Last Year: Never true   Transportation Needs: No Transportation Needs    Lack of Transportation (Medical): No    Lack of Transportation (Non-Medical): No   Physical Activity: Unknown    Days of Exercise per Week: 2 days   Stress: Stress Concern Present    Feeling of Stress : To some extent   Social Connections: Unknown    Frequency of Communication with Friends and Family: Three times a week    Frequency of Social Gatherings with Friends and Family: Once a week    Active Member of Clubs or Organizations: No    Attends Club or Organization Meetings: 1 to 4 times per year    Marital  Status:    Housing Stability: High Risk    Unable to Pay for Housing in the Last Year: No    Number of Places Lived in the Last Year: 1    Unstable Housing in the Last Year: Yes       Review of patient's allergies indicates:  No Known Allergies      Objective:   VS (24h): There were no vitals filed for this visit.  [unfilled]  General: Afebrile, alert, comfortable, no acute distress.   HEENT: RANDI. EOMI, no scleral icterus. No sinus tenderness. MMM.  Pulmonary: Non labored,clear to auscultation A/P/L. No wheezing, crackles, or rhonchi.  Cardiac: normal S1 & S2 w/o rubs/murmurs/gallops. No JVD.   Abdominal: Non-tender, non-distended.Bowel sounds present x 4. No appreciable hepatosplenomegaly. No guarding or rebound tenderness  Extremities: Moves all extremities x 4. No peripheral edema. 2+ pulses.  Skin: No jaundice, rashes, or visible lesions.   Neurological:  Alert and oriented x 4.     Labs:  Glucose   Date Value Ref Range Status   12/06/2021 78 70 - 110 mg/dL Final   12/06/2021 78 70 - 110 mg/dL Final   09/02/2021 94 70 - 110 mg/dL Final     Calcium   Date Value Ref Range Status   12/06/2021 9.3 8.7 - 10.5 mg/dL Final   12/06/2021 9.3 8.7 - 10.5 mg/dL Final   09/02/2021 9.1 8.7 - 10.5 mg/dL Final     Albumin   Date Value Ref Range Status   12/06/2021 3.9 3.5 - 5.2 g/dL Final   12/06/2021 3.9 3.5 - 5.2 g/dL Final   09/02/2021 3.8 3.5 - 5.2 g/dL Final     Total Protein   Date Value Ref Range Status   12/06/2021 7.9 6.0 - 8.4 g/dL Final   12/06/2021 7.9 6.0 - 8.4 g/dL Final   09/02/2021 7.9 6.0 - 8.4 g/dL Final     Sodium   Date Value Ref Range Status   12/06/2021 136 136 - 145 mmol/L Final   12/06/2021 136 136 - 145 mmol/L Final   09/02/2021 137 136 - 145 mmol/L Final     Potassium   Date Value Ref Range Status   12/06/2021 3.8 3.5 - 5.1 mmol/L Final   12/06/2021 3.8 3.5 - 5.1 mmol/L Final   09/02/2021 3.8 3.5 - 5.1 mmol/L Final     CO2   Date Value Ref Range Status   12/06/2021 19 (L) 23 - 29 mmol/L  Final   12/06/2021 19 (L) 23 - 29 mmol/L Final   09/02/2021 21 (L) 23 - 29 mmol/L Final     Chloride   Date Value Ref Range Status   12/06/2021 104 95 - 110 mmol/L Final   12/06/2021 104 95 - 110 mmol/L Final   09/02/2021 107 95 - 110 mmol/L Final     BUN   Date Value Ref Range Status   12/06/2021 11 6 - 20 mg/dL Final   12/06/2021 11 6 - 20 mg/dL Final   09/02/2021 11 6 - 20 mg/dL Final     Creatinine   Date Value Ref Range Status   12/06/2021 1.1 0.5 - 1.4 mg/dL Final   12/06/2021 1.1 0.5 - 1.4 mg/dL Final   09/02/2021 0.9 0.5 - 1.4 mg/dL Final     Alkaline Phosphatase   Date Value Ref Range Status   12/06/2021 87 55 - 135 U/L Final   12/06/2021 87 55 - 135 U/L Final   09/02/2021 83 55 - 135 U/L Final     ALT   Date Value Ref Range Status   12/06/2021 30 10 - 44 U/L Final   12/06/2021 30 10 - 44 U/L Final   09/02/2021 20 10 - 44 U/L Final     AST   Date Value Ref Range Status   12/06/2021 27 10 - 40 U/L Final   12/06/2021 27 10 - 40 U/L Final   09/02/2021 23 10 - 40 U/L Final     Total Bilirubin   Date Value Ref Range Status   12/06/2021 0.5 0.1 - 1.0 mg/dL Final     Comment:     For infants and newborns, interpretation of results should be based  on gestational age, weight and in agreement with clinical  observations.    Premature Infant recommended reference ranges:  Up to 24 hours.............<8.0 mg/dL  Up to 48 hours............<12.0 mg/dL  3-5 days..................<15.0 mg/dL  6-29 days.................<15.0 mg/dL     12/06/2021 0.5 0.1 - 1.0 mg/dL Final     Comment:     For infants and newborns, interpretation of results should be based  on gestational age, weight and in agreement with clinical  observations.    Premature Infant recommended reference ranges:  Up to 24 hours.............<8.0 mg/dL  Up to 48 hours............<12.0 mg/dL  3-5 days..................<15.0 mg/dL  6-29 days.................<15.0 mg/dL     09/02/2021 0.4 0.1 - 1.0 mg/dL Final     Comment:     For infants and newborns,  interpretation of results should be based  on gestational age, weight and in agreement with clinical  observations.    Premature Infant recommended reference ranges:  Up to 24 hours.............<8.0 mg/dL  Up to 48 hours............<12.0 mg/dL  3-5 days..................<15.0 mg/dL  6-29 days.................<15.0 mg/dL       WBC   Date Value Ref Range Status   12/06/2021 4.50 3.90 - 12.70 K/uL Final   12/06/2021 4.50 3.90 - 12.70 K/uL Final   09/02/2021 3.25 (L) 3.90 - 12.70 K/uL Final     Hemoglobin   Date Value Ref Range Status   12/06/2021 13.6 12.0 - 16.0 g/dL Final   12/06/2021 13.6 12.0 - 16.0 g/dL Final   09/02/2021 14.3 12.0 - 16.0 g/dL Final     Hematocrit   Date Value Ref Range Status   12/06/2021 42.1 37.0 - 48.5 % Final   12/06/2021 42.1 37.0 - 48.5 % Final   09/02/2021 43.6 37.0 - 48.5 % Final     MCV   Date Value Ref Range Status   12/06/2021 96 82 - 98 fL Final   12/06/2021 96 82 - 98 fL Final   09/02/2021 94 82 - 98 fL Final     Platelets   Date Value Ref Range Status   12/06/2021 299 150 - 450 K/uL Final   12/06/2021 299 150 - 450 K/uL Final   09/02/2021 249 150 - 450 K/uL Final     Lab Results   Component Value Date    CHOL 172 12/06/2021    CHOL 229 (H) 09/02/2021    CHOL 177 08/21/2020     Lab Results   Component Value Date    HDL 73 12/06/2021    HDL 62 09/02/2021    HDL 63 08/21/2020     Lab Results   Component Value Date    LDLCALC 84.8 12/06/2021    LDLCALC 141.8 09/02/2021    LDLCALC 79.6 08/21/2020     Lab Results   Component Value Date    TRIG 71 12/06/2021    TRIG 126 09/02/2021    TRIG 172 (H) 08/21/2020     Lab Results   Component Value Date    CHOLHDL 42.4 12/06/2021    CHOLHDL 27.1 09/02/2021    CHOLHDL 35.6 08/21/2020     No results found for: RPR  No results found for: QUANTIFERON    Medications:  Current Outpatient Medications on File Prior to Visit   Medication Sig Dispense Refill    atorvastatin (LIPITOR) 10 MG tablet Take 1 tablet (10 mg total) by mouth once daily. 90 tablet 3     azelastine (ASTELIN) 137 mcg (0.1 %) nasal spray 2 sprays (274 mcg total) by Nasal route 2 (two) times daily. 30 mL 11    butalbital-acetaminophen-caffeine -40 mg (FIORICET, ESGIC) -40 mg per tablet TAKE 1 TABLET EVERY 4 HOURS AS NEEDED FOR HEADACHE 90 tablet 3    clindamycin (CLEOCIN) 150 mg/mL injection EMPTY CONTENTS OF 1 VIAL INTO NASAL IRRIGATION SYSTEM, ADD DISTILLED WATER, SALT PACK, MIX & IRRIGATE PERFORM 2 TIMES DAILY      clindamycin (CLEOCIN) 300 MG capsule Take 600 mg by mouth 2 (two) times daily.      desloratadine (CLARINEX) 5 mg tablet Take 1 tablet (5 mg total) by mouth once daily. 90 tablet 3    DULoxetine (CYMBALTA) 60 MG capsule Take 1 capsule (60 mg total) by mouth 2 (two) times daily. 180 capsule 3    eletriptan (RELPAX) 40 MG tablet Take 1 tablet (40 mg total) by mouth as needed. (Patient taking differently: Take 40 mg by mouth as needed. Take if needed) 12 tablet 3    estradiol (ESTRACE) 2 MG tablet Take 2 mg by mouth every evening.       fluticasone furoate-vilanteroL (BREO ELLIPTA) 200-25 mcg/dose DsDv diskus inhaler Inhale 1 puff into the lungs once daily. 60 each 11    fluticasone propionate (FLONASE) 50 mcg/actuation nasal spray 2 sprays (100 mcg total) by Each Nostril route once daily. 16 g 3    gentamicin (GARAMYCIN) 40 mg/mL injection EMPTY CONTENTS OF 1 VIAL INTO NASAL IRRIGATION SYSTEM, ADD DISTILLED WATER, SALT PACK, MIX & IRRIGATE PERFORM 2 TIMES DAILY      gentamicin sulfate (GENTAMICIN, BULK, MISC) EMPTY CONTENTS OF 1 CAPSULE INTO NASAL IRRIGATION SYSTEM, ADD DISTILLED WATER, SALT PACK, MIX & IRRIGATE. PERFORM 2 TIMES DAILY      immun glob G,IgG,-pro-IgA 0-50 (HIZENTRA) 10 gram/50 mL (20 %) Soln Inject 60 mLs (12 g total) into the skin every 7 days. 240 mL 11    ipratropium (ATROVENT) 0.02 % nebulizer solution Nebulize 2.5 ml every 4-6 hours as directed, if needed 90 each 11    levalbuterol (XOPENEX) 1.25 mg/3 mL nebulizer solution Take 3 mLs (1.25  mg total) by nebulization every 4 (four) hours. Rescue 1 Box 11    losartan-hydrochlorothiazide 100-25 mg (HYZAAR) 100-25 mg per tablet TAKE 1 TABLET BY MOUTH EVERY DAY (Patient taking differently: 1/2 tablet prn) 90 tablet 3    montelukast (SINGULAIR) 10 mg tablet TAKE 1 TABLET EVERY EVENING 90 tablet 3    NEILMED SINUS RINSE COMPLETE pkdv use as directed      nortriptyline (PAMELOR) 25 MG capsule Take 1 capsule (25 mg total) by mouth every evening. 90 capsule 3    PENTASA 250 mg CpSR TAKE 4 CAPSULES (1000 MG TOTAL) FOUR TIMES A DAY 1440 capsule 3    pregabalin (LYRICA) 150 MG capsule Take 1 capsule (150 mg total) by mouth 3 (three) times daily. 270 capsule 1    propranoloL (INDERAL LA) 80 MG 24 hr capsule Take 1 capsule (80 mg total) by mouth once daily. 30 capsule 11    rizatriptan (MAXALT) 10 MG tablet TAKE 1 TABLET IF NEEDED FOR MIGRAINES. MAX 2 TABLETS IN 24 HOURS. 30 tablet 8    sod sulf-pot chloride-mag sulf (SUTAB) 1.479-0.188- 0.225 gram tablet Take 12 tablets by mouth once daily. Take according to package instructions with indicated amount of water. 24 tablet 0    tiotropium bromide (SPIRIVA RESPIMAT) 1.25 mcg/actuation Mist Inhale 2 puffs into the lungs once daily. 4 g 11    topiramate (TOPAMAX) 100 MG tablet Take 1 tablet (100 mg total) by mouth 2 (two) times daily. 180 tablet 3    triamcinolone acetonide 0.025% (KENALOG) 0.025 % cream 1 application once daily. Apply to affected area      VENTOLIN HFA 90 mcg/actuation inhaler INHALE 2 PUFFS INTO THE LUNGS EVERY 4 TO 6 HOURS AS NEEDED FOR WHEEZING. (Patient taking differently: Take if needed & bring) 18 Inhaler 1    zolpidem (AMBIEN) 5 MG Tab TAKE 1 TABLET BY MOUTH EVERY DAY AT BEDTIME AS NEEDED 90 tablet 1     Current Facility-Administered Medications on File Prior to Visit   Medication Dose Route Frequency Provider Last Rate Last Admin    lactated ringers infusion   Intravenous Continuous Mary Leiva MD   New Bag at 03/12/20 7222     lidocaine (PF) 10 mg/ml (1%) injection 10 mg  1 mL Intradermal Once Mary Leiva MD           Antibiotics:   Antibiotics (From admission, onward)            None          HIV: No components found for: HIV 1/2 AG/AB  Hepatitis C IgG: No components found for: HEPATITIS C  Syphilis: No results found for: RPR    Hepatitis A IgG: No components found for: HEPATITIS A IGG  Hepatitis Bc IgG: No components found for: HEPATITIS B CORE IGG  Hepatitis Bs IgG:  Quantiferon: No results found for: QUANTIFERON  VZV IgG: No components found for: VARICELLA IGG    No components found for: SEDIMENTATION RATE  No components found for: C-REACTIVE PROTEIN      Microbiology x 7d:   Microbiology Results (last 7 days)     ** No results found for the last 168 hours. **          Immunization History   Administered Date(s) Administered    COVID-19, MRNA, LN-S, PF (Pfizer) (Purple Cap) 03/05/2021, 04/15/2021    Hepatitis A / Hepatitis B 12/12/2019    Influenza 10/29/2013    Influenza - Quadrivalent - PF *Preferred* (6 months and older) 11/15/2017, 01/28/2020, 02/11/2021    Influenza Split 10/29/2013    PPD Test 05/22/2017    Pneumococcal Conjugate - 13 Valent 12/23/2019    Pneumococcal Polysaccharide - 23 Valent 10/29/2013    Tdap 02/18/2014         Reviewed records today as well as relevant labs, cultures, and imaging    Assessment:       Iv antibitoics needed as an outpatient  Home health active care coordination  Acute on chronic purulent sinusitis    pseudomonas  Bronchitis     Deep sinus cultures at time of debridement by ENT with  pseudomonas. Now with wheezing, cough, productive phlegm. CXR without infiltrate.     Plan:     Two week trial ceftazidime/avibactam  picc line, home heatlh  Continue nasal flushes per ENT  resp culture    Outpatient Antibiotic Therapy Plan:    Please send referral to [facility].    1) Infection: pseudomonas sinusitis    2) Discharge Antibiotics:    Intravenous  antibiotics:   Avycaz    3) Therapy Duration:  14 days    Estimated end date of IV antibiotics:     4) Outpatient Weekly Labs:    Order the following labs to be drawn on Mondays:    CBC   CMP    ESR    CRP    5) Fax Lab Results to Infectious Diseases Provider: Dr Corrigan    [facility] Fax Number: [phone]    6) Outpatient Infectious Diseases Follow-up     Follow-up appointment will be arranged by the ID clinic and will be found in the patient's appointments tab.     Prior to discharge, please ensure the patient's follow-up has been scheduled.     If there is still no follow-up scheduled prior to discharge, please send an EPIC message to Saige Amos in Infectious Diseases.        Melania Corrigan MD, MPH  Infectious Disease    Orders Placed This Encounter   Procedures    Culture, Respiratory with Gram Stain     Standing Status:   Future     Number of Occurrences:   1     Standing Expiration Date:   4/11/2023    X-Ray Chest PA And Lateral     Standing Status:   Future     Number of Occurrences:   1     Standing Expiration Date:   2/10/2023     Order Specific Question:   May the Radiologist modify the order per protocol to meet the clinical needs of the patient?     Answer:   Yes     Order Specific Question:   Release to patient     Answer:   Immediate    IR PICC Line Placement w/o Port w/ Img > 4 Y/O     Standing Status:   Future     Standing Expiration Date:   2/10/2023     Order Specific Question:   Has the patient had a prior contrast or iodine reaction?     Answer:   No     Order Specific Question:   Is the patient currently on any blood thinners like Aspirin, Coumadin, or Plavix?     Answer:   No     Order Specific Question:   Is the patient on any Metformin drug, such as Glucophage or Glucovance?     Answer:   No     Order Specific Question:   May the Radiologist modify the order per protocol to meet the clinical needs of the patient?     Answer:   Yes     Order Specific Question:   Release  to patient     Answer:   Immediate    Ambulatory referral/consult to Interventional RAD     Standing Status:   Future     Standing Expiration Date:   3/10/2023     Referral Priority:   Routine     Referral Type:   Consultation     Number of Visits Requested:   1

## 2022-02-14 ENCOUNTER — TELEPHONE (OUTPATIENT)
Dept: INFECTIOUS DISEASES | Facility: CLINIC | Age: 57
End: 2022-02-14
Payer: COMMERCIAL

## 2022-02-14 DIAGNOSIS — J32.4 CHRONIC PANSINUSITIS: Primary | ICD-10-CM

## 2022-02-14 LAB
BACTERIA SPEC AEROBE CULT: ABNORMAL
BACTERIA SPEC AEROBE CULT: ABNORMAL
GRAM STN SPEC: ABNORMAL

## 2022-02-14 NOTE — TELEPHONE ENCOUNTER
----- Message from Portia Nowak sent at 2/14/2022  2:57 PM CST -----  Regarding: ANTIBIOTIC  Contact: Self  Pt ask will she start her antibiotic treatment tomorrow after she receive her pic line? If not, pt ask if she can due to staying more that an hour away Pt ask for a call      Contact info  [phone] (home)

## 2022-02-14 NOTE — TELEPHONE ENCOUNTER
----- Message from Maddi Sanchez MA sent at 2/14/2022  2:47 PM CST -----    ----- Message -----  From: Melania Corrigan MD  Sent: 2/14/2022   2:35 PM CST  To: Anila Evans LPN, Madison Traylor, #    Needs to start ASAP. Susana, do I need to do anything else to set up the IV antibiotics. thanks      ----- Message -----  From: Madison Traylor  Sent: 2/14/2022   2:32 PM CST  To: Melania Corrigan MD, Evelyn Moreno MA    Patient is schedule on 2-15 for her picc placement. She is asking when will she start her infusion and who will teach her how to managed it once it is placed. She will like to start it tomorrow since her  took off to bring her here. Can you please call patient with this information?

## 2022-02-14 NOTE — TELEPHONE ENCOUNTER
Spoke with pt and informed her that she is set up with [facility] after she receives her PICC line. Explained to the pt they will call her and set her appt after she receives her line. Pt understood and confirmed.

## 2022-02-15 ENCOUNTER — HOSPITAL ENCOUNTER (OUTPATIENT)
Dept: INTERVENTIONAL RADIOLOGY/VASCULAR | Facility: HOSPITAL | Age: 57
Discharge: HOME | End: 2022-02-15
Attending: INTERNAL MEDICINE
Payer: COMMERCIAL

## 2022-02-15 VITALS
SYSTOLIC BLOOD PRESSURE: 138 MMHG | RESPIRATION RATE: 18 BRPM | HEART RATE: 57 BPM | OXYGEN SATURATION: 100 % | DIASTOLIC BLOOD PRESSURE: 75 MMHG

## 2022-02-15 DIAGNOSIS — J32.4 CHRONIC PANSINUSITIS: ICD-10-CM

## 2022-02-15 PROCEDURE — C1751 CATH, INF, PER/CENT/MIDLINE: HCPCS

## 2022-02-15 PROCEDURE — 36573 IR PICC LINE PLACEMENT W/O PORT, W/IMG > 5 Y/O: ICD-10-PCS | Mod: ,,, | Performed by: PHYSICIAN ASSISTANT

## 2022-02-15 PROCEDURE — 36573 INSJ PICC RS&I 5 YR+: CPT | Mod: ,,, | Performed by: PHYSICIAN ASSISTANT

## 2022-02-15 NOTE — DISCHARGE INSTRUCTIONS
Patient Education       How to Care for a Central Line Catheter   About this topic   A central line catheter is a very long intravenous line (IV). The Broviac, Kirk, Sraah, and PICC are different kinds of central lines. They are long, thin, flexible tubes or catheters. The catheter is put in your arm, neck, groin, or chest through the skin. It goes into a large blood vessel that leads to your heart. It gives a direct path into your blood. Germs can easily get in your blood from your central line. It is important to take extra care to try and keep this from happening.  This kind of IV is used if you need to have drugs or fluids for a few weeks or months. Based on how much drug or nutrition you need during treatment, the catheter may have 1, 2, or 3 tips, called lumens. These let you get fluid, drugs, or liquid food. A central line is also used when drugs need to be given through an IV and the veins in your arm are very hard to find. A central line may be used to give drugs that are irritating to small veins. This kind of IV can also be used to draw blood for tests or to monitor your condition.  These lines can stay in place for weeks or months. Some people may have a central line catheter for years.  General   Changing the Central Line Dressing   Keeping the skin clean and changing the dressing as ordered may help to prevent infection. How often you need to change the dressing will depend on a few things. You will always change the dressing at least 1 time each week. If the dressing is wet, loose, or dirty, change it right away. It is hard to stay sterile and change your own dressing, so someone in your family can learn how to do this. Others have a nurse come in to help with this care.  · Gather supplies and place them on a clean work space. You will need sterile gloves, chlorhexidine swab sticks, tape, and sterile dressings.  · Always wash hands well with soap and water before touching the catheter line. This  will help to avoid spreading germs. Wear a mask when changing the dressing. Also, have the patient wear a mask or keep the head turned away while the dressing is being changed. You may want to wear gloves when taking off the old dressing.  · Take off the old dressing. Avoid using scissors or sharp tools. They could cut the catheter. Do not pull on the central line catheter when taking off the dressing.  · Check the site for swelling, drainage, or redness.  · Wash hands again. Put on sterile gloves.  · Wash site with a chlorhexidine swab using a back and forth motion for 30 seconds. Start at the exit site, wash the catheter, and wash away from the site. Be sure to clean the entire site that will be under the dressing, about a 4-inch square. Let the cleaned area air dry for at least 30 seconds..  · Some doctors will order a small sponge disc that will help prevent infection. Put the disc around the catheter where it comes out of the skin. The colored side should be facing away from the skin.  · Lay the catheter on the skin in a loose [place] if it is long, or in a straight line if it is short. It should not lay on top of where the catheter goes into the skin or lay on itself. Put the dressing snugly on all sides of the catheter. Most dressings are clear and will stick to the skin. If it is not sticking, put medical tape on the edges only to hold it in place.  · Throw away the old dressing and used swabs and wipes.  · Remove gloves.  · Wash your hands with soap and water.  Flushing the Catheter   The catheter line should be flushed each day with a sterile fluid. It should also be flushed after each use. This will help keep the line from getting blocked.  · Gather supplies and place them on a clean work space. You will need rubbing alcohol with cotton balls or wipes. You will also need a 10 mL syringe filled with the fluid to flush the catheter. Your doctor may order heparin, normal saline or another flush solution for  you to use. You may need more supplies if you have more than one opening, or catheter lumen, to flush.  · Your doctor may have you use a smaller amount of fluid to flush the line if the catheter is in a child.  · Wash your hands with soap and water.  · Scrub the cap for at least 15 seconds with a rubbing alcohol wipe on the top and sides and let dry.  · Remove the end cap from the syringe and check for air bubbles. Slowly push the syringe plunger forward until all the air is out of the syringe.  · Screw the syringe onto the end of the cap that covers your catheter line.  · Unclamp the catheter and inject the flush into the catheter. Ask the staff which method of flushing is best for you.  ? Flush slowly and steadily until the syringe is empty.  ? Flush slowly until the syringe is at the 7 mL laura then pause for a second. Flush slowly until the syringe is at the 4 mL laura then pause for a second. Flush slowly to the 1 mL laura. As you reach the 1 mL laura, hold pressure on the syringe and clamp your catheter before you get to the very bottom of the syringe.  · If your catheter does not have a clamp, or you were told not to clamp the line, remove the syringe just before the black stopper reaches the bottom of the syringe.  · Remove the syringe from the catheter. Throw away into a container designed for used syringes. This is called a biohazard container. Do not throw away syringes in the trash.  · If the catheter has many lumens, repeat for each lumen.  · Use new supplies for each lumen. Do not reuse the same syringe.   · Throw out any used swabs, wipes, or materials.  · Wash your hands with soap and water.  Changing the Injection Cap   · Gather supplies and place them on a clean workspace. You will need a sterile injection cap for each catheter lumen. You will also need a syringe of fluid for each injection cap.  · Wash your hands with soap and water.  · Remove the new injection cap from the package without touching  the ends.  · Connect the syringe of fluid to the end of the injection cap. Hold the syringe so the cap points up and prime the cap with the fluid. Tap the cap gently to get rid of any air bubbles and leave the syringe joined to the cap.  · Clamp the catheter line and remove the old cap.  · Scrub the end of the catheter for at least 15 seconds with alcohol and let air dry.  · Remove the sterile tip protector from the new cap.  · Screw the new cap onto the catheter connector and remove the syringe from the cap.  · Throw out any used swabs, wipes, or materials.  · Wash your hands with soap and water.  Special Precautions   · Avoid contact sports or rough play.  · Keep sharp objects away from the catheter.  · Avoid swimming in ponds, lakes, rivers, ocean, pools, or hot tubs.  · Keep the dressing covered when bathing. Change the dressing if it gets wet, dirty, or becomes loose.  · Make sure the catheter is secured with special catheter holders or tape.  · Keep the catheter clamped when it is exposed to air. For example, when connecting an IV or when the cap is being changed. Your doctor will teach you when and how to clamp your catheter the right way.  · Make sure to check the line every day for signs of infection.         What problems could happen?   · Infection  · Bleeding from the skin or end of the catheter  · Blood clots in your vein or at the end of the catheter that can cause a blockage  · Break or crack in the catheter  · Leakage of drugs outside the vein, under the skin or fat tissues  · Air in the blood or air embolism   · Abnormal heartbeat  When do I need to call the doctor?   · Signs of infection. These include a fever of 100.4°F (38°C) or higher; chills; redness, drainage, warmth, stinging, or pain at the catheter site.  · Problems with the catheter like:  ? Catheter falls out all the way or part of the way  ? Breaks, cracks, or leaks in the catheter  ? Not able to get the drugs or flush solution through  the catheter  ? Not able to get a blood return from your catheter  ? You have any concerns about your catheter  · Sudden shortness of breath or chest pain  · Swelling on the face, neck, or chest on the side of the line  · Swelling or bulging veins around the catheter site  Teach Back: Helping You Understand   The Teach Back Method helps you understand the information we are giving you. After you talk with the staff, tell them in your own words what you learned. This helps to make sure the staff has described each thing clearly. It also helps to explain things that may have been confusing. Before going home, make sure you can do these:  · I can tell you about my condition.  · I can tell you how to change my central line dressing and how to flush the line.  · I can tell you what I will do if I have redness or drainage from the catheter site.  Last Reviewed Date   2020-04-22  Consumer Information Use and Disclaimer   This information is not specific medical advice and does not replace information you receive from your health care provider. This is only a brief summary of general information. It does NOT include all information about conditions, illnesses, injuries, tests, procedures, treatments, therapies, discharge instructions or life-style choices that may apply to you. You must talk with your health care provider for complete information about your health and treatment options. This information should not be used to decide whether or not to accept your health care providers advice, instructions or recommendations. Only your health care provider has the knowledge and training to provide advice that is right for you.  Copyright   Copyright © 2021 [employer] and its affiliates and/or licensors. All rights reserved.        [facility] 8 AM- 5PM [phone]. Radiology resident on call [phone].

## 2022-02-15 NOTE — H&P
Radiology History & Physical      SUBJECTIVE:     Chief Complaint: Here for a PICC line.    History of Present Illness:  Jaylin Murguia is a 56 y.o. female who presents for PICC line placement for antibiotics.     Past Medical History:   Diagnosis Date    Allergic rhinitis     Asthma     Chronic pansinusitis     Crohn's disease     Ileal involvement, previously on Remicade, Asacol, Prednisone    Fibromyalgia     Hyperlipidemia     Hypertension     Migraine     Obstructive sleep apnea     CPAP at night    Sciatica      Past Surgical History:   Procedure Laterality Date    BLADDER SURGERY      sling was created by her muscles      SECTION      COLONOSCOPY N/A 2017    Procedure: COLONOSCOPY;  Surgeon: Kin Dyer MD;  Location: [facility];  Service: Endoscopy;  Laterality: N/A;    COLONOSCOPY N/A 3/27/2018    Procedure: COLONOSCOPY;  Surgeon: Kyra Vallecillo MD;  Location: [facility];  Service: Endoscopy;  Laterality: N/A;    COLONOSCOPY N/A 3/12/2020    Procedure: COLONOSCOPY;  Surgeon: Nicole Leal MD;  Location: [facility];  Service: Endoscopy;  Laterality: N/A;    DEBRIDEMENT Bilateral 2020    Procedure: DEBRIDEMENT;  Surgeon: Matthias Roach MD;  Location: [address];  Service: ENT;  Laterality: Bilateral;    ESOPHAGOGASTRODUODENOSCOPY N/A 3/12/2020    Procedure: ESOPHAGOGASTRODUODENOSCOPY (EGD);  Surgeon: Nicole Leal MD;  Location: [facility];  Service: Endoscopy;  Laterality: N/A;    FINGER SURGERY      joint relpacement, left hand index finger    FUNCTIONAL ENDOSCOPIC SINUS SURGERY (FESS) USING COMPUTER-ASSISTED NAVIGATION Bilateral 2019    Procedure: FESS, USING COMPUTER-ASSISTED NAVIGATION;  Surgeon: Manish Shaffer MD;  Location: [facility];  Service: ENT;  Laterality: Bilateral;    FUNCTIONAL ENDOSCOPIC SINUS SURGERY (FESS) USING COMPUTER-ASSISTED NAVIGATION Bilateral 2020    Procedure: FESS, USING COMPUTER-ASSISTED NAVIGATION SPHENOID;   Surgeon: Matthias Roach MD;  Location: [facility] OR [address] [place] [address];  Service: ENT;  Laterality: Bilateral;  TIVA    HYSTERECTOMY      SINUS SURGERY      WISDOM TOOTH EXTRACTION         Home Meds:   Prior to Admission medications    Medication Sig Start Date End Date Taking? Authorizing Provider   atorvastatin (LIPITOR) 10 MG tablet Take 1 tablet (10 mg total) by mouth once daily. 9/8/21 9/8/22  Esthela Hu MD   azelastine (ASTELIN) 137 mcg (0.1 %) nasal spray 2 sprays (274 mcg total) by Nasal route 2 (two) times daily. 2/7/22 3/9/22  Jyothi Denise PA-C   butalbital-acetaminophen-caffeine -40 mg (FIORICET, ESGIC) -40 mg per tablet TAKE 1 TABLET EVERY 4 HOURS AS NEEDED FOR HEADACHE 10/5/20   Esthela Hu MD   clindamycin (CLEOCIN) 150 mg/mL injection EMPTY CONTENTS OF 1 VIAL INTO NASAL IRRIGATION SYSTEM, ADD DISTILLED WATER, SALT PACK, MIX & IRRIGATE PERFORM 2 TIMES DAILY 1/29/21   Historical Provider   clindamycin (CLEOCIN) 300 MG capsule Take 600 mg by mouth 2 (two) times daily. 4/19/21   Historical Provider   desloratadine (CLARINEX) 5 mg tablet Take 1 tablet (5 mg total) by mouth once daily. 12/8/21   Esthela Hu MD   DULoxetine (CYMBALTA) 60 MG capsule Take 1 capsule (60 mg total) by mouth 2 (two) times daily. 2/7/22   Esthela Hu MD   eletriptan (RELPAX) 40 MG tablet Take 1 tablet (40 mg total) by mouth as needed.  Patient taking differently: Take 40 mg by mouth as needed. Take if needed 2/7/20   Esthela uH MD   estradiol (ESTRACE) 2 MG tablet Take 2 mg by mouth every evening.     Historical Provider   fluticasone furoate-vilanteroL (BREO ELLIPTA) 200-25 mcg/dose DsDv diskus inhaler Inhale 1 puff into the lungs once daily. 7/22/21   Esthela Hu MD   fluticasone propionate (FLONASE) 50 mcg/actuation nasal spray SPRAY 2 SPRAYS BY EACH NOSTRIL ROUTE ONCE DAILY. 2/11/22   Tanya M. [place], NP   gentamicin (GARAMYCIN) 40 mg/mL injection EMPTY CONTENTS OF 1 VIAL INTO NASAL  IRRIGATION SYSTEM, ADD DISTILLED WATER, SALT PACK, MIX & IRRIGATE PERFORM 2 TIMES DAILY 1/29/21   Historical Provider   gentamicin sulfate (GENTAMICIN, BULK, MISC) EMPTY CONTENTS OF 1 CAPSULE INTO NASAL IRRIGATION SYSTEM, ADD DISTILLED WATER, SALT PACK, MIX & IRRIGATE. PERFORM 2 TIMES DAILY 10/18/21   Historical Provider   immun glob G,IgG,-pro-IgA 0-50 (HIZENTRA) 10 gram/50 mL (20 %) Soln Inject 60 mLs (12 g total) into the skin every 7 days. 10/12/21   Milan Bender MD   ipratropium (ATROVENT) 0.02 % nebulizer solution Nebulize 2.5 ml every 4-6 hours as directed, if needed 11/1/21   Esthela Hu MD   levalbuterol (XOPENEX) 1.25 mg/3 mL nebulizer solution Take 3 mLs (1.25 mg total) by nebulization every 4 (four) hours. Rescue 11/9/20 11/9/21  Esthela Hu MD   losartan-hydrochlorothiazide 100-25 mg (HYZAAR) 100-25 mg per tablet TAKE 1 TABLET BY MOUTH EVERY DAY  Patient taking differently: 1/2 tablet prn 10/14/20   Esthela Hu MD   montelukast (SINGULAIR) 10 mg tablet TAKE 1 TABLET EVERY EVENING 5/17/21   Esthela Wang PA-C   NEILMED SINUS RINSE COMPLETE pkdv use as directed 12/28/20   Historical Provider   nortriptyline (PAMELOR) 25 MG capsule Take 1 capsule (25 mg total) by mouth every evening. 12/8/21   Esthela Hu MD   PENTASA 250 mg CpSR TAKE 4 CAPSULES (1000 MG TOTAL) FOUR TIMES A DAY 12/30/20   Esthela Hu MD   pregabalin (LYRICA) 150 MG capsule Take 1 capsule (150 mg total) by mouth 3 (three) times daily. 11/1/21   Esthela Hu MD   propranoloL (INDERAL LA) 80 MG 24 hr capsule Take 1 capsule (80 mg total) by mouth once daily. 2/8/21 2/8/22  Esthela Hu MD   rizatriptan (MAXALT) 10 MG tablet TAKE 1 TABLET IF NEEDED FOR MIGRAINES. MAX 2 TABLETS IN 24 HOURS. 10/5/20   Esthela Hu MD   sod sulf-pot chloride-mag sulf (SUTAB) 1.479-0.188- 0.225 gram tablet Take 12 tablets by mouth once daily. Take according to package instructions with indicated amount of water. 1/24/22    Nicole Leal MD   tiotropium bromide (SPIRIVA RESPIMAT) 1.25 mcg/actuation Mist Inhale 2 puffs into the lungs once daily. 12/23/19   Vicki Fletcher MD   topiramate (TOPAMAX) 100 MG tablet Take 1 tablet (100 mg total) by mouth 2 (two) times daily. 5/27/21 5/27/22  Esthela Hu MD   triamcinolone acetonide 0.025% (KENALOG) 0.025 % cream 1 application once daily. Apply to affected area 8/26/21   Historical Provider   VENTOLIN HFA 90 mcg/actuation inhaler INHALE 2 PUFFS INTO THE LUNGS EVERY 4 TO 6 HOURS AS NEEDED FOR WHEEZING.  Patient taking differently: Take if needed & bring 6/19/19   Tanya Marc NP   zolpidem (AMBIEN) 5 MG Tab TAKE 1 TABLET BY MOUTH EVERY DAY AT BEDTIME AS NEEDED 7/14/20   Esthela Hu MD     Anticoagulants/Antiplatelets: no anticoagulation    Allergies: Review of patient's allergies indicates:  No Known Allergies  Sedation History:  no adverse reactions    Review of Systems:   Hematological: no known coagulopathies  Respiratory: no shortness of breath  Cardiovascular: no chest pain  Gastrointestinal: no abdominal pain  Genito-Urinary: no dysuria  Musculoskeletal: negative  Neurological: no TIA or stroke symptoms         OBJECTIVE:     Vital Signs (Most Recent)  Pulse: 62 (02/15/22 0821)  Resp: 18 (02/15/22 0821)  BP: 116/79 (02/15/22 0821)  SpO2: 100 % (02/15/22 0821)    Physical Exam:  ASA: 2  Mallampati: 2    General: no acute distress  Mental Status: alert and oriented to person, place and time  HEENT: normocephalic, atraumatic  Chest: unlabored breathing  Heart: regular heart rate  Abdomen: nondistended  Extremity: moves all extremities    Laboratory  Lab Results   Component Value Date    INR 1.0 11/20/2014       Lab Results   Component Value Date    WBC 4.50 12/06/2021    WBC 4.50 12/06/2021    HGB 13.6 12/06/2021    HGB 13.6 12/06/2021    HCT 42.1 12/06/2021    HCT 42.1 12/06/2021    MCV 96 12/06/2021    MCV 96 12/06/2021     12/06/2021     12/06/2021      Lab  Results   Component Value Date    GLU 87 02/15/2022     02/15/2022    K 3.8 02/15/2022     02/15/2022    CO2 25 02/15/2022    BUN 14 02/15/2022    CREATININE 1.0 02/15/2022    CALCIUM 9.2 02/15/2022    ALT 16 02/15/2022    AST 18 02/15/2022    ALBUMIN 3.6 02/15/2022    BILITOT 0.3 02/15/2022       ASSESSMENT/PLAN:     Sedation Plan: local  Patient will undergo image guided PICC line placement.        Gianni Cao MD  Radiology PGY-2  [facility]

## 2022-02-15 NOTE — PROCEDURES
Radiology Post-Procedure Note    Pre Op Diagnosis: chronic pansinusitis    Post Op Diagnosis: Same    Procedure: PICC placement    Procedure performed by: Ruby Duff PA-C; Iftikhar Cruz MD    Written Informed Consent Obtained: Yes    Specimen Removed: No    Estimated Blood Loss: Minimal    Findings:   Using realtime U/S guidance a 5 Fr PICC catheter was placed into the left Basilic Vein with tip of the catheter in the SVC.    PICC is ready for use.     Ruby Duff PA-C  Interventional Radiology  Clinic [phone]

## 2022-02-18 ENCOUNTER — PATIENT MESSAGE (OUTPATIENT)
Dept: INFECTIOUS DISEASES | Facility: CLINIC | Age: 57
End: 2022-02-18
Payer: COMMERCIAL

## 2022-02-18 ENCOUNTER — PATIENT MESSAGE (OUTPATIENT)
Dept: ENDOSCOPY | Facility: HOSPITAL | Age: 57
End: 2022-02-18
Payer: COMMERCIAL

## 2022-02-18 ENCOUNTER — PATIENT MESSAGE (OUTPATIENT)
Dept: OTOLARYNGOLOGY | Facility: CLINIC | Age: 57
End: 2022-02-18
Payer: COMMERCIAL

## 2022-02-18 ENCOUNTER — PATIENT MESSAGE (OUTPATIENT)
Dept: PRIMARY CARE CLINIC | Facility: CLINIC | Age: 57
End: 2022-02-18
Payer: COMMERCIAL

## 2022-02-18 NOTE — TELEPHONE ENCOUNTER
Duplicate message, pt c/o constant diarrhea since Tues. Pt reports being on a strong antibiotic due to sinus issues, pt advised to contact Rx'ing MD for recommendations and also to report to the ER if symptoms worsen. Pt verbalized understanding.

## 2022-02-19 DIAGNOSIS — R19.7 DIARRHEA, UNSPECIFIED TYPE: Primary | ICD-10-CM

## 2022-02-19 NOTE — TELEPHONE ENCOUNTER
Returned calll (see patient messagE). Asking for lomotil, which is schedule v and I don't prescribe. No answer left voicemail

## 2022-02-21 ENCOUNTER — TELEPHONE (OUTPATIENT)
Dept: GASTROENTEROLOGY | Facility: CLINIC | Age: 57
End: 2022-02-21
Payer: COMMERCIAL

## 2022-02-21 ENCOUNTER — TELEPHONE (OUTPATIENT)
Dept: PREADMISSION TESTING | Facility: HOSPITAL | Age: 57
End: 2022-02-21
Payer: COMMERCIAL

## 2022-02-21 RX ORDER — DIPHENOXYLATE HYDROCHLORIDE AND ATROPINE SULFATE 2.5; .025 MG/1; MG/1
1 TABLET ORAL 4 TIMES DAILY PRN
Qty: 20 TABLET | Refills: 0 | Status: SHIPPED | OUTPATIENT
Start: 2022-02-21 | End: 2022-03-03

## 2022-02-21 NOTE — TELEPHONE ENCOUNTER
----- Message from Luke Coburn RN sent at 2/21/2022  3:08 PM CST -----  Contact: Jaylin    ----- Message -----  From: Sydnee Mccord  Sent: 2/21/2022   2:31 PM CST  To: [facility] Staff    Patient would like a call back at [phone]  in regards to her procedure.    Thanks

## 2022-02-21 NOTE — TELEPHONE ENCOUNTER
----- Message from Sydnee Mccord sent at 2/21/2022  2:30 PM CST -----  Contact: Jaylin  Patient would like a call back at [phone]  in regards to her procedure.    Thanks

## 2022-02-21 NOTE — PRE-PROCEDURE INSTRUCTIONS
PAT call completed.  Patient educated on procedure instructions.  Medical history discussed and patient informed of arrival time of 8:00 AM on Wednesday, February 23, 2022 at the [place], and was made aware of the limited-visitor policy, and that  is to remain during the entire visit.  All questions and concerns addressed.  Endoscopy instructions reviewed. Instructed no solid food, only clear liquids, the day before the procedure, then at 6 PM, the day before the procedure, drink the first half of the bowel prep, then at 2 AM, the morning of procedure, drink the second half of the prep, then do not eat or drink anything until after the procedure.  Rapid pre-procedure covid testing to be performed the morning of procedure.   Patient verbalized understanding of all instructions.

## 2022-02-21 NOTE — TELEPHONE ENCOUNTER
PAT call completed.  Patient educated on procedure instructions.  Medical history discussed and patient informed of arrival time of 8:00 AM on Wednesday, February 23, 2022 at the [facility], and was made aware of the limited-visitor policy, and that  is to remain during the entire visit.  All questions and concerns addressed.  Endoscopy instructions reviewed. Instructed no solid food, only clear liquids, the day before the procedure, then at 6 PM, the day before the procedure, drink the first half of the bowel prep, then at 2 AM, the morning of procedure, drink the second half of the prep, then do not eat or drink anything until after the procedure.  Rapid pre-procedure covid testing to be performed the morning of procedure.   Patient verbalized understanding of all instructions.

## 2022-02-21 NOTE — TELEPHONE ENCOUNTER
Patient already spoke to Endoscopy and her questions have been answered. I informed the patient if she has any other questions to not hesitate to give us a call again. She verbalized understanding.

## 2022-02-22 ENCOUNTER — LAB VISIT (OUTPATIENT)
Dept: LAB | Facility: HOSPITAL | Age: 57
End: 2022-02-22
Attending: INTERNAL MEDICINE
Payer: COMMERCIAL

## 2022-02-22 ENCOUNTER — PATIENT MESSAGE (OUTPATIENT)
Dept: INFECTIOUS DISEASES | Facility: CLINIC | Age: 57
End: 2022-02-22
Payer: COMMERCIAL

## 2022-02-22 DIAGNOSIS — R19.7 DIARRHEA, UNSPECIFIED TYPE: ICD-10-CM

## 2022-02-22 PROCEDURE — 83993 ASSAY FOR CALPROTECTIN FECAL: CPT | Performed by: INTERNAL MEDICINE

## 2022-02-24 ENCOUNTER — OFFICE VISIT (OUTPATIENT)
Dept: INFECTIOUS DISEASES | Facility: CLINIC | Age: 57
End: 2022-02-24
Payer: COMMERCIAL

## 2022-02-24 ENCOUNTER — OFFICE VISIT (OUTPATIENT)
Dept: OTOLARYNGOLOGY | Facility: CLINIC | Age: 57
End: 2022-02-24
Payer: COMMERCIAL

## 2022-02-24 VITALS
HEIGHT: 67 IN | HEART RATE: 59 BPM | BODY MASS INDEX: 29.1 KG/M2 | WEIGHT: 185.44 LBS | TEMPERATURE: 98 F | SYSTOLIC BLOOD PRESSURE: 138 MMHG | DIASTOLIC BLOOD PRESSURE: 83 MMHG

## 2022-02-24 VITALS — BODY MASS INDEX: 29.51 KG/M2 | WEIGHT: 188 LBS | HEIGHT: 67 IN

## 2022-02-24 DIAGNOSIS — Z16.20 INFECTION DUE TO HIGHLY ANTIBIOTIC-RESISTANT PSEUDOMONAS: ICD-10-CM

## 2022-02-24 DIAGNOSIS — J32.4 CHRONIC PANSINUSITIS: ICD-10-CM

## 2022-02-24 DIAGNOSIS — D80.1 HYPOGAMMAGLOBULINEMIA: ICD-10-CM

## 2022-02-24 DIAGNOSIS — Z22.8 PSEUDOMONAS AERUGINOSA RESISTANT CARRIER: ICD-10-CM

## 2022-02-24 DIAGNOSIS — A49.8 INFECTION DUE TO HIGHLY ANTIBIOTIC-RESISTANT PSEUDOMONAS: ICD-10-CM

## 2022-02-24 DIAGNOSIS — Z51.81 THERAPEUTIC DRUG MONITORING: ICD-10-CM

## 2022-02-24 DIAGNOSIS — J31.0 NONALLERGIC RHINITIS: Primary | ICD-10-CM

## 2022-02-24 DIAGNOSIS — Z79.2 RECEIVING INTRAVENOUS ANTIBIOTIC TREATMENT AT HOME: Primary | ICD-10-CM

## 2022-02-24 DIAGNOSIS — Z78.9 HEALTH CARE HOME, ACTIVE CARE COORDINATION: ICD-10-CM

## 2022-02-24 DIAGNOSIS — R19.7 DIARRHEA, UNSPECIFIED TYPE: ICD-10-CM

## 2022-02-24 LAB
C DIFF GDH STL QL: NEGATIVE
C DIFF TOX A+B STL QL IA: NEGATIVE
GRAM STN SPEC: NORMAL
GRAM STN SPEC: NORMAL

## 2022-02-24 PROCEDURE — 87186 SC STD MICRODIL/AGAR DIL: CPT | Performed by: OTOLARYNGOLOGY

## 2022-02-24 PROCEDURE — 1160F PR REVIEW ALL MEDS BY PRESCRIBER/CLIN PHARMACIST DOCUMENTED: ICD-10-PCS | Mod: CPTII,S$GLB,, | Performed by: OTOLARYNGOLOGY

## 2022-02-24 PROCEDURE — 99999 PR PBB SHADOW E&M-EST. PATIENT-LVL V: CPT | Mod: PBBFAC,,, | Performed by: OTOLARYNGOLOGY

## 2022-02-24 PROCEDURE — 99999 PR PBB SHADOW E&M-EST. PATIENT-LVL V: CPT | Mod: PBBFAC,,, | Performed by: INTERNAL MEDICINE

## 2022-02-24 PROCEDURE — 3079F DIAST BP 80-89 MM HG: CPT | Mod: CPTII,S$GLB,, | Performed by: INTERNAL MEDICINE

## 2022-02-24 PROCEDURE — 99213 OFFICE O/P EST LOW 20 MIN: CPT | Mod: 25,S$GLB,, | Performed by: OTOLARYNGOLOGY

## 2022-02-24 PROCEDURE — 1159F PR MEDICATION LIST DOCUMENTED IN MEDICAL RECORD: ICD-10-PCS | Mod: CPTII,S$GLB,, | Performed by: OTOLARYNGOLOGY

## 2022-02-24 PROCEDURE — 31231 NASAL ENDOSCOPY DX: CPT | Mod: S$GLB,,, | Performed by: OTOLARYNGOLOGY

## 2022-02-24 PROCEDURE — 1159F MED LIST DOCD IN RCRD: CPT | Mod: CPTII,S$GLB,, | Performed by: INTERNAL MEDICINE

## 2022-02-24 PROCEDURE — 1160F RVW MEDS BY RX/DR IN RCRD: CPT | Mod: CPTII,S$GLB,, | Performed by: OTOLARYNGOLOGY

## 2022-02-24 PROCEDURE — 99214 PR OFFICE/OUTPT VISIT, EST, LEVL IV, 30-39 MIN: ICD-10-PCS | Mod: S$GLB,,, | Performed by: INTERNAL MEDICINE

## 2022-02-24 PROCEDURE — 99214 OFFICE O/P EST MOD 30 MIN: CPT | Mod: S$GLB,,, | Performed by: INTERNAL MEDICINE

## 2022-02-24 PROCEDURE — 99999 PR PBB SHADOW E&M-EST. PATIENT-LVL V: ICD-10-PCS | Mod: PBBFAC,,, | Performed by: INTERNAL MEDICINE

## 2022-02-24 PROCEDURE — 3008F BODY MASS INDEX DOCD: CPT | Mod: CPTII,S$GLB,, | Performed by: INTERNAL MEDICINE

## 2022-02-24 PROCEDURE — 3008F BODY MASS INDEX DOCD: CPT | Mod: CPTII,S$GLB,, | Performed by: OTOLARYNGOLOGY

## 2022-02-24 PROCEDURE — 87205 SMEAR GRAM STAIN: CPT | Performed by: OTOLARYNGOLOGY

## 2022-02-24 PROCEDURE — 99213 PR OFFICE/OUTPT VISIT, EST, LEVL III, 20-29 MIN: ICD-10-PCS | Mod: 25,S$GLB,, | Performed by: OTOLARYNGOLOGY

## 2022-02-24 PROCEDURE — 1159F MED LIST DOCD IN RCRD: CPT | Mod: CPTII,S$GLB,, | Performed by: OTOLARYNGOLOGY

## 2022-02-24 PROCEDURE — 87449 NOS EACH ORGANISM AG IA: CPT | Performed by: INTERNAL MEDICINE

## 2022-02-24 PROCEDURE — 87077 CULTURE AEROBIC IDENTIFY: CPT | Performed by: OTOLARYNGOLOGY

## 2022-02-24 PROCEDURE — 31231 NASAL/SINUS ENDOSCOPY: ICD-10-PCS | Mod: S$GLB,,, | Performed by: OTOLARYNGOLOGY

## 2022-02-24 PROCEDURE — 1159F PR MEDICATION LIST DOCUMENTED IN MEDICAL RECORD: ICD-10-PCS | Mod: CPTII,S$GLB,, | Performed by: INTERNAL MEDICINE

## 2022-02-24 PROCEDURE — 87070 CULTURE OTHR SPECIMN AEROBIC: CPT | Performed by: OTOLARYNGOLOGY

## 2022-02-24 PROCEDURE — 3075F SYST BP GE 130 - 139MM HG: CPT | Mod: CPTII,S$GLB,, | Performed by: INTERNAL MEDICINE

## 2022-02-24 PROCEDURE — 3008F PR BODY MASS INDEX (BMI) DOCUMENTED: ICD-10-PCS | Mod: CPTII,S$GLB,, | Performed by: OTOLARYNGOLOGY

## 2022-02-24 PROCEDURE — 3008F PR BODY MASS INDEX (BMI) DOCUMENTED: ICD-10-PCS | Mod: CPTII,S$GLB,, | Performed by: INTERNAL MEDICINE

## 2022-02-24 PROCEDURE — 87075 CULTR BACTERIA EXCEPT BLOOD: CPT | Performed by: OTOLARYNGOLOGY

## 2022-02-24 PROCEDURE — 3075F PR MOST RECENT SYSTOLIC BLOOD PRESS GE 130-139MM HG: ICD-10-PCS | Mod: CPTII,S$GLB,, | Performed by: INTERNAL MEDICINE

## 2022-02-24 PROCEDURE — 3079F PR MOST RECENT DIASTOLIC BLOOD PRESSURE 80-89 MM HG: ICD-10-PCS | Mod: CPTII,S$GLB,, | Performed by: INTERNAL MEDICINE

## 2022-02-24 PROCEDURE — 99999 PR PBB SHADOW E&M-EST. PATIENT-LVL V: ICD-10-PCS | Mod: PBBFAC,,, | Performed by: OTOLARYNGOLOGY

## 2022-02-24 NOTE — PROGRESS NOTES
"  Subjective:      Jaylin is a 57 y.o. female who comes for follow-up of sinusitis.  Her last visit with me was on 1/25/2022.  Now 17 months status-post endoscopic sinus surgery.  On 2-week course of (Ceftazidime/Avibactam) via PICC, has 4 days left.  Gradual improvements in nasal discharge, only scanty green crust, but still facial pressure and congestion.  Diarrhea from antibiotics.  Using antibiotic in sinus rinse BID.    SNOT-22 score: : (P) 48  NOSE score:: (P) 15%  ETDQ-7 score:: (P) 2.4    The patient's medications, allergies, past medical, surgical, social and family histories were reviewed and updated as appropriate.    A detailed review of systems was obtained with pertinent positives as per the above HPI, and otherwise negative.        Objective:     Ht 5' 7" (1.702 m)   Wt 85.3 kg (188 lb)   BMI 29.44 kg/m²        Constitutional:   She appears well-developed. She is cooperative.     Head:  Normocephalic.     Nose:  No mucosal edema, rhinorrhea, septal deviation or polyps. No epistaxis. Turbinates normal, no turbinate masses and no turbinate hypertrophy.  Right sinus exhibits no maxillary sinus tenderness and no frontal sinus tenderness. Left sinus exhibits no maxillary sinus tenderness and no frontal sinus tenderness.     Mouth/Throat  Oropharynx clear and moist without lesions or asymmetry. No oropharyngeal exudate or posterior oropharyngeal erythema.     Neck:  No adenopathy. Normal range of motion present.     She has no cervical adenopathy.       Procedure    Nasal endoscopy performed.  See procedure note.     Left sinuses     Right sinsues     Right sinuses after culture and debridement        Data Reviewed    WBC (K/uL)   Date Value   02/21/2022 5.09     Eosinophil % (%)   Date Value   02/21/2022 6.1     Eos # (K/uL)   Date Value   02/21/2022 0.3     Platelets (K/uL)   Date Value   02/21/2022 194     Glucose (mg/dL)   Date Value   02/21/2022 87     IgE (IU/mL)   Date Value   02/03/2021 <35 "       Pathology report indicated chronic inflammation with eosinophilia.    Cultures showed Pseudomonas  at last visit.      Assessment:     1. Nonallergic rhinitis    2. Chronic pansinusitis    3. 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  May consider continuance or change in therapy.  To see Dr. Corrigan today, I discussed already with her.  May also consider lysis of adhesions and local scrubbing to attempt to clear nidus of infection.  Follow up in about 2 weeks (around 3/10/2022) for nasal endoscopy, test results.

## 2022-02-24 NOTE — PROCEDURES
Nasal/sinus endoscopy    Date/Time: 2/24/2022 10:30 AM  Performed by: Matthias Roach MD  Authorized by: Matthias Roach MD     Consent Done?:  Yes (Verbal)  Anesthesia:     Local anesthetic:  Lidocaine 4% and Jose-Synephrine 1/2%    Patient tolerance:  Patient tolerated the procedure well with no immediate complications  Nose:     Procedure Performed:  Nasal Endoscopy  External:      No external nasal deformity  Intranasal:      Mucosa no polyps     Mucosa ulcers not present     No mucosa lesions present     Turbinates not enlarged     No septum gross deformity  Nasopharynx:      No mucosa lesions     Adenoids not present     Posterior choanae patent     Eustachian tube patent     Sinuses mostly patent though persistent focus of purulence and crusting in posterior ethmoid bilaterally  Focal synechiae around left MT  Swabbed on right

## 2022-02-24 NOTE — Clinical Note
KIAN, Ms. Murguia who you're about to see still has what looks like a nidus of infection in the ethmoid sinus on both sides.  The infected area is smaller but still apparent.  I took a new swab for cult and gram stain.  Would it be feasible to keep the PICC and consider a change or extension of therapy?  She is having diarrhea and having a tough time with that though.  Open to discussing with you of course.  Thanks!

## 2022-02-25 ENCOUNTER — PATIENT MESSAGE (OUTPATIENT)
Dept: INFECTIOUS DISEASES | Facility: CLINIC | Age: 57
End: 2022-02-25
Payer: COMMERCIAL

## 2022-02-27 LAB — BACTERIA SPEC AEROBE CULT: ABNORMAL

## 2022-02-28 ENCOUNTER — PATIENT MESSAGE (OUTPATIENT)
Dept: INFECTIOUS DISEASES | Facility: CLINIC | Age: 57
End: 2022-02-28
Payer: COMMERCIAL

## 2022-02-28 DIAGNOSIS — J32.4 CHRONIC PANSINUSITIS: Primary | ICD-10-CM

## 2022-02-28 LAB — BACTERIA SPEC ANAEROBE CULT: NORMAL

## 2022-03-01 LAB — CALPROTECTIN STL-MCNT: <27.1 MCG/G

## 2022-03-02 ENCOUNTER — PATIENT MESSAGE (OUTPATIENT)
Dept: GASTROENTEROLOGY | Facility: CLINIC | Age: 57
End: 2022-03-02
Payer: COMMERCIAL

## 2022-03-03 ENCOUNTER — PATIENT MESSAGE (OUTPATIENT)
Dept: OTOLARYNGOLOGY | Facility: CLINIC | Age: 57
End: 2022-03-03
Payer: COMMERCIAL

## 2022-03-03 ENCOUNTER — PATIENT MESSAGE (OUTPATIENT)
Dept: INFECTIOUS DISEASES | Facility: CLINIC | Age: 57
End: 2022-03-03
Payer: COMMERCIAL

## 2022-03-03 DIAGNOSIS — J32.4 CHRONIC PANSINUSITIS: Primary | ICD-10-CM

## 2022-03-03 NOTE — PROGRESS NOTES
"INFECTIOUS DISEASE CLINIC  2/24/22    Subjective:      Chief Complaint: f/u pseudomonas in sinus culture    History of Present Illness:    Patient Jaylin Murguia is a 57 y.o. female who presents today for f/u of IV antibiotics. Denies issues with picc line. Having issues with diarrhea since antitbiotics started. denies abdominal pain, bloody stool, fever. Reports watery diarrhea; goes several times per hour. Reports improvement in sinus symptoms. Just saw ENT.     Per Dr Roach:   "still has what looks like a nidus of infection in the ethmoid sinus on both sides.  The infected area is smaller but still apparent.  I took a new swab for cult and gram stain.  Would it be feasible to keep the PICC and consider a change or extension of therapy?"        Brief history:   Was referred from ENT for  pseudomonas that grew in sinus culture. Pt reports sinus infection for about 2.5 years. 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 Reports compliance with nasal flushes--- Using gent and clindamycin rinses twice a day for about a year - rinse system. Says it had been helping, but doesn't think it seems to be anymore. Worried about being contagious to other people. Added Uv filtration system for well water. Has a swimming pool, but hasn't used recently. Reports wheezing over past few weeks- uses inhalers. Thinks she has a chrons flare. Has been on IV antibiotics in the past per Dr Florez, but not recently. S/p debridement of sinus with ENT on 1/25            Aerobic Bacterial Culture  Abnormal   PSEUDOMONAS AERUGINOSA    Many     Aerobic Bacterial Culture  Abnormal   PSEUDOMONAS AERUGINOSA   Many     Resulting Agency [facility]          Susceptibility     PSEUDOMONAS AERUGINOSA  Pseudomonas aeruginosa     CULTURE, AEROBIC  (SPECIFY SOURCE) CULTURE, AEROBIC  (SPECIFY SOURCE)     Amikacin >32 mcg/mL Resistant <=16 mcg/mL Sensitive     Cefepime >16 mcg/mL Resistant <=2 mcg/mL Sensitive     " Ceftazidime/Avibactam 2 mcg/mL Sensitive       Ciprofloxacin >2 mcg/mL Resistant <=1 mcg/mL Sensitive     Colistin 1.0 mcg/mL Sensitive 1       Gentamicin >8 mcg/mL Resistant <=4 mcg/mL Sensitive     Meropenem >8 mcg/mL Resistant <=1 mcg/mL Sensitive     Piperacillin/Tazo >64 mcg/mL Resistant <=16 mcg/mL Sensitive     Tobramycin >8 mcg/mL Resistant <=4 mcg/mL Sensitive                         Review of Symptoms:  Constitutional: Denies fevers, chills, or weakness.  ENT: as per hpi  Cardiovascular: Denies chest pain, palpitations, orthopnea, or claudication.  Respiratory: Denies shortness of breath, cough, hemoptysis.   GI: + diarrhea  : Denies dysuria, incontinence, or hematuria.  Musculoskeletal: Denies joint pain or myalgias.  Skin/breast: Denies rashes, lumps, lesions, or discharge.  Neurologic: Denies headache, dizziness, vertigo, or paresthesias.    Past Medical History:   Diagnosis Date    Allergic rhinitis     Asthma     Chronic pansinusitis     Crohn's disease     Ileal involvement, previously on Remicade, Asacol, Prednisone    Fibromyalgia     Hyperlipidemia     Hypertension     Immunosuppression     Migraine     Obstructive sleep apnea     CPAP at night    Sciatica        Past Surgical History:   Procedure Laterality Date    BLADDER SURGERY      sling was created by her muscles      SECTION      COLONOSCOPY N/A 2017    Procedure: COLONOSCOPY;  Surgeon: Kin Dyer MD;  Location: [facility];  Service: Endoscopy;  Laterality: N/A;    COLONOSCOPY N/A 3/27/2018    Procedure: COLONOSCOPY;  Surgeon: Kyra Vallecillo MD;  Location: [facility];  Service: Endoscopy;  Laterality: N/A;    COLONOSCOPY N/A 3/12/2020    Procedure: COLONOSCOPY;  Surgeon: Nicole Leal MD;  Location: [facility];  Service: Endoscopy;  Laterality: N/A;    DEBRIDEMENT Bilateral 2020    Procedure: DEBRIDEMENT;  Surgeon: Matthias Roach MD;  Location: [facility] OR [address] [place] [address];  Service: ENT;   Laterality: Bilateral;    ESOPHAGOGASTRODUODENOSCOPY N/A 3/12/2020    Procedure: ESOPHAGOGASTRODUODENOSCOPY (EGD);  Surgeon: Nicole Leal MD;  Location: [facility];  Service: Endoscopy;  Laterality: N/A;    FINGER SURGERY      joint relpacement, left hand index finger    FUNCTIONAL ENDOSCOPIC SINUS SURGERY (FESS) USING COMPUTER-ASSISTED NAVIGATION Bilateral 7/31/2019    Procedure: FESS, USING COMPUTER-ASSISTED NAVIGATION;  Surgeon: aMnish Shaffer MD;  Location: [facility] OR;  Service: ENT;  Laterality: Bilateral;    FUNCTIONAL ENDOSCOPIC SINUS SURGERY (FESS) USING COMPUTER-ASSISTED NAVIGATION Bilateral 9/25/2020    Procedure: FESS, USING COMPUTER-ASSISTED NAVIGATION SPHENOID;  Surgeon: Matthias Roach MD;  Location: [address];  Service: ENT;  Laterality: Bilateral;  TIVA    HYSTERECTOMY      SINUS SURGERY      WISDOM TOOTH EXTRACTION         Family History   Problem Relation Age of Onset    Hypertension Mother     Allergies Mother     Kidney disease Father 64        ESRD on HD    Scleroderma Father     Hypertension Brother     Cancer Paternal Grandmother 70        colon    Heart attack Maternal Grandmother     COPD Maternal Grandmother 72       Social History     Socioeconomic History    Marital status:     Number of children: 2   Occupational History    Occupation: teacher   Tobacco Use    Smoking status: Never Smoker    Smokeless tobacco: Never Used   Substance and Sexual Activity    Alcohol use: No    Drug use: No    Sexual activity: Yes     Partners: Male   Social History Narrative    Surrogate Decision Maker: , Cristobal Murguia, [phone]     Social Determinants of Health     Financial Resource Strain: Low Risk     Difficulty of Paying Living Expenses: Not very hard   Food Insecurity: No Food Insecurity    Worried About Running Out of Food in the Last Year: Never true    Ran Out of Food in the Last Year: Never true   Transportation Needs: No Transportation Needs     Lack of Transportation (Medical): No    Lack of Transportation (Non-Medical): No   Physical Activity: Unknown    Days of Exercise per Week: 2 days   Stress: Stress Concern Present    Feeling of Stress : To some extent   Social Connections: Unknown    Frequency of Communication with Friends and Family: Three times a week    Frequency of Social Gatherings with Friends and Family: Once a week    Active Member of Clubs or Organizations: No    Attends Club or Organization Meetings: 1 to 4 times per year    Marital Status:    Housing Stability: High Risk    Unable to Pay for Housing in the Last Year: No    Number of Places Lived in the Last Year: 1    Unstable Housing in the Last Year: Yes       Review of patient's allergies indicates:  No Known Allergies      Objective:   VS (24h):   Vitals:    02/24/22 1145   BP: 138/83   Pulse: (!) 59   Temp: 98 °F (36.7 °C)     General: Afebrile, alert, comfortable, no acute distress.   HEENT: RANDI. EOMI, no scleral icterus. minimal sinus tenderness. MMM.  Pulmonary: Non labored,clear to auscultation A/P/L. No wheezing, crackles, or rhonchi.  Cardiac: normal S1 & S2 w/o rubs/murmurs/gallops. No JVD.   Abdominal: Non-tender, non-distended.Bowel sounds present x 4. No appreciable hepatosplenomegaly. No guarding or rebound tenderness  Extremities: Moves all extremities x 4. No peripheral edema.   Skin: No jaundice, rashes, or visible lesions.   Neurological:  Alert and oriented x 4.     Labs:    Glucose   Date Value Ref Range Status   02/21/2022 87 70 - 110 mg/dL Final   02/15/2022 87 70 - 110 mg/dL Final   12/06/2021 78 70 - 110 mg/dL Final   12/06/2021 78 70 - 110 mg/dL Final       Calcium   Date Value Ref Range Status   02/21/2022 8.8 8.7 - 10.5 mg/dL Final   02/15/2022 9.2 8.7 - 10.5 mg/dL Final   12/06/2021 9.3 8.7 - 10.5 mg/dL Final   12/06/2021 9.3 8.7 - 10.5 mg/dL Final       Albumin   Date Value Ref Range Status   02/21/2022 3.3 (L) 3.5 - 5.2 g/dL Final    02/15/2022 3.6 3.5 - 5.2 g/dL Final   12/06/2021 3.9 3.5 - 5.2 g/dL Final   12/06/2021 3.9 3.5 - 5.2 g/dL Final       Total Protein   Date Value Ref Range Status   02/21/2022 7.2 6.0 - 8.4 g/dL Final   02/15/2022 7.4 6.0 - 8.4 g/dL Final   12/06/2021 7.9 6.0 - 8.4 g/dL Final   12/06/2021 7.9 6.0 - 8.4 g/dL Final       Sodium   Date Value Ref Range Status   02/21/2022 141 136 - 145 mmol/L Final   02/15/2022 136 136 - 145 mmol/L Final   12/06/2021 136 136 - 145 mmol/L Final   12/06/2021 136 136 - 145 mmol/L Final       Potassium   Date Value Ref Range Status   02/21/2022 3.4 (L) 3.5 - 5.1 mmol/L Final   02/15/2022 3.8 3.5 - 5.1 mmol/L Final   12/06/2021 3.8 3.5 - 5.1 mmol/L Final   12/06/2021 3.8 3.5 - 5.1 mmol/L Final       CO2   Date Value Ref Range Status   02/21/2022 21 (L) 23 - 29 mmol/L Final   02/15/2022 25 23 - 29 mmol/L Final   12/06/2021 19 (L) 23 - 29 mmol/L Final   12/06/2021 19 (L) 23 - 29 mmol/L Final       Chloride   Date Value Ref Range Status   02/21/2022 112 (H) 95 - 110 mmol/L Final   02/15/2022 103 95 - 110 mmol/L Final   12/06/2021 104 95 - 110 mmol/L Final   12/06/2021 104 95 - 110 mmol/L Final       BUN   Date Value Ref Range Status   02/21/2022 10 6 - 20 mg/dL Final   02/15/2022 14 6 - 20 mg/dL Final   12/06/2021 11 6 - 20 mg/dL Final   12/06/2021 11 6 - 20 mg/dL Final       Creatinine   Date Value Ref Range Status   02/21/2022 0.8 0.5 - 1.4 mg/dL Final   02/15/2022 1.0 0.5 - 1.4 mg/dL Final   12/06/2021 1.1 0.5 - 1.4 mg/dL Final   12/06/2021 1.1 0.5 - 1.4 mg/dL Final       Alkaline Phosphatase   Date Value Ref Range Status   02/21/2022 68 55 - 135 U/L Final   02/15/2022 65 55 - 135 U/L Final   12/06/2021 87 55 - 135 U/L Final   12/06/2021 87 55 - 135 U/L Final       ALT   Date Value Ref Range Status   02/21/2022 19 10 - 44 U/L Final   02/15/2022 16 10 - 44 U/L Final   12/06/2021 30 10 - 44 U/L Final   12/06/2021 30 10 - 44 U/L Final       AST   Date Value Ref Range Status   02/21/2022 24 10  - 40 U/L Final   02/15/2022 18 10 - 40 U/L Final   12/06/2021 27 10 - 40 U/L Final   12/06/2021 27 10 - 40 U/L Final       Total Bilirubin   Date Value Ref Range Status   02/21/2022 0.4 0.1 - 1.0 mg/dL Final     Comment:     For infants and newborns, interpretation of results should be based  on gestational age, weight and in agreement with clinical  observations.    Premature Infant recommended reference ranges:  Up to 24 hours.............<8.0 mg/dL  Up to 48 hours............<12.0 mg/dL  3-5 days..................<15.0 mg/dL  6-29 days.................<15.0 mg/dL     02/15/2022 0.3 0.1 - 1.0 mg/dL Final     Comment:     For infants and newborns, interpretation of results should be based  on gestational age, weight and in agreement with clinical  observations.    Premature Infant recommended reference ranges:  Up to 24 hours.............<8.0 mg/dL  Up to 48 hours............<12.0 mg/dL  3-5 days..................<15.0 mg/dL  6-29 days.................<15.0 mg/dL     12/06/2021 0.5 0.1 - 1.0 mg/dL Final     Comment:     For infants and newborns, interpretation of results should be based  on gestational age, weight and in agreement with clinical  observations.    Premature Infant recommended reference ranges:  Up to 24 hours.............<8.0 mg/dL  Up to 48 hours............<12.0 mg/dL  3-5 days..................<15.0 mg/dL  6-29 days.................<15.0 mg/dL     12/06/2021 0.5 0.1 - 1.0 mg/dL Final     Comment:     For infants and newborns, interpretation of results should be based  on gestational age, weight and in agreement with clinical  observations.    Premature Infant recommended reference ranges:  Up to 24 hours.............<8.0 mg/dL  Up to 48 hours............<12.0 mg/dL  3-5 days..................<15.0 mg/dL  6-29 days.................<15.0 mg/dL         WBC   Date Value Ref Range Status   02/21/2022 5.09 3.90 - 12.70 K/uL Final   12/06/2021 4.50 3.90 - 12.70 K/uL Final   12/06/2021 4.50 3.90 - 12.70  K/uL Final       Hemoglobin   Date Value Ref Range Status   02/21/2022 12.2 12.0 - 16.0 g/dL Final   12/06/2021 13.6 12.0 - 16.0 g/dL Final   12/06/2021 13.6 12.0 - 16.0 g/dL Final       Hematocrit   Date Value Ref Range Status   02/21/2022 36.9 (L) 37.0 - 48.5 % Final   12/06/2021 42.1 37.0 - 48.5 % Final   12/06/2021 42.1 37.0 - 48.5 % Final       MCV   Date Value Ref Range Status   02/21/2022 96 82 - 98 fL Final   12/06/2021 96 82 - 98 fL Final   12/06/2021 96 82 - 98 fL Final       Platelets   Date Value Ref Range Status   02/21/2022 194 150 - 450 K/uL Final   12/06/2021 299 150 - 450 K/uL Final   12/06/2021 299 150 - 450 K/uL Final       Lab Results   Component Value Date    CHOL 172 12/06/2021    CHOL 229 (H) 09/02/2021    CHOL 177 08/21/2020       Lab Results   Component Value Date    HDL 73 12/06/2021    HDL 62 09/02/2021    HDL 63 08/21/2020       Lab Results   Component Value Date    LDLCALC 84.8 12/06/2021    LDLCALC 141.8 09/02/2021    LDLCALC 79.6 08/21/2020       Lab Results   Component Value Date    TRIG 71 12/06/2021    TRIG 126 09/02/2021    TRIG 172 (H) 08/21/2020       Lab Results   Component Value Date    CHOLHDL 42.4 12/06/2021    CHOLHDL 27.1 09/02/2021    CHOLHDL 35.6 08/21/2020     No results found for: RPR  No results found for: QUANTIFERON    Medications:  Current Outpatient Medications on File Prior to Visit   Medication Sig Dispense Refill    atorvastatin (LIPITOR) 10 MG tablet Take 1 tablet (10 mg total) by mouth once daily. 90 tablet 3    azelastine (ASTELIN) 137 mcg (0.1 %) nasal spray 2 sprays (274 mcg total) by Nasal route 2 (two) times daily. 30 mL 11    butalbital-acetaminophen-caffeine -40 mg (FIORICET, ESGIC) -40 mg per tablet TAKE 1 TABLET EVERY 4 HOURS AS NEEDED FOR HEADACHE 90 tablet 3    clindamycin (CLEOCIN) 150 mg/mL injection EMPTY CONTENTS OF 1 VIAL INTO NASAL IRRIGATION SYSTEM, ADD DISTILLED WATER, SALT PACK, MIX & IRRIGATE PERFORM 2 TIMES DAILY       clindamycin (CLEOCIN) 300 MG capsule Take 600 mg by mouth 2 (two) times daily.      desloratadine (CLARINEX) 5 mg tablet Take 1 tablet (5 mg total) by mouth once daily. 90 tablet 3    diphenoxylate-atropine 2.5-0.025 mg (LOMOTIL) 2.5-0.025 mg per tablet Take 1 tablet by mouth 4 (four) times daily as needed for Diarrhea. 20 tablet 0    DULoxetine (CYMBALTA) 60 MG capsule Take 1 capsule (60 mg total) by mouth 2 (two) times daily. 180 capsule 3    eletriptan (RELPAX) 40 MG tablet Take 1 tablet (40 mg total) by mouth as needed. (Patient taking differently: Take 40 mg by mouth as needed. Take if needed) 12 tablet 3    estradiol (ESTRACE) 2 MG tablet Take 2 mg by mouth every evening.       fluticasone furoate-vilanteroL (BREO ELLIPTA) 200-25 mcg/dose DsDv diskus inhaler Inhale 1 puff into the lungs once daily. 60 each 11    fluticasone propionate (FLONASE) 50 mcg/actuation nasal spray SPRAY 2 SPRAYS BY EACH NOSTRIL ROUTE ONCE DAILY. 16 mL 0    gentamicin (GARAMYCIN) 40 mg/mL injection EMPTY CONTENTS OF 1 VIAL INTO NASAL IRRIGATION SYSTEM, ADD DISTILLED WATER, SALT PACK, MIX & IRRIGATE PERFORM 2 TIMES DAILY      gentamicin sulfate (GENTAMICIN, BULK, MISC) EMPTY CONTENTS OF 1 CAPSULE INTO NASAL IRRIGATION SYSTEM, ADD DISTILLED WATER, SALT PACK, MIX & IRRIGATE. PERFORM 2 TIMES DAILY      immun glob G,IgG,-pro-IgA 0-50 (HIZENTRA) 10 gram/50 mL (20 %) Soln Inject 60 mLs (12 g total) into the skin every 7 days. 240 mL 11    ipratropium (ATROVENT) 0.02 % nebulizer solution Nebulize 2.5 ml every 4-6 hours as directed, if needed 90 each 11    losartan-hydrochlorothiazide 100-25 mg (HYZAAR) 100-25 mg per tablet TAKE 1 TABLET BY MOUTH EVERY DAY (Patient taking differently: 1/2 tablet prn) 90 tablet 3    montelukast (SINGULAIR) 10 mg tablet TAKE 1 TABLET EVERY EVENING 90 tablet 3    NEILMED SINUS RINSE COMPLETE pkdv use as directed      nortriptyline (PAMELOR) 25 MG capsule Take 1 capsule (25 mg total) by mouth every  evening. 90 capsule 3    PENTASA 250 mg CpSR TAKE 4 CAPSULES (1000 MG TOTAL) FOUR TIMES A DAY 1440 capsule 3    pregabalin (LYRICA) 150 MG capsule Take 1 capsule (150 mg total) by mouth 3 (three) times daily. 270 capsule 1    rizatriptan (MAXALT) 10 MG tablet TAKE 1 TABLET IF NEEDED FOR MIGRAINES. MAX 2 TABLETS IN 24 HOURS. 30 tablet 8    sod sulf-pot chloride-mag sulf (SUTAB) 1.479-0.188- 0.225 gram tablet Take 12 tablets by mouth once daily. Take according to package instructions with indicated amount of water. 24 tablet 0    tiotropium bromide (SPIRIVA RESPIMAT) 1.25 mcg/actuation Mist Inhale 2 puffs into the lungs once daily. 4 g 11    topiramate (TOPAMAX) 100 MG tablet Take 1 tablet (100 mg total) by mouth 2 (two) times daily. 180 tablet 3    triamcinolone acetonide 0.025% (KENALOG) 0.025 % cream 1 application once daily. Apply to affected area      VENTOLIN HFA 90 mcg/actuation inhaler INHALE 2 PUFFS INTO THE LUNGS EVERY 4 TO 6 HOURS AS NEEDED FOR WHEEZING. (Patient taking differently: Take if needed & bring) 18 Inhaler 1    zolpidem (AMBIEN) 5 MG Tab TAKE 1 TABLET BY MOUTH EVERY DAY AT BEDTIME AS NEEDED 90 tablet 1    levalbuterol (XOPENEX) 1.25 mg/3 mL nebulizer solution Take 3 mLs (1.25 mg total) by nebulization every 4 (four) hours. Rescue 1 Box 11    propranoloL (INDERAL LA) 80 MG 24 hr capsule Take 1 capsule (80 mg total) by mouth once daily. 30 capsule 11     Current Facility-Administered Medications on File Prior to Visit   Medication Dose Route Frequency Provider Last Rate Last Admin    lactated ringers infusion   Intravenous Continuous Mary Leiva MD   New Bag at 03/12/20 0989    lidocaine (PF) 10 mg/ml (1%) injection 10 mg  1 mL Intradermal Once Mary Leiva MD           Antibiotics:   Antibiotics (From admission, onward)            None          HIV: No components found for: HIV 1/2 AG/AB  Hepatitis C IgG: No components found for: HEPATITIS C  Syphilis: No results found for:  "RPR    Hepatitis A IgG: No components found for: HEPATITIS A IGG  Hepatitis Bc IgG: No components found for: HEPATITIS B CORE IGG  Hepatitis Bs IgG:  Quantiferon: No results found for: QUANTIFERON  VZV IgG: No components found for: VARICELLA IGG    No components found for: SEDIMENTATION RATE  No components found for: C-REACTIVE PROTEIN      Microbiology x 7d:   Microbiology Results (last 7 days)     Procedure Component Value Units Date/Time    CLOSTRIDIUM DIFFICILE [[id number]] Collected: 02/24/22 1226    Order Status: Completed Specimen: Stool Updated: 02/24/22 2042     C. diff Antigen Negative     C difficile Toxins A+B, EIA Negative     Comment: Testing not recommended for children <24 months old.             Immunization History   Administered Date(s) Administered    COVID-19, MRNA, LN-S, PF (Pfizer) (Purple Cap) 03/05/2021, 04/15/2021    Hepatitis A / Hepatitis B 12/12/2019    Influenza 10/29/2013    Influenza - Quadrivalent - PF *Preferred* (6 months and older) 11/15/2017, 01/28/2020, 02/11/2021    Influenza Split 10/29/2013    PPD Test 05/22/2017    Pneumococcal Conjugate - 13 Valent 12/23/2019    Pneumococcal Polysaccharide - 23 Valent 10/29/2013    Tdap 02/18/2014         Reviewed records today as well as relevant labs, cultures, and imaging    Assessment:       Iv antibitoics needed as an outpatient  Home health active care coordination  Acute on chronic purulent sinusitis    pseudomonas  Bronchitis   diarrhea    Deep sinus cultures at time of debridement by ENT with  pseudomonas. Has now been on 2 weeks IV antibiotics. Per ENT, "still has what looks like a nidus of infection in the ethmoid sinus on both sides.  The infected area is smaller but still apparent.  "    Plan:     extend ceftazidime/avibactam x 2 weeks   F/u pending cultures  continue home heatlh  Continue nasal flushes per ENT  Screen for C.diff      Melania Corrigan MD, MPH  Infectious Disease    Orders Placed This Encounter "   Procedures    CLOSTRIDIUM DIFFICILE

## 2022-03-04 ENCOUNTER — PATIENT MESSAGE (OUTPATIENT)
Dept: GASTROENTEROLOGY | Facility: CLINIC | Age: 57
End: 2022-03-04
Payer: COMMERCIAL

## 2022-03-04 ENCOUNTER — PATIENT MESSAGE (OUTPATIENT)
Dept: OTOLARYNGOLOGY | Facility: CLINIC | Age: 57
End: 2022-03-04
Payer: COMMERCIAL

## 2022-03-04 RX ORDER — DIAZEPAM 5 MG/1
TABLET ORAL
Qty: 2 TABLET | Refills: 0 | Status: SHIPPED | OUTPATIENT
Start: 2022-03-04 | End: 2022-05-16

## 2022-03-04 RX ORDER — HYDROCODONE BITARTRATE AND ACETAMINOPHEN 5; 325 MG/1; MG/1
1 TABLET ORAL EVERY 6 HOURS PRN
Qty: 5 TABLET | Refills: 0 | Status: SHIPPED | OUTPATIENT
Start: 2022-03-04 | End: 2022-05-06

## 2022-03-08 ENCOUNTER — TELEPHONE (OUTPATIENT)
Dept: INFECTIOUS DISEASES | Facility: CLINIC | Age: 57
End: 2022-03-08
Payer: COMMERCIAL

## 2022-03-08 ENCOUNTER — OFFICE VISIT (OUTPATIENT)
Dept: OTOLARYNGOLOGY | Facility: CLINIC | Age: 57
End: 2022-03-08
Payer: COMMERCIAL

## 2022-03-08 VITALS — BODY MASS INDEX: 27.47 KG/M2 | WEIGHT: 175 LBS | HEIGHT: 67 IN

## 2022-03-08 DIAGNOSIS — J32.4 CHRONIC PANSINUSITIS: Primary | ICD-10-CM

## 2022-03-08 PROCEDURE — 3008F PR BODY MASS INDEX (BMI) DOCUMENTED: ICD-10-PCS | Mod: CPTII,S$GLB,, | Performed by: OTOLARYNGOLOGY

## 2022-03-08 PROCEDURE — 31237 NSL/SINS NDSC SURG BX POLYPC: CPT | Mod: 50,S$GLB,, | Performed by: OTOLARYNGOLOGY

## 2022-03-08 PROCEDURE — 99999 PR PBB SHADOW E&M-EST. PATIENT-LVL V: ICD-10-PCS | Mod: PBBFAC,,, | Performed by: OTOLARYNGOLOGY

## 2022-03-08 PROCEDURE — 1159F PR MEDICATION LIST DOCUMENTED IN MEDICAL RECORD: ICD-10-PCS | Mod: CPTII,S$GLB,, | Performed by: OTOLARYNGOLOGY

## 2022-03-08 PROCEDURE — 99212 PR OFFICE/OUTPT VISIT, EST, LEVL II, 10-19 MIN: ICD-10-PCS | Mod: 25,S$GLB,, | Performed by: OTOLARYNGOLOGY

## 2022-03-08 PROCEDURE — 99999 PR PBB SHADOW E&M-EST. PATIENT-LVL V: CPT | Mod: PBBFAC,,, | Performed by: OTOLARYNGOLOGY

## 2022-03-08 PROCEDURE — 1160F RVW MEDS BY RX/DR IN RCRD: CPT | Mod: CPTII,S$GLB,, | Performed by: OTOLARYNGOLOGY

## 2022-03-08 PROCEDURE — 1160F PR REVIEW ALL MEDS BY PRESCRIBER/CLIN PHARMACIST DOCUMENTED: ICD-10-PCS | Mod: CPTII,S$GLB,, | Performed by: OTOLARYNGOLOGY

## 2022-03-08 PROCEDURE — 31237 NASAL/SINUS ENDOSCOPY: ICD-10-PCS | Mod: 50,S$GLB,, | Performed by: OTOLARYNGOLOGY

## 2022-03-08 PROCEDURE — 1159F MED LIST DOCD IN RCRD: CPT | Mod: CPTII,S$GLB,, | Performed by: OTOLARYNGOLOGY

## 2022-03-08 PROCEDURE — 99212 OFFICE O/P EST SF 10 MIN: CPT | Mod: 25,S$GLB,, | Performed by: OTOLARYNGOLOGY

## 2022-03-08 PROCEDURE — 3008F BODY MASS INDEX DOCD: CPT | Mod: CPTII,S$GLB,, | Performed by: OTOLARYNGOLOGY

## 2022-03-08 RX ORDER — POLYETHYLENE GLYCOL 3350, SODIUM SULFATE ANHYDROUS, SODIUM BICARBONATE, SODIUM CHLORIDE, POTASSIUM CHLORIDE 236; 22.74; 6.74; 5.86; 2.97 G/4L; G/4L; G/4L; G/4L; G/4L
4000 POWDER, FOR SOLUTION ORAL ONCE
COMMUNITY
Start: 2022-01-31 | End: 2022-09-16 | Stop reason: CLARIF

## 2022-03-08 RX ORDER — POLYETHYLENE GLYCOL 3350, SODIUM SULFATE, SODIUM CHLORIDE, POTASSIUM CHLORIDE, SODIUM ASCORBATE, AND ASCORBIC ACID 7.5-2.691G
KIT ORAL
COMMUNITY
Start: 2022-02-01 | End: 2022-09-16 | Stop reason: CLARIF

## 2022-03-08 NOTE — TELEPHONE ENCOUNTER
----- Message from Matthias Roach MD sent at 3/8/2022 10:20 AM CST -----  I saw her today and happy to report continued improvement of the sinuses.  Right side now totally clear, left side no longer crusted, just scanty mucopurulence in the sphenoid, which I opened further as a minor surgical procedure today.  Then applied ciprofloxacin gel to all the affected areas.    I'd recommend one additional week of IV antibiotics and to conclude next week.  Hopefully that ought to do it.

## 2022-03-08 NOTE — PROCEDURES
Nasal/sinus endoscopy    Date/Time: 3/8/2022 8:30 AM  Performed by: Matthias Roach MD  Authorized by: Matthias Roach MD     Consent Done?:  Yes (Verbal)  Anesthesia:     Local anesthetic:  Lidocaine 4% and Jose-Synephrine 1/2%    Patient tolerance:  Patient tolerated the procedure well with no immediate complications  Nose:     Procedure Performed:  Removal of Debridement  External:      No external nasal deformity  Intranasal:      Mucosa no polyps     Mucosa ulcers not present     No mucosa lesions present     Turbinates not enlarged     No septum gross deformity  Nasopharynx:      No mucosa lesions     Adenoids not present     Posterior choanae patent     Eustachian tube patent     Markedly improved findings, scanty purulence of left sphenoid and posterior ethmoid  Scanty left maxillary mucus along posterior wall  Right ethmoid clear  No crusting on either side  Scar tissue and posterior ethmoid partitions trimmed with cutting forceps bilaterally  Mucosal surfaces rubbed with cotton in left sphenoid and bilateral ethmoids  2% ciprofloxacin gel applied to left sphenoid and posterior ethmoid, right posterior ethmoid, and bilateral maxillary lumens  Tolerated very well.

## 2022-03-08 NOTE — TELEPHONE ENCOUNTER
Clinically improving per ENT evaluation today. Spoke with PharmD Mary Jo Pop and PharmD Ruby Rudolph. Extend antibiotics x 1 week.

## 2022-03-08 NOTE — Clinical Note
I saw her today and happy to report continued improvement of the sinuses.  Right side now totally clear, left side no longer crusted, just scanty mucopurulence in the sphenoid, which I opened further as a minor surgical procedure today.  Then applied ciprofloxacin gel to all the affected areas.  I'd recommend one additional week of IV antibiotics and to conclude next week.  Hopefully that ought to do it.

## 2022-03-11 NOTE — TELEPHONE ENCOUNTER
Not sure as to why her BP would be up higher. Could add in extra hctz 12.5 to go with her losartan 100/12.5.   Cont with the propranolol.   If doesn't improve in 1-2 days, needs ov to address. Can be private slot with me or Prabha.   
11-Mar-2022 09:16

## 2022-03-14 ENCOUNTER — PATIENT MESSAGE (OUTPATIENT)
Dept: INFECTIOUS DISEASES | Facility: CLINIC | Age: 57
End: 2022-03-14
Payer: COMMERCIAL

## 2022-03-14 ENCOUNTER — TELEPHONE (OUTPATIENT)
Dept: PREADMISSION TESTING | Facility: HOSPITAL | Age: 57
End: 2022-03-14
Payer: COMMERCIAL

## 2022-03-14 NOTE — PRE-PROCEDURE INSTRUCTIONS
PAT call completed.  Patient educated on procedure instructions.  Medical history discussed and patient informed of arrival time of 12:30 PM on Wednesday, March 16, 2022 at the [facility], and was made aware of the limited-visitor policy, and that  is to remain during the entire visit.  All questions and concerns addressed.  Endoscopy instructions reviewed. Instructed no solid food, only clear liquids, the day before the procedure, then at 6 PM, the day before the procedure, drink the first half of the bowel prep, then at 6 AM, the morning of procedure, drink the second half of the prep, then do not eat or drink anything until after the procedure.  Also instructed to only take her blood pressure medication the morning of procedure with just a few small sips of water and to use her inhalers the morning of procedure and to bring her inhalers with her.  Pre-procedure covid testing not needed, patient is covid vaccinated.   Patient verbalized understanding of all instructions.

## 2022-03-14 NOTE — TELEPHONE ENCOUNTER
PAT call completed.  Patient educated on procedure instructions.  Medical history discussed and patient informed of arrival time of 12:30 PM on Wednesday, March 16, 2022 at the [facility], and was made aware of the limited-visitor policy, and that  is to remain during the entire visit.  All questions and concerns addressed.  Endoscopy instructions reviewed. Instructed no solid food, only clear liquids, the day before the procedure, then at 6 PM, the day before the procedure, drink the first half of the bowel prep, then at 6 AM, the morning of procedure, drink the second half of the prep, then do not eat or drink anything until after the procedure.  Also instructed to only take her blood pressure medication the morning of procedure with just a few small sips of water and to use her inhalers the morning of procedure and to bring her inhalers with her.  Pre-procedure covid testing not needed, patient is covid vaccinated.   Patient verbalized understanding of all instructions.

## 2022-03-15 ENCOUNTER — ANESTHESIA EVENT (OUTPATIENT)
Dept: ENDOSCOPY | Facility: HOSPITAL | Age: 57
End: 2022-03-15
Payer: COMMERCIAL

## 2022-03-15 ENCOUNTER — INFUSION (OUTPATIENT)
Dept: INFECTIOUS DISEASES | Facility: HOSPITAL | Age: 57
End: 2022-03-15
Attending: INTERNAL MEDICINE
Payer: COMMERCIAL

## 2022-03-15 ENCOUNTER — OFFICE VISIT (OUTPATIENT)
Dept: INFECTIOUS DISEASES | Facility: CLINIC | Age: 57
End: 2022-03-15
Payer: COMMERCIAL

## 2022-03-15 ENCOUNTER — OFFICE VISIT (OUTPATIENT)
Dept: OTOLARYNGOLOGY | Facility: CLINIC | Age: 57
End: 2022-03-15
Payer: COMMERCIAL

## 2022-03-15 VITALS — BODY MASS INDEX: 27.94 KG/M2 | WEIGHT: 178 LBS | HEIGHT: 67 IN

## 2022-03-15 DIAGNOSIS — J32.4 CHRONIC PANSINUSITIS: ICD-10-CM

## 2022-03-15 DIAGNOSIS — R19.7 DIARRHEA, UNSPECIFIED TYPE: ICD-10-CM

## 2022-03-15 DIAGNOSIS — Z79.2 RECEIVING INTRAVENOUS ANTIBIOTIC TREATMENT AT HOME: ICD-10-CM

## 2022-03-15 DIAGNOSIS — Z16.20 INFECTION DUE TO HIGHLY ANTIBIOTIC-RESISTANT PSEUDOMONAS: ICD-10-CM

## 2022-03-15 DIAGNOSIS — Z51.81 THERAPEUTIC DRUG MONITORING: ICD-10-CM

## 2022-03-15 DIAGNOSIS — Z45.2 PICC (PERIPHERALLY INSERTED CENTRAL CATHETER) REMOVAL: ICD-10-CM

## 2022-03-15 DIAGNOSIS — Z79.60 LONG-TERM USE OF IMMUNOSUPPRESSANT MEDICATION: ICD-10-CM

## 2022-03-15 DIAGNOSIS — J32.4 CHRONIC PANSINUSITIS: Primary | ICD-10-CM

## 2022-03-15 DIAGNOSIS — Z79.2 RECEIVING INTRAVENOUS ANTIBIOTIC TREATMENT AS OUTPATIENT: Primary | ICD-10-CM

## 2022-03-15 DIAGNOSIS — A49.8 INFECTION DUE TO HIGHLY ANTIBIOTIC-RESISTANT PSEUDOMONAS: ICD-10-CM

## 2022-03-15 PROCEDURE — 99999 PR PBB SHADOW E&M-EST. PATIENT-LVL V: CPT | Mod: PBBFAC,,, | Performed by: OTOLARYNGOLOGY

## 2022-03-15 PROCEDURE — 1160F RVW MEDS BY RX/DR IN RCRD: CPT | Mod: CPTII,S$GLB,, | Performed by: OTOLARYNGOLOGY

## 2022-03-15 PROCEDURE — 31231 NASAL/SINUS ENDOSCOPY: ICD-10-PCS | Mod: S$GLB,,, | Performed by: OTOLARYNGOLOGY

## 2022-03-15 PROCEDURE — 1159F PR MEDICATION LIST DOCUMENTED IN MEDICAL RECORD: ICD-10-PCS | Mod: CPTII,S$GLB,, | Performed by: OTOLARYNGOLOGY

## 2022-03-15 PROCEDURE — 31231 NASAL ENDOSCOPY DX: CPT | Mod: S$GLB,,, | Performed by: OTOLARYNGOLOGY

## 2022-03-15 PROCEDURE — 99999 PR PBB SHADOW E&M-EST. PATIENT-LVL I: CPT | Mod: PBBFAC,,, | Performed by: INTERNAL MEDICINE

## 2022-03-15 PROCEDURE — 99214 PR OFFICE/OUTPT VISIT, EST, LEVL IV, 30-39 MIN: ICD-10-PCS | Mod: S$GLB,,, | Performed by: INTERNAL MEDICINE

## 2022-03-15 PROCEDURE — 99999 PR PBB SHADOW E&M-EST. PATIENT-LVL I: ICD-10-PCS | Mod: PBBFAC,,, | Performed by: INTERNAL MEDICINE

## 2022-03-15 PROCEDURE — 1159F MED LIST DOCD IN RCRD: CPT | Mod: CPTII,S$GLB,, | Performed by: OTOLARYNGOLOGY

## 2022-03-15 PROCEDURE — 3008F PR BODY MASS INDEX (BMI) DOCUMENTED: ICD-10-PCS | Mod: CPTII,S$GLB,, | Performed by: OTOLARYNGOLOGY

## 2022-03-15 PROCEDURE — 3008F BODY MASS INDEX DOCD: CPT | Mod: CPTII,S$GLB,, | Performed by: OTOLARYNGOLOGY

## 2022-03-15 PROCEDURE — 99999 PR PBB SHADOW E&M-EST. PATIENT-LVL V: ICD-10-PCS | Mod: PBBFAC,,, | Performed by: OTOLARYNGOLOGY

## 2022-03-15 PROCEDURE — 1160F PR REVIEW ALL MEDS BY PRESCRIBER/CLIN PHARMACIST DOCUMENTED: ICD-10-PCS | Mod: CPTII,S$GLB,, | Performed by: OTOLARYNGOLOGY

## 2022-03-15 PROCEDURE — 99213 PR OFFICE/OUTPT VISIT, EST, LEVL III, 20-29 MIN: ICD-10-PCS | Mod: 25,S$GLB,, | Performed by: OTOLARYNGOLOGY

## 2022-03-15 PROCEDURE — 99213 OFFICE O/P EST LOW 20 MIN: CPT | Mod: 25,S$GLB,, | Performed by: OTOLARYNGOLOGY

## 2022-03-15 PROCEDURE — 99214 OFFICE O/P EST MOD 30 MIN: CPT | Mod: S$GLB,,, | Performed by: INTERNAL MEDICINE

## 2022-03-15 NOTE — PROGRESS NOTES
"INFECTIOUS DISEASE CLINIC  3/15/22    Subjective:      Chief Complaint: f/u pseudomonas in sinus culture    History of Present Illness:    Patient Jaylin Murguia is a 57 y.o. female who presents today for f/u of IV antibiotics. Denies issues with picc line. Has had diarrhea since starting antibiotics-- c.diff negative. Reports improvement in sinus symptoms since starting antibiotics. Just saw ENT. Worried about getting reinfected. Uses pool frequently for exercise    Per Dr Roach:   "Sinus infection has grossly resolved.  OK for PICC removal today.  Continuing topical antibiotic rinse, will see her again in 2 weeks. "      Brief history:   Was referred from ENT for  pseudomonas that grew in sinus culture. Pt reports sinus infection for about 2.5 years. 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 Reports compliance with nasal flushes--- Using gent and clindamycin rinses twice a day for about a year - rinse system. Says it had been helping, but doesn't think it seems to be anymore. Worried about being contagious to other people. Added Uv filtration system for well water. Has a swimming pool, but hasn't used recently. Reports wheezing over past few weeks- uses inhalers. Thinks she has a chrons flare. Has been on IV antibiotics in the past per Dr Florez, but not recently. S/p debridement of sinus with ENT on 1/25      Aerobic Bacterial Culture  Abnormal   PSEUDOMONAS AERUGINOSA    Many     Aerobic Bacterial Culture  Abnormal   PSEUDOMONAS AERUGINOSA   Many     Resulting Agency [facility]          Susceptibility     PSEUDOMONAS AERUGINOSA  Pseudomonas aeruginosa     CULTURE, AEROBIC  (SPECIFY SOURCE) CULTURE, AEROBIC  (SPECIFY SOURCE)     Amikacin >32 mcg/mL Resistant <=16 mcg/mL Sensitive     Cefepime >16 mcg/mL Resistant <=2 mcg/mL Sensitive     Ceftazidime/Avibactam 2 mcg/mL Sensitive       Ciprofloxacin >2 mcg/mL Resistant <=1 mcg/mL Sensitive     Colistin 1.0 mcg/mL Sensitive 1       " Gentamicin >8 mcg/mL Resistant <=4 mcg/mL Sensitive     Meropenem >8 mcg/mL Resistant <=1 mcg/mL Sensitive     Piperacillin/Tazo >64 mcg/mL Resistant <=16 mcg/mL Sensitive     Tobramycin >8 mcg/mL Resistant <=4 mcg/mL Sensitive                         Review of Symptoms:  Constitutional: Denies fevers, chills, or weakness.  ENT: as per hpi  Cardiovascular: Denies chest pain, palpitations, orthopnea, or claudication.  Respiratory: Denies shortness of breath, cough, hemoptysis.   GI: + diarrhea  : Denies dysuria, incontinence, or hematuria.  Musculoskeletal: Denies joint pain or myalgias.  Skin/breast: Denies rashes, lumps, lesions, or discharge.  Neurologic: Denies headache, dizziness, vertigo, or paresthesias.    Past Medical History:   Diagnosis Date    Allergic rhinitis     Asthma     Chronic pansinusitis     Crohn's disease     Ileal involvement, previously on Remicade, Asacol, Prednisone    Fibromyalgia     Hyperlipidemia     Hypertension     Immunosuppression     Migraine     Obstructive sleep apnea     CPAP at night    Sciatica        Past Surgical History:   Procedure Laterality Date    BLADDER SURGERY      sling was created by her muscles      SECTION      COLONOSCOPY N/A 2017    Procedure: COLONOSCOPY;  Surgeon: Kin Dyer MD;  Location: [facility];  Service: Endoscopy;  Laterality: N/A;    COLONOSCOPY N/A 3/27/2018    Procedure: COLONOSCOPY;  Surgeon: Kyra Vallecillo MD;  Location: [facility];  Service: Endoscopy;  Laterality: N/A;    COLONOSCOPY N/A 3/12/2020    Procedure: COLONOSCOPY;  Surgeon: Nicole Leal MD;  Location: [facility];  Service: Endoscopy;  Laterality: N/A;    DEBRIDEMENT Bilateral 2020    Procedure: DEBRIDEMENT;  Surgeon: Matthias Roach MD;  Location: [address];  Service: ENT;  Laterality: Bilateral;    ESOPHAGOGASTRODUODENOSCOPY N/A 3/12/2020    Procedure: ESOPHAGOGASTRODUODENOSCOPY (EGD);  Surgeon: Nicole Leal MD;   Location: [facility];  Service: Endoscopy;  Laterality: N/A;    FINGER SURGERY      joint relpacement, left hand index finger    FUNCTIONAL ENDOSCOPIC SINUS SURGERY (FESS) USING COMPUTER-ASSISTED NAVIGATION Bilateral 7/31/2019    Procedure: FESS, USING COMPUTER-ASSISTED NAVIGATION;  Surgeon: Manish Shaffer MD;  Location: [facility];  Service: ENT;  Laterality: Bilateral;    FUNCTIONAL ENDOSCOPIC SINUS SURGERY (FESS) USING COMPUTER-ASSISTED NAVIGATION Bilateral 9/25/2020    Procedure: FESS, USING COMPUTER-ASSISTED NAVIGATION SPHENOID;  Surgeon: Matthias Roach MD;  Location: [address];  Service: ENT;  Laterality: Bilateral;  TIVA    HYSTERECTOMY      SINUS SURGERY      WISDOM TOOTH EXTRACTION         Family History   Problem Relation Age of Onset    Hypertension Mother     Allergies Mother     Kidney disease Father 64        ESRD on HD    Scleroderma Father     Hypertension Brother     Cancer Paternal Grandmother 70        colon    Heart attack Maternal Grandmother     COPD Maternal Grandmother 72       Social History     Socioeconomic History    Marital status:     Number of children: 2   Occupational History    Occupation: teacher   Tobacco Use    Smoking status: Never Smoker    Smokeless tobacco: Never Used   Substance and Sexual Activity    Alcohol use: No    Drug use: No    Sexual activity: Yes     Partners: Male   Social History Narrative    Surrogate Decision Maker: , Cristobal Murguia, [phone]     Social Determinants of Health     Financial Resource Strain: Low Risk     Difficulty of Paying Living Expenses: Not very hard   Food Insecurity: No Food Insecurity    Worried About Running Out of Food in the Last Year: Never true    Ran Out of Food in the Last Year: Never true   Transportation Needs: No Transportation Needs    Lack of Transportation (Medical): No    Lack of Transportation (Non-Medical): No   Physical Activity: Unknown    Days of Exercise per Week: 2  "days   Stress: Stress Concern Present    Feeling of Stress : To some extent   Social Connections: Unknown    Frequency of Communication with Friends and Family: Three times a week    Frequency of Social Gatherings with Friends and Family: Once a week    Active Member of Clubs or Organizations: No    Attends Club or Organization Meetings: 1 to 4 times per year    Marital Status:    Housing Stability: High Risk    Unable to Pay for Housing in the Last Year: No    Number of Places Lived in the Last Year: 1    Unstable Housing in the Last Year: Yes       Review of patient's allergies indicates:  No Known Allergies      Objective:   Ht 5' 7" (1.702 m)   Wt 80.7 kg (178 lb)   BMI 27.88 kg/m²      General: Afebrile, alert, comfortable, no acute distress.   HEENT: RANDI. EOMI, no scleral icterus.   Pulmonary: Non labored  Extremities: Moves all extremities x 4. picc dressings c/d/i   Skin: No jaundice, rashes, or visible lesions.   Neurological:  Alert and oriented x 4.         Reviewed records today as well as relevant labs, cultures, and imaging    Assessment:       Iv antibitoics needed as an outpatient  Home health active care coordination  Acute on chronic purulent sinusitis    pseudomonas  Bronchitis   Diarrhea  picc removal    Deep sinus cultures at time of debridement by ENT with  pseudomonas. Has now been on 5 weeks IV antibiotics, improved clinically. Per ENT, "Sinus infection has grossly resolved.  OK for PICC removal today.  Continuing topical antibiotic rinse, will see her again in 2 weeks. "    Plan:     picc removal  Continue nasal flushes per ENT      Melania Corrigan MD, MPH  Infectious Disease    Orders Placed This Encounter   Procedures    Nursing communication     picc removal     Scheduling Instructions:      picc removal       rtc prn  "

## 2022-03-15 NOTE — PROGRESS NOTES
Pt arrived to infusion suite for PICC line removal.  PICC to ASIF intact with transparent dressing and Biopatch in place, no redness, swelling or drainage noted from site.  PICC line removed as ordered, 49 cm length noted.  Vaseling gauze, 2x2 pads and coban applied to site, tolerated well.  Observed x 30 mins.  Understood to remove dressing in 24 hrs., hold pressure if bleeding occurs and call MD.  Left unit in NAD.

## 2022-03-15 NOTE — PROGRESS NOTES
"  Subjective:      Jaylin is a 57 y.o. female who comes for follow-up of sinusitis.  Her last visit with me was on 3/8/2022.  Now 1 week status-post revision endoscopic sinus debridement and JAMIE.   Progressive improvement, scanty mucus in navage, concerned about possible dilution of gentamicin-clindamycin rinse.  Finished 3rd week of IV antibiotics.    SNOT-22 score: : (P) 32  NOSE score:: (P) 25%  ETDQ-7 score:: (P) 1.9    The patient's medications, allergies, past medical, surgical, social and family histories were reviewed and updated as appropriate.    A detailed review of systems was obtained with pertinent positives as per the above HPI, and otherwise negative.        Objective:     Ht 5' 7" (1.702 m)   Wt 80.7 kg (178 lb)   BMI 27.88 kg/m²        Constitutional:   She appears well-developed. She is cooperative.     Head:  Normocephalic.     Nose:  No mucosal edema, rhinorrhea, septal deviation or polyps. No epistaxis. Turbinates normal, no turbinate masses and no turbinate hypertrophy.  Right sinus exhibits no maxillary sinus tenderness and no frontal sinus tenderness. Left sinus exhibits no maxillary sinus tenderness and no frontal sinus tenderness.     Mouth/Throat  Oropharynx clear and moist without lesions or asymmetry. No oropharyngeal exudate or posterior oropharyngeal erythema.     Neck:  No adenopathy. Normal range of motion present.     She has no cervical adenopathy.       Procedure    Nasal endoscopy performed.  See procedure note.        Data Reviewed    WBC (K/uL)   Date Value   03/08/2022 4.59     Eosinophil % (%)   Date Value   03/08/2022 6.3     Eos # (K/uL)   Date Value   03/08/2022 0.3     Platelets (K/uL)   Date Value   03/08/2022 238     Glucose (mg/dL)   Date Value   03/08/2022 104     IgE (IU/mL)   Date Value   02/03/2021 <35       Pathology report indicated non-eosinophilic chronic inflammation.    Cultures showed Pseudomonas.      Assessment:     1. Chronic pansinusitis         Plan: "     Infection grossly resolved.  Sinuses in anticipated healing phase.  OK to remove PICC today.  Continue clinda-genta rinse, switch to sinus rinse bottle.  Will ask PAP about more concentrated nasal spray for gentamicin.  Follow up in about 2 weeks (around 3/29/2022) for nasal endoscopy.

## 2022-03-15 NOTE — ANESTHESIA PREPROCEDURE EVALUATION
EF 60 (2019)                                                                                                             03/15/2022  Jaylin Murguia is a 57 y.o., female.    Past Medical History:   Diagnosis Date    Allergic rhinitis     Asthma     Chronic pansinusitis     Crohn's disease     Ileal involvement, previously on Remicade, Asacol, Prednisone    Fibromyalgia     Hyperlipidemia     Hypertension     Immunosuppression     Migraine     Obstructive sleep apnea     CPAP at night    Sciatica        Past Surgical History:   Procedure Laterality Date    BLADDER SURGERY      sling was created by her muscles      SECTION      COLONOSCOPY N/A 2017    Procedure: COLONOSCOPY;  Surgeon: Kin Dyer MD;  Location: [facility];  Service: Endoscopy;  Laterality: N/A;    COLONOSCOPY N/A 3/27/2018    Procedure: COLONOSCOPY;  Surgeon: Kyra Vallecillo MD;  Location: [facility];  Service: Endoscopy;  Laterality: N/A;    COLONOSCOPY N/A 3/12/2020    Procedure: COLONOSCOPY;  Surgeon: Nicole Leal MD;  Location: [facility];  Service: Endoscopy;  Laterality: N/A;    DEBRIDEMENT Bilateral 2020    Procedure: DEBRIDEMENT;  Surgeon: Matthias Roach MD;  Location: [address];  Service: ENT;  Laterality: Bilateral;    ESOPHAGOGASTRODUODENOSCOPY N/A 3/12/2020    Procedure: ESOPHAGOGASTRODUODENOSCOPY (EGD);  Surgeon: Nicole Leal MD;  Location: [facility];  Service: Endoscopy;  Laterality: N/A;    FINGER SURGERY      joint relpacement, left hand index finger    FUNCTIONAL ENDOSCOPIC SINUS SURGERY (FESS) USING COMPUTER-ASSISTED NAVIGATION Bilateral 2019    Procedure: FESS, USING COMPUTER-ASSISTED NAVIGATION;  Surgeon: Manish Shaffer MD;  Location: [facility];  Service: ENT;  Laterality: Bilateral;    FUNCTIONAL ENDOSCOPIC SINUS SURGERY (FESS) USING COMPUTER-ASSISTED NAVIGATION Bilateral 2020    Procedure: FESS, USING COMPUTER-ASSISTED NAVIGATION SPHENOID;  Surgeon:  Matthias Roach MD;  Location: [facility] OR [address] [place] [address];  Service: ENT;  Laterality: Bilateral;  TIVA    HYSTERECTOMY      SINUS SURGERY      WISDOM TOOTH EXTRACTION         Pre-op Assessment    I have reviewed the Patient Summary Reports.     I have reviewed the Nursing Notes. I have reviewed the NPO Status.   I have reviewed the Medications.     Review of Systems  Anesthesia Hx:  No problems with previous Anesthesia  History of prior surgery of interest to airway management or planning: Previous anesthesia: General Denies Family Hx of Anesthesia complications.   Denies Personal Hx of Anesthesia complications.   Social:  Non-Smoker    Hematology/Oncology:  Hematology Normal   Oncology Normal     EENT/Dental:EENT/Dental Normal   Cardiovascular:   Hypertension   Denies Hx of Heart Murmur.   Hypertension  Other Cardiac Conditions Tortuous Aorta, mild aortic insufficiency  Pulmonary:   Asthma Sleep Apnea  Asthma:  Obstructive Sleep Apnea (TAMIKA).   Education provided regarding risk of obstructive sleep apnea     Renal/:  Renal/ Normal     Hepatic/GI:  Hepatic/GI Normal    Musculoskeletal:   Arthritis     Neurological:   Neuromuscular Disease, Headaches  Dx of Headaches Neuromuscular Disease   Dermatological:  Skin Normal    Psych:  Psychiatric Normal           Physical Exam  General: Well nourished    Airway:  Mallampati: II   Mouth Opening: Normal  TM Distance: Normal  Tongue: Normal  Neck ROM: Normal ROM    Dental:  Intact    Chest/Lungs:  Clear to auscultation, Normal Respiratory Rate    Heart:  Rate: Normal  Rhythm: Regular Rhythm        Anesthesia Plan  Type of Anesthesia, risks & benefits discussed:    Anesthesia Type: Gen Natural Airway  Intra-op Monitoring Plan: Standard ASA Monitors  Post Op Pain Control Plan: multimodal analgesia  Induction:  IV  Informed Consent: Informed consent signed with the Patient and all parties understand the risks and agree with anesthesia plan.  All questions answered.   ASA Score:  3  Day of Surgery Review of History & Physical: H&P Update referred to the surgeon/provider.    Ready For Surgery From Anesthesia Perspective.     .

## 2022-03-15 NOTE — Clinical Note
Sinus infection has grossly resolved.  OK for PICC removal today.  Continuing topical antibiotic rinse, will see her again in 2 weeks.

## 2022-03-15 NOTE — PROCEDURES
Nasal/sinus endoscopy    Date/Time: 3/15/2022 9:30 AM  Performed by: Matthias Roach MD  Authorized by: Matthias Roach MD     Consent Done?:  Yes (Verbal)  Anesthesia:     Local anesthetic:  Lidocaine 4% and Jose-Synephrine 1/2%    Patient tolerance:  Patient tolerated the procedure well with no immediate complications  Nose:     Procedure Performed:  Nasal Endoscopy  External:      No external nasal deformity  Intranasal:      Mucosa no polyps     Mucosa ulcers not present     No mucosa lesions present     Turbinates not enlarged     No septum gross deformity  Nasopharynx:      No mucosa lesions     Adenoids not present     Posterior choanae patent     Eustachian tube patent     Sinuses patent bilaterally  Scanty brown scabs suctioned from posterior ethmoid bilaterally  No definite purulent matter

## 2022-03-16 ENCOUNTER — HOSPITAL ENCOUNTER (OUTPATIENT)
Facility: HOSPITAL | Age: 57
Discharge: HOME | End: 2022-03-16
Attending: INTERNAL MEDICINE | Admitting: INTERNAL MEDICINE
Payer: COMMERCIAL

## 2022-03-16 ENCOUNTER — ANESTHESIA (OUTPATIENT)
Dept: ENDOSCOPY | Facility: HOSPITAL | Age: 57
End: 2022-03-16
Payer: COMMERCIAL

## 2022-03-16 VITALS
HEIGHT: 67 IN | SYSTOLIC BLOOD PRESSURE: 124 MMHG | DIASTOLIC BLOOD PRESSURE: 72 MMHG | TEMPERATURE: 97 F | RESPIRATION RATE: 16 BRPM | HEART RATE: 60 BPM | WEIGHT: 175.25 LBS | BODY MASS INDEX: 27.51 KG/M2 | OXYGEN SATURATION: 98 %

## 2022-03-16 DIAGNOSIS — K50.90 CROHN'S DISEASE WITHOUT COMPLICATION, UNSPECIFIED GASTROINTESTINAL TRACT LOCATION: Primary | ICD-10-CM

## 2022-03-16 PROCEDURE — 45380 COLONOSCOPY AND BIOPSY: CPT | Performed by: INTERNAL MEDICINE

## 2022-03-16 PROCEDURE — D9220A PRA ANESTHESIA: ICD-10-PCS | Mod: CRNA,,, | Performed by: NURSE ANESTHETIST, CERTIFIED REGISTERED

## 2022-03-16 PROCEDURE — 37000009 HC ANESTHESIA EA ADD 15 MINS: Performed by: INTERNAL MEDICINE

## 2022-03-16 PROCEDURE — 88305 TISSUE EXAM BY PATHOLOGIST: CPT | Mod: 59 | Performed by: PATHOLOGY

## 2022-03-16 PROCEDURE — 88305 TISSUE EXAM BY PATHOLOGIST: ICD-10-PCS | Mod: 26,,, | Performed by: PATHOLOGY

## 2022-03-16 PROCEDURE — 43239 EGD BIOPSY SINGLE/MULTIPLE: CPT | Mod: 51,,, | Performed by: INTERNAL MEDICINE

## 2022-03-16 PROCEDURE — D9220A PRA ANESTHESIA: ICD-10-PCS | Mod: ANES,,, | Performed by: ANESTHESIOLOGY

## 2022-03-16 PROCEDURE — 63600175 PHARM REV CODE 636 W HCPCS: Performed by: INTERNAL MEDICINE

## 2022-03-16 PROCEDURE — 27201012 HC FORCEPS, HOT/COLD, DISP: Performed by: INTERNAL MEDICINE

## 2022-03-16 PROCEDURE — 43239 EGD BIOPSY SINGLE/MULTIPLE: CPT | Performed by: INTERNAL MEDICINE

## 2022-03-16 PROCEDURE — 45380 PR COLONOSCOPY,BIOPSY: ICD-10-PCS | Mod: ,,, | Performed by: INTERNAL MEDICINE

## 2022-03-16 PROCEDURE — D9220A PRA ANESTHESIA: Mod: ANES,,, | Performed by: ANESTHESIOLOGY

## 2022-03-16 PROCEDURE — 63600175 PHARM REV CODE 636 W HCPCS: Performed by: NURSE ANESTHETIST, CERTIFIED REGISTERED

## 2022-03-16 PROCEDURE — 43239 PR EGD, FLEX, W/BIOPSY, SGL/MULTI: ICD-10-PCS | Mod: 51,,, | Performed by: INTERNAL MEDICINE

## 2022-03-16 PROCEDURE — 88305 TISSUE EXAM BY PATHOLOGIST: CPT | Mod: 26,,, | Performed by: PATHOLOGY

## 2022-03-16 PROCEDURE — 37000008 HC ANESTHESIA 1ST 15 MINUTES: Performed by: INTERNAL MEDICINE

## 2022-03-16 PROCEDURE — 45380 COLONOSCOPY AND BIOPSY: CPT | Mod: ,,, | Performed by: INTERNAL MEDICINE

## 2022-03-16 PROCEDURE — D9220A PRA ANESTHESIA: Mod: CRNA,,, | Performed by: NURSE ANESTHETIST, CERTIFIED REGISTERED

## 2022-03-16 RX ORDER — LIDOCAINE HCL/PF 100 MG/5ML
SYRINGE (ML) INTRAVENOUS
Status: DISCONTINUED | OUTPATIENT
Start: 2022-03-16 | End: 2022-03-16

## 2022-03-16 RX ORDER — FLUCONAZOLE 100 MG/1
100 TABLET ORAL DAILY
Qty: 10 TABLET | Refills: 0 | Status: SHIPPED | OUTPATIENT
Start: 2022-03-16 | End: 2022-03-26

## 2022-03-16 RX ORDER — FENTANYL CITRATE 50 UG/ML
INJECTION, SOLUTION INTRAMUSCULAR; INTRAVENOUS
Status: DISCONTINUED | OUTPATIENT
Start: 2022-03-16 | End: 2022-03-16

## 2022-03-16 RX ORDER — SODIUM CHLORIDE, SODIUM LACTATE, POTASSIUM CHLORIDE, CALCIUM CHLORIDE 600; 310; 30; 20 MG/100ML; MG/100ML; MG/100ML; MG/100ML
INJECTION, SOLUTION INTRAVENOUS CONTINUOUS
Status: ACTIVE | OUTPATIENT
Start: 2022-03-16

## 2022-03-16 RX ORDER — PROPOFOL 10 MG/ML
INJECTION, EMULSION INTRAVENOUS
Status: DISCONTINUED | OUTPATIENT
Start: 2022-03-16 | End: 2022-03-16

## 2022-03-16 RX ADMIN — PROPOFOL 30 MG: 10 INJECTION, EMULSION INTRAVENOUS at 01:03

## 2022-03-16 RX ADMIN — FENTANYL CITRATE 25 MCG: 50 INJECTION, SOLUTION INTRAMUSCULAR; INTRAVENOUS at 01:03

## 2022-03-16 RX ADMIN — SODIUM CHLORIDE, SODIUM LACTATE, POTASSIUM CHLORIDE, AND CALCIUM CHLORIDE: .6; .31; .03; .02 INJECTION, SOLUTION INTRAVENOUS at 12:03

## 2022-03-16 RX ADMIN — Medication 50 MG: at 01:03

## 2022-03-16 RX ADMIN — PROPOFOL 150 MG: 10 INJECTION, EMULSION INTRAVENOUS at 01:03

## 2022-03-16 RX ADMIN — PROPOFOL 20 MG: 10 INJECTION, EMULSION INTRAVENOUS at 01:03

## 2022-03-16 RX ADMIN — PROPOFOL 10 MG: 10 INJECTION, EMULSION INTRAVENOUS at 01:03

## 2022-03-16 NOTE — PROVATION PATIENT INSTRUCTIONS
Discharge Summary/Instructions after an Endoscopic Procedure  Patient Name: Jaylin Murguia  Patient MRN: [id number]  Patient YOB: 1965 Wednesday, March 16, 2022  Shay Bruce MD  Dear patient,  As a result of recent federal legislation (The Federal Cures Act), you may   receive lab or pathology results from your procedure in your [facility]   account before your physician is able to contact you. Your physician or   their representative will relay the results to you with their   recommendations at their soonest availability.  Thank you,  RESTRICTIONS:  During your procedure today, you received medications for sedation.  These   medications may affect your judgment, balance and coordination.  Therefore,   for 24 hours, you have the following restrictions:   - DO NOT drive a car, operate machinery, make legal/financial decisions,   sign important papers or drink alcohol.    ACTIVITY:  Today: no heavy lifting, straining or running due to procedural   sedation/anesthesia.  The following day: return to full activity including work.  DIET:  Eat and drink normally unless instructed otherwise.     TREATMENT FOR COMMON SIDE EFFECTS:  - Mild abdominal pain, nausea, belching, bloating or excessive gas:  rest,   eat lightly and use a heating pad.  - Sore Throat: treat with throat lozenges and/or gargle with warm salt   water.  - Because air was used during the procedure, expelling large amounts of air   from your rectum or belching is normal.  - If a bowel prep was taken, you may not have a bowel movement for 1-3 days.    This is normal.  SYMPTOMS TO WATCH FOR AND REPORT TO YOUR PHYSICIAN:  1. Abdominal pain or bloating, other than gas cramps.  2. Chest pain.  3. Back pain.  4. Signs of infection such as: chills or fever occurring within 24 hours   after the procedure.  5. Rectal bleeding, which would show as bright red, maroon, or black stools.   (A tablespoon of blood from the rectum is not serious,  especially if   hemorrhoids are present.)  6. Vomiting.  7. Weakness or dizziness.  GO DIRECTLY TO THE NEAREST EMERGENCY ROOM IF YOU HAVE ANY OF THE FOLLOWING:      Difficulty breathing              Chills and/or fever over 101 F   Persistent vomiting and/or vomiting blood   Severe abdominal pain   Severe chest pain   Black, tarry stools   Bleeding- more than one tablespoon   Any other symptom or condition that you feel may need urgent attention  Your doctor recommends these additional instructions:  If any biopsies were taken, your doctors clinic will contact you in 1 to 2   weeks with any results.  - Discharge patient to home.   - Resume previous diet.   - Continue present medications.   - Await pathology results.   - Repeat colonoscopy at appointment to be scheduled for surveillance based   on pathology results.  For questions, problems or results please call your physician Shay Bruce MD at Work:  [phone]  If you have any questions about the above instructions, call the GI   department at [phone] or call the endoscopy unit at [phone]   from 7am until 3 pm.  [facility] [place], EMERGENCY ROOM PHONE NUMBER:   [phone]  IF A COMPLICATION OR EMERGENCY SITUATION ARISES AND YOU ARE UNABLE TO REACH   YOUR PHYSICIAN - GO DIRECTLY TO THE EMERGENCY ROOM.  I have read or have had read to me these discharge instructions for my   procedure and have received a written copy.  I understand these   instructions and will follow-up with my physician if I have any questions.     __________________________________       _____________________________________  Nurse Signature                                          Patient/Designated   Responsible Party Signature  MD Shay Luu MD  3/16/2022 1:50:21 PM  This report has been verified and signed electronically.  Dear patient,  As a result of recent federal legislation (The Federal Cures  Act), you may   receive lab or pathology results from your procedure in your [facility]   account before your physician is able to contact you. Your physician or   their representative will relay the results to you with their   recommendations at their soonest availability.  Thank you,  PROVATION

## 2022-03-16 NOTE — TRANSFER OF CARE
"Anesthesia Transfer of Care Note    Patient: Jaylin Murguia    Procedure(s) Performed: Procedure(s) (LRB):  COLONOSCOPY (N/A)  EGD (ESOPHAGOGASTRODUODENOSCOPY) (N/A)    Patient location: PACU    Anesthesia Type: general    Transport from OR: Transported from OR on room air with adequate spontaneous ventilation    Post pain: adequate analgesia    Post assessment: no apparent anesthetic complications    Post vital signs: stable    Level of consciousness: sedated and responds to stimulation    Nausea/Vomiting: no nausea/vomiting    Complications: none    Transfer of care protocol was followed      Last vitals:   Visit Vitals  BP (!) 94/52 (BP Location: Right arm, Patient Position: Lying)   Pulse (!) 59   Temp 36.3 °C (97.3 °F)   Resp 16   Ht 5' 7" (1.702 m)   Wt 79.5 kg (175 lb 4.3 oz)   SpO2 98%   Breastfeeding No   BMI 27.45 kg/m²     "

## 2022-03-16 NOTE — PROVATION PATIENT INSTRUCTIONS
Discharge Summary/Instructions after an Endoscopic Procedure  Patient Name: Jaylin Murguia  Patient MRN: [id number]  Patient YOB: 1965 Wednesday, March 16, 2022  Shay Bruce MD  Dear patient,  As a result of recent federal legislation (The Federal Cures Act), you may   receive lab or pathology results from your procedure in your [facility]   account before your physician is able to contact you. Your physician or   their representative will relay the results to you with their   recommendations at their soonest availability.  Thank you,  RESTRICTIONS:  During your procedure today, you received medications for sedation.  These   medications may affect your judgment, balance and coordination.  Therefore,   for 24 hours, you have the following restrictions:   - DO NOT drive a car, operate machinery, make legal/financial decisions,   sign important papers or drink alcohol.    ACTIVITY:  Today: no heavy lifting, straining or running due to procedural   sedation/anesthesia.  The following day: return to full activity including work.  DIET:  Eat and drink normally unless instructed otherwise.     TREATMENT FOR COMMON SIDE EFFECTS:  - Mild abdominal pain, nausea, belching, bloating or excessive gas:  rest,   eat lightly and use a heating pad.  - Sore Throat: treat with throat lozenges and/or gargle with warm salt   water.  - Because air was used during the procedure, expelling large amounts of air   from your rectum or belching is normal.  - If a bowel prep was taken, you may not have a bowel movement for 1-3 days.    This is normal.  SYMPTOMS TO WATCH FOR AND REPORT TO YOUR PHYSICIAN:  1. Abdominal pain or bloating, other than gas cramps.  2. Chest pain.  3. Back pain.  4. Signs of infection such as: chills or fever occurring within 24 hours   after the procedure.  5. Rectal bleeding, which would show as bright red, maroon, or black stools.   (A tablespoon of blood from the rectum is not serious,  especially if   hemorrhoids are present.)  6. Vomiting.  7. Weakness or dizziness.  GO DIRECTLY TO THE NEAREST EMERGENCY ROOM IF YOU HAVE ANY OF THE FOLLOWING:      Difficulty breathing              Chills and/or fever over 101 F   Persistent vomiting and/or vomiting blood   Severe abdominal pain   Severe chest pain   Black, tarry stools   Bleeding- more than one tablespoon   Any other symptom or condition that you feel may need urgent attention  Your doctor recommends these additional instructions:  If any biopsies were taken, your doctors clinic will contact you in 1 to 2   weeks with any results.  - Discharge patient to home.   - Resume previous diet.   - Continue present medications.   - Await pathology results.   - Diflucan (fluconazole) 100 mg PO daily for 1 week.  For questions, problems or results please call your physician Shay Bruce MD at Work:  [phone]  If you have any questions about the above instructions, call the GI   department at [phone] or call the endoscopy unit at [phone]   from 7am until 3 pm.  [facility] [place] [facility], EMERGENCY ROOM PHONE NUMBER:   [phone]  IF A COMPLICATION OR EMERGENCY SITUATION ARISES AND YOU ARE UNABLE TO REACH   YOUR PHYSICIAN - GO DIRECTLY TO THE EMERGENCY ROOM.  I have read or have had read to me these discharge instructions for my   procedure and have received a written copy.  I understand these   instructions and will follow-up with my physician if I have any questions.     __________________________________       _____________________________________  Nurse Signature                                          Patient/Designated   Responsible Party Signature  MD Shay Luu MD  3/16/2022 1:32:24 PM  This report has been verified and signed electronically.  Dear patient,  As a result of recent federal legislation (The Federal Cures Act), you may   receive lab or pathology  results from your procedure in your [facility]   account before your physician is able to contact you. Your physician or   their representative will relay the results to you with their   recommendations at their soonest availability.  Thank you,  PROVATION

## 2022-03-16 NOTE — H&P
PRE PROCEDURE H&P    Patient Name: Jaylin Murguia  MRN: [id number]  : 1965  Date of Procedure:  3/16/2022  Referring Physician: Nicole Leal MD  Primary Physician: Esthela Hu MD  Procedure Physician: Shay Bruce MD       Planned Procedure: Colonoscopy and EGD  Diagnosis: Crohns disease  Chief Complaint: Same as above    HPI: Patient is an 57 y.o. female is here for the above.     Risks and benefits of the procedure were discussed including the risks of bleeding, perforation, requiring surgery, risks related to anesthesia. The patient expressed understanding and agreed to proceed. No language barriers were present.       Past Medical History:   Past Medical History:   Diagnosis Date    Allergic rhinitis     Asthma     Chronic pansinusitis     Crohn's disease     Ileal involvement, previously on Remicade, Asacol, Prednisone    Fibromyalgia     Hyperlipidemia     Hypertension     Immunosuppression     Migraine     Obstructive sleep apnea     CPAP at night    Sciatica         Past Surgical History:  Past Surgical History:   Procedure Laterality Date    BLADDER SURGERY      sling was created by her muscles      SECTION      COLONOSCOPY N/A 2017    Procedure: COLONOSCOPY;  Surgeon: Kin Dyer MD;  Location: [facility];  Service: Endoscopy;  Laterality: N/A;    COLONOSCOPY N/A 3/27/2018    Procedure: COLONOSCOPY;  Surgeon: Kyra Vallecillo MD;  Location: [facility];  Service: Endoscopy;  Laterality: N/A;    COLONOSCOPY N/A 3/12/2020    Procedure: COLONOSCOPY;  Surgeon: Nicole Leal MD;  Location: [facility];  Service: Endoscopy;  Laterality: N/A;    DEBRIDEMENT Bilateral 2020    Procedure: DEBRIDEMENT;  Surgeon: Matthias Roach MD;  Location: [address];  Service: ENT;  Laterality: Bilateral;    ESOPHAGOGASTRODUODENOSCOPY N/A 3/12/2020    Procedure: ESOPHAGOGASTRODUODENOSCOPY (EGD);  Surgeon: Nicole Leal MD;  Location: [facility]  ENDO;  Service: Endoscopy;  Laterality: N/A;    FINGER SURGERY      joint relpacement, left hand index finger    FUNCTIONAL ENDOSCOPIC SINUS SURGERY (FESS) USING COMPUTER-ASSISTED NAVIGATION Bilateral 7/31/2019    Procedure: FESS, USING COMPUTER-ASSISTED NAVIGATION;  Surgeon: Manish Shaffer MD;  Location: [facility];  Service: ENT;  Laterality: Bilateral;    FUNCTIONAL ENDOSCOPIC SINUS SURGERY (FESS) USING COMPUTER-ASSISTED NAVIGATION Bilateral 9/25/2020    Procedure: FESS, USING COMPUTER-ASSISTED NAVIGATION SPHENOID;  Surgeon: Matthias Roach MD;  Location: [address];  Service: ENT;  Laterality: Bilateral;  TIVA    HYSTERECTOMY      SINUS SURGERY      WISDOM TOOTH EXTRACTION          Home Medications:  Prior to Admission medications    Medication Sig Start Date End Date Taking? Authorizing Provider   fluticasone furoate-vilanteroL (BREO ELLIPTA) 200-25 mcg/dose DsDv diskus inhaler Inhale 1 puff into the lungs once daily. 7/22/21  Yes Esthela Hu MD   losartan-hydrochlorothiazide 100-25 mg (HYZAAR) 100-25 mg per tablet TAKE 1 TABLET BY MOUTH EVERY DAY  Patient taking differently: 1/2 tablet prn 10/14/20  Yes Esthela Hu MD   propranoloL (INDERAL LA) 80 MG 24 hr capsule Take 1 capsule (80 mg total) by mouth once daily. 2/8/21 2/8/22 Yes Esthela Hu MD   tiotropium bromide (SPIRIVA RESPIMAT) 1.25 mcg/actuation Mist Inhale 2 puffs into the lungs once daily. 12/23/19  Yes Vicki Fletcher MD   atorvastatin (LIPITOR) 10 MG tablet Take 1 tablet (10 mg total) by mouth once daily. 9/8/21 9/8/22  Esthela Hu MD   azelastine (ASTELIN) 137 mcg (0.1 %) nasal spray 2 sprays (274 mcg total) by Nasal route 2 (two) times daily. 2/7/22 3/9/22  Jyothi Denise PA-C   butalbital-acetaminophen-caffeine -40 mg (FIORICET, ESGIC) -40 mg per tablet TAKE 1 TABLET EVERY 4 HOURS AS NEEDED FOR HEADACHE 10/5/20   Esthela Hu MD   clindamycin (CLEOCIN) 150 mg/mL injection EMPTY CONTENTS OF 1 VIAL INTO NASAL  IRRIGATION SYSTEM, ADD DISTILLED WATER, SALT PACK, MIX & IRRIGATE PERFORM 2 TIMES DAILY 1/29/21   Historical Provider   clindamycin (CLEOCIN) 300 MG capsule Take 600 mg by mouth 2 (two) times daily. 4/19/21   Historical Provider   desloratadine (CLARINEX) 5 mg tablet Take 1 tablet (5 mg total) by mouth once daily. 12/8/21   Esthela Hu MD   diazePAM (VALIUM) 5 MG tablet One pill to be taken 1 hour before the scheduled procedure.  Bring the 2nd pill with you to the clinic. 3/4/22   Matthias Roach MD   DULoxetine (CYMBALTA) 60 MG capsule Take 1 capsule (60 mg total) by mouth 2 (two) times daily. 2/7/22   Esthela Hu MD   eletriptan (RELPAX) 40 MG tablet Take 1 tablet (40 mg total) by mouth as needed.  Patient taking differently: Take 40 mg by mouth as needed. Take if needed 2/7/20   Esthela Hu MD   estradioL (ESTRACE) 2 MG tablet Take 1 tablet (2 mg total) by mouth every evening. 3/3/22   Kole Chang MD   fluticasone propionate (FLONASE) 50 mcg/actuation nasal spray SPRAY 2 SPRAYS BY EACH NOSTRIL ROUTE ONCE DAILY. 2/11/22   Tanya Marc NP   gentamicin (GARAMYCIN) 40 mg/mL injection EMPTY CONTENTS OF 1 VIAL INTO NASAL IRRIGATION SYSTEM, ADD DISTILLED WATER, SALT PACK, MIX & IRRIGATE PERFORM 2 TIMES DAILY 1/29/21   Historical Provider   gentamicin sulfate (GENTAMICIN, BULK, MISC) EMPTY CONTENTS OF 1 CAPSULE INTO NASAL IRRIGATION SYSTEM, ADD DISTILLED WATER, SALT PACK, MIX & IRRIGATE. PERFORM 2 TIMES DAILY 10/18/21   Historical Provider   HYDROcodone-acetaminophen (NORCO) 5-325 mg per tablet Take 1 tablet by mouth every 6 (six) hours as needed for Pain. 3/4/22   Matthias Roach MD   immun glob G,IgG,-pro-IgA 0-50 (HIZENTRA) 10 gram/50 mL (20 %) Soln Inject 60 mLs (12 g total) into the skin every 7 days. 10/12/21   Milan Bender MD   ipratropium (ATROVENT) 0.02 % nebulizer solution Nebulize 2.5 ml every 4-6 hours as directed, if needed 11/1/21   Esthela Hu MD   levalbuterol  (XOPENEX) 1.25 mg/3 mL nebulizer solution Take 3 mLs (1.25 mg total) by nebulization every 4 (four) hours. Rescue 11/9/20 11/9/21  Esthela Hu MD   montelukast (SINGULAIR) 10 mg tablet TAKE 1 TABLET EVERY EVENING 5/17/21   Esthela Wang PA-C   NEILMED SINUS RINSE COMPLETE pkdv use as directed 12/28/20   Historical Provider   nortriptyline (PAMELOR) 25 MG capsule Take 1 capsule (25 mg total) by mouth every evening. 12/8/21   Esthela Hu MD   PENTASA 250 mg CpSR TAKE 4 CAPSULES (1000 MG TOTAL) FOUR TIMES A DAY 12/30/20   Esthela Hu MD   polyethylene glycol (GOLYTELY,NULYTELY) 236-22.74-6.74 -5.86 gram suspension Take 4,000 mLs by mouth once. 1/31/22   Historical Provider   polyethylene glycol (MOVIPREP) 100-7.5-2.691 gram solution  2/1/22   Historical Provider   pregabalin (LYRICA) 150 MG capsule Take 1 capsule (150 mg total) by mouth 3 (three) times daily. 11/1/21   Esthela Hu MD   rizatriptan (MAXALT) 10 MG tablet TAKE 1 TABLET IF NEEDED FOR MIGRAINES. MAX 2 TABLETS IN 24 HOURS. 10/5/20   Esthela Hu MD   sod sulf-pot chloride-mag sulf (SUTAB) 1.479-0.188- 0.225 gram tablet Take 12 tablets by mouth once daily. Take according to package instructions with indicated amount of water. 1/24/22   Nicole Leal MD   topiramate (TOPAMAX) 100 MG tablet Take 1 tablet (100 mg total) by mouth 2 (two) times daily. 5/27/21 5/27/22  Esthela Hu MD   triamcinolone acetonide 0.025% (KENALOG) 0.025 % cream 1 application once daily. Apply to affected area 8/26/21   Historical Provider   VENTOLIN HFA 90 mcg/actuation inhaler INHALE 2 PUFFS INTO THE LUNGS EVERY 4 TO 6 HOURS AS NEEDED FOR WHEEZING.  Patient taking differently: Take if needed & bring 6/19/19   Tanya Marc, NP   XYLITOL, BULK, MISC EMPTY CONTENTS OF 1 CAPSULE INTO NASAL IRRIGATION SYSTEM, ADD DISTILLED WATER, SALT PACK, MIX & IRRIGATE. PERFORM 2 TIMES DAILY 2/18/22   Historical Provider   zolpidem (AMBIEN) 5 MG Tab TAKE 1 TABLET BY MOUTH  EVERY DAY AT BEDTIME AS NEEDED 7/14/20   Esthela Hu MD        Allergies:  Review of patient's allergies indicates:  No Known Allergies     Social History:   Social History     Socioeconomic History    Marital status:     Number of children: 2   Occupational History    Occupation: teacher   Tobacco Use    Smoking status: Never Smoker    Smokeless tobacco: Never Used   Substance and Sexual Activity    Alcohol use: No    Drug use: No    Sexual activity: Yes     Partners: Male   Social History Narrative    Surrogate Decision Maker: , Cristobal Murguia, [phone]     Social Determinants of Health     Financial Resource Strain: Low Risk     Difficulty of Paying Living Expenses: Not very hard   Food Insecurity: No Food Insecurity    Worried About Running Out of Food in the Last Year: Never true    Ran Out of Food in the Last Year: Never true   Transportation Needs: No Transportation Needs    Lack of Transportation (Medical): No    Lack of Transportation (Non-Medical): No   Physical Activity: Unknown    Days of Exercise per Week: 2 days   Stress: Stress Concern Present    Feeling of Stress : To some extent   Social Connections: Unknown    Frequency of Communication with Friends and Family: Three times a week    Frequency of Social Gatherings with Friends and Family: Once a week    Active Member of Clubs or Organizations: No    Attends Club or Organization Meetings: 1 to 4 times per year    Marital Status:    Housing Stability: High Risk    Unable to Pay for Housing in the Last Year: No    Number of Places Lived in the Last Year: 1    Unstable Housing in the Last Year: Yes       Family History:  Family History   Problem Relation Age of Onset    Hypertension Mother     Allergies Mother     Kidney disease Father 64        ESRD on HD    Scleroderma Father     Hypertension Brother     Cancer Paternal Grandmother 70        colon    Heart attack Maternal Grandmother      COPD Maternal Grandmother 72       ROS: No acute cardiac events, no acute respiratory complaints.     Physical Exam (all patients):    There were no vitals taken for this visit.  Lungs: Clear to auscultation bilaterally, respirations unlabored  Heart: Regular rate and rhythm, S1 and S2 normal, no obvious murmurs  Abdomen:         Soft, non-tender, bowel sounds normal, no masses, no organomegaly    Lab Results   Component Value Date    WBC 4.59 03/08/2022    MCV 98 03/08/2022    RDW 13.2 03/08/2022     03/08/2022    INR 1.0 11/20/2014     03/08/2022    HGBA1C 5.3 09/02/2021    BUN 7 03/08/2022     03/08/2022    K 3.1 (L) 03/08/2022     03/08/2022        SEDATION PLAN: per anesthesia      History reviewed, vital signs satisfactory, cardiopulmonary status satisfactory, sedation options, risks and plans have been discussed with the patient  All their questions were answered and the patient agrees to the sedation procedures as planned and the patient is deemed an appropriate candidate for the sedation as planned.    Procedure explained to patient, informed consent obtained and placed in chart.    Shay Bruce  3/16/2022  12:24 PM

## 2022-03-16 NOTE — ANESTHESIA POSTPROCEDURE EVALUATION
Anesthesia Post Evaluation    Patient: Jaylin Murguia    Procedure(s) Performed: Procedure(s) (LRB):  COLONOSCOPY (N/A)  EGD (ESOPHAGOGASTRODUODENOSCOPY) (N/A)    Final Anesthesia Type: general      Patient location during evaluation: PACU  Patient participation: Yes- Able to Participate  Level of consciousness: awake and alert and oriented  Post-procedure vital signs: reviewed and stable  Pain management: adequate  Airway patency: patent    PONV status at discharge: No PONV  Anesthetic complications: no      Cardiovascular status: blood pressure returned to baseline, stable and hemodynamically stable  Respiratory status: unassisted  Hydration status: euvolemic  Follow-up not needed.          Vitals Value Taken Time   /72 03/16/22 1413   Temp 36.3 °C (97.3 °F) 03/16/22 1349   Pulse 60 03/16/22 1413   Resp 16 03/16/22 1413   SpO2 98 % 03/16/22 1413         Event Time   Out of Recovery 14:20:00         Pain/Ramya Score: Ramya Score: 9 (3/16/2022  1:49 PM)

## 2022-03-17 ENCOUNTER — PATIENT MESSAGE (OUTPATIENT)
Dept: GASTROENTEROLOGY | Facility: CLINIC | Age: 57
End: 2022-03-17
Payer: COMMERCIAL

## 2022-03-21 ENCOUNTER — PATIENT MESSAGE (OUTPATIENT)
Dept: OTOLARYNGOLOGY | Facility: CLINIC | Age: 57
End: 2022-03-21
Payer: COMMERCIAL

## 2022-03-21 ENCOUNTER — PATIENT MESSAGE (OUTPATIENT)
Dept: GASTROENTEROLOGY | Facility: CLINIC | Age: 57
End: 2022-03-21
Payer: COMMERCIAL

## 2022-03-21 DIAGNOSIS — J32.4 CHRONIC PANSINUSITIS: Primary | ICD-10-CM

## 2022-03-23 ENCOUNTER — PATIENT MESSAGE (OUTPATIENT)
Dept: OTOLARYNGOLOGY | Facility: CLINIC | Age: 57
End: 2022-03-23
Payer: COMMERCIAL

## 2022-03-23 LAB
FINAL PATHOLOGIC DIAGNOSIS: NORMAL
Lab: NORMAL

## 2022-03-28 ENCOUNTER — DOCUMENTATION ONLY (OUTPATIENT)
Dept: OTOLARYNGOLOGY | Facility: CLINIC | Age: 57
End: 2022-03-28
Payer: COMMERCIAL

## 2022-03-28 NOTE — PROGRESS NOTES
Patient is requesting a refilled. ARIS Jones is longer with ENT. Patient needs to make an appointment to get established with another provider.

## 2022-03-29 RX ORDER — CHOLESTYRAMINE 4 G/9G
4 POWDER, FOR SUSPENSION ORAL
Qty: 270 PACKET | Refills: 3 | Status: SHIPPED | OUTPATIENT
Start: 2022-03-29 | End: 2023-10-11

## 2022-03-29 NOTE — PROGRESS NOTES
Colonoscopy showed a few aphthous ulcers, however, biopsies are negative.  CT enterography from one year ago is normal.  CRP, ESR and calpro are all normal.  Pt continues with increase frequency, abdominal pain and mucus.  Denies hematochezia.  Of note, she has had numerous rounds of IV antibiotics over the past 6 months because of sinus infections.      While she is at increased risk of Crohn's recurrence, I am reluctant to start biologic given her immune deficiency and recurrent serious infections in the absence of compelling evidence of active Crohn's disease.  Would like to start with trial of cholestyramine with meals as well as metamucil.        Nicole Leal MD  Gastroenterology

## 2022-03-30 NOTE — TRANSFER OF CARE
"Anesthesia Transfer of Care Note    Patient: Jaylin Murguia    Procedure(s) Performed: Procedure(s) (LRB):  ESOPHAGOGASTRODUODENOSCOPY (EGD) (N/A)  COLONOSCOPY (N/A)    Patient location: PACU    Anesthesia Type: MAC    Transport from OR: Transported from OR on room air with adequate spontaneous ventilation    Post pain: adequate analgesia    Post assessment: no apparent anesthetic complications    Post vital signs: stable    Level of consciousness: awake    Nausea/Vomiting: no nausea/vomiting    Complications: none    Transfer of care protocol was followed      Last vitals:   Visit Vitals    /86    Pulse 66    Temp 37 °C (98.6 °F)    Resp 14    Ht 5' 7" (1.702 m)    Wt 83.9 kg (185 lb)    SpO2 96%    Breastfeeding No    BMI 28.98 kg/m2     " Diclofenac Pending    Insurance response  Prescription Drug Insurance: [facility]  Notes: Prior authorization submitted - will update provider when decision has been made by insurance.

## 2022-03-31 ENCOUNTER — OFFICE VISIT (OUTPATIENT)
Dept: OTOLARYNGOLOGY | Facility: CLINIC | Age: 57
End: 2022-03-31
Payer: COMMERCIAL

## 2022-03-31 ENCOUNTER — LAB VISIT (OUTPATIENT)
Dept: LAB | Facility: HOSPITAL | Age: 57
End: 2022-03-31
Attending: OTOLARYNGOLOGY
Payer: COMMERCIAL

## 2022-03-31 VITALS — WEIGHT: 182.31 LBS | HEIGHT: 67 IN | BODY MASS INDEX: 28.61 KG/M2

## 2022-03-31 DIAGNOSIS — D80.1 HYPOGAMMAGLOBULINEMIA: ICD-10-CM

## 2022-03-31 DIAGNOSIS — J32.4 CHRONIC PANSINUSITIS: ICD-10-CM

## 2022-03-31 DIAGNOSIS — J31.0 NONALLERGIC RHINITIS: Primary | ICD-10-CM

## 2022-03-31 LAB — MAGNESIUM SERPL-MCNC: 1.8 MG/DL (ref 1.6–2.6)

## 2022-03-31 PROCEDURE — 1159F PR MEDICATION LIST DOCUMENTED IN MEDICAL RECORD: ICD-10-PCS | Mod: CPTII,S$GLB,, | Performed by: OTOLARYNGOLOGY

## 2022-03-31 PROCEDURE — 86255 FLUORESCENT ANTIBODY SCREEN: CPT | Mod: 59 | Performed by: OTOLARYNGOLOGY

## 2022-03-31 PROCEDURE — 3008F PR BODY MASS INDEX (BMI) DOCUMENTED: ICD-10-PCS | Mod: CPTII,S$GLB,, | Performed by: OTOLARYNGOLOGY

## 2022-03-31 PROCEDURE — 99214 PR OFFICE/OUTPT VISIT, EST, LEVL IV, 30-39 MIN: ICD-10-PCS | Mod: 25,S$GLB,, | Performed by: OTOLARYNGOLOGY

## 2022-03-31 PROCEDURE — 99999 PR PBB SHADOW E&M-EST. PATIENT-LVL V: ICD-10-PCS | Mod: PBBFAC,,, | Performed by: OTOLARYNGOLOGY

## 2022-03-31 PROCEDURE — 99999 PR PBB SHADOW E&M-EST. PATIENT-LVL V: CPT | Mod: PBBFAC,,, | Performed by: OTOLARYNGOLOGY

## 2022-03-31 PROCEDURE — 31231 NASAL/SINUS ENDOSCOPY: ICD-10-PCS | Mod: S$GLB,,, | Performed by: OTOLARYNGOLOGY

## 2022-03-31 PROCEDURE — 31231 NASAL ENDOSCOPY DX: CPT | Mod: S$GLB,,, | Performed by: OTOLARYNGOLOGY

## 2022-03-31 PROCEDURE — 36415 COLL VENOUS BLD VENIPUNCTURE: CPT | Performed by: OTOLARYNGOLOGY

## 2022-03-31 PROCEDURE — 1159F MED LIST DOCD IN RCRD: CPT | Mod: CPTII,S$GLB,, | Performed by: OTOLARYNGOLOGY

## 2022-03-31 PROCEDURE — 99214 OFFICE O/P EST MOD 30 MIN: CPT | Mod: 25,S$GLB,, | Performed by: OTOLARYNGOLOGY

## 2022-03-31 PROCEDURE — 3008F BODY MASS INDEX DOCD: CPT | Mod: CPTII,S$GLB,, | Performed by: OTOLARYNGOLOGY

## 2022-03-31 PROCEDURE — 83735 ASSAY OF MAGNESIUM: CPT | Performed by: OTOLARYNGOLOGY

## 2022-03-31 RX ORDER — AMLODIPINE BESYLATE 5 MG/1
5 TABLET ORAL DAILY
COMMUNITY
Start: 2022-03-25 | End: 2022-09-16 | Stop reason: CLARIF

## 2022-03-31 NOTE — PROCEDURES
Nasal/sinus endoscopy    Date/Time: 3/31/2022 9:30 AM  Performed by: Matthias Roach MD  Authorized by: Matthias Roach MD     Consent Done?:  Yes (Verbal)  Anesthesia:     Local anesthetic:  Lidocaine 4% and Jose-Synephrine 1/2%    Patient tolerance:  Patient tolerated the procedure well with no immediate complications  Nose:     Procedure Performed:  Nasal Endoscopy  External:      No external nasal deformity  Intranasal:      Mucosa no polyps     Mucosa ulcers not present     No mucosa lesions present     Turbinates not enlarged     No septum gross deformity  Nasopharynx:      No mucosa lesions     Adenoids not present     Posterior choanae patent     Eustachian tube patent     Glob of purulent gray mucus in left sphenoid, removed with suction  Other sinuses all clear and patent bilaterally

## 2022-03-31 NOTE — PROGRESS NOTES
"  Subjective:      Jaylin is a 57 y.o. female who comes for follow-up of sinusitis.  Her last visit with me was on 3/15/2022.  Now 3 weeks status-post revision in-office endoscopic sinus surgery.   Flareup of chartreuse mucus with rinse over past week, just received gentamicin spray.  Less mucus than before, no blockage.    SNOT-22 score: : (P) 57  NOSE score:: (P) 35%  ETDQ-7 score:: (P) 2.1    The patient's medications, allergies, past medical, surgical, social and family histories were reviewed and updated as appropriate.    A detailed review of systems was obtained with pertinent positives as per the above HPI, and otherwise negative.        Objective:     Ht 5' 7" (1.702 m)   Wt 82.7 kg (182 lb 5.1 oz)   BMI 28.56 kg/m²        Constitutional:   She appears well-developed. She is cooperative.     Head:  Normocephalic.     Nose:  No mucosal edema, rhinorrhea, septal deviation or polyps. No epistaxis. Turbinates normal, no turbinate masses and no turbinate hypertrophy.  Right sinus exhibits no maxillary sinus tenderness and no frontal sinus tenderness. Left sinus exhibits no maxillary sinus tenderness and no frontal sinus tenderness.   Sub-cm septal perforation    Mouth/Throat  Oropharynx clear and moist without lesions or asymmetry. No oropharyngeal exudate or posterior oropharyngeal erythema.     Neck:  No adenopathy. Normal range of motion present.     She has no cervical adenopathy.       Procedure    Nasal endoscopy performed.  See procedure note.     Left nasal cavity     Left sinuses patent     Left sphenoid with mucus, removed with suction     Right sinuses patent        Data Reviewed    WBC (K/uL)   Date Value   03/08/2022 4.59     Eosinophil % (%)   Date Value   03/08/2022 6.3     Eos # (K/uL)   Date Value   03/08/2022 0.3     Platelets (K/uL)   Date Value   03/08/2022 238     Glucose (mg/dL)   Date Value   03/08/2022 104     IgE (IU/mL)   Date Value   02/03/2021 <35       Pathology report indicated " chronic inflammation with eosinophilia.    Cultures showed Pseudomonas.     I independently reviewed the images of the CT sinuses dated 9/14/20. Pertinent findings include diffuse opacification of all sinuses with dominant left sphenoid sinus and aplastic right sphenoid.        Assessment:     1. Nonallergic rhinitis    2. Chronic pansinusitis    3. Hypogammaglobulinemia         Plan:     Check ANCA levels, vitamin D, Mg.  Continue gentamicin spray, instructed on directing toward sphenoid.  Continue sinus rinse with clindamycin.  Follow up in about 1 month (around 4/30/2022) for nasal endoscopy.

## 2022-04-04 ENCOUNTER — PATIENT MESSAGE (OUTPATIENT)
Dept: GASTROENTEROLOGY | Facility: CLINIC | Age: 57
End: 2022-04-04
Payer: COMMERCIAL

## 2022-04-04 ENCOUNTER — PATIENT MESSAGE (OUTPATIENT)
Dept: PRIMARY CARE CLINIC | Facility: CLINIC | Age: 57
End: 2022-04-04
Payer: COMMERCIAL

## 2022-04-04 LAB
ANCA AB TITR SER IF: NORMAL TITER
P-ANCA TITR SER IF: NORMAL TITER

## 2022-04-07 ENCOUNTER — PATIENT MESSAGE (OUTPATIENT)
Dept: OTOLARYNGOLOGY | Facility: CLINIC | Age: 57
End: 2022-04-07
Payer: COMMERCIAL

## 2022-04-11 ENCOUNTER — OFFICE VISIT (OUTPATIENT)
Dept: GASTROENTEROLOGY | Facility: CLINIC | Age: 57
End: 2022-04-11
Payer: COMMERCIAL

## 2022-04-11 VITALS
DIASTOLIC BLOOD PRESSURE: 84 MMHG | WEIGHT: 186.75 LBS | HEART RATE: 65 BPM | BODY MASS INDEX: 29.31 KG/M2 | SYSTOLIC BLOOD PRESSURE: 128 MMHG | HEIGHT: 67 IN

## 2022-04-11 DIAGNOSIS — K50.90 CROHN'S DISEASE WITHOUT COMPLICATION, UNSPECIFIED GASTROINTESTINAL TRACT LOCATION: Primary | ICD-10-CM

## 2022-04-11 DIAGNOSIS — R19.7 DIARRHEA, UNSPECIFIED TYPE: ICD-10-CM

## 2022-04-11 PROCEDURE — 3074F PR MOST RECENT SYSTOLIC BLOOD PRESSURE < 130 MM HG: ICD-10-PCS | Mod: CPTII,S$GLB,, | Performed by: INTERNAL MEDICINE

## 2022-04-11 PROCEDURE — 1159F PR MEDICATION LIST DOCUMENTED IN MEDICAL RECORD: ICD-10-PCS | Mod: CPTII,S$GLB,, | Performed by: INTERNAL MEDICINE

## 2022-04-11 PROCEDURE — 99999 PR PBB SHADOW E&M-EST. PATIENT-LVL V: CPT | Mod: PBBFAC,,, | Performed by: INTERNAL MEDICINE

## 2022-04-11 PROCEDURE — 1159F MED LIST DOCD IN RCRD: CPT | Mod: CPTII,S$GLB,, | Performed by: INTERNAL MEDICINE

## 2022-04-11 PROCEDURE — 3008F BODY MASS INDEX DOCD: CPT | Mod: CPTII,S$GLB,, | Performed by: INTERNAL MEDICINE

## 2022-04-11 PROCEDURE — 99999 PR PBB SHADOW E&M-EST. PATIENT-LVL V: ICD-10-PCS | Mod: PBBFAC,,, | Performed by: INTERNAL MEDICINE

## 2022-04-11 PROCEDURE — 99214 OFFICE O/P EST MOD 30 MIN: CPT | Mod: S$GLB,,, | Performed by: INTERNAL MEDICINE

## 2022-04-11 PROCEDURE — 3008F PR BODY MASS INDEX (BMI) DOCUMENTED: ICD-10-PCS | Mod: CPTII,S$GLB,, | Performed by: INTERNAL MEDICINE

## 2022-04-11 PROCEDURE — 3074F SYST BP LT 130 MM HG: CPT | Mod: CPTII,S$GLB,, | Performed by: INTERNAL MEDICINE

## 2022-04-11 PROCEDURE — 3079F PR MOST RECENT DIASTOLIC BLOOD PRESSURE 80-89 MM HG: ICD-10-PCS | Mod: CPTII,S$GLB,, | Performed by: INTERNAL MEDICINE

## 2022-04-11 PROCEDURE — 3079F DIAST BP 80-89 MM HG: CPT | Mod: CPTII,S$GLB,, | Performed by: INTERNAL MEDICINE

## 2022-04-11 PROCEDURE — 99214 PR OFFICE/OUTPT VISIT, EST, LEVL IV, 30-39 MIN: ICD-10-PCS | Mod: S$GLB,,, | Performed by: INTERNAL MEDICINE

## 2022-04-11 NOTE — PROGRESS NOTES
Clinic Consult:  [facility] Consultation Note    Reason for Consult:  The primary encounter diagnosis was Crohn's disease without complication, unspecified gastrointestinal tract location. A diagnosis of Diarrhea, unspecified type was also pertinent to this visit.    PCP: Esthela Hu       HPI:  This is a 57 y.o. female here for evaluation of CD    IBD History  - Type: crohn's disease  - Disease Location: small bowel  - Phenotype: stricture   - Diagnosed: 2000  - Surgeries related to IBD: none  - Extra-intestinal Manifestations: oral aphthous ulcers     Current Medications  Pentasa 1000 mg QID (started 4/2018)     Past Medications  Remicade (in remote past)-- ineffective  Asacol 4.8 gm-- ineffective  Entocort-- effective  Prednisone  Humira (started July 17, stopped 2/2018)-- stopped 2/2 multiple infections.      Drug and Antibody Levels    (2018) Humira level 2.7, intermediate antibody formation.     Endoscopy Reports  5/7/15 colonoscopy: poor prep. Skin tag. Crohn's disease with ileitis. Biopsied. Pathology: focal chronic active ileitis.   5/9//17: erythematous mucosa in the sigmoid, transverse and hepatic flexure. 2 mm polyp at hepatic flexure. Crohn's disease with ileitis. Pathology: colon and ileal bx normal colon mucosa. Polyp normal colon mucosa.   5/9/17 EGD: gastritis and duodenal erosions without bleeding. Pathology acute and chronic non-specific duodenitis with ulceration.   3/27/18 colonoscopy: 3 mm polyp in the sigmoid. No signs of active crohn's disease and no signs of stricture in the TI (advanced up to 15 cm). Pathology: hyperplastic polyp.   3/2020 colonoscopy: SESCD 4, ileitis   2021: normal biopsies      Interval History  Started cholestyramine with meals and says that this has helped significantly.  Not having incontinence or nocturnal BMs once she started taking it.  Says she has 3 loose bms daily now.  Pencil thin stool        Preventative Medicine     Immunizations  -  Influenza:   - Pnemococcal: PCV-13: 2019; PSV-23 2013  - Hepatitis A/B: 2019  - Herpes Zoster: 2015  - COVID-19: 2012 - rec'd first dose      Cancer Prevention   - Date of last pap smear: ?  - Date of last skin cancer screening: due   - Date of last surveillance colonoscopy: n/a     Bone Health  - Vitamin D level: ?  - Date of last DEXA: ?     Therapy Related Testing  - Date of last TB testin  - TPMT status: ?     Miscellaneous  - Vitamin B 12 level (if ileal disease or resection):   - Smoking status: no  - NSAID use: no  - History of C. Diff: no  - Family planning: n/a        ROS:  CONSTITUTIONAL: Denies weight change,  fatigue, fevers, chills, night sweats.  EYES: No changes in vision.   ENT: No oral lesions or sore throat.  HEMATOLOGICAL/Lymph: Denies bleeding tendency, bruising tendency. No swellings or enlarged lymph nodes.  CARDIOVASCULAR: Denies chest pain, shortness of breath, orthopnea and edema.  RESPIRATORY: Denies cough, hemoptysis, dyspnea, and wheezing.  GI: See HPI.  : Denies dysuria and hematuria  MUSCULOSKELETAL: Denies joint pain or swelling, back pain and muscle pain.  SKIN: Denies rashes.  NEUROLOGIC: Denies headaches, seizures and numbness.  PSYCHIATRIC: Denies depression or anxiety.  ENDOCRINE: Denies heat or cold intolerance and excessive thirst or urination.    Medical History:   Past Medical History:   Diagnosis Date    Allergic rhinitis     Asthma     Chronic pansinusitis     Crohn's disease     Ileal involvement, previously on Remicade, Asacol, Prednisone    Fibromyalgia     Hyperlipidemia     Hypertension     Immunosuppression     Migraine     Obstructive sleep apnea     CPAP at night    Sciatica        Surgical History:  Past Surgical History:   Procedure Laterality Date    BLADDER SURGERY      sling was created by her muscles      SECTION      COLONOSCOPY N/A 2017    Procedure: COLONOSCOPY;  Surgeon: Kin Dyer MD;  Location[facility]  ENDO;  Service: Endoscopy;  Laterality: N/A;    COLONOSCOPY N/A 3/27/2018    Procedure: COLONOSCOPY;  Surgeon: Kyra Vallecillo MD;  Location: [facility] ENDO;  Service: Endoscopy;  Laterality: N/A;    COLONOSCOPY N/A 3/12/2020    Procedure: COLONOSCOPY;  Surgeon: Nicole Leal MD;  Location: [facility] ENDO;  Service: Endoscopy;  Laterality: N/A;    COLONOSCOPY N/A 3/16/2022    Procedure: COLONOSCOPY;  Surgeon: Shay Bruce MD;  Location: [facility];  Service: Endoscopy;  Laterality: N/A;    DEBRIDEMENT Bilateral 12/21/2020    Procedure: DEBRIDEMENT;  Surgeon: Matthias Roach MD;  [facility];  Service: ENT;  Laterality: Bilateral;    ESOPHAGOGASTRODUODENOSCOPY N/A 3/12/2020    Procedure: ESOPHAGOGASTRODUODENOSCOPY (EGD);  Surgeon: Nicole Leal MD;  Location: [facility];  Service: Endoscopy;  Laterality: N/A;    ESOPHAGOGASTRODUODENOSCOPY N/A 3/16/2022    Procedure: EGD (ESOPHAGOGASTRODUODENOSCOPY);  Surgeon: Shay Bruce MD;  Location: [facility];  Service: Endoscopy;  Laterality: N/A;    FINGER SURGERY      joint relpacement, left hand index finger    FUNCTIONAL ENDOSCOPIC SINUS SURGERY (FESS) USING COMPUTER-ASSISTED NAVIGATION Bilateral 7/31/2019    Procedure: FESS, USING COMPUTER-ASSISTED NAVIGATION;  Surgeon: Manish Shaffer MD;  Location: [facility];  Service: ENT;  Laterality: Bilateral;    FUNCTIONAL ENDOSCOPIC SINUS SURGERY (FESS) USING COMPUTER-ASSISTED NAVIGATION Bilateral 9/25/2020    Procedure: FESS, USING COMPUTER-ASSISTED NAVIGATION SPHENOID;  Surgeon: Matthias Roach MD;  [facility];  Service: ENT;  Laterality: Bilateral;  TIVA    HYSTERECTOMY      SINUS SURGERY      WISDOM TOOTH EXTRACTION         Family History:   Family History   Problem Relation Age of Onset    Hypertension Mother     Allergies Mother     Kidney disease Father 64        ESRD on HD    Scleroderma Father     Hypertension Brother     Cancer Paternal Grandmother 70         colon    Heart attack Maternal Grandmother     COPD Maternal Grandmother 72       Social History:   Social History     Tobacco Use    Smoking status: Never Smoker    Smokeless tobacco: Never Used   Substance Use Topics    Alcohol use: No    Drug use: No       Allergies: Reviewed    Home Medications:   Medication List with Changes/Refills   New Medications    DILTIAZEM HCL (DILTIAZEM 2% - LIDOCAINE 5% CREAM)    Apply peasize amount topically to anal area.    DIPHENHYDRAMINE-ALUMINUM-MAGNESIUM HYDROXIDE-SIMETHICONE-LIDOCAINE HCL 2%    Swish and spit 15 mLs every 4 (four) hours as needed (oral ulcers, pain).   Current Medications    AMLODIPINE (NORVASC) 5 MG TABLET    Take 5 mg by mouth once daily.    ATORVASTATIN (LIPITOR) 10 MG TABLET    Take 1 tablet (10 mg total) by mouth once daily.    AZELASTINE (ASTELIN) 137 MCG (0.1 %) NASAL SPRAY    2 sprays (274 mcg total) by Nasal route 2 (two) times daily.    BUTALBITAL-ACETAMINOPHEN-CAFFEINE -40 MG (FIORICET, ESGIC) -40 MG PER TABLET    TAKE 1 TABLET EVERY 4 HOURS AS NEEDED FOR HEADACHE    CHOLESTYRAMINE (QUESTRAN) 4 GRAM PACKET    Take 1 packet (4 g total) by mouth 3 (three) times daily with meals.    CLINDAMYCIN (CLEOCIN) 150 MG/ML INJECTION    EMPTY CONTENTS OF 1 VIAL INTO NASAL IRRIGATION SYSTEM, ADD DISTILLED WATER, SALT PACK, MIX & IRRIGATE PERFORM 2 TIMES DAILY    CLINDAMYCIN (CLEOCIN) 300 MG CAPSULE    EMPTY CONTENTS OF 2 CAPSULES INTO NASAL IRRIGATION SYSTEM, ADD DISTILLED WATER, SALT PACK, MIX & IRRIGATE. PERFORM 2 TIMES DAILY    DESLORATADINE (CLARINEX) 5 MG TABLET    Take 1 tablet (5 mg total) by mouth once daily.    DIAZEPAM (VALIUM) 5 MG TABLET    One pill to be taken 1 hour before the scheduled procedure.  Bring the 2nd pill with you to the clinic.    DULOXETINE (CYMBALTA) 60 MG CAPSULE    Take 1 capsule (60 mg total) by mouth 2 (two) times daily.    ELETRIPTAN (RELPAX) 40 MG TABLET    Take 1 tablet (40 mg total) by mouth as needed.     ESTRADIOL (ESTRACE) 2 MG TABLET    Take 1 tablet (2 mg total) by mouth every evening.    FLUTICASONE FUROATE-VILANTEROL (BREO ELLIPTA) 200-25 MCG/DOSE DSDV DISKUS INHALER    Inhale 1 puff into the lungs once daily.    FLUTICASONE PROPIONATE (FLONASE) 50 MCG/ACTUATION NASAL SPRAY    SPRAY 2 SPRAYS BY EACH NOSTRIL ROUTE ONCE DAILY.    GENTAMICIN (GARAMYCIN) 40 MG/ML INJECTION    EMPTY CONTENTS OF 1 VIAL INTO NASAL IRRIGATION SYSTEM, ADD DISTILLED WATER, SALT PACK, MIX & IRRIGATE PERFORM 2 TIMES DAILY    GENTAMICIN SULFATE (GENTAMICIN, BULK, MISC)    EMPTY CONTENTS OF 1 CAPSULE INTO NASAL IRRIGATION SYSTEM, ADD DISTILLED WATER, SALT PACK, MIX & IRRIGATE. PERFORM 2 TIMES DAILY    HYDROCODONE-ACETAMINOPHEN (NORCO) 5-325 MG PER TABLET    Take 1 tablet by mouth every 6 (six) hours as needed for Pain.    IMMUN GLOB G,IGG,-PRO-IGA 0-50 (HIZENTRA) 10 GRAM/50 ML (20 %) SOLN    Inject 60 mLs (12 g total) into the skin every 7 days.    INV OPN-375/PLACEBO 93 MCG SPRM NASAL SPRAY    2 sprays by Alternating Nostrils route 2 (two) times a day.    IPRATROPIUM (ATROVENT) 0.02 % NEBULIZER SOLUTION    Nebulize 2.5 ml every 4-6 hours as directed, if needed    LEVALBUTEROL (XOPENEX) 1.25 MG/3 ML NEBULIZER SOLUTION    Take 3 mLs (1.25 mg total) by nebulization every 4 (four) hours. Rescue    LOSARTAN-HYDROCHLOROTHIAZIDE 100-25 MG (HYZAAR) 100-25 MG PER TABLET    TAKE 1 TABLET BY MOUTH EVERY DAY    MONTELUKAST (SINGULAIR) 10 MG TABLET    TAKE 1 TABLET EVERY EVENING    NEILMED SINUS RINSE COMPLETE PKDV    use as directed    NORTRIPTYLINE (PAMELOR) 25 MG CAPSULE    Take 1 capsule (25 mg total) by mouth every evening.    PENTASA 250 MG CPSR    TAKE 4 CAPSULES (1000 MG TOTAL) FOUR TIMES A DAY    POLYETHYLENE GLYCOL (GOLYTELY,NULYTELY) 236-22.74-6.74 -5.86 GRAM SUSPENSION    Take 4,000 mLs by mouth once.    POLYETHYLENE GLYCOL (MOVIPREP) 100-7.5-2.691 GRAM SOLUTION        PREGABALIN (LYRICA) 150 MG CAPSULE    Take 1 capsule (150 mg total) by  "mouth 3 (three) times daily.    PROPRANOLOL (INDERAL LA) 80 MG 24 HR CAPSULE    Take 1 capsule (80 mg total) by mouth once daily.    RIZATRIPTAN (MAXALT) 10 MG TABLET    TAKE 1 TABLET IF NEEDED FOR MIGRAINES. MAX 2 TABLETS IN 24 HOURS.    SOD SULF-POT CHLORIDE-MAG SULF (SUTAB) 1.479-0.188- 0.225 GRAM TABLET    Take 12 tablets by mouth once daily. Take according to package instructions with indicated amount of water.    TIOTROPIUM BROMIDE (SPIRIVA RESPIMAT) 1.25 MCG/ACTUATION MIST    Inhale 2 puffs into the lungs once daily.    TOPIRAMATE (TOPAMAX) 100 MG TABLET    Take 1 tablet (100 mg total) by mouth 2 (two) times daily.    TRIAMCINOLONE ACETONIDE 0.025% (KENALOG) 0.025 % CREAM    1 application once daily. Apply to affected area    VENTOLIN HFA 90 MCG/ACTUATION INHALER    INHALE 2 PUFFS INTO THE LUNGS EVERY 4 TO 6 HOURS AS NEEDED FOR WHEEZING.    XYLITOL, BULK, MISC    EMPTY CONTENTS OF 1 CAPSULE INTO NASAL IRRIGATION SYSTEM, ADD DISTILLED WATER, SALT PACK, MIX & IRRIGATE. PERFORM 2 TIMES DAILY    ZOLPIDEM (AMBIEN) 5 MG TAB    TAKE 1 TABLET BY MOUTH EVERY DAY AT BEDTIME AS NEEDED         Physical Exam:  Vital Signs:  /84 (BP Location: Left arm, Patient Position: Sitting, BP Method: Medium (Manual))   Pulse 65   Ht 5' 7" (1.702 m)   Wt 84.7 kg (186 lb 11.7 oz)   BMI 29.25 kg/m²   Body mass index is 29.25 kg/m².      GENERAL: No acute distress, A&Ox3  EYES: Anicteric, no pallor noted.  ENT: OP clear  NECK: Supple, no masses, no thyromegally.  CHEST: Equal breath sounds bilaterally, no wheezing.  CARDIOVASCULAR: Regular rate and rhythm. Murmurs, rubs and gallops absent.  ABDOMEN: soft, non-tender, non-distended, normal bowel sounds, no hepatosplenomegaly   EXTREMITIES: No clubbing, cyanosis or edema.  SKIN: Without lesion or erythema.  LYMPH: No cervical, axillary lymphadenopathy palpable.   NEUROLOGICAL: Grossly normal, no asterixis present.    Labs: Pertinent labs reviewed.    Assessment and Plan:  Crohn's " disease without complication, unspecified gastrointestinal tract location  No evidence of activity on recent scope.  Continue to monitor closely    Diarrhea, unspecified type  Not Crohn's related. May have been dysbiosis from all of the antibiotic use.  Now Improved with cholestyramine   Continue with each meal    Other orders  -     diltiazem HCl (DILTIAZEM 2% - LIDOCAINE 5% CREAM); Apply peasize amount topically to anal area.  Dispense: 30 g; Refill: 2  -     diphenhydrAMINE-aluminum-magnesium hydroxide-simethicone-LIDOcaine HCl 2%; Swish and spit 15 mLs every 4 (four) hours as needed (oral ulcers, pain).  Dispense: 1 Bottle; Refill: 2        Thank you so much for allowing me to participate in the care of Jaylin Leal MD

## 2022-04-13 ENCOUNTER — PATIENT MESSAGE (OUTPATIENT)
Dept: GASTROENTEROLOGY | Facility: CLINIC | Age: 57
End: 2022-04-13
Payer: COMMERCIAL

## 2022-04-20 ENCOUNTER — PATIENT MESSAGE (OUTPATIENT)
Dept: GASTROENTEROLOGY | Facility: CLINIC | Age: 57
End: 2022-04-20
Payer: COMMERCIAL

## 2022-04-22 ENCOUNTER — PATIENT MESSAGE (OUTPATIENT)
Dept: GASTROENTEROLOGY | Facility: CLINIC | Age: 57
End: 2022-04-22
Payer: COMMERCIAL

## 2022-04-22 RX ORDER — FLUCONAZOLE 200 MG/1
TABLET ORAL
Qty: 16 TABLET | Refills: 0 | Status: SHIPPED | OUTPATIENT
Start: 2022-04-22 | End: 2022-05-06

## 2022-05-02 ENCOUNTER — PATIENT MESSAGE (OUTPATIENT)
Dept: OTOLARYNGOLOGY | Facility: CLINIC | Age: 57
End: 2022-05-02
Payer: COMMERCIAL

## 2022-05-05 ENCOUNTER — PATIENT MESSAGE (OUTPATIENT)
Dept: OTOLARYNGOLOGY | Facility: CLINIC | Age: 57
End: 2022-05-05
Payer: COMMERCIAL

## 2022-05-05 ENCOUNTER — PATIENT MESSAGE (OUTPATIENT)
Dept: PRIMARY CARE CLINIC | Facility: CLINIC | Age: 57
End: 2022-05-05
Payer: COMMERCIAL

## 2022-05-05 DIAGNOSIS — R05.9 COUGH: Primary | ICD-10-CM

## 2022-05-06 ENCOUNTER — OFFICE VISIT (OUTPATIENT)
Dept: PRIMARY CARE CLINIC | Facility: CLINIC | Age: 57
End: 2022-05-06
Payer: COMMERCIAL

## 2022-05-06 ENCOUNTER — HOSPITAL ENCOUNTER (OUTPATIENT)
Dept: RADIOLOGY | Facility: HOSPITAL | Age: 57
Discharge: HOME | End: 2022-05-06
Attending: PHYSICIAN ASSISTANT
Payer: COMMERCIAL

## 2022-05-06 VITALS
OXYGEN SATURATION: 96 % | RESPIRATION RATE: 18 BRPM | HEIGHT: 67 IN | HEART RATE: 62 BPM | SYSTOLIC BLOOD PRESSURE: 130 MMHG | DIASTOLIC BLOOD PRESSURE: 80 MMHG | WEIGHT: 182.44 LBS | TEMPERATURE: 98 F | BODY MASS INDEX: 28.63 KG/M2

## 2022-05-06 DIAGNOSIS — R07.81 RIB PAIN: Primary | ICD-10-CM

## 2022-05-06 DIAGNOSIS — R55 SYNCOPE, UNSPECIFIED SYNCOPE TYPE: ICD-10-CM

## 2022-05-06 DIAGNOSIS — R05.9 COUGH: ICD-10-CM

## 2022-05-06 DIAGNOSIS — R07.81 RIB PAIN: ICD-10-CM

## 2022-05-06 DIAGNOSIS — R22.2 LOCALIZED SWELLING, MASS AND LUMP, TRUNK: ICD-10-CM

## 2022-05-06 DIAGNOSIS — G47.00 INSOMNIA, UNSPECIFIED TYPE: ICD-10-CM

## 2022-05-06 PROCEDURE — 3008F PR BODY MASS INDEX (BMI) DOCUMENTED: ICD-10-PCS | Mod: CPTII,S$GLB,, | Performed by: PHYSICIAN ASSISTANT

## 2022-05-06 PROCEDURE — 71046 X-RAY EXAM CHEST 2 VIEWS: CPT | Mod: TC,FY,PO

## 2022-05-06 PROCEDURE — 3079F PR MOST RECENT DIASTOLIC BLOOD PRESSURE 80-89 MM HG: ICD-10-PCS | Mod: CPTII,S$GLB,, | Performed by: PHYSICIAN ASSISTANT

## 2022-05-06 PROCEDURE — 3079F DIAST BP 80-89 MM HG: CPT | Mod: CPTII,S$GLB,, | Performed by: PHYSICIAN ASSISTANT

## 2022-05-06 PROCEDURE — 3075F SYST BP GE 130 - 139MM HG: CPT | Mod: CPTII,S$GLB,, | Performed by: PHYSICIAN ASSISTANT

## 2022-05-06 PROCEDURE — 71110 X-RAY EXAM RIBS BIL 3 VIEWS: CPT | Mod: TC

## 2022-05-06 PROCEDURE — 1160F RVW MEDS BY RX/DR IN RCRD: CPT | Mod: CPTII,S$GLB,, | Performed by: PHYSICIAN ASSISTANT

## 2022-05-06 PROCEDURE — 71110 X-RAY EXAM RIBS BIL 3 VIEWS: CPT | Mod: 26,,, | Performed by: RADIOLOGY

## 2022-05-06 PROCEDURE — 99999 PR PBB SHADOW E&M-EST. PATIENT-LVL V: ICD-10-PCS | Mod: PBBFAC,,, | Performed by: PHYSICIAN ASSISTANT

## 2022-05-06 PROCEDURE — 99214 PR OFFICE/OUTPT VISIT, EST, LEVL IV, 30-39 MIN: ICD-10-PCS | Mod: S$GLB,,, | Performed by: PHYSICIAN ASSISTANT

## 2022-05-06 PROCEDURE — 71046 X-RAY EXAM CHEST 2 VIEWS: CPT | Mod: 26,,, | Performed by: RADIOLOGY

## 2022-05-06 PROCEDURE — 71110 XR RIBS 3 VIEWS BILATERAL: ICD-10-PCS | Mod: 26,,, | Performed by: RADIOLOGY

## 2022-05-06 PROCEDURE — 71046 XR CHEST PA AND LATERAL: ICD-10-PCS | Mod: 26,,, | Performed by: RADIOLOGY

## 2022-05-06 PROCEDURE — 3075F PR MOST RECENT SYSTOLIC BLOOD PRESS GE 130-139MM HG: ICD-10-PCS | Mod: CPTII,S$GLB,, | Performed by: PHYSICIAN ASSISTANT

## 2022-05-06 PROCEDURE — 99999 PR PBB SHADOW E&M-EST. PATIENT-LVL V: CPT | Mod: PBBFAC,,, | Performed by: PHYSICIAN ASSISTANT

## 2022-05-06 PROCEDURE — 1159F MED LIST DOCD IN RCRD: CPT | Mod: CPTII,S$GLB,, | Performed by: PHYSICIAN ASSISTANT

## 2022-05-06 PROCEDURE — 3008F BODY MASS INDEX DOCD: CPT | Mod: CPTII,S$GLB,, | Performed by: PHYSICIAN ASSISTANT

## 2022-05-06 PROCEDURE — 1159F PR MEDICATION LIST DOCUMENTED IN MEDICAL RECORD: ICD-10-PCS | Mod: CPTII,S$GLB,, | Performed by: PHYSICIAN ASSISTANT

## 2022-05-06 PROCEDURE — 1160F PR REVIEW ALL MEDS BY PRESCRIBER/CLIN PHARMACIST DOCUMENTED: ICD-10-PCS | Mod: CPTII,S$GLB,, | Performed by: PHYSICIAN ASSISTANT

## 2022-05-06 PROCEDURE — 99214 OFFICE O/P EST MOD 30 MIN: CPT | Mod: S$GLB,,, | Performed by: PHYSICIAN ASSISTANT

## 2022-05-06 RX ORDER — TRAZODONE HYDROCHLORIDE 50 MG/1
50 TABLET ORAL NIGHTLY
Qty: 30 TABLET | Refills: 11 | Status: SHIPPED | OUTPATIENT
Start: 2022-05-06 | End: 2022-09-16 | Stop reason: CLARIF

## 2022-05-06 RX ORDER — AZITHROMYCIN 250 MG/1
250 TABLET, FILM COATED ORAL DAILY
Qty: 6 TABLET | Refills: 0 | Status: SHIPPED | OUTPATIENT
Start: 2022-05-06 | End: 2022-05-11

## 2022-05-06 RX ORDER — HYDROCODONE BITARTRATE AND ACETAMINOPHEN 5; 325 MG/1; MG/1
1 TABLET ORAL EVERY 8 HOURS PRN
Qty: 8 TABLET | Refills: 0 | Status: SHIPPED | OUTPATIENT
Start: 2022-05-06 | End: 2022-05-09

## 2022-05-06 RX ORDER — TRAMADOL HYDROCHLORIDE 50 MG/1
50 TABLET ORAL EVERY 8 HOURS PRN
Qty: 16 EACH | Refills: 0 | Status: CANCELLED | OUTPATIENT
Start: 2022-05-06

## 2022-05-06 NOTE — PROGRESS NOTES
Subjective:      Patient ID: Jaylin Murguia is a 57 y.o. female.    Chief Complaint: Follow-up (Fall )    Jaylin Murguia is a 57 y.o. female who presents to clinic for follow-up after fall in bathroom at home with right sided rib pain.      Per patient, she was standing looking in mirror, took inhaler (breo 200), next thing I knew, felt this coldness on back, felt like I was in dreamlike state, realized feeling floor on my back, had to roll over on all fours and get up/get in bathtub.  Has been coughing a lot in sleep, so been trying to use prior to sleep.  Not sure if just had used the restroom prior to taking inhaler, but think it is possible as use the restroom often with history of crohn's.   Feel like asthma is really bad right now.  Have even been using nebulizer.  Now with right sided rib pain.  Has prior hx of injury to left rib.  Cant  anything that weighs more than a pound due to pain.  Has been using a compression brace to help with pain, with some relief.  Took tylenol for relief of pain.       Dr Hu-- hi, one of the craziest things has happened to me!!  Night before last, I was using my inhaler, and I passed out!  I am quite sure that I broke a rib or two on the right side. ( reminder: I fractured 2 ribs two years ago coughing when I had pneumonia).   I am in a lot of pain! Barely able to sleep, I am having a lot of allergy coughing now which is unbearable.     My question is:  is there any reason to get an X-ray?  What do u suggest I do?  Anything I can do for the pain?    Past Medical History:  No date: Allergic rhinitis  No date: Asthma  No date: Chronic pansinusitis  No date: Crohn's disease      Comment:  Ileal involvement, previously on Remicade, Asacol,                Prednisone  No date: Fibromyalgia  No date: Hyperlipidemia  No date: Hypertension  20020: Immunosuppression - on infusions to boost immune system   No date: Migraine  No date: Obstructive sleep apnea      Comment:   "CPAP at night  No date: Sciatica      Postmenopausal - on estrogen   HTN - norvasc, losartan, hctz   Allergies - singulair   Asthma -   Sleep - ambium   Hx of pseudomonas in sinuses -   Take hizentra         Review of Systems   Constitutional: Negative for chills, diaphoresis, fatigue and fever.   Respiratory: Positive for cough and wheezing.    Gastrointestinal: Negative for abdominal pain, diarrhea, nausea and vomiting.   Musculoskeletal:        Right sided rib pain    Skin: Negative for rash.       Objective:   /80   Pulse 62   Temp 98.2 °F (36.8 °C) (Temporal)   Resp 18   Ht 5' 7" (1.702 m)   Wt 82.7 kg (182 lb 6.9 oz)   SpO2 96%   BMI 28.57 kg/m²   Physical Exam  Vitals reviewed.   Constitutional:       General: She is not in acute distress.     Appearance: She is well-developed. She is not ill-appearing, toxic-appearing or diaphoretic.   HENT:      Head: Normocephalic and atraumatic.      Right Ear: External ear normal.      Left Ear: External ear normal.      Nose: Nose normal.   Cardiovascular:      Rate and Rhythm: Normal rate and regular rhythm.      Pulses:           Radial pulses are 2+ on the right side and 2+ on the left side.      Heart sounds: Normal heart sounds, S1 normal and S2 normal.   Pulmonary:      Effort: Pulmonary effort is normal. No tachypnea, bradypnea, accessory muscle usage or respiratory distress.      Breath sounds: Wheezing present. No decreased breath sounds, rhonchi or rales.      Comments: Tenderness over lower right ribs   Chest:      Chest wall: No tenderness.   Skin:     General: Skin is warm and dry.      Capillary Refill: Capillary refill takes less than 2 seconds.   Neurological:      Mental Status: She is alert and oriented to person, place, and time. She is not disoriented.      Cranial Nerves: No cranial nerve deficit.      Sensory: No sensory deficit.      Motor: No abnormal muscle tone.   Psychiatric:         Behavior: Behavior normal.       Assessment:    "   1. Rib pain    2. Syncope, unspecified syncope type    3. Insomnia, unspecified type    4. Cough    5. Localized swelling, mass and lump, trunk      CLINICAL HISTORY:  Cough, unspecified     TECHNIQUE:  PA and lateral views of the chest were performed.     COMPARISON:  02/10/2022     FINDINGS:  There is a density seen on the lateral film in the retrosternal clear space which is new when compared to the prior exam.  This may be within the anterior portion of either lung.  Consider further evaluation with CT of the chest.  The heart is not enlarged.     Impression:  Density only seen on the lateral film in the retrosternal clear space new when compared to the prior study.  Further evaluation with CT of the chest is recommended.  This report was flagged in Epic as abnormal.       Plan:   Rib pain  Comments:  discussed tylenol, topical (voltaren/lidocaine), deep breathing exercises, short course of norco for breakthrough pain, rib x-ray  Orders:  -     X-Ray Ribs 2 View Right; Future; Expected date: 05/06/2022  -     X-Ray Ribs 2 View Left; Future; Expected date: 05/06/2022  -     HYDROcodone-acetaminophen (NORCO) 5-325 mg per tablet; Take 1 tablet by mouth every 8 (eight) hours as needed for Pain.  Dispense: 8 tablet; Refill: 0    Syncope, unspecified syncope type  Comments:  suspect micturation syncope since occurred in bathroom suspect after using restroom - check CBC/CMP to rule out abnormalities that could cause syncope   Orders:  -     Cancel: CBC Auto Differential; Future; Expected date: 05/06/2022  -     CBC Auto Differential; Future; Expected date: 05/06/2022  -     Comprehensive Metabolic Panel; Future; Expected date: 05/06/2022    Insomnia, unspecified type  Comments:  trazodone prn for insomnia assoc. with rib pain   Orders:  -     traZODone (DESYREL) 50 MG tablet; Take 1 tablet (50 mg total) by mouth every evening.  Dispense: 30 tablet; Refill: 11    Cough  Comments:  associated with acute exacerbation of  asthma symptoms - pt reports prone to infection - will cover with azithromycin   Orders:  -     azithromycin (Z-PURA) 250 MG tablet; Take 1 tablet (250 mg total) by mouth once daily. Take 2 tablets by mouth on day 1, then one tablet daily on days 2-5. for 5 days  Dispense: 6 tablet; Refill: 0    Localized swelling, mass and lump, trunk  Comments:  density on CXR with recommendation for follow-up CT - schedule CT chest   Orders:  -     CT Chest Without Contrast; Future; Expected date: 05/06/2022      Obtain x-rays of your ribs  We will schedule CT chest for follow-up for your chest x-ray   Continue Tylenol 1000 mg every 6 hours as needed for pain   Can take Norco as needed for breakthrough pain  Can do topical over the counter voltaren gel as well as 4% lidocaine patches for rib pain  Deep breathing exercises to keep lungs open  Steroid to both help decrease inflammation after fall as well as help with acute asthma symptoms  Will cover with antibiotic for lungs given worsening asthma symptoms cough     Over the counter sleep aid we discussed is called doxylamine 25 mg nightly before bedtime   03/04/2022 11/01/2021   1  Pregabalin 150 Mg Capsule   270.00  90  Me Lov  [id number]  Exp (4317)  1  3.01 LME  [place]     03/04/2022 03/04/2022   1  Diazepam 5 Mg Tablet   2.00  1  Ed [facility]  [id number]  Anne-Marie ([id number])  0  1.00 LME  [place]     03/04/2022 03/04/2022   1  Hydrocodone-Acetamin 5-325 Mg   5.00  1  Ed [facility]  [id number]  Anne-Marie ([id number])  0  25.00 MME  [place]     02/21/2022 02/21/2022   1  Diphenoxylate-Atrop 2.5-0.025   20.00  5  Me Lov  [id number]  Anne-Marie ([id number])  0  0.00 MME  [place]     11/03/2021 11/01/2021   1  Pregabalin 150 Mg Capsule   270.00  90  Me Lov  [id number]  Exp (4317)  0  3.01 LME  [place]        Esthela Wang PA-C   Physician Assistant   [facility]

## 2022-05-06 NOTE — PATIENT INSTRUCTIONS
Obtain x-rays of your ribs  We will schedule CT chest for follow-up for your chest x-ray   Continue Tylenol 1000 mg every 6 hours as needed for pain   Can take Norco as needed for breakthrough pain  Can do topical over the counter voltaren gel as well as 4% lidocaine patches for rib pain  Deep breathing exercises to keep lungs open  Steroid to both help decrease inflammation after fall as well as help with acute asthma symptoms  Will cover with antibiotic for lungs given worsening asthma symptoms cough     Over the counter sleep aid we discussed is called doxylamine 25 mg nightly before bedtime

## 2022-05-09 ENCOUNTER — PATIENT MESSAGE (OUTPATIENT)
Dept: PRIMARY CARE CLINIC | Facility: CLINIC | Age: 57
End: 2022-05-09
Payer: COMMERCIAL

## 2022-05-09 DIAGNOSIS — R74.8 ELEVATED LIVER ENZYMES: ICD-10-CM

## 2022-05-09 DIAGNOSIS — E87.1 HYPONATREMIA: Primary | ICD-10-CM

## 2022-05-09 RX ORDER — PROPRANOLOL HYDROCHLORIDE 80 MG/1
CAPSULE, EXTENDED RELEASE ORAL
Qty: 90 CAPSULE | Refills: 2 | Status: SHIPPED | OUTPATIENT
Start: 2022-05-09 | End: 2023-02-06 | Stop reason: SDUPTHER

## 2022-05-09 NOTE — TELEPHONE ENCOUNTER
No new care gaps identified.  Health system Embedded Care Gaps. Reference number: [id number]. 5/09/2022   12:12:41 AM MARINET

## 2022-05-09 NOTE — TELEPHONE ENCOUNTER
Refill Routing Note   Medication(s) are not appropriate for processing by [facility] for the following reason(s):      - Patient has been seen in the ED/Hospital since the last PCP visit    [facility] action(s):  Defer          Medication reconciliation completed: No     Appointments  past 12m or future 3m with PCP    Date Provider   Last Visit   12/8/2021 Esthela Hu MD   Next Visit   5/26/2022 Esthela Hu MD   ED visits in past 90 days: 0        Note composed:9:36 AM 05/09/2022

## 2022-05-10 ENCOUNTER — LAB VISIT (OUTPATIENT)
Dept: LAB | Facility: HOSPITAL | Age: 57
End: 2022-05-10
Attending: FAMILY MEDICINE
Payer: COMMERCIAL

## 2022-05-10 DIAGNOSIS — E87.1 HYPONATREMIA: ICD-10-CM

## 2022-05-10 LAB
ALBUMIN SERPL BCP-MCNC: 3.5 G/DL (ref 3.5–5.2)
ALP SERPL-CCNC: 101 U/L (ref 55–135)
ALT SERPL W/O P-5'-P-CCNC: 46 U/L (ref 10–44)
ANION GAP SERPL CALC-SCNC: 7 MMOL/L (ref 8–16)
AST SERPL-CCNC: 37 U/L (ref 10–40)
BILIRUB SERPL-MCNC: 0.3 MG/DL (ref 0.1–1)
BUN SERPL-MCNC: 10 MG/DL (ref 6–20)
CALCIUM SERPL-MCNC: 9.2 MG/DL (ref 8.7–10.5)
CHLORIDE SERPL-SCNC: 100 MMOL/L (ref 95–110)
CO2 SERPL-SCNC: 25 MMOL/L (ref 23–29)
CREAT SERPL-MCNC: 0.8 MG/DL (ref 0.5–1.4)
EST. GFR  (AFRICAN AMERICAN): >60 ML/MIN/1.73 M^2
EST. GFR  (NON AFRICAN AMERICAN): >60 ML/MIN/1.73 M^2
GLUCOSE SERPL-MCNC: 93 MG/DL (ref 70–110)
POTASSIUM SERPL-SCNC: 4.2 MMOL/L (ref 3.5–5.1)
PROT SERPL-MCNC: 7.4 G/DL (ref 6–8.4)
SODIUM SERPL-SCNC: 132 MMOL/L (ref 136–145)

## 2022-05-10 PROCEDURE — 80053 COMPREHEN METABOLIC PANEL: CPT | Performed by: PHYSICIAN ASSISTANT

## 2022-05-10 PROCEDURE — 36415 COLL VENOUS BLD VENIPUNCTURE: CPT | Performed by: PHYSICIAN ASSISTANT

## 2022-05-10 RX ORDER — LOSARTAN POTASSIUM 100 MG/1
100 TABLET ORAL DAILY
Qty: 90 TABLET | Refills: 3 | Status: SHIPPED | OUTPATIENT
Start: 2022-05-10 | End: 2023-12-05 | Stop reason: SDUPTHER

## 2022-05-10 RX ORDER — PROMETHAZINE HYDROCHLORIDE AND DEXTROMETHORPHAN HYDROBROMIDE 6.25; 15 MG/5ML; MG/5ML
5 SYRUP ORAL NIGHTLY PRN
Qty: 118 ML | Refills: 0 | Status: SHIPPED | OUTPATIENT
Start: 2022-05-10 | End: 2022-05-20

## 2022-05-16 ENCOUNTER — OFFICE VISIT (OUTPATIENT)
Dept: OTOLARYNGOLOGY | Facility: CLINIC | Age: 57
End: 2022-05-16
Payer: COMMERCIAL

## 2022-05-16 VITALS — HEIGHT: 67 IN | BODY MASS INDEX: 28.79 KG/M2 | WEIGHT: 183.44 LBS

## 2022-05-16 DIAGNOSIS — J31.0 NONALLERGIC RHINITIS: Primary | ICD-10-CM

## 2022-05-16 DIAGNOSIS — J32.4 CHRONIC PANSINUSITIS: ICD-10-CM

## 2022-05-16 PROCEDURE — 4010F ACE/ARB THERAPY RXD/TAKEN: CPT | Mod: CPTII,S$GLB,, | Performed by: OTOLARYNGOLOGY

## 2022-05-16 PROCEDURE — 3008F PR BODY MASS INDEX (BMI) DOCUMENTED: ICD-10-PCS | Mod: CPTII,S$GLB,, | Performed by: OTOLARYNGOLOGY

## 2022-05-16 PROCEDURE — 4010F PR ACE/ARB THEARPY RXD/TAKEN: ICD-10-PCS | Mod: CPTII,S$GLB,, | Performed by: OTOLARYNGOLOGY

## 2022-05-16 PROCEDURE — 1159F MED LIST DOCD IN RCRD: CPT | Mod: CPTII,S$GLB,, | Performed by: OTOLARYNGOLOGY

## 2022-05-16 PROCEDURE — 31231 NASAL/SINUS ENDOSCOPY: ICD-10-PCS | Mod: S$GLB,,, | Performed by: OTOLARYNGOLOGY

## 2022-05-16 PROCEDURE — 3008F BODY MASS INDEX DOCD: CPT | Mod: CPTII,S$GLB,, | Performed by: OTOLARYNGOLOGY

## 2022-05-16 PROCEDURE — 99999 PR PBB SHADOW E&M-EST. PATIENT-LVL V: CPT | Mod: PBBFAC,,, | Performed by: OTOLARYNGOLOGY

## 2022-05-16 PROCEDURE — 99214 PR OFFICE/OUTPT VISIT, EST, LEVL IV, 30-39 MIN: ICD-10-PCS | Mod: 25,S$GLB,, | Performed by: OTOLARYNGOLOGY

## 2022-05-16 PROCEDURE — 87077 CULTURE AEROBIC IDENTIFY: CPT | Mod: 59 | Performed by: OTOLARYNGOLOGY

## 2022-05-16 PROCEDURE — 87075 CULTR BACTERIA EXCEPT BLOOD: CPT | Performed by: OTOLARYNGOLOGY

## 2022-05-16 PROCEDURE — 99999 PR PBB SHADOW E&M-EST. PATIENT-LVL V: ICD-10-PCS | Mod: PBBFAC,,, | Performed by: OTOLARYNGOLOGY

## 2022-05-16 PROCEDURE — 1159F PR MEDICATION LIST DOCUMENTED IN MEDICAL RECORD: ICD-10-PCS | Mod: CPTII,S$GLB,, | Performed by: OTOLARYNGOLOGY

## 2022-05-16 PROCEDURE — 87186 SC STD MICRODIL/AGAR DIL: CPT | Mod: 59 | Performed by: OTOLARYNGOLOGY

## 2022-05-16 PROCEDURE — 87070 CULTURE OTHR SPECIMN AEROBIC: CPT | Performed by: OTOLARYNGOLOGY

## 2022-05-16 PROCEDURE — 31231 NASAL ENDOSCOPY DX: CPT | Mod: S$GLB,,, | Performed by: OTOLARYNGOLOGY

## 2022-05-16 PROCEDURE — 99214 OFFICE O/P EST MOD 30 MIN: CPT | Mod: 25,S$GLB,, | Performed by: OTOLARYNGOLOGY

## 2022-05-16 NOTE — PROCEDURES
Nasal/sinus endoscopy    Date/Time: 5/16/2022 10:00 AM  Performed by: Matthias Roach MD  Authorized by: Matthias Roach MD     Consent Done?:  Yes (Verbal)  Anesthesia:     Local anesthetic:  Lidocaine 4% and Jose-Synephrine 1/2%    Patient tolerance:  Patient tolerated the procedure well with no immediate complications  Nose:     Procedure Performed:  Nasal Endoscopy  External:      No external nasal deformity  Intranasal:      Mucosa no polyps     Mucosa ulcers not present     No mucosa lesions present     Turbinates not enlarged     No septum gross deformity  Nasopharynx:      No mucosa lesions     Adenoids not present     Posterior choanae patent     Eustachian tube patent     Green purulent mucus scabs focally removed from ethmoid and sphenoid on left, from ethmoid on right.  Swabbed for culture on left.

## 2022-05-16 NOTE — PROGRESS NOTES
"  Subjective:      Jaylin is a 57 y.o. female who comes for follow-up of sinusitis.  Her last visit with me was on 3/31/2022.  Now 9 weeks status-post endoscopic sinus surgery in office.   Using gentamicin spray daily, sinus rinse BID, plus astelin and flonase.  Recently hospitalized with 2 right broken ribs and pneumonia, now completed antibiotics and wearing a chest brace.  Coughing out phlegm.  Blowing out nasal mucus as well, though less pronounced.  Had nosebleed this morning, self-limited, a new occurrence for her.        %       The patient's medications, allergies, past medical, surgical, social and family histories were reviewed and updated as appropriate.    A detailed review of systems was obtained with pertinent positives as per the above HPI, and otherwise negative.        Objective:     Ht 5' 7" (1.702 m)   Wt 83.2 kg (183 lb 6.8 oz)   BMI 28.73 kg/m²        Constitutional:   She appears well-developed. She is cooperative.     Head:  Normocephalic.     Nose:  No mucosal edema, rhinorrhea, septal deviation or polyps. No epistaxis. Turbinates normal, no turbinate masses and no turbinate hypertrophy.  Right sinus exhibits no maxillary sinus tenderness and no frontal sinus tenderness. Left sinus exhibits no maxillary sinus tenderness and no frontal sinus tenderness.     Mouth/Throat  Oropharynx clear and moist without lesions or asymmetry. No oropharyngeal exudate or posterior oropharyngeal erythema.     Neck:  No adenopathy. Normal range of motion present.     She has no cervical adenopathy.       Procedure    Nasal endoscopy performed.  See procedure note.        Data Reviewed    WBC (K/uL)   Date Value   05/06/2022 6.67     Eosinophil % (%)   Date Value   05/06/2022 5.5     Eos # (K/uL)   Date Value   05/06/2022 0.4     Platelets (K/uL)   Date Value   05/06/2022 216     Glucose (mg/dL)   Date Value   05/10/2022 93     IgE (IU/mL)   Date Value   02/03/2021 <35     ANCA wnl    Pathology report indicated " chronic inflammation with eosinophilia.  Cultures showed Pseudomonas in Feb 2022.      Assessment:     1. Nonallergic rhinitis    2. Chronic pansinusitis         Plan:     Cultures taken, will consider medication change.  Suspect local irritation of septum by spray, will hold gentamicin for now.  Follow up in about 6 weeks (around 6/27/2022) for nasal endoscopy.

## 2022-05-16 NOTE — PATIENT INSTRUCTIONS
Try using saline gel (Prasad Med or other brand) 2 times a day to treat dryness.  Apply before bedtime and again in the morning if needed.

## 2022-05-20 ENCOUNTER — PATIENT MESSAGE (OUTPATIENT)
Dept: OTOLARYNGOLOGY | Facility: CLINIC | Age: 57
End: 2022-05-20
Payer: COMMERCIAL

## 2022-05-20 ENCOUNTER — HOSPITAL ENCOUNTER (OUTPATIENT)
Dept: RADIOLOGY | Facility: HOSPITAL | Age: 57
Discharge: HOME | End: 2022-05-20
Attending: PHYSICIAN ASSISTANT
Payer: COMMERCIAL

## 2022-05-20 DIAGNOSIS — R22.2 LOCALIZED SWELLING, MASS AND LUMP, TRUNK: ICD-10-CM

## 2022-05-20 LAB — BACTERIA SPEC ANAEROBE CULT: NORMAL

## 2022-05-20 PROCEDURE — 71250 CT CHEST WITHOUT CONTRAST: ICD-10-PCS | Mod: 26,,, | Performed by: RADIOLOGY

## 2022-05-20 PROCEDURE — 71250 CT THORAX DX C-: CPT | Mod: 26,,, | Performed by: RADIOLOGY

## 2022-05-20 PROCEDURE — 71250 CT THORAX DX C-: CPT | Mod: TC

## 2022-05-21 LAB — BACTERIA SPEC AEROBE CULT: ABNORMAL

## 2022-05-22 ENCOUNTER — PATIENT MESSAGE (OUTPATIENT)
Dept: PRIMARY CARE CLINIC | Facility: CLINIC | Age: 57
End: 2022-05-22
Payer: COMMERCIAL

## 2022-05-22 DIAGNOSIS — D80.1 HYPOGAMMAGLOBULINEMIA: Primary | ICD-10-CM

## 2022-05-22 DIAGNOSIS — J45.51 SEVERE PERSISTENT ASTHMA WITH ACUTE EXACERBATION: ICD-10-CM

## 2022-05-22 DIAGNOSIS — R91.8 ABNORMAL CT SCAN, LUNG: ICD-10-CM

## 2022-05-23 ENCOUNTER — PATIENT MESSAGE (OUTPATIENT)
Dept: PRIMARY CARE CLINIC | Facility: CLINIC | Age: 57
End: 2022-05-23
Payer: COMMERCIAL

## 2022-05-23 ENCOUNTER — PATIENT MESSAGE (OUTPATIENT)
Dept: OTOLARYNGOLOGY | Facility: CLINIC | Age: 57
End: 2022-05-23
Payer: COMMERCIAL

## 2022-05-23 NOTE — TELEPHONE ENCOUNTER
Refer to pulmonary see if we can get in this week due to abn on CT lung, suspect infection vs inflammation but with her hx of hypogammaglobulinemia, asthma, chronic sinusitis on immune therapy and crohn's with recent rib fractures, I believe she needs to see pulm asap to help determine additional imaging or treatments. Please let me know if there is difficulty in getting her in to [facility] or [facility].

## 2022-05-23 NOTE — PROGRESS NOTES
Dr. JONES,   Could you possibly take a look at this CT scan and either give recommendations or see this patient this week? Thanks!   Esthela uH MD

## 2022-05-24 ENCOUNTER — OFFICE VISIT (OUTPATIENT)
Dept: PULMONOLOGY | Facility: CLINIC | Age: 57
End: 2022-05-24
Payer: COMMERCIAL

## 2022-05-24 ENCOUNTER — PATIENT MESSAGE (OUTPATIENT)
Dept: PULMONOLOGY | Facility: CLINIC | Age: 57
End: 2022-05-24
Payer: COMMERCIAL

## 2022-05-24 ENCOUNTER — CLINICAL SUPPORT (OUTPATIENT)
Dept: PULMONOLOGY | Facility: CLINIC | Age: 57
End: 2022-05-24
Payer: COMMERCIAL

## 2022-05-24 VITALS
RESPIRATION RATE: 16 BRPM | WEIGHT: 183.44 LBS | HEART RATE: 56 BPM | BODY MASS INDEX: 28.79 KG/M2 | OXYGEN SATURATION: 99 % | SYSTOLIC BLOOD PRESSURE: 132 MMHG | DIASTOLIC BLOOD PRESSURE: 88 MMHG | HEIGHT: 67 IN

## 2022-05-24 DIAGNOSIS — I77.1 TORTUOUS AORTA: ICD-10-CM

## 2022-05-24 DIAGNOSIS — B37.0 ORAL THRUSH: ICD-10-CM

## 2022-05-24 DIAGNOSIS — D80.1 HYPOGAMMAGLOBULINEMIA: ICD-10-CM

## 2022-05-24 DIAGNOSIS — Z79.60 LONG-TERM USE OF IMMUNOSUPPRESSANT MEDICATION: ICD-10-CM

## 2022-05-24 DIAGNOSIS — K50.90 CROHN'S DISEASE WITHOUT COMPLICATION, UNSPECIFIED GASTROINTESTINAL TRACT LOCATION: ICD-10-CM

## 2022-05-24 DIAGNOSIS — R91.8 ABNORMAL CT SCAN, LUNG: ICD-10-CM

## 2022-05-24 DIAGNOSIS — G47.01 INSOMNIA DUE TO MEDICAL CONDITION: ICD-10-CM

## 2022-05-24 DIAGNOSIS — I10 ESSENTIAL HYPERTENSION: ICD-10-CM

## 2022-05-24 DIAGNOSIS — J32.9 CHRONIC RHINOSINUSITIS: ICD-10-CM

## 2022-05-24 DIAGNOSIS — J45.51 SEVERE PERSISTENT ASTHMA WITH ACUTE EXACERBATION: ICD-10-CM

## 2022-05-24 DIAGNOSIS — J45.50 SEVERE PERSISTENT ASTHMA WITHOUT COMPLICATION: ICD-10-CM

## 2022-05-24 DIAGNOSIS — E78.49 OTHER HYPERLIPIDEMIA: ICD-10-CM

## 2022-05-24 DIAGNOSIS — G47.33 OSA ON CPAP: Primary | ICD-10-CM

## 2022-05-24 PROCEDURE — 1160F PR REVIEW ALL MEDS BY PRESCRIBER/CLIN PHARMACIST DOCUMENTED: ICD-10-PCS | Mod: CPTII,S$GLB,, | Performed by: INTERNAL MEDICINE

## 2022-05-24 PROCEDURE — 99214 PR OFFICE/OUTPT VISIT, EST, LEVL IV, 30-39 MIN: ICD-10-PCS | Mod: 25,S$GLB,, | Performed by: INTERNAL MEDICINE

## 2022-05-24 PROCEDURE — 3075F SYST BP GE 130 - 139MM HG: CPT | Mod: CPTII,S$GLB,, | Performed by: INTERNAL MEDICINE

## 2022-05-24 PROCEDURE — 1159F PR MEDICATION LIST DOCUMENTED IN MEDICAL RECORD: ICD-10-PCS | Mod: CPTII,S$GLB,, | Performed by: INTERNAL MEDICINE

## 2022-05-24 PROCEDURE — 3008F PR BODY MASS INDEX (BMI) DOCUMENTED: ICD-10-PCS | Mod: CPTII,S$GLB,, | Performed by: INTERNAL MEDICINE

## 2022-05-24 PROCEDURE — 3079F PR MOST RECENT DIASTOLIC BLOOD PRESSURE 80-89 MM HG: ICD-10-PCS | Mod: CPTII,S$GLB,, | Performed by: INTERNAL MEDICINE

## 2022-05-24 PROCEDURE — 4010F PR ACE/ARB THEARPY RXD/TAKEN: ICD-10-PCS | Mod: CPTII,S$GLB,, | Performed by: INTERNAL MEDICINE

## 2022-05-24 PROCEDURE — 3079F DIAST BP 80-89 MM HG: CPT | Mod: CPTII,S$GLB,, | Performed by: INTERNAL MEDICINE

## 2022-05-24 PROCEDURE — 1160F RVW MEDS BY RX/DR IN RCRD: CPT | Mod: CPTII,S$GLB,, | Performed by: INTERNAL MEDICINE

## 2022-05-24 PROCEDURE — 3008F BODY MASS INDEX DOCD: CPT | Mod: CPTII,S$GLB,, | Performed by: INTERNAL MEDICINE

## 2022-05-24 PROCEDURE — 1159F MED LIST DOCD IN RCRD: CPT | Mod: CPTII,S$GLB,, | Performed by: INTERNAL MEDICINE

## 2022-05-24 PROCEDURE — 3075F PR MOST RECENT SYSTOLIC BLOOD PRESS GE 130-139MM HG: ICD-10-PCS | Mod: CPTII,S$GLB,, | Performed by: INTERNAL MEDICINE

## 2022-05-24 PROCEDURE — 95012 PR NITRIC OXIDE EXPIRED GAS DETERMINATION: ICD-10-PCS | Mod: 59,S$GLB,, | Performed by: INTERNAL MEDICINE

## 2022-05-24 PROCEDURE — 99999 PR PBB SHADOW E&M-EST. PATIENT-LVL V: ICD-10-PCS | Mod: PBBFAC,,, | Performed by: INTERNAL MEDICINE

## 2022-05-24 PROCEDURE — 95012 NITRIC OXIDE EXP GAS DETER: CPT | Mod: 59,S$GLB,, | Performed by: INTERNAL MEDICINE

## 2022-05-24 PROCEDURE — 99214 OFFICE O/P EST MOD 30 MIN: CPT | Mod: 25,S$GLB,, | Performed by: INTERNAL MEDICINE

## 2022-05-24 PROCEDURE — 4010F ACE/ARB THERAPY RXD/TAKEN: CPT | Mod: CPTII,S$GLB,, | Performed by: INTERNAL MEDICINE

## 2022-05-24 PROCEDURE — 99999 PR PBB SHADOW E&M-EST. PATIENT-LVL V: CPT | Mod: PBBFAC,,, | Performed by: INTERNAL MEDICINE

## 2022-05-24 RX ORDER — METHYLPREDNISOLONE 4 MG/1
TABLET ORAL
Qty: 21 TABLET | Refills: 0 | Status: SHIPPED | OUTPATIENT
Start: 2022-05-24 | End: 2022-07-10

## 2022-05-24 RX ORDER — NYSTATIN 100000 [USP'U]/ML
4 SUSPENSION ORAL
Qty: 240 ML | Refills: 1 | Status: SHIPPED | OUTPATIENT
Start: 2022-05-24 | End: 2022-06-03

## 2022-05-24 RX ORDER — SODIUM CHLORIDE FOR INHALATION 7 %
4 VIAL, NEBULIZER (ML) INHALATION 2 TIMES DAILY
Qty: 240 ML | Refills: 0 | Status: SHIPPED | OUTPATIENT
Start: 2022-05-24 | End: 2022-05-26 | Stop reason: SDUPTHER

## 2022-05-24 RX ORDER — FLUTICASONE FUROATE AND VILANTEROL TRIFENATATE 200; 25 UG/1; UG/1
1 POWDER RESPIRATORY (INHALATION) DAILY
COMMUNITY
End: 2022-09-15

## 2022-05-24 NOTE — PROCEDURES
"Clinical Guide to Interpretation or FeNO Levels :    FeNO  (ppb) LOW INTERMEDIATE HIGH   ADULT VALUES < 25 25-50          > 50   Th2-driven Inflammation Unlikely Likely Significant     Patients FeNO level at this visit : __94__ (ppb)      Interpretation of FeNO measurement in adults:   [ ] FENO is less than 25 ppb implies non eosinophilic airway inflammation or the absence of airway inflammation.   Comment: Low FENO (<25 ppb) in adult asthmatics with persistent symptoms suggests other etiologies for these symptoms and a lower likelihood of benefit from adding or increasing inhaled glucocorticoids.     [ ] FENO between 25 and 50 ppb in adults should be interpreted cautiously with reference to the clinical situation (eg, symptomatic, on or off therapy, current smoking).     [X ] FENO greater than 50 ppb in adults  suggests eosinophilic airway inflammation   Comment: High FENO (>50 ppb) in adult asthmatics even with atypical symptoms suggests glucocorticoid responsiveness. High FENO (>50 ppb) can help identify poor adherence or uncontrolled inflammation in asthma patients with otherwise seemingly "controlled" asthma.     Discussion:   ·A FENO less than 25 ppb in adults and less than 20 ppb in children younger than 12 years of age implies noneosinophilic airway inflammation or the absence of airway inflammation.   ·A FENO greater than 50 ppb in adults or greater than 35 ppb in children suggests eosinophilic airway inflammation.   ·Values of FENO between 25 and 50 ppb in adults (20 to 35 ppb in children) should be interpreted cautiously with reference to the clinical situation (eg, symptomatic, on or off therapy, current smoking).   ·A rising FENO with a greater than 20 percent change and more than 25 ppb (20 ppb in children) from a previously stable level suggests increasing eosinophilic airway inflammation, but there are wide inter-individual differences.   ·A decrease in FENO greater than 20 percent for values over 50 " ppb or more than 10 ppb for values less than 50 ppb may be clinically important.   FENO in other respiratory diseases - Several other diseases are associated with altered levels of exhaled NO: low levels of FENO have been noted in cystic fibrosis, current smoking, pulmonary hypertension, hypothermia, primary ciliary dyskinesia, and bronchopulmonary dysplasia. Elevated FENO has been noted in atopy, nonasthmatic eosinophilic bronchitis, COPD exacerbations, noncystic fibrosis bronchiectasis, and viral upper respiratory infections.     REFERENCE:   ATS Board of Directors, December 2004, and by the ERS Executive Committee, June 2004. ATS/ERS Recommendations for Standardized Procedures for the Online and Offline Measurement of Exhaled Lower Respiratory Nitric Oxide and Nasal Nitric Oxide. Guideline 2005

## 2022-05-24 NOTE — PROGRESS NOTES
Pulmonary Outpatient   Visit     Subjective:       Patient ID: Jaylin Murguia is a 57 y.o. female.    Chief Complaint: Abnormal Ct Scan and Shortness of Breath      Jaylin Murguia is 57 y.o.  Asked to see by Esthela Hu MD  Complicated Hx: Crohns, autoimmune issues  Chronic rhinosinusitis: seen by ENT and immunology  IgG replacement therapy, Hizentra 10 g q 7 days (465 mg/kg/mo). Has been on it for about 6 months.  Recurrent pseudomonas infections NASAL  Seen ENT for multiple recurrent drainage, debridement procedure  Last treated with extend ceftazidime/avibactam x 2 weeks : had PICC line  Chronic cough, wheezing, No prior Spir  Recent ? Fall after taking inhaler, rib pain  On BREO, has nebulizer and Xopenex.  Chrinic nasal cough, congestion  Night sweats  No +ve family Hx respiratory issues  Recently Low Na 123, Abn LFT  Was treated with Diflucan for yeast infection  Worked as teacher  No occupational exposure  Regarding crohns  Pentasa 1000 mg QID (started 4/2018)     Past Medications  Remicade (in remote past)-- ineffective  Asacol 4.8 gm-- ineffective  Entocort-- effective  Prednisone  Humira (started July 17, stopped 2/2018)-- stopped 2/2 multiple infections.             Review of Systems   HENT: Positive for congestion.    Respiratory: Positive for wheezing and use of rescue inhaler.    Genitourinary: Negative.    Endocrine: endocrine negative   Musculoskeletal: Positive for myalgias.   Psychiatric/Behavioral: Positive for sleep disturbance.   All other systems reviewed and are negative.      Outpatient Encounter Medications as of 5/24/2022   Medication Sig Dispense Refill    amLODIPine (NORVASC) 5 MG tablet Take 5 mg by mouth once daily.      atorvastatin (LIPITOR) 10 MG tablet Take 1 tablet (10 mg total) by mouth once daily. 90 tablet 3    butalbital-acetaminophen-caffeine -40 mg (FIORICET, ESGIC) -40 mg per tablet TAKE 1 TABLET EVERY 4  HOURS AS NEEDED FOR HEADACHE 90 tablet 3    cholestyramine (QUESTRAN) 4 gram packet Take 1 packet (4 g total) by mouth 3 (three) times daily with meals. 270 packet 3    clindamycin (CLEOCIN) 150 mg/mL injection EMPTY CONTENTS OF 1 VIAL INTO NASAL IRRIGATION SYSTEM, ADD DISTILLED WATER, SALT PACK, MIX & IRRIGATE PERFORM 2 TIMES DAILY      clindamycin (CLEOCIN) 300 MG capsule EMPTY CONTENTS OF 2 CAPSULES INTO NASAL IRRIGATION SYSTEM, ADD DISTILLED WATER, SALT PACK, MIX & IRRIGATE. PERFORM 2 TIMES DAILY 120 capsule 1    desloratadine (CLARINEX) 5 mg tablet Take 1 tablet (5 mg total) by mouth once daily. 90 tablet 3    diltiazem HCl (DILTIAZEM 2% - LIDOCAINE 5% CREAM) Apply peasize amount topically to anal area. 30 g 2    diphenhydrAMINE-aluminum-magnesium hydroxide-simethicone-LIDOcaine HCl 2% Swish and spit 15 mLs every 4 (four) hours as needed (oral ulcers, pain). 1 Bottle 2    DULoxetine (CYMBALTA) 60 MG capsule Take 1 capsule (60 mg total) by mouth 2 (two) times daily. 180 capsule 3    eletriptan (RELPAX) 40 MG tablet Take 1 tablet (40 mg total) by mouth as needed. (Patient taking differently: Take 40 mg by mouth as needed. Take if needed) 12 tablet 3    estradioL (ESTRACE) 2 MG tablet Take 1 tablet (2 mg total) by mouth every evening. 90 tablet 0    fluticasone furoate-vilanteroL (BREO ELLIPTA) 200-25 mcg/dose DsDv diskus inhaler Inhale 1 puff into the lungs once daily. 60 each 11    fluticasone propionate (FLONASE) 50 mcg/actuation nasal spray SPRAY 2 SPRAYS BY EACH NOSTRIL ROUTE ONCE DAILY. 16 mL 0    gentamicin (GARAMYCIN) 40 mg/mL injection EMPTY CONTENTS OF 1 VIAL INTO NASAL IRRIGATION SYSTEM, ADD DISTILLED WATER, SALT PACK, MIX & IRRIGATE PERFORM 2 TIMES DAILY      gentamicin sulfate (GENTAMICIN, BULK, MISC) EMPTY CONTENTS OF 1 CAPSULE INTO NASAL IRRIGATION SYSTEM, ADD DISTILLED WATER, SALT PACK, MIX & IRRIGATE. PERFORM 2 TIMES DAILY      immun glob G,IgG,-pro-IgA 0-50 (HIZENTRA) 10 gram/50 mL  (20 %) Soln Inject 60 mLs (12 g total) into the skin every 7 days. 240 mL 11    INV opn-375/placebo 93 mcg sprm nasal spray 2 sprays by Alternating Nostrils route 2 (two) times a day.      ipratropium (ATROVENT) 0.02 % nebulizer solution Nebulize 2.5 ml every 4-6 hours as directed, if needed 90 each 11    losartan (COZAAR) 100 MG tablet Take 1 tablet (100 mg total) by mouth once daily. 90 tablet 3    montelukast (SINGULAIR) 10 mg tablet TAKE 1 TABLET EVERY EVENING 90 tablet 3    NEILMED SINUS RINSE COMPLETE pkdv use as directed      nortriptyline (PAMELOR) 25 MG capsule Take 1 capsule (25 mg total) by mouth every evening. 90 capsule 3    polyethylene glycol (GOLYTELY,NULYTELY) 236-22.74-6.74 -5.86 gram suspension Take 4,000 mLs by mouth once.      polyethylene glycol (MOVIPREP) 100-7.5-2.691 gram solution       pregabalin (LYRICA) 150 MG capsule Take 1 capsule (150 mg total) by mouth 3 (three) times daily. 270 capsule 1    propranoloL (INDERAL LA) 80 MG 24 hr capsule TAKE 1 CAPSULE BY MOUTH ONCE DAILY. 90 capsule 2    rizatriptan (MAXALT) 10 MG tablet TAKE 1 TABLET IF NEEDED FOR MIGRAINES. MAX 2 TABLETS IN 24 HOURS. 30 tablet 8    sodium chloride 0.9% 0.9 % Soln 200 mL with gentamicin (ped) 20 mg/2 mL Soln EMPTY CONTENTS OF 1 CAPSULE INTO NASAL IRRIGATION SYSTEM, ADD DISTILLED WATER, SALT PACK, MIX & IRRIGATE. PERFORM 2 TIMES DAILY      tiotropium bromide (SPIRIVA RESPIMAT) 1.25 mcg/actuation Mist Inhale 2 puffs into the lungs once daily. 4 g 11    topiramate (TOPAMAX) 100 MG tablet Take 1 tablet (100 mg total) by mouth 2 (two) times daily. 180 tablet 3    traZODone (DESYREL) 50 MG tablet Take 1 tablet (50 mg total) by mouth every evening. 30 tablet 11    triamcinolone acetonide 0.025% (KENALOG) 0.025 % cream 1 application once daily. Apply to affected area      VENTOLIN HFA 90 mcg/actuation inhaler INHALE 2 PUFFS INTO THE LUNGS EVERY 4 TO 6 HOURS AS NEEDED FOR WHEEZING. (Patient taking differently:  "Take if needed & bring) 18 Inhaler 1    XYLITOL, BULK, MISC EMPTY CONTENTS OF 1 CAPSULE INTO NASAL IRRIGATION SYSTEM, ADD DISTILLED WATER, SALT PACK, MIX & IRRIGATE. PERFORM 2 TIMES DAILY      zolpidem (AMBIEN) 5 MG Tab TAKE 1 TABLET BY MOUTH EVERY DAY AT BEDTIME AS NEEDED 90 tablet 1    azelastine (ASTELIN) 137 mcg (0.1 %) nasal spray 2 sprays (274 mcg total) by Nasal route 2 (two) times daily. 30 mL 11    fluticasone furoate-vilanteroL (BREO ELLIPTA) 200-25 mcg/dose DsDv diskus inhaler Inhale 1 puff into the lungs once daily. Controller      levalbuterol (XOPENEX) 1.25 mg/3 mL nebulizer solution Take 3 mLs (1.25 mg total) by nebulization every 4 (four) hours. Rescue 1 Box 11    methylPREDNISolone (MEDROL DOSEPACK) 4 mg tablet use as directed 21 tablet 0    nystatin (MYCOSTATIN) 100,000 unit/mL suspension Take 4 mLs (400,000 Units total) by mouth 2 hours after meals and at bedtime. for 10 days 240 mL 1    sodium chloride 7% 7 % nebulizer solution Take 4 mLs by nebulization 2 (two) times a day. 240 mL 0     Facility-Administered Encounter Medications as of 5/24/2022   Medication Dose Route Frequency Provider Last Rate Last Admin    lactated ringers infusion   Intravenous Continuous Mary Leiva MD   New Bag at 03/12/20 0923    lactated ringers infusion   Intravenous Continuous Shay Bruce MD   Stopped at 03/16/22 1342    lidocaine (PF) 10 mg/ml (1%) injection 10 mg  1 mL Intradermal Once Mary Leiva MD           Objective:     Vital Signs (Most Recent)  Vital Signs  Pulse: (!) 56  Resp: 16  SpO2: 99 %  BP: 132/88  Height and Weight  Height: 5' 7" (170.2 cm)  Weight: 83.2 kg (183 lb 6.8 oz)  BSA (Calculated - sq m): 1.98 sq meters  BMI (Calculated): 28.7  Weight in (lb) to have BMI = 25: 159.3]  Wt Readings from Last 2 Encounters:   05/24/22 83.2 kg (183 lb 6.8 oz)   05/16/22 83.2 kg (183 lb 6.8 oz)       Physical Exam   Constitutional: She is oriented to person, place, and time. She " appears well-developed and well-nourished.   HENT:   Head: Normocephalic.   Mouth/Throat: Posterior oropharyngeal erythema present.     Mallampati Score: II.   Neck: No JVD present.   Cardiovascular: Normal rate and intact distal pulses.   No murmur heard.  Pulmonary/Chest: Normal expansion and effort normal. She has wheezes.   Abdominal: Soft. Bowel sounds are normal.   Musculoskeletal:         General: No edema. Normal range of motion.      Cervical back: Normal range of motion and neck supple.   Lymphadenopathy:     She has no axillary adenopathy.   Neurological: She is alert and oriented to person, place, and time.   Skin: Skin is warm and dry.   Psychiatric: She has a normal mood and affect.   Nursing note and vitals reviewed.      Laboratory  Lab Results   Component Value Date    WBC 6.67 05/06/2022    RBC 4.24 05/06/2022    HGB 13.2 05/06/2022    HCT 38.7 05/06/2022    MCV 91 05/06/2022    MCH 31.1 (H) 05/06/2022    MCHC 34.1 05/06/2022    RDW 12.1 05/06/2022     05/06/2022    MPV 10.1 05/06/2022    GRAN 3.4 05/06/2022    GRAN 50.3 05/06/2022    LYMPH 2.3 05/06/2022    LYMPH 34.5 05/06/2022    MONO 0.6 05/06/2022    MONO 8.7 05/06/2022    EOS 0.4 05/06/2022    BASO 0.06 05/06/2022    EOSINOPHIL 5.5 05/06/2022    BASOPHIL 0.9 05/06/2022       BMP  Lab Results   Component Value Date     (L) 05/10/2022    K 4.2 05/10/2022     05/10/2022    CO2 25 05/10/2022    BUN 10 05/10/2022    CREATININE 0.8 05/10/2022    CALCIUM 9.2 05/10/2022    ANIONGAP 7 (L) 05/10/2022    ESTGFRAFRICA >60 05/10/2022    EGFRNONAA >60 05/10/2022    AST 37 05/10/2022    ALT 46 (H) 05/10/2022    PROT 7.4 05/10/2022       No results found for: BNP    Lab Results   Component Value Date    TSH 0.887 09/02/2021       Lab Results   Component Value Date    SEDRATE 25 03/08/2022       Lab Results   Component Value Date    CRP 1.3 03/08/2022     Lab Results   Component Value Date    IGE <35 02/03/2021    IGE <35 12/12/2019    IGE  81 09/21/2019        Lab Results   Component Value Date    ASPERGILLUS <0.10 12/12/2019     Lab Results   Component Value Date    AFUMIGATUSCL CLASS 0 12/12/2019        No results found for: ACE     Diagnostic Results:  I have personally reviewed today the following studies:    CT Chest Without Contrast  Narrative: EXAMINATION:  CT CHEST WITHOUT CONTRAST    CLINICAL HISTORY:  Soft tissue mass, chest, US/xray nondiagnostic; Localized swelling, mass and lump, trunk    TECHNIQUE:  Low dose axial images, sagittal and coronal reformations were obtained from the thoracic inlet to the lung bases. Contrast was not administered.    COMPARISON:  Chest x-ray dated 05/06/2022, CT dated 02/18/2020    FINDINGS:  Heart and Great vessels: Within normal limits.  No mediastinal mass lesions demonstrated.    Thoracic Adenopathy: None demonstrated.    Lungs: There are some patchy ill-defined hazy ground-glass opacities with associated clustered tree-in-bud micro nodularity in the right lower lobe, most in keeping with infectious/inflammatory process.  Questionable mucous plugging versus motion artifact seen in the inferior left lower lobe.  Mild subsegmental scarring noted along the medial margins of the right upper lobe and dependent portions of the bilateral lung bases.  No parenchymal consolidations, pleural effusions, or suspicious nodular opacities visualized.    Upper Abdomen: No acute findings    Bones: There are acute appearing nondisplaced fractures involving the anterior right 7th and 8th ribs near the costochondral junctions.    Miscellaneous: None  Impression: 1. Acute appearing nondisplaced fractures involving the right right-sided anterior rib fractures as above.  2. Patchy ground-glass opacities with associated clustered tree-in-bud micro nodularity in the right lower lobe with questionable mucous plugging in the left lower lobe.  Findings suggest infectious/inflammatory process.  Correlate clinically.  3. No suspicious  pulmonary nodules or mass lesions visualized.    Electronically signed by: Sarthak Garcia MD  Date:    2022  Time:    15:47      FeNO was 94    Assessment/Plan:     Problem List Items Addressed This Visit     Insomnia due to medical condition    Long-term use of immunosuppressant medication    Relevant Orders    FUNGAL IMMUNODIFFUSION - BLOOD    QUANTIFERON GOLD TB    Other hyperlipidemia    Tortuous aorta    Crohn's disease    Hypogammaglobulinemia    Chronic rhinosinusitis    Relevant Orders    FUNGAL IMMUNODIFFUSION - BLOOD    QUANTIFERON GOLD TB    Essential hypertension    Abnormal CT scan, lung    TAMIKA on CPAP - Primary     Not using CPAP           Severe persistent asthma without complication    Relevant Medications    sodium chloride 7% 7 % nebulizer solution    methylPREDNISolone (MEDROL DOSEPACK) 4 mg tablet    Other Relevant Orders    Fraction of  Nitric Oxide (Completed)    PULM - Arterial Blood Gases--in addition to PFT only    IgE    Alpha 1 Antitrypsin Phenotype    Alpha-1-Antitrypsin    Complete PFT with bronchodilator    Culture, Respiratory with Gram Stain    AFB Culture & Smear    AFB Culture & Smear    Culture, Respiratory with Gram Stain    Culture, Respiratory with Gram Stain    FUNGAL IMMUNODIFFUSION - BLOOD    QUANTIFERON GOLD TB    Ambulatory referral/consult to Pulmonary Disease Management w/ Respiratory Therapist      Other Visit Diagnoses     Severe persistent asthma with acute exacerbation        Oral thrush        Relevant Medications    nystatin (MYCOSTATIN) 100,000 unit/mL suspension        Medrol dose   repeat FeNo next visit  NYSTATIN  Added Hypersal and aerobika  Need to investigate an MAC      Follow up in about 4 weeks (around 2022), or Nystatin, Sputum, FeNo, PFT, ABG, labs.    This note was prepared using voice recognition system and is likely to have sound alike errors that may have been overlooked even after proof reading.  Please call me with any  questions    Discussed diagnosis, its evaluation, treatment and usual course. All questions answered.      Abrahan Lira MD

## 2022-05-26 ENCOUNTER — OFFICE VISIT (OUTPATIENT)
Dept: PRIMARY CARE CLINIC | Facility: CLINIC | Age: 57
End: 2022-05-26
Payer: COMMERCIAL

## 2022-05-26 ENCOUNTER — CLINICAL SUPPORT (OUTPATIENT)
Dept: PULMONOLOGY | Facility: CLINIC | Age: 57
End: 2022-05-26
Payer: COMMERCIAL

## 2022-05-26 ENCOUNTER — PATIENT MESSAGE (OUTPATIENT)
Dept: PULMONOLOGY | Facility: CLINIC | Age: 57
End: 2022-05-26
Payer: COMMERCIAL

## 2022-05-26 ENCOUNTER — TELEPHONE (OUTPATIENT)
Dept: PULMONOLOGY | Facility: CLINIC | Age: 57
End: 2022-05-26
Payer: COMMERCIAL

## 2022-05-26 ENCOUNTER — LAB VISIT (OUTPATIENT)
Dept: LAB | Facility: HOSPITAL | Age: 57
End: 2022-05-26
Attending: INTERNAL MEDICINE
Payer: COMMERCIAL

## 2022-05-26 VITALS
WEIGHT: 179.38 LBS | BODY MASS INDEX: 28.16 KG/M2 | HEIGHT: 67 IN | DIASTOLIC BLOOD PRESSURE: 84 MMHG | TEMPERATURE: 98 F | SYSTOLIC BLOOD PRESSURE: 126 MMHG | HEART RATE: 64 BPM

## 2022-05-26 DIAGNOSIS — J45.50 SEVERE PERSISTENT ASTHMA WITHOUT COMPLICATION: ICD-10-CM

## 2022-05-26 DIAGNOSIS — G47.33 OSA ON CPAP: ICD-10-CM

## 2022-05-26 DIAGNOSIS — Z00.01 ENCOUNTER FOR PREVENTATIVE ADULT HEALTH CARE EXAM WITH ABNORMAL FINDINGS: Primary | ICD-10-CM

## 2022-05-26 DIAGNOSIS — I35.1 MILD AORTIC INSUFFICIENCY: ICD-10-CM

## 2022-05-26 DIAGNOSIS — J32.9 CHRONIC RHINOSINUSITIS: ICD-10-CM

## 2022-05-26 DIAGNOSIS — I10 ESSENTIAL HYPERTENSION: ICD-10-CM

## 2022-05-26 DIAGNOSIS — E78.49 OTHER HYPERLIPIDEMIA: ICD-10-CM

## 2022-05-26 DIAGNOSIS — D80.1 HYPOGAMMAGLOBULINEMIA: ICD-10-CM

## 2022-05-26 DIAGNOSIS — K50.90 CROHN'S DISEASE WITHOUT COMPLICATION, UNSPECIFIED GASTROINTESTINAL TRACT LOCATION: ICD-10-CM

## 2022-05-26 DIAGNOSIS — M79.7 FIBROMYALGIA: ICD-10-CM

## 2022-05-26 DIAGNOSIS — J32.4 CHRONIC PANSINUSITIS: ICD-10-CM

## 2022-05-26 DIAGNOSIS — G43.809 OTHER MIGRAINE WITHOUT STATUS MIGRAINOSUS, NOT INTRACTABLE: ICD-10-CM

## 2022-05-26 DIAGNOSIS — J44.9 CHRONIC OBSTRUCTIVE PULMONARY DISEASE, UNSPECIFIED COPD TYPE: ICD-10-CM

## 2022-05-26 DIAGNOSIS — I77.1 TORTUOUS AORTA: Chronic | ICD-10-CM

## 2022-05-26 DIAGNOSIS — R19.7 DIARRHEA, UNSPECIFIED TYPE: ICD-10-CM

## 2022-05-26 DIAGNOSIS — Z79.60 LONG-TERM USE OF IMMUNOSUPPRESSANT MEDICATION: ICD-10-CM

## 2022-05-26 DIAGNOSIS — G47.01 INSOMNIA DUE TO MEDICAL CONDITION: ICD-10-CM

## 2022-05-26 LAB
ALLENS TEST: ABNORMAL
BRPFT: ABNORMAL
DELSYS: ABNORMAL
DLCO ADJ PRE: 16.63 ML/(MIN*MMHG) (ref 19.31–30.78)
DLCO SINGLE BREATH LLN: 19.31
DLCO SINGLE BREATH PRE REF: 66 %
DLCO SINGLE BREATH REF: 25.05
DLCOC SBVA LLN: 3.26
DLCOC SBVA PRE REF: 103.3 %
DLCOC SBVA REF: 4.6
DLCOC SINGLE BREATH LLN: 19.31
DLCOC SINGLE BREATH PRE REF: 66.4 %
DLCOC SINGLE BREATH REF: 25.05
DLCOVA LLN: 3.26
DLCOVA PRE REF: 102.7 %
DLCOVA PRE: 4.73 ML/(MIN*MMHG*L) (ref 3.26–5.95)
DLCOVA REF: 4.6
DLVAADJ PRE: 4.76 ML/(MIN*MMHG*L) (ref 3.26–5.95)
ERV LLN: -16449.12
ERV PRE REF: 74.3 %
ERV REF: 0.88
FEF 25 75 CHG: -12.1 %
FEF 25 75 LLN: 1.34
FEF 25 75 POST REF: 71.1 %
FEF 25 75 PRE REF: 80.8 %
FEF 25 75 REF: 2.55
FET100 CHG: 17 %
FEV1 CHG: -7.4 %
FEV1 FVC CHG: -1.2 %
FEV1 FVC LLN: 68
FEV1 FVC POST REF: 100.8 %
FEV1 FVC PRE REF: 102 %
FEV1 FVC REF: 79
FEV1 LLN: 2.16
FEV1 POST REF: 65.5 %
FEV1 PRE REF: 70.7 %
FEV1 REF: 2.84
FIO2: 0.21
FRCPLETH LLN: 2.05
FRCPLETH PREREF: 84.7 %
FRCPLETH REF: 2.87
FVC CHG: -6.3 %
FVC LLN: 2.76
FVC POST REF: 64.4 %
FVC PRE REF: 68.8 %
FVC REF: 3.61
HCO3 UR-SCNC: 18.9 MMOL/L (ref 24–28)
IVC PRE: 2.39 L (ref 2.76–4.49)
IVC SINGLE BREATH LLN: 2.76
IVC SINGLE BREATH PRE REF: 66.2 %
IVC SINGLE BREATH REF: 3.61
MODE: ABNORMAL
MVV LLN: 92
MVV PRE REF: 83.2 %
MVV REF: 108
PCO2 BLDA: 33.7 MMHG (ref 35–45)
PEF CHG: 14.9 %
PEF LLN: 5.04
PEF POST REF: 95.2 %
PEF PRE REF: 82.8 %
PEF REF: 6.92
PH SMN: 7.36 [PH] (ref 7.35–7.45)
PO2 BLDA: 93 MMHG (ref 80–100)
POC BE: -7 MMOL/L
POC SATURATED O2: 97 % (ref 95–100)
POST FEF 25 75: 1.82 L/S (ref 1.34–4.15)
POST FET 100: 7.86 SEC
POST FEV1 FVC: 79.91 % (ref 67.59–89.18)
POST FEV1: 1.86 L (ref 2.16–3.48)
POST FVC: 2.32 L (ref 2.76–4.49)
POST PEF: 6.59 L/S (ref 5.04–8.8)
PRE DLCO: 16.53 ML/(MIN*MMHG) (ref 19.31–30.78)
PRE ERV: 0.65 L (ref -16449.12–16450.88)
PRE FEF 25 75: 2.06 L/S (ref 1.34–4.15)
PRE FET 100: 6.72 SEC
PRE FEV1 FVC: 80.85 % (ref 67.59–89.18)
PRE FEV1: 2.01 L (ref 2.16–3.48)
PRE FRC PL: 2.43 L (ref 2.05–3.69)
PRE FVC: 2.48 L (ref 2.76–4.49)
PRE MVV: 89.71 L/MIN (ref 91.7–124.07)
PRE PEF: 5.73 L/S (ref 5.04–8.8)
PRE RV: 1.78 L (ref 1.42–2.57)
PRE TLC: 4.26 L (ref 4.45–6.43)
RAW LLN: 3.06
RAW PRE REF: 114.2 %
RAW PRE: 3.49 CMH2O*S/L (ref 3.06–3.06)
RAW REF: 3.06
RV LLN: 1.42
RV PRE REF: 89.2 %
RV REF: 1.99
RVTLC LLN: 29
RVTLC PRE REF: 108.8 %
RVTLC PRE: 41.72 % (ref 28.75–47.93)
RVTLC REF: 38
SAMPLE: ABNORMAL
SITE: ABNORMAL
TLC LLN: 4.45
TLC PRE REF: 78.3 %
TLC REF: 5.44
VA PRE: 3.5 L (ref 5.29–5.29)
VA SINGLE BREATH LLN: 5.29
VA SINGLE BREATH PRE REF: 66.1 %
VA SINGLE BREATH REF: 5.29
VC LLN: 2.76
VC PRE REF: 68.9 %
VC PRE: 2.48 L (ref 2.76–4.49)
VC REF: 3.61

## 2022-05-26 PROCEDURE — 82103 ALPHA-1-ANTITRYPSIN TOTAL: CPT | Performed by: INTERNAL MEDICINE

## 2022-05-26 PROCEDURE — 3008F PR BODY MASS INDEX (BMI) DOCUMENTED: ICD-10-PCS | Mod: CPTII,S$GLB,, | Performed by: FAMILY MEDICINE

## 2022-05-26 PROCEDURE — 3074F SYST BP LT 130 MM HG: CPT | Mod: CPTII,S$GLB,, | Performed by: FAMILY MEDICINE

## 2022-05-26 PROCEDURE — 94726 PLETHYSMOGRAPHY LUNG VOLUMES: CPT | Mod: S$GLB,,, | Performed by: INTERNAL MEDICINE

## 2022-05-26 PROCEDURE — 99999 PR PBB SHADOW E&M-EST. PATIENT-LVL I: CPT | Mod: PBBFAC,,,

## 2022-05-26 PROCEDURE — 99396 PR PREVENTIVE VISIT,EST,40-64: ICD-10-PCS | Mod: S$GLB,,, | Performed by: FAMILY MEDICINE

## 2022-05-26 PROCEDURE — 1159F PR MEDICATION LIST DOCUMENTED IN MEDICAL RECORD: ICD-10-PCS | Mod: CPTII,S$GLB,, | Performed by: FAMILY MEDICINE

## 2022-05-26 PROCEDURE — 3079F DIAST BP 80-89 MM HG: CPT | Mod: CPTII,S$GLB,, | Performed by: FAMILY MEDICINE

## 2022-05-26 PROCEDURE — 82785 ASSAY OF IGE: CPT | Performed by: INTERNAL MEDICINE

## 2022-05-26 PROCEDURE — 3079F PR MOST RECENT DIASTOLIC BLOOD PRESSURE 80-89 MM HG: ICD-10-PCS | Mod: CPTII,S$GLB,, | Performed by: FAMILY MEDICINE

## 2022-05-26 PROCEDURE — 94729 PR C02/MEMBANE DIFFUSE CAPACITY: ICD-10-PCS | Mod: S$GLB,,, | Performed by: INTERNAL MEDICINE

## 2022-05-26 PROCEDURE — 94729 DIFFUSING CAPACITY: CPT | Mod: S$GLB,,, | Performed by: INTERNAL MEDICINE

## 2022-05-26 PROCEDURE — 94060 EVALUATION OF WHEEZING: CPT | Mod: S$GLB,,, | Performed by: INTERNAL MEDICINE

## 2022-05-26 PROCEDURE — 82103 ALPHA-1-ANTITRYPSIN TOTAL: CPT | Mod: 91 | Performed by: INTERNAL MEDICINE

## 2022-05-26 PROCEDURE — 36600 PR WITHDRAWAL OF ARTERIAL BLOOD: ICD-10-PCS | Mod: S$GLB,,, | Performed by: INTERNAL MEDICINE

## 2022-05-26 PROCEDURE — 4010F PR ACE/ARB THEARPY RXD/TAKEN: ICD-10-PCS | Mod: CPTII,S$GLB,, | Performed by: FAMILY MEDICINE

## 2022-05-26 PROCEDURE — 94726 PULM FUNCT TST PLETHYSMOGRAP: ICD-10-PCS | Mod: S$GLB,,, | Performed by: INTERNAL MEDICINE

## 2022-05-26 PROCEDURE — 36415 COLL VENOUS BLD VENIPUNCTURE: CPT | Mod: PO | Performed by: INTERNAL MEDICINE

## 2022-05-26 PROCEDURE — 99999 PR PBB SHADOW E&M-EST. PATIENT-LVL I: ICD-10-PCS | Mod: PBBFAC,,,

## 2022-05-26 PROCEDURE — 86635 COCCIDIOIDES ANTIBODY: CPT | Performed by: INTERNAL MEDICINE

## 2022-05-26 PROCEDURE — 99396 PREV VISIT EST AGE 40-64: CPT | Mod: S$GLB,,, | Performed by: FAMILY MEDICINE

## 2022-05-26 PROCEDURE — 3074F PR MOST RECENT SYSTOLIC BLOOD PRESSURE < 130 MM HG: ICD-10-PCS | Mod: CPTII,S$GLB,, | Performed by: FAMILY MEDICINE

## 2022-05-26 PROCEDURE — 36600 WITHDRAWAL OF ARTERIAL BLOOD: CPT | Mod: S$GLB,,, | Performed by: INTERNAL MEDICINE

## 2022-05-26 PROCEDURE — 82803 BLOOD GASES ANY COMBINATION: CPT | Mod: S$GLB,,, | Performed by: INTERNAL MEDICINE

## 2022-05-26 PROCEDURE — 3008F BODY MASS INDEX DOCD: CPT | Mod: CPTII,S$GLB,, | Performed by: FAMILY MEDICINE

## 2022-05-26 PROCEDURE — 94060 PR EVAL OF BRONCHOSPASM: ICD-10-PCS | Mod: S$GLB,,, | Performed by: INTERNAL MEDICINE

## 2022-05-26 PROCEDURE — 99999 PR PBB SHADOW E&M-EST. PATIENT-LVL V: ICD-10-PCS | Mod: PBBFAC,,, | Performed by: FAMILY MEDICINE

## 2022-05-26 PROCEDURE — 82803 PR  BLOOD GASES: PH, PO2 & PCO2: ICD-10-PCS | Mod: S$GLB,,, | Performed by: INTERNAL MEDICINE

## 2022-05-26 PROCEDURE — 1159F MED LIST DOCD IN RCRD: CPT | Mod: CPTII,S$GLB,, | Performed by: FAMILY MEDICINE

## 2022-05-26 PROCEDURE — 86480 TB TEST CELL IMMUN MEASURE: CPT | Performed by: INTERNAL MEDICINE

## 2022-05-26 PROCEDURE — 4010F ACE/ARB THERAPY RXD/TAKEN: CPT | Mod: CPTII,S$GLB,, | Performed by: FAMILY MEDICINE

## 2022-05-26 PROCEDURE — 99999 PR PBB SHADOW E&M-EST. PATIENT-LVL V: CPT | Mod: PBBFAC,,, | Performed by: FAMILY MEDICINE

## 2022-05-26 RX ORDER — SODIUM CHLORIDE FOR INHALATION 7 %
4 VIAL, NEBULIZER (ML) INHALATION 2 TIMES DAILY
Qty: 240 ML | Refills: 3 | Status: SHIPPED | OUTPATIENT
Start: 2022-05-26 | End: 2022-06-25

## 2022-05-26 NOTE — PROCEDURES
See ABG results.     This is an Anion Gap Metabolic Acidosis.  Primary Metabolic Acidosis, with: Appropriately Compensated by Respiratory Alkalosis and Additional Non-Gap Metabolic Acidosis.

## 2022-05-26 NOTE — TELEPHONE ENCOUNTER
Dr. JONES, I took care of this today while Ms. Murguia was in the office with me. Thanks! Esthela Hu MD

## 2022-05-26 NOTE — PROGRESS NOTES
Subjective:      Patient ID: Jaylin Murguia is a 57 y.o. female.    Chief Complaint: Annual Exam    Disclaimer:  This note is prepared using voice recognition software and as such is likely to have errors and has not been proof read. Please contact me for questions.     Jaylin Murguia is a 57 y.o. female who presents to clinic for follow-up  After ct scan, pulm visit, and rib fractures.   Was put on steroids and was told to be on xopenox more often.   Will see her back as well. Was supposed to get some packets of saline to use.     Tree buds on ct scans were from old infections. Was concerned about the sinuses than the lungs. Dr. stinson did another culture and has another strand of pseudomonas because the medication helps but gets resistant to the antibiotics. Is very frustrated with the sinuses. Has well water and has been tested in the past. Before it wasn't ok. But has an expensive uv light to improve it.   Crohn's is bad lately and does a powder and did well for 1 .5 weeks but now can't finish her food because going to the bathroom. Only eating 1 meal a day.     Did have rib fractures after fall in bathroom at home with right sided rib pain.      PULM note reviewed:     Jaylin Murguia is 57 y.o.  Asked to see by Esthela Hu MD  Complicated Hx: Crohns, autoimmune issues  Chronic rhinosinusitis: seen by ENT and immunology  IgG replacement therapy, Hizentra 10 g q 7 days (465 mg/kg/mo). Has been on it for about 6 months.  Recurrent pseudomonas infections NASAL  Seen ENT for multiple recurrent drainage, debridement procedure  Last treated with extend ceftazidime/avibactam x 2 weeks : had PICC line  Chronic cough, wheezing, No prior Spir  Recent ? Fall after taking inhaler, rib pain  On BREO, has nebulizer and Xopenex.  Chrinic nasal cough, congestion  Night sweats  No +ve family Hx respiratory issues  Recently Low Na 123, Abn LFT  Was treated with Diflucan for yeast infection  Worked as teacher  No  occupational exposure  Regarding crohns  Pentasa 1000 mg QID (started 2018)     Past Medications  Remicade (in remote past)-- ineffective  Asacol 4.8 gm-- ineffective  Entocort-- effective  Prednisone  Humira (started , stopped 2018)-- stopped 2/2 multiple infections.                Problem List Items Addressed This Visit     Insomnia due to medical condition     Long-term use of immunosuppressant medication     Relevant Orders     FUNGAL IMMUNODIFFUSION - BLOOD     QUANTIFERON GOLD TB     Other hyperlipidemia     Tortuous aorta     Crohn's disease     Hypogammaglobulinemia     Chronic rhinosinusitis     Relevant Orders     FUNGAL IMMUNODIFFUSION - BLOOD     QUANTIFERON GOLD TB     Essential hypertension     Abnormal CT scan, lung     TAMIKA on CPAP - Primary         Not using CPAP             Severe persistent asthma without complication     Relevant Medications     sodium chloride 7% 7 % nebulizer solution     methylPREDNISolone (MEDROL DOSEPACK) 4 mg tablet     Other Relevant Orders     Fraction of  Nitric Oxide (Completed)     PULM - Arterial Blood Gases--in addition to PFT only     IgE     Alpha 1 Antitrypsin Phenotype     Alpha-1-Antitrypsin     Complete PFT with bronchodilator     Culture, Respiratory with Gram Stain     AFB Culture & Smear     AFB Culture & Smear     Culture, Respiratory with Gram Stain     Culture, Respiratory with Gram Stain     FUNGAL IMMUNODIFFUSION - BLOOD     QUANTIFERON GOLD TB     Ambulatory referral/consult to Pulmonary Disease Management w/ Respiratory Therapist      Other Visit Diagnoses     Severe persistent asthma with acute exacerbation        Oral thrush        Relevant Medications    nystatin (MYCOSTATIN) 100,000 unit/mL suspension        Medrol dose   repeat FeNo next visit  NYSTATIN  Added Hypersal and aerobika  Need to investigate an MAC        Follow up in about 4 weeks (around 2022), or Nystatin, Sputum, FeNo, PFT, ABG, labs.     This note was  "prepared using voice recognition system and is likely to have sound alike errors that may have been overlooked even after proof reading.  Please call me with any questions     Discussed diagnosis, its evaluation, treatment and usual course. All questions answered.   Clinical Guide to Interpretation or FeNO Levels :     FeNO  (ppb) LOW INTERMEDIATE HIGH  ADULT VALUES < 25 25-50          > 50  Th2-driven Inflammation Unlikely Likely Significant     Patients FeNO level at this visit : __94__ (ppb)        Interpretation of FeNO measurement in adults:   ( ) FENO is less than 25 ppb implies non eosinophilic airway inflammation or the absence of airway inflammation.   Comment: Low FENO (<25 ppb) in adult asthmatics with persistent symptoms suggests other etiologies for these symptoms and a lower likelihood of benefit from adding or increasing inhaled glucocorticoids.     ( ) FENO between 25 and 50 ppb in adults should be interpreted cautiously with reference to the clinical situation (eg, symptomatic, on or off therapy, current smoking).     (X ) FENO greater than 50 ppb in adults  suggests eosinophilic airway inflammation   Comment: High FENO (>50 ppb) in adult asthmatics even with atypical symptoms suggests glucocorticoid responsiveness. High FENO (>50 ppb) can help identify poor adherence or uncontrolled inflammation in asthma patients with otherwise seemingly "controlled" asthma.        Abrahan Lira MD      Past Medical History:  No date: Allergic rhinitis  No date: Asthma  No date: Chronic pansinusitis  No date: Crohn's disease      Comment:  Ileal involvement, previously on Remicade, Asacol,                Prednisone  No date: Fibromyalgia  No date: Hyperlipidemia  No date: Hypertension  20020: Immunosuppression - on infusions to boost immune system   No date: Migraine  No date: Obstructive sleep apnea      Comment:  CPAP at night  No date: Sciatica      Postmenopausal - on estrogen   HTN - norvasc, losartan, " hctz   Allergies - singulair   Asthma -   Sleep - ambium   Hx of pseudomonas in sinuses -   Take hizentra         Lab Results   Component Value Date    HGBA1C 5.3 09/02/2021    HGBA1C 5.2 11/14/2017    HGBA1C 5.5 04/07/2017      Lab Results   Component Value Date    CHOL 172 12/06/2021    CHOL 229 (H) 09/02/2021    CHOL 177 08/21/2020     Lab Results   Component Value Date    LDLCALC 84.8 12/06/2021    LDLCALC 141.8 09/02/2021    LDLCALC 79.6 08/21/2020       Wt Readings from Last 10 Encounters:   06/02/22 80.7 kg (177 lb 14.6 oz)   05/26/22 81.4 kg (179 lb 5.5 oz)   05/24/22 83.2 kg (183 lb 6.8 oz)   05/16/22 83.2 kg (183 lb 6.8 oz)   05/06/22 82.7 kg (182 lb 6.9 oz)   04/11/22 84.7 kg (186 lb 11.7 oz)   03/31/22 82.7 kg (182 lb 5.1 oz)   03/16/22 79.5 kg (175 lb 4.3 oz)   03/15/22 80.7 kg (178 lb)   03/08/22 79.4 kg (175 lb)       The 10-year ASCVD risk score (Shawna LYNN Jr., et al., 2013) is: 2.2%    Values used to calculate the score:      Age: 57 years      Sex: Female      Is Non- : No      Diabetic: No      Tobacco smoker: No      Systolic Blood Pressure: 126 mmHg      Is BP treated: Yes      HDL Cholesterol: 73 mg/dL      Total Cholesterol: 172 mg/dL        Lab Results   Component Value Date    WBC 6.67 05/06/2022    HGB 13.2 05/06/2022    HCT 38.7 05/06/2022     05/06/2022    CHOL 172 12/06/2021    TRIG 71 12/06/2021    HDL 73 12/06/2021    ALT 46 (H) 05/10/2022    AST 37 05/10/2022     (L) 05/10/2022    K 4.2 05/10/2022     05/10/2022    CREATININE 0.8 05/10/2022    BUN 10 05/10/2022    CO2 25 05/10/2022    TSH 0.887 09/02/2021    INR 1.0 11/20/2014    HGBA1C 5.3 09/02/2021       CT Chest Without Contrast  Narrative: EXAMINATION:  CT CHEST WITHOUT CONTRAST    CLINICAL HISTORY:  Soft tissue mass, chest, US/xray nondiagnostic; Localized swelling, mass and lump, trunk    TECHNIQUE:  Low dose axial images, sagittal and coronal reformations were obtained from the thoracic  inlet to the lung bases. Contrast was not administered.    COMPARISON:  Chest x-ray dated 05/06/2022, CT dated 02/18/2020    FINDINGS:  Heart and Great vessels: Within normal limits.  No mediastinal mass lesions demonstrated.    Thoracic Adenopathy: None demonstrated.    Lungs: There are some patchy ill-defined hazy ground-glass opacities with associated clustered tree-in-bud micro nodularity in the right lower lobe, most in keeping with infectious/inflammatory process.  Questionable mucous plugging versus motion artifact seen in the inferior left lower lobe.  Mild subsegmental scarring noted along the medial margins of the right upper lobe and dependent portions of the bilateral lung bases.  No parenchymal consolidations, pleural effusions, or suspicious nodular opacities visualized.    Upper Abdomen: No acute findings    Bones: There are acute appearing nondisplaced fractures involving the anterior right 7th and 8th ribs near the costochondral junctions.    Miscellaneous: None  Impression: 1. Acute appearing nondisplaced fractures involving the right right-sided anterior rib fractures as above.  2. Patchy ground-glass opacities with associated clustered tree-in-bud micro nodularity in the right lower lobe with questionable mucous plugging in the left lower lobe.  Findings suggest infectious/inflammatory process.  Correlate clinically.  3. No suspicious pulmonary nodules or mass lesions visualized.    Electronically signed by: Sarthak Garcia MD  Date:    05/20/2022  Time:    15:47        Review of Systems   Constitutional: Negative for activity change, appetite change, chills, fatigue and fever.   HENT: Positive for congestion. Negative for ear pain and trouble swallowing.    Eyes: Negative for pain and visual disturbance.   Respiratory: Positive for cough, shortness of breath and wheezing.    Cardiovascular: Negative for chest pain and leg swelling.   Gastrointestinal: Negative for abdominal pain, blood in  "stool, nausea and vomiting.   Endocrine: Negative for cold intolerance and heat intolerance.   Genitourinary: Negative for dysuria and frequency.   Musculoskeletal: Positive for arthralgias and myalgias. Negative for joint swelling and neck pain.   Skin: Negative for color change and rash.   Neurological: Negative for dizziness and headaches.   Psychiatric/Behavioral: Negative for behavioral problems and sleep disturbance.     Objective:     Vitals:    05/26/22 1032   BP: 126/84   Pulse: 64   Temp: 97.5 °F (36.4 °C)   TempSrc: Temporal   Weight: 81.4 kg (179 lb 5.5 oz)   Height: 5' 7" (1.702 m)     Physical Exam  Vitals reviewed.   Constitutional:       Appearance: Normal appearance. She is well-developed and normal weight.   HENT:      Head: Normocephalic and atraumatic.      Right Ear: Tympanic membrane and external ear normal.      Left Ear: Tympanic membrane and external ear normal.      Nose: Nose normal.      Mouth/Throat:      Mouth: Mucous membranes are moist.      Pharynx: Oropharynx is clear.   Eyes:      Conjunctiva/sclera: Conjunctivae normal.      Pupils: Pupils are equal, round, and reactive to light.   Neck:      Thyroid: No thyromegaly.   Cardiovascular:      Rate and Rhythm: Normal rate and regular rhythm.      Pulses: Normal pulses.      Heart sounds: Normal heart sounds. No murmur heard.    No friction rub. No gallop.   Pulmonary:      Effort: Pulmonary effort is normal. No respiratory distress.      Breath sounds: Wheezing and rhonchi present. No rales.   Abdominal:      General: Bowel sounds are normal. There is no distension.      Palpations: Abdomen is soft.      Tenderness: There is no abdominal tenderness. There is no rebound.   Musculoskeletal:         General: Normal range of motion.      Cervical back: Normal range of motion and neck supple.   Lymphadenopathy:      Cervical: No cervical adenopathy.   Skin:     General: Skin is warm and dry.      Findings: No rash.   Neurological:      " Mental Status: She is alert and oriented to person, place, and time.      Motor: No weakness.   Psychiatric:         Attention and Perception: Attention and perception normal.         Mood and Affect: Mood and affect normal.         Speech: Speech normal.         Behavior: Behavior normal.         Thought Content: Thought content normal.         Cognition and Memory: Cognition and memory normal.         Judgment: Judgment normal.       Assessment:     1. Encounter for preventative adult health care exam with abnormal findings    2. Severe persistent asthma without complication    3. Chronic obstructive pulmonary disease, unspecified COPD type    4. Long-term use of immunosuppressant medication    5. TAMIKA on CPAP    6. Insomnia due to medical condition    7. Crohn's disease without complication, unspecified gastrointestinal tract location    8. Hypogammaglobulinemia    9. Diarrhea, unspecified type    10. Fibromyalgia    11. Essential hypertension    12. Other hyperlipidemia    13. Mild aortic insufficiency    14. Tortuous aorta    15. Other migraine without status migrainosus, not intractable    16. Chronic pansinusitis    17. Chronic rhinosinusitis      Plan:   Jaylin was seen today for annual exam.    Diagnoses and all orders for this visit:    Encounter for preventative adult health care exam with abnormal findings - labs reviewed. Discussed Health Maintenance issues.       Severe persistent asthma without complication- refilled for patient. Agree with pulm recommendations.   -     sodium chloride 7% 7 % nebulizer solution; Take 4 mLs by nebulization 2 (two) times a day.    Chronic obstructive pulmonary disease, unspecified COPD type- stable, Agree with pulm recommendations.       Long-term use of immunosuppressant medication - stable, Continue with current medications and interventions. Labs reviewed.       TAMIKA on CPAP- stable, Continue with current medications and interventions. Labs reviewed.     Insomnia due to  medical condition- stable, Continue with current medications and interventions. Labs reviewed.     Crohn's disease without complication, unspecified gastrointestinal tract-worse lately with diarrhea. Can try wellchol. followup with gi.  Continue with current medications and interventions. Labs reviewed.  location    Hypogammaglobulinemia- stable, Continue with current medications and interventions. Labs reviewed.     Diarrhea, unspecified type- see GI.     Fibromyalgia- stable, Continue with current medications and interventions. Labs reviewed.     Essential hypertension- stable, Continue with current medications and interventions. Labs reviewed.     Other hyperlipidemia- stable, Continue with current medications and interventions. Labs reviewed.     Mild aortic insufficiency- stable, Continue with current medications and interventions. Labs reviewed.     Tortuous aorta- stable, Continue with current medications and interventions. Labs reviewed.     Other migraine without status migrainosus, not intractable- stable, Continue with current medications and interventions. Labs reviewed.     Chronic pansinusitis- stable, Continue with current medications and interventions. Labs reviewed.     Chronic rhinosinusitis- stable, Continue with current medications and interventions. Labs reviewed.             Follow up in about 3 months (around 8/26/2022) for f/u office visit Dr. Hu/ orin .    There are no Patient Instructions on file for this visit.

## 2022-05-27 ENCOUNTER — PATIENT MESSAGE (OUTPATIENT)
Dept: GASTROENTEROLOGY | Facility: CLINIC | Age: 57
End: 2022-05-27
Payer: COMMERCIAL

## 2022-05-27 ENCOUNTER — TELEPHONE (OUTPATIENT)
Dept: GASTROENTEROLOGY | Facility: CLINIC | Age: 57
End: 2022-05-27
Payer: COMMERCIAL

## 2022-05-27 PROBLEM — J44.9 CHRONIC OBSTRUCTIVE PULMONARY DISEASE, UNSPECIFIED COPD TYPE: Status: ACTIVE | Noted: 2022-05-27

## 2022-05-27 PROBLEM — J44.9 CHRONIC OBSTRUCTIVE PULMONARY DISEASE, UNSPECIFIED COPD TYPE: Chronic | Status: ACTIVE | Noted: 2022-05-27

## 2022-05-27 PROBLEM — I77.1 TORTUOUS AORTA: Chronic | Status: ACTIVE | Noted: 2019-04-01

## 2022-05-27 LAB
A1AT SERPL-MCNC: 100 MG/DL (ref 100–190)
IGE SERPL-ACNC: <35 IU/ML (ref 0–100)

## 2022-05-27 NOTE — TELEPHONE ENCOUNTER
----- Message from Janice Abernathy sent at 5/27/2022  2:27 PM CDT -----  Chaitanya with [facility] called regarding a prescription for diltiazem 2% with 5% lidocaine cream and stated and they're not able to fill it. He stated they are not a compound pharmacy. Thx. EL

## 2022-05-27 NOTE — TELEPHONE ENCOUNTER
Per patient, called in   diltiazem HCl (DILTIAZEM 2% - LIDOCAINE 5% CREAM) 30 g 2 4/21/2022  No   Sig: Apply peasize amount topically to anal area.   Class: Print   Order: [id number]   Date/Time Signed: 4/21/2022 07:21       To [facility].

## 2022-05-28 ENCOUNTER — PATIENT MESSAGE (OUTPATIENT)
Dept: GASTROENTEROLOGY | Facility: CLINIC | Age: 57
End: 2022-05-28
Payer: COMMERCIAL

## 2022-05-30 ENCOUNTER — TELEPHONE (OUTPATIENT)
Dept: PULMONOLOGY | Facility: CLINIC | Age: 57
End: 2022-05-30
Payer: COMMERCIAL

## 2022-05-30 LAB
GAMMA INTERFERON BACKGROUND BLD IA-ACNC: 0 IU/ML
M TB IFN-G CD4+ BCKGRND COR BLD-ACNC: 0 IU/ML
MITOGEN IGNF BCKGRD COR BLD-ACNC: 10 IU/ML
TB GOLD PLUS: NEGATIVE
TB2 - NIL: 0 IU/ML

## 2022-05-30 NOTE — TELEPHONE ENCOUNTER
Chronic Disease Management:   Called patient to confirm Pulmonary Disease Management appointment scheduled

## 2022-05-31 ENCOUNTER — TELEPHONE (OUTPATIENT)
Dept: PULMONOLOGY | Facility: CLINIC | Age: 57
End: 2022-05-31
Payer: COMMERCIAL

## 2022-05-31 LAB
ASPERGILLUS AB SER QL ID: NORMAL
B DERMAT AB SER QL ID: NORMAL
C IMMITIS AB SER QL ID: NOT DETECTED
H CAPSUL AB SER QL ID: NORMAL

## 2022-05-31 NOTE — TELEPHONE ENCOUNTER
Chronic Disease Management:  Called patient in regard to appointment scheduled for today.  Rescheduled PDM visit to 6/2/22.

## 2022-06-02 ENCOUNTER — TELEPHONE (OUTPATIENT)
Dept: SLEEP MEDICINE | Facility: CLINIC | Age: 57
End: 2022-06-02
Payer: COMMERCIAL

## 2022-06-02 ENCOUNTER — CLINICAL SUPPORT (OUTPATIENT)
Dept: PULMONOLOGY | Facility: CLINIC | Age: 57
End: 2022-06-02
Payer: COMMERCIAL

## 2022-06-02 ENCOUNTER — PATIENT MESSAGE (OUTPATIENT)
Dept: OTOLARYNGOLOGY | Facility: CLINIC | Age: 57
End: 2022-06-02
Payer: COMMERCIAL

## 2022-06-02 VITALS
OXYGEN SATURATION: 95 % | WEIGHT: 177.94 LBS | RESPIRATION RATE: 16 BRPM | BODY MASS INDEX: 27.93 KG/M2 | HEIGHT: 67 IN | HEART RATE: 65 BPM

## 2022-06-02 DIAGNOSIS — R05.9 COUGH: ICD-10-CM

## 2022-06-02 DIAGNOSIS — J45.41 MODERATE PERSISTENT ASTHMA WITHOUT STATUS ASTHMATICUS WITH ACUTE EXACERBATION: ICD-10-CM

## 2022-06-02 DIAGNOSIS — J44.9 CHRONIC OBSTRUCTIVE PULMONARY DISEASE, UNSPECIFIED COPD TYPE: ICD-10-CM

## 2022-06-02 DIAGNOSIS — J44.9 CHRONIC OBSTRUCTIVE PULMONARY DISEASE, UNSPECIFIED COPD TYPE: Primary | ICD-10-CM

## 2022-06-02 DIAGNOSIS — J45.50 SEVERE PERSISTENT ASTHMA WITHOUT COMPLICATION: ICD-10-CM

## 2022-06-02 LAB
A1AT PHENOTYP SERPL-IMP: NORMAL
A1AT SERPL NEPH-MCNC: 107 MG/DL (ref 100–190)

## 2022-06-02 PROCEDURE — 99999 PR PBB SHADOW E&M-EST. PATIENT-LVL III: CPT | Mod: PBBFAC,,,

## 2022-06-02 PROCEDURE — 99999 PR PBB SHADOW E&M-EST. PATIENT-LVL III: ICD-10-PCS | Mod: PBBFAC,,,

## 2022-06-02 RX ORDER — PROPRANOLOL HYDROCHLORIDE 80 MG/1
CAPSULE, EXTENDED RELEASE ORAL
Refills: 0 | OUTPATIENT
Start: 2022-06-02

## 2022-06-02 RX ORDER — IPRATROPIUM BROMIDE 0.5 MG/2.5ML
SOLUTION RESPIRATORY (INHALATION)
Qty: 90 EACH | Refills: 11 | Status: SHIPPED | OUTPATIENT
Start: 2022-06-02 | End: 2022-12-01

## 2022-06-02 RX ORDER — DULOXETIN HYDROCHLORIDE 60 MG/1
CAPSULE, DELAYED RELEASE ORAL
Refills: 0 | OUTPATIENT
Start: 2022-06-02

## 2022-06-02 NOTE — PATIENT INSTRUCTIONS
Understanding Asthma         Asthma is a long-term (chronic) lung condition. It involves the airways (bronchial tubes). It happens when a trigger causes your airways to swell and become narrow. The muscles around your airways start to tighten. When your airways start to narrow, air can't move in and out of your lungs very well. Mucus also builds up along the airways. This makes it even harder to move air in and out of your lungs.  Experts are not exactly sure what causes asthma. It may be caused by a mix of inherited and environmental factors. People with asthma may have no symptoms until they are exposed to an allergen or trigger.  Healthy lungs  Inside your lungs there are branching airways made of stretchy tissue. Each airway is wrapped with bands of muscle. The airways are smaller as they go deeper into the lungs. The smallest airways end in clusters of tiny balloon-like air sacs (alveoli). These clusters are surrounded by blood vessels. When you breathe in (inhale), air enters the lungs. It travels down through the airways until it reaches the air sacs. When you breathe out (exhale), air travels up through the airways and out of the lungs. The airways make mucus that traps particles you breathe in. Normally, the mucus is then swept out of the lungs by tiny hairs (cilia) that line the airways. The mucus is swallowed or coughed up during your day.    What the lungs do  The air you inhale contains oxygen. When oxygen reaches the air sacs, it passes into the blood vessels around the sacs. Your blood then sends oxygen to all of your cells. As you exhale, carbon dioxide is removed in a similar way from the blood in the air sacs, and from your body.    When you have asthma  People with asthma have very sensitive airways. This means the airways react to certain things called triggers. Triggers can include pollen, dust, or smoke. Triggers cause inflammation. This makes the airways swell and become narrow. This is a  long-lasting (chronic) problem. Your airways may not always be narrow enough so that you notice breathing problems.  Symptoms of chronic inflammation include:   Coughing (chronic)   A feeling of tightness in your chest   Feeling short of breath   Wheezing (a whistling noise, especially when breathing out)   Low energy or feeling tired   In some people, over time chronic mild inflammation can lead to lasting (permanent) scarring of airways and loss of lung function.    Asthma flare-ups  When sensitive airways are irritated by a trigger, the muscles around the airways tighten. The lining of the airways swells. Thick, sticky mucus increases and partly clogs the airways. All of this makes it harder to breathe.  Symptoms of flare-ups may include:  Coughing, especially at night. You may not be able to sleep because of coughing.   Getting tired or out of breath easily   Wheezing   Chest tightness   Faster breathing when at rest   Flare-ups can be life-threatening. In a severe flare-up, the muscle tightening, swelling, and mucus are worse. Its very hard to breathe. Your body can't get enough oxygen and can't remove carbon dioxide. Waste gas is trapped in the alveoli. Gas exchange cant occur. The body is not getting enough oxygen. Without oxygen, body tissues, especially brain tissue, begin to get damaged. If this goes on for long, it can lead to severe brain damage or death.  Call 911 (or have someone call for you) if you have any of these symptoms and they are not relieved right away by taking your quick-relief medicine as prescribed:  Trouble breathing   Feeling too short of breath to talk or walk   Lips or fingers turning blue   Feeling lightheaded or dizzy, as though you are about to pass out   Peak flow less than 50% of your personal best, if you use a peak flow meter     Managing your asthma  Asthma is a long-term condition. So its important to work with your healthcare provider to manage it. If you have asthma,  you can prevent flare-ups. Develop an asthma action plan with your healthcare provider. It can help control your asthma and manage your symptoms. An asthma action plan also tells you and your family or friends what to do if your asthma flares up or gets worse.  Take your medicine as prescribed. Also learn about your asthma triggers. Knowing what causes your asthma to flare up in the first place can help you prevent future breathing problems.  If you smoke, get help to quit.   Asthma Trigger Checklist  Allergens, irritants, and other things may trigger your asthma. Check the box next to each of your triggers. After each trigger is a list of ways to avoid it.   Dust mites. Dust mites live in mattresses, bedding, carpets, curtains, and indoor dust.  To kill dust mites, wash bedding in hot water (130°F) each week.  Cover mattress and pillows with special dust-mite-proof cases.  Don't use upholstered furniture like sofas or chairs in the bedroom.  Use allergy-proof filters for air conditioners and furnaces. Replace or clean them as instructed.  If you can, replace carpeting with wood or tile debbie, especially in the bedroom.  Animals. Animals with fur or feathers shed dander (allergens).  It's best to choose a pet that doesn't have fur or feathers, such as a fish or a reptile.  If you have pets, keep them off your bed and out of your bedroom.  Wash your hands and clothes after handling pets.    Mold. Mold grows in damp places, such as bathrooms, basements, and closets.  Ask someone to clean damp areas in your home every week. Or try wearing a face mask while you clean.  Run an exhaust fan while bathing. Or leave a window open in the bathroom.  Repair water leaks in or around your home.  Have someone else cut grass or rake leaves, if possible.  Don't use vaporizers or humidifiers. They encourage mold growth.    Pollen. Pollen from trees, grasses, and weeds is a common allergen. (Flower pollens are generally not a  problem).  Try to learn what types of pollen affect you most. Pollen levels vary depending on the plant, the season, and the time of day.  If possible, use air conditioning instead of opening the windows in your home or car.  Have someone else do yard work, if possible.    Cockroaches. Roaches are found in many homes and produce allergens.  Keep your kitchen clean and dry. A leaky faucet or drain can attract roaches.  Remove garbage from your home daily.  Store food in tightly sealed containers. Wash dishes as soon as they are used.  Use bait stations or traps to control roaches. Avoid using chemical sprays.    Smoke. Smoke may be from cigarettes, cigars, pipes, incense or candles, barbecues or grills, and fireplaces.  Don't smoke. And don't let people smoke in your home or car.  When you travel, ask for nonsmoking rental cars and hotel rooms.  Avoid fireplaces and wood stoves. If you can't, sit away from them. Make sure the smoke is directed outside.  Don't burn incense or use candles.  Move away from smoky outdoor cooking grills.    Smog.  Smog is from car exhaust and other pollution.  Read or listen to local air-quality reports. These let you know when air quality is poor.  Stay indoors as much as you can on smoggy days. If possible, use air conditioning instead of opening the windows.  In your car, set air conditioning to recirculate air, so less pollution gets in.    Strong odors. These include air fresheners, deodorizers, and cleaning products; perfume, deodorant, and other beauty products; incense and candles; and insect sprays and other sprays.  Use scent-free products like deodorant or body lotion.  Avoid using cleaning products with bleach and ammonia. Make your own cleaning solution with white vinegar, baking soda, or mild dish soap.  Use exhaust fans while cooking. Or open a window, if possible.   Avoid perfumes, air fresheners, potpourri, and other scented products.          Other irritants. These  include dust, aerosol sprays, and powders.  Wear a face mask while doing tasks like sanding, dusting, sweeping, and yard work. Open doors and windows if working indoors.  Use pump spray bottles instead of aerosols.  Pour liquid  onto a rag or cloth instead of spraying them.    Weather. Weather conditions can trigger symptoms or make them worse.  Watch for very high or low temperatures, very humid conditions, or a lot of wind, as these conditions can make symptoms worse.  Limit outdoor activity during the type of weather that affects you.  Wear a scarf over your mouth and nose in cold weather.    Colds, flu, and sinus infections. Upper respiratory infections can trigger asthma.  Wash your hands often with soap and warm water or use a hand  containing alcohol.  Get a yearly flu shot. And ask if you should get a pneumonia vaccine.  Take care of your general health. Get plenty of sleep. And eat a variety of healthy foods.    Food additives. Food additives can trigger asthma flare-ups in some people.  Check food labels for sulfites or other similar ingredients. These are often found in foods such as wine, beer, and dried fruits.  Avoid foods that contain these additives.    Medicine. Aspirin, NSAIDS like ibuprofen and naproxen, and heart medicines like beta-blockers may be triggers.  Tell your health care provider if you think certain medicines trigger symptoms.   Be sure to read the labels on over-the-counter medicines. They may have ingredients that cause symptoms for you.   , Emotions. Laughing, crying, or feeling excited are triggers for some people.   To help you stay calm: Try breathing in slowly through your nose for a count of 2 seconds. Close your lips and breathe out for 4 seconds. Repeat.  Try to focus on a soothing image in your mind. This will help relax you and calm your breathing.  Remember to take your daily controller medicines. When you're upset or under stress, it's easy to forget.     Exercise. For some people, exercise can trigger symptoms. Don't let this keep you from being active.   If you have not been exercising regularly, start slow and work up gradually.  Take all of your medicines as prescribed.  If you use quick-relief medicine, make sure you have it with you when you exercise.  Stop if you have any symptoms. Make sure you talk with your provider about these symptoms.  © 4608-7649 [employer] [address] [facility], [place] [address]. All rights reserved. This information is not intended as a substitute for professional medical care. Always follow your healthcare professional's instructions.              [address]  Shortness of Breath: Maximizing Your Energy    Fear of shortness of breath may stop you from being as active as you once were. You don't have to live this way. Managing your time and pacing yourself can help you conserve energy and do more. It's even OK if you're short of breath sometimes. You can learn to work through this without limiting your activities.  Manage your time  Shortness of breath can make everyday tasks take longer. This means there's less time to do the things you enjoy. You can help prevent this by managing your time. Try these tips:  Plan ahead so your tasks are spaced throughout the day. As you plan, keep in mind the times of day you tend to have the most energy.  Do only one thing at a time. Finish one task before starting another.  Gather everything you need before you start a task. This cuts out unneeded steps while you're working.  Think about what you really need to do. Be realistic about what you can get done in a day.      Balance activity and rest  When you're tired, your activities will take longer. Fatigue also makes you more likely to get an infection. Plan your day so that your tasks are spaced throughout the day. To have more energy:  Stop and rest when you need to. Don't wait until you're overtired.  Switch back and forth between  hard tasks and easy ones.  Give yourself plenty of time for each task, so you don't have to hurry.  Take 20- to 30-minute rest breaks after meals and throughout the day.  If an activity takes a lot of energy, break it into smaller parts. For instance, fold the laundry first. Then take a break before putting it away.  Try not to exert yourself in extreme cold or heat.       Find ways to conserve energy  Conserving your energy can help you stay active and breathe better. The way you use your body during a task can help you conserve energy. Think of ways to make things easier, and take your time to ease shortness of breath.  For some tasks, you can also use special aids designed to reduce the amount of energy needed. Here are some tips:  Sit whenever possible, and keep your arms close to your body. Use slow, smooth motions.  Sit to dress and undress, shave, brush your teeth, and comb your hair. Use a long-handled reacher to pull on socks and shoes, and long-handled items like shoe horns.  Sit on a bench to shower. Use warm water, not hot. (Steam can make it harder to breathe.) Dry off by wrapping yourself in a terrycloth robe.  Use energy-saving appliances, such as an electric can opener, a power toothbrush, and a .  Use a cart with wheels to move groceries, laundry, dishes, and other items around the house. Some carts have seats so you can rest when you need to.  Use lightweight, nonstick pots and pans to cook. Soak dirty dishes instead of scrubbing them. Air-dry dishes, or use a .  Mix, cook, serve, and store foods in the same dish.  Keep the things you use most at waist level, so you can get them without reaching or bending.  Use steps slowly, pausing at each step. If you have steps outside or in your home, think about adding ramps or stair lifts.  Think about ways that others can help you. You might get help from friends, family members, or home health aides.      Remember to breathe  Sometimes  people with an illness or condition that affects the lungs try to rush through tasks so they won't get short of breath. This uses more energy and can actually increase shortness of breath. Instead, slow down and pace your breathing. These tips may help:  Move slowly during tasks that take a lot of effort, such as climbing stairs or pushing a shopping cart.  Use pursed-lip and diaphragmatic breathing while you go about a task.  Breathe out (exhale) when you exert effort. For example, breathe out as you lift up a grocery bag. Once you're holding the bag, breathe in. Ask the  at the [employer] to pack your grocery bags so they are light and easy to carry.  Focus on taking slow, deep breaths. If your breathing is shallow, you don't take in as much air.  Remember that it's OK to be short of breath. Just pace yourself and do pursed-lip breathing.      Talk with your healthcare provider about:  If you should use supplemental oxygen  If you need a referral to occupational and physical therapy. Therapists can help you with exercise and daily activities, and how to make things easier.      Pursed-lip breathing  This type of breathing helps you exhale better. Breathing this way during activity will help you reduce shortness of breath:  Relax your neck and shoulder muscles. Breathe in (inhale) slowly through your nose for 2 counts or more.  Pucker your lips as if you are going to blow out a candle. Breathe out slowly and gently through your lips for at least twice as long as you inhaled. Using an Inhaler with a Spacer  To control asthma, you need to use your medicines the right way. Some medicines are inhaled using a device called a metered-dose inhaler (MDI). Metered-dose inhalers deliver medicine with a fine spray. You may be asked to use a spacer (holding tube) with your inhaler. The spacer helps make sure all the medicine you need goes into your lungs.   Steps for using an inhaler with a spacer  Step 1:  Remove the  caps from the inhaler and spacer.  Shake the inhaler well and attach the spacer. If the inhaler is being used for the first time or has not been used for a while, prime it as directed by the product maker.  Step 2:  Breathe out normally.  Put the spacer between your teeth. Close your lips tightly around it.  Keep your chin up.  Step 3:  Spray 1 puff into the spacer by pressing down on the inhaler.  Then breathe in through your mouth as slowly and deeply as you can. This should take about 5-10 seconds. If you breathe too quickly, you may hear a whistling sound in certain spacers.  Step 4:  Take the spacer out of your mouth.  Hold your breath for a count of 10.  Then hold your lips together and slowly breathe out through your mouth   Using Dry-Powder Inhalers (DPIs)  Some asthma medications are inhaled using inhalers. Dry-powder inhalers (DPIs) dispense measured doses of powdered medicine into your lungs. DPIs often have counters to track the number of doses used. Keep in mind that DPIs don't all work the same way. So be sure you know how to use yours properly.  Using a DPI  Load the prescribed dose of medicine by following the instructions that came with the inhaler.  Breathe out normally, holding the inhaler away from your mouth. Hold your chin up.  Put the mouthpiece between your lips. Breathe in deeply through the inhaler--not through your nose. You may not feel or taste the medicine as you breathe in. This is normal.  Take the mouthpiece out of your mouth. Hold your breath for a count of 10, or as long as you can comfortably.  Breathe out slowly--but do not breathe out through the inhaler. Moisture from your breath can make the powder stick inside the inhaler.  Be sure to close the inhaler and store it in a dry place.  Rinse your mouth with water and spit it out, don't swallow it.  Do not wash your DPI with soap and water. To clean the mouthpiece, wipe with a dry cloth as needed.    © 5378-2855 [facility]  [employer] [address] [place] [address], [place] [address]. All rights reserved. This information is not intended as a substitute for professional medical care. Always follow your healthcare professional's instructions.            ACTION PLAN    GREEN ZONE  My sputum is clear/white/usual color and easily cleared.  My breathing is no harder than usual.  I can do my usual activities.  I can think clearly.   Take your usual medicines, including oxygen, as you are told to do so by your health care provider.   YELLOW ZONE  My sputum has change (color, thickness, amount).  I am more short of breath than usual.  I cough or wheeze more.  I weigh more and my legs/feet swell.  I cannot do my usual activities without resting.   Call your health care provider. You will probably be told to begin taking an antibiotic and prednisone. Have your pharmacy phone number available.   RED ZONE  I cannot cough out my sputum.  I am much more short of breath than normal.  I need to sit up to breathe  I cannot do my usual activities.  I am unable to speak more than one or two words at a time.  I am confused.   Call your health care provider. You may be asked to come in to be seen, told to go to the emergency room, or told to call [id number].

## 2022-06-02 NOTE — TELEPHONE ENCOUNTER
Spoke with patient  Sputum pseudomonas  Likely from nasal colonisation  Alpha 1 : MS, level was normal  Overall feels better

## 2022-06-02 NOTE — PROGRESS NOTES
Pulmonary Disease Management  [facility]  Initial Visit    Referring Provider: MD Pankaj  Diagnosis: Chronic obstructive pulmonary disease, unspecified COPD type, Abnormal CT scan, lung, Severe persistent asthma without complication  Last Hospital Admission: 3/16/22  Last provider visit: 5/24/22      Current Outpatient Medications:     amLODIPine (NORVASC) 5 MG tablet, Take 5 mg by mouth once daily., Disp: , Rfl:     atorvastatin (LIPITOR) 10 MG tablet, Take 1 tablet (10 mg total) by mouth once daily., Disp: 90 tablet, Rfl: 3    azelastine (ASTELIN) 137 mcg (0.1 %) nasal spray, 2 sprays (274 mcg total) by Nasal route 2 (two) times daily., Disp: 30 mL, Rfl: 11    butalbital-acetaminophen-caffeine -40 mg (FIORICET, ESGIC) -40 mg per tablet, TAKE 1 TABLET EVERY 4 HOURS AS NEEDED FOR HEADACHE, Disp: 90 tablet, Rfl: 3    cholestyramine (QUESTRAN) 4 gram packet, Take 1 packet (4 g total) by mouth 3 (three) times daily with meals., Disp: 270 packet, Rfl: 3    clindamycin (CLEOCIN) 150 mg/mL injection, EMPTY CONTENTS OF 1 VIAL INTO NASAL IRRIGATION SYSTEM, ADD DISTILLED WATER, SALT PACK, MIX & IRRIGATE PERFORM 2 TIMES DAILY, Disp: , Rfl:     clindamycin (CLEOCIN) 300 MG capsule, EMPTY CONTENTS OF 2 CAPSULES INTO NASAL IRRIGATION SYSTEM, ADD DISTILLED WATER, SALT PACK, MIX & IRRIGATE. PERFORM 2 TIMES DAILY, Disp: 120 capsule, Rfl: 1    desloratadine (CLARINEX) 5 mg tablet, Take 1 tablet (5 mg total) by mouth once daily., Disp: 90 tablet, Rfl: 3    diltiazem HCl (DILTIAZEM 2% - LIDOCAINE 5% CREAM), Apply peasize amount topically to anal area., Disp: 30 g, Rfl: 2    diphenhydrAMINE-aluminum-magnesium hydroxide-simethicone-LIDOcaine HCl 2%, Swish and spit 15 mLs every 4 (four) hours as needed (oral ulcers, pain)., Disp: 1 Bottle, Rfl: 2    DULoxetine (CYMBALTA) 60 MG capsule, Take 1 capsule (60 mg total) by mouth 2 (two) times daily., Disp: 180 capsule, Rfl: 3    eletriptan (RELPAX) 40 MG  tablet, Take 1 tablet (40 mg total) by mouth as needed. (Patient taking differently: Take 40 mg by mouth as needed. Take if needed), Disp: 12 tablet, Rfl: 3    estradioL (ESTRACE) 2 MG tablet, Take 1 tablet (2 mg total) by mouth every evening., Disp: 90 tablet, Rfl: 0    fluticasone furoate-vilanteroL (BREO ELLIPTA) 200-25 mcg/dose DsDv diskus inhaler, Inhale 1 puff into the lungs once daily., Disp: 60 each, Rfl: 11    fluticasone furoate-vilanteroL (BREO ELLIPTA) 200-25 mcg/dose DsDv diskus inhaler, Inhale 1 puff into the lungs once daily. Controller, Disp: , Rfl:     fluticasone propionate (FLONASE) 50 mcg/actuation nasal spray, SPRAY 2 SPRAYS BY EACH NOSTRIL ROUTE ONCE DAILY., Disp: 16 mL, Rfl: 0    gentamicin (GARAMYCIN) 40 mg/mL injection, EMPTY CONTENTS OF 1 VIAL INTO NASAL IRRIGATION SYSTEM, ADD DISTILLED WATER, SALT PACK, MIX & IRRIGATE PERFORM 2 TIMES DAILY, Disp: , Rfl:     gentamicin sulfate (GENTAMICIN, BULK, MISC), EMPTY CONTENTS OF 1 CAPSULE INTO NASAL IRRIGATION SYSTEM, ADD DISTILLED WATER, SALT PACK, MIX & IRRIGATE. PERFORM 2 TIMES DAILY, Disp: , Rfl:     immun glob G,IgG,-pro-IgA 0-50 (HIZENTRA) 10 gram/50 mL (20 %) Soln, Inject 60 mLs (12 g total) into the skin every 7 days., Disp: 240 mL, Rfl: 11    INV opn-375/placebo 93 mcg sprm nasal spray, 2 sprays by Alternating Nostrils route 2 (two) times a day., Disp: , Rfl:     ipratropium (ATROVENT) 0.02 % nebulizer solution, Nebulize 2.5 ml every 4-6 hours as directed, if needed, Disp: 90 each, Rfl: 11    levalbuterol (XOPENEX) 1.25 mg/3 mL nebulizer solution, Take 3 mLs (1.25 mg total) by nebulization every 4 (four) hours. Rescue, Disp: 1 Box, Rfl: 11    losartan (COZAAR) 100 MG tablet, Take 1 tablet (100 mg total) by mouth once daily., Disp: 90 tablet, Rfl: 3    methylPREDNISolone (MEDROL DOSEPACK) 4 mg tablet, use as directed, Disp: 21 tablet, Rfl: 0    montelukast (SINGULAIR) 10 mg tablet, TAKE 1 TABLET EVERY EVENING, Disp: 90 tablet,  Rfl: 3    NEILMED SINUS RINSE COMPLETE pkdv, use as directed, Disp: , Rfl:     nortriptyline (PAMELOR) 25 MG capsule, Take 1 capsule (25 mg total) by mouth every evening., Disp: 90 capsule, Rfl: 3    nystatin (MYCOSTATIN) 100,000 unit/mL suspension, Take 4 mLs (400,000 Units total) by mouth 2 hours after meals and at bedtime. for 10 days, Disp: 240 mL, Rfl: 1    polyethylene glycol (GOLYTELY,NULYTELY) 236-22.74-6.74 -5.86 gram suspension, Take 4,000 mLs by mouth once., Disp: , Rfl:     polyethylene glycol (MOVIPREP) 100-7.5-2.691 gram solution, , Disp: , Rfl:     pregabalin (LYRICA) 150 MG capsule, Take 1 capsule (150 mg total) by mouth 3 (three) times daily., Disp: 270 capsule, Rfl: 1    propranoloL (INDERAL LA) 80 MG 24 hr capsule, TAKE 1 CAPSULE BY MOUTH ONCE DAILY., Disp: 90 capsule, Rfl: 2    rizatriptan (MAXALT) 10 MG tablet, TAKE 1 TABLET IF NEEDED FOR MIGRAINES. MAX 2 TABLETS IN 24 HOURS., Disp: 30 tablet, Rfl: 8    sodium chloride 0.9% 0.9 % Soln 200 mL with gentamicin (ped) 20 mg/2 mL Soln, EMPTY CONTENTS OF 1 CAPSULE INTO NASAL IRRIGATION SYSTEM, ADD DISTILLED WATER, SALT PACK, MIX & IRRIGATE. PERFORM 2 TIMES DAILY, Disp: , Rfl:     sodium chloride 7% 7 % nebulizer solution, Take 4 mLs by nebulization 2 (two) times a day., Disp: 240 mL, Rfl: 3    tiotropium bromide (SPIRIVA RESPIMAT) 1.25 mcg/actuation Mist, Inhale 2 puffs into the lungs once daily., Disp: 4 g, Rfl: 11    topiramate (TOPAMAX) 100 MG tablet, Take 1 tablet (100 mg total) by mouth 2 (two) times daily., Disp: 180 tablet, Rfl: 3    traZODone (DESYREL) 50 MG tablet, Take 1 tablet (50 mg total) by mouth every evening., Disp: 30 tablet, Rfl: 11    triamcinolone acetonide 0.025% (KENALOG) 0.025 % cream, 1 application once daily. Apply to affected area, Disp: , Rfl:     VENTOLIN HFA 90 mcg/actuation inhaler, INHALE 2 PUFFS INTO THE LUNGS EVERY 4 TO 6 HOURS AS NEEDED FOR WHEEZING. (Patient taking differently: Take if needed & bring),  "Disp: 18 Inhaler, Rfl: 1    XYLITOL, BULK, MISC, EMPTY CONTENTS OF 1 CAPSULE INTO NASAL IRRIGATION SYSTEM, ADD DISTILLED WATER, SALT PACK, MIX & IRRIGATE. PERFORM 2 TIMES DAILY, Disp: , Rfl:     zolpidem (AMBIEN) 5 MG Tab, TAKE 1 TABLET BY MOUTH EVERY DAY AT BEDTIME AS NEEDED, Disp: 90 tablet, Rfl: 1  No current facility-administered medications for this visit.    Facility-Administered Medications Ordered in Other Visits:     lactated ringers infusion, , Intravenous, Continuous, Mary Leiva MD, New Bag at 03/12/20 0923    lactated ringers infusion, , Intravenous, Continuous, Shay Bruce MD, Stopped at 03/16/22 1342    lidocaine (PF) 10 mg/ml (1%) injection 10 mg, 1 mL, Intradermal, Once, Mary Leiva MD    Review of patient's allergies indicates:  No Known Allergies    Smoking history:   Social History     Tobacco Use   Smoking Status Never Smoker   Smokeless Tobacco Never Used       Pulse: 65  SpO2: 95 %  Resp: 16  Height: 5' 7" (170.2 cm)  Weight: 80.7 kg (177 lb 14.6 oz)  BMI (kg/m2): 27.92  Resting Robert Dyspnea Rating : 0   Current Oxygen Use: No  Does the patient use BiPAP, CPAP or Trilogy?: No    ACT Questionnaire:  Asthma Control Test  In the past 4  weeks, how much of the time did your asthma keep you from getting as much done at work, school or at home?: Most of the time  During the past 4 weeks, how often have you had shortness of breath?: More than once a day  During the past 4 weeks, how often did your asthma symptoms (wheezing, couging, shortness of breath, chest tightness or pain) wake you up at night or earlier than usual in the morning?: 2 or 3 nights a week  During the past 4 weeks, how often have you used your rescue inhaler or nebulizer medication (such as albuterol)?: 2 or 3 times a week  How would you rate your asthma control during the past 4 weeks?: Somewhat controlled  If your score is 19 or less, your asthma may not be under control: 11    COPD Questionnaire:  COPD " Questionnaire  How often do you cough?: All of the time  How often do you have phlegm (mucus) in your chest?: Most of the time  How often does your chest feel tight?: Most of the time  When you walk up a hill or one flight of stairs, how often are you breathless?: Most of the time  How often are you limited doing any activities at home?: Some of the time  How often are you confident leaving the house despite your lung condition?: Some of the time  How often do you sleep soundly?: Some of the time  How often do you have energy?: A little of the time  Total score: 27    Has this patient had any pulmonary studies (PFTS)?: Yes  PFT Results:  Pre FVC   Date/Time Value Ref Range Status   05/26/2022 04:42 PM 2.48 (L) 2.76 - 4.49 L Final     Pre FEV1   Date/Time Value Ref Range Status   05/26/2022 04:42 PM 2.01 (L) 2.16 - 3.48 L Final     Pre FEV1 FVC   Date/Time Value Ref Range Status   05/26/2022 04:42 PM 80.85 67.59 - 89.18 % Final     Pre TLC   Date/Time Value Ref Range Status   05/26/2022 04:42 PM 4.26 (L) 4.45 - 6.43 L Final     Pre DLCO   Date/Time Value Ref Range Status   05/26/2022 04:42 PM 16.53 (L) 19.31 - 30.78 ml/(min*mmHg) Final       Is this patient a candidate for pulmonary rehab?: No  Method Used for Education: Literature, Demonstration, Verbal  Did the patient demonstrate understanding?: Yes  What tests has the patient had done today?: Spirometry, Six Minute Walk      Educational assessment:   [x]            Good  []            Fair  []            Poor    Readiness to learn:   [x]            Good  []            Fair  []            Poor    Vision Status:   [x]            Good  []            Fair  []            Poor    Reading Ability:  [x]            Good  []            Fair  []            Poor    Knowledge of condition:   [x]            Good  []            Fair  []            Poor    Language Barriers:   []            Good  []            Fair  []            Poor  [x]            None    Cognitive/ Physical  Barriers:   []            Good  []            Fair  []            Poor  [x]            None    Learning best by:                       [x]            Seeing  []            Hearing  []            Reading                         [x]            Doing    Describe any barrier /Limitation or financial implications of care choices identified     []            Financial  []            Emotional  []            Education  []            Vision/Hearing  []            Physical  [x]            None  []                TOPIC /CONTENT FOR TODAY:    [x]            MDI with or without spacer  [x]            Dry powder inhaler  []            Acapella   []           Peak Flow meter  [x]            Asthma action plan  [x]            Nebulizer use  [x]            CPAP use   [x]            Breathing and cough techniques  [x]            Energy conservation  [x]            Infection prevention  []           OTHER________________________        Learner:    [x]            Patient   []            Caregiver    Method:    [x]            Verbal explanation  [x]            Audio visual    [x]            Literature  [x]            Teach back      Evaluation:    [x]            Teach back  [x]            Demonstrate  [x]            Follow up phone call    []            2 weeks     [x]            4 weeks   [x]            PRN    Therapist Comments:   Patient was seen today to review respiratory medication purpose and proper technique for use of inhalers. Patient practiced proper technique using MDI with valved holding chamber (spacer) and DPI inhalers. Patient demonstrated understanding. Literature was given to patient.    Patient takes Spiriva and Breo for maintenance therapy as prescribed. Reminded patient to rinse mouth thoroughly after ICS use.  Encouraged patient to utilize ipratropium bromide nebulizer treatments as prescribed for rescue therapy. Request sent to pulmonary provider to fill Combivent Respimat inhaler for rescue use. Patient stated  understanding.     Reviewed breathing techniques such as pursed-lip breathing, prince-cough technique, and diaphragmatic breathing. Patient practiced proper technique and verbalized understanding. Literature given to patient.      Discussed therapeutic goals for positive airway pressure therapy(CPAP or BiPAP): Ideal is usage 100% of nights for 6 - 8 hours per night. Minimum usage is 70% of night for at least 4 hours per night used. Reviewed CPAP habituation plan with patient. Patient expressed understanding. Patient has not achieved habituation and does not wear CPAP.      Discussed complications of untreated sleep apnea with patient, including but not limited to high blood pressure, heart attack, stroke, obesity, diabetes and worsening heart failure. Patient voiced understanding.      Asthma trigger checklist was verbally reviewed and literature given to patient.     Infection prevention was discussed. Patient's immunizations are current. Hand hygiene and cleaning of respiratory equipment was also discussed. Patient verbalized understanding.      Asthma action plan was reviewed and literature was given to patient. Patient verbalized understanding.     Plan:  Monthly Pulmonary Disease Management Questionnaire  Follow-up PDM appointment scheduled for 10/20/22    Reinforce education  Meds: Spiriva, Breo, Singulair, add Combivent  DME Needs: [facility]   Action Plan: Asthma   Immunization: Pneumococcal- current, Flu-current, Covid- booster due   Next Provider Visit: 6/20/22  Next Spirometry/CPFT: Not scheduled   Approximate time spent with patient: 75 mins

## 2022-06-02 NOTE — TELEPHONE ENCOUNTER
No new care gaps identified.  [facility] Embedded Care Gaps. Reference number: [id number]. 6/02/2022   4:04:51 PM CDT

## 2022-06-02 NOTE — TELEPHONE ENCOUNTER
[facility] Note  Quick DC. Inappropriate Request   Refill request requires further review by MD: NO   Medication Therapy Plan: Pharmacy is requesting new script(s) for the following medications without required information, (sig/ frequency/qty/etc)     [facility] action(s):  Quick Discontinue      Duplicate Pended Encounter(s)/ Last Prescribed Details:    Pharmacies have been requesting medications for patients without required information, (sig, frequency, qty, etc.). In addition, requests are sent for medication(s) pt. are currently not taking, and medications patients have never taken.    We have spoken to the pharmacies about these request types and advised their teams previously that we are unable to assess these New Script requests and require all details for these requests. This is a known issue and has been reported.        Medication related problems are not assessed for [facility].   Medication Reconciliation Completed? NO Were there pending details that required adjustment? NO     Automatic Epic Generated Protocol Data Below:   Requested Prescriptions   Pending Prescriptions Disp Refills    DULoxetine (CYMBALTA) 60 MG capsule [Pharmacy Med Name: DULOXETINE HCL DR CAPS 60MG]  0              Appointments      Date Provider   Last Visit   5/26/2022 Esthela Hu MD   Next Visit   8/25/2022 Esthela Hu MD        Note composed:6:48 PM 06/02/2022

## 2022-06-02 NOTE — TELEPHONE ENCOUNTER
[facility] Note  Quick DC. Inappropriate Request   Refill request requires further review by MD: NO   Medication Therapy Plan: Pharmacy is requesting new script(s) for the following medications without required information, (sig/ frequency/qty/etc)     [facility] action(s):  Quick Discontinue      Duplicate Pended Encounter(s)/ Last Prescribed Details:    Pharmacies have been requesting medications for patients without required information, (sig, frequency, qty, etc.). In addition, requests are sent for medication(s) pt. are currently not taking, and medications patients have never taken.    We have spoken to the pharmacies about these request types and advised their teams previously that we are unable to assess these New Script requests and require all details for these requests. This is a known issue and has been reported.        Medication related problems are not assessed for [facility]DC.   Medication Reconciliation Completed? NO Were there pending details that required adjustment? NO     Automatic Epic Generated Protocol Data Below:   Requested Prescriptions   Pending Prescriptions Disp Refills    propranoloL (INDERAL LA) 80 MG 24 hr capsule [Pharmacy Med Name: PROPRANOLOL HCL ER CAPS 80MG]  0              Appointments      Date Provider   Last Visit   5/26/2022 Esthela Hu MD   Next Visit   6/2/2022 Esthela Hu MD        Note composed:6:44 PM 06/02/2022

## 2022-06-02 NOTE — TELEPHONE ENCOUNTER
No new care gaps identified.  [facility] Embedded Care Gaps. Reference number: [id number]. 6/02/2022   4:03:48 PM CDT

## 2022-06-08 RX ORDER — TOPIRAMATE 100 MG/1
TABLET, FILM COATED ORAL
Qty: 180 TABLET | Refills: 3 | Status: SHIPPED | OUTPATIENT
Start: 2022-06-08 | End: 2023-06-09 | Stop reason: SDUPTHER

## 2022-06-14 ENCOUNTER — PATIENT MESSAGE (OUTPATIENT)
Dept: OTOLARYNGOLOGY | Facility: CLINIC | Age: 57
End: 2022-06-14
Payer: COMMERCIAL

## 2022-06-20 ENCOUNTER — OFFICE VISIT (OUTPATIENT)
Dept: PULMONOLOGY | Facility: CLINIC | Age: 57
End: 2022-06-20
Payer: COMMERCIAL

## 2022-06-20 VITALS
RESPIRATION RATE: 16 BRPM | WEIGHT: 181.19 LBS | SYSTOLIC BLOOD PRESSURE: 140 MMHG | OXYGEN SATURATION: 98 % | DIASTOLIC BLOOD PRESSURE: 84 MMHG | HEIGHT: 67 IN | BODY MASS INDEX: 28.44 KG/M2 | HEART RATE: 65 BPM

## 2022-06-20 DIAGNOSIS — J45.50 SEVERE PERSISTENT ASTHMA WITHOUT COMPLICATION: ICD-10-CM

## 2022-06-20 DIAGNOSIS — J32.4 CHRONIC PANSINUSITIS: Primary | ICD-10-CM

## 2022-06-20 DIAGNOSIS — I77.1 TORTUOUS AORTA: Chronic | ICD-10-CM

## 2022-06-20 DIAGNOSIS — G47.01 INSOMNIA DUE TO MEDICAL CONDITION: ICD-10-CM

## 2022-06-20 DIAGNOSIS — J32.9 CHRONIC RHINOSINUSITIS: ICD-10-CM

## 2022-06-20 DIAGNOSIS — D80.1 HYPOGAMMAGLOBULINEMIA: ICD-10-CM

## 2022-06-20 DIAGNOSIS — E78.49 OTHER HYPERLIPIDEMIA: ICD-10-CM

## 2022-06-20 DIAGNOSIS — J44.9 CHRONIC OBSTRUCTIVE PULMONARY DISEASE, UNSPECIFIED COPD TYPE: Chronic | ICD-10-CM

## 2022-06-20 DIAGNOSIS — G47.33 OSA ON CPAP: ICD-10-CM

## 2022-06-20 DIAGNOSIS — Z79.60 LONG-TERM USE OF IMMUNOSUPPRESSANT MEDICATION: ICD-10-CM

## 2022-06-20 DIAGNOSIS — R91.8 ABNORMAL CT SCAN, LUNG: ICD-10-CM

## 2022-06-20 DIAGNOSIS — I10 ESSENTIAL HYPERTENSION: ICD-10-CM

## 2022-06-20 PROCEDURE — 3077F PR MOST RECENT SYSTOLIC BLOOD PRESSURE >= 140 MM HG: ICD-10-PCS | Mod: CPTII,S$GLB,, | Performed by: INTERNAL MEDICINE

## 2022-06-20 PROCEDURE — 1159F MED LIST DOCD IN RCRD: CPT | Mod: CPTII,S$GLB,, | Performed by: INTERNAL MEDICINE

## 2022-06-20 PROCEDURE — 3079F PR MOST RECENT DIASTOLIC BLOOD PRESSURE 80-89 MM HG: ICD-10-PCS | Mod: CPTII,S$GLB,, | Performed by: INTERNAL MEDICINE

## 2022-06-20 PROCEDURE — 1159F PR MEDICATION LIST DOCUMENTED IN MEDICAL RECORD: ICD-10-PCS | Mod: CPTII,S$GLB,, | Performed by: INTERNAL MEDICINE

## 2022-06-20 PROCEDURE — 3077F SYST BP >= 140 MM HG: CPT | Mod: CPTII,S$GLB,, | Performed by: INTERNAL MEDICINE

## 2022-06-20 PROCEDURE — 4010F PR ACE/ARB THEARPY RXD/TAKEN: ICD-10-PCS | Mod: CPTII,S$GLB,, | Performed by: INTERNAL MEDICINE

## 2022-06-20 PROCEDURE — 99999 PR PBB SHADOW E&M-EST. PATIENT-LVL V: ICD-10-PCS | Mod: PBBFAC,,, | Performed by: INTERNAL MEDICINE

## 2022-06-20 PROCEDURE — 3079F DIAST BP 80-89 MM HG: CPT | Mod: CPTII,S$GLB,, | Performed by: INTERNAL MEDICINE

## 2022-06-20 PROCEDURE — 3008F BODY MASS INDEX DOCD: CPT | Mod: CPTII,S$GLB,, | Performed by: INTERNAL MEDICINE

## 2022-06-20 PROCEDURE — 99214 OFFICE O/P EST MOD 30 MIN: CPT | Mod: S$GLB,,, | Performed by: INTERNAL MEDICINE

## 2022-06-20 PROCEDURE — 4010F ACE/ARB THERAPY RXD/TAKEN: CPT | Mod: CPTII,S$GLB,, | Performed by: INTERNAL MEDICINE

## 2022-06-20 PROCEDURE — 3008F PR BODY MASS INDEX (BMI) DOCUMENTED: ICD-10-PCS | Mod: CPTII,S$GLB,, | Performed by: INTERNAL MEDICINE

## 2022-06-20 PROCEDURE — 99214 PR OFFICE/OUTPT VISIT, EST, LEVL IV, 30-39 MIN: ICD-10-PCS | Mod: S$GLB,,, | Performed by: INTERNAL MEDICINE

## 2022-06-20 PROCEDURE — 99999 PR PBB SHADOW E&M-EST. PATIENT-LVL V: CPT | Mod: PBBFAC,,, | Performed by: INTERNAL MEDICINE

## 2022-06-20 RX ORDER — PREDNISOLONE ACETATE 10 MG/ML
SUSPENSION/ DROPS OPHTHALMIC
COMMUNITY
Start: 2022-06-10 | End: 2022-09-16 | Stop reason: CLARIF

## 2022-06-20 NOTE — PROGRESS NOTES
Pulmonary Outpatient   Visit     Subjective:       Patient ID: Jaylin Murguia is a 57 y.o. female.    Chief Complaint: Sleep Apnea and Asthma      Jaylin Murguia is 57 y.o.  Asked to see by Esthela Hu MD  Complicated Hx: Crohns, autoimmune issues  Chronic rhinosinusitis: seen by ENT and immunology  IgG replacement therapy, Hizentra 10 g q 7 days (465 mg/kg/mo). Has been on it for about 6 months.  Recurrent pseudomonas infections NASAL  Seen ENT for multiple recurrent drainage, debridement procedure  Last treated with extend ceftazidime/avibactam x 2 weeks : had PICC line  Chronic cough, wheezing, No prior Spir  Recent ? Fall after taking inhaler, rib pain  On BREO, has nebulizer and Xopenex.  Chrinic nasal cough, congestion  Night sweats  No +ve family Hx respiratory issues  Recently Low Na 123, Abn LFT  Was treated with Diflucan for yeast infection  Worked as teacher  No occupational exposure  Regarding crohns  Pentasa 1000 mg QID (started 4/2018)     Past Medications  Remicade (in remote past)-- ineffective  Asacol 4.8 gm-- ineffective  Entocort-- effective  Prednisone  Humira (started July 17, stopped 2/2018)-- stopped 2/2 multiple infections.       06/20/2022  Here to review progress  Doing better.   Using BREO , Xopenex, Nebulizer  COPD score 19  Has Aerobika and Hypersal  PFT reviewed: FVC 2.48L( 68.8%)  DLCO 66%  Sputum grew pseudo: has ENT follow up 2 weeks  Alpha 1: MZ, level: 107  Has sinus rinse formula   Has appt with immunology  On Hyzentra            Review of Systems   HENT: Positive for congestion.    Respiratory: Positive for wheezing and use of rescue inhaler.    Genitourinary: Negative.    Endocrine: endocrine negative   Musculoskeletal: Positive for myalgias.   Psychiatric/Behavioral: Positive for sleep disturbance.   All other systems reviewed and are negative.      Outpatient Encounter Medications as of 6/20/2022   Medication Sig Dispense  Refill    amLODIPine (NORVASC) 5 MG tablet Take 5 mg by mouth once daily.      atorvastatin (LIPITOR) 10 MG tablet Take 1 tablet (10 mg total) by mouth once daily. 90 tablet 3    butalbital-acetaminophen-caffeine -40 mg (FIORICET, ESGIC) -40 mg per tablet TAKE 1 TABLET EVERY 4 HOURS AS NEEDED FOR HEADACHE 90 tablet 3    cholestyramine (QUESTRAN) 4 gram packet Take 1 packet (4 g total) by mouth 3 (three) times daily with meals. 270 packet 3    clindamycin (CLEOCIN) 150 mg/mL injection EMPTY CONTENTS OF 1 VIAL INTO NASAL IRRIGATION SYSTEM, ADD DISTILLED WATER, SALT PACK, MIX & IRRIGATE PERFORM 2 TIMES DAILY      clindamycin (CLEOCIN) 300 MG capsule EMPTY CONTENTS OF 2 CAPSULES INTO NASAL IRRIGATION SYSTEM, ADD DISTILLED WATER, SALT PACK, MIX & IRRIGATE. PERFORM 2 TIMES DAILY 120 capsule 1    desloratadine (CLARINEX) 5 mg tablet Take 1 tablet (5 mg total) by mouth once daily. 90 tablet 3    diltiazem HCl (DILTIAZEM 2% - LIDOCAINE 5% CREAM) Apply peasize amount topically to anal area. 30 g 2    diphenhydrAMINE-aluminum-magnesium hydroxide-simethicone-LIDOcaine HCl 2% Swish and spit 15 mLs every 4 (four) hours as needed (oral ulcers, pain). 1 Bottle 2    DULoxetine (CYMBALTA) 60 MG capsule Take 1 capsule (60 mg total) by mouth 2 (two) times daily. 180 capsule 3    eletriptan (RELPAX) 40 MG tablet Take 1 tablet (40 mg total) by mouth as needed. (Patient taking differently: Take 40 mg by mouth as needed. Take if needed) 12 tablet 3    estradioL (ESTRACE) 2 MG tablet Take 1 tablet (2 mg total) by mouth once daily. 90 tablet 0    fluticasone furoate-vilanteroL (BREO ELLIPTA) 200-25 mcg/dose DsDv diskus inhaler Inhale 1 puff into the lungs once daily. 60 each 11    fluticasone furoate-vilanteroL (BREO ELLIPTA) 200-25 mcg/dose DsDv diskus inhaler Inhale 1 puff into the lungs once daily. Controller      fluticasone propionate (FLONASE) 50 mcg/actuation nasal spray SPRAY 2 SPRAYS BY EACH NOSTRIL ROUTE  ONCE DAILY. 16 mL 0    gentamicin (GARAMYCIN) 40 mg/mL injection EMPTY CONTENTS OF 1 VIAL INTO NASAL IRRIGATION SYSTEM, ADD DISTILLED WATER, SALT PACK, MIX & IRRIGATE PERFORM 2 TIMES DAILY      gentamicin sulfate (GENTAMICIN, BULK, MISC) EMPTY CONTENTS OF 1 CAPSULE INTO NASAL IRRIGATION SYSTEM, ADD DISTILLED WATER, SALT PACK, MIX & IRRIGATE. PERFORM 2 TIMES DAILY      immun glob G,IgG,-pro-IgA 0-50 (HIZENTRA) 10 gram/50 mL (20 %) Soln Inject 60 mLs (12 g total) into the skin every 7 days. 240 mL 11    INV opn-375/placebo 93 mcg sprm nasal spray 2 sprays by Alternating Nostrils route 2 (two) times a day.      ipratropium (ATROVENT) 0.02 % nebulizer solution Nebulize 2.5 ml every 4-6 hours as directed, if needed 90 each 11    ipratropium-albuteroL (COMBIVENT)  mcg/actuation inhaler Inhale 2 puffs into the lungs every 6 (six) hours as needed for Wheezing. Rescue 4 g 3    losartan (COZAAR) 100 MG tablet Take 1 tablet (100 mg total) by mouth once daily. 90 tablet 3    methylPREDNISolone (MEDROL DOSEPACK) 4 mg tablet use as directed 21 tablet 0    montelukast (SINGULAIR) 10 mg tablet TAKE 1 TABLET EVERY EVENING 90 tablet 3    NEILMED SINUS RINSE COMPLETE pkdv use as directed      nortriptyline (PAMELOR) 25 MG capsule Take 1 capsule (25 mg total) by mouth every evening. 90 capsule 3    polyethylene glycol (GOLYTELY,NULYTELY) 236-22.74-6.74 -5.86 gram suspension Take 4,000 mLs by mouth once.      polyethylene glycol (MOVIPREP) 100-7.5-2.691 gram solution       prednisoLONE acetate (PRED FORTE) 1 % DrpS Place into the left eye.      pregabalin (LYRICA) 150 MG capsule Take 1 capsule (150 mg total) by mouth 3 (three) times daily. 270 capsule 1    propranoloL (INDERAL LA) 80 MG 24 hr capsule TAKE 1 CAPSULE BY MOUTH ONCE DAILY. 90 capsule 2    rizatriptan (MAXALT) 10 MG tablet TAKE 1 TABLET IF NEEDED FOR MIGRAINES. MAX 2 TABLETS IN 24 HOURS. 30 tablet 8    sodium chloride 0.9% 0.9 % Soln 200 mL with  "gentamicin (ped) 20 mg/2 mL Soln EMPTY CONTENTS OF 1 CAPSULE INTO NASAL IRRIGATION SYSTEM, ADD DISTILLED WATER, SALT PACK, MIX & IRRIGATE. PERFORM 2 TIMES DAILY      sodium chloride 7% 7 % nebulizer solution Take 4 mLs by nebulization 2 (two) times a day. 240 mL 3    topiramate (TOPAMAX) 100 MG tablet TAKE 1 TABLET TWICE A  tablet 3    traZODone (DESYREL) 50 MG tablet Take 1 tablet (50 mg total) by mouth every evening. 30 tablet 11    triamcinolone acetonide 0.025% (KENALOG) 0.025 % cream 1 application once daily. Apply to affected area      VENTOLIN HFA 90 mcg/actuation inhaler INHALE 2 PUFFS INTO THE LUNGS EVERY 4 TO 6 HOURS AS NEEDED FOR WHEEZING. (Patient taking differently: Take if needed & bring) 18 Inhaler 1    XYLITOL, BULK, MISC EMPTY CONTENTS OF 1 CAPSULE INTO NASAL IRRIGATION SYSTEM, ADD DISTILLED WATER, SALT PACK, MIX & IRRIGATE. PERFORM 2 TIMES DAILY      zolpidem (AMBIEN) 5 MG Tab TAKE 1 TABLET BY MOUTH EVERY DAY AT BEDTIME AS NEEDED 90 tablet 1    [DISCONTINUED] tiotropium bromide (SPIRIVA RESPIMAT) 1.25 mcg/actuation Mist Inhale 2 puffs into the lungs once daily. 4 g 11    azelastine (ASTELIN) 137 mcg (0.1 %) nasal spray 2 sprays (274 mcg total) by Nasal route 2 (two) times daily. 30 mL 11     Facility-Administered Encounter Medications as of 6/20/2022   Medication Dose Route Frequency Provider Last Rate Last Admin    lactated ringers infusion   Intravenous Continuous Mary Leiva MD   New Bag at 03/12/20 0923    lactated ringers infusion   Intravenous Continuous Shay Bruce MD   Stopped at 03/16/22 1342    lidocaine (PF) 10 mg/ml (1%) injection 10 mg  1 mL Intradermal Once Mary Leiva MD           Objective:     Vital Signs (Most Recent)  Vital Signs  Pulse: 65  Resp: 16  SpO2: 98 %  BP: (!) 140/84  Height and Weight  Height: 5' 7" (170.2 cm)  Weight: 82.2 kg (181 lb 3.5 oz)  BSA (Calculated - sq m): 1.97 sq meters  BMI (Calculated): 28.4  Weight in (lb) to " have BMI = 25: 159.3]  Wt Readings from Last 2 Encounters:   06/20/22 82.2 kg (181 lb 3.5 oz)   06/02/22 80.7 kg (177 lb 14.6 oz)       Physical Exam   Constitutional: She is oriented to person, place, and time. She appears well-developed and well-nourished.   HENT:   Head: Normocephalic.   Mouth/Throat: Posterior oropharyngeal erythema present.     Mallampati Score: II.   Neck: No JVD present.   Cardiovascular: Normal rate and intact distal pulses.   No murmur heard.  Pulmonary/Chest: Normal expansion and effort normal. She has no wheezes.   Abdominal: Soft. Bowel sounds are normal.   Musculoskeletal:         General: No edema. Normal range of motion.      Cervical back: Normal range of motion and neck supple.   Lymphadenopathy:     She has no axillary adenopathy.   Neurological: She is alert and oriented to person, place, and time.   Skin: Skin is warm and dry.   Psychiatric: She has a normal mood and affect.   Nursing note and vitals reviewed.      Laboratory  Lab Results   Component Value Date    WBC 6.67 05/06/2022    RBC 4.24 05/06/2022    HGB 13.2 05/06/2022    HCT 38.7 05/06/2022    MCV 91 05/06/2022    MCH 31.1 (H) 05/06/2022    MCHC 34.1 05/06/2022    RDW 12.1 05/06/2022     05/06/2022    MPV 10.1 05/06/2022    GRAN 3.4 05/06/2022    GRAN 50.3 05/06/2022    LYMPH 2.3 05/06/2022    LYMPH 34.5 05/06/2022    MONO 0.6 05/06/2022    MONO 8.7 05/06/2022    EOS 0.4 05/06/2022    BASO 0.06 05/06/2022    EOSINOPHIL 5.5 05/06/2022    BASOPHIL 0.9 05/06/2022       BMP  Lab Results   Component Value Date     (L) 05/10/2022    K 4.2 05/10/2022     05/10/2022    CO2 25 05/10/2022    BUN 10 05/10/2022    CREATININE 0.8 05/10/2022    CALCIUM 9.2 05/10/2022    ANIONGAP 7 (L) 05/10/2022    ESTGFRAFRICA >60 05/10/2022    EGFRNONAA >60 05/10/2022    AST 37 05/10/2022    ALT 46 (H) 05/10/2022    PROT 7.4 05/10/2022       No results found for: BNP    Lab Results   Component Value Date    TSH 0.887 09/02/2021        Lab Results   Component Value Date    SEDRATE 25 03/08/2022       Lab Results   Component Value Date    CRP 1.3 03/08/2022     Lab Results   Component Value Date    IGE <35 05/26/2022    IGE <35 02/03/2021    IGE <35 12/12/2019        Lab Results   Component Value Date    ASPERGILLUS None Detected 05/26/2022     Lab Results   Component Value Date    AFUMIGATUSCL CLASS 0 12/12/2019        No results found for: ACE     Diagnostic Results:  I have personally reviewed today the following studies:    CT Chest Without Contrast  Narrative: EXAMINATION:  CT CHEST WITHOUT CONTRAST    CLINICAL HISTORY:  Soft tissue mass, chest, US/xray nondiagnostic; Localized swelling, mass and lump, trunk    TECHNIQUE:  Low dose axial images, sagittal and coronal reformations were obtained from the thoracic inlet to the lung bases. Contrast was not administered.    COMPARISON:  Chest x-ray dated 05/06/2022, CT dated 02/18/2020    FINDINGS:  Heart and Great vessels: Within normal limits.  No mediastinal mass lesions demonstrated.    Thoracic Adenopathy: None demonstrated.    Lungs: There are some patchy ill-defined hazy ground-glass opacities with associated clustered tree-in-bud micro nodularity in the right lower lobe, most in keeping with infectious/inflammatory process.  Questionable mucous plugging versus motion artifact seen in the inferior left lower lobe.  Mild subsegmental scarring noted along the medial margins of the right upper lobe and dependent portions of the bilateral lung bases.  No parenchymal consolidations, pleural effusions, or suspicious nodular opacities visualized.    Upper Abdomen: No acute findings    Bones: There are acute appearing nondisplaced fractures involving the anterior right 7th and 8th ribs near the costochondral junctions.    Miscellaneous: None  Impression: 1. Acute appearing nondisplaced fractures involving the right right-sided anterior rib fractures as above.  2. Patchy ground-glass opacities  with associated clustered tree-in-bud micro nodularity in the right lower lobe with questionable mucous plugging in the left lower lobe.  Findings suggest infectious/inflammatory process.  Correlate clinically.  3. No suspicious pulmonary nodules or mass lesions visualized.    Electronically signed by: Sarthak Garcia MD  Date:    05/20/2022  Time:    15:47          Component      Latest Ref Rng & Units 5/26/2022   POC PH      7.35 - 7.45 7.356   POC PCO2      35 - 45 mmHg 33.7 (L)   POC PO2      80 - 100 mmHg 93   POC HCO3      24 - 28 mmol/L 18.9 (L)   POC BE      -2 to 2 mmol/L -7   POC SATURATED O2      95 - 100 % 97   Sample       ARTERIAL   Site       RR   Allens Test       Pass   DelSys       Room Air   Mode       SPONT   FiO2       0.21   NIL      IU/mL 0.85949   TB1 - Nil      IU/mL 0.001   TB2 - Nil      IU/mL 0.000   Mitogen - Nil      IU/mL 10.000   TB Gold Plus      Negative Negative   Coccidioides      Not Detected Not Detected   Aspergillus Antibody      None Detected None Detected   Blastomyces Antibody      None Detected None Detected   Histoplasma Ab, Immunodiffusion      None Detected None Detected   Alpha 1 Antitrypsin Phenotype       SEE BELOW   Alpha-1 Anti-Trypsin      100 - 190 mg/dL 107   IgE      0 - 100 IU/mL <35   A-1 Antitrypsin      100 - 190 mg/dL 100         Test duration was 7 hours 23 minutes. 32 minutes of respiratory signal was excluded from analysis. Heart rate  variability was present. Lowest oxygen saturation was 87%. Snoring recorded above 50 dB 93% of the time.  Apnea-hypopnea index: 5.1 events per hour. Total events 38.  Mild/borderline obstructive sleep apnea syndrome.  Treatment recommendations:  CPAP would be the guideline recommendation for first-line treatment for obstructive sleep apnea.  Either order in lab CPAP titration or AutoPAP device.  Follow-up evaluation and treatment in the sleep disorder clinic.  Other modalities for treatment of obstructive sleep apnea may  be explored in patients with intolerant to CPAP including evaluation by ear nose  and throat, Hypoglosal nerve stimulator ( INSPIRE) mandibular advancement device, nonsupine sleep positioning device.  Significant weight loss is recommended to normal ranges.  Close follow-up to ensure resolution of symptoms.      PFT:  FEV1: 2.01L( 70.7%), FVC 2.48L( 68.8%)  FEV1/FVC 81  TLC 4.26L( 78.3%)  DLCO 16.53( 66.0%)  Assessment/Plan:     Problem List Items Addressed This Visit     Tortuous aorta (Chronic)    RESOLVED: Chronic obstructive pulmonary disease, unspecified COPD type (Chronic)    Chronic pansinusitis - Primary    Essential hypertension     On HYZAAR, NORVASC           Insomnia due to medical condition     PRN AMBIEN           Long-term use of immunosuppressant medication     STABLE           TAMIKA on CPAP     Not using CPAP  Bring device for trouble shooting           Other hyperlipidemia     On LIPITOR           Severe persistent asthma without complication     DC Spiriva  Cont BREO, COMBIVENT, Nebulizer  PFT: Mild restriction and DLCO reduced           Relevant Orders    Fraction of  Nitric Oxide    Spirometry with/without bronchodilator    X-Ray Chest PA And Lateral    Hypogammaglobulinemia     Follow with Immunology  HIZENTRA           Chronic rhinosinusitis     Follow with ENT  Nasal HYgeine routine             Abnormal CT scan, lung     Follow up CXR               repeat FeNo next visit  NYSTATIN as needed  Cont Hypersal and aerobika  surveilance for MAC    Overall feels better      Follow up in about 3 months (around 2022), or FeNo, [place], CXR, DC Spiriva.    This note was prepared using voice recognition system and is likely to have sound alike errors that may have been overlooked even after proof reading.  Please call me with any questions    Discussed diagnosis, its evaluation, treatment and usual course. All questions answered.      Abrahan Lira MD

## 2022-07-07 ENCOUNTER — OFFICE VISIT (OUTPATIENT)
Dept: OTOLARYNGOLOGY | Facility: CLINIC | Age: 57
End: 2022-07-07
Payer: COMMERCIAL

## 2022-07-07 ENCOUNTER — LAB VISIT (OUTPATIENT)
Dept: LAB | Facility: HOSPITAL | Age: 57
End: 2022-07-07
Attending: ALLERGY & IMMUNOLOGY
Payer: COMMERCIAL

## 2022-07-07 ENCOUNTER — TELEPHONE (OUTPATIENT)
Dept: ALLERGY | Facility: CLINIC | Age: 57
End: 2022-07-07
Payer: COMMERCIAL

## 2022-07-07 ENCOUNTER — OFFICE VISIT (OUTPATIENT)
Dept: ALLERGY | Facility: CLINIC | Age: 57
End: 2022-07-07
Payer: COMMERCIAL

## 2022-07-07 VITALS — HEIGHT: 67 IN | BODY MASS INDEX: 28.58 KG/M2 | WEIGHT: 182.13 LBS

## 2022-07-07 VITALS — HEIGHT: 67 IN | WEIGHT: 182.56 LBS | BODY MASS INDEX: 28.65 KG/M2

## 2022-07-07 DIAGNOSIS — D80.1 HYPOGAMMAGLOBULINEMIA: ICD-10-CM

## 2022-07-07 DIAGNOSIS — K50.90 CROHN'S DISEASE WITHOUT COMPLICATION, UNSPECIFIED GASTROINTESTINAL TRACT LOCATION: ICD-10-CM

## 2022-07-07 DIAGNOSIS — D80.6 ANTI-POLYSACCHARIDE ANTIBODY DEFICIENCY: ICD-10-CM

## 2022-07-07 DIAGNOSIS — J32.9 RECURRENT SINUSITIS: Primary | ICD-10-CM

## 2022-07-07 DIAGNOSIS — D80.3 IGG2 SUBCLASS DEFICIENCY: ICD-10-CM

## 2022-07-07 DIAGNOSIS — J31.0 NONALLERGIC RHINITIS: Primary | ICD-10-CM

## 2022-07-07 DIAGNOSIS — J32.4 CHRONIC PANSINUSITIS: ICD-10-CM

## 2022-07-07 DIAGNOSIS — B99.9 RECURRENT INFECTIONS: ICD-10-CM

## 2022-07-07 DIAGNOSIS — J45.909 ASTHMA, UNSPECIFIED ASTHMA SEVERITY, UNSPECIFIED WHETHER COMPLICATED, UNSPECIFIED WHETHER PERSISTENT: ICD-10-CM

## 2022-07-07 LAB
BASOPHILS # BLD AUTO: 0.05 K/UL (ref 0–0.2)
BASOPHILS NFR BLD: 0.9 % (ref 0–1.9)
DIFFERENTIAL METHOD: ABNORMAL
EOSINOPHIL # BLD AUTO: 0.3 K/UL (ref 0–0.5)
EOSINOPHIL NFR BLD: 5.8 % (ref 0–8)
ERYTHROCYTE [DISTWIDTH] IN BLOOD BY AUTOMATED COUNT: 13.1 % (ref 11.5–14.5)
HCT VFR BLD AUTO: 40.9 % (ref 37–48.5)
HGB BLD-MCNC: 13.1 G/DL (ref 12–16)
HIV 1+2 AB+HIV1 P24 AG SERPL QL IA: NEGATIVE
IGA SERPL-MCNC: 284 MG/DL (ref 40–350)
IGG SERPL-MCNC: 1704 MG/DL (ref 650–1600)
IGM SERPL-MCNC: 163 MG/DL (ref 50–300)
IMM GRANULOCYTES # BLD AUTO: 0.01 K/UL (ref 0–0.04)
IMM GRANULOCYTES NFR BLD AUTO: 0.2 % (ref 0–0.5)
LYMPHOCYTES # BLD AUTO: 2 K/UL (ref 1–4.8)
LYMPHOCYTES NFR BLD: 36.9 % (ref 18–48)
MCH RBC QN AUTO: 31.1 PG (ref 27–31)
MCHC RBC AUTO-ENTMCNC: 32 G/DL (ref 32–36)
MCV RBC AUTO: 97 FL (ref 82–98)
MONOCYTES # BLD AUTO: 0.4 K/UL (ref 0.3–1)
MONOCYTES NFR BLD: 7.3 % (ref 4–15)
NEUTROPHILS # BLD AUTO: 2.7 K/UL (ref 1.8–7.7)
NEUTROPHILS NFR BLD: 48.9 % (ref 38–73)
NRBC BLD-RTO: 0 /100 WBC
PLATELET # BLD AUTO: 216 K/UL (ref 150–450)
PMV BLD AUTO: 10.4 FL (ref 9.2–12.9)
RBC # BLD AUTO: 4.21 M/UL (ref 4–5.4)
WBC # BLD AUTO: 5.5 K/UL (ref 3.9–12.7)

## 2022-07-07 PROCEDURE — 1160F PR REVIEW ALL MEDS BY PRESCRIBER/CLIN PHARMACIST DOCUMENTED: ICD-10-PCS | Mod: CPTII,S$GLB,, | Performed by: OTOLARYNGOLOGY

## 2022-07-07 PROCEDURE — 4010F PR ACE/ARB THEARPY RXD/TAKEN: ICD-10-PCS | Mod: CPTII,S$GLB,, | Performed by: OTOLARYNGOLOGY

## 2022-07-07 PROCEDURE — 99214 OFFICE O/P EST MOD 30 MIN: CPT | Mod: 25,S$GLB,, | Performed by: OTOLARYNGOLOGY

## 2022-07-07 PROCEDURE — 3008F BODY MASS INDEX DOCD: CPT | Mod: CPTII,S$GLB,, | Performed by: OTOLARYNGOLOGY

## 2022-07-07 PROCEDURE — 86360 T CELL ABSOLUTE COUNT/RATIO: CPT | Performed by: ALLERGY & IMMUNOLOGY

## 2022-07-07 PROCEDURE — 85025 COMPLETE CBC W/AUTO DIFF WBC: CPT | Performed by: ALLERGY & IMMUNOLOGY

## 2022-07-07 PROCEDURE — 31231 NASAL/SINUS ENDOSCOPY: ICD-10-PCS | Mod: S$GLB,,, | Performed by: OTOLARYNGOLOGY

## 2022-07-07 PROCEDURE — 99999 PR PBB SHADOW E&M-EST. PATIENT-LVL IV: ICD-10-PCS | Mod: PBBFAC,,, | Performed by: ALLERGY & IMMUNOLOGY

## 2022-07-07 PROCEDURE — 1159F MED LIST DOCD IN RCRD: CPT | Mod: CPTII,S$GLB,, | Performed by: OTOLARYNGOLOGY

## 2022-07-07 PROCEDURE — 4010F ACE/ARB THERAPY RXD/TAKEN: CPT | Mod: CPTII,S$GLB,, | Performed by: ALLERGY & IMMUNOLOGY

## 2022-07-07 PROCEDURE — 1159F PR MEDICATION LIST DOCUMENTED IN MEDICAL RECORD: ICD-10-PCS | Mod: CPTII,S$GLB,, | Performed by: OTOLARYNGOLOGY

## 2022-07-07 PROCEDURE — 1159F MED LIST DOCD IN RCRD: CPT | Mod: CPTII,S$GLB,, | Performed by: ALLERGY & IMMUNOLOGY

## 2022-07-07 PROCEDURE — 82784 ASSAY IGA/IGD/IGG/IGM EACH: CPT | Performed by: ALLERGY & IMMUNOLOGY

## 2022-07-07 PROCEDURE — 99214 OFFICE O/P EST MOD 30 MIN: CPT | Mod: S$GLB,,, | Performed by: ALLERGY & IMMUNOLOGY

## 2022-07-07 PROCEDURE — 3008F BODY MASS INDEX DOCD: CPT | Mod: CPTII,S$GLB,, | Performed by: ALLERGY & IMMUNOLOGY

## 2022-07-07 PROCEDURE — 1160F RVW MEDS BY RX/DR IN RCRD: CPT | Mod: CPTII,S$GLB,, | Performed by: OTOLARYNGOLOGY

## 2022-07-07 PROCEDURE — 99999 PR PBB SHADOW E&M-EST. PATIENT-LVL V: ICD-10-PCS | Mod: PBBFAC,,, | Performed by: OTOLARYNGOLOGY

## 2022-07-07 PROCEDURE — 36415 COLL VENOUS BLD VENIPUNCTURE: CPT | Performed by: ALLERGY & IMMUNOLOGY

## 2022-07-07 PROCEDURE — 87389 HIV-1 AG W/HIV-1&-2 AB AG IA: CPT | Performed by: ALLERGY & IMMUNOLOGY

## 2022-07-07 PROCEDURE — 99999 PR PBB SHADOW E&M-EST. PATIENT-LVL IV: CPT | Mod: PBBFAC,,, | Performed by: ALLERGY & IMMUNOLOGY

## 2022-07-07 PROCEDURE — 99214 PR OFFICE/OUTPT VISIT, EST, LEVL IV, 30-39 MIN: ICD-10-PCS | Mod: 25,S$GLB,, | Performed by: OTOLARYNGOLOGY

## 2022-07-07 PROCEDURE — 1159F PR MEDICATION LIST DOCUMENTED IN MEDICAL RECORD: ICD-10-PCS | Mod: CPTII,S$GLB,, | Performed by: ALLERGY & IMMUNOLOGY

## 2022-07-07 PROCEDURE — 31231 NASAL ENDOSCOPY DX: CPT | Mod: S$GLB,,, | Performed by: OTOLARYNGOLOGY

## 2022-07-07 PROCEDURE — 4010F ACE/ARB THERAPY RXD/TAKEN: CPT | Mod: CPTII,S$GLB,, | Performed by: OTOLARYNGOLOGY

## 2022-07-07 PROCEDURE — 86357 NK CELLS TOTAL COUNT: CPT | Performed by: ALLERGY & IMMUNOLOGY

## 2022-07-07 PROCEDURE — 86355 B CELLS TOTAL COUNT: CPT | Performed by: ALLERGY & IMMUNOLOGY

## 2022-07-07 PROCEDURE — 3008F PR BODY MASS INDEX (BMI) DOCUMENTED: ICD-10-PCS | Mod: CPTII,S$GLB,, | Performed by: OTOLARYNGOLOGY

## 2022-07-07 PROCEDURE — 4010F PR ACE/ARB THEARPY RXD/TAKEN: ICD-10-PCS | Mod: CPTII,S$GLB,, | Performed by: ALLERGY & IMMUNOLOGY

## 2022-07-07 PROCEDURE — 99999 PR PBB SHADOW E&M-EST. PATIENT-LVL V: CPT | Mod: PBBFAC,,, | Performed by: OTOLARYNGOLOGY

## 2022-07-07 PROCEDURE — 86359 T CELLS TOTAL COUNT: CPT | Performed by: ALLERGY & IMMUNOLOGY

## 2022-07-07 PROCEDURE — 99214 PR OFFICE/OUTPT VISIT, EST, LEVL IV, 30-39 MIN: ICD-10-PCS | Mod: S$GLB,,, | Performed by: ALLERGY & IMMUNOLOGY

## 2022-07-07 PROCEDURE — 3008F PR BODY MASS INDEX (BMI) DOCUMENTED: ICD-10-PCS | Mod: CPTII,S$GLB,, | Performed by: ALLERGY & IMMUNOLOGY

## 2022-07-07 NOTE — PROGRESS NOTES
Patient ID: Jaylin Murguia is a 57 y.o. female.     9/23/21    Chief Complaint:  Follow-up (Hizentra) and Asthma  FU recurrent sinusitis, anti-polysaccharide antibody deficiency, IgG2 subclass def    Follow-up  Associated symptoms include congestion and coughing. Pertinent negatives include no arthralgias, chest pain, fatigue, fever, headaches, joint swelling, nausea, rash, sore throat or vomiting.   Asthma  She complains of cough. There is no shortness of breath or wheezing. Associated symptoms include postnasal drip. Pertinent negatives include no chest pain, ear pain, fever, headaches, rhinorrhea, sneezing, sore throat or trouble swallowing. Her past medical history is significant for asthma.     Pt presents for fu anti-polysaccharide antibody def and IgG2 subclass deficiency. Initially did find IgG replacement helpful. At  we increased Hizentra dose (12g q 7d /  580 mg/kg/mo). Since then, though, continues w chronic/recurrent sinusitis sx's and concern of poorly controlled asthma. She is following w pulmonary. Has needed interval prednisone for cough, wheeze.  Has seen ENT. Is restarting cipro sinus rinses.        Hx from  9/23/21:  Since  has restarted IgG replacement therapy, Hizentra 10 g q 7 days (465 mg/kg/mo). Has been on it for about 6 months.  On Hizentra she does find that freq and severity of rhinosinusitis sx's has decreased. She recalls only one course of abx for sinusitis in the last 6 months. She usually infuses Hizentra on Tuesdays and notes increased energy in the days after. Often, on Sunday and Monday starts to notice increased fatigue. Sometimes, on Sunday and Monday, may notice slight increase in rhinitis sx's.  Continues to follow w ENT. Still using nasal steroids rinses and nasally instilled abx.  Has been advised to start Humira by GI, for Crohn's dis.    4-5 courses abx over last year, more sinus than lower res    Dog cat, dust, [place],     Initial hx from 2/3/21:  Pt  presents, referred by ENT, for re-evaluation of immune system, given hx of recurrent sinusitis. Has had sinus surgery x 3 over last 2 years and recurrent sinusitis continues, some w staph, pseudomonas. Currently on levoflofloxacin.  Had been followed by AI, Dr. Fletcher, in [place]. Was diagnosed with IgG subclass deficiency and was on IgG replacement therapy for about 2 months. Recalls 1-2 IV infusions, then switched to SQ IgG weekly x 8-10 weeks. Estimates about 4 mo total of IgG replacement therapy. She recalls that during this time she did have fewer sinus infections, and that she discontinued b/c she had noted fewer infections. Over the last approx 10 mo since stopping IgG therapy, frequent sinus infections have recurred.  She recalls distant SCIT x 3 years over 7 years ago. She doesn't recall wether it was helpful. ImmunoCAPs 12/19 were negative.  Hx GERD, though protonix was sedating. Trying to manage w lifestyle modification.  Also has hx wheeze w resp infections.      Past Medical History:   Diagnosis Date    Allergic rhinitis     Asthma     Chronic pansinusitis     Crohn's disease     Ileal involvement, previously on Remicade, Asacol, Prednisone    Fibromyalgia     Hyperlipidemia     Hypertension     Immunosuppression 20020    Migraine     Obstructive sleep apnea     CPAP at night    Sciatica        Family History   Problem Relation Age of Onset    Breast cancer Mother     Hypertension Mother     Allergies Mother     Kidney disease Father 64        ESRD on HD    Scleroderma Father     Breast cancer Maternal Grandmother     Heart attack Maternal Grandmother     COPD Maternal Grandmother 72    Cancer Paternal Grandmother 70        colon    Hypertension Brother          Review of Systems   Constitutional: Negative for activity change, fatigue and fever.   HENT: Positive for congestion, postnasal drip, sinus pressure and sinus pain. Negative for ear discharge, ear pain, facial swelling,  hearing loss, nosebleeds, rhinorrhea, sneezing, sore throat, trouble swallowing and voice change.    Eyes: Positive for photophobia. Negative for pain, discharge, redness and itching.   Respiratory: Positive for cough. Negative for apnea, choking, chest tightness, shortness of breath and wheezing.    Cardiovascular: Negative for chest pain.   Gastrointestinal: Negative for diarrhea, nausea and vomiting.   Genitourinary: Negative for dysuria.   Musculoskeletal: Negative for arthralgias and joint swelling.   Skin: Negative for color change and rash.   Neurological: Negative for headaches.   Hematological: Does not bruise/bleed easily.   Psychiatric/Behavioral: Negative for behavioral problems and sleep disturbance.        Objective:   Physical Exam  Constitutional:       General: She is not in acute distress.     Appearance: She is not ill-appearing or toxic-appearing.   Eyes:      General:         Right eye: No discharge.         Left eye: No discharge.   Pulmonary:      Effort: No respiratory distress.   Neurological:      Mental Status: She is alert and oriented to person, place, and time.   Psychiatric:         Mood and Affect: Mood normal.         Behavior: Behavior normal.       Results for DARIAN VALDERRAMA (MRN [id number]) as of 2/6/2021 16:20   Ref. Range 12/12/2019 16:02   IgG Latest Ref Range: 650 - 1600 mg/dL 941   IgM Latest Ref Range: 50 - 300 mg/dL 133   IgA Latest Ref Range: 40 - 350 mg/dL 235   IgG 1 Latest Ref Range: 382 - 929 mg/dL 556   IgG 2 Latest Ref Range: 242 - 700 mg/dL 153 (L)   IgG 3 Latest Ref Range: 22 - 176 mg/dL 16 (L)   IgG 4 Latest Ref Range: 4 - 86 mg/dL 22     Results for DARIAN VALDERRAMA (MRN [id number]) as of 2/6/2021 16:20   Ref. Range 2/3/2021 11:06   Absolute CD19 Latest Ref Range: 100 - 500 cells/ul 484   Absolute CD3 Latest Ref Range: 700 - 2100 cells/ul 1902   Absolute CD4 Latest Ref Range: 300 - 1400 cells/ul 1294   Absolute CD56 + CD16 Latest Ref Range: 90 - 600 cells/ul 122    Absolute CD8 Latest Ref Range: 200 - 900 cells/ul 526   CD19 B Cells Latest Ref Range: 6 - 19 % 20.3 (H)   CD3 % Total T Cell Latest Ref Range: 55 - 83 % 74.0   CD4 % Alsen T Cell Latest Ref Range: 28 - 57 % 50.4   CD4/CD8 Ratio Latest Ref Range: 0.9 - 3.6  2.46   CD56 + CD16 Natural Killers Latest Ref Range: 7 - 31 % 5.1 (L)   CD8 % Suppressor T Cell Latest Ref Range: 10 - 39 % 20.5   Results for DARIAN VALDERRAMA (MRN [id number]) as of 2/6/2021 16:20   Ref. Range 12/12/2019 16:37 4/20/2020 11:16   S.pneumoniae Type 1 Latest Units: mcg/mL <0.3 5.4   S.pneumoniae Type 12F Latest Units: mcg/mL 1.5 1.1   S.pneumoniae Type 18C Latest Units: mcg/mL 0.5 5.0   S.pneumoniae Type 19F Latest Units: mcg/mL 6.1 21.4   S.pneumoniae Type 23F Latest Units: mcg/mL 0.3 3.8   S.pneumoniae Type 3 Latest Units: mcg/mL 0.8 2.5   S.pneumoniae Type 5 Latest Units: mcg/mL 0.5 3.4   S.pneumoniae Type 6B Latest Units: mcg/mL 1.9 39.8   S.pneumoniae Type 7F Latest Units: mcg/mL 1.5 4.2   S.pneumoniae Type 8 Latest Units: mcg/mL 1.3 1.6   S.pneumoniae Type 9N Latest Units: mcg/mL <0.3 1.6   S.pneumoniae Type 9V Abs Latest Units: mcg/mL <0.3 3.8   Strep pneumo Type 14 Latest Units: mcg/mL 1.8 6.0   Strep pneumo Type 4 Latest Units: mcg/mL <0.3 1.5     Prevnar 12/19  Pneumovax 10/13    4/20/20 pneumo titers drawn while on IgG replacement    Prev AI notes in Epic reviewd    Assessment:       1. Recurrent sinusitis    2. Anti-polysaccharide antibody deficiency    3. IgG2 subclass deficiency    4. Crohn's disease without complication, unspecified gastrointestinal tract location    5. Asthma, unspecified asthma severity, unspecified whether complicated, unspecified whether persistent         Plan:         Increase Hizentra dose to 14 g SQ q 7 days (675 mg/kg/mo).  If no improvement and continued concern of poorly controlled asthma, biologic therapy for asthma, ie nucala, might be a consideration. Specific inhalant sensitization has not been  idetified and IgE is normal, but abs eosinophil count has been > 150/hpf    Check cbc, quant immunoglobulins, HIV, lymphocyte subpops

## 2022-07-07 NOTE — TELEPHONE ENCOUNTER
----- Message from Mattie To MA sent at 7/7/2022 10:54 AM CDT -----  Contact: Nurse Mcmanus  [phone]  Patient is in Dr Wilson's office, nurse is concerned that the patient may be late, please call and advise the nurse.         Nurse Mcmanus  [phone]

## 2022-07-07 NOTE — PROCEDURES
Nasal/sinus endoscopy    Date/Time: 7/7/2022 10:45 AM  Performed by: Matthias Roach MD  Authorized by: Matthias Roach MD     Consent Done?:  Yes (Verbal)  Anesthesia:     Local anesthetic:  Lidocaine 4% and Jose-Synephrine 1/2%    Patient tolerance:  Patient tolerated the procedure well with no immediate complications  Nose:     Procedure Performed:  Nasal Endoscopy  External:      No external nasal deformity  Intranasal:      Mucosa no polyps     Mucosa ulcers not present     No mucosa lesions present     Turbinates not enlarged     No septum gross deformity  Nasopharynx:      No mucosa lesions     Adenoids not present     Posterior choanae patent     Eustachian tube patent     Sinuses all patent  Left side w/ mucopurulent yellow mucus from ethmoid  Right side clear, no mucus

## 2022-07-07 NOTE — PROGRESS NOTES
"  Subjective:      Jaylin is a 57 y.o. female who comes for follow-up of sinusitis.  Her last visit with me was on 5/16/2022.  Now 1-1/2 years status-post endoscopic sinus surgery.  Had ceftazidime/avibactam x 2 weeks.  Continues to have congestion, poor smell and taste, cheek pressure, thick mucus in nose though not frankly discolored.  Using cipro spray for 1 month, just called for refill.  Using saline rinse daily.  Saw pulmonologist about recent chest CT.    SNOT-22 score: : (P) 54  NOSE score:: (P) 35%  ETDQ-7 score:: (P) 2.1    The patient's medications, allergies, past medical, surgical, social and family histories were reviewed and updated as appropriate.    A detailed review of systems was obtained with pertinent positives as per the above HPI, and otherwise negative.        Objective:     Ht 5' 7" (1.702 m)   Wt 82.6 kg (182 lb 1.6 oz)   BMI 28.52 kg/m²        Constitutional:   She appears well-developed. She is cooperative.     Head:  Normocephalic.     Nose:  No mucosal edema, rhinorrhea, septal deviation or polyps. No epistaxis. Turbinates normal, no turbinate masses and no turbinate hypertrophy.  Right sinus exhibits no maxillary sinus tenderness and no frontal sinus tenderness. Left sinus exhibits no maxillary sinus tenderness and no frontal sinus tenderness.     Mouth/Throat  Oropharynx clear and moist without lesions or asymmetry. No oropharyngeal exudate or posterior oropharyngeal erythema.     Neck:  No adenopathy. Normal range of motion present.     She has no cervical adenopathy.       Procedure    Nasal endoscopy performed.  See procedure note.     Left nasal valve     Left sinuses     Right nasal valve     Right sinuses  clear        Data Reviewed    WBC (K/uL)   Date Value   05/06/2022 6.67     Eosinophil % (%)   Date Value   05/06/2022 5.5     Eos # (K/uL)   Date Value   05/06/2022 0.4     Platelets (K/uL)   Date Value   05/06/2022 216     Glucose (mg/dL)   Date Value   05/10/2022 93 "     IgE (IU/mL)   Date Value   05/26/2022 <35       Pathology report indicated chronic inflammation with eosinophilia.    Cultures showed Pseudomonas.      Assessment:     1. Nonallergic rhinitis    2. Chronic pansinusitis    3. Hypogammaglobulinemia         Plan:     Switch to cipro sinus rinse, instead of spray.  To see Dr. Bender today.  Follow up in about 2 months (around 9/7/2022) for nasal endoscopy.

## 2022-07-08 LAB
CD3+CD4+ CELLS # BLD: 1137 CELLS/UL (ref 300–1400)
CD3+CD4+ CELLS NFR BLD: 55 % (ref 28–57)
LYMPHOCYTES NFR CSF MANUAL: 1550 CELLS/UL (ref 700–2100)
LYMPHOCYTES NFR CSF MANUAL: 18 % (ref 10–39)
LYMPHOCYTES NFR CSF MANUAL: 18.4 % (ref 6–19)
LYMPHOCYTES NFR CSF MANUAL: 3.06 % (ref 0.9–3.6)
LYMPHOCYTES NFR CSF MANUAL: 372 CELLS/UL (ref 200–900)
LYMPHOCYTES NFR CSF MANUAL: 4.4 % (ref 7–31)
LYMPHOCYTES NFR CSF MANUAL: 414 CELLS/UL (ref 100–500)
LYMPHOCYTES NFR CSF MANUAL: 74.9 % (ref 55–83)
LYMPHOCYTES NFR CSF MANUAL: 99 CELLS/UL (ref 90–600)

## 2022-07-10 RX ORDER — HUMAN IMMUNOGLOBULIN G 0.2 G/ML
14 LIQUID SUBCUTANEOUS
Qty: 280 ML | Refills: 11 | Status: SHIPPED | OUTPATIENT
Start: 2022-07-10

## 2022-07-18 ENCOUNTER — PATIENT MESSAGE (OUTPATIENT)
Dept: OTOLARYNGOLOGY | Facility: CLINIC | Age: 57
End: 2022-07-18
Payer: COMMERCIAL

## 2022-07-19 ENCOUNTER — PATIENT MESSAGE (OUTPATIENT)
Dept: GASTROENTEROLOGY | Facility: CLINIC | Age: 57
End: 2022-07-19
Payer: COMMERCIAL

## 2022-07-19 ENCOUNTER — PATIENT OUTREACH (OUTPATIENT)
Dept: PULMONOLOGY | Facility: CLINIC | Age: 57
End: 2022-07-19
Payer: COMMERCIAL

## 2022-07-19 NOTE — TELEPHONE ENCOUNTER
Chronic Disease Management  Called patient to complete Pulmonary Disease Management Questionnaire.    Patient reports she is doing well as compared to when she was in the office previously. Patient's breathing has improved and she does not utilize nebulizer treatments daily. Encouraged patient to continue taking nebulizer treatments as recommended. Patient is compliant with maintenance regimen. Patient stated understanding.    Plan to provide patient with Aerobika PEP Therapy Device upon next PDM visit. Educated patient on the benefits of use of the device. Patient stated understanding.    Patient reports a recent worsening of nasal congestion and green mucous. Patient has consulted ENT provider regarding this change.    Patient requests to schedule visit with [employer] representative to assess CPAP settings per provider recommendation. Message sent to Tessa with [employer] to have this scheduled.     Time  spent with patient: 15 Minutes

## 2022-07-28 ENCOUNTER — DOCUMENTATION ONLY (OUTPATIENT)
Dept: ALLERGY | Facility: CLINIC | Age: 57
End: 2022-07-28
Payer: COMMERCIAL

## 2022-07-28 ENCOUNTER — PATIENT MESSAGE (OUTPATIENT)
Dept: SLEEP MEDICINE | Facility: CLINIC | Age: 57
End: 2022-07-28
Payer: COMMERCIAL

## 2022-07-28 ENCOUNTER — CLINICAL SUPPORT (OUTPATIENT)
Dept: PULMONOLOGY | Facility: CLINIC | Age: 57
End: 2022-07-28
Payer: COMMERCIAL

## 2022-07-28 DIAGNOSIS — J45.50 SEVERE PERSISTENT ASTHMA WITHOUT COMPLICATION: Primary | ICD-10-CM

## 2022-07-28 DIAGNOSIS — J32.4 CHRONIC PANSINUSITIS: ICD-10-CM

## 2022-07-28 NOTE — PROGRESS NOTES
[facility] rx form completed, signed by Dr. Bender and faxed to [phone]

## 2022-07-28 NOTE — PROGRESS NOTES
Chronic Disease Management:    Patient was seen on today to receive Aerobika PEP Therapy Device. Provided patient with device and instructions for use. Reviewed instructions for proper cleaning of equipment. Patient verbalized understanding.    Reviewed respiratory therapy regimen and maintenance versus rescue medications. Patient is compliant with recommended regimen.    Patient has concerns pertaining to an increase in respiratory secretions. Secretions are thick and green/yellowish in color. Message sent to Dr. Lira to request advice. Dr. Lira will reach out to the patient.    Advised patient to contact [facility] for any further needs.  Follow up visit scheduled on 10/20/22 at [facility].

## 2022-08-15 ENCOUNTER — TELEPHONE (OUTPATIENT)
Dept: PULMONOLOGY | Facility: CLINIC | Age: 57
End: 2022-08-15
Payer: COMMERCIAL

## 2022-08-15 NOTE — TELEPHONE ENCOUNTER
----- Message from Geovanna Baca MA sent at 8/11/2022  3:11 PM CDT -----  Contact: Patient, [phone]    ----- Message -----  From: Isadora Yeung  Sent: 8/11/2022   2:30 PM CDT  To: [facility] Staff    Calling to speak with the respiratory therapist Clayton Please call her. Thanks.

## 2022-08-15 NOTE — TELEPHONE ENCOUNTER
Chronic Disease Management:  Patient called in to request a new nebulizer kit. Patient will need nebulizer and nebulizer supply order placed on next provider visit.   Advised patient. Patient stated understanding.

## 2022-08-23 ENCOUNTER — OFFICE VISIT (OUTPATIENT)
Dept: OTOLARYNGOLOGY | Facility: CLINIC | Age: 57
End: 2022-08-23
Payer: COMMERCIAL

## 2022-08-23 VITALS — HEIGHT: 67 IN | BODY MASS INDEX: 28.65 KG/M2 | WEIGHT: 182.56 LBS

## 2022-08-23 DIAGNOSIS — Z22.8 PSEUDOMONAS AERUGINOSA RESISTANT CARRIER: ICD-10-CM

## 2022-08-23 DIAGNOSIS — D80.1 HYPOGAMMAGLOBULINEMIA: ICD-10-CM

## 2022-08-23 DIAGNOSIS — J32.4 CHRONIC PANSINUSITIS: ICD-10-CM

## 2022-08-23 DIAGNOSIS — J31.0 NONALLERGIC RHINITIS: Primary | ICD-10-CM

## 2022-08-23 PROCEDURE — 31231 NASAL/SINUS ENDOSCOPY: ICD-10-PCS | Mod: S$GLB,,, | Performed by: OTOLARYNGOLOGY

## 2022-08-23 PROCEDURE — 4010F PR ACE/ARB THEARPY RXD/TAKEN: ICD-10-PCS | Mod: CPTII,S$GLB,, | Performed by: OTOLARYNGOLOGY

## 2022-08-23 PROCEDURE — 99999 PR PBB SHADOW E&M-EST. PATIENT-LVL V: ICD-10-PCS | Mod: PBBFAC,,, | Performed by: OTOLARYNGOLOGY

## 2022-08-23 PROCEDURE — 31231 NASAL ENDOSCOPY DX: CPT | Mod: S$GLB,,, | Performed by: OTOLARYNGOLOGY

## 2022-08-23 PROCEDURE — 99214 OFFICE O/P EST MOD 30 MIN: CPT | Mod: 25,S$GLB,, | Performed by: OTOLARYNGOLOGY

## 2022-08-23 PROCEDURE — 1159F PR MEDICATION LIST DOCUMENTED IN MEDICAL RECORD: ICD-10-PCS | Mod: CPTII,S$GLB,, | Performed by: OTOLARYNGOLOGY

## 2022-08-23 PROCEDURE — 1159F MED LIST DOCD IN RCRD: CPT | Mod: CPTII,S$GLB,, | Performed by: OTOLARYNGOLOGY

## 2022-08-23 PROCEDURE — 3008F BODY MASS INDEX DOCD: CPT | Mod: CPTII,S$GLB,, | Performed by: OTOLARYNGOLOGY

## 2022-08-23 PROCEDURE — 99999 PR PBB SHADOW E&M-EST. PATIENT-LVL V: CPT | Mod: PBBFAC,,, | Performed by: OTOLARYNGOLOGY

## 2022-08-23 PROCEDURE — 4010F ACE/ARB THERAPY RXD/TAKEN: CPT | Mod: CPTII,S$GLB,, | Performed by: OTOLARYNGOLOGY

## 2022-08-23 PROCEDURE — 1160F PR REVIEW ALL MEDS BY PRESCRIBER/CLIN PHARMACIST DOCUMENTED: ICD-10-PCS | Mod: CPTII,S$GLB,, | Performed by: OTOLARYNGOLOGY

## 2022-08-23 PROCEDURE — 3008F PR BODY MASS INDEX (BMI) DOCUMENTED: ICD-10-PCS | Mod: CPTII,S$GLB,, | Performed by: OTOLARYNGOLOGY

## 2022-08-23 PROCEDURE — 99214 PR OFFICE/OUTPT VISIT, EST, LEVL IV, 30-39 MIN: ICD-10-PCS | Mod: 25,S$GLB,, | Performed by: OTOLARYNGOLOGY

## 2022-08-23 PROCEDURE — 1160F RVW MEDS BY RX/DR IN RCRD: CPT | Mod: CPTII,S$GLB,, | Performed by: OTOLARYNGOLOGY

## 2022-08-23 NOTE — PROGRESS NOTES
"  Subjective:      Jaylin is a 57 y.o. female who comes for follow-up of sinusitis.  Her last visit with me was on 7/7/2022.  Now nearly 2 years status-post endoscopic sinus surgery.   Worsening recently, coughing daily, postnasal drip.  Saw pulmonologist recently who found pseudomonas in the lungs.  Using cipro rinse daily.    SNOT-22 score: : (P) 55  NOSE score:: (P) 35%  ETDQ-7 score:: (P) 3.1    The patient's medications, allergies, past medical, surgical, social and family histories were reviewed and updated as appropriate.    A detailed review of systems was obtained with pertinent positives as per the above HPI, and otherwise negative.        Objective:     Ht 5' 7" (1.702 m)   Wt 82.8 kg (182 lb 8.7 oz)   BMI 28.59 kg/m²        Constitutional:   She appears well-developed. She is cooperative.     Head:  Normocephalic.     Nose:  No mucosal edema, rhinorrhea, septal deviation or polyps. No epistaxis. Turbinates normal, no turbinate masses and no turbinate hypertrophy.  Right sinus exhibits no maxillary sinus tenderness and no frontal sinus tenderness. Left sinus exhibits no maxillary sinus tenderness and no frontal sinus tenderness.     Mouth/Throat  Oropharynx clear and moist without lesions or asymmetry. No oropharyngeal exudate or posterior oropharyngeal erythema.     Neck:  No adenopathy. Normal range of motion present.     She has no cervical adenopathy.       Procedure    Nasal endoscopy performed.  See procedure note.     Left nasal valve     Left maxillary     Left ethmoid     Left sphenoid contracted to pinpoint     Debris seen within sphenoid     Right sinuses     Right anterior ethmoid crust        Data Reviewed    WBC (K/uL)   Date Value   07/07/2022 5.50     Eosinophil % (%)   Date Value   07/07/2022 5.8     Eos # (K/uL)   Date Value   07/07/2022 0.3     Platelets (K/uL)   Date Value   07/07/2022 216     Glucose (mg/dL)   Date Value   05/10/2022 93     IgE (IU/mL)   Date Value   05/26/2022 <35 "       Pathology report indicated chronic inflammation with eosinophilia.  Cultures showed Pseudomonas.      Assessment:     1. Nonallergic rhinitis    2. Chronic pansinusitis    3. 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  Inferior turbinate reduction would not be included.  I discussed the risks, benefits and alternatives to surgery with the patient, as well as the expected postoperative course.  I gave her the opportunity to ask questions and I answered all of them.  I provided relevant printed information on her condition for her to review at home.  Same-day discharge is anticipated.  She may have anesthesia triage by telephone. She will also need a CT sinuses with Stealth protocol prior to surgery.  The surgery will be tentatively scheduled for September 19.  She will need to return for a postoperative visit 1 week after surgery.  Follow up for surgery.

## 2022-08-23 NOTE — PROCEDURES
Nasal/sinus endoscopy    Date/Time: 8/23/2022 8:45 AM  Performed by: Matthias Roach MD  Authorized by: Matthias Roach MD     Consent Done?:  Yes (Verbal)  Anesthesia:     Local anesthetic:  Lidocaine 4% and Jose-Synephrine 1/2%    Patient tolerance:  Patient tolerated the procedure well with no immediate complications  Nose:     Procedure Performed:  Nasal Endoscopy  External:      No external nasal deformity  Intranasal:      Mucosa no polyps     Mucosa ulcers not present     No mucosa lesions present     Turbinates not enlarged     No septum gross deformity  Nasopharynx:      No mucosa lesions     Adenoids not present     Posterior choanae patent     Eustachian tube patent     Less crusting on left  Left sphenoid ostium now stenotic to pinpoint  Debris visible through ostium  Right side nearly clear, small crust at anterior ethmoid

## 2022-08-24 ENCOUNTER — PATIENT MESSAGE (OUTPATIENT)
Dept: PRIMARY CARE CLINIC | Facility: CLINIC | Age: 57
End: 2022-08-24
Payer: COMMERCIAL

## 2022-08-24 DIAGNOSIS — J31.0 NONALLERGIC RHINITIS: Primary | ICD-10-CM

## 2022-08-24 DIAGNOSIS — Z22.8 PSEUDOMONAS AERUGINOSA RESISTANT CARRIER: ICD-10-CM

## 2022-08-24 DIAGNOSIS — J32.4 CHRONIC PANSINUSITIS: ICD-10-CM

## 2022-08-25 ENCOUNTER — PATIENT MESSAGE (OUTPATIENT)
Dept: PRIMARY CARE CLINIC | Facility: CLINIC | Age: 57
End: 2022-08-25

## 2022-08-25 ENCOUNTER — OFFICE VISIT (OUTPATIENT)
Dept: PRIMARY CARE CLINIC | Facility: CLINIC | Age: 57
End: 2022-08-25
Payer: COMMERCIAL

## 2022-08-25 ENCOUNTER — PATIENT MESSAGE (OUTPATIENT)
Dept: SURGERY | Facility: HOSPITAL | Age: 57
End: 2022-08-25
Payer: COMMERCIAL

## 2022-08-25 ENCOUNTER — PATIENT MESSAGE (OUTPATIENT)
Dept: SLEEP MEDICINE | Facility: CLINIC | Age: 57
End: 2022-08-25
Payer: COMMERCIAL

## 2022-08-25 DIAGNOSIS — K50.90 CROHN'S DISEASE WITHOUT COMPLICATION, UNSPECIFIED GASTROINTESTINAL TRACT LOCATION: ICD-10-CM

## 2022-08-25 DIAGNOSIS — R91.8 ABNORMAL CT SCAN, LUNG: ICD-10-CM

## 2022-08-25 DIAGNOSIS — J98.8 PSEUDOMONAS RESPIRATORY INFECTION: ICD-10-CM

## 2022-08-25 DIAGNOSIS — Z79.60 LONG-TERM USE OF IMMUNOSUPPRESSANT MEDICATION: ICD-10-CM

## 2022-08-25 DIAGNOSIS — D84.9 IMMUNOSUPPRESSION: ICD-10-CM

## 2022-08-25 DIAGNOSIS — J32.4 CHRONIC PANSINUSITIS: Primary | ICD-10-CM

## 2022-08-25 DIAGNOSIS — J44.9 CHRONIC OBSTRUCTIVE PULMONARY DISEASE, UNSPECIFIED COPD TYPE: ICD-10-CM

## 2022-08-25 DIAGNOSIS — D80.1 HYPOGAMMAGLOBULINEMIA: ICD-10-CM

## 2022-08-25 DIAGNOSIS — J45.50 SEVERE PERSISTENT ASTHMA WITHOUT COMPLICATION: ICD-10-CM

## 2022-08-25 DIAGNOSIS — B96.5 PSEUDOMONAS RESPIRATORY INFECTION: ICD-10-CM

## 2022-08-25 PROCEDURE — 99215 PR OFFICE/OUTPT VISIT, EST, LEVL V, 40-54 MIN: ICD-10-PCS | Mod: 95,,, | Performed by: FAMILY MEDICINE

## 2022-08-25 PROCEDURE — 1159F MED LIST DOCD IN RCRD: CPT | Mod: CPTII,95,, | Performed by: FAMILY MEDICINE

## 2022-08-25 PROCEDURE — 4010F PR ACE/ARB THEARPY RXD/TAKEN: ICD-10-PCS | Mod: CPTII,95,, | Performed by: FAMILY MEDICINE

## 2022-08-25 PROCEDURE — 1160F PR REVIEW ALL MEDS BY PRESCRIBER/CLIN PHARMACIST DOCUMENTED: ICD-10-PCS | Mod: CPTII,95,, | Performed by: FAMILY MEDICINE

## 2022-08-25 PROCEDURE — 1160F RVW MEDS BY RX/DR IN RCRD: CPT | Mod: CPTII,95,, | Performed by: FAMILY MEDICINE

## 2022-08-25 PROCEDURE — 1159F PR MEDICATION LIST DOCUMENTED IN MEDICAL RECORD: ICD-10-PCS | Mod: CPTII,95,, | Performed by: FAMILY MEDICINE

## 2022-08-25 PROCEDURE — 4010F ACE/ARB THERAPY RXD/TAKEN: CPT | Mod: CPTII,95,, | Performed by: FAMILY MEDICINE

## 2022-08-25 PROCEDURE — 99215 OFFICE O/P EST HI 40 MIN: CPT | Mod: 95,,, | Performed by: FAMILY MEDICINE

## 2022-08-25 NOTE — Clinical Note
I am asking for consultation in regard to our mutual patient. I did a virtual visit with her today. She is going for surgery on 9/18 in [facility] with ENT. She is concerned about the pseudomonas in the resp washings, and the abn CT scan findings , with concern for pneumonia. Dr. Lira, could you see her prior to this? She is scheduled to see you on 9/22. Dr. Corrigan, I have requested a consult/referral for you because in past she had to do PICC line with IV antibiotics. Dr. Roach, I am including you to help coordinate any procedures that may need to be done prior or at the same time as her surgery in [facility] on 9/22. Thank you all for you time and recommendations.  Sincerely,  Esthela Hu MD

## 2022-08-25 NOTE — PROGRESS NOTES
Subjective:      Patient ID: Jaylin Murguia is a 57 y.o. female.    Chief Complaint: Follow-up    Disclaimer:  This note is prepared using voice recognition software and as such is likely to have errors and has not been proof read. Please contact me for questions.     The patient location is: home  The chief complaint leading to consultation is: mychart request     Visit type: video and audio simultaneous    Face to Face time with patient:918 am  - 942am     45  minutes of total time spent on the encounter, which includes face to face time and non-face to face time preparing to see the patient (eg, review of tests), Obtaining and/or reviewing separately obtained history, Documenting clinical information in the electronic or other health record, Independently interpreting results (not separately reported) and communicating results to the patient/family/caregiver, or Care coordination (not separately reported).     Each patient to whom he or she provides medical services by telemedicine is:  (1) informed of the relationship between the physician and patient and the respective role of any other health care provider with respect to management of the patient; and (2) notified that he or she may decline to receive medical services by telemedicine and may withdraw from such care at any time.      Jaylin Murguia is a 57 y.o. female who presents to clinic for follow-up  Has been  Having asthma flares for about 3 weeks. Dr. Lira thinks she may have the pseudomonas in her lungs. Dr. Wilson was concerned about what the recommendations should be for her. Dr. Lira went off sputum tests, and thinks it could have been drainage from the sinuses vs procedure and bronchoscopy.     Left ethmoid sinus is starting to close up a little bit. Went into to see him 3 weeks due to a sinus headache. Thinks it is mostly closed up. Now more inflammation. Couldn't get the camera in there to visualize the sinus.   Reviewed consult  notes.     Will have to sinus surgery, Sept 19th. Still doing antibiotics, nasal washes bid, doing breathing treatments every 4 hours, inhalers once to twice a day.     No fevers. Does sweat a lot with sleeping. Did have LAYLA at 33.   ? Of pneumonia.  She is very concerned about this at this time.   She did have positive respiratory sputum cultures consistent with Pseudomonas.  She had a CT scan done in May which showed evidence of some right lower lobe infiltrates and left lower lobe infiltrates.  She is concerned she might have pneumonia with Pseudomonas as well.  In the past when she was initially going through a lot of her sinus issues also she ended up having have a PICC line for IV antibiotics through Infectious Disease at [facility].  She is supposed to have a follow-up appointment with Pulmonary on September 22nd which will be 3 days after her sinus surgery.  She does not believe she will be able to do this post surgery.  She is really hoping to see if she can get her care seen Pulmonary sooner as well as possibly coordinate if possible in [place] to do some of the procedures at the same time if necessary.  She is very fearful that with the infection in her lungs now that this is near the end of her life.  She is not depressed but she is just uncertain and scared about what to do next.  She is fearful that the infections just going to continue to spread everywhere due to her immunosuppressed status.        PULM note reviewed:     Jaylin Murguia is 57 y.o.  Asked to see by Esthela Hu MD  Complicated Hx: Crohns, autoimmune issues  Chronic rhinosinusitis: seen by ENT and immunology  IgG replacement therapy, Hizentra 10 g q 7 days (465 mg/kg/mo). Has been on it for about 6 months.  Recurrent pseudomonas infections NASAL  Seen ENT for multiple recurrent drainage, debridement procedure  Last treated with extend ceftazidime/avibactam x 2 weeks : had PICC line  Chronic cough, wheezing, No prior  Spir  Recent ? Fall after taking inhaler, rib pain  On BREO, has nebulizer and Xopenex.  Chrinic nasal cough, congestion  Night sweats  No +ve family Hx respiratory issues  Recently Low Na 123, Abn LFT  Was treated with Diflucan for yeast infection  Worked as teacher  No occupational exposure  Regarding crohns  Pentasa 1000 mg QID (started 2018)     Past Medications  Remicade (in remote past)-- ineffective  Asacol 4.8 gm-- ineffective  Entocort-- effective  Prednisone  Humira (started , stopped 2018)-- stopped 2/2 multiple infections.                Problem List Items Addressed This Visit     Insomnia due to medical condition     Long-term use of immunosuppressant medication     Relevant Orders     FUNGAL IMMUNODIFFUSION - BLOOD     QUANTIFERON GOLD TB     Other hyperlipidemia     Tortuous aorta     Crohn's disease     Hypogammaglobulinemia     Chronic rhinosinusitis     Relevant Orders     FUNGAL IMMUNODIFFUSION - BLOOD     QUANTIFERON GOLD TB     Essential hypertension     Abnormal CT scan, lung     TAMIKA on CPAP - Primary         Not using CPAP             Severe persistent asthma without complication     Relevant Medications     sodium chloride 7% 7 % nebulizer solution     methylPREDNISolone (MEDROL DOSEPACK) 4 mg tablet     Other Relevant Orders     Fraction of  Nitric Oxide (Completed)     PULM - Arterial Blood Gases--in addition to PFT only     IgE     Alpha 1 Antitrypsin Phenotype     Alpha-1-Antitrypsin     Complete PFT with bronchodilator     Culture, Respiratory with Gram Stain     AFB Culture & Smear     AFB Culture & Smear     Culture, Respiratory with Gram Stain     Culture, Respiratory with Gram Stain     FUNGAL IMMUNODIFFUSION - BLOOD     QUANTIFERON GOLD TB     Ambulatory referral/consult to Pulmonary Disease Management w/ Respiratory Therapist      Other Visit Diagnoses     Severe persistent asthma with acute exacerbation        Oral thrush        Relevant Medications    nystatin  "(MYCOSTATIN) 100,000 unit/mL suspension        Medrol dose   repeat FeNo next visit  NYSTATIN  Added Hypersal and aerobika  Need to investigate an MAC        Follow up in about 4 weeks (around 6/21/2022), or Nystatin, Sputum, FeNo, PFT, ABG, labs.     This note was prepared using voice recognition system and is likely to have sound alike errors that may have been overlooked even after proof reading.  Please call me with any questions     Discussed diagnosis, its evaluation, treatment and usual course. All questions answered.   Clinical Guide to Interpretation or FeNO Levels :     FeNO  (ppb) LOW INTERMEDIATE HIGH  ADULT VALUES < 25 25-50          > 50  Th2-driven Inflammation Unlikely Likely Significant     Patients FeNO level at this visit : __94__ (ppb)        Interpretation of FeNO measurement in adults:   ( ) FENO is less than 25 ppb implies non eosinophilic airway inflammation or the absence of airway inflammation.   Comment: Low FENO (<25 ppb) in adult asthmatics with persistent symptoms suggests other etiologies for these symptoms and a lower likelihood of benefit from adding or increasing inhaled glucocorticoids.     ( ) FENO between 25 and 50 ppb in adults should be interpreted cautiously with reference to the clinical situation (eg, symptomatic, on or off therapy, current smoking).     (X ) FENO greater than 50 ppb in adults  suggests eosinophilic airway inflammation   Comment: High FENO (>50 ppb) in adult asthmatics even with atypical symptoms suggests glucocorticoid responsiveness. High FENO (>50 ppb) can help identify poor adherence or uncontrolled inflammation in asthma patients with otherwise seemingly "controlled" asthma.        Abrahan Lira MD      Past Medical History:  No date: Allergic rhinitis  No date: Asthma  No date: Chronic pansinusitis  No date: Crohn's disease      Comment:  Ileal involvement, previously on Remicade, Asacol,                Prednisone  No date: Fibromyalgia  No " date: Hyperlipidemia  No date: Hypertension  20020: Immunosuppression - on infusions to boost immune system   No date: Migraine  No date: Obstructive sleep apnea      Comment:  CPAP at night  No date: Sciatica      Postmenopausal - on estrogen   HTN - norvasc, losartan, hctz   Allergies - singulair   Asthma -   Sleep - ambium   Hx of pseudomonas in sinuses -   Take hizentra         Lab Results   Component Value Date    HGBA1C 5.3 09/02/2021    HGBA1C 5.2 11/14/2017    HGBA1C 5.5 04/07/2017      Lab Results   Component Value Date    CHOL 172 12/06/2021    CHOL 229 (H) 09/02/2021    CHOL 177 08/21/2020     Lab Results   Component Value Date    LDLCALC 84.8 12/06/2021    LDLCALC 141.8 09/02/2021    LDLCALC 79.6 08/21/2020       Wt Readings from Last 10 Encounters:   08/23/22 82.8 kg (182 lb 8.7 oz)   07/07/22 82.8 kg (182 lb 8.7 oz)   07/07/22 82.6 kg (182 lb 1.6 oz)   06/27/22 81.6 kg (180 lb)   06/20/22 82.2 kg (181 lb 3.5 oz)   06/02/22 80.7 kg (177 lb 14.6 oz)   05/26/22 81.4 kg (179 lb 5.5 oz)   05/24/22 83.2 kg (183 lb 6.8 oz)   05/16/22 83.2 kg (183 lb 6.8 oz)   05/06/22 82.7 kg (182 lb 6.9 oz)       The 10-year ASCVD risk score (Shawna LEAH Sheppard., et al., 2013) is: 2.9%    Values used to calculate the score:      Age: 57 years      Sex: Female      Is Non- : No      Diabetic: No      Tobacco smoker: No      Systolic Blood Pressure: 146 mmHg      Is BP treated: Yes      HDL Cholesterol: 73 mg/dL      Total Cholesterol: 172 mg/dL        Lab Results   Component Value Date    WBC 5.50 07/07/2022    HGB 13.1 07/07/2022    HCT 40.9 07/07/2022     07/07/2022    CHOL 172 12/06/2021    TRIG 71 12/06/2021    HDL 73 12/06/2021    ALT 46 (H) 05/10/2022    AST 37 05/10/2022     (L) 05/10/2022    K 4.2 05/10/2022     05/10/2022    CREATININE 0.8 05/10/2022    BUN 10 05/10/2022    CO2 25 05/10/2022    TSH 0.887 09/02/2021    INR 1.0 11/20/2014    HGBA1C 5.3 09/02/2021       Mammo Digital  Screening Bilat w/ Twin  Narrative: Result:  Mammo Digital Screening Bilat w/ Twin    History:  Patient is 57 y.o. and is seen for a screening mammogram.    Films Compared:  Prior images (if available) were compared.     Findings:   This procedure was performed using tomosynthesis.   Computer-aided detection was utilized in the interpretation of this   examination.    The breasts are almost entirely fatty. There is no evidence of suspicious   masses, microcalcifications or architectural distortion.  Impression:    No mammographic evidence of malignancy.    BI-RADS Category 1: Negative    Recommendation:  Routine screening mammogram in 1 year is recommended.        Review of Systems   Constitutional: Negative for activity change and unexpected weight change.   HENT: Positive for sinus pressure and sinus pain. Negative for hearing loss, rhinorrhea and trouble swallowing.    Eyes: Negative for discharge and visual disturbance.   Respiratory: Positive for shortness of breath and wheezing. Negative for chest tightness.    Cardiovascular: Negative for chest pain and palpitations.   Gastrointestinal: Positive for diarrhea. Negative for blood in stool, constipation and vomiting.   Endocrine: Negative for polydipsia and polyuria.   Genitourinary: Negative for difficulty urinating, dysuria, hematuria and menstrual problem.   Musculoskeletal: Positive for arthralgias. Negative for joint swelling and neck pain.   Neurological: Positive for headaches. Negative for weakness.   Psychiatric/Behavioral: Negative for confusion and dysphoric mood. The patient is nervous/anxious.      Objective:   There were no vitals filed for this visit.  Physical Exam  Vitals reviewed.   Constitutional:       General: She is awake. She is not in acute distress.     Appearance: Normal appearance. She is well-developed, well-groomed and normal weight. She is not ill-appearing.   HENT:      Head: Normocephalic and atraumatic.      Right Ear: External  ear normal.      Left Ear: External ear normal.      Nose: Nose normal.      Mouth/Throat:      Lips: Pink.   Eyes:      Conjunctiva/sclera: Conjunctivae normal.   Pulmonary:      Effort: Pulmonary effort is normal.   Neurological:      Mental Status: She is alert.   Psychiatric:         Attention and Perception: Attention and perception normal. She is attentive.         Mood and Affect: Mood is anxious. Mood is not depressed. Affect is tearful. Affect is not labile, blunt, angry or inappropriate.         Speech: Speech normal. She is communicative. Speech is not rapid and pressured, delayed, slurred or tangential.         Behavior: Behavior normal. Behavior is not agitated, slowed, aggressive, withdrawn, hyperactive or combative. Behavior is cooperative.         Thought Content: Thought content normal. Thought content is not paranoid or delusional. Thought content does not include homicidal or suicidal ideation. Thought content does not include homicidal or suicidal plan.         Cognition and Memory: Cognition and memory normal. Memory is not impaired. She does not exhibit impaired recent memory or impaired remote memory.         Judgment: Judgment normal. Judgment is not impulsive or inappropriate.       Assessment:     1. Chronic pansinusitis    2. Immunosuppression    3. Pseudomonas respiratory infection    4. Severe persistent asthma without complication    5. Long-term use of immunosuppressant medication    6. Crohn's disease without complication, unspecified gastrointestinal tract location    7. Chronic obstructive pulmonary disease, unspecified COPD type    8. Hypogammaglobulinemia    9. Abnormal CT scan, lung      Plan:   Jaylin was seen today for follow-up.    Diagnoses and all orders for this visit:    Chronic pansinusitis- worse , - contributing to high complexity   -     Ambulatory referral/consult to Infectious Disease; Future    Immunosuppression- contributing to complications   -     Ambulatory  referral/consult to Infectious Disease; Future    Pseudomonas respiratory infection- new, abn Ct scan , - contributing to high complexity   -     Ambulatory referral/consult to Infectious Disease; Future    Severe persistent asthma without complication- worse    Long-term use of immunosuppressant medication- contributing to high complexity     Crohn's disease without complication, unspecified gastrointestinal tract location- contributing to high complexity     Chronic obstructive pulmonary disease, unspecified COPD type- contributing to high complexity     Hypogammaglobulinemia- contributing to high complexity     Abnormal CT scan, lung- contributing to high complexity     At this time I will place referral once again back to Infectious Disease to see if we can get Dr. Corrigan on board to give recommendations and to see if there is the need for doing once again IV antibiotic therapy.  I will also reach out to her pulmonary specialist Dr. Lira  to see if he can potentially move her next appointment since she will be having surgery in [place] for sinuses on September 18th.  If it is needed or recommended about the potential for bronchoscopy I would ask to see if this could be done at the same time while she is doing her sinus surgery in [place].  If this needs to be coordinated through pulmonary out of [place] then I will ask for recommendation from her specialists to see who they would recommend for her to see.  In the meantime she has been given the guidance to continue to follow her ENT and pulmonary specialist orders for her treatment.  I did help to reassure her and address her fears in regards to the lung infections.  If necessary we can do repeat CT scan or chest x-ray if this would be required or recommended by her specialist.  Patient is in agreement with this plan.      Health Maintenance Due   Topic Date Due    Shingles Vaccine (1 of 2) Never done    COVID-19 Vaccine (3 - Booster for  Pfizer series) 09/15/2021       Follow up if symptoms worsen or fail to improve.    There are no Patient Instructions on file for this visit.

## 2022-08-26 ENCOUNTER — PATIENT MESSAGE (OUTPATIENT)
Dept: PULMONOLOGY | Facility: CLINIC | Age: 57
End: 2022-08-26
Payer: COMMERCIAL

## 2022-08-26 DIAGNOSIS — J45.50 SEVERE PERSISTENT ASTHMA WITHOUT COMPLICATION: Primary | ICD-10-CM

## 2022-08-26 NOTE — PROGRESS NOTES
Orders Placed This Encounter   Procedures    CFTR Gene, Full Gene Analysis     Standing Status:   Future     Standing Expiration Date:   10/25/2023     Order Specific Question:   Release to patient     Answer:   Immediate

## 2022-08-29 ENCOUNTER — TELEPHONE (OUTPATIENT)
Dept: INFECTIOUS DISEASES | Facility: CLINIC | Age: 57
End: 2022-08-29
Payer: COMMERCIAL

## 2022-08-29 NOTE — TELEPHONE ENCOUNTER
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     The pseudomonas is likely colonization- sample wasn't very purulent and no clear infiltrate. Wouldn't necessarily treat it unless she has respiratory symptoms.     I'm not clear what's causing the bronchiectasis? Gerd? Agree she should have cystic fibrosis screening    Would appreciate if you could send updated surgical cultures if there's anything you're worried about. Not sure the details of the surgery. Are you planning on placing any prosthetic material?     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     I am asking for consultation in regard to coordination of care plan on this pleasant yet very complicated patient. I did a virtual visit with her today. She is going for surgery on 9/18 in [facility] with ENT.  She is concerned about the pseudomonas in the respiratory washings, and the abn CT scan findings from May , with concern for pneumonia on her behalf.     @Dr. Lira, could you see her prior to this? She is scheduled to see you on 9/22.   @Dr. Corrigan, I have requested a consult/referral for you because in past she had to do PICC line with IV antibiotics.   @Dr. Roach, I am including you to help coordinate any procedures that may need to be done prior or at the same time as her surgery in [facility] on 9/22.     Thank you all for your time and recommendations.       Sincerely,   Esthela Hu MD

## 2022-08-30 ENCOUNTER — LAB VISIT (OUTPATIENT)
Dept: LAB | Facility: HOSPITAL | Age: 57
End: 2022-08-30
Attending: INTERNAL MEDICINE
Payer: COMMERCIAL

## 2022-08-30 DIAGNOSIS — J45.50 SEVERE PERSISTENT ASTHMA WITHOUT COMPLICATION: ICD-10-CM

## 2022-08-30 PROCEDURE — 36415 COLL VENOUS BLD VENIPUNCTURE: CPT | Mod: PO | Performed by: INTERNAL MEDICINE

## 2022-08-30 PROCEDURE — 81222 CFTR GENE DUP/DELET VARIANTS: CPT | Performed by: INTERNAL MEDICINE

## 2022-09-02 ENCOUNTER — PATIENT MESSAGE (OUTPATIENT)
Dept: OTOLARYNGOLOGY | Facility: CLINIC | Age: 57
End: 2022-09-02
Payer: COMMERCIAL

## 2022-09-12 ENCOUNTER — PATIENT MESSAGE (OUTPATIENT)
Dept: SURGERY | Facility: HOSPITAL | Age: 57
End: 2022-09-12
Payer: COMMERCIAL

## 2022-09-12 DIAGNOSIS — J32.4 CHRONIC PANSINUSITIS: Primary | ICD-10-CM

## 2022-09-14 ENCOUNTER — CLINICAL SUPPORT (OUTPATIENT)
Dept: PULMONOLOGY | Facility: CLINIC | Age: 57
End: 2022-09-14
Payer: COMMERCIAL

## 2022-09-14 ENCOUNTER — HOSPITAL ENCOUNTER (OUTPATIENT)
Dept: RADIOLOGY | Facility: HOSPITAL | Age: 57
Discharge: HOME | End: 2022-09-14
Attending: INTERNAL MEDICINE
Payer: COMMERCIAL

## 2022-09-14 DIAGNOSIS — J45.50 SEVERE PERSISTENT ASTHMA WITHOUT COMPLICATION: ICD-10-CM

## 2022-09-14 PROCEDURE — 95012 NITRIC OXIDE EXP GAS DETER: CPT | Mod: S$GLB,,, | Performed by: INTERNAL MEDICINE

## 2022-09-14 PROCEDURE — 94010 BREATHING CAPACITY TEST: CPT | Mod: S$GLB,,, | Performed by: INTERNAL MEDICINE

## 2022-09-14 PROCEDURE — 71046 X-RAY EXAM CHEST 2 VIEWS: CPT | Mod: 26,,, | Performed by: RADIOLOGY

## 2022-09-14 PROCEDURE — 95012 PR NITRIC OXIDE EXPIRED GAS DETERMINATION: ICD-10-PCS | Mod: S$GLB,,, | Performed by: INTERNAL MEDICINE

## 2022-09-14 PROCEDURE — 71046 XR CHEST PA AND LATERAL: ICD-10-PCS | Mod: 26,,, | Performed by: RADIOLOGY

## 2022-09-14 PROCEDURE — 94010 BREATHING CAPACITY TEST: ICD-10-PCS | Mod: S$GLB,,, | Performed by: INTERNAL MEDICINE

## 2022-09-14 PROCEDURE — 71046 X-RAY EXAM CHEST 2 VIEWS: CPT | Mod: TC

## 2022-09-14 NOTE — PROGRESS NOTES
Pulmonary Outpatient   Visit     Subjective:       Patient ID: Jaylin Murguia is a 57 y.o. female.    Chief Complaint: Sleep Apnea      Jaylin Murguia is 57 y.o.  Asked to see by Esthela Hu MD  Complicated Hx: Crohns, autoimmune issues  Chronic rhinosinusitis: seen by ENT and immunology  IgG replacement therapy, Hizentra 10 g q 7 days (465 mg/kg/mo). Has been on it for about 6 months.  Recurrent pseudomonas infections NASAL  Seen ENT for multiple recurrent drainage, debridement procedure  Last treated with extend ceftazidime/avibactam x 2 weeks : had PICC line  Chronic cough, wheezing, No prior Spir  Recent ? Fall after taking inhaler, rib pain  On BREO, has nebulizer and Xopenex.  Chrinic nasal cough, congestion  Night sweats  No +ve family Hx respiratory issues  Recently Low Na 123, Abn LFT  Was treated with Diflucan for yeast infection  Worked as teacher  No occupational exposure  Regarding crohns  Pentasa 1000 mg QID (started 4/2018)     Past Medications  Remicade (in remote past)-- ineffective  Asacol 4.8 gm-- ineffective  Entocort-- effective  Prednisone  Humira (started July 17, stopped 2/2018)-- stopped 2/2 multiple infections.     09/15/2022  Last seen 06/20/2022  Here to review   FeNo 78  Jeremias: restrictive  CPAP download: not using  Feels better changed diet,   Respiratory secretion clear  Recently pseudomonas biofilm colonization  No constitutional signs  Planned FESS next week            Review of Systems   HENT:  Positive for congestion.    Respiratory:  Positive for wheezing and use of rescue inhaler.    Genitourinary: Negative.    Endocrine: endocrine negative    Musculoskeletal:  Positive for myalgias.   Psychiatric/Behavioral:  Positive for sleep disturbance.    All other systems reviewed and are negative.    Outpatient Encounter Medications as of 9/15/2022   Medication Sig Dispense Refill    amLODIPine (NORVASC) 5 MG tablet Take 5 mg by mouth  once daily.      butalbital-acetaminophen-caffeine -40 mg (FIORICET, ESGIC) -40 mg per tablet TAKE 1 TABLET EVERY 4 HOURS AS NEEDED FOR HEADACHE 90 tablet 3    cholestyramine (QUESTRAN) 4 gram packet Take 1 packet (4 g total) by mouth 3 (three) times daily with meals. 270 packet 3    clindamycin (CLEOCIN) 150 mg/mL injection EMPTY CONTENTS OF 1 VIAL INTO NASAL IRRIGATION SYSTEM, ADD DISTILLED WATER, SALT PACK, MIX & IRRIGATE PERFORM 2 TIMES DAILY      clindamycin (CLEOCIN) 300 MG capsule EMPTY CONTENTS OF 2 CAPSULES INTO NASAL IRRIGATION SYSTEM, ADD DISTILLED WATER, SALT PACK, MIX & IRRIGATE. PERFORM 2 TIMES DAILY 120 capsule 1    diltiazem HCl (DILTIAZEM 2% - LIDOCAINE 5% CREAM) Apply peasize amount topically to anal area. 30 g 2    diphenhydrAMINE-aluminum-magnesium hydroxide-simethicone-LIDOcaine HCl 2% Swish and spit 15 mLs every 4 (four) hours as needed (oral ulcers, pain). 1 Bottle 2    DULoxetine (CYMBALTA) 60 MG capsule TAKE 1 CAPSULE BY MOUTH TWICE A  capsule 3    eletriptan (RELPAX) 40 MG tablet Take 1 tablet (40 mg total) by mouth as needed. (Patient taking differently: Take 40 mg by mouth as needed. Take if needed) 12 tablet 3    estradioL (ESTRACE) 2 MG tablet Take 1 tablet (2 mg total) by mouth every evening. 90 tablet 3    fluconazole (DIFLUCAN) 150 MG Tab One PO STAT repeat in 4 days 2 tablet 0    fluticasone propionate (FLONASE) 50 mcg/actuation nasal spray SPRAY 2 SPRAYS BY EACH NOSTRIL ROUTE ONCE DAILY. 16 mL 0    gentamicin (GARAMYCIN) 40 mg/mL injection EMPTY CONTENTS OF 1 VIAL INTO NASAL IRRIGATION SYSTEM, ADD DISTILLED WATER, SALT PACK, MIX & IRRIGATE PERFORM 2 TIMES DAILY      immun glob G,IgG,-pro-IgA 0-50 (HIZENTRA) 10 gram/50 mL (20 %) Soln Inject 70 mLs (14 g total) into the skin every 7 days. 280 mL 11    INV opn-375/placebo 93 mcg sprm nasal spray 2 sprays by Alternating Nostrils route 2 (two) times a day.      ipratropium (ATROVENT) 0.02 % nebulizer solution Nebulize  2.5 ml every 4-6 hours as directed, if needed 90 each 11    ipratropium-albuteroL (COMBIVENT)  mcg/actuation inhaler Inhale 2 puffs into the lungs every 6 (six) hours as needed for Wheezing. Rescue 4 g 3    losartan (COZAAR) 100 MG tablet Take 1 tablet (100 mg total) by mouth once daily. 90 tablet 3    montelukast (SINGULAIR) 10 mg tablet TAKE 1 TABLET EVERY EVENING 90 tablet 3    NEILMED SINUS RINSE COMPLETE pkdv use as directed      nortriptyline (PAMELOR) 25 MG capsule Take 1 capsule (25 mg total) by mouth every evening. 90 capsule 3    polyethylene glycol (GOLYTELY,NULYTELY) 236-22.74-6.74 -5.86 gram suspension Take 4,000 mLs by mouth once.      polyethylene glycol (MOVIPREP) 100-7.5-2.691 gram solution       prednisoLONE acetate (PRED FORTE) 1 % DrpS Place into the left eye.      pregabalin (LYRICA) 150 MG capsule Take 1 capsule (150 mg total) by mouth 3 (three) times daily. 270 capsule 1    propranoloL (INDERAL LA) 80 MG 24 hr capsule TAKE 1 CAPSULE BY MOUTH ONCE DAILY. 90 capsule 2    rizatriptan (MAXALT) 10 MG tablet TAKE 1 TABLET IF NEEDED FOR MIGRAINES. MAX 2 TABLETS IN 24 HOURS. 30 tablet 8    sodium chloride 0.9% 0.9 % Soln 200 mL with gentamicin (ped) 20 mg/2 mL Soln EMPTY CONTENTS OF 1 CAPSULE INTO NASAL IRRIGATION SYSTEM, ADD DISTILLED WATER, SALT PACK, MIX & IRRIGATE. PERFORM 2 TIMES DAILY      topiramate (TOPAMAX) 100 MG tablet TAKE 1 TABLET TWICE A  tablet 3    traZODone (DESYREL) 50 MG tablet Take 1 tablet (50 mg total) by mouth every evening. 30 tablet 11    triamcinolone acetonide 0.025% (KENALOG) 0.025 % cream 1 application once daily. Apply to affected area      VENTOLIN HFA 90 mcg/actuation inhaler INHALE 2 PUFFS INTO THE LUNGS EVERY 4 TO 6 HOURS AS NEEDED FOR WHEEZING. (Patient taking differently: Take if needed & bring) 18 Inhaler 1    XYLITOL, BULK, MISC EMPTY CONTENTS OF 1 CAPSULE INTO NASAL IRRIGATION SYSTEM, ADD DISTILLED WATER, SALT PACK, MIX & IRRIGATE. PERFORM 2 TIMES  "DAILY      zolpidem (AMBIEN) 5 MG Tab TAKE 1 TABLET BY MOUTH EVERY DAY AT BEDTIME AS NEEDED 90 tablet 1    [DISCONTINUED] fluticasone furoate-vilanteroL (BREO ELLIPTA) 200-25 mcg/dose DsDv diskus inhaler Inhale 1 puff into the lungs once daily. 60 each 11    [DISCONTINUED] fluticasone furoate-vilanteroL (BREO ELLIPTA) 200-25 mcg/dose DsDv diskus inhaler Inhale 1 puff into the lungs once daily. Controller      atorvastatin (LIPITOR) 10 MG tablet Take 1 tablet (10 mg total) by mouth once daily. 90 tablet 3    azelastine (ASTELIN) 137 mcg (0.1 %) nasal spray 2 sprays (274 mcg total) by Nasal route 2 (two) times daily. 30 mL 11    fluticasone-umeclidin-vilanter (TRELEGY ELLIPTA) 100-62.5-25 mcg DsDv Inhale 1 puff into the lungs once daily. 60 each 11    methylPREDNISolone (MEDROL DOSEPACK) 4 mg tablet use as directed 21 tablet 2     Facility-Administered Encounter Medications as of 9/15/2022   Medication Dose Route Frequency Provider Last Rate Last Admin    lactated ringers infusion   Intravenous Continuous Mary Leiva MD   New Bag at 03/12/20 0923    lactated ringers infusion   Intravenous Continuous Shay Bruce MD   Stopped at 03/16/22 1342    lidocaine (PF) 10 mg/ml (1%) injection 10 mg  1 mL Intradermal Once Mary Leiva MD           Objective:     Vital Signs (Most Recent)  Vital Signs  Pulse: (!) 56  Resp: 18  SpO2: 99 %  BP: 120/70  Height and Weight  Height: 5' 7" (170.2 cm)  Weight: 81.6 kg (179 lb 14.3 oz)  BSA (Calculated - sq m): 1.96 sq meters  BMI (Calculated): 28.2  Weight in (lb) to have BMI = 25: 159.3]  Wt Readings from Last 2 Encounters:   09/15/22 81.6 kg (179 lb 14.3 oz)   08/23/22 82.8 kg (182 lb 8.7 oz)       Physical Exam   Constitutional: She is oriented to person, place, and time. She appears well-developed and well-nourished.   HENT:   Head: Normocephalic.   Mouth/Throat: Mallampati Score: II.   Neck: No JVD present.   Cardiovascular: Normal rate and intact distal pulses. "   No murmur heard.  Pulmonary/Chest: Normal expansion and effort normal. No respiratory distress. She has no wheezes. She has no rhonchi. Negative for tactile fremitus.   Abdominal: Soft. Bowel sounds are normal.   Musculoskeletal:         General: No edema. Normal range of motion.      Cervical back: Normal range of motion and neck supple.   Lymphadenopathy:     She has no axillary adenopathy.   Neurological: She is alert and oriented to person, place, and time.   Skin: Skin is warm and dry.   Psychiatric: She has a normal mood and affect.   Nursing note and vitals reviewed.    Laboratory  Lab Results   Component Value Date    WBC 5.50 07/07/2022    RBC 4.21 07/07/2022    HGB 13.1 07/07/2022    HCT 40.9 07/07/2022    MCV 97 07/07/2022    MCH 31.1 (H) 07/07/2022    MCHC 32.0 07/07/2022    RDW 13.1 07/07/2022     07/07/2022    MPV 10.4 07/07/2022    GRAN 2.7 07/07/2022    GRAN 48.9 07/07/2022    LYMPH 2.0 07/07/2022    LYMPH 36.9 07/07/2022    MONO 0.4 07/07/2022    MONO 7.3 07/07/2022    EOS 0.3 07/07/2022    BASO 0.05 07/07/2022    EOSINOPHIL 5.8 07/07/2022    BASOPHIL 0.9 07/07/2022       BMP  Lab Results   Component Value Date     (L) 05/10/2022    K 4.2 05/10/2022     05/10/2022    CO2 25 05/10/2022    BUN 10 05/10/2022    CREATININE 0.8 05/10/2022    CALCIUM 9.2 05/10/2022    ANIONGAP 7 (L) 05/10/2022    ESTGFRAFRICA >60 05/10/2022    EGFRNONAA >60 05/10/2022    AST 37 05/10/2022    ALT 46 (H) 05/10/2022    PROT 7.4 05/10/2022       No results found for: BNP    Lab Results   Component Value Date    TSH 0.887 09/02/2021       Lab Results   Component Value Date    SEDRATE 25 03/08/2022       Lab Results   Component Value Date    CRP 1.3 03/08/2022     Lab Results   Component Value Date    IGE <35 05/26/2022    IGE <35 02/03/2021    IGE <35 12/12/2019        Lab Results   Component Value Date    ASPERGILLUS None Detected 05/26/2022     Lab Results   Component Value Date    AFUMIGATUSCL CLASS 0  2019        No results found for: ACE     Diagnostic Results:  I have personally reviewed today the following studies:    X-Ray Chest PA And Lateral  Narrative: EXAMINATION:  XR CHEST PA AND LATERAL    CLINICAL HISTORY:  Severe persistent asthma, uncomplicated    TECHNIQUE:  PA and lateral views of the chest were performed.    COMPARISON:  Prior radiographs    FINDINGS:  Cardiac silhouette and mediastinal contours are normal.  Lungs are clear.  Osseous structures are intact.  Impression: No acute cardiopulmonary process.    Electronically signed by: Nash Wang MD  Date:    2022  Time:    14:02      FeNO was 78      Download       Jeremias  FEV1: 1.86L ( 65.9%), FVC 2.19L( 61%)  FEV1/FVC 85        Assessment/Plan:     Problem List Items Addressed This Visit       Tortuous aorta (Chronic)     Stable  Monitor CXR         Crohn's disease    Chronic pansinusitis    Essential hypertension     On HYZAAR, NORVASC         Insomnia due to medical condition     PRN AMBIEN         Long-term use of immunosuppressant medication     STABLE         TAMIKA on CPAP - Primary     [place] 3  CPAP adherence poor due to sinus issues  Usage > 4 hrs was 0%  Encourage adherence         Relevant Orders    CPAP FOR HOME USE    Other hyperlipidemia     On LIPITOR         Severe persistent asthma without complication     ACT score 11  Feno still high but impproved (78)  Cont BREO changed to TRELEGY  Nebulizer  PFT: Mild restriction and DLCO reduced         Relevant Medications    fluticasone-umeclidin-vilanter (TRELEGY ELLIPTA) 100-62.5-25 mcg DsDv    methylPREDNISolone (MEDROL DOSEPACK) 4 mg tablet    Other Relevant Orders    NEBULIZER KIT (SUPPLIES) FOR HOME USE    NEBULIZER FOR HOME USE    Fraction of  Nitric Oxide    Hypogammaglobulinemia     Follow with Immunology  HIZENTRA      Component      Latest Ref Rng & Units 2022   IgG      650 - 1600 mg/dL 1704 (H)   IgA      40 - 350 mg/dL 284   IgM      50 - 300 mg/dL 163             Chronic rhinosinusitis     Follow with ENT  Nasal HYgeine routine      No pulmonary contraindication for procodure: FESS         Abnormal CT scan, lung     Bronchial clearance: aerobika, saline nebs          Medrol dose   repeat FeNo next visit     Continue Hypersal and aerobika         Follow up in about 6 weeks (around 10/27/2022), or FeNo, nebulizers,.    This note was prepared using voice recognition system and is likely to have sound alike errors that may have been overlooked even after proof reading.  Please call me with any questions    Discussed diagnosis, its evaluation, treatment and usual course. All questions answered.      Abrahan Lira MD

## 2022-09-14 NOTE — PROCEDURES
"Clinical Guide to Interpretation or FeNO Levels :    FeNO  (ppb) LOW INTERMEDIATE HIGH   ADULT VALUES < 25 25-50          > 50   Th2-driven Inflammation Unlikely Likely Significant     Patients FeNO level at this visit : ___78_ (ppb)     Interpretation of FeNO measurement in adults:   [ ] FENO is less than 25 ppb implies non eosinophilic airway inflammation or the absence of airway inflammation.   Comment: Low FENO (<25 ppb) in adult asthmatics with persistent symptoms suggests other etiologies for these symptoms and a lower likelihood of benefit from adding or increasing inhaled glucocorticoids.     [ ] FENO between 25 and 50 ppb in adults should be interpreted cautiously with reference to the clinical situation (eg, symptomatic, on or off therapy, current smoking).     [X ] FENO greater than 50 ppb in adults  suggests eosinophilic airway inflammation   Comment: High FENO (>50 ppb) in adult asthmatics even with atypical symptoms suggests glucocorticoid responsiveness. High FENO (>50 ppb) can help identify poor adherence or uncontrolled inflammation in asthma patients with otherwise seemingly "controlled" asthma.     Discussion:   ·A FENO less than 25 ppb in adults and less than 20 ppb in children younger than 12 years of age implies noneosinophilic airway inflammation or the absence of airway inflammation.   ·A FENO greater than 50 ppb in adults or greater than 35 ppb in children suggests eosinophilic airway inflammation.   ·Values of FENO between 25 and 50 ppb in adults (20 to 35 ppb in children) should be interpreted cautiously with reference to the clinical situation (eg, symptomatic, on or off therapy, current smoking).   ·A rising FENO with a greater than 20 percent change and more than 25 ppb (20 ppb in children) from a previously stable level suggests increasing eosinophilic airway inflammation, but there are wide inter-individual differences.   ·A decrease in FENO greater than 20 percent for values over 50 " ppb or more than 10 ppb for values less than 50 ppb may be clinically important.   FENO in other respiratory diseases - Several other diseases are associated with altered levels of exhaled NO: low levels of FENO have been noted in cystic fibrosis, current smoking, pulmonary hypertension, hypothermia, primary ciliary dyskinesia, and bronchopulmonary dysplasia. Elevated FENO has been noted in atopy, nonasthmatic eosinophilic bronchitis, COPD exacerbations, noncystic fibrosis bronchiectasis, and viral upper respiratory infections.     REFERENCE:   ATS Board of Directors, December 2004, and by the ERS Executive Committee, June 2004. ATS/ERS Recommendations for Standardized Procedures for the Online and Offline Measurement of Exhaled Lower Respiratory Nitric Oxide and Nasal Nitric Oxide. Guideline 2005

## 2022-09-15 ENCOUNTER — PATIENT MESSAGE (OUTPATIENT)
Dept: SURGERY | Facility: HOSPITAL | Age: 57
End: 2022-09-15
Payer: COMMERCIAL

## 2022-09-15 ENCOUNTER — TELEPHONE (OUTPATIENT)
Dept: OTOLARYNGOLOGY | Facility: CLINIC | Age: 57
End: 2022-09-15
Payer: COMMERCIAL

## 2022-09-15 ENCOUNTER — OFFICE VISIT (OUTPATIENT)
Dept: SLEEP MEDICINE | Facility: CLINIC | Age: 57
End: 2022-09-15
Payer: COMMERCIAL

## 2022-09-15 VITALS
OXYGEN SATURATION: 99 % | BODY MASS INDEX: 28.23 KG/M2 | WEIGHT: 179.88 LBS | DIASTOLIC BLOOD PRESSURE: 70 MMHG | SYSTOLIC BLOOD PRESSURE: 120 MMHG | RESPIRATION RATE: 18 BRPM | HEIGHT: 67 IN | HEART RATE: 56 BPM

## 2022-09-15 DIAGNOSIS — J32.4 CHRONIC PANSINUSITIS: Primary | ICD-10-CM

## 2022-09-15 DIAGNOSIS — G47.01 INSOMNIA DUE TO MEDICAL CONDITION: ICD-10-CM

## 2022-09-15 DIAGNOSIS — J32.4 CHRONIC PANSINUSITIS: ICD-10-CM

## 2022-09-15 DIAGNOSIS — Z79.60 LONG-TERM USE OF IMMUNOSUPPRESSANT MEDICATION: ICD-10-CM

## 2022-09-15 DIAGNOSIS — I10 ESSENTIAL HYPERTENSION: ICD-10-CM

## 2022-09-15 DIAGNOSIS — E78.49 OTHER HYPERLIPIDEMIA: ICD-10-CM

## 2022-09-15 DIAGNOSIS — I77.1 TORTUOUS AORTA: Chronic | ICD-10-CM

## 2022-09-15 DIAGNOSIS — J45.50 SEVERE PERSISTENT ASTHMA WITHOUT COMPLICATION: ICD-10-CM

## 2022-09-15 DIAGNOSIS — K50.90 CROHN'S DISEASE WITHOUT COMPLICATION, UNSPECIFIED GASTROINTESTINAL TRACT LOCATION: ICD-10-CM

## 2022-09-15 DIAGNOSIS — D80.1 HYPOGAMMAGLOBULINEMIA: ICD-10-CM

## 2022-09-15 DIAGNOSIS — G47.33 OSA ON CPAP: Primary | ICD-10-CM

## 2022-09-15 DIAGNOSIS — R91.8 ABNORMAL CT SCAN, LUNG: ICD-10-CM

## 2022-09-15 DIAGNOSIS — J32.9 CHRONIC RHINOSINUSITIS: ICD-10-CM

## 2022-09-15 LAB
ANNOTATION COMMENT IMP: NORMAL
BRPFT: ABNORMAL
CFTRZ INTERPRETATION: NORMAL
CFTRZ RELEASED BY: NORMAL
CFTRZ SPECIMEN: NORMAL
CFTZ RESULT: NORMAL
FEF 25 75 LLN: 1.33
FEF 25 75 PRE REF: 104.3 %
FEF 25 75 REF: 2.54
FEV1 FVC LLN: 68
FEV1 FVC PRE REF: 107.2 %
FEV1 FVC REF: 79
FEV1 LLN: 2.15
FEV1 PRE REF: 65.9 %
FEV1 REF: 2.82
FVC LLN: 2.74
FVC PRE REF: 61 %
FVC REF: 3.59
MOL DX INTERP BLD/T QL: NEGATIVE
PEF LLN: 5.04
PEF PRE REF: 77.2 %
PEF REF: 6.91
PRE FEF 25 75: 2.64 L/S (ref 1.33–3.74)
PRE FET 100: 7.06 SEC
PRE FEV1 FVC: 84.96 % (ref 67.54–90.94)
PRE FEV1: 1.86 L (ref 2.15–3.49)
PRE FVC: 2.19 L (ref 2.74–4.43)
PRE PEF: 5.34 L/S (ref 5.04–8.78)
SPECIMEN SOURCE: NORMAL

## 2022-09-15 PROCEDURE — 1160F PR REVIEW ALL MEDS BY PRESCRIBER/CLIN PHARMACIST DOCUMENTED: ICD-10-PCS | Mod: CPTII,S$GLB,, | Performed by: INTERNAL MEDICINE

## 2022-09-15 PROCEDURE — 1160F RVW MEDS BY RX/DR IN RCRD: CPT | Mod: CPTII,S$GLB,, | Performed by: INTERNAL MEDICINE

## 2022-09-15 PROCEDURE — 3074F SYST BP LT 130 MM HG: CPT | Mod: CPTII,S$GLB,, | Performed by: INTERNAL MEDICINE

## 2022-09-15 PROCEDURE — 3078F DIAST BP <80 MM HG: CPT | Mod: CPTII,S$GLB,, | Performed by: INTERNAL MEDICINE

## 2022-09-15 PROCEDURE — 3008F BODY MASS INDEX DOCD: CPT | Mod: CPTII,S$GLB,, | Performed by: INTERNAL MEDICINE

## 2022-09-15 PROCEDURE — 99999 PR PBB SHADOW E&M-EST. PATIENT-LVL III: CPT | Mod: PBBFAC,,, | Performed by: INTERNAL MEDICINE

## 2022-09-15 PROCEDURE — 99214 OFFICE O/P EST MOD 30 MIN: CPT | Mod: 25,S$GLB,, | Performed by: INTERNAL MEDICINE

## 2022-09-15 PROCEDURE — 1159F PR MEDICATION LIST DOCUMENTED IN MEDICAL RECORD: ICD-10-PCS | Mod: CPTII,S$GLB,, | Performed by: INTERNAL MEDICINE

## 2022-09-15 PROCEDURE — 3074F PR MOST RECENT SYSTOLIC BLOOD PRESSURE < 130 MM HG: ICD-10-PCS | Mod: CPTII,S$GLB,, | Performed by: INTERNAL MEDICINE

## 2022-09-15 PROCEDURE — 99214 PR OFFICE/OUTPT VISIT, EST, LEVL IV, 30-39 MIN: ICD-10-PCS | Mod: 25,S$GLB,, | Performed by: INTERNAL MEDICINE

## 2022-09-15 PROCEDURE — 99999 PR PBB SHADOW E&M-EST. PATIENT-LVL III: ICD-10-PCS | Mod: PBBFAC,,, | Performed by: INTERNAL MEDICINE

## 2022-09-15 PROCEDURE — 4010F ACE/ARB THERAPY RXD/TAKEN: CPT | Mod: CPTII,S$GLB,, | Performed by: INTERNAL MEDICINE

## 2022-09-15 PROCEDURE — 4010F PR ACE/ARB THEARPY RXD/TAKEN: ICD-10-PCS | Mod: CPTII,S$GLB,, | Performed by: INTERNAL MEDICINE

## 2022-09-15 PROCEDURE — 3008F PR BODY MASS INDEX (BMI) DOCUMENTED: ICD-10-PCS | Mod: CPTII,S$GLB,, | Performed by: INTERNAL MEDICINE

## 2022-09-15 PROCEDURE — 3078F PR MOST RECENT DIASTOLIC BLOOD PRESSURE < 80 MM HG: ICD-10-PCS | Mod: CPTII,S$GLB,, | Performed by: INTERNAL MEDICINE

## 2022-09-15 PROCEDURE — 1159F MED LIST DOCD IN RCRD: CPT | Mod: CPTII,S$GLB,, | Performed by: INTERNAL MEDICINE

## 2022-09-15 RX ORDER — METHYLPREDNISOLONE 4 MG/1
TABLET ORAL
Qty: 21 TABLET | Refills: 2 | Status: SHIPPED | OUTPATIENT
Start: 2022-09-15 | End: 2022-12-23

## 2022-09-15 NOTE — TELEPHONE ENCOUNTER
----- Message from Dank Patino MA sent at 9/9/2022  1:09 PM CDT -----  Contact: Jaylin  Can you put in an order for the CT scan?  ----- Message -----  From: Jessica Bruce  Sent: 9/9/2022   1:02 PM CDT  To: Doc De La Cruz    Jaylin is needing the orders for her CT orders placed so she can have it before her surgery here in the [facility]. Please call her back at [phone]

## 2022-09-16 ENCOUNTER — PATIENT OUTREACH (OUTPATIENT)
Dept: ADMINISTRATIVE | Facility: HOSPITAL | Age: 57
End: 2022-09-16
Payer: COMMERCIAL

## 2022-09-16 ENCOUNTER — HOSPITAL ENCOUNTER (OUTPATIENT)
Dept: RADIOLOGY | Facility: HOSPITAL | Age: 57
Discharge: HOME | End: 2022-09-16
Attending: NURSE PRACTITIONER
Payer: COMMERCIAL

## 2022-09-16 ENCOUNTER — TELEPHONE (OUTPATIENT)
Dept: OTOLARYNGOLOGY | Facility: CLINIC | Age: 57
End: 2022-09-16
Payer: COMMERCIAL

## 2022-09-16 DIAGNOSIS — J32.4 CHRONIC PANSINUSITIS: ICD-10-CM

## 2022-09-16 PROCEDURE — 70486 CT MAXILLOFACIAL W/O DYE: CPT | Mod: TC,PN

## 2022-09-16 NOTE — ASSESSMENT & PLAN NOTE
ACT score 11  Feno still high but impproved (78)  Cont BREO changed to TRELEGY  Nebulizer  PFT: Mild restriction and DLCO reduced

## 2022-09-16 NOTE — TELEPHONE ENCOUNTER
Spoke with the patient. All surgery instructions were discussed with her. She was given the opportunity to ask any questions. Patient verbalized understanding.

## 2022-09-16 NOTE — PROGRESS NOTES
Health Maintenance Due   Topic Date Due    Shingles Vaccine (1 of 2) Never done    Pneumococcal Vaccines (Age 0-64) (3 - PPSV23 or PCV20) 12/23/2020    COVID-19 Vaccine (3 - Booster for Pfizer series) 09/15/2021

## 2022-09-16 NOTE — PRE-PROCEDURE INSTRUCTIONS
PreOp Instructions given:   - Verbal medication information (what to hold and what to take)   - NPO guidelines 2400  - Arrival place directions given; time to be given the day before procedure by the   Surgeon's Office 1000  - Bathing with antibacterial soap   - Don't wear any jewelry or bring any valuables AM of surgery   - No makeup or moisturizer to face   - No perfume/cologne, powder, lotions or aftershave   Pt. verbalized understanding.   Pt reports many years ago - she had difficulty breathing after colonoscopy - may surgeries since - without complications.

## 2022-09-16 NOTE — ASSESSMENT & PLAN NOTE
Follow with Immunology  HIZENTRA      Component      Latest Ref Rng & Units 7/7/2022   IgG      650 - 1600 mg/dL 1704 (H)   IgA      40 - 350 mg/dL 284   IgM      50 - 300 mg/dL 163

## 2022-09-19 ENCOUNTER — PATIENT MESSAGE (OUTPATIENT)
Dept: OTOLARYNGOLOGY | Facility: CLINIC | Age: 57
End: 2022-09-19
Payer: COMMERCIAL

## 2022-09-19 ENCOUNTER — PATIENT MESSAGE (OUTPATIENT)
Dept: INFECTIOUS DISEASES | Facility: CLINIC | Age: 57
End: 2022-09-19
Payer: COMMERCIAL

## 2022-09-19 DIAGNOSIS — J32.4 CHRONIC PANSINUSITIS: Primary | ICD-10-CM

## 2022-09-20 ENCOUNTER — PATIENT OUTREACH (OUTPATIENT)
Dept: PULMONOLOGY | Facility: CLINIC | Age: 57
End: 2022-09-20
Payer: COMMERCIAL

## 2022-09-20 ENCOUNTER — PATIENT MESSAGE (OUTPATIENT)
Dept: OTOLARYNGOLOGY | Facility: CLINIC | Age: 57
End: 2022-09-20
Payer: COMMERCIAL

## 2022-09-20 RX ORDER — CIPROFLOXACIN 500 MG/1
500 TABLET ORAL EVERY 12 HOURS
Qty: 20 TABLET | Refills: 0 | Status: SHIPPED | OUTPATIENT
Start: 2022-09-20 | End: 2022-09-30

## 2022-09-20 NOTE — TELEPHONE ENCOUNTER
Chronic Disease Management:  Called patient to follow up following surgery. Patient reports surgery was not approved by insurance plan and patient plans to initiate new antibiotic while awaiting approval.  Advised patient to contact PDM with any further needs.

## 2022-09-28 ENCOUNTER — PATIENT MESSAGE (OUTPATIENT)
Dept: SURGERY | Facility: HOSPITAL | Age: 57
End: 2022-09-28
Payer: COMMERCIAL

## 2022-09-29 ENCOUNTER — ANESTHESIA EVENT (OUTPATIENT)
Dept: SURGERY | Facility: HOSPITAL | Age: 57
End: 2022-09-29
Payer: COMMERCIAL

## 2022-09-29 ENCOUNTER — TELEPHONE (OUTPATIENT)
Dept: OTOLARYNGOLOGY | Facility: CLINIC | Age: 57
End: 2022-09-29
Payer: COMMERCIAL

## 2022-09-29 NOTE — ANESTHESIA PREPROCEDURE EVALUATION
[facility]  Anesthesia Pre-Operative Evaluation         Patient Name: Jaylin Murguia  YOB: 1965  MRN: [id number]    SUBJECTIVE:     Pre-operative evaluation for Procedure(s) (LRB):  FESS, USING COMPUTER-ASSISTED NAVIGATION (Bilateral)     09/29/2022    Jaylin Murguia is a 57 y.o. female w/ a significant PMHx of HTN, HLD, TAMIKA, Crohn's, Mod-severe asthma, Migraines, chronic sinusitis refractory to FESS procedure 2 years ago.    Patient now presents for the above procedure(s).    TTE 4/1/2019:    1 - Mild left atrial enlargement.     2 - Concentric hypertrophy.     3 - No wall motion abnormalities.     4 - Normal left ventricular systolic function (EF 60-65%).     5 - Normal left ventricular diastolic function.     6 - Normal right ventricular systolic function .     7 - The estimated PA systolic pressure is 28 mmHg.     8 - Trivial to mild aortic regurgitation.     9 - Trivial to mild mitral regurgitation.     LDA: None documented.    Prev airway:  Intubation  Performed by: Iris Palacios  Authorized by: Rajni Stovall MD      Intubation:     Induction:  Intravenous    Intubated:  Postinduction    Mask Ventilation:  Easy mask    Attempts:  1    Attempted By:  CRNA    Method of Intubation:  Direct    Blade:  Riley 2    Laryngeal View Grade: Grade IIA - cords partially seen      Difficult Airway Encountered?: No      Complications:  None    Airway Device:  Oral matthieu    Airway Device Size:  7.5    Style/Cuff Inflation:  Cuffed (inflated to minimal occlusive pressure)    Tube secured:  23    Secured at:  The lips    Placement Verified By:  Capnometry    Complicating Factors:  Short neck    Findings Post-Intubation:  BS equal bilateral and atraumatic/condition of teeth unchanged    Drips: None documented.      Patient Active Problem List   Diagnosis    Fibromyalgia    Migraine    Crohn's disease of small intestine    Sciatica    Allergic rhinitis    Diarrhea    Essential  hypertension    Insomnia due to medical condition    Hormone replacement therapy (postmenopausal)    Long-term use of immunosuppressant medication    Crohn's disease    TAMIKA on CPAP    Bilateral primary osteoarthritis of knee    Primary osteoarthritis of right knee    Primary osteoarthritis of left knee    Other hyperlipidemia    Tortuous aorta    Mild aortic insufficiency    Chronic pansinusitis    Severe persistent asthma without complication    Hypogammaglobulinemia    Abdominal pain    Chronic rhinosinusitis    Abnormal CT scan, lung       Review of patient's allergies indicates:  No Known Allergies    Current Inpatient Medications:      Current Facility-Administered Medications on File Prior to Encounter   Medication Dose Route Frequency Provider Last Rate Last Admin    lactated ringers infusion   Intravenous Continuous Mary Leiva MD   New Bag at 03/12/20 0923    lactated ringers infusion   Intravenous Continuous Shay Bruce MD   Stopped at 03/16/22 1342    lidocaine (PF) 10 mg/ml (1%) injection 10 mg  1 mL Intradermal Once Mary Leiva MD         Current Outpatient Medications on File Prior to Encounter   Medication Sig Dispense Refill    atorvastatin (LIPITOR) 10 MG tablet Take 1 tablet (10 mg total) by mouth once daily. (Patient taking differently: Take 10 mg by mouth every evening.) 90 tablet 3    azelastine (ASTELIN) 137 mcg (0.1 %) nasal spray 2 sprays (274 mcg total) by Nasal route 2 (two) times daily. 30 mL 11    DULoxetine (CYMBALTA) 60 MG capsule TAKE 1 CAPSULE BY MOUTH TWICE A DAY (Patient taking differently: Take 60 mg by mouth once daily.) 180 capsule 3    estradioL (ESTRACE) 2 MG tablet Take 1 tablet (2 mg total) by mouth every evening. 90 tablet 3    fluticasone propionate (FLONASE) 50 mcg/actuation nasal spray SPRAY 2 SPRAYS BY EACH NOSTRIL ROUTE ONCE DAILY. (Patient taking differently: 2 sprays by Each Nostril route 2 (two) times a day.) 16 mL 0     nortriptyline (PAMELOR) 25 MG capsule Take 1 capsule (25 mg total) by mouth every evening. 90 capsule 3    pregabalin (LYRICA) 150 MG capsule Take 1 capsule (150 mg total) by mouth 3 (three) times daily. (Patient taking differently: Take 150 mg by mouth 2 (two) times daily.) 270 capsule 1    topiramate (TOPAMAX) 100 MG tablet TAKE 1 TABLET TWICE A  tablet 3    butalbital-acetaminophen-caffeine -40 mg (FIORICET, ESGIC) -40 mg per tablet TAKE 1 TABLET EVERY 4 HOURS AS NEEDED FOR HEADACHE 90 tablet 3    cholestyramine (QUESTRAN) 4 gram packet Take 1 packet (4 g total) by mouth 3 (three) times daily with meals. 270 packet 3    clindamycin (CLEOCIN) 150 mg/mL injection EMPTY CONTENTS OF 1 VIAL INTO NASAL IRRIGATION SYSTEM, ADD DISTILLED WATER, SALT PACK, MIX & IRRIGATE PERFORM 2 TIMES DAILY      clindamycin (CLEOCIN) 300 MG capsule EMPTY CONTENTS OF 2 CAPSULES INTO NASAL IRRIGATION SYSTEM, ADD DISTILLED WATER, SALT PACK, MIX & IRRIGATE. PERFORM 2 TIMES DAILY (Patient not taking: Reported on 9/16/2022) 120 capsule 1    diltiazem HCl (DILTIAZEM 2% - LIDOCAINE 5% CREAM) Apply peasize amount topically to anal area. 30 g 2    diphenhydrAMINE-aluminum-magnesium hydroxide-simethicone-LIDOcaine HCl 2% Swish and spit 15 mLs every 4 (four) hours as needed (oral ulcers, pain). 1 Bottle 2    eletriptan (RELPAX) 40 MG tablet Take 1 tablet (40 mg total) by mouth as needed. (Patient taking differently: Take 40 mg by mouth as needed. Take if needed) 12 tablet 3    gentamicin (GARAMYCIN) 40 mg/mL injection EMPTY CONTENTS OF 1 VIAL INTO NASAL IRRIGATION SYSTEM, ADD DISTILLED WATER, SALT PACK, MIX & IRRIGATE PERFORM 2 TIMES DAILY      immun glob G,IgG,-pro-IgA 0-50 (HIZENTRA) 10 gram/50 mL (20 %) Soln Inject 70 mLs (14 g total) into the skin every 7 days. 280 mL 11    INV opn-375/placebo 93 mcg sprm nasal spray 2 sprays by Alternating Nostrils route 2 (two) times a day.      ipratropium (ATROVENT) 0.02 %  nebulizer solution Nebulize 2.5 ml every 4-6 hours as directed, if needed 90 each 11    ipratropium-albuteroL (COMBIVENT)  mcg/actuation inhaler Inhale 2 puffs into the lungs every 6 (six) hours as needed for Wheezing. Rescue 4 g 3    losartan (COZAAR) 100 MG tablet Take 1 tablet (100 mg total) by mouth once daily. (Patient not taking: Reported on 2022) 90 tablet 3    montelukast (SINGULAIR) 10 mg tablet TAKE 1 TABLET EVERY EVENING 90 tablet 3    propranoloL (INDERAL LA) 80 MG 24 hr capsule TAKE 1 CAPSULE BY MOUTH ONCE DAILY. (Patient taking differently: 80 mg nightly.) 90 capsule 2    rizatriptan (MAXALT) 10 MG tablet TAKE 1 TABLET IF NEEDED FOR MIGRAINES. MAX 2 TABLETS IN 24 HOURS. 30 tablet 8    sodium chloride 0.9% 0.9 % Soln 200 mL with gentamicin (ped) 20 mg/2 mL Soln EMPTY CONTENTS OF 1 CAPSULE INTO NASAL IRRIGATION SYSTEM, ADD DISTILLED WATER, SALT PACK, MIX & IRRIGATE. PERFORM 2 TIMES DAILY      triamcinolone acetonide 0.025% (KENALOG) 0.025 % cream 1 application once daily. Apply to affected area      VENTOLIN HFA 90 mcg/actuation inhaler INHALE 2 PUFFS INTO THE LUNGS EVERY 4 TO 6 HOURS AS NEEDED FOR WHEEZING. (Patient taking differently: Take if needed & bring) 18 Inhaler 1    XYLITOL, BULK, MISC EMPTY CONTENTS OF 1 CAPSULE INTO NASAL IRRIGATION SYSTEM, ADD DISTILLED WATER, SALT PACK, MIX & IRRIGATE. PERFORM 2 TIMES DAILY         Past Surgical History:   Procedure Laterality Date    BLADDER SURGERY      sling was created by her muscles      SECTION      COLONOSCOPY N/A 2017    Procedure: COLONOSCOPY;  Surgeon: Kin Dyer MD;  Location: [facility];  Service: Endoscopy;  Laterality: N/A;    COLONOSCOPY N/A 3/27/2018    Procedure: COLONOSCOPY;  Surgeon: Kyra Vallecillo MD;  Location: [facility];  Service: Endoscopy;  Laterality: N/A;    COLONOSCOPY N/A 3/12/2020    Procedure: COLONOSCOPY;  Surgeon: Nicole Leal MD;  Location: [facility];  Service:  Endoscopy;  Laterality: N/A;    COLONOSCOPY N/A 3/16/2022    Procedure: COLONOSCOPY;  Surgeon: Shay Bruce MD;  Location: [facility] ENDO;  Service: Endoscopy;  Laterality: N/A;    DEBRIDEMENT Bilateral 12/21/2020    Procedure: DEBRIDEMENT;  Surgeon: Matthias Roach MD;  [facility];  Service: ENT;  Laterality: Bilateral;    ESOPHAGOGASTRODUODENOSCOPY N/A 3/12/2020    Procedure: ESOPHAGOGASTRODUODENOSCOPY (EGD);  Surgeon: Nicole Leal MD;  Location: [facility] ENDO;  Service: Endoscopy;  Laterality: N/A;    ESOPHAGOGASTRODUODENOSCOPY N/A 3/16/2022    Procedure: EGD (ESOPHAGOGASTRODUODENOSCOPY);  Surgeon: Shay Bruce MD;  Location: [facility];  Service: Endoscopy;  Laterality: N/A;    FINGER SURGERY      joint relpacement, left hand index finger    FUNCTIONAL ENDOSCOPIC SINUS SURGERY (FESS) USING COMPUTER-ASSISTED NAVIGATION Bilateral 7/31/2019    Procedure: FESS, USING COMPUTER-ASSISTED NAVIGATION;  Surgeon: Manish Shaffer MD;  Location[facility];  Service: ENT;  Laterality: Bilateral;    FUNCTIONAL ENDOSCOPIC SINUS SURGERY (FESS) USING COMPUTER-ASSISTED NAVIGATION Bilateral 9/25/2020    Procedure: FESS, USING COMPUTER-ASSISTED NAVIGATION SPHENOID;  Surgeon: Matthias Roach MD;  [facility];  Service: ENT;  Laterality: Bilateral;  TIVA    HYSTERECTOMY      SINUS SURGERY      WISDOM TOOTH EXTRACTION         Social History     Socioeconomic History    Marital status:     Number of children: 2   Occupational History    Occupation: teacher   Tobacco Use    Smoking status: Never    Smokeless tobacco: Never   Substance and Sexual Activity    Alcohol use: No    Drug use: No    Sexual activity: Yes     Partners: Male   Social History Narrative    Surrogate Decision Maker: , Cristobal Stauffernchard, [phone]     Social Determinants of Health     Financial Resource Strain: Low Risk     Difficulty of Paying Living Expenses: Not very hard   Food  Insecurity: No Food Insecurity    Worried About Running Out of Food in the Last Year: Never true    Ran Out of Food in the Last Year: Never true   Transportation Needs: No Transportation Needs    Lack of Transportation (Medical): No    Lack of Transportation (Non-Medical): No   Physical Activity: Insufficiently Active    Days of Exercise per Week: 3 days    Minutes of Exercise per Session: 20 min   Stress: Stress Concern Present    Feeling of Stress : To some extent   Social Connections: Unknown    Frequency of Communication with Friends and Family: Three times a week    Frequency of Social Gatherings with Friends and Family: Once a week    Active Member of Clubs or Organizations: Yes    Attends Club or Organization Meetings: 1 to 4 times per year    Marital Status:    Housing Stability: Low Risk     Unable to Pay for Housing in the Last Year: No    Number of Places Lived in the Last Year: 1    Unstable Housing in the Last Year: No       OBJECTIVE:     Vital Signs Range (Last 24H):         Significant Labs:  Lab Results   Component Value Date    WBC 5.50 07/07/2022    HGB 13.1 07/07/2022    HCT 40.9 07/07/2022     07/07/2022    CHOL 172 12/06/2021    TRIG 71 12/06/2021    HDL 73 12/06/2021    ALT 46 (H) 05/10/2022    AST 37 05/10/2022     (L) 05/10/2022    K 4.2 05/10/2022     05/10/2022    CREATININE 0.8 05/10/2022    BUN 10 05/10/2022    CO2 25 05/10/2022    TSH 0.887 09/02/2021    INR 1.0 11/20/2014    HGBA1C 5.3 09/02/2021       Diagnostic Studies: No relevant studies.    EKG:   No results found. However, due to the size of the patient record, not all encounters were searched. Please check Results Review for a complete set of results.    2D ECHO:  TTE:  No results found. However, due to the size of the patient record, not all encounters were searched. Please check Results Review for a complete set of results.    KYLIE:  No results found. However, due to the size of the  patient record, not all encounters were searched. Please check Results Review for a complete set of results.    ASSESSMENT/PLAN:                                                                                                                 09/29/2022  Jaylin Murguia is a 57 y.o., female.      Pre-op Assessment    I have reviewed the Patient Summary Reports.     I have reviewed the Nursing Notes. I have reviewed the NPO Status.   I have reviewed the Medications.     Review of Systems  Anesthesia Hx:  No previous Anesthesia Denies Hx of Anesthetic complications  History of prior surgery of interest to airway management or planning: Denies Family Hx of Anesthesia complications.   Denies Personal Hx of Anesthesia complications.   EENT/Dental:   chronic allergic rhinitis   Cardiovascular:   Exercise tolerance: good Hypertension Denies CAD.    Denies CABG/stent.   Denies CHF. hyperlipidemia    Pulmonary:   Denies COPD. Asthma severe and moderate Sleep Apnea    Renal/:   Denies Chronic Renal Disease.     Hepatic/GI:   Denies GERD. Crohn's   Musculoskeletal:   Arthritis     Neurological:   Denies CVA. Neuromuscular Disease,  Headaches Denies Seizures.    Endocrine:   Denies Diabetes.  Denies Obesity / BMI > 30  Psych:   Denies Psychiatric History.          Physical Exam  General: Well nourished, Cooperative, Alert and Oriented    Airway:  Mallampati: II   Mouth Opening: Normal  TM Distance: Normal  Tongue: Normal  Neck ROM: Normal ROM    Dental:  Intact        Anesthesia Plan  Type of Anesthesia, risks & benefits discussed:    Anesthesia Type: Gen ETT  Intra-op Monitoring Plan: Standard ASA Monitors  Post Op Pain Control Plan: multimodal analgesia and IV/PO Opioids PRN  Induction:  IV  Airway Plan: Direct and Video, Post-Induction  Informed Consent: Informed consent signed with the Patient and all parties understand the risks and agree with anesthesia plan.  All questions answered. Patient consented to blood products?  Yes  ASA Score: 2  Day of Surgery Review of History & Physical: H&P Update referred to the surgeon/provider.    Ready For Surgery From Anesthesia Perspective.     .

## 2022-09-29 NOTE — TELEPHONE ENCOUNTER
Spoke with patient. All surgery instructions were discussed with her. She was given the opportunity to ask questions. Patient verbalized understanding.

## 2022-09-30 ENCOUNTER — ANESTHESIA (OUTPATIENT)
Dept: SURGERY | Facility: HOSPITAL | Age: 57
End: 2022-09-30
Payer: COMMERCIAL

## 2022-09-30 ENCOUNTER — HOSPITAL ENCOUNTER (OUTPATIENT)
Facility: HOSPITAL | Age: 57
Discharge: HOME | End: 2022-09-30
Attending: OTOLARYNGOLOGY | Admitting: OTOLARYNGOLOGY
Payer: COMMERCIAL

## 2022-09-30 VITALS
TEMPERATURE: 98 F | DIASTOLIC BLOOD PRESSURE: 67 MMHG | OXYGEN SATURATION: 96 % | HEIGHT: 67 IN | HEART RATE: 69 BPM | WEIGHT: 179 LBS | SYSTOLIC BLOOD PRESSURE: 112 MMHG | BODY MASS INDEX: 28.09 KG/M2 | RESPIRATION RATE: 23 BRPM

## 2022-09-30 DIAGNOSIS — J32.4 CHRONIC PANSINUSITIS: Primary | ICD-10-CM

## 2022-09-30 PROCEDURE — C9046 COCAINE HCL NASAL SOLUTION: HCPCS | Mod: TB | Performed by: OTOLARYNGOLOGY

## 2022-09-30 PROCEDURE — 37000008 HC ANESTHESIA 1ST 15 MINUTES: Performed by: OTOLARYNGOLOGY

## 2022-09-30 PROCEDURE — 63600175 PHARM REV CODE 636 W HCPCS: Performed by: OTOLARYNGOLOGY

## 2022-09-30 PROCEDURE — 71000015 HC POSTOP RECOV 1ST HR: Performed by: OTOLARYNGOLOGY

## 2022-09-30 PROCEDURE — 25000003 PHARM REV CODE 250

## 2022-09-30 PROCEDURE — 36000710: Performed by: OTOLARYNGOLOGY

## 2022-09-30 PROCEDURE — 87186 SC STD MICRODIL/AGAR DIL: CPT | Performed by: OTOLARYNGOLOGY

## 2022-09-30 PROCEDURE — 25000003 PHARM REV CODE 250: Performed by: STUDENT IN AN ORGANIZED HEALTH CARE EDUCATION/TRAINING PROGRAM

## 2022-09-30 PROCEDURE — 87075 CULTR BACTERIA EXCEPT BLOOD: CPT | Performed by: OTOLARYNGOLOGY

## 2022-09-30 PROCEDURE — 71000045 HC DOSC ROUTINE RECOVERY EA ADD'L HR: Performed by: OTOLARYNGOLOGY

## 2022-09-30 PROCEDURE — 36000711: Performed by: OTOLARYNGOLOGY

## 2022-09-30 PROCEDURE — 61782 PR STEREOTACTIC COMP ASSIST PROC,CRANIAL,EXTRADURAL: ICD-10-PCS | Mod: ,,, | Performed by: OTOLARYNGOLOGY

## 2022-09-30 PROCEDURE — D9220A PRA ANESTHESIA: ICD-10-PCS | Mod: ,,, | Performed by: ANESTHESIOLOGY

## 2022-09-30 PROCEDURE — 27201423 OPTIME MED/SURG SUP & DEVICES STERILE SUPPLY: Performed by: OTOLARYNGOLOGY

## 2022-09-30 PROCEDURE — 31288 PR NASAL/SINUS ENDOSCOPY,REMV TISS SPHENOID: ICD-10-PCS | Mod: 50,,, | Performed by: OTOLARYNGOLOGY

## 2022-09-30 PROCEDURE — 31288 NASAL/SINUS ENDOSCOPY SURG: CPT | Mod: 50,,, | Performed by: OTOLARYNGOLOGY

## 2022-09-30 PROCEDURE — 63600175 PHARM REV CODE 636 W HCPCS

## 2022-09-30 PROCEDURE — 99900035 HC TECH TIME PER 15 MIN (STAT)

## 2022-09-30 PROCEDURE — 94640 AIRWAY INHALATION TREATMENT: CPT | Mod: XB

## 2022-09-30 PROCEDURE — 37000009 HC ANESTHESIA EA ADD 15 MINS: Performed by: OTOLARYNGOLOGY

## 2022-09-30 PROCEDURE — 94761 N-INVAS EAR/PLS OXIMETRY MLT: CPT

## 2022-09-30 PROCEDURE — 27201037 HC PRESSURE MONITORING SET UP

## 2022-09-30 PROCEDURE — D9220A PRA ANESTHESIA: Mod: ,,, | Performed by: ANESTHESIOLOGY

## 2022-09-30 PROCEDURE — 87077 CULTURE AEROBIC IDENTIFY: CPT | Performed by: OTOLARYNGOLOGY

## 2022-09-30 PROCEDURE — 87206 SMEAR FLUORESCENT/ACID STAI: CPT | Performed by: OTOLARYNGOLOGY

## 2022-09-30 PROCEDURE — 25000242 PHARM REV CODE 250 ALT 637 W/ HCPCS

## 2022-09-30 PROCEDURE — 87102 FUNGUS ISOLATION CULTURE: CPT | Performed by: OTOLARYNGOLOGY

## 2022-09-30 PROCEDURE — 87070 CULTURE OTHR SPECIMN AEROBIC: CPT | Performed by: OTOLARYNGOLOGY

## 2022-09-30 PROCEDURE — 87116 MYCOBACTERIA CULTURE: CPT | Performed by: OTOLARYNGOLOGY

## 2022-09-30 PROCEDURE — 71000044 HC DOSC ROUTINE RECOVERY FIRST HOUR: Performed by: OTOLARYNGOLOGY

## 2022-09-30 PROCEDURE — 61782 SCAN PROC CRANIAL EXTRA: CPT | Mod: ,,, | Performed by: OTOLARYNGOLOGY

## 2022-09-30 PROCEDURE — 25000003 PHARM REV CODE 250: Mod: TB | Performed by: OTOLARYNGOLOGY

## 2022-09-30 RX ORDER — PROPOFOL 10 MG/ML
VIAL (ML) INTRAVENOUS
Status: DISCONTINUED | OUTPATIENT
Start: 2022-09-30 | End: 2022-09-30

## 2022-09-30 RX ORDER — ROCURONIUM BROMIDE 10 MG/ML
INJECTION, SOLUTION INTRAVENOUS
Status: DISCONTINUED | OUTPATIENT
Start: 2022-09-30 | End: 2022-09-30

## 2022-09-30 RX ORDER — REMIFENTANIL HYDROCHLORIDE 1 MG/ML
INJECTION, POWDER, LYOPHILIZED, FOR SOLUTION INTRAVENOUS
Status: DISCONTINUED | OUTPATIENT
Start: 2022-09-30 | End: 2022-09-30

## 2022-09-30 RX ORDER — OXYCODONE HYDROCHLORIDE 5 MG/1
5 TABLET ORAL ONCE
Status: COMPLETED | OUTPATIENT
Start: 2022-09-30 | End: 2022-09-30

## 2022-09-30 RX ORDER — LIDOCAINE HYDROCHLORIDE 20 MG/ML
INJECTION, SOLUTION EPIDURAL; INFILTRATION; INTRACAUDAL; PERINEURAL
Status: DISCONTINUED | OUTPATIENT
Start: 2022-09-30 | End: 2022-09-30

## 2022-09-30 RX ORDER — SODIUM CHLORIDE 0.9 % (FLUSH) 0.9 %
10 SYRINGE (ML) INJECTION
Status: DISCONTINUED | OUTPATIENT
Start: 2022-09-30 | End: 2022-09-30 | Stop reason: HOSPADM

## 2022-09-30 RX ORDER — ONDANSETRON 2 MG/ML
INJECTION INTRAMUSCULAR; INTRAVENOUS
Status: DISCONTINUED | OUTPATIENT
Start: 2022-09-30 | End: 2022-09-30

## 2022-09-30 RX ORDER — IPRATROPIUM BROMIDE AND ALBUTEROL SULFATE 2.5; .5 MG/3ML; MG/3ML
3 SOLUTION RESPIRATORY (INHALATION) ONCE
Status: COMPLETED | OUTPATIENT
Start: 2022-09-30 | End: 2022-09-30

## 2022-09-30 RX ORDER — HYDROMORPHONE HYDROCHLORIDE 1 MG/ML
0.2 INJECTION, SOLUTION INTRAMUSCULAR; INTRAVENOUS; SUBCUTANEOUS EVERY 5 MIN PRN
Status: DISCONTINUED | OUTPATIENT
Start: 2022-09-30 | End: 2022-09-30 | Stop reason: HOSPADM

## 2022-09-30 RX ORDER — DEXMEDETOMIDINE HYDROCHLORIDE 100 UG/ML
INJECTION, SOLUTION INTRAVENOUS
Status: DISCONTINUED | OUTPATIENT
Start: 2022-09-30 | End: 2022-09-30

## 2022-09-30 RX ORDER — KETAMINE HCL IN 0.9 % NACL 50 MG/5 ML
SYRINGE (ML) INTRAVENOUS
Status: DISCONTINUED | OUTPATIENT
Start: 2022-09-30 | End: 2022-09-30

## 2022-09-30 RX ORDER — ACETAMINOPHEN 500 MG
1000 TABLET ORAL ONCE
Status: COMPLETED | OUTPATIENT
Start: 2022-09-30 | End: 2022-09-30

## 2022-09-30 RX ORDER — OXYCODONE HYDROCHLORIDE 5 MG/1
5 CAPSULE ORAL EVERY 4 HOURS PRN
Qty: 15 CAPSULE | Refills: 0 | Status: SHIPPED | OUTPATIENT
Start: 2022-09-30 | End: 2022-12-23

## 2022-09-30 RX ORDER — LIDOCAINE HYDROCHLORIDE 10 MG/ML
1 INJECTION, SOLUTION EPIDURAL; INFILTRATION; INTRACAUDAL; PERINEURAL ONCE
Status: ACTIVE | OUTPATIENT
Start: 2022-09-30

## 2022-09-30 RX ORDER — PROCHLORPERAZINE EDISYLATE 5 MG/ML
5 INJECTION INTRAMUSCULAR; INTRAVENOUS EVERY 30 MIN PRN
Status: DISCONTINUED | OUTPATIENT
Start: 2022-09-30 | End: 2022-09-30 | Stop reason: HOSPADM

## 2022-09-30 RX ORDER — SODIUM CHLORIDE 0.9 % (FLUSH) 0.9 %
10 SYRINGE (ML) INJECTION
Status: ACTIVE | OUTPATIENT
Start: 2022-09-30

## 2022-09-30 RX ORDER — COCAINE HYDROCHLORIDE 40 MG/ML
SOLUTION NASAL
Status: DISCONTINUED | OUTPATIENT
Start: 2022-09-30 | End: 2022-09-30 | Stop reason: HOSPADM

## 2022-09-30 RX ORDER — IPRATROPIUM BROMIDE AND ALBUTEROL SULFATE 2.5; .5 MG/3ML; MG/3ML
SOLUTION RESPIRATORY (INHALATION)
Status: DISCONTINUED
Start: 2022-09-30 | End: 2022-09-30 | Stop reason: HOSPADM

## 2022-09-30 RX ORDER — EPINEPHRINE 1 MG/ML
INJECTION, SOLUTION INTRACARDIAC; INTRAMUSCULAR; INTRAVENOUS; SUBCUTANEOUS
Status: DISCONTINUED | OUTPATIENT
Start: 2022-09-30 | End: 2022-09-30 | Stop reason: HOSPADM

## 2022-09-30 RX ORDER — CEFAZOLIN SODIUM/WATER 2 G/20 ML
SYRINGE (ML) INTRAVENOUS
Status: DISCONTINUED | OUTPATIENT
Start: 2022-09-30 | End: 2022-09-30

## 2022-09-30 RX ORDER — DEXAMETHASONE SODIUM PHOSPHATE 4 MG/ML
INJECTION, SOLUTION INTRA-ARTICULAR; INTRALESIONAL; INTRAMUSCULAR; INTRAVENOUS; SOFT TISSUE
Status: DISCONTINUED | OUTPATIENT
Start: 2022-09-30 | End: 2022-09-30

## 2022-09-30 RX ORDER — LIDOCAINE HYDROCHLORIDE AND EPINEPHRINE 10; 10 MG/ML; UG/ML
INJECTION, SOLUTION INFILTRATION; PERINEURAL
Status: DISCONTINUED | OUTPATIENT
Start: 2022-09-30 | End: 2022-09-30 | Stop reason: HOSPADM

## 2022-09-30 RX ORDER — FENTANYL CITRATE 50 UG/ML
INJECTION, SOLUTION INTRAMUSCULAR; INTRAVENOUS
Status: DISCONTINUED | OUTPATIENT
Start: 2022-09-30 | End: 2022-09-30

## 2022-09-30 RX ORDER — ACETAMINOPHEN 500 MG
1000 TABLET ORAL EVERY 6 HOURS PRN
Qty: 60 TABLET | Refills: 0 | Status: SHIPPED | OUTPATIENT
Start: 2022-09-30

## 2022-09-30 RX ORDER — OXYCODONE HYDROCHLORIDE 5 MG/1
5 CAPSULE ORAL EVERY 4 HOURS PRN
Qty: 15 CAPSULE | Refills: 0 | Status: SHIPPED | OUTPATIENT
Start: 2022-09-30 | End: 2022-09-30 | Stop reason: SDUPTHER

## 2022-09-30 RX ADMIN — DEXMEDETOMIDINE HYDROCHLORIDE 8 MCG: 100 INJECTION, SOLUTION INTRAVENOUS at 11:09

## 2022-09-30 RX ADMIN — SODIUM CHLORIDE, SODIUM GLUCONATE, SODIUM ACETATE, POTASSIUM CHLORIDE, MAGNESIUM CHLORIDE, SODIUM PHOSPHATE, DIBASIC, AND POTASSIUM PHOSPHATE: .53; .5; .37; .037; .03; .012; .00082 INJECTION, SOLUTION INTRAVENOUS at 11:09

## 2022-09-30 RX ADMIN — SODIUM CHLORIDE: 9 INJECTION, SOLUTION INTRAVENOUS at 10:09

## 2022-09-30 RX ADMIN — Medication 10 MG: at 11:09

## 2022-09-30 RX ADMIN — Medication 20 MG: at 10:09

## 2022-09-30 RX ADMIN — IPRATROPIUM BROMIDE AND ALBUTEROL SULFATE 3 ML: 2.5; .5 SOLUTION RESPIRATORY (INHALATION) at 09:09

## 2022-09-30 RX ADMIN — HYDROMORPHONE HYDROCHLORIDE 0.2 MG: 1 INJECTION, SOLUTION INTRAMUSCULAR; INTRAVENOUS; SUBCUTANEOUS at 01:09

## 2022-09-30 RX ADMIN — DEXAMETHASONE SODIUM PHOSPHATE 4 MG: 4 INJECTION INTRA-ARTICULAR; INTRALESIONAL; INTRAMUSCULAR; INTRAVENOUS; SOFT TISSUE at 10:09

## 2022-09-30 RX ADMIN — IPRATROPIUM BROMIDE AND ALBUTEROL SULFATE 3 ML: 2.5; .5 SOLUTION RESPIRATORY (INHALATION) at 12:09

## 2022-09-30 RX ADMIN — ROCURONIUM BROMIDE 40 MG: 50 INJECTION INTRAVENOUS at 10:09

## 2022-09-30 RX ADMIN — LIDOCAINE HYDROCHLORIDE 75 MG: 20 INJECTION, SOLUTION EPIDURAL; INFILTRATION; INTRACAUDAL; PERINEURAL at 10:09

## 2022-09-30 RX ADMIN — FENTANYL CITRATE 100 MCG: 0.05 INJECTION, SOLUTION INTRAMUSCULAR; INTRAVENOUS at 10:09

## 2022-09-30 RX ADMIN — ONDANSETRON 4 MG: 2 INJECTION INTRAMUSCULAR; INTRAVENOUS at 11:09

## 2022-09-30 RX ADMIN — OXYCODONE 5 MG: 5 TABLET ORAL at 01:09

## 2022-09-30 RX ADMIN — PROPOFOL 150 MCG/KG/MIN: 10 INJECTION, EMULSION INTRAVENOUS at 10:09

## 2022-09-30 RX ADMIN — REMIFENTANIL HYDROCHLORIDE 0.1 MCG/KG/MIN: 1 INJECTION, POWDER, LYOPHILIZED, FOR SOLUTION INTRAVENOUS at 10:09

## 2022-09-30 RX ADMIN — ACETAMINOPHEN 1000 MG: 500 TABLET ORAL at 08:09

## 2022-09-30 RX ADMIN — SUGAMMADEX 400 MG: 100 INJECTION, SOLUTION INTRAVENOUS at 12:09

## 2022-09-30 RX ADMIN — PROPOFOL 150 MG: 10 INJECTION, EMULSION INTRAVENOUS at 10:09

## 2022-09-30 RX ADMIN — DEXMEDETOMIDINE HYDROCHLORIDE 8 MCG: 100 INJECTION, SOLUTION INTRAVENOUS at 12:09

## 2022-09-30 RX ADMIN — GLYCOPYRROLATE 0.2 MG: 0.2 INJECTION INTRAMUSCULAR; INTRAVENOUS at 11:09

## 2022-09-30 RX ADMIN — Medication 2 G: at 10:09

## 2022-09-30 NOTE — TRANSFER OF CARE
"Anesthesia Transfer of Care Note    Patient: Jaylin Murguia    Procedure(s) Performed: Procedure(s) (LRB):  FESS, USING COMPUTER-ASSISTED NAVIGATION (Bilateral)    Patient location: PACU    Anesthesia Type: general    Transport from OR: Transported from OR on 6-10 L/min O2 by face mask with adequate spontaneous ventilation    Post pain: adequate analgesia    Post assessment: no apparent anesthetic complications    Post vital signs: stable    Level of consciousness: awake and alert    Nausea/Vomiting: no nausea/vomiting    Complications: none    Transfer of care protocol was followed      Last vitals:   Visit Vitals  /62 (BP Location: Left arm, Patient Position: Lying)   Pulse 74   Temp 36.7 °C (98 °F) (Temporal)   Resp 20   Ht 5' 7" (1.702 m)   Wt 81.2 kg (179 lb)   SpO2 99%   Breastfeeding No   BMI 28.04 kg/m²     "

## 2022-09-30 NOTE — ANESTHESIA PROCEDURE NOTES
Intubation    Date/Time: 9/30/2022 10:19 AM  Performed by: Sherif Montalvo MD  Authorized by: Partha Garces MD     Intubation:     Induction:  Intravenous    Intubated:  Postinduction    Mask Ventilation:  Easy mask    Attempts:  2    Attempted By:  Resident anesthesiologist    Method of Intubation:  Direct    Blade:  Riley 2    Laryngeal View Grade: Grade I - full view of cords      Attempted By (2nd Attempt):  Resident anesthesiologist    Method of Intubation (2nd Attempt):  Direct    Blade (2nd Attempt):  Riley 2    Laryngeal View Grade (2nd Attempt): Grade I - full view of cords      Difficult Airway Encountered?: No      Complications:  None    Airway Device:  Oral endotracheal tube    Airway Device Size:  7.0    Style/Cuff Inflation:  Cuffed (inflated to minimal occlusive pressure)    Tube secured:  22    Secured at:  The lips    Placement Verified By:  Capnometry    Complicating Factors:  Large/floppy epiglottis    Findings Post-Intubation:  BS equal bilateral and atraumatic/condition of teeth unchanged

## 2022-09-30 NOTE — PLAN OF CARE
Discharge instructions given and explained to patient and family with verbalized understanding. Patients V/S stable, denies N/V, tolerating PO fluids, rates pain level as tolerable, IV DC'd cath intact and No bleeding present. Pt left floor via W/C, stable for transport, responsible person available for transportation home.

## 2022-09-30 NOTE — H&P
"Subjective:      Jaylin is a 57 y.o. female who comes for follow-up of sinusitis.  Her last visit with me was on 7/7/2022.  Now nearly 2 years status-post endoscopic sinus surgery.   Worsening recently, coughing daily, postnasal drip.  Saw pulmonologist recently who found pseudomonas in the lungs.  Using cipro rinse daily.     SNOT-22 score: : (P) 55  NOSE score:: (P) 35%  ETDQ-7 score:: (P) 3.1     The patient's medications, allergies, past medical, surgical, social and family histories were reviewed and updated as appropriate.     A detailed review of systems was obtained with pertinent positives as per the above HPI, and otherwise negative.         Objective:      Ht 5' 7" (1.702 m)   Wt 82.8 kg (182 lb 8.7 oz)   BMI 28.59 kg/m²          Constitutional:   She appears well-developed. She is cooperative.      Head:  Normocephalic.      Nose:  No mucosal edema, rhinorrhea, septal deviation or polyps. No epistaxis. Turbinates normal, no turbinate masses and no turbinate hypertrophy.  Right sinus exhibits no maxillary sinus tenderness and no frontal sinus tenderness. Left sinus exhibits no maxillary sinus tenderness and no frontal sinus tenderness.      Mouth/Throat  Oropharynx clear and moist without lesions or asymmetry. No oropharyngeal exudate or posterior oropharyngeal erythema.      Neck:  No adenopathy. Normal range of motion present.     She has no cervical adenopathy.         Procedure     Nasal endoscopy performed.  See procedure note.      Left nasal valve      Left maxillary      Left ethmoid      Left sphenoid contracted to pinpoint      Debris seen within sphenoid      Right sinuses      Right anterior ethmoid crust           Data Reviewed         WBC (K/uL)   Date Value   07/07/2022 5.50          Eosinophil % (%)   Date Value   07/07/2022 5.8          Eos # (K/uL)   Date Value   07/07/2022 0.3          Platelets (K/uL)   Date Value   07/07/2022 216          Glucose (mg/dL)   Date Value   05/10/2022 93 "          IgE (IU/mL)   Date Value   05/26/2022 <35         Pathology report indicated chronic inflammation with eosinophilia.  Cultures showed Pseudomonas.        Assessment:      1. Nonallergic rhinitis    2. Chronic pansinusitis    3. Hypogammaglobulinemia    4. 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  Inferior turbinate reduction would not be included.  I discussed the risks, benefits and alternatives to surgery with the patient, as well as the expected postoperative course.  I gave her the opportunity to ask questions and I answered all of them.  I provided relevant printed information on her condition for her to review at home.  Same-day discharge is anticipated.  She may have anesthesia triage by telephone. She will also need a CT sinuses with Stealth protocol prior to surgery.  The surgery will be tentatively scheduled for September 19.  She will need to return for a postoperative visit 1 week after surgery.  Follow up for surgery.

## 2022-10-02 NOTE — ANESTHESIA POSTPROCEDURE EVALUATION
Anesthesia Post Evaluation    Patient: Jaylin Murguia    Procedure(s) Performed: Procedure(s) (LRB):  FESS, USING COMPUTER-ASSISTED NAVIGATION (Bilateral)    Final Anesthesia Type: general      Patient location during evaluation: PACU  Patient participation: Yes- Able to Participate  Level of consciousness: awake and alert  Post-procedure vital signs: reviewed and stable  Pain management: adequate  Airway patency: patent    PONV status at discharge: No PONV  Anesthetic complications: no      Cardiovascular status: blood pressure returned to baseline  Respiratory status: unassisted  Hydration status: euvolemic  Follow-up not needed.          Vitals Value Taken Time   /67 09/30/22 1431   Temp 36.6 °C (97.8 °F) 09/30/22 1430   Pulse 67 09/30/22 1442   Resp 21 09/30/22 1442   SpO2 97 % 09/30/22 1442   Vitals shown include unvalidated device data.      No case tracking events are documented in the log.      Pain/Ramay Score: Pain Rating Prior to Med Admin: 6 (9/30/2022  1:53 PM)  Ramya Score: 8 (9/30/2022 12:45 PM)

## 2022-10-03 NOTE — BRIEF OP NOTE
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    DISPOSITION: Home or Self Care    FINAL DIAGNOSIS:  Sinusitis    FOLLOWUP: In clinic    DISCHARGE INSTRUCTIONS:    Discharge Procedure Orders   Diet Adult Regular     No dressing needed   Order Comments: Mustache dressing prn for nose bleeds     Activity as tolerated        Clinical Reference Documents Added to Patient Instructions         Document    ENDOSCOPIC SINUS SURGERY DISCHARGE INSTRUCTIONS (ENGLISH)

## 2022-10-04 LAB
BACTERIA SPEC AEROBE CULT: ABNORMAL
BACTERIA SPEC AEROBE CULT: ABNORMAL
BACTERIA SPEC ANAEROBE CULT: NORMAL

## 2022-10-05 ENCOUNTER — PATIENT MESSAGE (OUTPATIENT)
Dept: OTOLARYNGOLOGY | Facility: CLINIC | Age: 57
End: 2022-10-05
Payer: COMMERCIAL

## 2022-10-10 ENCOUNTER — TELEPHONE (OUTPATIENT)
Dept: PULMONOLOGY | Facility: CLINIC | Age: 57
End: 2022-10-10
Payer: COMMERCIAL

## 2022-10-11 ENCOUNTER — OFFICE VISIT (OUTPATIENT)
Dept: OTOLARYNGOLOGY | Facility: CLINIC | Age: 57
End: 2022-10-11
Payer: COMMERCIAL

## 2022-10-11 VITALS — WEIGHT: 183.81 LBS | BODY MASS INDEX: 28.85 KG/M2 | HEIGHT: 67 IN

## 2022-10-11 DIAGNOSIS — J32.4 CHRONIC PANSINUSITIS: Primary | ICD-10-CM

## 2022-10-11 PROCEDURE — 1160F PR REVIEW ALL MEDS BY PRESCRIBER/CLIN PHARMACIST DOCUMENTED: ICD-10-PCS | Mod: CPTII,S$GLB,, | Performed by: OTOLARYNGOLOGY

## 2022-10-11 PROCEDURE — 99213 OFFICE O/P EST LOW 20 MIN: CPT | Mod: 25,S$GLB,, | Performed by: OTOLARYNGOLOGY

## 2022-10-11 PROCEDURE — 1159F MED LIST DOCD IN RCRD: CPT | Mod: CPTII,S$GLB,, | Performed by: OTOLARYNGOLOGY

## 2022-10-11 PROCEDURE — 1160F RVW MEDS BY RX/DR IN RCRD: CPT | Mod: CPTII,S$GLB,, | Performed by: OTOLARYNGOLOGY

## 2022-10-11 PROCEDURE — 31237 NSL/SINS NDSC SURG BX POLYPC: CPT | Mod: 50,S$GLB,, | Performed by: OTOLARYNGOLOGY

## 2022-10-11 PROCEDURE — 99213 PR OFFICE/OUTPT VISIT, EST, LEVL III, 20-29 MIN: ICD-10-PCS | Mod: 25,S$GLB,, | Performed by: OTOLARYNGOLOGY

## 2022-10-11 PROCEDURE — 99999 PR PBB SHADOW E&M-EST. PATIENT-LVL III: CPT | Mod: PBBFAC,,, | Performed by: OTOLARYNGOLOGY

## 2022-10-11 PROCEDURE — 31237 NASAL/SINUS ENDOSCOPY: ICD-10-PCS | Mod: 50,S$GLB,, | Performed by: OTOLARYNGOLOGY

## 2022-10-11 PROCEDURE — 99999 PR PBB SHADOW E&M-EST. PATIENT-LVL III: ICD-10-PCS | Mod: PBBFAC,,, | Performed by: OTOLARYNGOLOGY

## 2022-10-11 PROCEDURE — 1159F PR MEDICATION LIST DOCUMENTED IN MEDICAL RECORD: ICD-10-PCS | Mod: CPTII,S$GLB,, | Performed by: OTOLARYNGOLOGY

## 2022-10-11 PROCEDURE — 4010F ACE/ARB THERAPY RXD/TAKEN: CPT | Mod: CPTII,S$GLB,, | Performed by: OTOLARYNGOLOGY

## 2022-10-11 PROCEDURE — 4010F PR ACE/ARB THEARPY RXD/TAKEN: ICD-10-PCS | Mod: CPTII,S$GLB,, | Performed by: OTOLARYNGOLOGY

## 2022-10-11 PROCEDURE — 3008F PR BODY MASS INDEX (BMI) DOCUMENTED: ICD-10-PCS | Mod: CPTII,S$GLB,, | Performed by: OTOLARYNGOLOGY

## 2022-10-11 PROCEDURE — 3008F BODY MASS INDEX DOCD: CPT | Mod: CPTII,S$GLB,, | Performed by: OTOLARYNGOLOGY

## 2022-10-11 NOTE — PROCEDURES
Nasal/sinus endoscopy    Date/Time: 10/11/2022 10:30 AM  Performed by: Matthias Roach MD  Authorized by: Matthias Roach MD     Consent Done?:  Yes (Verbal)  Anesthesia:     Local anesthetic:  Lidocaine 4% and Jose-Synephrine 1/2%    Patient tolerance:  Patient tolerated the procedure well with no immediate complications  Nose:     Procedure Performed:  Removal of Debridement  External:      No external nasal deformity  Intranasal:      Mucosa no polyps     Mucosa ulcers not present     No mucosa lesions present     Turbinates not enlarged     No septum gross deformity  Nasopharynx:      No mucosa lesions     Adenoids not present     Posterior choanae patent     Eustachian tube patent     Debridement of the bilateral sphenoid cavity performed with Galindo forceps.  Sinuses otherwise patent.

## 2022-10-11 NOTE — PROGRESS NOTES
"  Subjective:      Jaylin Murguia is a 57 y.o. female who comes for follow-up 1 week status-post endoscopic sinus surgery.  Her last visit with me was on 8/23/2022.  Doing fine, some congestion, headache, thick discharge in sinus rinse.  Using cipro rinse, just received bactrim rinse but hasn't started yet.        %           Objective:     Ht 5' 7" (1.702 m)   Wt 83.4 kg (183 lb 12.8 oz)   BMI 28.79 kg/m²      General:   not in distress   Nasal:  edematous mucosa   no septal hematoma   no bleeding   Oral Cavity:   clear   Oropharynx:   no bleeding   Neck:   nontender       Procedure    Endoscopic debridement performed.  See procedure note.        Data Reviewed    Cultures showed Pseudomonas and X maltophilia.      Assessment:     Doing well following bilateral endoscopic sinus surgery.        1. Chronic pansinusitis         Plan:     Begin SMZ-TMP in sinus rinse.  Continue cipro rinse for 1 more week with current supply, then call with progress.  If still same degree of discharge, will select different anti-pseudomonal agent (cipro is intermediate sensitivity).  Follow up in about 1 month (around 11/11/2022) for nasal endoscopy.        "

## 2022-10-17 ENCOUNTER — PATIENT OUTREACH (OUTPATIENT)
Dept: PULMONOLOGY | Facility: CLINIC | Age: 57
End: 2022-10-17
Payer: COMMERCIAL

## 2022-10-17 ENCOUNTER — PATIENT MESSAGE (OUTPATIENT)
Dept: OTOLARYNGOLOGY | Facility: CLINIC | Age: 57
End: 2022-10-17
Payer: COMMERCIAL

## 2022-10-20 ENCOUNTER — CLINICAL SUPPORT (OUTPATIENT)
Dept: PULMONOLOGY | Facility: CLINIC | Age: 57
End: 2022-10-20
Payer: COMMERCIAL

## 2022-10-20 ENCOUNTER — PATIENT MESSAGE (OUTPATIENT)
Dept: PULMONOLOGY | Facility: CLINIC | Age: 57
End: 2022-10-20

## 2022-10-20 VITALS
RESPIRATION RATE: 16 BRPM | HEART RATE: 69 BPM | WEIGHT: 179.44 LBS | HEIGHT: 67 IN | BODY MASS INDEX: 28.16 KG/M2 | OXYGEN SATURATION: 96 %

## 2022-10-20 DIAGNOSIS — J44.9 CHRONIC OBSTRUCTIVE PULMONARY DISEASE, UNSPECIFIED COPD TYPE: Primary | ICD-10-CM

## 2022-10-20 DIAGNOSIS — J45.50 SEVERE PERSISTENT ASTHMA WITHOUT COMPLICATION: ICD-10-CM

## 2022-10-20 PROCEDURE — 95012 PR NITRIC OXIDE EXPIRED GAS DETERMINATION: ICD-10-PCS | Mod: S$GLB,,, | Performed by: INTERNAL MEDICINE

## 2022-10-20 PROCEDURE — 99999 PR PBB SHADOW E&M-EST. PATIENT-LVL II: ICD-10-PCS | Mod: PBBFAC,,,

## 2022-10-20 PROCEDURE — 95012 NITRIC OXIDE EXP GAS DETER: CPT | Mod: S$GLB,,, | Performed by: INTERNAL MEDICINE

## 2022-10-20 PROCEDURE — 99999 PR PBB SHADOW E&M-EST. PATIENT-LVL II: CPT | Mod: PBBFAC,,,

## 2022-10-20 RX ORDER — AZELASTINE 1 MG/ML
2 SPRAY, METERED NASAL 2 TIMES DAILY
Qty: 30 ML | Refills: 11 | Status: SHIPPED | OUTPATIENT
Start: 2022-10-20 | End: 2023-03-15

## 2022-10-20 RX ORDER — IPRATROPIUM BROMIDE AND ALBUTEROL SULFATE 2.5; .5 MG/3ML; MG/3ML
3 SOLUTION RESPIRATORY (INHALATION) EVERY 6 HOURS PRN
Qty: 270 ML | Refills: 0 | Status: SHIPPED | OUTPATIENT
Start: 2022-10-20 | End: 2022-11-30 | Stop reason: SDUPTHER

## 2022-10-20 RX ORDER — FLUTICASONE PROPIONATE 50 MCG
2 SPRAY, SUSPENSION (ML) NASAL DAILY
Qty: 16 ML | Refills: 0 | Status: SHIPPED | OUTPATIENT
Start: 2022-10-20 | End: 2023-02-07 | Stop reason: SDUPTHER

## 2022-10-20 NOTE — PROCEDURES
"Clinical Guide to Interpretation or FeNO Levels :    FeNO  (ppb) LOW INTERMEDIATE HIGH   ADULT VALUES < 25 25-50          > 50   Th2-driven Inflammation Unlikely Likely Significant     Patients FeNO level at this visit : __69__ (ppb)     Interpretation of FeNO measurement in adults:   [ ] FENO is less than 25 ppb implies non eosinophilic airway inflammation or the absence of airway inflammation.   Comment: Low FENO (<25 ppb) in adult asthmatics with persistent symptoms suggests other etiologies for these symptoms and a lower likelihood of benefit from adding or increasing inhaled glucocorticoids.     [ ] FENO between 25 and 50 ppb in adults should be interpreted cautiously with reference to the clinical situation (eg, symptomatic, on or off therapy, current smoking).     [ X] FENO greater than 50 ppb in adults  suggests eosinophilic airway inflammation   Comment: High FENO (>50 ppb) in adult asthmatics even with atypical symptoms suggests glucocorticoid responsiveness. High FENO (>50 ppb) can help identify poor adherence or uncontrolled inflammation in asthma patients with otherwise seemingly "controlled" asthma.     Discussion:   ·A FENO less than 25 ppb in adults and less than 20 ppb in children younger than 12 years of age implies noneosinophilic airway inflammation or the absence of airway inflammation.   ·A FENO greater than 50 ppb in adults or greater than 35 ppb in children suggests eosinophilic airway inflammation.   ·Values of FENO between 25 and 50 ppb in adults (20 to 35 ppb in children) should be interpreted cautiously with reference to the clinical situation (eg, symptomatic, on or off therapy, current smoking).   ·A rising FENO with a greater than 20 percent change and more than 25 ppb (20 ppb in children) from a previously stable level suggests increasing eosinophilic airway inflammation, but there are wide inter-individual differences.   ·A decrease in FENO greater than 20 percent for values over 50 " ppb or more than 10 ppb for values less than 50 ppb may be clinically important.   FENO in other respiratory diseases - Several other diseases are associated with altered levels of exhaled NO: low levels of FENO have been noted in cystic fibrosis, current smoking, pulmonary hypertension, hypothermia, primary ciliary dyskinesia, and bronchopulmonary dysplasia. Elevated FENO has been noted in atopy, nonasthmatic eosinophilic bronchitis, COPD exacerbations, noncystic fibrosis bronchiectasis, and viral upper respiratory infections.     REFERENCE:   ATS Board of Directors, December 2004, and by the ERS Executive Committee, June 2004. ATS/ERS Recommendations for Standardized Procedures for the Online and Offline Measurement of Exhaled Lower Respiratory Nitric Oxide and Nasal Nitric Oxide. Guideline 2005

## 2022-10-20 NOTE — PROGRESS NOTES
Pulmonary Disease Management  [facility]  Follow up Visit  Chronic Care Management    Jaylin Murguia  was seen 10/20/2022  12:40 PM in the Pulmonary Disease Management Clinic for evaluation, education, reinforcement of self management techniques and exacerbation action plan.    Darell Cordova    Past Medical History:   Diagnosis Date    Allergic rhinitis     Asthma     Chronic pansinusitis     Crohn's disease     Ileal involvement, previously on Remicade, Asacol, Prednisone    Fibromyalgia     Hyperlipidemia     Hypertension     Immunosuppression 20020    Migraine     Obstructive sleep apnea     CPAP at night    Sciatica        Patient's Medications   New Prescriptions    No medications on file   Previous Medications    ACETAMINOPHEN (TYLENOL) 500 MG TABLET    Take 2 tablets (1,000 mg total) by mouth every 6 (six) hours as needed for Pain.    ATORVASTATIN (LIPITOR) 10 MG TABLET    Take 1 tablet (10 mg total) by mouth once daily.    AZELASTINE (ASTELIN) 137 MCG (0.1 %) NASAL SPRAY    2 sprays (274 mcg total) by Nasal route 2 (two) times daily.    BUTALBITAL-ACETAMINOPHEN-CAFFEINE -40 MG (FIORICET, ESGIC) -40 MG PER TABLET    TAKE 1 TABLET EVERY 4 HOURS AS NEEDED FOR HEADACHE    CHOLESTYRAMINE (QUESTRAN) 4 GRAM PACKET    Take 1 packet (4 g total) by mouth 3 (three) times daily with meals.    CLINDAMYCIN (CLEOCIN) 300 MG CAPSULE    EMPTY CONTENTS OF 2 CAPSULES INTO NASAL IRRIGATION SYSTEM, ADD DISTILLED WATER, SALT PACK, MIX & IRRIGATE. PERFORM 2 TIMES DAILY    DILTIAZEM HCL (DILTIAZEM 2% - LIDOCAINE 5% CREAM)    Apply peasize amount topically to anal area.    DIPHENHYDRAMINE-ALUMINUM-MAGNESIUM HYDROXIDE-SIMETHICONE-LIDOCAINE HCL 2%    Swish and spit 15 mLs every 4 (four) hours as needed (oral ulcers, pain).    DULOXETINE (CYMBALTA) 60 MG CAPSULE    TAKE 1 CAPSULE BY MOUTH TWICE A DAY    ELETRIPTAN (RELPAX) 40 MG TABLET    Take 1 tablet (40 mg total) by mouth as needed.    ESTRADIOL (ESTRACE)  2 MG TABLET    Take 1 tablet (2 mg total) by mouth every evening.    FLUTICASONE PROPIONATE (FLONASE) 50 MCG/ACTUATION NASAL SPRAY    SPRAY 2 SPRAYS BY EACH NOSTRIL ROUTE ONCE DAILY.    IMMUN GLOB G,IGG,-PRO-IGA 0-50 (HIZENTRA) 10 GRAM/50 ML (20 %) SOLN    Inject 70 mLs (14 g total) into the skin every 7 days.    IPRATROPIUM (ATROVENT) 0.02 % NEBULIZER SOLUTION    Nebulize 2.5 ml every 4-6 hours as directed, if needed    IPRATROPIUM-ALBUTEROL (COMBIVENT)  MCG/ACTUATION INHALER    Inhale 2 puffs into the lungs every 6 (six) hours as needed for Wheezing. Rescue    LOSARTAN (COZAAR) 100 MG TABLET    Take 1 tablet (100 mg total) by mouth once daily.    METHYLPREDNISOLONE (MEDROL DOSEPACK) 4 MG TABLET    use as directed    MONTELUKAST (SINGULAIR) 10 MG TABLET    TAKE 1 TABLET EVERY EVENING    NORTRIPTYLINE (PAMELOR) 25 MG CAPSULE    Take 1 capsule (25 mg total) by mouth every evening.    OXYCODONE (OXY-IR) 5 MG CAP    Take 1 capsule (5 mg total) by mouth every 4 (four) hours as needed for Pain.    PREGABALIN (LYRICA) 150 MG CAPSULE    Take 1 capsule (150 mg total) by mouth 3 (three) times daily.    PROPRANOLOL (INDERAL LA) 80 MG 24 HR CAPSULE    TAKE 1 CAPSULE BY MOUTH ONCE DAILY.    RIZATRIPTAN (MAXALT) 10 MG TABLET    TAKE 1 TABLET IF NEEDED FOR MIGRAINES. MAX 2 TABLETS IN 24 HOURS.    SODIUM CHLORIDE 0.9% 0.9 % SOLN 200 ML WITH GENTAMICIN (PED) 20 MG/2 ML SOLN    EMPTY CONTENTS OF 1 CAPSULE INTO NASAL IRRIGATION SYSTEM, ADD DISTILLED WATER, SALT PACK, MIX & IRRIGATE. PERFORM 2 TIMES DAILY    TOPIRAMATE (TOPAMAX) 100 MG TABLET    TAKE 1 TABLET TWICE A DAY    TRIAMCINOLONE ACETONIDE 0.025% (KENALOG) 0.025 % CREAM    1 application once daily. Apply to affected area    VENTOLIN HFA 90 MCG/ACTUATION INHALER    INHALE 2 PUFFS INTO THE LUNGS EVERY 4 TO 6 HOURS AS NEEDED FOR WHEEZING.    XYLITOL, BULK, MISC    EMPTY CONTENTS OF 1 CAPSULE INTO NASAL IRRIGATION SYSTEM, ADD DISTILLED WATER, SALT PACK, MIX & IRRIGATE.  PERFORM 2 TIMES DAILY   Modified Medications    No medications on file   Discontinued Medications    No medications on file             Educational assessment:   [x]            Good  []            Fair  []            Poor    Readiness to learn:   [x]            Good  []            Fair  []            Poor    Vision Status:   [x]            Good  []            Fair  []            Poor    Reading Ability:  [x]            Good  []            Fair  []            Poor    Knowledge of condition:   [x]            Good  []            Fair  []            Poor    Language Barriers:   []            Good  []            Fair  []            Poor  [x]            None    Cognitive/ Physical Barriers:   []            Good  []            Fair  []            Poor  [x]            None    Learning best by:                       [x]            Seeing  []            Hearing  []            Reading                         [x]            Doing    Describe any barrier /Limitation or financial implications of care choices identified     []            Financial  []            Emotional  []            Education  []            Vision/Hearing  []            Physical  [x]            None  []                TOPIC /CONTENT FOR TODAY:    [x]            MDI with or without spacer  [x]            Dry powder inhaler  []            Acapella   []           Peak Flow meter  [x]            Action plan  [x]            Nebulizer use  [x]            Oxygen use safety  [x]            Breathing and cough techniques  []            Energy conservation  [x]            Infection prevention  []           OTHER________________________        Learner:    [x]            Patient   []            Caregiver    Method:    [x]            Verbal explanation  [x]            Audio visual    [x]            Literature  [x]            Teach back      Evaluation:    [x]            Teach back  [x]            Demonstrate  [x]            Follow up phone call    []            2 weeks      []            4 weeks   []            PRN    Additional comments:   Patient was seen today to review respiratory medication purpose and proper technique for use of inhalers. Patient practiced proper technique using MDI with valved holding chamber (spacer) and DPI inhalers. Patient demonstrated understanding. Literature was given to patient.    Patient is in need of a refill on respiratory medications. Refill request sent to pulmonary provider for approval.      Asthma trigger checklist was verbally reviewed and literature given to patient.     Infection prevention was discussed. Patient's immunizations are current. Hand hygiene and cleaning of respiratory equipment was also discussed. Patient verbalized understanding.      Asthma action plan was reviewed and literature was given to patient. Patient verbalized understanding.      Plan:  Monthly Pulmonary Disease Management Questionnaire  Reinforce education  Meds: Trelegy, Duoneb, albuterol HFA  DME Needs: [facility]  Action Plan: Asthma   Immunization: Pneumococcal- current, Flu-current , Covid 19- current  Next Provider Visit: 11/10/22  Next Spirometry/CPFT: Not scheduled   Approximate time spent with patient: 60 mins

## 2022-10-21 ENCOUNTER — PATIENT MESSAGE (OUTPATIENT)
Dept: OTOLARYNGOLOGY | Facility: CLINIC | Age: 57
End: 2022-10-21
Payer: COMMERCIAL

## 2022-10-31 LAB — FUNGUS SPEC CULT: NORMAL

## 2022-11-07 ENCOUNTER — OFFICE VISIT (OUTPATIENT)
Dept: PULMONOLOGY | Facility: CLINIC | Age: 57
End: 2022-11-07
Payer: COMMERCIAL

## 2022-11-07 ENCOUNTER — HOSPITAL ENCOUNTER (OUTPATIENT)
Dept: RADIOLOGY | Facility: HOSPITAL | Age: 57
Discharge: HOME | End: 2022-11-07
Attending: INTERNAL MEDICINE
Payer: COMMERCIAL

## 2022-11-07 ENCOUNTER — PATIENT MESSAGE (OUTPATIENT)
Dept: OTOLARYNGOLOGY | Facility: CLINIC | Age: 57
End: 2022-11-07
Payer: COMMERCIAL

## 2022-11-07 ENCOUNTER — PATIENT MESSAGE (OUTPATIENT)
Dept: PULMONOLOGY | Facility: CLINIC | Age: 57
End: 2022-11-07

## 2022-11-07 VITALS
BODY MASS INDEX: 28.27 KG/M2 | DIASTOLIC BLOOD PRESSURE: 80 MMHG | HEART RATE: 59 BPM | HEIGHT: 67 IN | OXYGEN SATURATION: 98 % | RESPIRATION RATE: 14 BRPM | SYSTOLIC BLOOD PRESSURE: 120 MMHG | WEIGHT: 180.13 LBS

## 2022-11-07 DIAGNOSIS — J32.4 CHRONIC PANSINUSITIS: Primary | ICD-10-CM

## 2022-11-07 DIAGNOSIS — I10 ESSENTIAL HYPERTENSION: ICD-10-CM

## 2022-11-07 DIAGNOSIS — I77.1 TORTUOUS AORTA: Chronic | ICD-10-CM

## 2022-11-07 DIAGNOSIS — G47.33 OSA ON CPAP: ICD-10-CM

## 2022-11-07 DIAGNOSIS — J45.50 SEVERE PERSISTENT ASTHMA WITHOUT COMPLICATION: ICD-10-CM

## 2022-11-07 DIAGNOSIS — G47.01 INSOMNIA DUE TO MEDICAL CONDITION: ICD-10-CM

## 2022-11-07 DIAGNOSIS — E78.49 OTHER HYPERLIPIDEMIA: ICD-10-CM

## 2022-11-07 DIAGNOSIS — R91.8 ABNORMAL CT SCAN, LUNG: ICD-10-CM

## 2022-11-07 DIAGNOSIS — J32.9 CHRONIC RHINOSINUSITIS: ICD-10-CM

## 2022-11-07 DIAGNOSIS — D80.1 HYPOGAMMAGLOBULINEMIA: ICD-10-CM

## 2022-11-07 PROCEDURE — 71046 X-RAY EXAM CHEST 2 VIEWS: CPT | Mod: TC,FY,PO

## 2022-11-07 PROCEDURE — 71046 XR CHEST PA AND LATERAL: ICD-10-PCS | Mod: 26,,, | Performed by: RADIOLOGY

## 2022-11-07 PROCEDURE — 1159F MED LIST DOCD IN RCRD: CPT | Mod: CPTII,S$GLB,, | Performed by: INTERNAL MEDICINE

## 2022-11-07 PROCEDURE — 3008F BODY MASS INDEX DOCD: CPT | Mod: CPTII,S$GLB,, | Performed by: INTERNAL MEDICINE

## 2022-11-07 PROCEDURE — 99214 OFFICE O/P EST MOD 30 MIN: CPT | Mod: S$GLB,,, | Performed by: INTERNAL MEDICINE

## 2022-11-07 PROCEDURE — 99999 PR PBB SHADOW E&M-EST. PATIENT-LVL V: CPT | Mod: PBBFAC,,, | Performed by: INTERNAL MEDICINE

## 2022-11-07 PROCEDURE — 99214 PR OFFICE/OUTPT VISIT, EST, LEVL IV, 30-39 MIN: ICD-10-PCS | Mod: S$GLB,,, | Performed by: INTERNAL MEDICINE

## 2022-11-07 PROCEDURE — 4010F ACE/ARB THERAPY RXD/TAKEN: CPT | Mod: CPTII,S$GLB,, | Performed by: INTERNAL MEDICINE

## 2022-11-07 PROCEDURE — 1159F PR MEDICATION LIST DOCUMENTED IN MEDICAL RECORD: ICD-10-PCS | Mod: CPTII,S$GLB,, | Performed by: INTERNAL MEDICINE

## 2022-11-07 PROCEDURE — 4010F PR ACE/ARB THEARPY RXD/TAKEN: ICD-10-PCS | Mod: CPTII,S$GLB,, | Performed by: INTERNAL MEDICINE

## 2022-11-07 PROCEDURE — 71046 X-RAY EXAM CHEST 2 VIEWS: CPT | Mod: 26,,, | Performed by: RADIOLOGY

## 2022-11-07 PROCEDURE — 3074F PR MOST RECENT SYSTOLIC BLOOD PRESSURE < 130 MM HG: ICD-10-PCS | Mod: CPTII,S$GLB,, | Performed by: INTERNAL MEDICINE

## 2022-11-07 PROCEDURE — 3079F PR MOST RECENT DIASTOLIC BLOOD PRESSURE 80-89 MM HG: ICD-10-PCS | Mod: CPTII,S$GLB,, | Performed by: INTERNAL MEDICINE

## 2022-11-07 PROCEDURE — 3074F SYST BP LT 130 MM HG: CPT | Mod: CPTII,S$GLB,, | Performed by: INTERNAL MEDICINE

## 2022-11-07 PROCEDURE — 99999 PR PBB SHADOW E&M-EST. PATIENT-LVL V: ICD-10-PCS | Mod: PBBFAC,,, | Performed by: INTERNAL MEDICINE

## 2022-11-07 PROCEDURE — 3008F PR BODY MASS INDEX (BMI) DOCUMENTED: ICD-10-PCS | Mod: CPTII,S$GLB,, | Performed by: INTERNAL MEDICINE

## 2022-11-07 PROCEDURE — 3079F DIAST BP 80-89 MM HG: CPT | Mod: CPTII,S$GLB,, | Performed by: INTERNAL MEDICINE

## 2022-11-07 PROCEDURE — 1160F RVW MEDS BY RX/DR IN RCRD: CPT | Mod: CPTII,S$GLB,, | Performed by: INTERNAL MEDICINE

## 2022-11-07 PROCEDURE — 1160F PR REVIEW ALL MEDS BY PRESCRIBER/CLIN PHARMACIST DOCUMENTED: ICD-10-PCS | Mod: CPTII,S$GLB,, | Performed by: INTERNAL MEDICINE

## 2022-11-07 RX ORDER — MEROPENEM 1 G/1
INJECTION, POWDER, FOR SOLUTION INTRAVENOUS
COMMUNITY
Start: 2022-10-19 | End: 2022-12-23

## 2022-11-07 NOTE — PROGRESS NOTES
Pulmonary Outpatient   Visit     Subjective:       Patient ID: Jaylin Murguia is a 57 y.o. female.    Chief Complaint: Sleep Apnea, Abnormal Ct Scan, and Insomnia      Jaylin Murguia is 57 y.o.  Asked to see by Esthela Hu MD  Complicated Hx: Crohns, autoimmune issues  Chronic rhinosinusitis: seen by ENT and immunology  IgG replacement therapy, Hizentra 10 g q 7 days (465 mg/kg/mo). Has been on it for about 6 months.  Recurrent pseudomonas infections NASAL  Seen ENT for multiple recurrent drainage, debridement procedure  Last treated with extend ceftazidime/avibactam x 2 weeks : had PICC line  Chronic cough, wheezing, No prior Spir  Recent ? Fall after taking inhaler, rib pain  On BREO, has nebulizer and Xopenex.  Chrinic nasal cough, congestion  Night sweats  No +ve family Hx respiratory issues  Recently Low Na 123, Abn LFT  Was treated with Diflucan for yeast infection  Worked as teacher  No occupational exposure  Regarding crohns  Pentasa 1000 mg QID (started 4/2018)     Past Medications  Remicade (in remote past)-- ineffective  Asacol 4.8 gm-- ineffective  Entocort-- effective  Prednisone  Humira (started July 17, stopped 2/2018)-- stopped 2/2 multiple infections.     09/15/2022  Last seen 06/20/2022  Here to review   FeNo 78  [place]: restrictive  CPAP download: not using  Feels better changed diet,   Respiratory secretion clear  Recently pseudomonas biofilm colonization  No constitutional signs  Planned FESS next week    11/07/2022  Last visit 09/15/2022  SP FESS: grew Sternotrophomonas and pseudomonas  Feels better  No green sputum  On Compounded CIPRO + BACTRIM  No constitional signs  Occasional Cough  ACT score 9, Beyer 1                Review of Systems   HENT:  Negative for congestion.    Respiratory:  Positive for use of rescue inhaler. Negative for wheezing.    Genitourinary: Negative.    Endocrine: endocrine negative    Musculoskeletal:  Negative for  myalgias.   Psychiatric/Behavioral:  Negative for sleep disturbance.    All other systems reviewed and are negative.    Outpatient Encounter Medications as of 11/7/2022   Medication Sig Dispense Refill    acetaminophen (TYLENOL) 500 MG tablet Take 2 tablets (1,000 mg total) by mouth every 6 (six) hours as needed for Pain. 60 tablet 0    albuterol-ipratropium (DUO-NEB) 2.5 mg-0.5 mg/3 mL nebulizer solution Take 3 mLs by nebulization every 6 (six) hours as needed for Wheezing. Rescue 270 mL 0    azelastine (ASTELIN) 137 mcg (0.1 %) nasal spray 2 sprays (274 mcg total) by Nasal route 2 (two) times daily. 30 mL 11    butalbital-acetaminophen-caffeine -40 mg (FIORICET, ESGIC) -40 mg per tablet TAKE 1 TABLET EVERY 4 HOURS AS NEEDED FOR HEADACHE 90 tablet 3    cholestyramine (QUESTRAN) 4 gram packet Take 1 packet (4 g total) by mouth 3 (three) times daily with meals. 270 packet 3    clindamycin (CLEOCIN) 300 MG capsule EMPTY CONTENTS OF 2 CAPSULES INTO NASAL IRRIGATION SYSTEM, ADD DISTILLED WATER, SALT PACK, MIX & IRRIGATE. PERFORM 2 TIMES DAILY 120 capsule 1    diltiazem HCl (DILTIAZEM 2% - LIDOCAINE 5% CREAM) Apply peasize amount topically to anal area. 30 g 2    diphenhydrAMINE-aluminum-magnesium hydroxide-simethicone-LIDOcaine HCl 2% Swish and spit 15 mLs every 4 (four) hours as needed (oral ulcers, pain). 1 Bottle 2    DULoxetine (CYMBALTA) 60 MG capsule TAKE 1 CAPSULE BY MOUTH TWICE A DAY (Patient taking differently: Take 60 mg by mouth once daily.) 180 capsule 3    eletriptan (RELPAX) 40 MG tablet Take 1 tablet (40 mg total) by mouth as needed. (Patient taking differently: Take 40 mg by mouth as needed. Take if needed) 12 tablet 3    estradioL (ESTRACE) 2 MG tablet Take 1 tablet (2 mg total) by mouth every evening. 90 tablet 3    fluticasone propionate (FLONASE) 50 mcg/actuation nasal spray 2 sprays (100 mcg total) by Each Nostril route once daily. 16 mL 0    fluticasone-umeclidin-vilanter (TRELEGY  ELLIPTA) 100-62.5-25 mcg DsDv Inhale 1 puff into the lungs once daily. 90 each 11    immun glob G,IgG,-pro-IgA 0-50 (HIZENTRA) 10 gram/50 mL (20 %) Soln Inject 70 mLs (14 g total) into the skin every 7 days. 280 mL 11    ipratropium (ATROVENT) 0.02 % nebulizer solution Nebulize 2.5 ml every 4-6 hours as directed, if needed 90 each 11    ipratropium-albuteroL (COMBIVENT)  mcg/actuation inhaler Inhale 2 puffs into the lungs every 6 (six) hours as needed for Wheezing. Rescue 4 g 3    losartan (COZAAR) 100 MG tablet Take 1 tablet (100 mg total) by mouth once daily. 90 tablet 3    meropenem (MERREM) 1 gram injection Inject into the vein.      methylPREDNISolone (MEDROL DOSEPACK) 4 mg tablet use as directed 21 tablet 2    montelukast (SINGULAIR) 10 mg tablet TAKE 1 TABLET EVERY EVENING 90 tablet 3    nortriptyline (PAMELOR) 25 MG capsule Take 1 capsule (25 mg total) by mouth every evening. 90 capsule 3    oxyCODONE (OXY-IR) 5 mg Cap Take 1 capsule (5 mg total) by mouth every 4 (four) hours as needed for Pain. 15 capsule 0    pregabalin (LYRICA) 150 MG capsule Take 1 capsule (150 mg total) by mouth 3 (three) times daily. (Patient taking differently: Take 150 mg by mouth 2 (two) times daily.) 270 capsule 1    propranoloL (INDERAL LA) 80 MG 24 hr capsule TAKE 1 CAPSULE BY MOUTH ONCE DAILY. (Patient taking differently: 80 mg nightly.) 90 capsule 2    rizatriptan (MAXALT) 10 MG tablet TAKE 1 TABLET IF NEEDED FOR MIGRAINES. MAX 2 TABLETS IN 24 HOURS. 30 tablet 8    sodium chloride 0.9% 0.9 % Soln 200 mL with gentamicin (ped) 20 mg/2 mL Soln EMPTY CONTENTS OF 1 CAPSULE INTO NASAL IRRIGATION SYSTEM, ADD DISTILLED WATER, SALT PACK, MIX & IRRIGATE. PERFORM 2 TIMES DAILY      topiramate (TOPAMAX) 100 MG tablet TAKE 1 TABLET TWICE A  tablet 3    triamcinolone acetonide 0.025% (KENALOG) 0.025 % cream 1 application once daily. Apply to affected area      VENTOLIN HFA 90 mcg/actuation inhaler INHALE 2 PUFFS INTO THE LUNGS  "EVERY 4 TO 6 HOURS AS NEEDED FOR WHEEZING. (Patient taking differently: Take if needed & bring) 18 Inhaler 1    XYLITOL, BULK, MISC EMPTY CONTENTS OF 1 CAPSULE INTO NASAL IRRIGATION SYSTEM, ADD DISTILLED WATER, SALT PACK, MIX & IRRIGATE. PERFORM 2 TIMES DAILY      atorvastatin (LIPITOR) 10 MG tablet Take 1 tablet (10 mg total) by mouth once daily. (Patient taking differently: Take 10 mg by mouth every evening.) 90 tablet 3     Facility-Administered Encounter Medications as of 11/7/2022   Medication Dose Route Frequency Provider Last Rate Last Admin    lactated ringers infusion   Intravenous Continuous Mary Leiva MD   New Bag at 03/12/20 0923    lactated ringers infusion   Intravenous Continuous Shay Bruce MD   Stopped at 03/16/22 1342    lidocaine (PF) 10 mg/ml (1%) injection 10 mg  1 mL Intradermal Once Mary Leiva MD        LIDOcaine (PF) 10 mg/ml (1%) injection 10 mg  1 mL Intradermal Once Johan Baez MD        sodium chloride 0.9% flush 10 mL  10 mL Intravenous PRN Johan Baez MD           Objective:     Vital Signs (Most Recent)  Vital Signs  Pulse: (!) 59  Resp: 14  SpO2: 98 %  BP: 120/80  Height and Weight  Height: 5' 7" (170.2 cm)  Weight: 81.7 kg (180 lb 1.9 oz)  BSA (Calculated - sq m): 1.97 sq meters  BMI (Calculated): 28.2  Weight in (lb) to have BMI = 25: 159.3]  Wt Readings from Last 2 Encounters:   11/07/22 81.7 kg (180 lb 1.9 oz)   10/20/22 81.4 kg (179 lb 7.3 oz)       Physical Exam   Constitutional: She is oriented to person, place, and time. She appears well-developed and well-nourished.   HENT:   Head: Normocephalic.   Mouth/Throat: Mallampati Score: II.   Neck: No JVD present.   Cardiovascular: Normal rate and intact distal pulses.   No murmur heard.  Pulmonary/Chest: Normal expansion and effort normal. No respiratory distress. She has no wheezes. She has no rhonchi. Negative for tactile fremitus.   Abdominal: Soft. Bowel sounds are normal. "   Musculoskeletal:         General: No edema. Normal range of motion.      Cervical back: Normal range of motion and neck supple.   Lymphadenopathy:     She has no axillary adenopathy.   Neurological: She is alert and oriented to person, place, and time.   Skin: Skin is warm and dry.   Psychiatric: She has a normal mood and affect.   Nursing note and vitals reviewed.    Laboratory  Lab Results   Component Value Date    WBC 5.50 07/07/2022    RBC 4.21 07/07/2022    HGB 13.1 07/07/2022    HCT 40.9 07/07/2022    MCV 97 07/07/2022    MCH 31.1 (H) 07/07/2022    MCHC 32.0 07/07/2022    RDW 13.1 07/07/2022     07/07/2022    MPV 10.4 07/07/2022    GRAN 2.7 07/07/2022    GRAN 48.9 07/07/2022    LYMPH 2.0 07/07/2022    LYMPH 36.9 07/07/2022    MONO 0.4 07/07/2022    MONO 7.3 07/07/2022    EOS 0.3 07/07/2022    BASO 0.05 07/07/2022    EOSINOPHIL 5.8 07/07/2022    BASOPHIL 0.9 07/07/2022       BMP  Lab Results   Component Value Date     (L) 05/10/2022    K 4.2 05/10/2022     05/10/2022    CO2 25 05/10/2022    BUN 10 05/10/2022    CREATININE 0.8 05/10/2022    CALCIUM 9.2 05/10/2022    ANIONGAP 7 (L) 05/10/2022    ESTGFRAFRICA >60 05/10/2022    EGFRNONAA >60 05/10/2022    AST 37 05/10/2022    ALT 46 (H) 05/10/2022    PROT 7.4 05/10/2022       No results found for: BNP    Lab Results   Component Value Date    TSH 0.887 09/02/2021       Lab Results   Component Value Date    SEDRATE 25 03/08/2022       Lab Results   Component Value Date    CRP 1.3 03/08/2022     Lab Results   Component Value Date    IGE <35 05/26/2022    IGE <35 02/03/2021    IGE <35 12/12/2019        Lab Results   Component Value Date    ASPERGILLUS None Detected 05/26/2022     Lab Results   Component Value Date    AFUMIGATUSCL CLASS 0 12/12/2019        No results found for: ACE     Diagnostic Results:  I have personally reviewed today the following studies:    X-Ray Chest PA And Lateral  Narrative: EXAMINATION:  XR CHEST PA AND  LATERAL    CLINICAL HISTORY:  Severe persistent asthma, uncomplicated    TECHNIQUE:  PA and lateral views of the chest were performed.    COMPARISON:  09/14/2022    FINDINGS:  Emphysematous changes are present.The lungs are clear, with normal appearance of pulmonary vasculature and no pleural effusion or pneumothorax.    The cardiac silhouette is normal in size. The hilar and mediastinal contours are unremarkable.    Bones are intact.  Impression: Emphysematous changes are present without acute abnormality.    Electronically signed by: Cali Curtis  Date:    11/07/2022  Time:    14:35      FeNO was 78      Download       [place]  FEV1: 1.86L ( 65.9%), FVC 2.19L( 61%)  FEV1/FVC 85    Aerobic Bacterial Culture  Abnormal   PSEUDOMONAS AERUGINOSA   Moderate     Aerobic Bacterial Culture  Abnormal   STENOTROPHOMONAS (X.) MALTOPHILIA   Moderate     Resulting Agency [facility]        Susceptibility     Pseudomonas aeruginosa Stenotrophomonas (X.) Maltophilia     CULTURE, AEROBIC  (SPECIFY SOURCE) CULTURE, AEROBIC  (SPECIFY SOURCE)     Amikacin <=16 mcg/mL Sensitive       Cefepime 8 mcg/mL Sensitive       Ciprofloxacin 2 mcg/mL Intermediate       Gentamicin >8 mcg/mL Resistant       Levofloxacin 4 mcg/mL Intermediate >4 mcg/mL Resistant     Meropenem 2 mcg/mL Sensitive       Piperacillin/Tazo <=16 mcg/mL Sensitive       Tobramycin >8 mcg/mL Resistant       Trimeth/Sulfa   <=2/38 mcg/mL Sensitive                 Assessment/Plan:     Problem List Items Addressed This Visit       Tortuous aorta (Chronic)     Stable  Monitor CXR         TAMIKA on CPAP    Essential hypertension     On HYZAAR, NORVASC         Insomnia due to medical condition     PRN AMBIEN         Other hyperlipidemia     On LIPITOR         Chronic pansinusitis - Primary     Most recent grew Pseudomonas and Sternotrphomonas  Cipro+ Bactrim rinses  Monitor sensitivity pattern         Severe persistent asthma without complication     ACT score 11  Feno still high but  impproved (78)  Cont BREO changed to TRELEGY  Nebulizer  PFT: Mild restriction and DLCO reduced         Relevant Orders    Spirometry with/without bronchodilator    Fraction of  Nitric Oxide    Hypogammaglobulinemia     Follow with Immunology  HIZENTRA      Component      Latest Ref Rng & Units 2022   IgG      650 - 1600 mg/dL 1704 (H)   IgA      40 - 350 mg/dL 284   IgM      50 - 300 mg/dL 163            Chronic rhinosinusitis     Follow with ENT  Nasal HYgeine routine      No pulmonary contraindication for procodure: FESS         Abnormal CT scan, lung     Bronchial clearance: aerobika, saline nebs             Protein suppliments  Abx nasal rinses per ENT for Pseudomonas and sternotrophomonas     Continue Hypersal and aerobika         Follow up in about 6 months (around 2023), or cxr, jann,FeNo.    This note was prepared using voice recognition system and is likely to have sound alike errors that may have been overlooked even after proof reading.  Please call me with any questions    Discussed diagnosis, its evaluation, treatment and usual course. All questions answered.      Abrahan Lira MD

## 2022-11-08 ENCOUNTER — TELEPHONE (OUTPATIENT)
Dept: OTOLARYNGOLOGY | Facility: CLINIC | Age: 57
End: 2022-11-08
Payer: COMMERCIAL

## 2022-11-08 NOTE — ASSESSMENT & PLAN NOTE
Most recent grew Pseudomonas and Sternotrphomonas  Cipro+ Bactrim rinses  Monitor sensitivity pattern

## 2022-11-11 ENCOUNTER — PATIENT MESSAGE (OUTPATIENT)
Dept: PULMONOLOGY | Facility: CLINIC | Age: 57
End: 2022-11-11
Payer: COMMERCIAL

## 2022-11-11 DIAGNOSIS — B37.0 ORAL THRUSH: Primary | ICD-10-CM

## 2022-11-11 RX ORDER — NYSTATIN 100000 [USP'U]/ML
6 SUSPENSION ORAL
Qty: 240 ML | Refills: 0 | Status: SHIPPED | OUTPATIENT
Start: 2022-11-11 | End: 2022-11-21

## 2022-11-11 NOTE — TELEPHONE ENCOUNTER
Nystatin    Requested Prescriptions     Signed Prescriptions Disp Refills    nystatin (MYCOSTATIN) 100,000 unit/mL suspension 240 mL 0     Sig: Take 6 mLs (600,000 Units total) by mouth 4 (four) times daily with meals and nightly. for 10 days     Authorizing Provider: LINDA WAY

## 2022-11-14 ENCOUNTER — PATIENT MESSAGE (OUTPATIENT)
Dept: ALLERGY | Facility: CLINIC | Age: 57
End: 2022-11-14
Payer: COMMERCIAL

## 2022-11-15 ENCOUNTER — PATIENT MESSAGE (OUTPATIENT)
Dept: PRIMARY CARE CLINIC | Facility: CLINIC | Age: 57
End: 2022-11-15
Payer: COMMERCIAL

## 2022-11-15 RX ORDER — TRAZODONE HYDROCHLORIDE 50 MG/1
50 TABLET ORAL NIGHTLY
Qty: 90 TABLET | Refills: 3 | Status: SHIPPED | OUTPATIENT
Start: 2022-11-15 | End: 2023-03-15 | Stop reason: SDUPTHER

## 2022-11-15 NOTE — TELEPHONE ENCOUNTER
Jaylin Murguia,     I have sent the following medications to your preferred pharmacy on file. Please let me know if you have further questions.     Medications Ordered This Encounter   Medications    traZODone (DESYREL) 50 MG tablet     Sig: Take 1 tablet (50 mg total) by mouth every evening.     Dispense:  90 tablet     Refill:  3          [facility] - [address] [address]  Phone: [phone] Fax: [phone]    Sincerely,   Esthela Hu MD

## 2022-11-18 ENCOUNTER — PATIENT MESSAGE (OUTPATIENT)
Dept: ALLERGY | Facility: CLINIC | Age: 57
End: 2022-11-18
Payer: COMMERCIAL

## 2022-11-18 LAB
ACID FAST MOD KINY STN SPEC: NORMAL
MYCOBACTERIUM SPEC QL CULT: NORMAL

## 2022-11-23 ENCOUNTER — TELEPHONE (OUTPATIENT)
Dept: URGENT CARE | Facility: CLINIC | Age: 57
End: 2022-11-23

## 2022-11-23 ENCOUNTER — PATIENT MESSAGE (OUTPATIENT)
Dept: ALLERGY | Facility: CLINIC | Age: 57
End: 2022-11-23
Payer: COMMERCIAL

## 2022-11-23 ENCOUNTER — OFFICE VISIT (OUTPATIENT)
Dept: URGENT CARE | Facility: CLINIC | Age: 57
End: 2022-11-23
Payer: COMMERCIAL

## 2022-11-23 ENCOUNTER — HOSPITAL ENCOUNTER (OUTPATIENT)
Dept: RADIOLOGY | Facility: CLINIC | Age: 57
Discharge: HOME | End: 2022-11-23
Attending: PHYSICIAN ASSISTANT
Payer: COMMERCIAL

## 2022-11-23 VITALS
OXYGEN SATURATION: 96 % | SYSTOLIC BLOOD PRESSURE: 127 MMHG | RESPIRATION RATE: 18 BRPM | HEART RATE: 68 BPM | TEMPERATURE: 98 F | HEIGHT: 67 IN | DIASTOLIC BLOOD PRESSURE: 69 MMHG | WEIGHT: 175 LBS | BODY MASS INDEX: 27.47 KG/M2

## 2022-11-23 DIAGNOSIS — J43.9 PULMONARY EMPHYSEMA, UNSPECIFIED EMPHYSEMA TYPE: ICD-10-CM

## 2022-11-23 DIAGNOSIS — J45.41 MODERATE PERSISTENT ASTHMA WITH EXACERBATION: Primary | ICD-10-CM

## 2022-11-23 DIAGNOSIS — J18.9 COMMUNITY ACQUIRED PNEUMONIA OF LEFT LUNG, UNSPECIFIED PART OF LUNG: Primary | ICD-10-CM

## 2022-11-23 DIAGNOSIS — R06.2 DIFFUSE WHEEZING: ICD-10-CM

## 2022-11-23 DIAGNOSIS — R05.2 SUBACUTE COUGH: ICD-10-CM

## 2022-11-23 DIAGNOSIS — Z87.09 HISTORY OF ASTHMA: ICD-10-CM

## 2022-11-23 LAB
CTP QC/QA: YES
CTP QC/QA: YES
POC MOLECULAR INFLUENZA A AGN: NEGATIVE
POC MOLECULAR INFLUENZA B AGN: NEGATIVE
SARS-COV-2 RDRP RESP QL NAA+PROBE: NEGATIVE

## 2022-11-23 PROCEDURE — 3078F DIAST BP <80 MM HG: CPT | Mod: CPTII,S$GLB,,

## 2022-11-23 PROCEDURE — 1160F PR REVIEW ALL MEDS BY PRESCRIBER/CLIN PHARMACIST DOCUMENTED: ICD-10-PCS | Mod: CPTII,S$GLB,,

## 2022-11-23 PROCEDURE — 3008F PR BODY MASS INDEX (BMI) DOCUMENTED: ICD-10-PCS | Mod: CPTII,S$GLB,,

## 2022-11-23 PROCEDURE — 87635: ICD-10-PCS | Mod: QW,S$GLB,,

## 2022-11-23 PROCEDURE — 3074F PR MOST RECENT SYSTOLIC BLOOD PRESSURE < 130 MM HG: ICD-10-PCS | Mod: CPTII,S$GLB,,

## 2022-11-23 PROCEDURE — 87635 SARS-COV-2 COVID-19 AMP PRB: CPT | Mod: QW,S$GLB,,

## 2022-11-23 PROCEDURE — 3008F BODY MASS INDEX DOCD: CPT | Mod: CPTII,S$GLB,,

## 2022-11-23 PROCEDURE — 4010F PR ACE/ARB THEARPY RXD/TAKEN: ICD-10-PCS | Mod: CPTII,S$GLB,,

## 2022-11-23 PROCEDURE — 99214 PR OFFICE/OUTPT VISIT, EST, LEVL IV, 30-39 MIN: ICD-10-PCS | Mod: S$GLB,,,

## 2022-11-23 PROCEDURE — 71046 X-RAY EXAM CHEST 2 VIEWS: CPT | Mod: S$GLB,,, | Performed by: RADIOLOGY

## 2022-11-23 PROCEDURE — 1159F PR MEDICATION LIST DOCUMENTED IN MEDICAL RECORD: ICD-10-PCS | Mod: CPTII,S$GLB,,

## 2022-11-23 PROCEDURE — 87502 POCT INFLUENZA A/B MOLECULAR: ICD-10-PCS | Mod: QW,S$GLB,,

## 2022-11-23 PROCEDURE — 1160F RVW MEDS BY RX/DR IN RCRD: CPT | Mod: CPTII,S$GLB,,

## 2022-11-23 PROCEDURE — 1159F MED LIST DOCD IN RCRD: CPT | Mod: CPTII,S$GLB,,

## 2022-11-23 PROCEDURE — 87502 INFLUENZA DNA AMP PROBE: CPT | Mod: QW,S$GLB,,

## 2022-11-23 PROCEDURE — 71046 XR CHEST PA AND LATERAL: ICD-10-PCS | Mod: S$GLB,,, | Performed by: RADIOLOGY

## 2022-11-23 PROCEDURE — 4010F ACE/ARB THERAPY RXD/TAKEN: CPT | Mod: CPTII,S$GLB,,

## 2022-11-23 PROCEDURE — 99214 OFFICE O/P EST MOD 30 MIN: CPT | Mod: S$GLB,,,

## 2022-11-23 PROCEDURE — 3078F PR MOST RECENT DIASTOLIC BLOOD PRESSURE < 80 MM HG: ICD-10-PCS | Mod: CPTII,S$GLB,,

## 2022-11-23 PROCEDURE — 3074F SYST BP LT 130 MM HG: CPT | Mod: CPTII,S$GLB,,

## 2022-11-23 RX ORDER — AMOXICILLIN AND CLAVULANATE POTASSIUM 875; 125 MG/1; MG/1
1 TABLET, FILM COATED ORAL 2 TIMES DAILY
Qty: 14 TABLET | Refills: 0 | Status: SHIPPED | OUTPATIENT
Start: 2022-11-23 | End: 2022-11-30

## 2022-11-23 RX ORDER — IPRATROPIUM BROMIDE 0.5 MG/2.5ML
500 SOLUTION RESPIRATORY (INHALATION)
Status: COMPLETED | OUTPATIENT
Start: 2022-11-23 | End: 2022-11-23

## 2022-11-23 RX ORDER — METHYLPREDNISOLONE 4 MG/1
TABLET ORAL
Qty: 21 EACH | Refills: 0 | Status: SHIPPED | OUTPATIENT
Start: 2022-11-23 | End: 2022-12-14

## 2022-11-23 RX ORDER — PROMETHAZINE HYDROCHLORIDE AND DEXTROMETHORPHAN HYDROBROMIDE 6.25; 15 MG/5ML; MG/5ML
5 SYRUP ORAL EVERY 6 HOURS PRN
Qty: 118 ML | Refills: 0 | Status: SHIPPED | OUTPATIENT
Start: 2022-11-23 | End: 2022-11-30

## 2022-11-23 RX ADMIN — IPRATROPIUM BROMIDE 500 MCG: 0.5 SOLUTION RESPIRATORY (INHALATION) at 01:11

## 2022-11-23 NOTE — PATIENT INSTRUCTIONS
ASTHMA EXACERBATION:   We will call with cxr results.   You received nebulizer treatments in urgent care today.  You will need to use your RESCUE inhaler every 4 hours if your symptoms persist, and then use as directed every 6 hours as needed.  You will need to take the Oral Steroid (Medrol dose pack) as directed. This medication should be taken in the morning, as it can cause you to feel jittery.   You should schedule a follow-up appointment with primary care and pulmonology as we discussed.   Go to the ER if your symptoms worsen.        - You must understand that you have received an Urgent Care treatment only and that you may be released before all of your medical problems are known or treated.   - You, the patient, will arrange for follow up care as instructed with your primary care provider or recommended specialist.   - If your condition worsens or fails to improve we recommend that you receive another evaluation at the ER immediately or contact your PCP to discuss your concerns, or return here.   - Please do not drive or make any important decisions for 24 hours if you have received any pain medications, sedatives or mood altering drugs during your visit.    Disclaimer: This document was drafted with the use of a voice recognition device and is likely to have sound alike errors.

## 2022-11-23 NOTE — TELEPHONE ENCOUNTER
PNEUMONIA TREATMENT:    Take antibiotic medicine prescribed until it is all gone, even if you are feeling better after a few days.  Rest. Dont let yourself get overly tired when you go back to your activities.  Stay away from cigarette smoke - yours or other peoples.  You may use acetaminophen or ibuprofen to control fever or pain,   Your appetite may be poor, so a light diet is fine.  Drink 6 to 8 glasses of fluids every day to make sure you are getting enough fluids.       XR CHEST PA AND LATERAL    Result Date: 11/23/2022  EXAMINATION: XR CHEST PA AND LATERAL CLINICAL HISTORY: cough/congestion; Cough, unspecified TECHNIQUE: PA and lateral views of the chest were performed. COMPARISON: 11/07/2022 FINDINGS: The cardiomediastinal silhouette is within normal limits.  There is hazy opacification in the left suprahilar region suspicious for developing infiltrate.  No pleural effusion.     Suspected developing left lung infiltrate suspicious for pneumonia. Electronically signed by: Monty Burns MD Date:    11/23/2022 Time:    13:43    X-Ray Chest PA And Lateral    Result Date: 11/7/2022  EXAMINATION: XR CHEST PA AND LATERAL CLINICAL HISTORY: Severe persistent asthma, uncomplicated TECHNIQUE: PA and lateral views of the chest were performed. COMPARISON: 09/14/2022 FINDINGS: Emphysematous changes are present.The lungs are clear, with normal appearance of pulmonary vasculature and no pleural effusion or pneumothorax. The cardiac silhouette is normal in size. The hilar and mediastinal contours are unremarkable. Bones are intact.     Emphysematous changes are present without acute abnormality. Electronically signed by: Cali Curtis Date:    11/07/2022 Time:    14:35

## 2022-11-23 NOTE — PROGRESS NOTES
"Subjective:       Patient ID: Jaylin Murguia is a 57 y.o. female.    Vitals:  height is 5' 7" (1.702 m) and weight is 79.4 kg (175 lb). Her oral temperature is 98.3 °F (36.8 °C). Her blood pressure is 127/69 and her pulse is 68. Her respiration is 18 and oxygen saturation is 96%.     Chief Complaint: Cough    Pt is a 58 yo female with PMH of asthma who does breathing treatments BID, who presents for a worsening cough, generalized body aches, chills, postnasal drip, generalized HA that worsens with coughing, and hoarseness x 3 days.  Breathing tx provides temporary relief for a few hours.  She has tried Mucinex DM cough syrup, Tylenol, with temporary relief.  She takes effusions weekly but hasn't had them in the past 2 weeks.  Denies sick contacts. Denies fever, CP, denies recent travel.     Cough  This is a new problem. The current episode started in the past 7 days. The problem has been gradually worsening. The cough is Non-productive. Associated symptoms include chills, headaches, nasal congestion, postnasal drip and wheezing. Pertinent negatives include no chest pain, ear congestion, ear pain, eye redness, fever, heartburn, hemoptysis, myalgias, rash, rhinorrhea, sore throat, sweats or weight loss. Associated symptoms comments: Body aches. Nothing aggravates the symptoms. Treatments tried: breathing treatment and sinus rinse. The treatment provided no relief. Her past medical history is significant for asthma. There is no history of bronchiectasis, bronchitis, COPD, emphysema, environmental allergies or pneumonia.     Constitution: Positive for appetite change (lack of appetite), chills and fatigue. Negative for fever.   HENT:  Positive for postnasal drip. Negative for ear pain and sore throat.    Neck: Negative for neck stiffness.   Cardiovascular:  Negative for chest pain.   Eyes:  Negative for eye discharge, eye pain and eye redness.   Respiratory:  Positive for chest tightness, cough, wheezing and asthma. " Negative for bloody sputum.    Gastrointestinal:  Negative for nausea, vomiting and heartburn. Diarrhea: hx of crohns.  Musculoskeletal:  Negative for muscle ache.   Skin:  Negative for rash.   Allergic/Immunologic: Positive for asthma and immunocompromised state. Negative for environmental allergies.   Neurological:  Positive for light-headedness and headaches. Negative for passing out.     Objective:      Vitals:    11/23/22 1226   BP: 127/69   Pulse: 68   Resp: 18   Temp: 98.3 °F (36.8 °C)       Physical Exam   Constitutional: She is oriented to person, place, and time. She appears well-developed. She is cooperative.  Non-toxic appearance. She does not appear ill. No distress.   HENT:   Head: Normocephalic and atraumatic.   Ears:   Right Ear: Hearing, tympanic membrane, external ear and ear canal normal. Tympanic membrane is not bulging.   Left Ear: Hearing, tympanic membrane, external ear and ear canal normal. Tympanic membrane is not bulging.   Nose: Nose normal. No mucosal edema, rhinorrhea or nasal deformity. No epistaxis. Right sinus exhibits no maxillary sinus tenderness and no frontal sinus tenderness. Left sinus exhibits no maxillary sinus tenderness and no frontal sinus tenderness.   Mouth/Throat: Uvula is midline, oropharynx is clear and moist and mucous membranes are normal. Mucous membranes are moist. No trismus in the jaw. Normal dentition. No uvula swelling. No oropharyngeal exudate, posterior oropharyngeal edema or posterior oropharyngeal erythema. No tonsillar exudate.   Eyes: Conjunctivae and lids are normal. Pupils are equal, round, and reactive to light. Right eye exhibits no discharge. Left eye exhibits no discharge. No scleral icterus.   Neck: Trachea normal and phonation normal. Neck supple. No edema present. No erythema present. No neck rigidity present.   Cardiovascular: Normal rate, regular rhythm, normal heart sounds and normal pulses.   No murmur heard.  Pulmonary/Chest: Effort normal.  No stridor. No tachypnea. No respiratory distress. She has no decreased breath sounds. She has wheezes in the right upper field, the right middle field, the right lower field, the left upper field, the left middle field and the left lower field. She has no rhonchi. She has no rales.   Abdominal: Normal appearance.   Musculoskeletal: Normal range of motion.         General: No deformity. Normal range of motion.   Lymphadenopathy:        Head (right side): No submental, no submandibular, no tonsillar, no preauricular and no posterior auricular adenopathy present.        Head (left side): No submental, no submandibular, no tonsillar, no preauricular and no posterior auricular adenopathy present.   Neurological: She is alert and oriented to person, place, and time. She exhibits normal muscle tone. Coordination normal.   Skin: Skin is warm, dry, intact, not diaphoretic and not pale.   Psychiatric: Her speech is normal and behavior is normal. Judgment and thought content normal.   Nursing note and vitals reviewed.      Assessment:       1. Moderate persistent asthma with exacerbation    2. Subacute cough    3. Diffuse wheezing    4. History of asthma    5. Pulmonary emphysema, unspecified emphysema type        Results for orders placed or performed in visit on 11/23/22   POCT Influenza A/B MOLECULAR   Result Value Ref Range    POC Molecular Influenza A Ag Negative Negative, Not Reported    POC Molecular Influenza B Ag Negative Negative, Not Reported     Acceptable Yes    POCT COVID-19 Rapid Screening   Result Value Ref Range    POC Rapid COVID Negative Negative     Acceptable Yes      *Note: Due to a large number of results and/or encounters for the requested time period, some results have not been displayed. A complete set of results can be found in Results Review.      Plan:          Moderate persistent asthma with exacerbation  -     methylPREDNISolone (MEDROL DOSEPACK) 4 mg tablet; use as  directed  Dispense: 21 each; Refill: 0    Subacute cough  -     POCT Influenza A/B MOLECULAR  -     ipratropium 0.02 % nebulizer solution 500 mcg  -     POCT COVID-19 Rapid Screening  -     XR CHEST PA AND LATERAL; Future; Expected date: 11/23/2022  -     promethazine-dextromethorphan (PROMETHAZINE-DM) 6.25-15 mg/5 mL Syrp; Take 5 mLs by mouth every 6 (six) hours as needed (cough).  Dispense: 118 mL; Refill: 0    Diffuse wheezing  -     ipratropium 0.02 % nebulizer solution 500 mcg  -     XR CHEST PA AND LATERAL; Future; Expected date: 11/23/2022    History of asthma  -     ipratropium 0.02 % nebulizer solution 500 mcg  -     methylPREDNISolone (MEDROL DOSEPACK) 4 mg tablet; use as directed  Dispense: 21 each; Refill: 0    Pulmonary emphysema, unspecified emphysema type       Patient Instructions   ASTHMA EXACERBATION:   We will call with cxr results.   You received nebulizer treatments in urgent care today.  You will need to use your RESCUE inhaler every 4 hours if your symptoms persist, and then use as directed every 6 hours as needed.  You will need to take the Oral Steroid (Medrol dose pack) as directed. This medication should be taken in the morning, as it can cause you to feel jittery.   You should schedule a follow-up appointment with primary care and pulmonology as we discussed.   Go to the ER if your symptoms worsen.        - You must understand that you have received an Urgent Care treatment only and that you may be released before all of your medical problems are known or treated.   - You, the patient, will arrange for follow up care as instructed with your primary care provider or recommended specialist.   - If your condition worsens or fails to improve we recommend that you receive another evaluation at the ER immediately or contact your PCP to discuss your concerns, or return here.   - Please do not drive or make any important decisions for 24 hours if you have received any pain medications, sedatives  or mood altering drugs during your visit.    Disclaimer: This document was drafted with the use of a voice recognition device and is likely to have sound alike errors.

## 2022-11-23 NOTE — TELEPHONE ENCOUNTER
Spoke with patient. Notified that paper work was sent in by Nurse meeks. Patient stated she spoke with insurance company  today the correct fax number is  [phone]

## 2022-11-25 ENCOUNTER — PATIENT MESSAGE (OUTPATIENT)
Dept: PRIMARY CARE CLINIC | Facility: CLINIC | Age: 57
End: 2022-11-25
Payer: COMMERCIAL

## 2022-11-25 RX ORDER — AZITHROMYCIN 250 MG/1
250 TABLET, FILM COATED ORAL DAILY
Qty: 6 TABLET | Refills: 0 | Status: SHIPPED | OUTPATIENT
Start: 2022-11-25 | End: 2022-11-30

## 2022-11-30 ENCOUNTER — PATIENT MESSAGE (OUTPATIENT)
Dept: PULMONOLOGY | Facility: CLINIC | Age: 57
End: 2022-11-30
Payer: COMMERCIAL

## 2022-11-30 DIAGNOSIS — J45.50 SEVERE PERSISTENT ASTHMA WITHOUT COMPLICATION: Primary | ICD-10-CM

## 2022-12-01 ENCOUNTER — PATIENT OUTREACH (OUTPATIENT)
Dept: PULMONOLOGY | Facility: CLINIC | Age: 57
End: 2022-12-01
Payer: COMMERCIAL

## 2022-12-01 RX ORDER — IPRATROPIUM BROMIDE AND ALBUTEROL SULFATE 2.5; .5 MG/3ML; MG/3ML
3 SOLUTION RESPIRATORY (INHALATION) EVERY 6 HOURS PRN
Qty: 270 ML | Refills: 0 | Status: SHIPPED | OUTPATIENT
Start: 2022-12-01 | End: 2023-12-05

## 2022-12-01 NOTE — TELEPHONE ENCOUNTER
Chronic Disease Management  Called patient to complete Pulmonary Disease Management Questionnaire.    Time  spent with patient:  Minutes

## 2022-12-08 ENCOUNTER — OFFICE VISIT (OUTPATIENT)
Dept: OTOLARYNGOLOGY | Facility: CLINIC | Age: 57
End: 2022-12-08
Payer: COMMERCIAL

## 2022-12-08 VITALS — HEIGHT: 67 IN | WEIGHT: 178.13 LBS | BODY MASS INDEX: 27.96 KG/M2

## 2022-12-08 DIAGNOSIS — J31.0 NONALLERGIC RHINITIS: Primary | ICD-10-CM

## 2022-12-08 DIAGNOSIS — D80.1 HYPOGAMMAGLOBULINEMIA: ICD-10-CM

## 2022-12-08 DIAGNOSIS — J32.4 CHRONIC PANSINUSITIS: ICD-10-CM

## 2022-12-08 PROCEDURE — 99213 PR OFFICE/OUTPT VISIT, EST, LEVL III, 20-29 MIN: ICD-10-PCS | Mod: 25,S$GLB,, | Performed by: OTOLARYNGOLOGY

## 2022-12-08 PROCEDURE — 1159F PR MEDICATION LIST DOCUMENTED IN MEDICAL RECORD: ICD-10-PCS | Mod: CPTII,S$GLB,, | Performed by: OTOLARYNGOLOGY

## 2022-12-08 PROCEDURE — 99999 PR PBB SHADOW E&M-EST. PATIENT-LVL V: ICD-10-PCS | Mod: PBBFAC,,, | Performed by: OTOLARYNGOLOGY

## 2022-12-08 PROCEDURE — 3008F BODY MASS INDEX DOCD: CPT | Mod: CPTII,S$GLB,, | Performed by: OTOLARYNGOLOGY

## 2022-12-08 PROCEDURE — 1159F MED LIST DOCD IN RCRD: CPT | Mod: CPTII,S$GLB,, | Performed by: OTOLARYNGOLOGY

## 2022-12-08 PROCEDURE — 4010F PR ACE/ARB THEARPY RXD/TAKEN: ICD-10-PCS | Mod: CPTII,S$GLB,, | Performed by: OTOLARYNGOLOGY

## 2022-12-08 PROCEDURE — 99213 OFFICE O/P EST LOW 20 MIN: CPT | Mod: 25,S$GLB,, | Performed by: OTOLARYNGOLOGY

## 2022-12-08 PROCEDURE — 99999 PR PBB SHADOW E&M-EST. PATIENT-LVL V: CPT | Mod: PBBFAC,,, | Performed by: OTOLARYNGOLOGY

## 2022-12-08 PROCEDURE — 31237 NASAL/SINUS ENDOSCOPY: ICD-10-PCS | Mod: 50,S$GLB,, | Performed by: OTOLARYNGOLOGY

## 2022-12-08 PROCEDURE — 4010F ACE/ARB THERAPY RXD/TAKEN: CPT | Mod: CPTII,S$GLB,, | Performed by: OTOLARYNGOLOGY

## 2022-12-08 PROCEDURE — 3008F PR BODY MASS INDEX (BMI) DOCUMENTED: ICD-10-PCS | Mod: CPTII,S$GLB,, | Performed by: OTOLARYNGOLOGY

## 2022-12-08 PROCEDURE — 31237 NSL/SINS NDSC SURG BX POLYPC: CPT | Mod: 50,S$GLB,, | Performed by: OTOLARYNGOLOGY

## 2022-12-08 NOTE — PROGRESS NOTES
"  Subjective:      Jaylin is a 57 y.o. female who comes for follow-up of sinusitis.  Her last visit with me was on 10/11/2022.  Now 2 months status-post revision endoscopic sinus surgery.  Overall improved, less discharge from nose over past few weeks, occasional scabs, no purulence.  Finished meropenem, now using bactrim sinus rinse daily.  Increased course wheezing, fullness and soreness in left supraclavicular fossa and laterally to acromion.  Previously evaluated, had CT chest in May 2022.  Also on hiatus from [employer] [facility] [place] for past 2 months due to insurance issues, recently diagnosed with pneumonia.    SNOT-22 score: : (P) 50  NOSE score:: (P) 35%  ETDQ-7 score:: (P) 1.6    The patient's medications, allergies, past medical, surgical, social and family histories were reviewed and updated as appropriate.    A detailed review of systems was obtained with pertinent positives as per the above HPI, and otherwise negative.        Objective:     Ht 5' 7" (1.702 m)   Wt 80.8 kg (178 lb 2.1 oz)   BMI 27.90 kg/m²        Constitutional:   She appears well-developed. She is cooperative.     Head:  Normocephalic.     Nose:  No mucosal edema, rhinorrhea, septal deviation or polyps. No epistaxis. Turbinates normal, no turbinate masses and no turbinate hypertrophy.  Right sinus exhibits no maxillary sinus tenderness and no frontal sinus tenderness. Left sinus exhibits no maxillary sinus tenderness and no frontal sinus tenderness.     Mouth/Throat  Oropharynx clear and moist without lesions or asymmetry. No oropharyngeal exudate or posterior oropharyngeal erythema.     Neck:  No adenopathy. Normal range of motion present.     She has no cervical adenopathy.     Procedure    Nasal endoscopy with debridement performed.  See procedure note.     Left sinuses     Right sinsues      Data Reviewed    WBC (K/uL)   Date Value   07/07/2022 5.50     Eosinophil % (%)   Date Value   07/07/2022 5.8     Eos # (K/uL)   Date Value   07/07/2022 0.3 "     Platelets (K/uL)   Date Value   07/07/2022 216     Glucose (mg/dL)   Date Value   05/10/2022 93     IgE (IU/mL)   Date Value   05/26/2022 <35       Pathology report indicated non-eosinophilic chronic inflammation.  Cultures showed Pseudomonas and Stenotrophomonas.      Assessment:     1. Nonallergic rhinitis    2. Chronic pansinusitis    3. Hypogammaglobulinemia         Plan:     Sinuses improving, continue SMX-TMP sinus rinse, refill for 1 more month.  Will consider a second pulmonology evaluation.  Follow up in about 2 months (around 2/8/2023) for nasal endoscopy.

## 2022-12-08 NOTE — PROCEDURES
Nasal/sinus endoscopy    Date/Time: 12/8/2022 9:45 AM  Performed by: Matthias Roach MD  Authorized by: Matthias Roach MD     Consent Done?:  Yes (Verbal)  Anesthesia:     Local anesthetic:  Lidocaine 4% and Jose-Synephrine 1/2%    Patient tolerance:  Patient tolerated the procedure well with no immediate complications  Nose:     Procedure Performed:  Removal of Debridement  External:      No external nasal deformity  Intranasal:      Mucosa no polyps     Mucosa ulcers not present     No mucosa lesions present     Turbinates not enlarged     No septum gross deformity  Nasopharynx:      No mucosa lesions     Adenoids not present     Posterior choanae patent     Eustachian tube patent     Sphenoidotomy crusting removed and debrided with Galindo forceps bilaterally.  Scanty purulence underneath suctioned.  Other sinuses normal.

## 2022-12-08 NOTE — Clinical Note
Although her sinuses are improving (knock on wood), Ms. Murguia is a bit frustrated with her pulmonary situation, and with her current pulmonologist.  It think there may be differences in communication style.  Knowing our mutual patient, would you possibly have a suggestion for a second opinion (perhaps at [facility]) who she might consider seeing?

## 2022-12-09 ENCOUNTER — PATIENT MESSAGE (OUTPATIENT)
Dept: ALLERGY | Facility: CLINIC | Age: 57
End: 2022-12-09
Payer: COMMERCIAL

## 2022-12-09 ENCOUNTER — PATIENT MESSAGE (OUTPATIENT)
Dept: OTOLARYNGOLOGY | Facility: CLINIC | Age: 57
End: 2022-12-09
Payer: COMMERCIAL

## 2022-12-09 NOTE — TELEPHONE ENCOUNTER
Spoke to Sonia at [facility], she stated that clinicals were not received even though they have been faxed several times. Sonia asked that I e-mail the clinicals to Jamee Zurita.  Clinicals e-mailed.  Fax confirmation received as well.  Per Sonia, Hizentra to be shipped to pt on Monday.    Received an e-mail from Jamee, she received clinicals and working on extending PA for H[employer]ent[employer] now.      Pt notified.

## 2022-12-09 NOTE — TELEPHONE ENCOUNTER
Called patient to schedule an appointment, patient does not need appointment.  Prior authorization for her Magd[employer]tra .  Spoke with Elizabeth Bosworth LPN about standing of authorization.  She will work on it.

## 2022-12-12 ENCOUNTER — TELEPHONE (OUTPATIENT)
Dept: ALLERGY | Facility: CLINIC | Age: 57
End: 2022-12-12
Payer: COMMERCIAL

## 2022-12-12 ENCOUNTER — PATIENT MESSAGE (OUTPATIENT)
Dept: PRIMARY CARE CLINIC | Facility: CLINIC | Age: 57
End: 2022-12-12
Payer: COMMERCIAL

## 2022-12-12 DIAGNOSIS — M79.7 FIBROMYALGIA: ICD-10-CM

## 2022-12-12 NOTE — TELEPHONE ENCOUNTER
----- Message from Milan Bender MD sent at 12/12/2022  1:11 PM CST -----  Per below, can we please contact Ms. Murguia and offer to schedule w pulmonary, Dr. Solorzano.  Thank you,   LM  ----- Message -----  From: Matthias Roach MD  Sent: 12/8/2022   5:44 PM CST  To: Milan Bender MD    That would be nice -- thank you    ----- Message -----  From: Milan Bender MD  Sent: 12/8/2022   5:34 PM CST  To: Matthias Roach MD    Maybe Dr. Solorzano? If you'd like, I can see if my staff can schedule    ----- Message -----  From: Matthias Roach MD  Sent: 12/8/2022  11:29 AM CST  To: Milan Bender MD    Although her sinuses are improving (knock on wood), Ms. Murguia is a bit frustrated with her pulmonary situation, and with her current pulmonologist.  It think there may be differences in communication style.  Knowing our mutual patient, would you possibly have a suggestion for a second opinion (perhaps at [facility]) who she might consider seeing?

## 2022-12-13 NOTE — TELEPHONE ENCOUNTER
Care Due:                  Date            Visit Type   Department     Provider  --------------------------------------------------------------------------------                                ESTABLISHED                              PATIENT -    [facility] PRIMARY  Last Visit: 08-      [facility]      CARE           Esthelakevan Hu  Next Visit: None Scheduled  None         None Found                                                            Last  Test          Frequency    Reason                     Performed    Due Date  --------------------------------------------------------------------------------    Lipid Panel.  12 months..  atorvastatin.............  12- 12-    [facility] Embedded Care Gaps. Reference number: [id number]. 12/13/2022   12:43:39 PM CST

## 2022-12-14 ENCOUNTER — PATIENT MESSAGE (OUTPATIENT)
Dept: ALLERGY | Facility: CLINIC | Age: 57
End: 2022-12-14
Payer: COMMERCIAL

## 2022-12-14 RX ORDER — PREGABALIN 150 MG/1
150 CAPSULE ORAL 3 TIMES DAILY
Qty: 270 CAPSULE | Refills: 1 | Status: SHIPPED | OUTPATIENT
Start: 2022-12-14 | End: 2023-04-21 | Stop reason: SDUPTHER

## 2022-12-15 ENCOUNTER — OFFICE VISIT (OUTPATIENT)
Dept: ALLERGY | Facility: CLINIC | Age: 57
End: 2022-12-15
Payer: COMMERCIAL

## 2022-12-15 DIAGNOSIS — R91.8 ABNORMAL CT SCAN OF LUNG: ICD-10-CM

## 2022-12-15 DIAGNOSIS — D80.3 IGG2 SUBCLASS DEFICIENCY: ICD-10-CM

## 2022-12-15 DIAGNOSIS — D80.6 ANTI-POLYSACCHARIDE ANTIBODY DEFICIENCY: Primary | ICD-10-CM

## 2022-12-15 DIAGNOSIS — J32.9 CHRONIC SINUSITIS, UNSPECIFIED LOCATION: ICD-10-CM

## 2022-12-15 PROCEDURE — 4010F PR ACE/ARB THEARPY RXD/TAKEN: ICD-10-PCS | Mod: CPTII,95,, | Performed by: ALLERGY & IMMUNOLOGY

## 2022-12-15 PROCEDURE — 99214 PR OFFICE/OUTPT VISIT, EST, LEVL IV, 30-39 MIN: ICD-10-PCS | Mod: 95,,, | Performed by: ALLERGY & IMMUNOLOGY

## 2022-12-15 PROCEDURE — 1160F RVW MEDS BY RX/DR IN RCRD: CPT | Mod: CPTII,95,, | Performed by: ALLERGY & IMMUNOLOGY

## 2022-12-15 PROCEDURE — 1159F PR MEDICATION LIST DOCUMENTED IN MEDICAL RECORD: ICD-10-PCS | Mod: CPTII,95,, | Performed by: ALLERGY & IMMUNOLOGY

## 2022-12-15 PROCEDURE — 1160F PR REVIEW ALL MEDS BY PRESCRIBER/CLIN PHARMACIST DOCUMENTED: ICD-10-PCS | Mod: CPTII,95,, | Performed by: ALLERGY & IMMUNOLOGY

## 2022-12-15 PROCEDURE — 99214 OFFICE O/P EST MOD 30 MIN: CPT | Mod: 95,,, | Performed by: ALLERGY & IMMUNOLOGY

## 2022-12-15 PROCEDURE — 4010F ACE/ARB THERAPY RXD/TAKEN: CPT | Mod: CPTII,95,, | Performed by: ALLERGY & IMMUNOLOGY

## 2022-12-15 PROCEDURE — 1159F MED LIST DOCD IN RCRD: CPT | Mod: CPTII,95,, | Performed by: ALLERGY & IMMUNOLOGY

## 2022-12-15 NOTE — PROGRESS NOTES
The patient location is: [place]  The chief complaint leading to consultation is: fu specific antibody def    Visit type: audiovisual    Face to Face time with patient: 20  30 minutes of total time spent on the encounter, which includes face to face time and non-face to face time preparing to see the patient (eg, review of tests), Obtaining and/or reviewing separately obtained history, Documenting clinical information in the electronic or other health record, Independently interpreting results (not separately reported) and communicating results to the patient/family/caregiver, or Care coordination (not separately reported).         Each patient to whom he or she provides medical services by telemedicine is:  (1) informed of the relationship between the physician and patient and the respective role of any other health care provider with respect to management of the patient; and (2) notified that he or she may decline to receive medical services by telemedicine and may withdraw from such care at any time.    Notes:       Patient ID: Jaylin Murguia is a 57 y.o. female.     7/7/22    Chief Complaint:  No chief complaint on file.  FU recurrent sinusitis, anti-polysaccharide antibody deficiency, IgG2 subclass def    Follow-up  Associated symptoms include congestion and coughing. Pertinent negatives include no arthralgias, chest pain, fatigue, fever, headaches, joint swelling, nausea, rash, sore throat or vomiting.   Asthma  She complains of cough. There is no shortness of breath or wheezing. Associated symptoms include postnasal drip. Pertinent negatives include no chest pain, ear pain, fever, headaches, rhinorrhea, sneezing, sore throat or trouble swallowing. Her past medical history is significant for asthma.     Since  pt notes some improvement w increase in Hizentra dose from 12 to 14 g weekly (693 mg/kg/mo) but still needing/using antibiotic sinus rinse, rx'd by ENT. Has had increase in cough, wheeze. Would like  2nd opinion from pulmonary.  Sx's worse w recent delay in obtaining Hizentra d/t insurance issues. Has been off x 2 mo and was dx'd w pneumonia in the interval.    Hx from 7/7:  Pt presents for fu anti-polysaccharide antibody def and IgG2 subclass deficiency. Initially did find IgG replacement helpful. At  we increased Hizentra dose (12g q 7d /  580 mg/kg/mo). Since then, though, continues w chronic/recurrent sinusitis sx's and concern of poorly controlled asthma. She is following w pulmonary. Has needed interval prednisone for cough, wheeze.  Has seen ENT. Is restarting cipro sinus rinses.        Hx from  9/23/21:  Since  has restarted IgG replacement therapy, Hizentra 10 g q 7 days (465 mg/kg/mo). Has been on it for about 6 months.  On Hizentra she does find that freq and severity of rhinosinusitis sx's has decreased. She recalls only one course of abx for sinusitis in the last 6 months. She usually infuses Hizentra on Tuesdays and notes increased energy in the days after. Often, on Sunday and Monday starts to notice increased fatigue. Sometimes, on Sunday and Monday, may notice slight increase in rhinitis sx's.  Continues to follow w ENT. Still using nasal steroids rinses and nasally instilled abx.  Has been advised to start Humira by GI, for Crohn's dis.    4-5 courses abx over last year, more sinus than lower res    Dog cat, dust, [place],     Initial hx from 2/3/21:  Pt presents, referred by ENT, for re-evaluation of immune system, given hx of recurrent sinusitis. Has had sinus surgery x 3 over last 2 years and recurrent sinusitis continues, some w staph, pseudomonas. Currently on levoflofloxacin.  Had been followed by [facility], Dr. Fletcher, in [place]. Was diagnosed with IgG subclass deficiency and was on IgG replacement therapy for about 2 months. Recalls 1-2 IV infusions, then switched to SQ IgG weekly x 8-10 weeks. Estimates about 4 mo total of IgG replacement therapy. She recalls that during this time she  did have fewer sinus infections, and that she discontinued b/c she had noted fewer infections. Over the last approx 10 mo since stopping IgG therapy, frequent sinus infections have recurred.  She recalls distant SCIT x 3 years over 7 years ago. She doesn't recall wether it was helpful. ImmunoCAPs 12/19 were negative.  Hx GERD, though protonix was sedating. Trying to manage w lifestyle modification.  Also has hx wheeze w resp infections.      Past Medical History:   Diagnosis Date    Allergic rhinitis     Asthma     Chronic pansinusitis     Crohn's disease     Ileal involvement, previously on Remicade, Asacol, Prednisone    Fibromyalgia     Hyperlipidemia     Hypertension     Immunosuppression 20020    Migraine     Obstructive sleep apnea     CPAP at night    Sciatica        Family History   Problem Relation Age of Onset    Breast cancer Mother     Hypertension Mother     Allergies Mother     Kidney disease Father 64        ESRD on HD    Scleroderma Father     Breast cancer Maternal Grandmother     Heart attack Maternal Grandmother     COPD Maternal Grandmother 72    Cancer Paternal Grandmother 70        colon    Hypertension Brother          Review of Systems   Constitutional:  Negative for activity change, fatigue and fever.   HENT:  Positive for congestion, postnasal drip, sinus pressure and sinus pain. Negative for ear discharge, ear pain, facial swelling, hearing loss, nosebleeds, rhinorrhea, sneezing, sore throat, trouble swallowing and voice change.    Eyes:  Positive for photophobia. Negative for pain, discharge, redness and itching.   Respiratory:  Positive for cough. Negative for apnea, choking, chest tightness, shortness of breath and wheezing.    Cardiovascular:  Negative for chest pain.   Gastrointestinal:  Negative for diarrhea, nausea and vomiting.   Genitourinary:  Negative for dysuria.   Musculoskeletal:  Negative for arthralgias and joint swelling.   Skin:  Negative for color change and rash.    Neurological:  Negative for headaches.   Hematological:  Does not bruise/bleed easily.   Psychiatric/Behavioral:  Negative for behavioral problems and sleep disturbance.       Objective:   Physical Exam  Constitutional:       General: She is not in acute distress.     Appearance: She is not ill-appearing or toxic-appearing.   Eyes:      General:         Right eye: No discharge.         Left eye: No discharge.   Pulmonary:      Effort: No respiratory distress.   Neurological:      Mental Status: She is alert and oriented to person, place, and time.   Psychiatric:         Mood and Affect: Mood normal.         Behavior: Behavior normal.     Results for DARIAN VALDERRAMA (MRN [id number]) as of 2/6/2021 16:20   Ref. Range 12/12/2019 16:02   IgG Latest Ref Range: 650 - 1600 mg/dL 941   IgM Latest Ref Range: 50 - 300 mg/dL 133   IgA Latest Ref Range: 40 - 350 mg/dL 235   IgG 1 Latest Ref Range: 382 - 929 mg/dL 556   IgG 2 Latest Ref Range: 242 - 700 mg/dL 153 (L)   IgG 3 Latest Ref Range: 22 - 176 mg/dL 16 (L)   IgG 4 Latest Ref Range: 4 - 86 mg/dL 22     Results for DARIAN VALDERRAMA (MRN [id number]) as of 2/6/2021 16:20   Ref. Range 2/3/2021 11:06   Absolute CD19 Latest Ref Range: 100 - 500 cells/ul 484   Absolute CD3 Latest Ref Range: 700 - 2100 cells/ul 1902   Absolute CD4 Latest Ref Range: 300 - 1400 cells/ul 1294   Absolute CD56 + CD16 Latest Ref Range: 90 - 600 cells/ul 122   Absolute CD8 Latest Ref Range: 200 - 900 cells/ul 526   CD19 B Cells Latest Ref Range: 6 - 19 % 20.3 (H)   CD3 % Total T Cell Latest Ref Range: 55 - 83 % 74.0   CD4 % Cunningham T Cell Latest Ref Range: 28 - 57 % 50.4   CD4/CD8 Ratio Latest Ref Range: 0.9 - 3.6  2.46   CD56 + CD16 Natural Killers Latest Ref Range: 7 - 31 % 5.1 (L)   CD8 % Suppressor T Cell Latest Ref Range: 10 - 39 % 20.5   Results for DARIAN VALDRERAMA (MRN [id number]) as of 2/6/2021 16:20   Ref. Range 12/12/2019 16:37 4/20/2020 11:16   S.pneumoniae Type 1 Latest Units: mcg/mL <0.3  5.4   S.pneumoniae Type 12F Latest Units: mcg/mL 1.5 1.1   S.pneumoniae Type 18C Latest Units: mcg/mL 0.5 5.0   S.pneumoniae Type 19F Latest Units: mcg/mL 6.1 21.4   S.pneumoniae Type 23F Latest Units: mcg/mL 0.3 3.8   S.pneumoniae Type 3 Latest Units: mcg/mL 0.8 2.5   S.pneumoniae Type 5 Latest Units: mcg/mL 0.5 3.4   S.pneumoniae Type 6B Latest Units: mcg/mL 1.9 39.8   S.pneumoniae Type 7F Latest Units: mcg/mL 1.5 4.2   S.pneumoniae Type 8 Latest Units: mcg/mL 1.3 1.6   S.pneumoniae Type 9N Latest Units: mcg/mL <0.3 1.6   S.pneumoniae Type 9V Abs Latest Units: mcg/mL <0.3 3.8   Strep pneumo Type 14 Latest Units: mcg/mL 1.8 6.0   Strep pneumo Type 4 Latest Units: mcg/mL <0.3 1.5     Prevnar 12/19  Pneumovax 10/13    4/20/20 pneumo titers drawn while on IgG replacement    Prev AI notes in Epic reviewd    Assessment:       1. Anti-polysaccharide antibody deficiency    2. IgG2 subclass deficiency    3. Chronic sinusitis, unspecified location    4. Abnormal CT scan of lung           Plan:         Re-start/cont Hizentra 14 g SQ q 7 days (693 mg/kg/mo).  Pulmonary eval  Cont sinus rinses as per ENT  Fu 4-6 mo

## 2022-12-15 NOTE — TELEPHONE ENCOUNTER
Pt. Notified Pulmonology is working on getting her scheduled for an appointment and will call her to gretel.

## 2022-12-19 ENCOUNTER — PATIENT MESSAGE (OUTPATIENT)
Dept: OTOLARYNGOLOGY | Facility: CLINIC | Age: 57
End: 2022-12-19
Payer: COMMERCIAL

## 2022-12-20 ENCOUNTER — PATIENT MESSAGE (OUTPATIENT)
Dept: ALLERGY | Facility: CLINIC | Age: 57
End: 2022-12-20
Payer: COMMERCIAL

## 2022-12-27 ENCOUNTER — PATIENT MESSAGE (OUTPATIENT)
Dept: ALLERGY | Facility: CLINIC | Age: 57
End: 2022-12-27
Payer: COMMERCIAL

## 2022-12-27 ENCOUNTER — PATIENT MESSAGE (OUTPATIENT)
Dept: OTOLARYNGOLOGY | Facility: CLINIC | Age: 57
End: 2022-12-27
Payer: COMMERCIAL

## 2022-12-29 ENCOUNTER — PATIENT MESSAGE (OUTPATIENT)
Dept: PULMONOLOGY | Facility: CLINIC | Age: 57
End: 2022-12-29
Payer: COMMERCIAL

## 2022-12-29 DIAGNOSIS — B37.0 ORAL THRUSH: Primary | ICD-10-CM

## 2022-12-29 RX ORDER — NYSTATIN 100000 [USP'U]/ML
4 SUSPENSION ORAL
Qty: 240 ML | Refills: 3 | Status: SHIPPED | OUTPATIENT
Start: 2022-12-29 | End: 2023-01-08

## 2022-12-29 NOTE — TELEPHONE ENCOUNTER
Requested Prescriptions     Signed Prescriptions Disp Refills    nystatin (MYCOSTATIN) 100,000 unit/mL suspension 240 mL 3     Sig: Take 4 mLs (400,000 Units total) by mouth 2 hours after meals and at bedtime. for 10 days     Authorizing Provider: LINDA WAY

## 2023-01-03 ENCOUNTER — OFFICE VISIT (OUTPATIENT)
Dept: PULMONOLOGY | Facility: CLINIC | Age: 58
End: 2023-01-03
Payer: COMMERCIAL

## 2023-01-03 VITALS
OXYGEN SATURATION: 95 % | BODY MASS INDEX: 27.41 KG/M2 | HEART RATE: 75 BPM | SYSTOLIC BLOOD PRESSURE: 118 MMHG | WEIGHT: 174.63 LBS | HEIGHT: 67 IN | DIASTOLIC BLOOD PRESSURE: 78 MMHG

## 2023-01-03 DIAGNOSIS — R05.3 CHRONIC COUGH: ICD-10-CM

## 2023-01-03 DIAGNOSIS — D80.1 HYPOGAMMAGLOBULINEMIA: ICD-10-CM

## 2023-01-03 DIAGNOSIS — R91.8 ABNORMAL CT SCAN, LUNG: ICD-10-CM

## 2023-01-03 DIAGNOSIS — J45.50 SEVERE PERSISTENT ASTHMA WITHOUT COMPLICATION: Primary | ICD-10-CM

## 2023-01-03 DIAGNOSIS — J32.9 CHRONIC RHINOSINUSITIS: ICD-10-CM

## 2023-01-03 PROCEDURE — 3074F PR MOST RECENT SYSTOLIC BLOOD PRESSURE < 130 MM HG: ICD-10-PCS | Mod: CPTII,S$GLB,, | Performed by: INTERNAL MEDICINE

## 2023-01-03 PROCEDURE — 3074F SYST BP LT 130 MM HG: CPT | Mod: CPTII,S$GLB,, | Performed by: INTERNAL MEDICINE

## 2023-01-03 PROCEDURE — 3078F PR MOST RECENT DIASTOLIC BLOOD PRESSURE < 80 MM HG: ICD-10-PCS | Mod: CPTII,S$GLB,, | Performed by: INTERNAL MEDICINE

## 2023-01-03 PROCEDURE — 3078F DIAST BP <80 MM HG: CPT | Mod: CPTII,S$GLB,, | Performed by: INTERNAL MEDICINE

## 2023-01-03 PROCEDURE — 99999 PR PBB SHADOW E&M-EST. PATIENT-LVL III: ICD-10-PCS | Mod: PBBFAC,,, | Performed by: INTERNAL MEDICINE

## 2023-01-03 PROCEDURE — 1159F MED LIST DOCD IN RCRD: CPT | Mod: CPTII,S$GLB,, | Performed by: INTERNAL MEDICINE

## 2023-01-03 PROCEDURE — 99215 OFFICE O/P EST HI 40 MIN: CPT | Mod: S$GLB,,, | Performed by: INTERNAL MEDICINE

## 2023-01-03 PROCEDURE — 99999 PR PBB SHADOW E&M-EST. PATIENT-LVL III: CPT | Mod: PBBFAC,,, | Performed by: INTERNAL MEDICINE

## 2023-01-03 PROCEDURE — 99215 PR OFFICE/OUTPT VISIT, EST, LEVL V, 40-54 MIN: ICD-10-PCS | Mod: S$GLB,,, | Performed by: INTERNAL MEDICINE

## 2023-01-03 PROCEDURE — 3008F PR BODY MASS INDEX (BMI) DOCUMENTED: ICD-10-PCS | Mod: CPTII,S$GLB,, | Performed by: INTERNAL MEDICINE

## 2023-01-03 PROCEDURE — 1159F PR MEDICATION LIST DOCUMENTED IN MEDICAL RECORD: ICD-10-PCS | Mod: CPTII,S$GLB,, | Performed by: INTERNAL MEDICINE

## 2023-01-03 PROCEDURE — 3008F BODY MASS INDEX DOCD: CPT | Mod: CPTII,S$GLB,, | Performed by: INTERNAL MEDICINE

## 2023-01-03 RX ORDER — PREDNISONE 20 MG/1
40 TABLET ORAL DAILY
Qty: 5 TABLET | Refills: 0 | Status: SHIPPED | OUTPATIENT
Start: 2023-01-03 | End: 2023-01-04

## 2023-01-03 RX ORDER — BENZONATATE 200 MG/1
200 CAPSULE ORAL 3 TIMES DAILY PRN
Qty: 60 CAPSULE | Refills: 2 | Status: SHIPPED | OUTPATIENT
Start: 2023-01-03 | End: 2023-01-13

## 2023-01-03 NOTE — ASSESSMENT & PLAN NOTE
Currently on trelegy and singulair. Uses her duonebs at least twice daily, sometimes more. Has frequent productive cough and wheezing partially due to post nasal drip. Am concerned that uncontrolled Crohn's disease is contributing to lack of asthma control as well. Pt has great response to prednisone with AE of swelling in her face. Pt had previously been on it for 4 months with significant improvement in symptoms with relapse after stopping.    - will continue trelegy, singulair and PRN duonebs/combivent.   - Will discuss with immunologist, PCP and GI risk/benefit of Humira or alternatives in setting of immune deficiency.   - emergency prednisone prescribed.   - may end up requiring Dupixent if continues to be uncontrolled.

## 2023-01-03 NOTE — PROGRESS NOTES
Subjective:       Patient ID: Jaylin Murguia is a 57 y.o. female.    Chief Complaint: No chief complaint on file.    Pt is a 56 yo CW pmh Asthama, allergic rhinitis, chronic pansinusitis, hypogammaglobulinemia, Crohn's disease, fibromyalgia, chronic immunosuppression, TAMIKA on CPAP who presents for evaluation of Asthma and cough. Pt was diagnosed with Asthma approximately 20 years ago. She started having sinus issues approximately 7 years, which has required multiple debridements and treatments for Pseudomonal sinusitis. Pt's asthma worsened approximately 5 years ago. It has recently been fairly out of control with chronic wheezing requiring Triple inhaler therapy and use of PRN combivent and duonebs daily. Uses her duonebs in the AM and PM along with her nasal rinses and sprays. Has had multiple bouts of thrush of the mouth and pharynx requiring nystatin.     Per chart review:   On IgG replacement therapy, Hizentra  Recurrent nasal pseudomonas infections: ( 1/2022) treated with ceftazadime/avibactam x5 weeks with PICC (2/15/22-3/15/22)  Meropenam nasal rinses (as of 12/27/22)    Past Medications:  Remicade (in remote past)-- ineffective  Asacol 4.8 gm-- ineffective  Entocort-- effective  Prednisone  Humira (started July 17, stopped 2/2018)-- stopped 2/2 multiple infections.     FESS: Stenotrophomonas and Pseudomonas s/p cipro/bactrim  FeNO 78  Eos: 400 or less  IgE: <35  CFTR (-), alpha-1-antitrypsin wnl  Normal IgG1/4, low or low normal IgG 2/3    Inhaler use: Trelegy   PRN inhaler use: duonebs BID  Smoking hx: never smoker  Work hx: previous teacher  Exposure hx: none  Pets (dog),  birds(none), down furniture: pillows  Family hx lung disease: none  Family hx:   - father: scleroderma, father smoked  - mother: breast cancer  Personal hx:   Preeclampsia, eclampsia with second child. Pulmonary edema, acute heart failure?      Review of Systems   Constitutional:  Positive for activity change and fatigue. Negative  for fever, weight loss and weight gain.   HENT:  Positive for postnasal drip, voice change and congestion. Negative for sinus pressure.    Respiratory:  Positive for cough, sputum production, wheezing, asthma nighttime symptoms, dyspnea on extertion and use of rescue inhaler. Negative for shortness of breath and previous hospitialization due to pulmonary problems.    Cardiovascular:  Negative for chest pain, palpitations and leg swelling.   Musculoskeletal:  Negative for arthralgias, back pain and joint swelling.   Gastrointestinal:  Negative for nausea, vomiting and acid reflux.   Psychiatric/Behavioral:  Positive for sleep disturbance. Negative for confusion. The patient is not nervous/anxious.      Objective:      Physical Exam   Constitutional: She is oriented to person, place, and time. She appears well-developed. She is obese.   HENT:   Head: Normocephalic.   Nose: Mucosal edema present.   Cardiovascular: Normal rate, regular rhythm and normal heart sounds. Exam reveals no gallop and no friction rub.   No murmur heard.  Pulmonary/Chest: Normal expansion, symmetric chest wall expansion and effort normal. No stridor. No respiratory distress. She has no decreased breath sounds. She has wheezes. She has no rhonchi. She has no rales. Chest wall is not dull to percussion.   Musculoskeletal:         General: No edema.      Cervical back: Normal range of motion.   Neurological: She is alert and oriented to person, place, and time.   Skin: No cyanosis. Nails show no clubbing.   Psychiatric: She has a normal mood and affect. Her behavior is normal. Judgment and thought content normal.   Vitals reviewed.  Personal Diagnostic Review  Chest x-ray: 11/23/22  Right infrahilar atelectasis vs opacification. Hazy left suprahilar opacification concerning for developing PNA.     CT of chest performed on 5/6/22 without contrast revealed bibasilar posterior predominant tree and bud indicative of infectious vs inflammatory source.      Echocardiogram: 22    1 - Mild left atrial enlargement.     2 - Concentric hypertrophy.     3 - No wall motion abnormalities.     4 - Normal left ventricular systolic function (EF 60-65%).     5 - Normal left ventricular diastolic function.     6 - Normal right ventricular systolic function .     7 - The estimated PA systolic pressure is 28 mmHg.     8 - Trivial to mild aortic regurgitation.     9 - Trivial to mild mitral regurgitation.     Pulmonary function tests:   22  FEV1: 1.86L (65.9%)  FVC: 2.19L (61%)    22  FEV1: 1.86L  (65.5 % predicted),   FVC:  2.32L (64.4 % predicted),   FEV1/FVC:  80,   T.26L (78.3% predicted),   DLCO: 16.53 (66 % predicted)    No flowsheet data found.      Assessment:       1. Severe persistent asthma without complication    2. Abnormal CT scan, lung    3. Hypogammaglobulinemia    4. Chronic rhinosinusitis    5. Chronic cough        Outpatient Encounter Medications as of 1/3/2023   Medication Sig Dispense Refill    acetaminophen (TYLENOL) 500 MG tablet Take 2 tablets (1,000 mg total) by mouth every 6 (six) hours as needed for Pain. 60 tablet 0    albuterol-ipratropium (DUO-NEB) 2.5 mg-0.5 mg/3 mL nebulizer solution Take 3 mLs by nebulization every 6 (six) hours as needed for Wheezing. Rescue 270 mL 0    atorvastatin (LIPITOR) 10 MG tablet Take 1 tablet (10 mg total) by mouth once daily. (Patient taking differently: Take 10 mg by mouth every evening.) 90 tablet 3    azelastine (ASTELIN) 137 mcg (0.1 %) nasal spray 2 sprays (274 mcg total) by Nasal route 2 (two) times daily. 30 mL 11    butalbital-acetaminophen-caffeine -40 mg (FIORICET, ESGIC) -40 mg per tablet TAKE 1 TABLET EVERY 4 HOURS AS NEEDED FOR HEADACHE 90 tablet 3    cholestyramine (QUESTRAN) 4 gram packet Take 1 packet (4 g total) by mouth 3 (three) times daily with meals. 270 packet 3    diltiazem HCl (DILTIAZEM 2% - LIDOCAINE 5% CREAM) Apply peasize amount topically to anal area. 30 g 2     diphenhydrAMINE-aluminum-magnesium hydroxide-simethicone-LIDOcaine HCl 2% Swish and spit 15 mLs every 4 (four) hours as needed (oral ulcers, pain). 1 Bottle 2    DULoxetine (CYMBALTA) 60 MG capsule TAKE 1 CAPSULE BY MOUTH TWICE A DAY (Patient taking differently: Take 60 mg by mouth once daily.) 180 capsule 3    eletriptan (RELPAX) 40 MG tablet Take 1 tablet (40 mg total) by mouth as needed. (Patient taking differently: Take 40 mg by mouth as needed. Take if needed) 12 tablet 3    estradioL (ESTRACE) 2 MG tablet Take 1 tablet (2 mg total) by mouth every evening. 30 tablet 6    fluticasone propionate (FLONASE) 50 mcg/actuation nasal spray 2 sprays (100 mcg total) by Each Nostril route once daily. 16 mL 0    fluticasone-umeclidin-vilanter (TRELEGY ELLIPTA) 100-62.5-25 mcg DsDv Inhale 1 puff into the lungs once daily. 90 each 3    immun glob G,IgG,-pro-IgA 0-50 (HIZENTRA) 10 gram/50 mL (20 %) Soln Inject 70 mLs (14 g total) into the skin every 7 days. 280 mL 11    ipratropium-albuteroL (COMBIVENT)  mcg/actuation inhaler Inhale 2 puffs into the lungs every 6 (six) hours as needed for Wheezing. Rescue 4 g 3    losartan (COZAAR) 100 MG tablet Take 1 tablet (100 mg total) by mouth once daily. 90 tablet 3    montelukast (SINGULAIR) 10 mg tablet TAKE 1 TABLET EVERY EVENING 90 tablet 3    nortriptyline (PAMELOR) 25 MG capsule Take 1 capsule (25 mg total) by mouth every evening. 90 capsule 3    nystatin (MYCOSTATIN) 100,000 unit/mL suspension Take 4 mLs (400,000 Units total) by mouth 2 hours after meals and at bedtime. for 10 days 240 mL 3    pregabalin (LYRICA) 150 MG capsule Take 1 capsule (150 mg total) by mouth 3 (three) times daily. 270 capsule 1    propranoloL (INDERAL LA) 80 MG 24 hr capsule TAKE 1 CAPSULE BY MOUTH ONCE DAILY. (Patient taking differently: 80 mg nightly.) 90 capsule 2    rizatriptan (MAXALT) 10 MG tablet TAKE 1 TABLET IF NEEDED FOR MIGRAINES. MAX 2 TABLETS IN 24 HOURS. 30 tablet 8    sodium  chloride 0.9% 0.9 % Soln 200 mL with gentamicin (ped) 20 mg/2 mL Soln EMPTY CONTENTS OF 1 CAPSULE INTO NASAL IRRIGATION SYSTEM, ADD DISTILLED WATER, SALT PACK, MIX & IRRIGATE. PERFORM 2 TIMES DAILY      topiramate (TOPAMAX) 100 MG tablet TAKE 1 TABLET TWICE A  tablet 3    traZODone (DESYREL) 50 MG tablet Take 1 tablet (50 mg total) by mouth every evening. 90 tablet 3    triamcinolone acetonide 0.025% (KENALOG) 0.025 % cream 1 application once daily. Apply to affected area      VENTOLIN HFA 90 mcg/actuation inhaler INHALE 2 PUFFS INTO THE LUNGS EVERY 4 TO 6 HOURS AS NEEDED FOR WHEEZING. (Patient taking differently: Take if needed & bring) 18 Inhaler 1    XYLITOL, BULK, MISC EMPTY CONTENTS OF 1 CAPSULE INTO NASAL IRRIGATION SYSTEM, ADD DISTILLED WATER, SALT PACK, MIX & IRRIGATE. PERFORM 2 TIMES DAILY       Facility-Administered Encounter Medications as of 1/3/2023   Medication Dose Route Frequency Provider Last Rate Last Admin    lactated ringers infusion   Intravenous Continuous Mary Leiva MD   New Bag at 03/12/20 0923    lactated ringers infusion   Intravenous Continuous Shay Bruce MD   Stopped at 03/16/22 1342    lidocaine (PF) 10 mg/ml (1%) injection 10 mg  1 mL Intradermal Once Mary Leiva MD        LIDOcaine (PF) 10 mg/ml (1%) injection 10 mg  1 mL Intradermal Once Johan Baez MD        sodium chloride 0.9% flush 10 mL  10 mL Intravenous PRN Johan Baez MD         Orders Placed This Encounter   Procedures    CT Chest Without Contrast     Standing Status:   Future     Standing Expiration Date:   1/3/2024     Order Specific Question:   May the Radiologist modify the order per protocol to meet the clinical needs of the patient?     Answer:   Yes    Spirometry with/without bronchodilator     Standing Status:   Future     Standing Expiration Date:   1/3/2024     Scheduling Instructions:      Day of next appointment     Order Specific Question:   Release to patient      Answer:   Immediate    Lung Volumes     Standing Status:   Future     Standing Expiration Date:   1/3/2024     Scheduling Instructions:      Day of next appointment     Order Specific Question:   Release to patient     Answer:   Immediate    DLCO-Carbon Monoxide Diffusing Capacity     Standing Status:   Future     Standing Expiration Date:   1/3/2024     Scheduling Instructions:      Day of next appointment.     Order Specific Question:   Release to patient     Answer:   Immediate       Plan:       Chronic rhinosinusitis  Secondary to chronic pseudomonas infection with multiple rounds of abx and debriedments. On chronic antibiotic nasal rinses, flonase, azelastine. Followed by Dr. Roach. Likely contributing somewhat to lack of asthma control due to postnasal drip, however unsure if anyway to be more aggressive with treatment. Continue treatment per Dr. Roach and suggest post nasal rinse duonebs.     Severe persistent asthma without complication  Currently on trelegy and singulair. Uses her duonebs at least twice daily, sometimes more. Has frequent productive cough and wheezing partially due to post nasal drip. Am concerned that uncontrolled Crohn's disease is contributing to lack of asthma control as well. Pt has great response to prednisone with AE of swelling in her face. Pt had previously been on it for 4 months with significant improvement in symptoms with relapse after stopping.    - will continue trelegy, singulair and PRN duonebs/combivent.   - Will discuss with immunologist, PCP and GI risk/benefit of Humira or alternatives in setting of immune deficiency.   - emergency prednisone prescribed.   - may end up requiring Dupixent if continues to be uncontrolled.     Abnormal CT scan, lung  Mild lower lobe GGO with surrounding tree and bud formation. Inflammation vs chronic infection.   - repeat CT chest a year from previous to determine worsening or resolution.     Chronic cough  Multifactorial in setting of  uncontrolled asthma and post nasal drip.   - tessalon pearls  - continue aggressive treatment of asthma and chronic sinusitis.         Follow up in 2 months.     Ivan Riley MD  [facility]

## 2023-01-03 NOTE — ASSESSMENT & PLAN NOTE
Mild lower lobe GGO with surrounding tree and bud formation. Inflammation vs chronic infection.   - repeat CT chest a year from previous to determine worsening or resolution.

## 2023-01-03 NOTE — ASSESSMENT & PLAN NOTE
Secondary to chronic pseudomonas infection with multiple rounds of abx and debriedments. On chronic antibiotic nasal rinses, flonase, azelastine. Followed by Dr. Roach. Likely contributing somewhat to lack of asthma control due to postnasal drip, however unsure if anyway to be more aggressive with treatment. Continue treatment per Dr. Roach and suggest post nasal rinse duonebs.

## 2023-01-03 NOTE — ASSESSMENT & PLAN NOTE
Multifactorial in setting of uncontrolled asthma and post nasal drip.   - tessalon pearls  - continue aggressive treatment of asthma and chronic sinusitis.

## 2023-01-04 ENCOUNTER — PATIENT MESSAGE (OUTPATIENT)
Dept: PULMONOLOGY | Facility: CLINIC | Age: 58
End: 2023-01-04
Payer: COMMERCIAL

## 2023-01-04 DIAGNOSIS — J45.50 SEVERE PERSISTENT ASTHMA WITHOUT COMPLICATION: Primary | ICD-10-CM

## 2023-01-04 RX ORDER — PREDNISONE 20 MG/1
40 TABLET ORAL DAILY
Qty: 10 TABLET | Refills: 0 | Status: SHIPPED | OUTPATIENT
Start: 2023-01-04 | End: 2023-03-15

## 2023-01-08 ENCOUNTER — PATIENT MESSAGE (OUTPATIENT)
Dept: PULMONOLOGY | Facility: CLINIC | Age: 58
End: 2023-01-08
Payer: COMMERCIAL

## 2023-01-11 ENCOUNTER — PATIENT MESSAGE (OUTPATIENT)
Dept: GASTROENTEROLOGY | Facility: CLINIC | Age: 58
End: 2023-01-11
Payer: COMMERCIAL

## 2023-01-23 ENCOUNTER — LAB VISIT (OUTPATIENT)
Dept: LAB | Facility: HOSPITAL | Age: 58
End: 2023-01-23
Attending: INTERNAL MEDICINE
Payer: COMMERCIAL

## 2023-01-23 ENCOUNTER — OFFICE VISIT (OUTPATIENT)
Dept: GASTROENTEROLOGY | Facility: CLINIC | Age: 58
End: 2023-01-23
Payer: COMMERCIAL

## 2023-01-23 VITALS
SYSTOLIC BLOOD PRESSURE: 124 MMHG | HEART RATE: 58 BPM | WEIGHT: 178.81 LBS | DIASTOLIC BLOOD PRESSURE: 78 MMHG | BODY MASS INDEX: 28.07 KG/M2 | HEIGHT: 67 IN

## 2023-01-23 DIAGNOSIS — K50.80 CROHN'S DISEASE OF BOTH SMALL AND LARGE INTESTINE WITHOUT COMPLICATION: ICD-10-CM

## 2023-01-23 DIAGNOSIS — D80.1 HYPOGAMMAGLOBULINEMIA: ICD-10-CM

## 2023-01-23 DIAGNOSIS — R10.9 ABDOMINAL PAIN, UNSPECIFIED ABDOMINAL LOCATION: ICD-10-CM

## 2023-01-23 DIAGNOSIS — K50.80 CROHN'S DISEASE OF BOTH SMALL AND LARGE INTESTINE WITHOUT COMPLICATION: Primary | ICD-10-CM

## 2023-01-23 PROCEDURE — 99999 PR PBB SHADOW E&M-EST. PATIENT-LVL III: ICD-10-PCS | Mod: PBBFAC,,, | Performed by: INTERNAL MEDICINE

## 2023-01-23 PROCEDURE — 82306 VITAMIN D 25 HYDROXY: CPT | Performed by: INTERNAL MEDICINE

## 2023-01-23 PROCEDURE — 99215 OFFICE O/P EST HI 40 MIN: CPT | Mod: S$GLB,,, | Performed by: INTERNAL MEDICINE

## 2023-01-23 PROCEDURE — 82607 VITAMIN B-12: CPT | Performed by: INTERNAL MEDICINE

## 2023-01-23 PROCEDURE — 86140 C-REACTIVE PROTEIN: CPT | Performed by: INTERNAL MEDICINE

## 2023-01-23 PROCEDURE — 1159F MED LIST DOCD IN RCRD: CPT | Mod: CPTII,S$GLB,, | Performed by: INTERNAL MEDICINE

## 2023-01-23 PROCEDURE — 36415 COLL VENOUS BLD VENIPUNCTURE: CPT | Performed by: INTERNAL MEDICINE

## 2023-01-23 PROCEDURE — 3078F DIAST BP <80 MM HG: CPT | Mod: CPTII,S$GLB,, | Performed by: INTERNAL MEDICINE

## 2023-01-23 PROCEDURE — 85652 RBC SED RATE AUTOMATED: CPT | Performed by: INTERNAL MEDICINE

## 2023-01-23 PROCEDURE — 3074F PR MOST RECENT SYSTOLIC BLOOD PRESSURE < 130 MM HG: ICD-10-PCS | Mod: CPTII,S$GLB,, | Performed by: INTERNAL MEDICINE

## 2023-01-23 PROCEDURE — 1159F PR MEDICATION LIST DOCUMENTED IN MEDICAL RECORD: ICD-10-PCS | Mod: CPTII,S$GLB,, | Performed by: INTERNAL MEDICINE

## 2023-01-23 PROCEDURE — 99999 PR PBB SHADOW E&M-EST. PATIENT-LVL III: CPT | Mod: PBBFAC,,, | Performed by: INTERNAL MEDICINE

## 2023-01-23 PROCEDURE — 3078F PR MOST RECENT DIASTOLIC BLOOD PRESSURE < 80 MM HG: ICD-10-PCS | Mod: CPTII,S$GLB,, | Performed by: INTERNAL MEDICINE

## 2023-01-23 PROCEDURE — 99215 PR OFFICE/OUTPT VISIT, EST, LEVL V, 40-54 MIN: ICD-10-PCS | Mod: S$GLB,,, | Performed by: INTERNAL MEDICINE

## 2023-01-23 PROCEDURE — 80053 COMPREHEN METABOLIC PANEL: CPT | Performed by: INTERNAL MEDICINE

## 2023-01-23 PROCEDURE — 85025 COMPLETE CBC W/AUTO DIFF WBC: CPT | Performed by: INTERNAL MEDICINE

## 2023-01-23 PROCEDURE — 3074F SYST BP LT 130 MM HG: CPT | Mod: CPTII,S$GLB,, | Performed by: INTERNAL MEDICINE

## 2023-01-23 PROCEDURE — 3008F BODY MASS INDEX DOCD: CPT | Mod: CPTII,S$GLB,, | Performed by: INTERNAL MEDICINE

## 2023-01-23 PROCEDURE — 3008F PR BODY MASS INDEX (BMI) DOCUMENTED: ICD-10-PCS | Mod: CPTII,S$GLB,, | Performed by: INTERNAL MEDICINE

## 2023-01-23 RX ORDER — SODIUM, POTASSIUM,MAG SULFATES 17.5-3.13G
1 SOLUTION, RECONSTITUTED, ORAL ORAL DAILY
Qty: 1 KIT | Refills: 0 | Status: SHIPPED | OUTPATIENT
Start: 2023-01-23 | End: 2023-01-25

## 2023-01-23 NOTE — PROGRESS NOTES
Clinic Consult:  [facility] Consultation Note    Reason for Consult:  The primary encounter diagnosis was Crohn's disease of both small and large intestine without complication. Diagnoses of Hypogammaglobulinemia and Abdominal pain, unspecified abdominal location were also pertinent to this visit.    PCP: Esthela Hu       HPI:  This is a 57 y.o. female here for evaluation of CD    IBD History  - Type: crohn's disease  - Disease Location: small bowel  - Phenotype: stricture   - Diagnosed: 2000  - Surgeries related to IBD: none  - Extra-intestinal Manifestations: oral aphthous ulcers     Current Medications  none     Past Medications  Remicade (in remote past)-- ineffective  Asacol 4.8 gm-- ineffective  Entocort-- effective  Prednisone  Humira (started July 17, stopped 2/2018)-- stopped 2/2 multiple infections.      Drug and Antibody Levels    (2018) Humira level 2.7, intermediate antibody formation.     Endoscopy Reports  5/7/15 colonoscopy: poor prep. Skin tag. Crohn's disease with ileitis. Biopsied. Pathology: focal chronic active ileitis.   5/9//17: erythematous mucosa in the sigmoid, transverse and hepatic flexure. 2 mm polyp at hepatic flexure. Crohn's disease with ileitis. Pathology: colon and ileal bx normal colon mucosa. Polyp normal colon mucosa.   5/9/17 EGD: gastritis and duodenal erosions without bleeding. Pathology acute and chronic non-specific duodenitis with ulceration.   3/27/18 colonoscopy: 3 mm polyp in the sigmoid. No signs of active crohn's disease and no signs of stricture in the TI (advanced up to 15 cm). Pathology: hyperplastic polyp.   3/2020 colonoscopy: SESCD 4, ileitis   3/2022: single aptha in ileum, single aptha in colon     Interval History    Diarrhea has worsened.  Having up to 10 Bms, mucus, watery   Having nocturnal Bms, endorses abdominal pain  Endorses joint pain -- hips, knees, elbows; endorses fatigue.    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  - Date of last skin cancer screening: due   - Date of last surveillance colonoscopy: n/a     Bone Health  - Vitamin D level: ?  - Date of last DEXA: ?     Therapy Related Testing  - Date of last TB testin  - TPMT status: ?     Miscellaneous  - Vitamin B 12 level (if ileal disease or resection):   - Smoking status: no  - NSAID use: no  - History of C. Diff: no  - Family planning: n/a      ROS:  CONSTITUTIONAL: Denies weight change,  fatigue, fevers, chills, night sweats.  EYES: No changes in vision.   ENT: No oral lesions or sore throat.  HEMATOLOGICAL/Lymph: Denies bleeding tendency, bruising tendency. No swellings or enlarged lymph nodes.  CARDIOVASCULAR: Denies chest pain, shortness of breath, orthopnea and edema.  RESPIRATORY: Denies cough, hemoptysis, dyspnea, and wheezing.  GI: See HPI.  : Denies dysuria and hematuria  MUSCULOSKELETAL: Denies joint pain or swelling, back pain and muscle pain.  SKIN: Denies rashes.  NEUROLOGIC: Denies headaches, seizures and numbness.  PSYCHIATRIC: Denies depression or anxiety.  ENDOCRINE: Denies heat or cold intolerance and excessive thirst or urination.    Medical History:   Past Medical History:   Diagnosis Date    Allergic rhinitis     Asthma     Chronic pansinusitis     Crohn's disease     Ileal involvement, previously on Remicade, Asacol, Prednisone    Fibromyalgia     Hyperlipidemia     Hypertension     Immunosuppression     Migraine     Obstructive sleep apnea     CPAP at night    Sciatica        Surgical History:  Past Surgical History:   Procedure Laterality Date    BLADDER SURGERY      sling was created by her muscles      SECTION      COLONOSCOPY N/A 2017    Procedure: COLONOSCOPY;  Surgeon:  Kin Dyer MD;  Location: [facility];  Service: Endoscopy;  Laterality: N/A;    COLONOSCOPY N/A 3/27/2018    Procedure: COLONOSCOPY;  Surgeon: Kyra Vallecillo MD;  Location: [facility] ENDO;  Service: Endoscopy;  Laterality: N/A;    COLONOSCOPY N/A 3/12/2020    Procedure: COLONOSCOPY;  Surgeon: Nicole Leal MD;  Location: [facility] ENDO;  Service: Endoscopy;  Laterality: N/A;    COLONOSCOPY N/A 3/16/2022    Procedure: COLONOSCOPY;  Surgeon: Shay Bruce MD;  Location: [facility];  Service: Endoscopy;  Laterality: N/A;    DEBRIDEMENT Bilateral 12/21/2020    Procedure: DEBRIDEMENT;  Surgeon: Matthias Roach MD;  Location: [facility] OR [address] [place] [address];  Service: ENT;  Laterality: Bilateral;    ESOPHAGOGASTRODUODENOSCOPY N/A 3/12/2020    Procedure: ESOPHAGOGASTRODUODENOSCOPY (EGD);  Surgeon: Nicole Leal MD;  Location: [facility];  Service: Endoscopy;  Laterality: N/A;    ESOPHAGOGASTRODUODENOSCOPY N/A 3/16/2022    Procedure: EGD (ESOPHAGOGASTRODUODENOSCOPY);  Surgeon: Shay Bruce MD;  Location: [facility];  Service: Endoscopy;  Laterality: N/A;    FINGER SURGERY      joint relpacement, left hand index finger    FUNCTIONAL ENDOSCOPIC SINUS SURGERY (FESS) USING COMPUTER-ASSISTED NAVIGATION Bilateral 7/31/2019    Procedure: FESS, USING COMPUTER-ASSISTED NAVIGATION;  Surgeon: Manish Shaffer MD;  Location: [facility];  Service: ENT;  Laterality: Bilateral;    FUNCTIONAL ENDOSCOPIC SINUS SURGERY (FESS) USING COMPUTER-ASSISTED NAVIGATION Bilateral 9/25/2020    Procedure: FESS, USING COMPUTER-ASSISTED NAVIGATION SPHENOID;  Surgeon: Matthias Roach MD;  Location: [facility] OR [facility];  Service: ENT;  Laterality: Bilateral;  TIVA    FUNCTIONAL ENDOSCOPIC SINUS SURGERY (FESS) USING COMPUTER-ASSISTED NAVIGATION Bilateral 9/30/2022    Procedure: FESS, USING COMPUTER-ASSISTED NAVIGATION;  Surgeon: Matthias Roach MD;  Location: [facility] OR [facility];  Service: ENT;  Laterality: Bilateral;    HYSTERECTOMY      SINUS SURGERY       WISDOM TOOTH EXTRACTION         Family History:   Family History   Problem Relation Age of Onset    Breast cancer Mother     Hypertension Mother     Allergies Mother     Kidney disease Father 64        ESRD on HD    Scleroderma Father     Breast cancer Maternal Grandmother     Heart attack Maternal Grandmother     COPD Maternal Grandmother 72    Cancer Paternal Grandmother 70        colon    Hypertension Brother        Social History:   Social History     Tobacco Use    Smoking status: Never     Passive exposure: Never    Smokeless tobacco: Never   Substance Use Topics    Alcohol use: No    Drug use: No       Allergies: Reviewed    Home Medications:   Medication List with Changes/Refills   New Medications    SODIUM,POTASSIUM,MAG SULFATES (SUPREP BOWEL PREP KIT) 17.5-3.13-1.6 GRAM SOLR    Take 177 mLs by mouth once daily. for 2 days   Current Medications    ACETAMINOPHEN (TYLENOL) 500 MG TABLET    Take 2 tablets (1,000 mg total) by mouth every 6 (six) hours as needed for Pain.    ALBUTEROL-IPRATROPIUM (DUO-NEB) 2.5 MG-0.5 MG/3 ML NEBULIZER SOLUTION    Take 3 mLs by nebulization every 6 (six) hours as needed for Wheezing. Rescue    ATORVASTATIN (LIPITOR) 10 MG TABLET    Take 1 tablet (10 mg total) by mouth once daily.    AZELASTINE (ASTELIN) 137 MCG (0.1 %) NASAL SPRAY    2 sprays (274 mcg total) by Nasal route 2 (two) times daily.    BUTALBITAL-ACETAMINOPHEN-CAFFEINE -40 MG (FIORICET, ESGIC) -40 MG PER TABLET    TAKE 1 TABLET EVERY 4 HOURS AS NEEDED FOR HEADACHE    CHOLESTYRAMINE (QUESTRAN) 4 GRAM PACKET    Take 1 packet (4 g total) by mouth 3 (three) times daily with meals.    DILTIAZEM HCL (DILTIAZEM 2% - LIDOCAINE 5% CREAM)    Apply peasize amount topically to anal area.    DIPHENHYDRAMINE-ALUMINUM-MAGNESIUM HYDROXIDE-SIMETHICONE-LIDOCAINE HCL 2%    Swish and spit 15 mLs every 4 (four) hours as needed (oral ulcers, pain).    DULOXETINE (CYMBALTA) 60 MG CAPSULE    TAKE 1 CAPSULE BY MOUTH TWICE A DAY     ELETRIPTAN (RELPAX) 40 MG TABLET    Take 1 tablet (40 mg total) by mouth as needed.    ESTRADIOL (ESTRACE) 2 MG TABLET    Take 1 tablet (2 mg total) by mouth every evening.    FLUTICASONE PROPIONATE (FLONASE) 50 MCG/ACTUATION NASAL SPRAY    2 sprays (100 mcg total) by Each Nostril route once daily.    FLUTICASONE-UMECLIDIN-VILANTER (TRELEGY ELLIPTA) 100-62.5-25 MCG DSDV    Inhale 1 puff into the lungs once daily.    IMMUN GLOB G,IGG,-PRO-IGA 0-50 (HIZENTRA) 10 GRAM/50 ML (20 %) SOLN    Inject 70 mLs (14 g total) into the skin every 7 days.    IPRATROPIUM-ALBUTEROL (COMBIVENT)  MCG/ACTUATION INHALER    Inhale 2 puffs into the lungs every 6 (six) hours as needed for Wheezing. Rescue    LOSARTAN (COZAAR) 100 MG TABLET    Take 1 tablet (100 mg total) by mouth once daily.    MONTELUKAST (SINGULAIR) 10 MG TABLET    TAKE 1 TABLET EVERY EVENING    NORTRIPTYLINE (PAMELOR) 25 MG CAPSULE    Take 1 capsule (25 mg total) by mouth every evening.    PREDNISONE (DELTASONE) 20 MG TABLET    Take 2 tablets (40 mg total) by mouth once daily.    PREGABALIN (LYRICA) 150 MG CAPSULE    Take 1 capsule (150 mg total) by mouth 3 (three) times daily.    PROPRANOLOL (INDERAL LA) 80 MG 24 HR CAPSULE    TAKE 1 CAPSULE BY MOUTH ONCE DAILY.    RIZATRIPTAN (MAXALT) 10 MG TABLET    TAKE 1 TABLET IF NEEDED FOR MIGRAINES. MAX 2 TABLETS IN 24 HOURS.    SODIUM CHLORIDE 0.9% 0.9 % SOLN 200 ML WITH GENTAMICIN (PED) 20 MG/2 ML SOLN    EMPTY CONTENTS OF 1 CAPSULE INTO NASAL IRRIGATION SYSTEM, ADD DISTILLED WATER, SALT PACK, MIX & IRRIGATE. PERFORM 2 TIMES DAILY    TOPIRAMATE (TOPAMAX) 100 MG TABLET    TAKE 1 TABLET TWICE A DAY    TRAZODONE (DESYREL) 50 MG TABLET    Take 1 tablet (50 mg total) by mouth every evening.    TRIAMCINOLONE ACETONIDE 0.025% (KENALOG) 0.025 % CREAM    1 application once daily. Apply to affected area    VENTOLIN HFA 90 MCG/ACTUATION INHALER    INHALE 2 PUFFS INTO THE LUNGS EVERY 4 TO 6 HOURS AS NEEDED FOR WHEEZING.     "XYLITOL, BULK, MISC    EMPTY CONTENTS OF 1 CAPSULE INTO NASAL IRRIGATION SYSTEM, ADD DISTILLED WATER, SALT PACK, MIX & IRRIGATE. PERFORM 2 TIMES DAILY         Physical Exam:  Vital Signs:  /78 (BP Location: Left arm, Patient Position: Sitting, BP Method: Medium (Manual))   Pulse (!) 58   Ht 5' 7" (1.702 m)   Wt 81.1 kg (178 lb 12.7 oz)   BMI 28.00 kg/m²   Body mass index is 28 kg/m².      GENERAL: No acute distress, A&Ox3  EYES: Anicteric, no pallor noted.  ENT: OP clear  NECK: Supple, no masses, no thyromegally.  CHEST: Equal breath sounds bilaterally, no wheezing.  CARDIOVASCULAR: Regular rate and rhythm. Murmurs, rubs and gallops absent.  ABDOMEN: soft, non-tender, non-distended, normal bowel sounds, no hepatosplenomegaly   EXTREMITIES: No clubbing, cyanosis or edema.  SKIN: Without lesion or erythema.  LYMPH: No cervical, axillary lymphadenopathy palpable.   NEUROLOGICAL: Grossly normal, no asterixis present.    Labs: Pertinent labs reviewed.    Assessment and Plan:  Crohn's disease of both small and large intestine without complication  Pt with active disease, although mild on prior colonoscopy, not on medication.  We discussed the need for advance therapy to control Crohn's disease even if biopsies are mild.  She is now having worsening symptoms so I suspect disease has progressed or there is an additional etiology such as infection.  We have discussed humira in the past and that it is reasonable given her infections.  However, there are other advance therapies that are equally as effective and have lower infection rates (compared to anti-tnfs).  Today we discussed skyrizi and stelara.  I would like to try for skyrizi.  Will do EGD and colonoscopy to determine disease severity and begin the process of PA, etc.  Check c diff and calprotectin and labs today.  Also rheum referral for joint symptoms.       -     C Diff Toxin by PCR; Future  -     Calprotectin, Stool; Future; Expected date: 01/23/2023  -  "    Vitamin B12; Future; Expected date: 01/23/2023  -     Vitamin D; Future; Expected date: 01/23/2023  -     Sedimentation rate; Future; Expected date: 01/23/2023  -     CBC Auto Differential; Future; Expected date: 01/23/2023  -     Comprehensive Metabolic Panel; Future; Expected date: 01/23/2023  -     C-Reactive Protein; Future; Expected date: 01/23/2023  -     Case Request Endoscopy: EGD (ESOPHAGOGASTRODUODENOSCOPY), COLONOSCOPY  -     Ambulatory referral/consult to Rheumatology; Future; Expected date: 01/30/2023    Hypogammaglobulinemia    Abdominal pain, unspecified abdominal location    Other orders  -     sodium,potassium,mag sulfates (SUPREP BOWEL PREP KIT) 17.5-3.13-1.6 gram SolR; Take 177 mLs by mouth once daily. for 2 days  Dispense: 1 kit; Refill: 0              Thank you so much for allowing me to participate in the care of Jaylin Leal MD

## 2023-01-24 ENCOUNTER — PATIENT MESSAGE (OUTPATIENT)
Dept: PULMONOLOGY | Facility: CLINIC | Age: 58
End: 2023-01-24
Payer: COMMERCIAL

## 2023-01-24 ENCOUNTER — PATIENT MESSAGE (OUTPATIENT)
Dept: ENDOSCOPY | Facility: HOSPITAL | Age: 58
End: 2023-01-24
Payer: COMMERCIAL

## 2023-01-24 LAB
25(OH)D3+25(OH)D2 SERPL-MCNC: 77 NG/ML (ref 30–96)
ALBUMIN SERPL BCP-MCNC: 3.5 G/DL (ref 3.5–5.2)
ALP SERPL-CCNC: 84 U/L (ref 55–135)
ALT SERPL W/O P-5'-P-CCNC: 28 U/L (ref 10–44)
ANION GAP SERPL CALC-SCNC: 7 MMOL/L (ref 8–16)
AST SERPL-CCNC: 31 U/L (ref 10–40)
BASOPHILS # BLD AUTO: 0.09 K/UL (ref 0–0.2)
BASOPHILS NFR BLD: 1.2 % (ref 0–1.9)
BILIRUB SERPL-MCNC: 0.3 MG/DL (ref 0.1–1)
BUN SERPL-MCNC: 10 MG/DL (ref 6–20)
CALCIUM SERPL-MCNC: 9.1 MG/DL (ref 8.7–10.5)
CHLORIDE SERPL-SCNC: 107 MMOL/L (ref 95–110)
CO2 SERPL-SCNC: 22 MMOL/L (ref 23–29)
CREAT SERPL-MCNC: 0.9 MG/DL (ref 0.5–1.4)
CRP SERPL-MCNC: 3.2 MG/L (ref 0–8.2)
DIFFERENTIAL METHOD: ABNORMAL
EOSINOPHIL # BLD AUTO: 2.3 K/UL (ref 0–0.5)
EOSINOPHIL NFR BLD: 29.8 % (ref 0–8)
ERYTHROCYTE [DISTWIDTH] IN BLOOD BY AUTOMATED COUNT: 12.9 % (ref 11.5–14.5)
ERYTHROCYTE [SEDIMENTATION RATE] IN BLOOD BY PHOTOMETRIC METHOD: 46 MM/HR (ref 0–36)
EST. GFR  (NO RACE VARIABLE): >60 ML/MIN/1.73 M^2
GLUCOSE SERPL-MCNC: 87 MG/DL (ref 70–110)
HCT VFR BLD AUTO: 40.6 % (ref 37–48.5)
HGB BLD-MCNC: 13 G/DL (ref 12–16)
IMM GRANULOCYTES # BLD AUTO: 0.01 K/UL (ref 0–0.04)
IMM GRANULOCYTES NFR BLD AUTO: 0.1 % (ref 0–0.5)
LYMPHOCYTES # BLD AUTO: 2.1 K/UL (ref 1–4.8)
LYMPHOCYTES NFR BLD: 27.6 % (ref 18–48)
MCH RBC QN AUTO: 31.3 PG (ref 27–31)
MCHC RBC AUTO-ENTMCNC: 32 G/DL (ref 32–36)
MCV RBC AUTO: 98 FL (ref 82–98)
MONOCYTES # BLD AUTO: 0.5 K/UL (ref 0.3–1)
MONOCYTES NFR BLD: 6.6 % (ref 4–15)
NEUTROPHILS # BLD AUTO: 2.7 K/UL (ref 1.8–7.7)
NEUTROPHILS NFR BLD: 34.7 % (ref 38–73)
NRBC BLD-RTO: 0 /100 WBC
OVALOCYTES BLD QL SMEAR: ABNORMAL
PLATELET # BLD AUTO: 260 K/UL (ref 150–450)
PLATELET BLD QL SMEAR: ABNORMAL
PMV BLD AUTO: 11 FL (ref 9.2–12.9)
POTASSIUM SERPL-SCNC: 4.3 MMOL/L (ref 3.5–5.1)
PROT SERPL-MCNC: 7.5 G/DL (ref 6–8.4)
RBC # BLD AUTO: 4.15 M/UL (ref 4–5.4)
SODIUM SERPL-SCNC: 136 MMOL/L (ref 136–145)
VIT B12 SERPL-MCNC: >2000 PG/ML (ref 210–950)
WBC # BLD AUTO: 7.62 K/UL (ref 3.9–12.7)

## 2023-01-24 NOTE — DISCHARGE INSTRUCTIONS
PLEASE PERFORM SINUS RINSES 5-7 TIMES DAILY UNTIL YOUR FIRST POSTOPERATIVE VISIT.          DIRECTIONS FOR SINUS SALINE RINSE To see demonstration: Enter http://www.youtube.com/watch?[email or web address] into the browser address box, or go to You tube, and under the search box, enter sinus rinse. Click on [facility] Sinus Rinse Video    Step 1    Step 1 Please wash your hands. Fill the clean bottle with the designated volume of warm distilled water, filtered water or previously boiled water. You may warm the water in a microwave but we recommend that you warm it in increments of five seconds. This is to avoid excessive heating of the water and damage to the device or scalding your nasal passage.    Step 2    Step 2 Cut the SINUS RINSE mixture packet at the corner and pour its contents into the bottle. Tighten the cap & tube on the bottle securely, place one finger over the tip of the cap and shake the bottle gently to dissolve the mixture.      Step 3    Step 3 Standing in front of a sink, bend forward to your comfort level and tilt your head down. Keeping your mouth open without holding your breath, place cap snugly against your nasal passage and SQUEEZE BOTTLE GENTLY until the solution starts draining from the OPPOSITE nasal passage or from your mouth. Keep squeezing the bottle GENTLY until at least 1/4 to 1/2 (60 to 120 mL) of the bottle is used for a proper rinse. Do not swallow the solution.    Step 4    Blow your nose gently, without pinching your nose completely because this will apply pressure on the    eardrums. If tolerable, sniff in any residual solution remaining in the nasal passage once or twice prior to    blowing your nose as this may clean out the posterior nasopharyngeal area (the area at the back of your    nasal passage). Some solution will reach the back of the throat, so please spit it out. To help improve    drainage of any residual solution, blow your nose gently while tilting your head to the  opposite side of    the nasal passage that you just rinsed.    Step 5    Now repeat steps 3 & 4 for your other nasal passage.    Step 6 Air dry the Sinus Rinse bottle, cap, and tube on a clean paper towel, a glass plate to store the bottle cap and tube. If there is any solution leftover, please discard it. We recommend you make a fresh solution each time you rinse. Rinse 5 times each day, OR as directed by your physician. Warnings: DO NOT RINSE IF NASAL PASSAGE IS COMPLETELY BLOCKED OR IF YOU HAVE AN EAR INFECTION OR BLOCKED EARS. If you have had ear surgery, please contact your physician prior to irrigation. If you experience any pressure in your ears, stop the rinse and get further directions from your physician or contact our office during regular business hours. To avoid any ear discomfort: Heat the solution to lukewarm, do not use hot, boiling or cold water. Keep your mouth open. Do not hold your breath and if possible make the sound JOSE....JOSE... Make sure to take the position as shown. Gently squeeze 1/4 of the bottle at a time (60mL / 2 ounces). Stop the rinse if you feel any solution sensation near the ears. You may rinse with a partially blocked nasal passage. Please do not use for any other purposes. Please rinse at least ONE HOUR PRIOR to bedtime, in order to avoid any residual solution dripping in the throat.    >> Before using the SINUS RINSE kit, please inspect the cap, tube and bottle carefully for wear and    tear. If any of the components appear discolored or cracked, please contact [facility] to obtain a    replacement. You must follow the cleaning instructions provided in this brochure or cleaning instruction    card prior to each use.    >> The SINUS RINSE bottle and mixture are to be used only for nasalirrigation. Do not use for any other    purposes.    >> We recommend that you use the rinse ONE HOUR PRIOR to bedtime in order to avoid any residual    solution dripping in the throat.    Tips to  avoid ear discomfort while rinsing    If you have had ear surgery, please contact your physician prior to irrigation. Do not use if you have an    ear infection or blocked ears. Rinse with lukewarm water. Keep your mouth open. Do not hold your    breath while rinsing. While rinsing, make sure to tilt your. Gently squeeze the bottle while rinsing; do    not squeeze the bottle very forcefully. Stop the rinse if you feel a sensation of fluid near your ears.    Tips to avoid unexpected drainage after rinsing    In rare situations, especially if you have had sinus surgery, the saline solution can pool in the sinus    cavities and nasal passages and then drip from your nostrils hours after rinsing. To avoid this harmless    but annoying inconvenience, take one extra step after rinsing: lean forward, tilt your head sideways and    gently blow your nose. Then, tilt your head to the other side and blow again. You may need to repeat    this several times. This will help rid your nasal passages of any excess mucus and remaining saline    solution. If you find yourself experiencing delayed drainage often, do not rinse right before leaving your    house or going to bed.              ENDOSCOPIC SINUS SURGERY POST-OP INSTRUCTIONS    Rarely, is nasal packing (absorbent bandage) required (less than 5% of the time). This will be remove before you go home, or in some cases, on your first post-up appointment.    Slight trickling of blood in the back of your throat, or on your drip pad in front of your nose, and/or crusting of secretions for the first few days, is to be expected. You should change your drip pad periodically during the day as needed. This may be necessary for the first 3-5 days after surgery. You may also clean the hair-baring area of the nose daily, as needed, using a Q-tip and hydrogen peroxide. Keep your fingers out of your nose.    Diet: You may experience nausea after surgery. Avoid heavy meals on that day. Wait 24  hours to gradually resume your normal diet. Drink plenty of fluids to maintain hydration (6-8 glasses daily minimum). Avoid alcohol or caffeine the first week after surgery.    Restricted Activities: the first week after surgery, your sinuses will feel very full and congested, this is due to the accumulation of dried blood and/or mucus crust. Do not plan to blow your nose, bend over or lift heavy objects (over 10 lbs) for the first week after surgery. This may result in a nose bleed. Rest and do not exert or overheat yourself with exercise or other physical activity.    Post-Operative pain: pain medication (Percocet or Acetaminophen with codeine) and/or anti-inflammatory medication (prednisone) may be ordered for significant post-operative pain, (FOR HEADACHES). You can still take extra-strength acetaminophen if the pain is not too uncomfortable, to avoid the occasional side-affects of nausea, constipation, or grogginess, associated with all narcotics. Use stool softener such as: dulcolax or Miralax to prevent constipation. Do not drive or handle heavy machinery while on narcotics. Throat discomfort is not too uncommon after endotracheal intubation during general anesthesia. This should resolve on it's own within 5 days. Throat lozenges or gargles tend to soothe the discomfort. Plane travel is allowed after your first post-op visit.    Do not take aspirin or anti-coagulant therapy 7-10 days after surgery unless otherwise indicated by your surgeon at time of discharge. If you are on Coumadin you will likely restart your normal dosage 24 hours after surgery. However, check with your doctor first.    Post-operative Office Visits: The first visit will be 7-10 days after surgery. At this time the physician will clean your sinus cavity by removing dried blood and/or mucus crusting to promote healing, to minimize the chance for secondary bacterial infection, and relieves congestion and/or headaches. It is suggested that  you take a couple of narcotics pills 1 hour prior to your first post op appointment. This is especially important for your first debridement, when your internal nose is still slightly swollen and tender.    If indicated by the physician, patient may start nasal irrigations, such as Sinus Rinse, or resume any other previous treatment.    Signs and symptoms that MUST be reported to the physician/nurse immediately by [phone]. *Fever over 102 degree F. *Persistent bleeding with more that ¼ cup of bright red blood within a short period of time (half hour). *Severe pain unrelieved by prescribed medication. *Mental or visual changes (confusion, slurred speech, double- vision, visual loss).    * If you experience difficulty breathing, shortness of breath, or severe bleeding, call 911 or go to the nearest emergency room.     1st Trimester Sonogram/BioPhysical Profile(s)/Non Invasive Prenatal Screen (NIPS)/Fetal Non-Stress Test (NST)

## 2023-01-25 DIAGNOSIS — D72.10 EOSINOPHILIA, UNSPECIFIED TYPE: Primary | ICD-10-CM

## 2023-01-26 ENCOUNTER — TELEPHONE (OUTPATIENT)
Dept: GASTROENTEROLOGY | Facility: CLINIC | Age: 58
End: 2023-01-26
Payer: COMMERCIAL

## 2023-01-26 ENCOUNTER — LAB VISIT (OUTPATIENT)
Dept: LAB | Facility: HOSPITAL | Age: 58
End: 2023-01-26
Attending: INTERNAL MEDICINE
Payer: COMMERCIAL

## 2023-01-26 DIAGNOSIS — K50.80 CROHN'S DISEASE OF BOTH SMALL AND LARGE INTESTINE WITHOUT COMPLICATION: ICD-10-CM

## 2023-01-26 PROCEDURE — 83993 ASSAY FOR CALPROTECTIN FECAL: CPT | Performed by: INTERNAL MEDICINE

## 2023-01-26 NOTE — TELEPHONE ENCOUNTER
Pharmacy Note       Suprep Bowel Kit called into [facility] at [phone] at 12:05 01.26.2023

## 2023-01-27 ENCOUNTER — LAB VISIT (OUTPATIENT)
Dept: LAB | Facility: HOSPITAL | Age: 58
End: 2023-01-27
Attending: INTERNAL MEDICINE
Payer: COMMERCIAL

## 2023-01-27 ENCOUNTER — PATIENT MESSAGE (OUTPATIENT)
Dept: OTOLARYNGOLOGY | Facility: CLINIC | Age: 58
End: 2023-01-27
Payer: COMMERCIAL

## 2023-01-27 DIAGNOSIS — K50.80 CROHN'S DISEASE OF BOTH SMALL AND LARGE INTESTINE WITHOUT COMPLICATION: ICD-10-CM

## 2023-01-27 LAB — C DIFF TOX GENS STL QL NAA+PROBE: NEGATIVE

## 2023-01-27 PROCEDURE — 87493 C DIFF AMPLIFIED PROBE: CPT | Performed by: INTERNAL MEDICINE

## 2023-02-01 ENCOUNTER — PATIENT OUTREACH (OUTPATIENT)
Dept: PULMONOLOGY | Facility: CLINIC | Age: 58
End: 2023-02-01
Payer: COMMERCIAL

## 2023-02-02 ENCOUNTER — ANESTHESIA (OUTPATIENT)
Dept: ENDOSCOPY | Facility: HOSPITAL | Age: 58
End: 2023-02-02
Payer: COMMERCIAL

## 2023-02-02 ENCOUNTER — HOSPITAL ENCOUNTER (OUTPATIENT)
Facility: HOSPITAL | Age: 58
Discharge: HOME | End: 2023-02-02
Attending: INTERNAL MEDICINE | Admitting: INTERNAL MEDICINE
Payer: COMMERCIAL

## 2023-02-02 ENCOUNTER — ANESTHESIA EVENT (OUTPATIENT)
Dept: ENDOSCOPY | Facility: HOSPITAL | Age: 58
End: 2023-02-02
Payer: COMMERCIAL

## 2023-02-02 DIAGNOSIS — K50.90 CROHN'S DISEASE WITHOUT COMPLICATION, UNSPECIFIED GASTROINTESTINAL TRACT LOCATION: Primary | ICD-10-CM

## 2023-02-02 LAB — CALPROTECTIN STL-MCNT: <27.1 MCG/G

## 2023-02-02 PROCEDURE — 63600175 PHARM REV CODE 636 W HCPCS: Performed by: NURSE ANESTHETIST, CERTIFIED REGISTERED

## 2023-02-02 PROCEDURE — 45380 PR COLONOSCOPY,BIOPSY: ICD-10-PCS | Mod: ,,, | Performed by: INTERNAL MEDICINE

## 2023-02-02 PROCEDURE — 43239 PR EGD, FLEX, W/BIOPSY, SGL/MULTI: ICD-10-PCS | Mod: 51,,, | Performed by: INTERNAL MEDICINE

## 2023-02-02 PROCEDURE — 25000003 PHARM REV CODE 250: Performed by: NURSE ANESTHETIST, CERTIFIED REGISTERED

## 2023-02-02 PROCEDURE — 37000008 HC ANESTHESIA 1ST 15 MINUTES: Performed by: INTERNAL MEDICINE

## 2023-02-02 PROCEDURE — 27201012 HC FORCEPS, HOT/COLD, DISP: Performed by: INTERNAL MEDICINE

## 2023-02-02 PROCEDURE — 88305 TISSUE EXAM BY PATHOLOGIST: ICD-10-PCS | Mod: 26,,, | Performed by: PATHOLOGY

## 2023-02-02 PROCEDURE — 37000009 HC ANESTHESIA EA ADD 15 MINS: Performed by: INTERNAL MEDICINE

## 2023-02-02 PROCEDURE — 43239 EGD BIOPSY SINGLE/MULTIPLE: CPT | Performed by: INTERNAL MEDICINE

## 2023-02-02 PROCEDURE — 88305 TISSUE EXAM BY PATHOLOGIST: CPT | Mod: 59 | Performed by: PATHOLOGY

## 2023-02-02 PROCEDURE — 43239 EGD BIOPSY SINGLE/MULTIPLE: CPT | Mod: 51,,, | Performed by: INTERNAL MEDICINE

## 2023-02-02 PROCEDURE — 45380 COLONOSCOPY AND BIOPSY: CPT | Mod: ,,, | Performed by: INTERNAL MEDICINE

## 2023-02-02 PROCEDURE — 88305 TISSUE EXAM BY PATHOLOGIST: CPT | Mod: 26,,, | Performed by: PATHOLOGY

## 2023-02-02 PROCEDURE — 45380 COLONOSCOPY AND BIOPSY: CPT | Performed by: INTERNAL MEDICINE

## 2023-02-02 RX ORDER — LIDOCAINE HYDROCHLORIDE 10 MG/ML
INJECTION, SOLUTION EPIDURAL; INFILTRATION; INTRACAUDAL; PERINEURAL
Status: DISCONTINUED | OUTPATIENT
Start: 2023-02-02 | End: 2023-02-02

## 2023-02-02 RX ORDER — PROPOFOL 10 MG/ML
VIAL (ML) INTRAVENOUS
Status: DISCONTINUED | OUTPATIENT
Start: 2023-02-02 | End: 2023-02-02

## 2023-02-02 RX ADMIN — PROPOFOL 30 MG: 10 INJECTION, EMULSION INTRAVENOUS at 02:02

## 2023-02-02 RX ADMIN — PROPOFOL 70 MG: 10 INJECTION, EMULSION INTRAVENOUS at 01:02

## 2023-02-02 RX ADMIN — PROPOFOL 30 MG: 10 INJECTION, EMULSION INTRAVENOUS at 01:02

## 2023-02-02 RX ADMIN — LIDOCAINE HYDROCHLORIDE 50 MG: 10 INJECTION, SOLUTION EPIDURAL; INFILTRATION; INTRACAUDAL; PERINEURAL at 01:02

## 2023-02-02 RX ADMIN — SODIUM CHLORIDE, SODIUM LACTATE, POTASSIUM CHLORIDE, AND CALCIUM CHLORIDE: .6; .31; .03; .02 INJECTION, SOLUTION INTRAVENOUS at 01:02

## 2023-02-02 NOTE — ANESTHESIA PREPROCEDURE EVALUATION
02/02/2023  Jaylin Murguia is a 57 y.o., female.      Pre-op Assessment    I have reviewed the Patient Summary Reports.     I have reviewed the Nursing Notes. I have reviewed the NPO Status.   I have reviewed the Medications.     Review of Systems  Anesthesia Hx:  No problems with previous Anesthesia  Denies Family Hx of Anesthesia complications.   Denies Personal Hx of Anesthesia complications.   Social:  Non-Smoker    Hematology/Oncology:  Hematology Normal   Oncology Normal     EENT/Dental:EENT/Dental Normal   Cardiovascular:   Hypertension hyperlipidemia ECG has been reviewed.    Pulmonary:   Asthma severe Sleep Apnea, CPAP    Renal/:  Renal/ Normal     Hepatic/GI:  Hepatic/GI Normal    Musculoskeletal:   Arthritis     Neurological:   Neuromuscular Disease, Headaches    Endocrine:  Endocrine Normal    Dermatological:  Skin Normal    Psych:  Psychiatric Normal           Physical Exam  General: Well nourished, Cooperative, Alert and Oriented    Airway:  Mallampati: II   Mouth Opening: Normal  TM Distance: Normal  Tongue: Normal  Neck ROM: Normal ROM    Dental:  Intact    Chest/Lungs:  Clear to auscultation, Normal Respiratory Rate    Heart:  Rate: Normal  Rhythm: Regular Rhythm        Anesthesia Plan  Type of Anesthesia, risks & benefits discussed:    Anesthesia Type: MAC  Intra-op Monitoring Plan: Standard ASA Monitors  Induction:  IV  Informed Consent: Informed consent signed with the Patient and all parties understand the risks and agree with anesthesia plan.  All questions answered.   ASA Score: 3  Day of Surgery Review of History & Physical: H&P Update referred to the surgeon/provider.I have interviewed and examined the patient. I have reviewed the patient's H&P dated: There are no significant changes.     Ready For Surgery From Anesthesia Perspective.     .

## 2023-02-02 NOTE — TRANSFER OF CARE
"Anesthesia Transfer of Care Note    Patient: Jaylin Murguia    Procedure(s) Performed: Procedure(s) (LRB):  EGD (ESOPHAGOGASTRODUODENOSCOPY) (N/A)  COLONOSCOPY (N/A)    Patient location: PACU    Anesthesia Type: MAC    Transport from OR: Transported from OR on room air with adequate spontaneous ventilation    Post pain: adequate analgesia    Post assessment: no apparent anesthetic complications    Post vital signs: stable    Level of consciousness: responds to stimulation    Nausea/Vomiting: no nausea/vomiting    Complications: none    Transfer of care protocol was followed      Last vitals:   Visit Vitals  /69 (BP Location: Left arm, Patient Position: Lying)   Pulse (!) 56   Temp 36.7 °C (98.1 °F) (Tympanic)   Resp 18   Ht 5' 7" (1.702 m)   Wt 77.1 kg (170 lb)   SpO2 99%   Breastfeeding No   BMI 26.63 kg/m²     "

## 2023-02-02 NOTE — PROVATION PATIENT INSTRUCTIONS
Discharge Summary/Instructions after an Endoscopic Procedure  Patient Name: Jaylin Murguia  Patient MRN: [id number]  Patient YOB: 1965 Thursday, February 2, 2023 Nicole Leal MD  Dear patient,  As a result of recent federal legislation (The Federal Cures Act), you may   receive lab or pathology results from your procedure in your [facility]   account before your physician is able to contact you. Your physician or   their representative will relay the results to you with their   recommendations at their soonest availability.  Thank you,  RESTRICTIONS:  During your procedure today, you received medications for sedation.  These   medications may affect your judgment, balance and coordination.  Therefore,   for 24 hours, you have the following restrictions:   - DO NOT drive a car, operate machinery, make legal/financial decisions,   sign important papers or drink alcohol.    ACTIVITY:  Today: no heavy lifting, straining or running due to procedural   sedation/anesthesia.  The following day: return to full activity including work.  DIET:  Eat and drink normally unless instructed otherwise.     TREATMENT FOR COMMON SIDE EFFECTS:  - Mild abdominal pain, nausea, belching, bloating or excessive gas:  rest,   eat lightly and use a heating pad.  - Sore Throat: treat with throat lozenges and/or gargle with warm salt   water.  - Because air was used during the procedure, expelling large amounts of air   from your rectum or belching is normal.  - If a bowel prep was taken, you may not have a bowel movement for 1-3 days.    This is normal.  SYMPTOMS TO WATCH FOR AND REPORT TO YOUR PHYSICIAN:  1. Abdominal pain or bloating, other than gas cramps.  2. Chest pain.  3. Back pain.  4. Signs of infection such as: chills or fever occurring within 24 hours   after the procedure.  5. Rectal bleeding, which would show as bright red, maroon, or black stools.   (A tablespoon of blood from the rectum is not serious, especially  if   hemorrhoids are present.)  6. Vomiting.  7. Weakness or dizziness.  GO DIRECTLY TO THE NEAREST EMERGENCY ROOM IF YOU HAVE ANY OF THE FOLLOWING:      Difficulty breathing              Chills and/or fever over 101 F   Persistent vomiting and/or vomiting blood   Severe abdominal pain   Severe chest pain   Black, tarry stools   Bleeding- more than one tablespoon   Any other symptom or condition that you feel may need urgent attention  Your doctor recommends these additional instructions:  If any biopsies were taken, your doctors clinic will contact you in 1 to 2   weeks with any results.  - Patient has a contact number available for emergencies.  The signs and   symptoms of potential delayed complications were discussed with the   patient.  Return to normal activities tomorrow.  Written discharge   instructions were provided to the patient.   - Discharge patient to home (via wheelchair).   - Resume previous diet today.   - Continue present medications.   - Await pathology results.   - Repeat colonoscopy for surveillance based on pathology results.  For questions, problems or results please call your physician Nicole Leal MD at Work:  [phone]  If you have any questions about the above instructions, call the GI   department at [phone] or call the endoscopy unit at [phone]   from 7am until 3 pm.  [facility], EMERGENCY ROOM PHONE NUMBER:   [phone]  IF A COMPLICATION OR EMERGENCY SITUATION ARISES AND YOU ARE UNABLE TO REACH   YOUR PHYSICIAN - GO DIRECTLY TO THE EMERGENCY ROOM.  I have read or have had read to me these discharge instructions for my   procedure and have received a written copy.  I understand these   instructions and will follow-up with my physician if I have any questions.     __________________________________       _____________________________________  Nurse Signature                                          Patient/Designated   Responsible  Party Signature  MD Nicole Watkins MD  2/2/2023 2:21:53 PM  This report has been verified and signed electronically.  Dear patient,  As a result of recent federal legislation (The Federal Cures Act), you may   receive lab or pathology results from your procedure in your [facility]   account before your physician is able to contact you. Your physician or   their representative will relay the results to you with their   recommendations at their soonest availability.  Thank you,  PROVATION

## 2023-02-02 NOTE — PROVATION PATIENT INSTRUCTIONS
Discharge Summary/Instructions after an Endoscopic Procedure  Patient Name: Jaylin Murguia  Patient MRN: [id number]  Patient YOB: 1965 Thursday, February 2, 2023 Nicole Leal MD  Dear patient,  As a result of recent federal legislation (The Federal Cures Act), you may   receive lab or pathology results from your procedure in your [facility]   account before your physician is able to contact you. Your physician or   their representative will relay the results to you with their   recommendations at their soonest availability.  Thank you,  RESTRICTIONS:  During your procedure today, you received medications for sedation.  These   medications may affect your judgment, balance and coordination.  Therefore,   for 24 hours, you have the following restrictions:   - DO NOT drive a car, operate machinery, make legal/financial decisions,   sign important papers or drink alcohol.    ACTIVITY:  Today: no heavy lifting, straining or running due to procedural   sedation/anesthesia.  The following day: return to full activity including work.  DIET:  Eat and drink normally unless instructed otherwise.     TREATMENT FOR COMMON SIDE EFFECTS:  - Mild abdominal pain, nausea, belching, bloating or excessive gas:  rest,   eat lightly and use a heating pad.  - Sore Throat: treat with throat lozenges and/or gargle with warm salt   water.  - Because air was used during the procedure, expelling large amounts of air   from your rectum or belching is normal.  - If a bowel prep was taken, you may not have a bowel movement for 1-3 days.    This is normal.  SYMPTOMS TO WATCH FOR AND REPORT TO YOUR PHYSICIAN:  1. Abdominal pain or bloating, other than gas cramps.  2. Chest pain.  3. Back pain.  4. Signs of infection such as: chills or fever occurring within 24 hours   after the procedure.  5. Rectal bleeding, which would show as bright red, maroon, or black stools.   (A tablespoon of blood from the rectum is not serious, especially  if   hemorrhoids are present.)  6. Vomiting.  7. Weakness or dizziness.  GO DIRECTLY TO THE NEAREST EMERGENCY ROOM IF YOU HAVE ANY OF THE FOLLOWING:      Difficulty breathing              Chills and/or fever over 101 F   Persistent vomiting and/or vomiting blood   Severe abdominal pain   Severe chest pain   Black, tarry stools   Bleeding- more than one tablespoon   Any other symptom or condition that you feel may need urgent attention  Your doctor recommends these additional instructions:  If any biopsies were taken, your doctors clinic will contact you in 1 to 2   weeks with any results.  - Patient has a contact number available for emergencies.  The signs and   symptoms of potential delayed complications were discussed with the   patient.  Return to normal activities tomorrow.  Written discharge   instructions were provided to the patient.   - Discharge patient to home (via wheelchair).   - Resume previous diet today.   - Continue present medications.   - Await pathology results.  For questions, problems or results please call your physician Nicole Leal MD at Work:  [phone]  If you have any questions about the above instructions, call the GI   department at [phone] or call the endoscopy unit at [phone]   from 7am until 3 pm.  [facility], EMERGENCY ROOM PHONE NUMBER:   [phone]  IF A COMPLICATION OR EMERGENCY SITUATION ARISES AND YOU ARE UNABLE TO REACH   YOUR PHYSICIAN - GO DIRECTLY TO THE EMERGENCY ROOM.  I have read or have had read to me these discharge instructions for my   procedure and have received a written copy.  I understand these   instructions and will follow-up with my physician if I have any questions.     __________________________________       _____________________________________  Nurse Signature                                          Patient/Designated   Responsible Party Signature  MD Nicole Watkins MD  2/2/2023 2:24:11  PM  This report has been verified and signed electronically.  Dear patient,  As a result of recent federal legislation (The Federal Cures Act), you may   receive lab or pathology results from your procedure in your [facility]   account before your physician is able to contact you. Your physician or   their representative will relay the results to you with their   recommendations at their soonest availability.  Thank you,  PROVATION

## 2023-02-02 NOTE — LETTER
February 2, 2023         [id number] [place] [id number]  Phone: [phone]  Fax: [phone]       Patient: Jaylin Murguia   YOB: 1965  Date of Visit: 02/02/2023    To Whom It May Concern:    Judi Murguia  was at [facility] on 02/02/2023. The patient may return to work/school on 02/03/2023 with no restrictions. If you have any questions or concerns, or if I can be of further assistance, please do not hesitate to contact me.    Sincerely,    Ivan Rodriguez RN

## 2023-02-02 NOTE — H&P
PRE PROCEDURE H&P    Patient Name: Jaylin Murguia  MRN: [id number]  : 1965  Date of Procedure:  2023  Referring Physician: Nicole Leal MD  Primary Physician: Esthela Hu MD  Procedure Physician: Nicole Leal MD       Planned Procedure: Colonoscopy and EGD  Diagnosis: Crohn's disease  Chief Complaint: Same as above    HPI: Patient is an 57 y.o. female is here for the above.     3/2022: single aptha in ileum, single aptha in colon      Past Medical History:   Past Medical History:   Diagnosis Date    Allergic rhinitis     Asthma     Chronic pansinusitis     Crohn's disease     Ileal involvement, previously on Remicade, Asacol, Prednisone    Fibromyalgia     Hyperlipidemia     Hypertension     Immunosuppression     Migraine     Obstructive sleep apnea     CPAP at night    Sciatica         Past Surgical History:  Past Surgical History:   Procedure Laterality Date    BLADDER SURGERY      sling was created by her muscles      SECTION      COLONOSCOPY N/A 2017    Procedure: COLONOSCOPY;  Surgeon: Kin Dyer MD;  Location: [facility];  Service: Endoscopy;  Laterality: N/A;    COLONOSCOPY N/A 3/27/2018    Procedure: COLONOSCOPY;  Surgeon: Kyra Vallecillo MD;  Location: [facility];  Service: Endoscopy;  Laterality: N/A;    COLONOSCOPY N/A 3/12/2020    Procedure: COLONOSCOPY;  Surgeon: Nicole Leal MD;  Location: [facility];  Service: Endoscopy;  Laterality: N/A;    COLONOSCOPY N/A 3/16/2022    Procedure: COLONOSCOPY;  Surgeon: Shay Bruce MD;  Location: [facility];  Service: Endoscopy;  Laterality: N/A;    DEBRIDEMENT Bilateral 2020    Procedure: DEBRIDEMENT;  Surgeon: Matthias Roach MD;  Location: [address];  Service: ENT;  Laterality: Bilateral;    ESOPHAGOGASTRODUODENOSCOPY N/A 3/12/2020    Procedure: ESOPHAGOGASTRODUODENOSCOPY (EGD);  Surgeon: Nicole Leal MD;  Location: [facility];  Service: Endoscopy;  Laterality: N/A;     ESOPHAGOGASTRODUODENOSCOPY N/A 3/16/2022    Procedure: EGD (ESOPHAGOGASTRODUODENOSCOPY);  Surgeon: Shay Bruce MD;  Location: [facility] ENDO;  Service: Endoscopy;  Laterality: N/A;    FINGER SURGERY      joint relpacement, left hand index finger    FUNCTIONAL ENDOSCOPIC SINUS SURGERY (FESS) USING COMPUTER-ASSISTED NAVIGATION Bilateral 7/31/2019    Procedure: FESS, USING COMPUTER-ASSISTED NAVIGATION;  Surgeon: Manish Shaffer MD;  Location: [facility] OR;  Service: ENT;  Laterality: Bilateral;    FUNCTIONAL ENDOSCOPIC SINUS SURGERY (FESS) USING COMPUTER-ASSISTED NAVIGATION Bilateral 9/25/2020    Procedure: FESS, USING COMPUTER-ASSISTED NAVIGATION SPHENOID;  Surgeon: Matthias Roach MD;  [facility];  Service: ENT;  Laterality: Bilateral;  TIVA    FUNCTIONAL ENDOSCOPIC SINUS SURGERY (FESS) USING COMPUTER-ASSISTED NAVIGATION Bilateral 9/30/2022    Procedure: FESS, USING COMPUTER-ASSISTED NAVIGATION;  Surgeon: Matthias Roach MD;  [facility];  Service: ENT;  Laterality: Bilateral;    HYSTERECTOMY      SINUS SURGERY      WISDOM TOOTH EXTRACTION          Home Medications:  Prior to Admission medications    Medication Sig Start Date End Date Taking? Authorizing Provider   acetaminophen (TYLENOL) 500 MG tablet Take 2 tablets (1,000 mg total) by mouth every 6 (six) hours as needed for Pain. 9/30/22  Yes Sherwin Daniels MD   albuterol-ipratropium (DUO-NEB) 2.5 mg-0.5 mg/3 mL nebulizer solution Take 3 mLs by nebulization every 6 (six) hours as needed for Wheezing. Rescue 12/1/22 12/1/23 Yes Abrahan Lira MD   butalbital-acetaminophen-caffeine -40 mg (FIORICET, ESGIC) -40 mg per tablet TAKE 1 TABLET EVERY 4 HOURS AS NEEDED FOR HEADACHE 10/5/20  Yes Esthela Hu MD   cholestyramine (QUESTRAN) 4 gram packet Take 1 packet (4 g total) by mouth 3 (three) times daily with meals. 3/29/22 3/29/23 Yes Nicole Leal MD   DULoxetine (CYMBALTA) 60 MG capsule TAKE 1 CAPSULE BY MOUTH  TWICE A DAY  Patient taking differently: Take 60 mg by mouth once daily. 7/13/22  Yes Esthela Hu MD   eletriptan (RELPAX) 40 MG tablet Take 1 tablet (40 mg total) by mouth as needed.  Patient taking differently: Take 40 mg by mouth as needed. Take if needed 2/7/20  Yes Esthela Hu MD   estradioL (ESTRACE) 2 MG tablet Take 1 tablet (2 mg total) by mouth once daily. 1/25/23  Yes Kole Chang MD   fluticasone propionate (FLONASE) 50 mcg/actuation nasal spray 2 sprays (100 mcg total) by Each Nostril route once daily. 10/20/22  Yes Abrahan Lira MD   fluticasone-umeclidin-vilanter (TRELEGY ELLIPTA) 100-62.5-25 mcg DsDv Inhale 1 puff into the lungs once daily. 12/1/22 12/1/23 Yes Abrahan Lira MD   immun glob G,IgG,-pro-IgA 0-50 (HIZENTRA) 10 gram/50 mL (20 %) Soln Inject 70 mLs (14 g total) into the skin every 7 days. 7/10/22  Yes Milan Bender MD   ipratropium-albuteroL (COMBIVENT)  mcg/actuation inhaler Inhale 2 puffs into the lungs every 6 (six) hours as needed for Wheezing. Rescue 6/2/22  Yes Abrahan Lira MD   losartan (COZAAR) 100 MG tablet Take 1 tablet (100 mg total) by mouth once daily. 5/10/22 5/10/23 Yes Esthela Wang PA-C   montelukast (SINGULAIR) 10 mg tablet TAKE 1 TABLET EVERY EVENING 7/12/22  Yes Esthela Hu MD   nortriptyline (PAMELOR) 25 MG capsule Take 1 capsule (25 mg total) by mouth every evening. 12/8/21  Yes Esthela Hu MD   predniSONE (DELTASONE) 20 MG tablet Take 2 tablets (40 mg total) by mouth once daily. 1/4/23  Yes Ivan Riley MD   pregabalin (LYRICA) 150 MG capsule Take 1 capsule (150 mg total) by mouth 3 (three) times daily. 12/14/22  Yes Esthela Hu MD   propranoloL (INDERAL LA) 80 MG 24 hr capsule TAKE 1 CAPSULE BY MOUTH ONCE DAILY.  Patient taking differently: 80 mg nightly. 5/9/22  Yes Esthela Hu MD   rizatriptan (MAXALT) 10 MG tablet TAKE 1 TABLET IF NEEDED FOR MIGRAINES. MAX 2 TABLETS IN 24 HOURS. 10/5/20  Yes  Esthela Hu MD   sodium chloride 0.9% 0.9 % Soln 200 mL with gentamicin (ped) 20 mg/2 mL Soln EMPTY CONTENTS OF 1 CAPSULE INTO NASAL IRRIGATION SYSTEM, ADD DISTILLED WATER, SALT PACK, MIX & IRRIGATE. PERFORM 2 TIMES DAILY 5/3/22  Yes Historical Provider   topiramate (TOPAMAX) 100 MG tablet TAKE 1 TABLET TWICE A DAY 6/8/22  Yes Esthela Hu MD   traZODone (DESYREL) 50 MG tablet Take 1 tablet (50 mg total) by mouth every evening. 11/15/22 11/15/23 Yes Esthela Hu MD   triamcinolone acetonide 0.025% (KENALOG) 0.025 % cream 1 application once daily. Apply to affected area 8/26/21  Yes Historical Provider   VENTOLIN HFA 90 mcg/actuation inhaler INHALE 2 PUFFS INTO THE LUNGS EVERY 4 TO 6 HOURS AS NEEDED FOR WHEEZING.  Patient taking differently: Take if needed & bring 6/19/19  Yes Tanya Marc, ARIS   XYLITOL, BULK, MISC EMPTY CONTENTS OF 1 CAPSULE INTO NASAL IRRIGATION SYSTEM, ADD DISTILLED WATER, SALT PACK, MIX & IRRIGATE. PERFORM 2 TIMES DAILY 2/18/22  Yes Historical Provider   azelastine (ASTELIN) 137 mcg (0.1 %) nasal spray 2 sprays (274 mcg total) by Nasal route 2 (two) times daily. 10/20/22 11/19/22  Abrahan Lira MD   diltiazem HCl (DILTIAZEM 2% - LIDOCAINE 5% CREAM) Apply peasize amount topically to anal area. 4/21/22   Nicole Leal MD   diphenhydrAMINE-aluminum-magnesium hydroxide-simethicone-LIDOcaine HCl 2% Swish and spit 15 mLs every 4 (four) hours as needed (oral ulcers, pain).  Patient not taking: Reported on 1/23/2023 4/21/22   Nicole Leal MD   atorvastatin (LIPITOR) 10 MG tablet Take 1 tablet (10 mg total) by mouth once daily.  Patient taking differently: Take 10 mg by mouth every evening. 9/8/21 2/2/23  Esthela Hu MD        Allergies:  Review of patient's allergies indicates:  No Known Allergies     Social History:   Social History     Socioeconomic History    Marital status:     Number of children: 2   Occupational History    Occupation: teacher   Tobacco Use     Smoking status: Never     Passive exposure: Never    Smokeless tobacco: Never   Substance and Sexual Activity    Alcohol use: No    Drug use: No    Sexual activity: Yes     Partners: Male   Social History Narrative    Surrogate Decision Maker: , Cristobal Murguia, [phone]     Social Determinants of Health     Financial Resource Strain: Low Risk     Difficulty of Paying Living Expenses: Not very hard   Food Insecurity: No Food Insecurity    Worried About Running Out of Food in the Last Year: Never true    Ran Out of Food in the Last Year: Never true   Transportation Needs: No Transportation Needs    Lack of Transportation (Medical): No    Lack of Transportation (Non-Medical): No   Physical Activity: Insufficiently Active    Days of Exercise per Week: 3 days    Minutes of Exercise per Session: 20 min   Stress: Stress Concern Present    Feeling of Stress : To some extent   Social Connections: Unknown    Frequency of Communication with Friends and Family: Three times a week    Frequency of Social Gatherings with Friends and Family: Once a week    Active Member of Clubs or Organizations: Yes    Attends Club or Organization Meetings: 1 to 4 times per year    Marital Status:    Housing Stability: Low Risk     Unable to Pay for Housing in the Last Year: No    Number of Places Lived in the Last Year: 1    Unstable Housing in the Last Year: No       Family History:  Family History   Problem Relation Age of Onset    Breast cancer Mother     Hypertension Mother     Allergies Mother     Kidney disease Father 64        ESRD on HD    Scleroderma Father     Breast cancer Maternal Grandmother     Heart attack Maternal Grandmother     COPD Maternal Grandmother 72    Cancer Paternal Grandmother 70        colon    Hypertension Brother        ROS: No acute cardiac events, no acute respiratory complaints.     Physical Exam (all patients):    /69 (BP Location: Left arm, Patient Position: Lying)   Pulse (!) 56   " Temp 98.1 °F (36.7 °C) (Tympanic)   Resp 18   Ht 5' 7" (1.702 m)   Wt 77.1 kg (170 lb)   SpO2 99%   Breastfeeding No   BMI 26.63 kg/m²   Lungs: Clear to auscultation bilaterally, respirations unlabored  Heart: Regular rate and rhythm, S1 and S2 normal, no obvious murmurs  Abdomen:         Soft, non-tender, bowel sounds normal, no masses, no organomegaly    Lab Results   Component Value Date    WBC 7.62 01/23/2023    MCV 98 01/23/2023    RDW 12.9 01/23/2023     01/23/2023    INR 1.0 11/20/2014    GLU 87 01/23/2023    HGBA1C 5.3 09/02/2021    BUN 10 01/23/2023     01/23/2023    K 4.3 01/23/2023     01/23/2023        SEDATION PLAN: per anesthesia      History reviewed, vital signs satisfactory, cardiopulmonary status satisfactory, sedation options, risks and plans have been discussed with the patient  All their questions were answered and the patient agrees to the sedation procedures as planned and the patient is deemed an appropriate candidate for the sedation as planned.    Procedure explained to patient, informed consent obtained and placed in chart.    Nicole Leal  2/2/2023  1:42 PM    "

## 2023-02-03 ENCOUNTER — PATIENT MESSAGE (OUTPATIENT)
Dept: GASTROENTEROLOGY | Facility: CLINIC | Age: 58
End: 2023-02-03
Payer: COMMERCIAL

## 2023-02-03 VITALS
BODY MASS INDEX: 26.68 KG/M2 | WEIGHT: 170 LBS | RESPIRATION RATE: 18 BRPM | HEART RATE: 51 BPM | OXYGEN SATURATION: 98 % | SYSTOLIC BLOOD PRESSURE: 137 MMHG | TEMPERATURE: 98 F | DIASTOLIC BLOOD PRESSURE: 89 MMHG | HEIGHT: 67 IN

## 2023-02-06 ENCOUNTER — PATIENT MESSAGE (OUTPATIENT)
Dept: PRIMARY CARE CLINIC | Facility: CLINIC | Age: 58
End: 2023-02-06
Payer: COMMERCIAL

## 2023-02-06 NOTE — TELEPHONE ENCOUNTER
No new care gaps identified.  [facility] Embedded Care Gaps. Reference number: [id number]. 2/06/2023   3:26:08 PM CST

## 2023-02-07 ENCOUNTER — OFFICE VISIT (OUTPATIENT)
Dept: OTOLARYNGOLOGY | Facility: CLINIC | Age: 58
End: 2023-02-07
Payer: COMMERCIAL

## 2023-02-07 VITALS — WEIGHT: 172 LBS | BODY MASS INDEX: 27 KG/M2 | HEIGHT: 67 IN

## 2023-02-07 DIAGNOSIS — D80.1 HYPOGAMMAGLOBULINEMIA: ICD-10-CM

## 2023-02-07 DIAGNOSIS — J32.4 CHRONIC PANSINUSITIS: ICD-10-CM

## 2023-02-07 DIAGNOSIS — J31.0 NONALLERGIC RHINITIS: Primary | ICD-10-CM

## 2023-02-07 PROCEDURE — 99999 PR PBB SHADOW E&M-EST. PATIENT-LVL IV: CPT | Mod: PBBFAC,,, | Performed by: OTOLARYNGOLOGY

## 2023-02-07 PROCEDURE — 99214 PR OFFICE/OUTPT VISIT, EST, LEVL IV, 30-39 MIN: ICD-10-PCS | Mod: 25,S$GLB,, | Performed by: OTOLARYNGOLOGY

## 2023-02-07 PROCEDURE — 1159F PR MEDICATION LIST DOCUMENTED IN MEDICAL RECORD: ICD-10-PCS | Mod: CPTII,S$GLB,, | Performed by: OTOLARYNGOLOGY

## 2023-02-07 PROCEDURE — 99999 PR PBB SHADOW E&M-EST. PATIENT-LVL IV: ICD-10-PCS | Mod: PBBFAC,,, | Performed by: OTOLARYNGOLOGY

## 2023-02-07 PROCEDURE — 87077 CULTURE AEROBIC IDENTIFY: CPT | Performed by: OTOLARYNGOLOGY

## 2023-02-07 PROCEDURE — 1160F PR REVIEW ALL MEDS BY PRESCRIBER/CLIN PHARMACIST DOCUMENTED: ICD-10-PCS | Mod: CPTII,S$GLB,, | Performed by: OTOLARYNGOLOGY

## 2023-02-07 PROCEDURE — 1160F RVW MEDS BY RX/DR IN RCRD: CPT | Mod: CPTII,S$GLB,, | Performed by: OTOLARYNGOLOGY

## 2023-02-07 PROCEDURE — 31237 NSL/SINS NDSC SURG BX POLYPC: CPT | Mod: 50,S$GLB,, | Performed by: OTOLARYNGOLOGY

## 2023-02-07 PROCEDURE — 87186 SC STD MICRODIL/AGAR DIL: CPT | Performed by: OTOLARYNGOLOGY

## 2023-02-07 PROCEDURE — 87075 CULTR BACTERIA EXCEPT BLOOD: CPT | Performed by: OTOLARYNGOLOGY

## 2023-02-07 PROCEDURE — 99214 OFFICE O/P EST MOD 30 MIN: CPT | Mod: 25,S$GLB,, | Performed by: OTOLARYNGOLOGY

## 2023-02-07 PROCEDURE — 3008F BODY MASS INDEX DOCD: CPT | Mod: CPTII,S$GLB,, | Performed by: OTOLARYNGOLOGY

## 2023-02-07 PROCEDURE — 1159F MED LIST DOCD IN RCRD: CPT | Mod: CPTII,S$GLB,, | Performed by: OTOLARYNGOLOGY

## 2023-02-07 PROCEDURE — 31237 NASAL/SINUS ENDOSCOPY: ICD-10-PCS | Mod: 50,S$GLB,, | Performed by: OTOLARYNGOLOGY

## 2023-02-07 PROCEDURE — 3008F PR BODY MASS INDEX (BMI) DOCUMENTED: ICD-10-PCS | Mod: CPTII,S$GLB,, | Performed by: OTOLARYNGOLOGY

## 2023-02-07 PROCEDURE — 87070 CULTURE OTHR SPECIMN AEROBIC: CPT | Performed by: OTOLARYNGOLOGY

## 2023-02-07 RX ORDER — FLUTICASONE PROPIONATE 50 MCG
2 SPRAY, SUSPENSION (ML) NASAL DAILY
Qty: 16 ML | Refills: 0 | Status: SHIPPED | OUTPATIENT
Start: 2023-02-07 | End: 2023-04-24

## 2023-02-07 RX ORDER — OMEPRAZOLE MAGNESIUM 10 MG/1
GRANULE, DELAYED RELEASE ORAL
COMMUNITY
Start: 2022-09-14 | End: 2023-04-21

## 2023-02-07 RX ORDER — BUDESONIDE AND FORMOTEROL FUMARATE DIHYDRATE 160; 4.5 UG/1; UG/1
1 AEROSOL RESPIRATORY (INHALATION) 2 TIMES DAILY
COMMUNITY
Start: 2022-09-14 | End: 2023-03-15

## 2023-02-07 RX ORDER — MESALAMINE 800 MG/1
800 TABLET, DELAYED RELEASE ORAL 3 TIMES DAILY
COMMUNITY
Start: 2022-09-14 | End: 2023-10-11

## 2023-02-07 RX ORDER — PROPRANOLOL HYDROCHLORIDE 80 MG/1
80 CAPSULE, EXTENDED RELEASE ORAL DAILY
Qty: 90 CAPSULE | Refills: 2 | Status: SHIPPED | OUTPATIENT
Start: 2023-02-07 | End: 2023-05-01 | Stop reason: SDUPTHER

## 2023-02-07 RX ORDER — SODIUM, POTASSIUM,MAG SULFATES 17.5-3.13G
SOLUTION, RECONSTITUTED, ORAL ORAL
COMMUNITY
Start: 2023-01-26 | End: 2023-10-11

## 2023-02-07 RX ORDER — VERAPAMIL HYDROCHLORIDE 300 MG/1
CAPSULE, EXTENDED RELEASE ORAL
COMMUNITY
Start: 2022-09-14 | End: 2023-03-15

## 2023-02-08 LAB
FINAL PATHOLOGIC DIAGNOSIS: NORMAL
Lab: NORMAL

## 2023-02-09 ENCOUNTER — PATIENT MESSAGE (OUTPATIENT)
Dept: OTOLARYNGOLOGY | Facility: CLINIC | Age: 58
End: 2023-02-09
Payer: COMMERCIAL

## 2023-02-09 LAB — BACTERIA SPEC AEROBE CULT: ABNORMAL

## 2023-02-09 NOTE — PROGRESS NOTES
"  Subjective:      Jaylin is a 57 y.o. female who comes for follow-up of sinusitis.  Her last visit with me was on 12/8/2022.  Now 4 months status-post revision endoscopic sinus surgery.   Past 2 weeks has gradual increase of green discharge from nose again.  Using bactrim in sinus rinse.  Continuing Hizentra, IgG on hold.  Saw new pulmonologist Dr. Riley about 1 month ago, on Trelegy, Singulair and prn duonebs/combivent.        %       The patient's medications, allergies, past medical, surgical, social and family histories were reviewed and updated as appropriate.    A detailed review of systems was obtained with pertinent positives as per the above HPI, and otherwise negative.        Objective:     Ht 5' 7" (1.702 m)   Wt 78 kg (172 lb)   BMI 26.94 kg/m²        Constitutional:   She appears well-developed. She is cooperative.     Head:  Normocephalic.     Nose:  No mucosal edema, rhinorrhea, septal deviation or polyps. No epistaxis. Turbinates normal, no turbinate masses and no turbinate hypertrophy.  Right sinus exhibits no maxillary sinus tenderness and no frontal sinus tenderness. Left sinus exhibits no maxillary sinus tenderness and no frontal sinus tenderness.     Mouth/Throat  Oropharynx clear and moist without lesions or asymmetry. No oropharyngeal exudate or posterior oropharyngeal erythema.     Neck:  No adenopathy. Normal range of motion present.     She has no cervical adenopathy.     Procedure    Nasal endoscopy performed.  See procedure note.     Left maxillary     Left sphenoid     Right maxillary and sphenoid      Data Reviewed    WBC (K/uL)   Date Value   01/23/2023 7.62     Eosinophil % (%)   Date Value   01/23/2023 29.8 (H)     Eos # (K/uL)   Date Value   01/23/2023 2.3 (H)     Platelets (K/uL)   Date Value   01/23/2023 260     Glucose (mg/dL)   Date Value   01/23/2023 87     IgE (IU/mL)   Date Value   05/26/2022 <35       Pathology report indicated non-eosinophilic chronic inflammation.  " Cultures showed Pseudomonas at last swab.      Assessment:     1. Nonallergic rhinitis    2. Chronic pansinusitis    3. Hypogammaglobulinemia         Plan:     New cultures taken, will consider new antibiotic.  Follow up in about 1 month (around 3/7/2023) for nasal endoscopy.

## 2023-02-09 NOTE — PROCEDURES
Nasal/sinus endoscopy    Date/Time: 2/7/2023 8:45 AM  Performed by: Matthias Roach MD  Authorized by: Matthias Roach MD     Consent Done?:  Yes (Verbal)  Anesthesia:     Local anesthetic:  Lidocaine 4% and Jose-Synephrine 1/2%    Patient tolerance:  Patient tolerated the procedure well with no immediate complications  Nose:     Procedure Performed:  Removal of Debridement  External:      No external nasal deformity  Intranasal:      Mucosa no polyps     Mucosa ulcers not present     No mucosa lesions present     Turbinates not enlarged     No septum gross deformity  Nasopharynx:      No mucosa lesions     Adenoids not present     Posterior choanae patent     Eustachian tube patent     Purulent green mucus and crusting at posterior septectomy and common sphenoidotomy  Swabbed for culture  Other sinuses all clear

## 2023-02-13 ENCOUNTER — PATIENT MESSAGE (OUTPATIENT)
Dept: GASTROENTEROLOGY | Facility: CLINIC | Age: 58
End: 2023-02-13
Payer: COMMERCIAL

## 2023-02-13 LAB — BACTERIA SPEC ANAEROBE CULT: NORMAL

## 2023-02-14 ENCOUNTER — OFFICE VISIT (OUTPATIENT)
Dept: GASTROENTEROLOGY | Facility: CLINIC | Age: 58
End: 2023-02-14
Payer: COMMERCIAL

## 2023-02-14 ENCOUNTER — PATIENT MESSAGE (OUTPATIENT)
Dept: GASTROENTEROLOGY | Facility: CLINIC | Age: 58
End: 2023-02-14

## 2023-02-14 DIAGNOSIS — R19.7 DIARRHEA, UNSPECIFIED TYPE: ICD-10-CM

## 2023-02-14 DIAGNOSIS — K50.90 CROHN'S DISEASE WITHOUT COMPLICATION, UNSPECIFIED GASTROINTESTINAL TRACT LOCATION: Primary | ICD-10-CM

## 2023-02-14 PROCEDURE — 99215 OFFICE O/P EST HI 40 MIN: CPT | Mod: 95,,, | Performed by: INTERNAL MEDICINE

## 2023-02-14 PROCEDURE — 99215 PR OFFICE/OUTPT VISIT, EST, LEVL V, 40-54 MIN: ICD-10-PCS | Mod: 95,,, | Performed by: INTERNAL MEDICINE

## 2023-02-14 NOTE — PROGRESS NOTES
Clinic Consult:  [facility] Consultation Note    Reason for Consult:  The primary encounter diagnosis was Crohn's disease without complication, unspecified gastrointestinal tract location. A diagnosis of Diarrhea, unspecified type was also pertinent to this visit.    PCP: Esthela Hu       The patient location is: [place]  The chief complaint leading to consultation is: diarrhea     Visit type: audiovisual    Face to Face time with patient: 30  60 minutes of total time spent on the encounter, which includes face to face time and non-face to face time preparing to see the patient (eg, review of tests), Obtaining and/or reviewing separately obtained history, Documenting clinical information in the electronic or other health record, Independently interpreting results (not separately reported) and communicating results to the patient/family/caregiver, or Care coordination (not separately reported).         Each patient to whom he or she provides medical services by telemedicine is:  (1) informed of the relationship between the physician and patient and the respective role of any other health care provider with respect to management of the patient; and (2) notified that he or she may decline to receive medical services by telemedicine and may withdraw from such care at any time.    Notes:    HPI:  This is a 58 y.o. female here for follow up after colonoscopy.      IBD History  - Type: crohn's disease  - Disease Location: small bowel  - Phenotype: stricture   - Diagnosed: 2000  - Surgeries related to IBD: none  - Extra-intestinal Manifestations: oral aphthous ulcers     Current Medications  none     Past Medications  Remicade (in remote past)-- ineffective??  Asacol 4.8 gm-- ineffective  Entocort-- effective  Prednisone  Humira (started July 17, stopped 2/2018)-- stopped 2/2 multiple infections.      Drug and Antibody Levels    (2018) Humira level 2.7, intermediate antibody formation.     Endoscopy  "Reports  5/7/15 colonoscopy: poor prep. Skin tag. Crohn's disease with ileitis. Biopsied. Path: focal chronic active ileitis.   17: erythematous mucosa in the sigmoid, transverse and hepatic flexure. 2 mm polyp at hepatic flexure. Crohn's disease with ileitis. Pathology: colon and ileal bx normal colon mucosa. Polyp normal colon mucosa.   17 EGD: gastritis and duodenal erosions without bleeding. Path: acute and chronic non-specific duodenitis with ulceration.   3/27/18 colonoscopy: 3 mm polyp in the sigmoid. No signs of active crohn's disease and no signs of stricture in the TI (advanced up to 15 cm). Pathology: hyperplastic polyp.   3/2020 colonoscopy: SESCD 4, ileitis; path: mild active chronic ileitis   3/2022: single aptha in ileum, single aptha in colon; path shows prominent peyer's patch and benign colon  2023: normal TI, normal colon; path: benign ileum, focal active colitis (no architectural distortion), discussed with pathologist -- very minimal focal colitis, no chronicity in any sepcimen and "virtually normal"    Imaging:  CTE (): normal      Interval History   She continues with watery diarrhea, 10+ Bms daily and occasional nocturnal.  Continues with c/o joint pain    On IgG weekly         Preventative Medicine     Immunizations  - Influenza:   - Pnemococcal: PCV-13: 2019; PSV-23   - Hepatitis A/B: 2019  - Herpes Zoster: 2015  - COVID-19: 202     Cancer Prevention   - Date of last pap smear: ?  - Date of last skin cancer screening: due   - Date of last surveillance colonoscopy: n/a     Bone Health  - Vitamin D level: ?  - Date of last DEXA: ?     Therapy Related Testing  - Date of last TB testin  - TPMT status: ?     Miscellaneous  - Vitamin B 12 level (if ileal disease or resection):   - Smoking status: no  - NSAID use: no  - History of C. Diff: no  - Family planning: n/a        ROS:  CONSTITUTIONAL: Denies weight change,  fatigue, fevers, chills, night sweats.  EYES: No " changes in vision.   ENT: No oral lesions or sore throat.  HEMATOLOGICAL/Lymph: Denies bleeding tendency, bruising tendency. No swellings or enlarged lymph nodes.  CARDIOVASCULAR: Denies chest pain, shortness of breath, orthopnea and edema.  RESPIRATORY: Denies cough, hemoptysis, dyspnea, and wheezing.  GI: See HPI.  : Denies dysuria and hematuria  MUSCULOSKELETAL: Denies joint pain or swelling, back pain and muscle pain.  SKIN: Denies rashes.  NEUROLOGIC: Denies headaches, seizures and numbness.  PSYCHIATRIC: Denies depression or anxiety.  ENDOCRINE: Denies heat or cold intolerance and excessive thirst or urination.    Medical History:   Past Medical History:   Diagnosis Date    Allergic rhinitis     Asthma     Chronic pansinusitis     Crohn's disease     Ileal involvement, previously on Remicade, Asacol, Prednisone    Fibromyalgia     Hyperlipidemia     Hypertension     Immunosuppression     Migraine     Obstructive sleep apnea     CPAP at night    Sciatica        Surgical History:  Past Surgical History:   Procedure Laterality Date    BLADDER SURGERY      sling was created by her muscles      SECTION      COLONOSCOPY N/A 2017    Procedure: COLONOSCOPY;  Surgeon: Kin Dyer MD;  Location: [facility];  Service: Endoscopy;  Laterality: N/A;    COLONOSCOPY N/A 3/27/2018    Procedure: COLONOSCOPY;  Surgeon: Kyra Vallecillo MD;  Location: [facility];  Service: Endoscopy;  Laterality: N/A;    COLONOSCOPY N/A 3/12/2020    Procedure: COLONOSCOPY;  Surgeon: Nicole Leal MD;  Location: [facility];  Service: Endoscopy;  Laterality: N/A;    COLONOSCOPY N/A 3/16/2022    Procedure: COLONOSCOPY;  Surgeon: Shay Bruce MD;  Location: [facility];  Service: Endoscopy;  Laterality: N/A;    COLONOSCOPY N/A 2023    Procedure: COLONOSCOPY;  Surgeon: Nicole Leal MD;  Location: [facility];  Service: Endoscopy;  Laterality: N/A;    DEBRIDEMENT Bilateral 2020    Procedure:  DEBRIDEMENT;  Surgeon: Matthias Roach MD;  Location: [address];  Service: ENT;  Laterality: Bilateral;    ESOPHAGOGASTRODUODENOSCOPY N/A 3/12/2020    Procedure: ESOPHAGOGASTRODUODENOSCOPY (EGD);  Surgeon: Nicole Leal MD;  Location: [facility];  Service: Endoscopy;  Laterality: N/A;    ESOPHAGOGASTRODUODENOSCOPY N/A 3/16/2022    Procedure: EGD (ESOPHAGOGASTRODUODENOSCOPY);  Surgeon: Shay Bruce MD;  Location: [facility];  Service: Endoscopy;  Laterality: N/A;    ESOPHAGOGASTRODUODENOSCOPY N/A 2/2/2023    Procedure: EGD (ESOPHAGOGASTRODUODENOSCOPY);  Surgeon: Nicole Leal MD;  Location: [facility];  Service: Endoscopy;  Laterality: N/A;    FINGER SURGERY      joint relpacement, left hand index finger    FUNCTIONAL ENDOSCOPIC SINUS SURGERY (FESS) USING COMPUTER-ASSISTED NAVIGATION Bilateral 7/31/2019    Procedure: FESS, USING COMPUTER-ASSISTED NAVIGATION;  Surgeon: Manish Shaffer MD;  Location: [facility];  Service: ENT;  Laterality: Bilateral;    FUNCTIONAL ENDOSCOPIC SINUS SURGERY (FESS) USING COMPUTER-ASSISTED NAVIGATION Bilateral 9/25/2020    Procedure: FESS, USING COMPUTER-ASSISTED NAVIGATION SPHENOID;  Surgeon: Matthias Roach MD;  Location: [address];  Service: ENT;  Laterality: Bilateral;  TIVA    FUNCTIONAL ENDOSCOPIC SINUS SURGERY (FESS) USING COMPUTER-ASSISTED NAVIGATION Bilateral 9/30/2022    Procedure: FESS, USING COMPUTER-ASSISTED NAVIGATION;  Surgeon: Matthias Roach MD;  Location: [address];  Service: ENT;  Laterality: Bilateral;    HYSTERECTOMY      SINUS SURGERY      WISDOM TOOTH EXTRACTION         Family History:   Family History   Problem Relation Age of Onset    Breast cancer Mother     Hypertension Mother     Allergies Mother     Kidney disease Father 64        ESRD on HD    Scleroderma Father     Breast cancer Maternal Grandmother     Heart attack Maternal Grandmother     COPD Maternal Grandmother 72    Cancer Paternal Grandmother 70        colon     Hypertension Brother        Social History:   Social History     Tobacco Use    Smoking status: Never     Passive exposure: Never    Smokeless tobacco: Never   Substance Use Topics    Alcohol use: No    Drug use: No       Allergies: Reviewed    Home Medications:   Medication List with Changes/Refills   Current Medications    ACETAMINOPHEN (TYLENOL) 500 MG TABLET    Take 2 tablets (1,000 mg total) by mouth every 6 (six) hours as needed for Pain.    ALBUTEROL-IPRATROPIUM (DUO-NEB) 2.5 MG-0.5 MG/3 ML NEBULIZER SOLUTION    Take 3 mLs by nebulization every 6 (six) hours as needed for Wheezing. Rescue    ASACOL  MG TBEC    Take 800 mg by mouth 3 (three) times daily.    AZELASTINE (ASTELIN) 137 MCG (0.1 %) NASAL SPRAY    2 sprays (274 mcg total) by Nasal route 2 (two) times daily.    BUTALBITAL-ACETAMINOPHEN-CAFFEINE -40 MG (FIORICET, ESGIC) -40 MG PER TABLET    TAKE 1 TABLET EVERY 4 HOURS AS NEEDED FOR HEADACHE    CHOLESTYRAMINE (QUESTRAN) 4 GRAM PACKET    Take 1 packet (4 g total) by mouth 3 (three) times daily with meals.    DILTIAZEM HCL (DILTIAZEM 2% - LIDOCAINE 5% CREAM)    Apply peasize amount topically to anal area.    DIPHENHYDRAMINE-ALUMINUM-MAGNESIUM HYDROXIDE-SIMETHICONE-LIDOCAINE HCL 2%    Swish and spit 15 mLs every 4 (four) hours as needed (oral ulcers, pain).    DULOXETINE (CYMBALTA) 60 MG CAPSULE    TAKE 1 CAPSULE BY MOUTH TWICE A DAY    ELETRIPTAN (RELPAX) 40 MG TABLET    Take 1 tablet (40 mg total) by mouth as needed.    ESTRADIOL (ESTRACE) 2 MG TABLET    Take 1 tablet (2 mg total) by mouth once daily.    FLUTICASONE PROPIONATE (FLONASE) 50 MCG/ACTUATION NASAL SPRAY    2 sprays (100 mcg total) by Each Nostril route once daily.    FLUTICASONE-UMECLIDIN-VILANTER (TRELEGY ELLIPTA) 100-62.5-25 MCG DSDV    Inhale 1 puff into the lungs once daily.    IMMUN GLOB G,IGG,-PRO-IGA 0-50 (HIZENTRA) 10 GRAM/50 ML (20 %) SOLN    Inject 70 mLs (14 g total) into the skin every 7 days.     IPRATROPIUM-ALBUTEROL (COMBIVENT)  MCG/ACTUATION INHALER    Inhale 2 puffs into the lungs every 6 (six) hours as needed for Wheezing. Rescue    LOSARTAN (COZAAR) 100 MG TABLET    Take 1 tablet (100 mg total) by mouth once daily.    MONTELUKAST (SINGULAIR) 10 MG TABLET    TAKE 1 TABLET EVERY EVENING    NORTRIPTYLINE (PAMELOR) 25 MG CAPSULE    Take 1 capsule (25 mg total) by mouth every evening.    PREDNISONE (DELTASONE) 20 MG TABLET    Take 2 tablets (40 mg total) by mouth once daily.    PREGABALIN (LYRICA) 150 MG CAPSULE    Take 1 capsule (150 mg total) by mouth 3 (three) times daily.    PRILOSEC 10 MG SUDR    Take by mouth as needed.    PROPRANOLOL (INDERAL LA) 80 MG 24 HR CAPSULE    Take 1 capsule (80 mg total) by mouth once daily.    RIZATRIPTAN (MAXALT) 10 MG TABLET    TAKE 1 TABLET IF NEEDED FOR MIGRAINES. MAX 2 TABLETS IN 24 HOURS.    SODIUM CHLORIDE 0.9% 0.9 % SOLN 200 ML WITH GENTAMICIN (PED) 20 MG/2 ML SOLN    EMPTY CONTENTS OF 1 CAPSULE INTO NASAL IRRIGATION SYSTEM, ADD DISTILLED WATER, SALT PACK, MIX & IRRIGATE. PERFORM 2 TIMES DAILY    SODIUM,POTASSIUM,MAG SULFATES (SUPREP BOWEL PREP KIT) 17.5-3.13-1.6 GRAM SOLR    Take by mouth.    SYMBICORT 160-4.5 MCG/ACTUATION HFAA    Inhale 1 puff into the lungs 2 (two) times daily.    TOPIRAMATE (TOPAMAX) 100 MG TABLET    TAKE 1 TABLET TWICE A DAY    TRAZODONE (DESYREL) 50 MG TABLET    Take 1 tablet (50 mg total) by mouth every evening.    TRIAMCINOLONE ACETONIDE 0.025% (KENALOG) 0.025 % CREAM    1 application once daily. Apply to affected area    VENTOLIN HFA 90 MCG/ACTUATION INHALER    INHALE 2 PUFFS INTO THE LUNGS EVERY 4 TO 6 HOURS AS NEEDED FOR WHEEZING.    VERAPAMIL (VERELAN PM) 300 MG 24 HR CAPSULE    Take by mouth.    XYLITOL, BULK, MISC    EMPTY CONTENTS OF 1 CAPSULE INTO NASAL IRRIGATION SYSTEM, ADD DISTILLED WATER, SALT PACK, MIX & IRRIGATE. PERFORM 2 TIMES DAILY         Physical Exam:  Vital Signs:  There were no vitals taken for this visit.   There is no height or weight on file to calculate BMI.        Labs: Pertinent labs reviewed.    Assessment and Plan:  Crohn's disease without complication, unspecified gastrointestinal tract location  Pt with h/o stricturing Crohn's by report, evidence of ileitis on biopsies on several colonoscopies over the past 5 years with some normal path (not on medication).  She has h/o persistent sinusitis with pseudomonas and is followed by ENT and allergy, receiving subcutaneous IgG andIgA (Hizentra).  Her symptoms have waxed and waned over the past 2 years but seem particularly bad now with significant increase in frequency and nocturnal Bms.  Recent colonoscopy normal with essentially normal biopsies (showing very minimal focal colitis but normal ileum).  She is, however, on prednisone (> 20 mg daily) by allergist. Etiology of her diarrhea is not clear.  She has report of strictures but enterography done in 2021 is negative for stricture.    - will get VCE to evaluate small bowel.  Get patency capsule first   - we discussed doing a trial of budesonide vs starting advance therapy like stelara or skyrizi even though there is minimal evidence of active disease   - will check immunoglobulin levels.  Could consider trial of increasing IgG depending on levels    -     X-Ray Abdomen AP 1 View; Future; Expected date: 02/15/2023    Diarrhea, unspecified type  -     Gastrin; Future; Expected date: 02/17/2023  -     Pancreatic elastase, fecal; Future; Expected date: 02/17/2023  -     Immunoglobulins (IgG, IgA, IgM) Quantitative; Future; Expected date: 02/17/2023          Thank you so much for allowing me to participate in the care of Jaylin Leal MD

## 2023-02-15 ENCOUNTER — PATIENT MESSAGE (OUTPATIENT)
Dept: GASTROENTEROLOGY | Facility: CLINIC | Age: 58
End: 2023-02-15

## 2023-02-15 ENCOUNTER — CLINICAL SUPPORT (OUTPATIENT)
Dept: GASTROENTEROLOGY | Facility: CLINIC | Age: 58
End: 2023-02-15
Payer: COMMERCIAL

## 2023-02-15 NOTE — PROGRESS NOTES
Patient presented in clinic to receive a patency capsule for potential VCE. Patient denies any signs or symptoms of dysphagia. Patient educated on the process of the patency capsule and VCE. Patency capsule was administered by Betty RITCHIE RN. Patient tolerated the patency capsule well in clinic, patent airway. Patient was advised to get scheduled x-ray on 2/16/23. Patient verbalized understanding and ambulated out of clinic with no questions or concerns.        PillCam Patency Capsule  Lot# [id number]  Expiration: 5/8/23

## 2023-02-16 ENCOUNTER — HOSPITAL ENCOUNTER (OUTPATIENT)
Dept: RADIOLOGY | Facility: HOSPITAL | Age: 58
Discharge: HOME | End: 2023-02-16
Attending: INTERNAL MEDICINE
Payer: COMMERCIAL

## 2023-02-16 DIAGNOSIS — K50.90 CROHN'S DISEASE WITHOUT COMPLICATION, UNSPECIFIED GASTROINTESTINAL TRACT LOCATION: ICD-10-CM

## 2023-02-16 PROCEDURE — 74018 XR ABDOMEN AP 1 VIEW: ICD-10-PCS | Mod: 26,,, | Performed by: RADIOLOGY

## 2023-02-16 PROCEDURE — 74018 RADEX ABDOMEN 1 VIEW: CPT | Mod: TC,PN

## 2023-02-16 PROCEDURE — 74018 RADEX ABDOMEN 1 VIEW: CPT | Mod: 26,,, | Performed by: RADIOLOGY

## 2023-02-17 ENCOUNTER — PATIENT MESSAGE (OUTPATIENT)
Dept: GASTROENTEROLOGY | Facility: CLINIC | Age: 58
End: 2023-02-17
Payer: COMMERCIAL

## 2023-02-17 DIAGNOSIS — Z18.9 RETAINED FOREIGN BODY: Primary | ICD-10-CM

## 2023-02-20 ENCOUNTER — PATIENT MESSAGE (OUTPATIENT)
Dept: GASTROENTEROLOGY | Facility: CLINIC | Age: 58
End: 2023-02-20
Payer: COMMERCIAL

## 2023-02-20 ENCOUNTER — HOSPITAL ENCOUNTER (OUTPATIENT)
Dept: RADIOLOGY | Facility: HOSPITAL | Age: 58
Discharge: HOME | End: 2023-02-20
Attending: INTERNAL MEDICINE
Payer: COMMERCIAL

## 2023-02-20 DIAGNOSIS — Z18.9 RETAINED FOREIGN BODY: ICD-10-CM

## 2023-02-20 PROCEDURE — 74018 RADEX ABDOMEN 1 VIEW: CPT | Mod: 26,,, | Performed by: RADIOLOGY

## 2023-02-20 PROCEDURE — 74018 XR ABDOMEN AP 1 VIEW: ICD-10-PCS | Mod: 26,,, | Performed by: RADIOLOGY

## 2023-02-20 PROCEDURE — 74018 RADEX ABDOMEN 1 VIEW: CPT | Mod: TC,PN

## 2023-02-24 ENCOUNTER — PATIENT MESSAGE (OUTPATIENT)
Dept: PULMONOLOGY | Facility: CLINIC | Age: 58
End: 2023-02-24
Payer: COMMERCIAL

## 2023-03-02 ENCOUNTER — HOSPITAL ENCOUNTER (OUTPATIENT)
Facility: HOSPITAL | Age: 58
Discharge: HOME | End: 2023-03-03
Attending: INTERNAL MEDICINE | Admitting: INTERNAL MEDICINE
Payer: COMMERCIAL

## 2023-03-03 PROCEDURE — 91110 GI TRC IMG INTRAL ESOPH-ILE: CPT | Mod: TC | Performed by: INTERNAL MEDICINE

## 2023-03-06 ENCOUNTER — PATIENT MESSAGE (OUTPATIENT)
Dept: PULMONOLOGY | Facility: CLINIC | Age: 58
End: 2023-03-06
Payer: COMMERCIAL

## 2023-03-06 ENCOUNTER — HOSPITAL ENCOUNTER (OUTPATIENT)
Dept: RADIOLOGY | Facility: HOSPITAL | Age: 58
Discharge: HOME | End: 2023-03-06
Attending: INTERNAL MEDICINE
Payer: COMMERCIAL

## 2023-03-06 DIAGNOSIS — R91.8 ABNORMAL CT SCAN, LUNG: ICD-10-CM

## 2023-03-06 PROCEDURE — 71250 CT CHEST WITHOUT CONTRAST: ICD-10-PCS | Mod: 26,,, | Performed by: RADIOLOGY

## 2023-03-06 PROCEDURE — 71250 CT THORAX DX C-: CPT | Mod: 26,,, | Performed by: RADIOLOGY

## 2023-03-06 PROCEDURE — 71250 CT THORAX DX C-: CPT | Mod: TC,PN

## 2023-03-08 ENCOUNTER — PATIENT MESSAGE (OUTPATIENT)
Dept: GASTROENTEROLOGY | Facility: CLINIC | Age: 58
End: 2023-03-08
Payer: COMMERCIAL

## 2023-03-08 ENCOUNTER — TELEPHONE (OUTPATIENT)
Dept: GASTROENTEROLOGY | Facility: CLINIC | Age: 58
End: 2023-03-08
Payer: COMMERCIAL

## 2023-03-08 DIAGNOSIS — Z18.9 RETAINED FOREIGN BODY: Primary | ICD-10-CM

## 2023-03-08 NOTE — TELEPHONE ENCOUNTER
Phoned patient and scheduled her KUB per Dr. Leal for tomorrow.  Patient verbalized understanding.

## 2023-03-08 NOTE — TELEPHONE ENCOUNTER
----- Message from Nicole Leal MD sent at 3/8/2023  3:49 PM CST -----  Can you call pt and have her do a KUB to see if her capsule passed?  Let her know that I will have the completed report to her by the end of the week and discuss plan.

## 2023-03-09 ENCOUNTER — HOSPITAL ENCOUNTER (OUTPATIENT)
Dept: RADIOLOGY | Facility: HOSPITAL | Age: 58
Discharge: HOME | End: 2023-03-09
Attending: INTERNAL MEDICINE
Payer: COMMERCIAL

## 2023-03-09 DIAGNOSIS — Z18.9 RETAINED FOREIGN BODY: ICD-10-CM

## 2023-03-09 PROCEDURE — 74018 RADEX ABDOMEN 1 VIEW: CPT | Mod: TC,PN

## 2023-03-09 PROCEDURE — 74018 XR KUB: ICD-10-PCS | Mod: 26,,, | Performed by: RADIOLOGY

## 2023-03-09 PROCEDURE — 74018 RADEX ABDOMEN 1 VIEW: CPT | Mod: 26,,, | Performed by: RADIOLOGY

## 2023-03-10 ENCOUNTER — PATIENT MESSAGE (OUTPATIENT)
Dept: GASTROENTEROLOGY | Facility: CLINIC | Age: 58
End: 2023-03-10
Payer: COMMERCIAL

## 2023-03-10 ENCOUNTER — PATIENT MESSAGE (OUTPATIENT)
Dept: OTOLARYNGOLOGY | Facility: CLINIC | Age: 58
End: 2023-03-10
Payer: COMMERCIAL

## 2023-03-15 ENCOUNTER — TELEPHONE (OUTPATIENT)
Dept: GASTROENTEROLOGY | Facility: CLINIC | Age: 58
End: 2023-03-15
Payer: COMMERCIAL

## 2023-03-15 ENCOUNTER — OFFICE VISIT (OUTPATIENT)
Dept: OTOLARYNGOLOGY | Facility: CLINIC | Age: 58
End: 2023-03-15
Payer: COMMERCIAL

## 2023-03-15 ENCOUNTER — PATIENT MESSAGE (OUTPATIENT)
Dept: OTOLARYNGOLOGY | Facility: CLINIC | Age: 58
End: 2023-03-15

## 2023-03-15 ENCOUNTER — PATIENT MESSAGE (OUTPATIENT)
Dept: PRIMARY CARE CLINIC | Facility: CLINIC | Age: 58
End: 2023-03-15
Payer: COMMERCIAL

## 2023-03-15 VITALS — WEIGHT: 176.38 LBS | HEIGHT: 67 IN | BODY MASS INDEX: 27.68 KG/M2

## 2023-03-15 DIAGNOSIS — J31.0 NONALLERGIC RHINITIS: Primary | ICD-10-CM

## 2023-03-15 DIAGNOSIS — J32.4 CHRONIC PANSINUSITIS: ICD-10-CM

## 2023-03-15 DIAGNOSIS — D80.1 HYPOGAMMAGLOBULINEMIA: ICD-10-CM

## 2023-03-15 PROCEDURE — 99999 PR PBB SHADOW E&M-EST. PATIENT-LVL V: CPT | Mod: PBBFAC,,, | Performed by: OTOLARYNGOLOGY

## 2023-03-15 PROCEDURE — 1159F MED LIST DOCD IN RCRD: CPT | Mod: CPTII,S$GLB,, | Performed by: OTOLARYNGOLOGY

## 2023-03-15 PROCEDURE — 99214 PR OFFICE/OUTPT VISIT, EST, LEVL IV, 30-39 MIN: ICD-10-PCS | Mod: 25,S$GLB,, | Performed by: OTOLARYNGOLOGY

## 2023-03-15 PROCEDURE — 4010F ACE/ARB THERAPY RXD/TAKEN: CPT | Mod: CPTII,S$GLB,, | Performed by: OTOLARYNGOLOGY

## 2023-03-15 PROCEDURE — 99214 OFFICE O/P EST MOD 30 MIN: CPT | Mod: 25,S$GLB,, | Performed by: OTOLARYNGOLOGY

## 2023-03-15 PROCEDURE — 99999 PR PBB SHADOW E&M-EST. PATIENT-LVL V: ICD-10-PCS | Mod: PBBFAC,,, | Performed by: OTOLARYNGOLOGY

## 2023-03-15 PROCEDURE — 31231 NASAL/SINUS ENDOSCOPY: ICD-10-PCS | Mod: S$GLB,,, | Performed by: OTOLARYNGOLOGY

## 2023-03-15 PROCEDURE — 4010F PR ACE/ARB THEARPY RXD/TAKEN: ICD-10-PCS | Mod: CPTII,S$GLB,, | Performed by: OTOLARYNGOLOGY

## 2023-03-15 PROCEDURE — 1160F PR REVIEW ALL MEDS BY PRESCRIBER/CLIN PHARMACIST DOCUMENTED: ICD-10-PCS | Mod: CPTII,S$GLB,, | Performed by: OTOLARYNGOLOGY

## 2023-03-15 PROCEDURE — 31231 NASAL ENDOSCOPY DX: CPT | Mod: S$GLB,,, | Performed by: OTOLARYNGOLOGY

## 2023-03-15 PROCEDURE — 3008F PR BODY MASS INDEX (BMI) DOCUMENTED: ICD-10-PCS | Mod: CPTII,S$GLB,, | Performed by: OTOLARYNGOLOGY

## 2023-03-15 PROCEDURE — 1160F RVW MEDS BY RX/DR IN RCRD: CPT | Mod: CPTII,S$GLB,, | Performed by: OTOLARYNGOLOGY

## 2023-03-15 PROCEDURE — 1159F PR MEDICATION LIST DOCUMENTED IN MEDICAL RECORD: ICD-10-PCS | Mod: CPTII,S$GLB,, | Performed by: OTOLARYNGOLOGY

## 2023-03-15 PROCEDURE — 3008F BODY MASS INDEX DOCD: CPT | Mod: CPTII,S$GLB,, | Performed by: OTOLARYNGOLOGY

## 2023-03-15 RX ORDER — LEVOFLOXACIN 750 MG/1
750 TABLET ORAL DAILY
Qty: 7 TABLET | Refills: 0 | Status: SHIPPED | OUTPATIENT
Start: 2023-03-15 | End: 2023-04-21

## 2023-03-15 RX ORDER — TRAZODONE HYDROCHLORIDE 50 MG/1
50 TABLET ORAL NIGHTLY
Qty: 90 TABLET | Refills: 3 | Status: SHIPPED | OUTPATIENT
Start: 2023-03-15 | End: 2023-12-05 | Stop reason: SDUPTHER

## 2023-03-15 RX ORDER — DULOXETIN HYDROCHLORIDE 60 MG/1
60 CAPSULE, DELAYED RELEASE ORAL 2 TIMES DAILY
Qty: 180 CAPSULE | Refills: 3 | Status: SHIPPED | OUTPATIENT
Start: 2023-03-15 | End: 2023-12-05 | Stop reason: SDUPTHER

## 2023-03-15 NOTE — TELEPHONE ENCOUNTER
No new care gaps identified.  [facility] Embedded Care Gaps. Reference number: [id number]. 3/15/2023   9:35:49 AM MARINET

## 2023-03-15 NOTE — TELEPHONE ENCOUNTER
Informed patient of x-ray results. She verbalized understanding. She states she is still feeling the same, diarrhea everyday. She asked how long the VCE results take to come back. I informed her 2-3 weeks. She verbalized understanding. She states Dr. Leal was suppose to order a prescription compound like Imodium? Because she can take 3 imodium without any affect. I informed her I would ask Dr. Leal and get her to send it to [facility].

## 2023-03-16 ENCOUNTER — PATIENT MESSAGE (OUTPATIENT)
Dept: GASTROENTEROLOGY | Facility: CLINIC | Age: 58
End: 2023-03-16

## 2023-03-16 PROCEDURE — 91110 PR GI TRACT CAPSULE ENDOSCOPY: ICD-10-PCS | Mod: 26,,, | Performed by: INTERNAL MEDICINE

## 2023-03-16 PROCEDURE — 91110 GI TRC IMG INTRAL ESOPH-ILE: CPT | Mod: 26,,, | Performed by: INTERNAL MEDICINE

## 2023-03-16 NOTE — PROCEDURES
Nasal/sinus endoscopy    Date/Time: 3/15/2023 1:45 PM  Performed by: Matthias Roach MD  Authorized by: Matthias Roach MD     Consent Done?:  Yes (Verbal)  Anesthesia:     Local anesthetic:  Lidocaine 4% and Jose-Synephrine 1/2%    Patient tolerance:  Patient tolerated the procedure well with no immediate complications  Nose:     Procedure Performed:  Nasal Endoscopy  External:      No external nasal deformity  Intranasal:      Mucosa no polyps     Mucosa ulcers not present     No mucosa lesions present     Turbinates not enlarged     No septum gross deformity  Nasopharynx:      No mucosa lesions     Adenoids not present     Posterior choanae patent     Eustachian tube patent     Markedly improved purulent crusting at sphenoidotomies bilaterally  Small residual crust at septectomy site  Maxillary sinuses clear

## 2023-03-16 NOTE — PROVATION PATIENT INSTRUCTIONS
Discharge Summary/Instructions after an Endoscopic Procedure  Patient Name: Jaylin Murguia  Patient MRN: [id number]  Patient YOB: 1965 Thursday, March 2, 2023 Nicole Leal MD  Dear patient,  As a result of recent federal legislation (The Federal Cures Act), you may   receive lab or pathology results from your procedure in your [facility]   account before your physician is able to contact you. Your physician or   their representative will relay the results to you with their   recommendations at their soonest availability.  Thank you,  RESTRICTIONS:  During your procedure today, you received medications for sedation.  These   medications may affect your judgment, balance and coordination.  Therefore,   for 24 hours, you have the following restrictions:   - DO NOT drive a car, operate machinery, make legal/financial decisions,   sign important papers or drink alcohol.    ACTIVITY:  Today: no heavy lifting, straining or running due to procedural   sedation/anesthesia.  The following day: return to full activity including work.  DIET:  Eat and drink normally unless instructed otherwise.     TREATMENT FOR COMMON SIDE EFFECTS:  - Mild abdominal pain, nausea, belching, bloating or excessive gas:  rest,   eat lightly and use a heating pad.  - Sore Throat: treat with throat lozenges and/or gargle with warm salt   water.  - Because air was used during the procedure, expelling large amounts of air   from your rectum or belching is normal.  - If a bowel prep was taken, you may not have a bowel movement for 1-3 days.    This is normal.  SYMPTOMS TO WATCH FOR AND REPORT TO YOUR PHYSICIAN:  1. Abdominal pain or bloating, other than gas cramps.  2. Chest pain.  3. Back pain.  4. Signs of infection such as: chills or fever occurring within 24 hours   after the procedure.  5. Rectal bleeding, which would show as bright red, maroon, or black stools.   (A tablespoon of blood from the rectum is not serious, especially if    hemorrhoids are present.)  6. Vomiting.  7. Weakness or dizziness.  GO DIRECTLY TO THE NEAREST EMERGENCY ROOM IF YOU HAVE ANY OF THE FOLLOWING:      Difficulty breathing              Chills and/or fever over 101 F   Persistent vomiting and/or vomiting blood   Severe abdominal pain   Severe chest pain   Black, tarry stools   Bleeding- more than one tablespoon   Any other symptom or condition that you feel may need urgent attention  Your doctor recommends these additional instructions:  If any biopsies were taken, your doctors clinic will contact you in 1 to 2   weeks with any results.  - Continue present medications.  For questions, problems or results please call your physician Nicole Leal MD at Work:  [phone]  If you have any questions about the above instructions, call the GI   department at [phone] or call the endoscopy unit at [phone]   from 7am until 3 pm.  [facility], EMERGENCY ROOM PHONE NUMBER:   [phone]  IF A COMPLICATION OR EMERGENCY SITUATION ARISES AND YOU ARE UNABLE TO REACH   YOUR PHYSICIAN - GO DIRECTLY TO THE EMERGENCY ROOM.  I have read or have had read to me these discharge instructions for my   procedure and have received a written copy.  I understand these   instructions and will follow-up with my physician if I have any questions.     __________________________________       _____________________________________  Nurse Signature                                          Patient/Designated   Responsible Party Signature  MD Nicole Watkins MD  3/16/2023 4:31:16 PM  This report has been verified and signed electronically.  Dear patient,  As a result of recent federal legislation (The Federal Cures Act), you may   receive lab or pathology results from your procedure in your [facility]   account before your physician is able to contact you. Your physician or   their representative will relay the results to you with their    recommendations at their soonest availability.  Thank you,  PROVATION

## 2023-03-16 NOTE — PROGRESS NOTES
"  Subjective:      Jaylin is a 58 y.o. female who comes for follow-up of sinusitis.  Her last visit with me was on 2/7/2023.  Now nearly 6 months status-post endoscopic sinus surgery.  Condition worse, now coughing in prolonged episodes, productive of green phlegm, with feeling of PND and congestion.  Using Trelegy and Spiriva, had CT chest last week that looked worse, seeing Dr. Riley (pulmonology) in a few days.  Under instructions to defer oral steroids until then.  Using cipro sinus rinse.        %       The patient's medications, allergies, past medical, surgical, social and family histories were reviewed and updated as appropriate.    A detailed review of systems was obtained with pertinent positives as per the above HPI, and otherwise negative.        Objective:     Ht 5' 7" (1.702 m)   Wt 80 kg (176 lb 5.9 oz)   BMI 27.62 kg/m²        Constitutional:   She appears well-developed. She is cooperative.     Head:  Normocephalic.     Nose:  No mucosal edema, rhinorrhea, septal deviation or polyps. No epistaxis. Turbinates normal, no turbinate masses and no turbinate hypertrophy.  Right sinus exhibits no maxillary sinus tenderness and no frontal sinus tenderness. Left sinus exhibits no maxillary sinus tenderness and no frontal sinus tenderness.     Mouth/Throat  Oropharynx clear and moist without lesions or asymmetry. No oropharyngeal exudate or posterior oropharyngeal erythema.     Neck:  No adenopathy. Normal range of motion present.     She has no cervical adenopathy.     Procedure    Nasal endoscopy performed.  See procedure note.     Left sphenoid     Right sphenoid      Data Reviewed    WBC (K/uL)   Date Value   01/23/2023 7.62     Eosinophil % (%)   Date Value   01/23/2023 29.8 (H)     Eos # (K/uL)   Date Value   01/23/2023 2.3 (H)     Platelets (K/uL)   Date Value   01/23/2023 260     Glucose (mg/dL)   Date Value   01/23/2023 87     IgE (IU/mL)   Date Value   05/26/2022 <35       Pathology report " indicated non-eosinophilic chronic inflammation.  Cultures showed Pseudomonas.      Assessment:     1. Nonallergic rhinitis    2. Chronic pansinusitis    3. Hypogammaglobulinemia         Plan:     Rx levofloxacin 750mg.  Continue cipro rinse.  F/U pulmonology as planned.  Follow up if symptoms worsen or fail to improve.

## 2023-03-18 NOTE — PROGRESS NOTES
Subjective:       Patient ID: Jaylin Murguia is a 58 y.o. female.    Chief Complaint: Cough and shortness of breath    Pt is a 58 yo CW pmh Asthama, allergic rhinitis, chronic pansinusitis, hypogammaglobulinemia, Crohn's disease, fibromyalgia, chronic immunosuppression, TAMIKA on CPAP who presents follow up of uncontrolled asthma with associated cough.     Since last being seen patient has been treated for pseudomonal infection of the sinuses with cipro. Pt had improvement in her symptoms, but recently had increased worsening of her cough and shortness of breath. Was placed on levofloxacin with some improvement. Her crohns disease is being followed by GI who plans to start on budesonide or Skyrizi. She states that she has been having ~8 episodes of diarrhea a day. Her cough has been productive of some brownish sputum.     Per chart review:   On IgG replacement therapy, Hizentra  Recurrent nasal pseudomonas infections: ( 1/2022) treated with ceftazadime/avibactam x5 weeks with PICC (2/15/22-3/15/22)  Meropenam nasal rinses (as of 12/27/22)     Past Medications:  Remicade (in remote past)-- ineffective  Asacol 4.8 gm-- ineffective  Entocort-- effective  Prednisone  Humira (started July 17, stopped 2/2018)-- stopped 2/2 multiple infections.      FESS: Stenotrophomonas and Pseudomonas s/p cipro/bactrim  FeNO 78  Eos: 400 or less  IgE: <35  CFTR (-), alpha-1-antitrypsin wnl  Normal IgG1/4, low or low normal IgG 2/3     Inhaler use: Trelegy   PRN inhaler use: combivent 2-3x/day ,duonebs 2-3x/day   Smoking hx: never smoker  Work hx: previous teacher  Exposure hx: none  Pets (dog),  birds(none), down furniture: pillows  Family hx lung disease: none  Family hx:   - father: scleroderma, father smoked  - mother: breast cancer  Personal hx:   Preeclampsia, eclampsia with second child. Pulmonary edema, acute heart failure?    Review of Systems   Constitutional:  Positive for activity change and fatigue. Negative for fever.    HENT:  Positive for postnasal drip. Negative for sinus pressure and congestion.    Respiratory:  Positive for cough, sputum production, shortness of breath, wheezing, dyspnea on extertion and use of rescue inhaler.    Cardiovascular:  Negative for chest pain, palpitations and leg swelling.   Musculoskeletal:  Negative for gait problem, joint swelling and myalgias.   Gastrointestinal:  Negative for nausea and vomiting.   Neurological:  Negative for dizziness, syncope, weakness and light-headedness.   Psychiatric/Behavioral:  Negative for confusion and sleep disturbance. The patient is not nervous/anxious.      Objective:      Physical Exam   Constitutional: She is oriented to person, place, and time. She appears well-developed and well-nourished. She is obese.   HENT:   Head: Normocephalic.   Cardiovascular: Normal rate and regular rhythm. Exam reveals no gallop and no friction rub.   No murmur heard.  Pulmonary/Chest: Normal expansion, symmetric chest wall expansion and effort normal. She has no decreased breath sounds. She has wheezes. She has no rhonchi. She has no rales.   Musculoskeletal:         General: No edema.   Neurological: She is alert and oriented to person, place, and time. Gait normal.   Skin: No cyanosis. Nails show no clubbing.   Psychiatric: She has a normal mood and affect. Her behavior is normal. Judgment and thought content normal.   Vitals reviewed.  Personal Diagnostic Review    Chest x-ray: 11/23/22  Right infrahilar atelectasis vs opacification. Hazy left suprahilar opacification concerning for developing PNA.      CT of chest performed on 5/6/22 without contrast revealed bibasilar posterior predominant tree and bud indicative of infectious vs inflammatory source.     CT chest 3/6/23:   Interval worsening of GGO and consolidative processes bilaterally, particularly in bilateral upper lobes and lower lobes.      Echocardiogram: 4/19/22    1 - Mild left atrial enlargement.     2 - Concentric  hypertrophy.     3 - No wall motion abnormalities.     4 - Normal left ventricular systolic function (EF 60-65%).     5 - Normal left ventricular diastolic function.     6 - Normal right ventricular systolic function .     7 - The estimated PA systolic pressure is 28 mmHg.     8 - Trivial to mild aortic regurgitation.     9 - Trivial to mild mitral regurgitation.      Pulmonary function tests:   22  FEV1: 1.86L (65.9%)  FVC: 2.19L (61%)     22  FEV1: 1.86L  (65.5 % predicted),   FVC:  2.32L (64.4 % predicted),   FEV1/FVC:  80,   T.26L (78.3% predicted),   DLCO: 16.53 (66 % predicted)    No flowsheet data found.      Assessment:       1. Abnormal CT scan, lung    2. Severe persistent asthma without complication    3. TAMIKA on CPAP    4. Chronic rhinosinusitis        Outpatient Encounter Medications as of 3/20/2023   Medication Sig Dispense Refill    acetaminophen (TYLENOL) 500 MG tablet Take 2 tablets (1,000 mg total) by mouth every 6 (six) hours as needed for Pain. 60 tablet 0    albuterol-ipratropium (DUO-NEB) 2.5 mg-0.5 mg/3 mL nebulizer solution Take 3 mLs by nebulization every 6 (six) hours as needed for Wheezing. Rescue 270 mL 0    ASACOL  mg TbEC Take 800 mg by mouth 3 (three) times daily.      butalbital-acetaminophen-caffeine -40 mg (FIORICET, ESGIC) -40 mg per tablet TAKE 1 TABLET EVERY 4 HOURS AS NEEDED FOR HEADACHE 90 tablet 3    cholestyramine (QUESTRAN) 4 gram packet Take 1 packet (4 g total) by mouth 3 (three) times daily with meals. 270 packet 3    diltiazem HCl (DILTIAZEM 2% - LIDOCAINE 5% CREAM) Apply peasize amount topically to anal area. 30 g 2    diphenhydrAMINE-aluminum-magnesium hydroxide-simethicone-LIDOcaine HCl 2% Swish and spit 15 mLs every 4 (four) hours as needed (oral ulcers, pain). 1 Bottle 2    DULoxetine (CYMBALTA) 60 MG capsule Take 1 capsule (60 mg total) by mouth 2 (two) times daily. 180 capsule 3    eletriptan (RELPAX) 40 MG tablet Take 1 tablet (40  mg total) by mouth as needed. (Patient taking differently: Take 40 mg by mouth as needed. Take if needed) 12 tablet 3    estradioL (ESTRACE) 2 MG tablet Take 1 tablet (2 mg total) by mouth once daily. 90 tablet 1    fluticasone propionate (FLONASE) 50 mcg/actuation nasal spray 2 sprays (100 mcg total) by Each Nostril route once daily. 16 mL 0    fluticasone-umeclidin-vilanter (TRELEGY ELLIPTA) 100-62.5-25 mcg DsDv Inhale 1 puff into the lungs once daily. 90 each 3    immun glob G,IgG,-pro-IgA 0-50 (HIZENTRA) 10 gram/50 mL (20 %) Soln Inject 70 mLs (14 g total) into the skin every 7 days. 280 mL 11    levoFLOXacin (LEVAQUIN) 750 MG tablet Take 1 tablet (750 mg total) by mouth once daily. 7 tablet 0    losartan (COZAAR) 100 MG tablet Take 1 tablet (100 mg total) by mouth once daily. 90 tablet 3    montelukast (SINGULAIR) 10 mg tablet TAKE 1 TABLET EVERY EVENING 90 tablet 3    nortriptyline (PAMELOR) 25 MG capsule Take 1 capsule (25 mg total) by mouth every evening. 90 capsule 3    pregabalin (LYRICA) 150 MG capsule Take 1 capsule (150 mg total) by mouth 3 (three) times daily. 270 capsule 1    PRILOSEC 10 mg SuDR Take by mouth as needed.      propranoloL (INDERAL LA) 80 MG 24 hr capsule Take 1 capsule (80 mg total) by mouth once daily. 90 capsule 2    rizatriptan (MAXALT) 10 MG tablet TAKE 1 TABLET IF NEEDED FOR MIGRAINES. MAX 2 TABLETS IN 24 HOURS. 30 tablet 8    sodium chloride 0.9% 0.9 % Soln 200 mL with gentamicin (ped) 20 mg/2 mL Soln EMPTY CONTENTS OF 1 CAPSULE INTO NASAL IRRIGATION SYSTEM, ADD DISTILLED WATER, SALT PACK, MIX & IRRIGATE. PERFORM 2 TIMES DAILY      sodium,potassium,mag sulfates (SUPREP BOWEL PREP KIT) 17.5-3.13-1.6 gram SolR Take by mouth.      topiramate (TOPAMAX) 100 MG tablet TAKE 1 TABLET TWICE A  tablet 3    traZODone (DESYREL) 50 MG tablet Take 1 tablet (50 mg total) by mouth every evening. 90 tablet 3    triamcinolone acetonide 0.025% (KENALOG) 0.025 % cream 1 application once daily.  Apply to affected area      VENTOLIN HFA 90 mcg/actuation inhaler INHALE 2 PUFFS INTO THE LUNGS EVERY 4 TO 6 HOURS AS NEEDED FOR WHEEZING. (Patient taking differently: Take if needed & bring) 18 Inhaler 1    XYLITOL, BULK, MISC EMPTY CONTENTS OF 1 CAPSULE INTO NASAL IRRIGATION SYSTEM, ADD DISTILLED WATER, SALT PACK, MIX & IRRIGATE. PERFORM 2 TIMES DAILY      [DISCONTINUED] azelastine (ASTELIN) 137 mcg (0.1 %) nasal spray 2 sprays (274 mcg total) by Nasal route 2 (two) times daily. 30 mL 11    [DISCONTINUED] DULoxetine (CYMBALTA) 60 MG capsule TAKE 1 CAPSULE BY MOUTH TWICE A DAY (Patient taking differently: Take 60 mg by mouth once daily.) 180 capsule 3    [DISCONTINUED] ipratropium-albuteroL (COMBIVENT)  mcg/actuation inhaler Inhale 2 puffs into the lungs every 6 (six) hours as needed for Wheezing. Rescue 4 g 3    [DISCONTINUED] predniSONE (DELTASONE) 20 MG tablet Take 2 tablets (40 mg total) by mouth once daily. 10 tablet 0    [DISCONTINUED] SYMBICORT 160-4.5 mcg/actuation HFAA Inhale 1 puff into the lungs 2 (two) times daily.      [DISCONTINUED] traZODone (DESYREL) 50 MG tablet Take 1 tablet (50 mg total) by mouth every evening. 90 tablet 3    [DISCONTINUED] verapamiL (VERELAN PM) 300 mg 24 hr capsule Take by mouth.       Facility-Administered Encounter Medications as of 3/20/2023   Medication Dose Route Frequency Provider Last Rate Last Admin    lactated ringers infusion   Intravenous Continuous Mary Leiva MD   New Bag at 03/12/20 0923    lactated ringers infusion   Intravenous Continuous Shay Bruce MD   Stopped at 03/16/22 1342    lidocaine (PF) 10 mg/ml (1%) injection 10 mg  1 mL Intradermal Once Mary Leiva MD        LIDOcaine (PF) 10 mg/ml (1%) injection 10 mg  1 mL Intradermal Once Johan Baez MD        sodium chloride 0.9% flush 10 mL  10 mL Intravenous PRN Johan Baez MD         Orders Placed This Encounter   Procedures    STRONGYLOIDES IGG ANTIBODIES      Standing Status:   Future     Number of Occurrences:   1     Standing Expiration Date:   5/18/2024       Plan:       Chronic rhinosinusitis  Secondary to chronic pseudomonas infection with multiple rounds of abx and debriedments. On chronic antibiotic nasal rinses, flonase, azelastine. Followed by Dr. Roach. Likely contributing somewhat to lack of asthma control due to postnasal drip, however unsure if anyway to be more aggressive with treatment. Recently finished ciprofloxacin and soon after required levofloxacin for shortness of breath and worsened cough. Continue treatment per Dr. Roach and suggest nasal rinse and scheduled duonebs.     Severe persistent asthma without complication  Currently on trelegy and singulair. Uses her duonebs at least twice daily, sometimes more. Has frequent productive cough and wheezing partially due to post nasal drip. Am concerned that uncontrolled Crohn's disease is contributing to lack of asthma control as well. Pt has great response to prednisone with AE of swelling in her face. Pt had previously been on it for 4 months with significant improvement in symptoms with relapse after stopping.    - will continue trelegy, singulair and PRN duonebs/combivent.   - Pt to start on local budesonide or Skyrizi  - may end up requiring Dupixent if continues to be uncontrolled with control of post nasal drip and crohn's disease    Abnormal CT scan, lung  Bilateral patchy areas of GGO concerning for inflammatory vs infectious process. Differential included ILD 2/2 crohn's dz, DILI (fluoroquinolones, , atypical infection, Hypersensitivity pneumonitis (recurrent exposure to pseudomonas vs other exposures),   - obtain strongyloides IgG in setting of eosinophilia of 2.3, GI and pulmonary side effects.   - bronch to evaluate for infectious process vs eosinophilic PNA vs other.   - If rule out infectious process will start on steroids and attempt to wean off as starts on crohn's treatment.     TAMIKA on  CPAP  Unable to tolerate CPAP right now in setting of cough and shortness of breath.          Follow up in 1 month    Ivan Riley MD  [facility]

## 2023-03-18 NOTE — H&P (VIEW-ONLY)
Subjective:       Patient ID: Jaylin Murguia is a 58 y.o. female.    Chief Complaint: Cough and shortness of breath    Pt is a 58 yo CW pmh Asthama, allergic rhinitis, chronic pansinusitis, hypogammaglobulinemia, Crohn's disease, fibromyalgia, chronic immunosuppression, TAMIKA on CPAP who presents follow up of uncontrolled asthma with associated cough.     Since last being seen patient has been treated for pseudomonal infection of the sinuses with cipro. Pt had improvement in her symptoms, but recently had increased worsening of her cough and shortness of breath. Was placed on levofloxacin with some improvement. Her crohns disease is being followed by GI who plans to start on budesonide or Skyrizi. She states that she has been having ~8 episodes of diarrhea a day. Her cough has been productive of some brownish sputum.     Per chart review:   On IgG replacement therapy, Hizentra  Recurrent nasal pseudomonas infections: ( 1/2022) treated with ceftazadime/avibactam x5 weeks with PICC (2/15/22-3/15/22)  Meropenam nasal rinses (as of 12/27/22)     Past Medications:  Remicade (in remote past)-- ineffective  Asacol 4.8 gm-- ineffective  Entocort-- effective  Prednisone  Humira (started July 17, stopped 2/2018)-- stopped 2/2 multiple infections.      FESS: Stenotrophomonas and Pseudomonas s/p cipro/bactrim  FeNO 78  Eos: 400 or less  IgE: <35  CFTR (-), alpha-1-antitrypsin wnl  Normal IgG1/4, low or low normal IgG 2/3     Inhaler use: Trelegy   PRN inhaler use: combivent 2-3x/day ,duonebs 2-3x/day   Smoking hx: never smoker  Work hx: previous teacher  Exposure hx: none  Pets (dog),  birds(none), down furniture: pillows  Family hx lung disease: none  Family hx:   - father: scleroderma, father smoked  - mother: breast cancer  Personal hx:   Preeclampsia, eclampsia with second child. Pulmonary edema, acute heart failure?    Review of Systems   Constitutional:  Positive for activity change and fatigue. Negative for fever.    HENT:  Positive for postnasal drip. Negative for sinus pressure and congestion.    Respiratory:  Positive for cough, sputum production, shortness of breath, wheezing, dyspnea on extertion and use of rescue inhaler.    Cardiovascular:  Negative for chest pain, palpitations and leg swelling.   Musculoskeletal:  Negative for gait problem, joint swelling and myalgias.   Gastrointestinal:  Negative for nausea and vomiting.   Neurological:  Negative for dizziness, syncope, weakness and light-headedness.   Psychiatric/Behavioral:  Negative for confusion and sleep disturbance. The patient is not nervous/anxious.      Objective:      Physical Exam   Constitutional: She is oriented to person, place, and time. She appears well-developed and well-nourished. She is obese.   HENT:   Head: Normocephalic.   Cardiovascular: Normal rate and regular rhythm. Exam reveals no gallop and no friction rub.   No murmur heard.  Pulmonary/Chest: Normal expansion, symmetric chest wall expansion and effort normal. She has no decreased breath sounds. She has wheezes. She has no rhonchi. She has no rales.   Musculoskeletal:         General: No edema.   Neurological: She is alert and oriented to person, place, and time. Gait normal.   Skin: No cyanosis. Nails show no clubbing.   Psychiatric: She has a normal mood and affect. Her behavior is normal. Judgment and thought content normal.   Vitals reviewed.  Personal Diagnostic Review    Chest x-ray: 11/23/22  Right infrahilar atelectasis vs opacification. Hazy left suprahilar opacification concerning for developing PNA.      CT of chest performed on 5/6/22 without contrast revealed bibasilar posterior predominant tree and bud indicative of infectious vs inflammatory source.     CT chest 3/6/23:   Interval worsening of GGO and consolidative processes bilaterally, particularly in bilateral upper lobes and lower lobes.      Echocardiogram: 4/19/22    1 - Mild left atrial enlargement.     2 - Concentric  hypertrophy.     3 - No wall motion abnormalities.     4 - Normal left ventricular systolic function (EF 60-65%).     5 - Normal left ventricular diastolic function.     6 - Normal right ventricular systolic function .     7 - The estimated PA systolic pressure is 28 mmHg.     8 - Trivial to mild aortic regurgitation.     9 - Trivial to mild mitral regurgitation.      Pulmonary function tests:   22  FEV1: 1.86L (65.9%)  FVC: 2.19L (61%)     22  FEV1: 1.86L  (65.5 % predicted),   FVC:  2.32L (64.4 % predicted),   FEV1/FVC:  80,   T.26L (78.3% predicted),   DLCO: 16.53 (66 % predicted)    No flowsheet data found.      Assessment:       1. Abnormal CT scan, lung    2. Severe persistent asthma without complication    3. TAMIKA on CPAP    4. Chronic rhinosinusitis        Outpatient Encounter Medications as of 3/20/2023   Medication Sig Dispense Refill    acetaminophen (TYLENOL) 500 MG tablet Take 2 tablets (1,000 mg total) by mouth every 6 (six) hours as needed for Pain. 60 tablet 0    albuterol-ipratropium (DUO-NEB) 2.5 mg-0.5 mg/3 mL nebulizer solution Take 3 mLs by nebulization every 6 (six) hours as needed for Wheezing. Rescue 270 mL 0    ASACOL  mg TbEC Take 800 mg by mouth 3 (three) times daily.      butalbital-acetaminophen-caffeine -40 mg (FIORICET, ESGIC) -40 mg per tablet TAKE 1 TABLET EVERY 4 HOURS AS NEEDED FOR HEADACHE 90 tablet 3    cholestyramine (QUESTRAN) 4 gram packet Take 1 packet (4 g total) by mouth 3 (three) times daily with meals. 270 packet 3    diltiazem HCl (DILTIAZEM 2% - LIDOCAINE 5% CREAM) Apply peasize amount topically to anal area. 30 g 2    diphenhydrAMINE-aluminum-magnesium hydroxide-simethicone-LIDOcaine HCl 2% Swish and spit 15 mLs every 4 (four) hours as needed (oral ulcers, pain). 1 Bottle 2    DULoxetine (CYMBALTA) 60 MG capsule Take 1 capsule (60 mg total) by mouth 2 (two) times daily. 180 capsule 3    eletriptan (RELPAX) 40 MG tablet Take 1 tablet (40  mg total) by mouth as needed. (Patient taking differently: Take 40 mg by mouth as needed. Take if needed) 12 tablet 3    estradioL (ESTRACE) 2 MG tablet Take 1 tablet (2 mg total) by mouth once daily. 90 tablet 1    fluticasone propionate (FLONASE) 50 mcg/actuation nasal spray 2 sprays (100 mcg total) by Each Nostril route once daily. 16 mL 0    fluticasone-umeclidin-vilanter (TRELEGY ELLIPTA) 100-62.5-25 mcg DsDv Inhale 1 puff into the lungs once daily. 90 each 3    immun glob G,IgG,-pro-IgA 0-50 (HIZENTRA) 10 gram/50 mL (20 %) Soln Inject 70 mLs (14 g total) into the skin every 7 days. 280 mL 11    levoFLOXacin (LEVAQUIN) 750 MG tablet Take 1 tablet (750 mg total) by mouth once daily. 7 tablet 0    losartan (COZAAR) 100 MG tablet Take 1 tablet (100 mg total) by mouth once daily. 90 tablet 3    montelukast (SINGULAIR) 10 mg tablet TAKE 1 TABLET EVERY EVENING 90 tablet 3    nortriptyline (PAMELOR) 25 MG capsule Take 1 capsule (25 mg total) by mouth every evening. 90 capsule 3    pregabalin (LYRICA) 150 MG capsule Take 1 capsule (150 mg total) by mouth 3 (three) times daily. 270 capsule 1    PRILOSEC 10 mg SuDR Take by mouth as needed.      propranoloL (INDERAL LA) 80 MG 24 hr capsule Take 1 capsule (80 mg total) by mouth once daily. 90 capsule 2    rizatriptan (MAXALT) 10 MG tablet TAKE 1 TABLET IF NEEDED FOR MIGRAINES. MAX 2 TABLETS IN 24 HOURS. 30 tablet 8    sodium chloride 0.9% 0.9 % Soln 200 mL with gentamicin (ped) 20 mg/2 mL Soln EMPTY CONTENTS OF 1 CAPSULE INTO NASAL IRRIGATION SYSTEM, ADD DISTILLED WATER, SALT PACK, MIX & IRRIGATE. PERFORM 2 TIMES DAILY      sodium,potassium,mag sulfates (SUPREP BOWEL PREP KIT) 17.5-3.13-1.6 gram SolR Take by mouth.      topiramate (TOPAMAX) 100 MG tablet TAKE 1 TABLET TWICE A  tablet 3    traZODone (DESYREL) 50 MG tablet Take 1 tablet (50 mg total) by mouth every evening. 90 tablet 3    triamcinolone acetonide 0.025% (KENALOG) 0.025 % cream 1 application once daily.  Apply to affected area      VENTOLIN HFA 90 mcg/actuation inhaler INHALE 2 PUFFS INTO THE LUNGS EVERY 4 TO 6 HOURS AS NEEDED FOR WHEEZING. (Patient taking differently: Take if needed & bring) 18 Inhaler 1    XYLITOL, BULK, MISC EMPTY CONTENTS OF 1 CAPSULE INTO NASAL IRRIGATION SYSTEM, ADD DISTILLED WATER, SALT PACK, MIX & IRRIGATE. PERFORM 2 TIMES DAILY      [DISCONTINUED] azelastine (ASTELIN) 137 mcg (0.1 %) nasal spray 2 sprays (274 mcg total) by Nasal route 2 (two) times daily. 30 mL 11    [DISCONTINUED] DULoxetine (CYMBALTA) 60 MG capsule TAKE 1 CAPSULE BY MOUTH TWICE A DAY (Patient taking differently: Take 60 mg by mouth once daily.) 180 capsule 3    [DISCONTINUED] ipratropium-albuteroL (COMBIVENT)  mcg/actuation inhaler Inhale 2 puffs into the lungs every 6 (six) hours as needed for Wheezing. Rescue 4 g 3    [DISCONTINUED] predniSONE (DELTASONE) 20 MG tablet Take 2 tablets (40 mg total) by mouth once daily. 10 tablet 0    [DISCONTINUED] SYMBICORT 160-4.5 mcg/actuation HFAA Inhale 1 puff into the lungs 2 (two) times daily.      [DISCONTINUED] traZODone (DESYREL) 50 MG tablet Take 1 tablet (50 mg total) by mouth every evening. 90 tablet 3    [DISCONTINUED] verapamiL (VERELAN PM) 300 mg 24 hr capsule Take by mouth.       Facility-Administered Encounter Medications as of 3/20/2023   Medication Dose Route Frequency Provider Last Rate Last Admin    lactated ringers infusion   Intravenous Continuous Mary Leiva MD   New Bag at 03/12/20 0923    lactated ringers infusion   Intravenous Continuous Shay Bruce MD   Stopped at 03/16/22 1342    lidocaine (PF) 10 mg/ml (1%) injection 10 mg  1 mL Intradermal Once Mary Leiva MD        LIDOcaine (PF) 10 mg/ml (1%) injection 10 mg  1 mL Intradermal Once Johan Baez MD        sodium chloride 0.9% flush 10 mL  10 mL Intravenous PRN Johan Baez MD         Orders Placed This Encounter   Procedures    STRONGYLOIDES IGG ANTIBODIES      Standing Status:   Future     Number of Occurrences:   1     Standing Expiration Date:   5/18/2024       Plan:       Chronic rhinosinusitis  Secondary to chronic pseudomonas infection with multiple rounds of abx and debriedments. On chronic antibiotic nasal rinses, flonase, azelastine. Followed by Dr. Roach. Likely contributing somewhat to lack of asthma control due to postnasal drip, however unsure if anyway to be more aggressive with treatment. Recently finished ciprofloxacin and soon after required levofloxacin for shortness of breath and worsened cough. Continue treatment per Dr. Roach and suggest nasal rinse and scheduled duonebs.     Severe persistent asthma without complication  Currently on trelegy and singulair. Uses her duonebs at least twice daily, sometimes more. Has frequent productive cough and wheezing partially due to post nasal drip. Am concerned that uncontrolled Crohn's disease is contributing to lack of asthma control as well. Pt has great response to prednisone with AE of swelling in her face. Pt had previously been on it for 4 months with significant improvement in symptoms with relapse after stopping.    - will continue trelegy, singulair and PRN duonebs/combivent.   - Pt to start on local budesonide or Skyrizi  - may end up requiring Dupixent if continues to be uncontrolled with control of post nasal drip and crohn's disease    Abnormal CT scan, lung  Bilateral patchy areas of GGO concerning for inflammatory vs infectious process. Differential included ILD 2/2 crohn's dz, DILI (fluoroquinolones, , atypical infection, Hypersensitivity pneumonitis (recurrent exposure to pseudomonas vs other exposures),   - obtain strongyloides IgG in setting of eosinophilia of 2.3, GI and pulmonary side effects.   - bronch to evaluate for infectious process vs eosinophilic PNA vs other.   - If rule out infectious process will start on steroids and attempt to wean off as starts on crohn's treatment.     TAMIKA on  CPAP  Unable to tolerate CPAP right now in setting of cough and shortness of breath.          Follow up in 1 month    Ivan Riley MD  [facility]

## 2023-03-20 ENCOUNTER — PATIENT MESSAGE (OUTPATIENT)
Dept: GASTROENTEROLOGY | Facility: CLINIC | Age: 58
End: 2023-03-20
Payer: COMMERCIAL

## 2023-03-20 ENCOUNTER — LAB VISIT (OUTPATIENT)
Dept: LAB | Facility: HOSPITAL | Age: 58
End: 2023-03-20
Attending: INTERNAL MEDICINE
Payer: COMMERCIAL

## 2023-03-20 ENCOUNTER — OFFICE VISIT (OUTPATIENT)
Dept: PULMONOLOGY | Facility: CLINIC | Age: 58
End: 2023-03-20
Payer: COMMERCIAL

## 2023-03-20 ENCOUNTER — OFFICE VISIT (OUTPATIENT)
Dept: GASTROENTEROLOGY | Facility: CLINIC | Age: 58
End: 2023-03-20
Payer: COMMERCIAL

## 2023-03-20 VITALS
HEIGHT: 67 IN | BODY MASS INDEX: 27.41 KG/M2 | WEIGHT: 174.63 LBS | HEART RATE: 67 BPM | DIASTOLIC BLOOD PRESSURE: 86 MMHG | SYSTOLIC BLOOD PRESSURE: 140 MMHG | OXYGEN SATURATION: 95 %

## 2023-03-20 DIAGNOSIS — R19.7 DIARRHEA, UNSPECIFIED TYPE: ICD-10-CM

## 2023-03-20 DIAGNOSIS — R91.8 ABNORMAL CT SCAN, LUNG: ICD-10-CM

## 2023-03-20 DIAGNOSIS — J45.50 SEVERE PERSISTENT ASTHMA WITHOUT COMPLICATION: ICD-10-CM

## 2023-03-20 DIAGNOSIS — R93.89 ABNORMAL CAT SCAN: Primary | ICD-10-CM

## 2023-03-20 DIAGNOSIS — J32.9 CHRONIC RHINOSINUSITIS: ICD-10-CM

## 2023-03-20 DIAGNOSIS — K50.00 CROHN'S DISEASE OF SMALL INTESTINE WITHOUT COMPLICATION: Primary | ICD-10-CM

## 2023-03-20 DIAGNOSIS — J32.4 CHRONIC PANSINUSITIS: ICD-10-CM

## 2023-03-20 DIAGNOSIS — G47.33 OSA ON CPAP: ICD-10-CM

## 2023-03-20 DIAGNOSIS — R91.8 ABNORMAL CT SCAN, LUNG: Primary | ICD-10-CM

## 2023-03-20 PROCEDURE — 99214 OFFICE O/P EST MOD 30 MIN: CPT | Mod: 95,,, | Performed by: INTERNAL MEDICINE

## 2023-03-20 PROCEDURE — 3079F DIAST BP 80-89 MM HG: CPT | Mod: CPTII,S$GLB,, | Performed by: INTERNAL MEDICINE

## 2023-03-20 PROCEDURE — 99214 PR OFFICE/OUTPT VISIT, EST, LEVL IV, 30-39 MIN: ICD-10-PCS | Mod: 95,,, | Performed by: INTERNAL MEDICINE

## 2023-03-20 PROCEDURE — 4010F ACE/ARB THERAPY RXD/TAKEN: CPT | Mod: CPTII,95,, | Performed by: INTERNAL MEDICINE

## 2023-03-20 PROCEDURE — 99214 PR OFFICE/OUTPT VISIT, EST, LEVL IV, 30-39 MIN: ICD-10-PCS | Mod: S$GLB,,, | Performed by: INTERNAL MEDICINE

## 2023-03-20 PROCEDURE — 4010F ACE/ARB THERAPY RXD/TAKEN: CPT | Mod: CPTII,S$GLB,, | Performed by: INTERNAL MEDICINE

## 2023-03-20 PROCEDURE — 3079F PR MOST RECENT DIASTOLIC BLOOD PRESSURE 80-89 MM HG: ICD-10-PCS | Mod: CPTII,S$GLB,, | Performed by: INTERNAL MEDICINE

## 2023-03-20 PROCEDURE — 3077F PR MOST RECENT SYSTOLIC BLOOD PRESSURE >= 140 MM HG: ICD-10-PCS | Mod: CPTII,S$GLB,, | Performed by: INTERNAL MEDICINE

## 2023-03-20 PROCEDURE — 4010F PR ACE/ARB THEARPY RXD/TAKEN: ICD-10-PCS | Mod: CPTII,95,, | Performed by: INTERNAL MEDICINE

## 2023-03-20 PROCEDURE — 99999 PR PBB SHADOW E&M-EST. PATIENT-LVL III: ICD-10-PCS | Mod: PBBFAC,,, | Performed by: INTERNAL MEDICINE

## 2023-03-20 PROCEDURE — 3077F SYST BP >= 140 MM HG: CPT | Mod: CPTII,S$GLB,, | Performed by: INTERNAL MEDICINE

## 2023-03-20 PROCEDURE — 99999 PR PBB SHADOW E&M-EST. PATIENT-LVL III: CPT | Mod: PBBFAC,,, | Performed by: INTERNAL MEDICINE

## 2023-03-20 PROCEDURE — 1159F MED LIST DOCD IN RCRD: CPT | Mod: CPTII,S$GLB,, | Performed by: INTERNAL MEDICINE

## 2023-03-20 PROCEDURE — 3008F PR BODY MASS INDEX (BMI) DOCUMENTED: ICD-10-PCS | Mod: CPTII,S$GLB,, | Performed by: INTERNAL MEDICINE

## 2023-03-20 PROCEDURE — 1159F PR MEDICATION LIST DOCUMENTED IN MEDICAL RECORD: ICD-10-PCS | Mod: CPTII,S$GLB,, | Performed by: INTERNAL MEDICINE

## 2023-03-20 PROCEDURE — 36415 COLL VENOUS BLD VENIPUNCTURE: CPT | Performed by: INTERNAL MEDICINE

## 2023-03-20 PROCEDURE — 3008F BODY MASS INDEX DOCD: CPT | Mod: CPTII,S$GLB,, | Performed by: INTERNAL MEDICINE

## 2023-03-20 PROCEDURE — 99214 OFFICE O/P EST MOD 30 MIN: CPT | Mod: S$GLB,,, | Performed by: INTERNAL MEDICINE

## 2023-03-20 PROCEDURE — 4010F PR ACE/ARB THEARPY RXD/TAKEN: ICD-10-PCS | Mod: CPTII,S$GLB,, | Performed by: INTERNAL MEDICINE

## 2023-03-20 PROCEDURE — 86682 HELMINTH ANTIBODY: CPT | Performed by: INTERNAL MEDICINE

## 2023-03-20 RX ORDER — DIPHENOXYLATE HYDROCHLORIDE AND ATROPINE SULFATE 2.5; .025 MG/1; MG/1
1 TABLET ORAL 4 TIMES DAILY PRN
Qty: 56 TABLET | Refills: 0 | Status: SHIPPED | OUTPATIENT
Start: 2023-03-20 | End: 2023-04-03

## 2023-03-20 NOTE — ASSESSMENT & PLAN NOTE
Bilateral patchy areas of GGO concerning for inflammatory vs infectious process. Differential included ILD 2/2 crohn's dz, DILI (fluoroquinolones, , atypical infection, Hypersensitivity pneumonitis (recurrent exposure to pseudomonas vs other exposures),   - obtain strongyloides IgG in setting of eosinophilia of 2.3, GI and pulmonary side effects.   - bronch to evaluate for infectious process vs eosinophilic PNA vs other.   - If rule out infectious process will start on steroids and attempt to wean off as starts on crohn's treatment.

## 2023-03-20 NOTE — PROGRESS NOTES
Clinic Consult:  [facility] Consultation Note    Reason for Consult:  The primary encounter diagnosis was Crohn's disease of small intestine without complication. Diagnoses of Diarrhea, unspecified type, Chronic pansinusitis, and Severe persistent asthma without complication were also pertinent to this visit.    PCP: Esthela Hu       The patient location is: [place]  The chief complaint leading to consultation is: follow up diarrhea     Visit type: audiovisual    Face to Face time with patient: 25  40 minutes of total time spent on the encounter, which includes face to face time and non-face to face time preparing to see the patient (eg, review of tests), Obtaining and/or reviewing separately obtained history, Documenting clinical information in the electronic or other health record, Independently interpreting results (not separately reported) and communicating results to the patient/family/caregiver, or Care coordination (not separately reported).         Each patient to whom he or she provides medical services by telemedicine is:  (1) informed of the relationship between the physician and patient and the respective role of any other health care provider with respect to management of the patient; and (2) notified that he or she may decline to receive medical services by telemedicine and may withdraw from such care at any time.    Notes:      HPI:  This is a 58 y.o. female here for follow up.      IBD History  - Type: crohn's disease  - Disease Location: small bowel  - Phenotype: stricture   - Diagnosed: 2000  - Surgeries related to IBD: none  - Extra-intestinal Manifestations: oral aphthous ulcers     Current Medications  none     Past Medications  Remicade (in remote past)-- ineffective??  Asacol 4.8 gm-- ineffective  Entocort-- effective  Prednisone  Humira (started July 17, stopped 2/2018)-- stopped 2/2 multiple infections.      Drug and Antibody Levels    (2018) Humira level 2.7, intermediate  "antibody formation.     Endoscopy Reports  5/7/15 colonoscopy: poor prep. Skin tag. Crohn's disease with ileitis. Biopsied. Path: focal chronic active ileitis.   17: erythematous mucosa in the sigmoid, transverse and hepatic flexure. 2 mm polyp at hepatic flexure. Crohn's disease with ileitis. Pathology: colon and ileal bx normal colon mucosa. Polyp normal colon mucosa.   17 EGD: gastritis and duodenal erosions without bleeding. Path: acute and chronic non-specific duodenitis with ulceration.   3/27/18 colonoscopy: 3 mm polyp in the sigmoid. No signs of active crohn's disease and no signs of stricture in the TI (advanced up to 15 cm). Pathology: hyperplastic polyp.   3/2020 colonoscopy: SESCD 4, ileitis; path: mild active chronic ileitis   3/2022: single aptha in ileum, single aptha in colon; path shows prominent peyer's patch and benign colon  2023: normal TI, normal colon; path: benign ileum, focal active colitis (no architectural distortion), discussed with pathologist -- very minimal focal colitis, no chronicity in any sepcimen and "virtually normal"     Imaging:  CTE (): normal      Interval History  She continues with diarrhea -- watery, sometimes up to 10 a day.  She is frustrated.  She says that she previously was nervous about biologic because of her numerous sinus infections but now is desperate for treatment.          Preventative Medicine     Immunizations  - Influenza:   - Pnemococcal: PCV-13: 2019; PSV-23   - Hepatitis A/B: 2019  - Herpes Zoster: 2015  - COVID-19: 202     Cancer Prevention   - Date of last pap smear: ?  - Date of last skin cancer screening: due   - Date of last surveillance colonoscopy: n/a     Bone Health  - Vitamin D level: ?  - Date of last DEXA: ?     Therapy Related Testing  - Date of last TB testin  - TPMT status: ?     Miscellaneous  - Vitamin B 12 level (if ileal disease or resection):   - Smoking status: no  - NSAID use: no  - History of C. Diff: " no  - Family planning: n/a    ROS:  CONSTITUTIONAL: Denies weight change,  fatigue, fevers, chills, night sweats.  EYES: No changes in vision.   ENT: No oral lesions or sore throat.  HEMATOLOGICAL/Lymph: Denies bleeding tendency, bruising tendency. No swellings or enlarged lymph nodes.  CARDIOVASCULAR: Denies chest pain, shortness of breath, orthopnea and edema.  RESPIRATORY: Denies cough, hemoptysis, dyspnea, and wheezing.  GI: See HPI.  : Denies dysuria and hematuria  MUSCULOSKELETAL: Denies joint pain or swelling, back pain and muscle pain.  SKIN: Denies rashes.  NEUROLOGIC: Denies headaches, seizures and numbness.  PSYCHIATRIC: Denies depression or anxiety.  ENDOCRINE: Denies heat or cold intolerance and excessive thirst or urination.    Medical History:   Past Medical History:   Diagnosis Date    Allergic rhinitis     Asthma     Chronic pansinusitis     Crohn's disease     Ileal involvement, previously on Remicade, Asacol, Prednisone    Fibromyalgia     Hyperlipidemia     Hypertension     Immunosuppression     Migraine     Obstructive sleep apnea     CPAP at night    Sciatica        Surgical History:  Past Surgical History:   Procedure Laterality Date    BLADDER SURGERY      sling was created by her muscles     BRONCHOSCOPY N/A 3/23/2023    Procedure: BRONCHOSCOPY;  Surgeon: [facility] Provider;  Location: [facility] OR [address] [place] [address];  Service: Anesthesiology;  Laterality: N/A;     SECTION      COLONOSCOPY N/A 2017    Procedure: COLONOSCOPY;  Surgeon: Kin Dyer MD;  Location: [facility];  Service: Endoscopy;  Laterality: N/A;    COLONOSCOPY N/A 3/27/2018    Procedure: COLONOSCOPY;  Surgeon: Kyra Vallecillo MD;  Location: [facility];  Service: Endoscopy;  Laterality: N/A;    COLONOSCOPY N/A 3/12/2020    Procedure: COLONOSCOPY;  Surgeon: Nicole Leal MD;  Location: [facility];  Service: Endoscopy;  Laterality: N/A;    COLONOSCOPY N/A 3/16/2022    Procedure: COLONOSCOPY;  Surgeon:  Shay Bruce MD;  Location: [facility];  Service: Endoscopy;  Laterality: N/A;    COLONOSCOPY N/A 2/2/2023    Procedure: COLONOSCOPY;  Surgeon: Nicole Leal MD;  Location: [facility] ENDO;  Service: Endoscopy;  Laterality: N/A;    DEBRIDEMENT Bilateral 12/21/2020    Procedure: DEBRIDEMENT;  Surgeon: Matthias Roach MD;  Location: [facility] OR 2ND FLR;  Service: ENT;  Laterality: Bilateral;    ESOPHAGOGASTRODUODENOSCOPY N/A 3/12/2020    Procedure: ESOPHAGOGASTRODUODENOSCOPY (EGD);  Surgeon: Nicole Leal MD;  Location: [facility] ENDO;  Service: Endoscopy;  Laterality: N/A;    ESOPHAGOGASTRODUODENOSCOPY N/A 3/16/2022    Procedure: EGD (ESOPHAGOGASTRODUODENOSCOPY);  Surgeon: Shay Bruce MD;  Location: [facility] ENDO;  Service: Endoscopy;  Laterality: N/A;    ESOPHAGOGASTRODUODENOSCOPY N/A 2/2/2023    Procedure: EGD (ESOPHAGOGASTRODUODENOSCOPY);  Surgeon: Nicole Leal MD;  Location: [facility];  Service: Endoscopy;  Laterality: N/A;    FINGER SURGERY      joint relpacement, left hand index finger    FUNCTIONAL ENDOSCOPIC SINUS SURGERY (FESS) USING COMPUTER-ASSISTED NAVIGATION Bilateral 7/31/2019    Procedure: FESS, USING COMPUTER-ASSISTED NAVIGATION;  Surgeon: Manish Shaffer MD;  Location: [facility] OR;  Service: ENT;  Laterality: Bilateral;    FUNCTIONAL ENDOSCOPIC SINUS SURGERY (FESS) USING COMPUTER-ASSISTED NAVIGATION Bilateral 9/25/2020    Procedure: FESS, USING COMPUTER-ASSISTED NAVIGATION SPHENOID;  Surgeon: Matthias Roach MD;  Location: [facility] OR 2ND FLR;  Service: ENT;  Laterality: Bilateral;  TIVA    FUNCTIONAL ENDOSCOPIC SINUS SURGERY (FESS) USING COMPUTER-ASSISTED NAVIGATION Bilateral 9/30/2022    Procedure: FESS, USING COMPUTER-ASSISTED NAVIGATION;  Surgeon: Matthias Roach MD;  Location: [facility] OR 2ND FLR;  Service: ENT;  Laterality: Bilateral;    HYSTERECTOMY      INTRALUMINAL GASTROINTESTINAL TRACT IMAGING VIA CAPSULE N/A 3/3/2023    Procedure: IMAGING PROCEDURE, GI TRACT, INTRALUMINAL, VIA  CAPSULE;  Surgeon: First Available Ninfa Gillespie;  Location: [facility];  Service: Endoscopy;  Laterality: N/A;    SINUS SURGERY      WISDOM TOOTH EXTRACTION         Family History:   Family History   Problem Relation Age of Onset    Breast cancer Mother     Hypertension Mother     Allergies Mother     Kidney disease Father 64        ESRD on HD    Scleroderma Father     Breast cancer Maternal Grandmother     Heart attack Maternal Grandmother     COPD Maternal Grandmother 72    Cancer Paternal Grandmother 70        colon    Hypertension Brother        Social History:   Social History     Tobacco Use    Smoking status: Never     Passive exposure: Never    Smokeless tobacco: Never   Substance Use Topics    Alcohol use: No    Drug use: No       Allergies: Reviewed    Home Medications:   Medication List with Changes/Refills   New Medications    DIPHENOXYLATE-ATROPINE 2.5-0.025 MG (LOMOTIL) 2.5-0.025 MG PER TABLET    Take 1 tablet by mouth 4 (four) times daily as needed for Diarrhea.   Current Medications    ACETAMINOPHEN (TYLENOL) 500 MG TABLET    Take 2 tablets (1,000 mg total) by mouth every 6 (six) hours as needed for Pain.    ALBUTEROL-IPRATROPIUM (DUO-NEB) 2.5 MG-0.5 MG/3 ML NEBULIZER SOLUTION    Take 3 mLs by nebulization every 6 (six) hours as needed for Wheezing. Rescue    ASACOL  MG TBEC    Take 800 mg by mouth 3 (three) times daily.    BUTALBITAL-ACETAMINOPHEN-CAFFEINE -40 MG (FIORICET, ESGIC) -40 MG PER TABLET    TAKE 1 TABLET EVERY 4 HOURS AS NEEDED FOR HEADACHE    CHOLESTYRAMINE (QUESTRAN) 4 GRAM PACKET    Take 1 packet (4 g total) by mouth 3 (three) times daily with meals.    DILTIAZEM HCL (DILTIAZEM 2% - LIDOCAINE 5% CREAM)    Apply peasize amount topically to anal area.    DIPHENHYDRAMINE-ALUMINUM-MAGNESIUM HYDROXIDE-SIMETHICONE-LIDOCAINE HCL 2%    Swish and spit 15 mLs every 4 (four) hours as needed (oral ulcers, pain).    DULOXETINE (CYMBALTA) 60 MG CAPSULE    Take 1 capsule (60 mg total)  by mouth 2 (two) times daily.    ELETRIPTAN (RELPAX) 40 MG TABLET    Take 1 tablet (40 mg total) by mouth as needed.    ESTRADIOL (ESTRACE) 2 MG TABLET    Take 1 tablet (2 mg total) by mouth once daily.    FLUTICASONE PROPIONATE (FLONASE) 50 MCG/ACTUATION NASAL SPRAY    2 sprays (100 mcg total) by Each Nostril route once daily.    FLUTICASONE-UMECLIDIN-VILANTER (TRELEGY ELLIPTA) 100-62.5-25 MCG DSDV    Inhale 1 puff into the lungs once daily.    IMMUN GLOB G,IGG,-PRO-IGA 0-50 (HIZENTRA) 10 GRAM/50 ML (20 %) SOLN    Inject 70 mLs (14 g total) into the skin every 7 days.    LEVOFLOXACIN (LEVAQUIN) 750 MG TABLET    Take 1 tablet (750 mg total) by mouth once daily.    LOSARTAN (COZAAR) 100 MG TABLET    Take 1 tablet (100 mg total) by mouth once daily.    MONTELUKAST (SINGULAIR) 10 MG TABLET    TAKE 1 TABLET EVERY EVENING    NORTRIPTYLINE (PAMELOR) 25 MG CAPSULE    Take 1 capsule (25 mg total) by mouth every evening.    PREGABALIN (LYRICA) 150 MG CAPSULE    Take 1 capsule (150 mg total) by mouth 3 (three) times daily.    PRILOSEC 10 MG SUDR    Take by mouth as needed.    PROPRANOLOL (INDERAL LA) 80 MG 24 HR CAPSULE    Take 1 capsule (80 mg total) by mouth once daily.    RIZATRIPTAN (MAXALT) 10 MG TABLET    TAKE 1 TABLET IF NEEDED FOR MIGRAINES. MAX 2 TABLETS IN 24 HOURS.    SODIUM CHLORIDE 0.9% 0.9 % SOLN 200 ML WITH GENTAMICIN (PED) 20 MG/2 ML SOLN    EMPTY CONTENTS OF 1 CAPSULE INTO NASAL IRRIGATION SYSTEM, ADD DISTILLED WATER, SALT PACK, MIX & IRRIGATE. PERFORM 2 TIMES DAILY    SODIUM,POTASSIUM,MAG SULFATES (SUPREP BOWEL PREP KIT) 17.5-3.13-1.6 GRAM SOLR    Take by mouth.    TOPIRAMATE (TOPAMAX) 100 MG TABLET    TAKE 1 TABLET TWICE A DAY    TRAZODONE (DESYREL) 50 MG TABLET    Take 1 tablet (50 mg total) by mouth every evening.    TRIAMCINOLONE ACETONIDE 0.025% (KENALOG) 0.025 % CREAM    1 application once daily. Apply to affected area    VENTOLIN HFA 90 MCG/ACTUATION INHALER    INHALE 2 PUFFS INTO THE LUNGS EVERY 4 TO  6 HOURS AS NEEDED FOR WHEEZING.    XYLITOL, BULK, MISC    EMPTY CONTENTS OF 1 CAPSULE INTO NASAL IRRIGATION SYSTEM, ADD DISTILLED WATER, SALT PACK, MIX & IRRIGATE. PERFORM 2 TIMES DAILY         Physical Exam:  Vital Signs:  There were no vitals taken for this visit.  There is no height or weight on file to calculate BMI.        Labs: Pertinent labs reviewed.    Assessment and Plan:  Crohn's disease of small intestine without complication  It is difficult to say what is actually currently driving her diarrhea.  She does have pathology in the past to indicated chronic ileitis c/w Crohn's disease.  Most recent pathology does not show chronicity and pretty normal appearing. That said, she is symptomatic.  She has been on hizentra with good IgG levels so unlikely to be an immunoglobulin deficiency issue.  In theory, hizentra could cause diarrhea.  That said, she has a history that is consistent with Crohn's disease and symptomatic.  She is frustrated and has not responded to lomotil.  We discussed the use of skyrizi as a trial vs budesonide.  I think it is reasonable to try skyrizi and monitor response.      Diarrhea, unspecified type    Chronic pansinusitis  Followed by ENT in [place] as well as allergy.    On repeated antibiotics which may be worsening gut symptoms.    On hizentra by allergy    Severe persistent asthma without complication        Follow up in about 3 months (around 6/20/2023).        Thank you so much for allowing me to participate in the care of Jaylin Leal MD

## 2023-03-20 NOTE — ASSESSMENT & PLAN NOTE
Currently on trelegy and singulair. Uses her duonebs at least twice daily, sometimes more. Has frequent productive cough and wheezing partially due to post nasal drip. Am concerned that uncontrolled Crohn's disease is contributing to lack of asthma control as well. Pt has great response to prednisone with AE of swelling in her face. Pt had previously been on it for 4 months with significant improvement in symptoms with relapse after stopping.    - will continue trelegy, singulair and PRN duonebs/combivent.   - Pt to start on local budesonide or Skyrizi  - may end up requiring Dupixent if continues to be uncontrolled with control of post nasal drip and crohn's disease

## 2023-03-20 NOTE — ASSESSMENT & PLAN NOTE
Secondary to chronic pseudomonas infection with multiple rounds of abx and debriedments. On chronic antibiotic nasal rinses, flonase, azelastine. Followed by Dr. Roach. Likely contributing somewhat to lack of asthma control due to postnasal drip, however unsure if anyway to be more aggressive with treatment. Recently finished ciprofloxacin and soon after required levofloxacin for shortness of breath and worsened cough. Continue treatment per Dr. Roach and suggest nasal rinse and scheduled duonebs.

## 2023-03-21 LAB — STRONGYLOIDES ANTIBODY IGG: NEGATIVE

## 2023-03-22 ENCOUNTER — TELEPHONE (OUTPATIENT)
Dept: PULMONOLOGY | Facility: CLINIC | Age: 58
End: 2023-03-22
Payer: COMMERCIAL

## 2023-03-22 NOTE — TELEPHONE ENCOUNTER
Pre Bronchoscopy instruction given to patient over the phone. Patient was instructed to remain in NPO after midnight. Patient was instructed to take morning medication with a little water and not to take blood thinners. Patient verbalized understanding to report to [facility] on the second floor at 6:30am and to have a designated .

## 2023-03-23 ENCOUNTER — HOSPITAL ENCOUNTER (OUTPATIENT)
Facility: HOSPITAL | Age: 58
Discharge: HOME | End: 2023-03-23
Attending: INTERNAL MEDICINE | Admitting: INTERNAL MEDICINE
Payer: COMMERCIAL

## 2023-03-23 VITALS
RESPIRATION RATE: 16 BRPM | TEMPERATURE: 98 F | SYSTOLIC BLOOD PRESSURE: 117 MMHG | DIASTOLIC BLOOD PRESSURE: 67 MMHG | HEART RATE: 67 BPM | OXYGEN SATURATION: 94 %

## 2023-03-23 DIAGNOSIS — R91.8 ABNORMAL CT SCAN, LUNG: Primary | ICD-10-CM

## 2023-03-23 DIAGNOSIS — R93.89 ABNORMAL CAT SCAN: ICD-10-CM

## 2023-03-23 DIAGNOSIS — J45.51 SEVERE PERSISTENT ASTHMA WITH ACUTE EXACERBATION: Primary | ICD-10-CM

## 2023-03-23 PROCEDURE — 31622 DX BRONCHOSCOPE/WASH: CPT | Mod: 53

## 2023-03-23 PROCEDURE — 99153 MOD SED SAME PHYS/QHP EA: CPT | Performed by: INTERNAL MEDICINE

## 2023-03-23 PROCEDURE — 71000016 HC POSTOP RECOV ADDL HR

## 2023-03-23 PROCEDURE — 31622 DX BRONCHOSCOPE/WASH: CPT | Mod: 53 | Performed by: INTERNAL MEDICINE

## 2023-03-23 PROCEDURE — 71000015 HC POSTOP RECOV 1ST HR

## 2023-03-23 PROCEDURE — 31622 DX BRONCHOSCOPE/WASH: CPT | Mod: ,,, | Performed by: INTERNAL MEDICINE

## 2023-03-23 PROCEDURE — 99152 MOD SED SAME PHYS/QHP 5/>YRS: CPT | Performed by: INTERNAL MEDICINE

## 2023-03-23 PROCEDURE — 63600175 PHARM REV CODE 636 W HCPCS: Performed by: INTERNAL MEDICINE

## 2023-03-23 PROCEDURE — 25000003 PHARM REV CODE 250: Performed by: INTERNAL MEDICINE

## 2023-03-23 PROCEDURE — 31622 PR BRONCHOSCOPY,DIAGNOSTIC: ICD-10-PCS | Mod: ,,, | Performed by: INTERNAL MEDICINE

## 2023-03-23 RX ORDER — PROMETHAZINE HYDROCHLORIDE AND DEXTROMETHORPHAN HYDROBROMIDE 6.25; 15 MG/5ML; MG/5ML
5 SYRUP ORAL EVERY 4 HOURS PRN
Qty: 240 ML | Refills: 2 | Status: SHIPPED | OUTPATIENT
Start: 2023-03-23 | End: 2023-03-23

## 2023-03-23 RX ORDER — PREDNISONE 20 MG/1
40 TABLET ORAL DAILY
Qty: 10 TABLET | Refills: 0 | Status: SHIPPED | OUTPATIENT
Start: 2023-03-23 | End: 2023-03-28

## 2023-03-23 RX ORDER — LIDOCAINE HYDROCHLORIDE 20 MG/ML
INJECTION, SOLUTION INFILTRATION; PERINEURAL CODE/TRAUMA/SEDATION MEDICATION
Status: COMPLETED | OUTPATIENT
Start: 2023-03-23 | End: 2023-03-23

## 2023-03-23 RX ORDER — PREDNISONE 20 MG/1
40 TABLET ORAL DAILY
Qty: 10 TABLET | Refills: 0 | Status: SHIPPED | OUTPATIENT
Start: 2023-03-23 | End: 2023-03-23

## 2023-03-23 RX ORDER — FENTANYL CITRATE 50 UG/ML
INJECTION, SOLUTION INTRAMUSCULAR; INTRAVENOUS CODE/TRAUMA/SEDATION MEDICATION
Status: COMPLETED | OUTPATIENT
Start: 2023-03-23 | End: 2023-03-23

## 2023-03-23 RX ORDER — MIDAZOLAM HYDROCHLORIDE 5 MG/ML
INJECTION INTRAMUSCULAR; INTRAVENOUS CODE/TRAUMA/SEDATION MEDICATION
Status: COMPLETED | OUTPATIENT
Start: 2023-03-23 | End: 2023-03-23

## 2023-03-23 RX ORDER — LIDOCAINE HYDROCHLORIDE 10 MG/ML
INJECTION INFILTRATION; PERINEURAL CODE/TRAUMA/SEDATION MEDICATION
Status: COMPLETED | OUTPATIENT
Start: 2023-03-23 | End: 2023-03-23

## 2023-03-23 RX ORDER — NALOXONE HYDROCHLORIDE 1 MG/ML
INJECTION INTRAMUSCULAR; INTRAVENOUS; SUBCUTANEOUS CODE/TRAUMA/SEDATION MEDICATION
Status: COMPLETED | OUTPATIENT
Start: 2023-03-23 | End: 2023-03-23

## 2023-03-23 RX ORDER — PROMETHAZINE HYDROCHLORIDE AND DEXTROMETHORPHAN HYDROBROMIDE 6.25; 15 MG/5ML; MG/5ML
5 SYRUP ORAL EVERY 4 HOURS PRN
Qty: 240 ML | Refills: 2 | Status: SHIPPED | OUTPATIENT
Start: 2023-03-23 | End: 2023-05-25

## 2023-03-23 RX ADMIN — FENTANYL CITRATE 50 MCG: 50 INJECTION, SOLUTION INTRAMUSCULAR; INTRAVENOUS at 10:03

## 2023-03-23 RX ADMIN — LIDOCAINE HYDROCHLORIDE 8 ML: 10 INJECTION, SOLUTION INFILTRATION; PERINEURAL at 10:03

## 2023-03-23 RX ADMIN — MIDAZOLAM HYDROCHLORIDE 2 MG: 5 INJECTION, SOLUTION INTRAMUSCULAR; INTRAVENOUS at 10:03

## 2023-03-23 RX ADMIN — LIDOCAINE HYDROCHLORIDE 6 ML: 20 INJECTION, SOLUTION INFILTRATION; PERINEURAL at 10:03

## 2023-03-23 RX ADMIN — NALOXONE HYDROCHLORIDE 0.4 MG: 1 INJECTION PARENTERAL at 10:03

## 2023-03-23 NOTE — ED NOTES
Moderate concious sedation was performed and cardiorespiratory functions were monitored the entire procedure by Melissa Ruffin RN.  Sedation began at 1000am  and concluded at 1113.

## 2023-03-23 NOTE — INTERVAL H&P NOTE
The patient has been examined and the H&P has been reviewed:    I concur with the findings and no changes have occurred since H&P was written.    Procedure and anesthesia risks, benefits and alternative options discussed and understood by patient/family.      ASA 2  Mallampati 2    There are no hospital problems to display for this patient.    Jerica Jackman MD  Pulmonary and Critical Care Fellow  03/23/2023  9:58 AM

## 2023-03-23 NOTE — ED NOTES
H and P updated-yes, patient placed on cardiac monitor, anesthesia Plan:  Conscious sedation, ASA verified-yes, Airway exam performed-yes, Personal or Family history of anesthesia complications-No  Consent signed and witnessed, Melissa Ruffin RN

## 2023-03-23 NOTE — CARE UPDATE
Patient discharged home with . VSS, no signs of distress. Patient was monitored for 1 hour in the pulmonary lab post narcan administration. Meds picked up from pharmacy. Discharged instructions reviewed and questions answered. Patient tolerated po liquids. PIV removed. Patient wheeled out via whgeel chair with transporter.

## 2023-03-23 NOTE — DISCHARGE SUMMARY
[facility] (2nd Fl)  Discharge Note  Short Stay    Procedure(s) (LRB):  BRONCHOSCOPY (N/A)      OUTCOME: Patient tolerated treatment/procedure well without complication and is now ready for discharge.    Patient came in for bronchoscopy with Bal and TBBX, however, procedure aborted due to apnea/hypoxia - see significant event note from Dr. Watson. Patient monitored for 1 hour after procedure and awake, alert, minimal cough, feels well, okay to go home. Discussed with Dr Riley - may need OR bronch if bronchoscopy needed in the future. Fentanyl listed as an allergy in chart.     DISPOSITION: Home or Self Care    FINAL DIAGNOSIS:  <principal problem not specified>    FOLLOWUP:  With Dr Riley    DISCHARGE INSTRUCTIONS:    Discharge Procedure Orders   Diet Adult Regular     Activity as tolerated        Jerica Jackman MD  Pulmonary and Critical Care Fellow  03/23/2023  11:18 AM     ASSESSMENT/PLAN  80 y/o F w/ PMH of DVT (on Eliquis), NIDDM, glaucoma, HLD, HTN, hs of NPH s/p  shunt 2015 sent ot ED for +CTH w/ ICH.  CTH was done outpt after c/o dizziness and unsteadness/falling over to left side.  Pt denies head trauma.  Evaluated by neurosurgery - no surgical intervention. Hospital course complicated by encephalopathic d/u UTI    COMORBIDITES/ACTIVE MEDICAL ISSUES     Gait Instability, ADL impairments and Functional impairments: start Comprehensive Rehab Program of PT/OT/SLP     # Right Basal ganglia hemorrhage  - Eliquis held (previous DVT) - negative LE Venous Doppler now   - CTH x4 - ICH stable   - BP goal: <140/90    # Hypertension   - Continue Norvasc  and Metoprolol- dc (6/14) d/t bradycardia   - Hydralazine 10 - inc 20 TID (6/11)  - BP Goal: <140/90    #Encephalopathy due to UTI - improving   - UCx grew klebsiella variicola  - start ceftriaxone x 5-7 days (completed on 6/10)    #Hypoxia  - on 3L NC  - Home O2? - started at [facility] , but pt did not see a need and declined  - Pulmonary consult appreciated - H/o COVID??, monitor closely, will follow recommendations   - encouraged incentive spirometry    #Chest pain - likely benign   - EKG - normal clarisse  - monitor    #Pleuritic pain  - albuterol prn switched to standing    #Hyperglycemia   - A1c 7.7  - Lantus 5U - DC 6/8  - Admelog 2U - DC 6/8  - metformin 850 QD (6/8)  - FS + ISS    #PASCALE  - improved  - Encourage oral fluid  - Avoid nephrotoxic med  - Nephro is following  - Renal US normal     #HLD  - Lipitor 40    #Bipolar disorder  - Pristiq ER 25- d/c (6/8)  - Fluvoxamine 150  - Abilify 10mg qhs  - psychiatry consult appreciated, case discussed     #Pain control  - Tylenol PRN    #GI/Bowel Mgmt   - Continent Senna,  Miralax PRN    #Hx of DVT (9/2020)  - SCDs, TEDs   - Doppler negative - 5/28  - Eliquis dc in light of hemorrhage    #FEN   - Diet - upgrade to mechanical soft-thin (6/8)  - Dysphagia  SLP - evaluation and treatment    Precautions / PROPHYLAXIS:   - Falls  - ortho: Weight bearing status: WBAT   - Lungs: Aspiration, Incentive Spirometer   - Pressure injury/Skin: Turn Q2hrs while in bed, OOB to Chair, PT/OT      TEAM MEETING 6/14  SW: lives with unemployed son, also has a daughter who is proxy - questionable family dynamic  OT: SV, eat, min A groom, min-mod toileting, Max A UBD/toilet transfer, Total A LBD - sone aided in LBD  PT: min A transfer, amb 70' RW min, 4 steps min A - 97% 2LNC, 94% 2LNC with ambulation  SLP: dec attention/memory, breakdown with semi complexity, poor fitting denture  barriers: dec memory, safety, visual hallucination - pt expressed concerns about son managing her finances  Goals: min assist with ADLs; close SV with transfer and ambulation; will need 24H help  EDOD: 6/29 home, 6/21 JULIEN? - proxy think JULIEN placement is best, pt express concerns with son managing her finance

## 2023-03-23 NOTE — ED NOTES
Procedure was aborted due to patient saturation drastically dropping to the 40's. Patient was bag and masked. Narcan was order and given. Patient saturations came back up. Patient will be in observation with RN at bedside for 1 hour post Narcan given.

## 2023-03-23 NOTE — SIGNIFICANT EVENT
During the procedure, the patient stopped having any respiratory effort and became hypoxic as low as 40%. Bag mask ventilation was initiated that was minimally helpful and narcan was administered. Despite having no chest wall movement, the patient was moving her arms and legs spontaneously throughout this event.   She recovered quickly with narcan administration and her oxygenation improved.   Code blue was called but she never lost a pulse and did not require chest compressions or any medications for cardiac concerns.   Due to the nature of this event, the fact that she was still moving her other extremities but her chest wall was immovable with bag or spontaneously, I highly suspect that this was fentanyl induced chest wall rigidity. It is an uncommon complication but has been reported in moderate sedation cases.   She also reported to us prior to the procedure that she had stopped breathing with anesthesia in the past but at that time it was attributed to propofol. They are unaware if she had fentanyl at that time.   Listing fentanyl as an allergy and it should be avoided in the future.   Recommend bronchoscopy in the OR for safety and to further evaluate her disease process.   Details of airway survey will be available in her procedure note.     Livier Watson M.D.  Pulmonary/Critical Care

## 2023-04-03 ENCOUNTER — PATIENT MESSAGE (OUTPATIENT)
Dept: PRIMARY CARE CLINIC | Facility: CLINIC | Age: 58
End: 2023-04-03
Payer: COMMERCIAL

## 2023-04-03 RX ORDER — SODIUM CHLORIDE 0.9 % (FLUSH) 0.9 %
10 SYRINGE (ML) INJECTION
Status: CANCELLED | OUTPATIENT
Start: 2023-04-07

## 2023-04-03 RX ORDER — HEPARIN 100 UNIT/ML
500 SYRINGE INTRAVENOUS
Status: CANCELLED | OUTPATIENT
Start: 2023-04-07

## 2023-04-04 ENCOUNTER — PATIENT MESSAGE (OUTPATIENT)
Dept: PRIMARY CARE CLINIC | Facility: CLINIC | Age: 58
End: 2023-04-04
Payer: COMMERCIAL

## 2023-04-04 RX ORDER — NORTRIPTYLINE HYDROCHLORIDE 25 MG/1
25 CAPSULE ORAL NIGHTLY
Qty: 90 CAPSULE | Refills: 3 | Status: SHIPPED | OUTPATIENT
Start: 2023-04-04 | End: 2023-04-05 | Stop reason: SDUPTHER

## 2023-04-04 NOTE — TELEPHONE ENCOUNTER
No new care gaps identified.  [facility] Embedded Care Gaps. Reference number: [id number]. 4/04/2023   9:02:52 AM MARINET

## 2023-04-05 ENCOUNTER — PATIENT MESSAGE (OUTPATIENT)
Dept: PULMONOLOGY | Facility: CLINIC | Age: 58
End: 2023-04-05
Payer: COMMERCIAL

## 2023-04-05 RX ORDER — NORTRIPTYLINE HYDROCHLORIDE 25 MG/1
25 CAPSULE ORAL NIGHTLY
Qty: 90 CAPSULE | Refills: 3 | Status: SHIPPED | OUTPATIENT
Start: 2023-04-05 | End: 2023-12-05 | Stop reason: SDUPTHER

## 2023-04-05 NOTE — TELEPHONE ENCOUNTER
No new care gaps identified.  [facility] Embedded Care Gaps. Reference number: [id number]. 4/05/2023   8:33:08 AM MARINET

## 2023-04-06 DIAGNOSIS — J45.50 SEVERE PERSISTENT ASTHMA WITHOUT COMPLICATION: Primary | ICD-10-CM

## 2023-04-06 RX ORDER — PREDNISONE 10 MG/1
TABLET ORAL
Qty: 28 TABLET | Refills: 0 | Status: SHIPPED | OUTPATIENT
Start: 2023-04-06 | End: 2023-04-26

## 2023-04-06 NOTE — OP NOTE
DATE OF OPERATION: 9/30/2022    SURGEON:  Matthias Roach MD     ASSISTANT SURGEON:  Sherwin Daniels MD     OPERATION:    Bilateral image-guided revision endoscopic radical sphenoidotomy.     PREOPERATIVE DIAGNOSIS:      Chronic rhinosinusitis     POSTOPERATIVE DIAGNOSIS:     Chronic rhinosinusitis    ANESTHESIA: General.     COMPLICATIONS: None.     ESTIMATED BLOOD LOSS: 50 mL     SPECIMEN: Left sphenoid sinus cultures for bacteria, fungus and AFB.     WOUND EXPECTANCY: Infected.    DRESSING: No stent or nasal packing.    FINDINGS: Stenotic ostium of dominant left sphenoid sinus.  Hyperostotic sphenoid bone with polypoid mucosal edema within.  Inspissated green purulent mucus within sphenoid lumen.     INDICATIONS: Chronic rhinosinusitis, not controlled with maximal medical therapy.     I discussed the risks, benefits and alternatives of surgical correction of the chronically obstructed sinuses and associated turbinate hypertrophy with the patient as well as the expected postoperative course. I gave her the opportunity to ask questions and I answered all of them. On the morning of surgery I again met with the patient and reviewed the indications for surgery and she consented to proceed.     DESCRIPTION OF PROCEDURE: The patient was brought to the operating room and placed supine on the operating table. The patient was placed under general anesthesia and intubated. The patient was positioned with a donut under the head and the image-guidance headset for the [employer] Fusion system was applied.  Image-guided navigation was indicated to facilitate exenteration of all ethmoid cells and the extent of sinusotomy.  The CT scan disc was loaded into the image-guidance system and registered with the patient tracking system according to the 's instructions. The pointer was calibrated and registration was verified using predefined landmarks.  Cottonoid pledgets soaked with 4% cocaine was placed into the nasal cavity  bilaterally for mucosal decongestion. Prophylactic cefazolin were given prior to the surgery start. A time-out was performed to confirm the proper patient, site and procedure. The CT images were again reviewed prior to surgical start.  The patient was prepped and draped in the usual fashion.  The bed was placed in 20-degree reverse Trendelenberg position.     A 0-degree endoscope was used to examine the nasal cavity.  Local injections of 1% lidocaine with 1:100,000 parts epinephrine were then performed at the sphenopalatine ganglion region bilaterally.     Attention was then turned to the sinuses.   The left sphenoethmoidal recess was topically decongested using pledgets containing 10,000 units of thrombin with 1:10,000 parts epinephrine. The middle turbinate had already been partially resected.  The previous maxillary antrostomy was widely patent and the lumen was clear.  The anterior ethmoid cells were patent but had nonpurulent mucoid exudate that was suctioned and cleared.     The sphenoid sinus rostrum was then identified.  Prior sphenoidotomy was apparent on the left but the ostium was stenotic.  The sinus was entered in a low and medial fashion, adjacent to the superior turbinate. This sphenoidotomy was enlarged to include the posterior segment of this superior turbinate and the natural ostium of the sphenoid sinus.  Purulent exudate and debris was present within the sphenoid sinus.  This was sampled for bacterial and fungal cultures.  A 1.5 cm posterior septectomy was then created and the contralateral sphenoid rostrum was opened to create a very wide, contiguous bilateral sphenoidotomy.  The rostrum was lowered with Kerrison rongeurs to the level of the sphenoid floor.  Branches of the SPA system were cauterized with suction bovie at a setting of 20 griffith.  Polypoid tissue was trimmed with the microdebrider and the lateral recess was probed to rule out retained infectious matter.  The lumen was then  copiously irrigated with saline.     At the conclusion of these procedures, the image-guidance probe was used to verify that all ethmoid cells had been properly opened and that the skull base was visible and that the lamina papyracea had not been traversed.  All sinuses were copiously irrigated with warm normal saline solution.     At this point, the pledgets were all removed. Ger hemostatic agent was placed into the surgical sites. No stent was placed.  The nasal cavity was not packed.  Mupirocin ointment was applied to the vestibule bilaterally.  At this point, the headset was removed and the drapes were taken down. Intravenous dexamethasone was given toward the end of the case. The patient was turned back toward the anesthesiologist and awakened from anesthesia, extubated and transferred to the recovery room in stable condition.      POSTOPERATIVE PLAN:  Topical cipro irrigation at POD#1.     DISPLAY PLAN FREE TEXT DISPLAY PLAN FREE TEXT DISPLAY PLAN FREE TEXT DISPLAY PLAN FREE TEXT DISPLAY PLAN FREE TEXT DISPLAY PLAN FREE TEXT DISPLAY PLAN FREE TEXT DISPLAY PLAN FREE TEXT DISPLAY PLAN FREE TEXT DISPLAY PLAN FREE TEXT

## 2023-04-10 ENCOUNTER — PATIENT MESSAGE (OUTPATIENT)
Dept: PULMONOLOGY | Facility: CLINIC | Age: 58
End: 2023-04-10
Payer: COMMERCIAL

## 2023-04-11 ENCOUNTER — TELEPHONE (OUTPATIENT)
Dept: INFUSION THERAPY | Facility: HOSPITAL | Age: 58
End: 2023-04-11
Payer: COMMERCIAL

## 2023-04-11 ENCOUNTER — PATIENT MESSAGE (OUTPATIENT)
Dept: PULMONOLOGY | Facility: CLINIC | Age: 58
End: 2023-04-11
Payer: COMMERCIAL

## 2023-04-11 NOTE — TELEPHONE ENCOUNTER
Returned patient's call, patient apologizes because she has her son's wedding coming up and she also has an infusion at home the day before and she would like to rest her body a bit between infusions if possible.  States Thursday or Friday would be better.  Patient's appt was r/s'd to that Friday at 10am. Patient acknowledged. Call ended well.

## 2023-04-13 DIAGNOSIS — M79.7 FIBROMYALGIA: ICD-10-CM

## 2023-04-13 DIAGNOSIS — J45.51 SEVERE PERSISTENT ASTHMA WITH ACUTE EXACERBATION: Primary | ICD-10-CM

## 2023-04-13 RX ORDER — PREDNISONE 20 MG/1
40 TABLET ORAL DAILY
Qty: 10 TABLET | Refills: 0 | Status: SHIPPED | OUTPATIENT
Start: 2023-04-13 | End: 2023-04-21

## 2023-04-19 ENCOUNTER — PATIENT MESSAGE (OUTPATIENT)
Dept: GASTROENTEROLOGY | Facility: CLINIC | Age: 58
End: 2023-04-19
Payer: COMMERCIAL

## 2023-04-21 ENCOUNTER — INFUSION (OUTPATIENT)
Dept: INFUSION THERAPY | Facility: HOSPITAL | Age: 58
End: 2023-04-21
Attending: INTERNAL MEDICINE
Payer: COMMERCIAL

## 2023-04-21 ENCOUNTER — PATIENT MESSAGE (OUTPATIENT)
Dept: PRIMARY CARE CLINIC | Facility: CLINIC | Age: 58
End: 2023-04-21
Payer: COMMERCIAL

## 2023-04-21 ENCOUNTER — PATIENT MESSAGE (OUTPATIENT)
Dept: OTOLARYNGOLOGY | Facility: CLINIC | Age: 58
End: 2023-04-21
Payer: COMMERCIAL

## 2023-04-21 VITALS
TEMPERATURE: 98 F | HEIGHT: 67 IN | WEIGHT: 177.94 LBS | DIASTOLIC BLOOD PRESSURE: 78 MMHG | BODY MASS INDEX: 27.93 KG/M2 | RESPIRATION RATE: 18 BRPM | HEART RATE: 58 BPM | OXYGEN SATURATION: 98 % | SYSTOLIC BLOOD PRESSURE: 139 MMHG

## 2023-04-21 DIAGNOSIS — J32.4 CHRONIC PANSINUSITIS: Primary | ICD-10-CM

## 2023-04-21 DIAGNOSIS — K50.00 CROHN'S DISEASE OF SMALL INTESTINE WITHOUT COMPLICATION: Primary | ICD-10-CM

## 2023-04-21 PROCEDURE — 25000003 PHARM REV CODE 250: Performed by: INTERNAL MEDICINE

## 2023-04-21 PROCEDURE — 63600175 PHARM REV CODE 636 W HCPCS: Mod: JZ,TB | Performed by: INTERNAL MEDICINE

## 2023-04-21 PROCEDURE — 96365 THER/PROPH/DIAG IV INF INIT: CPT

## 2023-04-21 RX ORDER — HEPARIN 100 UNIT/ML
500 SYRINGE INTRAVENOUS
OUTPATIENT
Start: 2023-05-19

## 2023-04-21 RX ORDER — PREGABALIN 150 MG/1
150 CAPSULE ORAL 3 TIMES DAILY
Qty: 270 CAPSULE | Refills: 1 | Status: SHIPPED | OUTPATIENT
Start: 2023-04-21

## 2023-04-21 RX ORDER — CIPROFLOXACIN 500 MG/1
500 TABLET ORAL 2 TIMES DAILY
Qty: 14 TABLET | Refills: 0 | Status: SHIPPED | OUTPATIENT
Start: 2023-04-21 | End: 2023-04-28

## 2023-04-21 RX ORDER — SODIUM CHLORIDE 0.9 % (FLUSH) 0.9 %
10 SYRINGE (ML) INJECTION
Status: DISCONTINUED | OUTPATIENT
Start: 2023-04-21 | End: 2023-04-21 | Stop reason: HOSPADM

## 2023-04-21 RX ORDER — SODIUM CHLORIDE 0.9 % (FLUSH) 0.9 %
10 SYRINGE (ML) INJECTION
OUTPATIENT
Start: 2023-05-19

## 2023-04-21 RX ADMIN — DEXTROSE: 50 INJECTION, SOLUTION INTRAVENOUS at 11:04

## 2023-04-21 NOTE — NURSING
Infusion# 1    Recent labs? Reviewed    Premeds? None    S/S of current or recent infections in the past 14 days? None/Denies    Recent Surgery/invasive procedures? None/Denies     Any recent vaccines ? None/Denies    Skyrizi 600 mg administered IV at a 60 minute rate per orders; see MAR and vitals for more  Details.

## 2023-04-24 ENCOUNTER — OFFICE VISIT (OUTPATIENT)
Dept: PULMONOLOGY | Facility: CLINIC | Age: 58
End: 2023-04-24
Payer: COMMERCIAL

## 2023-04-24 ENCOUNTER — LAB VISIT (OUTPATIENT)
Dept: LAB | Facility: HOSPITAL | Age: 58
End: 2023-04-24
Attending: INTERNAL MEDICINE
Payer: COMMERCIAL

## 2023-04-24 VITALS
OXYGEN SATURATION: 97 % | SYSTOLIC BLOOD PRESSURE: 124 MMHG | BODY MASS INDEX: 27.96 KG/M2 | DIASTOLIC BLOOD PRESSURE: 82 MMHG | HEART RATE: 61 BPM | HEIGHT: 67 IN | WEIGHT: 178.13 LBS

## 2023-04-24 DIAGNOSIS — J45.50 SEVERE PERSISTENT ASTHMA WITHOUT COMPLICATION: ICD-10-CM

## 2023-04-24 DIAGNOSIS — J32.9 CHRONIC RHINOSINUSITIS: ICD-10-CM

## 2023-04-24 DIAGNOSIS — D72.19 OTHER EOSINOPHILIA: Primary | ICD-10-CM

## 2023-04-24 DIAGNOSIS — D72.19 OTHER EOSINOPHILIA: ICD-10-CM

## 2023-04-24 LAB
BASOPHILS # BLD AUTO: 0.05 K/UL (ref 0–0.2)
BASOPHILS NFR BLD: 0.5 % (ref 0–1.9)
DIFFERENTIAL METHOD: ABNORMAL
EOSINOPHIL # BLD AUTO: 0 K/UL (ref 0–0.5)
EOSINOPHIL NFR BLD: 0.4 % (ref 0–8)
ERYTHROCYTE [DISTWIDTH] IN BLOOD BY AUTOMATED COUNT: 13.2 % (ref 11.5–14.5)
HCT VFR BLD AUTO: 43.9 % (ref 37–48.5)
HGB BLD-MCNC: 13.5 G/DL (ref 12–16)
IMM GRANULOCYTES # BLD AUTO: 0.03 K/UL (ref 0–0.04)
IMM GRANULOCYTES NFR BLD AUTO: 0.3 % (ref 0–0.5)
LYMPHOCYTES # BLD AUTO: 2 K/UL (ref 1–4.8)
LYMPHOCYTES NFR BLD: 20.4 % (ref 18–48)
MCH RBC QN AUTO: 30.3 PG (ref 27–31)
MCHC RBC AUTO-ENTMCNC: 30.8 G/DL (ref 32–36)
MCV RBC AUTO: 99 FL (ref 82–98)
MONOCYTES # BLD AUTO: 0.6 K/UL (ref 0.3–1)
MONOCYTES NFR BLD: 6.1 % (ref 4–15)
NEUTROPHILS # BLD AUTO: 6.9 K/UL (ref 1.8–7.7)
NEUTROPHILS NFR BLD: 72.3 % (ref 38–73)
NRBC BLD-RTO: 0 /100 WBC
PLATELET # BLD AUTO: 294 K/UL (ref 150–450)
PMV BLD AUTO: 10.1 FL (ref 9.2–12.9)
RBC # BLD AUTO: 4.45 M/UL (ref 4–5.4)
WBC # BLD AUTO: 9.58 K/UL (ref 3.9–12.7)

## 2023-04-24 PROCEDURE — 3074F PR MOST RECENT SYSTOLIC BLOOD PRESSURE < 130 MM HG: ICD-10-PCS | Mod: CPTII,S$GLB,, | Performed by: INTERNAL MEDICINE

## 2023-04-24 PROCEDURE — 3008F PR BODY MASS INDEX (BMI) DOCUMENTED: ICD-10-PCS | Mod: CPTII,S$GLB,, | Performed by: INTERNAL MEDICINE

## 2023-04-24 PROCEDURE — 3008F BODY MASS INDEX DOCD: CPT | Mod: CPTII,S$GLB,, | Performed by: INTERNAL MEDICINE

## 2023-04-24 PROCEDURE — 3079F PR MOST RECENT DIASTOLIC BLOOD PRESSURE 80-89 MM HG: ICD-10-PCS | Mod: CPTII,S$GLB,, | Performed by: INTERNAL MEDICINE

## 2023-04-24 PROCEDURE — 3074F SYST BP LT 130 MM HG: CPT | Mod: CPTII,S$GLB,, | Performed by: INTERNAL MEDICINE

## 2023-04-24 PROCEDURE — 99999 PR PBB SHADOW E&M-EST. PATIENT-LVL III: CPT | Mod: PBBFAC,,, | Performed by: INTERNAL MEDICINE

## 2023-04-24 PROCEDURE — 4010F ACE/ARB THERAPY RXD/TAKEN: CPT | Mod: CPTII,S$GLB,, | Performed by: INTERNAL MEDICINE

## 2023-04-24 PROCEDURE — 99214 OFFICE O/P EST MOD 30 MIN: CPT | Mod: S$GLB,,, | Performed by: INTERNAL MEDICINE

## 2023-04-24 PROCEDURE — 3079F DIAST BP 80-89 MM HG: CPT | Mod: CPTII,S$GLB,, | Performed by: INTERNAL MEDICINE

## 2023-04-24 PROCEDURE — 1159F MED LIST DOCD IN RCRD: CPT | Mod: CPTII,S$GLB,, | Performed by: INTERNAL MEDICINE

## 2023-04-24 PROCEDURE — 85025 COMPLETE CBC W/AUTO DIFF WBC: CPT | Performed by: INTERNAL MEDICINE

## 2023-04-24 PROCEDURE — 99999 PR PBB SHADOW E&M-EST. PATIENT-LVL III: ICD-10-PCS | Mod: PBBFAC,,, | Performed by: INTERNAL MEDICINE

## 2023-04-24 PROCEDURE — 36415 COLL VENOUS BLD VENIPUNCTURE: CPT | Performed by: INTERNAL MEDICINE

## 2023-04-24 PROCEDURE — 1159F PR MEDICATION LIST DOCUMENTED IN MEDICAL RECORD: ICD-10-PCS | Mod: CPTII,S$GLB,, | Performed by: INTERNAL MEDICINE

## 2023-04-24 PROCEDURE — 99214 PR OFFICE/OUTPT VISIT, EST, LEVL IV, 30-39 MIN: ICD-10-PCS | Mod: S$GLB,,, | Performed by: INTERNAL MEDICINE

## 2023-04-24 PROCEDURE — 4010F PR ACE/ARB THEARPY RXD/TAKEN: ICD-10-PCS | Mod: CPTII,S$GLB,, | Performed by: INTERNAL MEDICINE

## 2023-04-24 NOTE — PROGRESS NOTES
Subjective:      Patient ID: Jaylin Murguia is a 58 y.o. female.    Chief Complaint: No chief complaint on file.    Pt is a 58 yo CW pmh Asthama, allergic rhinitis, chronic pansinusitis, hypogammaglobulinemia, Crohn's disease, fibromyalgia, chronic immunosuppression, TAMIKA on CPAP who presents follow up of uncontrolled asthma with associated cough.      Patient has been treated for pseudomonal infection of the sinuses. Her crohns disease is being followed by GI who plans to start on budesonide or Skyrizi. Her cough has been productive of some brownish sputum.     Since last being seen she had significant improvement with 40 mg prednisone. Is currently on 20 mg with a slight increase in cough.      Per chart review:   On IgG replacement therapy, Hizentra  Recurrent nasal pseudomonas infections: ( 1/2022) treated with ceftazadime/avibactam x5 weeks with PICC (2/15/22-3/15/22)  Meropenam nasal rinses (as of 12/27/22)     Past Medications:  Remicade (in remote past)-- ineffective  Asacol 4.8 gm-- ineffective  Entocort-- effective  Prednisone  Humira (started July 17, stopped 2/2018)-- stopped 2/2 multiple infections.      FESS: Stenotrophomonas and Pseudomonas s/p cipro/bactrim  FeNO 78  Eos: 400 or less  IgE: <35  CFTR (-), alpha-1-antitrypsin wnl  Normal IgG1/4, low or low normal IgG 2/3     Inhaler use: Trelegy   PRN inhaler use: combivent 2-3x/day ,duonebs 2-3x/day   Smoking hx: never smoker  Work hx: previous teacher  Exposure hx: none  Pets (dog),  birds(none), down furniture: pillows  Family hx lung disease: none  Family hx:   - father: scleroderma, father smoked  - mother: breast cancer  Personal hx:   Preeclampsia, eclampsia with second child. Pulmonary edema, acute heart failure?    Review of Systems   Constitutional:  Positive for activity change (improved). Negative for weight loss, weight gain and fatigue.   HENT:  Negative for postnasal drip and congestion.    Respiratory:  Positive for cough, dyspnea  on extertion (improved) and use of rescue inhaler. Negative for sputum production, shortness of breath and wheezing.    Cardiovascular:  Negative for chest pain, palpitations and leg swelling.   Musculoskeletal:  Negative for arthralgias.   Gastrointestinal:  Negative for nausea and vomiting.   Neurological:  Negative for light-headedness.   Psychiatric/Behavioral:  Negative for confusion and sleep disturbance. The patient is not nervous/anxious.      Objective:     Physical Exam   Constitutional: She is oriented to person, place, and time. She appears well-developed and well-nourished. No distress. She is obese.   HENT:   Head: Normocephalic.   Cardiovascular: Normal rate, regular rhythm and normal heart sounds. Exam reveals no gallop and no friction rub.   No murmur heard.  Pulmonary/Chest: Normal expansion, symmetric chest wall expansion and effort normal. No stridor. No respiratory distress. She has no decreased breath sounds. She has no wheezes. She has no rhonchi. She has no rales.   Musculoskeletal:         General: No edema.   Neurological: She is alert and oriented to person, place, and time. Gait normal.   Skin: She is not diaphoretic. No cyanosis. Nails show no clubbing.   Psychiatric: She has a normal mood and affect. Her behavior is normal. Judgment and thought content normal.   Vitals reviewed.    Personal Diagnostic Review  Chest x-ray: 11/23/22  Right infrahilar atelectasis vs opacification. Hazy left suprahilar opacification concerning for developing PNA.      CT of chest performed on 5/6/22 without contrast revealed bibasilar posterior predominant tree and bud indicative of infectious vs inflammatory source.      CT chest 3/6/23:   Interval worsening of GGO and consolidative processes bilaterally, particularly in bilateral upper lobes and lower lobes.      Echocardiogram: 4/19/22    1 - Mild left atrial enlargement.     2 - Concentric hypertrophy.     3 - No wall motion abnormalities.     4 -  Normal left ventricular systolic function (EF 60-65%).     5 - Normal left ventricular diastolic function.     6 - Normal right ventricular systolic function .     7 - The estimated PA systolic pressure is 28 mmHg.     8 - Trivial to mild aortic regurgitation.     9 - Trivial to mild mitral regurgitation.      Pulmonary function tests:   22  FEV1: 1.86L (65.9%)  FVC: 2.19L (61%)     22  FEV1: 1.86L  (65.5 % predicted),   FVC:  2.32L (64.4 % predicted),   FEV1/FVC:  80,   T.26L (78.3% predicted),   DLCO: 16.53 (66 % predicted)    No flowsheet data found.     Assessment:     No diagnosis found.     Outpatient Encounter Medications as of 2023   Medication Sig Dispense Refill    acetaminophen (TYLENOL) 500 MG tablet Take 2 tablets (1,000 mg total) by mouth every 6 (six) hours as needed for Pain. 60 tablet 0    albuterol-ipratropium (DUO-NEB) 2.5 mg-0.5 mg/3 mL nebulizer solution Take 3 mLs by nebulization every 6 (six) hours as needed for Wheezing. Rescue 270 mL 0    ASACOL  mg TbEC Take 800 mg by mouth 3 (three) times daily.      butalbital-acetaminophen-caffeine -40 mg (FIORICET, ESGIC) -40 mg per tablet TAKE 1 TABLET EVERY 4 HOURS AS NEEDED FOR HEADACHE 90 tablet 3    cholestyramine (QUESTRAN) 4 gram packet Take 1 packet (4 g total) by mouth 3 (three) times daily with meals. 270 packet 3    ciprofloxacin HCl (CIPRO) 500 MG tablet Take 1 tablet (500 mg total) by mouth 2 (two) times daily. for 7 days 14 tablet 0    diltiazem HCl (DILTIAZEM 2% - LIDOCAINE 5% CREAM) Apply peasize amount topically to anal area. 30 g 2    diphenhydrAMINE-aluminum-magnesium hydroxide-simethicone-LIDOcaine HCl 2% Swish and spit 15 mLs every 4 (four) hours as needed (oral ulcers, pain). 1 Bottle 2    [] diphenoxylate-atropine 2.5-0.025 mg (LOMOTIL) 2.5-0.025 mg per tablet Take 1 tablet by mouth 4 (four) times daily as needed for Diarrhea. 56 tablet 0    DULoxetine (CYMBALTA) 60 MG capsule Take 1  capsule (60 mg total) by mouth 2 (two) times daily. 180 capsule 3    eletriptan (RELPAX) 40 MG tablet Take 1 tablet (40 mg total) by mouth as needed. (Patient taking differently: Take 40 mg by mouth as needed. Take if needed) 12 tablet 3    estradioL (ESTRACE) 2 MG tablet Take 1 tablet (2 mg total) by mouth once daily. 90 tablet 1    fluticasone propionate (FLONASE) 50 mcg/actuation nasal spray 2 sprays (100 mcg total) by Each Nostril route once daily. 16 mL 0    fluticasone-umeclidin-vilanter (TRELEGY ELLIPTA) 100-62.5-25 mcg DsDv Inhale 1 puff into the lungs once daily. 90 each 3    immun glob G,IgG,-pro-IgA 0-50 (HIZENTRA) 10 gram/50 mL (20 %) Soln Inject 70 mLs (14 g total) into the skin every 7 days. 280 mL 11    losartan (COZAAR) 100 MG tablet Take 1 tablet (100 mg total) by mouth once daily. 90 tablet 3    montelukast (SINGULAIR) 10 mg tablet TAKE 1 TABLET EVERY EVENING 90 tablet 3    nortriptyline (PAMELOR) 25 MG capsule Take 1 capsule (25 mg total) by mouth every evening. 90 capsule 3    predniSONE (DELTASONE) 10 MG tablet Take 2 tablets (20 mg total) by mouth once daily for 10 days, THEN 1 tablet (10 mg total) once daily for 5 days, THEN 0.5 tablets (5 mg total) once daily for 5 days. 28 tablet 0    [] predniSONE (DELTASONE) 20 MG tablet Take 2 tablets (40 mg total) by mouth once daily. for 5 days 10 tablet 0    pregabalin (LYRICA) 150 MG capsule Take 1 capsule (150 mg total) by mouth 3 (three) times daily. 270 capsule 1    [] promethazine-dextromethorphan (PROMETHAZINE-DM) 6.25-15 mg/5 mL Syrp Take 5 mLs by mouth every 4 (four) hours as needed (cough). 240 mL 2    propranoloL (INDERAL LA) 80 MG 24 hr capsule Take 1 capsule (80 mg total) by mouth once daily. 90 capsule 2    rizatriptan (MAXALT) 10 MG tablet TAKE 1 TABLET IF NEEDED FOR MIGRAINES. MAX 2 TABLETS IN 24 HOURS. 30 tablet 8    sodium chloride 0.9% 0.9 % Soln 200 mL with gentamicin (ped) 20 mg/2 mL Soln EMPTY CONTENTS OF 1 CAPSULE  INTO NASAL IRRIGATION SYSTEM, ADD DISTILLED WATER, SALT PACK, MIX & IRRIGATE. PERFORM 2 TIMES DAILY      sodium,potassium,mag sulfates (SUPREP BOWEL PREP KIT) 17.5-3.13-1.6 gram SolR Take by mouth.      topiramate (TOPAMAX) 100 MG tablet TAKE 1 TABLET TWICE A  tablet 3    traZODone (DESYREL) 50 MG tablet Take 1 tablet (50 mg total) by mouth every evening. 90 tablet 3    triamcinolone acetonide 0.025% (KENALOG) 0.025 % cream 1 application once daily. Apply to affected area      VENTOLIN HFA 90 mcg/actuation inhaler INHALE 2 PUFFS INTO THE LUNGS EVERY 4 TO 6 HOURS AS NEEDED FOR WHEEZING. (Patient taking differently: Take if needed & bring) 18 Inhaler 1    XYLITOL, BULK, MISC EMPTY CONTENTS OF 1 CAPSULE INTO NASAL IRRIGATION SYSTEM, ADD DISTILLED WATER, SALT PACK, MIX & IRRIGATE. PERFORM 2 TIMES DAILY      [DISCONTINUED] levoFLOXacin (LEVAQUIN) 750 MG tablet Take 1 tablet (750 mg total) by mouth once daily. 7 tablet 0    [DISCONTINUED] nortriptyline (PAMELOR) 25 MG capsule Take 1 capsule (25 mg total) by mouth every evening. 90 capsule 3    [DISCONTINUED] nortriptyline (PAMELOR) 25 MG capsule Take 1 capsule (25 mg total) by mouth every evening. 90 capsule 3    [DISCONTINUED] predniSONE (DELTASONE) 20 MG tablet Take 2 tablets (40 mg total) by mouth once daily. for 5 days 10 tablet 0    [DISCONTINUED] pregabalin (LYRICA) 150 MG capsule Take 1 capsule (150 mg total) by mouth 3 (three) times daily. 270 capsule 1    [DISCONTINUED] PRILOSEC 10 mg SuDR Take by mouth as needed.       Facility-Administered Encounter Medications as of 4/24/2023   Medication Dose Route Frequency Provider Last Rate Last Admin    lactated ringers infusion   Intravenous Continuous Mary Leiva MD   New Bag at 03/12/20 0923    lactated ringers infusion   Intravenous Continuous Shay Bruce MD   Stopped at 03/16/22 1342    lidocaine (PF) 10 mg/ml (1%) injection 10 mg  1 mL Intradermal Once Mary Leiva MD        LIDOcaine  (PF) 10 mg/ml (1%) injection 10 mg  1 mL Intradermal Once Johan Baez MD        sodium chloride 0.9% flush 10 mL  10 mL Intravenous PRN Johan Baez MD         Orders Placed This Encounter   Procedures    CBC Auto Differential     Standing Status:   Future     Number of Occurrences:   1     Standing Expiration Date:   6/22/2024       Plan:     Severe persistent asthma without complication  Currently on trelegy and singulair. Am concerned that uncontrolled Crohn's disease is contributing to lack of asthma control as well. Pt has great response to prednisone with AE of swelling in her face. Pt had previously been on it for 4 months with significant improvement in symptoms with relapse after stopping. Much improvement with 40 mg prednisone. Mild increase in cough on 20 mg.  - will continue trelegy, singulair and PRN duonebs/combivent.   - Pt to start on local budesonide and Skyrizi  - may end up requiring Dupixent if continues to be uncontrolled with control of post nasal drip and crohn's disease  - will continue to titrate prednisone to 10 mg and continue to the lowest dose with tolerable symptoms. Hope that with control of crohn's she will have improvement of symptoms.     Other eosinophilia  Eosinophil level to 2300 on last CBC. Will obtain repeat. Pt is currently on 20 mg prednisone.     Chronic rhinosinusitis  Secondary to chronic pseudomonas infection with multiple rounds of abx and debriedments. On chronic antibiotic nasal rinses, flonase, azelastine. Followed by Dr. Roach. Likely contributing somewhat to lack of asthma control due to postnasal drip, however unsure if anyway to be more aggressive with treatment. Recently finished ciprofloxacin and soon after required levofloxacin for shortness of breath and worsened cough. Continue treatment per Dr. Roach and suggest nasal rinse and PRN duonebs.     Follow up in 3 months.     Ivan Riley MD  [facility]

## 2023-04-24 NOTE — ASSESSMENT & PLAN NOTE
Currently on trelegy and singulair. Am concerned that uncontrolled Crohn's disease is contributing to lack of asthma control as well. Pt has great response to prednisone with AE of swelling in her face. Pt had previously been on it for 4 months with significant improvement in symptoms with relapse after stopping. Much improvement with 40 mg prednisone. Mild increase in cough on 20 mg.  - will continue trelegy, singulair and PRN duonebs/combivent.   - Pt to start on local budesonide and Skyrizi  - may end up requiring Dupixent if continues to be uncontrolled with control of post nasal drip and crohn's disease  - will continue to titrate prednisone to 10 mg and continue to the lowest dose with tolerable symptoms. Hope that with control of crohn's she will have improvement of symptoms.

## 2023-04-27 ENCOUNTER — PATIENT MESSAGE (OUTPATIENT)
Dept: PULMONOLOGY | Facility: CLINIC | Age: 58
End: 2023-04-27
Payer: COMMERCIAL

## 2023-04-28 ENCOUNTER — PATIENT MESSAGE (OUTPATIENT)
Dept: PULMONOLOGY | Facility: CLINIC | Age: 58
End: 2023-04-28
Payer: COMMERCIAL

## 2023-05-01 ENCOUNTER — PATIENT MESSAGE (OUTPATIENT)
Dept: PRIMARY CARE CLINIC | Facility: CLINIC | Age: 58
End: 2023-05-01
Payer: COMMERCIAL

## 2023-05-01 DIAGNOSIS — J45.50 SEVERE PERSISTENT ASTHMA WITHOUT COMPLICATION: Primary | ICD-10-CM

## 2023-05-01 RX ORDER — RIZATRIPTAN BENZOATE 10 MG/1
TABLET ORAL
Qty: 30 TABLET | Refills: 8 | Status: SHIPPED | OUTPATIENT
Start: 2023-05-01

## 2023-05-01 RX ORDER — PROPRANOLOL HYDROCHLORIDE 80 MG/1
80 CAPSULE, EXTENDED RELEASE ORAL DAILY
Qty: 90 CAPSULE | Refills: 2 | Status: SHIPPED | OUTPATIENT
Start: 2023-05-01 | End: 2023-10-11

## 2023-05-01 RX ORDER — PREDNISONE 10 MG/1
10 TABLET ORAL DAILY
Qty: 30 TABLET | Refills: 0 | Status: SHIPPED | OUTPATIENT
Start: 2023-05-01 | End: 2023-05-31

## 2023-05-01 NOTE — TELEPHONE ENCOUNTER
No care due was identified.  [facility] Embedded Care Due Messages. Reference number: [id number].   5/01/2023 1:31:24 PM CDT

## 2023-05-03 ENCOUNTER — TELEPHONE (OUTPATIENT)
Dept: INFUSION THERAPY | Facility: HOSPITAL | Age: 58
End: 2023-05-03
Payer: COMMERCIAL

## 2023-05-03 NOTE — TELEPHONE ENCOUNTER
Spoke with Ms. Murguia and informed her that per her insurance, Skyrizi was only approved for one treatment at [facility] and future doses will need to scheduled at an approved location. I encouraged Ms. Murguia to contact her Insurance company for approved locations, notify Dr. Tao which facility she has chosen so orders can be sent there. Ms. Murguia stated understanding. Infusion schedulers notified to cancel future appt.     Message below was sent to Dr. Tao by Pre-Service on 4.19.23     N-BASKET SENT TO DR. TAO, STAFF, AND MELI CORMIER:    Good morning,          Please be advised that Ms. Murguia's request for Skyrizi has been approved for one dose only.  [facility] has a site of care stipulation, and she will need to receive her remaining IV 2 doses in an off-campus hospital setting.    Thanks,    Litzy Craig R.N.

## 2023-05-04 ENCOUNTER — TELEPHONE (OUTPATIENT)
Dept: GASTROENTEROLOGY | Facility: CLINIC | Age: 58
End: 2023-05-04
Payer: COMMERCIAL

## 2023-05-04 NOTE — TELEPHONE ENCOUNTER
Due to patient's insurance , the 2nd and 3rd Skyrizi induction infusions must be done at an outside infusion center. Patient scheduled at [facility] infusion with Manasa Mari  phone number is [phone]    Dinorah Gregory, UliD , [facility]  Clinical Pharmacist Gastroenterology  [facility] [place]

## 2023-05-10 DIAGNOSIS — J31.0 NONALLERGIC RHINITIS: ICD-10-CM

## 2023-05-10 RX ORDER — FLUTICASONE PROPIONATE 50 MCG
2 SPRAY, SUSPENSION (ML) NASAL DAILY
Qty: 16 G | Refills: 5 | Status: SHIPPED | OUTPATIENT
Start: 2023-05-10

## 2023-05-15 ENCOUNTER — PATIENT MESSAGE (OUTPATIENT)
Dept: PULMONOLOGY | Facility: CLINIC | Age: 58
End: 2023-05-15
Payer: COMMERCIAL

## 2023-06-01 ENCOUNTER — PATIENT MESSAGE (OUTPATIENT)
Dept: OTOLARYNGOLOGY | Facility: CLINIC | Age: 58
End: 2023-06-01
Payer: COMMERCIAL

## 2023-06-09 ENCOUNTER — PATIENT MESSAGE (OUTPATIENT)
Dept: OTOLARYNGOLOGY | Facility: CLINIC | Age: 58
End: 2023-06-09
Payer: COMMERCIAL

## 2023-06-09 ENCOUNTER — PATIENT MESSAGE (OUTPATIENT)
Dept: PRIMARY CARE CLINIC | Facility: CLINIC | Age: 58
End: 2023-06-09
Payer: COMMERCIAL

## 2023-06-09 DIAGNOSIS — G43.809 OTHER MIGRAINE WITHOUT STATUS MIGRAINOSUS, NOT INTRACTABLE: Primary | ICD-10-CM

## 2023-06-12 ENCOUNTER — PATIENT MESSAGE (OUTPATIENT)
Dept: GASTROENTEROLOGY | Facility: CLINIC | Age: 58
End: 2023-06-12
Payer: COMMERCIAL

## 2023-06-12 RX ORDER — TOPIRAMATE 100 MG/1
100 TABLET, FILM COATED ORAL 2 TIMES DAILY
Qty: 180 TABLET | Refills: 3 | Status: SHIPPED | OUTPATIENT
Start: 2023-06-12

## 2023-06-15 ENCOUNTER — OFFICE VISIT (OUTPATIENT)
Dept: OTOLARYNGOLOGY | Facility: CLINIC | Age: 58
End: 2023-06-15
Payer: COMMERCIAL

## 2023-06-15 VITALS
DIASTOLIC BLOOD PRESSURE: 78 MMHG | BODY MASS INDEX: 27.9 KG/M2 | HEART RATE: 70 BPM | WEIGHT: 178.13 LBS | SYSTOLIC BLOOD PRESSURE: 116 MMHG

## 2023-06-15 DIAGNOSIS — J31.0 NONALLERGIC RHINITIS: Primary | ICD-10-CM

## 2023-06-15 DIAGNOSIS — J32.4 CHRONIC PANSINUSITIS: ICD-10-CM

## 2023-06-15 DIAGNOSIS — D80.1 HYPOGAMMAGLOBULINEMIA: ICD-10-CM

## 2023-06-15 PROCEDURE — 3074F PR MOST RECENT SYSTOLIC BLOOD PRESSURE < 130 MM HG: ICD-10-PCS | Mod: CPTII,S$GLB,, | Performed by: OTOLARYNGOLOGY

## 2023-06-15 PROCEDURE — 87186 SC STD MICRODIL/AGAR DIL: CPT | Performed by: OTOLARYNGOLOGY

## 2023-06-15 PROCEDURE — 99214 OFFICE O/P EST MOD 30 MIN: CPT | Mod: 25,S$GLB,, | Performed by: OTOLARYNGOLOGY

## 2023-06-15 PROCEDURE — 99214 PR OFFICE/OUTPT VISIT, EST, LEVL IV, 30-39 MIN: ICD-10-PCS | Mod: 25,S$GLB,, | Performed by: OTOLARYNGOLOGY

## 2023-06-15 PROCEDURE — 31231 NASAL/SINUS ENDOSCOPY: ICD-10-PCS | Mod: S$GLB,,, | Performed by: OTOLARYNGOLOGY

## 2023-06-15 PROCEDURE — 87070 CULTURE OTHR SPECIMN AEROBIC: CPT | Performed by: OTOLARYNGOLOGY

## 2023-06-15 PROCEDURE — 3008F PR BODY MASS INDEX (BMI) DOCUMENTED: ICD-10-PCS | Mod: CPTII,S$GLB,, | Performed by: OTOLARYNGOLOGY

## 2023-06-15 PROCEDURE — 99999 PR PBB SHADOW E&M-EST. PATIENT-LVL V: ICD-10-PCS | Mod: PBBFAC,,, | Performed by: OTOLARYNGOLOGY

## 2023-06-15 PROCEDURE — 1160F RVW MEDS BY RX/DR IN RCRD: CPT | Mod: CPTII,S$GLB,, | Performed by: OTOLARYNGOLOGY

## 2023-06-15 PROCEDURE — 3008F BODY MASS INDEX DOCD: CPT | Mod: CPTII,S$GLB,, | Performed by: OTOLARYNGOLOGY

## 2023-06-15 PROCEDURE — 87075 CULTR BACTERIA EXCEPT BLOOD: CPT | Performed by: OTOLARYNGOLOGY

## 2023-06-15 PROCEDURE — 31231 NASAL ENDOSCOPY DX: CPT | Mod: S$GLB,,, | Performed by: OTOLARYNGOLOGY

## 2023-06-15 PROCEDURE — 99999 PR PBB SHADOW E&M-EST. PATIENT-LVL V: CPT | Mod: PBBFAC,,, | Performed by: OTOLARYNGOLOGY

## 2023-06-15 PROCEDURE — 1159F PR MEDICATION LIST DOCUMENTED IN MEDICAL RECORD: ICD-10-PCS | Mod: CPTII,S$GLB,, | Performed by: OTOLARYNGOLOGY

## 2023-06-15 PROCEDURE — 3078F DIAST BP <80 MM HG: CPT | Mod: CPTII,S$GLB,, | Performed by: OTOLARYNGOLOGY

## 2023-06-15 PROCEDURE — 1160F PR REVIEW ALL MEDS BY PRESCRIBER/CLIN PHARMACIST DOCUMENTED: ICD-10-PCS | Mod: CPTII,S$GLB,, | Performed by: OTOLARYNGOLOGY

## 2023-06-15 PROCEDURE — 4010F ACE/ARB THERAPY RXD/TAKEN: CPT | Mod: CPTII,S$GLB,, | Performed by: OTOLARYNGOLOGY

## 2023-06-15 PROCEDURE — 1159F MED LIST DOCD IN RCRD: CPT | Mod: CPTII,S$GLB,, | Performed by: OTOLARYNGOLOGY

## 2023-06-15 PROCEDURE — 87077 CULTURE AEROBIC IDENTIFY: CPT | Performed by: OTOLARYNGOLOGY

## 2023-06-15 PROCEDURE — 3074F SYST BP LT 130 MM HG: CPT | Mod: CPTII,S$GLB,, | Performed by: OTOLARYNGOLOGY

## 2023-06-15 PROCEDURE — 3078F PR MOST RECENT DIASTOLIC BLOOD PRESSURE < 80 MM HG: ICD-10-PCS | Mod: CPTII,S$GLB,, | Performed by: OTOLARYNGOLOGY

## 2023-06-15 PROCEDURE — 4010F PR ACE/ARB THEARPY RXD/TAKEN: ICD-10-PCS | Mod: CPTII,S$GLB,, | Performed by: OTOLARYNGOLOGY

## 2023-06-15 RX ORDER — IPRATROPIUM BROMIDE 0.5 MG/2.5ML
SOLUTION RESPIRATORY (INHALATION) 4 TIMES DAILY PRN
COMMUNITY
Start: 2023-05-10

## 2023-06-15 NOTE — PROGRESS NOTES
Subjective:      Jaylin is a 58 y.o. female who comes for follow-up of sinusitis. Her last visit with me was on 3/15/2023. Now nearly 9 months status-post most recent endoscopic sinus surgery.  About 2 weeks ago finished a course of 10 mg prednisone, immediately had grass-colored purulent discharge from both nostrils, lasted for several days, improved with extra xylitol sinus rinses.  Better this week, still scanty discharge.  Left ear fullness, feels the need to pop.  No congestion, facial pressure, pain.        %       The patient's medications, allergies, past medical, surgical, social and family histories were reviewed and updated as appropriate.    A detailed review of systems was obtained with pertinent positives as per the above HPI, and otherwise negative.        Objective:     /78 (BP Location: Right arm, Patient Position: Sitting)   Pulse 70   Wt 80.8 kg (178 lb 2.1 oz)   BMI 27.90 kg/m²        Constitutional:   She appears well-developed. She is cooperative. Normal speech.  No hoarse voice.      Head:  Normocephalic. Salivary glands normal.  Facial strength is normal.      Ears:    Right Ear: No drainage or tenderness. Tympanic membrane is not perforated. Tympanic membrane mobility is normal. No middle ear effusion. No decreased hearing is noted.   Left Ear: No drainage or tenderness. Tympanic membrane is not perforated. Tympanic membrane mobility is normal.  No middle ear effusion. No decreased hearing is noted.     Nose:  No mucosal edema, rhinorrhea, septal deviation or polyps. No epistaxis. Turbinates normal, no turbinate masses and no turbinate hypertrophy.  Right sinus exhibits no maxillary sinus tenderness and no frontal sinus tenderness. Left sinus exhibits no maxillary sinus tenderness and no frontal sinus tenderness.     Mouth/Throat  Oropharynx clear and moist without lesions or asymmetry and normal uvula midline. She does not have dentures. Normal dentition. No oral lesions or mucous  membrane lesions. No oropharyngeal exudate or posterior oropharyngeal erythema. Mirror exam not performed due to patient tolerance.  Mirror exam not performed due to patient tolerance.      Neck:  Neck normal without thyromegaly masses, asymmetry, normal tracheal structure, crepitus, and tenderness, thyroid normal, trachea normal and no adenopathy. Normal range of motion present.     She has no cervical adenopathy.     Cardiovascular:    Regular rhythm.              Pulmonary/Chest:   Effort normal.     Psychiatric:   She has a normal mood and affect. Her speech is normal and behavior is normal.     Neurological:   No cranial nerve deficit.     Skin:   No rash noted.     Procedure    Nasal endoscopy performed.  See procedure note.        Data Reviewed    WBC (K/uL)   Date Value   04/24/2023 9.58     Eosinophil % (%)   Date Value   04/24/2023 0.4     Eos # (K/uL)   Date Value   04/24/2023 0.0     Platelets (K/uL)   Date Value   04/24/2023 294     Glucose (mg/dL)   Date Value   01/23/2023 87     IgE (IU/mL)   Date Value   05/26/2022 <35       Pathology report indicated non-eosinophilic chronic inflammation.  Cultures showed Pseudomonas and Stenotrophomonas.      Assessment:     1. Nonallergic rhinitis    2. Chronic pansinusitis    3. Hypogammaglobulinemia         Plan:     Apparent local infection at posterior septectomy.  Culture taken, will consider antibiotic in sinus rinse.  Continue xylitol.  Mineral oil for ears prn.  Follow up if symptoms worsen or fail to improve.

## 2023-06-15 NOTE — PROCEDURES
Nasal/sinus endoscopy    Date/Time: 6/15/2023 9:30 AM  Performed by: Matthias Roach MD  Authorized by: Matthias Roach MD     Consent Done?:  Yes (Verbal)  Anesthesia:     Local anesthetic:  Lidocaine 4% and Jose-Synephrine 1/2%    Patient tolerance:  Patient tolerated the procedure well with no immediate complications  Nose:     Procedure Performed:  Nasal Endoscopy  External:      No external nasal deformity  Intranasal:      Mucosa no polyps     Mucosa ulcers not present     No mucosa lesions present     Turbinates not enlarged     No septum gross deformity  Nasopharynx:      No mucosa lesions     Adenoids not present     Posterior choanae patent     Eustachian tube patent     Sinuses all patent  Sphenoidotomy clear to suction  Scanty green purulent crusts at posterior septectomy, more on right, swabbed for culture

## 2023-06-17 LAB — BACTERIA SPEC AEROBE CULT: ABNORMAL

## 2023-06-19 ENCOUNTER — PATIENT MESSAGE (OUTPATIENT)
Dept: OTOLARYNGOLOGY | Facility: CLINIC | Age: 58
End: 2023-06-19
Payer: COMMERCIAL

## 2023-06-19 LAB — BACTERIA SPEC ANAEROBE CULT: NORMAL

## 2023-06-21 ENCOUNTER — PATIENT MESSAGE (OUTPATIENT)
Dept: OTOLARYNGOLOGY | Facility: CLINIC | Age: 58
End: 2023-06-21
Payer: COMMERCIAL

## 2023-06-21 ENCOUNTER — CLINICAL SUPPORT (OUTPATIENT)
Dept: GASTROENTEROLOGY | Facility: CLINIC | Age: 58
End: 2023-06-21
Payer: COMMERCIAL

## 2023-06-21 DIAGNOSIS — K50.00 CROHN'S DISEASE OF SMALL INTESTINE WITHOUT COMPLICATION: Chronic | ICD-10-CM

## 2023-06-21 NOTE — PATIENT INSTRUCTIONS
Patient was told to continue therapy as prescribed until I can discuss patient with provider    Dinorah Gregory, PharmD , AAHIVP  Clinical Pharmacist Gastroenterology  [facility] [place]

## 2023-06-21 NOTE — PROGRESS NOTES
[facility]  Inflammatory Bowel Disease  Follow up - Pharmacy Note    TODAY'S VISIT DATE:  6/21/2023    Reason for visit: F/U to assess adherence and tolerance to Lexus    IBD MD: Nicole Leal      Pertinent History:  IBD type Crohn's disease ?    has a past medical history of Allergic rhinitis, Asthma, Chronic pansinusitis, Crohn's disease, Fibromyalgia, Hyperlipidemia, Hypertension, Immunosuppression, Migraine, Obstructive sleep apnea, and Sciatica.  Fever No  Cough No  Recent Antibiotics use in the last 30 days No  New medications since last encounter No  Dispensing pharmacy:  [facility]    F/U Questionnaire   Missed doses  From chart patient has had one dose on 4/21 so patient has missed 2 doses   Side effects reported dizziness  Patient is feeling unchanged since starting therapy  Start Date: 4/21/2023  LD:4/21/2023   ND:unsure . Will need to verify with Dr Leal if we need to start the induction over       Therapeutic Drug Monitoring Labs: n/a      Prior IBD Therapies: Remicade       Vaccinations:  Lab Results   Component Value Date    HEPBSAB Negative 11/07/2019     No results found for: HEPBSURFABQU  Lab Results   Component Value Date    HEPAIGG Negative 11/07/2019     No results found for: VARICELLAZOS, VARICELLAINT  Immunization History   Administered Date(s) Administered    COVID-19, MRNA, LN-S, PF (Pfizer) (Purple Cap) 03/05/2021, 04/15/2021    Hepatitis A / Hepatitis B 12/12/2019    Influenza 10/29/2013    Influenza - Quadrivalent - PF *Preferred* (6 months and older) 11/15/2017, 01/28/2020, 02/11/2021    Influenza Split 10/29/2013    PPD Test 05/22/2017    Pneumococcal Conjugate - 13 Valent 12/23/2019    Pneumococcal Polysaccharide - 23 Valent 10/29/2013    Tdap 02/18/2014           All Medical History/Surgical History/Family History/Social History/Allergies have been reviewed and updated in EMR    Review of patient's allergies indicates:   Allergen Reactions     Fentanyl Other (See Comments)     Rigid Chest Syndrome         Current Medications:   No outpatient medications have been marked as taking for the 6/21/23 encounter (Appointment) with IBD PHARMACIST, [facility].       Reviewed all current medications including OTC, herbals and supplements.     Labs:   Lab Results   Component Value Date    HEPBSAG Negative 11/07/2019    HEPBCAB Negative 11/07/2019     Lab Results   Component Value Date    TBGOLDPLUS Negative 05/26/2022     Lab Results   Component Value Date    ZBFZXLWF92GI 77 01/23/2023    HVDUQHCE50 >2000 (H) 01/23/2023     Lab Results   Component Value Date    WBC 9.58 04/24/2023    HGB 13.5 04/24/2023    HCT 43.9 04/24/2023    MCV 99 (H) 04/24/2023     04/24/2023     Lab Results   Component Value Date    CREATININE 0.9 01/23/2023    ALBUMIN 3.5 01/23/2023    BILITOT 0.3 01/23/2023    ALKPHOS 84 01/23/2023    AST 31 01/23/2023    ALT 28 01/23/2023         Assessment/Plan:  Jaylin Murguia is a 58 y.o. female with  has a past medical history of Allergic rhinitis, Asthma, Chronic pansinusitis, Crohn's disease, Fibromyalgia, Hyperlipidemia, Hypertension, Immunosuppression, Migraine, Obstructive sleep apnea, and Sciatica.    # Recommendations:  Unclear. Will consult with physician on the need to start induction over     Dinorah Gregory, PharmD , AAHIVP  Clinical Pharmacist Gastroenterology  [facility] [place]

## 2023-06-26 ENCOUNTER — PATIENT MESSAGE (OUTPATIENT)
Dept: GASTROENTEROLOGY | Facility: CLINIC | Age: 58
End: 2023-06-26
Payer: COMMERCIAL

## 2023-06-27 ENCOUNTER — PATIENT MESSAGE (OUTPATIENT)
Dept: GASTROENTEROLOGY | Facility: CLINIC | Age: 58
End: 2023-06-27
Payer: COMMERCIAL

## 2023-06-28 ENCOUNTER — PATIENT MESSAGE (OUTPATIENT)
Dept: PULMONOLOGY | Facility: CLINIC | Age: 58
End: 2023-06-28
Payer: COMMERCIAL

## 2023-06-30 ENCOUNTER — PATIENT MESSAGE (OUTPATIENT)
Dept: GASTROENTEROLOGY | Facility: CLINIC | Age: 58
End: 2023-06-30
Payer: COMMERCIAL

## 2023-07-03 ENCOUNTER — PATIENT MESSAGE (OUTPATIENT)
Dept: PULMONOLOGY | Facility: CLINIC | Age: 58
End: 2023-07-03
Payer: COMMERCIAL

## 2023-07-05 DIAGNOSIS — R05.3 CHRONIC COUGH: Primary | ICD-10-CM

## 2023-07-05 RX ORDER — PROMETHAZINE HYDROCHLORIDE AND DEXTROMETHORPHAN HYDROBROMIDE 6.25; 15 MG/5ML; MG/5ML
5 SYRUP ORAL EVERY 4 HOURS PRN
Qty: 180 ML | Refills: 1 | Status: SHIPPED | OUTPATIENT
Start: 2023-07-05 | End: 2023-07-15

## 2023-07-06 DIAGNOSIS — J45.41 MODERATE PERSISTENT ASTHMA WITH ACUTE EXACERBATION: ICD-10-CM

## 2023-07-06 DIAGNOSIS — B37.0 THRUSH, ORAL: Primary | ICD-10-CM

## 2023-07-06 RX ORDER — NYSTATIN 100000 [USP'U]/ML
4 SUSPENSION ORAL 4 TIMES DAILY
Qty: 160 ML | Refills: 0 | Status: SHIPPED | OUTPATIENT
Start: 2023-07-06 | End: 2023-07-16

## 2023-07-06 RX ORDER — NYSTATIN 100000 [USP'U]/ML
4 SUSPENSION ORAL 4 TIMES DAILY
Qty: 160 ML | Refills: 0 | Status: SHIPPED | OUTPATIENT
Start: 2023-07-06 | End: 2023-07-06

## 2023-07-06 RX ORDER — PREDNISONE 20 MG/1
40 TABLET ORAL DAILY
Qty: 10 TABLET | Refills: 0 | Status: SHIPPED | OUTPATIENT
Start: 2023-07-06 | End: 2023-07-06

## 2023-07-06 RX ORDER — PREDNISONE 20 MG/1
40 TABLET ORAL DAILY
Qty: 10 TABLET | Refills: 0 | Status: SHIPPED | OUTPATIENT
Start: 2023-07-06 | End: 2023-07-11

## 2023-07-09 ENCOUNTER — PATIENT MESSAGE (OUTPATIENT)
Dept: PULMONOLOGY | Facility: CLINIC | Age: 58
End: 2023-07-09
Payer: COMMERCIAL

## 2023-07-10 ENCOUNTER — HOSPITAL ENCOUNTER (OUTPATIENT)
Dept: RADIOLOGY | Facility: HOSPITAL | Age: 58
Discharge: HOME | End: 2023-07-10
Attending: INTERNAL MEDICINE
Payer: COMMERCIAL

## 2023-07-10 DIAGNOSIS — J20.9 ACUTE BRONCHITIS, UNSPECIFIED ORGANISM: ICD-10-CM

## 2023-07-10 DIAGNOSIS — J20.9 ACUTE BRONCHITIS, UNSPECIFIED ORGANISM: Primary | ICD-10-CM

## 2023-07-10 PROCEDURE — 71046 XR CHEST PA AND LATERAL: ICD-10-PCS | Mod: 26,,, | Performed by: RADIOLOGY

## 2023-07-10 PROCEDURE — 71046 X-RAY EXAM CHEST 2 VIEWS: CPT | Mod: 26,,, | Performed by: RADIOLOGY

## 2023-07-10 PROCEDURE — 71046 X-RAY EXAM CHEST 2 VIEWS: CPT | Mod: TC,PN

## 2023-07-10 RX ORDER — LEVOFLOXACIN 750 MG/1
750 TABLET ORAL DAILY
Qty: 5 TABLET | Refills: 0 | Status: SHIPPED | OUTPATIENT
Start: 2023-07-10 | End: 2023-07-15

## 2023-07-12 ENCOUNTER — LAB VISIT (OUTPATIENT)
Dept: LAB | Facility: HOSPITAL | Age: 58
End: 2023-07-12
Attending: INTERNAL MEDICINE
Payer: COMMERCIAL

## 2023-07-12 DIAGNOSIS — J20.9 ACUTE BRONCHITIS, UNSPECIFIED ORGANISM: ICD-10-CM

## 2023-07-12 PROCEDURE — 87205 SMEAR GRAM STAIN: CPT | Performed by: INTERNAL MEDICINE

## 2023-07-12 PROCEDURE — 87186 SC STD MICRODIL/AGAR DIL: CPT | Mod: 59 | Performed by: INTERNAL MEDICINE

## 2023-07-12 PROCEDURE — 87070 CULTURE OTHR SPECIMN AEROBIC: CPT | Performed by: INTERNAL MEDICINE

## 2023-07-12 PROCEDURE — 87077 CULTURE AEROBIC IDENTIFY: CPT | Performed by: INTERNAL MEDICINE

## 2023-07-15 LAB
BACTERIA SPEC AEROBE CULT: ABNORMAL
BACTERIA SPEC AEROBE CULT: ABNORMAL
GRAM STN SPEC: ABNORMAL

## 2023-07-20 ENCOUNTER — PATIENT MESSAGE (OUTPATIENT)
Dept: OTOLARYNGOLOGY | Facility: CLINIC | Age: 58
End: 2023-07-20
Payer: COMMERCIAL

## 2023-07-20 DIAGNOSIS — Z22.8 PSEUDOMONAS AERUGINOSA RESISTANT CARRIER: Primary | ICD-10-CM

## 2023-07-24 ENCOUNTER — PATIENT MESSAGE (OUTPATIENT)
Dept: PULMONOLOGY | Facility: CLINIC | Age: 58
End: 2023-07-24
Payer: COMMERCIAL

## 2023-07-24 DIAGNOSIS — J45.50 SEVERE PERSISTENT ASTHMA WITHOUT COMPLICATION: Primary | ICD-10-CM

## 2023-07-24 RX ORDER — PREDNISONE 20 MG/1
40 TABLET ORAL DAILY
Qty: 10 TABLET | Refills: 0 | Status: SHIPPED | OUTPATIENT
Start: 2023-07-24 | End: 2023-07-29

## 2023-07-27 ENCOUNTER — PATIENT MESSAGE (OUTPATIENT)
Dept: OTOLARYNGOLOGY | Facility: CLINIC | Age: 58
End: 2023-07-27
Payer: COMMERCIAL

## 2023-07-28 ENCOUNTER — PATIENT MESSAGE (OUTPATIENT)
Dept: ALLERGY | Facility: CLINIC | Age: 58
End: 2023-07-28
Payer: COMMERCIAL

## 2023-07-28 DIAGNOSIS — D80.6 ANTI-POLYSACCHARIDE ANTIBODY DEFICIENCY: ICD-10-CM

## 2023-07-28 DIAGNOSIS — D80.3 IGG2 SUBCLASS DEFICIENCY: ICD-10-CM

## 2023-07-28 RX ORDER — HUMAN IMMUNOGLOBULIN G 0.2 G/ML
14 LIQUID SUBCUTANEOUS
Qty: 280 ML | Refills: 11 | OUTPATIENT
Start: 2023-07-28

## 2023-07-31 ENCOUNTER — OFFICE VISIT (OUTPATIENT)
Dept: GASTROENTEROLOGY | Facility: CLINIC | Age: 58
End: 2023-07-31
Payer: COMMERCIAL

## 2023-07-31 VITALS
DIASTOLIC BLOOD PRESSURE: 78 MMHG | SYSTOLIC BLOOD PRESSURE: 122 MMHG | BODY MASS INDEX: 27.57 KG/M2 | HEIGHT: 67 IN | HEART RATE: 63 BPM | WEIGHT: 175.69 LBS

## 2023-07-31 DIAGNOSIS — K50.00 CROHN'S DISEASE OF SMALL INTESTINE WITHOUT COMPLICATION: Primary | ICD-10-CM

## 2023-07-31 DIAGNOSIS — R19.7 DIARRHEA, UNSPECIFIED TYPE: ICD-10-CM

## 2023-07-31 PROCEDURE — 3074F SYST BP LT 130 MM HG: CPT | Mod: CPTII,S$GLB,, | Performed by: INTERNAL MEDICINE

## 2023-07-31 PROCEDURE — 3078F PR MOST RECENT DIASTOLIC BLOOD PRESSURE < 80 MM HG: ICD-10-PCS | Mod: CPTII,S$GLB,, | Performed by: INTERNAL MEDICINE

## 2023-07-31 PROCEDURE — 3008F BODY MASS INDEX DOCD: CPT | Mod: CPTII,S$GLB,, | Performed by: INTERNAL MEDICINE

## 2023-07-31 PROCEDURE — 99214 PR OFFICE/OUTPT VISIT, EST, LEVL IV, 30-39 MIN: ICD-10-PCS | Mod: S$GLB,,, | Performed by: INTERNAL MEDICINE

## 2023-07-31 PROCEDURE — 1159F MED LIST DOCD IN RCRD: CPT | Mod: CPTII,S$GLB,, | Performed by: INTERNAL MEDICINE

## 2023-07-31 PROCEDURE — 99999 PR PBB SHADOW E&M-EST. PATIENT-LVL III: CPT | Mod: PBBFAC,,, | Performed by: INTERNAL MEDICINE

## 2023-07-31 PROCEDURE — 4010F ACE/ARB THERAPY RXD/TAKEN: CPT | Mod: CPTII,S$GLB,, | Performed by: INTERNAL MEDICINE

## 2023-07-31 PROCEDURE — 3074F PR MOST RECENT SYSTOLIC BLOOD PRESSURE < 130 MM HG: ICD-10-PCS | Mod: CPTII,S$GLB,, | Performed by: INTERNAL MEDICINE

## 2023-07-31 PROCEDURE — 3078F DIAST BP <80 MM HG: CPT | Mod: CPTII,S$GLB,, | Performed by: INTERNAL MEDICINE

## 2023-07-31 PROCEDURE — 3008F PR BODY MASS INDEX (BMI) DOCUMENTED: ICD-10-PCS | Mod: CPTII,S$GLB,, | Performed by: INTERNAL MEDICINE

## 2023-07-31 PROCEDURE — 99214 OFFICE O/P EST MOD 30 MIN: CPT | Mod: S$GLB,,, | Performed by: INTERNAL MEDICINE

## 2023-07-31 PROCEDURE — 1159F PR MEDICATION LIST DOCUMENTED IN MEDICAL RECORD: ICD-10-PCS | Mod: CPTII,S$GLB,, | Performed by: INTERNAL MEDICINE

## 2023-07-31 PROCEDURE — 99999 PR PBB SHADOW E&M-EST. PATIENT-LVL III: ICD-10-PCS | Mod: PBBFAC,,, | Performed by: INTERNAL MEDICINE

## 2023-07-31 PROCEDURE — 4010F PR ACE/ARB THEARPY RXD/TAKEN: ICD-10-PCS | Mod: CPTII,S$GLB,, | Performed by: INTERNAL MEDICINE

## 2023-07-31 NOTE — PROGRESS NOTES
Clinic Consult:  [facility] Consultation Note    Reason for Consult:  The primary encounter diagnosis was Crohn's disease of small intestine without complication. A diagnosis of Diarrhea, unspecified type was also pertinent to this visit.    PCP: Esthela Hu       HPI:  This is a 58 y.o. female here for follow up and to discuss coming off of hizentra.     IBD History  - Type: crohn's disease  - Disease Location: small bowel  - Phenotype: stricture   - Diagnosed: 2000  - Surgeries related to IBD: none  - Extra-intestinal Manifestations: oral aphthous ulcers     Current Medications  Skyrizi, started 4/21/23 (started empirically as colonoscopy, VCE and calprotectin were all normal at the time * see note from 3/2023)     Past Medications  Remicade (in remote past)-- ineffective??  Asacol 4.8 gm-- ineffective  Entocort-- effective  Prednisone  Humira (started July 17, stopped 2/2018)-- stopped 2/2 multiple infections.      Drug and Antibody Levels    (2018) Humira level 2.7, intermediate antibody formation.     Endoscopy Reports  5/7/15 colonoscopy: poor prep. Skin tag. Crohn's disease with ileitis. Biopsied. Path: focal chronic active ileitis.   5/9//17: erythematous mucosa in the sigmoid, transverse and hepatic flexure. 2 mm polyp at hepatic flexure. Crohn's disease with ileitis. Pathology: colon and ileal bx normal colon mucosa. Polyp normal colon mucosa.   5/9/17 EGD: gastritis and duodenal erosions without bleeding. Path: acute and chronic non-specific duodenitis with ulceration.   3/27/18 colonoscopy: 3 mm polyp in the sigmoid. No signs of active crohn's disease and no signs of stricture in the TI (advanced up to 15 cm). Pathology: hyperplastic polyp.   3/2020 colonoscopy: SESCD 4, ileitis; path: mild active chronic ileitis   3/2022: single aptha in ileum, single aptha in colon; path shows prominent peyer's patch and benign colon  2/2023: normal TI, normal colon; path: benign ileum, focal active  "colitis (no architectural distortion), discussed with pathologist -- very minimal focal colitis, no chronicity in any sepcimen and "virtually normal"     Imaging:  CTE (2021): normal   VCE (2023) normal, did not reach cecum     Interval History    #diarrhea   Started skyrizi in April, completed induction, has not noticed any change in symptoms. Continue to have watery diarrhea, usually triggered by eating.  Has urgency and occasional incontinence.  Takes 2-3 lomitil to stop the diarrhea. If she takes 3 lomitil, then it will get her through the night and is able to eat breakfast and not have urgency.  By the afternoon, she goes back to having diarrhea with urgency.      #sinus  Had sinus endoscopy on 6/15/23  Pseudomonas on sputum culture from 7/12/23, put on antibiotic and steroid.        ROS:  CONSTITUTIONAL: Denies weight change,  fatigue, fevers, chills, night sweats.  EYES: No changes in vision.   ENT: No oral lesions or sore throat.  HEMATOLOGICAL/Lymph: Denies bleeding tendency, bruising tendency. No swellings or enlarged lymph nodes.  CARDIOVASCULAR: Denies chest pain, shortness of breath, orthopnea and edema.  RESPIRATORY: Denies cough, hemoptysis, dyspnea, and wheezing.  GI: See HPI.  : Denies dysuria and hematuria  MUSCULOSKELETAL: Denies joint pain or swelling, back pain and muscle pain.  SKIN: Denies rashes.  NEUROLOGIC: Denies headaches, seizures and numbness.  PSYCHIATRIC: Denies depression or anxiety.  ENDOCRINE: Denies heat or cold intolerance and excessive thirst or urination.    Medical History:   Past Medical History:   Diagnosis Date    Allergic rhinitis     Asthma     Chronic pansinusitis     Crohn's disease     Ileal involvement, previously on Remicade, Asacol, Prednisone    Fibromyalgia     Hyperlipidemia     Hypertension     Immunosuppression 20020    Migraine     Obstructive sleep apnea     CPAP at night    Sciatica        Surgical History:  Past Surgical History:   Procedure Laterality " Date    BLADDER SURGERY      sling was created by her muscles     BRONCHOSCOPY N/A 2023    Procedure: BRONCHOSCOPY;  Surgeon: [facility];  Location: [facility] OR 2ND FLR;  Service: Anesthesiology;  Laterality: N/A;     SECTION      COLONOSCOPY N/A 2017    Procedure: COLONOSCOPY;  Surgeon: Kin Dyer MD;  Location: [facility];  Service: Endoscopy;  Laterality: N/A;    COLONOSCOPY N/A 2018    Procedure: COLONOSCOPY;  Surgeon: Kyra Vallecillo MD;  Location: [facility];  Service: Endoscopy;  Laterality: N/A;    COLONOSCOPY N/A 2020    Procedure: COLONOSCOPY;  Surgeon: Nicole Leal MD;  Location: [facility];  Service: Endoscopy;  Laterality: N/A;    COLONOSCOPY N/A 2022    Procedure: COLONOSCOPY;  Surgeon: Shay Bruce MD;  Location: [facility];  Service: Endoscopy;  Laterality: N/A;    COLONOSCOPY N/A 2023    Procedure: COLONOSCOPY;  Surgeon: Nicole Leal MD;  Location: [facility];  Service: Endoscopy;  Laterality: N/A;    DEBRIDEMENT Bilateral 2020    Procedure: DEBRIDEMENT;  Surgeon: Matthias Roach MD;  Location: [facility] OR 2ND FLR;  Service: ENT;  Laterality: Bilateral;    ESOPHAGOGASTRODUODENOSCOPY N/A 2020    Procedure: ESOPHAGOGASTRODUODENOSCOPY (EGD);  Surgeon: Nicole Leal MD;  Location: [facility];  Service: Endoscopy;  Laterality: N/A;    ESOPHAGOGASTRODUODENOSCOPY N/A 2022    Procedure: EGD (ESOPHAGOGASTRODUODENOSCOPY);  Surgeon: Shay Bruce MD;  Location: [facility];  Service: Endoscopy;  Laterality: N/A;    ESOPHAGOGASTRODUODENOSCOPY N/A 2023    Procedure: EGD (ESOPHAGOGASTRODUODENOSCOPY);  Surgeon: Nicole Leal MD;  Location: [facility];  Service: Endoscopy;  Laterality: N/A;    FINGER SURGERY      joint relpacement, left hand index finger    FUNCTIONAL ENDOSCOPIC SINUS SURGERY (FESS) USING COMPUTER-ASSISTED NAVIGATION Bilateral 2019    Procedure: FESS, USING COMPUTER-ASSISTED  NAVIGATION;  Surgeon: Manish Shaffer MD;  Location: [facility] OR;  Service: ENT;  Laterality: Bilateral;    FUNCTIONAL ENDOSCOPIC SINUS SURGERY (FESS) USING COMPUTER-ASSISTED NAVIGATION Bilateral 09/25/2020    Procedure: FESS, USING COMPUTER-ASSISTED NAVIGATION SPHENOID;  Surgeon: Matthias Roach MD;  Location: [facility] OR 2ND FLR;  Service: ENT;  Laterality: Bilateral;  TIVA    FUNCTIONAL ENDOSCOPIC SINUS SURGERY (FESS) USING COMPUTER-ASSISTED NAVIGATION Bilateral 09/30/2022    Procedure: FESS, USING COMPUTER-ASSISTED NAVIGATION;  Surgeon: Matthias Roach MD;  Location: [facility] OR 2ND FLR;  Service: ENT;  Laterality: Bilateral;    HYSTERECTOMY  2001    INTRALUMINAL GASTROINTESTINAL TRACT IMAGING VIA CAPSULE N/A 03/03/2023    Procedure: IMAGING PROCEDURE, GI TRACT, INTRALUMINAL, VIA CAPSULE;  Surgeon: First Available [place];  Location: [facility] ENDO;  Service: Endoscopy;  Laterality: N/A;    SINUS SURGERY      WISDOM TOOTH EXTRACTION         Family History:   Family History   Problem Relation Age of Onset    Breast cancer Mother     Hypertension Mother     Allergies Mother     Kidney disease Father 64        ESRD on HD    Scleroderma Father     Breast cancer Maternal Grandmother     Heart attack Maternal Grandmother     COPD Maternal Grandmother 72    Cancer Paternal Grandmother 70        colon    Hypertension Brother        Social History:   Social History     Tobacco Use    Smoking status: Never     Passive exposure: Never    Smokeless tobacco: Never   Substance Use Topics    Alcohol use: No    Drug use: No       Allergies: Reviewed    Home Medications:   Medication List with Changes/Refills   Current Medications    ACETAMINOPHEN (TYLENOL) 500 MG TABLET    Take 2 tablets (1,000 mg total) by mouth every 6 (six) hours as needed for Pain.    ALBUTEROL-IPRATROPIUM (DUO-NEB) 2.5 MG-0.5 MG/3 ML NEBULIZER SOLUTION    Take 3 mLs by nebulization every 6 (six) hours as needed for Wheezing. Rescue    ASACOL  MG TBEC    Take 800  mg by mouth 3 (three) times daily.    BUTALBITAL-ACETAMINOPHEN-CAFFEINE -40 MG (FIORICET, ESGIC) -40 MG PER TABLET    TAKE 1 TABLET EVERY 4 HOURS AS NEEDED FOR HEADACHE    CHOLESTYRAMINE (QUESTRAN) 4 GRAM PACKET    Take 1 packet (4 g total) by mouth 3 (three) times daily with meals.    DILTIAZEM HCL (DILTIAZEM 2% - LIDOCAINE 5% CREAM)    Apply peasize amount topically to anal area.    DIPHENHYDRAMINE-ALUMINUM-MAGNESIUM HYDROXIDE-SIMETHICONE-LIDOCAINE HCL 2%    Swish and spit 15 mLs every 4 (four) hours as needed (oral ulcers, pain).    DULOXETINE (CYMBALTA) 60 MG CAPSULE    Take 1 capsule (60 mg total) by mouth 2 (two) times daily.    ELETRIPTAN (RELPAX) 40 MG TABLET    Take 1 tablet (40 mg total) by mouth as needed.    ESTRADIOL (ESTRACE) 2 MG TABLET    Take 1 tablet (2 mg total) by mouth once daily.    FLUTICASONE PROPIONATE (FLONASE) 50 MCG/ACTUATION NASAL SPRAY    2 sprays (100 mcg total) by Each Nostril route once daily.    FLUTICASONE-UMECLIDIN-VILANTER (TRELEGY ELLIPTA) 100-62.5-25 MCG DSDV    Inhale 1 puff into the lungs once daily.    IMMUN GLOB G,IGG,-PRO-IGA 0-50 (HIZENTRA) 10 GRAM/50 ML (20 %) SOLN    Inject 70 mLs (14 g total) into the skin every 7 days.    IPRATROPIUM (ATROVENT) 0.02 % NEBULIZER SOLUTION    Take by nebulization 4 (four) times daily.    LOSARTAN (COZAAR) 100 MG TABLET    Take 1 tablet (100 mg total) by mouth once daily.    MONTELUKAST (SINGULAIR) 10 MG TABLET    TAKE 1 TABLET EVERY EVENING    NORTRIPTYLINE (PAMELOR) 25 MG CAPSULE    Take 1 capsule (25 mg total) by mouth every evening.    PREGABALIN (LYRICA) 150 MG CAPSULE    Take 1 capsule (150 mg total) by mouth 3 (three) times daily.    PROPRANOLOL (INDERAL LA) 80 MG 24 HR CAPSULE    Take 1 capsule (80 mg total) by mouth once daily.    RISANKIZUMAB-RZAA 360 MG/2.4 ML (150 MG/ML) INJT    Inject 360 mg into the skin every 8 weeks. for 6 doses    RIZATRIPTAN (MAXALT) 10 MG TABLET    TAKE 1 TABLET IF NEEDED FOR MIGRAINES. MAX  "2 TABLETS IN 24 HOURS.    SODIUM CHLORIDE 0.9% 0.9 % SOLN 200 ML WITH GENTAMICIN (PED) 20 MG/2 ML SOLN    EMPTY CONTENTS OF 1 CAPSULE INTO NASAL IRRIGATION SYSTEM, ADD DISTILLED WATER, SALT PACK, MIX & IRRIGATE. PERFORM 2 TIMES DAILY    SODIUM,POTASSIUM,MAG SULFATES (SUPREP BOWEL PREP KIT) 17.5-3.13-1.6 GRAM SOLR    Take by mouth.    TOPIRAMATE (TOPAMAX) 100 MG TABLET    Take 1 tablet (100 mg total) by mouth 2 (two) times daily.    TRAZODONE (DESYREL) 50 MG TABLET    Take 1 tablet (50 mg total) by mouth every evening.    TRIAMCINOLONE ACETONIDE 0.025% (KENALOG) 0.025 % CREAM    1 application once daily. Apply to affected area    VENTOLIN HFA 90 MCG/ACTUATION INHALER    INHALE 2 PUFFS INTO THE LUNGS EVERY 4 TO 6 HOURS AS NEEDED FOR WHEEZING.    XYLITOL, BULK, MISC    EMPTY CONTENTS OF 1 CAPSULE INTO NASAL IRRIGATION SYSTEM, ADD DISTILLED WATER, SALT PACK, MIX & IRRIGATE. PERFORM 2 TIMES DAILY         Physical Exam:  Vital Signs:  /78 (BP Location: Right arm, Patient Position: Sitting, BP Method: Medium (Manual))   Pulse 63   Ht 5' 7" (1.702 m)   Wt 79.7 kg (175 lb 11.3 oz)   BMI 27.52 kg/m²   Body mass index is 27.52 kg/m².      GENERAL: No acute distress, A&Ox3  EYES: Anicteric, no pallor noted.  ENT: OP clear  NECK: Supple, no masses, no thyromegally.  CHEST: Equal breath sounds bilaterally, no wheezing.  CARDIOVASCULAR: Regular rate and rhythm. Murmurs, rubs and gallops absent.  ABDOMEN: soft, non-tender, non-distended, normal bowel sounds, no hepatosplenomegaly   EXTREMITIES: No clubbing, cyanosis or edema.  SKIN: Without lesion or erythema.  LYMPH: No cervical, axillary lymphadenopathy palpable.   NEUROLOGICAL: Grossly normal, no asterixis present.    Labs: Pertinent labs reviewed.    Assessment and Plan:  Crohn's disease of small intestine without complication  Her diagnosis is still not clear.  Skyrizi started even though there was no evidence on scope or VCE of active disease.  Since starting, she " has had no improvement in symptoms.  I discussed a trial off of hizentra.  Her IgG has been good and she continues with significant infections while on hizentra.  This could be the source of her diarrhea and I think it would reasonable to do a trial off of it to see if her GI symptoms improve. I recommend continuing skyrizi for now.  We also discuss referral to an academic center for comprehensive evaluation.      Diarrhea, unspecified type      Follow up in about 3 months (around 10/31/2023).        Thank you so much for allowing me to participate in the care of Jaylin Leal MD

## 2023-07-31 NOTE — PROGRESS NOTES
"[facility]  Inflammatory Bowel Disease  Follow up - Pharmacy Note    TODAY'S VISIT DATE:  7/31/2023    Reason for visit: F/U to assess adherence and tolerance to Skyrizi    IBD MD: Nicole Leal      Pertinent History:  IBD type Crohn's disease   has a past medical history of Allergic rhinitis, Asthma, Chronic pansinusitis, Crohn's disease, Fibromyalgia, Hyperlipidemia, Hypertension, Immunosuppression, Migraine, Obstructive sleep apnea, and Sciatica.  Fever No  Cough No  Recent Antibiotics use in the last 30 days Yes - tobramycin in nasal levage solution for pseudomonas infection of the sinuses  New medications since last encounter No  Dispensing pharmacy:  [facility]    F/U Questionnaire   Missed doses Patient has not begun maintenance Skyrizi dose . Patient states she didn't know who to contact. Epic reflects that [facility] at least attempted to ship to her on 6/21/23  Side effects reported None  Patient is feeling unchanged since starting therapy  Start Date:overdue should have started in June  LD::overdue should have started in June  ND: overdue should have started in June    Therapeutic Drug Monitoring Labs: n/a      Prior IBD Therapies:Humira , Remicade       Vaccinations:  Lab Results   Component Value Date    HEPBSAB Negative 11/07/2019     No results found for: "HEPBSURFABQU"  Lab Results   Component Value Date    HEPAIGG Negative 11/07/2019     No results found for: "VARICELLAZOS", "VARICELLAINT"  Immunization History   Administered Date(s) Administered    COVID-19, MRNA, LN-S, PF (Pfizer) (Purple Cap) 03/05/2021, 04/15/2021    Hepatitis A / Hepatitis B 12/12/2019    Influenza 10/29/2013    Influenza - Quadrivalent - PF *Preferred* (6 months and older) 11/15/2017, 01/28/2020, 02/11/2021    Influenza Split 10/29/2013    PPD Test 05/22/2017    Pneumococcal Conjugate - 13 Valent 12/23/2019    Pneumococcal Polysaccharide - 23 Valent 10/29/2013    Tdap 02/18/2014           All Medical " History/Surgical History/Family History/Social History/Allergies have been reviewed and updated in EMR    Review of patient's allergies indicates:   Allergen Reactions    Fentanyl Other (See Comments)     Rigid Chest Syndrome         Current Medications:   Outpatient Medications Marked as Taking for the 7/31/23 encounter (Office Visit) with Nicole Leal MD   Medication Sig Dispense Refill    DULoxetine (CYMBALTA) 60 MG capsule Take 1 capsule (60 mg total) by mouth 2 (two) times daily. 180 capsule 3    estradioL (ESTRACE) 2 MG tablet Take 1 tablet (2 mg total) by mouth once daily. 30 tablet 1    fluticasone-umeclidin-vilanter (TRELEGY ELLIPTA) 100-62.5-25 mcg DsDv Inhale 1 puff into the lungs once daily. 90 each 3    immun glob G,IgG,-pro-IgA 0-50 (HIZENTRA) 10 gram/50 mL (20 %) Soln Inject 70 mLs (14 g total) into the skin every 7 days. 280 mL 11    montelukast (SINGULAIR) 10 mg tablet TAKE 1 TABLET EVERY EVENING 90 tablet 0    nortriptyline (PAMELOR) 25 MG capsule Take 1 capsule (25 mg total) by mouth every evening. 90 capsule 3    pregabalin (LYRICA) 150 MG capsule Take 1 capsule (150 mg total) by mouth 3 (three) times daily. 270 capsule 1    propranoloL (INDERAL LA) 80 MG 24 hr capsule Take 1 capsule (80 mg total) by mouth once daily. 90 capsule 2    topiramate (TOPAMAX) 100 MG tablet Take 1 tablet (100 mg total) by mouth 2 (two) times daily. 180 tablet 3    traZODone (DESYREL) 50 MG tablet Take 1 tablet (50 mg total) by mouth every evening. 90 tablet 3       Reviewed all current medications including OTC, herbals and supplements.     Labs:   Lab Results   Component Value Date    HEPBSAG Negative 11/07/2019    HEPBCAB Negative 11/07/2019     Lab Results   Component Value Date    TBGOLDPLUS Negative 05/26/2022     Lab Results   Component Value Date    [id number] 77 01/23/2023    WRZXNXDU15 >2000 (H) 01/23/2023     Lab Results   Component Value Date    WBC 9.58 04/24/2023    HGB 13.5 04/24/2023    HCT 43.9  04/24/2023    MCV 99 (H) 04/24/2023     04/24/2023     Lab Results   Component Value Date    CREATININE 0.9 01/23/2023    ALBUMIN 3.5 01/23/2023    BILITOT 0.3 01/23/2023    ALKPHOS 84 01/23/2023    AST 31 01/23/2023    ALT 28 01/23/2023         Assessment/Plan:  Jaylin Murguia is a 58 y.o. female with  has a past medical history of Allergic rhinitis, Asthma, Chronic pansinusitis, Crohn's disease, Fibromyalgia, Hyperlipidemia, Hypertension, Immunosuppression, Migraine, Obstructive sleep apnea, and Sciatica.    # Recommendations:  - Provided patient with the number to reach out to [facility].  - Have patient initiate maintenance dose Lexus Gregory, PharmD , [facility]  Clinical Pharmacist Gastroenterology  [facility] [place]

## 2023-08-01 RX ORDER — DULOXETIN HYDROCHLORIDE 60 MG/1
60 CAPSULE, DELAYED RELEASE ORAL 2 TIMES DAILY
Refills: 0 | OUTPATIENT
Start: 2023-08-01

## 2023-08-01 NOTE — TELEPHONE ENCOUNTER
No care due was identified.  [facility] Embedded Care Due Messages. Reference number: [id number].   8/01/2023 2:06:37 PM CDT

## 2023-08-01 NOTE — TELEPHONE ENCOUNTER
Refill Decision Note   Jaylin Blade  is requesting a refill authorization.  Brief Assessment and Rationale for Refill:  Quick Discontinue     Medication Therapy Plan:         Comments:     Note composed:2:31 PM 08/01/2023

## 2023-08-10 ENCOUNTER — PATIENT MESSAGE (OUTPATIENT)
Dept: GASTROENTEROLOGY | Facility: CLINIC | Age: 58
End: 2023-08-10
Payer: COMMERCIAL

## 2023-08-11 ENCOUNTER — PATIENT MESSAGE (OUTPATIENT)
Dept: OTOLARYNGOLOGY | Facility: CLINIC | Age: 58
End: 2023-08-11
Payer: COMMERCIAL

## 2023-08-21 ENCOUNTER — PATIENT MESSAGE (OUTPATIENT)
Dept: GASTROENTEROLOGY | Facility: CLINIC | Age: 58
End: 2023-08-21
Payer: COMMERCIAL

## 2023-08-23 ENCOUNTER — PATIENT MESSAGE (OUTPATIENT)
Dept: PULMONOLOGY | Facility: CLINIC | Age: 58
End: 2023-08-23
Payer: COMMERCIAL

## 2023-08-23 DIAGNOSIS — J45.50 SEVERE PERSISTENT ASTHMA WITHOUT COMPLICATION: Primary | ICD-10-CM

## 2023-08-23 DIAGNOSIS — R05.3 CHRONIC COUGH: ICD-10-CM

## 2023-08-23 RX ORDER — PROMETHAZINE HYDROCHLORIDE AND DEXTROMETHORPHAN HYDROBROMIDE 6.25; 15 MG/5ML; MG/5ML
5 SYRUP ORAL EVERY 4 HOURS PRN
Qty: 240 ML | Refills: 1 | Status: SHIPPED | OUTPATIENT
Start: 2023-08-23 | End: 2023-09-02

## 2023-08-23 RX ORDER — PREDNISONE 20 MG/1
40 TABLET ORAL DAILY
Qty: 10 TABLET | Refills: 0 | Status: SHIPPED | OUTPATIENT
Start: 2023-08-23 | End: 2023-08-28

## 2023-08-24 ENCOUNTER — OFFICE VISIT (OUTPATIENT)
Dept: INFECTIOUS DISEASES | Facility: CLINIC | Age: 58
End: 2023-08-24
Payer: COMMERCIAL

## 2023-08-24 DIAGNOSIS — Z22.8 PSEUDOMONAS AERUGINOSA RESISTANT CARRIER: ICD-10-CM

## 2023-08-24 PROCEDURE — 99215 OFFICE O/P EST HI 40 MIN: CPT | Mod: 95,,, | Performed by: INTERNAL MEDICINE

## 2023-08-24 PROCEDURE — 4010F PR ACE/ARB THEARPY RXD/TAKEN: ICD-10-PCS | Mod: CPTII,95,, | Performed by: INTERNAL MEDICINE

## 2023-08-24 PROCEDURE — 99215 PR OFFICE/OUTPT VISIT, EST, LEVL V, 40-54 MIN: ICD-10-PCS | Mod: 95,,, | Performed by: INTERNAL MEDICINE

## 2023-08-24 PROCEDURE — 4010F ACE/ARB THERAPY RXD/TAKEN: CPT | Mod: CPTII,95,, | Performed by: INTERNAL MEDICINE

## 2023-08-24 RX ORDER — LEVOFLOXACIN 750 MG/1
750 TABLET ORAL DAILY
Qty: 14 TABLET | Refills: 0 | Status: SHIPPED | OUTPATIENT
Start: 2023-08-24 | End: 2023-09-07

## 2023-08-24 NOTE — PROGRESS NOTES
The patient location is: [place]  The chief complaint leading to consultation is: sinusitis    Visit type: audiovisual    Face to Face time with patient: 30  65 minutes of total time spent on the encounter, which includes face to face time and non-face to face time preparing to see the patient (eg, review of tests), Obtaining and/or reviewing separately obtained history, Documenting clinical information in the electronic or other health record, Independently interpreting results (not separately reported) and communicating results to the patient/family/caregiver, or Care coordination (not separately reported).     Each patient to whom he or she provides medical services by telemedicine is:  (1) informed of the relationship between the physician and patient and the respective role of any other health care provider with respect to management of the patient; and (2) notified that he or she may decline to receive medical services by telemedicine and may withdraw from such care at any time.    Notes:     Subjective:     Patient ID: Jaylin Murguia is a 58 y.o. female    Chief Complaint: sinusitis     HPI: 58F w/ hx of asthma, crohn's disease (IBD GI in [place], currently on skirizi, not currently on steroids - previous therapy has included remicade, humira, entocort, asacol), chronic diarrhea 5-6x/day, chronic pansinusitis, hypogammaglobulinemia on hizentra weekly for 8 years who is referred to me for management of recurrent sinusitis. Patient follows regularly with ENT, and has a history of FESS in 7/2019 and 9/2022.     Past cultures:   6/2/2020 - sinus fluid + pseudomonas  8/11/2020 - sinus fluid + pseudomonas  9/25/2020 - sinus fluid + pseudomonas  11/24/2020 - sinus abscess + stenotrophomonas  12/21/2020 - sinus fluid + pseudomonas and MSSA  1/25/2022 - sinus abscess +  pseuodmonas, pseudomonas  2/10/2022 - sputum + pseudomonas  2/24/2022 - sinus abscess + pseudomonas  5/16/2022 - sinus abscess + pseudomonas  5/26/2022 -  sputum + pseudomonas  2022 - sinus abscess + pseudomonas and stenotrophomonas   2023 - sinus abscess + pseudomonas  6/15/23 - sinus abscess + pseudomonas  23 - sputum + pseudomonas    Endorses ongoing sinus pain, drainage and cough. Recently completed gentamicin sinus rinse prescribed by ENT, notes worsening of symptoms after completion. Cough is productive. Does not use oxygen at home. Prescribed CPAP but does not use. Follows with Dr. Riley for asthma management, receives PRN courses of steroids. Last IgG level 1700.      Immunization History   Administered Date(s) Administered    COVID-19, MRNA, LN-S, PF (Pfizer) (Purple Cap) 2021, 04/15/2021    Hepatitis A / Hepatitis B 2019    Influenza 10/29/2013    Influenza - Quadrivalent - PF *Preferred* (6 months and older) 11/15/2017, 2020, 2021    Influenza Split 10/29/2013    PPD Test 2017    Pneumococcal Conjugate - 13 Valent 2019    Pneumococcal Polysaccharide - 23 Valent 10/29/2013    Tdap 2014        Review of Systems   All other systems reviewed and are negative.       Past Medical History:   Diagnosis Date    Allergic rhinitis     Asthma     Chronic pansinusitis     Crohn's disease     Ileal involvement, previously on Remicade, Asacol, Prednisone    Fibromyalgia     Hyperlipidemia     Hypertension     Immunosuppression     Migraine     Obstructive sleep apnea     CPAP at night    Sciatica      Past Surgical History:   Procedure Laterality Date    BLADDER SURGERY      sling was created by her muscles     BRONCHOSCOPY N/A 2023    Procedure: BRONCHOSCOPY;  Surgeon: Kajal Diagnostic Provider;  Location: [address];  Service: Anesthesiology;  Laterality: N/A;     SECTION      COLONOSCOPY N/A 2017    Procedure: COLONOSCOPY;  Surgeon: Kin Dyer MD;  Location: [facility];  Service: Endoscopy;  Laterality: N/A;    COLONOSCOPY N/A 2018    Procedure: COLONOSCOPY;  Surgeon:  Kyra Vallecillo MD;  Location: [facility];  Service: Endoscopy;  Laterality: N/A;    COLONOSCOPY N/A 03/12/2020    Procedure: COLONOSCOPY;  Surgeon: Nicole Leal MD;  Location: [facility];  Service: Endoscopy;  Laterality: N/A;    COLONOSCOPY N/A 03/16/2022    Procedure: COLONOSCOPY;  Surgeon: Shay Bruce MD;  Location: [facility];  Service: Endoscopy;  Laterality: N/A;    COLONOSCOPY N/A 02/02/2023    Procedure: COLONOSCOPY;  Surgeon: Nicole Leal MD;  Location: [facility];  Service: Endoscopy;  Laterality: N/A;    DEBRIDEMENT Bilateral 12/21/2020    Procedure: DEBRIDEMENT;  Surgeon: Matthias Roach MD;  [facility];  Service: ENT;  Laterality: Bilateral;    ESOPHAGOGASTRODUODENOSCOPY N/A 03/12/2020    Procedure: ESOPHAGOGASTRODUODENOSCOPY (EGD);  Surgeon: Nicole Leal MD;  Location: [facility];  Service: Endoscopy;  Laterality: N/A;    ESOPHAGOGASTRODUODENOSCOPY N/A 03/16/2022    Procedure: EGD (ESOPHAGOGASTRODUODENOSCOPY);  Surgeon: Shay Bruce MD;  Location: [facility];  Service: Endoscopy;  Laterality: N/A;    ESOPHAGOGASTRODUODENOSCOPY N/A 02/02/2023    Procedure: EGD (ESOPHAGOGASTRODUODENOSCOPY);  Surgeon: Nicole Leal MD;  Location: [facility];  Service: Endoscopy;  Laterality: N/A;    FINGER SURGERY      joint relpacement, left hand index finger    FUNCTIONAL ENDOSCOPIC SINUS SURGERY (FESS) USING COMPUTER-ASSISTED NAVIGATION Bilateral 07/31/2019    Procedure: FESS, USING COMPUTER-ASSISTED NAVIGATION;  Surgeon: Manish Shaffer MD;  Location: [facility];  Service: ENT;  Laterality: Bilateral;    FUNCTIONAL ENDOSCOPIC SINUS SURGERY (FESS) USING COMPUTER-ASSISTED NAVIGATION Bilateral 09/25/2020    Procedure: FESS, USING COMPUTER-ASSISTED NAVIGATION SPHENOID;  Surgeon: Matthias Roach MD;  Location: [facility] OR 2ND FLR;  Service: ENT;  Laterality: Bilateral;  TIVA    FUNCTIONAL ENDOSCOPIC SINUS SURGERY (FESS) USING COMPUTER-ASSISTED NAVIGATION Bilateral 09/30/2022     Procedure: FESS, USING COMPUTER-ASSISTED NAVIGATION;  Surgeon: Matthias Roach MD;  [facility];  Service: ENT;  Laterality: Bilateral;    HYSTERECTOMY  2001    INTRALUMINAL GASTROINTESTINAL TRACT IMAGING VIA CAPSULE N/A 03/03/2023    Procedure: IMAGING PROCEDURE, GI TRACT, INTRALUMINAL, VIA CAPSULE;  Surgeon: First Efrem Gillespie;  Location: [facility] ENDO;  Service: Endoscopy;  Laterality: N/A;    SINUS SURGERY      WISDOM TOOTH EXTRACTION       Family History   Problem Relation Age of Onset    Breast cancer Mother     Hypertension Mother     Allergies Mother     Kidney disease Father 64        ESRD on HD    Scleroderma Father     Breast cancer Maternal Grandmother     Heart attack Maternal Grandmother     COPD Maternal Grandmother 72    Cancer Paternal Grandmother 70        colon    Hypertension Brother      Social History     Tobacco Use    Smoking status: Never     Passive exposure: Never    Smokeless tobacco: Never   Substance Use Topics    Alcohol use: No    Drug use: No       Objective:     Physical Exam  Constitutional:       General: She is not in acute distress.     Appearance: Normal appearance. She is well-developed. She is not ill-appearing or diaphoretic.   HENT:      Head: Normocephalic and atraumatic.      Right Ear: External ear normal.      Left Ear: External ear normal.      Nose: Nose normal.   Eyes:      General: No scleral icterus.        Right eye: No discharge.         Left eye: No discharge.      Extraocular Movements: Extraocular movements intact.      Conjunctiva/sclera: Conjunctivae normal.   Pulmonary:      Effort: Pulmonary effort is normal. No respiratory distress.      Breath sounds: No stridor.      Comments: Productive cough  Musculoskeletal:         General: Normal range of motion.   Skin:     Findings: No erythema or rash.   Neurological:      General: No focal deficit present.      Mental Status: She is alert and oriented to person, place, and time. Mental  status is at baseline.      Cranial Nerves: No cranial nerve deficit.   Psychiatric:         Mood and Affect: Mood normal.         Behavior: Behavior normal.         Thought Content: Thought content normal.         Judgment: Judgment normal.         Data:    All data, including recent labs, radiology, and pathology, has been independently reviewed.    Labs:    No results found in the last 24 hours.    Radiology:    No results found in the last 24 hours.     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  Chronic sinusitis w/ recurrent pseudomonal infections in setting of hypogammaglobulinemia and immunosuppression, ongoing issues despite IVIG    Recommend patient continue IVIG  Will discuss longer course of tobramycin sinus rinse w/ ENT to decrease burden of bacterial colonization  Patient will submit new sputum sample for culture  Start levaquin x 14d course, will adjust if needed based on culture results  Complex case - will discuss w/ ENT and pulm to assess for any other avenues of medical optimization       Culture, Respiratory with Gram Stain    levoFLOXacin (LEVAQUIN) 750 MG tablet         Follow up in 2 weeks    The total time for evaluation and management services performed on 8/24/23 was greater than 65 minutes.     Daysi Saini DO  Transplant Infectious Disease

## 2023-08-25 ENCOUNTER — LAB VISIT (OUTPATIENT)
Dept: LAB | Facility: HOSPITAL | Age: 58
End: 2023-08-25
Attending: INTERNAL MEDICINE
Payer: COMMERCIAL

## 2023-08-25 ENCOUNTER — PATIENT MESSAGE (OUTPATIENT)
Dept: GASTROENTEROLOGY | Facility: CLINIC | Age: 58
End: 2023-08-25
Payer: COMMERCIAL

## 2023-08-25 DIAGNOSIS — Z22.8 PSEUDOMONAS AERUGINOSA RESISTANT CARRIER: ICD-10-CM

## 2023-08-25 PROCEDURE — 87205 SMEAR GRAM STAIN: CPT | Performed by: INTERNAL MEDICINE

## 2023-08-26 LAB
BACTERIA SPEC AEROBE CULT: NORMAL
GRAM STN SPEC: NORMAL
GRAM STN SPEC: NORMAL

## 2023-08-28 DIAGNOSIS — Z22.8 PSEUDOMONAS AERUGINOSA RESISTANT CARRIER: Primary | ICD-10-CM

## 2023-08-29 ENCOUNTER — LAB VISIT (OUTPATIENT)
Dept: LAB | Facility: HOSPITAL | Age: 58
End: 2023-08-29
Attending: INTERNAL MEDICINE
Payer: COMMERCIAL

## 2023-08-29 DIAGNOSIS — Z22.8 PSEUDOMONAS AERUGINOSA RESISTANT CARRIER: ICD-10-CM

## 2023-08-29 PROCEDURE — 87070 CULTURE OTHR SPECIMN AEROBIC: CPT | Performed by: INTERNAL MEDICINE

## 2023-08-29 PROCEDURE — 87205 SMEAR GRAM STAIN: CPT | Performed by: INTERNAL MEDICINE

## 2023-08-30 ENCOUNTER — PATIENT MESSAGE (OUTPATIENT)
Dept: OTOLARYNGOLOGY | Facility: CLINIC | Age: 58
End: 2023-08-30
Payer: COMMERCIAL

## 2023-09-01 LAB
BACTERIA SPEC AEROBE CULT: NORMAL
BACTERIA SPEC AEROBE CULT: NORMAL
GRAM STN SPEC: NORMAL

## 2023-09-08 ENCOUNTER — PATIENT MESSAGE (OUTPATIENT)
Dept: PULMONOLOGY | Facility: CLINIC | Age: 58
End: 2023-09-08
Payer: COMMERCIAL

## 2023-09-08 DIAGNOSIS — B37.0 THRUSH, ORAL: Primary | ICD-10-CM

## 2023-09-08 RX ORDER — NYSTATIN 100000 [USP'U]/ML
4 SUSPENSION ORAL 4 TIMES DAILY
Qty: 160 ML | Refills: 0 | Status: SHIPPED | OUTPATIENT
Start: 2023-09-08 | End: 2023-09-18

## 2023-09-18 ENCOUNTER — PATIENT MESSAGE (OUTPATIENT)
Dept: GASTROENTEROLOGY | Facility: CLINIC | Age: 58
End: 2023-09-18
Payer: COMMERCIAL

## 2023-09-20 ENCOUNTER — PATIENT MESSAGE (OUTPATIENT)
Dept: OTOLARYNGOLOGY | Facility: CLINIC | Age: 58
End: 2023-09-20
Payer: COMMERCIAL

## 2023-09-20 ENCOUNTER — PATIENT MESSAGE (OUTPATIENT)
Dept: GASTROENTEROLOGY | Facility: CLINIC | Age: 58
End: 2023-09-20
Payer: COMMERCIAL

## 2023-09-20 RX ORDER — DIPHENOXYLATE HYDROCHLORIDE AND ATROPINE SULFATE 2.5; .025 MG/1; MG/1
1 TABLET ORAL 4 TIMES DAILY PRN
Qty: 120 TABLET | Refills: 2 | Status: SHIPPED | OUTPATIENT
Start: 2023-09-20 | End: 2023-12-19

## 2023-09-21 ENCOUNTER — PATIENT MESSAGE (OUTPATIENT)
Dept: INFECTIOUS DISEASES | Facility: CLINIC | Age: 58
End: 2023-09-21
Payer: COMMERCIAL

## 2023-09-22 ENCOUNTER — PATIENT MESSAGE (OUTPATIENT)
Dept: PULMONOLOGY | Facility: CLINIC | Age: 58
End: 2023-09-22
Payer: COMMERCIAL

## 2023-09-22 ENCOUNTER — PATIENT MESSAGE (OUTPATIENT)
Dept: GASTROENTEROLOGY | Facility: CLINIC | Age: 58
End: 2023-09-22
Payer: COMMERCIAL

## 2023-09-22 DIAGNOSIS — J45.50 SEVERE PERSISTENT ASTHMA WITHOUT COMPLICATION: ICD-10-CM

## 2023-09-22 DIAGNOSIS — G43.809 OTHER MIGRAINE WITHOUT STATUS MIGRAINOSUS, NOT INTRACTABLE: ICD-10-CM

## 2023-09-22 RX ORDER — DULOXETIN HYDROCHLORIDE 60 MG/1
CAPSULE, DELAYED RELEASE ORAL
Refills: 0 | OUTPATIENT
Start: 2023-09-22

## 2023-09-22 RX ORDER — TOPIRAMATE 100 MG/1
TABLET, FILM COATED ORAL
Refills: 0 | OUTPATIENT
Start: 2023-09-22

## 2023-09-22 RX ORDER — TRAZODONE HYDROCHLORIDE 50 MG/1
TABLET ORAL
Refills: 0 | OUTPATIENT
Start: 2023-09-22

## 2023-09-22 NOTE — TELEPHONE ENCOUNTER
Refill Routing Note   Medication(s) are not appropriate for processing by [facility] for the following reason(s):      Medication outside of protocol    [facility] action(s):  Route  Quick Discontinue Care Due:  Appointment due     Medication Therapy Plan: Request without required information. Pharmacy notified previously.      Pharmacist review requested: Yes     Appointments  past 12m or future 3m with PCP    Date Provider   Last Visit   8/25/2022 Esthela Hu MD   Next Visit   Visit date not found Esthela Hu MD   ED visits in past 90 days: 0        Note composed:3:00 PM 09/22/2023

## 2023-09-22 NOTE — TELEPHONE ENCOUNTER
Care Due:                  Date            Visit Type   Department     Provider  --------------------------------------------------------------------------------                                ESTABLISHED                              PATIENT -    [facility] PRIMARY  Last Visit: 08-      [facility]           Esthela Pennington  [place]  Next Visit: None Scheduled  None         None Found                                                            Last  Test          Frequency    Reason                     Performed    Due Date  --------------------------------------------------------------------------------    Office Visit  12 months..  DULoxetine, montelukast,   08- 08-                             nortriptyline,                             propranoloL, rizatriptan,                             traZODone................    [employer] Embedded Care Due Messages. Reference number: [id number].   9/22/2023 2:30:16 PM CDT

## 2023-09-22 NOTE — TELEPHONE ENCOUNTER
Refill Decision Note   Jaylin Murguia  is requesting a refill authorization.  Brief Assessment and Rationale for Refill:  Quick Discontinue     Medication Therapy Plan:  Request without required information. Pharmacy notified previously.   Pharmacy is requesting new scripts for the following medications without required information, (sig/ frequency/qty/etc)      Medication Reconciliation Completed: No     Comments: Pharmacies have been requesting medications for patients without required information, (sig, frequency, qty, etc.). In addition, requests are sent for medication(s) pt. are currently not taking, and medications patients have never taken.    We have spoken to the pharmacies about these request types and advised their teams previously that we are unable to assess these New Script requests and require all details for these requests. This is a known issue and has been reported.     Note composed:4:41 PM 09/22/2023

## 2023-09-25 ENCOUNTER — OFFICE VISIT (OUTPATIENT)
Dept: INFECTIOUS DISEASES | Facility: CLINIC | Age: 58
End: 2023-09-25
Payer: COMMERCIAL

## 2023-09-25 VITALS
SYSTOLIC BLOOD PRESSURE: 146 MMHG | HEIGHT: 67 IN | BODY MASS INDEX: 27.51 KG/M2 | HEART RATE: 61 BPM | WEIGHT: 175.25 LBS | DIASTOLIC BLOOD PRESSURE: 90 MMHG | TEMPERATURE: 98 F

## 2023-09-25 DIAGNOSIS — Z22.8 PSEUDOMONAS AERUGINOSA RESISTANT CARRIER: Primary | ICD-10-CM

## 2023-09-25 DIAGNOSIS — J45.50 SEVERE PERSISTENT ASTHMA WITHOUT COMPLICATION: ICD-10-CM

## 2023-09-25 PROCEDURE — 3077F PR MOST RECENT SYSTOLIC BLOOD PRESSURE >= 140 MM HG: ICD-10-PCS | Mod: CPTII,S$GLB,, | Performed by: INTERNAL MEDICINE

## 2023-09-25 PROCEDURE — 3008F BODY MASS INDEX DOCD: CPT | Mod: CPTII,S$GLB,, | Performed by: INTERNAL MEDICINE

## 2023-09-25 PROCEDURE — 3008F PR BODY MASS INDEX (BMI) DOCUMENTED: ICD-10-PCS | Mod: CPTII,S$GLB,, | Performed by: INTERNAL MEDICINE

## 2023-09-25 PROCEDURE — 99999 PR PBB SHADOW E&M-EST. PATIENT-LVL V: CPT | Mod: PBBFAC,,, | Performed by: INTERNAL MEDICINE

## 2023-09-25 PROCEDURE — 99215 OFFICE O/P EST HI 40 MIN: CPT | Mod: S$GLB,,, | Performed by: INTERNAL MEDICINE

## 2023-09-25 PROCEDURE — 99215 PR OFFICE/OUTPT VISIT, EST, LEVL V, 40-54 MIN: ICD-10-PCS | Mod: S$GLB,,, | Performed by: INTERNAL MEDICINE

## 2023-09-25 PROCEDURE — 3077F SYST BP >= 140 MM HG: CPT | Mod: CPTII,S$GLB,, | Performed by: INTERNAL MEDICINE

## 2023-09-25 PROCEDURE — 3080F PR MOST RECENT DIASTOLIC BLOOD PRESSURE >= 90 MM HG: ICD-10-PCS | Mod: CPTII,S$GLB,, | Performed by: INTERNAL MEDICINE

## 2023-09-25 PROCEDURE — 1159F PR MEDICATION LIST DOCUMENTED IN MEDICAL RECORD: ICD-10-PCS | Mod: CPTII,S$GLB,, | Performed by: INTERNAL MEDICINE

## 2023-09-25 PROCEDURE — 3080F DIAST BP >= 90 MM HG: CPT | Mod: CPTII,S$GLB,, | Performed by: INTERNAL MEDICINE

## 2023-09-25 PROCEDURE — 1159F MED LIST DOCD IN RCRD: CPT | Mod: CPTII,S$GLB,, | Performed by: INTERNAL MEDICINE

## 2023-09-25 PROCEDURE — 4010F PR ACE/ARB THEARPY RXD/TAKEN: ICD-10-PCS | Mod: CPTII,S$GLB,, | Performed by: INTERNAL MEDICINE

## 2023-09-25 PROCEDURE — 99999 PR PBB SHADOW E&M-EST. PATIENT-LVL V: ICD-10-PCS | Mod: PBBFAC,,, | Performed by: INTERNAL MEDICINE

## 2023-09-25 PROCEDURE — 4010F ACE/ARB THERAPY RXD/TAKEN: CPT | Mod: CPTII,S$GLB,, | Performed by: INTERNAL MEDICINE

## 2023-09-25 NOTE — PROGRESS NOTES
Subjective:     Patient ID: Jaylin Murguia is a 58 y.o. female    Chief Complaint: cough and SOB    HPI: 58F seen by me previously for evaluation of recurrent sinusitis and cough seen today for follow up. Previously treated w/ course of levofloxacin. Was also given steroids by pulmonary. Endorses improvement in symptoms while on treatment, however symptoms have worsened again since finishing course. No fevers. Cough is nonproductive. Denies worsening sinus issues.       Immunization History   Administered Date(s) Administered    COVID-19, MRNA, LN-S, PF (Pfizer) (Purple Cap) 2021, 04/15/2021    Hepatitis A / Hepatitis B 2019    Influenza 10/29/2013    Influenza - Quadrivalent - PF *Preferred* (6 months and older) 11/15/2017, 2020, 2021    Influenza Split 10/29/2013    PPD Test 2017    Pneumococcal Conjugate - 13 Valent 2019    Pneumococcal Polysaccharide - 23 Valent 10/29/2013    Tdap 2014        Review of Systems   All other systems reviewed and are negative.       Past Medical History:   Diagnosis Date    Allergic rhinitis     Asthma     Chronic pansinusitis     Crohn's disease     Ileal involvement, previously on Remicade, Asacol, Prednisone    Fibromyalgia     Hyperlipidemia     Hypertension     Immunosuppression     Migraine     Obstructive sleep apnea     CPAP at night    Sciatica      Past Surgical History:   Procedure Laterality Date    BLADDER SURGERY      sling was created by her muscles     BRONCHOSCOPY N/A 2023    Procedure: BRONCHOSCOPY;  Surgeon: Kajal Diagnostic Provider;  Location: [facility] OR [address] [place] [address];  Service: Anesthesiology;  Laterality: N/A;     SECTION      COLONOSCOPY N/A 2017    Procedure: COLONOSCOPY;  Surgeon: Kin Dyer MD;  Location: [facility];  Service: Endoscopy;  Laterality: N/A;    COLONOSCOPY N/A 2018    Procedure: COLONOSCOPY;  Surgeon: Kyra Vallecillo MD;  Location: [facility];  Service: Endoscopy;   Laterality: N/A;    COLONOSCOPY N/A 03/12/2020    Procedure: COLONOSCOPY;  Surgeon: Nicole Leal MD;  Location: [facility] ENDO;  Service: Endoscopy;  Laterality: N/A;    COLONOSCOPY N/A 03/16/2022    Procedure: COLONOSCOPY;  Surgeon: Shay Bruce MD;  Location: [facility] ENDO;  Service: Endoscopy;  Laterality: N/A;    COLONOSCOPY N/A 02/02/2023    Procedure: COLONOSCOPY;  Surgeon: Nicole Leal MD;  Location: [facility] ENDO;  Service: Endoscopy;  Laterality: N/A;    DEBRIDEMENT Bilateral 12/21/2020    Procedure: DEBRIDEMENT;  Surgeon: Matthias Roach MD;  [facility];  Service: ENT;  Laterality: Bilateral;    ESOPHAGOGASTRODUODENOSCOPY N/A 03/12/2020    Procedure: ESOPHAGOGASTRODUODENOSCOPY (EGD);  Surgeon: Nicole Leal MD;  Location: [facility];  Service: Endoscopy;  Laterality: N/A;    ESOPHAGOGASTRODUODENOSCOPY N/A 03/16/2022    Procedure: EGD (ESOPHAGOGASTRODUODENOSCOPY);  Surgeon: Shay Bruce MD;  [facility];  Service: Endoscopy;  Laterality: N/A;    ESOPHAGOGASTRODUODENOSCOPY N/A 02/02/2023    Procedure: EGD (ESOPHAGOGASTRODUODENOSCOPY);  Surgeon: Nicole Leal MD;  Location: [facility];  Service: Endoscopy;  Laterality: N/A;    FINGER SURGERY      joint relpacement, left hand index finger    FUNCTIONAL ENDOSCOPIC SINUS SURGERY (FESS) USING COMPUTER-ASSISTED NAVIGATION Bilateral 07/31/2019    Procedure: FESS, USING COMPUTER-ASSISTED NAVIGATION;  Surgeon: Manish Shaffer MD;  Location: [facility];  Service: ENT;  Laterality: Bilateral;    FUNCTIONAL ENDOSCOPIC SINUS SURGERY (FESS) USING COMPUTER-ASSISTED NAVIGATION Bilateral 09/25/2020    Procedure: FESS, USING COMPUTER-ASSISTED NAVIGATION SPHENOID;  Surgeon: Matthias Roach MD;  Location: [facility] OR 2ND FLR;  Service: ENT;  Laterality: Bilateral;  TIVA    FUNCTIONAL ENDOSCOPIC SINUS SURGERY (FESS) USING COMPUTER-ASSISTED NAVIGATION Bilateral 09/30/2022    Procedure: FESS, USING COMPUTER-ASSISTED NAVIGATION;  Surgeon:  Matthias Roach MD;  Location: [facility] OR [address] [place] [address];  Service: ENT;  Laterality: Bilateral;    HYSTERECTOMY  2001    INTRALUMINAL GASTROINTESTINAL TRACT IMAGING VIA CAPSULE N/A 03/03/2023    Procedure: IMAGING PROCEDURE, GI TRACT, INTRALUMINAL, VIA CAPSULE;  Surgeon: First Available [place];  Location: [facility] ENDO;  Service: Endoscopy;  Laterality: N/A;    SINUS SURGERY      WISDOM TOOTH EXTRACTION       Family History   Problem Relation Age of Onset    Breast cancer Mother     Hypertension Mother     Allergies Mother     Kidney disease Father 64        ESRD on HD    Scleroderma Father     Breast cancer Maternal Grandmother     Heart attack Maternal Grandmother     COPD Maternal Grandmother 72    Cancer Paternal Grandmother 70        colon    Hypertension Brother      Social History     Tobacco Use    Smoking status: Never     Passive exposure: Never    Smokeless tobacco: Never   Substance Use Topics    Alcohol use: No    Drug use: No       Objective:     Physical Exam  Constitutional:       General: She is not in acute distress.     Appearance: Normal appearance. She is well-developed. She is not ill-appearing or diaphoretic.   HENT:      Head: Normocephalic and atraumatic.      Right Ear: External ear normal.      Left Ear: External ear normal.      Nose: Nose normal.   Eyes:      General: No scleral icterus.        Right eye: No discharge.         Left eye: No discharge.      Extraocular Movements: Extraocular movements intact.      Conjunctiva/sclera: Conjunctivae normal.   Pulmonary:      Effort: Pulmonary effort is normal. No respiratory distress.      Breath sounds: No stridor.   Musculoskeletal:         General: Normal range of motion.   Skin:     Findings: No erythema or rash.   Neurological:      General: No focal deficit present.      Mental Status: She is alert and oriented to person, place, and time. Mental status is at baseline.      Cranial Nerves: No cranial nerve deficit.   Psychiatric:         Mood  and Affect: Mood normal.         Behavior: Behavior normal.         Thought Content: Thought content normal.         Judgment: Judgment normal.         Data:    All data, including recent labs, radiology, and pathology, has been independently reviewed.    Labs:    No results found in the last 24 hours    Radiology:    No results found in the last 24 hours.     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     Daysi Saini DO  Infectious Disease

## 2023-09-26 ENCOUNTER — PATIENT MESSAGE (OUTPATIENT)
Dept: INFECTIOUS DISEASES | Facility: CLINIC | Age: 58
End: 2023-09-26
Payer: COMMERCIAL

## 2023-09-27 ENCOUNTER — HOSPITAL ENCOUNTER (OUTPATIENT)
Dept: RADIOLOGY | Facility: HOSPITAL | Age: 58
Discharge: HOME | End: 2023-09-27
Attending: INTERNAL MEDICINE
Payer: COMMERCIAL

## 2023-09-27 DIAGNOSIS — Z22.8 PSEUDOMONAS AERUGINOSA RESISTANT CARRIER: ICD-10-CM

## 2023-09-27 PROCEDURE — 71250 CT THORAX DX C-: CPT | Mod: TC,PN

## 2023-09-27 PROCEDURE — 71250 CT THORAX DX C-: CPT | Mod: 26,,, | Performed by: RADIOLOGY

## 2023-09-27 PROCEDURE — 71250 CT CHEST WITHOUT CONTRAST: ICD-10-PCS | Mod: 26,,, | Performed by: RADIOLOGY

## 2023-09-27 RX ORDER — DIPHENOXYLATE HYDROCHLORIDE AND ATROPINE SULFATE 2.5; .025 MG/1; MG/1
1 TABLET ORAL 4 TIMES DAILY PRN
Qty: 120 TABLET | Refills: 2 | Status: CANCELLED | OUTPATIENT
Start: 2023-09-27 | End: 2023-12-26

## 2023-09-27 NOTE — TELEPHONE ENCOUNTER
Pended prescription for Lomotil to patients requested pharmacy.    Dinorah Gregory, PharmD , [facility]  Clinical Pharmacist Gastroenterology   [facility] [place]

## 2023-09-30 ENCOUNTER — TELEPHONE (OUTPATIENT)
Dept: INFECTIOUS DISEASES | Facility: HOSPITAL | Age: 58
End: 2023-09-30
Payer: COMMERCIAL

## 2023-09-30 DIAGNOSIS — Z22.8 PSEUDOMONAS AERUGINOSA RESISTANT CARRIER: ICD-10-CM

## 2023-09-30 DIAGNOSIS — J15.211 PNEUMONIA OF BOTH LUNGS DUE TO METHICILLIN SUSCEPTIBLE STAPHYLOCOCCUS AUREUS (MSSA), UNSPECIFIED PART OF LUNG: Primary | ICD-10-CM

## 2023-09-30 RX ORDER — LEVOFLOXACIN 750 MG/1
750 TABLET ORAL DAILY
Qty: 14 TABLET | Refills: 0 | Status: ON HOLD | OUTPATIENT
Start: 2023-09-30 | End: 2023-10-15 | Stop reason: HOSPADM

## 2023-09-30 RX ORDER — CEFADROXIL 500 MG/1
1000 CAPSULE ORAL EVERY 12 HOURS
Qty: 56 CAPSULE | Refills: 0 | Status: ON HOLD | OUTPATIENT
Start: 2023-09-30 | End: 2023-10-15 | Stop reason: HOSPADM

## 2023-09-30 NOTE — TELEPHONE ENCOUNTER
Called patient to discuss CT and respiratory culture  CT w/ improvement compared to prior, sent to pulmonary for review  Resp cx + pseudomonas and MSSA  Levaquin and cefadroxil sent to pharmacy  Pt will message me next week to let me know how she is doing    Daysi Amilcaro DO  Transplant Infectious Disease

## 2023-10-04 RX ORDER — MONTELUKAST SODIUM 10 MG/1
TABLET ORAL
Qty: 90 TABLET | Refills: 0 | Status: ON HOLD | OUTPATIENT
Start: 2023-10-04 | End: 2023-10-15 | Stop reason: HOSPADM

## 2023-10-04 NOTE — TELEPHONE ENCOUNTER
No care due was identified.  [facility] Embedded Care Due Messages. Reference number: [id number].   10/03/2023 11:10:49 PM CDT

## 2023-10-04 NOTE — TELEPHONE ENCOUNTER
Refill Decision Note   Jaylin Blade  is requesting a refill authorization.  Brief Assessment and Rationale for Refill:  Approve     Medication Therapy Plan:         Comments:     Note composed:2:51 AM 10/04/2023

## 2023-10-05 ENCOUNTER — PATIENT MESSAGE (OUTPATIENT)
Dept: INFECTIOUS DISEASES | Facility: CLINIC | Age: 58
End: 2023-10-05
Payer: COMMERCIAL

## 2023-10-09 ENCOUNTER — PATIENT MESSAGE (OUTPATIENT)
Dept: INFECTIOUS DISEASES | Facility: CLINIC | Age: 58
End: 2023-10-09
Payer: COMMERCIAL

## 2023-10-09 DIAGNOSIS — J15.211 PNEUMONIA OF BOTH LUNGS DUE TO METHICILLIN SUSCEPTIBLE STAPHYLOCOCCUS AUREUS (MSSA), UNSPECIFIED PART OF LUNG: Primary | ICD-10-CM

## 2023-10-10 ENCOUNTER — PATIENT MESSAGE (OUTPATIENT)
Dept: INFECTIOUS DISEASES | Facility: CLINIC | Age: 58
End: 2023-10-10
Payer: COMMERCIAL

## 2023-10-11 ENCOUNTER — PATIENT MESSAGE (OUTPATIENT)
Dept: OTOLARYNGOLOGY | Facility: CLINIC | Age: 58
End: 2023-10-11
Payer: COMMERCIAL

## 2023-10-11 ENCOUNTER — PATIENT MESSAGE (OUTPATIENT)
Dept: INFECTIOUS DISEASES | Facility: CLINIC | Age: 58
End: 2023-10-11
Payer: COMMERCIAL

## 2023-10-11 ENCOUNTER — HOSPITAL ENCOUNTER (INPATIENT)
Facility: HOSPITAL | Age: 58
LOS: 4 days | Discharge: HOME | DRG: 167 | End: 2023-10-15
Attending: EMERGENCY MEDICINE | Admitting: FAMILY MEDICINE
Payer: COMMERCIAL

## 2023-10-11 DIAGNOSIS — R06.02 SHORTNESS OF BREATH: ICD-10-CM

## 2023-10-11 DIAGNOSIS — R07.9 CHEST PAIN: ICD-10-CM

## 2023-10-11 DIAGNOSIS — D72.19 OTHER EOSINOPHILIA: ICD-10-CM

## 2023-10-11 DIAGNOSIS — J82.81: ICD-10-CM

## 2023-10-11 DIAGNOSIS — J18.9 MULTIFOCAL PNEUMONIA: Primary | ICD-10-CM

## 2023-10-11 DIAGNOSIS — J18.9 PNEUMONIA: ICD-10-CM

## 2023-10-11 LAB
ALBUMIN SERPL BCP-MCNC: 3.4 G/DL (ref 3.5–5.2)
ALP SERPL-CCNC: 101 U/L (ref 55–135)
ALT SERPL W/O P-5'-P-CCNC: 17 U/L (ref 10–44)
ANION GAP SERPL CALC-SCNC: 8 MMOL/L (ref 8–16)
ANISOCYTOSIS BLD QL SMEAR: SLIGHT
AST SERPL-CCNC: 24 U/L (ref 10–40)
BASOPHILS # BLD AUTO: 0.13 K/UL (ref 0–0.2)
BASOPHILS NFR BLD: 1.1 % (ref 0–1.9)
BILIRUB SERPL-MCNC: 0.4 MG/DL (ref 0.1–1)
BNP SERPL-MCNC: 68 PG/ML (ref 0–99)
BUN SERPL-MCNC: 11 MG/DL (ref 6–20)
CALCIUM SERPL-MCNC: 9.1 MG/DL (ref 8.7–10.5)
CHLORIDE SERPL-SCNC: 108 MMOL/L (ref 95–110)
CO2 SERPL-SCNC: 20 MMOL/L (ref 23–29)
CREAT SERPL-MCNC: 0.8 MG/DL (ref 0.5–1.4)
DIFFERENTIAL METHOD: ABNORMAL
EOSINOPHIL # BLD AUTO: 2.4 K/UL (ref 0–0.5)
EOSINOPHIL NFR BLD: 19.7 % (ref 0–8)
ERYTHROCYTE [DISTWIDTH] IN BLOOD BY AUTOMATED COUNT: 12.6 % (ref 11.5–14.5)
EST. GFR  (NO RACE VARIABLE): >60 ML/MIN/1.73 M^2
GLUCOSE SERPL-MCNC: 100 MG/DL (ref 70–110)
HCT VFR BLD AUTO: 42 % (ref 37–48.5)
HCV AB SERPL QL IA: NORMAL
HGB BLD-MCNC: 13.9 G/DL (ref 12–16)
HIV 1+2 AB+HIV1 P24 AG SERPL QL IA: NORMAL
IMM GRANULOCYTES # BLD AUTO: 0.03 K/UL (ref 0–0.04)
IMM GRANULOCYTES NFR BLD AUTO: 0.2 % (ref 0–0.5)
INFLUENZA A, MOLECULAR: NEGATIVE
INFLUENZA B, MOLECULAR: NEGATIVE
LACTATE SERPL-SCNC: 0.9 MMOL/L (ref 0.5–2.2)
LYMPHOCYTES # BLD AUTO: 1.7 K/UL (ref 1–4.8)
LYMPHOCYTES NFR BLD: 13.6 % (ref 18–48)
MAGNESIUM SERPL-MCNC: 1.8 MG/DL (ref 1.6–2.6)
MCH RBC QN AUTO: 31.6 PG (ref 27–31)
MCHC RBC AUTO-ENTMCNC: 33.1 G/DL (ref 32–36)
MCV RBC AUTO: 96 FL (ref 82–98)
MONOCYTES # BLD AUTO: 0.8 K/UL (ref 0.3–1)
MONOCYTES NFR BLD: 6.4 % (ref 4–15)
NEUTROPHILS # BLD AUTO: 7.3 K/UL (ref 1.8–7.7)
NEUTROPHILS NFR BLD: 59 % (ref 38–73)
NRBC BLD-RTO: 0 /100 WBC
PLATELET # BLD AUTO: 234 K/UL (ref 150–450)
PLATELET BLD QL SMEAR: ABNORMAL
PMV BLD AUTO: 10.1 FL (ref 9.2–12.9)
POLYCHROMASIA BLD QL SMEAR: ABNORMAL
POTASSIUM SERPL-SCNC: 3.4 MMOL/L (ref 3.5–5.1)
PROT SERPL-MCNC: 7.9 G/DL (ref 6–8.4)
RBC # BLD AUTO: 4.4 M/UL (ref 4–5.4)
SARS-COV-2 RDRP RESP QL NAA+PROBE: NEGATIVE
SMUDGE CELLS BLD QL SMEAR: PRESENT
SODIUM SERPL-SCNC: 136 MMOL/L (ref 136–145)
SPECIMEN SOURCE: NORMAL
TROPONIN I SERPL DL<=0.01 NG/ML-MCNC: <0.006 NG/ML (ref 0–0.03)
WBC # BLD AUTO: 12.36 K/UL (ref 3.9–12.7)

## 2023-10-11 PROCEDURE — U0002 COVID-19 LAB TEST NON-CDC: HCPCS | Performed by: EMERGENCY MEDICINE

## 2023-10-11 PROCEDURE — 80053 COMPREHEN METABOLIC PANEL: CPT | Performed by: EMERGENCY MEDICINE

## 2023-10-11 PROCEDURE — 93010 ELECTROCARDIOGRAM REPORT: CPT | Mod: 76,,, | Performed by: INTERNAL MEDICINE

## 2023-10-11 PROCEDURE — 83605 ASSAY OF LACTIC ACID: CPT | Performed by: EMERGENCY MEDICINE

## 2023-10-11 PROCEDURE — 83735 ASSAY OF MAGNESIUM: CPT | Performed by: EMERGENCY MEDICINE

## 2023-10-11 PROCEDURE — 93010 EKG 12-LEAD: ICD-10-PCS | Mod: ,,, | Performed by: INTERNAL MEDICINE

## 2023-10-11 PROCEDURE — 96367 TX/PROPH/DG ADDL SEQ IV INF: CPT

## 2023-10-11 PROCEDURE — 99285 EMERGENCY DEPT VISIT HI MDM: CPT | Mod: 25

## 2023-10-11 PROCEDURE — 96375 TX/PRO/DX INJ NEW DRUG ADDON: CPT

## 2023-10-11 PROCEDURE — 87205 SMEAR GRAM STAIN: CPT | Performed by: EMERGENCY MEDICINE

## 2023-10-11 PROCEDURE — 86803 HEPATITIS C AB TEST: CPT | Performed by: PHYSICIAN ASSISTANT

## 2023-10-11 PROCEDURE — 87502 INFLUENZA DNA AMP PROBE: CPT | Performed by: EMERGENCY MEDICINE

## 2023-10-11 PROCEDURE — 25000003 PHARM REV CODE 250: Performed by: EMERGENCY MEDICINE

## 2023-10-11 PROCEDURE — 84484 ASSAY OF TROPONIN QUANT: CPT | Performed by: EMERGENCY MEDICINE

## 2023-10-11 PROCEDURE — 87040 BLOOD CULTURE FOR BACTERIA: CPT | Mod: 59 | Performed by: EMERGENCY MEDICINE

## 2023-10-11 PROCEDURE — 93005 ELECTROCARDIOGRAM TRACING: CPT

## 2023-10-11 PROCEDURE — 11000001 HC ACUTE MED/SURG PRIVATE ROOM

## 2023-10-11 PROCEDURE — 99223 PR INITIAL HOSPITAL CARE,LEVL III: ICD-10-PCS | Mod: ,,, | Performed by: FAMILY MEDICINE

## 2023-10-11 PROCEDURE — 99223 1ST HOSP IP/OBS HIGH 75: CPT | Mod: ,,, | Performed by: FAMILY MEDICINE

## 2023-10-11 PROCEDURE — 87389 HIV-1 AG W/HIV-1&-2 AB AG IA: CPT | Performed by: PHYSICIAN ASSISTANT

## 2023-10-11 PROCEDURE — 93010 ELECTROCARDIOGRAM REPORT: CPT | Mod: ,,, | Performed by: INTERNAL MEDICINE

## 2023-10-11 PROCEDURE — 96365 THER/PROPH/DIAG IV INF INIT: CPT

## 2023-10-11 PROCEDURE — 83880 ASSAY OF NATRIURETIC PEPTIDE: CPT | Performed by: EMERGENCY MEDICINE

## 2023-10-11 PROCEDURE — 85025 COMPLETE CBC W/AUTO DIFF WBC: CPT | Performed by: EMERGENCY MEDICINE

## 2023-10-11 PROCEDURE — 25000003 PHARM REV CODE 250: Performed by: FAMILY MEDICINE

## 2023-10-11 PROCEDURE — 63600175 PHARM REV CODE 636 W HCPCS: Performed by: EMERGENCY MEDICINE

## 2023-10-11 RX ORDER — CETIRIZINE HYDROCHLORIDE 10 MG/1
10 TABLET ORAL 2 TIMES DAILY
COMMUNITY

## 2023-10-11 RX ORDER — ONDANSETRON 2 MG/ML
4 INJECTION INTRAMUSCULAR; INTRAVENOUS EVERY 8 HOURS PRN
Status: DISCONTINUED | OUTPATIENT
Start: 2023-10-11 | End: 2023-10-15 | Stop reason: HOSPADM

## 2023-10-11 RX ORDER — FLUTICASONE FUROATE AND VILANTEROL 100; 25 UG/1; UG/1
1 POWDER RESPIRATORY (INHALATION) DAILY
Status: DISCONTINUED | OUTPATIENT
Start: 2023-10-12 | End: 2023-10-15 | Stop reason: HOSPADM

## 2023-10-11 RX ORDER — NALOXONE HCL 0.4 MG/ML
0.02 VIAL (ML) INJECTION
Status: DISCONTINUED | OUTPATIENT
Start: 2023-10-11 | End: 2023-10-15 | Stop reason: HOSPADM

## 2023-10-11 RX ORDER — TALC
6 POWDER (GRAM) TOPICAL NIGHTLY PRN
Status: DISCONTINUED | OUTPATIENT
Start: 2023-10-11 | End: 2023-10-15 | Stop reason: HOSPADM

## 2023-10-11 RX ORDER — CETIRIZINE HYDROCHLORIDE 5 MG/1
5 TABLET ORAL 2 TIMES DAILY
Status: DISCONTINUED | OUTPATIENT
Start: 2023-10-11 | End: 2023-10-15 | Stop reason: HOSPADM

## 2023-10-11 RX ORDER — LOSARTAN POTASSIUM 50 MG/1
100 TABLET ORAL DAILY
Status: DISCONTINUED | OUTPATIENT
Start: 2023-10-12 | End: 2023-10-15 | Stop reason: HOSPADM

## 2023-10-11 RX ORDER — SODIUM CHLORIDE 0.9 % (FLUSH) 0.9 %
10 SYRINGE (ML) INJECTION
Status: DISCONTINUED | OUTPATIENT
Start: 2023-10-11 | End: 2023-10-15 | Stop reason: HOSPADM

## 2023-10-11 RX ORDER — TRAZODONE HYDROCHLORIDE 50 MG/1
50 TABLET ORAL NIGHTLY
Status: DISCONTINUED | OUTPATIENT
Start: 2023-10-11 | End: 2023-10-15 | Stop reason: HOSPADM

## 2023-10-11 RX ORDER — IPRATROPIUM BROMIDE AND ALBUTEROL SULFATE 2.5; .5 MG/3ML; MG/3ML
3 SOLUTION RESPIRATORY (INHALATION) EVERY 6 HOURS PRN
Status: DISCONTINUED | OUTPATIENT
Start: 2023-10-11 | End: 2023-10-12

## 2023-10-11 RX ORDER — GLUCAGON 1 MG
1 KIT INJECTION
Status: DISCONTINUED | OUTPATIENT
Start: 2023-10-11 | End: 2023-10-15 | Stop reason: HOSPADM

## 2023-10-11 RX ORDER — PREGABALIN 150 MG/1
150 CAPSULE ORAL DAILY
Status: DISCONTINUED | OUTPATIENT
Start: 2023-10-12 | End: 2023-10-15 | Stop reason: HOSPADM

## 2023-10-11 RX ORDER — IBUPROFEN 200 MG
16 TABLET ORAL
Status: DISCONTINUED | OUTPATIENT
Start: 2023-10-11 | End: 2023-10-15 | Stop reason: HOSPADM

## 2023-10-11 RX ORDER — MONTELUKAST SODIUM 10 MG/1
10 TABLET ORAL NIGHTLY
Status: DISCONTINUED | OUTPATIENT
Start: 2023-10-11 | End: 2023-10-15 | Stop reason: HOSPADM

## 2023-10-11 RX ORDER — IBUPROFEN 200 MG
24 TABLET ORAL
Status: DISCONTINUED | OUTPATIENT
Start: 2023-10-11 | End: 2023-10-15 | Stop reason: HOSPADM

## 2023-10-11 RX ORDER — SODIUM CHLORIDE 0.9 % (FLUSH) 0.9 %
10 SYRINGE (ML) INJECTION EVERY 12 HOURS PRN
Status: DISCONTINUED | OUTPATIENT
Start: 2023-10-11 | End: 2023-10-15 | Stop reason: HOSPADM

## 2023-10-11 RX ORDER — MAG HYDROX/ALUMINUM HYD/SIMETH 200-200-20
30 SUSPENSION, ORAL (FINAL DOSE FORM) ORAL 4 TIMES DAILY PRN
Status: DISCONTINUED | OUTPATIENT
Start: 2023-10-11 | End: 2023-10-15 | Stop reason: HOSPADM

## 2023-10-11 RX ORDER — VITAMIN B COMPLEX
1 CAPSULE ORAL DAILY
COMMUNITY

## 2023-10-11 RX ORDER — TOPIRAMATE 100 MG/1
100 TABLET, FILM COATED ORAL 2 TIMES DAILY
Status: DISCONTINUED | OUTPATIENT
Start: 2023-10-11 | End: 2023-10-15 | Stop reason: HOSPADM

## 2023-10-11 RX ORDER — ACETAMINOPHEN 500 MG
1000 TABLET ORAL EVERY 8 HOURS PRN
Status: DISCONTINUED | OUTPATIENT
Start: 2023-10-11 | End: 2023-10-15 | Stop reason: HOSPADM

## 2023-10-11 RX ORDER — DULOXETIN HYDROCHLORIDE 60 MG/1
60 CAPSULE, DELAYED RELEASE ORAL 2 TIMES DAILY
Status: DISCONTINUED | OUTPATIENT
Start: 2023-10-11 | End: 2023-10-15 | Stop reason: HOSPADM

## 2023-10-11 RX ADMIN — TRAZODONE HYDROCHLORIDE 50 MG: 50 TABLET ORAL at 09:10

## 2023-10-11 RX ADMIN — CETIRIZINE HYDROCHLORIDE 5 MG: 5 TABLET, FILM COATED ORAL at 09:10

## 2023-10-11 RX ADMIN — VANCOMYCIN HYDROCHLORIDE 2000 MG: 500 INJECTION, POWDER, LYOPHILIZED, FOR SOLUTION INTRAVENOUS at 06:10

## 2023-10-11 RX ADMIN — CEFEPIME 2 G: 2 INJECTION, POWDER, FOR SOLUTION INTRAVENOUS at 04:10

## 2023-10-11 RX ADMIN — AZITHROMYCIN MONOHYDRATE 500 MG: 500 INJECTION, POWDER, LYOPHILIZED, FOR SOLUTION INTRAVENOUS at 04:10

## 2023-10-11 NOTE — ED PROVIDER NOTES
Encounter Date: 10/11/2023       History     Chief Complaint   Patient presents with    Shortness of Breath     Sob x 1 week.      58-year-old female with history of Crohn's disease on immunosuppression,hyzentra use due to immunosuppression, chest wall rigidity secondary to fentanyl, chronic pansinusitis with Pseudomonas colonization, recurrent Pseudomonas pneumonia with mucous plugging.      Patient is been treated since  with cefadroxil levofloxacin due to acute on chronic sinusitis and pneumonia diagnosed on CT.  Patient noted to have significant mucus plugging.  Patient's symptoms have failed to improve.  She presents with progressive shortness of breath and episodes of difficulty breathing overnight.  She reports decreased activity tolerance for this.  She denies richard orthopnea or extremity swelling.  She has no chest pain.  She denies fever chills.  She reports compliance with her medications.      Review of patient's allergies indicates:   Allergen Reactions    Fentanyl Other (See Comments)     Rigid Chest Syndrome     Past Medical History:   Diagnosis Date    Allergic rhinitis     Asthma     Chronic pansinusitis     Crohn's disease     Ileal involvement, previously on Remicade, Asacol, Prednisone    Fibromyalgia     Hyperlipidemia     Hypertension     Immunosuppression     Migraine     Obstructive sleep apnea     CPAP at night    Sciatica      Past Surgical History:   Procedure Laterality Date    BLADDER SURGERY      sling was created by her muscles     BRONCHOSCOPY N/A 2023    Procedure: BRONCHOSCOPY;  Surgeon: Kajal Diagnostic Provider;  Location: [facility] OR [address] [place] [address];  Service: Anesthesiology;  Laterality: N/A;     SECTION      COLONOSCOPY N/A 2017    Procedure: COLONOSCOPY;  Surgeon: Kin Dyer MD;  Location: [facility];  Service: Endoscopy;  Laterality: N/A;    COLONOSCOPY N/A 2018    Procedure: COLONOSCOPY;  Surgeon: Kyra Vallecillo MD;  Location:  [facility] ENDO;  Service: Endoscopy;  Laterality: N/A;    COLONOSCOPY N/A 03/12/2020    Procedure: COLONOSCOPY;  Surgeon: Nicole Leal MD;  Location: [facility];  Service: Endoscopy;  Laterality: N/A;    COLONOSCOPY N/A 03/16/2022    Procedure: COLONOSCOPY;  Surgeon: Shay Bruce MD;  Location: [facility] ENDO;  Service: Endoscopy;  Laterality: N/A;    COLONOSCOPY N/A 02/02/2023    Procedure: COLONOSCOPY;  Surgeon: Nicole Leal MD;  Location: [facility];  Service: Endoscopy;  Laterality: N/A;    DEBRIDEMENT Bilateral 12/21/2020    Procedure: DEBRIDEMENT;  Surgeon: Matthias Roach MD;  [facility];  Service: ENT;  Laterality: Bilateral;    ESOPHAGOGASTRODUODENOSCOPY N/A 03/12/2020    Procedure: ESOPHAGOGASTRODUODENOSCOPY (EGD);  Surgeon: Nicole Leal MD;  Location: [facility];  Service: Endoscopy;  Laterality: N/A;    ESOPHAGOGASTRODUODENOSCOPY N/A 03/16/2022    Procedure: EGD (ESOPHAGOGASTRODUODENOSCOPY);  Surgeon: Shay Bruce MD;  Location: [facility];  Service: Endoscopy;  Laterality: N/A;    ESOPHAGOGASTRODUODENOSCOPY N/A 02/02/2023    Procedure: EGD (ESOPHAGOGASTRODUODENOSCOPY);  Surgeon: Nicole Leal MD;  Location: [facility];  Service: Endoscopy;  Laterality: N/A;    FINGER SURGERY      joint relpacement, left hand index finger    FUNCTIONAL ENDOSCOPIC SINUS SURGERY (FESS) USING COMPUTER-ASSISTED NAVIGATION Bilateral 07/31/2019    Procedure: FESS, USING COMPUTER-ASSISTED NAVIGATION;  Surgeon: Manish Shaffer MD;  Location: [facility];  Service: ENT;  Laterality: Bilateral;    FUNCTIONAL ENDOSCOPIC SINUS SURGERY (FESS) USING COMPUTER-ASSISTED NAVIGATION Bilateral 09/25/2020    Procedure: FESS, USING COMPUTER-ASSISTED NAVIGATION SPHENOID;  Surgeon: Matthias Roach MD;  Location: [facility] OR 2ND FLR;  Service: ENT;  Laterality: Bilateral;  TIVA    FUNCTIONAL ENDOSCOPIC SINUS SURGERY (FESS) USING COMPUTER-ASSISTED NAVIGATION Bilateral 09/30/2022    Procedure: FESS, USING  COMPUTER-ASSISTED NAVIGATION;  Surgeon: Matthias Roach MD;  Location: [facility] OR [address] [place] [address];  Service: ENT;  Laterality: Bilateral;    HYSTERECTOMY  2001    INTRALUMINAL GASTROINTESTINAL TRACT IMAGING VIA CAPSULE N/A 03/03/2023    Procedure: IMAGING PROCEDURE, GI TRACT, INTRALUMINAL, VIA CAPSULE;  Surgeon: First Available [place];  Location: [facility] ENDO;  Service: Endoscopy;  Laterality: N/A;    SINUS SURGERY      WISDOM TOOTH EXTRACTION       Family History   Problem Relation Age of Onset    Breast cancer Mother     Hypertension Mother     Allergies Mother     Kidney disease Father 64        ESRD on HD    Scleroderma Father     Breast cancer Maternal Grandmother     Heart attack Maternal Grandmother     COPD Maternal Grandmother 72    Cancer Paternal Grandmother 70        colon    Hypertension Brother      Social History     Tobacco Use    Smoking status: Never     Passive exposure: Never    Smokeless tobacco: Never   Substance Use Topics    Alcohol use: No    Drug use: No     Review of Systems  All other systems reviewed and negative except as noted in HPI    Physical Exam     Initial Vitals [10/11/23 1415]   BP Pulse Resp Temp SpO2   121/71 77 20 97.6 °F (36.4 °C) 95 %      MAP       --         Physical Exam  General: AO x 3, NAD. Well nourished. Well Developed  Head: Normocephalic and Atraumatic  HEENT: PERRLA. EOMI. OP Clear  Neck: Supple, Nontender in midline.  Cardiovascular: RRR. No m/r/g. 2+ Distal Pulses  Pulm/Chest: Chest nontender.  Diffuse rhonchi.  Coarse productive cough during exam.  No respiratory distress.  Abdomen: Nontender. Nondistended. No rigidity, rebound, or guarding  MSK: extremities atraumatic x 4. Gait normal  Ext: no clubbing, cyanosis, or edema  Neuro: GCS 15. CN II-XII intact. Intact symmetric sensation, strength, DTR x 4 extremities  Skin: no bullae, petechiae, or purpura. Warm, dry, and intact.  Psych: normal mood and affect.    ED Course   Procedures  Labs Reviewed   CBC W/ AUTO  DIFFERENTIAL - Abnormal; Notable for the following components:       Result Value    MCH 31.6 (*)     Eos # 2.4 (*)     Lymph % 13.6 (*)     Eosinophil % 19.7 (*)     All other components within normal limits   COMPREHENSIVE METABOLIC PANEL - Abnormal; Notable for the following components:    Potassium 3.4 (*)     CO2 20 (*)     Albumin 3.4 (*)     All other components within normal limits   INFLUENZA A & B BY MOLECULAR   HIV 1 / 2 ANTIBODY    Narrative:     Release to patient->Immediate   HEPATITIS C ANTIBODY    Narrative:     Release to patient->Immediate   B-TYPE NATRIURETIC PEPTIDE   LACTIC ACID, PLASMA   MAGNESIUM   TROPONIN I   SARS-COV-2 RNA AMPLIFICATION, QUAL     EKG Readings: (Independently Interpreted)   Initial Reading: No STEMI. Previous EKG: Compared with most recent EKG Rhythm: Normal Sinus Rhythm.     ECG Results              EKG 12-lead (Final result)  Result time 10/12/23 08:38:08      Final result by Interface, Lab In [facility] (10/12/23 08:38:08)                   Narrative:    Test Reason : J18.9,    Vent. Rate : 076 BPM     Atrial Rate : 076 BPM     P-R Int : 202 ms          QRS Dur : 068 ms      QT Int : 376 ms       P-R-T Axes : 070 020 067 degrees     QTc Int : 423 ms    Normal sinus rhythm  Nonspecific T wave abnormality  Abnormal ECG  When compared with ECG of 11-OCT-2023 14:18,  No significant change was found  Confirmed by MONIQUE BLACKWELL MD ([id number]) on 10/12/2023 8:38:01 AM    Referred By: [email or web address]   SELF           Confirmed By:MONIQUE BLACKWELL MD                                     EKG 12-lead (Final result)  Result time 10/11/23 14:48:30      Final result by Interface, Lab In [facility] (10/11/23 14:48:30)                   Narrative:    Test Reason : R06.02,    Vent. Rate : 074 BPM     Atrial Rate : 074 BPM     P-R Int : 194 ms          QRS Dur : 072 ms      QT Int : 386 ms       P-R-T Axes : 112 -01 066 degrees     QTc Int : 428 ms    Normal sinus rhythm  Nonspecific T wave  abnormality    Possible Anterior infarct (cited on or before 11-OCT-2023)  Abnormal ECG  When compared with ECG of 14-MAY-2019 08:32,    Nonspecific T wave abnormality now evident in Anterior-lateral leads  Confirmed by MONIQUE BLACKWELL MD ([id number]) on 10/11/2023 2:48:21 PM    Referred By:             Confirmed By:MONIQUE BLACKWELL MD                                  Imaging Results              CT Chest Without Contrast (Final result)  Result time 10/11/23 18:10:54      Final result by Jessee Cash MD (10/11/23 18:10:54)                   Impression:      Patchy bilateral ground-glass pulmonary airspace opacities have increased since 09/27/2023.  Although the differential diagnosis is extensive, a leading consideration is likely COVID-19 pneumonia.  Correlate clinically, and with follow-up chest CT within 3-6 months to help further exclude other underlying pathology, such as carcinoma in-situ/bronchioloalveolar carcinoma.    Mildly prominent/enlarged mediastinal lymph nodes, similar to the comparison scan and favored to represent reactive lymphadenopathy.  The follow-up chest CT recommended above could also help further characterize this, and help exclude underlying neoplasm or lymphoproliferative disorder.    Some predominantly bibasilar, bronchial occlusions remain and are most likely related to mucous plugging.  Follow-up imaging again recommended, as above, to help further exclude underlying neoplasm.      Electronically signed by: Jessee Cash  Date:    10/11/2023  Time:    18:10               Narrative:    EXAMINATION:  CT CHEST WITHOUT CONTRAST    CLINICAL HISTORY:  Pneumonia, unresolved;    TECHNIQUE:  Low dose axial images, sagittal and coronal reformations were obtained from the thoracic inlet to the lung bases. Contrast was not administered.    COMPARISON:  Noncontrast chest CT 09/27/2023    FINDINGS:  Overall, there is more extensive patchy ground-glass pulmonary opacification than was demonstrated on  09/27/2023, involving portions of all pulmonary lobes.    Calcified granuloma in the right middle lobe (series 2, image 69).    No cavitary lung lesions.    No pleural fluid or pneumothorax.    There remain prominent to mildly enlarged mediastinal lymph nodes, likely reactive.  Neoplasm or lymphoproliferative disorder less likely but not fully excluded at this time.    There remains some occluded bronchi, most apparent in the lower lobes, left greater than right, likely representing mucous plugging.    The heart, pericardium, and visualized portions of the thyroid, trachea, esophagus, upper abdomen, body wall, and thoracic skeleton demonstrate no acute CT abnormalities.    Poorly visualized occasional coronary artery calcifications, a CT marker for underlying coronary artery disease.    A small hiatal hernia is not excluded.    Pre-existing degenerative changes in thoracic and visualized lower cervical spine.                                       Medications   sodium chloride 0.9% flush 10 mL (has no administration in time range)   melatonin tablet 6 mg (has no administration in time range)   cetirizine tablet 5 mg (5 mg Oral Given 10/12/23 0830)   DULoxetine DR capsule 60 mg (60 mg Oral Given 10/12/23 0830)   fluticasone furoate-vilanteroL 100-25 mcg/dose diskus inhaler 1 puff (1 puff Inhalation Not Given 10/12/23 0922)   losartan tablet 100 mg (100 mg Oral Given 10/12/23 0829)   montelukast tablet 10 mg (10 mg Oral Not Given 10/11/23 2100)   pregabalin capsule 150 mg (150 mg Oral Given 10/12/23 0830)   topiramate tablet 100 mg (100 mg Oral Given 10/12/23 0829)   traZODone tablet 50 mg (50 mg Oral Given 10/11/23 2104)   sodium chloride 0.9% flush 10 mL (has no administration in time range)   naloxone 0.4 mg/mL injection 0.02 mg (has no administration in time range)   glucose chewable tablet 16 g (has no administration in time range)   glucose chewable tablet 24 g (has no administration in time range)   glucagon  (human recombinant) injection 1 mg (has no administration in time range)   acetaminophen tablet 1,000 mg (has no administration in time range)   ondansetron injection 4 mg (has no administration in time range)   aluminum-magnesium hydroxide-simethicone 200-200-20 mg/5 mL suspension 30 mL (has no administration in time range)   dextrose 10% bolus 125 mL 125 mL (has no administration in time range)   dextrose 10% bolus 250 mL 250 mL (has no administration in time range)   guaiFENesin 12 hr tablet 600 mg (600 mg Oral Given 10/12/23 0830)   albuterol-ipratropium 2.5 mg-0.5 mg/3 mL nebulizer solution 3 mL (3 mLs Nebulization Given 10/12/23 1107)   meropenem (MERREM) 2 g in sodium chloride 0.9% 100 mL IVPB (has no administration in time range)   doxycycline tablet 100 mg (has no administration in time range)   fluconazole tablet 200 mg (has no administration in time range)   vancomycin 2 g in dextrose 5 % 500 mL IVPB (0 mg Intravenous Stopped 10/11/23 2036)   ceFEPIme (MAXIPIME) 2 g in dextrose 5 % in water (D5W) 100 mL IVPB (MB+) (0 g Intravenous Stopped 10/11/23 1641)   azithromycin (ZITHROMAX) 500 mg in dextrose 5 % (D5W) 250 mL IVPB (Vial-Mate) (0 mg Intravenous Stopped 10/11/23 1744)     Medical Decision Making  Concern for acute on chronic Pseudomonas and staph aureus pneumonia with mucous plugging.  Patient in no respiratory distress but significant productive cough symptoms.  Antibiotic therapy initiated for multidrug resistant pathogens given the patient is high risk for drug-resistant pneumonia due to her recent antibiotic use and history of drug-resistant pathogens.  Plan for admission for pulmonary consultation to discuss bronchoscopy as well as Infectious Disease consultation help guide antibiotic therapy.  Sputum culture ordered to assist with same    Amount and/or Complexity of Data Reviewed  External Data Reviewed: labs, radiology, ECG and notes.     Details: History of Pseudomonas and staph aureus  colonization    Most recent CT noncontrast of chest demonstrate mucus plugging  Labs: ordered. Decision-making details documented in ED Course.  Radiology: ordered and independent interpretation performed. Decision-making details documented in ED Course.     Details: CT chest appears to show worsening of previously seen ground-glass opacities and mucus plugging    Risk  OTC drugs.  Prescription drug management.  Drug therapy requiring intensive monitoring for toxicity.  Decision regarding hospitalization.                               Clinical Impression:   Final diagnoses:  [R06.02] Shortness of breath  [J18.9] Pneumonia  [J18.9] Multifocal pneumonia (Primary)        ED Disposition Condition    Admit Stable                Sessions, Jluis DOZIER MD  10/12/23 2647

## 2023-10-11 NOTE — PHARMACY MED REC
"Admission Medication History     The home medication history was taken by Mary Kay Solomon.    You may go to "Admission" then "Reconcile Home Medications" tabs to review and/or act upon these items.     The home medication list has been updated by the Pharmacy department.   Please read ALL comments highlighted in yellow.   Please address this information as you see fit.    Feel free to contact us if you have any questions or require assistance.      The medications listed below were removed from the home medication list. Please reorder if appropriate:  Patient reports no longer taking the following medication(s):  ASACOL 800 MG  FIORICET -40 MG  CHOLESTYRAMINE 4 GM  PROPRANOLOL 80 MG  SODIUM CHLORIDE 0.9% GENTAMICIN 20 MG/2 ML  SUPREP BOWEL PREP KIT  TRIAMCINOLONE ACETONIDE 0.025%  VENTOLIN 90 MCG/ACT    Medications listed below were obtained from: Patient/family  Current Outpatient Medications on File Prior to Encounter   Medication Sig    acetaminophen (TYLENOL) 500 MG tablet Take 2 tablets (1,000 mg total) by mouth every 6 (six) hours as needed for Pain.      albuterol-ipratropium (DUO-NEB) 2.5 mg-0.5 mg/3 mL nebulizer solution Take 3 mLs by nebulization every 6 (six) hours as needed for Wheezing. Rescue      b complex vitamins capsule Take 1 capsule by mouth once daily.      calcium carbonate/vitamin D3 (VITAMIN D-3 ORAL)   Take 2 tablets by mouth once daily.    cefadroxil (DURICEF) 500 MG Cap Take 2 capsules (1,000 mg total) by mouth every 12 (twelve) hours. for 14 days      cetirizine (ZYRTEC) 10 MG tablet Take 10 mg by mouth 2 (two) times a day.      clotrimazole-betamethasone 1-0.05% (LOTRISONE) cream   Apply topically 2 (two) times daily.    diltiazem HCl (DILTIAZEM 2% - LIDOCAINE 5% CREAM)   Apply peasize amount topically to anal area.    diphenhydrAMINE-aluminum-magnesium hydroxide-simethicone-LIDOcaine HCl 2%   Swish and spit 15 mLs every 4 (four) hours as needed (oral ulcers, pain).    " diphenoxylate-atropine 2.5-0.025 mg (LOMOTIL) 2.5-0.025 mg per tablet   Take 1 tablet by mouth 4 (four) times daily as needed for Diarrhea.    DULoxetine (CYMBALTA) 60 MG capsule   Take 1 capsule (60 mg total) by mouth 2 (two) times daily.    eletriptan (RELPAX) 40 MG tablet Take 40 mg by mouth as needed (migraine).)      estradioL (ESTRACE) 2 MG tablet   TAKE 1 TABLET DAILY    fish oil/borage/flax/om3,6,9 1 (OMEGA 3-6-9 ORAL)   Take 2 tablets by mouth once daily.    fluticasone propionate (FLONASE) 50 mcg/actuation nasal spray   2 sprays (100 mcg total) by Each Nostril route once daily.    fluticasone-umeclidin-vilanter (TRELEGY ELLIPTA) 100-62.5-25 mcg DsDv   Inhale 1 puff into the lungs once daily.    immun glob G,IgG,-pro-IgA 0-50 (HIZENTRA) 10 gram/50 mL (20 %) Soln   Inject 70 mLs (14 g total) into the skin every 7 days.    ipratropium (ATROVENT) 0.02 % nebulizer solution   Take by nebulization 4 (four) times daily as needed for Wheezing.    levoFLOXacin (LEVAQUIN) 750 MG tablet   Take 1 tablet (750 mg total) by mouth once daily. for 14 days    losartan (COZAAR) 100 MG tablet Take 100 mg by mouth daily as needed (high blood pressure (>120/80)).)      montelukast (SINGULAIR) 10 mg tablet   TAKE 1 TABLET EVERY EVENING    nortriptyline (PAMELOR) 25 MG capsule   Take 1 capsule (25 mg total) by mouth every evening.    pregabalin (LYRICA) 150 MG capsule   Take 150 mg by mouth once daily.)    risankizumab-rzaa 360 mg/2.4 mL (150 mg/mL) Injt   Inject 360 mg into the skin every 8 weeks. for 6 doses    rizatriptan (MAXALT) 10 MG tablet TAKE 1 TABLET IF NEEDED FOR MIGRAINES. MAX 2 TABLETS IN 24 HOURS.      topiramate (TOPAMAX) 100 MG tablet Take 1 tablet (100 mg total) by mouth 2 (two) times daily.      traZODone (DESYREL) 50 MG tablet Take 1 tablet (50 mg total) by mouth every evening.      XYLITOL, BULK, MISC EMPTY CONTENTS OF 1 CAPSULE INTO NASAL IRRIGATION SYSTEM, ADD DISTILLED WATER, SALT PACK, MIX & IRRIGATE.  PERFORM 2 TIMES DAILY      ZINC ORAL Take 1 tablet by mouth once daily.       Mary Kay Solomon  EXT [id number]                  .

## 2023-10-12 ENCOUNTER — ANESTHESIA EVENT (OUTPATIENT)
Dept: SURGERY | Facility: HOSPITAL | Age: 58
DRG: 167 | End: 2023-10-12
Payer: COMMERCIAL

## 2023-10-12 PROBLEM — J98.8 RESPIRATORY INFECTION: Chronic | Status: ACTIVE | Noted: 2023-10-12

## 2023-10-12 PROBLEM — B37.0 THRUSH: Status: ACTIVE | Noted: 2023-10-12

## 2023-10-12 PROBLEM — J98.8 RESPIRATORY INFECTION: Status: ACTIVE | Noted: 2023-10-12

## 2023-10-12 PROBLEM — B37.0 THRUSH: Chronic | Status: ACTIVE | Noted: 2023-10-12

## 2023-10-12 LAB
ALBUMIN SERPL BCP-MCNC: 2.9 G/DL (ref 3.5–5.2)
ALP SERPL-CCNC: 89 U/L (ref 55–135)
ALT SERPL W/O P-5'-P-CCNC: 14 U/L (ref 10–44)
ANION GAP SERPL CALC-SCNC: 9 MMOL/L (ref 8–16)
AST SERPL-CCNC: 20 U/L (ref 10–40)
BACTERIA SPEC AEROBE CULT: NORMAL
BACTERIA SPEC AEROBE CULT: NORMAL
BASOPHILS # BLD AUTO: 0.09 K/UL (ref 0–0.2)
BASOPHILS NFR BLD: 1.1 % (ref 0–1.9)
BILIRUB SERPL-MCNC: 0.4 MG/DL (ref 0.1–1)
BUN SERPL-MCNC: 10 MG/DL (ref 6–20)
CALCIUM SERPL-MCNC: 8.6 MG/DL (ref 8.7–10.5)
CHLORIDE SERPL-SCNC: 113 MMOL/L (ref 95–110)
CO2 SERPL-SCNC: 19 MMOL/L (ref 23–29)
CREAT SERPL-MCNC: 0.8 MG/DL (ref 0.5–1.4)
DIFFERENTIAL METHOD: ABNORMAL
EOSINOPHIL # BLD AUTO: 2.7 K/UL (ref 0–0.5)
EOSINOPHIL NFR BLD: 31.3 % (ref 0–8)
ERYTHROCYTE [DISTWIDTH] IN BLOOD BY AUTOMATED COUNT: 12.7 % (ref 11.5–14.5)
EST. GFR  (NO RACE VARIABLE): >60 ML/MIN/1.73 M^2
GLUCOSE SERPL-MCNC: 99 MG/DL (ref 70–110)
GRAM STN SPEC: NORMAL
HCT VFR BLD AUTO: 39.7 % (ref 37–48.5)
HGB BLD-MCNC: 13.1 G/DL (ref 12–16)
IGE SERPL-ACNC: 234 IU/ML (ref 0–100)
IMM GRANULOCYTES # BLD AUTO: 0.01 K/UL (ref 0–0.04)
IMM GRANULOCYTES NFR BLD AUTO: 0.1 % (ref 0–0.5)
LYMPHOCYTES # BLD AUTO: 2 K/UL (ref 1–4.8)
LYMPHOCYTES NFR BLD: 23.3 % (ref 18–48)
MAGNESIUM SERPL-MCNC: 2 MG/DL (ref 1.6–2.6)
MCH RBC QN AUTO: 31.6 PG (ref 27–31)
MCHC RBC AUTO-ENTMCNC: 33 G/DL (ref 32–36)
MCV RBC AUTO: 96 FL (ref 82–98)
MONOCYTES # BLD AUTO: 1 K/UL (ref 0.3–1)
MONOCYTES NFR BLD: 11.6 % (ref 4–15)
NEUTROPHILS # BLD AUTO: 2.8 K/UL (ref 1.8–7.7)
NEUTROPHILS NFR BLD: 32.6 % (ref 38–73)
NRBC BLD-RTO: 0 /100 WBC
PHOSPHATE SERPL-MCNC: 4.1 MG/DL (ref 2.7–4.5)
PLATELET # BLD AUTO: 217 K/UL (ref 150–450)
PMV BLD AUTO: 10.2 FL (ref 9.2–12.9)
POTASSIUM SERPL-SCNC: 3.7 MMOL/L (ref 3.5–5.1)
PROT SERPL-MCNC: 7.1 G/DL (ref 6–8.4)
RBC # BLD AUTO: 4.15 M/UL (ref 4–5.4)
SODIUM SERPL-SCNC: 141 MMOL/L (ref 136–145)
WBC # BLD AUTO: 8.53 K/UL (ref 3.9–12.7)

## 2023-10-12 PROCEDURE — 80053 COMPREHEN METABOLIC PANEL: CPT | Performed by: FAMILY MEDICINE

## 2023-10-12 PROCEDURE — 94664 DEMO&/EVAL PT USE INHALER: CPT

## 2023-10-12 PROCEDURE — 11000001 HC ACUTE MED/SURG PRIVATE ROOM

## 2023-10-12 PROCEDURE — 83516 IMMUNOASSAY NONANTIBODY: CPT | Performed by: STUDENT IN AN ORGANIZED HEALTH CARE EDUCATION/TRAINING PROGRAM

## 2023-10-12 PROCEDURE — 25000242 PHARM REV CODE 250 ALT 637 W/ HCPCS: Performed by: STUDENT IN AN ORGANIZED HEALTH CARE EDUCATION/TRAINING PROGRAM

## 2023-10-12 PROCEDURE — 99223 1ST HOSP IP/OBS HIGH 75: CPT | Mod: ,,, | Performed by: INTERNAL MEDICINE

## 2023-10-12 PROCEDURE — 94640 AIRWAY INHALATION TREATMENT: CPT

## 2023-10-12 PROCEDURE — 36415 COLL VENOUS BLD VENIPUNCTURE: CPT | Performed by: FAMILY MEDICINE

## 2023-10-12 PROCEDURE — 84100 ASSAY OF PHOSPHORUS: CPT | Performed by: FAMILY MEDICINE

## 2023-10-12 PROCEDURE — 87070 CULTURE OTHR SPECIMN AEROBIC: CPT | Performed by: STUDENT IN AN ORGANIZED HEALTH CARE EDUCATION/TRAINING PROGRAM

## 2023-10-12 PROCEDURE — 85025 COMPLETE CBC W/AUTO DIFF WBC: CPT | Performed by: FAMILY MEDICINE

## 2023-10-12 PROCEDURE — 99232 SBSQ HOSP IP/OBS MODERATE 35: CPT | Mod: ,,, | Performed by: STUDENT IN AN ORGANIZED HEALTH CARE EDUCATION/TRAINING PROGRAM

## 2023-10-12 PROCEDURE — 63600175 PHARM REV CODE 636 W HCPCS: Performed by: INTERNAL MEDICINE

## 2023-10-12 PROCEDURE — 82785 ASSAY OF IGE: CPT | Performed by: STUDENT IN AN ORGANIZED HEALTH CARE EDUCATION/TRAINING PROGRAM

## 2023-10-12 PROCEDURE — 27000221 HC OXYGEN, UP TO 24 HOURS

## 2023-10-12 PROCEDURE — 87449 NOS EACH ORGANISM AG IA: CPT | Mod: 91 | Performed by: INTERNAL MEDICINE

## 2023-10-12 PROCEDURE — 63600175 PHARM REV CODE 636 W HCPCS: Performed by: FAMILY MEDICINE

## 2023-10-12 PROCEDURE — 25000003 PHARM REV CODE 250: Performed by: STUDENT IN AN ORGANIZED HEALTH CARE EDUCATION/TRAINING PROGRAM

## 2023-10-12 PROCEDURE — 86036 ANCA SCREEN EACH ANTIBODY: CPT | Performed by: STUDENT IN AN ORGANIZED HEALTH CARE EDUCATION/TRAINING PROGRAM

## 2023-10-12 PROCEDURE — 83735 ASSAY OF MAGNESIUM: CPT | Performed by: FAMILY MEDICINE

## 2023-10-12 PROCEDURE — 27000646 HC AEROBIKA DEVICE

## 2023-10-12 PROCEDURE — 99223 PR INITIAL HOSPITAL CARE,LEVL III: ICD-10-PCS | Mod: ,,, | Performed by: INTERNAL MEDICINE

## 2023-10-12 PROCEDURE — 86003 ALLG SPEC IGE CRUDE XTRC EA: CPT | Performed by: STUDENT IN AN ORGANIZED HEALTH CARE EDUCATION/TRAINING PROGRAM

## 2023-10-12 PROCEDURE — 87205 SMEAR GRAM STAIN: CPT | Performed by: STUDENT IN AN ORGANIZED HEALTH CARE EDUCATION/TRAINING PROGRAM

## 2023-10-12 PROCEDURE — 94761 N-INVAS EAR/PLS OXIMETRY MLT: CPT

## 2023-10-12 PROCEDURE — 25000003 PHARM REV CODE 250: Performed by: INTERNAL MEDICINE

## 2023-10-12 PROCEDURE — 99232 PR SUBSEQUENT HOSPITAL CARE,LEVL II: ICD-10-PCS | Mod: ,,, | Performed by: STUDENT IN AN ORGANIZED HEALTH CARE EDUCATION/TRAINING PROGRAM

## 2023-10-12 PROCEDURE — 99900035 HC TECH TIME PER 15 MIN (STAT)

## 2023-10-12 PROCEDURE — 87449 NOS EACH ORGANISM AG IA: CPT | Performed by: INTERNAL MEDICINE

## 2023-10-12 PROCEDURE — 36415 COLL VENOUS BLD VENIPUNCTURE: CPT | Performed by: STUDENT IN AN ORGANIZED HEALTH CARE EDUCATION/TRAINING PROGRAM

## 2023-10-12 PROCEDURE — 83516 IMMUNOASSAY NONANTIBODY: CPT | Mod: 59 | Performed by: STUDENT IN AN ORGANIZED HEALTH CARE EDUCATION/TRAINING PROGRAM

## 2023-10-12 PROCEDURE — 25000003 PHARM REV CODE 250: Performed by: FAMILY MEDICINE

## 2023-10-12 PROCEDURE — 87385 HISTOPLASMA CAPSUL AG IA: CPT | Performed by: INTERNAL MEDICINE

## 2023-10-12 RX ORDER — IPRATROPIUM BROMIDE AND ALBUTEROL SULFATE 2.5; .5 MG/3ML; MG/3ML
3 SOLUTION RESPIRATORY (INHALATION) EVERY 6 HOURS
Status: DISCONTINUED | OUTPATIENT
Start: 2023-10-12 | End: 2023-10-15 | Stop reason: HOSPADM

## 2023-10-12 RX ORDER — DOXYCYCLINE HYCLATE 100 MG
100 TABLET ORAL EVERY 12 HOURS
Status: DISCONTINUED | OUTPATIENT
Start: 2023-10-12 | End: 2023-10-14

## 2023-10-12 RX ORDER — FLUCONAZOLE 200 MG/1
200 TABLET ORAL DAILY
Status: DISCONTINUED | OUTPATIENT
Start: 2023-10-12 | End: 2023-10-15 | Stop reason: HOSPADM

## 2023-10-12 RX ORDER — GUAIFENESIN 600 MG/1
600 TABLET, EXTENDED RELEASE ORAL 2 TIMES DAILY
Status: DISCONTINUED | OUTPATIENT
Start: 2023-10-12 | End: 2023-10-15 | Stop reason: HOSPADM

## 2023-10-12 RX ADMIN — MEROPENEM 2 G: 1 INJECTION INTRAVENOUS at 04:10

## 2023-10-12 RX ADMIN — DULOXETINE HYDROCHLORIDE 60 MG: 60 CAPSULE, DELAYED RELEASE ORAL at 08:10

## 2023-10-12 RX ADMIN — CETIRIZINE HYDROCHLORIDE 5 MG: 5 TABLET, FILM COATED ORAL at 08:10

## 2023-10-12 RX ADMIN — FLUCONAZOLE 200 MG: 200 TABLET ORAL at 04:10

## 2023-10-12 RX ADMIN — LOSARTAN POTASSIUM 100 MG: 50 TABLET, FILM COATED ORAL at 08:10

## 2023-10-12 RX ADMIN — VANCOMYCIN HYDROCHLORIDE 1000 MG: 1 INJECTION, POWDER, LYOPHILIZED, FOR SOLUTION INTRAVENOUS at 05:10

## 2023-10-12 RX ADMIN — TOPIRAMATE 100 MG: 100 TABLET, FILM COATED ORAL at 08:10

## 2023-10-12 RX ADMIN — IPRATROPIUM BROMIDE AND ALBUTEROL SULFATE 3 ML: .5; 3 SOLUTION RESPIRATORY (INHALATION) at 08:10

## 2023-10-12 RX ADMIN — IPRATROPIUM BROMIDE AND ALBUTEROL SULFATE 3 ML: .5; 3 SOLUTION RESPIRATORY (INHALATION) at 11:10

## 2023-10-12 RX ADMIN — MONTELUKAST 10 MG: 10 TABLET, FILM COATED ORAL at 08:10

## 2023-10-12 RX ADMIN — CEFEPIME 2 G: 2 INJECTION, POWDER, FOR SOLUTION INTRAVENOUS at 06:10

## 2023-10-12 RX ADMIN — GUAIFENESIN 600 MG: 600 TABLET, EXTENDED RELEASE ORAL at 08:10

## 2023-10-12 RX ADMIN — DOXYCYCLINE HYCLATE 100 MG: 100 TABLET, COATED ORAL at 08:10

## 2023-10-12 RX ADMIN — PREGABALIN 150 MG: 150 CAPSULE ORAL at 08:10

## 2023-10-12 RX ADMIN — TRAZODONE HYDROCHLORIDE 50 MG: 50 TABLET ORAL at 08:10

## 2023-10-12 RX ADMIN — MEROPENEM 2 G: 1 INJECTION INTRAVENOUS at 11:10

## 2023-10-12 NOTE — NURSING
Pt wants to be on low dose O2 tonight instead  of cpap. Pt continues to cough non productive Notified [facility] team Md made aware via secure chat.

## 2023-10-12 NOTE — PROGRESS NOTES
[facility] Medicine  Progress Note    Patient Name: Jaylin Murguia  MRN: [id number]  Patient Class: IP- Inpatient   Admission Date: 10/11/2023  Length of Stay: 1 days  Attending Physician: Ray Hubbard MD  Primary Care Provider: Esthela Hu MD        Subjective:     Principal Problem:Multifocal pneumonia        HPI:  58-year-old female with history of Crohn's disease on immunosuppression,hyzentra use due to immunosuppression, chest wall rigidity secondary to fentanyl, chronic pansinusitis with Pseudomonas colonization, recurrent Pseudomonas pneumonia with mucous plugging who presents to the ED with chief complaint of shortness of breath and cough. Patient has been treated since September 27th with cefadroxil levofloxacin due to acute on chronic sinusitis and pneumonia diagnosed on CT.  Patient noted to have significant mucus plugging.  Patient's symptoms have failed to improve.  She presents with progressive shortness of breath and episodes of difficulty breathing overnight.  She reports decreased activity tolerance for this.  She denies richard orthopnea or extremity swelling.  She has no chest pain.  She denies fever chills.  She reports compliance with her medications.    In the ED patient afebrile and hemodynamically stable saturating in mid 90's on room air at rest. CT with worsening ground glass opacities. Patient started on broad spectrum abx and admitted to the care of medicine for further evaluation and management.       Overview/Hospital Course:  No notes on file    Interval History:   No events overnight. Patient with continued productive cough and dyspnea. Pulm consulted. Plann for bronch tomorrow. Continue broad abx. ID consulted.      Review of Systems   Constitutional:  Positive for fatigue. Negative for chills and fever.   HENT:  Negative for sore throat and trouble swallowing.    Eyes:  Negative for photophobia and visual disturbance.   Respiratory:  Positive for cough  and shortness of breath. Negative for wheezing.    Cardiovascular:  Negative for chest pain, palpitations and leg swelling.   Gastrointestinal:  Negative for abdominal distention, abdominal pain, diarrhea, nausea and vomiting.   Genitourinary:  Negative for dysuria and hematuria.   Musculoskeletal:  Negative for neck pain and neck stiffness.   Skin:  Negative for rash and wound.   Neurological:  Positive for weakness. Negative for seizures, syncope, light-headedness, numbness and headaches.   Psychiatric/Behavioral:  Negative for confusion and decreased concentration.      Objective:     Vital Signs (Most Recent):  Temp: 98.2 °F (36.8 °C) (10/12/23 1251)  Pulse: 63 (10/12/23 1251)  Resp: 18 (10/12/23 1251)  BP: (!) 134/95 (10/12/23 1251)  SpO2: (!) 93 % (10/12/23 1251) Vital Signs (24h Range):  Temp:  [96.7 °F (35.9 °C)-98.2 °F (36.8 °C)] 98.2 °F (36.8 °C)  Pulse:  [56-77] 63  Resp:  [13-20] 18  SpO2:  [92 %-97 %] 93 %  BP: (121-161)/(65-95) 134/95     Weight: 79.3 kg (174 lb 13.2 oz)  Body mass index is 27.38 kg/m².  No intake or output data in the 24 hours ending 10/12/23 1330      Physical Exam  Constitutional:       General: She is not in acute distress.     Appearance: She is not toxic-appearing or diaphoretic.   HENT:      Head: Normocephalic and atraumatic.      Nose: Nose normal.   Eyes:      General: No scleral icterus.     Extraocular Movements: Extraocular movements intact.   Cardiovascular:      Rate and Rhythm: Normal rate and regular rhythm.   Pulmonary:      Effort: Pulmonary effort is normal. No respiratory distress.      Breath sounds: Wheezing and rales present.   Abdominal:      General: Abdomen is flat. There is no distension.      Palpations: Abdomen is soft.      Tenderness: There is no abdominal tenderness. There is no guarding.   Musculoskeletal:         General: Normal range of motion.      Right lower leg: No edema.      Left lower leg: No edema.   Skin:     General: Skin is warm and dry.       Coloration: Skin is not jaundiced.   Neurological:      General: No focal deficit present.      Mental Status: She is alert.      Cranial Nerves: No cranial nerve deficit.   Psychiatric:         Mood and Affect: Mood normal.         Behavior: Behavior normal.             Significant Labs: All pertinent labs within the past 24 hours have been reviewed.  CBC:   Recent Labs   Lab 10/11/23  1546 10/12/23  0356   WBC 12.36 8.53   HGB 13.9 13.1   HCT 42.0 39.7    217     CMP:   Recent Labs   Lab 10/11/23  1546 10/12/23  0356    141   K 3.4* 3.7    113*   CO2 20* 19*    99   BUN 11 10   CREATININE 0.8 0.8   CALCIUM 9.1 8.6*   PROT 7.9 7.1   ALBUMIN 3.4* 2.9*   BILITOT 0.4 0.4   ALKPHOS 101 89   AST 24 20   ALT 17 14   ANIONGAP 8 9       Significant Imaging: I have reviewed all pertinent imaging results/findings within the past 24 hours.      Assessment/Plan:      * Multifocal pneumonia  - CT with worsening ground glass opacities with mucus plugging  - Prior respiratory cultures with multi resistant pseudomonas and staph aureus  - Covid and Flu negative   - Pulmonolgy consulted   -- Plan for bronch on 10/13  -- Esophogram ordered to evaluate for aspiration   - ID consulted  -- Respiratory culture sent  -- Continue cefepime and vancomycin    Severe persistent asthma without complication  - Daily LABA-ICS  - Duonebs prn  - Pulm toilet     Crohn's disease of small intestine  - Taking skyrizi; hold for now given current infection    Insomnia due to medical condition  - Continue home meds    Essential hypertension  - Continue home losartan        VTE Risk Mitigation (From admission, onward)         Ordered     IP VTE LOW RISK PATIENT  Once         10/11/23 1915     Place sequential compression device  Until discontinued         10/11/23 1915                Discharge Planning   SOBEIDA:      Code Status: Full Code   Is the patient medically ready for discharge?: No    Reason for patient still in  hospital (select all that apply): Patient trending condition, Laboratory test, Treatment, Consult recommendations and Pending disposition                     Ray Hubbard MD  Department of Hospital Medicine   [facility]

## 2023-10-12 NOTE — ASSESSMENT & PLAN NOTE
Noted on exam today;    Recommendations:  -- Fluconazole 200mg qd for 7-14 days; based on progress

## 2023-10-12 NOTE — CONSULTS
[facility] Consult Note    Primary Attending Physician: Ray Hubbard MD  Consultant Attending: Mary Molina MD  Consultant Fellow: Mary Jo Monsivais MD    Reason for Consult:     Bilateral ground glass opacities     Subjective:      History of Present Illness:  Jaylin Murguia is a 58 y.o. woman with a history of chronic asthma, allergic rhinitis, pansinuitis with recurrent pseudomonal infection, crohn's disease, HTN, chronic immunosuppression, TAMIKA, and fibromyalgia who presents with worsening SOB for the last 2-3 days.     She reports shortness of breath with exertion and at night worse than baseline. Has a chronic stable productive cough. No hemoptysis. Denies any fevers, chills. Reports her breathing problems started about 5 years ago, no clear triggers. Started risankizumab a few months ago, has been on IgG for the last 5 years. She does have chickens at home, unsure when she got the chickens. No other clear allergens, had her house checked for mold.     Has chronic allergies and nasal congestion. On and off prednisone for the last several years, completed most recent prednisone course a few weeks ago. Currently being treated for a sinus infection with levofloxacin.     She denies any history of smoking. No recent travel. 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     Past Medical History:  Past Medical History:   Diagnosis Date    Allergic rhinitis     Asthma     Chronic pansinusitis     Crohn's disease     Ileal involvement, previously on Remicade, Asacol, Prednisone    Fibromyalgia     Hyperlipidemia     Hypertension     Immunosuppression 20020    Migraine     Obstructive sleep apnea     CPAP at night    Sciatica        Past Surgical History:  Past Surgical History:   Procedure Laterality Date    BLADDER SURGERY      sling was created by her muscles     BRONCHOSCOPY N/A 03/23/2023    Procedure: BRONCHOSCOPY;  Surgeon: Dosc Diagnostic Provider;  Location: [facility] OR [address] [place] [address];   Service: Anesthesiology;  Laterality: N/A;     SECTION      COLONOSCOPY N/A 2017    Procedure: COLONOSCOPY;  Surgeon: Kin Dyer MD;  Location: [facility];  Service: Endoscopy;  Laterality: N/A;    COLONOSCOPY N/A 2018    Procedure: COLONOSCOPY;  Surgeon: Kyra Vallecillo MD;  Location: [facility];  Service: Endoscopy;  Laterality: N/A;    COLONOSCOPY N/A 2020    Procedure: COLONOSCOPY;  Surgeon: Nicole Leal MD;  Location: [facility];  Service: Endoscopy;  Laterality: N/A;    COLONOSCOPY N/A 2022    Procedure: COLONOSCOPY;  Surgeon: Shay Bruce MD;  Location: [facility];  Service: Endoscopy;  Laterality: N/A;    COLONOSCOPY N/A 2023    Procedure: COLONOSCOPY;  Surgeon: iNcole Leal MD;  Location: [facility];  Service: Endoscopy;  Laterality: N/A;    DEBRIDEMENT Bilateral 2020    Procedure: DEBRIDEMENT;  Surgeon: Matthias Roach MD;  Location: [address];  Service: ENT;  Laterality: Bilateral;    ESOPHAGOGASTRODUODENOSCOPY N/A 2020    Procedure: ESOPHAGOGASTRODUODENOSCOPY (EGD);  Surgeon: Nicole Leal MD;  Location: [facility];  Service: Endoscopy;  Laterality: N/A;    ESOPHAGOGASTRODUODENOSCOPY N/A 2022    Procedure: EGD (ESOPHAGOGASTRODUODENOSCOPY);  Surgeon: Shay Bruce MD;  Location: [facility];  Service: Endoscopy;  Laterality: N/A;    ESOPHAGOGASTRODUODENOSCOPY N/A 2023    Procedure: EGD (ESOPHAGOGASTRODUODENOSCOPY);  Surgeon: Nicole Leal MD;  Location: [facility];  Service: Endoscopy;  Laterality: N/A;    FINGER SURGERY      joint relpacement, left hand index finger    FUNCTIONAL ENDOSCOPIC SINUS SURGERY (FESS) USING COMPUTER-ASSISTED NAVIGATION Bilateral 2019    Procedure: FESS, USING COMPUTER-ASSISTED NAVIGATION;  Surgeon: Manish Shaffer MD;  Location: [facility] OR;  Service: ENT;  Laterality: Bilateral;    FUNCTIONAL ENDOSCOPIC SINUS SURGERY (FESS) USING COMPUTER-ASSISTED NAVIGATION  Bilateral 09/25/2020    Procedure: FESS, USING COMPUTER-ASSISTED NAVIGATION SPHENOID;  Surgeon: Matthias Roach MD;  Location: [facility] OR 2ND FLR;  Service: ENT;  Laterality: Bilateral;  TIVA    FUNCTIONAL ENDOSCOPIC SINUS SURGERY (FESS) USING COMPUTER-ASSISTED NAVIGATION Bilateral 09/30/2022    Procedure: FESS, USING COMPUTER-ASSISTED NAVIGATION;  Surgeon: Matthias Roach MD;  Location: [facility] OR 2ND FLR;  Service: ENT;  Laterality: Bilateral;    HYSTERECTOMY  2001    INTRALUMINAL GASTROINTESTINAL TRACT IMAGING VIA CAPSULE N/A 03/03/2023    Procedure: IMAGING PROCEDURE, GI TRACT, INTRALUMINAL, VIA CAPSULE;  Surgeon: First Available Boscobel;  Location: [facility] ENDO;  Service: Endoscopy;  Laterality: N/A;    SINUS SURGERY      WISDOM TOOTH EXTRACTION         Allergies:  Review of patient's allergies indicates:   Allergen Reactions    Fentanyl Other (See Comments)     Rigid Chest Syndrome       Medications:   In-Hospital Scheduled Medications:   ceFEPime (MAXIPIME) IVPB  2 g Intravenous Q8H    cetirizine  5 mg Oral BID    DULoxetine  60 mg Oral BID    fluticasone furoate-vilanteroL  1 puff Inhalation Daily    guaiFENesin  600 mg Oral BID    losartan  100 mg Oral Daily    montelukast  10 mg Oral QHS    pregabalin  150 mg Oral Daily    topiramate  100 mg Oral BID    traZODone  50 mg Oral QHS    vancomycin (VANCOCIN) IV (PEDS and ADULTS)  1,000 mg Intravenous Q12H      In-Hospital PRN Medications:  acetaminophen, albuterol-ipratropium, aluminum-magnesium hydroxide-simethicone, dextrose 10%, dextrose 10%, glucagon (human recombinant), glucose, glucose, melatonin, naloxone, ondansetron, sodium chloride 0.9%, sodium chloride 0.9%, Pharmacy to dose Vancomycin consult **AND** vancomycin - pharmacy to dose   In-Hospital IV Infusion Medications:     Home Medications:  Prior to Admission medications    Medication Sig Start Date End Date Taking? Authorizing Provider   acetaminophen (TYLENOL) 500 MG tablet Take 2 tablets (1,000 mg  total) by mouth every 6 (six) hours as needed for Pain. 9/30/22  Yes Sherwin Daniels MD   albuterol-ipratropium (DUO-NEB) 2.5 mg-0.5 mg/3 mL nebulizer solution Take 3 mLs by nebulization every 6 (six) hours as needed for Wheezing. Rescue 12/1/22 12/1/23 Yes Abrahan Lira MD   b complex vitamins capsule Take 1 capsule by mouth once daily.   Yes Provider, Historical   calcium carbonate/vitamin D3 (VITAMIN D-3 ORAL) Take 2 tablets by mouth once daily.   Yes Provider, Historical   cefadroxil (DURICEF) 500 MG Cap Take 2 capsules (1,000 mg total) by mouth every 12 (twelve) hours. for 14 days 9/30/23 10/14/23 Yes Emperatriz Saini,    cetirizine (ZYRTEC) 10 MG tablet Take 10 mg by mouth 2 (two) times a day.   Yes Provider, Historical   clotrimazole-betamethasone 1-0.05% (LOTRISONE) cream Apply topically 2 (two) times daily. 9/11/23  Yes Kole Chang MD   diltiazem HCl (DILTIAZEM 2% - LIDOCAINE 5% CREAM) Apply peasize amount topically to anal area. 4/21/22  Yes Nicole Leal MD   diphenhydrAMINE-aluminum-magnesium hydroxide-simethicone-LIDOcaine HCl 2% Swish and spit 15 mLs every 4 (four) hours as needed (oral ulcers, pain). 4/21/22  Yes Nicole Leal MD   diphenoxylate-atropine 2.5-0.025 mg (LOMOTIL) 2.5-0.025 mg per tablet Take 1 tablet by mouth 4 (four) times daily as needed for Diarrhea. 9/20/23 12/19/23 Yes Nicole Leal MD   DULoxetine (CYMBALTA) 60 MG capsule Take 1 capsule (60 mg total) by mouth 2 (two) times daily. 3/15/23  Yes Esthela Hu MD   eletriptan (RELPAX) 40 MG tablet Take 1 tablet (40 mg total) by mouth as needed.  Patient taking differently: Take 40 mg by mouth as needed (migraine). 2/7/20  Yes Esthela Hu MD   estradioL (ESTRACE) 2 MG tablet TAKE 1 TABLET DAILY 9/7/23  Yes Kole Chang MD   fish oil/borage/flax/om3,6,9 1 (OMEGA 3-6-9 ORAL) Take 2 tablets by mouth once daily.   Yes Provider, Historical   fluticasone propionate (FLONASE) 50 mcg/actuation nasal  spray 2 sprays (100 mcg total) by Each Nostril route once daily. 5/10/23  Yes Matthias Roach MD   fluticasone-umeclidin-vilanter (TRELEGY ELLIPTA) 100-62.5-25 mcg DsDv Inhale 1 puff into the lungs once daily. 9/25/23  Yes Ivan Riley MD   immun glob G,IgG,-pro-IgA 0-50 (HIZENTRA) 10 gram/50 mL (20 %) Soln Inject 70 mLs (14 g total) into the skin every 7 days. 7/10/22  Yes Milan Bender MD   ipratropium (ATROVENT) 0.02 % nebulizer solution Take by nebulization 4 (four) times daily as needed for Wheezing. 5/10/23  Yes Cayden Schultz   levoFLOXacin (LEVAQUIN) 750 MG tablet Take 1 tablet (750 mg total) by mouth once daily. for 14 days 9/30/23 10/14/23 Yes Emperatriz Saini DO   losartan (COZAAR) 100 MG tablet Take 1 tablet (100 mg total) by mouth once daily.  Patient taking differently: Take 100 mg by mouth daily as needed (high blood pressure (>120/80)). 5/10/22 10/11/23 Yes Esthela Wang PA-C   montelukast (SINGULAIR) 10 mg tablet TAKE 1 TABLET EVERY EVENING 10/4/23  Yes Esthela Hu MD   nortriptyline (PAMELOR) 25 MG capsule Take 1 capsule (25 mg total) by mouth every evening. 4/5/23  Yes Esthela Hu MD   pregabalin (LYRICA) 150 MG capsule Take 1 capsule (150 mg total) by mouth 3 (three) times daily.  Patient taking differently: Take 150 mg by mouth once daily. 4/21/23  Yes Sb Ngo, ARIS   risankizumab-rzaa 360 mg/2.4 mL (150 mg/mL) Injt Inject 360 mg into the skin every 8 weeks. for 6 doses 6/21/23 3/28/24 Yes Nicole Leal MD   rizatriptan (MAXALT) 10 MG tablet TAKE 1 TABLET IF NEEDED FOR MIGRAINES. MAX 2 TABLETS IN 24 HOURS. 5/1/23  Yes Esthela Hu MD   topiramate (TOPAMAX) 100 MG tablet Take 1 tablet (100 mg total) by mouth 2 (two) times daily. 6/12/23  Yes Esthela Hu MD   traZODone (DESYREL) 50 MG tablet Take 1 tablet (50 mg total) by mouth every evening. 3/15/23 3/14/24 Yes Esthela Hu MD   XYLITOL, BULK, MISC EMPTY CONTENTS OF 1 CAPSULE  "INTO NASAL IRRIGATION SYSTEM, ADD DISTILLED WATER, SALT PACK, MIX & IRRIGATE. PERFORM 2 TIMES DAILY 22  Yes Provider, Historical   ZINC ORAL Take 1 tablet by mouth once daily.   Yes Provider, Historical       Family History:  Family History   Problem Relation Age of Onset    Breast cancer Mother     Hypertension Mother     Allergies Mother     Kidney disease Father 64        ESRD on HD    Scleroderma Father     Breast cancer Maternal Grandmother     Heart attack Maternal Grandmother     COPD Maternal Grandmother 72    Cancer Paternal Grandmother 70        colon    Hypertension Brother        Social History:  Social History     Tobacco Use    Smoking status: Never     Passive exposure: Never    Smokeless tobacco: Never   Substance Use Topics    Alcohol use: No    Drug use: No       Review of Systems:  Pertinent items are noted in HPI. All other systems are reviewed and are negative.     Objective:   Last 24 Hour Vital Signs:  BP  Min: 121/71  Max: 161/77  Temp  Av.6 °F (36.4 °C)  Min: 96.7 °F (35.9 °C)  Max: 97.9 °F (36.6 °C)  Pulse  Av  Min: 56  Max: 77  Resp  Av.8  Min: 13  Max: 20  SpO2  Av.6 %  Min: 93 %  Max: 97 %  Height  Av' 7" (170.2 cm)  Min: 5' 7" (170.2 cm)  Max: 5' 7" (170.2 cm)  Weight  Av.3 kg (174 lb 14.6 oz)  Min: 79.3 kg (174 lb 13.2 oz)  Max: 79.4 kg (175 lb)  No intake/output data recorded.    Physical Examination:  GEN: middle-aged woman, laying in bed in NAD  HEENT: face symmetric, conjunctivae anicteric, MMM  CV: regular rhythm, normal rate  PULM: course breath sounds with bilateral expiratory wheezing, normal respiratory effort on rest  Abd: non-distended  Skin: no rashes or skin lesions  Ext: no LE edema  Neuro: alert, answering questions appropriately      Laboratory:  Trended Lab Data:  Recent Labs     10/11/23  1546 10/12/23  0356   WBC 12.36 8.53   HGB 13.9 13.1   HCT 42.0 39.7    217    141   K 3.4* 3.7    113*   CO2 20* 19*   BUN 11 10 "   CREATININE 0.8 0.8    99   BILITOT 0.4 0.4   AST 24 20   ALT 17 14   ALKPHOS 101 89   CALCIUM 9.1 8.6*   ALBUMIN 3.4* 2.9*   PROT 7.9 7.1   MG 1.8 2.0   PHOS  --  4.1       Cardiac:   Recent Labs   Lab 10/11/23  1546   TROPONINI <0.006   BNP 68     Microbiology:  Microbiology Results (last 7 days)       Procedure Component Value Units Date/Time    Culture, Respiratory with Gram Stain [[id number]]     Order Status: No result Specimen: Respiratory     Culture, Respiratory with Gram Stain [[id number]] Collected: 10/11/23 1758    Order Status: Completed Specimen: Sputum Updated: 10/12/23 0422     Respiratory Culture CANCELED     Comment: Result canceled by the ancillary.        Gram Stain (Respiratory) >10epis/lfp and <than many WBC's     Gram Stain (Respiratory) Predominance of oropharyngeal binu. Please recollect.    Blood culture #1 **CANNOT BE ORDERED STAT** [[id number]] Collected: 10/11/23 1546    Order Status: Completed Specimen: Blood from Peripheral, Antecubital, Left Updated: 10/12/23 0115     Blood Culture, Routine No Growth to date    Blood culture #2 **CANNOT BE ORDERED STAT** [[id number]] Collected: 10/11/23 1545    Order Status: Completed Specimen: Blood from Peripheral, Antecubital, Right Updated: 10/12/23 0115     Blood Culture, Routine No Growth to date    Influenza A & B by Molecular [[id number]] Collected: 10/11/23 1558    Order Status: Completed Specimen: Nasopharyngeal Swab Updated: 10/11/23 1648     Influenza A, Molecular Negative     Influenza B, Molecular Negative     Flu A & B Source Nasal swab            Radiology:  CT Chest Without Contrast   Final Result      Patchy bilateral ground-glass pulmonary airspace opacities have increased since 09/27/2023.  Although the differential diagnosis is extensive, a leading consideration is likely COVID-19 pneumonia.  Correlate clinically, and with follow-up chest CT within 3-6 months to help further exclude other underlying pathology, such as  carcinoma in-situ/bronchioloalveolar carcinoma.      Mildly prominent/enlarged mediastinal lymph nodes, similar to the comparison scan and favored to represent reactive lymphadenopathy.  The follow-up chest CT recommended above could also help further characterize this, and help exclude underlying neoplasm or lymphoproliferative disorder.      Some predominantly bibasilar, bronchial occlusions remain and are most likely related to mucous plugging.  Follow-up imaging again recommended, as above, to help further exclude underlying neoplasm.         Electronically signed by: Jessee Cash   Date:    10/11/2023   Time:    18:10          I have personally reviewed the above labs and imaging.    Current Medications:     Infusions:       Scheduled:   ceFEPime (MAXIPIME) IVPB  2 g Intravenous Q8H    cetirizine  5 mg Oral BID    DULoxetine  60 mg Oral BID    fluticasone furoate-vilanteroL  1 puff Inhalation Daily    guaiFENesin  600 mg Oral BID    losartan  100 mg Oral Daily    montelukast  10 mg Oral QHS    pregabalin  150 mg Oral Daily    topiramate  100 mg Oral BID    traZODone  50 mg Oral QHS    vancomycin (VANCOCIN) IV (PEDS and ADULTS)  1,000 mg Intravenous Q12H        PRN:  acetaminophen, albuterol-ipratropium, aluminum-magnesium hydroxide-simethicone, dextrose 10%, dextrose 10%, glucagon (human recombinant), glucose, glucose, melatonin, naloxone, ondansetron, sodium chloride 0.9%, sodium chloride 0.9%, Pharmacy to dose Vancomycin consult **AND** vancomycin - pharmacy to dose     Assessment:     Jaylin Murguia is a 58 y.o. female with:  Patient Active Problem List    Diagnosis Date Noted    Multifocal pneumonia 10/11/2023    Other eosinophilia 04/24/2023    Chronic cough 01/03/2023    Abnormal CT scan, lung 05/22/2022    Chronic rhinosinusitis 09/25/2020    Abdominal pain 03/12/2020    Hypogammaglobulinemia 12/24/2019    Severe persistent asthma without complication 10/16/2019    Chronic pansinusitis 07/18/2019     Mild aortic insufficiency 05/06/2019    Other hyperlipidemia 04/01/2019    Tortuous aorta 04/01/2019    Bilateral primary osteoarthritis of knee 02/26/2019    Primary osteoarthritis of right knee 02/26/2019    Primary osteoarthritis of left knee 02/26/2019    TAMIKA on CPAP 05/01/2018    Crohn's disease 03/27/2018    Hormone replacement therapy (postmenopausal) 11/19/2017    Long-term use of immunosuppressant medication 11/19/2017    Insomnia due to medical condition 10/20/2016    Diarrhea 05/07/2015    Fibromyalgia     Migraine     Crohn's disease of small intestine     Sciatica     Allergic rhinitis     Essential hypertension 07/30/2013        Plan:     # Severe persistent asthma  On trelegy and atrovent at home as well as prednisone. Significant wheezing on exam.  - hold on any prednisone while awaiting diagnostic bronch    # Peripheral eosinophilia   # Dyspnea  # Bilateral ground glass opacities  Patient presents with worsening dyspnea with bilateral ground glass opacities on imaging. Labs with significant peripheral eosinophilia. Differential includes eosinophilic pneumonia, hypersensitivity pneumonitis, infection, vasculitis. She's had these CT findings dating back at least to 5/2022. Considered drug toxicity but timeline would not fit for reaction to risankizumab (started a few months ago) and only other treatment for Crohn's disease is IgG. She has chickens at home so hypersensitivity pneumonitis is certainly a possibility. Plan for diagnostic bronchoscopy on 10/13 for further diagnosis. Due to prior adverse reaction to fentanyl will plan for bronchoscopy with anesthesia in the OR.   - bronchoscopy 10/13  - fu ANCA, PR3, MPO, Chicken IgG, IgE  - recommended patient strongly consider rehoming her chickens     # Chronic rhinosinusitis  Complex history of recurrent pseudomonal infections of the sinuses. On aggressive sinus regimen as an outpatient.     Thank you for allowing us to participate in the care of this  patient. Please contact me if you have any questions regarding this consult.    Mary Jo Monsivais MD  [facility] Pulmonary & Critical Care Medicine Fellow

## 2023-10-12 NOTE — SUBJECTIVE & OBJECTIVE
Past Medical History:   Diagnosis Date    Allergic rhinitis     Asthma     Chronic pansinusitis     Crohn's disease     Ileal involvement, previously on Remicade, Asacol, Prednisone    Fibromyalgia     Hyperlipidemia     Hypertension     Immunosuppression     Migraine     Obstructive sleep apnea     CPAP at night    Sciatica        Past Surgical History:   Procedure Laterality Date    BLADDER SURGERY      sling was created by her muscles     BRONCHOSCOPY N/A 2023    Procedure: BRONCHOSCOPY;  Surgeon: [facility]cindy Diagnostic Provider;  Location: [facility] OR [address] [place] [address];  Service: Anesthesiology;  Laterality: N/A;     SECTION      COLONOSCOPY N/A 2017    Procedure: COLONOSCOPY;  Surgeon: Kin Dyer MD;  Location: [facility];  Service: Endoscopy;  Laterality: N/A;    COLONOSCOPY N/A 2018    Procedure: COLONOSCOPY;  Surgeon: Kyra Vallecillo MD;  Location: [facility];  Service: Endoscopy;  Laterality: N/A;    COLONOSCOPY N/A 2020    Procedure: COLONOSCOPY;  Surgeon: Nicole Leal MD;  Location: [facility];  Service: Endoscopy;  Laterality: N/A;    COLONOSCOPY N/A 2022    Procedure: COLONOSCOPY;  Surgeon: Shay Bruce MD;  Location: [facility];  Service: Endoscopy;  Laterality: N/A;    COLONOSCOPY N/A 2023    Procedure: COLONOSCOPY;  Surgeon: Nicole Leal MD;  Location: [facility];  Service: Endoscopy;  Laterality: N/A;    DEBRIDEMENT Bilateral 2020    Procedure: DEBRIDEMENT;  Surgeon: Matthias Roach MD;  Location: [facility] OR [address] [place] [address];  Service: ENT;  Laterality: Bilateral;    ESOPHAGOGASTRODUODENOSCOPY N/A 2020    Procedure: ESOPHAGOGASTRODUODENOSCOPY (EGD);  Surgeon: Nicole Leal MD;  Location: [facility];  Service: Endoscopy;  Laterality: N/A;    ESOPHAGOGASTRODUODENOSCOPY N/A 2022    Procedure: EGD (ESOPHAGOGASTRODUODENOSCOPY);  Surgeon: Shay Bruce MD;  Location: [facility];  Service: Endoscopy;  Laterality: N/A;     ESOPHAGOGASTRODUODENOSCOPY N/A 02/02/2023    Procedure: EGD (ESOPHAGOGASTRODUODENOSCOPY);  Surgeon: Nicole Leal MD;  Location: [facility];  Service: Endoscopy;  Laterality: N/A;    FINGER SURGERY      joint relpacement, left hand index finger    FUNCTIONAL ENDOSCOPIC SINUS SURGERY (FESS) USING COMPUTER-ASSISTED NAVIGATION Bilateral 07/31/2019    Procedure: FESS, USING COMPUTER-ASSISTED NAVIGATION;  Surgeon: Manish Shaffer MD;  Location: [facility] OR;  Service: ENT;  Laterality: Bilateral;    FUNCTIONAL ENDOSCOPIC SINUS SURGERY (FESS) USING COMPUTER-ASSISTED NAVIGATION Bilateral 09/25/2020    Procedure: FESS, USING COMPUTER-ASSISTED NAVIGATION SPHENOID;  Surgeon: Matthias Roach MD;  Location: [facility] OR [address] [place] [address];  Service: ENT;  Laterality: Bilateral;  TIVA    FUNCTIONAL ENDOSCOPIC SINUS SURGERY (FESS) USING COMPUTER-ASSISTED NAVIGATION Bilateral 09/30/2022    Procedure: FESS, USING COMPUTER-ASSISTED NAVIGATION;  Surgeon: Matthias Roach MD;  [facility];  Service: ENT;  Laterality: Bilateral;    HYSTERECTOMY  2001    INTRALUMINAL GASTROINTESTINAL TRACT IMAGING VIA CAPSULE N/A 03/03/2023    Procedure: IMAGING PROCEDURE, GI TRACT, INTRALUMINAL, VIA CAPSULE;  Surgeon: First Available Woody;  Location: [facility];  Service: Endoscopy;  Laterality: N/A;    SINUS SURGERY      WISDOM TOOTH EXTRACTION         Review of patient's allergies indicates:   Allergen Reactions    Fentanyl Other (See Comments)     Rigid Chest Syndrome       Medications:  Medications Prior to Admission   Medication Sig    acetaminophen (TYLENOL) 500 MG tablet Take 2 tablets (1,000 mg total) by mouth every 6 (six) hours as needed for Pain.    albuterol-ipratropium (DUO-NEB) 2.5 mg-0.5 mg/3 mL nebulizer solution Take 3 mLs by nebulization every 6 (six) hours as needed for Wheezing. Rescue    b complex vitamins capsule Take 1 capsule by mouth once daily.    calcium carbonate/vitamin D3 (VITAMIN D-3 ORAL) Take 2 tablets by mouth once  daily.    cefadroxil (DURICEF) 500 MG Cap Take 2 capsules (1,000 mg total) by mouth every 12 (twelve) hours. for 14 days    cetirizine (ZYRTEC) 10 MG tablet Take 10 mg by mouth 2 (two) times a day.    clotrimazole-betamethasone 1-0.05% (LOTRISONE) cream Apply topically 2 (two) times daily.    diltiazem HCl (DILTIAZEM 2% - LIDOCAINE 5% CREAM) Apply peasize amount topically to anal area.    diphenhydrAMINE-aluminum-magnesium hydroxide-simethicone-LIDOcaine HCl 2% Swish and spit 15 mLs every 4 (four) hours as needed (oral ulcers, pain).    diphenoxylate-atropine 2.5-0.025 mg (LOMOTIL) 2.5-0.025 mg per tablet Take 1 tablet by mouth 4 (four) times daily as needed for Diarrhea.    DULoxetine (CYMBALTA) 60 MG capsule Take 1 capsule (60 mg total) by mouth 2 (two) times daily.    eletriptan (RELPAX) 40 MG tablet Take 1 tablet (40 mg total) by mouth as needed. (Patient taking differently: Take 40 mg by mouth as needed (migraine).)    estradioL (ESTRACE) 2 MG tablet TAKE 1 TABLET DAILY    fish oil/borage/flax/om3,6,9 1 (OMEGA 3-6-9 ORAL) Take 2 tablets by mouth once daily.    fluticasone propionate (FLONASE) 50 mcg/actuation nasal spray 2 sprays (100 mcg total) by Each Nostril route once daily.    fluticasone-umeclidin-vilanter (TRELEGY ELLIPTA) 100-62.5-25 mcg DsDv Inhale 1 puff into the lungs once daily.    immun glob G,IgG,-pro-IgA 0-50 (HIZENTRA) 10 gram/50 mL (20 %) Soln Inject 70 mLs (14 g total) into the skin every 7 days.    ipratropium (ATROVENT) 0.02 % nebulizer solution Take by nebulization 4 (four) times daily as needed for Wheezing.    levoFLOXacin (LEVAQUIN) 750 MG tablet Take 1 tablet (750 mg total) by mouth once daily. for 14 days    losartan (COZAAR) 100 MG tablet Take 1 tablet (100 mg total) by mouth once daily. (Patient taking differently: Take 100 mg by mouth daily as needed (high blood pressure (>120/80)).)    montelukast (SINGULAIR) 10 mg tablet TAKE 1 TABLET EVERY EVENING    nortriptyline (PAMELOR) 25 MG  capsule Take 1 capsule (25 mg total) by mouth every evening.    pregabalin (LYRICA) 150 MG capsule Take 1 capsule (150 mg total) by mouth 3 (three) times daily. (Patient taking differently: Take 150 mg by mouth once daily.)    risankizumab-rzaa 360 mg/2.4 mL (150 mg/mL) Injt Inject 360 mg into the skin every 8 weeks. for 6 doses    rizatriptan (MAXALT) 10 MG tablet TAKE 1 TABLET IF NEEDED FOR MIGRAINES. MAX 2 TABLETS IN 24 HOURS.    topiramate (TOPAMAX) 100 MG tablet Take 1 tablet (100 mg total) by mouth 2 (two) times daily.    traZODone (DESYREL) 50 MG tablet Take 1 tablet (50 mg total) by mouth every evening.    XYLITOL, BULK, MISC EMPTY CONTENTS OF 1 CAPSULE INTO NASAL IRRIGATION SYSTEM, ADD DISTILLED WATER, SALT PACK, MIX & IRRIGATE. PERFORM 2 TIMES DAILY    ZINC ORAL Take 1 tablet by mouth once daily.     Antibiotics (From admission, onward)      Start     Stop Route Frequency Ordered    10/12/23 2100  doxycycline tablet 100 mg         -- Oral Every 12 hours 10/12/23 1344    10/12/23 1445  meropenem (MERREM) 2 g in sodium chloride 0.9% 100 mL IVPB         -- IV Every 8 hours (non-standard times) 10/12/23 1344          Antifungals (From admission, onward)      Start     Stop Route Frequency Ordered    10/12/23 1500  fluconazole tablet 200 mg         10/19/23 0859 Oral Daily 10/12/23 1348          Antivirals (From admission, onward)      None             Immunization History   Administered Date(s) Administered    COVID-19, MRNA, LN-S, PF (Pfizer) (Purple Cap) 03/05/2021, 04/15/2021    Hepatitis A / Hepatitis B 12/12/2019    Influenza 10/29/2013    Influenza - Quadrivalent - PF *Preferred* (6 months and older) 11/15/2017, 01/28/2020, 02/11/2021    Influenza Split 10/29/2013    PPD Test 05/22/2017    Pneumococcal Conjugate - 13 Valent 12/23/2019    Pneumococcal Polysaccharide - 23 Valent 10/29/2013    Tdap 02/18/2014       Family History       Problem Relation (Age of Onset)    Allergies Mother    Breast cancer  Mother, Maternal Grandmother    COPD Maternal Grandmother (72)    Cancer Paternal Grandmother (70)    Heart attack Maternal Grandmother    Hypertension Mother, Brother    Kidney disease Father (64)    Scleroderma Father          Social History     Socioeconomic History    Marital status:     Number of children: 2   Occupational History    Occupation: teacher   Tobacco Use    Smoking status: Never     Passive exposure: Never    Smokeless tobacco: Never   Substance and Sexual Activity    Alcohol use: No    Drug use: No    Sexual activity: Yes     Partners: Male   Social History Narrative    Surrogate Decision Maker: , Cristobal Murguia, [phone]     Social Determinants of Health     Financial Resource Strain: Low Risk  (8/25/2022)    Overall Financial Resource Strain (CARDIA)     Difficulty of Paying Living Expenses: Not very hard   Food Insecurity: No Food Insecurity (8/24/2023)    Hunger Vital Sign     Worried About Running Out of Food in the Last Year: Never true     Ran Out of Food in the Last Year: Never true   Transportation Needs: No Transportation Needs (8/24/2023)    PRAPARE - Transportation     Lack of Transportation (Medical): No     Lack of Transportation (Non-Medical): No   Physical Activity: Inactive (8/24/2023)    Exercise Vital Sign     Days of Exercise per Week: 0 days     Minutes of Exercise per Session: 0 min   Stress: Stress Concern Present (8/24/2023)    [employer] - Occupational Stress Questionnaire     Feeling of Stress : Rather much   Social Connections: Unknown (8/24/2023)    Social Connection and Isolation Panel [NHANES]     Frequency of Communication with Friends and Family: Twice a week     Frequency of Social Gatherings with Friends and Family: Once a week     Attends Club or Organization Meetings: More than 4 times per year     Marital Status:    Housing Stability: Low Risk  (8/24/2023)    Housing Stability Vital Sign     Unable to  Pay for Housing in the Last Year: No     Number of Places Lived in the Last Year: 1     Unstable Housing in the Last Year: No     Review of Systems   Constitutional:  Negative for chills and fever.   HENT:  Positive for congestion, postnasal drip, sinus pressure and sinus pain.    Eyes:  Negative for discharge.   Gastrointestinal:  Negative for constipation, diarrhea, nausea and vomiting.   Genitourinary:  Negative for dysuria and urgency.   Skin:  Negative for rash and wound.     Objective:     Vital Signs (Most Recent):  Temp: 98 °F (36.7 °C) (10/12/23 1548)  Pulse: 72 (10/12/23 1548)  Resp: 16 (10/12/23 1548)  BP: (!) 134/95 (10/12/23 1251)  SpO2: (!) 94 % (10/12/23 1548) Vital Signs (24h Range):  Temp:  [96.7 °F (35.9 °C)-98.2 °F (36.8 °C)] 98 °F (36.7 °C)  Pulse:  [56-74] 72  Resp:  [13-20] 16  SpO2:  [92 %-97 %] 94 %  BP: (122-161)/(65-95) 134/95     Weight: 79.3 kg (174 lb 13.2 oz)  Body mass index is 27.38 kg/m².    Estimated Creatinine Clearance: 83.1 mL/min (based on SCr of 0.8 mg/dL).     Physical Exam  Constitutional:       General: She is not in acute distress.     Appearance: She is not ill-appearing or toxic-appearing.   HENT:      Head: Normocephalic and atraumatic.      Comments: Posterior tongue with white patch, possible leukoplakia, can be scraped off     Nose: Congestion present.   Eyes:      General: No scleral icterus.     Conjunctiva/sclera: Conjunctivae normal.   Cardiovascular:      Rate and Rhythm: Normal rate and regular rhythm.      Heart sounds: Normal heart sounds.   Pulmonary:      Effort: Pulmonary effort is normal.      Comments: Mildly coarse breath sounds bilaterally  Abdominal:      General: There is no distension.      Palpations: Abdomen is soft.   Musculoskeletal:      Right lower leg: No edema.      Left lower leg: No edema.   Lymphadenopathy:      Cervical: No cervical adenopathy.   Skin:     General: Skin is warm and dry.   Neurological:      Mental Status: She is alert and  oriented to person, place, and time.   Psychiatric:         Mood and Affect: Mood normal.         Behavior: Behavior normal.          Significant Labs: All pertinent labs within the past 24 hours have been reviewed.    Significant Imaging: I have reviewed all pertinent imaging results/findings within the past 24 hours.

## 2023-10-12 NOTE — NURSING
"Nurses Note -- 4 Eyes      10/11/2023   11:36 PM      Skin assessed during: Admit      [x] No Altered Skin Integrity Present    [x]Prevention Measures Documented      [] Yes- Altered Skin Integrity Present or Discovered   [] LDA Added if Not in Epic (Describe Wound)   [] New Altered Skin Integrity was Present on Admit and Documented in LDA   [] Wound Image Taken    Wound Care Consulted? No    Attending Nurse: Litzy Crowell Lpn     Second RN/Staff Member:Roberto GRANADOS    Pt stated"I don't have any skin issues."             "

## 2023-10-12 NOTE — H&P
[facility] Medicine  History & Physical    Patient Name: Jaylni Murguia  MRN: [id number]  Patient Class: IP- Inpatient  Admission Date: 10/11/2023  Attending Physician: Mumtaz Medina MD   Primary Care Provider: Esthela Hu MD         Patient information was obtained from patient, past medical records and ER records.     Subjective:     Principal Problem:Multifocal pneumonia    Chief Complaint:   Chief Complaint   Patient presents with    Shortness of Breath     Sob x 1 week.         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 Patient has been treated since September 27th with cefadroxil levofloxacin due to acute on chronic sinusitis and pneumonia diagnosed on CT.  Patient noted to have significant mucus plugging.  Patient's symptoms have failed to improve.  She presents with progressive shortness of breath and episodes of difficulty breathing overnight.  She reports decreased activity tolerance for this.  She denies richard orthopnea or extremity swelling.  She has no chest pain.  She denies fever chills.  She reports compliance with her medications.    In the ED patient afebrile and hemodynamically stable saturating in mid 90's on room air at rest. CT with worsening ground glass opacities. Patient started on broad spectrum abx and admitted to the care of medicine for further evaluation and management.       Past Medical History:   Diagnosis Date    Allergic rhinitis     Asthma     Chronic pansinusitis     Crohn's disease     Ileal involvement, previously on Remicade, Asacol, Prednisone    Fibromyalgia     Hyperlipidemia     Hypertension     Immunosuppression 20020    Migraine      Obstructive sleep apnea     CPAP at night    Sciatica        Past Surgical History:   Procedure Laterality Date    BLADDER SURGERY      sling was created by her muscles     BRONCHOSCOPY N/A 2023    Procedure: BRONCHOSCOPY;  Surgeon: [facility];  [facility];  Service: Anesthesiology;  Laterality: N/A;     SECTION      COLONOSCOPY N/A 2017    Procedure: COLONOSCOPY;  Surgeon: Kin Dyer MD;  Location: [facility] ENDO;  Service: Endoscopy;  Laterality: N/A;    COLONOSCOPY N/A 2018    Procedure: COLONOSCOPY;  Surgeon: Kyra Vallecillo MD;  Location: [facility];  Service: Endoscopy;  Laterality: N/A;    COLONOSCOPY N/A 2020    Procedure: COLONOSCOPY;  Surgeon: Nicole Leal MD;  Location: [facility];  Service: Endoscopy;  Laterality: N/A;    COLONOSCOPY N/A 2022    Procedure: COLONOSCOPY;  Surgeon: Shay Bruce MD;  Location: [facility];  Service: Endoscopy;  Laterality: N/A;    COLONOSCOPY N/A 2023    Procedure: COLONOSCOPY;  Surgeon: Nicole Leal MD;  Location: [facility];  Service: Endoscopy;  Laterality: N/A;    DEBRIDEMENT Bilateral 2020    Procedure: DEBRIDEMENT;  Surgeon: Matthias Roach MD;  Location: [facility] OR [address] [place] [address];  Service: ENT;  Laterality: Bilateral;    ESOPHAGOGASTRODUODENOSCOPY N/A 2020    Procedure: ESOPHAGOGASTRODUODENOSCOPY (EGD);  Surgeon: Nicole Leal MD;  Location: [facility];  Service: Endoscopy;  Laterality: N/A;    ESOPHAGOGASTRODUODENOSCOPY N/A 2022    Procedure: EGD (ESOPHAGOGASTRODUODENOSCOPY);  Surgeon: Shay Bruce MD;  Location: [facility];  Service: Endoscopy;  Laterality: N/A;    ESOPHAGOGASTRODUODENOSCOPY N/A 2023    Procedure: EGD (ESOPHAGOGASTRODUODENOSCOPY);  Surgeon: Nicole Leal MD;  Location: [facility];  Service: Endoscopy;  Laterality: N/A;    FINGER SURGERY      joint relpacement, left hand index finger    FUNCTIONAL  ENDOSCOPIC SINUS SURGERY (FESS) USING COMPUTER-ASSISTED NAVIGATION Bilateral 07/31/2019    Procedure: FESS, USING COMPUTER-ASSISTED NAVIGATION;  Surgeon: Manish Shaffer MD;  Location: [facility] OR;  Service: ENT;  Laterality: Bilateral;    FUNCTIONAL ENDOSCOPIC SINUS SURGERY (FESS) USING COMPUTER-ASSISTED NAVIGATION Bilateral 09/25/2020    Procedure: FESS, USING COMPUTER-ASSISTED NAVIGATION SPHENOID;  Surgeon: Matthias Roach MD;  Location: [facility] OR 2ND FLR;  Service: ENT;  Laterality: Bilateral;  TIVA    FUNCTIONAL ENDOSCOPIC SINUS SURGERY (FESS) USING COMPUTER-ASSISTED NAVIGATION Bilateral 09/30/2022    Procedure: FESS, USING COMPUTER-ASSISTED NAVIGATION;  Surgeon: Matthias Roach MD;  Location: [facility] OR 2ND FLR;  Service: ENT;  Laterality: Bilateral;    HYSTERECTOMY  2001    INTRALUMINAL GASTROINTESTINAL TRACT IMAGING VIA CAPSULE N/A 03/03/2023    Procedure: IMAGING PROCEDURE, GI TRACT, INTRALUMINAL, VIA CAPSULE;  Surgeon: First Available [place];  Location: [facility] ENDO;  Service: Endoscopy;  Laterality: N/A;    SINUS SURGERY      WISDOM TOOTH EXTRACTION         Review of patient's allergies indicates:   Allergen Reactions    Fentanyl Other (See Comments)     Rigid Chest Syndrome       Current Facility-Administered Medications on File Prior to Encounter   Medication    lactated ringers infusion    lactated ringers infusion    lidocaine (PF) 10 mg/ml (1%) injection 10 mg    LIDOcaine (PF) 10 mg/ml (1%) injection 10 mg    sodium chloride 0.9% flush 10 mL     Current Outpatient Medications on File Prior to Encounter   Medication Sig    acetaminophen (TYLENOL) 500 MG tablet Take 2 tablets (1,000 mg total) by mouth every 6 (six) hours as needed for Pain.    albuterol-ipratropium (DUO-NEB) 2.5 mg-0.5 mg/3 mL nebulizer solution Take 3 mLs by nebulization every 6 (six) hours as needed for Wheezing. Rescue    b complex vitamins capsule Take 1 capsule by mouth once daily.    calcium carbonate/vitamin D3 (VITAMIN  D-3 ORAL) Take 2 tablets by mouth once daily.    cefadroxil (DURICEF) 500 MG Cap Take 2 capsules (1,000 mg total) by mouth every 12 (twelve) hours. for 14 days    cetirizine (ZYRTEC) 10 MG tablet Take 10 mg by mouth 2 (two) times a day.    clotrimazole-betamethasone 1-0.05% (LOTRISONE) cream Apply topically 2 (two) times daily.    diltiazem HCl (DILTIAZEM 2% - LIDOCAINE 5% CREAM) Apply peasize amount topically to anal area.    diphenhydrAMINE-aluminum-magnesium hydroxide-simethicone-LIDOcaine HCl 2% Swish and spit 15 mLs every 4 (four) hours as needed (oral ulcers, pain).    diphenoxylate-atropine 2.5-0.025 mg (LOMOTIL) 2.5-0.025 mg per tablet Take 1 tablet by mouth 4 (four) times daily as needed for Diarrhea.    DULoxetine (CYMBALTA) 60 MG capsule Take 1 capsule (60 mg total) by mouth 2 (two) times daily.    eletriptan (RELPAX) 40 MG tablet Take 1 tablet (40 mg total) by mouth as needed. (Patient taking differently: Take 40 mg by mouth as needed (migraine).)    estradioL (ESTRACE) 2 MG tablet TAKE 1 TABLET DAILY    fish oil/borage/flax/om3,6,9 1 (OMEGA 3-6-9 ORAL) Take 2 tablets by mouth once daily.    fluticasone propionate (FLONASE) 50 mcg/actuation nasal spray 2 sprays (100 mcg total) by Each Nostril route once daily.    fluticasone-umeclidin-vilanter (TRELEGY ELLIPTA) 100-62.5-25 mcg DsDv Inhale 1 puff into the lungs once daily.    immun glob G,IgG,-pro-IgA 0-50 (HIZENTRA) 10 gram/50 mL (20 %) Soln Inject 70 mLs (14 g total) into the skin every 7 days.    ipratropium (ATROVENT) 0.02 % nebulizer solution Take by nebulization 4 (four) times daily as needed for Wheezing.    levoFLOXacin (LEVAQUIN) 750 MG tablet Take 1 tablet (750 mg total) by mouth once daily. for 14 days    losartan (COZAAR) 100 MG tablet Take 1 tablet (100 mg total) by mouth once daily. (Patient taking differently: Take 100 mg by mouth daily as needed (high blood pressure (>120/80)).)    montelukast (SINGULAIR) 10 mg tablet TAKE  1 TABLET EVERY EVENING    nortriptyline (PAMELOR) 25 MG capsule Take 1 capsule (25 mg total) by mouth every evening.    pregabalin (LYRICA) 150 MG capsule Take 1 capsule (150 mg total) by mouth 3 (three) times daily. (Patient taking differently: Take 150 mg by mouth once daily.)    risankizumab-rzaa 360 mg/2.4 mL (150 mg/mL) Injt Inject 360 mg into the skin every 8 weeks. for 6 doses    rizatriptan (MAXALT) 10 MG tablet TAKE 1 TABLET IF NEEDED FOR MIGRAINES. MAX 2 TABLETS IN 24 HOURS.    topiramate (TOPAMAX) 100 MG tablet Take 1 tablet (100 mg total) by mouth 2 (two) times daily.    traZODone (DESYREL) 50 MG tablet Take 1 tablet (50 mg total) by mouth every evening.    XYLITOL, BULK, MISC EMPTY CONTENTS OF 1 CAPSULE INTO NASAL IRRIGATION SYSTEM, ADD DISTILLED WATER, SALT PACK, MIX & IRRIGATE. PERFORM 2 TIMES DAILY    ZINC ORAL Take 1 tablet by mouth once daily.    [DISCONTINUED] ASACOL  mg TbEC Take 800 mg by mouth 3 (three) times daily.    [DISCONTINUED] butalbital-acetaminophen-caffeine -40 mg (FIORICET, ESGIC) -40 mg per tablet TAKE 1 TABLET EVERY 4 HOURS AS NEEDED FOR HEADACHE    [DISCONTINUED] cholestyramine (QUESTRAN) 4 gram packet Take 1 packet (4 g total) by mouth 3 (three) times daily with meals.    [DISCONTINUED] propranoloL (INDERAL LA) 80 MG 24 hr capsule Take 1 capsule (80 mg total) by mouth once daily.    [DISCONTINUED] sodium chloride 0.9% 0.9 % Soln 200 mL with gentamicin (ped) 20 mg/2 mL Soln EMPTY CONTENTS OF 1 CAPSULE INTO NASAL IRRIGATION SYSTEM, ADD DISTILLED WATER, SALT PACK, MIX & IRRIGATE. PERFORM 2 TIMES DAILY    [DISCONTINUED] sodium,potassium,mag sulfates (SUPREP BOWEL PREP KIT) 17.5-3.13-1.6 gram SolR Take by mouth.    [DISCONTINUED] triamcinolone acetonide 0.025% (KENALOG) 0.025 % cream 1 application once daily. Apply to affected area    [DISCONTINUED] VENTOLIN HFA 90 mcg/actuation inhaler INHALE 2 PUFFS INTO THE LUNGS EVERY 4 TO 6 HOURS AS NEEDED FOR  WHEEZING.     Family History       Problem Relation (Age of Onset)    Allergies Mother    Breast cancer Mother, Maternal Grandmother    COPD Maternal Grandmother (72)    Cancer Paternal Grandmother (70)    Heart attack Maternal Grandmother    Hypertension Mother, Brother    Kidney disease Father (64)    Scleroderma Father          Tobacco Use    Smoking status: Never     Passive exposure: Never    Smokeless tobacco: Never   Substance and Sexual Activity    Alcohol use: No    Drug use: No    Sexual activity: Yes     Partners: Male     Review of Systems   Constitutional:  Positive for chills and fatigue. Negative for fever.   HENT:  Negative for sore throat and trouble swallowing.    Eyes:  Negative for photophobia and visual disturbance.   Respiratory:  Positive for cough and shortness of breath. Negative for wheezing.    Cardiovascular:  Negative for chest pain, palpitations and leg swelling.   Gastrointestinal:  Negative for abdominal distention, abdominal pain, diarrhea, nausea and vomiting.   Genitourinary:  Negative for dysuria and hematuria.   Musculoskeletal:  Negative for neck pain and neck stiffness.   Skin:  Negative for rash and wound.   Neurological:  Positive for weakness. Negative for seizures, syncope, light-headedness, numbness and headaches.   Psychiatric/Behavioral:  Negative for confusion and decreased concentration.      Objective:     Vital Signs (Most Recent):  Temp: 96.7 °F (35.9 °C) (10/11/23 2050)  Pulse: 74 (10/11/23 2050)  Resp: 18 (10/11/23 2050)  BP: 138/89 (10/11/23 2050)  SpO2: (!) 93 % (10/11/23 2050) Vital Signs (24h Range):  Temp:  [96.7 °F (35.9 °C)-97.6 °F (36.4 °C)] 96.7 °F (35.9 °C)  Pulse:  [70-77] 74  Resp:  [13-20] 18  SpO2:  [93 %-97 %] 93 %  BP: (121-161)/(71-89) 138/89     Weight: 79.4 kg (175 lb)  Body mass index is 27.41 kg/m².     Physical Exam  Constitutional:       General: She is not in acute distress.     Appearance: She is not toxic-appearing or diaphoretic.    HENT:      Head: Normocephalic and atraumatic.      Nose: Nose normal.   Eyes:      General: No scleral icterus.     Extraocular Movements: Extraocular movements intact.      Pupils: Pupils are equal, round, and reactive to light.   Cardiovascular:      Rate and Rhythm: Normal rate and regular rhythm.   Pulmonary:      Effort: Pulmonary effort is normal. No respiratory distress.      Breath sounds: Rales present. No wheezing.   Abdominal:      General: Abdomen is flat. There is no distension.      Palpations: Abdomen is soft.      Tenderness: There is no abdominal tenderness. There is no guarding.   Musculoskeletal:         General: Normal range of motion.      Right lower leg: No edema.      Left lower leg: No edema.   Skin:     General: Skin is warm and dry.      Coloration: Skin is not jaundiced.   Neurological:      General: No focal deficit present.      Mental Status: She is alert and oriented to person, place, and time.      Cranial Nerves: No cranial nerve deficit.   Psychiatric:         Mood and Affect: Mood normal.         Behavior: Behavior normal.              CRANIAL NERVES     CN III, IV, VI   Pupils are equal, round, and reactive to light.       Significant Labs: All pertinent labs within the past 24 hours have been reviewed.  CBC:   Recent Labs   Lab 10/11/23  1546   WBC 12.36   HGB 13.9   HCT 42.0        CMP:   Recent Labs   Lab 10/11/23  1546      K 3.4*      CO2 20*      BUN 11   CREATININE 0.8   CALCIUM 9.1   PROT 7.9   ALBUMIN 3.4*   BILITOT 0.4   ALKPHOS 101   AST 24   ALT 17   ANIONGAP 8       Significant Imaging: I have reviewed all pertinent imaging results/findings within the past 24 hours.    Assessment/Plan:     * Multifocal pneumonia  - CT with worsening ground glass opacities with mucus plugging  - Prior respiratory cultures with multi resistant pseudomonas and staph aureus  - follow up respiratory cultures  - continue cefepime and vancomycin  - ID and  Pulmonology consults in am  - npo midnight in case of decision for procedure/bronch  - further management pending clinical course and future study review      Severe persistent asthma without complication  - daily LABA-ICS  - duonebs prn      Insomnia due to medical condition  - continue home meds      Essential hypertension  Continue home losartan      Crohn's disease of small intestine  - taking skyrizi  ;  Hold for now given current infection        VTE Risk Mitigation (From admission, onward)         Ordered     IP VTE LOW RISK PATIENT  Once         10/11/23 1915     Place sequential compression device  Until discontinued         10/11/23 1915                           Mumtaz Medina MD  Department of Hospital Medicine  [facility] - Med Surg

## 2023-10-12 NOTE — PROGRESS NOTES
Pharmacist Renal Dose Adjustment Note    Jaylin Murguia is a 58 y.o. female being treated with the medication cefepime.    Patient Data:    Vital Signs (Most Recent):  Temp: 97.7 °F (36.5 °C) (10/12/23 0714)  Pulse: (!) 56 (10/12/23 0714)  Resp: 18 (10/12/23 0714)  BP: 122/69 (10/12/23 0714)  SpO2: 97 % (10/12/23 0714) Vital Signs (72h Range):  Temp:  [96.7 °F (35.9 °C)-97.9 °F (36.6 °C)]   Pulse:  [56-77]   Resp:  [13-20]   BP: (121-161)/(65-89)   SpO2:  [93 %-97 %]      Recent Labs   Lab 10/11/23  1546 10/12/23  0356   CREATININE 0.8 0.8     Serum creatinine: 0.8 mg/dL 10/12/23 0356  Estimated creatinine clearance: 83.1 mL/min    Medication: Cefepime 2 g IV Q12H will be changed to Q8H.     Pharmacist's Name: Abbe Pop  Pharmacist's Extension: [phone]

## 2023-10-12 NOTE — SUBJECTIVE & OBJECTIVE
Past Medical History:   Diagnosis Date    Allergic rhinitis     Asthma     Chronic pansinusitis     Crohn's disease     Ileal involvement, previously on Remicade, Asacol, Prednisone    Fibromyalgia     Hyperlipidemia     Hypertension     Immunosuppression     Migraine     Obstructive sleep apnea     CPAP at night    Sciatica        Past Surgical History:   Procedure Laterality Date    BLADDER SURGERY      sling was created by her muscles     BRONCHOSCOPY N/A 2023    Procedure: BRONCHOSCOPY;  [facility];  Location: [facility] OR [address] [place] [address];  Service: Anesthesiology;  Laterality: N/A;     SECTION      COLONOSCOPY N/A 2017    Procedure: COLONOSCOPY;  Surgeon: Kin Dyer MD;  Location: [facility];  Service: Endoscopy;  Laterality: N/A;    COLONOSCOPY N/A 2018    Procedure: COLONOSCOPY;  Surgeon: Kyra Vallecillo MD;  Location: [facility];  Service: Endoscopy;  Laterality: N/A;    COLONOSCOPY N/A 2020    Procedure: COLONOSCOPY;  Surgeon: Nicole Leal MD;  Location: [facility];  Service: Endoscopy;  Laterality: N/A;    COLONOSCOPY N/A 2022    Procedure: COLONOSCOPY;  Surgeon: Shay Bruce MD;  Location: [facility];  Service: Endoscopy;  Laterality: N/A;    COLONOSCOPY N/A 2023    Procedure: COLONOSCOPY;  Surgeon: Nicole Leal MD;  Location: [facility];  Service: Endoscopy;  Laterality: N/A;    DEBRIDEMENT Bilateral 2020    Procedure: DEBRIDEMENT;  Surgeon: Matthias Roach MD;  Location: [facility] OR [address] [place] [address];  Service: ENT;  Laterality: Bilateral;    ESOPHAGOGASTRODUODENOSCOPY N/A 2020    Procedure: ESOPHAGOGASTRODUODENOSCOPY (EGD);  Surgeon: Nicole Leal MD;  Location: [facility];  Service: Endoscopy;  Laterality: N/A;    ESOPHAGOGASTRODUODENOSCOPY N/A 2022    Procedure: EGD (ESOPHAGOGASTRODUODENOSCOPY);  Surgeon: Shay Bruce MD;  Location: [facility];  Service: Endoscopy;  Laterality: N/A;     ESOPHAGOGASTRODUODENOSCOPY N/A 02/02/2023    Procedure: EGD (ESOPHAGOGASTRODUODENOSCOPY);  Surgeon: Nicole Leal MD;  Location: [facility];  Service: Endoscopy;  Laterality: N/A;    FINGER SURGERY      joint relpacement, left hand index finger    FUNCTIONAL ENDOSCOPIC SINUS SURGERY (FESS) USING COMPUTER-ASSISTED NAVIGATION Bilateral 07/31/2019    Procedure: FESS, USING COMPUTER-ASSISTED NAVIGATION;  Surgeon: Manish Shaffer MD;  Location: [facility] OR;  Service: ENT;  Laterality: Bilateral;    FUNCTIONAL ENDOSCOPIC SINUS SURGERY (FESS) USING COMPUTER-ASSISTED NAVIGATION Bilateral 09/25/2020    Procedure: FESS, USING COMPUTER-ASSISTED NAVIGATION SPHENOID;  Surgeon: Matthias Roach MD;  Location: [facility] OR [address] [place] [address];  Service: ENT;  Laterality: Bilateral;  TIVA    FUNCTIONAL ENDOSCOPIC SINUS SURGERY (FESS) USING COMPUTER-ASSISTED NAVIGATION Bilateral 09/30/2022    Procedure: FESS, USING COMPUTER-ASSISTED NAVIGATION;  Surgeon: Matthias Roach MD;  [facility];  Service: ENT;  Laterality: Bilateral;    HYSTERECTOMY  2001    INTRALUMINAL GASTROINTESTINAL TRACT IMAGING VIA CAPSULE N/A 03/03/2023    Procedure: IMAGING PROCEDURE, GI TRACT, INTRALUMINAL, VIA CAPSULE;  Surgeon: [place];  Location: [facility];  Service: Endoscopy;  Laterality: N/A;    SINUS SURGERY      WISDOM TOOTH EXTRACTION         Review of patient's allergies indicates:   Allergen Reactions    Fentanyl Other (See Comments)     Rigid Chest Syndrome       Current Facility-Administered Medications on File Prior to Encounter   Medication    lactated ringers infusion    lactated ringers infusion    lidocaine (PF) 10 mg/ml (1%) injection 10 mg    LIDOcaine (PF) 10 mg/ml (1%) injection 10 mg    sodium chloride 0.9% flush 10 mL     Current Outpatient Medications on File Prior to Encounter   Medication Sig    acetaminophen (TYLENOL) 500 MG tablet Take 2 tablets (1,000 mg total) by mouth every 6 (six) hours as needed for Pain.     albuterol-ipratropium (DUO-NEB) 2.5 mg-0.5 mg/3 mL nebulizer solution Take 3 mLs by nebulization every 6 (six) hours as needed for Wheezing. Rescue    b complex vitamins capsule Take 1 capsule by mouth once daily.    calcium carbonate/vitamin D3 (VITAMIN D-3 ORAL) Take 2 tablets by mouth once daily.    cefadroxil (DURICEF) 500 MG Cap Take 2 capsules (1,000 mg total) by mouth every 12 (twelve) hours. for 14 days    cetirizine (ZYRTEC) 10 MG tablet Take 10 mg by mouth 2 (two) times a day.    clotrimazole-betamethasone 1-0.05% (LOTRISONE) cream Apply topically 2 (two) times daily.    diltiazem HCl (DILTIAZEM 2% - LIDOCAINE 5% CREAM) Apply peasize amount topically to anal area.    diphenhydrAMINE-aluminum-magnesium hydroxide-simethicone-LIDOcaine HCl 2% Swish and spit 15 mLs every 4 (four) hours as needed (oral ulcers, pain).    diphenoxylate-atropine 2.5-0.025 mg (LOMOTIL) 2.5-0.025 mg per tablet Take 1 tablet by mouth 4 (four) times daily as needed for Diarrhea.    DULoxetine (CYMBALTA) 60 MG capsule Take 1 capsule (60 mg total) by mouth 2 (two) times daily.    eletriptan (RELPAX) 40 MG tablet Take 1 tablet (40 mg total) by mouth as needed. (Patient taking differently: Take 40 mg by mouth as needed (migraine).)    estradioL (ESTRACE) 2 MG tablet TAKE 1 TABLET DAILY    fish oil/borage/flax/om3,6,9 1 (OMEGA 3-6-9 ORAL) Take 2 tablets by mouth once daily.    fluticasone propionate (FLONASE) 50 mcg/actuation nasal spray 2 sprays (100 mcg total) by Each Nostril route once daily.    fluticasone-umeclidin-vilanter (TRELEGY ELLIPTA) 100-62.5-25 mcg DsDv Inhale 1 puff into the lungs once daily.    immun glob G,IgG,-pro-IgA 0-50 (HIZENTRA) 10 gram/50 mL (20 %) Soln Inject 70 mLs (14 g total) into the skin every 7 days.    ipratropium (ATROVENT) 0.02 % nebulizer solution Take by nebulization 4 (four) times daily as needed for Wheezing.    levoFLOXacin (LEVAQUIN) 750 MG tablet Take 1 tablet (750 mg total) by mouth once daily. for  14 days    losartan (COZAAR) 100 MG tablet Take 1 tablet (100 mg total) by mouth once daily. (Patient taking differently: Take 100 mg by mouth daily as needed (high blood pressure (>120/80)).)    montelukast (SINGULAIR) 10 mg tablet TAKE 1 TABLET EVERY EVENING    nortriptyline (PAMELOR) 25 MG capsule Take 1 capsule (25 mg total) by mouth every evening.    pregabalin (LYRICA) 150 MG capsule Take 1 capsule (150 mg total) by mouth 3 (three) times daily. (Patient taking differently: Take 150 mg by mouth once daily.)    risankizumab-rzaa 360 mg/2.4 mL (150 mg/mL) Injt Inject 360 mg into the skin every 8 weeks. for 6 doses    rizatriptan (MAXALT) 10 MG tablet TAKE 1 TABLET IF NEEDED FOR MIGRAINES. MAX 2 TABLETS IN 24 HOURS.    topiramate (TOPAMAX) 100 MG tablet Take 1 tablet (100 mg total) by mouth 2 (two) times daily.    traZODone (DESYREL) 50 MG tablet Take 1 tablet (50 mg total) by mouth every evening.    XYLITOL, BULK, MISC EMPTY CONTENTS OF 1 CAPSULE INTO NASAL IRRIGATION SYSTEM, ADD DISTILLED WATER, SALT PACK, MIX & IRRIGATE. PERFORM 2 TIMES DAILY    ZINC ORAL Take 1 tablet by mouth once daily.    [DISCONTINUED] ASACOL  mg TbEC Take 800 mg by mouth 3 (three) times daily.    [DISCONTINUED] butalbital-acetaminophen-caffeine -40 mg (FIORICET, ESGIC) -40 mg per tablet TAKE 1 TABLET EVERY 4 HOURS AS NEEDED FOR HEADACHE    [DISCONTINUED] cholestyramine (QUESTRAN) 4 gram packet Take 1 packet (4 g total) by mouth 3 (three) times daily with meals.    [DISCONTINUED] propranoloL (INDERAL LA) 80 MG 24 hr capsule Take 1 capsule (80 mg total) by mouth once daily.    [DISCONTINUED] sodium chloride 0.9% 0.9 % Soln 200 mL with gentamicin (ped) 20 mg/2 mL Soln EMPTY CONTENTS OF 1 CAPSULE INTO NASAL IRRIGATION SYSTEM, ADD DISTILLED WATER, SALT PACK, MIX & IRRIGATE. PERFORM 2 TIMES DAILY    [DISCONTINUED] sodium,potassium,mag sulfates (SUPREP BOWEL PREP KIT) 17.5-3.13-1.6 gram SolR Take by mouth.    [DISCONTINUED]  triamcinolone acetonide 0.025% (KENALOG) 0.025 % cream 1 application once daily. Apply to affected area    [DISCONTINUED] VENTOLIN HFA 90 mcg/actuation inhaler INHALE 2 PUFFS INTO THE LUNGS EVERY 4 TO 6 HOURS AS NEEDED FOR WHEEZING.     Family History       Problem Relation (Age of Onset)    Allergies Mother    Breast cancer Mother, Maternal Grandmother    COPD Maternal Grandmother (72)    Cancer Paternal Grandmother (70)    Heart attack Maternal Grandmother    Hypertension Mother, Brother    Kidney disease Father (64)    Scleroderma Father          Tobacco Use    Smoking status: Never     Passive exposure: Never    Smokeless tobacco: Never   Substance and Sexual Activity    Alcohol use: No    Drug use: No    Sexual activity: Yes     Partners: Male     Review of Systems   Constitutional:  Positive for chills and fatigue. Negative for fever.   HENT:  Negative for sore throat and trouble swallowing.    Eyes:  Negative for photophobia and visual disturbance.   Respiratory:  Positive for cough and shortness of breath. Negative for wheezing.    Cardiovascular:  Negative for chest pain, palpitations and leg swelling.   Gastrointestinal:  Negative for abdominal distention, abdominal pain, diarrhea, nausea and vomiting.   Genitourinary:  Negative for dysuria and hematuria.   Musculoskeletal:  Negative for neck pain and neck stiffness.   Skin:  Negative for rash and wound.   Neurological:  Positive for weakness. Negative for seizures, syncope, light-headedness, numbness and headaches.   Psychiatric/Behavioral:  Negative for confusion and decreased concentration.      Objective:     Vital Signs (Most Recent):  Temp: 96.7 °F (35.9 °C) (10/11/23 2050)  Pulse: 74 (10/11/23 2050)  Resp: 18 (10/11/23 2050)  BP: 138/89 (10/11/23 2050)  SpO2: (!) 93 % (10/11/23 2050) Vital Signs (24h Range):  Temp:  [96.7 °F (35.9 °C)-97.6 °F (36.4 °C)] 96.7 °F (35.9 °C)  Pulse:  [70-77] 74  Resp:  [13-20] 18  SpO2:  [93 %-97 %] 93 %  BP:  (121-161)/(71-89) 138/89     Weight: 79.4 kg (175 lb)  Body mass index is 27.41 kg/m².     Physical Exam  Constitutional:       General: She is not in acute distress.     Appearance: She is not toxic-appearing or diaphoretic.   HENT:      Head: Normocephalic and atraumatic.      Nose: Nose normal.   Eyes:      General: No scleral icterus.     Extraocular Movements: Extraocular movements intact.      Pupils: Pupils are equal, round, and reactive to light.   Cardiovascular:      Rate and Rhythm: Normal rate and regular rhythm.   Pulmonary:      Effort: Pulmonary effort is normal. No respiratory distress.      Breath sounds: Rales present. No wheezing.   Abdominal:      General: Abdomen is flat. There is no distension.      Palpations: Abdomen is soft.      Tenderness: There is no abdominal tenderness. There is no guarding.   Musculoskeletal:         General: Normal range of motion.      Right lower leg: No edema.      Left lower leg: No edema.   Skin:     General: Skin is warm and dry.      Coloration: Skin is not jaundiced.   Neurological:      General: No focal deficit present.      Mental Status: She is alert and oriented to person, place, and time.      Cranial Nerves: No cranial nerve deficit.   Psychiatric:         Mood and Affect: Mood normal.         Behavior: Behavior normal.              CRANIAL NERVES     CN III, IV, VI   Pupils are equal, round, and reactive to light.       Significant Labs: All pertinent labs within the past 24 hours have been reviewed.  CBC:   Recent Labs   Lab 10/11/23  1546   WBC 12.36   HGB 13.9   HCT 42.0        CMP:   Recent Labs   Lab 10/11/23  1546      K 3.4*      CO2 20*      BUN 11   CREATININE 0.8   CALCIUM 9.1   PROT 7.9   ALBUMIN 3.4*   BILITOT 0.4   ALKPHOS 101   AST 24   ALT 17   ANIONGAP 8       Significant Imaging: I have reviewed all pertinent imaging results/findings within the past 24 hours.

## 2023-10-12 NOTE — PROGRESS NOTES
Pharmacokinetic Initial Assessment: IV Vancomycin    Assessment/Plan:    Ms. Murguia received vancomycin with loading dose of 2000 mg once in the ED.  - Her renal function appears stable and at baseline.  - Will schedule a maintenance dose of vancomycin 1000 mg IV every 12 hours.  - Desired empiric serum trough concentration is 10 to 20 mcg/mL.  - Draw vancomycin trough level 60 min prior to fourth dose on 10/13 at approximately 0500.  - Please draw random level sooner than scheduled trough if renal function changes significantly.    Pharmacy will continue to follow and monitor vancomycin.      Please contact pharmacy at extension [phone] with any questions regarding this assessment.     Thank you for the consult,   Alysia Orta       Patient brief summary:  Jaylin Murguia is a 58 y.o. female initiated on antimicrobial therapy with IV Vancomycin for treatment of suspected lower respiratory infection    Actual Body Weight:   79.4 kg    Renal Function:   Estimated Creatinine Clearance: 83.1 mL/min (based on SCr of 0.8 mg/dL).     Dialysis Method (if applicable):  N/A

## 2023-10-12 NOTE — ED TRIAGE NOTES
Patient is a 58-year-old female presents to the ED via personal vehicle with c/o SOB. Patient states that she has been having productive cough for a months and its been gradually getting worse. Patient sees primary care physician and was prescribed some oral antibiotics but coughing hasn't improved. Patient states she easily gets out of breath and feel like she can't do anything. Primary care recommended patient to come to ED for IV antibiotics.

## 2023-10-12 NOTE — HPI
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 Patient has been treated since September 27th with cefadroxil levofloxacin due to acute on chronic sinusitis and pneumonia diagnosed on CT.  Patient noted to have significant mucus plugging.  Patient's symptoms have failed to improve.  She presents with progressive shortness of breath and episodes of difficulty breathing overnight.  She reports decreased activity tolerance for this.  She denies richard orthopnea or extremity swelling.  She has no chest pain.  She denies fever chills.  She reports compliance with her medications.    In the ED patient afebrile and hemodynamically stable saturating in mid 90's on room air at rest. CT with worsening ground glass opacities. Patient started on broad spectrum abx and admitted to the care of medicine for further evaluation and management.       Chart review:   CT 10/11 - Patchy bilateral ground-glass pulmonary airspace opacities have increased since 09/27/2023.  Although the differential diagnosis is extensive, a leading consideration is likely COVID-19 pneumonia.  Correlate clinically, and with follow-up chest CT within 3-6 months to help further exclude other underlying pathology, such as carcinoma in-situ/bronchioloalveolar carcinoma.     Mildly prominent/enlarged mediastinal lymph nodes, similar to the comparison scan and favored to represent reactive lymphadenopathy.  The follow-up chest CT recommended above could also help further characterize this, and help exclude underlying neoplasm or lymphoproliferative disorder.     Some predominantly bibasilar, bronchial occlusions remain and are most likely related to mucous plugging.  Follow-up  imaging again recommended, as above, to help further exclude underlying neoplasm.

## 2023-10-12 NOTE — ASSESSMENT & PLAN NOTE
- CT with worsening ground glass opacities with mucus plugging  - Prior respiratory cultures with multi resistant pseudomonas and staph aureus  - Covid and Flu negative   - Pulmonolgy consulted   -- Plan for bronch on 10/13  -- Esophogram ordered to evaluate for aspiration   - ID consulted  -- Respiratory culture sent  -- Continue cefepime and vancomycin

## 2023-10-12 NOTE — ANESTHESIA PREPROCEDURE EVALUATION
"                                         [facility]  Anesthesia Pre-Operative Evaluation   10/12/2023        Jaylin Murguia, 1965  [id number]  Procedure(s) (LRB):  BRONCHOSCOPY, WITH FLUOROSCOPY (N/A)    Subjective    Jaylin Murguia is a 58 y.o. female w/ a significant PMHx of Crohn's disease, HTN,  asthma, immunosuppressed, chest wall rigidity, chronic sinusitis who presented with SOB and cough.  Imaging concerning for worsening ground glass opacities with mucous plugging. Pulm planning for bronch with fluoro. On losartan.     Pt reports prior reaction to fentanyl in that "code blue was called". Unable to provide further details. Reports that she recovered quickly from the incident. Chart review on 3/23/23, pt became hypoxic and difficult to mask during out patient bronch. De-satted to 40%. Pt was given narcan.   Patient now presents for above procedure(s).     Prev Airway:  Induction:  Intravenous    Intubated:  Postinduction    Mask Ventilation:  Easy mask    Attempts:  2    Attempted By:  Resident anesthesiologist    Method of Intubation:  Direct    Blade:  Riley 2    Laryngeal View Grade: Grade I - full view of cords      Attempted By (2nd Attempt):  Resident anesthesiologist    Method of Intubation (2nd Attempt):  Direct    Blade (2nd Attempt):  Riley 2    Laryngeal View Grade (2nd Attempt): Grade I - full view of cords      Difficult Airway Encountered?: No      Complications:  None    Airway Device:  Oral endotracheal tube    Airway Device Size:  7.0    Style/Cuff Inflation:  Cuffed (inflated to minimal occlusive pressure)    Tube secured:  22    Secured at:  The lips    Placement Verified By:  Capnometry    Complicating Factors:  Large/floppy epiglottis    Findings Post-Intubation:  BS equal bilateral and atraumatic/condition of teeth unchanged       LDA:        Peripheral IV - Single Lumen 10/11/23 1551 20 G Right Antecubital (Active)   Site Assessment Clean;Dry;Intact " 10/12/23 0800   Extremity Assessment Distal to IV No warmth;No swelling;No redness;No abnormal discoloration 10/12/23 0800   Line Status Saline locked;Flushed 10/12/23 0800   Dressing Status Clean;Dry;Intact 10/12/23 0800   Dressing Intervention Integrity maintained 10/12/23 0800   Number of days: 0           Patient Active Problem List   Diagnosis    Fibromyalgia    Migraine    Crohn's disease of small intestine    Sciatica    Allergic rhinitis    Diarrhea    Essential hypertension    Insomnia due to medical condition    Hormone replacement therapy (postmenopausal)    Long-term use of immunosuppressant medication    Crohn's disease    TAMIKA on CPAP    Bilateral primary osteoarthritis of knee    Primary osteoarthritis of right knee    Primary osteoarthritis of left knee    Other hyperlipidemia    Tortuous aorta    Mild aortic insufficiency    Chronic pansinusitis    Severe persistent asthma without complication    Hypogammaglobulinemia    Abdominal pain    Chronic rhinosinusitis    Abnormal CT scan, lung    Chronic cough    Other eosinophilia    Multifocal pneumonia       Review of patient's allergies indicates:   Allergen Reactions    Fentanyl Other (See Comments)     Rigid Chest Syndrome       Current Inpatient Medications:       Current Facility-Administered Medications on File Prior to Encounter   Medication Dose Route Frequency Provider Last Rate Last Admin    acetaminophen tablet 1,000 mg  1,000 mg Oral Q8H PRN Mumtaz Medina MD        albuterol-ipratropium 2.5 mg-0.5 mg/3 mL nebulizer solution 3 mL  3 mL Nebulization Q6H Mary Jo Monsivais MD   3 mL at 10/12/23 1107    aluminum-magnesium hydroxide-simethicone 200-200-20 mg/5 mL suspension 30 mL  30 mL Oral QID PRN Mumtaz Medina MD        ceFEPIme (MAXIPIME) 2 g in dextrose 5 % in water (D5W) 100 mL IVPB (MB+)  2 g Intravenous Q8H Ray Hubbard MD        cetirizine tablet 5 mg  5 mg Oral BID Mumtaz Medina MD    5 mg at 10/12/23 0830    dextrose 10% bolus 125 mL 125 mL  12.5 g Intravenous PRN Mumtaz Medina MD        dextrose 10% bolus 250 mL 250 mL  25 g Intravenous PRN Mumtaz Medina MD        DULoxetine DR capsule 60 mg  60 mg Oral BID Mumtaz Medina MD   60 mg at 10/12/23 0830    fluticasone furoate-vilanteroL 100-25 mcg/dose diskus inhaler 1 puff  1 puff Inhalation Daily Mumtaz Medina MD        glucagon (human recombinant) injection 1 mg  1 mg Intramuscular PRN Mumtaz Medina MD        glucose chewable tablet 16 g  16 g Oral PRN Mmutaz Medina MD        glucose chewable tablet 24 g  24 g Oral PRN Mumtaz Medina MD        guaiFENesin 12 hr tablet 600 mg  600 mg Oral BID Ray Hubbard MD   600 mg at 10/12/23 0830    lactated ringers infusion   Intravenous Continuous Mary Leiva MD   New Bag at 03/12/20 0923    lactated ringers infusion   Intravenous Continuous Shay Bruce MD   Stopped at 03/16/22 1342    lidocaine (PF) 10 mg/ml (1%) injection 10 mg  1 mL Intradermal Once Mary Leiva MD        LIDOcaine (PF) 10 mg/ml (1%) injection 10 mg  1 mL Intradermal Once Johan Baez MD        losartan tablet 100 mg  100 mg Oral Daily Mumtaz Medina MD   100 mg at 10/12/23 0829    melatonin tablet 6 mg  6 mg Oral Nightly PRN Jluis Montes MD        montelukast tablet 10 mg  10 mg Oral QHS Mumtaz Mdeina MD        naloxone 0.4 mg/mL injection 0.02 mg  0.02 mg Intravenous PRN Mumtaz Medina MD        ondansetron injection 4 mg  4 mg Intravenous Q8H PRN Mumtaz Medina MD        pregabalin capsule 150 mg  150 mg Oral Daily Mumtaz Medina MD   150 mg at 10/12/23 0830    sodium chloride 0.9% flush 10 mL  10 mL Intravenous PRN Johan Baez MD        sodium chloride 0.9% flush 10 mL  10 mL Intravenous PRN Jluis Montes MD        sodium chloride 0.9% flush 10 mL  10 mL Intravenous Q12H PRN Adam,  Mumtaz LEBRON MD        topiramate tablet 100 mg  100 mg Oral BID Mumtaz Medina MD   100 mg at 10/12/23 0829    traZODone tablet 50 mg  50 mg Oral QHS Mumtaz Medina MD   50 mg at 10/11/23 2104    vancomycin (VANCOCIN) 1,000 mg in dextrose 5 % (D5W) 250 mL IVPB (Vial-Mate)  1,000 mg Intravenous Q12H Mumtaz Medina MD   Stopped at 10/12/23 0630    vancomycin - pharmacy to dose   Intravenous pharmacy to manage frequency Mumtaz Medina MD         Current Outpatient Medications on File Prior to Encounter   Medication Sig Dispense Refill    acetaminophen (TYLENOL) 500 MG tablet Take 2 tablets (1,000 mg total) by mouth every 6 (six) hours as needed for Pain. 60 tablet 0    albuterol-ipratropium (DUO-NEB) 2.5 mg-0.5 mg/3 mL nebulizer solution Take 3 mLs by nebulization every 6 (six) hours as needed for Wheezing. Rescue 270 mL 0    b complex vitamins capsule Take 1 capsule by mouth once daily.      calcium carbonate/vitamin D3 (VITAMIN D-3 ORAL) Take 2 tablets by mouth once daily.      cefadroxil (DURICEF) 500 MG Cap Take 2 capsules (1,000 mg total) by mouth every 12 (twelve) hours. for 14 days 56 capsule 0    cetirizine (ZYRTEC) 10 MG tablet Take 10 mg by mouth 2 (two) times a day.      clotrimazole-betamethasone 1-0.05% (LOTRISONE) cream Apply topically 2 (two) times daily. 15 g 0    diltiazem HCl (DILTIAZEM 2% - LIDOCAINE 5% CREAM) Apply peasize amount topically to anal area. 30 g 2    diphenhydrAMINE-aluminum-magnesium hydroxide-simethicone-LIDOcaine HCl 2% Swish and spit 15 mLs every 4 (four) hours as needed (oral ulcers, pain). 1 Bottle 2    diphenoxylate-atropine 2.5-0.025 mg (LOMOTIL) 2.5-0.025 mg per tablet Take 1 tablet by mouth 4 (four) times daily as needed for Diarrhea. 120 tablet 2    DULoxetine (CYMBALTA) 60 MG capsule Take 1 capsule (60 mg total) by mouth 2 (two) times daily. 180 capsule 3    eletriptan (RELPAX) 40 MG tablet Take 1 tablet (40 mg total) by mouth as needed.  (Patient taking differently: Take 40 mg by mouth as needed (migraine).) 12 tablet 3    estradioL (ESTRACE) 2 MG tablet TAKE 1 TABLET DAILY 90 tablet 3    fish oil/borage/flax/om3,6,9 1 (OMEGA 3-6-9 ORAL) Take 2 tablets by mouth once daily.      fluticasone propionate (FLONASE) 50 mcg/actuation nasal spray 2 sprays (100 mcg total) by Each Nostril route once daily. 16 g 5    fluticasone-umeclidin-vilanter (TRELEGY ELLIPTA) 100-62.5-25 mcg DsDv Inhale 1 puff into the lungs once daily. 60 each 11    immun glob G,IgG,-pro-IgA 0-50 (HIZENTRA) 10 gram/50 mL (20 %) Soln Inject 70 mLs (14 g total) into the skin every 7 days. 280 mL 11    ipratropium (ATROVENT) 0.02 % nebulizer solution Take by nebulization 4 (four) times daily as needed for Wheezing.      levoFLOXacin (LEVAQUIN) 750 MG tablet Take 1 tablet (750 mg total) by mouth once daily. for 14 days 14 tablet 0    losartan (COZAAR) 100 MG tablet Take 1 tablet (100 mg total) by mouth once daily. (Patient taking differently: Take 100 mg by mouth daily as needed (high blood pressure (>120/80)).) 90 tablet 3    montelukast (SINGULAIR) 10 mg tablet TAKE 1 TABLET EVERY EVENING 90 tablet 0    nortriptyline (PAMELOR) 25 MG capsule Take 1 capsule (25 mg total) by mouth every evening. 90 capsule 3    pregabalin (LYRICA) 150 MG capsule Take 1 capsule (150 mg total) by mouth 3 (three) times daily. (Patient taking differently: Take 150 mg by mouth once daily.) 270 capsule 1    risankizumab-rzaa 360 mg/2.4 mL (150 mg/mL) Injt Inject 360 mg into the skin every 8 weeks. for 6 doses 2.4 mL 5    rizatriptan (MAXALT) 10 MG tablet TAKE 1 TABLET IF NEEDED FOR MIGRAINES. MAX 2 TABLETS IN 24 HOURS. 30 tablet 8    topiramate (TOPAMAX) 100 MG tablet Take 1 tablet (100 mg total) by mouth 2 (two) times daily. 180 tablet 3    traZODone (DESYREL) 50 MG tablet Take 1 tablet (50 mg total) by mouth every evening. 90 tablet 3    XYLITOL, BULK, MISC EMPTY CONTENTS OF 1 CAPSULE INTO NASAL  IRRIGATION SYSTEM, ADD DISTILLED WATER, SALT PACK, MIX & IRRIGATE. PERFORM 2 TIMES DAILY      ZINC ORAL Take 1 tablet by mouth once daily.         Past Surgical History:   Procedure Laterality Date    BLADDER SURGERY      sling was created by her muscles     BRONCHOSCOPY N/A 2023    Procedure: BRONCHOSCOPY;  Surgeon: Kajal Diagnostic Provider;  Location: [facility] OR [address] [place] [address];  Service: Anesthesiology;  Laterality: N/A;     SECTION      COLONOSCOPY N/A 2017    Procedure: COLONOSCOPY;  Surgeon: Kin Dyer MD;  Location: [facility];  Service: Endoscopy;  Laterality: N/A;    COLONOSCOPY N/A 2018    Procedure: COLONOSCOPY;  Surgeon: Kyra Vallecillo MD;  Location: [facility];  Service: Endoscopy;  Laterality: N/A;    COLONOSCOPY N/A 2020    Procedure: COLONOSCOPY;  Surgeon: Nicole Leal MD;  Location: [facility];  Service: Endoscopy;  Laterality: N/A;    COLONOSCOPY N/A 2022    Procedure: COLONOSCOPY;  Surgeon: Shay Bruce MD;  Location: [facility];  Service: Endoscopy;  Laterality: N/A;    COLONOSCOPY N/A 2023    Procedure: COLONOSCOPY;  Surgeon: Nicole Leal MD;  Location: [facility];  Service: Endoscopy;  Laterality: N/A;    DEBRIDEMENT Bilateral 2020    Procedure: DEBRIDEMENT;  Surgeon: Matthias Roach MD;  [facility];  Service: ENT;  Laterality: Bilateral;    ESOPHAGOGASTRODUODENOSCOPY N/A 2020    Procedure: ESOPHAGOGASTRODUODENOSCOPY (EGD);  Surgeon: Nicole Leal MD;  Location: [facility];  Service: Endoscopy;  Laterality: N/A;    ESOPHAGOGASTRODUODENOSCOPY N/A 2022    Procedure: EGD (ESOPHAGOGASTRODUODENOSCOPY);  Surgeon: Shay Bruce MD;  Location: [facility];  Service: Endoscopy;  Laterality: N/A;    ESOPHAGOGASTRODUODENOSCOPY N/A 2023    Procedure: EGD (ESOPHAGOGASTRODUODENOSCOPY);  Surgeon: Nicole Leal MD;  Location: [facility];  Service: Endoscopy;  Laterality: N/A;     FINGER SURGERY      joint relpacement, left hand index finger    FUNCTIONAL ENDOSCOPIC SINUS SURGERY (FESS) USING COMPUTER-ASSISTED NAVIGATION Bilateral 07/31/2019    Procedure: FESS, USING COMPUTER-ASSISTED NAVIGATION;  Surgeon: Manish Shaffer MD;  Location: [facility] OR;  Service: ENT;  Laterality: Bilateral;    FUNCTIONAL ENDOSCOPIC SINUS SURGERY (FESS) USING COMPUTER-ASSISTED NAVIGATION Bilateral 09/25/2020    Procedure: FESS, USING COMPUTER-ASSISTED NAVIGATION SPHENOID;  Surgeon: Matthias Roach MD;  Location: [facility] OR [facility];  Service: ENT;  Laterality: Bilateral;  TIVA    FUNCTIONAL ENDOSCOPIC SINUS SURGERY (FESS) USING COMPUTER-ASSISTED NAVIGATION Bilateral 09/30/2022    Procedure: FESS, USING COMPUTER-ASSISTED NAVIGATION;  Surgeon: Matthias Roach MD;  Location: [facility] OR [facility] F[facility];  Service: ENT;  Laterality: Bilateral;    HYSTERECTOMY  2001    INTRALUMINAL GASTROINTESTINAL TRACT IMAGING VIA CAPSULE N/A 03/03/2023    Procedure: IMAGING PROCEDURE, GI TRACT, INTRALUMINAL, VIA CAPSULE;  Surgeon: First Available Mission;  Location: [facility] ENDO;  Service: Endoscopy;  Laterality: N/A;    SINUS SURGERY      WISDOM TOOTH EXTRACTION         Social History:  Tobacco Use: Low Risk  (10/11/2023)    Patient History     Smoking Tobacco Use: Never     Smokeless Tobacco Use: Never     Passive Exposure: Never       Alcohol Use: Not At Risk (8/24/2023)    AUDIT-C     Frequency of Alcohol Consumption: Not on file     Average Number of Drinks: Patient does not drink     Frequency of Binge Drinking: Never       Objective    Vital Signs Range:  BMI Readings from Last 1 Encounters:   10/12/23 27.38 kg/m²       Temp:  [36.5 °C (97.7 °F)-36.6 °C (97.9 °F)]   Pulse:  [56-62]   Resp:  [18-20]   BP: (122-131)/(65-71)   SpO2:  [92 %-97 %]        Significant Labs:        Component Value Date/Time    WBC 8.53 10/12/2023 0356    HGB 13.1 10/12/2023 0356    HCT 39.7 10/12/2023 0356     10/12/2023 0356     10/12/2023  0356    K 3.7 10/12/2023 0356     (H) 10/12/2023 0356    CO2 19 (L) 10/12/2023 0356    GLU 99 10/12/2023 0356    BUN 10 10/12/2023 0356    CREATININE 0.8 10/12/2023 0356    MG 2.0 10/12/2023 0356    PHOS 4.1 10/12/2023 0356    CALCIUM 8.6 (L) 10/12/2023 0356    ALBUMIN 2.9 (L) 10/12/2023 0356    PROT 7.1 10/12/2023 0356    ALKPHOS 89 10/12/2023 0356    BILITOT 0.4 10/12/2023 0356    AST 20 10/12/2023 0356    ALT 14 10/12/2023 0356    INR 1.0 11/20/2014 1135    HGBA1C 5.3 09/02/2021 1208        Please see Results Review for additional labs.     Diagnostic Studies: All relevant studies, reviewed.      EKG:   Results for orders placed or performed during the hospital encounter of 10/11/23   EKG 12-lead    Collection Time: 10/11/23  4:15 PM    Narrative    Test Reason : J18.9,    Vent. Rate : 076 BPM     Atrial Rate : 076 BPM     P-R Int : 202 ms          QRS Dur : 068 ms      QT Int : 376 ms       P-R-T Axes : 070 020 067 degrees     QTc Int : 423 ms    Normal sinus rhythm  Nonspecific T wave abnormality  Abnormal ECG  When compared with ECG of 11-OCT-2023 14:18,  No significant change was found  Confirmed by MONIQUE BLACKWELL MD ([id number]) on 10/12/2023 8:38:01 AM    Referred By: [email or web address]   SELF           Confirmed By:MONIQUE BLACKWELL MD       ECHO: 4/1/2019    CONCLUSIONS     1 - Mild left atrial enlargement.     2 - Concentric hypertrophy.     3 - No wall motion abnormalities.     4 - Normal left ventricular systolic function (EF 60-65%).     5 - Normal left ventricular diastolic function.     6 - Normal right ventricular systolic function .     7 - The estimated PA systolic pressure is 28 mmHg.     8 - Trivial to mild aortic regurgitation.     9 - Trivial to mild mitral regurgitation.              Pre-op Assessment    I have reviewed the Patient Summary Reports.     I have reviewed the Nursing Notes.    I have reviewed the Medications.     Review of Systems  Anesthesia Hx:  No problems with previous Anesthesia   Denies Family Hx of Anesthesia complications.   Denies Personal Hx of Anesthesia complications.   Cardiovascular:   Hypertension    Pulmonary:   Pneumonia Asthma    Musculoskeletal:   Arthritis     Neurological:   Neuromuscular Disease, Headaches        Physical Exam  General: Well nourished, Cooperative, Alert and Oriented    Airway:  Mallampati: II   Tongue: Normal    Dental:  Intact        Anesthesia Plan  Type of Anesthesia, risks & benefits discussed:    Anesthesia Type: Gen ETT, Gen Supraglottic Airway, Gen Natural Airway, MAC  Intra-op Monitoring Plan: Standard ASA Monitors  Post Op Pain Control Plan: multimodal analgesia and IV/PO Opioids PRN  Induction:  IV  Airway Plan: Direct and Video  Informed Consent: Informed consent signed with the Patient and all parties understand the risks and agree with anesthesia plan.  All questions answered. Patient consented to blood products? Yes  ASA Score: 3  Day of Surgery Review of History & Physical: H&P Update referred to the surgeon/provider.    Ready For Surgery From Anesthesia Perspective.     .

## 2023-10-12 NOTE — PLAN OF CARE
SW tried to meet with patient at bedside. Doctor was at bedside completing an assessment.     MIRI Hanna, MSW-LMSW  Medical Social Worker/  ER Department

## 2023-10-12 NOTE — PLAN OF CARE
Problem: Adult Inpatient Plan of Care  Goal: Plan of Care Review  10/12/2023 0334 by Litzy Crowell LPN  Outcome: Ongoing, Progressing  10/12/2023 0334 by Litzy Crowell LPN  Outcome: Ongoing, Progressing  Goal: Patient-Specific Goal (Individualized)  10/12/2023 0334 by Litzy Crowell LPN  Outcome: Ongoing, Progressing  10/12/2023 0334 by Litzy Crowell LPN  Outcome: Ongoing, Progressing  Goal: Absence of Hospital-Acquired Illness or Injury  10/12/2023 0334 by iLtzy Crowell LPN  Outcome: Ongoing, Progressing  10/12/2023 0334 by Litzy Crowell LPN  Outcome: Ongoing, Progressing  Goal: Optimal Comfort and Wellbeing  10/12/2023 0334 by Litzy Crowell LPN  Outcome: Ongoing, Progressing  10/12/2023 0334 by Litzy Crowell LPN  Outcome: Ongoing, Progressing     Problem: Adult Inpatient Plan of Care  Goal: Patient-Specific Goal (Individualized)  10/12/2023 0334 by Litzy Crowell LPN  Outcome: Ongoing, Progressing  10/12/2023 0334 by Litzy Crowell LPN  Outcome: Ongoing, Progressing

## 2023-10-12 NOTE — ASSESSMENT & PLAN NOTE
Extensive history of sinusitis; follows with Dr. Roger with [facility].  She has recurrent sinus congestion, abscesses , despite multiple rounds of surgeries and antibiotics  - Most recently seen Sept 27th w/ Dr. Roger; worked up for dyspnea and worsening congestion/cough; treated with cefadroxil and levofloxacin.  Has also been on gentamicin nasal lavages  - Has grown pseudomonas in 13 sinus cultures (abscess or sputum) since 2020; also seen stenotrophamonas  - Scheduled for bronch tomorrow with ENT    CT chest showing worsening opacities between scanning on 9/27 and repeat on 10/11; also showed enlarged LN's, and mucous plugging.  Patient having worsening dyspnea.  Leukocytosis on arrival to 12k.  Would treat for pneumonia at this point and follow with ENT for bronch tomorrow; adjust as needed.      Recommendations:  -- OK to stop vancomycin and cefepime  -- Start meropenem 2g q8hr + doxycycline 100mg BID (based on cx history)  -- Repeat RIP, obtain urine legionella, fungal studies (ordered) - obtain cultures tomorrow

## 2023-10-12 NOTE — ASSESSMENT & PLAN NOTE
- CT with worsening ground glass opacities with mucus plugging  - Prior respiratory cultures with multi resistant pseudomonas and staph aureus  - follow up respiratory cultures  - continue cefepime and vancomycin  - ID and Pulmonology consults in am  - npo midnight in case of decision for procedure/bronch  - further management pending clinical course and future study review

## 2023-10-12 NOTE — SUBJECTIVE & OBJECTIVE
Interval History:   No events overnight. Patient with continued productive cough and dyspnea. Pulm consulted. Plann for bronch tomorrow. Continue broad abx. ID consulted.      Review of Systems   Constitutional:  Positive for fatigue. Negative for chills and fever.   HENT:  Negative for sore throat and trouble swallowing.    Eyes:  Negative for photophobia and visual disturbance.   Respiratory:  Positive for cough and shortness of breath. Negative for wheezing.    Cardiovascular:  Negative for chest pain, palpitations and leg swelling.   Gastrointestinal:  Negative for abdominal distention, abdominal pain, diarrhea, nausea and vomiting.   Genitourinary:  Negative for dysuria and hematuria.   Musculoskeletal:  Negative for neck pain and neck stiffness.   Skin:  Negative for rash and wound.   Neurological:  Positive for weakness. Negative for seizures, syncope, light-headedness, numbness and headaches.   Psychiatric/Behavioral:  Negative for confusion and decreased concentration.      Objective:     Vital Signs (Most Recent):  Temp: 98.2 °F (36.8 °C) (10/12/23 1251)  Pulse: 63 (10/12/23 1251)  Resp: 18 (10/12/23 1251)  BP: (!) 134/95 (10/12/23 1251)  SpO2: (!) 93 % (10/12/23 1251) Vital Signs (24h Range):  Temp:  [96.7 °F (35.9 °C)-98.2 °F (36.8 °C)] 98.2 °F (36.8 °C)  Pulse:  [56-77] 63  Resp:  [13-20] 18  SpO2:  [92 %-97 %] 93 %  BP: (121-161)/(65-95) 134/95     Weight: 79.3 kg (174 lb 13.2 oz)  Body mass index is 27.38 kg/m².  No intake or output data in the 24 hours ending 10/12/23 1330      Physical Exam  Constitutional:       General: She is not in acute distress.     Appearance: She is not toxic-appearing or diaphoretic.   HENT:      Head: Normocephalic and atraumatic.      Nose: Nose normal.   Eyes:      General: No scleral icterus.     Extraocular Movements: Extraocular movements intact.   Cardiovascular:      Rate and Rhythm: Normal rate and regular rhythm.   Pulmonary:      Effort: Pulmonary effort is  normal. No respiratory distress.      Breath sounds: Wheezing and rales present.   Abdominal:      General: Abdomen is flat. There is no distension.      Palpations: Abdomen is soft.      Tenderness: There is no abdominal tenderness. There is no guarding.   Musculoskeletal:         General: Normal range of motion.      Right lower leg: No edema.      Left lower leg: No edema.   Skin:     General: Skin is warm and dry.      Coloration: Skin is not jaundiced.   Neurological:      General: No focal deficit present.      Mental Status: She is alert.      Cranial Nerves: No cranial nerve deficit.   Psychiatric:         Mood and Affect: Mood normal.         Behavior: Behavior normal.             Significant Labs: All pertinent labs within the past 24 hours have been reviewed.  CBC:   Recent Labs   Lab 10/11/23  1546 10/12/23  0356   WBC 12.36 8.53   HGB 13.9 13.1   HCT 42.0 39.7    217     CMP:   Recent Labs   Lab 10/11/23  1546 10/12/23  0356    141   K 3.4* 3.7    113*   CO2 20* 19*    99   BUN 11 10   CREATININE 0.8 0.8   CALCIUM 9.1 8.6*   PROT 7.9 7.1   ALBUMIN 3.4* 2.9*   BILITOT 0.4 0.4   ALKPHOS 101 89   AST 24 20   ALT 17 14   ANIONGAP 8 9       Significant Imaging: I have reviewed all pertinent imaging results/findings within the past 24 hours.

## 2023-10-13 ENCOUNTER — ANESTHESIA (OUTPATIENT)
Dept: SURGERY | Facility: HOSPITAL | Age: 58
DRG: 167 | End: 2023-10-13
Payer: COMMERCIAL

## 2023-10-13 LAB
ADENOVIRUS: NOT DETECTED
ALBUMIN SERPL BCP-MCNC: 2.9 G/DL (ref 3.5–5.2)
ALP SERPL-CCNC: 98 U/L (ref 55–135)
ALT SERPL W/O P-5'-P-CCNC: 23 U/L (ref 10–44)
ANION GAP SERPL CALC-SCNC: 8 MMOL/L (ref 8–16)
ANISOCYTOSIS BLD QL SMEAR: SLIGHT
APPEARANCE FLD: NORMAL
AST SERPL-CCNC: 25 U/L (ref 10–40)
BASOPHILS # BLD AUTO: 0.09 K/UL (ref 0–0.2)
BASOPHILS NFR BLD: 1.1 % (ref 0–1.9)
BILIRUB SERPL-MCNC: 0.4 MG/DL (ref 0.1–1)
BODY FLD TYPE: NORMAL
BORDETELLA PARAPERTUSSIS (IS1001): NOT DETECTED
BORDETELLA PERTUSSIS (PTXP): NOT DETECTED
BUN SERPL-MCNC: 11 MG/DL (ref 6–20)
CALCIUM SERPL-MCNC: 8.7 MG/DL (ref 8.7–10.5)
CHLAMYDIA PNEUMONIAE: NOT DETECTED
CHLORIDE SERPL-SCNC: 112 MMOL/L (ref 95–110)
CO2 SERPL-SCNC: 21 MMOL/L (ref 23–29)
COLOR FLD: COLORLESS
CORONAVIRUS 229E, COMMON COLD VIRUS: NOT DETECTED
CORONAVIRUS HKU1, COMMON COLD VIRUS: NOT DETECTED
CORONAVIRUS NL63, COMMON COLD VIRUS: NOT DETECTED
CORONAVIRUS OC43, COMMON COLD VIRUS: NOT DETECTED
CREAT SERPL-MCNC: 0.8 MG/DL (ref 0.5–1.4)
CRYPTOC AG SER QL LA: NEGATIVE
DIFFERENTIAL METHOD: ABNORMAL
EOSINOPHIL # BLD AUTO: 2.7 K/UL (ref 0–0.5)
EOSINOPHIL NFR BLD: 32.6 % (ref 0–8)
EOSINOPHIL NFR FLD MANUAL: 66 %
ERYTHROCYTE [DISTWIDTH] IN BLOOD BY AUTOMATED COUNT: 12.8 % (ref 11.5–14.5)
EST. GFR  (NO RACE VARIABLE): >60 ML/MIN/1.73 M^2
FLUBV RNA NPH QL NAA+NON-PROBE: NOT DETECTED
GLUCOSE SERPL-MCNC: 91 MG/DL (ref 70–110)
HCT VFR BLD AUTO: 40.5 % (ref 37–48.5)
HGB BLD-MCNC: 13 G/DL (ref 12–16)
HPIV1 RNA NPH QL NAA+NON-PROBE: NOT DETECTED
HPIV2 RNA NPH QL NAA+NON-PROBE: NOT DETECTED
HPIV3 RNA NPH QL NAA+NON-PROBE: NOT DETECTED
HPIV4 RNA NPH QL NAA+NON-PROBE: NOT DETECTED
HUMAN METAPNEUMOVIRUS: NOT DETECTED
HYPOCHROMIA BLD QL SMEAR: ABNORMAL
IMM GRANULOCYTES # BLD AUTO: 0.01 K/UL (ref 0–0.04)
IMM GRANULOCYTES NFR BLD AUTO: 0.1 % (ref 0–0.5)
INFLUENZA A (SUBTYPES H1,H1-2009,H3): NOT DETECTED
LYMPHOCYTES # BLD AUTO: 1.7 K/UL (ref 1–4.8)
LYMPHOCYTES NFR BLD: 21 % (ref 18–48)
LYMPHOCYTES NFR FLD MANUAL: 19 %
MAGNESIUM SERPL-MCNC: 1.9 MG/DL (ref 1.6–2.6)
MCH RBC QN AUTO: 30.7 PG (ref 27–31)
MCHC RBC AUTO-ENTMCNC: 32.1 G/DL (ref 32–36)
MCV RBC AUTO: 96 FL (ref 82–98)
MONOCYTES # BLD AUTO: 0.8 K/UL (ref 0.3–1)
MONOCYTES NFR BLD: 10.1 % (ref 4–15)
MONOS+MACROS NFR FLD MANUAL: 13 %
MYCOPLASMA PNEUMONIAE: NOT DETECTED
NEUTROPHILS # BLD AUTO: 2.9 K/UL (ref 1.8–7.7)
NEUTROPHILS NFR BLD: 35.1 % (ref 38–73)
NEUTROPHILS NFR FLD MANUAL: 2 %
NRBC BLD-RTO: 0 /100 WBC
OVALOCYTES BLD QL SMEAR: ABNORMAL
PHOSPHATE SERPL-MCNC: 3.4 MG/DL (ref 2.7–4.5)
PLATELET # BLD AUTO: 246 K/UL (ref 150–450)
PMV BLD AUTO: 10.5 FL (ref 9.2–12.9)
POIKILOCYTOSIS BLD QL SMEAR: SLIGHT
POLYCHROMASIA BLD QL SMEAR: ABNORMAL
POTASSIUM SERPL-SCNC: 3.2 MMOL/L (ref 3.5–5.1)
PROT SERPL-MCNC: 7.1 G/DL (ref 6–8.4)
PROTEINASE3 IGG SER-ACNC: <0.2 U
RBC # BLD AUTO: 4.24 M/UL (ref 4–5.4)
RESPIRATORY INFECTION PANEL SOURCE: NORMAL
RSV RNA NPH QL NAA+NON-PROBE: NOT DETECTED
RV+EV RNA NPH QL NAA+NON-PROBE: NOT DETECTED
SARS-COV-2 RNA RESP QL NAA+PROBE: NOT DETECTED
SODIUM SERPL-SCNC: 141 MMOL/L (ref 136–145)
SPHEROCYTES BLD QL SMEAR: ABNORMAL
WBC # BLD AUTO: 8.19 K/UL (ref 3.9–12.7)
WBC # FLD: 0 /CU MM

## 2023-10-13 PROCEDURE — 99233 SBSQ HOSP IP/OBS HIGH 50: CPT | Mod: 25,,, | Performed by: INTERNAL MEDICINE

## 2023-10-13 PROCEDURE — 89051 BODY FLUID CELL COUNT: CPT | Performed by: INTERNAL MEDICINE

## 2023-10-13 PROCEDURE — D9220A PRA ANESTHESIA: Mod: ANES,,, | Performed by: ANESTHESIOLOGY

## 2023-10-13 PROCEDURE — 87206 SMEAR FLUORESCENT/ACID STAI: CPT | Performed by: INTERNAL MEDICINE

## 2023-10-13 PROCEDURE — 94640 AIRWAY INHALATION TREATMENT: CPT

## 2023-10-13 PROCEDURE — 87798 DETECT AGENT NOS DNA AMP: CPT | Performed by: INTERNAL MEDICINE

## 2023-10-13 PROCEDURE — 88305 TISSUE EXAM BY PATHOLOGIST: CPT | Mod: 26,,, | Performed by: PATHOLOGY

## 2023-10-13 PROCEDURE — 83735 ASSAY OF MAGNESIUM: CPT | Performed by: FAMILY MEDICINE

## 2023-10-13 PROCEDURE — 87150 DNA/RNA AMPLIFIED PROBE: CPT

## 2023-10-13 PROCEDURE — 88312 SPECIAL STAINS GROUP 1: CPT | Performed by: PATHOLOGY

## 2023-10-13 PROCEDURE — 25000242 PHARM REV CODE 250 ALT 637 W/ HCPCS: Performed by: STUDENT IN AN ORGANIZED HEALTH CARE EDUCATION/TRAINING PROGRAM

## 2023-10-13 PROCEDURE — 63600175 PHARM REV CODE 636 W HCPCS: Performed by: INTERNAL MEDICINE

## 2023-10-13 PROCEDURE — 87118 MYCOBACTERIC IDENTIFICATION: CPT | Mod: 59 | Performed by: INTERNAL MEDICINE

## 2023-10-13 PROCEDURE — 11000001 HC ACUTE MED/SURG PRIVATE ROOM

## 2023-10-13 PROCEDURE — 84100 ASSAY OF PHOSPHORUS: CPT | Performed by: FAMILY MEDICINE

## 2023-10-13 PROCEDURE — 87116 MYCOBACTERIA CULTURE: CPT | Performed by: INTERNAL MEDICINE

## 2023-10-13 PROCEDURE — 27201423 OPTIME MED/SURG SUP & DEVICES STERILE SUPPLY: Performed by: INTERNAL MEDICINE

## 2023-10-13 PROCEDURE — 63600175 PHARM REV CODE 636 W HCPCS

## 2023-10-13 PROCEDURE — 87305 ASPERGILLUS AG IA: CPT | Mod: 91 | Performed by: INTERNAL MEDICINE

## 2023-10-13 PROCEDURE — 94799 UNLISTED PULMONARY SVC/PX: CPT | Mod: XB

## 2023-10-13 PROCEDURE — 88305 TISSUE EXAM BY PATHOLOGIST: ICD-10-PCS | Mod: 26,,, | Performed by: PATHOLOGY

## 2023-10-13 PROCEDURE — 88312 PR  SPECIAL STAINS,GROUP I: ICD-10-PCS | Mod: 26,,, | Performed by: PATHOLOGY

## 2023-10-13 PROCEDURE — 87118 MYCOBACTERIC IDENTIFICATION: CPT | Performed by: INTERNAL MEDICINE

## 2023-10-13 PROCEDURE — 88172 CYTP DX EVAL FNA 1ST EA SITE: CPT | Mod: 26,,, | Performed by: PATHOLOGY

## 2023-10-13 PROCEDURE — 71000016 HC POSTOP RECOV ADDL HR: Performed by: INTERNAL MEDICINE

## 2023-10-13 PROCEDURE — 88112 CYTOPATH CELL ENHANCE TECH: CPT | Mod: 26,59,, | Performed by: PATHOLOGY

## 2023-10-13 PROCEDURE — 87015 SPECIMEN INFECT AGNT CONCNTJ: CPT | Performed by: INTERNAL MEDICINE

## 2023-10-13 PROCEDURE — 94664 DEMO&/EVAL PT USE INHALER: CPT

## 2023-10-13 PROCEDURE — 71000033 HC RECOVERY, INTIAL HOUR: Performed by: INTERNAL MEDICINE

## 2023-10-13 PROCEDURE — 86003 ALLG SPEC IGE CRUDE XTRC EA: CPT | Performed by: INTERNAL MEDICINE

## 2023-10-13 PROCEDURE — 85025 COMPLETE CBC W/AUTO DIFF WBC: CPT | Performed by: FAMILY MEDICINE

## 2023-10-13 PROCEDURE — 88312 SPECIAL STAINS GROUP 1: CPT | Mod: 26,,, | Performed by: PATHOLOGY

## 2023-10-13 PROCEDURE — 88177 PR  EVALUATION OF FNA SMEAR TO DETERMINE ADEQUACY, EA ADD EVAL: ICD-10-PCS | Mod: 26,,, | Performed by: PATHOLOGY

## 2023-10-13 PROCEDURE — 86777 TOXOPLASMA ANTIBODY: CPT | Performed by: INTERNAL MEDICINE

## 2023-10-13 PROCEDURE — 63600175 PHARM REV CODE 636 W HCPCS: Performed by: FAMILY MEDICINE

## 2023-10-13 PROCEDURE — 99900035 HC TECH TIME PER 15 MIN (STAT)

## 2023-10-13 PROCEDURE — 37000008 HC ANESTHESIA 1ST 15 MINUTES: Performed by: INTERNAL MEDICINE

## 2023-10-13 PROCEDURE — 99233 PR SUBSEQUENT HOSPITAL CARE,LEVL III: ICD-10-PCS | Mod: 25,,, | Performed by: INTERNAL MEDICINE

## 2023-10-13 PROCEDURE — 36000707: Performed by: INTERNAL MEDICINE

## 2023-10-13 PROCEDURE — 87798 DETECT AGENT NOS DNA AMP: CPT | Mod: 59 | Performed by: INTERNAL MEDICINE

## 2023-10-13 PROCEDURE — 87305 ASPERGILLUS AG IA: CPT | Performed by: INTERNAL MEDICINE

## 2023-10-13 PROCEDURE — D9220A PRA ANESTHESIA: ICD-10-PCS | Mod: CRNA,,, | Performed by: NURSE ANESTHETIST, CERTIFIED REGISTERED

## 2023-10-13 PROCEDURE — 87449 NOS EACH ORGANISM AG IA: CPT | Performed by: INTERNAL MEDICINE

## 2023-10-13 PROCEDURE — 36000706: Performed by: INTERNAL MEDICINE

## 2023-10-13 PROCEDURE — 86606 ASPERGILLUS ANTIBODY: CPT | Performed by: INTERNAL MEDICINE

## 2023-10-13 PROCEDURE — 86635 COCCIDIOIDES ANTIBODY: CPT | Mod: 91 | Performed by: INTERNAL MEDICINE

## 2023-10-13 PROCEDURE — 87070 CULTURE OTHR SPECIMN AEROBIC: CPT | Performed by: INTERNAL MEDICINE

## 2023-10-13 PROCEDURE — 94761 N-INVAS EAR/PLS OXIMETRY MLT: CPT

## 2023-10-13 PROCEDURE — 99232 PR SUBSEQUENT HOSPITAL CARE,LEVL II: ICD-10-PCS | Mod: ,,, | Performed by: STUDENT IN AN ORGANIZED HEALTH CARE EDUCATION/TRAINING PROGRAM

## 2023-10-13 PROCEDURE — 87186 SC STD MICRODIL/AGAR DIL: CPT

## 2023-10-13 PROCEDURE — 86698 HISTOPLASMA ANTIBODY: CPT | Performed by: INTERNAL MEDICINE

## 2023-10-13 PROCEDURE — 25000003 PHARM REV CODE 250: Performed by: STUDENT IN AN ORGANIZED HEALTH CARE EDUCATION/TRAINING PROGRAM

## 2023-10-13 PROCEDURE — D9220A PRA ANESTHESIA: ICD-10-PCS | Mod: ANES,,, | Performed by: ANESTHESIOLOGY

## 2023-10-13 PROCEDURE — 87102 FUNGUS ISOLATION CULTURE: CPT | Performed by: INTERNAL MEDICINE

## 2023-10-13 PROCEDURE — 25000003 PHARM REV CODE 250: Performed by: EMERGENCY MEDICINE

## 2023-10-13 PROCEDURE — 25000003 PHARM REV CODE 250: Performed by: NURSE ANESTHETIST, CERTIFIED REGISTERED

## 2023-10-13 PROCEDURE — 37000009 HC ANESTHESIA EA ADD 15 MINS: Performed by: INTERNAL MEDICINE

## 2023-10-13 PROCEDURE — 88172 CYTP DX EVAL FNA 1ST EA SITE: CPT | Performed by: PATHOLOGY

## 2023-10-13 PROCEDURE — 27000646 HC AEROBIKA DEVICE

## 2023-10-13 PROCEDURE — 88305 TISSUE EXAM BY PATHOLOGIST: CPT | Performed by: PATHOLOGY

## 2023-10-13 PROCEDURE — D9220A PRA ANESTHESIA: Mod: CRNA,,, | Performed by: NURSE ANESTHETIST, CERTIFIED REGISTERED

## 2023-10-13 PROCEDURE — 87205 SMEAR GRAM STAIN: CPT | Performed by: INTERNAL MEDICINE

## 2023-10-13 PROCEDURE — 87186 SC STD MICRODIL/AGAR DIL: CPT | Performed by: INTERNAL MEDICINE

## 2023-10-13 PROCEDURE — 71000015 HC POSTOP RECOV 1ST HR: Performed by: INTERNAL MEDICINE

## 2023-10-13 PROCEDURE — C1726 CATH, BAL DIL, NON-VASCULAR: HCPCS | Performed by: INTERNAL MEDICINE

## 2023-10-13 PROCEDURE — 88177 CYTP FNA EVAL EA ADDL: CPT | Mod: 59 | Performed by: PATHOLOGY

## 2023-10-13 PROCEDURE — 88112 CYTOPATH CELL ENHANCE TECH: CPT | Performed by: PATHOLOGY

## 2023-10-13 PROCEDURE — 88172 PR  EVALUATION OF FNA SMEAR TO DETERMINE ADEQUACY, FIRST EVAL: ICD-10-PCS | Mod: 26,,, | Performed by: PATHOLOGY

## 2023-10-13 PROCEDURE — 86778 TOXOPLASMA ANTIBODY IGM: CPT | Performed by: INTERNAL MEDICINE

## 2023-10-13 PROCEDURE — 25000003 PHARM REV CODE 250

## 2023-10-13 PROCEDURE — 99232 SBSQ HOSP IP/OBS MODERATE 35: CPT | Mod: ,,, | Performed by: STUDENT IN AN ORGANIZED HEALTH CARE EDUCATION/TRAINING PROGRAM

## 2023-10-13 PROCEDURE — 25000003 PHARM REV CODE 250: Performed by: INTERNAL MEDICINE

## 2023-10-13 PROCEDURE — 87077 CULTURE AEROBIC IDENTIFY: CPT | Performed by: INTERNAL MEDICINE

## 2023-10-13 PROCEDURE — 25000003 PHARM REV CODE 250: Performed by: FAMILY MEDICINE

## 2023-10-13 PROCEDURE — 86403 PARTICLE AGGLUT ANTBDY SCRN: CPT | Performed by: INTERNAL MEDICINE

## 2023-10-13 PROCEDURE — 27000221 HC OXYGEN, UP TO 24 HOURS

## 2023-10-13 PROCEDURE — 87449 NOS EACH ORGANISM AG IA: CPT | Mod: 91 | Performed by: INTERNAL MEDICINE

## 2023-10-13 PROCEDURE — 87118 MYCOBACTERIC IDENTIFICATION: CPT

## 2023-10-13 PROCEDURE — 88177 CYTP FNA EVAL EA ADDL: CPT | Mod: 26,,, | Performed by: PATHOLOGY

## 2023-10-13 PROCEDURE — 88112 PR  CYTOPATH, CELL ENHANCE TECH: ICD-10-PCS | Mod: 26,59,, | Performed by: PATHOLOGY

## 2023-10-13 PROCEDURE — 80053 COMPREHEN METABOLIC PANEL: CPT | Performed by: FAMILY MEDICINE

## 2023-10-13 RX ORDER — PANTOPRAZOLE SODIUM 40 MG/1
40 TABLET, DELAYED RELEASE ORAL DAILY
Status: DISCONTINUED | OUTPATIENT
Start: 2023-10-14 | End: 2023-10-15 | Stop reason: HOSPADM

## 2023-10-13 RX ORDER — DEXAMETHASONE SODIUM PHOSPHATE 4 MG/ML
INJECTION, SOLUTION INTRA-ARTICULAR; INTRALESIONAL; INTRAMUSCULAR; INTRAVENOUS; SOFT TISSUE
Status: DISCONTINUED | OUTPATIENT
Start: 2023-10-13 | End: 2023-10-13

## 2023-10-13 RX ORDER — LIDOCAINE HYDROCHLORIDE 20 MG/ML
INJECTION INTRAVENOUS
Status: DISCONTINUED | OUTPATIENT
Start: 2023-10-13 | End: 2023-10-13

## 2023-10-13 RX ORDER — LABETALOL HYDROCHLORIDE 5 MG/ML
INJECTION, SOLUTION INTRAVENOUS
Status: DISCONTINUED | OUTPATIENT
Start: 2023-10-13 | End: 2023-10-13

## 2023-10-13 RX ORDER — KETAMINE HCL IN 0.9 % NACL 50 MG/5 ML
SYRINGE (ML) INTRAVENOUS
Status: DISCONTINUED | OUTPATIENT
Start: 2023-10-13 | End: 2023-10-13

## 2023-10-13 RX ORDER — DEXMEDETOMIDINE HYDROCHLORIDE 100 UG/ML
INJECTION, SOLUTION INTRAVENOUS
Status: DISCONTINUED | OUTPATIENT
Start: 2023-10-13 | End: 2023-10-13

## 2023-10-13 RX ORDER — ONDANSETRON 2 MG/ML
4 INJECTION INTRAMUSCULAR; INTRAVENOUS DAILY PRN
Status: DISCONTINUED | OUTPATIENT
Start: 2023-10-13 | End: 2023-10-13 | Stop reason: HOSPADM

## 2023-10-13 RX ORDER — PREDNISONE 20 MG/1
80 TABLET ORAL DAILY
Status: DISCONTINUED | OUTPATIENT
Start: 2023-10-14 | End: 2023-10-15 | Stop reason: HOSPADM

## 2023-10-13 RX ORDER — ROCURONIUM BROMIDE 10 MG/ML
INJECTION, SOLUTION INTRAVENOUS
Status: DISCONTINUED | OUTPATIENT
Start: 2023-10-13 | End: 2023-10-13

## 2023-10-13 RX ORDER — ONDANSETRON 2 MG/ML
INJECTION INTRAMUSCULAR; INTRAVENOUS
Status: DISCONTINUED | OUTPATIENT
Start: 2023-10-13 | End: 2023-10-13

## 2023-10-13 RX ORDER — PROPOFOL 10 MG/ML
VIAL (ML) INTRAVENOUS
Status: DISCONTINUED | OUTPATIENT
Start: 2023-10-13 | End: 2023-10-13

## 2023-10-13 RX ORDER — MIDAZOLAM HYDROCHLORIDE 1 MG/ML
INJECTION, SOLUTION INTRAMUSCULAR; INTRAVENOUS
Status: DISCONTINUED | OUTPATIENT
Start: 2023-10-13 | End: 2023-10-13

## 2023-10-13 RX ADMIN — SODIUM CHLORIDE: 0.9 INJECTION, SOLUTION INTRAVENOUS at 03:10

## 2023-10-13 RX ADMIN — MEROPENEM 2 G: 1 INJECTION INTRAVENOUS at 05:10

## 2023-10-13 RX ADMIN — GUAIFENESIN 600 MG: 600 TABLET, EXTENDED RELEASE ORAL at 08:10

## 2023-10-13 RX ADMIN — IPRATROPIUM BROMIDE AND ALBUTEROL SULFATE 3 ML: .5; 3 SOLUTION RESPIRATORY (INHALATION) at 09:10

## 2023-10-13 RX ADMIN — DEXMEDETOMIDINE 8 MCG: 100 INJECTION, SOLUTION, CONCENTRATE INTRAVENOUS at 03:10

## 2023-10-13 RX ADMIN — PROPOFOL 130 MG: 10 INJECTION, EMULSION INTRAVENOUS at 03:10

## 2023-10-13 RX ADMIN — LOSARTAN POTASSIUM 100 MG: 50 TABLET, FILM COATED ORAL at 08:10

## 2023-10-13 RX ADMIN — TOPIRAMATE 100 MG: 100 TABLET, FILM COATED ORAL at 08:10

## 2023-10-13 RX ADMIN — MONTELUKAST 10 MG: 10 TABLET, FILM COATED ORAL at 08:10

## 2023-10-13 RX ADMIN — CETIRIZINE HYDROCHLORIDE 5 MG: 5 TABLET, FILM COATED ORAL at 08:10

## 2023-10-13 RX ADMIN — IPRATROPIUM BROMIDE AND ALBUTEROL SULFATE 3 ML: .5; 3 SOLUTION RESPIRATORY (INHALATION) at 07:10

## 2023-10-13 RX ADMIN — SUGAMMADEX 200 MG: 100 INJECTION, SOLUTION INTRAVENOUS at 03:10

## 2023-10-13 RX ADMIN — Medication 20 MG: at 03:10

## 2023-10-13 RX ADMIN — DEXAMETHASONE SODIUM PHOSPHATE 8 MG: 4 INJECTION, SOLUTION INTRAMUSCULAR; INTRAVENOUS at 03:10

## 2023-10-13 RX ADMIN — MEROPENEM 2 G: 1 INJECTION INTRAVENOUS at 10:10

## 2023-10-13 RX ADMIN — IPRATROPIUM BROMIDE AND ALBUTEROL SULFATE 3 ML: .5; 3 SOLUTION RESPIRATORY (INHALATION) at 02:10

## 2023-10-13 RX ADMIN — ROCURONIUM BROMIDE 40 MG: 10 INJECTION INTRAVENOUS at 03:10

## 2023-10-13 RX ADMIN — DEXAMETHASONE SODIUM PHOSPHATE 4 MG: 4 INJECTION, SOLUTION INTRAMUSCULAR; INTRAVENOUS at 03:10

## 2023-10-13 RX ADMIN — Medication 6 MG: at 08:10

## 2023-10-13 RX ADMIN — LABETALOL HYDROCHLORIDE 15 MG: 5 INJECTION, SOLUTION INTRAVENOUS at 03:10

## 2023-10-13 RX ADMIN — IPRATROPIUM BROMIDE AND ALBUTEROL SULFATE 3 ML: .5; 3 SOLUTION RESPIRATORY (INHALATION) at 12:10

## 2023-10-13 RX ADMIN — MIDAZOLAM HYDROCHLORIDE 2 MG: 1 INJECTION, SOLUTION INTRAMUSCULAR; INTRAVENOUS at 03:10

## 2023-10-13 RX ADMIN — ONDANSETRON 4 MG: 2 INJECTION INTRAMUSCULAR; INTRAVENOUS at 03:10

## 2023-10-13 RX ADMIN — IPRATROPIUM BROMIDE AND ALBUTEROL SULFATE 3 ML: .5; 3 SOLUTION RESPIRATORY (INHALATION) at 04:10

## 2023-10-13 RX ADMIN — DEXMEDETOMIDINE 12 MCG: 100 INJECTION, SOLUTION, CONCENTRATE INTRAVENOUS at 03:10

## 2023-10-13 RX ADMIN — DOXYCYCLINE HYCLATE 100 MG: 100 TABLET, COATED ORAL at 08:10

## 2023-10-13 RX ADMIN — LIDOCAINE HYDROCHLORIDE 100 MG: 20 INJECTION INTRAVENOUS at 03:10

## 2023-10-13 RX ADMIN — FLUCONAZOLE 200 MG: 200 TABLET ORAL at 08:10

## 2023-10-13 RX ADMIN — TRAZODONE HYDROCHLORIDE 50 MG: 50 TABLET ORAL at 08:10

## 2023-10-13 RX ADMIN — Medication 30 MG: at 03:10

## 2023-10-13 RX ADMIN — SUGAMMADEX 200 MG: 100 INJECTION, SOLUTION INTRAVENOUS at 04:10

## 2023-10-13 RX ADMIN — DULOXETINE HYDROCHLORIDE 60 MG: 60 CAPSULE, DELAYED RELEASE ORAL at 08:10

## 2023-10-13 RX ADMIN — PREGABALIN 150 MG: 150 CAPSULE ORAL at 08:10

## 2023-10-13 RX ADMIN — ONDANSETRON 4 MG: 2 INJECTION INTRAMUSCULAR; INTRAVENOUS at 10:10

## 2023-10-13 NOTE — TRANSFER OF CARE
"Anesthesia Transfer of Care Note    Patient: Jaylin Murguia    Procedure(s) Performed: Procedure(s) (LRB):  BRONCHOSCOPY, WITH FLUOROSCOPY (N/A)    Patient location: PACU    Anesthesia Type: general    Transport from OR: Transported from OR on 6-10 L/min O2 by face mask with adequate spontaneous ventilation    Post pain: adequate analgesia    Post assessment: no apparent anesthetic complications and tolerated procedure well    Post vital signs: stable    Level of consciousness: awake    Nausea/Vomiting: no nausea/vomiting    Complications: none    Transfer of care protocol was followed      Last vitals:   Visit Vitals  /71   Pulse 82   Temp 36.6 °C (97.9 °F) (Temporal)   Resp 19   Ht 5' 7" (1.702 m)   Wt 78.9 kg (174 lb)   SpO2 (!) 93%   Breastfeeding No   BMI 27.25 kg/m²     "

## 2023-10-13 NOTE — PLAN OF CARE
Problem: Adult Inpatient Plan of Care  Goal: Plan of Care Review  Outcome: Ongoing, Progressing  Goal: Patient-Specific Goal (Individualized)  Outcome: Ongoing, Progressing  Goal: Absence of Hospital-Acquired Illness or Injury  Outcome: Ongoing, Progressing  Goal: Optimal Comfort and Wellbeing  Outcome: Ongoing, Progressing     Problem: Adult Inpatient Plan of Care  Goal: Plan of Care Review  Outcome: Ongoing, Progressing     Problem: Adult Inpatient Plan of Care  Goal: Optimal Comfort and Wellbeing  Outcome: Ongoing, Progressing

## 2023-10-13 NOTE — CONSULTS
[facility] - Med Surg  Infectious Disease  Consult Note    Patient Name: Jaylin Murguia  MRN: [id number]  Admission Date: 10/11/2023  Hospital Length of Stay: 1 days  Attending Physician: Ray Hubbard MD  Primary Care Provider: Esthela Hu MD     Isolation Status: No active isolations    Patient information was obtained from patient and ER records.      Inpatient consult to Infectious Diseases  Consult performed by: Manish Johnston MD  Consult ordered by: Mumtaz Medina MD        Assessment/Plan:     Pulmonary  Respiratory infection  Extensive history of sinusitis; [facility]  She has recurrent sinus congestion, abscesses , despite multiple rounds of surgeries and antibiotics  - Most recently seen Sept 27th w/ Dr. Roger; worked up for dyspnea and worsening congestion/cough; treated with cefadroxil and levofloxacin.  Has also been on gentamicin nasal lavages  - Has grown pseudomonas in 13 sinus cultures (abscess or sputum) since 2020; also seen stenotrophamonas  - Scheduled for bronch tomorrow with ENT    CT chest showing worsening opacities between scanning on 9/27 and repeat on 10/11; also showed enlarged LN's, and mucous plugging.  Patient having worsening dyspnea.  Leukocytosis on arrival to 12k.  Would treat for pneumonia at this point and follow with ENT for bronch tomorrow; adjust as needed.      Recommendations:  -- OK to stop vancomycin and cefepime  -- Start meropenem 2g q8hr + doxycycline 100mg BID (based on cx history)  -- Repeat RIP, obtain urine legionella, fungal studies (ordered) - obtain cultures tomorrow     ID  Thrush  Noted on exam today;    Recommendations:  -- Fluconazole 200mg qd ; duration based on progress  -- Nystatin suspension 500,000 units (5 mL) swish & swallow qid        Thank you for your consult. ID will continue to follow    Manish Johnston MD  Infectious Disease  Subjective:     Principal Problem: Multifocal pneumonia    HPI: 58F with a PMHx  asthma, Crohn's Disease on IS, IVIG d/t IS, chronic diarrhea (5-6 episodes daily, takes 1-2 lomotil for relief, follows up with Dr. Leal), and chronic pansinusitis with pseudomonas colonization s/p FESS 07/2019 and 09/2022, and recurrent pseudomonas pneumonia. She presented to ED on 10/11 with worsening shortness of breath and productive cough with dark green and red colored sputum. She was on a course of cefadroxil and levofloxacin started on 09/27 with Dr. Saini; however, her symptoms were getting worse as she was becoming more short of breath coughing episodes and with minimal exertion. She completed a Chest CT 09/27 which showed Patchy ground-glass opacities throughout the lungs which has improved in the interval.  Scattered mucus plugging which appears similar to prior exam. Respiratory sputum Cx 09/27 grew pseudomonas aeruginosa and methicillin sensitive staph aureus.      She denies any fever or chills, no nausea or vomiting. No known sick contacts. Her last episode of diarrhea was last night, which stopped after taking 2 lomotil. Has abdominal cramping with diarrhea but no blood in stool. No dysuria, abdominal pain or skin changes/rashes.      She was started on cefepime and vancomycin. She reports that although she continues to have the coughing and shortness of breath on exertion, she has noticed an improvement in her symptoms. She no longer has dark green, red colored sputum. States she produces a tsp amount of sputum with cough that is clear      She lives in [place] in a house with her  and son (25), near [facility] ([facility] She has 1 dog. She also has a flock of chickens with an outdoor and indoor coup - she has had the chickens for 4-5 years but stopped directly interacting with them around June 2023 due to breathing issues. She state that her  takes care of them. She has a swimming pool that is chlorinated but has not swum in it for months, otherwise would swim 1-2 times per week. She  likes to garden and has a small lesion on her right lower extremity from a adriana bush thorn that occurred 6 months ago but has not healed - no draining, discharge or tenderness. 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  ID was consulted for multifocal PNA hx of pseudomonas resistant colonization       Past Medical History:   Diagnosis Date    Allergic rhinitis     Asthma     Chronic pansinusitis     Crohn's disease     Ileal involvement, previously on Remicade, Asacol, Prednisone    Fibromyalgia     Hyperlipidemia     Hypertension     Immunosuppression     Migraine     Obstructive sleep apnea     CPAP at night    Sciatica        Past Surgical History:   Procedure Laterality Date    BLADDER SURGERY      sling was created by her muscles     BRONCHOSCOPY N/A 2023    Procedure: BRONCHOSCOPY;  Surgeon: [facility];  Location: [facility] OR [address] [place] [address];  Service: Anesthesiology;  Laterality: N/A;     SECTION      COLONOSCOPY N/A 2017    Procedure: COLONOSCOPY;  Surgeon: Kin Dyer MD;  Location: [facility];  Service: Endoscopy;  Laterality: N/A;    COLONOSCOPY N/A 2018    Procedure: COLONOSCOPY;  Surgeon: Kyra Vallecillo MD;  Location: [facility];  Service: Endoscopy;  Laterality: N/A;    COLONOSCOPY N/A 2020    Procedure: COLONOSCOPY;  Surgeon: Nicole Leal MD;  Location: [facility];  Service: Endoscopy;  Laterality: N/A;    COLONOSCOPY N/A 2022    Procedure: COLONOSCOPY;  Surgeon: Shay Bruce MD;  Location: [facility] ENDO;  Service: Endoscopy;  Laterality: N/A;    COLONOSCOPY N/A 2023    Procedure: COLONOSCOPY;  Surgeon: Nicole Leal MD;  Location: [facility] ENDO;  Service: Endoscopy;  Laterality: N/A;    DEBRIDEMENT Bilateral 2020    Procedure: DEBRIDEMENT;  Surgeon: Matthias Roach MD;  Location: [facility] OR [address] [place] [address];  Service: ENT;  Laterality:  Bilateral;    ESOPHAGOGASTRODUODENOSCOPY N/A 03/12/2020    Procedure: ESOPHAGOGASTRODUODENOSCOPY (EGD);  Surgeon: Nicole Leal MD;  Location: [facility];  Service: Endoscopy;  Laterality: N/A;    ESOPHAGOGASTRODUODENOSCOPY N/A 03/16/2022    Procedure: EGD (ESOPHAGOGASTRODUODENOSCOPY);  Surgeon: Shay Bruce MD;  Location: [facility];  Service: Endoscopy;  Laterality: N/A;    ESOPHAGOGASTRODUODENOSCOPY N/A 02/02/2023    Procedure: EGD (ESOPHAGOGASTRODUODENOSCOPY);  Surgeon: Nicole Leal MD;  Location: [facility];  Service: Endoscopy;  Laterality: N/A;    FINGER SURGERY      joint relpacement, left hand index finger    FUNCTIONAL ENDOSCOPIC SINUS SURGERY (FESS) USING COMPUTER-ASSISTED NAVIGATION Bilateral 07/31/2019    Procedure: FESS, USING COMPUTER-ASSISTED NAVIGATION;  Surgeon: Manish Shaffer MD;  Location: [facility];  Service: ENT;  Laterality: Bilateral;    FUNCTIONAL ENDOSCOPIC SINUS SURGERY (FESS) USING COMPUTER-ASSISTED NAVIGATION Bilateral 09/25/2020    Procedure: FESS, USING COMPUTER-ASSISTED NAVIGATION SPHENOID;  Surgeon: Matthias Roach MD;  Location: [facility] OR [address] [place] [address];  Service: ENT;  Laterality: Bilateral;  TIVA    FUNCTIONAL ENDOSCOPIC SINUS SURGERY (FESS) USING COMPUTER-ASSISTED NAVIGATION Bilateral 09/30/2022    Procedure: FESS, USING COMPUTER-ASSISTED NAVIGATION;  Surgeon: Matthias Roach MD;  [facility];  Service: ENT;  Laterality: Bilateral;    HYSTERECTOMY  2001    INTRALUMINAL GASTROINTESTINAL TRACT IMAGING VIA CAPSULE N/A 03/03/2023    Procedure: IMAGING PROCEDURE, GI TRACT, INTRALUMINAL, VIA CAPSULE;  Surgeon: First Available [place];  Location: [facility];  Service: Endoscopy;  Laterality: N/A;    SINUS SURGERY      WISDOM TOOTH EXTRACTION         Review of patient's allergies indicates:   Allergen Reactions    Fentanyl Other (See Comments)     Rigid Chest Syndrome       Medications:  Medications Prior to Admission   Medication Sig    acetaminophen (TYLENOL) 500  MG tablet Take 2 tablets (1,000 mg total) by mouth every 6 (six) hours as needed for Pain.    albuterol-ipratropium (DUO-NEB) 2.5 mg-0.5 mg/3 mL nebulizer solution Take 3 mLs by nebulization every 6 (six) hours as needed for Wheezing. Rescue    b complex vitamins capsule Take 1 capsule by mouth once daily.    calcium carbonate/vitamin D3 (VITAMIN D-3 ORAL) Take 2 tablets by mouth once daily.    cefadroxil (DURICEF) 500 MG Cap Take 2 capsules (1,000 mg total) by mouth every 12 (twelve) hours. for 14 days    cetirizine (ZYRTEC) 10 MG tablet Take 10 mg by mouth 2 (two) times a day.    clotrimazole-betamethasone 1-0.05% (LOTRISONE) cream Apply topically 2 (two) times daily.    diltiazem HCl (DILTIAZEM 2% - LIDOCAINE 5% CREAM) Apply peasize amount topically to anal area.    diphenhydrAMINE-aluminum-magnesium hydroxide-simethicone-LIDOcaine HCl 2% Swish and spit 15 mLs every 4 (four) hours as needed (oral ulcers, pain).    diphenoxylate-atropine 2.5-0.025 mg (LOMOTIL) 2.5-0.025 mg per tablet Take 1 tablet by mouth 4 (four) times daily as needed for Diarrhea.    DULoxetine (CYMBALTA) 60 MG capsule Take 1 capsule (60 mg total) by mouth 2 (two) times daily.    eletriptan (RELPAX) 40 MG tablet Take 1 tablet (40 mg total) by mouth as needed. (Patient taking differently: Take 40 mg by mouth as needed (migraine).)    estradioL (ESTRACE) 2 MG tablet TAKE 1 TABLET DAILY    fish oil/borage/flax/om3,6,9 1 (OMEGA 3-6-9 ORAL) Take 2 tablets by mouth once daily.    fluticasone propionate (FLONASE) 50 mcg/actuation nasal spray 2 sprays (100 mcg total) by Each Nostril route once daily.    fluticasone-umeclidin-vilanter (TRELEGY ELLIPTA) 100-62.5-25 mcg DsDv Inhale 1 puff into the lungs once daily.    immun glob G,IgG,-pro-IgA 0-50 (HIZENTRA) 10 gram/50 mL (20 %) Soln Inject 70 mLs (14 g total) into the skin every 7 days.    ipratropium (ATROVENT) 0.02 % nebulizer solution Take by nebulization 4 (four) times daily as needed for Wheezing.     levoFLOXacin (LEVAQUIN) 750 MG tablet Take 1 tablet (750 mg total) by mouth once daily. for 14 days    losartan (COZAAR) 100 MG tablet Take 1 tablet (100 mg total) by mouth once daily. (Patient taking differently: Take 100 mg by mouth daily as needed (high blood pressure (>120/80)).)    montelukast (SINGULAIR) 10 mg tablet TAKE 1 TABLET EVERY EVENING    nortriptyline (PAMELOR) 25 MG capsule Take 1 capsule (25 mg total) by mouth every evening.    pregabalin (LYRICA) 150 MG capsule Take 1 capsule (150 mg total) by mouth 3 (three) times daily. (Patient taking differently: Take 150 mg by mouth once daily.)    risankizumab-rzaa 360 mg/2.4 mL (150 mg/mL) Injt Inject 360 mg into the skin every 8 weeks. for 6 doses    rizatriptan (MAXALT) 10 MG tablet TAKE 1 TABLET IF NEEDED FOR MIGRAINES. MAX 2 TABLETS IN 24 HOURS.    topiramate (TOPAMAX) 100 MG tablet Take 1 tablet (100 mg total) by mouth 2 (two) times daily.    traZODone (DESYREL) 50 MG tablet Take 1 tablet (50 mg total) by mouth every evening.    XYLITOL, BULK, MISC EMPTY CONTENTS OF 1 CAPSULE INTO NASAL IRRIGATION SYSTEM, ADD DISTILLED WATER, SALT PACK, MIX & IRRIGATE. PERFORM 2 TIMES DAILY    ZINC ORAL Take 1 tablet by mouth once daily.     Antibiotics (From admission, onward)      Start     Stop Route Frequency Ordered    10/12/23 2100  doxycycline tablet 100 mg         -- Oral Every 12 hours 10/12/23 1344    10/12/23 1445  meropenem (MERREM) 2 g in sodium chloride 0.9% 100 mL IVPB         -- IV Every 8 hours (non-standard times) 10/12/23 1344          Antifungals (From admission, onward)      Start     Stop Route Frequency Ordered    10/12/23 1500  fluconazole tablet 200 mg         10/19/23 0859 Oral Daily 10/12/23 1348          Antivirals (From admission, onward)      None             Immunization History   Administered Date(s) Administered    COVID-19, MRNA, LN-S, PF (Pfizer) (Purple Cap) 03/05/2021, 04/15/2021    Hepatitis A / Hepatitis B 12/12/2019     Influenza 10/29/2013    Influenza - Quadrivalent - PF *Preferred* (6 months and older) 11/15/2017, 01/28/2020, 02/11/2021    Influenza Split 10/29/2013    PPD Test 05/22/2017    Pneumococcal Conjugate - 13 Valent 12/23/2019    Pneumococcal Polysaccharide - 23 Valent 10/29/2013    Tdap 02/18/2014       Family History       Problem Relation (Age of Onset)    Allergies Mother    Breast cancer Mother, Maternal Grandmother    COPD Maternal Grandmother (72)    Cancer Paternal Grandmother (70)    Heart attack Maternal Grandmother    Hypertension Mother, Brother    Kidney disease Father (64)    Scleroderma Father          Social History     Socioeconomic History    Marital status:     Number of children: 2   Occupational History    Occupation: teacher   Tobacco Use    Smoking status: Never     Passive exposure: Never    Smokeless tobacco: Never   Substance and Sexual Activity    Alcohol use: No    Drug use: No    Sexual activity: Yes     Partners: Male   Social History Narrative    Surrogate Decision Maker: , Cristobal Murguia, [phone]     Social Determinants of Health     Financial Resource Strain: Low Risk  (8/25/2022)    Overall Financial Resource Strain (CARDIA)     Difficulty of Paying Living Expenses: Not very hard   Food Insecurity: No Food Insecurity (8/24/2023)    Hunger Vital Sign     Worried About Running Out of Food in the Last Year: Never true     Ran Out of Food in the Last Year: Never true   Transportation Needs: No Transportation Needs (8/24/2023)    PRAPARE - Transportation     Lack of Transportation (Medical): No     Lack of Transportation (Non-Medical): No   Physical Activity: Inactive (8/24/2023)    Exercise Vital Sign     Days of Exercise per Week: 0 days     Minutes of Exercise per Session: 0 min   Stress: Stress Concern Present (8/24/2023)    [employer] - Occupational Stress Questionnaire     Feeling of Stress : Rather much   Social Connections:  Unknown (8/24/2023)    Social Connection and Isolation Panel [NHANES]     Frequency of Communication with Friends and Family: Twice a week     Frequency of Social Gatherings with Friends and Family: Once a week     Attends Club or Organization Meetings: More than 4 times per year     Marital Status:    Housing Stability: Low Risk  (8/24/2023)    Housing Stability Vital Sign     Unable to Pay for Housing in the Last Year: No     Number of Places Lived in the Last Year: 1     Unstable Housing in the Last Year: No     Review of Systems   Constitutional:  Negative for chills and fever.   HENT:  Positive for congestion, postnasal drip, sinus pressure and sinus pain.    Eyes:  Negative for discharge.   Gastrointestinal:  Negative for constipation, diarrhea, nausea and vomiting.   Genitourinary:  Negative for dysuria and urgency.   Skin:  Negative for rash and wound.     Objective:     Vital Signs (Most Recent):  Temp: 98 °F (36.7 °C) (10/12/23 1548)  Pulse: 72 (10/12/23 1548)  Resp: 16 (10/12/23 1548)  BP: (!) 134/95 (10/12/23 1251)  SpO2: (!) 94 % (10/12/23 1548) Vital Signs (24h Range):  Temp:  [96.7 °F (35.9 °C)-98.2 °F (36.8 °C)] 98 °F (36.7 °C)  Pulse:  [56-74] 72  Resp:  [13-20] 16  SpO2:  [92 %-97 %] 94 %  BP: (122-161)/(65-95) 134/95     Weight: 79.3 kg (174 lb 13.2 oz)  Body mass index is 27.38 kg/m².    Estimated Creatinine Clearance: 83.1 mL/min (based on SCr of 0.8 mg/dL).     Physical Exam  Constitutional:       General: She is not in acute distress.     Appearance: She is not ill-appearing or toxic-appearing.   HENT:      Head: Normocephalic and atraumatic.      Comments: Posterior tongue with white patch, possible leukoplakia, can be scraped off     Nose: Congestion present.   Eyes:      General: No scleral icterus.     Conjunctiva/sclera: Conjunctivae normal.   Cardiovascular:      Rate and Rhythm: Normal rate and regular rhythm.      Heart sounds: Normal heart sounds.   Pulmonary:      Effort:  Pulmonary effort is normal.      Comments: Mildly coarse breath sounds bilaterally  Abdominal:      General: There is no distension.      Palpations: Abdomen is soft.   Musculoskeletal:      Right lower leg: No edema.      Left lower leg: No edema.   Lymphadenopathy:      Cervical: No cervical adenopathy.   Skin:     General: Skin is warm and dry.   Neurological:      Mental Status: She is alert and oriented to person, place, and time.   Psychiatric:         Mood and Affect: Mood normal.         Behavior: Behavior normal.          Significant Labs: All pertinent labs within the past 24 hours have been reviewed.    Significant Imaging: I have reviewed all pertinent imaging results/findings within the past 24 hours.

## 2023-10-13 NOTE — PLAN OF CARE
[facility] - Med Surg  Initial Discharge Assessment       Primary Care Provider: Esthela Hu MD    Admission Diagnosis: Shortness of breath [R06.02]  Pneumonia [J18.9]  Chest pain [R07.9]  Multifocal pneumonia [J18.9]    Admission Date: 10/11/2023  Expected Discharge Date:     Transition of Care Barriers: (P) None    Payor: [employer] / Plan: [facility] CHOICE PLUS / Product Type: Commercial /     Extended Emergency Contact Information  Primary Emergency Contact: MurguiaCristobal morel  Address: [address] [place], [place] [id number] [place] [address]  Mobile Phone: [phone]  Relation: Spouse    Discharge Plan A: (P) Home, Home with family  Discharge Plan B: (P) Home, Home with family      [facility] - [address] [address]  Phone: [phone] Fax: [phone]    [facility] - [facility] [address] [address] [place] [address]  [address] [address] [address]  Phone: [phone] Fax: [phone]      Initial Assessment (most recent)       Adult Discharge Assessment - 10/13/23 0947          Discharge Assessment    Assessment Type Discharge Planning Assessment (P)      Confirmed/corrected address, phone number and insurance Yes (P)      Confirmed Demographics Correct on Facesheet (P)      Source of Information patient (P)      When was your last doctors appointment? -- (P)    8 months ago    Does patient/caregiver understand observation status Yes (P)      Communicated SOBEIDA with patient/caregiver Yes (P)      Reason For Admission SOB (P)      People in Home spouse (P)      Facility Arrived From: Home (P)      Do you expect to return to your current living situation? Yes (P)      Do you have help at home or someone to help you manage your care at home? Yes (P)      Who are your caregiver(s) and their phone number(s)?  (P)      Prior to hospitilization cognitive status: Alert/Oriented (P)      Current cognitive status:  Alert/Oriented (P)      Home Accessibility wheelchair accessible (P)      Home Layout Able to live on 1st floor (P)      Equipment Currently Used at Home CPAP (P)      Readmission within 30 days? No (P)      Patient currently being followed by outpatient case management? No (P)      Do you currently have service(s) that help you manage your care at home? No (P)      Do you take prescription medications? Yes (P)      Do you have prescription coverage? Yes (P)      Coverage [facility] (P)      Do you have any problems affording any of your prescribed medications? No (P)      Is the patient taking medications as prescribed? yes (P)      Who is going to help you get home at discharge?  (P)      How do you get to doctors appointments? car, drives self (P)      Are you on dialysis? No (P)      Do you take coumadin? No (P)      DME Needed Upon Discharge  none (P)      Discharge Plan discussed with: Patient (P)      Transition of Care Barriers None (P)      Discharge Plan A Home;Home with family (P)      Discharge Plan B Home;Home with family (P)         Financial Resource Strain    How hard is it for you to pay for the very basics like food, housing, medical care, and heating? Not hard at all (P)         Housing Stability    In the last 12 months, was there a time when you were not able to pay the mortgage or rent on time? No (P)      In the last 12 months, how many places have you lived? 1 (P)      In the last 12 months, was there a time when you did not have a steady place to sleep or slept in a shelter (including now)? No (P)         Transportation Needs    In the past 12 months, has lack of transportation kept you from medical appointments or from getting medications? No (P)      In the past 12 months, has lack of transportation kept you from meetings, work, or from getting things needed for daily living? No (P)         Food Insecurity    Within the past 12 months, you worried that your food would run out before you  got the money to buy more. Never true (P)      Within the past 12 months, the food you bought just didn't last and you didn't have money to get more. Never true (P)         Stress    Do you feel stress - tense, restless, nervous, or anxious, or unable to sleep at night because your mind is troubled all the time - these days? Not at all (P)         Social Connections    In a typical week, how many times do you talk on the phone with family, friends, or neighbors? More than three times a week (P)      How often do you get together with friends or relatives? Once a week (P)      How often do you attend Confucianism or Spiritism services? Never (P)      Do you belong to any clubs or organizations such as Confucianism groups, unions, fraternal or athletic groups, or school groups? No (P)      How often do you attend meetings of the clubs or organizations you belong to? Never (P)      Are you , , , , never , or living with a partner?  (P)         Alcohol Use    Q1: How often do you have a drink containing alcohol? Never (P)      Q2: How many drinks containing alcohol do you have on a typical day when you are drinking? Patient does not drink (P)      Q3: How often do you have six or more drinks on one occasion? Never (P)         OTHER    Name(s) of People in Home  (P)

## 2023-10-13 NOTE — ASSESSMENT & PLAN NOTE
- ID consulted  -- Fluconazole 200mg daily, duration based on progress  -- Nystatin suspension 500,000 units (5 mL) swish & swallow qid

## 2023-10-13 NOTE — HPI
58F with a PMHx asthma, Crohn's Disease on IS, IVIG d/t IS, chronic diarrhea (5-6 episodes daily, takes 1-2 lomotil for relief, follows up with Dr. Leal), and chronic pansinusitis with pseudomonas colonization s/p FESS 07/2019 and 09/2022, and recurrent pseudomonas pneumonia. She presented to ED on 10/11 with worsening shortness of breath and productive cough with dark green and red colored sputum. She was on a course of cefadroxil and levofloxacin started on 09/27 with Dr. Saini; however, her symptoms were getting worse as she was becoming more short of breath coughing episodes and with minimal exertion. She completed a Chest CT 09/27 which showed Patchy ground-glass opacities throughout the lungs which has improved in the interval.  Scattered mucus plugging which appears similar to prior exam. Respiratory sputum Cx 09/27 grew pseudomonas aeruginosa and methicillin sensitive staph aureus.      She denies any fever or chills, no nausea or vomiting. No known sick contacts. Her last episode of diarrhea was last night, which stopped after taking 2 lomotil. Has abdominal cramping with diarrhea but no blood in stool. No dysuria, abdominal pain or skin changes/rashes.      She was started on cefepime and vancomycin. She reports that although she continues to have the coughing and shortness of breath on exertion, she has noticed an improvement in her symptoms. She no longer has dark green, red colored sputum. States she produces a tsp amount of sputum with cough that is clear      She lives in [place] in a house with her  and son (25), near [facility]. She has 1 dog. She also has a flock of chickens with an outdoor and indoor coup - she has had the chickens for 4-5 years but stopped directly interacting with them around June 2023 due to breathing issues. She state that her  takes care of them. She has a swimming pool that is chlorinated but has not swum in it for months, otherwise would swim 1-2 times  per week. She likes to garden and has a small lesion on her right lower extremity from a adriana bush thorn that occurred 6 months ago but has not healed - no draining, discharge or tenderness. 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  ID was consulted for multifocal PNA hx of pseudomonas resistant colonization

## 2023-10-13 NOTE — CONSULTS
"[facility] Consult Note    Primary Attending Physician: Ray Hubbard MD  Consultant Attending: Mary Molina MD  Consultant Fellow: Mary Jo Monsivais MD    Reason for Consult:     Bilateral ground glass opacities     Subjective:      Reports dyspnea last night while she was getting ready for bed. Started coughing and felt like she couldn't catch her breath, put on 2L of oxygen overnight and is feeling better this morning.     Objective:   Last 24 Hour Vital Signs:  BP  Min: 120/74  Max: 165/89  Temp  Av.3 °F (33.5 °C)  Min: 44.6 °F (7 °C)  Max: 98.8 °F (37.1 °C)  Pulse  Av.3  Min: 65  Max: 94  Resp  Av.9  Min: 16  Max: 19  SpO2  Av.5 %  Min: 91 %  Max: 98 %  Height  Av' 7" (170.2 cm)  Min: 5' 7" (170.2 cm)  Max: 5' 7" (170.2 cm)  Weight  Av.9 kg (174 lb)  Min: 78.9 kg (174 lb)  Max: 78.9 kg (174 lb)  No intake/output data recorded.    Physical Examination:  GEN: middle-aged woman, resting comfortably in bed  HEENT: face symmetric, conjunctivae anicteric  CV: regular rhythm, normal rate  PULM: normal respiratory effort on RA, no increased WOB  Abd: soft, non-tender, non-distended  Ext: no LE edema  MSK: moving all extremities spontaneously   Neuro: alert, oriented, answering questions appropriately     Laboratory:  Trended Lab Data:  Recent Labs     10/11/23  1546 10/12/23  0356 10/13/23  0523   WBC 12.36 8.53 8.19   HGB 13.9 13.1 13.0   HCT 42.0 39.7 40.5    217 246    141 141   K 3.4* 3.7 3.2*    113* 112*   CO2 20* 19* 21*   BUN 11 10 11   CREATININE 0.8 0.8 0.8    99 91   BILITOT 0.4 0.4 0.4   AST 24 20 25   ALT 17 14 23   ALKPHOS 101 89 98   CALCIUM 9.1 8.6* 8.7   ALBUMIN 3.4* 2.9* 2.9*   PROT 7.9 7.1 7.1   MG 1.8 2.0 1.9   PHOS  --  4.1 3.4       Cardiac:   Recent Labs   Lab 10/11/23  1546   TROPONINI <0.006   BNP 68     IgE - 234    Microbiology:  Microbiology Results (last 7 days)       Procedure Component Value Units Date/Time    " Blood culture #1 **CANNOT BE ORDERED STAT** [[id number]] Collected: 10/11/23 1546    Order Status: Completed Specimen: Blood from Peripheral, Antecubital, Left Updated: 10/13/23 1622     Blood Culture, Routine No Growth to date      No Growth to date      No Growth to date    Blood culture #2 **CANNOT BE ORDERED STAT** [[id number]] Collected: 10/11/23 1545    Order Status: Completed Specimen: Blood from Peripheral, Antecubital, Right Updated: 10/13/23 1622     Blood Culture, Routine No Growth to date      No Growth to date      No Growth to date    Culture, Respiratory with Gram Stain [[id number]] Collected: 10/13/23 1553    Order Status: Sent Specimen: Respiratory from BAL, RML Updated: 10/13/23 1554    AFB Culture & Smear [[id number]] Collected: 10/13/23 1553    Order Status: Sent Specimen: Respiratory from BAL, RML Updated: 10/13/23 1554    Fungus culture [[id number]] Collected: 10/13/23 1553    Order Status: Sent Specimen: Respiratory from BAL, RML Updated: 10/13/23 1554    Respiratory Infection Panel (PCR), Nasopharyngeal [[id number]] Collected: 10/13/23 1104    Order Status: Completed Specimen: Nasopharyngeal Swab Updated: 10/13/23 1253     Respiratory Infection Panel Source NP Swab     Adenovirus Not Detected     Coronavirus 229E, Common Cold Virus Not Detected     Coronavirus HKU1, Common Cold Virus Not Detected     Coronavirus NL63, Common Cold Virus Not Detected     Coronavirus OC43, Common Cold Virus Not Detected     Comment: The Coronavirus strains detected in this test cause the common cold.  These strains are not the COVID-19 (novel Coronavirus)strain   associated with the respiratory disease outbreak.          SARS-CoV2 (COVID-19) Qualitative PCR Not Detected     Human Metapneumovirus Not Detected     Human Rhinovirus/Enterovirus Not Detected     Influenza A (subtypes H1, H1-2009,H3) Not Detected     Influenza B Not Detected     Parainfluenza Virus 1 Not Detected     Parainfluenza Virus 2 Not Detected      Parainfluenza Virus 3 Not Detected     Parainfluenza Virus 4 Not Detected     Respiratory Syncytial Virus Not Detected     Bordetella Parapertussis (HP6969) Not Detected     Bordetella pertussis (ptxP) Not Detected     Chlamydia pneumoniae Not Detected     Mycoplasma pneumoniae Not Detected    Narrative:      For all other respiratory sources, order [id number] -  Respiratory Viral Panel by PCR    Cryptococcal antigen, blood [[id number]] Collected: 10/13/23 0524    Order Status: Completed Specimen: Blood, Venous Updated: 10/13/23 1135     Cryptococcal Ag, Blood Negative    Toxoplasma Gondii, Dna (Qual) [[id number]] Collected: 10/13/23 0524    Order Status: Completed Specimen: Blood Updated: 10/13/23 0642     Toxoplasma gondii DNA, Source n/a    Respiratory Infection Panel (PCR), Nasopharyngeal [[id number]] Collected: 10/12/23 1911    Order Status: Canceled Specimen: Nasopharyngeal Swab     Culture, Respiratory with Gram Stain [[id number]] Collected: 10/12/23 1121    Order Status: Completed Specimen: Respiratory from Sputum, Expectorated Updated: 10/12/23 1510     Respiratory Culture Specimen inadequate - culture not performed     Gram Stain (Respiratory) >10epis/lfp and <than many WBC's     Gram Stain (Respiratory) Predominance of oropharyngeal binu. Please recollect.    Culture, Respiratory with Gram Stain [[id number]] Collected: 10/11/23 1758    Order Status: Completed Specimen: Sputum Updated: 10/12/23 0422     Respiratory Culture CANCELED     Comment: Result canceled by the ancillary.        Gram Stain (Respiratory) >10epis/lfp and <than many WBC's     Gram Stain (Respiratory) Predominance of oropharyngeal binu. Please recollect.    Influenza A & B by Molecular [[id number]] Collected: 10/11/23 1558    Order Status: Completed Specimen: Nasopharyngeal Swab Updated: 10/11/23 1648     Influenza A, Molecular Negative     Influenza B, Molecular Negative     Flu A & B Source Nasal swab            Radiology:  CT Chest  Without Contrast   Final Result      Patchy bilateral ground-glass pulmonary airspace opacities have increased since 09/27/2023.  Although the differential diagnosis is extensive, a leading consideration is likely COVID-19 pneumonia.  Correlate clinically, and with follow-up chest CT within 3-6 months to help further exclude other underlying pathology, such as carcinoma in-situ/bronchioloalveolar carcinoma.      Mildly prominent/enlarged mediastinal lymph nodes, similar to the comparison scan and favored to represent reactive lymphadenopathy.  The follow-up chest CT recommended above could also help further characterize this, and help exclude underlying neoplasm or lymphoproliferative disorder.      Some predominantly bibasilar, bronchial occlusions remain and are most likely related to mucous plugging.  Follow-up imaging again recommended, as above, to help further exclude underlying neoplasm.         Electronically signed by: Jessee Cash   Date:    10/11/2023   Time:    18:10      SURG [facility] Surgery Fluoro Usage    (Results Pending)   X-Ray Chest AP Portable    (Results Pending)     I have personally reviewed the above labs and imaging.    Current Medications:     Infusions:       Scheduled:   albuterol-ipratropium  3 mL Nebulization Q6H    cetirizine  5 mg Oral BID    doxycycline  100 mg Oral Q12H    DULoxetine  60 mg Oral BID    fluconazole  200 mg Oral Daily    fluticasone furoate-vilanteroL  1 puff Inhalation Daily    guaiFENesin  600 mg Oral BID    losartan  100 mg Oral Daily    meropenem (MERREM) IVPB  2 g Intravenous Q8H    montelukast  10 mg Oral QHS    pregabalin  150 mg Oral Daily    topiramate  100 mg Oral BID    traZODone  50 mg Oral QHS        PRN:  acetaminophen, aluminum-magnesium hydroxide-simethicone, dextrose 10%, dextrose 10%, glucagon (human recombinant), glucose, glucose, melatonin, naloxone, ondansetron, ondansetron, sodium chloride 0.9%, sodium chloride 0.9%     Assessment:     Jaylin PERRY  Blade is a 58 y.o. female with:  Patient Active Problem List    Diagnosis Date Noted    Respiratory infection 10/12/2023    Thrush 10/12/2023    Multifocal pneumonia 10/11/2023    Other eosinophilia 04/24/2023    Chronic cough 01/03/2023    Abnormal CT scan, lung 05/22/2022    Chronic rhinosinusitis 09/25/2020    Abdominal pain 03/12/2020    Hypogammaglobulinemia 12/24/2019    Severe persistent asthma without complication 10/16/2019    Chronic pansinusitis 07/18/2019    Mild aortic insufficiency 05/06/2019    Other hyperlipidemia 04/01/2019    Tortuous aorta 04/01/2019    Bilateral primary osteoarthritis of knee 02/26/2019    Primary osteoarthritis of right knee 02/26/2019    Primary osteoarthritis of left knee 02/26/2019    TAMIKA on CPAP 05/01/2018    Crohn's disease 03/27/2018    Hormone replacement therapy (postmenopausal) 11/19/2017    Long-term use of immunosuppressant medication 11/19/2017    Insomnia due to medical condition 10/20/2016    Diarrhea 05/07/2015    Fibromyalgia     Migraine     Crohn's disease of small intestine     Sciatica     Allergic rhinitis     Essential hypertension 07/30/2013        Plan:     # Severe persistent asthma  On trelegy and atrovent at home as well as frequent courses of prednisone. Significant wheezing on exam.  - given IV dexamethasone in the OR  - start prednisone 80mg daily on 10/14     # Peripheral eosinophilia   # Dyspnea  # Bilateral ground glass opacities  Patient presents with worsening dyspnea with bilateral ground glass opacities on imaging. Labs with significant peripheral eosinophilia. Differential includes eosinophilic pneumonia, hypersensitivity pneumonitis, infection, vasculitis. She's had these CT findings dating back at least to 5/2022. Considered drug toxicity but timeline would not fit for reaction to risankizumab (started a few months ago) and only other treatment for Crohn's disease is IgG. She has chickens at home so hypersensitivity pneumonitis is  certainly a possibility. IgE elevated at 234. Underwent diagnostic bronchoscopy on 10/13.   - fu ANCA, PR3, MPO, Chicken IgE  - recommended patient strongly consider rehoming her chickens     # Chronic rhinosinusitis  Complex history of recurrent pseudomonal infections of the sinuses. On aggressive sinus regimen as an outpatient.     Thank you for allowing us to participate in the care of this patient. Please contact me if you have any questions regarding this consult.    Mary Jo Monsivais MD  [facility] Critical [facility] Medicine Fellow

## 2023-10-13 NOTE — ASSESSMENT & PLAN NOTE
- CT with worsening ground glass opacities with mucus plugging  - Prior respiratory cultures with multi resistant pseudomonas and staph aureus  - Covid and Flu negative   - Pulmonolgy consulted   -- s/p bronch on 10/13  -- Follow up bronch studies  -- SLP consult to evaluate for aspiration   - ID consulted  -- Respiratory culture with multiple epithelial cells, thus canceled x2.  -- Blood cultures NGTD  -- Viral respiratory panel negative  -- Cryptococcal antigen negative  -- Toxoplasma DNA pending  -- Continue meropenem and doxycycline

## 2023-10-13 NOTE — SUBJECTIVE & OBJECTIVE
Interval History:   No events overnight.  Patient reports slight improvement in dyspnea.  Bronchoscopy with pulmonology in the OR today.      Review of Systems   Constitutional:  Negative for chills and fever.   HENT:  Negative for congestion, postnasal drip, sinus pressure and sinus pain.    Eyes:  Negative for discharge.   Respiratory:  Positive for cough and shortness of breath. Negative for choking and wheezing.    Gastrointestinal:  Negative for constipation, diarrhea, nausea and vomiting.   Genitourinary:  Negative for dysuria and urgency.   Skin:  Negative for rash and wound.     Objective:     Vital Signs (Most Recent):  Temp: 98.4 °F (36.9 °C) (10/13/23 1730)  Pulse: 86 (10/13/23 1800)  Resp: 20 (10/13/23 1800)  BP: (!) 154/86 (10/13/23 1800)  SpO2: (!) 94 % (10/13/23 1800) Vital Signs (24h Range):  Temp:  [44.6 °F (7 °C)-98.8 °F (37.1 °C)] 98.4 °F (36.9 °C)  Pulse:  [65-99] 86  Resp:  [16-25] 20  SpO2:  [91 %-98 %] 94 %  BP: (120-165)/(62-89) 154/86     Weight: 78.9 kg (174 lb)  Body mass index is 27.25 kg/m².    Intake/Output Summary (Last 24 hours) at 10/13/2023 1832  Last data filed at 10/13/2023 1615  Gross per 24 hour   Intake 250 ml   Output --   Net 250 ml         Physical Exam  Constitutional:       General: She is not in acute distress.     Appearance: She is not toxic-appearing or diaphoretic.   HENT:      Head: Normocephalic and atraumatic.      Nose: Nose normal.   Eyes:      General: No scleral icterus.     Extraocular Movements: Extraocular movements intact.   Cardiovascular:      Rate and Rhythm: Normal rate and regular rhythm.   Pulmonary:      Effort: Pulmonary effort is normal. No respiratory distress.      Breath sounds: Rhonchi present. No wheezing or rales.   Abdominal:      General: Abdomen is flat. There is no distension.      Palpations: Abdomen is soft.      Tenderness: There is no abdominal tenderness. There is no guarding.   Musculoskeletal:         General: Normal range of  motion.      Right lower leg: No edema.      Left lower leg: No edema.   Skin:     General: Skin is warm and dry.      Coloration: Skin is not jaundiced.   Neurological:      General: No focal deficit present.      Mental Status: She is alert.      Cranial Nerves: No cranial nerve deficit.   Psychiatric:         Mood and Affect: Mood normal.         Behavior: Behavior normal.             Significant Labs: All pertinent labs within the past 24 hours have been reviewed.  CBC:   Recent Labs   Lab 10/12/23  0356 10/13/23  0523   WBC 8.53 8.19   HGB 13.1 13.0   HCT 39.7 40.5    246     CMP:   Recent Labs   Lab 10/12/23  0356 10/13/23  0523    141   K 3.7 3.2*   * 112*   CO2 19* 21*   GLU 99 91   BUN 10 11   CREATININE 0.8 0.8   CALCIUM 8.6* 8.7   PROT 7.1 7.1   ALBUMIN 2.9* 2.9*   BILITOT 0.4 0.4   ALKPHOS 89 98   AST 20 25   ALT 14 23   ANIONGAP 9 8       Significant Imaging: I have reviewed all pertinent imaging results/findings within the past 24 hours.

## 2023-10-13 NOTE — PROGRESS NOTES
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 Patient has been treated since September 27th with cefadroxil levofloxacin due to acute on chronic sinusitis and pneumonia diagnosed on CT.  Patient noted to have significant mucus plugging.  Patient's symptoms have failed to improve.  She presents with progressive shortness of breath and episodes of difficulty breathing overnight.  She reports decreased activity tolerance for this.  She denies richard orthopnea or extremity swelling.  She has no chest pain.  She denies fever chills.  She reports compliance with her medications.    In the ED patient afebrile and hemodynamically stable saturating in mid 90's on room air at rest. CT with worsening ground glass opacities. Patient started on broad spectrum abx and admitted to the care of medicine for further evaluation and management.       Overview/Hospital Course:  No notes on file    Interval History:   No events overnight.  Patient reports slight improvement in dyspnea.  Bronchoscopy with pulmonology in the OR today.      Review of Systems   Constitutional:  Negative for chills and fever.   HENT:  Negative for congestion, postnasal drip, sinus pressure and sinus pain.    Eyes:  Negative for discharge.   Respiratory:  Positive for cough and shortness of breath. Negative for choking and  wheezing.    Gastrointestinal:  Negative for constipation, diarrhea, nausea and vomiting.   Genitourinary:  Negative for dysuria and urgency.   Skin:  Negative for rash and wound.     Objective:     Vital Signs (Most Recent):  Temp: 98.4 °F (36.9 °C) (10/13/23 1730)  Pulse: 86 (10/13/23 1800)  Resp: 20 (10/13/23 1800)  BP: (!) 154/86 (10/13/23 1800)  SpO2: (!) 94 % (10/13/23 1800) Vital Signs (24h Range):  Temp:  [44.6 °F (7 °C)-98.8 °F (37.1 °C)] 98.4 °F (36.9 °C)  Pulse:  [65-99] 86  Resp:  [16-25] 20  SpO2:  [91 %-98 %] 94 %  BP: (120-165)/(62-89) 154/86     Weight: 78.9 kg (174 lb)  Body mass index is 27.25 kg/m².    Intake/Output Summary (Last 24 hours) at 10/13/2023 1832  Last data filed at 10/13/2023 1615  Gross per 24 hour   Intake 250 ml   Output --   Net 250 ml         Physical Exam  Constitutional:       General: She is not in acute distress.     Appearance: She is not toxic-appearing or diaphoretic.   HENT:      Head: Normocephalic and atraumatic.      Nose: Nose normal.   Eyes:      General: No scleral icterus.     Extraocular Movements: Extraocular movements intact.   Cardiovascular:      Rate and Rhythm: Normal rate and regular rhythm.   Pulmonary:      Effort: Pulmonary effort is normal. No respiratory distress.      Breath sounds: Rhonchi present. No wheezing or rales.   Abdominal:      General: Abdomen is flat. There is no distension.      Palpations: Abdomen is soft.      Tenderness: There is no abdominal tenderness. There is no guarding.   Musculoskeletal:         General: Normal range of motion.      Right lower leg: No edema.      Left lower leg: No edema.   Skin:     General: Skin is warm and dry.      Coloration: Skin is not jaundiced.   Neurological:      General: No focal deficit present.      Mental Status: She is alert.      Cranial Nerves: No cranial nerve deficit.   Psychiatric:         Mood and Affect: Mood normal.         Behavior: Behavior normal.             Significant Labs: All  pertinent labs within the past 24 hours have been reviewed.  CBC:   Recent Labs   Lab 10/12/23  0356 10/13/23  0523   WBC 8.53 8.19   HGB 13.1 13.0   HCT 39.7 40.5    246     CMP:   Recent Labs   Lab 10/12/23  0356 10/13/23  0523    141   K 3.7 3.2*   * 112*   CO2 19* 21*   GLU 99 91   BUN 10 11   CREATININE 0.8 0.8   CALCIUM 8.6* 8.7   PROT 7.1 7.1   ALBUMIN 2.9* 2.9*   BILITOT 0.4 0.4   ALKPHOS 89 98   AST 20 25   ALT 14 23   ANIONGAP 9 8       Significant Imaging: I have reviewed all pertinent imaging results/findings within the past 24 hours.      Assessment/Plan:      * Multifocal pneumonia  - CT with worsening ground glass opacities with mucus plugging  - Prior respiratory cultures with multi resistant pseudomonas and staph aureus  - Covid and Flu negative   - Pulmonolgy consulted   -- s/p bronch on 10/13  -- Follow up bronch studies  -- SLP consult to evaluate for aspiration   - ID consulted  -- Respiratory culture with multiple epithelial cells, thus canceled x2.  -- Blood cultures NGTD  -- Viral respiratory panel negative  -- Cryptococcal antigen negative  -- Toxoplasma DNA pending  -- Continue meropenem and doxycycline    Severe persistent asthma without complication  - Daily LABA-ICS  - Duonebs prn  - Pulm toilet     Thrush  - ID consulted  -- Fluconazole 200mg daily, duration based on progress  -- Nystatin suspension 500,000 units (5 mL) swish & swallow qid    Crohn's disease of small intestine  - Taking skyrizi; hold for now given current infection    Respiratory infection  See above      Insomnia due to medical condition  - Continue home meds    Essential hypertension  - Continue home losartan        VTE Risk Mitigation (From admission, onward)         Ordered     IP VTE LOW RISK PATIENT  Once         10/11/23 1915     Place sequential compression device  Until discontinued         10/11/23 1915                Discharge Planning   SOBEIDA:      Code Status: Full Code   Is the patient  medically ready for discharge?: No    Reason for patient still in hospital (select all that apply): Patient trending condition, Laboratory test, Treatment, Consult recommendations and Pending disposition  Discharge Plan A: Home, Home with family                  Ray Hubbard MD  Department of Hospital Medicine   [facility] Surgery ([facility])

## 2023-10-14 LAB
ALBUMIN SERPL BCP-MCNC: 2.8 G/DL (ref 3.5–5.2)
ALP SERPL-CCNC: 96 U/L (ref 55–135)
ALT SERPL W/O P-5'-P-CCNC: 15 U/L (ref 10–44)
ANION GAP SERPL CALC-SCNC: 9 MMOL/L (ref 8–16)
AST SERPL-CCNC: 19 U/L (ref 10–40)
B DERMAT AG UR QL IA: NOT DETECTED
BASOPHILS # BLD AUTO: 0.02 K/UL (ref 0–0.2)
BASOPHILS NFR BLD: 0.5 % (ref 0–1.9)
BILIRUB SERPL-MCNC: 0.2 MG/DL (ref 0.1–1)
BLASTOMYCES AG RESULT: NOT DETECTED NG/ML
BUN SERPL-MCNC: 12 MG/DL (ref 6–20)
CALCIUM SERPL-MCNC: 8.9 MG/DL (ref 8.7–10.5)
CHLORIDE SERPL-SCNC: 113 MMOL/L (ref 95–110)
CO2 SERPL-SCNC: 19 MMOL/L (ref 23–29)
CREAT SERPL-MCNC: 0.7 MG/DL (ref 0.5–1.4)
DIFFERENTIAL METHOD: ABNORMAL
EOSINOPHIL # BLD AUTO: 0 K/UL (ref 0–0.5)
EOSINOPHIL NFR BLD: 0.2 % (ref 0–8)
ERYTHROCYTE [DISTWIDTH] IN BLOOD BY AUTOMATED COUNT: 12.5 % (ref 11.5–14.5)
EST. GFR  (NO RACE VARIABLE): >60 ML/MIN/1.73 M^2
GLUCOSE SERPL-MCNC: 117 MG/DL (ref 70–110)
HCT VFR BLD AUTO: 39.5 % (ref 37–48.5)
HGB BLD-MCNC: 12.6 G/DL (ref 12–16)
IMM GRANULOCYTES # BLD AUTO: 0.01 K/UL (ref 0–0.04)
IMM GRANULOCYTES NFR BLD AUTO: 0.2 % (ref 0–0.5)
LYMPHOCYTES # BLD AUTO: 1 K/UL (ref 1–4.8)
LYMPHOCYTES NFR BLD: 22.8 % (ref 18–48)
MAGNESIUM SERPL-MCNC: 1.9 MG/DL (ref 1.6–2.6)
MCH RBC QN AUTO: 31.3 PG (ref 27–31)
MCHC RBC AUTO-ENTMCNC: 31.9 G/DL (ref 32–36)
MCV RBC AUTO: 98 FL (ref 82–98)
MONOCYTES # BLD AUTO: 0.4 K/UL (ref 0.3–1)
MONOCYTES NFR BLD: 10.5 % (ref 4–15)
NEUTROPHILS # BLD AUTO: 2.8 K/UL (ref 1.8–7.7)
NEUTROPHILS NFR BLD: 65.8 % (ref 38–73)
NRBC BLD-RTO: 0 /100 WBC
PHOSPHATE SERPL-MCNC: 2.9 MG/DL (ref 2.7–4.5)
PLATELET # BLD AUTO: 229 K/UL (ref 150–450)
PMV BLD AUTO: 10.4 FL (ref 9.2–12.9)
POTASSIUM SERPL-SCNC: 3.5 MMOL/L (ref 3.5–5.1)
PROT SERPL-MCNC: 7.2 G/DL (ref 6–8.4)
RBC # BLD AUTO: 4.02 M/UL (ref 4–5.4)
SODIUM SERPL-SCNC: 141 MMOL/L (ref 136–145)
WBC # BLD AUTO: 4.21 K/UL (ref 3.9–12.7)

## 2023-10-14 PROCEDURE — 27000646 HC AEROBIKA DEVICE

## 2023-10-14 PROCEDURE — 25000003 PHARM REV CODE 250: Performed by: HOSPITALIST

## 2023-10-14 PROCEDURE — 11000001 HC ACUTE MED/SURG PRIVATE ROOM

## 2023-10-14 PROCEDURE — 25000003 PHARM REV CODE 250: Performed by: INTERNAL MEDICINE

## 2023-10-14 PROCEDURE — 99233 SBSQ HOSP IP/OBS HIGH 50: CPT | Mod: ,,, | Performed by: INTERNAL MEDICINE

## 2023-10-14 PROCEDURE — 25000242 PHARM REV CODE 250 ALT 637 W/ HCPCS: Performed by: FAMILY MEDICINE

## 2023-10-14 PROCEDURE — 25000003 PHARM REV CODE 250: Performed by: FAMILY MEDICINE

## 2023-10-14 PROCEDURE — 99900035 HC TECH TIME PER 15 MIN (STAT)

## 2023-10-14 PROCEDURE — 84100 ASSAY OF PHOSPHORUS: CPT | Performed by: FAMILY MEDICINE

## 2023-10-14 PROCEDURE — 94640 AIRWAY INHALATION TREATMENT: CPT

## 2023-10-14 PROCEDURE — 99233 PR SUBSEQUENT HOSPITAL CARE,LEVL III: ICD-10-PCS | Mod: ,,, | Performed by: INTERNAL MEDICINE

## 2023-10-14 PROCEDURE — 63600175 PHARM REV CODE 636 W HCPCS: Performed by: STUDENT IN AN ORGANIZED HEALTH CARE EDUCATION/TRAINING PROGRAM

## 2023-10-14 PROCEDURE — 80053 COMPREHEN METABOLIC PANEL: CPT | Performed by: FAMILY MEDICINE

## 2023-10-14 PROCEDURE — 27000221 HC OXYGEN, UP TO 24 HOURS

## 2023-10-14 PROCEDURE — 25000003 PHARM REV CODE 250: Performed by: EMERGENCY MEDICINE

## 2023-10-14 PROCEDURE — 25000003 PHARM REV CODE 250: Performed by: STUDENT IN AN ORGANIZED HEALTH CARE EDUCATION/TRAINING PROGRAM

## 2023-10-14 PROCEDURE — 92610 EVALUATE SWALLOWING FUNCTION: CPT

## 2023-10-14 PROCEDURE — 85025 COMPLETE CBC W/AUTO DIFF WBC: CPT | Performed by: FAMILY MEDICINE

## 2023-10-14 PROCEDURE — 99233 SBSQ HOSP IP/OBS HIGH 50: CPT | Mod: ,,, | Performed by: HOSPITALIST

## 2023-10-14 PROCEDURE — 25000242 PHARM REV CODE 250 ALT 637 W/ HCPCS: Performed by: STUDENT IN AN ORGANIZED HEALTH CARE EDUCATION/TRAINING PROGRAM

## 2023-10-14 PROCEDURE — 63600175 PHARM REV CODE 636 W HCPCS: Performed by: INTERNAL MEDICINE

## 2023-10-14 PROCEDURE — 36415 COLL VENOUS BLD VENIPUNCTURE: CPT | Performed by: FAMILY MEDICINE

## 2023-10-14 PROCEDURE — 94664 DEMO&/EVAL PT USE INHALER: CPT

## 2023-10-14 PROCEDURE — 27100108

## 2023-10-14 PROCEDURE — 83735 ASSAY OF MAGNESIUM: CPT | Performed by: FAMILY MEDICINE

## 2023-10-14 PROCEDURE — 99233 PR SUBSEQUENT HOSPITAL CARE,LEVL III: ICD-10-PCS | Mod: ,,, | Performed by: HOSPITALIST

## 2023-10-14 PROCEDURE — 94761 N-INVAS EAR/PLS OXIMETRY MLT: CPT

## 2023-10-14 RX ORDER — SULFAMETHOXAZOLE AND TRIMETHOPRIM 800; 160 MG/1; MG/1
1 TABLET ORAL 2 TIMES DAILY
Status: DISCONTINUED | OUTPATIENT
Start: 2023-10-14 | End: 2023-10-15

## 2023-10-14 RX ORDER — DOXYCYCLINE HYCLATE 100 MG
100 TABLET ORAL EVERY 12 HOURS
Status: DISCONTINUED | OUTPATIENT
Start: 2023-10-14 | End: 2023-10-15 | Stop reason: HOSPADM

## 2023-10-14 RX ORDER — BENZONATATE 100 MG/1
200 CAPSULE ORAL 3 TIMES DAILY PRN
Status: DISCONTINUED | OUTPATIENT
Start: 2023-10-14 | End: 2023-10-15 | Stop reason: HOSPADM

## 2023-10-14 RX ORDER — PROMETHAZINE HYDROCHLORIDE AND CODEINE PHOSPHATE 6.25; 1 MG/5ML; MG/5ML
5 SOLUTION ORAL EVERY 6 HOURS PRN
Status: DISCONTINUED | OUTPATIENT
Start: 2023-10-14 | End: 2023-10-15 | Stop reason: HOSPADM

## 2023-10-14 RX ORDER — GUAIFENESIN/DEXTROMETHORPHAN 100-10MG/5
10 SYRUP ORAL EVERY 6 HOURS
Status: DISCONTINUED | OUTPATIENT
Start: 2023-10-15 | End: 2023-10-15 | Stop reason: HOSPADM

## 2023-10-14 RX ADMIN — IPRATROPIUM BROMIDE AND ALBUTEROL SULFATE 3 ML: .5; 3 SOLUTION RESPIRATORY (INHALATION) at 07:10

## 2023-10-14 RX ADMIN — GUAIFENESIN AND DEXTROMETHORPHAN 10 ML: 100; 10 SYRUP ORAL at 11:10

## 2023-10-14 RX ADMIN — DOXYCYCLINE HYCLATE 100 MG: 100 TABLET, COATED ORAL at 08:10

## 2023-10-14 RX ADMIN — CETIRIZINE HYDROCHLORIDE 5 MG: 5 TABLET, FILM COATED ORAL at 08:10

## 2023-10-14 RX ADMIN — TOPIRAMATE 100 MG: 100 TABLET, FILM COATED ORAL at 08:10

## 2023-10-14 RX ADMIN — ACETAMINOPHEN 1000 MG: 500 TABLET ORAL at 06:10

## 2023-10-14 RX ADMIN — MEROPENEM 2 G: 1 INJECTION INTRAVENOUS at 05:10

## 2023-10-14 RX ADMIN — FLUTICASONE FUROATE AND VILANTEROL TRIFENATATE 1 PUFF: 100; 25 POWDER RESPIRATORY (INHALATION) at 07:10

## 2023-10-14 RX ADMIN — GUAIFENESIN 600 MG: 600 TABLET, EXTENDED RELEASE ORAL at 08:10

## 2023-10-14 RX ADMIN — IPRATROPIUM BROMIDE AND ALBUTEROL SULFATE 3 ML: .5; 3 SOLUTION RESPIRATORY (INHALATION) at 01:10

## 2023-10-14 RX ADMIN — BENZONATATE 200 MG: 100 CAPSULE ORAL at 08:10

## 2023-10-14 RX ADMIN — PROMETHAZINE HYDROCHLORIDE AND CODEINE PHOSPHATE 5 ML: 6.25; 1 SOLUTION ORAL at 08:10

## 2023-10-14 RX ADMIN — DULOXETINE HYDROCHLORIDE 60 MG: 60 CAPSULE, DELAYED RELEASE ORAL at 08:10

## 2023-10-14 RX ADMIN — SULFAMETHOXAZOLE AND TRIMETHOPRIM 1 TABLET: 800; 160 TABLET ORAL at 08:10

## 2023-10-14 RX ADMIN — PANTOPRAZOLE SODIUM 40 MG: 40 TABLET, DELAYED RELEASE ORAL at 08:10

## 2023-10-14 RX ADMIN — Medication 6 MG: at 08:10

## 2023-10-14 RX ADMIN — PREDNISONE 80 MG: 20 TABLET ORAL at 08:10

## 2023-10-14 RX ADMIN — TRAZODONE HYDROCHLORIDE 50 MG: 50 TABLET ORAL at 08:10

## 2023-10-14 RX ADMIN — MONTELUKAST 10 MG: 10 TABLET, FILM COATED ORAL at 08:10

## 2023-10-14 RX ADMIN — LOSARTAN POTASSIUM 100 MG: 50 TABLET, FILM COATED ORAL at 08:10

## 2023-10-14 RX ADMIN — FLUCONAZOLE 200 MG: 200 TABLET ORAL at 08:10

## 2023-10-14 RX ADMIN — PREGABALIN 150 MG: 150 CAPSULE ORAL at 08:10

## 2023-10-14 NOTE — PLAN OF CARE
Problem: Adult Inpatient Plan of Care  Goal: Plan of Care Review  Outcome: Ongoing, Not Progressing  Goal: Patient-Specific Goal (Individualized)  Outcome: Ongoing, Not Progressing  Goal: Absence of Hospital-Acquired Illness or Injury  Outcome: Ongoing, Not Progressing  Goal: Optimal Comfort and Wellbeing  Outcome: Ongoing, Not Progressing  Goal: Readiness for Transition of Care  Outcome: Ongoing, Not Progressing     Problem: Fluid Imbalance (Pneumonia)  Goal: Fluid Balance  Outcome: Ongoing, Not Progressing     Problem: Infection (Pneumonia)  Goal: Resolution of Infection Signs and Symptoms  Outcome: Ongoing, Not Progressing     Problem: Respiratory Compromise (Pneumonia)  Goal: Effective Oxygenation and Ventilation  Outcome: Ongoing, Not Progressing     Problem: Fall Injury Risk  Goal: Absence of Fall and Fall-Related Injury  Outcome: Ongoing, Not Progressing     Problem: Fatigue  Goal: Improved Activity Tolerance  Outcome: Ongoing, Not Progressing     Problem: Infection  Goal: Absence of Infection Signs and Symptoms  Outcome: Ongoing, Not Progressing     Problem: Gas Exchange Impaired  Goal: Optimal Gas Exchange  Outcome: Ongoing, Not Progressing

## 2023-10-14 NOTE — SUBJECTIVE & OBJECTIVE
Interval History: see above    Review of Systems   Constitutional:  Positive for activity change and appetite change. Negative for fever.   HENT:  Negative for trouble swallowing.    Respiratory:  Positive for cough, shortness of breath and wheezing. Negative for apnea.    Cardiovascular:  Negative for chest pain and leg swelling.   Gastrointestinal:  Negative for abdominal pain, diarrhea, nausea and vomiting.   Genitourinary:  Negative for difficulty urinating.   Neurological:  Negative for light-headedness, numbness and headaches.   Psychiatric/Behavioral:  Negative for behavioral problems.      Objective:     Vital Signs (Most Recent):  Temp: 96.5 °F (35.8 °C) (10/14/23 0427)  Pulse: 62 (10/14/23 0715)  Resp: 18 (10/14/23 0715)  BP: (!) 146/78 (10/14/23 0427)  SpO2: 98 % (10/14/23 0427) Vital Signs (24h Range):  Temp:  [96.5 °F (35.8 °C)-98.6 °F (37 °C)] 96.5 °F (35.8 °C)  Pulse:  [62-99] 62  Resp:  [16-25] 18  SpO2:  [91 %-98 %] 98 %  BP: (118-160)/(68-86) 146/78     Weight: 78.9 kg (174 lb)  Body mass index is 27.25 kg/m².    Intake/Output Summary (Last 24 hours) at 10/14/2023 0759  Last data filed at 10/13/2023 1615  Gross per 24 hour   Intake 250 ml   Output --   Net 250 ml         Physical Exam  Constitutional:       General: She is not in acute distress.     Appearance: Normal appearance. She is ill-appearing.   HENT:      Head: Normocephalic and atraumatic.      Nose: Nose normal.      Mouth/Throat:      Mouth: Mucous membranes are moist.   Eyes:      General: No scleral icterus.     Extraocular Movements: Extraocular movements intact.      Pupils: Pupils are equal, round, and reactive to light.   Cardiovascular:      Rate and Rhythm: Normal rate and regular rhythm.      Pulses: Normal pulses.      Heart sounds: Normal heart sounds.   Pulmonary:      Effort: No respiratory distress.      Breath sounds: Wheezing, rhonchi and rales present.   Chest:      Chest wall: No tenderness.   Abdominal:      General:  Abdomen is flat. Bowel sounds are normal. There is no distension.      Palpations: Abdomen is soft.      Tenderness: There is no abdominal tenderness. There is no right CVA tenderness, left CVA tenderness, guarding or rebound.   Musculoskeletal:         General: No swelling, tenderness, deformity or signs of injury. Normal range of motion.      Cervical back: Normal range of motion and neck supple. No rigidity or tenderness.      Right lower leg: No edema.      Left lower leg: No edema.   Skin:     General: Skin is warm and dry.      Coloration: Skin is not jaundiced or pale.      Findings: No erythema or rash.   Neurological:      General: No focal deficit present.      Mental Status: She is alert and oriented to person, place, and time. Mental status is at baseline.      Cranial Nerves: No cranial nerve deficit.      Motor: No weakness.   Psychiatric:         Mood and Affect: Mood normal.         Behavior: Behavior normal.         Thought Content: Thought content normal.         Judgment: Judgment normal.             Significant Labs: All pertinent labs within the past 24 hours have been reviewed.  CBC:   Recent Labs   Lab 10/13/23  0523   WBC 8.19   HGB 13.0   HCT 40.5        CMP:   Recent Labs   Lab 10/13/23  0523 10/14/23  0653    141   K 3.2* 3.5   * 113*   CO2 21* 19*   GLU 91 117*   BUN 11 12   CREATININE 0.8 0.7   CALCIUM 8.7 8.9   PROT 7.1 7.2   ALBUMIN 2.9* 2.8*   BILITOT 0.4 0.2   ALKPHOS 98 96   AST 25 19   ALT 23 15   ANIONGAP 8 9       Significant Imaging: I have reviewed all pertinent imaging results/findings within the past 24 hours.

## 2023-10-14 NOTE — PROGRESS NOTES
[facility] Medicine  Progress Note    Patient Name: Jaylin Murguia  MRN: [id number]  Patient Class: IP- Inpatient   Admission Date: 10/11/2023  Length of Stay: 3 days  Attending Physician: Mona Maynard MD  Primary Care Provider: Esthela Hu MD        Subjective:     Principal Problem:Multifocal pneumonia        HPI:  58-year-old female with history of Crohn's disease on immunosuppression, hyzentra use due to immunosuppression, chest wall rigidity secondary to fentanyl, chronic pansinusitis with Pseudomonas colonization, recurrent Pseudomonas pneumonia with mucous plugging who presents to the ED with chief complaint of shortness of breath and cough. Patient has been treated since September 27th with cefadroxil levofloxacin due to acute on chronic sinusitis and pneumonia diagnosed on CT.  Patient noted to have significant mucus plugging.  Patient's symptoms have failed to improve.  She presents with progressive shortness of breath and episodes of difficulty breathing overnight.  She reports decreased activity tolerance for this.  She denies richard orthopnea or extremity swelling.  She has no chest pain.  She denies fever chills.  She reports compliance with her medications.    In the ED patient afebrile and hemodynamically stable saturating in mid 90's on room air at rest. CT with worsening ground glass opacities. Patient started on broad spectrum abx and admitted to the care of medicine for further evaluation and management.       Chart review:   CT 10/11 - Patchy bilateral ground-glass pulmonary airspace opacities have increased since 09/27/2023.  Although the differential diagnosis is extensive, a leading consideration is likely COVID-19 pneumonia.  Correlate clinically, and with follow-up chest CT within 3-6 months to help further exclude other underlying pathology, such as carcinoma in-situ/bronchioloalveolar carcinoma.     Mildly prominent/enlarged mediastinal lymph nodes, similar to  the comparison scan and favored to represent reactive lymphadenopathy.  The follow-up chest CT recommended above could also help further characterize this, and help exclude underlying neoplasm or lymphoproliferative disorder.     Some predominantly bibasilar, bronchial occlusions remain and are most likely related to mucous plugging.  Follow-up imaging again recommended, as above, to help further exclude underlying neoplasm.          Overview/Hospital Course:  Patient will unremarkable initial infectious workup.  Pulmonary consulted. Noted exposure to chickens. Immunocompromised. Underwent bronchoscopy with pulmonology on 10/13.  Lavage and biopsies pending. Infectious Disease following for antibiotic management.    10/14- /78  VSS. On meropenum, doxy and pred 80. Cxr- patchy pneumonia. Covid neg.  Willl chat w Pulm to clarify recs.  EOSINOPHILIC PNEUMONIA.  pt is still hypoxic on 2 liters. Continue pred until f/u, bactrim for PJP prophylaxis, stopping the other antibiotics.  Will dc if oxygenation stable. Pt will likely need home oxygen.        Interval History: see above    Review of Systems   Constitutional:  Positive for activity change and appetite change. Negative for fever.   HENT:  Negative for trouble swallowing.    Respiratory:  Positive for cough, shortness of breath and wheezing. Negative for apnea.    Cardiovascular:  Negative for chest pain and leg swelling.   Gastrointestinal:  Negative for abdominal pain, diarrhea, nausea and vomiting.   Genitourinary:  Negative for difficulty urinating.   Neurological:  Negative for light-headedness, numbness and headaches.   Psychiatric/Behavioral:  Negative for behavioral problems.      Objective:     Vital Signs (Most Recent):  Temp: 96.5 °F (35.8 °C) (10/14/23 0427)  Pulse: 62 (10/14/23 0715)  Resp: 18 (10/14/23 0715)  BP: (!) 146/78 (10/14/23 0427)  SpO2: 98 % (10/14/23 0427) Vital Signs (24h Range):  Temp:  [96.5 °F (35.8 °C)-98.6 °F (37 °C)] 96.5 °F  (35.8 °C)  Pulse:  [62-99] 62  Resp:  [16-25] 18  SpO2:  [91 %-98 %] 98 %  BP: (118-160)/(68-86) 146/78     Weight: 78.9 kg (174 lb)  Body mass index is 27.25 kg/m².    Intake/Output Summary (Last 24 hours) at 10/14/2023 0759  Last data filed at 10/13/2023 1615  Gross per 24 hour   Intake 250 ml   Output --   Net 250 ml         Physical Exam  Constitutional:       General: She is not in acute distress.     Appearance: Normal appearance. She is ill-appearing.   HENT:      Head: Normocephalic and atraumatic.      Nose: Nose normal.      Mouth/Throat:      Mouth: Mucous membranes are moist.   Eyes:      General: No scleral icterus.     Extraocular Movements: Extraocular movements intact.      Pupils: Pupils are equal, round, and reactive to light.   Cardiovascular:      Rate and Rhythm: Normal rate and regular rhythm.      Pulses: Normal pulses.      Heart sounds: Normal heart sounds.   Pulmonary:      Effort: No respiratory distress.      Breath sounds: Wheezing, rhonchi and rales present.   Chest:      Chest wall: No tenderness.   Abdominal:      General: Abdomen is flat. Bowel sounds are normal. There is no distension.      Palpations: Abdomen is soft.      Tenderness: There is no abdominal tenderness. There is no right CVA tenderness, left CVA tenderness, guarding or rebound.   Musculoskeletal:         General: No swelling, tenderness, deformity or signs of injury. Normal range of motion.      Cervical back: Normal range of motion and neck supple. No rigidity or tenderness.      Right lower leg: No edema.      Left lower leg: No edema.   Skin:     General: Skin is warm and dry.      Coloration: Skin is not jaundiced or pale.      Findings: No erythema or rash.   Neurological:      General: No focal deficit present.      Mental Status: She is alert and oriented to person, place, and time. Mental status is at baseline.      Cranial Nerves: No cranial nerve deficit.      Motor: No weakness.   Psychiatric:         Mood  and Affect: Mood normal.         Behavior: Behavior normal.         Thought Content: Thought content normal.         Judgment: Judgment normal.             Significant Labs: All pertinent labs within the past 24 hours have been reviewed.  CBC:   Recent Labs   Lab 10/13/23  0523   WBC 8.19   HGB 13.0   HCT 40.5        CMP:   Recent Labs   Lab 10/13/23  0523 10/14/23  0653    141   K 3.2* 3.5   * 113*   CO2 21* 19*   GLU 91 117*   BUN 11 12   CREATININE 0.8 0.7   CALCIUM 8.7 8.9   PROT 7.1 7.2   ALBUMIN 2.9* 2.8*   BILITOT 0.4 0.2   ALKPHOS 98 96   AST 25 19   ALT 23 15   ANIONGAP 8 9       Significant Imaging: I have reviewed all pertinent imaging results/findings within the past 24 hours.      Assessment/Plan:      * Multifocal pneumonia  EOSINOPHILIC PNEUMONIA  - CT with worsening ground glass opacities with mucus plugging  - Prior respiratory cultures with multi resistant pseudomonas and staph aureus  - Covid and Flu negative   - Pulmonolgy consulted   -- s/p bronch on 10/13  -- Follow up bronch studies  -- SLP consult to evaluate for aspiration   - ID consulted  -- Respiratory culture with multiple epithelial cells, thus canceled x2.  -- Blood cultures NGTD  -- Viral respiratory panel negative  -- Cryptococcal antigen negative  -- Toxoplasma DNA pending  -- Continue meropenem and doxycycline    10/14- Bronchoscopy done yesterday and studies pending. Will take weeks. dc rosemary, doxy and continue  Prednisone until f/u w pulmonary.  pt is still hypoxic over baseline, on 2 liters per NC today.   - Continue pred  Until f/u.   Bactrim for PJP prophylaxis  - dc to home if oxygenation stable.    Respiratory infection  See above      Severe persistent asthma without complication  - Daily LABA-ICS  - Duonebs prn  - Pulm toilet     Thrush  - ID consulted  -- Fluconazole 200mg daily, duration based on progress  -- Nystatin suspension 500,000 units (5 mL) swish & swallow qid    Crohn's disease of small  intestine  - Taking skyrizi; hold for now given current infection  -pt manages her diarrhea w lomotil    Insomnia due to medical condition  - Continue home meds    Essential hypertension  - Continue home losartan      VTE Risk Mitigation (From admission, onward)         Ordered     IP VTE LOW RISK PATIENT  Once         10/11/23 1915     Place sequential compression device  Until discontinued         10/11/23 1915                Discharge Planning   SOBEIDA: 10/16/2023     Code Status: Full Code   Is the patient medically ready for discharge?: No    Reason for patient still in hospital (select all that apply): Patient trending condition  Discharge Plan A: Home, Home with family          Mona Maynard MD  Department of Hospital Medicine   [facility] - Med Surg

## 2023-10-14 NOTE — NURSING TRANSFER
Nursing Transfer Note      10/13/2023   7:16 PM    Reason patient is being transferred: post-procedure    Transfer To: [id number]    Transfer via stretcher    Transfer with 2 L NC to O2    Transported by RN    Order for Tele Monitor? No    Medicines sent: none    Any special needs or follow-up needed: routine    Patient belongings transferred with patient: No    Chart send with patient: Yes    Notified: spouse

## 2023-10-14 NOTE — PLAN OF CARE
Problem: Adult Inpatient Plan of Care  Goal: Plan of Care Review  Outcome: Ongoing, Progressing  Goal: Patient-Specific Goal (Individualized)  Outcome: Ongoing, Progressing  Goal: Absence of Hospital-Acquired Illness or Injury  Outcome: Ongoing, Progressing  Goal: Optimal Comfort and Wellbeing  Outcome: Ongoing, Progressing  Goal: Readiness for Transition of Care  Outcome: Ongoing, Progressing     Problem: Fluid Imbalance (Pneumonia)  Goal: Fluid Balance  Outcome: Ongoing, Progressing     Problem: Infection (Pneumonia)  Goal: Resolution of Infection Signs and Symptoms  Outcome: Ongoing, Progressing     Problem: Respiratory Compromise (Pneumonia)  Goal: Effective Oxygenation and Ventilation  Outcome: Ongoing, Progressing     Problem: Fall Injury Risk  Goal: Absence of Fall and Fall-Related Injury  Outcome: Ongoing, Progressing     Problem: Fatigue  Goal: Improved Activity Tolerance  Outcome: Ongoing, Progressing     Problem: Infection  Goal: Absence of Infection Signs and Symptoms  Outcome: Ongoing, Progressing     Problem: Gas Exchange Impaired  Goal: Optimal Gas Exchange  Outcome: Ongoing, Progressing

## 2023-10-14 NOTE — ASSESSMENT & PLAN NOTE
EOSINOPHILIC PNEUMONIA  - CT with worsening ground glass opacities with mucus plugging  - Prior respiratory cultures with multi resistant pseudomonas and staph aureus  - Covid and Flu negative   - Pulmonolgy consulted   -- s/p bronch on 10/13  -- Follow up bronch studies  -- SLP consult to evaluate for aspiration   - ID consulted  -- Respiratory culture with multiple epithelial cells, thus canceled x2.  -- Blood cultures NGTD  -- Viral respiratory panel negative  -- Cryptococcal antigen negative  -- Toxoplasma DNA pending  -- Continue meropenem and doxycycline    10/14- Bronchoscopy done yesterday and studies pending. Will take weeks. dc rosemary, doxy and continue  Prednisone until f/u w pulmonary.  pt is still hypoxic over baseline, on 2 liters per NC today.   - Continue pred  Until f/u.   Bactrim for PJP prophylaxis  - dc to home if oxygenation stable.

## 2023-10-14 NOTE — HOSPITAL COURSE
Patient will unremarkable initial infectious workup.  Pulmonary consulted. Noted exposure to chickens. Immunocompromised. Underwent bronchoscopy with pulmonology on 10/13.  Lavage and biopsies pending. Infectious Disease following for antibiotic management.    10/14- /78  VSS. On meropenum, doxy and pred 80. Cxr- patchy pneumonia. Covid neg.  Willl chat w Pulm to clarify recs.  EOSINOPHILIC PNEUMONIA.  pt is still hypoxic on 2 liters. Continue pred until f/u, bactrim for PJP prophylaxis, stopping the other antibiotics.  Will dc if oxygenation stable. Pt will likely need home oxygen.    10/15-- Suspect EOSINOPHILC PNEUMONIA vs. FUNGAL PNEUMONIA.  Bonch results pending  -complete 7 day course of antibiotics for pneumonia, switch to levoflox 750 mg PO qdaily, continue doxycycline 100 mg PO BID, last day 10/17/2023.  follow-up with Dr. Saini in ID, and Pulmonary Dr. Molina.   -six min walk test.   Bactrim for PJP prophylaxis with steroids until f/u  Pulm. Will taper after f/u.

## 2023-10-14 NOTE — PLAN OF CARE
Problem: SLP  Goal: SLP Goal  Description: Speech Language Pathology Goals  Goals expected to be met by 10/28:    1. The pt will tolerate a regular/thin diet without signs of aspiration.      Outcome: Ongoing, Progressing     Bedside evaluation complete. Pt safe to consume a regular ETC diet with thin liquids. ST will continue to follow.

## 2023-10-14 NOTE — PT/OT/SLP EVAL
Speech Language Pathology Evaluation  Bedside Swallow    Patient Name:  Jaylin Murguia   MRN:  [id number]  Admitting Diagnosis: Multifocal pneumonia    Recommendations:                 General Recommendations:  Dysphagia therapy  Diet recommendations:  Easy to Chew Diet - IDDSI Level 7, Thin liquids - IDDSI Level 0   Aspiration Precautions: 1 bite/sip at a time, Alternating bites/sips, HOB to 90 degrees, Meds whole 1 at a time, Remain upright 30 minutes post meal, Small bites/sips, and Standard aspiration precautions   General Precautions: Standard, fall  Communication strategies:  go to room if call light pushed    Assessment:     Jaylin Murguia is a 58 y.o. female with dysphagia c/b xerostomia and globus sensation with solids. ST will continue to follow.     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, Asacol, Prednisone    Fibromyalgia     Hyperlipidemia     Hypertension     Immunosuppression     Migraine     Obstructive sleep apnea     CPAP at night    Sciatica        Past Surgical History:   Procedure Laterality Date    BLADDER SURGERY      sling was created by her muscles     BRONCHOSCOPY N/A 2023    Procedure: BRONCHOSCOPY;  [facility];  Location: [facility] OR [address] [place] [address];  Service: Anesthesiology;  Laterality: N/A;     SECTION      COLONOSCOPY N/A 2017    Procedure: COLONOSCOPY;  Surgeon: Kin Dyer MD;  Location: [facility];  Service: Endoscopy;  Laterality: N/A;    COLONOSCOPY N/A 2018    Procedure: COLONOSCOPY;  Surgeon: Kyra Vallecillo MD;  Location: [facility];  Service: Endoscopy;  Laterality: N/A;    COLONOSCOPY N/A 2020    Procedure: COLONOSCOPY;  Surgeon: Nicole Leal MD;  Location: [facility];  Service: Endoscopy;  Laterality: N/A;    COLONOSCOPY N/A 2022    Procedure: COLONOSCOPY;  Surgeon:  Shay Bruce MD;  Location: [facility];  Service: Endoscopy;  Laterality: N/A;    COLONOSCOPY N/A 02/02/2023    Procedure: COLONOSCOPY;  Surgeon: Nicole Leal MD;  Location: [facility] ENDO;  Service: Endoscopy;  Laterality: N/A;    DEBRIDEMENT Bilateral 12/21/2020    Procedure: DEBRIDEMENT;  Surgeon: Matthias Roach MD;  Location: [facility] OR 2ND FLR;  Service: ENT;  Laterality: Bilateral;    ESOPHAGOGASTRODUODENOSCOPY N/A 03/12/2020    Procedure: ESOPHAGOGASTRODUODENOSCOPY (EGD);  Surgeon: Nicole Leal MD;  Location: [facility] ENDO;  Service: Endoscopy;  Laterality: N/A;    ESOPHAGOGASTRODUODENOSCOPY N/A 03/16/2022    Procedure: EGD (ESOPHAGOGASTRODUODENOSCOPY);  Surgeon: Shay Bruce MD;  Location: [facility] ENDO;  Service: Endoscopy;  Laterality: N/A;    ESOPHAGOGASTRODUODENOSCOPY N/A 02/02/2023    Procedure: EGD (ESOPHAGOGASTRODUODENOSCOPY);  Surgeon: Nicole Leal MD;  Location: [facility];  Service: Endoscopy;  Laterality: N/A;    FINGER SURGERY      joint relpacement, left hand index finger    FUNCTIONAL ENDOSCOPIC SINUS SURGERY (FESS) USING COMPUTER-ASSISTED NAVIGATION Bilateral 07/31/2019    Procedure: FESS, USING COMPUTER-ASSISTED NAVIGATION;  Surgeon: Manish Shaffer MD;  Location: [facility];  Service: ENT;  Laterality: Bilateral;    FUNCTIONAL ENDOSCOPIC SINUS SURGERY (FESS) USING COMPUTER-ASSISTED NAVIGATION Bilateral 09/25/2020    Procedure: FESS, USING COMPUTER-ASSISTED NAVIGATION SPHENOID;  Surgeon: Matthias Roach MD;  Location: [facility] OR 2ND FLR;  Service: ENT;  Laterality: Bilateral;  TIVA    FUNCTIONAL ENDOSCOPIC SINUS SURGERY (FESS) USING COMPUTER-ASSISTED NAVIGATION Bilateral 09/30/2022    Procedure: FESS, USING COMPUTER-ASSISTED NAVIGATION;  Surgeon: Matthias Roach MD;  Location: [facility] OR 2ND FLR;  Service: ENT;  Laterality: Bilateral;    HYSTERECTOMY  2001    INTRALUMINAL GASTROINTESTINAL TRACT IMAGING VIA CAPSULE N/A 03/03/2023    Procedure: IMAGING PROCEDURE, GI TRACT,  "INTRALUMINAL, VIA CAPSULE;  Surgeon: First Available Ninfa Gillespie;  Location: [facility];  Service: Endoscopy;  Laterality: N/A;    SINUS SURGERY      WISDOM TOOTH EXTRACTION       Social History: Patient reports that she has had globus sensation with some solids though this appears to be chronic in nature. She reports using compensatory strategies such as steaming vegetables and cutting up harder solids into smaller pieces. Pt reports xerostomia impacting bolus formulation.     Prior Intubation HX:  None this admission    Modified Barium Swallow: None this admission.    Chest X-Rays: 10/14: "No evidence of complication following lung biopsy. Multifocal pulmonary opacities appear worse than on the recent CT.    Prior diet: Regular/thin.    Subjective     RN cleared pt for evaluation.    Pt's spouse at bedside. Pt with a coughing episode while in the bathroom as the SLP entered the room. Coughing subsided prior to PO trials. The pt was cooperative t/o evaluation.     "I've had trouble for a while. Sometimes it feels like things get stuck in my throat."    Pain/Comfort:  Pain Rating 1: 0/10  Pain Rating Post-Intervention 1: 0/10    Respiratory Status: Room air    Objective:     Oral Musculature Evaluation  Oral Musculature: WFL  Dentition: present and adequate  Secretion Management: adequate  Mucosal Quality: adequate  Mandibular Strength and Mobility: WFL  Oral Labial Strength and Mobility: WFL, functional coordination, functional seal, functional retraction  Lingual Strength and Mobility: WFL, functional protrusion, functional lateral movement  Velar Elevation: WFL  Buccal Strength and Mobility: WFL  Volitional Cough: Elicited  Volitional Swallow: Timely initiation of the pharyngeal swallow noted during palpation  Voice Prior to PO Intake: clear with adequate intensity    Bedside Swallow Eval:   Consistencies Assessed:  Thin liquids via open cup and straw  Solids x1      Oral Phase:   Dry mouth  Poor bolus cohesion "     Pharyngeal Phase:   no overt clinical signs/symptoms of aspiration    Compensatory Strategies  None    Treatment: Education provided re: role of SLP, diet recs, swallow precs, s/s aspiration and POC.  Pt verbalized understanding and agreement.     Goals:   Multidisciplinary Problems       SLP Goals          Problem: SLP    Goal Priority Disciplines Outcome   SLP Goal     SLP Ongoing, Progressing   Description: Speech Language Pathology Goals  Goals expected to be met by 10/28:    1. The pt will tolerate a regular/thin diet without signs of aspiration.                                Plan:     Patient to be seen:  3 x/week   Plan of Care expires:  11/11/23  Plan of Care reviewed with:  patient, spouse   SLP Follow-Up:  Yes       Discharge recommendations:  other (see comments)       Time Tracking:     SLP Treatment Date:   10/14/23  Speech Start Time:  1405  Speech Stop Time:  1415     Speech Total Time (min):  10 min    Billable Minutes: Eval Swallow and Oral Function 10    10/14/2023

## 2023-10-15 ENCOUNTER — PATIENT MESSAGE (OUTPATIENT)
Dept: PULMONOLOGY | Facility: CLINIC | Age: 58
End: 2023-10-15
Payer: COMMERCIAL

## 2023-10-15 VITALS
HEIGHT: 67 IN | HEART RATE: 67 BPM | RESPIRATION RATE: 19 BRPM | BODY MASS INDEX: 27.31 KG/M2 | WEIGHT: 174 LBS | TEMPERATURE: 98 F | DIASTOLIC BLOOD PRESSURE: 87 MMHG | OXYGEN SATURATION: 98 % | SYSTOLIC BLOOD PRESSURE: 160 MMHG

## 2023-10-15 PROBLEM — J82.81: Status: ACTIVE | Noted: 2019-10-16

## 2023-10-15 PROBLEM — J18.9 MULTIFOCAL PNEUMONIA: Status: RESOLVED | Noted: 2023-10-11 | Resolved: 2023-10-15

## 2023-10-15 PROBLEM — J98.8 RESPIRATORY INFECTION: Chronic | Status: RESOLVED | Noted: 2023-10-12 | Resolved: 2023-10-15

## 2023-10-15 LAB
1,3 BETA GLUCAN SER-MCNC: 78 PG/ML
FUNGITELL COMMENTS: ABNORMAL
HISTOPLASMA/BLASTOMYCES AG VALUE: NOT DETECTED NG/ML
HISTOPLASMA/BLASTOMYCES RESULT: NOT DETECTED

## 2023-10-15 PROCEDURE — 27000221 HC OXYGEN, UP TO 24 HOURS

## 2023-10-15 PROCEDURE — 63600175 PHARM REV CODE 636 W HCPCS: Performed by: STUDENT IN AN ORGANIZED HEALTH CARE EDUCATION/TRAINING PROGRAM

## 2023-10-15 PROCEDURE — 94640 AIRWAY INHALATION TREATMENT: CPT

## 2023-10-15 PROCEDURE — 25000003 PHARM REV CODE 250: Performed by: INTERNAL MEDICINE

## 2023-10-15 PROCEDURE — G0008 ADMIN INFLUENZA VIRUS VAC: HCPCS | Performed by: HOSPITALIST

## 2023-10-15 PROCEDURE — 25000003 PHARM REV CODE 250: Performed by: HOSPITALIST

## 2023-10-15 PROCEDURE — 25000242 PHARM REV CODE 250 ALT 637 W/ HCPCS: Performed by: STUDENT IN AN ORGANIZED HEALTH CARE EDUCATION/TRAINING PROGRAM

## 2023-10-15 PROCEDURE — 25000003 PHARM REV CODE 250: Performed by: FAMILY MEDICINE

## 2023-10-15 PROCEDURE — 25000003 PHARM REV CODE 250: Performed by: STUDENT IN AN ORGANIZED HEALTH CARE EDUCATION/TRAINING PROGRAM

## 2023-10-15 PROCEDURE — 25000242 PHARM REV CODE 250 ALT 637 W/ HCPCS: Performed by: FAMILY MEDICINE

## 2023-10-15 PROCEDURE — 99239 HOSP IP/OBS DSCHRG MGMT >30: CPT | Mod: ,,, | Performed by: HOSPITALIST

## 2023-10-15 PROCEDURE — 99233 PR SUBSEQUENT HOSPITAL CARE,LEVL III: ICD-10-PCS | Mod: ,,, | Performed by: INTERNAL MEDICINE

## 2023-10-15 PROCEDURE — 63600175 PHARM REV CODE 636 W HCPCS: Performed by: HOSPITALIST

## 2023-10-15 PROCEDURE — 99900035 HC TECH TIME PER 15 MIN (STAT)

## 2023-10-15 PROCEDURE — 94761 N-INVAS EAR/PLS OXIMETRY MLT: CPT

## 2023-10-15 PROCEDURE — 90686 IIV4 VACC NO PRSV 0.5 ML IM: CPT | Performed by: HOSPITALIST

## 2023-10-15 PROCEDURE — 90471 IMMUNIZATION ADMIN: CPT | Performed by: HOSPITALIST

## 2023-10-15 PROCEDURE — 99233 SBSQ HOSP IP/OBS HIGH 50: CPT | Mod: ,,, | Performed by: INTERNAL MEDICINE

## 2023-10-15 PROCEDURE — 99239 PR HOSPITAL DISCHARGE DAY,>30 MIN: ICD-10-PCS | Mod: ,,, | Performed by: HOSPITALIST

## 2023-10-15 PROCEDURE — 94799 UNLISTED PULMONARY SVC/PX: CPT

## 2023-10-15 PROCEDURE — 94664 DEMO&/EVAL PT USE INHALER: CPT

## 2023-10-15 RX ORDER — SULFAMETHOXAZOLE AND TRIMETHOPRIM 800; 160 MG/1; MG/1
1 TABLET ORAL DAILY
Qty: 30 TABLET | Refills: 1 | Status: SHIPPED | OUTPATIENT
Start: 2023-10-15 | End: 2023-10-15 | Stop reason: SDUPTHER

## 2023-10-15 RX ORDER — BENZONATATE 200 MG/1
200 CAPSULE ORAL 3 TIMES DAILY PRN
Qty: 90 CAPSULE | Refills: 0 | Status: SHIPPED | OUTPATIENT
Start: 2023-10-15 | End: 2023-10-15 | Stop reason: SDUPTHER

## 2023-10-15 RX ORDER — SULFAMETHOXAZOLE AND TRIMETHOPRIM 800; 160 MG/1; MG/1
1 TABLET ORAL DAILY
Qty: 30 TABLET | Refills: 1 | Status: SHIPPED | OUTPATIENT
Start: 2023-10-15 | End: 2024-01-16

## 2023-10-15 RX ORDER — LEVOFLOXACIN 750 MG/1
750 TABLET ORAL DAILY
Qty: 2 TABLET | Refills: 0 | Status: SHIPPED | OUTPATIENT
Start: 2023-10-15 | End: 2023-10-17

## 2023-10-15 RX ORDER — PANTOPRAZOLE SODIUM 40 MG/1
40 TABLET, DELAYED RELEASE ORAL DAILY
Qty: 30 TABLET | Refills: 11 | Status: SHIPPED | OUTPATIENT
Start: 2023-10-15 | End: 2024-10-14

## 2023-10-15 RX ORDER — DOXYCYCLINE HYCLATE 100 MG
100 TABLET ORAL EVERY 12 HOURS
Qty: 4 TABLET | Refills: 0 | Status: SHIPPED | OUTPATIENT
Start: 2023-10-15 | End: 2023-10-15 | Stop reason: SDUPTHER

## 2023-10-15 RX ORDER — BENZONATATE 200 MG/1
200 CAPSULE ORAL 3 TIMES DAILY PRN
Qty: 90 CAPSULE | Refills: 0 | Status: SHIPPED | OUTPATIENT
Start: 2023-10-15 | End: 2023-11-14

## 2023-10-15 RX ORDER — GUAIFENESIN/DEXTROMETHORPHAN 100-10MG/5
10 SYRUP ORAL EVERY 6 HOURS
Qty: 473 ML | Refills: 0 | Status: SHIPPED | OUTPATIENT
Start: 2023-10-15 | End: 2023-10-27

## 2023-10-15 RX ORDER — PREDNISONE 20 MG/1
80 TABLET ORAL DAILY
Qty: 120 TABLET | Refills: 0 | Status: SHIPPED | OUTPATIENT
Start: 2023-10-15 | End: 2023-10-15 | Stop reason: SDUPTHER

## 2023-10-15 RX ORDER — PANTOPRAZOLE SODIUM 40 MG/1
40 TABLET, DELAYED RELEASE ORAL DAILY
Qty: 30 TABLET | Refills: 11 | Status: SHIPPED | OUTPATIENT
Start: 2023-10-15 | End: 2023-10-15 | Stop reason: SDUPTHER

## 2023-10-15 RX ORDER — SULFAMETHOXAZOLE AND TRIMETHOPRIM 800; 160 MG/1; MG/1
1 TABLET ORAL DAILY
Status: DISCONTINUED | OUTPATIENT
Start: 2023-10-15 | End: 2023-10-15 | Stop reason: HOSPADM

## 2023-10-15 RX ORDER — FLUCONAZOLE 200 MG/1
200 TABLET ORAL DAILY
Qty: 30 TABLET | Refills: 0 | Status: SHIPPED | OUTPATIENT
Start: 2023-10-15 | End: 2023-10-15 | Stop reason: HOSPADM

## 2023-10-15 RX ORDER — GUAIFENESIN/DEXTROMETHORPHAN 100-10MG/5
10 SYRUP ORAL EVERY 6 HOURS
Qty: 473 ML | Refills: 0 | Status: SHIPPED | OUTPATIENT
Start: 2023-10-15 | End: 2023-10-15 | Stop reason: SDUPTHER

## 2023-10-15 RX ORDER — PREDNISONE 20 MG/1
60 TABLET ORAL DAILY
Qty: 90 TABLET | Refills: 0 | Status: SHIPPED | OUTPATIENT
Start: 2023-10-15 | End: 2023-11-14

## 2023-10-15 RX ORDER — LEVOFLOXACIN 750 MG/1
750 TABLET ORAL DAILY
Qty: 2 TABLET | Refills: 0 | Status: SHIPPED | OUTPATIENT
Start: 2023-10-15 | End: 2023-10-15 | Stop reason: SDUPTHER

## 2023-10-15 RX ORDER — PROMETHAZINE HYDROCHLORIDE AND CODEINE PHOSPHATE 6.25; 1 MG/5ML; MG/5ML
5 SOLUTION ORAL EVERY 6 HOURS PRN
Qty: 240 ML | Refills: 0 | Status: SHIPPED | OUTPATIENT
Start: 2023-10-15 | End: 2023-10-27

## 2023-10-15 RX ORDER — DOXYCYCLINE HYCLATE 100 MG
100 TABLET ORAL EVERY 12 HOURS
Qty: 4 TABLET | Refills: 0 | Status: SHIPPED | OUTPATIENT
Start: 2023-10-15 | End: 2023-10-17

## 2023-10-15 RX ADMIN — TOPIRAMATE 100 MG: 100 TABLET, FILM COATED ORAL at 08:10

## 2023-10-15 RX ADMIN — PANTOPRAZOLE SODIUM 40 MG: 40 TABLET, DELAYED RELEASE ORAL at 08:10

## 2023-10-15 RX ADMIN — GUAIFENESIN AND DEXTROMETHORPHAN 10 ML: 100; 10 SYRUP ORAL at 11:10

## 2023-10-15 RX ADMIN — IPRATROPIUM BROMIDE AND ALBUTEROL SULFATE 3 ML: .5; 3 SOLUTION RESPIRATORY (INHALATION) at 01:10

## 2023-10-15 RX ADMIN — INFLUENZA VIRUS VACCINE 0.5 ML: 15; 15; 15; 15 SUSPENSION INTRAMUSCULAR at 04:10

## 2023-10-15 RX ADMIN — LOSARTAN POTASSIUM 100 MG: 50 TABLET, FILM COATED ORAL at 08:10

## 2023-10-15 RX ADMIN — PREDNISONE 80 MG: 20 TABLET ORAL at 08:10

## 2023-10-15 RX ADMIN — IPRATROPIUM BROMIDE AND ALBUTEROL SULFATE 3 ML: .5; 3 SOLUTION RESPIRATORY (INHALATION) at 07:10

## 2023-10-15 RX ADMIN — FLUTICASONE FUROATE AND VILANTEROL TRIFENATATE 1 PUFF: 100; 25 POWDER RESPIRATORY (INHALATION) at 07:10

## 2023-10-15 RX ADMIN — PREGABALIN 150 MG: 150 CAPSULE ORAL at 08:10

## 2023-10-15 RX ADMIN — DULOXETINE HYDROCHLORIDE 60 MG: 60 CAPSULE, DELAYED RELEASE ORAL at 08:10

## 2023-10-15 RX ADMIN — BENZONATATE 200 MG: 100 CAPSULE ORAL at 08:10

## 2023-10-15 RX ADMIN — GUAIFENESIN AND DEXTROMETHORPHAN 10 ML: 100; 10 SYRUP ORAL at 05:10

## 2023-10-15 RX ADMIN — SULFAMETHOXAZOLE AND TRIMETHOPRIM 1 TABLET: 800; 160 TABLET ORAL at 08:10

## 2023-10-15 RX ADMIN — FLUCONAZOLE 200 MG: 200 TABLET ORAL at 08:10

## 2023-10-15 RX ADMIN — IPRATROPIUM BROMIDE AND ALBUTEROL SULFATE 3 ML: .5; 3 SOLUTION RESPIRATORY (INHALATION) at 12:10

## 2023-10-15 RX ADMIN — LEVOFLOXACIN 750 MG: 500 TABLET, FILM COATED ORAL at 08:10

## 2023-10-15 RX ADMIN — DOXYCYCLINE HYCLATE 100 MG: 100 TABLET, COATED ORAL at 08:10

## 2023-10-15 RX ADMIN — CETIRIZINE HYDROCHLORIDE 5 MG: 5 TABLET, FILM COATED ORAL at 08:10

## 2023-10-15 NOTE — PROGRESS NOTES
"[facility]  Pulmonary  Medicine  Progress Note    Patient Name: Jaylin Murguia  MRN: [id number]  Admission Date: 10/11/2023  Hospital Length of Stay: 4 days  Code Status: Full Code  Attending Provider: Mona Maynard MD  Primary Care Provider: Esthela Hu MD   Principal Problem: Idiopathic chronic eosinophilic pneumonia  Clinic Pulmonologist:  Dr Juan Riley.   Subjective:     HPI:  Patient with chronic peripheral eosinophilia and difficult to control asthma for years, recurrent "multifocal pneumonia".  Steroid responsive.  In March had rigid chest due to Fentanyl to bronch (not an allergy and can be readministered).  Admitted for asthma exacerbation and multifocal pneumonia.       Hospital/ICU Course:  Admitted and treated for pneumonia.  All fungal and culture serology is negative.  BAL is consistent with eosinophilic pneumonia NOT an active infection. . High dose steroid initiated.  Received 12 mg decadron during bronch and started on 60 mg of prednisone daily for chronic eosinophilic pneumonia.  Patient will likely need a prolonged steroid taper to be managed by Dr Riley with pulmonary.          Interval History/Significant Events: Stable overnight.     Review of Systems   Constitutional:  Negative for fever.   Respiratory:  Positive for cough and wheezing (expiratroy wheeze at bases.  Rhonchi throughout).      Objective:     Vital Signs (Most Recent):  Temp: 98.2 °F (36.8 °C) (10/15/23 0833)  Pulse: 79 (10/15/23 0833)  Resp: 17 (10/15/23 0833)  BP: 137/69 (10/15/23 0833)  SpO2: 98 % (10/15/23 0833) Vital Signs (24h Range):  Temp:  [96.9 °F (36.1 °C)-98.2 °F (36.8 °C)] 98.2 °F (36.8 °C)  Pulse:  [65-80] 79  Resp:  [17-20] 17  SpO2:  [94 %-98 %] 98 %  BP: (137-168)/(69-87) 137/69   Weight: 78.9 kg (174 lb)  Body mass index is 27.25 kg/m².      Intake/Output Summary (Last 24 hours) at 10/15/2023 4077  Last data filed at 10/15/2023 0544  Gross per 24 hour   Intake 240 ml   Output --   Net 240 " "ml          Physical Exam  Constitutional:       Appearance: Normal appearance.   Pulmonary:      Breath sounds: Wheezing and rhonchi present.   Musculoskeletal:         General: No swelling. Normal range of motion.      Cervical back: Normal range of motion and neck supple.   Skin:     General: Skin is warm and dry.   Neurological:      General: No focal deficit present.      Mental Status: She is alert and oriented to person, place, and time.   Psychiatric:         Mood and Affect: Mood normal.         Behavior: Behavior normal.            Vents:     Lines/Drains/Airways       Peripheral Intravenous Line  Duration                  Peripheral IV - Single Lumen 10/11/23 1551 20 G Right Antecubital 3 days                  Significant Labs:    CBC/Anemia Profile:  Recent Labs   Lab 10/14/23  0653   WBC 4.21   HGB 12.6   HCT 39.5      MCV 98   RDW 12.5        Chemistries:  Recent Labs   Lab 10/14/23  0653      K 3.5   *   CO2 19*   BUN 12   CREATININE 0.7   CALCIUM 8.9   ALBUMIN 2.8*   PROT 7.2   BILITOT 0.2   ALKPHOS 96   ALT 15   AST 19   MG 1.9   PHOS 2.9     BAL results: RIP negative      0 Result Notes      Component 2 d ago   Respiratory Culture No Growth P    Gram Stain (Respiratory) Rare WBC's    Gram Stain (Respiratory) No organisms seen        0 Result Notes       Component Ref Range & Units 2 d ago   Body Fluid Type  Bronchial Wash    Fluid Appearance  Hazy    Fluid Color  Colorless    WBC, Body Fluid /cu mm 0    Comment: Reference ranges for body fluids not established.   Correlate clinically.    Segs, Fluid % 2    Lymphs, Fluid % 19    Monocytes/Macrophages, Fluid % 13    Eos, Fluid % 66         Pathology and cytology pending.     Significant Imaging:  I have reviewed all pertinent imaging results/findings within the past 24 hours.      ABG  No results for input(s): "PH", "PO2", "PCO2", "HCO3", "BE" in the last 168 hours.  Assessment/Plan:     Pulmonary  * Idiopathic chronic eosinophilic " pneumonia  Continue asthma treatment with trelegy and nebs  STop singular.  Symptoms, bronch and clinical picture most consistent with chronic eosinophilic pneumonia.  No identifiable cause.  May complete pneumonia tx per ID but etiology is likely eosinophilic pneumonia.     Will need prolonged steroid taper (continue prednisone 60 mg daily until seen by Dr Riley in clinic) and Bactrim PJP prophylaxis.    Follow up with Dr. Juan Molina MD  Pulmoanry and Critical Care Medicine  [facility] Surg

## 2023-10-15 NOTE — PLAN OF CARE
10/15/23 1424   Post-Acute Status   Post-Acute Authorization E   HME Status Referrals Sent   Discharge Plan   Discharge Plan A Home with family     Home oxygen ordered and I notified [facility] DME coordinator.    3:57 PM-Pt has paid oxygen copay. Waiting on oxygen to be delivered.      Sonia Headley, RN    [facility]  [phone]

## 2023-10-15 NOTE — PROGRESS NOTES
[facility] Surg  Infectious Disease  Progress Note    Patient Name: Jaylin Murguia  MRN: [id number]  Admission Date: 10/11/2023  Length of Stay: 3 days  Attending Physician: Mona Maynard MD  Primary Care Provider: Estheal Hu MD    Isolation Status: No active isolations  Assessment/Plan:      Pulmonary  Respiratory infection  58F with history of Crohns disease on skyrizi, chronic sinusitis s/p bilateral FESS, recurrent Pseudomonas infections, presents with worsening SOB, found to have eosinophilia, CT chest with patchy bilateral ground glass opacities, enlarged mediastinal lymph nodes, bronchial occlusions, bronchoscopy with WBC 0, eos 88%, concerning for eosinophilic pneumonia, started on steroids.     Recommendations:  - Okay to complete 7 day course of antibiotics for pneumonia, switch to levoflox 750 mg PO qdaily, continue doxycycline 100 mg PO BID, last day 10/17/2023  - Follow-up evaluating for atypical pneumonia, fungal pneumonia  - Follow-up Aspergillus IgE  - Will arrange for follow-up with Dr. Saini in ID      50 minutes of total time spent on the encounter, which includes face to face time and non-face to face time preparing to see the patient (eg, review of tests), obtaining and reviewing separately obtained history, documenting clinical information in the electronic or other health record, independently interpreting results (not separately reported) and communicating results to the patient/family/caregiver, and care coordination (not separately reported).       Thank you for your consult. I will sign off. Please contact us if you have any additional questions.    Destiney Potter MD  Infectious Disease  [facility]    Subjective:     Principal Problem:Multifocal pneumonia    HPI: 58F with a PMHx asthma, Crohn's Disease on IS, IVIG d/t IS, chronic diarrhea (5-6 episodes daily, takes 1-2 lomotil for relief, follows up with Dr. 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 She presented to ED on 10/11 with worsening shortness of breath and productive cough with dark green and red colored sputum. She was on a course of cefadroxil and levofloxacin started on 09/27 with Dr. Saini; however, her symptoms were getting worse as she was becoming more short of breath coughing episodes and with minimal exertion. She completed a Chest CT 09/27 which showed Patchy ground-glass opacities throughout the lungs which has improved in the interval.  Scattered mucus plugging which appears similar to prior exam. Respiratory sputum Cx 09/27 grew pseudomonas aeruginosa and methicillin sensitive staph aureus.      She denies any fever or chills, no nausea or vomiting. No known sick contacts. Her last episode of diarrhea was last night, which stopped after taking 2 lomotil. Has abdominal cramping with diarrhea but no blood in stool. No dysuria, abdominal pain or skin changes/rashes.      She was started on cefepime and vancomycin. She reports that although she continues to have the coughing and shortness of breath on exertion, she has noticed an improvement in her symptoms. She no longer has dark green, red colored sputum. States she produces a tsp amount of sputum with cough that is clear      She lives in [place] in a house with her  and son (25), near [facility]. She has 1 dog. She also has a flock of chickens with an outdoor and indoor coup - she has had the chickens for 4-5 years but stopped directly interacting with them around June 2023 due to breathing issues. She state that her  takes care of them. She has a swimming pool that is chlorinated but has not swum in it for months, otherwise would swim 1-2 times per week. She likes to garden and has a small lesion on her right lower extremity from a adriana bush thorn that occurred 6 months ago but has not healed - no draining, discharge or tenderness. She  previously enjoyed working as a  however due to her chronic illness she retired from teaching about 7 years ago.  ID was consulted for multifocal PNA hx of pseudomonas resistant colonization     Interval History: No fevers overnight  Patient underwent bronchoscopy yesterday - noted to have WBC 0, 88% eosinophils, started on steroids for eosinophilic pneumonia  Patient reports feeling better with steroids, but still has SOB with ambulation to bathroom, cough with movements    Review of Systems   Constitutional:  Negative for chills, diaphoresis and fever.   HENT:  Negative for rhinorrhea and sore throat.    Respiratory:  Positive for cough and shortness of breath.    Cardiovascular:  Negative for chest pain and leg swelling.   Gastrointestinal:  Negative for abdominal pain, diarrhea, nausea and vomiting.   Genitourinary:  Negative for dysuria and hematuria.   Musculoskeletal:  Negative for arthralgias and myalgias.   Skin:  Negative for rash.   Neurological:  Negative for headaches.     Objective:     Vital Signs (Most Recent):  Temp: 97.3 °F (36.3 °C) (10/14/23 1959)  Pulse: 69 (10/14/23 1959)  Resp: 18 (10/14/23 2006)  BP: (!) 152/85 (10/14/23 1959)  SpO2: (!) 94 % (10/14/23 1959) Vital Signs (24h Range):  Temp:  [96.5 °F (35.8 °C)-97.8 °F (36.6 °C)] 97.3 °F (36.3 °C)  Pulse:  [62-89] 69  Resp:  [16-20] 18  SpO2:  [94 %-98 %] 94 %  BP: (123-152)/(71-85) 152/85     Weight: 78.9 kg (174 lb)  Body mass index is 27.25 kg/m².    Estimated Creatinine Clearance: 94.7 mL/min (based on SCr of 0.7 mg/dL).     Physical Exam  Vitals reviewed.   Constitutional:       General: She is not in acute distress.     Appearance: She is well-developed. She is not diaphoretic.   HENT:      Head: Normocephalic and atraumatic.      Nose: Nose normal.   Eyes:      Conjunctiva/sclera: Conjunctivae normal.   Pulmonary:      Effort: Pulmonary effort is normal. No respiratory distress.      Breath sounds: Wheezing and rhonchi  present.   Abdominal:      General: Abdomen is flat. There is no distension.   Musculoskeletal:      Cervical back: Normal range of motion.      Right lower leg: No edema.      Left lower leg: No edema.   Skin:     General: Skin is warm and dry.      Findings: No erythema or rash.   Neurological:      Mental Status: She is alert.   Psychiatric:         Behavior: Behavior normal.          Significant Labs: All pertinent labs within the past 24 hours have been reviewed.    Significant Imaging: I have reviewed all pertinent imaging results/findings within the past 24 hours.

## 2023-10-15 NOTE — PROGRESS NOTES
"[facility]  Critical Care Medicine  Progress Note    Patient Name: Jaylin Murguia  MRN: [id number]  Admission Date: 10/11/2023  Hospital Length of Stay: 4 days  Code Status: Full Code  Attending Provider: Mona Maynard MD  Primary Care Provider: Esthela Hu MD   Principal Problem:Chronic eosinophilic pneumonia  Pulmonologist:  Dr. Juan Riley.     Subjective:     HPI:  Patient with chronic peripheral eosinophilia and difficult to control asthma for years, recurrent "multifocal pneumonia".  Steroid responsive.  In March had rigid chest due to Fentanyl to bronch (not an allergy and can be readministered).  Admitted for asthma exacerbation and multifocal pneumonia.       Hospital/ICU Course:  Admitted and treated for pneumonia.  All fungal and culture serology is negative.  BAL is consistent with eosinophilic pneumonia NOT an active infection. . High dose steroid initiated.  Received 12 mg decadron during bronch and started on 60 mg of prednisone daily for chronic eosinophilic pneumonia.  Patient will likely need a prolonged steroid taper to be managed by Dr Riley with pulmonary.          Interval History/Significant Events: Stable overnight.     Review of Systems   Constitutional:  Negative for fever.   Respiratory:  Positive for cough and wheezing (expiratroy wheeze at bases.  Rhonchi throughout).      Objective:     Vital Signs (Most Recent):  Temp: 98.2 °F (36.8 °C) (10/15/23 0833)  Pulse: 79 (10/15/23 0833)  Resp: 17 (10/15/23 0833)  BP: 137/69 (10/15/23 0833)  SpO2: 98 % (10/15/23 0833) Vital Signs (24h Range):  Temp:  [96.9 °F (36.1 °C)-98.2 °F (36.8 °C)] 98.2 °F (36.8 °C)  Pulse:  [65-80] 79  Resp:  [17-20] 17  SpO2:  [94 %-98 %] 98 %  BP: (137-168)/(69-87) 137/69   Weight: 78.9 kg (174 lb)  Body mass index is 27.25 kg/m².      Intake/Output Summary (Last 24 hours) at 10/15/2023 3819  Last data filed at 10/15/2023 0568  Gross per 24 hour   Intake 240 ml   Output --   Net 240 ml        " "  Physical Exam  Constitutional:       Appearance: Normal appearance.   Pulmonary:      Breath sounds: Wheezing and rhonchi present.   Musculoskeletal:         General: No swelling. Normal range of motion.      Cervical back: Normal range of motion and neck supple.   Skin:     General: Skin is warm and dry.   Neurological:      General: No focal deficit present.      Mental Status: She is alert and oriented to person, place, and time.   Psychiatric:         Mood and Affect: Mood normal.         Behavior: Behavior normal.            Vents:     Lines/Drains/Airways       Peripheral Intravenous Line  Duration                  Peripheral IV - Single Lumen 10/11/23 1551 20 G Right Antecubital 3 days                  Significant Labs:    CBC/Anemia Profile:  Recent Labs   Lab 10/14/23  0653   WBC 4.21   HGB 12.6   HCT 39.5      MCV 98   RDW 12.5        Chemistries:  Recent Labs   Lab 10/14/23  0653      K 3.5   *   CO2 19*   BUN 12   CREATININE 0.7   CALCIUM 8.9   ALBUMIN 2.8*   PROT 7.2   BILITOT 0.2   ALKPHOS 96   ALT 15   AST 19   MG 1.9   PHOS 2.9     BAL results: RIP negative      0 Result Notes      Component 2 d ago   Respiratory Culture No Growth P    Gram Stain (Respiratory) Rare WBC's    Gram Stain (Respiratory) No organisms seen        0 Result Notes       Component Ref Range & Units 2 d ago   Body Fluid Type  Bronchial Wash    Fluid Appearance  Hazy    Fluid Color  Colorless    WBC, Body Fluid /cu mm 0    Comment: Reference ranges for body fluids not established.   Correlate clinically.    Segs, Fluid % 2    Lymphs, Fluid % 19    Monocytes/Macrophages, Fluid % 13    Eos, Fluid % 66         Pathology and cytology pending.     Significant Imaging:  I have reviewed all pertinent imaging results/findings within the past 24 hours.      ABG  No results for input(s): "PH", "PO2", "PCO2", "HCO3", "BE" in the last 168 hours.  Assessment/Plan:     Mary Molina MD  Pulmonary and Critical Care " Medicine  [facility]

## 2023-10-15 NOTE — HPI
"Patient with chronic peripheral eosinophilia and difficult to control asthma for years, recurrent "multifocal pneumonia".  Steroid responsive.  In March had rigid chest due to Fentanyl to bronch (not an allergy and can be readministered).  Admitted for asthma exacerbation and multifocal pneumonia.   "

## 2023-10-15 NOTE — DISCHARGE SUMMARY
[facility] Medicine  Discharge Summary      Patient Name: Jaylin Murguia  MRN: [id number]  JOSÉ: [id number]  Patient Class: IP- Inpatient  Admission Date: 10/11/2023  Hospital Length of Stay: 4 days  Discharge Date and Time: No discharge date for patient encounter.  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 Patient has been treated since September 27th with cefadroxil levofloxacin due to acute on chronic sinusitis and pneumonia diagnosed on CT.  Patient noted to have significant mucus plugging.  Patient's symptoms have failed to improve.  She presents with progressive shortness of breath and episodes of difficulty breathing overnight.  She reports decreased activity tolerance for this.  She denies richard orthopnea or extremity swelling.  She has no chest pain.  She denies fever chills.  She reports compliance with her medications.    In the ED patient afebrile and hemodynamically stable saturating in mid 90's on room air at rest. CT with worsening ground glass opacities. Patient started on broad spectrum abx and admitted to the care of medicine for further evaluation and management.       Chart review:   CT 10/11 - Patchy bilateral ground-glass pulmonary airspace opacities have increased since 09/27/2023.  Although the differential diagnosis is extensive, a leading consideration is likely COVID-19 pneumonia.  Correlate clinically, and with follow-up chest CT within 3-6 months  to help further exclude other underlying pathology, such as carcinoma in-situ/bronchioloalveolar carcinoma.     Mildly prominent/enlarged mediastinal lymph nodes, similar to the comparison scan and favored to represent reactive lymphadenopathy.  The follow-up chest CT recommended above could also help further characterize this, and help exclude underlying neoplasm or lymphoproliferative disorder.     Some predominantly bibasilar, bronchial occlusions remain and are most likely related to mucous plugging.  Follow-up imaging again recommended, as above, to help further exclude underlying neoplasm.        Procedure(s) (LRB):  BRONCHOSCOPY, WITH FLUOROSCOPY (N/A)      Hospital Course:   Patient will unremarkable initial infectious workup.  Pulmonary consulted. Noted exposure to chickens. Immunocompromised. Underwent bronchoscopy with pulmonology on 10/13.  Lavage and biopsies pending. Infectious Disease following for antibiotic management.    10/14- /78  VSS. On meropenum, doxy and pred 80. Cxr- patchy pneumonia. Covid neg.  Willl chat w Pulm to clarify recs.  EOSINOPHILIC PNEUMONIA.  pt is still hypoxic on 2 liters. Continue pred until f/u, bactrim for PJP prophylaxis, stopping the other antibiotics.  Will dc if oxygenation stable. Pt will likely need home oxygen.    10/15-- Suspect EOSINOPHILC PNEUMONIA vs. FUNGAL PNEUMONIA.  Bonch results pending  -complete 7 day course of antibiotics for pneumonia, switch to levoflox 750 mg PO qdaily, continue doxycycline 100 mg PO BID, last day 10/17/2023.  follow-up with Dr. Saini in ID, and Pulmonary Dr. Molina.   -six min walk test.   Bactrim for PJP prophylaxis with steroids until f/u  Pulm. Will taper after f/u.           Goals of Care Treatment Preferences:  Code Status: Full Code      Consults:   Consults (From admission, onward)          Status Ordering Provider     [facility] Pharm[facility] Consult  Once        Provider:  (Not yet assigned)    Ordered THIERNO FISCHER      Inpatient consult to Pulmonology  Once        Provider:  (Not yet assigned)    Completed DARLING MORENO     Inpatient consult to Infectious Diseases  Once        Provider:  (Not yet assigned)    Completed DARLING MORENO            Pulmonary  * Idiopathic chronic eosinophilic pneumonia  - Daily LABA-ICS  - Duonebs prn  - Pulm toilet     Respiratory infection-resolved as of 10/15/2023  See above      Multifocal pneumonia-resolved as of 10/15/2023  EOSINOPHILIC PNEUMONIA vs FUNGAL PNEUMONIA  - CT with worsening ground glass opacities with mucus plugging  - Prior respiratory cultures with multi resistant pseudomonas and staph aureus  - Covid and Flu negative   - Pulmonolgy consulted   -- s/p bronch on 10/13  -- Follow up bronch studies in next 2 weeks as outpt  -- SLP consult to evaluate for aspiration   - ID consulted  -- Respiratory culture with multiple epithelial cells, thus canceled x2.  -- Blood cultures NGTD  -- Viral respiratory panel negative  -- Cryptococcal antigen negative  -- Toxoplasma DNA pending  -- Continue meropenem and doxycycline    10/14- Bronchoscopy done yesterday and studies pending. Will take weeks. dc rosemary, doxy and continue  Prednisone until f/u w pulmonary.  pt is still hypoxic over baseline, on 2 liters per NC today.   -   10/15- Complete levofl and doxy on 10/17. Continue pred 80 mg Until f/u.   Bactrim for PJP prophylaxis  - dc to home if oxygenation stable. Six minute walk test.     Cardiac/Vascular  Essential hypertension  - Continue home losartan      ID  Thrush  - ID consulted  -- Fluconazole 200mg daily, duration based on progress  -- Nystatin suspension 500,000 units (5 mL) swish & swallow qid    GI  Crohn's disease of small intestine  - Taking skyrizi; hold for now given current infection  -pt manages her diarrhea w lomotil    Other  Insomnia due to medical condition  - Continue home meds      Final Active Diagnoses:    Diagnosis Date Noted POA    PRINCIPAL PROBLEM:   "Idiopathic chronic eosinophilic pneumonia [J82.81] 10/16/2019 Yes    Thrush [B37.0] 10/12/2023 Yes     Chronic    Crohn's disease of small intestine [K50.00]  Yes    Insomnia due to medical condition [G47.01] 10/20/2016 Yes    Essential hypertension [I10] 07/30/2013 Yes      Problems Resolved During this Admission:    Diagnosis Date Noted Date Resolved POA    Multifocal pneumonia [J18.9] 10/11/2023 10/15/2023 Yes    Respiratory infection [J98.8] 10/12/2023 10/15/2023 Yes     Chronic       Discharged Condition: good    Disposition: Home or Self Care    Follow Up:   Follow-up Information       Ivan Riley MD Follow up in 2 week(s).    Specialties: Pulmonary Disease, Critical Care Medicine  Contact information:  [address]  [facility] [address]  [phone]                           Patient Instructions:      OXYGEN FOR HOME USE     Order Specific Question Answer Comments   Liter Flow 2    Duration Continuous    Qualifying Test Performed at: Rest    Oxygen saturation: 88    Portable mode: continuous    Route nasal cannula    Device: home concentrator with portable tanks    Length of need (in months): 99 mos    Patient condition with qualifying saturation Other - List qualifying diagnosis and code eosinophilic pneumonia   Select a diagnosis & list the code in the comments Pneumonia allergic [115495]    Height: 5' 7" (1.702 m)    Weight: 78.9 kg (174 lb)    Alternative treatment measures have been tried or considered and deemed clinically ineffective. Yes        Significant Diagnostic Studies: Labs:   CMP   Recent Labs   Lab 10/14/23  0653      K 3.5   *   CO2 19*   *   BUN 12   CREATININE 0.7   CALCIUM 8.9   PROT 7.2   ALBUMIN 2.8*   BILITOT 0.2   ALKPHOS 96   AST 19   ALT 15   ANIONGAP 9    and CBC   Recent Labs   Lab 10/14/23  0653   WBC 4.21   HGB 12.6   HCT 39.5          Pending Diagnostic Studies:       Procedure Component Value Units Date/Time    Anti-neutrophilic " cytoplasmic antibody [[id number]] Collected: 10/12/23 1116    Order Status: Sent Lab Status: In process Updated: 10/12/23 1142    Specimen: Blood     Aspergillus Antigen [[id number]] Collected: 10/13/23 0523    Order Status: Sent Lab Status: In process Updated: 10/13/23 0628    Specimen: Blood     Aspergillus Antigen, BAL [[id number]] Collected: 10/13/23 1554    Order Status: Sent Lab Status: In process Updated: 10/13/23 1648    Specimen: Body Fluid from Bronchial Alveolar Lavage (BAL)     Aspergillus fumagatus IgE [[id number]] Collected: 10/13/23 0524    Order Status: Sent Lab Status: In process Updated: 10/13/23 0628    Specimen: Blood     Blastomyces Dermatitidis Ag, Blood [[id number]]     Order Status: Sent Lab Status: No result     Specimen: Blood     CMV DNA, Quantitative, PCR [[id number]] Collected: 10/13/23 0523    Order Status: Sent Lab Status: In process Updated: 10/13/23 0642    Specimen: Blood     Chicken IgE [[id number]] Collected: 10/12/23 1116    Order Status: Sent Lab Status: In process Updated: 10/12/23 1142    Specimen: Blood     Coccidioides Ab with Reflex [[id number]] Collected: 10/13/23 0523    Order Status: Sent Lab Status: In process Updated: 10/13/23 0628    Specimen: Blood     Cytology- FNA Radiology Guided, Bronch/EBUS, EUS/GI [[id number]] Collected: 10/13/23 1555    Order Status: Sent Lab Status: In process Updated: 10/14/23 0456    Fungal Immunodiffusion - Blood [[id number]] Collected: 10/13/23 0523    Order Status: Sent Lab Status: In process Updated: 10/13/23 0628    Specimen: Blood     Histoplasma antigen, urine [[id number]] Collected: 10/12/23 1912    Order Status: Sent Lab Status: In process Updated: 10/12/23 2042    Specimen: Urine, Clean Catch     Legionella antigen, urine [[id number]] Collected: 10/12/23 1911    Order Status: Sent Lab Status: In process Updated: 10/12/23 2042    Specimen: Urine, Clean Catch     Myeloperoxidase Antibody (MPO) [[id number]] Collected: 10/12/23 1116     Order Status: Sent Lab Status: In process Updated: 10/12/23 1142    Specimen: Blood     Toxoplasma Gondii Antibody, IgM [[id number]] Collected: 10/13/23 0523    Order Status: Sent Lab Status: In process Updated: 10/13/23 0628    Specimen: Blood     Toxoplasma Gondii IgG [[id number]] Collected: 10/13/23 0524    Order Status: Sent Lab Status: In process Updated: 10/13/23 0628    Specimen: Blood            Medications:  Reconciled Home Medications:      Medication List        START taking these medications      benzonatate 200 MG capsule  Commonly known as: TESSALON  Take 1 capsule (200 mg total) by mouth 3 (three) times daily as needed for Cough (2nd line option for cough).     dextromethorphan-guaiFENesin  mg/5 ml  mg/5 mL liquid  Commonly known as: ROBITUSSIN-DM  Take 10 mLs by mouth every 6 (six) hours. for 10 days     doxycycline 100 MG tablet  Commonly known as: VIBRA-TABS  Take 1 tablet (100 mg total) by mouth every 12 (twelve) hours. for 2 days     pantoprazole 40 MG tablet  Commonly known as: PROTONIX  Take 1 tablet (40 mg total) by mouth once daily.     predniSONE 20 MG tablet  Commonly known as: DELTASONE  Take 3 tablets (60 mg total) by mouth once daily.     promethazine-codeine 6.25-10 mg/5 ml 6.25-10 mg/5 mL syrup  Commonly known as: PHENERGAN with CODEINE  Take 5 mLs by mouth every 6 (six) hours as needed for Cough.     sulfamethoxazole-trimethoprim 800-160mg 800-160 mg Tab  Commonly known as: BACTRIM DS  Take 1 tablet by mouth once daily.            CHANGE how you take these medications      losartan 100 MG tablet  Commonly known as: COZAAR  Take 1 tablet (100 mg total) by mouth once daily.  What changed:   when to take this  reasons to take this     pregabalin 150 MG capsule  Commonly known as: LYRICA  Take 1 capsule (150 mg total) by mouth 3 (three) times daily.  What changed: when to take this            CONTINUE taking these medications      acetaminophen 500 MG tablet  Commonly known  as: TYLENOL  Take 2 tablets (1,000 mg total) by mouth every 6 (six) hours as needed for Pain.     albuterol-ipratropium 2.5 mg-0.5 mg/3 mL nebulizer solution  Commonly known as: DUO-NEB  Take 3 mLs by nebulization every 6 (six) hours as needed for Wheezing. Rescue     b complex vitamins capsule  Take 1 capsule by mouth once daily.     cetirizine 10 MG tablet  Commonly known as: ZYRTEC  Take 10 mg by mouth 2 (two) times a day.     clotrimazole-betamethasone 1-0.05% cream  Commonly known as: LOTRISONE  Apply topically 2 (two) times daily.     DILTIAZEM 2% - LIDOCAINE 5% CREAM  Apply peasize amount topically to anal area.     diphenhydrAMINE-aluminum-magnesium hydroxide-simethicone-LIDOcaine HCl 2%  Swish and spit 15 mLs every 4 (four) hours as needed (oral ulcers, pain).     diphenoxylate-atropine 2.5-0.025 mg 2.5-0.025 mg per tablet  Commonly known as: LOMOTIL  Take 1 tablet by mouth 4 (four) times daily as needed for Diarrhea.     DULoxetine 60 MG capsule  Commonly known as: CYMBALTA  Take 1 capsule (60 mg total) by mouth 2 (two) times daily.     estradioL 2 MG tablet  Commonly known as: ESTRACE  TAKE 1 TABLET DAILY     fluticasone propionate 50 mcg/actuation nasal spray  Commonly known as: FLONASE  2 sprays (100 mcg total) by Each Nostril route once daily.     HIZENTRA 10 gram/50 mL (20 %) Soln  Generic drug: immun glob G(IgG)-pro-IgA 0-50  Inject 70 mLs (14 g total) into the skin every 7 days.     ipratropium 0.02 % nebulizer solution  Commonly known as: ATROVENT  Take by nebulization 4 (four) times daily as needed for Wheezing.     levoFLOXacin 750 MG tablet  Commonly known as: LEVAQUIN  Take 1 tablet (750 mg total) by mouth once daily. for 2 days     nortriptyline 25 MG capsule  Commonly known as: PAMELOR  Take 1 capsule (25 mg total) by mouth every evening.     OMEGA 3-6-9 ORAL  Take 2 tablets by mouth once daily.     risankizumab-rzaa 360 mg/2.4 mL (150 mg/mL) Injt  Inject 360 mg into the skin every 8 weeks.  for 6 doses     rizatriptan 10 MG tablet  Commonly known as: MAXALT  TAKE 1 TABLET IF NEEDED FOR MIGRAINES. MAX 2 TABLETS IN 24 HOURS.     topiramate 100 MG tablet  Commonly known as: TOPAMAX  Take 1 tablet (100 mg total) by mouth 2 (two) times daily.     traZODone 50 MG tablet  Commonly known as: DESYREL  Take 1 tablet (50 mg total) by mouth every evening.     VITAMIN D-3 ORAL  Take 2 tablets by mouth once daily.     XYLITOL (BULK) MISC  EMPTY CONTENTS OF 1 CAPSULE INTO NASAL IRRIGATION SYSTEM, ADD DISTILLED WATER, SALT PACK, MIX & IRRIGATE. PERFORM 2 TIMES DAILY     ZINC ORAL  Take 1 tablet by mouth once daily.            STOP taking these medications      cefadroxil 500 MG Cap  Commonly known as: DURICEF     eletriptan 40 MG tablet  Commonly known as: RELPAX     fluticasone-umeclidin-vilanter 100-62.5-25 mcg Dsdv  Commonly known as: TRELEGY ELLIPTA     montelukast 10 mg tablet  Commonly known as: SINGULAIR              Indwelling Lines/Drains at time of discharge:   Lines/Drains/Airways       None                   Time spent on the discharge of patient:    minutes         Mona Maynard MD  Department of Hospital Medicine  [facility]

## 2023-10-15 NOTE — HOSPITAL COURSE
Admitted and treated for pneumonia.  All fungal and culture serology is negative.  BAL is consistent with eosinophilic pneumonia NOT an active infection. . High dose steroid initiated.  Received 12 mg decadron during bronch and started on 60 mg of prednisone daily for chronic eosinophilic pneumonia.  Patient will likely need a prolonged steroid taper to be managed by Dr Riley with pulmonary.

## 2023-10-15 NOTE — ASSESSMENT & PLAN NOTE
Continue asthma treatment with trelegy and nebs  STop singular.  Symptoms, bronch and clinical picture most consistent with chronic eosinophilic pneumonia.  No identifiable cause.  May complete pneumonia tx per ID but etiology is likely eosinophilic pneumonia.     Will need prolonged steroid taper (continue prednisone 60 mg daily until seen by Dr Riley in clinic) and Bactrim PJP prophylaxis.    Follow up with Dr. Juan Riley

## 2023-10-15 NOTE — SUBJECTIVE & OBJECTIVE
Interval History/Significant Events: Stable overnight.     Review of Systems   Constitutional:  Negative for fever.   Respiratory:  Positive for cough and wheezing (expiratroy wheeze at bases.  Rhonchi throughout).      Objective:     Vital Signs (Most Recent):  Temp: 98.2 °F (36.8 °C) (10/15/23 0833)  Pulse: 79 (10/15/23 0833)  Resp: 17 (10/15/23 0833)  BP: 137/69 (10/15/23 0833)  SpO2: 98 % (10/15/23 0833) Vital Signs (24h Range):  Temp:  [96.9 °F (36.1 °C)-98.2 °F (36.8 °C)] 98.2 °F (36.8 °C)  Pulse:  [65-80] 79  Resp:  [17-20] 17  SpO2:  [94 %-98 %] 98 %  BP: (137-168)/(69-87) 137/69   Weight: 78.9 kg (174 lb)  Body mass index is 27.25 kg/m².      Intake/Output Summary (Last 24 hours) at 10/15/2023 0949  Last data filed at 10/15/2023 0544  Gross per 24 hour   Intake 240 ml   Output --   Net 240 ml          Physical Exam  Constitutional:       Appearance: Normal appearance.   Pulmonary:      Breath sounds: Wheezing and rhonchi present.   Musculoskeletal:         General: No swelling. Normal range of motion.      Cervical back: Normal range of motion and neck supple.   Skin:     General: Skin is warm and dry.   Neurological:      General: No focal deficit present.      Mental Status: She is alert and oriented to person, place, and time.   Psychiatric:         Mood and Affect: Mood normal.         Behavior: Behavior normal.            Vents:     Lines/Drains/Airways       Peripheral Intravenous Line  Duration                  Peripheral IV - Single Lumen 10/11/23 1551 20 G Right Antecubital 3 days                  Significant Labs:    CBC/Anemia Profile:  Recent Labs   Lab 10/14/23  0653   WBC 4.21   HGB 12.6   HCT 39.5      MCV 98   RDW 12.5        Chemistries:  Recent Labs   Lab 10/14/23  0653      K 3.5   *   CO2 19*   BUN 12   CREATININE 0.7   CALCIUM 8.9   ALBUMIN 2.8*   PROT 7.2   BILITOT 0.2   ALKPHOS 96   ALT 15   AST 19   MG 1.9   PHOS 2.9     BAL results: RIP negative     0 Result  Notes      Component 2 d ago   Respiratory Culture No Growth P    Gram Stain (Respiratory) Rare WBC's    Gram Stain (Respiratory) No organisms seen       0 Result Notes       Component Ref Range & Units 2 d ago   Body Fluid Type  Bronchial Wash    Fluid Appearance  Hazy    Fluid Color  Colorless    WBC, Body Fluid /cu mm 0    Comment: Reference ranges for body fluids not established.   Correlate clinically.    Segs, Fluid % 2    Lymphs, Fluid % 19    Monocytes/Macrophages, Fluid % 13    Eos, Fluid % 66         Pathology and cytology pending.     Significant Imaging:  I have reviewed all pertinent imaging results/findings within the past 24 hours.

## 2023-10-15 NOTE — SUBJECTIVE & OBJECTIVE
Interval History: see above    Review of Systems   Constitutional:  Positive for activity change and appetite change. Negative for fever.   HENT:  Negative for trouble swallowing.    Respiratory:  Positive for cough, shortness of breath and wheezing. Negative for apnea.    Cardiovascular:  Negative for chest pain and leg swelling.   Gastrointestinal:  Negative for abdominal pain, diarrhea, nausea and vomiting.   Genitourinary:  Negative for difficulty urinating.   Neurological:  Negative for light-headedness, numbness and headaches.   Psychiatric/Behavioral:  Negative for behavioral problems.      Objective:     Vital Signs (Most Recent):  Temp: 97.5 °F (36.4 °C) (10/15/23 0505)  Pulse: 67 (10/15/23 0747)  Resp: 18 (10/15/23 0747)  BP: (!) 168/87 (10/15/23 0505)  SpO2: 97 % (10/15/23 0747) Vital Signs (24h Range):  Temp:  [96.9 °F (36.1 °C)-97.8 °F (36.6 °C)] 97.5 °F (36.4 °C)  Pulse:  [65-80] 67  Resp:  [17-20] 18  SpO2:  [94 %-98 %] 97 %  BP: (146-168)/(71-87) 168/87     Weight: 78.9 kg (174 lb)  Body mass index is 27.25 kg/m².    Intake/Output Summary (Last 24 hours) at 10/15/2023 0879  Last data filed at 10/15/2023 0544  Gross per 24 hour   Intake 240 ml   Output --   Net 240 ml           Physical Exam  Constitutional:       General: She is not in acute distress.     Appearance: Normal appearance. She is ill-appearing.   HENT:      Head: Normocephalic and atraumatic.      Nose: Nose normal.      Mouth/Throat:      Mouth: Mucous membranes are moist.   Eyes:      General: No scleral icterus.     Extraocular Movements: Extraocular movements intact.      Pupils: Pupils are equal, round, and reactive to light.   Cardiovascular:      Rate and Rhythm: Normal rate and regular rhythm.      Pulses: Normal pulses.      Heart sounds: Normal heart sounds.   Pulmonary:      Effort: No respiratory distress.      Breath sounds: Wheezing, rhonchi and rales present.   Chest:      Chest wall: No tenderness.   Abdominal:       General: Abdomen is flat. Bowel sounds are normal. There is no distension.      Palpations: Abdomen is soft.      Tenderness: There is no abdominal tenderness. There is no right CVA tenderness, left CVA tenderness, guarding or rebound.   Musculoskeletal:         General: No swelling, tenderness, deformity or signs of injury. Normal range of motion.      Cervical back: Normal range of motion and neck supple. No rigidity or tenderness.      Right lower leg: No edema.      Left lower leg: No edema.   Skin:     General: Skin is warm and dry.      Coloration: Skin is not jaundiced or pale.      Findings: No erythema or rash.   Neurological:      General: No focal deficit present.      Mental Status: She is alert and oriented to person, place, and time. Mental status is at baseline.      Cranial Nerves: No cranial nerve deficit.      Motor: No weakness.   Psychiatric:         Mood and Affect: Mood normal.         Behavior: Behavior normal.         Thought Content: Thought content normal.         Judgment: Judgment normal.             Significant Labs: All pertinent labs within the past 24 hours have been reviewed.  CBC:   Recent Labs   Lab 10/14/23  0653   WBC 4.21   HGB 12.6   HCT 39.5          CMP:   Recent Labs   Lab 10/14/23  0653      K 3.5   *   CO2 19*   *   BUN 12   CREATININE 0.7   CALCIUM 8.9   PROT 7.2   ALBUMIN 2.8*   BILITOT 0.2   ALKPHOS 96   AST 19   ALT 15   ANIONGAP 9         Significant Imaging: I have reviewed all pertinent imaging results/findings within the past 24 hours.

## 2023-10-15 NOTE — ASSESSMENT & PLAN NOTE
EOSINOPHILIC PNEUMONIA vs FUNGAL PNEUMONIA  - CT with worsening ground glass opacities with mucus plugging  - Prior respiratory cultures with multi resistant pseudomonas and staph aureus  - Covid and Flu negative   - Pulmonolgy consulted   -- s/p bronch on 10/13  -- Follow up bronch studies in next 2 weeks as outpt  -- SLP consult to evaluate for aspiration   - ID consulted  -- Respiratory culture with multiple epithelial cells, thus canceled x2.  -- Blood cultures NGTD  -- Viral respiratory panel negative  -- Cryptococcal antigen negative  -- Toxoplasma DNA pending  -- Continue meropenem and doxycycline    10/14- Bronchoscopy done yesterday and studies pending. Will take weeks. dc rosemary, doxy and continue  Prednisone until f/u w pulmonary.  pt is still hypoxic over baseline, on 2 liters per NC today.   -   10/15- Complete levofl and doxy on 10/17. Continue pred 80 mg Until f/u.   Bactrim for PJP prophylaxis  - dc to home if oxygenation stable. Six minute walk test.

## 2023-10-15 NOTE — SUBJECTIVE & OBJECTIVE
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   HENT:  Negative for rhinorrhea and sore throat.    Respiratory:  Positive for cough and shortness of breath.    Cardiovascular:  Negative for chest pain and leg swelling.   Gastrointestinal:  Negative for abdominal pain, diarrhea, nausea and vomiting.   Genitourinary:  Negative for dysuria and hematuria.   Musculoskeletal:  Negative for arthralgias and myalgias.   Skin:  Negative for rash.   Neurological:  Negative for headaches.     Objective:     Vital Signs (Most Recent):  Temp: 97.3 °F (36.3 °C) (10/14/23 1959)  Pulse: 69 (10/14/23 1959)  Resp: 18 (10/14/23 2006)  BP: (!) 152/85 (10/14/23 1959)  SpO2: (!) 94 % (10/14/23 1959) Vital Signs (24h Range):  Temp:  [96.5 °F (35.8 °C)-97.8 °F (36.6 °C)] 97.3 °F (36.3 °C)  Pulse:  [62-89] 69  Resp:  [16-20] 18  SpO2:  [94 %-98 %] 94 %  BP: (123-152)/(71-85) 152/85     Weight: 78.9 kg (174 lb)  Body mass index is 27.25 kg/m².    Estimated Creatinine Clearance: 94.7 mL/min (based on SCr of 0.7 mg/dL).     Physical Exam  Vitals reviewed.   Constitutional:       General: She is not in acute distress.     Appearance: She is well-developed. She is not diaphoretic.   HENT:      Head: Normocephalic and atraumatic.      Nose: Nose normal.   Eyes:      Conjunctiva/sclera: Conjunctivae normal.   Pulmonary:      Effort: Pulmonary effort is normal. No respiratory distress.      Breath sounds: Wheezing and rhonchi present.   Abdominal:      General: Abdomen is flat. There is no distension.   Musculoskeletal:      Cervical back: Normal range of motion.      Right lower leg: No edema.      Left lower leg: No edema.   Skin:      General: Skin is warm and dry.      Findings: No erythema or rash.   Neurological:      Mental Status: She is alert.   Psychiatric:         Behavior: Behavior normal.          Significant Labs: All pertinent labs within the past 24 hours have been reviewed.    Significant Imaging: I have reviewed all pertinent imaging results/findings within the past 24 hours.

## 2023-10-15 NOTE — PROGRESS NOTES

## 2023-10-15 NOTE — ASSESSMENT & PLAN NOTE
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     Recommendations:  - Okay to complete 7 day course of antibiotics for pneumonia, switch to levoflox 750 mg PO qdaily, continue doxycycline 100 mg PO BID, last day 10/17/2023  - Follow-up evaluating for atypical pneumonia, fungal pneumonia  - Follow-up Aspergillus IgE  - Will arrange for follow-up with Dr. Saini in [place]

## 2023-10-16 LAB
A FUMIGATUS IGE QN: <0.1 KU/L
ANCA AB TITR SER IF: NORMAL TITER
BACTERIA BLD CULT: NORMAL
BACTERIA BLD CULT: NORMAL
C IMMITIS AB SER QL IA: NEGATIVE
CHICKEN CLASS: NORMAL
CHICKEN IGE QN: <0.1 KU/L
CMV DNA SPEC QL NAA+PROBE: NORMAL
CYTOMEGALOVIRUS PCR, QUANT: NOT DETECTED IU/ML
DEPRECATED A FUMIGATUS IGE RAST QL: NORMAL
GALACTOMANNAN AG SERPL IA-ACNC: <0.5 INDEX
GALACTOMANNAN AG SPEC-ACNC: <0.5 INDEX
H CAPSUL AG UR-MCNC: NOT DETECTED NG/ML
HISTOPLASMA ANTIGEN URINE: NOT DETECTED
L PNEUMO AG UR QL IA: NEGATIVE
P-ANCA TITR SER IF: NORMAL TITER
T GONDII IGM SER-ACNC: <3 AU/ML

## 2023-10-16 NOTE — ANESTHESIA POSTPROCEDURE EVALUATION
Anesthesia Post Evaluation    Patient: Jaylin Murguia    Procedure(s) Performed: Procedure(s) (LRB):  BRONCHOSCOPY, WITH FLUOROSCOPY (N/A)    Final Anesthesia Type: general      Patient location during evaluation: PACU  Patient participation: Yes- Able to Participate  Level of consciousness: awake and alert  Post-procedure vital signs: reviewed and stable  Pain management: adequate  Airway patency: patent    PONV status at discharge: No PONV  Anesthetic complications: no      Cardiovascular status: blood pressure returned to baseline  Respiratory status: unassisted  Hydration status: euvolemic  Follow-up not needed.          Vitals Value Taken Time   /87 10/15/23 1144   Temp 36.8 °C (98.2 °F) 10/15/23 1144   Pulse 67 10/15/23 1332   Resp 19 10/15/23 1332   SpO2 98 % 10/15/23 1500         Event Time   Out of Recovery 10/13/2023 16:45:00         Pain/Ramay Score: No data recorded

## 2023-10-16 NOTE — PROGRESS NOTES
Pt is discharging discharge paperwork reviewed with pt and her  both state their understanding, IV access removed, pt left [facility] in facility wheelchair propelled by in house transport. Medications were picked up at the pharmacy by the  portable O2 delivered to the bedside. Pt was instructed to use the O2 continuously at 2 LPM NC. She stated her understanding.

## 2023-10-17 ENCOUNTER — TELEPHONE (OUTPATIENT)
Dept: ALLERGY | Facility: CLINIC | Age: 58
End: 2023-10-17
Payer: COMMERCIAL

## 2023-10-17 LAB
ASPERGILLUS AB SER QL ID: NOT DETECTED
B DERMAT AB SER QL ID: NOT DETECTED
C IMMITIS AB SER QL ID: NOT DETECTED
COMMENT: ABNORMAL
FINAL PATHOLOGIC DIAGNOSIS: ABNORMAL
H CAPSUL AB SER QL ID: NOT DETECTED
Lab: ABNORMAL
MICROSCOPIC EXAM: ABNORMAL
MYELOPEROXIDASE AB SER-ACNC: 9 U/ML

## 2023-10-17 NOTE — TELEPHONE ENCOUNTER
----- Message from Alida Pandya sent at 10/17/2023 10:00 AM CDT -----  Regarding: pt adivce  Contact: [phone]  Jamee calling [facility] regards to medication Hizentra ,needing most recent office notes and labs. Pls feng     FAX [phone]

## 2023-10-17 NOTE — TELEPHONE ENCOUNTER
Good morning ,    Kindly note I rang [employer] - [phone] and spoke with Josh to inform him I have faxed the notes Jamee requested.    Josh says he will take care of this.    Kind Regards  Shiva Neves MA        ----- Message from Alida Pandya sent at 10/17/2023 10:00 AM CDT -----  Regarding: pt adivce  Contact: [phone]  Jamee calling from [employer] in regards to medication Hizentra ,needing most recent office notes and labs. Pls feng     FAX [phone]

## 2023-10-18 ENCOUNTER — PATIENT MESSAGE (OUTPATIENT)
Dept: PULMONOLOGY | Facility: CLINIC | Age: 58
End: 2023-10-18
Payer: COMMERCIAL

## 2023-10-18 LAB
BACTERIA SPEC AEROBE CULT: ABNORMAL
BACTERIA SPEC AEROBE CULT: ABNORMAL
GRAM STN SPEC: ABNORMAL
GRAM STN SPEC: ABNORMAL
T GONDII IGG SER QL IA: NORMAL
T GONDII IGG SERPL IA-ACNC: <5 IU/ML (ref 0–6.4)

## 2023-10-19 ENCOUNTER — PATIENT OUTREACH (OUTPATIENT)
Dept: ADMINISTRATIVE | Facility: CLINIC | Age: 58
End: 2023-10-19
Payer: COMMERCIAL

## 2023-10-19 LAB
T GONDII DNA SPEC QL NAA+PROBE: NOT DETECTED
TOXOPLASMA GONDII DNA SOURCE: NORMAL

## 2023-10-19 NOTE — PROGRESS NOTES
C3 nurse spoke with Jaylin Murguia  for a TCC post hospital discharge follow up call. The patient does not have a scheduled HOSFU appointment with Esthela Hu MD  within 5-7 days post hospital discharge date 10/15/23. C3 nurse was unable to schedule HOSFU appointment in [facility].    Message sent to PCP staff requesting they contact patient and schedule follow up appointment.

## 2023-10-20 ENCOUNTER — PATIENT MESSAGE (OUTPATIENT)
Dept: PULMONOLOGY | Facility: CLINIC | Age: 58
End: 2023-10-20
Payer: COMMERCIAL

## 2023-10-20 DIAGNOSIS — Z91.89 AT RISK FOR SIDE EFFECT OF MEDICATION: Primary | ICD-10-CM

## 2023-10-24 ENCOUNTER — PATIENT MESSAGE (OUTPATIENT)
Dept: PULMONOLOGY | Facility: CLINIC | Age: 58
End: 2023-10-24
Payer: COMMERCIAL

## 2023-10-24 DIAGNOSIS — A49.8 INFECTION DUE TO STENOTROPHOMONAS MALTOPHILIA: Primary | ICD-10-CM

## 2023-10-24 RX ORDER — SULFAMETHOXAZOLE AND TRIMETHOPRIM 800; 160 MG/1; MG/1
2 TABLET ORAL 2 TIMES DAILY
Qty: 40 TABLET | Refills: 0 | Status: SHIPPED | OUTPATIENT
Start: 2023-10-24 | End: 2023-11-03

## 2023-10-26 ENCOUNTER — TELEMEDICINE (OUTPATIENT)
Dept: PRIMARY CARE CLINIC | Facility: CLINIC | Age: 58
End: 2023-10-26
Payer: COMMERCIAL

## 2023-10-26 DIAGNOSIS — M79.7 FIBROMYALGIA: ICD-10-CM

## 2023-10-26 DIAGNOSIS — Z00.00 ROUTINE GENERAL MEDICAL EXAMINATION AT A HEALTH CARE FACILITY: ICD-10-CM

## 2023-10-26 DIAGNOSIS — K50.00 CROHN'S DISEASE OF SMALL INTESTINE WITHOUT COMPLICATION: ICD-10-CM

## 2023-10-26 DIAGNOSIS — G43.809 OTHER MIGRAINE WITHOUT STATUS MIGRAINOSUS, NOT INTRACTABLE: ICD-10-CM

## 2023-10-26 DIAGNOSIS — D84.9 IMMUNOSUPPRESSION: ICD-10-CM

## 2023-10-26 DIAGNOSIS — Z79.60 LONG-TERM USE OF IMMUNOSUPPRESSANT MEDICATION: ICD-10-CM

## 2023-10-26 DIAGNOSIS — J82.81: Primary | ICD-10-CM

## 2023-10-26 NOTE — PROGRESS NOTES
Subjective:      Patient ID: Jaylin Murguia is a 58 y.o. female.    Chief Complaint: No chief complaint on file.    The patient location is: home  Visit type: video and audio simultaneous    Transitional Care Note    Family and/or Caretaker present at visit?  No.  Diagnostic tests reviewed/disposition: I have reviewed all completed as well as pending diagnostic tests at the time of discharge.  Disease/illness education: PNA  Home health/community services discussion/referrals: Patient does not have home health established from hospital visit.  They do not need home health.  If needed, we will set up home health for the patient. Was sent home with home oxygen.   Establishment or re-establishment of referral orders for community resources: No other necessary community resources.   Discussion with other health care providers: No discussion with other health care providers necessary.     Dr Riley is pulmonary MD.   Dr. Molina did bronchoscopy.   Infection will go away but Eosinophilic Pna will be chronic for her.   Dr. Roger for ID.   Has to determine what amt of steroids can keep her from having recurrent infections.   Allergist: Milan Bender MD              [facility] Medicine  Discharge Summary        Patient Name: Jaylin Murguia  MRN: [id number]  JOSÉ: [id number]  Patient Class: IP- Inpatient  Admission Date: 10/11/2023  Hospital Length of Stay: 4 days  Discharge Date and Time: No discharge date for patient encounter.  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 Patient has been treated since September 27th with cefadroxil levofloxacin due to acute on chronic sinusitis and pneumonia diagnosed on CT.  Patient noted to have significant mucus plugging.  Patient's symptoms have failed to improve.  She presents with progressive shortness of breath and episodes of difficulty breathing overnight.  She reports decreased activity tolerance for this.  She denies richard orthopnea or extremity swelling.  She has no chest pain.  She denies fever chills.  She reports compliance with her medications.     In the ED patient afebrile and hemodynamically stable saturating in mid 90's on room air at rest. CT with worsening ground glass opacities. Patient started on broad spectrum abx and admitted to the care of medicine for further evaluation and management.         Chart review:   CT 10/11 - Patchy bilateral ground-glass pulmonary airspace opacities have increased since 09/27/2023.  Although the differential diagnosis is extensive, a leading consideration is likely COVID-19 pneumonia.  Correlate clinically, and with follow-up chest CT within 3-6 months to help further exclude other underlying pathology, such as carcinoma in-situ/bronchioloalveolar carcinoma.     Mildly prominent/enlarged mediastinal lymph nodes, similar to the comparison scan and favored to represent reactive lymphadenopathy.  The follow-up chest CT recommended above could also help further characterize this, and help exclude underlying neoplasm or lymphoproliferative disorder.     Some predominantly bibasilar, bronchial occlusions remain and are most likely related to mucous plugging.  Follow-up imaging again recommended, as above, to help further exclude underlying neoplasm.           Procedure(s) (LRB):  BRONCHOSCOPY, WITH FLUOROSCOPY (N/A)       Hospital Course:   Patient will unremarkable initial infectious workup.   Pulmonary consulted. Noted exposure to chickens. Immunocompromised. Underwent bronchoscopy with pulmonology on 10/13.  Lavage and biopsies pending. Infectious Disease following for antibiotic management.     10/14- /78  VSS. On meropenum, doxy and pred 80. Cxr- patchy pneumonia. Covid neg.  Willl chat w Pulm to clarify recs.  EOSINOPHILIC PNEUMONIA.  pt is still hypoxic on 2 liters. Continue pred until f/u, bactrim for PJP prophylaxis, stopping the other antibiotics.  Will dc if oxygenation stable. Pt will likely need home oxygen.     10/15-- Suspect EOSINOPHILC PNEUMONIA vs. FUNGAL PNEUMONIA.  Bonch results pending  -complete 7 day course of antibiotics for pneumonia, switch to levoflox 750 mg PO qdaily, continue doxycycline 100 mg PO BID, last day 10/17/2023.  follow-up with Dr. Saini in ID, and Pulmonary Dr. Molina.   -six min walk test.   Bactrim for PJP prophylaxis with steroids until f/u  Pulm. Will taper after f/u.            Goals of Care Treatment Preferences:  Code Status: Full Code        Consults:   Consults (From admission, onward)           Status Ordering Provider        [facility] Medicine PharmD Consult  Once       Provider:  (Not yet assigned)   Ordered THIERNO FISCHER        Inpatient consult to Pulmonology  Once       Provider:  (Not yet assigned)   Completed DARLING MORENO        Inpatient consult to Infectious Diseases  Once       Provider:  (Not yet assigned)   Completed DARLING MORENO              Pulmonary  * Idiopathic chronic eosinophilic pneumonia  - Daily LABA-ICS  - Duonebs prn  - Pulm toilet      Respiratory infection-resolved as of 10/15/2023  See above        Multifocal pneumonia-resolved as of 10/15/2023  EOSINOPHILIC PNEUMONIA vs FUNGAL PNEUMONIA  - CT with worsening ground glass opacities with mucus plugging  - Prior respiratory cultures with multi resistant pseudomonas and staph aureus  - Covid and Flu negative   - Pulmonolgy consulted   -- s/p bronch on 10/13  --  Follow up bronch studies in next 2 weeks as outpt  -- SLP consult to evaluate for aspiration   - ID consulted  -- Respiratory culture with multiple epithelial cells, thus canceled x2.  -- Blood cultures NGTD  -- Viral respiratory panel negative  -- Cryptococcal antigen negative  -- Toxoplasma DNA pending  -- Continue meropenem and doxycycline     10/14- Bronchoscopy done yesterday and studies pending. Will take weeks. dc rosemary, doxy and continue  Prednisone until f/u w pulmonary.  pt is still hypoxic over baseline, on 2 liters per NC today.   -   10/15- Complete levofl and doxy on 10/17. Continue pred 80 mg Until f/u.   Bactrim for PJP prophylaxis  - dc to home if oxygenation stable. Six minute walk test.      Cardiac/Vascular  Essential hypertension  - Continue home losartan        ID  Thrush  - ID consulted  -- Fluconazole 200mg daily, duration based on progress  -- Nystatin suspension 500,000 units (5 mL) swish & swallow qid     GI  Crohn's disease of small intestine  - Taking skyrizi; hold for now given current infection  -pt manages her diarrhea w lomotil     Other  Insomnia due to medical condition  - Continue home meds        Final Active Diagnoses:    Diagnosis Date Noted POA  · PRINCIPAL PROBLEM:  Idiopathic chronic eosinophilic pneumonia [J82.81] 10/16/2019 Yes  · Thrush [B37.0] 10/12/2023 Yes      Chronic  · Crohn's disease of small intestine [K50.00]   Yes  · Insomnia due to medical condition [G47.01] 10/20/2016 Yes  · Essential hypertension [I10] 07/30/2013 Yes     Problems Resolved During this Admission:    Diagnosis Date Noted Date Resolved POA  · Multifocal pneumonia [J18.9] 10/11/2023 10/15/2023 Yes  · Respiratory infection [J98.8] 10/12/2023 10/15/2023 Yes      Chronic        Discharged Condition: good     Disposition: Home or Self Care     Follow Up:      Follow-up Information            Ivan Riley MD Follow up in 2 week(s).   Specialties: Pulmonary Disease, Critical Care Medicine  Contact  information:  [address]  [facility] [address]  [phone]                  No questionnaires on file.    Pmh, Psh, Family Hx, Social Hx updated in Epic Tabs today.     LABS:   Lab Results   Component Value Date    WBC 4.21 10/14/2023    HGB 12.6 10/14/2023    HCT 39.5 10/14/2023     10/14/2023    CHOL 172 12/06/2021    TRIG 71 12/06/2021    HDL 73 12/06/2021    ALT 15 10/14/2023    AST 19 10/14/2023     10/14/2023    K 3.5 10/14/2023     (H) 10/14/2023    CREATININE 0.7 10/14/2023    BUN 12 10/14/2023    CO2 19 (L) 10/14/2023    TSH 0.887 09/02/2021    INR 1.0 11/20/2014    HGBA1C 5.3 09/02/2021       X-Ray Chest AP Portable  Narrative: EXAMINATION:  XR CHEST AP PORTABLE    CLINICAL HISTORY:  post transbronchial biopsy;    TECHNIQUE:  Single frontal view of the chest was performed.    COMPARISON:  Chest CT 10/11/2023    FINDINGS:  Cardiomediastinal silhouettewithin normal limits for age.    Extensive patchy bilateral lung opacities, right greater than left redemonstrated.  No pneumothorax is appreciated.    Gaseous distension of the stomach incidentally noted.  Impression: No evidence of complication following lung biopsy    Multifocal pulmonary opacities appear worse than on the recent CT.    Electronically signed by: Cali Frausto Jr  Date:    10/14/2023  Time:    09:53        Review of Systems   Constitutional:  Negative for activity change, appetite change, fatigue and unexpected weight change.   Respiratory:  Positive for cough. Negative for shortness of breath.    Cardiovascular:  Negative for chest pain and palpitations.   Gastrointestinal:  Negative for abdominal distention and abdominal pain.   Psychiatric/Behavioral:  Negative for dysphoric mood. The patient is not nervous/anxious.      Objective:   There were no vitals filed for this visit.  Wt Readings from Last 10 Encounters:   10/13/23 78.9 kg (174 lb)   09/25/23 79.5 kg (175 lb 4.3 oz)   08/28/23 79.4 kg (175 lb)    07/31/23 79.7 kg (175 lb 11.3 oz)   06/15/23 80.8 kg (178 lb 2.1 oz)   04/24/23 80.8 kg (178 lb 2.1 oz)   04/21/23 80.7 kg (177 lb 14.6 oz)   03/20/23 79.2 kg (174 lb 9.7 oz)   03/15/23 80 kg (176 lb 5.9 oz)   02/07/23 78 kg (172 lb)     Physical Exam  Vitals reviewed.   Constitutional:       General: She is awake. She is not in acute distress.     Appearance: Normal appearance. She is well-developed and well-groomed. She is obese. She is not ill-appearing.   HENT:      Head: Normocephalic and atraumatic.      Right Ear: External ear normal.      Left Ear: External ear normal.      Nose: Nose normal.      Mouth/Throat:      Lips: Pink.   Eyes:      Conjunctiva/sclera: Conjunctivae normal.   Pulmonary:      Effort: Pulmonary effort is normal.   Neurological:      Mental Status: She is alert.   Psychiatric:         Attention and Perception: Attention and perception normal. She is attentive.         Mood and Affect: Mood and affect normal. Mood is not anxious or depressed. Affect is not labile, blunt, angry or inappropriate.         Speech: Speech normal. She is communicative. Speech is not rapid and pressured, delayed, slurred or tangential.         Behavior: Behavior normal. Behavior is not agitated, slowed, aggressive, withdrawn, hyperactive or combative. Behavior is cooperative.         Thought Content: Thought content normal. Thought content is not paranoid or delusional. Thought content does not include homicidal or suicidal ideation. Thought content does not include homicidal or suicidal plan.         Cognition and Memory: Cognition and memory normal. Memory is not impaired. She does not exhibit impaired recent memory or impaired remote memory.         Judgment: Judgment normal. Judgment is not impulsive or inappropriate.       Assessment:     1. Idiopathic chronic eosinophilic pneumonia    2. Immunosuppression    3. Long-term use of immunosuppressant medication    4. Crohn's disease of small intestine without  complication    5. Other migraine without status migrainosus, not intractable    6. Fibromyalgia    7. Routine general medical examination at a health care facility      Plan:   Diagnoses and all orders for this visit:    Idiopathic chronic eosinophilic pneumonia  Comments:  Improving, continue with Bactrim and steroids follow-up with Pulmonary and ID as scheduled    Immunosuppression  Comments:  On 60 mg prednisone follow-up with ID    Long-term use of immunosuppressant medication    Crohn's disease of small intestine without complication  Comments:  Stable at this time continue with follow-up    Other migraine without status migrainosus, not intractable  Comments:  Stable at this time continue with follow-up in December    Fibromyalgia  Comments:  Stable at this time continue with follow-up in December    Routine general medical examination at a health care facility  -     TSH; Future  -     T4, Free; Future  -     Lipid Panel; Future  -     Hemoglobin A1C; Future  -     Comprehensive Metabolic Panel; Future  -     CBC Auto Differential; Future  -     Microalbumin/Creatinine Ratio, Urine; Future  -     Insulin, Random; Future      Labs prior to Dec annual visit at [facility].     Face to Face time with patient: 1:04 PM-120pm          30 minutes of total time spent on the encounter, which includes face to face time and non-face to face time preparing to see the patient (eg, review of tests), Obtaining and/or reviewing separately obtained history, Documenting clinical information in the electronic or other health record, Independently interpreting results (not separately reported) and communicating results to the patient/family/caregiver, or Care coordination (not separately reported).     Each patient to whom he or she provides medical services by telemedicine is:  (1) informed of the relationship between the physician and patient and the respective role of any other health care provider with respect to  management of the patient; and (2) notified that he or she may decline to receive medical services by telemedicine and may withdraw from such care at any time.      No follow-ups on file.    There are no Patient Instructions on file for this visit.

## 2023-10-30 ENCOUNTER — PATIENT MESSAGE (OUTPATIENT)
Dept: PULMONOLOGY | Facility: CLINIC | Age: 58
End: 2023-10-30
Payer: COMMERCIAL

## 2023-11-01 ENCOUNTER — OFFICE VISIT (OUTPATIENT)
Dept: INFECTIOUS DISEASES | Facility: CLINIC | Age: 58
End: 2023-11-01
Payer: COMMERCIAL

## 2023-11-01 ENCOUNTER — PATIENT MESSAGE (OUTPATIENT)
Dept: GASTROENTEROLOGY | Facility: CLINIC | Age: 58
End: 2023-11-01
Payer: COMMERCIAL

## 2023-11-01 DIAGNOSIS — J82.81 EOSINOPHILIC PNEUMONIA: Primary | ICD-10-CM

## 2023-11-01 PROCEDURE — 1111F PR DISCHARGE MEDS RECONCILED W/ CURRENT OUTPATIENT MED LIST: ICD-10-PCS | Mod: CPTII,95,, | Performed by: INTERNAL MEDICINE

## 2023-11-01 PROCEDURE — 99214 PR OFFICE/OUTPT VISIT, EST, LEVL IV, 30-39 MIN: ICD-10-PCS | Mod: 95,,, | Performed by: INTERNAL MEDICINE

## 2023-11-01 PROCEDURE — 4010F PR ACE/ARB THEARPY RXD/TAKEN: ICD-10-PCS | Mod: CPTII,95,, | Performed by: INTERNAL MEDICINE

## 2023-11-01 PROCEDURE — 4010F ACE/ARB THERAPY RXD/TAKEN: CPT | Mod: CPTII,95,, | Performed by: INTERNAL MEDICINE

## 2023-11-01 PROCEDURE — 1111F DSCHRG MED/CURRENT MED MERGE: CPT | Mod: CPTII,95,, | Performed by: INTERNAL MEDICINE

## 2023-11-01 PROCEDURE — 99214 OFFICE O/P EST MOD 30 MIN: CPT | Mod: 95,,, | Performed by: INTERNAL MEDICINE

## 2023-11-01 NOTE — PROGRESS NOTES
Subjective:     Patient ID: Jaylin Murguia is a 58 y.o. female    Chief Complaint: pneumonia    HPI: 58F well known to me w/ hx of recurrent sinusitis and pneumonia who was recently admitted for hypoxic respiratory failure. She underwent bronchoscopy, with findings consistent w/ eosinophilic pneumonia. She was discharged on high dose prednisone.    Seen today for follow up. She states she is overall feeling better, but still has some cough and feels short of breath with activity. Is seeing pulmonary this week for follow up. She states her bactrim dose was increased to 2DS BID after discharge, and has been continued for 3 weeks. She is not on any other antibiotics right now. Currently taking 60mg prednisone daily.       Immunization History   Administered Date(s) Administered    COVID-19, MRNA, LN-S, PF (Pfizer) (Purple Cap) 03/05/2021, 04/15/2021    COVID-19, mRNA, LNP-S, bivalent booster, PF (PFIZER OMICRON) 01/13/2023    Hepatitis A / Hepatitis B 12/12/2019    Influenza 10/29/2013    Influenza - Quadrivalent - PF *Preferred* (6 months and older) 11/15/2017, 01/28/2020, 02/11/2021, 10/15/2023    Influenza Split 10/29/2013    PPD Test 05/22/2017    Pneumococcal Conjugate - 13 Valent 12/23/2019    Pneumococcal Polysaccharide - 23 Valent 10/29/2013    Tdap 02/18/2014        Review of Systems   All other systems reviewed and are negative.       Past Medical History:   Diagnosis Date    Allergic rhinitis     Asthma     Chronic pansinusitis     Crohn's disease     Ileal involvement, previously on Remicade, Asacol, Prednisone    Fibromyalgia     Hyperlipidemia     Hypertension     Immunosuppression 20020    Migraine     Obstructive sleep apnea     CPAP at night    Sciatica      Past Surgical History:   Procedure Laterality Date    BLADDER SURGERY      sling was created by her muscles     BRONCHOSCOPY N/A 03/23/2023    Procedure: BRONCHOSCOPY;  Surgeon: [facility] Diagnostic Provider;  Location: [facility] OR [address] [place] [address];  Service:  Anesthesiology;  Laterality: N/A;    BRONCHOSCOPY WITH FLUOROSCOPY N/A 10/13/2023    Procedure: BRONCHOSCOPY, WITH FLUOROSCOPY;  Surgeon: Mary Molina MD;  Location: [facility] OR 2ND FLR;  Service: Pulmonary;  Laterality: N/A;     SECTION      COLONOSCOPY N/A 2017    Procedure: COLONOSCOPY;  Surgeon: Kin Dyer MD;  Location: [facility];  Service: Endoscopy;  Laterality: N/A;    COLONOSCOPY N/A 2018    Procedure: COLONOSCOPY;  Surgeon: Kyra Vallecillo MD;  Location: [facility];  Service: Endoscopy;  Laterality: N/A;    COLONOSCOPY N/A 2020    Procedure: COLONOSCOPY;  Surgeon: Nicole Leal MD;  Location: [facility];  Service: Endoscopy;  Laterality: N/A;    COLONOSCOPY N/A 2022    Procedure: COLONOSCOPY;  Surgeon: Shay Bruce MD;  Location: [facility] [place] [place];  Service: Endoscopy;  Laterality: N/A;    COLONOSCOPY N/A 2023    Procedure: COLONOSCOPY;  Surgeon: Nicole Leal MD;  Location: [facility];  Service: Endoscopy;  Laterality: N/A;    DEBRIDEMENT Bilateral 2020    Procedure: DEBRIDEMENT;  Surgeon: Matthias Roach MD;  Location: [facility] OR 2ND FLR;  Service: ENT;  Laterality: Bilateral;    ESOPHAGOGASTRODUODENOSCOPY N/A 2020    Procedure: ESOPHAGOGASTRODUODENOSCOPY (EGD);  Surgeon: Nicole Leal MD;  Location: [facility];  Service: Endoscopy;  Laterality: N/A;    ESOPHAGOGASTRODUODENOSCOPY N/A 2022    Procedure: EGD (ESOPHAGOGASTRODUODENOSCOPY);  Surgeon: Shay Bruce MD;  Location: [facility] [place] [place];  Service: Endoscopy;  Laterality: N/A;    ESOPHAGOGASTRODUODENOSCOPY N/A 2023    Procedure: EGD (ESOPHAGOGASTRODUODENOSCOPY);  Surgeon: Nicole Leal MD;  Location: [facility];  Service: Endoscopy;  Laterality: N/A;    FINGER SURGERY      joint relpacement, left hand index finger    FUNCTIONAL ENDOSCOPIC SINUS SURGERY (FESS) USING COMPUTER-ASSISTED NAVIGATION Bilateral 2019    Procedure: FESS, USING COMPUTER-ASSISTED  NAVIGATION;  Surgeon: Manish Shaffer MD;  Location: [facility] OR;  Service: ENT;  Laterality: Bilateral;    FUNCTIONAL ENDOSCOPIC SINUS SURGERY (FESS) USING COMPUTER-ASSISTED NAVIGATION Bilateral 09/25/2020    Procedure: FESS, USING COMPUTER-ASSISTED NAVIGATION SPHENOID;  Surgeon: Matthias Roach MD;  Location: [facility] OR 2ND FLR;  Service: ENT;  Laterality: Bilateral;  TIVA    FUNCTIONAL ENDOSCOPIC SINUS SURGERY (FESS) USING COMPUTER-ASSISTED NAVIGATION Bilateral 09/30/2022    Procedure: FESS, USING COMPUTER-ASSISTED NAVIGATION;  Surgeon: Matthias Roach MD;  Location: [facility] OR 2ND FLR;  Service: ENT;  Laterality: Bilateral;    HYSTERECTOMY  2001    INTRALUMINAL GASTROINTESTINAL TRACT IMAGING VIA CAPSULE N/A 03/03/2023    Procedure: IMAGING PROCEDURE, GI TRACT, INTRALUMINAL, VIA CAPSULE;  Surgeon: First Available [place];  Location: [facility] ENDO;  Service: Endoscopy;  Laterality: N/A;    SINUS SURGERY      WISDOM TOOTH EXTRACTION       Family History   Problem Relation Age of Onset    Breast cancer Mother     Hypertension Mother     Allergies Mother     Kidney disease Father 64        ESRD on HD    Scleroderma Father     Breast cancer Maternal Grandmother     Heart attack Maternal Grandmother     COPD Maternal Grandmother 72    Cancer Paternal Grandmother 70        colon    Hypertension Brother      Social History     Tobacco Use    Smoking status: Never     Passive exposure: Never    Smokeless tobacco: Never   Substance Use Topics    Alcohol use: No    Drug use: No       Objective:     Physical Exam  Constitutional:       General: She is not in acute distress.     Appearance: Normal appearance. She is well-developed. She is not ill-appearing or diaphoretic.   HENT:      Head: Normocephalic and atraumatic.      Right Ear: External ear normal.      Left Ear: External ear normal.      Nose: Nose normal.   Eyes:      General: No scleral icterus.        Right eye: No discharge.         Left eye: No discharge.      Extraocular  Movements: Extraocular movements intact.      Conjunctiva/sclera: Conjunctivae normal.   Pulmonary:      Effort: Pulmonary effort is normal. No respiratory distress.      Breath sounds: No stridor.   Musculoskeletal:         General: Normal range of motion.   Skin:     Findings: No erythema or rash.   Neurological:      General: No focal deficit present.      Mental Status: She is alert and oriented to person, place, and time. Mental status is at baseline.      Cranial Nerves: No cranial nerve deficit.   Psychiatric:         Mood and Affect: Mood normal.         Behavior: Behavior normal.         Thought Content: Thought content normal.         Judgment: Judgment normal.         Data:    All data, including recent labs, radiology, and pathology, has been independently reviewed.    Labs:    Recent Labs   Lab Result Units 10/11/23  1546 10/12/23  0356 10/13/23  0523 10/14/23  0653   WBC K/uL 12.36 8.53 8.19 4.21   Hemoglobin g/dL 13.9 13.1 13.0 12.6   Hematocrit % 42.0 39.7 40.5 39.5   Sodium mmol/L 136 141 141 141   Potassium mmol/L 3.4* 3.7 3.2* 3.5   Chloride mmol/L 108 113* 112* 113*   BUN mg/dL 11 10 11 12   Creatinine mg/dL 0.8 0.8 0.8 0.7   AST U/L 24 20 25 19   ALT U/L 17 14 23 15   Alkaline Phosphatase U/L 101 89 98 96   Total Bilirubin mg/dL 0.4 0.4 0.4 0.2   HIV 1/2 Ag/Ab  Non-reactive  --   --   --         Radiology:    No results found in the last 24 hours.     Assessment:    1. Eosinophilic pneumonia  CBC Auto Differential    Comprehensive Metabolic Panel    Fungitell Assay For (1.3)-B-D-Glucans  Will repeat labs this week   Decrease bactrim to 1DS daily for PJP ppx          Follow up in 1 weeks - will call patient w/ lab results    The total time for evaluation and management services performed on 11/1/23 was greater than 30 minutes.     Daysi Saini DO  Transplant Infectious Disease

## 2023-11-03 ENCOUNTER — LAB VISIT (OUTPATIENT)
Dept: LAB | Facility: HOSPITAL | Age: 58
End: 2023-11-03
Attending: INTERNAL MEDICINE
Payer: COMMERCIAL

## 2023-11-03 ENCOUNTER — HOSPITAL ENCOUNTER (OUTPATIENT)
Dept: PULMONOLOGY | Facility: CLINIC | Age: 58
Discharge: HOME | End: 2023-11-03
Payer: COMMERCIAL

## 2023-11-03 ENCOUNTER — OFFICE VISIT (OUTPATIENT)
Dept: PULMONOLOGY | Facility: CLINIC | Age: 58
End: 2023-11-03
Payer: COMMERCIAL

## 2023-11-03 VITALS
SYSTOLIC BLOOD PRESSURE: 134 MMHG | OXYGEN SATURATION: 99 % | BODY MASS INDEX: 27.61 KG/M2 | HEART RATE: 75 BPM | DIASTOLIC BLOOD PRESSURE: 82 MMHG | HEIGHT: 67 IN | WEIGHT: 175.94 LBS

## 2023-11-03 VITALS — HEIGHT: 67 IN | BODY MASS INDEX: 27.61 KG/M2 | WEIGHT: 175.94 LBS

## 2023-11-03 DIAGNOSIS — J96.01 ACUTE HYPOXEMIC RESPIRATORY FAILURE: ICD-10-CM

## 2023-11-03 DIAGNOSIS — J45.50 SEVERE PERSISTENT ASTHMA WITHOUT COMPLICATION: Primary | ICD-10-CM

## 2023-11-03 DIAGNOSIS — J45.50 SEVERE PERSISTENT ASTHMA WITHOUT COMPLICATION: ICD-10-CM

## 2023-11-03 DIAGNOSIS — J82.81 EOSINOPHILIC PNEUMONIA: ICD-10-CM

## 2023-11-03 DIAGNOSIS — D72.19 OTHER EOSINOPHILIA: ICD-10-CM

## 2023-11-03 LAB
ALBUMIN SERPL BCP-MCNC: 3.1 G/DL (ref 3.5–5.2)
ALP SERPL-CCNC: 54 U/L (ref 55–135)
ALT SERPL W/O P-5'-P-CCNC: 55 U/L (ref 10–44)
ANION GAP SERPL CALC-SCNC: 7 MMOL/L (ref 8–16)
AST SERPL-CCNC: 30 U/L (ref 10–40)
BASOPHILS # BLD AUTO: 0.02 K/UL (ref 0–0.2)
BASOPHILS NFR BLD: 0.2 % (ref 0–1.9)
BILIRUB SERPL-MCNC: 0.2 MG/DL (ref 0.1–1)
BUN SERPL-MCNC: 17 MG/DL (ref 6–20)
CALCIUM SERPL-MCNC: 8.8 MG/DL (ref 8.7–10.5)
CHLORIDE SERPL-SCNC: 103 MMOL/L (ref 95–110)
CO2 SERPL-SCNC: 23 MMOL/L (ref 23–29)
CREAT SERPL-MCNC: 1.1 MG/DL (ref 0.5–1.4)
DIFFERENTIAL METHOD: ABNORMAL
DLCO ADJ PRE: 13.55 ML/(MIN*MMHG) (ref 19.17–30.63)
DLCO SINGLE BREATH LLN: 19.17
DLCO SINGLE BREATH PRE REF: 53 %
DLCO SINGLE BREATH REF: 24.9
DLCOC SBVA LLN: 3.23
DLCOC SBVA PRE REF: 70.5 %
DLCOC SBVA REF: 4.58
DLCOC SINGLE BREATH LLN: 19.17
DLCOC SINGLE BREATH PRE REF: 54.4 %
DLCOC SINGLE BREATH REF: 24.9
DLCOCSBVAULN: 5.92
DLCOCSINGLEBREATHULN: 30.63
DLCOSINGLEBREATHULN: 30.63
DLCOVA LLN: 3.23
DLCOVA PRE REF: 68.7 %
DLCOVA PRE: 3.14 ML/(MIN*MMHG*L) (ref 3.23–5.92)
DLCOVA REF: 4.58
DLCOVAULN: 5.92
DLVAADJ PRE: 3.23 ML/(MIN*MMHG*L) (ref 3.23–5.92)
EOSINOPHIL # BLD AUTO: 0.1 K/UL (ref 0–0.5)
EOSINOPHIL NFR BLD: 1.1 % (ref 0–8)
ERV LLN: -16449.14
ERV PRE REF: 33.2 %
ERV REF: 0.86
ERVULN: ABNORMAL
ERYTHROCYTE [DISTWIDTH] IN BLOOD BY AUTOMATED COUNT: 12.1 % (ref 11.5–14.5)
EST. GFR  (NO RACE VARIABLE): 58.2 ML/MIN/1.73 M^2
FEF 25 75 LLN: 1.29
FEF 25 75 PRE REF: 77.6 %
FEF 25 75 REF: 2.48
FET100 CHG: 1.4 %
FEV05 LLN: 1.16
FEV05 REF: 2.02
FEV1 CHG: 7.2 %
FEV1 FVC LLN: 67
FEV1 FVC PRE REF: 97.8 %
FEV1 FVC REF: 79
FEV1 LLN: 2.12
FEV1 PRE REF: 82 %
FEV1 REF: 2.79
FEV1 VOL CHG: 0.16
FRCPLETH LLN: 2.05
FRCPLETH PREREF: 79.3 %
FRCPLETH REF: 2.87
FRCPLETHULN: 3.69
FVC CHG: 1.5 %
FVC LLN: 2.71
FVC PRE REF: 83.2 %
FVC REF: 3.56
FVC VOL CHG: 0.04
GLUCOSE SERPL-MCNC: 75 MG/DL (ref 70–110)
HCT VFR BLD AUTO: 39.2 % (ref 37–48.5)
HGB BLD-MCNC: 12.4 G/DL (ref 12–16)
IMM GRANULOCYTES # BLD AUTO: 0.02 K/UL (ref 0–0.04)
IMM GRANULOCYTES NFR BLD AUTO: 0.2 % (ref 0–0.5)
IVC PRE: 2.84 L (ref 2.71–4.44)
IVC SINGLE BREATH LLN: 2.71
IVC SINGLE BREATH PRE REF: 80 %
IVC SINGLE BREATH REF: 3.56
IVCSINGLEBREATHULN: 4.44
LLN IC: -16447.6
LYMPHOCYTES # BLD AUTO: 4 K/UL (ref 1–4.8)
LYMPHOCYTES NFR BLD: 45.4 % (ref 18–48)
MCH RBC QN AUTO: 30.2 PG (ref 27–31)
MCHC RBC AUTO-ENTMCNC: 31.6 G/DL (ref 32–36)
MCV RBC AUTO: 96 FL (ref 82–98)
MONOCYTES # BLD AUTO: 0.6 K/UL (ref 0.3–1)
MONOCYTES NFR BLD: 7.2 % (ref 4–15)
NEUTROPHILS # BLD AUTO: 4 K/UL (ref 1.8–7.7)
NEUTROPHILS NFR BLD: 45.9 % (ref 38–73)
NRBC BLD-RTO: 0 /100 WBC
PEF LLN: 5
PEF PRE REF: 105.4 %
PEF REF: 6.87
PHYSICIAN COMMENT: ABNORMAL
PLATELET # BLD AUTO: 270 K/UL (ref 150–450)
PMV BLD AUTO: 9.7 FL (ref 9.2–12.9)
POST FEF 25 75: 2.74 L/S (ref 1.29–4.08)
POST FET 100: 6.39 SEC
POST FEV1 FVC: 81.72 % (ref 67.28–89.16)
POST FEV1: 2.45 L (ref 2.12–3.44)
POST FEV5: 2.07 L (ref 1.16–2.87)
POST FVC: 3 L (ref 2.71–4.44)
POST PEF: 6.47 L/S (ref 5–8.75)
POTASSIUM SERPL-SCNC: 3.6 MMOL/L (ref 3.5–5.1)
PRE DLCO: 13.2 ML/(MIN*MMHG) (ref 19.17–30.63)
PRE ERV: 0.29 L (ref -16449.14–16450.86)
PRE FEF 25 75: 1.93 L/S (ref 1.29–4.08)
PRE FET 100: 6.3 SEC
PRE FEV05 REF: 93.9 %
PRE FEV1 FVC: 77.37 % (ref 67.28–89.16)
PRE FEV1: 2.29 L (ref 2.12–3.44)
PRE FEV5: 1.89 L (ref 1.16–2.87)
PRE FRC PL: 2.27 L (ref 2.05–3.69)
PRE FVC: 2.96 L (ref 2.71–4.44)
PRE IC: 2.67 L (ref -16447.6–16452.4)
PRE PEF: 7.24 L/S (ref 5–8.75)
PRE REF IC: 111.6 %
PRE RV: 1.99 L (ref 1.43–2.58)
PRE TLC: 4.95 L (ref 4.45–6.43)
PROT SERPL-MCNC: 6.7 G/DL (ref 6–8.4)
RAW PRE REF: 75.6 %
RAW PRE: 2.31 CMH2O*S/L (ref 3.06–3.06)
RAW REF: 3.06
RBC # BLD AUTO: 4.1 M/UL (ref 4–5.4)
REF IC: 2.4
RV LLN: 1.43
RV PRE REF: 99 %
RV REF: 2.01
RVTLC LLN: 29
RVTLC PRE REF: 103.9 %
RVTLC PRE: 40.18 % (ref 29.09–48.27)
RVTLC REF: 39
RVTLCULN: 48
RVULN: 2.58
SGAW PRE REF: 130.2 %
SGAW PRE: 0.13 1/(CMH2O*S) (ref 0.1–0.1)
SGAW REF: 0.1
SODIUM SERPL-SCNC: 133 MMOL/L (ref 136–145)
TLC LLN: 4.45
TLC PRE REF: 90.9 %
TLC REF: 5.44
TLC ULN: 6.43
ULN IC: ABNORMAL
VA PRE: 4.2 L (ref 5.29–5.29)
VA SINGLE BREATH LLN: 5.29
VA SINGLE BREATH PRE REF: 79.4 %
VA SINGLE BREATH REF: 5.29
VASINGLEBREATHULN: 5.29
VC LLN: 2.71
VC PRE REF: 83.2 %
VC PRE: 2.96 L (ref 2.71–4.44)
VC REF: 3.56
VC ULN: 4.44
WBC # BLD AUTO: 8.71 K/UL (ref 3.9–12.7)

## 2023-11-03 PROCEDURE — 87449 NOS EACH ORGANISM AG IA: CPT | Performed by: INTERNAL MEDICINE

## 2023-11-03 PROCEDURE — 3075F SYST BP GE 130 - 139MM HG: CPT | Mod: CPTII,S$GLB,, | Performed by: INTERNAL MEDICINE

## 2023-11-03 PROCEDURE — 4010F PR ACE/ARB THEARPY RXD/TAKEN: ICD-10-PCS | Mod: CPTII,S$GLB,, | Performed by: INTERNAL MEDICINE

## 2023-11-03 PROCEDURE — 1111F PR DISCHARGE MEDS RECONCILED W/ CURRENT OUTPATIENT MED LIST: ICD-10-PCS | Mod: CPTII,S$GLB,, | Performed by: INTERNAL MEDICINE

## 2023-11-03 PROCEDURE — 94060 PR EVAL OF BRONCHOSPASM: ICD-10-PCS | Mod: 59,S$GLB,, | Performed by: INTERNAL MEDICINE

## 2023-11-03 PROCEDURE — 99999 PR PBB SHADOW E&M-EST. PATIENT-LVL III: CPT | Mod: PBBFAC,,, | Performed by: INTERNAL MEDICINE

## 2023-11-03 PROCEDURE — 94726 PULM FUNCT TST PLETHYSMOGRAP: ICD-10-PCS | Mod: S$GLB,,, | Performed by: INTERNAL MEDICINE

## 2023-11-03 PROCEDURE — 3079F PR MOST RECENT DIASTOLIC BLOOD PRESSURE 80-89 MM HG: ICD-10-PCS | Mod: CPTII,S$GLB,, | Performed by: INTERNAL MEDICINE

## 2023-11-03 PROCEDURE — 85025 COMPLETE CBC W/AUTO DIFF WBC: CPT | Performed by: INTERNAL MEDICINE

## 2023-11-03 PROCEDURE — 94726 PLETHYSMOGRAPHY LUNG VOLUMES: CPT | Mod: S$GLB,,, | Performed by: INTERNAL MEDICINE

## 2023-11-03 PROCEDURE — 94618 PULMONARY STRESS TESTING: CPT | Mod: S$GLB,,, | Performed by: INTERNAL MEDICINE

## 2023-11-03 PROCEDURE — 36415 COLL VENOUS BLD VENIPUNCTURE: CPT | Performed by: INTERNAL MEDICINE

## 2023-11-03 PROCEDURE — 99214 PR OFFICE/OUTPT VISIT, EST, LEVL IV, 30-39 MIN: ICD-10-PCS | Mod: 25,S$GLB,, | Performed by: INTERNAL MEDICINE

## 2023-11-03 PROCEDURE — 3075F PR MOST RECENT SYSTOLIC BLOOD PRESS GE 130-139MM HG: ICD-10-PCS | Mod: CPTII,S$GLB,, | Performed by: INTERNAL MEDICINE

## 2023-11-03 PROCEDURE — 99214 OFFICE O/P EST MOD 30 MIN: CPT | Mod: 25,S$GLB,, | Performed by: INTERNAL MEDICINE

## 2023-11-03 PROCEDURE — 3008F PR BODY MASS INDEX (BMI) DOCUMENTED: ICD-10-PCS | Mod: CPTII,S$GLB,, | Performed by: INTERNAL MEDICINE

## 2023-11-03 PROCEDURE — 1111F DSCHRG MED/CURRENT MED MERGE: CPT | Mod: CPTII,S$GLB,, | Performed by: INTERNAL MEDICINE

## 2023-11-03 PROCEDURE — 3008F BODY MASS INDEX DOCD: CPT | Mod: CPTII,S$GLB,, | Performed by: INTERNAL MEDICINE

## 2023-11-03 PROCEDURE — 99999 PR PBB SHADOW E&M-EST. PATIENT-LVL III: ICD-10-PCS | Mod: PBBFAC,,, | Performed by: INTERNAL MEDICINE

## 2023-11-03 PROCEDURE — 94729 DIFFUSING CAPACITY: CPT | Mod: S$GLB,,, | Performed by: INTERNAL MEDICINE

## 2023-11-03 PROCEDURE — 94729 PR C02/MEMBANE DIFFUSE CAPACITY: ICD-10-PCS | Mod: S$GLB,,, | Performed by: INTERNAL MEDICINE

## 2023-11-03 PROCEDURE — 4010F ACE/ARB THERAPY RXD/TAKEN: CPT | Mod: CPTII,S$GLB,, | Performed by: INTERNAL MEDICINE

## 2023-11-03 PROCEDURE — 94618 PULMONARY STRESS TESTING: ICD-10-PCS | Mod: S$GLB,,, | Performed by: INTERNAL MEDICINE

## 2023-11-03 PROCEDURE — 94060 EVALUATION OF WHEEZING: CPT | Mod: 59,S$GLB,, | Performed by: INTERNAL MEDICINE

## 2023-11-03 PROCEDURE — 3079F DIAST BP 80-89 MM HG: CPT | Mod: CPTII,S$GLB,, | Performed by: INTERNAL MEDICINE

## 2023-11-03 PROCEDURE — 80053 COMPREHEN METABOLIC PANEL: CPT | Performed by: INTERNAL MEDICINE

## 2023-11-03 NOTE — PROGRESS NOTES
Subjective:      Patient ID: Jaylin Murguia is a 58 y.o. female.    Chief Complaint: Medication Problem    Pt is a 56 yo CW pmh Asthma, allergic rhinitis, chronic pansinusitis, hypogammaglobulinemia, Crohn's disease, fibromyalgia, chronic immunosuppression, TAMIKA on CPAP who presents follow up of uncontrolled asthma with associated cough.      Patient has been treated for pseudomonal infection of the sinuses. Her crohns disease is being followed by GI who plans to start on budesonide or Skyrizi. Her cough has been productive of some brownish sputum.      Since last being seen had hospitalization with bronchoscopy that showed 66% pulmonary eosinophils. Was started on high dose prednisone to treat chronic eosinophilic pneumonia. Has improvement in shortness of breath, decreased cough. Adverse effects from prednisone include anxiety, mood swings.     Per chart review:   On IgG replacement therapy, Hizentra  Recurrent nasal pseudomonas infections: ( 1/2022) treated with ceftazadime/avibactam x5 weeks with PICC (2/15/22-3/15/22)  Meropenam nasal rinses (as of 12/27/22)     Past Medications:  Remicade (in remote past)-- ineffective  Asacol 4.8 gm-- ineffective  Entocort-- effective  Prednisone  Humira (started July 17, stopped 2/2018)-- stopped 2/2 multiple infections.      FESS: Stenotrophomonas and Pseudomonas s/p cipro/bactrim  FeNO 78  Eos: as high as 2700  IgE: <35  CFTR (-), alpha-1-antitrypsin wnl  Normal IgG1/4, low or low normal IgG 2/3    Inhaler use: Trelegy   PRN inhaler use: has not used in last 3 weeks.    Smoking hx: never smoker  Work hx: previous teacher  Exposure hx: none  Pets (dog),  birds(none), down furniture: pillows  Family hx lung disease: none  Family hx:   - father: scleroderma, father smoked  - mother: breast cancer  Personal hx:   Preeclampsia, eclampsia with second child. Pulmonary edema, acute heart failure?    Immunology:   Positive: MPO (9.0)   Negative: P-ANCA    Review of Systems    Respiratory:  Positive for cough, sputum production and use of rescue inhaler. Negative for shortness of breath and wheezing.    Cardiovascular:  Negative for chest pain, palpitations and leg swelling.   Gastrointestinal:  Negative for nausea and vomiting.   Psychiatric/Behavioral:  Negative for confusion and sleep disturbance. The patient is not nervous/anxious.        Objective:     Physical Exam   Constitutional: She is oriented to person, place, and time. She appears well-developed and well-nourished. No distress. She is not obese.   HENT:   Head: Normocephalic.   Cardiovascular: Normal rate, regular rhythm and normal heart sounds. Exam reveals no gallop and no friction rub.   No murmur heard.  Pulmonary/Chest: Normal expansion, symmetric chest wall expansion and effort normal. No stridor. No respiratory distress. She has no decreased breath sounds. She has no wheezes. She has no rhonchi. She has no rales.   Musculoskeletal:         General: No edema.   Neurological: She is alert and oriented to person, place, and time. Gait normal.   Skin: She is not diaphoretic. No cyanosis. Nails show no clubbing.   Psychiatric: She has a normal mood and affect. Her behavior is normal. Judgment and thought content normal.   Vitals reviewed.      Personal Diagnostic Review    Chest x-ray: 11/23/22  Right infrahilar atelectasis vs opacification. Hazy left suprahilar opacification concerning for developing PNA.      CT of chest performed on 5/6/22 without contrast revealed bibasilar posterior predominant tree and bud indicative of infectious vs inflammatory source.      CT chest 3/6/23:   Interval worsening of GGO and consolidative processes bilaterally, particularly in bilateral upper lobes and lower lobes.     CT chest 10/11/23: significant increase in Mosaic attenuation in all lung fields compared to 9/27/23     Echocardiogram: 4/19/22    1 - Mild left atrial enlargement.     2 - Concentric hypertrophy.     3 - No wall  motion abnormalities.     4 - Normal left ventricular systolic function (EF 60-65%).     5 - Normal left ventricular diastolic function.     6 - Normal right ventricular systolic function .     7 - The estimated PA systolic pressure is 28 mmHg.     8 - Trivial to mild aortic regurgitation.     9 - Trivial to mild mitral regurgitation.      Pulmonary function tests:   22  FEV1: 1.86L (65.9%)  FVC: 2.19L (61%)     22  FEV1: 1.86L  (65.5 % predicted),   FVC:  2.32L (64.4 % predicted),   FEV1/FVC:  80,   T.26L (78.3% predicted),   DLCO: 16.53 (66 % predicted)        10/15/2023     3:00 PM 10/15/2023     1:32 PM 10/15/2023    11:44 AM 10/15/2023     8:33 AM 10/15/2023     7:47 AM 10/15/2023     5:05 AM 10/15/2023    12:59 AM   Pulmonary Function Tests   SpO2 98 % 99 % 99 % 98 % 97 % 97 % 95 %        Assessment:     1. Severe persistent asthma without complication    2. Other eosinophilia    3. Eosinophilic pneumonia         Outpatient Encounter Medications as of 11/3/2023   Medication Sig Dispense Refill    acetaminophen (TYLENOL) 500 MG tablet Take 2 tablets (1,000 mg total) by mouth every 6 (six) hours as needed for Pain. 60 tablet 0    albuterol-ipratropium (DUO-NEB) 2.5 mg-0.5 mg/3 mL nebulizer solution Take 3 mLs by nebulization every 6 (six) hours as needed for Wheezing. Rescue 270 mL 0    b complex vitamins capsule Take 1 capsule by mouth once daily.      benzonatate (TESSALON) 200 MG capsule Take 1 capsule (200 mg total) by mouth 3 (three) times daily as needed for Cough (2nd line option for cough). 90 capsule 0    calcium carbonate/vitamin D3 (VITAMIN D-3 ORAL) Take 2 tablets by mouth once daily.      cetirizine (ZYRTEC) 10 MG tablet Take 10 mg by mouth 2 (two) times a day.      clotrimazole-betamethasone 1-0.05% (LOTRISONE) cream Apply topically 2 (two) times daily. 15 g 0    diltiazem HCl (DILTIAZEM 2% - LIDOCAINE 5% CREAM) Apply peasize amount topically to anal area. 30 g 2     diphenhydrAMINE-aluminum-magnesium hydroxide-simethicone-LIDOcaine HCl 2% Swish and spit 15 mLs every 4 (four) hours as needed (oral ulcers, pain). 1 Bottle 2    diphenoxylate-atropine 2.5-0.025 mg (LOMOTIL) 2.5-0.025 mg per tablet Take 1 tablet by mouth 4 (four) times daily as needed for Diarrhea. 120 tablet 2    DULoxetine (CYMBALTA) 60 MG capsule Take 1 capsule (60 mg total) by mouth 2 (two) times daily. 180 capsule 3    estradioL (ESTRACE) 2 MG tablet TAKE 1 TABLET DAILY 90 tablet 3    fish oil/borage/flax/om3,6,9 1 (OMEGA 3-6-9 ORAL) Take 2 tablets by mouth once daily.      fluticasone propionate (FLONASE) 50 mcg/actuation nasal spray 2 sprays (100 mcg total) by Each Nostril route once daily. 16 g 5    immun glob G,IgG,-pro-IgA 0-50 (HIZENTRA) 10 gram/50 mL (20 %) Soln Inject 70 mLs (14 g total) into the skin every 7 days. 280 mL 11    ipratropium (ATROVENT) 0.02 % nebulizer solution Take by nebulization 4 (four) times daily as needed for Wheezing.      losartan (COZAAR) 100 MG tablet Take 1 tablet (100 mg total) by mouth once daily. (Patient taking differently: Take 100 mg by mouth daily as needed (high blood pressure (>120/80)).) 90 tablet 3    nortriptyline (PAMELOR) 25 MG capsule Take 1 capsule (25 mg total) by mouth every evening. 90 capsule 3    pantoprazole (PROTONIX) 40 MG tablet Take 1 tablet (40 mg total) by mouth once daily. 30 tablet 11    predniSONE (DELTASONE) 20 MG tablet Take 3 tablets (60 mg total) by mouth once daily. 90 tablet 0    pregabalin (LYRICA) 150 MG capsule Take 1 capsule (150 mg total) by mouth 3 (three) times daily. 270 capsule 1    risankizumab-rzaa 360 mg/2.4 mL (150 mg/mL) Injt Inject 360 mg into the skin every 8 weeks. for 6 doses 2.4 mL 5    rizatriptan (MAXALT) 10 MG tablet TAKE 1 TABLET IF NEEDED FOR MIGRAINES. MAX 2 TABLETS IN 24 HOURS. 30 tablet 8    sulfamethoxazole-trimethoprim 800-160mg (BACTRIM DS) 800-160 mg Tab Take 1 tablet by mouth once daily. (Patient not taking:  Reported on 10/19/2023) 30 tablet 1    sulfamethoxazole-trimethoprim 800-160mg (BACTRIM DS) 800-160 mg Tab Take 2 tablets by mouth 2 (two) times daily. for 10 days 40 tablet 0    topiramate (TOPAMAX) 100 MG tablet Take 1 tablet (100 mg total) by mouth 2 (two) times daily. 180 tablet 3    traZODone (DESYREL) 50 MG tablet Take 1 tablet (50 mg total) by mouth every evening. 90 tablet 3    XYLITOL, BULK, MISC EMPTY CONTENTS OF 1 CAPSULE INTO NASAL IRRIGATION SYSTEM, ADD DISTILLED WATER, SALT PACK, MIX & IRRIGATE. PERFORM 2 TIMES DAILY      ZINC ORAL Take 1 tablet by mouth once daily.       Facility-Administered Encounter Medications as of 11/3/2023   Medication Dose Route Frequency Provider Last Rate Last Admin    lactated ringers infusion   Intravenous Continuous Mary Leiva MD   New Bag at 03/12/20 0923    lactated ringers infusion   Intravenous Continuous Shay Bruce MD   Stopped at 03/16/22 1342    lidocaine (PF) 10 mg/ml (1%) injection 10 mg  1 mL Intradermal Once Mary Lieva MD        LIDOcaine (PF) 10 mg/ml (1%) injection 10 mg  1 mL Intradermal Once Johan Baez MD        sodium chloride 0.9% flush 10 mL  10 mL Intravenous PRN Johan Baez MD         Orders Placed This Encounter   Procedures    Stress test, pulmonary     Start on room air.     Standing Status:   Future     Number of Occurrences:   1     Standing Expiration Date:   11/3/2024     Order Specific Question:   Reason for study     Answer:   Oxygen prescription     Order Specific Question:   Release to patient     Answer:   Immediate       Plan:     Eosinophilic pneumonia  Unsure if chronic eosinophilic PNA vs EGPA. WBC with 66% eosinophils on BAL. 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 She has Asthma that is poorly controlled. Pt may  have not had vasculitic findings on biopsies as it is a late stage presentation. Is very responsive to steroids.     Severe persistent asthma without complication  Currently on trelegy and singulair. Initially concerned that uncontrolled Crohn's disease was contributing to lack of asthma control. Now likely due to chronic eosinophilic pneumonia vs EGPA.  Pt has great response to prednisone with AE of swelling in her face. Pt had previously been on it for 4 months with significant improvement in symptoms with relapse after stopping. Much improvement with 40 mg prednisone. Mild increase in cough on 20 mg.  - will continue trelegy, singulair and PRN duonebs/combivent.   - may end up requiring biologic in setting of eosinophilic process contributing.   - Titrate prednisone 60 mg to 50 mg for 7 days and then to 40 mg daily until next appointment.     Acute hypoxemic respiratory failure  Patient with Hypoxic Respiratory failure which is Acute.  she is on home oxygen at 0 LPM. Supplemental oxygen was provided and noted- [unfilled].   Signs/symptoms of respiratory failure include- increased work of breathing, respiratory distress, and use of accessory muscles. Contributing diagnoses includes - Interstitial lung disease Labs and images were reviewed. Patient Has recent ABG, which has been reviewed. Will treat underlying causes and adjust management of respiratory failure as follows-   - Pt no longer having hypoxia at baseline and [employer] can  oxygen from home.     Follow up in one month.     Ivan Riley MD  [facility]

## 2023-11-03 NOTE — ASSESSMENT & PLAN NOTE
Patient with Hypoxic Respiratory failure which is Acute.  she is on home oxygen at 0 LPM. Supplemental oxygen was provided and noted- [unfilled].   Signs/symptoms of respiratory failure include- increased work of breathing, respiratory distress, and use of accessory muscles. Contributing diagnoses includes - Interstitial lung disease Labs and images were reviewed. Patient Has recent ABG, which has been reviewed. Will treat underlying causes and adjust management of respiratory failure as follows-   - Pt no longer having hypoxia at baseline and D[employer] company can  oxygen from home.

## 2023-11-03 NOTE — PROCEDURES
Jaylin Murguia is a 58 y.o.  female patient, who presents for a 6 minute walk test ordered by MD Osvaldo.  The diagnosis is Shortness of Breath; Asthma.  The patient's BMI is 27.5 kg/m2.  Predicted distance (lower limit of normal) is 368.26 meters.      Test Results:    The test was completed without stopping. The total time walked was 360 seconds. During walking, the patient reported:  No complaints. The patient used no assistive devices during testing.     11/03/2023---------Distance: 320.04 meters (1050 feet)     O2 Sat % Supplemental Oxygen Heart Rate Blood Pressure Robert Scale   Pre-exercise  (Resting) 99 % Room Air 69 bpm 116/72 mmHg 0   During Exercise 99 % Room Air 76 bpm 129/77 mmHg 1   Post-exercise  (Recovery) 99 % Room Air  74 bpm       Recovery Time: 78 seconds    Performing nurse/tech: [employer] RRT      PREVIOUS STUDY:   The patient has not had a previous study.      CLINICAL INTERPRETATION:  Six minute walk distance is 320.04 meters (1050 feet) with very light dyspnea.  During exercise, there was no desaturation while breathing room air.  Both blood pressure and heart rate remained stable with walking.  The patient did not report non-pulmonary symptoms during exercise.  No previous study performed.  Based upon age and body mass index, exercise capacity is less than predicted.

## 2023-11-03 NOTE — ASSESSMENT & PLAN NOTE
Currently on trelegy and singulair. Initially concerned that uncontrolled Crohn's disease was contributing to lack of asthma control. Now likely due to chronic eosinophilic pneumonia vs EGPA.  Pt has great response to prednisone with AE of swelling in her face. Pt had previously been on it for 4 months with significant improvement in symptoms with relapse after stopping. Much improvement with 40 mg prednisone. Mild increase in cough on 20 mg.  - will continue trelegy, singulair and PRN duonebs/combivent.   - may end up requiring biologic in setting of eosinophilic process contributing.   - Titrate prednisone 60 mg to 50 mg for 7 days and then to 40 mg daily until next appointment.

## 2023-11-03 NOTE — ASSESSMENT & PLAN NOTE
Unsure if chronic eosinophilic PNA vs EGPA. WBC with 66% eosinophils on BAL. 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 She has Asthma that is poorly controlled. Pt may have not had vasculitic findings on biopsies as it is a late stage presentation. Is very responsive to steroids.

## 2023-11-05 LAB
1,3 BETA GLUCAN SER-MCNC: 158 PG/ML
FUNGITELL COMMENTS: POSITIVE

## 2023-11-06 ENCOUNTER — PATIENT MESSAGE (OUTPATIENT)
Dept: PULMONOLOGY | Facility: CLINIC | Age: 58
End: 2023-11-06
Payer: COMMERCIAL

## 2023-11-07 ENCOUNTER — TELEPHONE (OUTPATIENT)
Dept: OTOLARYNGOLOGY | Facility: CLINIC | Age: 58
End: 2023-11-07
Payer: COMMERCIAL

## 2023-11-07 ENCOUNTER — PATIENT MESSAGE (OUTPATIENT)
Dept: INFECTIOUS DISEASES | Facility: CLINIC | Age: 58
End: 2023-11-07
Payer: COMMERCIAL

## 2023-11-07 NOTE — TELEPHONE ENCOUNTER
----- Message from Sonia Barker RN sent at 11/7/2023  1:36 PM CST -----  Regarding: FW: [facility] referral  Giacomo Lopez!  Can you call this patient to see if the referral was done please?  Thanks Allison  ----- Message -----  From: Matthias Roach MD  Sent: 9/22/2023   9:46 AM CST  To: Sonia Barker RN  Subject: [facility] referral                                    Giacomo Cooper.  This is the patient who needs to see ID specialty at [facility].  I placed an external referral (at least I tried to) in July.  Please see what we can do to make this work and maybe you can advise us on how to do it correctly in the future.

## 2023-11-08 ENCOUNTER — TELEPHONE (OUTPATIENT)
Dept: INFECTIOUS DISEASES | Facility: HOSPITAL | Age: 58
End: 2023-11-08
Payer: COMMERCIAL

## 2023-11-08 DIAGNOSIS — J82.81 EOSINOPHILIC PNEUMONIA: Primary | ICD-10-CM

## 2023-11-09 NOTE — TELEPHONE ENCOUNTER
Called patient to discuss elevated fungitell  Likely a false positive in setting of hizentra   Will recheck in 1 month  Clinically improving on steroid treatment, do not suspect she has an active fungal infection. All other testing is negative.    Daysi Saini DO  Transplant Infectious Disease

## 2023-11-13 ENCOUNTER — PATIENT MESSAGE (OUTPATIENT)
Dept: GASTROENTEROLOGY | Facility: CLINIC | Age: 58
End: 2023-11-13
Payer: COMMERCIAL

## 2023-11-14 ENCOUNTER — PATIENT MESSAGE (OUTPATIENT)
Dept: PULMONOLOGY | Facility: CLINIC | Age: 58
End: 2023-11-14
Payer: COMMERCIAL

## 2023-11-14 DIAGNOSIS — Z79.52 CURRENT USE OF STEROID MEDICATION: ICD-10-CM

## 2023-11-14 DIAGNOSIS — J82.81 EOSINOPHILIC PNEUMONIA: Primary | ICD-10-CM

## 2023-11-14 RX ORDER — SULFAMETHOXAZOLE AND TRIMETHOPRIM 800; 160 MG/1; MG/1
1 TABLET ORAL DAILY
Qty: 30 TABLET | Refills: 1 | Status: SHIPPED | OUTPATIENT
Start: 2023-11-14 | End: 2024-01-16

## 2023-11-14 RX ORDER — PREDNISONE 10 MG/1
TABLET ORAL
Qty: 81 TABLET | Refills: 0 | Status: SHIPPED | OUTPATIENT
Start: 2023-11-14 | End: 2023-12-08 | Stop reason: SDUPTHER

## 2023-11-16 LAB — FUNGUS SPEC CULT: NORMAL

## 2023-11-17 ENCOUNTER — TELEPHONE (OUTPATIENT)
Dept: PULMONOLOGY | Facility: CLINIC | Age: 58
End: 2023-11-17
Payer: COMMERCIAL

## 2023-11-17 ENCOUNTER — TELEPHONE (OUTPATIENT)
Dept: RHEUMATOLOGY | Facility: CLINIC | Age: 58
End: 2023-11-17
Payer: COMMERCIAL

## 2023-11-17 NOTE — TELEPHONE ENCOUNTER
----- Message from Virgil Edwards MD sent at 11/16/2023 11:18 AM CST -----  Regarding: RE: EGPA?  [facility] Team ,   Please schedule this patient with  as requested by .      ----- Message -----  From: Ivan Riley MD  Sent: 11/14/2023   3:46 PM CST  To: Aramis Chand MD  Subject: EGPA?                                            Aramis,        This was the patient I was hoping you'd see. Any chance you can get her in over the next couple of weeks? I know the holidays can make it tough.     Ivan

## 2023-11-28 ENCOUNTER — LAB VISIT (OUTPATIENT)
Dept: LAB | Facility: HOSPITAL | Age: 58
End: 2023-11-28
Attending: FAMILY MEDICINE
Payer: COMMERCIAL

## 2023-11-28 DIAGNOSIS — Z00.00 ROUTINE GENERAL MEDICAL EXAMINATION AT A HEALTH CARE FACILITY: ICD-10-CM

## 2023-11-28 LAB
ALBUMIN SERPL BCP-MCNC: 3.3 G/DL (ref 3.5–5.2)
ALBUMIN/CREAT UR: 64.5 UG/MG (ref 0–30)
ALP SERPL-CCNC: 48 U/L (ref 55–135)
ALT SERPL W/O P-5'-P-CCNC: 49 U/L (ref 10–44)
ANION GAP SERPL CALC-SCNC: 7 MMOL/L (ref 8–16)
AST SERPL-CCNC: 34 U/L (ref 10–40)
BASOPHILS # BLD AUTO: 0.04 K/UL (ref 0–0.2)
BASOPHILS NFR BLD: 0.8 % (ref 0–1.9)
BILIRUB SERPL-MCNC: 0.3 MG/DL (ref 0.1–1)
BUN SERPL-MCNC: 14 MG/DL (ref 6–20)
CALCIUM SERPL-MCNC: 8.7 MG/DL (ref 8.7–10.5)
CHLORIDE SERPL-SCNC: 108 MMOL/L (ref 95–110)
CHOLEST SERPL-MCNC: 226 MG/DL (ref 120–199)
CHOLEST/HDLC SERPL: 3 {RATIO} (ref 2–5)
CO2 SERPL-SCNC: 24 MMOL/L (ref 23–29)
CREAT SERPL-MCNC: 1 MG/DL (ref 0.5–1.4)
CREAT UR-MCNC: 251 MG/DL (ref 15–325)
DIFFERENTIAL METHOD: ABNORMAL
EOSINOPHIL # BLD AUTO: 0.1 K/UL (ref 0–0.5)
EOSINOPHIL NFR BLD: 1.8 % (ref 0–8)
ERYTHROCYTE [DISTWIDTH] IN BLOOD BY AUTOMATED COUNT: 13.9 % (ref 11.5–14.5)
EST. GFR  (NO RACE VARIABLE): >60 ML/MIN/1.73 M^2
ESTIMATED AVG GLUCOSE: 105 MG/DL (ref 68–131)
GLUCOSE SERPL-MCNC: 77 MG/DL (ref 70–110)
HBA1C MFR BLD: 5.3 % (ref 4–5.6)
HCT VFR BLD AUTO: 41.1 % (ref 37–48.5)
HDLC SERPL-MCNC: 76 MG/DL (ref 40–75)
HDLC SERPL: 33.6 % (ref 20–50)
HGB BLD-MCNC: 13.1 G/DL (ref 12–16)
IMM GRANULOCYTES # BLD AUTO: 0.02 K/UL (ref 0–0.04)
IMM GRANULOCYTES NFR BLD AUTO: 0.4 % (ref 0–0.5)
INSULIN COLLECTION INTERVAL: NORMAL
INSULIN SERPL-ACNC: 5.9 UU/ML
LDLC SERPL CALC-MCNC: 126.4 MG/DL (ref 63–159)
LYMPHOCYTES # BLD AUTO: 1.6 K/UL (ref 1–4.8)
LYMPHOCYTES NFR BLD: 31.1 % (ref 18–48)
MCH RBC QN AUTO: 31.2 PG (ref 27–31)
MCHC RBC AUTO-ENTMCNC: 31.9 G/DL (ref 32–36)
MCV RBC AUTO: 98 FL (ref 82–98)
MICROALBUMIN UR DL<=1MG/L-MCNC: 162 UG/ML
MONOCYTES # BLD AUTO: 0.7 K/UL (ref 0.3–1)
MONOCYTES NFR BLD: 12.8 % (ref 4–15)
NEUTROPHILS # BLD AUTO: 2.7 K/UL (ref 1.8–7.7)
NEUTROPHILS NFR BLD: 53.1 % (ref 38–73)
NONHDLC SERPL-MCNC: 150 MG/DL
NRBC BLD-RTO: 0 /100 WBC
PLATELET # BLD AUTO: 242 K/UL (ref 150–450)
PMV BLD AUTO: 10.1 FL (ref 9.2–12.9)
POTASSIUM SERPL-SCNC: 3.8 MMOL/L (ref 3.5–5.1)
PROT SERPL-MCNC: 6.8 G/DL (ref 6–8.4)
RBC # BLD AUTO: 4.2 M/UL (ref 4–5.4)
SODIUM SERPL-SCNC: 139 MMOL/L (ref 136–145)
T4 FREE SERPL-MCNC: 0.7 NG/DL (ref 0.71–1.51)
TRIGL SERPL-MCNC: 118 MG/DL (ref 30–150)
TSH SERPL DL<=0.005 MIU/L-ACNC: 1.17 UIU/ML (ref 0.4–4)
WBC # BLD AUTO: 5.14 K/UL (ref 3.9–12.7)

## 2023-11-28 PROCEDURE — 83525 ASSAY OF INSULIN: CPT | Performed by: FAMILY MEDICINE

## 2023-11-28 PROCEDURE — 80061 LIPID PANEL: CPT | Performed by: FAMILY MEDICINE

## 2023-11-28 PROCEDURE — 80053 COMPREHEN METABOLIC PANEL: CPT | Performed by: FAMILY MEDICINE

## 2023-11-28 PROCEDURE — 83036 HEMOGLOBIN GLYCOSYLATED A1C: CPT | Performed by: FAMILY MEDICINE

## 2023-11-28 PROCEDURE — 84439 ASSAY OF FREE THYROXINE: CPT | Performed by: FAMILY MEDICINE

## 2023-11-28 PROCEDURE — 84443 ASSAY THYROID STIM HORMONE: CPT | Performed by: FAMILY MEDICINE

## 2023-11-28 PROCEDURE — 85025 COMPLETE CBC W/AUTO DIFF WBC: CPT | Performed by: FAMILY MEDICINE

## 2023-11-28 PROCEDURE — 82043 UR ALBUMIN QUANTITATIVE: CPT | Performed by: FAMILY MEDICINE

## 2023-11-29 ENCOUNTER — PATIENT MESSAGE (OUTPATIENT)
Dept: PULMONOLOGY | Facility: CLINIC | Age: 58
End: 2023-11-29
Payer: COMMERCIAL

## 2023-11-29 DIAGNOSIS — J82.81 EOSINOPHILIC PNEUMONIA: Primary | ICD-10-CM

## 2023-11-29 DIAGNOSIS — B37.0 THRUSH, ORAL: ICD-10-CM

## 2023-11-29 RX ORDER — NYSTATIN 100000 [USP'U]/ML
4 SUSPENSION ORAL 4 TIMES DAILY
Qty: 160 ML | Refills: 1 | Status: SHIPPED | OUTPATIENT
Start: 2023-11-29 | End: 2023-12-19

## 2023-11-29 RX ORDER — PROMETHAZINE HYDROCHLORIDE AND DEXTROMETHORPHAN HYDROBROMIDE 6.25; 15 MG/5ML; MG/5ML
5 SYRUP ORAL EVERY 4 HOURS PRN
Qty: 240 ML | Refills: 1 | Status: SHIPPED | OUTPATIENT
Start: 2023-11-29 | End: 2023-12-19

## 2023-12-05 ENCOUNTER — OFFICE VISIT (OUTPATIENT)
Dept: PRIMARY CARE CLINIC | Facility: CLINIC | Age: 58
End: 2023-12-05
Payer: COMMERCIAL

## 2023-12-05 VITALS
OXYGEN SATURATION: 98 % | BODY MASS INDEX: 29.35 KG/M2 | SYSTOLIC BLOOD PRESSURE: 139 MMHG | TEMPERATURE: 98 F | DIASTOLIC BLOOD PRESSURE: 82 MMHG | WEIGHT: 187.38 LBS | HEART RATE: 72 BPM

## 2023-12-05 DIAGNOSIS — J82.81 EOSINOPHILIC PNEUMONIA: ICD-10-CM

## 2023-12-05 DIAGNOSIS — Z00.01 ENCOUNTER FOR PREVENTATIVE ADULT HEALTH CARE EXAM WITH ABNORMAL FINDINGS: Primary | ICD-10-CM

## 2023-12-05 DIAGNOSIS — G43.809 OTHER MIGRAINE WITHOUT STATUS MIGRAINOSUS, NOT INTRACTABLE: ICD-10-CM

## 2023-12-05 DIAGNOSIS — I35.1 MILD AORTIC INSUFFICIENCY: ICD-10-CM

## 2023-12-05 DIAGNOSIS — J45.50 SEVERE PERSISTENT ASTHMA WITHOUT COMPLICATION: ICD-10-CM

## 2023-12-05 DIAGNOSIS — M79.7 FIBROMYALGIA: ICD-10-CM

## 2023-12-05 DIAGNOSIS — R74.8 ELEVATED LIVER ENZYMES: ICD-10-CM

## 2023-12-05 DIAGNOSIS — D84.9 IMMUNOSUPPRESSION: ICD-10-CM

## 2023-12-05 DIAGNOSIS — J32.4 CHRONIC PANSINUSITIS: ICD-10-CM

## 2023-12-05 DIAGNOSIS — K50.00 CROHN'S DISEASE OF SMALL INTESTINE WITHOUT COMPLICATION: ICD-10-CM

## 2023-12-05 DIAGNOSIS — D80.1 HYPOGAMMAGLOBULINEMIA: ICD-10-CM

## 2023-12-05 DIAGNOSIS — J82.81: ICD-10-CM

## 2023-12-05 DIAGNOSIS — Z23 NEED FOR PNEUMOCOCCAL VACCINATION: ICD-10-CM

## 2023-12-05 DIAGNOSIS — I77.1 TORTUOUS AORTA: Chronic | ICD-10-CM

## 2023-12-05 DIAGNOSIS — E78.49 OTHER HYPERLIPIDEMIA: ICD-10-CM

## 2023-12-05 DIAGNOSIS — G47.33 OSA ON CPAP: ICD-10-CM

## 2023-12-05 PROCEDURE — 99999 PR PBB SHADOW E&M-EST. PATIENT-LVL V: CPT | Mod: PBBFAC,,, | Performed by: FAMILY MEDICINE

## 2023-12-05 PROCEDURE — 3008F PR BODY MASS INDEX (BMI) DOCUMENTED: ICD-10-PCS | Mod: CPTII,S$GLB,, | Performed by: FAMILY MEDICINE

## 2023-12-05 PROCEDURE — 3060F PR POS MICROALBUMINURIA RESULT DOCUMENTED/REVIEW: ICD-10-PCS | Mod: CPTII,S$GLB,, | Performed by: FAMILY MEDICINE

## 2023-12-05 PROCEDURE — 3075F SYST BP GE 130 - 139MM HG: CPT | Mod: CPTII,S$GLB,, | Performed by: FAMILY MEDICINE

## 2023-12-05 PROCEDURE — 3060F POS MICROALBUMINURIA REV: CPT | Mod: CPTII,S$GLB,, | Performed by: FAMILY MEDICINE

## 2023-12-05 PROCEDURE — 99214 OFFICE O/P EST MOD 30 MIN: CPT | Mod: 25,S$GLB,, | Performed by: FAMILY MEDICINE

## 2023-12-05 PROCEDURE — 99999 PR PBB SHADOW E&M-EST. PATIENT-LVL V: ICD-10-PCS | Mod: PBBFAC,,, | Performed by: FAMILY MEDICINE

## 2023-12-05 PROCEDURE — 3075F PR MOST RECENT SYSTOLIC BLOOD PRESS GE 130-139MM HG: ICD-10-PCS | Mod: CPTII,S$GLB,, | Performed by: FAMILY MEDICINE

## 2023-12-05 PROCEDURE — 3044F HG A1C LEVEL LT 7.0%: CPT | Mod: CPTII,S$GLB,, | Performed by: FAMILY MEDICINE

## 2023-12-05 PROCEDURE — G0009 ADMIN PNEUMOCOCCAL VACCINE: HCPCS | Mod: S$GLB,,, | Performed by: FAMILY MEDICINE

## 2023-12-05 PROCEDURE — 90677 PCV20 VACCINE IM: CPT | Mod: S$GLB,,, | Performed by: FAMILY MEDICINE

## 2023-12-05 PROCEDURE — 3008F BODY MASS INDEX DOCD: CPT | Mod: CPTII,S$GLB,, | Performed by: FAMILY MEDICINE

## 2023-12-05 PROCEDURE — 1159F MED LIST DOCD IN RCRD: CPT | Mod: CPTII,S$GLB,, | Performed by: FAMILY MEDICINE

## 2023-12-05 PROCEDURE — 3079F DIAST BP 80-89 MM HG: CPT | Mod: CPTII,S$GLB,, | Performed by: FAMILY MEDICINE

## 2023-12-05 PROCEDURE — 99214 PR OFFICE/OUTPT VISIT, EST, LEVL IV, 30-39 MIN: ICD-10-PCS | Mod: 25,S$GLB,, | Performed by: FAMILY MEDICINE

## 2023-12-05 PROCEDURE — 3079F PR MOST RECENT DIASTOLIC BLOOD PRESSURE 80-89 MM HG: ICD-10-PCS | Mod: CPTII,S$GLB,, | Performed by: FAMILY MEDICINE

## 2023-12-05 PROCEDURE — 4010F ACE/ARB THERAPY RXD/TAKEN: CPT | Mod: CPTII,S$GLB,, | Performed by: FAMILY MEDICINE

## 2023-12-05 PROCEDURE — 3044F PR MOST RECENT HEMOGLOBIN A1C LEVEL <7.0%: ICD-10-PCS | Mod: CPTII,S$GLB,, | Performed by: FAMILY MEDICINE

## 2023-12-05 PROCEDURE — 99396 PR PREVENTIVE VISIT,EST,40-64: ICD-10-PCS | Mod: 25,S$GLB,, | Performed by: FAMILY MEDICINE

## 2023-12-05 PROCEDURE — 4010F PR ACE/ARB THEARPY RXD/TAKEN: ICD-10-PCS | Mod: CPTII,S$GLB,, | Performed by: FAMILY MEDICINE

## 2023-12-05 PROCEDURE — 99396 PREV VISIT EST AGE 40-64: CPT | Mod: 25,S$GLB,, | Performed by: FAMILY MEDICINE

## 2023-12-05 PROCEDURE — 3066F PR DOCUMENTATION OF TREATMENT FOR NEPHROPATHY: ICD-10-PCS | Mod: CPTII,S$GLB,, | Performed by: FAMILY MEDICINE

## 2023-12-05 PROCEDURE — 90677 PNEUMOCOCCAL CONJUGATE VACCINE 20-VALENT: ICD-10-PCS | Mod: S$GLB,,, | Performed by: FAMILY MEDICINE

## 2023-12-05 PROCEDURE — G0009 PNEUMOCOCCAL CONJUGATE VACCINE 20-VALENT: ICD-10-PCS | Mod: S$GLB,,, | Performed by: FAMILY MEDICINE

## 2023-12-05 PROCEDURE — 1159F PR MEDICATION LIST DOCUMENTED IN MEDICAL RECORD: ICD-10-PCS | Mod: CPTII,S$GLB,, | Performed by: FAMILY MEDICINE

## 2023-12-05 PROCEDURE — 3066F NEPHROPATHY DOC TX: CPT | Mod: CPTII,S$GLB,, | Performed by: FAMILY MEDICINE

## 2023-12-05 RX ORDER — TRIAMTERENE AND HYDROCHLOROTHIAZIDE 37.5; 25 MG/1; MG/1
1 CAPSULE ORAL DAILY PRN
Qty: 30 CAPSULE | Refills: 11 | Status: SHIPPED | OUTPATIENT
Start: 2023-12-05 | End: 2024-12-04

## 2023-12-05 RX ORDER — LOSARTAN POTASSIUM 100 MG/1
100 TABLET ORAL DAILY PRN
Qty: 90 TABLET | Refills: 3 | Status: SHIPPED | OUTPATIENT
Start: 2023-12-05

## 2023-12-05 RX ORDER — NORTRIPTYLINE HYDROCHLORIDE 25 MG/1
25 CAPSULE ORAL NIGHTLY
Qty: 90 CAPSULE | Refills: 3 | Status: SHIPPED | OUTPATIENT
Start: 2023-12-05

## 2023-12-05 RX ORDER — PROPRANOLOL HYDROCHLORIDE 80 MG/1
80 CAPSULE, EXTENDED RELEASE ORAL DAILY
Qty: 90 CAPSULE | Refills: 3 | Status: SHIPPED | OUTPATIENT
Start: 2023-12-05 | End: 2023-12-28 | Stop reason: SDUPTHER

## 2023-12-05 RX ORDER — DULOXETIN HYDROCHLORIDE 60 MG/1
60 CAPSULE, DELAYED RELEASE ORAL 2 TIMES DAILY
Qty: 180 CAPSULE | Refills: 3 | Status: SHIPPED | OUTPATIENT
Start: 2023-12-05

## 2023-12-05 RX ORDER — TRAZODONE HYDROCHLORIDE 50 MG/1
50 TABLET ORAL NIGHTLY
Qty: 90 TABLET | Refills: 3 | Status: SHIPPED | OUTPATIENT
Start: 2023-12-05 | End: 2024-12-04

## 2023-12-05 NOTE — PROGRESS NOTES
Jaylin Murguia presented for a Prevention exam at the same time as problem based visit with concerns and treatments addressed by physician., Patient desired to have these issues addressed at the same time thus 25 modifier added to reflect the time spent on the problem based encounter as well as the prevention services that were performed.      The following components were reviewed and updated:  Pmh, Psh, Family Hx, Social Hx, Allergies, Current Medications, and HM updated in Epic Tabs today.     Vitals:    12/05/23 1048   BP: 139/82   Pulse: 72   Temp: 98.1 °F (36.7 °C)   SpO2: 98%   Weight: 85 kg (187 lb 6.3 oz)     Body mass index is 29.35 kg/m².       Annual Wellness Visit, Subsequent Z00.01 ICD 10 code    Provided Jaylin with a 5-10 year written screening schedule and personal prevention plan. Recommendations were developed using the USPSTF age appropriate recommendations. Education, counseling, and referrals were provided as needed.  After Visit Summary given to patient which includes a list of additional screenings\tests needed.      Health Maintenance Due   Topic Date Due    Shingles Vaccine (1 of 2) Never done    COVID-19 Vaccine (4 - 2023-24 season) 09/01/2023    Colorectal Cancer Screening  02/02/2024       Health Maintenance Topics with due status: Not Due       Topic Last Completion Date    TETANUS VACCINE 02/18/2014    Mammogram 08/28/2023    Hemoglobin A1c (Diabetic Prevention Screening) 11/28/2023    Lipid Panel 11/28/2023       Patient Active Problem List   Diagnosis    Fibromyalgia    Migraine    Crohn's disease of small intestine    Sciatica    Allergic rhinitis    Diarrhea    Essential hypertension    Insomnia due to medical condition    Hormone replacement therapy (postmenopausal)    Long-term use of immunosuppressant medication    Crohn's disease    TAMIKA on CPAP    Bilateral primary osteoarthritis of knee    Primary osteoarthritis of right knee    Primary osteoarthritis of left knee    Other  hyperlipidemia    Tortuous aorta    Mild aortic insufficiency    Chronic pansinusitis    Idiopathic chronic eosinophilic pneumonia    Hypogammaglobulinemia    Abdominal pain    Chronic rhinosinusitis    Abnormal CT scan, lung    Chronic cough    Eosinophilic pneumonia    Thrush    Severe persistent asthma without complication    Acute hypoxemic respiratory failure         Follow up in about 7 weeks (around 1/23/2024) for f/u Telemed Dr Hu/ labs f/u .      Esthela Hu MD      15   minutes of total time spent on the prevention part of the encounter, which includes face to face time and non-face to face time preparing to see the patient (eg, review of tests), Obtaining and/or reviewing separately obtained history, Documenting clinical information in the electronic or other health record, Independently interpreting results (not separately reported) and communicating results to the patient/family/caregiver, or Care coordination (not separately reported).    Scribe Attestation:   I, David Restrepo, am scribing for, and in the presence of, Dr. Esthela Hu MD. I performed the above scribed service and the documentation accurately describes the services I performed. I attest to the accuracy of the note.    I, Dr. Esthela Hu MD, reviewed documentation as scribed above. I personally performed the services described in this documentation.  I agree that the record reflects my personal performance and is accurate and complete. Esthela Hu MD.    12/05/2023

## 2023-12-05 NOTE — PROGRESS NOTES
Subjective:      Patient ID: Jaylin Murguia is a 58 y.o. female.    Chief Complaint: Annual Exam      Patient is a 58 y.o. female coming in today for annual exam.   Initial BP is elevated in clinic. Hx of immunosuppressed  conditions and chronic sinus and lung infections. She is requesting referral to test food allergies. Feels more swollen at times.  Recent lab work results reviewed with pt. Cholesterol was slightly elevated. Reports eating shrimp prior to getting labs drawn. 1 lft level is elevated but improved from 1 month. Free t4 low last visit but TSH is normal and has history of Hashimoto's thyroid condition in the family.  Reports that they may be considering alternative diagnosis for her conditions to include a type of vasculitis that can affect the sinuses the lungs and the gut.  She also reports that while in the hospital they transitioned her off of Trelegy and placed her on Breo but she found that Trelegy seem to help more with her asthma symptoms.  She is due for PNA vaccine. She is due for medication refills; medication list updated in clinic. No other health concern at this time.       1. Encounter for preventative adult health care exam with abnormal findings    2. Immunosuppression    3. Idiopathic chronic eosinophilic pneumonia    4. Crohn's disease of small intestine without complication    5. Chronic pansinusitis    6. Hypogammaglobulinemia    7. Severe persistent asthma without complication    8. Eosinophilic pneumonia    9. Mild aortic insufficiency    10. Other hyperlipidemia    11. Tortuous aorta    12. Fibromyalgia    13. TAMIKA on CPAP    14. Other migraine without status migrainosus, not intractable    15. Elevated liver enzymes    16. Need for pneumococcal vaccination       Ohs Peq Sdoh    11/1/2023  3:29 PM CDT - Filed by Patient   On average, how many days per week do you engage in moderate to strenuous exercise (like a brisk walk)? 1 day   On average, how many minutes do you engage in  exercise at this level? 30 min   Do you feel stress - tense, restless, nervous, or anxious, or unable to sleep at night because your mind is troubled all the time - these days? To some extent   Do you belong to any clubs or organizations such as Congregational groups, unions, fraternal or athletic groups, or school groups?    How often do you attend meetings of the clubs or organizations you belong to? 1 to 4 times per year   In a typical week, how many times do you talk on the phone with family, friends, or neighbors? Three times a week   How often do you get together with friends or relatives? Once a week   Are you , , , , never , or living with a partner?    How hard is it for you to pay for the very basics like food, housing, medical care, and heating?    Within the past 12 months, you worried that your food would run out before you got the money to buy more. Never true   Within the past 12 months, the food you bought just didnt last and you didnt have money to get more. Never true   In the past 12 months, has lack of transportation kept you from medical appointments or from getting medications? No   In the past 12 months, has lack of transportation kept you from meetings, work, or from getting things needed for daily living? No   How often do you have a drink containing alcohol?    How many drinks containing alcohol do you have on a typical day when you are drinking? Patient does not drink   How often do you have six or more drinks on one occasion? Never   In the last 12 months, was there a time when you were not able to pay the mortgage or rent on time? No   In the last 12 months, how many places have you lived? (range: at least 0) 1   In the last 12 months, was there a time when you did not have a steady place to sleep or slept in a shelter (including now)? No         Pmh, Psh, Family Hx, Social Hx, HM updated in Epic Tabs today.   Review of Systems   Constitutional:   Positive for activity change, appetite change, fatigue and unexpected weight change. Negative for chills and fever.   HENT:  Negative for congestion, ear pain and trouble swallowing.    Eyes:  Negative for pain and visual disturbance.   Respiratory:  Positive for cough and wheezing. Negative for shortness of breath.    Cardiovascular:  Negative for chest pain and leg swelling.   Gastrointestinal:  Negative for abdominal pain, blood in stool, nausea and vomiting.   Endocrine: Negative for cold intolerance and heat intolerance.   Genitourinary:  Negative for dysuria and frequency.   Musculoskeletal:  Negative for joint swelling, myalgias and neck pain.   Skin:  Negative for color change and rash.   Neurological:  Negative for dizziness and headaches.   Psychiatric/Behavioral:  Negative for behavioral problems and sleep disturbance.      Objective:     Vitals:    12/05/23 1048   BP: (!) 140/82   Pulse: 72   Temp: 98.1 °F (36.7 °C)   SpO2: 98%   Weight: 85 kg (187 lb 6.3 oz)     Wt Readings from Last 10 Encounters:   12/05/23 85 kg (187 lb 6.3 oz)   11/03/23 79.8 kg (175 lb 14.8 oz)   11/03/23 79.8 kg (175 lb 14.8 oz)   10/13/23 78.9 kg (174 lb)   09/25/23 79.5 kg (175 lb 4.3 oz)   08/28/23 79.4 kg (175 lb)   07/31/23 79.7 kg (175 lb 11.3 oz)   06/15/23 80.8 kg (178 lb 2.1 oz)   04/24/23 80.8 kg (178 lb 2.1 oz)   04/21/23 80.7 kg (177 lb 14.6 oz)     Physical Exam  Vitals reviewed.   Constitutional:       Appearance: Normal appearance. She is well-developed and overweight.   HENT:      Head: Normocephalic and atraumatic.      Right Ear: Tympanic membrane and external ear normal.      Left Ear: Tympanic membrane and external ear normal.      Nose: Nose normal.      Mouth/Throat:      Mouth: Mucous membranes are moist.      Pharynx: Oropharynx is clear.   Eyes:      Conjunctiva/sclera: Conjunctivae normal.      Pupils: Pupils are equal, round, and reactive to light.   Neck:      Thyroid: No thyromegaly.   Cardiovascular:       Rate and Rhythm: Normal rate and regular rhythm.      Heart sounds: Normal heart sounds. No murmur heard.     No friction rub. No gallop.   Pulmonary:      Effort: Pulmonary effort is normal. No respiratory distress.      Breath sounds: Examination of the right-upper field reveals wheezing and rhonchi. Examination of the left-upper field reveals wheezing and rhonchi. Examination of the right-middle field reveals wheezing and rhonchi. Examination of the left-middle field reveals wheezing and rhonchi. Examination of the right-lower field reveals wheezing and rhonchi. Examination of the left-lower field reveals wheezing and rhonchi. Wheezing and rhonchi present. No rales.   Chest:          Comments: Fullness noted on left clavicle  Abdominal:      General: Bowel sounds are normal. There is no distension.      Palpations: Abdomen is soft.      Tenderness: There is no abdominal tenderness. There is no rebound.   Musculoskeletal:         General: Normal range of motion.      Cervical back: Normal range of motion and neck supple.   Lymphadenopathy:      Cervical: No cervical adenopathy.   Skin:     General: Skin is warm and dry.      Findings: No rash.   Neurological:      General: No focal deficit present.      Mental Status: She is alert and oriented to person, place, and time.   Psychiatric:         Attention and Perception: Attention and perception normal.         Mood and Affect: Mood and affect normal.         Speech: Speech normal.         Behavior: Behavior normal.         Thought Content: Thought content normal.         Cognition and Memory: Cognition and memory normal.         Judgment: Judgment normal.         Assessment:     1. Encounter for preventative adult health care exam with abnormal findings    2. Immunosuppression    3. Idiopathic chronic eosinophilic pneumonia    4. Crohn's disease of small intestine without complication    5. Chronic pansinusitis    6. Hypogammaglobulinemia    7. Severe  persistent asthma without complication    8. Eosinophilic pneumonia    9. Mild aortic insufficiency    10. Other hyperlipidemia    11. Tortuous aorta    12. Fibromyalgia    13. TAMIKA on CPAP    14. Other migraine without status migrainosus, not intractable    15. Elevated liver enzymes    16. Need for pneumococcal vaccination        Plan:   Jaylin was seen today for annual exam.    Diagnoses and all orders for this visit:    Encounter for preventative adult health care exam with abnormal findings  -     pneumoc 20-wei conj-dip cr,PF, (PREVNAR-20, PF,) 0.5 mL Syrg injection; Inject 0.5 mLs into the muscle once. for 1 dose    Immunosuppression    Idiopathic chronic eosinophilic pneumonia  -     T4, Free; Future  -     TSH; Future  -     Comprehensive Metabolic Panel; Future  -     Thyroid Peroxidase Antibody; Future    Crohn's disease of small intestine without complication  -     T4, Free; Future  -     TSH; Future  -     Comprehensive Metabolic Panel; Future  -     Thyroid Peroxidase Antibody; Future    Chronic pansinusitis    Hypogammaglobulinemia  -     T4, Free; Future  -     TSH; Future  -     Comprehensive Metabolic Panel; Future  -     Thyroid Peroxidase Antibody; Future    Severe persistent asthma without complication    Eosinophilic pneumonia    Mild aortic insufficiency    Other hyperlipidemia    Tortuous aorta    Fibromyalgia    TAMIKA on CPAP    Other migraine without status migrainosus, not intractable    Elevated liver enzymes  -     Comprehensive Metabolic Panel; Future    Need for pneumococcal vaccination  -     (In Office Administered) Pneumococcal Conjugate Vaccine (20 Valent) (IM) (Preferred)    Other orders  -     DULoxetine (CYMBALTA) 60 MG capsule; Take 1 capsule (60 mg total) by mouth 2 (two) times daily.  -     traZODone (DESYREL) 50 MG tablet; Take 1 tablet (50 mg total) by mouth every evening.  -     nortriptyline (PAMELOR) 25 MG capsule; Take 1 capsule (25 mg total) by mouth every evening.  -      propranoloL (INDERAL LA) 80 MG 24 hr capsule; Take 1 capsule (80 mg total) by mouth once daily.  -     triamterene-hydrochlorothiazide 37.5-25 mg (DYAZIDE) 37.5-25 mg per capsule; Take 1 capsule by mouth daily as needed (for wt fluctuation > 3 lbs in 24 hrs).  -     losartan (COZAAR) 100 MG tablet; Take 1 tablet (100 mg total) by mouth daily as needed (high blood pressure (>120/80)).      - above diagnoses were discussed and reviewed today during visit. Continue with current medications and interventions. The conditions are stable except for the following:   LDL cholesterol which was elevated.  Discussed dietary measures to improve LDL including more fruits and vegetables, fish, chicken less red meat.  Refilled Rx Cymbalta 60 mg BID for fibromyalgia treatment.   Refilled Rx Trazodone 50 mg/day for insomnia treatment.   Refilled Rx Nortriptyline 25 mg/day for headache prevention and insomnia and fibromyalgia treatment.   Refilled Rx Propanolol 80 mg/day, for migraine prevention.  Blood pressure is elevated with at times having more swelling related suspected to increase salt intake.  Will restart diuretic and will use  Triamterene-HCTZ 37.5-25 mg/day for any weight fluctuations greater than 3 lb in a 24 hour.  And she will also monitor to determine if losartan is needed for any blood pressure readings above 120/80.  She can take both of these at the same time but she may find that she may not need the losartan if taking the triamterene hydrochlorothiazide will refill the Losartan 100 mg/day for HTN treatment.   LFTs noted to be slightly elevated however improving.  Did discuss potential causes including medications and estrogen.  Patient is willing to try to reduce dose of estrogen will reduce dose of estrogen from 2 mg daily to alternating 2 mg with 1 mg every other day x1 month.  If hot flashes reoccur can resume back to 2 mg daily.  Will repeat liver function tests again in 6 weeks.  TSH normal however free T4  low.  With a history of Hashimoto's will check thyroid labs again in 6 weeks.  As for food allergy intolerances discussed dietary measures such as reducing sugar and gluten diet to see if this makes any impact in her swelling or half she feels with dietary changes.  Lab work ordered to be completed in 6 weeks.   Prevnar 20 administered in clinic today.  For prevention exam measures.  Instructed to f/u in 7 weeks.     There are no Patient Instructions on file for this visit.    Follow up in about 7 weeks (around 1/23/2024) for f/u Telemed Dr Hu/ labs f/u .      LABS:   Lab Results   Component Value Date    HGBA1C 5.3 11/28/2023    HGBA1C 5.3 09/02/2021    HGBA1C 5.2 11/14/2017      Lab Results   Component Value Date    CHOL 226 (H) 11/28/2023    CHOL 172 12/06/2021    CHOL 229 (H) 09/02/2021     Lab Results   Component Value Date    LDLCALC 126.4 11/28/2023    LDLCALC 84.8 12/06/2021    LDLCALC 141.8 09/02/2021     Lab Results   Component Value Date    WBC 5.14 11/28/2023    HGB 13.1 11/28/2023    HCT 41.1 11/28/2023     11/28/2023    CHOL 226 (H) 11/28/2023    TRIG 118 11/28/2023    HDL 76 (H) 11/28/2023    ALT 49 (H) 11/28/2023    AST 34 11/28/2023     11/28/2023    K 3.8 11/28/2023     11/28/2023    CREATININE 1.0 11/28/2023    BUN 14 11/28/2023    CO2 24 11/28/2023    TSH 1.169 11/28/2023    INR 1.0 11/20/2014    HGBA1C 5.3 11/28/2023       The 10-year ASCVD risk score (Hill RUSSELL, et al., 2019) is: 3.7%    Values used to calculate the score:      Age: 58 years      Sex: Female      Is Non- : No      Diabetic: No      Tobacco smoker: No      Systolic Blood Pressure: 140 mmHg      Is BP treated: Yes      HDL Cholesterol: 76 mg/dL      Total Cholesterol: 226 mg/dL  X-Ray Chest AP Portable  Narrative: EXAMINATION:  XR CHEST AP PORTABLE    CLINICAL HISTORY:  post transbronchial biopsy;    TECHNIQUE:  Single frontal view of the chest was performed.    COMPARISON:  Chest CT  10/11/2023    FINDINGS:  Cardiomediastinal silhouettewithin normal limits for age.    Extensive patchy bilateral lung opacities, right greater than left redemonstrated.  No pneumothorax is appreciated.    Gaseous distension of the stomach incidentally noted.  Impression: No evidence of complication following lung biopsy    Multifocal pulmonary opacities appear worse than on the recent CT.    Electronically signed by: Cali Frausto Jr  Date:    10/14/2023  Time:    09:53  Prevention exam performed at the same time as problem based visit with concerns and treatments addressed by physician, Patient desired to have these issues addressed at the same time thus 25 modifier added to reflect the time spent on the problem based encounter as well as the prevention services that were performed.      50  minutes of total time spent on the encounter, which includes face to face time and non-face to face time preparing to see the patient (eg, review of tests), Obtaining and/or reviewing separately obtained history, Documenting clinical information in the electronic or other health record, Independently interpreting results (not separately reported) and communicating results to the patient/family/caregiver, or Care coordination (not separately reported).    Scribe Attestation:   I, David Restrepo, am scribing for, and in the presence of, Dr. Esthela Hu MD. I performed the above scribed service and the documentation accurately describes the services I performed. I attest to the accuracy of the note.    I, Dr. Esthela Hu MD, reviewed documentation as scribed above. I personally performed the services described in this documentation.  I agree that the record reflects my personal performance and is accurate and complete. Esthela Hu MD.    12/05/2023

## 2023-12-08 ENCOUNTER — PATIENT MESSAGE (OUTPATIENT)
Dept: PULMONOLOGY | Facility: CLINIC | Age: 58
End: 2023-12-08
Payer: COMMERCIAL

## 2023-12-08 DIAGNOSIS — J82.81 EOSINOPHILIC PNEUMONIA: ICD-10-CM

## 2023-12-11 ENCOUNTER — OFFICE VISIT (OUTPATIENT)
Dept: GASTROENTEROLOGY | Facility: CLINIC | Age: 58
End: 2023-12-11
Payer: COMMERCIAL

## 2023-12-11 ENCOUNTER — PATIENT MESSAGE (OUTPATIENT)
Dept: OTOLARYNGOLOGY | Facility: CLINIC | Age: 58
End: 2023-12-11
Payer: COMMERCIAL

## 2023-12-11 VITALS
SYSTOLIC BLOOD PRESSURE: 126 MMHG | DIASTOLIC BLOOD PRESSURE: 83 MMHG | HEIGHT: 67 IN | HEART RATE: 63 BPM | BODY MASS INDEX: 28.58 KG/M2 | WEIGHT: 182.13 LBS

## 2023-12-11 DIAGNOSIS — K50.00 CROHN'S DISEASE OF SMALL INTESTINE WITHOUT COMPLICATION: ICD-10-CM

## 2023-12-11 DIAGNOSIS — R19.7 DIARRHEA, UNSPECIFIED TYPE: Primary | ICD-10-CM

## 2023-12-11 DIAGNOSIS — J82.81 EOSINOPHILIC PNEUMONIA: ICD-10-CM

## 2023-12-11 PROCEDURE — 3079F PR MOST RECENT DIASTOLIC BLOOD PRESSURE 80-89 MM HG: ICD-10-PCS | Mod: CPTII,S$GLB,, | Performed by: INTERNAL MEDICINE

## 2023-12-11 PROCEDURE — 99999 PR PBB SHADOW E&M-EST. PATIENT-LVL V: ICD-10-PCS | Mod: PBBFAC,,, | Performed by: INTERNAL MEDICINE

## 2023-12-11 PROCEDURE — 3066F PR DOCUMENTATION OF TREATMENT FOR NEPHROPATHY: ICD-10-PCS | Mod: CPTII,S$GLB,, | Performed by: INTERNAL MEDICINE

## 2023-12-11 PROCEDURE — 3044F PR MOST RECENT HEMOGLOBIN A1C LEVEL <7.0%: ICD-10-PCS | Mod: CPTII,S$GLB,, | Performed by: INTERNAL MEDICINE

## 2023-12-11 PROCEDURE — 3074F SYST BP LT 130 MM HG: CPT | Mod: CPTII,S$GLB,, | Performed by: INTERNAL MEDICINE

## 2023-12-11 PROCEDURE — 99999 PR PBB SHADOW E&M-EST. PATIENT-LVL V: CPT | Mod: PBBFAC,,, | Performed by: INTERNAL MEDICINE

## 2023-12-11 PROCEDURE — 3008F BODY MASS INDEX DOCD: CPT | Mod: CPTII,S$GLB,, | Performed by: INTERNAL MEDICINE

## 2023-12-11 PROCEDURE — 3060F PR POS MICROALBUMINURIA RESULT DOCUMENTED/REVIEW: ICD-10-PCS | Mod: CPTII,S$GLB,, | Performed by: INTERNAL MEDICINE

## 2023-12-11 PROCEDURE — 3060F POS MICROALBUMINURIA REV: CPT | Mod: CPTII,S$GLB,, | Performed by: INTERNAL MEDICINE

## 2023-12-11 PROCEDURE — 99213 PR OFFICE/OUTPT VISIT, EST, LEVL III, 20-29 MIN: ICD-10-PCS | Mod: S$GLB,,, | Performed by: INTERNAL MEDICINE

## 2023-12-11 PROCEDURE — 4010F ACE/ARB THERAPY RXD/TAKEN: CPT | Mod: CPTII,S$GLB,, | Performed by: INTERNAL MEDICINE

## 2023-12-11 PROCEDURE — 3074F PR MOST RECENT SYSTOLIC BLOOD PRESSURE < 130 MM HG: ICD-10-PCS | Mod: CPTII,S$GLB,, | Performed by: INTERNAL MEDICINE

## 2023-12-11 PROCEDURE — 3008F PR BODY MASS INDEX (BMI) DOCUMENTED: ICD-10-PCS | Mod: CPTII,S$GLB,, | Performed by: INTERNAL MEDICINE

## 2023-12-11 PROCEDURE — 4010F PR ACE/ARB THEARPY RXD/TAKEN: ICD-10-PCS | Mod: CPTII,S$GLB,, | Performed by: INTERNAL MEDICINE

## 2023-12-11 PROCEDURE — 3066F NEPHROPATHY DOC TX: CPT | Mod: CPTII,S$GLB,, | Performed by: INTERNAL MEDICINE

## 2023-12-11 PROCEDURE — 99213 OFFICE O/P EST LOW 20 MIN: CPT | Mod: S$GLB,,, | Performed by: INTERNAL MEDICINE

## 2023-12-11 PROCEDURE — 3079F DIAST BP 80-89 MM HG: CPT | Mod: CPTII,S$GLB,, | Performed by: INTERNAL MEDICINE

## 2023-12-11 PROCEDURE — 3044F HG A1C LEVEL LT 7.0%: CPT | Mod: CPTII,S$GLB,, | Performed by: INTERNAL MEDICINE

## 2023-12-11 RX ORDER — PREDNISONE 10 MG/1
30 TABLET ORAL DAILY
Qty: 180 TABLET | Refills: 0 | Status: SHIPPED | OUTPATIENT
Start: 2023-12-11 | End: 2024-02-09

## 2023-12-11 RX ORDER — PREDNISONE 10 MG/1
30 TABLET ORAL DAILY
Qty: 180 TABLET | Refills: 0 | Status: SHIPPED | OUTPATIENT
Start: 2023-12-11 | End: 2023-12-11 | Stop reason: SDUPTHER

## 2023-12-11 NOTE — PROGRESS NOTES
Clinic Consult:  [facility] Consultation Note    Reason for Consult:  The primary encounter diagnosis was Diarrhea, unspecified type. Diagnoses of Eosinophilic pneumonia and Crohn's disease of small intestine without complication were also pertinent to this visit.    PCP: Esthela Hu       HPI:  This is a 58 y.o. female here for follow up.        Interval history:  Since last visit, she has been admitted to [facility].  Work done that included bronch and diagnosis of Churg Jasper given.  She was discharged on steroids.  Currently on prednisone 30 mg daily  Was discharged on 80 mg prednisone and felt great, said gut symptoms were all good, sinus issues were gone and breathing was good.  She was dropped to 20 and then bumped back to 30 and says symptoms are all returning.  Diarrhea is returning.Taking lomotil 3-4 days a week.  Can't figure out if certain foods contribute but notices that etoh does worsen.  She had one drink the other night.        ROS:  CONSTITUTIONAL: Denies weight change,  fatigue, fevers, chills, night sweats.  EYES: No changes in vision.   ENT: No oral lesions or sore throat.  HEMATOLOGICAL/Lymph: Denies bleeding tendency, bruising tendency. No swellings or enlarged lymph nodes.  CARDIOVASCULAR: Denies chest pain, shortness of breath, orthopnea and edema.  RESPIRATORY: Denies cough, hemoptysis, dyspnea, and wheezing.  GI: See HPI.  : Denies dysuria and hematuria  MUSCULOSKELETAL: Denies joint pain or swelling, back pain and muscle pain.  SKIN: Denies rashes.  NEUROLOGIC: Denies headaches, seizures and numbness.  PSYCHIATRIC: Denies depression or anxiety.  ENDOCRINE: Denies heat or cold intolerance and excessive thirst or urination.    Medical History:   Past Medical History:   Diagnosis Date    Allergic rhinitis     Asthma     Chronic pansinusitis     Crohn's disease     Ileal involvement, previously on Remicade, Asacol, Prednisone    Fibromyalgia     Hyperlipidemia      Hypertension     Immunosuppression     Migraine     Obstructive sleep apnea     CPAP at night    Sciatica        Surgical History:  Past Surgical History:   Procedure Laterality Date    BLADDER SURGERY      sling was created by her muscles     BRONCHOSCOPY N/A 2023    Procedure: BRONCHOSCOPY;  Surgeon: [facility] Diagnostic Provider;  Location: [facility] OR 2ND FLR;  Service: Anesthesiology;  Laterality: N/A;    BRONCHOSCOPY WITH FLUOROSCOPY N/A 10/13/2023    Procedure: BRONCHOSCOPY, WITH FLUOROSCOPY;  Surgeon: Mary Molina MD;  Location: [facility] OR 2ND FLR;  Service: Pulmonary;  Laterality: N/A;     SECTION      COLONOSCOPY N/A 2017    Procedure: COLONOSCOPY;  Surgeon: Kin Dyer MD;  Location: [facility] ENDO;  Service: Endoscopy;  Laterality: N/A;    COLONOSCOPY N/A 2018    Procedure: COLONOSCOPY;  Surgeon: Kyra Vallecillo MD;  Location: [facility];  Service: Endoscopy;  Laterality: N/A;    COLONOSCOPY N/A 2020    Procedure: COLONOSCOPY;  Surgeon: Nicole Leal MD;  Location: [facility];  Service: Endoscopy;  Laterality: N/A;    COLONOSCOPY N/A 2022    Procedure: COLONOSCOPY;  Surgeon: Shay Bruce MD;  Location: [facility];  Service: Endoscopy;  Laterality: N/A;    COLONOSCOPY N/A 2023    Procedure: COLONOSCOPY;  Surgeon: Nicole Leal MD;  Location: [facility];  Service: Endoscopy;  Laterality: N/A;    DEBRIDEMENT Bilateral 2020    Procedure: DEBRIDEMENT;  Surgeon: Matthias Roach MD;  Location: [facility] OR 2ND FLR;  Service: ENT;  Laterality: Bilateral;    ESOPHAGOGASTRODUODENOSCOPY N/A 2020    Procedure: ESOPHAGOGASTRODUODENOSCOPY (EGD);  Surgeon: Nicole Leal MD;  Location: [facility];  Service: Endoscopy;  Laterality: N/A;    ESOPHAGOGASTRODUODENOSCOPY N/A 2022    Procedure: EGD (ESOPHAGOGASTRODUODENOSCOPY);  Surgeon: Shay Bruce MD;  Location: [facility];  Service: Endoscopy;  Laterality: N/A;     ESOPHAGOGASTRODUODENOSCOPY N/A 02/02/2023    Procedure: EGD (ESOPHAGOGASTRODUODENOSCOPY);  Surgeon: Nicole Leal MD;  Location: [facility];  Service: Endoscopy;  Laterality: N/A;    FINGER SURGERY      joint relpacement, left hand index finger    FUNCTIONAL ENDOSCOPIC SINUS SURGERY (FESS) USING COMPUTER-ASSISTED NAVIGATION Bilateral 07/31/2019    Procedure: FESS, USING COMPUTER-ASSISTED NAVIGATION;  Surgeon: Manish Shaffer MD;  Location: [facility] OR;  Service: ENT;  Laterality: Bilateral;    FUNCTIONAL ENDOSCOPIC SINUS SURGERY (FESS) USING COMPUTER-ASSISTED NAVIGATION Bilateral 09/25/2020    Procedure: FESS, USING COMPUTER-ASSISTED NAVIGATION SPHENOID;  Surgeon: Matthias Roach MD;  Location: [facility] OR [address] [place] [address];  Service: ENT;  Laterality: Bilateral;  TIVA    FUNCTIONAL ENDOSCOPIC SINUS SURGERY (FESS) USING COMPUTER-ASSISTED NAVIGATION Bilateral 09/30/2022    Procedure: FESS, USING COMPUTER-ASSISTED NAVIGATION;  Surgeon: Matthias Roach MD;  [facility];  Service: ENT;  Laterality: Bilateral;    HYSTERECTOMY  2001    INTRALUMINAL GASTROINTESTINAL TRACT IMAGING VIA CAPSULE N/A 03/03/2023    Procedure: IMAGING PROCEDURE, GI TRACT, INTRALUMINAL, VIA CAPSULE;  Surgeon: First Available Mead;  Location: [facility];  Service: Endoscopy;  Laterality: N/A;    SINUS SURGERY      WISDOM TOOTH EXTRACTION         Family History:   Family History   Problem Relation Age of Onset    Breast cancer Mother     Hypertension Mother     Allergies Mother     Cancer Mother     Depression Mother     Kidney disease Father 64        ESRD on HD    Scleroderma Father     Arthritis Father     Diabetes Father     Hypertension Father     Breast cancer Maternal Grandmother     Heart attack Maternal Grandmother     COPD Maternal Grandmother 72    Cancer Maternal Grandmother     Cancer Paternal Grandmother 70        colon    Hypertension Brother        Social History:   Social History     Tobacco Use    Smoking status: Never      Passive exposure: Never    Smokeless tobacco: Never   Substance Use Topics    Alcohol use: No    Drug use: No       Allergies: Reviewed    Home Medications:   Medication List with Changes/Refills   New Medications    LEVOFLOXACIN (LEVAQUIN) 750 MG TABLET    Take 1 tablet (750 mg total) by mouth once daily. for 10 days    MEPOLIZUMAB (NUCALA) 100 MG/ML SYRG    Inject 3 mLs (300 mg total) into the skin every 28 days.    MONTELUKAST (SINGULAIR) 10 MG TABLET    Take 1 tablet (10 mg total) by mouth every evening.    MONTELUKAST (SINGULAIR) 10 MG TABLET    Take 1 tablet (10 mg total) by mouth every evening.   Current Medications    ACETAMINOPHEN (TYLENOL) 500 MG TABLET    Take 2 tablets (1,000 mg total) by mouth every 6 (six) hours as needed for Pain.    ALBUTEROL-IPRATROPIUM (DUO-NEB) 2.5 MG-0.5 MG/3 ML NEBULIZER SOLUTION    Take 3 mLs by nebulization every 6 (six) hours as needed for Wheezing. Rescue    B COMPLEX VITAMINS CAPSULE    Take 1 capsule by mouth once daily.    CALCIUM CARBONATE/VITAMIN D3 (VITAMIN D-3 ORAL)    Take 2 tablets by mouth once daily.    CETIRIZINE (ZYRTEC) 10 MG TABLET    Take 10 mg by mouth 2 (two) times a day.    CLOTRIMAZOLE-BETAMETHASONE 1-0.05% (LOTRISONE) CREAM    Apply topically 2 (two) times daily.    DILTIAZEM HCL (DILTIAZEM 2% - LIDOCAINE 5% CREAM)    Apply peasize amount topically to anal area.    DIPHENHYDRAMINE-ALUMINUM-MAGNESIUM HYDROXIDE-SIMETHICONE-LIDOCAINE HCL 2%    Swish and spit 15 mLs every 4 (four) hours as needed (oral ulcers, pain).    DULOXETINE (CYMBALTA) 60 MG CAPSULE    Take 1 capsule (60 mg total) by mouth 2 (two) times daily.    ESTRADIOL (ESTRACE) 2 MG TABLET    TAKE 1 TABLET DAILY    FISH OIL/BORAGE/FLAX/OM3,6,9 1 (OMEGA 3-6-9 ORAL)    Take 2 tablets by mouth once daily.    FLUTICASONE PROPIONATE (FLONASE) 50 MCG/ACTUATION NASAL SPRAY    2 sprays (100 mcg total) by Each Nostril route once daily.    IMMUN GLOB G,IGG,-PRO-IGA 0-50 (HIZENTRA) 10 GRAM/50 ML (20 %) SOLN     Inject 70 mLs (14 g total) into the skin every 7 days.    IPRATROPIUM (ATROVENT) 0.02 % NEBULIZER SOLUTION    Take by nebulization 4 (four) times daily as needed for Wheezing.    LOSARTAN (COZAAR) 100 MG TABLET    Take 1 tablet (100 mg total) by mouth daily as needed (high blood pressure (>120/80)).    NORTRIPTYLINE (PAMELOR) 25 MG CAPSULE    Take 1 capsule (25 mg total) by mouth every evening.    PANTOPRAZOLE (PROTONIX) 40 MG TABLET    Take 1 tablet (40 mg total) by mouth once daily.    PREGABALIN (LYRICA) 150 MG CAPSULE    Take 1 capsule (150 mg total) by mouth 3 (three) times daily.    PROPRANOLOL (INDERAL LA) 80 MG 24 HR CAPSULE    Take 1 capsule (80 mg total) by mouth once daily.    RISANKIZUMAB-RZAA 360 MG/2.4 ML (150 MG/ML) INJT    Inject 360 mg into the skin every 8 weeks. for 6 doses    RIZATRIPTAN (MAXALT) 10 MG TABLET    TAKE 1 TABLET IF NEEDED FOR MIGRAINES. MAX 2 TABLETS IN 24 HOURS.    SULFAMETHOXAZOLE-TRIMETHOPRIM 800-160MG (BACTRIM DS) 800-160 MG TAB    Take 1 tablet by mouth once daily.    SULFAMETHOXAZOLE-TRIMETHOPRIM 800-160MG (BACTRIM DS) 800-160 MG TAB    Take 1 tablet by mouth once daily.    TOPIRAMATE (TOPAMAX) 100 MG TABLET    Take 1 tablet (100 mg total) by mouth 2 (two) times daily.    TRAZODONE (DESYREL) 50 MG TABLET    Take 1 tablet (50 mg total) by mouth every evening.    TRIAMTERENE-HYDROCHLOROTHIAZIDE 37.5-25 MG (DYAZIDE) 37.5-25 MG PER CAPSULE    Take 1 capsule by mouth daily as needed (for wt fluctuation > 3 lbs in 24 hrs).    XYLITOL, BULK, MISC    EMPTY CONTENTS OF 1 CAPSULE INTO NASAL IRRIGATION SYSTEM, ADD DISTILLED WATER, SALT PACK, MIX & IRRIGATE. PERFORM 2 TIMES DAILY    ZINC ORAL    Take 1 tablet by mouth once daily.   Changed and/or Refilled Medications    Modified Medication Previous Medication    PREDNISONE (DELTASONE) 10 MG TABLET predniSONE (DELTASONE) 10 MG tablet       Take 3 tablets (30 mg total) by mouth once daily.    Take 3 tablets (30 mg total) by mouth once  "daily.         Physical Exam:  Vital Signs:  /83 (BP Location: Left arm)   Pulse 63   Ht 5' 7" (1.702 m)   Wt 82.6 kg (182 lb 1.6 oz)   BMI 28.52 kg/m²   Body mass index is 28.52 kg/m².      GENERAL: No acute distress, A&Ox3  EYES: Anicteric, no pallor noted.  ENT: OP clear  NECK: Supple, no masses, no thyromegally.  CHEST: Equal breath sounds bilaterally, no wheezing.  CARDIOVASCULAR: Regular rate and rhythm. Murmurs, rubs and gallops absent.  ABDOMEN: soft, non-tender, non-distended, normal bowel sounds, no hepatosplenomegaly   EXTREMITIES: No clubbing, cyanosis or edema.  SKIN: Without lesion or erythema.  LYMPH: No cervical, axillary lymphadenopathy palpable.   NEUROLOGICAL: Grossly normal, no asterixis present.    Labs: Pertinent labs reviewed.    Assessment and Plan:  Her diagnosis as related to her gut symptoms has been in question.  She has been diagnosed as Crohn's, however, has never responded to biologics.  There have scopes with positive findings on pathology and there have been times off of medications when scopes and biopsies have been negative.  Her symptoms have persisted over this time but often intermittent.  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  Will need to discuss with multidisciplinary team regarding treatment.  An anti-interleukin might be the best choice but will need to be discussed as a team.        Diarrhea, unspecified type    Eosinophilic pneumonia  -     predniSONE (DELTASONE) 10 MG tablet; Take 3 tablets (30 mg total) by mouth once daily.  Dispense: 180 tablet; Refill: 0    Crohn's disease of small intestine without complication          Thank you so much for allowing me to participate in the care of Jaylin Leal MD    "

## 2023-12-13 ENCOUNTER — PATIENT MESSAGE (OUTPATIENT)
Dept: PRIMARY CARE CLINIC | Facility: CLINIC | Age: 58
End: 2023-12-13
Payer: COMMERCIAL

## 2023-12-13 ENCOUNTER — TELEPHONE (OUTPATIENT)
Dept: PRIMARY CARE CLINIC | Facility: CLINIC | Age: 58
End: 2023-12-13
Payer: COMMERCIAL

## 2023-12-13 NOTE — TELEPHONE ENCOUNTER
----- Message from Rachele Smith sent at 12/13/2023  2:54 PM CST -----  Contact: Jaylin Jackson is calling in regards to getting a call back. Please call back at [phone]                      Thanks  KT

## 2023-12-13 NOTE — TELEPHONE ENCOUNTER
----- Message from Rachele Smith sent at 12/13/2023  2:54 PM CST -----  Contact: Jaylin Jackson is calling in regards to getting a call back. Please call back at [phone]                      Thanks  KT

## 2023-12-13 NOTE — TELEPHONE ENCOUNTER
I'm not sure why they would have sent her the prevnar 20 because I ordered it during her visit on 12/5/23 to be given in the office and it was given in the office as well.   I'm not sure how a pneumonia shot [facility].     Let's see if we can contact someone at her pharmacy ([facility] out [facility] or [facility]) and see if there is a way to figure out what happened. I'm not sure that she will be able to return the Rx since it has been delivered to her.

## 2023-12-19 ENCOUNTER — LAB VISIT (OUTPATIENT)
Dept: LAB | Facility: HOSPITAL | Age: 58
End: 2023-12-19
Attending: STUDENT IN AN ORGANIZED HEALTH CARE EDUCATION/TRAINING PROGRAM
Payer: COMMERCIAL

## 2023-12-19 ENCOUNTER — OFFICE VISIT (OUTPATIENT)
Dept: RHEUMATOLOGY | Facility: CLINIC | Age: 58
End: 2023-12-19
Payer: COMMERCIAL

## 2023-12-19 VITALS
HEIGHT: 67 IN | BODY MASS INDEX: 28.93 KG/M2 | DIASTOLIC BLOOD PRESSURE: 85 MMHG | SYSTOLIC BLOOD PRESSURE: 142 MMHG | WEIGHT: 184.31 LBS | HEART RATE: 63 BPM

## 2023-12-19 DIAGNOSIS — J82.81 EOSINOPHILIC PNEUMONIA: ICD-10-CM

## 2023-12-19 DIAGNOSIS — M30.1 EOSINOPHILIC GRANULOMATOSIS WITH POLYANGIITIS (EGPA): Primary | ICD-10-CM

## 2023-12-19 DIAGNOSIS — M30.1 EOSINOPHILIC GRANULOMATOSIS WITH POLYANGIITIS (EGPA): ICD-10-CM

## 2023-12-19 DIAGNOSIS — D72.18 EOSINOPHILIC GRANULOMATOSIS WITH POLYANGIITIS (EGPA): Primary | ICD-10-CM

## 2023-12-19 DIAGNOSIS — D72.18 EOSINOPHILIC GRANULOMATOSIS WITH POLYANGIITIS (EGPA): ICD-10-CM

## 2023-12-19 LAB
CRP SERPL-MCNC: <0.3 MG/L (ref 0–8.2)
HBV SURFACE AB SER-ACNC: 441.87 MIU/ML
HBV SURFACE AB SER-ACNC: REACTIVE M[IU]/ML
HBV SURFACE AG SERPL QL IA: NORMAL
HCV AB SERPL QL IA: NORMAL
HIV 1+2 AB+HIV1 P24 AG SERPL QL IA: NORMAL

## 2023-12-19 PROCEDURE — 4010F PR ACE/ARB THEARPY RXD/TAKEN: ICD-10-PCS | Mod: CPTII,S$GLB,, | Performed by: STUDENT IN AN ORGANIZED HEALTH CARE EDUCATION/TRAINING PROGRAM

## 2023-12-19 PROCEDURE — 3008F BODY MASS INDEX DOCD: CPT | Mod: CPTII,S$GLB,, | Performed by: STUDENT IN AN ORGANIZED HEALTH CARE EDUCATION/TRAINING PROGRAM

## 2023-12-19 PROCEDURE — 3079F PR MOST RECENT DIASTOLIC BLOOD PRESSURE 80-89 MM HG: ICD-10-PCS | Mod: CPTII,S$GLB,, | Performed by: STUDENT IN AN ORGANIZED HEALTH CARE EDUCATION/TRAINING PROGRAM

## 2023-12-19 PROCEDURE — 4010F ACE/ARB THERAPY RXD/TAKEN: CPT | Mod: CPTII,S$GLB,, | Performed by: STUDENT IN AN ORGANIZED HEALTH CARE EDUCATION/TRAINING PROGRAM

## 2023-12-19 PROCEDURE — 3008F PR BODY MASS INDEX (BMI) DOCUMENTED: ICD-10-PCS | Mod: CPTII,S$GLB,, | Performed by: STUDENT IN AN ORGANIZED HEALTH CARE EDUCATION/TRAINING PROGRAM

## 2023-12-19 PROCEDURE — 3044F PR MOST RECENT HEMOGLOBIN A1C LEVEL <7.0%: ICD-10-PCS | Mod: CPTII,S$GLB,, | Performed by: STUDENT IN AN ORGANIZED HEALTH CARE EDUCATION/TRAINING PROGRAM

## 2023-12-19 PROCEDURE — 3060F PR POS MICROALBUMINURIA RESULT DOCUMENTED/REVIEW: ICD-10-PCS | Mod: CPTII,S$GLB,, | Performed by: STUDENT IN AN ORGANIZED HEALTH CARE EDUCATION/TRAINING PROGRAM

## 2023-12-19 PROCEDURE — 85652 RBC SED RATE AUTOMATED: CPT | Performed by: STUDENT IN AN ORGANIZED HEALTH CARE EDUCATION/TRAINING PROGRAM

## 2023-12-19 PROCEDURE — 3066F NEPHROPATHY DOC TX: CPT | Mod: CPTII,S$GLB,, | Performed by: STUDENT IN AN ORGANIZED HEALTH CARE EDUCATION/TRAINING PROGRAM

## 2023-12-19 PROCEDURE — 99215 OFFICE O/P EST HI 40 MIN: CPT | Mod: S$GLB,,, | Performed by: STUDENT IN AN ORGANIZED HEALTH CARE EDUCATION/TRAINING PROGRAM

## 2023-12-19 PROCEDURE — 87389 HIV-1 AG W/HIV-1&-2 AB AG IA: CPT | Performed by: STUDENT IN AN ORGANIZED HEALTH CARE EDUCATION/TRAINING PROGRAM

## 2023-12-19 PROCEDURE — 99215 PR OFFICE/OUTPT VISIT, EST, LEVL V, 40-54 MIN: ICD-10-PCS | Mod: S$GLB,,, | Performed by: STUDENT IN AN ORGANIZED HEALTH CARE EDUCATION/TRAINING PROGRAM

## 2023-12-19 PROCEDURE — 86480 TB TEST CELL IMMUN MEASURE: CPT | Performed by: STUDENT IN AN ORGANIZED HEALTH CARE EDUCATION/TRAINING PROGRAM

## 2023-12-19 PROCEDURE — 3077F PR MOST RECENT SYSTOLIC BLOOD PRESSURE >= 140 MM HG: ICD-10-PCS | Mod: CPTII,S$GLB,, | Performed by: STUDENT IN AN ORGANIZED HEALTH CARE EDUCATION/TRAINING PROGRAM

## 2023-12-19 PROCEDURE — 86140 C-REACTIVE PROTEIN: CPT | Performed by: STUDENT IN AN ORGANIZED HEALTH CARE EDUCATION/TRAINING PROGRAM

## 2023-12-19 PROCEDURE — 99999 PR PBB SHADOW E&M-EST. PATIENT-LVL V: CPT | Mod: PBBFAC,,, | Performed by: STUDENT IN AN ORGANIZED HEALTH CARE EDUCATION/TRAINING PROGRAM

## 2023-12-19 PROCEDURE — 3066F PR DOCUMENTATION OF TREATMENT FOR NEPHROPATHY: ICD-10-PCS | Mod: CPTII,S$GLB,, | Performed by: STUDENT IN AN ORGANIZED HEALTH CARE EDUCATION/TRAINING PROGRAM

## 2023-12-19 PROCEDURE — 3077F SYST BP >= 140 MM HG: CPT | Mod: CPTII,S$GLB,, | Performed by: STUDENT IN AN ORGANIZED HEALTH CARE EDUCATION/TRAINING PROGRAM

## 2023-12-19 PROCEDURE — 86706 HEP B SURFACE ANTIBODY: CPT | Performed by: STUDENT IN AN ORGANIZED HEALTH CARE EDUCATION/TRAINING PROGRAM

## 2023-12-19 PROCEDURE — 86682 HELMINTH ANTIBODY: CPT | Performed by: STUDENT IN AN ORGANIZED HEALTH CARE EDUCATION/TRAINING PROGRAM

## 2023-12-19 PROCEDURE — 99999 PR PBB SHADOW E&M-EST. PATIENT-LVL V: ICD-10-PCS | Mod: PBBFAC,,, | Performed by: STUDENT IN AN ORGANIZED HEALTH CARE EDUCATION/TRAINING PROGRAM

## 2023-12-19 PROCEDURE — 87340 HEPATITIS B SURFACE AG IA: CPT | Performed by: STUDENT IN AN ORGANIZED HEALTH CARE EDUCATION/TRAINING PROGRAM

## 2023-12-19 PROCEDURE — 86592 SYPHILIS TEST NON-TREP QUAL: CPT | Performed by: STUDENT IN AN ORGANIZED HEALTH CARE EDUCATION/TRAINING PROGRAM

## 2023-12-19 PROCEDURE — 3044F HG A1C LEVEL LT 7.0%: CPT | Mod: CPTII,S$GLB,, | Performed by: STUDENT IN AN ORGANIZED HEALTH CARE EDUCATION/TRAINING PROGRAM

## 2023-12-19 PROCEDURE — 83516 IMMUNOASSAY NONANTIBODY: CPT | Performed by: STUDENT IN AN ORGANIZED HEALTH CARE EDUCATION/TRAINING PROGRAM

## 2023-12-19 PROCEDURE — 86803 HEPATITIS C AB TEST: CPT | Performed by: STUDENT IN AN ORGANIZED HEALTH CARE EDUCATION/TRAINING PROGRAM

## 2023-12-19 PROCEDURE — 86704 HEP B CORE ANTIBODY TOTAL: CPT | Performed by: STUDENT IN AN ORGANIZED HEALTH CARE EDUCATION/TRAINING PROGRAM

## 2023-12-19 PROCEDURE — 83516 IMMUNOASSAY NONANTIBODY: CPT | Mod: 59 | Performed by: STUDENT IN AN ORGANIZED HEALTH CARE EDUCATION/TRAINING PROGRAM

## 2023-12-19 PROCEDURE — 3060F POS MICROALBUMINURIA REV: CPT | Mod: CPTII,S$GLB,, | Performed by: STUDENT IN AN ORGANIZED HEALTH CARE EDUCATION/TRAINING PROGRAM

## 2023-12-19 PROCEDURE — 3079F DIAST BP 80-89 MM HG: CPT | Mod: CPTII,S$GLB,, | Performed by: STUDENT IN AN ORGANIZED HEALTH CARE EDUCATION/TRAINING PROGRAM

## 2023-12-19 NOTE — PROGRESS NOTES
RHEUMATOLOGY CLINIC ESTABLISHED PATIENT VISIT    Reason for consult:- eosinophilic granulomatosis with polyangiitis    Chief complaints, HPI, ROS, EXAM, Assessment & Plans:-    Jaylin Murguia is a 58 y.o. 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  For at least the past several years patient has been treated for asthma.  States she has also had episodes of hemoptysis in the past but none recently.  Also follows with ENT and has had recurrent sinusitis.  Previously diagnosed with Pseudomonas sinusitis.  She has followed with Infectious Disease as well.  She is on Hizentra for immunodeficiency.  She also follows with GI for diagnosis Crohn's disease for which she has been on Skyrizi.  She had recent hospitalization October 11 through October 15th where she was treated for multifocal pneumonia.  She had bronchoscopy that was consistent with chronic eosinophilic pneumonia although suspicion for EGPA remains.  She was found to have positive MPO antibodies as well.  She is currently on prednisone 30 mg daily and feels that her symptoms are relatively well controlled on this dosage but if she goes lower she starts having coughing and shortness of breath.  She is on Bactrim for PCP prophylaxis.  Denies any chest pain or palpitations.  No visual changes.  No rashes.  Rheumatologic review of systems otherwise negative.  No synovitis, dactylitis or enthesitis.  No respiratory distress or conversational dyspnea.    Reviewed all available old and outside pertinent medical records.    All lab results personally reviewed and interpreted by me.    1. Eosinophilic granulomatosis with polyangiitis (EGPA)    2. Eosinophilic pneumonia        Problem List Items Addressed This Visit          Pulmonary    Eosinophilic pneumonia    Relevant Medications    mepolizumab (NUCALA) 100 mg/mL Syrg    Other Relevant Orders    Proteinase 3 Autoantibodies    Myeloperoxidase Antibody  (MPO)    C-Reactive Protein    Sedimentation rate    HIV 1/2 Ag/Ab (4th Gen)    Hepatitis B surface antigen    HBcAB    Hepatitis B surface antibody    Hepatitis C antibody    Quantiferon Gold TB    RPR    Strongyloides IgG Antibodies     Other Visit Diagnoses       Eosinophilic granulomatosis with polyangiitis (EGPA)    -  Primary    Relevant Medications    mepolizumab (NUCALA) 100 mg/mL Syrg    Other Relevant Orders    Proteinase 3 Autoantibodies    Myeloperoxidase Antibody (MPO)    C-Reactive Protein    Sedimentation rate    HIV 1/2 Ag/Ab (4th Gen)    Hepatitis B surface antigen    HBcAB    Hepatitis B surface antibody    Hepatitis C antibody    Quantiferon Gold TB    RPR    Strongyloides IgG Antibodies            Patient presenting to be evaluated for treatment of possible eosinophilic granulomatosis with polyangiitis versus chronic eosinophilic pneumonia  Suspicion for EGPA with history of recurrent sinusitis as well as inflammatory bowel disease previously treated as Crohn's but EGPA can also affect the GI tract similarly  She had BAL during recent hospitalization that was consistent with chronic eosinophilic pneumonia  Of note, she had positive MPO antibodies in October as well  Currently she is on prednisone 30 mg daily and has had difficulty tapering below this dosage  She has been on Skyrizi for her Crohn's disease  She also was on IVIG for immunodeficiency  Recommend trial of Nucala for EGPA versus chronic eosinophilic pneumonia  Ideal would discontinue Skyrizi as to not risk more significant immunosuppression however there are many case reports with patients being on dual biologic therapies  We will check pre DMARDs as well as MPO/PR3 and ESR/CRP  She had normal EKG in October and does not seem to have any cardiac symptoms    # Follow up in about 2 months (around 2/19/2024).    Chronic comorbid conditions affecting medical decision making today:    Past Medical History:   Diagnosis Date    Allergic  rhinitis     Asthma     Chronic pansinusitis     Crohn's disease     Ileal involvement, previously on Remicade, Asacol, Prednisone    Fibromyalgia     Hyperlipidemia     Hypertension     Immunosuppression     Migraine     Obstructive sleep apnea     CPAP at night    Sciatica        Past Surgical History:   Procedure Laterality Date    BLADDER SURGERY      sling was created by her muscles     BRONCHOSCOPY N/A 2023    Procedure: BRONCHOSCOPY;  Surgeon: Kajal Diagnostic Provider;  Location: [facility] OR 2ND FLR;  Service: Anesthesiology;  Laterality: N/A;    BRONCHOSCOPY WITH FLUOROSCOPY N/A 10/13/2023    Procedure: BRONCHOSCOPY, WITH FLUOROSCOPY;  Surgeon: Mary Molina MD;  [facility];  Service: Pulmonary;  Laterality: N/A;     SECTION      COLONOSCOPY N/A 2017    Procedure: COLONOSCOPY;  Surgeon: Kin Dyer MD;  Location: [facility];  Service: Endoscopy;  Laterality: N/A;    COLONOSCOPY N/A 2018    Procedure: COLONOSCOPY;  Surgeon: Kyra Vallecillo MD;  Location: [facility];  Service: Endoscopy;  Laterality: N/A;    COLONOSCOPY N/A 2020    Procedure: COLONOSCOPY;  Surgeon: Nicole Leal MD;  Location: [facility];  Service: Endoscopy;  Laterality: N/A;    COLONOSCOPY N/A 2022    Procedure: COLONOSCOPY;  Surgeon: Shay Bruce MD;  Location: [facility];  Service: Endoscopy;  Laterality: N/A;    COLONOSCOPY N/A 2023    Procedure: COLONOSCOPY;  Surgeon: Nicole Leal MD;  Location: [facility];  Service: Endoscopy;  Laterality: N/A;    DEBRIDEMENT Bilateral 2020    Procedure: DEBRIDEMENT;  Surgeon: Matthias Roach MD;  [facility];  Service: ENT;  Laterality: Bilateral;    ESOPHAGOGASTRODUODENOSCOPY N/A 2020    Procedure: ESOPHAGOGASTRODUODENOSCOPY (EGD);  Surgeon: Nicole Leal MD;  Location: [facility];  Service: Endoscopy;  Laterality: N/A;    ESOPHAGOGASTRODUODENOSCOPY N/A 2022    Procedure: EGD  (ESOPHAGOGASTRODUODENOSCOPY);  Surgeon: Shay Bruce MD;  Location: [facility] ENDO;  Service: Endoscopy;  Laterality: N/A;    ESOPHAGOGASTRODUODENOSCOPY N/A 02/02/2023    Procedure: EGD (ESOPHAGOGASTRODUODENOSCOPY);  Surgeon: Nicole Leal MD;  Location: [facility] ENDO;  Service: Endoscopy;  Laterality: N/A;    FINGER SURGERY      joint relpacement, left hand index finger    FUNCTIONAL ENDOSCOPIC SINUS SURGERY (FESS) USING COMPUTER-ASSISTED NAVIGATION Bilateral 07/31/2019    Procedure: FESS, USING COMPUTER-ASSISTED NAVIGATION;  Surgeon: Manish Shaffer MD;  Location: [facility] OR;  Service: ENT;  Laterality: Bilateral;    FUNCTIONAL ENDOSCOPIC SINUS SURGERY (FESS) USING COMPUTER-ASSISTED NAVIGATION Bilateral 09/25/2020    Procedure: FESS, USING COMPUTER-ASSISTED NAVIGATION SPHENOID;  Surgeon: Matthias Roach MD;  Location: [facility] OR 2ND FLR;  Service: ENT;  Laterality: Bilateral;  TIVA    FUNCTIONAL ENDOSCOPIC SINUS SURGERY (FESS) USING COMPUTER-ASSISTED NAVIGATION Bilateral 09/30/2022    Procedure: FESS, USING COMPUTER-ASSISTED NAVIGATION;  Surgeon: Matthias Roach MD;  Location: [facility] OR 2ND FLR;  Service: ENT;  Laterality: Bilateral;    HYSTERECTOMY  2001    INTRALUMINAL GASTROINTESTINAL TRACT IMAGING VIA CAPSULE N/A 03/03/2023    Procedure: IMAGING PROCEDURE, GI TRACT, INTRALUMINAL, VIA CAPSULE;  Surgeon: First Available Truro;  Location: [facility];  Service: Endoscopy;  Laterality: N/A;    SINUS SURGERY      WISDOM TOOTH EXTRACTION          Social History     Tobacco Use    Smoking status: Never     Passive exposure: Never    Smokeless tobacco: Never   Substance Use Topics    Alcohol use: No    Drug use: No       Family History   Problem Relation Age of Onset    Breast cancer Mother     Hypertension Mother     Allergies Mother     Cancer Mother     Depression Mother     Kidney disease Father 64        ESRD on HD    Scleroderma Father     Arthritis Father     Diabetes Father     Hypertension Father     Breast  cancer Maternal Grandmother     Heart attack Maternal Grandmother     COPD Maternal Grandmother 72    Cancer Maternal Grandmother     Cancer Paternal Grandmother 70        colon    Hypertension Brother        Review of patient's allergies indicates:   Allergen Reactions    Fentanyl Other (See Comments)     Rigid Chest Syndrome       Medication List with Changes/Refills   New Medications    MEPOLIZUMAB (NUCALA) 100 MG/ML SYRG    Inject 3 mLs (300 mg total) into the skin every 28 days.   Current Medications    ACETAMINOPHEN (TYLENOL) 500 MG TABLET    Take 2 tablets (1,000 mg total) by mouth every 6 (six) hours as needed for Pain.    ALBUTEROL-IPRATROPIUM (DUO-NEB) 2.5 MG-0.5 MG/3 ML NEBULIZER SOLUTION    Take 3 mLs by nebulization every 6 (six) hours as needed for Wheezing. Rescue    B COMPLEX VITAMINS CAPSULE    Take 1 capsule by mouth once daily.    CALCIUM CARBONATE/VITAMIN D3 (VITAMIN D-3 ORAL)    Take 2 tablets by mouth once daily.    CETIRIZINE (ZYRTEC) 10 MG TABLET    Take 10 mg by mouth 2 (two) times a day.    CLOTRIMAZOLE-BETAMETHASONE 1-0.05% (LOTRISONE) CREAM    Apply topically 2 (two) times daily.    DILTIAZEM HCL (DILTIAZEM 2% - LIDOCAINE 5% CREAM)    Apply peasize amount topically to anal area.    DIPHENHYDRAMINE-ALUMINUM-MAGNESIUM HYDROXIDE-SIMETHICONE-LIDOCAINE HCL 2%    Swish and spit 15 mLs every 4 (four) hours as needed (oral ulcers, pain).    DIPHENOXYLATE-ATROPINE 2.5-0.025 MG (LOMOTIL) 2.5-0.025 MG PER TABLET    Take 1 tablet by mouth 4 (four) times daily as needed for Diarrhea.    DULOXETINE (CYMBALTA) 60 MG CAPSULE    Take 1 capsule (60 mg total) by mouth 2 (two) times daily.    ESTRADIOL (ESTRACE) 2 MG TABLET    TAKE 1 TABLET DAILY    FISH OIL/BORAGE/FLAX/OM3,6,9 1 (OMEGA 3-6-9 ORAL)    Take 2 tablets by mouth once daily.    FLUTICASONE PROPIONATE (FLONASE) 50 MCG/ACTUATION NASAL SPRAY    2 sprays (100 mcg total) by Each Nostril route once daily.    IMMUN GLOB G,IGG,-PRO-IGA 0-50 (HIZENTRA) 10  GRAM/50 ML (20 %) SOLN    Inject 70 mLs (14 g total) into the skin every 7 days.    IPRATROPIUM (ATROVENT) 0.02 % NEBULIZER SOLUTION    Take by nebulization 4 (four) times daily as needed for Wheezing.    LOSARTAN (COZAAR) 100 MG TABLET    Take 1 tablet (100 mg total) by mouth daily as needed (high blood pressure (>120/80)).    NORTRIPTYLINE (PAMELOR) 25 MG CAPSULE    Take 1 capsule (25 mg total) by mouth every evening.    NYSTATIN (MYCOSTATIN) 100,000 UNIT/ML SUSPENSION    Take 4 mLs (400,000 Units total) by mouth 4 (four) times daily. for 20 days    PANTOPRAZOLE (PROTONIX) 40 MG TABLET    Take 1 tablet (40 mg total) by mouth once daily.    PREDNISONE (DELTASONE) 10 MG TABLET    Take 3 tablets (30 mg total) by mouth once daily.    PREGABALIN (LYRICA) 150 MG CAPSULE    Take 1 capsule (150 mg total) by mouth 3 (three) times daily.    PROMETHAZINE-DEXTROMETHORPHAN (PROMETHAZINE-DM) 6.25-15 MG/5 ML SYRP    Take 5 mLs by mouth every 4 (four) hours as needed.    PROPRANOLOL (INDERAL LA) 80 MG 24 HR CAPSULE    Take 1 capsule (80 mg total) by mouth once daily.    RISANKIZUMAB-RZAA 360 MG/2.4 ML (150 MG/ML) INJT    Inject 360 mg into the skin every 8 weeks. for 6 doses    RIZATRIPTAN (MAXALT) 10 MG TABLET    TAKE 1 TABLET IF NEEDED FOR MIGRAINES. MAX 2 TABLETS IN 24 HOURS.    SULFAMETHOXAZOLE-TRIMETHOPRIM 800-160MG (BACTRIM DS) 800-160 MG TAB    Take 1 tablet by mouth once daily.    SULFAMETHOXAZOLE-TRIMETHOPRIM 800-160MG (BACTRIM DS) 800-160 MG TAB    Take 1 tablet by mouth once daily.    TOPIRAMATE (TOPAMAX) 100 MG TABLET    Take 1 tablet (100 mg total) by mouth 2 (two) times daily.    TRAZODONE (DESYREL) 50 MG TABLET    Take 1 tablet (50 mg total) by mouth every evening.    TRIAMTERENE-HYDROCHLOROTHIAZIDE 37.5-25 MG (DYAZIDE) 37.5-25 MG PER CAPSULE    Take 1 capsule by mouth daily as needed (for wt fluctuation > 3 lbs in 24 hrs).    XYLITOL, BULK, MISC    EMPTY CONTENTS OF 1 CAPSULE INTO NASAL IRRIGATION SYSTEM, ADD  DISTILLED WATER, SALT PACK, MIX & IRRIGATE. PERFORM 2 TIMES DAILY    ZINC ORAL    Take 1 tablet by mouth once daily.         Disclaimer: This note was prepared using voice recognition system and is likely to have sound alike errors and is not proofread.  Please message me with any questions.    65 minutes of total time spent on the encounter, which includes face to face time and non-face to face time preparing to see the patient (eg, review of tests), Obtaining and/or reviewing separately obtained history, Documenting clinical information in the electronic or other health record, Independently interpreting results (not separately reported) and communicating results to the patient/family/caregiver, or Care coordination (not separately reported).     Thank you for allowing me to participate in the care of Jaylin VICKY Murguia.    Aramis Chand MD

## 2023-12-19 NOTE — Clinical Note
Hello all, Recently saw this complex patient and just wanted to pass along my thoughts on treatment.  Does seem very likely that she has EGPA versus at a minimum chronic eosinophil pneumonia.  Thus, I think the best next step would be Nucala.  I know she is on Skyrizi for her Crohn's but wonder if this was EGPA in the GI tract all along perhaps?  Dr. Leal, do you feel strongly about her being on Skyrizi still?  Probably would be best to only be on one biologic and Nucala probably is the best choice at this point. Appreciate any input you all would like to add! (Her Hep B core antibody came back positive so awaiting the Hep B viral DNA test, fyi) Thanks, Aramis

## 2023-12-20 ENCOUNTER — PATIENT MESSAGE (OUTPATIENT)
Dept: PULMONOLOGY | Facility: CLINIC | Age: 58
End: 2023-12-20
Payer: COMMERCIAL

## 2023-12-20 LAB
ERYTHROCYTE [SEDIMENTATION RATE] IN BLOOD BY PHOTOMETRIC METHOD: 6 MM/HR (ref 0–36)
HBV CORE AB SERPL QL IA: REACTIVE
RPR SER QL: NORMAL

## 2023-12-21 ENCOUNTER — OFFICE VISIT (OUTPATIENT)
Dept: OTOLARYNGOLOGY | Facility: CLINIC | Age: 58
End: 2023-12-21
Payer: COMMERCIAL

## 2023-12-21 VITALS
DIASTOLIC BLOOD PRESSURE: 77 MMHG | SYSTOLIC BLOOD PRESSURE: 118 MMHG | HEART RATE: 76 BPM | BODY MASS INDEX: 28.73 KG/M2 | WEIGHT: 183.44 LBS

## 2023-12-21 DIAGNOSIS — Z22.8 PSEUDOMONAS AERUGINOSA RESISTANT CARRIER: ICD-10-CM

## 2023-12-21 DIAGNOSIS — J31.0 NONALLERGIC RHINITIS: Primary | ICD-10-CM

## 2023-12-21 DIAGNOSIS — D80.1 HYPOGAMMAGLOBULINEMIA: ICD-10-CM

## 2023-12-21 DIAGNOSIS — J32.4 CHRONIC PANSINUSITIS: ICD-10-CM

## 2023-12-21 LAB
GAMMA INTERFERON BACKGROUND BLD IA-ACNC: 0.02 IU/ML
M TB IFN-G CD4+ BCKGRND COR BLD-ACNC: 0.02 IU/ML
M TB IFN-G CD4+ BCKGRND COR BLD-ACNC: 0.05 IU/ML
MITOGEN IGNF BCKGRD COR BLD-ACNC: 3.97 IU/ML
PROTEINASE3 IGG SER-ACNC: <0.2 U
STRONGYLOIDES ANTIBODY IGG: NEGATIVE
TB GOLD PLUS: NEGATIVE

## 2023-12-21 PROCEDURE — 3066F PR DOCUMENTATION OF TREATMENT FOR NEPHROPATHY: ICD-10-PCS | Mod: CPTII,S$GLB,, | Performed by: OTOLARYNGOLOGY

## 2023-12-21 PROCEDURE — 3060F POS MICROALBUMINURIA REV: CPT | Mod: CPTII,S$GLB,, | Performed by: OTOLARYNGOLOGY

## 2023-12-21 PROCEDURE — 4010F PR ACE/ARB THEARPY RXD/TAKEN: ICD-10-PCS | Mod: CPTII,S$GLB,, | Performed by: OTOLARYNGOLOGY

## 2023-12-21 PROCEDURE — 3078F DIAST BP <80 MM HG: CPT | Mod: CPTII,S$GLB,, | Performed by: OTOLARYNGOLOGY

## 2023-12-21 PROCEDURE — 3066F NEPHROPATHY DOC TX: CPT | Mod: CPTII,S$GLB,, | Performed by: OTOLARYNGOLOGY

## 2023-12-21 PROCEDURE — 3044F PR MOST RECENT HEMOGLOBIN A1C LEVEL <7.0%: ICD-10-PCS | Mod: CPTII,S$GLB,, | Performed by: OTOLARYNGOLOGY

## 2023-12-21 PROCEDURE — 3008F BODY MASS INDEX DOCD: CPT | Mod: CPTII,S$GLB,, | Performed by: OTOLARYNGOLOGY

## 2023-12-21 PROCEDURE — 87102 FUNGUS ISOLATION CULTURE: CPT | Performed by: OTOLARYNGOLOGY

## 2023-12-21 PROCEDURE — 1160F RVW MEDS BY RX/DR IN RCRD: CPT | Mod: CPTII,S$GLB,, | Performed by: OTOLARYNGOLOGY

## 2023-12-21 PROCEDURE — 87070 CULTURE OTHR SPECIMN AEROBIC: CPT | Performed by: OTOLARYNGOLOGY

## 2023-12-21 PROCEDURE — 3074F SYST BP LT 130 MM HG: CPT | Mod: CPTII,S$GLB,, | Performed by: OTOLARYNGOLOGY

## 2023-12-21 PROCEDURE — 4010F ACE/ARB THERAPY RXD/TAKEN: CPT | Mod: CPTII,S$GLB,, | Performed by: OTOLARYNGOLOGY

## 2023-12-21 PROCEDURE — 1159F MED LIST DOCD IN RCRD: CPT | Mod: CPTII,S$GLB,, | Performed by: OTOLARYNGOLOGY

## 2023-12-21 PROCEDURE — 99214 OFFICE O/P EST MOD 30 MIN: CPT | Mod: 25,S$GLB,, | Performed by: OTOLARYNGOLOGY

## 2023-12-21 PROCEDURE — 3008F PR BODY MASS INDEX (BMI) DOCUMENTED: ICD-10-PCS | Mod: CPTII,S$GLB,, | Performed by: OTOLARYNGOLOGY

## 2023-12-21 PROCEDURE — 3074F PR MOST RECENT SYSTOLIC BLOOD PRESSURE < 130 MM HG: ICD-10-PCS | Mod: CPTII,S$GLB,, | Performed by: OTOLARYNGOLOGY

## 2023-12-21 PROCEDURE — 87186 SC STD MICRODIL/AGAR DIL: CPT | Performed by: OTOLARYNGOLOGY

## 2023-12-21 PROCEDURE — 1159F PR MEDICATION LIST DOCUMENTED IN MEDICAL RECORD: ICD-10-PCS | Mod: CPTII,S$GLB,, | Performed by: OTOLARYNGOLOGY

## 2023-12-21 PROCEDURE — 3060F PR POS MICROALBUMINURIA RESULT DOCUMENTED/REVIEW: ICD-10-PCS | Mod: CPTII,S$GLB,, | Performed by: OTOLARYNGOLOGY

## 2023-12-21 PROCEDURE — 87077 CULTURE AEROBIC IDENTIFY: CPT | Performed by: OTOLARYNGOLOGY

## 2023-12-21 PROCEDURE — 31231 NASAL/SINUS ENDOSCOPY: ICD-10-PCS | Mod: S$GLB,,, | Performed by: OTOLARYNGOLOGY

## 2023-12-21 PROCEDURE — 99999 PR PBB SHADOW E&M-EST. PATIENT-LVL V: CPT | Mod: PBBFAC,,, | Performed by: OTOLARYNGOLOGY

## 2023-12-21 PROCEDURE — 99999 PR PBB SHADOW E&M-EST. PATIENT-LVL V: ICD-10-PCS | Mod: PBBFAC,,, | Performed by: OTOLARYNGOLOGY

## 2023-12-21 PROCEDURE — 31231 NASAL ENDOSCOPY DX: CPT | Mod: S$GLB,,, | Performed by: OTOLARYNGOLOGY

## 2023-12-21 PROCEDURE — 3078F PR MOST RECENT DIASTOLIC BLOOD PRESSURE < 80 MM HG: ICD-10-PCS | Mod: CPTII,S$GLB,, | Performed by: OTOLARYNGOLOGY

## 2023-12-21 PROCEDURE — 1160F PR REVIEW ALL MEDS BY PRESCRIBER/CLIN PHARMACIST DOCUMENTED: ICD-10-PCS | Mod: CPTII,S$GLB,, | Performed by: OTOLARYNGOLOGY

## 2023-12-21 PROCEDURE — 99214 PR OFFICE/OUTPT VISIT, EST, LEVL IV, 30-39 MIN: ICD-10-PCS | Mod: 25,S$GLB,, | Performed by: OTOLARYNGOLOGY

## 2023-12-21 PROCEDURE — 87075 CULTR BACTERIA EXCEPT BLOOD: CPT | Performed by: OTOLARYNGOLOGY

## 2023-12-21 PROCEDURE — 3044F HG A1C LEVEL LT 7.0%: CPT | Mod: CPTII,S$GLB,, | Performed by: OTOLARYNGOLOGY

## 2023-12-21 NOTE — PROGRESS NOTES
Subjective:      Jaylin is a 58 y.o. female who comes for follow-up of sinusitis. Her last visit with me was on 6/15/2023. Now over 15 months status-post revision endoscopic sinus surgery.  On 30 mg prednisone, notes that when tapering down to 20 mg the green discharge returns.  Using saline rinse daily.  Recently diagnosed with Churg-Jasper Disease, order placed for Nucala and awaiting start date.  Ongoing GI issues and dyspnea.        %       The patient's medications, allergies, past medical, surgical, social and family histories were reviewed and updated as appropriate.    A detailed review of systems was obtained with pertinent positives as per the above HPI, and otherwise negative.        Objective:     /77 (BP Location: Left forearm, Patient Position: Sitting, BP Method: Large (Automatic))   Pulse 76   Wt 83.2 kg (183 lb 6.8 oz)   BMI 28.73 kg/m²        Constitutional:   She appears well-developed. She is cooperative.     Head:  Normocephalic.     Nose:  No mucosal edema, rhinorrhea, septal deviation or polyps. No epistaxis. Turbinates normal, no turbinate masses and no turbinate hypertrophy.  Right sinus exhibits no maxillary sinus tenderness and no frontal sinus tenderness. Left sinus exhibits no maxillary sinus tenderness and no frontal sinus tenderness.     Mouth/Throat  Oropharynx clear and moist without lesions or asymmetry. No oropharyngeal exudate or posterior oropharyngeal erythema.     Neck:  No adenopathy. Normal range of motion present.     She has no cervical adenopathy.       Procedure    Nasal endoscopy performed.  See procedure note.        Data Reviewed    WBC (K/uL)   Date Value   11/28/2023 5.14     Eosinophil % (%)   Date Value   11/28/2023 1.8     Eos, Fluid (%)   Date Value   10/13/2023 66     Eos # (K/uL)   Date Value   11/28/2023 0.1     Platelets (K/uL)   Date Value   11/28/2023 242     Glucose (mg/dL)   Date Value   11/28/2023 77     IgE (IU/mL)   Date Value   10/12/2023  234 (H)       Pathology report indicated chronic inflammation with eosinophilia.  Cultures showed Pseudomonas .      Assessment:     1. Nonallergic rhinitis    2. 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 Hypogammaglobulinemia         Plan:     New cultures taken, will consider topical antibiotic/antifungal.  Optimistic about improvement with Nucala.  Endoscopy findings are focal and not indicative of active sinusitis.  Continue saline rinse.  Follow up for test results.

## 2023-12-21 NOTE — PROGRESS NOTES
Subjective:      Patient ID: Jaylin Murguia is a 58 y.o. female.    Chief Complaint:     The patient location is: [place]/ [place]   The chief complaint leading to consultation is:      Visit type: audiovisual     Face to Face time with patient: 25   40 minutes of total time spent on the encounter, which includes face to face time and non-face to face time preparing to see the patient (eg, review of tests), Obtaining and/or reviewing separately obtained history, Documenting clinical information in the electronic or other health record, Independently interpreting results (not separately reported) and communicating results to the patient/family/caregiver, or Care coordination (not separately reported).      Each patient to whom he or she provides medical services by telemedicine is:  (1) informed of the relationship between the physician and patient and the respective role of any other health care provider with respect to management of the patient; and (2) notified that he or she may decline to receive medical services by telemedicine and may withdraw from such care at any time.                   Pt is a 58 yo CW pmh Asthma, allergic rhinitis, chronic pansinusitis, hypogammaglobulinemia, Crohn's disease, fibromyalgia, chronic immunosuppression, TAMIKA on CPAP who presents follow up of uncontrolled asthma with associated cough.      Patient has been treated for pseudomonal infection of the sinuses. Her crohns disease is being followed by GI who plans to start on budesonide or Skyrizi. Her cough has been productive of some brownish sputum.      Since last being seen has seen Rheumatology who will be starting her on Nucala for presumed EGPA. Started having worsening symptoms when decreased prednisone dose from 30 mg to 20 mg. Continues to take bactrim for PJP ppx. Adverse effects from prednisone include anxiety, mood swings. Seen by Dr. Roach yesterday with  nasal/sinus endoscopy without evidence of active sinusitis.  Cultures were obtained and pending.      Per chart review:   On IgG replacement therapy, Hizentra  Recurrent nasal pseudomonas infections: ( 1/2022) treated with ceftazadime/avibactam x5 weeks with PICC (2/15/22-3/15/22)  Meropenam nasal rinses (as of 12/27/22)     Past Medications:  Remicade (in remote past)-- ineffective  Asacol 4.8 gm-- ineffective  Entocort-- effective  Prednisone  Humira (started July 17, stopped 2/2018)-- stopped 2/2 multiple infections.      FESS: Stenotrophomonas and Pseudomonas s/p cipro/bactrim  FeNO 78  Eos: as high as 2700  IgE: <35  CFTR (-), alpha-1-antitrypsin wnl  Normal IgG1/4, low or low normal IgG 2/3     Inhaler use: Trelegy   PRN inhaler use: has not used in last 3 weeks.    Smoking hx: never smoker  Work hx: previous teacher  Exposure hx: none  Pets (dog),  birds(none), down furniture: pillows- has removed  Family hx lung disease: none  Family hx:   - father: scleroderma, father smoked  - mother: breast cancer  Personal hx:   Preeclampsia, eclampsia with second child. Pulmonary edema, acute heart failure?     Immunology:   Positive: MPO (9.0)   Negative: P-ANCA    Review of Systems   Respiratory:  Positive for cough, sputum production and use of rescue inhaler. Negative for shortness of breath and wheezing.    Cardiovascular:  Negative for chest pain, palpitations and leg swelling.   Gastrointestinal:  Negative for nausea and vomiting.   Psychiatric/Behavioral:  Negative for confusion and sleep disturbance. The patient is not nervous/anxious.      Objective:     Physical Exam- no full physical exam due to virtual visit.     Personal Diagnostic Review  Chest x-ray: 11/23/22  Right infrahilar atelectasis vs opacification. Hazy left suprahilar opacification concerning for developing PNA.      CT of chest performed on 5/6/22 without contrast revealed bibasilar posterior predominant tree and bud indicative of infectious vs inflammatory source.      CT chest 3/6/23:   Interval  "worsening of GGO and consolidative processes bilaterally, particularly in bilateral upper lobes and lower lobes.      CT chest 10/11/23: significant increase in Mosaic attenuation in all lung fields compared to 23      Echocardiogram: 22    1 - Mild left atrial enlargement.     2 - Concentric hypertrophy.     3 - No wall motion abnormalities.     4 - Normal left ventricular systolic function (EF 60-65%).     5 - Normal left ventricular diastolic function.     6 - Normal right ventricular systolic function .     7 - The estimated PA systolic pressure is 28 mmHg.     8 - Trivial to mild aortic regurgitation.     9 - Trivial to mild mitral regurgitation.      Pulmonary function tests:   11/3/23  FEV1: 2.45L (89.2%)  FVC: 3.00L (84.7%)  FEV1/FVC: 82  T.95L (90.9%)  DLCO: 13.55 (54.4%)    22  FEV1: 1.86L (65.9%)  FVC: 2.19L (61%)     22  FEV1: 1.86L  (65.5 % predicted),   FVC:  2.32L (64.4 % predicted),   FEV1/FVC:  80,   T.26L (78.3% predicted),   DLCO: 16.53 (66 % predicted)        2023     9:02 AM 2023    11:47 AM 2023    10:15 AM 2023    10:48 AM 11/3/2023    11:11 AM 11/3/2023     9:39 AM 10/15/2023     3:00 PM   Pulmonary Function Tests   SpO2    98 %  99 % 98 %   Ordering Provider     MD Osvaldo     Performing nurse/tech/RT     Estopinal RRT     Diagnosis     Shortness of Breath     Height  5' 7" (1.702 m) 5' 7" (1.702 m)  5' 7" (1.702 m) 5' 7" (1.702 m)    Weight 83.2 kg (183 lb 6.8 oz) 83.6 kg (184 lb 4.9 oz) 82.6 kg (182 lb 1.6 oz) 85 kg (187 lb 6.3 oz) 79.8 kg (175 lb 14.8 oz) 79.8 kg (175 lb 14.8 oz)    BMI (Calculated)  28.9 28.5  27.5 27.5    Patient Race          6MWT Status     completed without stopping     Patient Reported     No complaints     Was O2 used?     No     6MW Distance walked (feet)     1050 feet     Distance walked (meters)     320.04 meters     Did patient stop?     No     Type of assistive device(s) used?     no assistive " devices     Oxygen Saturation     99 %     Supplemental Oxygen     Room Air     Heart Rate     69 bpm     Blood Pressure     116/72     Robert Dyspnea Rating      nothing at all     Oxygen Saturation     99 %     Supplemental Oxygen     Room Air     Heart Rate     74 bpm     Blood Pressure     129/77     Robert Dyspnea Rating      very light     Recovery Time (seconds)     78 seconds     Oxygen Saturation     99 %     Supplemental Oxygen     Room Air     Heart Rate     74 bpm     Is procedure ready for interpretation?     Yes     Oxygen Qualification?     No          Assessment:     1. Severe persistent asthma without complication    2. Eosinophilic granulomatosis with polyangiitis (EGPA)      Outpatient Encounter Medications as of 12/22/2023   Medication Sig Dispense Refill    acetaminophen (TYLENOL) 500 MG tablet Take 2 tablets (1,000 mg total) by mouth every 6 (six) hours as needed for Pain. 60 tablet 0    albuterol-ipratropium (DUO-NEB) 2.5 mg-0.5 mg/3 mL nebulizer solution Take 3 mLs by nebulization every 6 (six) hours as needed for Wheezing. Rescue 270 mL 0    b complex vitamins capsule Take 1 capsule by mouth once daily.      calcium carbonate/vitamin D3 (VITAMIN D-3 ORAL) Take 2 tablets by mouth once daily.      cetirizine (ZYRTEC) 10 MG tablet Take 10 mg by mouth 2 (two) times a day.      clotrimazole-betamethasone 1-0.05% (LOTRISONE) cream Apply topically 2 (two) times daily. 15 g 0    diltiazem HCl (DILTIAZEM 2% - LIDOCAINE 5% CREAM) Apply peasize amount topically to anal area. 30 g 2    diphenhydrAMINE-aluminum-magnesium hydroxide-simethicone-LIDOcaine HCl 2% Swish and spit 15 mLs every 4 (four) hours as needed (oral ulcers, pain). 1 Bottle 2    DULoxetine (CYMBALTA) 60 MG capsule Take 1 capsule (60 mg total) by mouth 2 (two) times daily. 180 capsule 3    estradioL (ESTRACE) 2 MG tablet TAKE 1 TABLET DAILY 90 tablet 3    fish oil/borage/flax/om3,6,9 1 (OMEGA 3-6-9 ORAL) Take 2 tablets by mouth once daily.       fluticasone propionate (FLONASE) 50 mcg/actuation nasal spray 2 sprays (100 mcg total) by Each Nostril route once daily. 16 g 5    immun glob G,IgG,-pro-IgA 0-50 (HIZENTRA) 10 gram/50 mL (20 %) Soln Inject 70 mLs (14 g total) into the skin every 7 days. 280 mL 11    ipratropium (ATROVENT) 0.02 % nebulizer solution Take by nebulization 4 (four) times daily as needed for Wheezing.      losartan (COZAAR) 100 MG tablet Take 1 tablet (100 mg total) by mouth daily as needed (high blood pressure (>120/80)). 90 tablet 3    mepolizumab (NUCALA) 100 mg/mL Syrg Inject 3 mLs (300 mg total) into the skin every 28 days. 3 mL 11    nortriptyline (PAMELOR) 25 MG capsule Take 1 capsule (25 mg total) by mouth every evening. 90 capsule 3    pantoprazole (PROTONIX) 40 MG tablet Take 1 tablet (40 mg total) by mouth once daily. 30 tablet 11    predniSONE (DELTASONE) 10 MG tablet Take 3 tablets (30 mg total) by mouth once daily. 180 tablet 0    pregabalin (LYRICA) 150 MG capsule Take 1 capsule (150 mg total) by mouth 3 (three) times daily. 270 capsule 1    propranoloL (INDERAL LA) 80 MG 24 hr capsule Take 1 capsule (80 mg total) by mouth once daily. 90 capsule 3    risankizumab-rzaa 360 mg/2.4 mL (150 mg/mL) Injt Inject 360 mg into the skin every 8 weeks. for 6 doses 2.4 mL 5    rizatriptan (MAXALT) 10 MG tablet TAKE 1 TABLET IF NEEDED FOR MIGRAINES. MAX 2 TABLETS IN 24 HOURS. 30 tablet 8    sulfamethoxazole-trimethoprim 800-160mg (BACTRIM DS) 800-160 mg Tab Take 1 tablet by mouth once daily. 30 tablet 1    sulfamethoxazole-trimethoprim 800-160mg (BACTRIM DS) 800-160 mg Tab Take 1 tablet by mouth once daily. 30 tablet 1    topiramate (TOPAMAX) 100 MG tablet Take 1 tablet (100 mg total) by mouth 2 (two) times daily. 180 tablet 3    traZODone (DESYREL) 50 MG tablet Take 1 tablet (50 mg total) by mouth every evening. 90 tablet 3    triamterene-hydrochlorothiazide 37.5-25 mg (DYAZIDE) 37.5-25 mg per capsule Take 1 capsule by mouth daily as  needed (for wt fluctuation > 3 lbs in 24 hrs). 30 capsule 11    XYLITOL, BULK, MISC EMPTY CONTENTS OF 1 CAPSULE INTO NASAL IRRIGATION SYSTEM, ADD DISTILLED WATER, SALT PACK, MIX & IRRIGATE. PERFORM 2 TIMES DAILY      ZINC ORAL Take 1 tablet by mouth once daily.       Facility-Administered Encounter Medications as of 12/22/2023   Medication Dose Route Frequency Provider Last Rate Last Admin    lactated ringers infusion   Intravenous Continuous Mary Leiva MD   New Bag at 03/12/20 0923    lactated ringers infusion   Intravenous Continuous Shay Bruce MD   Stopped at 03/16/22 1342    lidocaine (PF) 10 mg/ml (1%) injection 10 mg  1 mL Intradermal Once Mary Leiva MD        LIDOcaine (PF) 10 mg/ml (1%) injection 10 mg  1 mL Intradermal Once Johan Baez MD        sodium chloride 0.9% flush 10 mL  10 mL Intravenous PRN Johan Baez MD         No orders of the defined types were placed in this encounter.    Plan:     Eosinophilic granulomatosis with polyangiitis (EGPA)  WBC with 66% eosinophils on BAL. 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 She has Asthma that is poorly controlled. Pt may have not had vasculitic findings on biopsies as it is a late stage presentation. Is very responsive to steroids.  Seen by Rheumatology with plan to start on Nucala. Will continue to follow for improvement after initiation. Will wean off prednisone with improvement with symptoms.     Severe persistent asthma without complication  Currently on trelegy and singulair. Initially concerned that uncontrolled Crohn's disease was contributing to lack of asthma control. Now likely due to EGPA.  Pt has great response to prednisone with AE of swelling in her face. Pt had previously been on it for 4 months with significant  improvement in symptoms with relapse after stopping. Much improvement with 40 mg prednisone. Had significant symptoms with worsening on 20 mg prednisone, currently on 30 mg.   - will continue trelegy, singulair and PRN duonebs/combivent.   - Plan to start on Nucala.   - titrated to 20 mg prednisone with worsening symptoms. Increased back to 30 mg prednisone. Will titrate once started on Nucala.  Continue Bactrim while on high doses of prednisone.     Follow up in 3 months.     Ivan Riley MD  [facility]

## 2023-12-21 NOTE — PROCEDURES
Nasal/sinus endoscopy    Date/Time: 12/21/2023 9:00 AM    Performed by: Matthias Roach MD  Authorized by: Matthias Roach MD    Anesthesia:     Local anesthetic:  Lidocaine 4% and Jose-Synephrine 1/2%    Patient tolerance:  Patient tolerated the procedure well with no immediate complications  Nose:     Procedure Performed:  Nasal Endoscopy  External:      No external nasal deformity  Intranasal:      Mucosa no polyps     Mucosa ulcers not present     No mucosa lesions present     Turbinates not enlarged     No septum gross deformity  Nasopharynx:      No mucosa lesions     Adenoids not present     Posterior choanae patent     Eustachian tube not patent     Sinuses all patent and clear  Scattered white specks on left MT  Focal green purulent crust at posterior septectomy site  No polyps

## 2023-12-22 ENCOUNTER — OFFICE VISIT (OUTPATIENT)
Dept: PULMONOLOGY | Facility: CLINIC | Age: 58
End: 2023-12-22
Payer: COMMERCIAL

## 2023-12-22 ENCOUNTER — PATIENT MESSAGE (OUTPATIENT)
Dept: RHEUMATOLOGY | Facility: CLINIC | Age: 58
End: 2023-12-22
Payer: COMMERCIAL

## 2023-12-22 DIAGNOSIS — J45.50 SEVERE PERSISTENT ASTHMA WITHOUT COMPLICATION: Primary | ICD-10-CM

## 2023-12-22 DIAGNOSIS — D72.18 EOSINOPHILIC GRANULOMATOSIS WITH POLYANGIITIS (EGPA): ICD-10-CM

## 2023-12-22 DIAGNOSIS — R76.8 HEPATITIS B CORE ANTIBODY POSITIVE: Primary | ICD-10-CM

## 2023-12-22 DIAGNOSIS — M30.1 EOSINOPHILIC GRANULOMATOSIS WITH POLYANGIITIS (EGPA): ICD-10-CM

## 2023-12-22 LAB — MYELOPEROXIDASE AB SER-ACNC: 0.5 U/ML

## 2023-12-22 PROCEDURE — 99214 OFFICE O/P EST MOD 30 MIN: CPT | Mod: 95,,, | Performed by: INTERNAL MEDICINE

## 2023-12-22 RX ORDER — MONTELUKAST SODIUM 10 MG/1
10 TABLET ORAL NIGHTLY
Qty: 30 TABLET | Refills: 11 | Status: SHIPPED | OUTPATIENT
Start: 2024-01-26 | End: 2024-01-16

## 2023-12-22 RX ORDER — MONTELUKAST SODIUM 10 MG/1
10 TABLET ORAL NIGHTLY
Qty: 30 TABLET | Refills: 0 | Status: SHIPPED | OUTPATIENT
Start: 2023-12-22 | End: 2024-01-16

## 2023-12-22 NOTE — TELEPHONE ENCOUNTER
Aramis Chand, MD Mann[facility] Staff4 minutes ago (10:40 AM)     CH  Please schedule Hep B viral DNA lab.  Thanks!

## 2023-12-26 ENCOUNTER — PATIENT MESSAGE (OUTPATIENT)
Dept: OTOLARYNGOLOGY | Facility: CLINIC | Age: 58
End: 2023-12-26
Payer: COMMERCIAL

## 2023-12-26 ENCOUNTER — PATIENT MESSAGE (OUTPATIENT)
Dept: INFECTIOUS DISEASES | Facility: CLINIC | Age: 58
End: 2023-12-26
Payer: COMMERCIAL

## 2023-12-26 LAB — BACTERIA SPEC AEROBE CULT: ABNORMAL

## 2023-12-26 RX ORDER — LEVOFLOXACIN 750 MG/1
750 TABLET ORAL DAILY
Qty: 10 TABLET | Refills: 0 | Status: SHIPPED | OUTPATIENT
Start: 2023-12-26 | End: 2024-01-29

## 2023-12-26 NOTE — PROGRESS NOTES
Labs currently all unremarkable with the exception of positive hep B core antibody.  We will be obtaining hepatitis-B viral DNA test.

## 2023-12-27 ENCOUNTER — PATIENT MESSAGE (OUTPATIENT)
Dept: GASTROENTEROLOGY | Facility: CLINIC | Age: 58
End: 2023-12-27
Payer: COMMERCIAL

## 2023-12-27 ENCOUNTER — PATIENT MESSAGE (OUTPATIENT)
Dept: RHEUMATOLOGY | Facility: CLINIC | Age: 58
End: 2023-12-27
Payer: COMMERCIAL

## 2023-12-27 ENCOUNTER — LAB VISIT (OUTPATIENT)
Dept: LAB | Facility: HOSPITAL | Age: 58
End: 2023-12-27
Attending: FAMILY MEDICINE
Payer: COMMERCIAL

## 2023-12-27 DIAGNOSIS — R76.8 HEPATITIS B CORE ANTIBODY POSITIVE: ICD-10-CM

## 2023-12-27 LAB — BACTERIA SPEC ANAEROBE CULT: NORMAL

## 2023-12-27 PROCEDURE — 87517 HEPATITIS B DNA QUANT: CPT | Performed by: STUDENT IN AN ORGANIZED HEALTH CARE EDUCATION/TRAINING PROGRAM

## 2023-12-27 PROCEDURE — 36415 COLL VENOUS BLD VENIPUNCTURE: CPT | Mod: PN | Performed by: STUDENT IN AN ORGANIZED HEALTH CARE EDUCATION/TRAINING PROGRAM

## 2023-12-28 ENCOUNTER — PATIENT MESSAGE (OUTPATIENT)
Dept: GASTROENTEROLOGY | Facility: CLINIC | Age: 58
End: 2023-12-28
Payer: COMMERCIAL

## 2023-12-28 ENCOUNTER — PATIENT MESSAGE (OUTPATIENT)
Dept: PRIMARY CARE CLINIC | Facility: CLINIC | Age: 58
End: 2023-12-28
Payer: COMMERCIAL

## 2023-12-28 LAB — MYCOBACTERIUM SPEC CULT: ABNORMAL

## 2023-12-28 RX ORDER — PROPRANOLOL HYDROCHLORIDE 80 MG/1
80 CAPSULE, EXTENDED RELEASE ORAL DAILY
Qty: 30 CAPSULE | Refills: 3 | Status: SHIPPED | OUTPATIENT
Start: 2023-12-28 | End: 2024-01-29

## 2023-12-28 RX ORDER — PROPRANOLOL HYDROCHLORIDE 80 MG/1
80 CAPSULE, EXTENDED RELEASE ORAL DAILY
Qty: 90 CAPSULE | Refills: 3 | Status: CANCELLED | OUTPATIENT
Start: 2023-12-28

## 2023-12-28 NOTE — TELEPHONE ENCOUNTER
No care due was identified.  [facility] Embedded Care Due Messages. Reference number: [id number].   12/28/2023 1:27:59 PM CST

## 2023-12-28 NOTE — PROGRESS NOTES
Pt with positive hep b core antibody.   Await hep b DNA and refer to hepatology once back.      Discontinue skyrizi  Defer biologic to rheumatology.        Nicole Leal MD  Gastroenterology

## 2023-12-29 ENCOUNTER — PATIENT MESSAGE (OUTPATIENT)
Dept: RHEUMATOLOGY | Facility: CLINIC | Age: 58
End: 2023-12-29
Payer: COMMERCIAL

## 2023-12-29 ENCOUNTER — TELEPHONE (OUTPATIENT)
Dept: GASTROENTEROLOGY | Facility: CLINIC | Age: 58
End: 2023-12-29
Payer: COMMERCIAL

## 2023-12-29 ENCOUNTER — TELEPHONE (OUTPATIENT)
Dept: INFECTIOUS DISEASES | Facility: CLINIC | Age: 58
End: 2023-12-29
Payer: COMMERCIAL

## 2023-12-29 LAB
HEPATITIS B VIRUS DNA: NORMAL
HEPATITIS B VIRUS PCR, QUANT: NOT DETECTED IU/ML

## 2023-12-29 NOTE — PROGRESS NOTES
Hepatitis-B viral DNA is negative.  We would still like you to see hepatology just to have them weigh in on whether they think you need prophylaxis since you will be on immunosuppressant.

## 2023-12-29 NOTE — TELEPHONE ENCOUNTER
----- Message from Emperatriz Saini DO sent at 12/29/2023  3:29 PM CST -----  Can you add on w/ me next week  Virtual OK

## 2024-01-03 ENCOUNTER — PATIENT MESSAGE (OUTPATIENT)
Dept: RHEUMATOLOGY | Facility: CLINIC | Age: 59
End: 2024-01-03
Payer: COMMERCIAL

## 2024-01-03 ENCOUNTER — PATIENT MESSAGE (OUTPATIENT)
Dept: PRIMARY CARE CLINIC | Facility: CLINIC | Age: 59
End: 2024-01-03
Payer: COMMERCIAL

## 2024-01-03 RX ORDER — PROPRANOLOL HYDROCHLORIDE 80 MG/1
80 CAPSULE, EXTENDED RELEASE ORAL DAILY
Qty: 30 CAPSULE | Refills: 3 | OUTPATIENT
Start: 2024-01-03

## 2024-01-03 NOTE — TELEPHONE ENCOUNTER
Refill Decision Note  Quick DC. Duplicate Request-  med pended in previous encounter, awaiting assessment       Jaylin Stauffernchard  is requesting a refill authorization.  Brief Assessment and Rationale for Refill:  Quick Discontinue     Medication Therapy Plan:  Receipt confirmed by pharmacy (12/28/2023  1:51 PM CST) [facility] pharmacy      Comments:     Note composed:1:03 PM 01/03/2024

## 2024-01-03 NOTE — TELEPHONE ENCOUNTER
No care due was identified.  [facility] Embedded Care Due Messages. Reference number: [id number].   1/03/2024 11:24:31 AM CST

## 2024-01-04 ENCOUNTER — TELEPHONE (OUTPATIENT)
Dept: GASTROENTEROLOGY | Facility: CLINIC | Age: 59
End: 2024-01-04
Payer: COMMERCIAL

## 2024-01-05 ENCOUNTER — OFFICE VISIT (OUTPATIENT)
Dept: INFECTIOUS DISEASES | Facility: CLINIC | Age: 59
End: 2024-01-05
Payer: COMMERCIAL

## 2024-01-05 ENCOUNTER — PATIENT MESSAGE (OUTPATIENT)
Dept: INFECTIOUS DISEASES | Facility: CLINIC | Age: 59
End: 2024-01-05

## 2024-01-05 DIAGNOSIS — A31.0 MYCOBACTERIUM AVIUM-INTRACELLULARE COMPLEX: Primary | ICD-10-CM

## 2024-01-05 DIAGNOSIS — J82.81 EOSINOPHILIC PNEUMONIA: ICD-10-CM

## 2024-01-05 PROCEDURE — 99215 OFFICE O/P EST HI 40 MIN: CPT | Mod: 95,,, | Performed by: INTERNAL MEDICINE

## 2024-01-05 RX ORDER — PROPRANOLOL HYDROCHLORIDE 80 MG/1
80 CAPSULE, EXTENDED RELEASE ORAL DAILY
Qty: 30 CAPSULE | Refills: 3 | Status: CANCELLED | OUTPATIENT
Start: 2024-01-05

## 2024-01-05 NOTE — TELEPHONE ENCOUNTER
No care due was identified.  Health [facility] Embedded Care Due Messages. Reference number: [id number].   1/05/2024 10:12:30 AM CST

## 2024-01-05 NOTE — PROGRESS NOTES
The patient location is: [place]  The chief complaint leading to consultation is: recurrent pulmonary infections    Visit type: audiovisual    Face to Face time with patient: 15  40 minutes of total time spent on the encounter, which includes face to face time and non-face to face time preparing to see the patient (eg, review of tests), Obtaining and/or reviewing separately obtained history, Documenting clinical information in the electronic or other health record, Independently interpreting results (not separately reported) and communicating results to the patient/family/caregiver, or Care coordination (not separately reported).     Each patient to whom he or she provides medical services by telemedicine is:  (1) informed of the relationship between the physician and patient and the respective role of any other health care provider with respect to management of the patient; and (2) notified that he or she may decline to receive medical services by telemedicine and may withdraw from such care at any time.    Subjective:     Patient ID: Jaylin Murguia is a 58 y.o. female    Chief Complaint: recurrent sinopulmonary infections    HPI: 58F well known to me w/ hx of recurrent sinusitis and pneumonia, underwent bronchoscopy, with findings consistent w/ eosinophilic pneumonia and was initiated on high dose prednisone with improvement in symptoms, IBD on skirizi, chronic IVIG replacement, now seen by rheumatology who believes all sinus / pulmonary / GI issues may be due to unifying diagnosis of EGPA. Her steroids have been tapered to 30mg, with plans to stop skirizi and start nucala. Noted issues w/ insurance coverage, pharmacy working on patient assistance.     She was recently given a course of levofloxacin for sinus cultures + pseudomonas, and restarted on tobramycin rinses by ENT. She notes overall feeling ok, but has difficulty sleeping on current dose of steroids, and notes hoarseness after recently starting sandra rinses.      AFB culture from BAL done in October is now positive for MAC. Unclear if this is having a significant contribution in patient's ongoing pulmonary symptoms.     Recent labs showed a new positive hepatitis B core ab w/ negative HBV DNA. Feel this is likely false positive and related to patient's ongoing treatment with IVIG. Discussed w/ hepatology, they recommend monitoring labs every 3 months while on biologic therapy. No indication for antiviral therapy at this time.     Immunization History   Administered Date(s) Administered    COVID-19, MRNA, LN-S, PF (Pfizer) (Purple Cap) 03/05/2021, 04/15/2021    COVID-19, mRNA, LNP-S, bivalent booster, PF (PFIZER OMICRON) 01/13/2023    Hepatitis A / Hepatitis B 12/12/2019    Influenza 10/29/2013    Influenza - Quadrivalent - PF *Preferred* (6 months and older) 11/15/2017, 01/28/2020, 02/11/2021, 10/15/2023    Influenza Split 10/29/2013    PPD Test 05/22/2017    Pneumococcal Conjugate - 13 Valent 12/23/2019    Pneumococcal Conjugate - 20 Valent 12/05/2023    Pneumococcal Polysaccharide - 23 Valent 10/29/2013    Tdap 02/18/2014        Review of Systems   All other systems reviewed and are negative.       Past Medical History:   Diagnosis Date    Allergic rhinitis     Asthma     Chronic pansinusitis     Crohn's disease     Ileal involvement, previously on Remicade, Asacol, Prednisone    Fibromyalgia     Hyperlipidemia     Hypertension     Immunosuppression 20020    Migraine     Obstructive sleep apnea     CPAP at night    Sciatica      Past Surgical History:   Procedure Laterality Date    BLADDER SURGERY      sling was created by her muscles     BRONCHOSCOPY N/A 03/23/2023    Procedure: BRONCHOSCOPY;  Surgeon: Kajal Diagnostic Provider;  Location: [facility] OR [address] [place] [address];  Service: Anesthesiology;  Laterality: N/A;    BRONCHOSCOPY WITH FLUOROSCOPY N/A 10/13/2023    Procedure: BRONCHOSCOPY, WITH FLUOROSCOPY;  Surgeon: Mary Molina MD;  Location: [facility] OR [address] [place] [address];  Service: Pulmonary;   Laterality: N/A;     SECTION      COLONOSCOPY N/A 2017    Procedure: COLONOSCOPY;  Surgeon: Kin Dyer MD;  Location: [facility];  Service: Endoscopy;  Laterality: N/A;    COLONOSCOPY N/A 2018    Procedure: COLONOSCOPY;  Surgeon: Kyra Vallecillo MD;  Location: [facility];  Service: Endoscopy;  Laterality: N/A;    COLONOSCOPY N/A 2020    Procedure: COLONOSCOPY;  Surgeon: Nicole Leal MD;  Location: [facility];  Service: Endoscopy;  Laterality: N/A;    COLONOSCOPY N/A 2022    Procedure: COLONOSCOPY;  Surgeon: Shay Bruce MD;  Location: [facility];  Service: Endoscopy;  Laterality: N/A;    COLONOSCOPY N/A 2023    Procedure: COLONOSCOPY;  Surgeon: Nicole Leal MD;  Location: [facility];  Service: Endoscopy;  Laterality: N/A;    DEBRIDEMENT Bilateral 2020    Procedure: DEBRIDEMENT;  Surgeon: Matthias Roach MD;  Location: [address];  Service: ENT;  Laterality: Bilateral;    ESOPHAGOGASTRODUODENOSCOPY N/A 2020    Procedure: ESOPHAGOGASTRODUODENOSCOPY (EGD);  Surgeon: Nicole Leal MD;  Location: [facility];  Service: Endoscopy;  Laterality: N/A;    ESOPHAGOGASTRODUODENOSCOPY N/A 2022    Procedure: EGD (ESOPHAGOGASTRODUODENOSCOPY);  Surgeon: Shay Bruce MD;  Location: [facility];  Service: Endoscopy;  Laterality: N/A;    ESOPHAGOGASTRODUODENOSCOPY N/A 2023    Procedure: EGD (ESOPHAGOGASTRODUODENOSCOPY);  Surgeon: Nicole Leal MD;  Location: [facility];  Service: Endoscopy;  Laterality: N/A;    FINGER SURGERY      joint relpacement, left hand index finger    FUNCTIONAL ENDOSCOPIC SINUS SURGERY (FESS) USING COMPUTER-ASSISTED NAVIGATION Bilateral 2019    Procedure: FESS, USING COMPUTER-ASSISTED NAVIGATION;  Surgeon: Manish Shaffer MD;  Location: [facility];  Service: ENT;  Laterality: Bilateral;    FUNCTIONAL ENDOSCOPIC SINUS SURGERY (FESS) USING COMPUTER-ASSISTED NAVIGATION Bilateral 2020    Procedure:  FESS, USING COMPUTER-ASSISTED NAVIGATION SPHENOID;  Surgeon: Matthias Roach MD;  Location: [facility] OR [facility] FLR;  Service: ENT;  Laterality: Bilateral;  TIVA    FUNCTIONAL ENDOSCOPIC SINUS SURGERY (FESS) USING COMPUTER-ASSISTED NAVIGATION Bilateral 09/30/2022    Procedure: FESS, USING COMPUTER-ASSISTED NAVIGATION;  Surgeon: Matthias Roach MD;  Location: [facility] OR [facility] FLR;  Service: ENT;  Laterality: Bilateral;    HYSTERECTOMY  2001    INTRALUMINAL GASTROINTESTINAL TRACT IMAGING VIA CAPSULE N/A 03/03/2023    Procedure: IMAGING PROCEDURE, GI TRACT, INTRALUMINAL, VIA CAPSULE;  Surgeon: First Available Ninfa Gillespie;  Location: [facility] ENDO;  Service: Endoscopy;  Laterality: N/A;    SINUS SURGERY      WISDOM TOOTH EXTRACTION       Family History   Problem Relation Age of Onset    Breast cancer Mother     Hypertension Mother     Allergies Mother     Cancer Mother     Depression Mother     Kidney disease Father 64        ESRD on HD    Scleroderma Father     Arthritis Father     Diabetes Father     Hypertension Father     Breast cancer Maternal Grandmother     Heart attack Maternal Grandmother     COPD Maternal Grandmother 72    Cancer Maternal Grandmother     Cancer Paternal Grandmother 70        colon    Hypertension Brother      Social History     Tobacco Use    Smoking status: Never     Passive exposure: Never    Smokeless tobacco: Never   Substance Use Topics    Alcohol use: No    Drug use: No       Objective:     Physical Exam  Constitutional:       General: She is not in acute distress.     Appearance: Normal appearance. She is well-developed. She is not ill-appearing or diaphoretic.   HENT:      Head: Normocephalic and atraumatic.      Right Ear: External ear normal.      Left Ear: External ear normal.      Nose: Nose normal.   Eyes:      General: No scleral icterus.        Right eye: No discharge.         Left eye: No discharge.      Extraocular Movements: Extraocular movements intact.      Conjunctiva/sclera:  Conjunctivae normal.   Pulmonary:      Effort: Pulmonary effort is normal. No respiratory distress.      Breath sounds: No stridor.   Musculoskeletal:         General: Normal range of motion.   Skin:     Findings: No erythema or rash.   Neurological:      General: No focal deficit present.      Mental Status: She is alert and oriented to person, place, and time. Mental status is at baseline.      Cranial Nerves: No cranial nerve deficit.   Psychiatric:         Mood and Affect: Mood normal.         Behavior: Behavior normal.         Thought Content: Thought content normal.         Judgment: Judgment normal.         Data:    All data, including recent labs, radiology, and pathology, has been independently reviewed.    Labs:    Recent Labs   Lab Result Units 10/14/23  0653 11/03/23  1215 11/28/23  0734 12/19/23  1306   WBC K/uL 4.21 8.71 5.14  --    Hemoglobin g/dL 12.6 12.4 13.1  --    Hematocrit % 39.5 39.2 41.1  --    Sodium mmol/L 141 133* 139  --    Potassium mmol/L 3.5 3.6 3.8  --    Chloride mmol/L 113* 103 108  --    BUN mg/dL 12 17 14  --    Creatinine mg/dL 0.7 1.1 1.0  --    AST U/L 19 30 34  --    ALT U/L 15 55* 49*  --    Alkaline Phosphatase U/L 96 54* 48*  --    Total Bilirubin mg/dL 0.2 0.2 0.3  --    CRP mg/L  --   --   --  <0.3   HIV 1/2 Ag/Ab   --   --   --  Non-reactive        Radiology:    No results found in the last 24 hours.     Assessment:    1. Mycobacterium avium-intracellulare complex  Discussed results with patient  Will obtain additional sputum cultures to assess burden  Will not start treatment at this time      2. Eosinophilic pneumonia  Remains on high dose prednisone with goal to start nucala and taper down steroids. Rheumatology working on obtaining patient assistance   Continue bactrim ppx while on prednisone dose > 20mg daily           Follow up in 1 month    The total time for evaluation and management services performed on 1/5/24 was greater than 40 minutes.     Daysi Saini  DO  Transplant Infectious Disease

## 2024-01-05 NOTE — TELEPHONE ENCOUNTER
No care due was identified.  [facility] Embedded Care Due Messages. Reference number: [id number].   1/05/2024 8:03:32 AM CST

## 2024-01-08 PROBLEM — D72.18 EOSINOPHILIC GRANULOMATOSIS WITH POLYANGIITIS (EGPA): Status: ACTIVE | Noted: 2024-01-08

## 2024-01-08 PROBLEM — M30.1 EOSINOPHILIC GRANULOMATOSIS WITH POLYANGIITIS (EGPA): Status: ACTIVE | Noted: 2024-01-08

## 2024-01-08 NOTE — ASSESSMENT & PLAN NOTE
WBC with 66% eosinophils on BAL. 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 She has Asthma that is poorly controlled. Pt may have not had vasculitic findings on biopsies as it is a late stage presentation. Is very responsive to steroids.  Seen by Rheumatology with plan to start on Nucala. Will continue to follow for improvement after initiation. Will wean off prednisone with improvement with symptoms.

## 2024-01-10 ENCOUNTER — PATIENT MESSAGE (OUTPATIENT)
Dept: RHEUMATOLOGY | Facility: CLINIC | Age: 59
End: 2024-01-10
Payer: COMMERCIAL

## 2024-01-10 DIAGNOSIS — J82.81 EOSINOPHILIC PNEUMONIA: ICD-10-CM

## 2024-01-10 DIAGNOSIS — M30.1 EOSINOPHILIC GRANULOMATOSIS WITH POLYANGIITIS (EGPA): ICD-10-CM

## 2024-01-10 DIAGNOSIS — D72.18 EOSINOPHILIC GRANULOMATOSIS WITH POLYANGIITIS (EGPA): ICD-10-CM

## 2024-01-12 LAB
ACID FAST MOD KINY STN SPEC: ABNORMAL
MYCOBACTERIUM SPEC QL CULT: ABNORMAL
MYCOBACTERIUM SPEC QL CULT: ABNORMAL

## 2024-01-15 PROBLEM — J82.81: Status: RESOLVED | Noted: 2019-10-16 | Resolved: 2024-01-15

## 2024-01-16 DIAGNOSIS — J45.50 SEVERE PERSISTENT ASTHMA WITHOUT COMPLICATION: ICD-10-CM

## 2024-01-16 DIAGNOSIS — Z79.52 CURRENT USE OF STEROID MEDICATION: ICD-10-CM

## 2024-01-16 RX ORDER — MONTELUKAST SODIUM 10 MG/1
10 TABLET ORAL NIGHTLY
Qty: 30 TABLET | Refills: 5 | Status: SHIPPED | OUTPATIENT
Start: 2024-01-16 | End: 2024-02-15 | Stop reason: SDUPTHER

## 2024-01-16 RX ORDER — SULFAMETHOXAZOLE AND TRIMETHOPRIM 800; 160 MG/1; MG/1
1 TABLET ORAL DAILY
Qty: 30 TABLET | Refills: 1 | Status: SHIPPED | OUTPATIENT
Start: 2024-01-16 | End: 2024-02-18

## 2024-01-17 ENCOUNTER — LAB VISIT (OUTPATIENT)
Dept: LAB | Facility: HOSPITAL | Age: 59
End: 2024-01-17
Attending: INTERNAL MEDICINE
Payer: COMMERCIAL

## 2024-01-17 DIAGNOSIS — A31.0 MYCOBACTERIUM AVIUM-INTRACELLULARE COMPLEX: ICD-10-CM

## 2024-01-17 PROCEDURE — 87116 MYCOBACTERIA CULTURE: CPT | Performed by: INTERNAL MEDICINE

## 2024-01-17 PROCEDURE — 87118 MYCOBACTERIC IDENTIFICATION: CPT | Performed by: INTERNAL MEDICINE

## 2024-01-17 PROCEDURE — 87118 MYCOBACTERIC IDENTIFICATION: CPT | Mod: 91 | Performed by: INTERNAL MEDICINE

## 2024-01-17 PROCEDURE — 87118 MYCOBACTERIC IDENTIFICATION: CPT

## 2024-01-17 PROCEDURE — 87206 SMEAR FLUORESCENT/ACID STAI: CPT | Performed by: INTERNAL MEDICINE

## 2024-01-17 PROCEDURE — 87186 SC STD MICRODIL/AGAR DIL: CPT

## 2024-01-17 PROCEDURE — 87150 DNA/RNA AMPLIFIED PROBE: CPT

## 2024-01-17 PROCEDURE — 87015 SPECIMEN INFECT AGNT CONCNTJ: CPT | Performed by: INTERNAL MEDICINE

## 2024-01-18 ENCOUNTER — PATIENT MESSAGE (OUTPATIENT)
Dept: RHEUMATOLOGY | Facility: CLINIC | Age: 59
End: 2024-01-18
Payer: COMMERCIAL

## 2024-01-18 ENCOUNTER — OFFICE VISIT (OUTPATIENT)
Dept: HEPATOLOGY | Facility: CLINIC | Age: 59
End: 2024-01-18
Payer: COMMERCIAL

## 2024-01-18 VITALS — HEIGHT: 67 IN | BODY MASS INDEX: 27.47 KG/M2 | WEIGHT: 175 LBS

## 2024-01-18 DIAGNOSIS — R79.89 ABNORMAL LFTS: Primary | ICD-10-CM

## 2024-01-18 DIAGNOSIS — R76.8 HEPATITIS B CORE ANTIBODY POSITIVE: ICD-10-CM

## 2024-01-18 PROCEDURE — 1160F RVW MEDS BY RX/DR IN RCRD: CPT | Mod: CPTII,95,, | Performed by: INTERNAL MEDICINE

## 2024-01-18 PROCEDURE — 1159F MED LIST DOCD IN RCRD: CPT | Mod: CPTII,95,, | Performed by: INTERNAL MEDICINE

## 2024-01-18 PROCEDURE — 3008F BODY MASS INDEX DOCD: CPT | Mod: CPTII,95,, | Performed by: INTERNAL MEDICINE

## 2024-01-18 PROCEDURE — 99214 OFFICE O/P EST MOD 30 MIN: CPT | Mod: 95,,, | Performed by: INTERNAL MEDICINE

## 2024-01-18 NOTE — PROGRESS NOTES
Subjective:     Jaylin Murguia is here for evaluation of Elevated Hepatic Enzymes      HPI  Jaylin Murguia has a complex medical history requiring IVIG, Crohn's disease and esosinophilic disease with a small increase in ALT in Nov. Of note it was in Oct that she was thought to have PNA, was given Abx, but then thought to have eosinophilic PNA and put on high dose steroids. She has continued to be on steroids and will be started on nucala. During  this time she also had a HepBcAb + but other HepB testing was negative. She does not have a knonw cause of liver disease and her liver tests are usually normal.    Review of Systems    Objective:     Physical Exam    Computed MELD 3.0 unavailable. Necessary lab results were not found in the last year.  Computed MELD-Na unavailable. Necessary lab results were not found in the last year.      WBC   Date Value Ref Range Status   11/28/2023 5.14 3.90 - 12.70 K/uL Final     Hemoglobin   Date Value Ref Range Status   11/28/2023 13.1 12.0 - 16.0 g/dL Final     Hematocrit   Date Value Ref Range Status   11/28/2023 41.1 37.0 - 48.5 % Final     Platelets   Date Value Ref Range Status   11/28/2023 242 150 - 450 K/uL Final     BUN   Date Value Ref Range Status   11/28/2023 14 6 - 20 mg/dL Final     Creatinine   Date Value Ref Range Status   11/28/2023 1.0 0.5 - 1.4 mg/dL Final     Glucose   Date Value Ref Range Status   11/28/2023 77 70 - 110 mg/dL Final     Calcium   Date Value Ref Range Status   11/28/2023 8.7 8.7 - 10.5 mg/dL Final     Sodium   Date Value Ref Range Status   11/28/2023 139 136 - 145 mmol/L Final     Potassium   Date Value Ref Range Status   11/28/2023 3.8 3.5 - 5.1 mmol/L Final     Chloride   Date Value Ref Range Status   11/28/2023 108 95 - 110 mmol/L Final     Magnesium   Date Value Ref Range Status   10/14/2023 1.9 1.6 - 2.6 mg/dL Final     AST   Date Value Ref Range Status   11/28/2023 34 10 - 40 U/L Final     ALT   Date Value Ref Range Status    11/28/2023 49 (H) 10 - 44 U/L Final     Alkaline Phosphatase   Date Value Ref Range Status   11/28/2023 48 (L) 55 - 135 U/L Final     Total Bilirubin   Date Value Ref Range Status   11/28/2023 0.3 0.1 - 1.0 mg/dL Final     Comment:     For infants and newborns, interpretation of results should be based  on gestational age, weight and in agreement with clinical  observations.    Premature Infant recommended reference ranges:  Up to 24 hours.............<8.0 mg/dL  Up to 48 hours............<12.0 mg/dL  3-5 days..................<15.0 mg/dL  6-29 days.................<15.0 mg/dL       Albumin   Date Value Ref Range Status   11/28/2023 3.3 (L) 3.5 - 5.2 g/dL Final     INR   Date Value Ref Range Status   11/20/2014 1.0 0.8 - 1.2 Final     Comment:     Coumadin Therapy:  2.0 - 3.0 for INR for all indicators except mechanical heart valves  and antiphospholipid syndromes which should use 2.5 - 3.5.           Assessment/Plan:     1. Abnormal LFTs    2. Hepatitis B core antibody positive      Jaylin Murguia is a 58 y.o. female withElevated Hepatic Enzymes    Abnormal LFTs-only transient increase, need to see if this is chronic. She has been on steroids so have to consider metabolic steatosis component. Low concern for chronic liver disease but need updated labs.   -Check labs  -Low concern that any med she is on is causing a significant issue  -Low risk of abnormal LFTs with Nucala, she can proceed with this treatment; will get repeat baseline testing  -     HEPATITIS B VIRAL DNA, QUANTITATIVE; Future; Expected date: 01/18/2024  -     Hepatic Function Panel; Future; Expected date: 01/18/2024  -     Hepatitis B Core Antibody, Total; Future; Expected date: 01/18/2024  -     Hepatitis B Surface Antigen; Future; Expected date: 01/18/2024  -     Hepatitis B Surface Ab, Qualitative; Future; Expected date: 01/18/2024  -     Hepatitis A antibody, IgG; Future; Expected date: 01/18/2024    Hepatitis B core antibody positive-low  concern for HBV infection. Agree with core A may be positive from IVIG treatments. Low concern that the Nucala will affect her LFTs.  -     HEPATITIS B VIRAL DNA, QUANTITATIVE; Future; Expected date: 01/18/2024  -     Hepatitis B Core Antibody, Total; Future; Expected date: 01/18/2024  -     Hepatitis B Surface Antigen; Future; Expected date: 01/18/2024  -     Hepatitis B Surface Ab, Qualitative; Future; Expected date: 01/18/2024  -     Hepatitis A antibody, IgG; Future; Expected date: 01/18/2024    RTC in 6 months    The patient location is: [place]  The chief complaint leading to consultation is: Abnormal LFTs    Visit type: audiovisual    Face to Face time with patient: 15 minutes  25 minutes of total time spent on the encounter, which includes face to face time and non-face to face time preparing to see the patient (eg, review of tests), Obtaining and/or reviewing separately obtained history, Documenting clinical information in the electronic or other health record, Independently interpreting results (not separately reported) and communicating results to the patient/family/caregiver, or Care coordination (not separately reported).         Each patient to whom he or she provides medical services by telemedicine is:  (1) informed of the relationship between the physician and patient and the respective role of any other health care provider with respect to management of the patient; and (2) notified that he or she may decline to receive medical services by telemedicine and may withdraw from such care at any time.    Notes:         Halima Coles MD

## 2024-01-19 ENCOUNTER — LAB VISIT (OUTPATIENT)
Dept: LAB | Facility: HOSPITAL | Age: 59
End: 2024-01-19
Attending: INTERNAL MEDICINE
Payer: COMMERCIAL

## 2024-01-19 DIAGNOSIS — R79.89 ABNORMAL LFTS: ICD-10-CM

## 2024-01-19 DIAGNOSIS — R76.8 HEPATITIS B CORE ANTIBODY POSITIVE: ICD-10-CM

## 2024-01-19 LAB
ALBUMIN SERPL BCP-MCNC: 3.5 G/DL (ref 3.5–5.2)
ALP SERPL-CCNC: 36 U/L (ref 55–135)
ALT SERPL W/O P-5'-P-CCNC: 28 U/L (ref 10–44)
AST SERPL-CCNC: 32 U/L (ref 10–40)
BILIRUB DIRECT SERPL-MCNC: 0.1 MG/DL (ref 0.1–0.3)
BILIRUB SERPL-MCNC: 0.4 MG/DL (ref 0.1–1)
PROT SERPL-MCNC: 7.1 G/DL (ref 6–8.4)

## 2024-01-19 PROCEDURE — 80076 HEPATIC FUNCTION PANEL: CPT | Performed by: INTERNAL MEDICINE

## 2024-01-19 PROCEDURE — 87517 HEPATITIS B DNA QUANT: CPT | Performed by: INTERNAL MEDICINE

## 2024-01-19 PROCEDURE — 86706 HEP B SURFACE ANTIBODY: CPT | Performed by: INTERNAL MEDICINE

## 2024-01-19 PROCEDURE — 87340 HEPATITIS B SURFACE AG IA: CPT | Performed by: INTERNAL MEDICINE

## 2024-01-19 PROCEDURE — 86704 HEP B CORE ANTIBODY TOTAL: CPT | Performed by: INTERNAL MEDICINE

## 2024-01-19 PROCEDURE — 36415 COLL VENOUS BLD VENIPUNCTURE: CPT | Mod: PN | Performed by: INTERNAL MEDICINE

## 2024-01-19 PROCEDURE — 86790 VIRUS ANTIBODY NOS: CPT | Performed by: INTERNAL MEDICINE

## 2024-01-20 LAB
HAV IGG SER QL IA: REACTIVE
HBV CORE AB SERPL QL IA: REACTIVE
HBV SURFACE AB SER-ACNC: 436.4 MIU/ML
HBV SURFACE AB SER-ACNC: REACTIVE M[IU]/ML
HBV SURFACE AG SERPL QL IA: NORMAL

## 2024-01-22 LAB
FUNGUS SPEC CULT: NORMAL
HEPATITIS B VIRUS DNA: NORMAL
HEPATITIS B VIRUS PCR, QUANT: NOT DETECTED IU/ML

## 2024-01-29 ENCOUNTER — OFFICE VISIT (OUTPATIENT)
Dept: PRIMARY CARE CLINIC | Facility: CLINIC | Age: 59
End: 2024-01-29
Payer: COMMERCIAL

## 2024-01-29 DIAGNOSIS — D84.9 IMMUNOSUPPRESSION: ICD-10-CM

## 2024-01-29 DIAGNOSIS — Z79.890 HORMONE REPLACEMENT THERAPY (POSTMENOPAUSAL): ICD-10-CM

## 2024-01-29 DIAGNOSIS — J32.4 CHRONIC PANSINUSITIS: ICD-10-CM

## 2024-01-29 DIAGNOSIS — E78.49 OTHER HYPERLIPIDEMIA: ICD-10-CM

## 2024-01-29 DIAGNOSIS — D80.1 HYPOGAMMAGLOBULINEMIA: ICD-10-CM

## 2024-01-29 DIAGNOSIS — K50.00 CROHN'S DISEASE OF SMALL INTESTINE WITHOUT COMPLICATION: ICD-10-CM

## 2024-01-29 DIAGNOSIS — J45.50 SEVERE PERSISTENT ASTHMA WITHOUT COMPLICATION: ICD-10-CM

## 2024-01-29 DIAGNOSIS — I77.1 TORTUOUS AORTA: ICD-10-CM

## 2024-01-29 DIAGNOSIS — D80.3 IGG2 SUBCLASS DEFICIENCY: ICD-10-CM

## 2024-01-29 DIAGNOSIS — R39.89 URINE DISCOLORATION: ICD-10-CM

## 2024-01-29 DIAGNOSIS — G43.809 OTHER MIGRAINE WITHOUT STATUS MIGRAINOSUS, NOT INTRACTABLE: ICD-10-CM

## 2024-01-29 DIAGNOSIS — Z79.60 LONG-TERM USE OF IMMUNOSUPPRESSANT MEDICATION: ICD-10-CM

## 2024-01-29 DIAGNOSIS — D72.18 EOSINOPHILIC GRANULOMATOSIS WITH POLYANGIITIS (EGPA): Primary | ICD-10-CM

## 2024-01-29 DIAGNOSIS — M30.1 EOSINOPHILIC GRANULOMATOSIS WITH POLYANGIITIS (EGPA): Primary | ICD-10-CM

## 2024-01-29 PROCEDURE — 99214 OFFICE O/P EST MOD 30 MIN: CPT | Mod: 95,,, | Performed by: FAMILY MEDICINE

## 2024-01-29 RX ORDER — PROPRANOLOL HYDROCHLORIDE 60 MG/1
60 CAPSULE, EXTENDED RELEASE ORAL DAILY
Qty: 90 CAPSULE | Refills: 3 | Status: SHIPPED | OUTPATIENT
Start: 2024-01-29

## 2024-01-29 NOTE — PROGRESS NOTES
Subjective:      Patient ID: Jaylin Murguia is a 58 y.o. female.    Chief Complaint: Follow-up    The patient location is: home  Visit type: video and audio simultaneous    Patient is a 58 y.o. female coming in today for f/u.  She used to f/u with Dr. Leal GI. Reports she is about to start receiving Nucala injections through rheumatology. Recent sputum culture shows evidence of atypical bacterial infection. ID doctor is repeating chest to assess disease burden. Has been weaning off of steroids at this time. Recently f/u with Dr. Coles, liver specialist. Workup showed low risk of liver issues. Reports recent onset of cloudy urine. Denies dysuria or hematuria. She is off of Levaquin. Reports being stable being off of Estrogen supplement. Pt would like to reduce Propanolol to see if can take lower medicine with no migraine flare-ups. She is requiring medication refill today. No other health concern at this time.       [facility] Reason For Visit    1/29/2024 11:54 AM CST - Filed by Patient   What is your primary reason for visit? Other/Annual   Have you experienced any of the following:   Change in activity? Yes   Unexpected weight change? No   Neck pain? No   Hearing loss? No   Runny nose? No   Trouble swallowing? No   Eye discharge? No   Changes in vision? No   Chest tightness? No   Wheezing? No   Chest pain? No   Heart beating fast or racing? No   Blood in stool? No   Constipation? No   Vomiting? No   Diarrhea? Yes   Drinking much more than usual? No   Urinating much more than usual? No   Difficulty urinating? No   Blood in the urine? No   Menstrual problem? No   Painful urination? No   Joint swelling? No   Joint pain? Yes   Headaches? Yes   Weakness? No   Confusion? No   Feeling depressed? No     [facility]    1/29/2024 11:56 AM CST - Filed by Patient   This questionnaire should take approximately 5 to 10 minutes to complete.  To begin, press Let's Begin and then press Continue. Let's Begin   On average, how  many days per week do you engage in moderate to strenuous exercise (like a brisk walk)? 3 days   On average, how many minutes do you engage in exercise at this level? 30 min   Do you feel stress - tense, restless, nervous, or anxious, or unable to sleep at night because your mind is troubled all the time - these days? To some extent   Do you belong to any clubs or organizations such as [employer] groups, unions, fraternal or athletic groups, or school groups? No   How often do you attend meetings of the clubs or organizations you belong to? 1 to 4 times per year   In a typical week, how many times do you talk on the phone with family, friends, or neighbors? Three times a week   How often do you get together with friends or relatives? Twice a week   Are you , , , , never , or living with a partner?    How hard is it for you to pay for the very basics like food, housing, medical care, and heating? Not hard at all   Within the past 12 months, you worried that your food would run out before you got the money to buy more. Never true   Within the past 12 months, the food you bought just didnt last and you didnt have money to get more. Never true   In the past 12 months, has lack of transportation kept you from medical appointments or from getting medications? No   In the past 12 months, has lack of transportation kept you from meetings, work, or from getting things needed for daily living? No   How often do you have a drink containing alcohol? Never   How many drinks containing alcohol do you have on a typical day when you are drinking? Patient does not drink   How often do you have six or more drinks on one occasion? Never   In the last 12 months, was there a time when you were not able to pay the mortgage or rent on time? No   In the last 12 months, how many places have you lived? (range: at least 0) 1   In the last 12 months, was there a time when you did not have a steady  place to sleep or slept in a shelter (including now)? No         Pmh, Psh, Family Hx, Social Hx updated in Epic Tabs today.     LABS:   Lab Results   Component Value Date    WBC 5.14 11/28/2023    HGB 13.1 11/28/2023    HCT 41.1 11/28/2023     11/28/2023    CHOL 226 (H) 11/28/2023    TRIG 118 11/28/2023    HDL 76 (H) 11/28/2023    ALT 28 01/19/2024    AST 32 01/19/2024     11/28/2023    K 3.8 11/28/2023     11/28/2023    CREATININE 1.0 11/28/2023    BUN 14 11/28/2023    CO2 24 11/28/2023    TSH 1.169 11/28/2023    INR 1.0 11/20/2014    HGBA1C 5.3 11/28/2023       X-Ray Chest AP Portable  Narrative: EXAMINATION:  XR CHEST AP PORTABLE    CLINICAL HISTORY:  post transbronchial biopsy;    TECHNIQUE:  Single frontal view of the chest was performed.    COMPARISON:  Chest CT 10/11/2023    FINDINGS:  Cardiomediastinal silhouettewithin normal limits for age.    Extensive patchy bilateral lung opacities, right greater than left redemonstrated.  No pneumothorax is appreciated.    Gaseous distension of the stomach incidentally noted.  Impression: No evidence of complication following lung biopsy    Multifocal pulmonary opacities appear worse than on the recent CT.    Electronically signed by: Cali Frausto Jr  Date:    10/14/2023  Time:    09:53        Review of Systems   Constitutional:  Positive for activity change. Negative for unexpected weight change.   HENT:  Negative for hearing loss, rhinorrhea and trouble swallowing.    Eyes:  Negative for discharge and visual disturbance.   Respiratory:  Negative for chest tightness and wheezing.    Cardiovascular:  Negative for chest pain and palpitations.   Gastrointestinal:  Positive for diarrhea. Negative for blood in stool, constipation and vomiting.   Endocrine: Negative for polydipsia and polyuria.   Genitourinary:  Negative for difficulty urinating, dysuria, hematuria and menstrual problem.   Musculoskeletal:  Positive for arthralgias. Negative for joint  swelling and neck pain.   Neurological:  Positive for headaches. Negative for weakness.   Psychiatric/Behavioral:  Negative for confusion and dysphoric mood.      Objective:   There were no vitals filed for this visit.  Wt Readings from Last 10 Encounters:   01/18/24 79.4 kg (175 lb)   12/21/23 83.2 kg (183 lb 6.8 oz)   12/19/23 83.6 kg (184 lb 4.9 oz)   12/11/23 82.6 kg (182 lb 1.6 oz)   12/05/23 85 kg (187 lb 6.3 oz)   11/03/23 79.8 kg (175 lb 14.8 oz)   11/03/23 79.8 kg (175 lb 14.8 oz)   10/13/23 78.9 kg (174 lb)   09/25/23 79.5 kg (175 lb 4.3 oz)   08/28/23 79.4 kg (175 lb)     Physical Exam  Vitals reviewed.   Constitutional:       General: She is awake. She is not in acute distress.     Appearance: Normal appearance. She is well-developed, well-groomed and normal weight. She is not ill-appearing.   HENT:      Head: Normocephalic and atraumatic.      Right Ear: External ear normal.      Left Ear: External ear normal.      Nose: Nose normal.      Mouth/Throat:      Lips: Pink.   Eyes:      Conjunctiva/sclera: Conjunctivae normal.   Pulmonary:      Effort: Pulmonary effort is normal.   Neurological:      Mental Status: She is alert.   Psychiatric:         Attention and Perception: Attention and perception normal. She is attentive.         Mood and Affect: Mood and affect normal. Mood is not anxious or depressed. Affect is not labile, blunt, angry or inappropriate.         Speech: Speech normal. She is communicative. Speech is not rapid and pressured, delayed, slurred or tangential.         Behavior: Behavior normal. Behavior is not agitated, slowed, aggressive, withdrawn, hyperactive or combative. Behavior is cooperative.         Thought Content: Thought content normal. Thought content is not paranoid or delusional. Thought content does not include homicidal or suicidal ideation. Thought content does not include homicidal or suicidal plan.         Cognition and Memory: Cognition and memory normal. Memory is not  impaired. She does not exhibit impaired recent memory or impaired remote memory.         Judgment: Judgment normal. Judgment is not impulsive or inappropriate.       Assessment:     1. Eosinophilic granulomatosis with polyangiitis (EGPA)    2. Immunosuppression    3. Other migraine without status migrainosus, not intractable    4. Crohn's disease of small intestine without complication    5. Tortuous aorta    6. Other hyperlipidemia    7. Severe persistent asthma without complication    8. Urine discoloration    9. Hormone replacement therapy (postmenopausal)    10. Hypogammaglobulinemia    11. IgG2 subclass deficiency    12. Long-term use of immunosuppressant medication    13. Chronic pansinusitis      Plan:   Jaylin was seen today for follow-up.    Diagnoses and all orders for this visit:    Eosinophilic granulomatosis with polyangiitis (EGPA)    Immunosuppression    Other migraine without status migrainosus, not intractable  -     propranoloL (INDERAL LA) 60 MG 24 hr capsule; Take 1 capsule (60 mg total) by mouth once daily. For headache prevention    Crohn's disease of small intestine without complication    Tortuous aorta    Other hyperlipidemia    Severe persistent asthma without complication    Urine discoloration  -     Urinalysis; Future    Hormone replacement therapy (postmenopausal)  Comments:  off meds now of estrogen.    Hypogammaglobulinemia    IgG2 subclass deficiency    Long-term use of immunosuppressant medication    Chronic pansinusitis      Urine discoloration is new problem and is not controlled.   Urinalysis ordered to be completed this month.   Pt is fully off of Estrogen for HRT.      Reduced Rx Propanolol to 60 mg/day for headache prevention.   Continue f/u with multiple specialists including ID, Rheum, Pulm, GI, hepatology as directed and  as needed.   Instructed to f/u in 3 months to coordinate assistance of care with multiple complex chronic conditions.     - The other above diagnoses were  reassessed during today's visit. The associated diagnoses are linked to their chronic conditions. These conditions are currently stable, and will be monitored through associated labs, and treated with the associated medications as listed per EPIC in the patient's Medication List. I will refill medications to continue ongoing care as requested per the patient or pharmacy when needed.       Face to Face time with patient: 12:07 PM-12:27 PM        30   minutes of total time spent on the encounter, which includes face to face time and non-face to face time preparing to see the patient (eg, review of tests), Obtaining and/or reviewing separately obtained history, Documenting clinical information in the electronic or other health record, Independently interpreting results (not separately reported) and communicating results to the patient/family/caregiver, or Care coordination (not separately reported).     Each patient to whom he or she provides medical services by telemedicine is:  (1) informed of the relationship between the physician and patient and the respective role of any other health care provider with respect to management of the patient; and (2) notified that he or she may decline to receive medical services by telemedicine and may withdraw from such care at any time.      Follow up in about 3 months (around 4/29/2024) for f/u Telemed Dr Hu/ 3mo chronic issue f/u .    There are no Patient Instructions on file for this visit.    Scribe Attestation:   I, David Restrepo, am scribing for, and in the presence of, Dr. Esthela Hu MD. I performed the above scribed service and the documentation accurately describes the services I performed. I attest to the accuracy of the note.    I, Dr. Esthela Hu MD, reviewed documentation as scribed above. I personally performed the services described in this documentation.  I agree that the record reflects my personal performance and is accurate and complete. Esthela AGUILAR  MD [place].    01/29/2024

## 2024-02-05 ENCOUNTER — PATIENT MESSAGE (OUTPATIENT)
Dept: RHEUMATOLOGY | Facility: CLINIC | Age: 59
End: 2024-02-05
Payer: COMMERCIAL

## 2024-02-05 ENCOUNTER — PATIENT MESSAGE (OUTPATIENT)
Dept: PRIMARY CARE CLINIC | Facility: CLINIC | Age: 59
End: 2024-02-05
Payer: COMMERCIAL

## 2024-02-05 PROBLEM — J96.01 ACUTE HYPOXEMIC RESPIRATORY FAILURE: Status: RESOLVED | Noted: 2023-11-03 | Resolved: 2024-02-05

## 2024-02-09 ENCOUNTER — LAB VISIT (OUTPATIENT)
Dept: LAB | Facility: HOSPITAL | Age: 59
End: 2024-02-09
Attending: FAMILY MEDICINE
Payer: COMMERCIAL

## 2024-02-09 DIAGNOSIS — D80.1 HYPOGAMMAGLOBULINEMIA: ICD-10-CM

## 2024-02-09 DIAGNOSIS — K50.00 CROHN'S DISEASE OF SMALL INTESTINE WITHOUT COMPLICATION: ICD-10-CM

## 2024-02-09 DIAGNOSIS — J82.81: ICD-10-CM

## 2024-02-09 DIAGNOSIS — R39.89 URINE DISCOLORATION: ICD-10-CM

## 2024-02-09 DIAGNOSIS — R74.8 ELEVATED LIVER ENZYMES: ICD-10-CM

## 2024-02-09 LAB
ALBUMIN SERPL BCP-MCNC: 3.6 G/DL (ref 3.5–5.2)
ALP SERPL-CCNC: 38 U/L (ref 55–135)
ALT SERPL W/O P-5'-P-CCNC: 43 U/L (ref 10–44)
ANION GAP SERPL CALC-SCNC: 10 MMOL/L (ref 8–16)
AST SERPL-CCNC: 28 U/L (ref 10–40)
BILIRUB SERPL-MCNC: 0.3 MG/DL (ref 0.1–1)
BUN SERPL-MCNC: 20 MG/DL (ref 6–20)
CALCIUM SERPL-MCNC: 8.9 MG/DL (ref 8.7–10.5)
CHLORIDE SERPL-SCNC: 104 MMOL/L (ref 95–110)
CO2 SERPL-SCNC: 23 MMOL/L (ref 23–29)
CREAT SERPL-MCNC: 1.2 MG/DL (ref 0.5–1.4)
EST. GFR  (NO RACE VARIABLE): 52.5 ML/MIN/1.73 M^2
GLUCOSE SERPL-MCNC: 87 MG/DL (ref 70–110)
POTASSIUM SERPL-SCNC: 3.9 MMOL/L (ref 3.5–5.1)
PROT SERPL-MCNC: 7 G/DL (ref 6–8.4)
SODIUM SERPL-SCNC: 137 MMOL/L (ref 136–145)
T4 FREE SERPL-MCNC: 0.64 NG/DL (ref 0.71–1.51)
THYROPEROXIDASE IGG SERPL-ACNC: 38.1 IU/ML
TSH SERPL DL<=0.005 MIU/L-ACNC: 0.61 UIU/ML (ref 0.4–4)

## 2024-02-09 PROCEDURE — 36415 COLL VENOUS BLD VENIPUNCTURE: CPT | Mod: PN | Performed by: FAMILY MEDICINE

## 2024-02-09 PROCEDURE — 86376 MICROSOMAL ANTIBODY EACH: CPT | Performed by: FAMILY MEDICINE

## 2024-02-09 PROCEDURE — 80053 COMPREHEN METABOLIC PANEL: CPT | Performed by: FAMILY MEDICINE

## 2024-02-09 PROCEDURE — 84443 ASSAY THYROID STIM HORMONE: CPT | Performed by: FAMILY MEDICINE

## 2024-02-09 PROCEDURE — 84439 ASSAY OF FREE THYROXINE: CPT | Performed by: FAMILY MEDICINE

## 2024-02-10 ENCOUNTER — PATIENT MESSAGE (OUTPATIENT)
Dept: PULMONOLOGY | Facility: CLINIC | Age: 59
End: 2024-02-10
Payer: COMMERCIAL

## 2024-02-13 ENCOUNTER — PATIENT MESSAGE (OUTPATIENT)
Dept: PULMONOLOGY | Facility: CLINIC | Age: 59
End: 2024-02-13
Payer: COMMERCIAL

## 2024-02-13 ENCOUNTER — PATIENT MESSAGE (OUTPATIENT)
Dept: RHEUMATOLOGY | Facility: CLINIC | Age: 59
End: 2024-02-13
Payer: COMMERCIAL

## 2024-02-13 DIAGNOSIS — J82.81 EOSINOPHILIC PNEUMONIA: Primary | ICD-10-CM

## 2024-02-13 DIAGNOSIS — D72.18 EOSINOPHILIC GRANULOMATOSIS WITH POLYANGIITIS (EGPA): ICD-10-CM

## 2024-02-13 DIAGNOSIS — M30.1 EOSINOPHILIC GRANULOMATOSIS WITH POLYANGIITIS (EGPA): ICD-10-CM

## 2024-02-13 RX ORDER — PREDNISONE 10 MG/1
TABLET ORAL
Qty: 150 TABLET | Refills: 1 | Status: SHIPPED | OUTPATIENT
Start: 2024-02-13 | End: 2024-05-20 | Stop reason: SDUPTHER

## 2024-02-14 ENCOUNTER — PATIENT MESSAGE (OUTPATIENT)
Dept: PRIMARY CARE CLINIC | Facility: CLINIC | Age: 59
End: 2024-02-14
Payer: COMMERCIAL

## 2024-02-14 DIAGNOSIS — E78.49 OTHER HYPERLIPIDEMIA: Primary | ICD-10-CM

## 2024-02-14 DIAGNOSIS — E06.3 HASHIMOTO'S THYROIDITIS: ICD-10-CM

## 2024-02-15 DIAGNOSIS — J45.50 SEVERE PERSISTENT ASTHMA WITHOUT COMPLICATION: ICD-10-CM

## 2024-02-15 RX ORDER — MONTELUKAST SODIUM 10 MG/1
10 TABLET ORAL NIGHTLY
Qty: 30 TABLET | Refills: 11 | Status: SHIPPED | OUTPATIENT
Start: 2024-02-15 | End: 2024-03-07

## 2024-02-17 DIAGNOSIS — Z79.52 CURRENT USE OF STEROID MEDICATION: ICD-10-CM

## 2024-02-18 RX ORDER — SULFAMETHOXAZOLE AND TRIMETHOPRIM 800; 160 MG/1; MG/1
TABLET ORAL
Qty: 30 TABLET | Refills: 1 | Status: SHIPPED | OUTPATIENT
Start: 2024-02-18 | End: 2024-04-19

## 2024-02-19 ENCOUNTER — PATIENT MESSAGE (OUTPATIENT)
Dept: PRIMARY CARE CLINIC | Facility: CLINIC | Age: 59
End: 2024-02-19
Payer: COMMERCIAL

## 2024-02-19 NOTE — TELEPHONE ENCOUNTER
Can add lipid panel on to her next lab draw that is scheduled. It will make it fasting. Along with repeat thyroid labs in about 3 months.     I have signed for the following orders AND/OR meds.  Please call the patient and ask the patient to schedule the testing AND/OR inform about any medications that were sent.      Orders Placed This Encounter   Procedures    Lipid Panel     Standing Status:   Future     Standing Expiration Date:   6/18/2024    TSH     Standing Status:   Future     Standing Expiration Date:   2/18/2025    T4, Free     Standing Status:   Future     Standing Expiration Date:   4/19/2025

## 2024-02-19 NOTE — PROGRESS NOTES
Jaylin Murguia,     Sorry for the delay as I was out of the office last week. + for hashimoto's antibodies but TSH is normal. The free T4 is on the lower end but it may be more due to your other medications. I would NOT start thyroid medication at this time. I didn't have the cholesterol labs down. If you want to do that on your next blood draw just let me know so we can add it in. We did it back on 11/28/23.     Esthela Hu MD

## 2024-02-21 NOTE — TELEPHONE ENCOUNTER
----- Message from Ivet Harris sent at 2/21/2024  3:33 PM CST -----  Regarding: missed call  Type:  Patient Returning Call    Who Called:Jaylin  Who Left Message for Patient:unknown  Does the patient know what this is regarding?:no  Would the patient rather a call back or a response via [facility]? Call back  Best Call Back Number: [phone]  Additional Information:

## 2024-02-21 NOTE — TELEPHONE ENCOUNTER
Patient stated she received a call but wasn't sure why. Informed patient it's pro bally was due to lab results. Patient verbalized understanding.

## 2024-02-23 ENCOUNTER — PATIENT MESSAGE (OUTPATIENT)
Dept: INFECTIOUS DISEASES | Facility: CLINIC | Age: 59
End: 2024-02-23
Payer: COMMERCIAL

## 2024-02-23 LAB — MYCOBACTERIUM SPEC CULT: ABNORMAL

## 2024-03-06 ENCOUNTER — PATIENT MESSAGE (OUTPATIENT)
Dept: INFECTIOUS DISEASES | Facility: CLINIC | Age: 59
End: 2024-03-06
Payer: COMMERCIAL

## 2024-03-06 ENCOUNTER — OFFICE VISIT (OUTPATIENT)
Dept: RHEUMATOLOGY | Facility: CLINIC | Age: 59
End: 2024-03-06
Payer: COMMERCIAL

## 2024-03-06 VITALS
DIASTOLIC BLOOD PRESSURE: 83 MMHG | BODY MASS INDEX: 30.07 KG/M2 | HEIGHT: 67 IN | WEIGHT: 191.56 LBS | HEART RATE: 64 BPM | SYSTOLIC BLOOD PRESSURE: 136 MMHG

## 2024-03-06 DIAGNOSIS — M30.1 EOSINOPHILIC GRANULOMATOSIS WITH POLYANGIITIS (EGPA): Primary | ICD-10-CM

## 2024-03-06 DIAGNOSIS — D72.18 EOSINOPHILIC GRANULOMATOSIS WITH POLYANGIITIS (EGPA): Primary | ICD-10-CM

## 2024-03-06 PROCEDURE — 99215 OFFICE O/P EST HI 40 MIN: CPT | Mod: S$GLB,,, | Performed by: STUDENT IN AN ORGANIZED HEALTH CARE EDUCATION/TRAINING PROGRAM

## 2024-03-06 PROCEDURE — 3075F SYST BP GE 130 - 139MM HG: CPT | Mod: CPTII,S$GLB,, | Performed by: STUDENT IN AN ORGANIZED HEALTH CARE EDUCATION/TRAINING PROGRAM

## 2024-03-06 PROCEDURE — 99999 PR PBB SHADOW E&M-EST. PATIENT-LVL V: CPT | Mod: PBBFAC,,, | Performed by: STUDENT IN AN ORGANIZED HEALTH CARE EDUCATION/TRAINING PROGRAM

## 2024-03-06 PROCEDURE — 1159F MED LIST DOCD IN RCRD: CPT | Mod: CPTII,S$GLB,, | Performed by: STUDENT IN AN ORGANIZED HEALTH CARE EDUCATION/TRAINING PROGRAM

## 2024-03-06 PROCEDURE — 3008F BODY MASS INDEX DOCD: CPT | Mod: CPTII,S$GLB,, | Performed by: STUDENT IN AN ORGANIZED HEALTH CARE EDUCATION/TRAINING PROGRAM

## 2024-03-06 PROCEDURE — 3079F DIAST BP 80-89 MM HG: CPT | Mod: CPTII,S$GLB,, | Performed by: STUDENT IN AN ORGANIZED HEALTH CARE EDUCATION/TRAINING PROGRAM

## 2024-03-06 PROCEDURE — 1160F RVW MEDS BY RX/DR IN RCRD: CPT | Mod: CPTII,S$GLB,, | Performed by: STUDENT IN AN ORGANIZED HEALTH CARE EDUCATION/TRAINING PROGRAM

## 2024-03-06 NOTE — Clinical Note
Hey, Just wanted to let you know I recommended discontinuing the Singulair as that has been associated with new and worsening EGPA. Thanks! Aramis

## 2024-03-06 NOTE — PATIENT INSTRUCTIONS
Do 2 weeks total of 20mg of Prednisone and then 15mg daily for 2 weeks    Then 10mg daily until follow-up    Would hold Singulair and monitor symptoms.  If notice worsening of asthma symptoms, can resume.

## 2024-03-07 ENCOUNTER — OFFICE VISIT (OUTPATIENT)
Dept: INFECTIOUS DISEASES | Facility: CLINIC | Age: 59
End: 2024-03-07
Payer: COMMERCIAL

## 2024-03-07 ENCOUNTER — PATIENT MESSAGE (OUTPATIENT)
Dept: INFECTIOUS DISEASES | Facility: CLINIC | Age: 59
End: 2024-03-07

## 2024-03-07 DIAGNOSIS — A31.9 MYCOBACTERIAL INFECTION: Primary | ICD-10-CM

## 2024-03-07 PROCEDURE — 99215 OFFICE O/P EST HI 40 MIN: CPT | Mod: 95,,, | Performed by: INTERNAL MEDICINE

## 2024-03-07 PROCEDURE — G2211 COMPLEX E/M VISIT ADD ON: HCPCS | Mod: 95,,, | Performed by: INTERNAL MEDICINE

## 2024-03-07 RX ORDER — SODIUM CHLORIDE FOR INHALATION 10 %
4 VIAL, NEBULIZER (ML) INHALATION 2 TIMES DAILY
Qty: 240 ML | Refills: 3 | Status: SHIPPED | OUTPATIENT
Start: 2024-03-07

## 2024-03-07 RX ORDER — ALBUTEROL SULFATE 90 UG/1
2 AEROSOL, METERED RESPIRATORY (INHALATION) EVERY 6 HOURS PRN
Qty: 18 G | Refills: 3 | Status: SHIPPED | OUTPATIENT
Start: 2024-03-07 | End: 2025-03-07

## 2024-03-07 NOTE — PROGRESS NOTES
The patient location is: [place]  The chief complaint leading to consultation is: mycobacterial infection    Visit type: audiovisual    Face to Face time with patient: 30  60 minutes of total time spent on the encounter, which includes face to face time and non-face to face time preparing to see the patient (eg, review of tests), Obtaining and/or reviewing separately obtained history, Documenting clinical information in the electronic or other health record, Independently interpreting results (not separately reported) and communicating results to the patient/family/caregiver, or Care coordination (not separately reported).     Each patient to whom he or she provides medical services by telemedicine is:  (1) informed of the relationship between the physician and patient and the respective role of any other health care provider with respect to management of the patient; and (2) notified that he or she may decline to receive medical services by telemedicine and may withdraw from such care at any time.    Subjective:     Patient ID: Jaylin Murguia is a 59 y.o. female    Chief Complaint: mycobacterial infection    HPI: 59F well known to me w/ hx of recurrent sinusitis and pneumonia, underwent bronchoscopy, with findings consistent w/ eosinophilic pneumonia and was initiated on high dose prednisone with improvement in symptoms, IBD previously on skirizi, chronic IVIG replacement, now seen by rheumatology who believes all sinus / pulmonary / GI issues may be due to unifying diagnosis of EGPA. Her steroids have been tapered to 20mg, she has stopped skirizi and started nucala.     On her last visit, we discussed an AFB cultures done from her Bal in October 2023 that was positive for MAC. At that time, we opted to obtain more cultures and monitor off of treatment.     She submitted an additional sputum AFB culture in January. That culture is positive for m. Abscessus.      She endorses overall feeling well. Intermittent cough  productive of clear sputum. Some sinus congestion recently. Endorses some fatigue. She has been on nucala for about 2 months, and has been able to slowly taper down to 20mg of prednisone w/ plans to decrease to 15mg in 2 weeks. She has been able to spend time with her new granddaughter.    Immunization History   Administered Date(s) Administered    COVID-19, MRNA, LN-S, PF (Pfizer) (Purple Cap) 2021, 04/15/2021    COVID-19, mRNA, LNP-S, bivalent booster, PF (PFIZER OMICRON) 2023    Hepatitis A / Hepatitis B 2019    Influenza 10/29/2013    Influenza - Quadrivalent - PF *Preferred* (6 months and older) 11/15/2017, 2020, 2021, 10/15/2023    Influenza Split 10/29/2013    PPD Test 2017    Pneumococcal Conjugate - 13 Valent 2019    Pneumococcal Conjugate - 20 Valent 2023    Pneumococcal Polysaccharide - 23 Valent 10/29/2013    Tdap 2014        Review of Systems   All other systems reviewed and are negative.       Past Medical History:   Diagnosis Date    Allergic rhinitis     Asthma     Chronic pansinusitis     Crohn's disease     Ileal involvement, previously on Remicade, Asacol, Prednisone    Fibromyalgia     Hormone replacement therapy (postmenopausal) 2017    Hyperlipidemia     Hypertension     Immunosuppression     Migraine     Obstructive sleep apnea     CPAP at night    Sciatica      Past Surgical History:   Procedure Laterality Date    BLADDER SURGERY      sling was created by her muscles     BRONCHOSCOPY N/A 2023    Procedure: BRONCHOSCOPY;  Surgeon: Kajal Diagnostic Provider;  Location: [facility] OR [address] [place] [address];  Service: Anesthesiology;  Laterality: N/A;    BRONCHOSCOPY WITH FLUOROSCOPY N/A 10/13/2023    Procedure: BRONCHOSCOPY, WITH FLUOROSCOPY;  Surgeon: Mary Molina MD;  Location: [facility] OR [address] [place] [address];  Service: Pulmonary;  Laterality: N/A;     SECTION      COLONOSCOPY N/A 2017    Procedure: COLONOSCOPY;  Surgeon: Kin Dyer,  MD;  Location: [facility];  Service: Endoscopy;  Laterality: N/A;    COLONOSCOPY N/A 03/27/2018    Procedure: COLONOSCOPY;  Surgeon: Kyra Vallecillo MD;  Location: [facility];  Service: Endoscopy;  Laterality: N/A;    COLONOSCOPY N/A 03/12/2020    Procedure: COLONOSCOPY;  Surgeon: Nicole Leal MD;  Location: [facility] END[facility];  Service: Endoscopy;  Laterality: N/A;    COLONOSCOPY N/A 03/16/2022    Procedure: COLONOSCOPY;  Surgeon: Shay Bruce MD;  Location: [facility];  Service: Endoscopy;  Laterality: N/A;    COLONOSCOPY N/A 02/02/2023    Procedure: COLONOSCOPY;  Surgeon: Nicole Leal MD;  Location: [facility];  Service: Endoscopy;  Laterality: N/A;    DEBRIDEMENT Bilateral 12/21/2020    Procedure: DEBRIDEMENT;  Surgeon: Matthias Roach MD;  Location: [address];  Service: ENT;  Laterality: Bilateral;    ESOPHAGOGASTRODUODENOSCOPY N/A 03/12/2020    Procedure: ESOPHAGOGASTRODUODENOSCOPY (EGD);  Surgeon: Nicole Leal MD;  Location: [facility];  Service: Endoscopy;  Laterality: N/A;    ESOPHAGOGASTRODUODENOSCOPY N/A 03/16/2022    Procedure: EGD (ESOPHAGOGASTRODUODENOSCOPY);  Surgeon: Shay Bruce MD;  Location: [facility];  Service: Endoscopy;  Laterality: N/A;    ESOPHAGOGASTRODUODENOSCOPY N/A 02/02/2023    Procedure: EGD (ESOPHAGOGASTRODUODENOSCOPY);  Surgeon: Nicole Leal MD;  Location: [facility];  Service: Endoscopy;  Laterality: N/A;    FINGER SURGERY      joint relpacement, left hand index finger    FUNCTIONAL ENDOSCOPIC SINUS SURGERY (FESS) USING COMPUTER-ASSISTED NAVIGATION Bilateral 07/31/2019    Procedure: FESS, USING COMPUTER-ASSISTED NAVIGATION;  Surgeon: Manish Shaffer MD;  Location: [facility];  Service: ENT;  Laterality: Bilateral;    FUNCTIONAL ENDOSCOPIC SINUS SURGERY (FESS) USING COMPUTER-ASSISTED NAVIGATION Bilateral 09/25/2020    Procedure: FESS, USING COMPUTER-ASSISTED NAVIGATION SPHENOID;  Surgeon: Matthias Roach MD;  Location: [facility] OR [address] [place] [address];  Service: ENT;   Laterality: Bilateral;  TIVA    FUNCTIONAL ENDOSCOPIC SINUS SURGERY (FESS) USING COMPUTER-ASSISTED NAVIGATION Bilateral 09/30/2022    Procedure: FESS, USING COMPUTER-ASSISTED NAVIGATION;  Surgeon: Matthias Roach MD;  Location: [address];  Service: ENT;  Laterality: Bilateral;    HYSTERECTOMY  2001    INTRALUMINAL GASTROINTESTINAL TRACT IMAGING VIA CAPSULE N/A 03/03/2023    Procedure: IMAGING PROCEDURE, GI TRACT, INTRALUMINAL, VIA CAPSULE;  Surgeon: First Available Slick;  Location: [facility];  Service: Endoscopy;  Laterality: N/A;    SINUS SURGERY      WISDOM TOOTH EXTRACTION       Family History   Problem Relation Age of Onset    Breast cancer Mother     Hypertension Mother     Allergies Mother     Cancer Mother     Depression Mother     Kidney disease Father 64        ESRD on HD    Scleroderma Father     Arthritis Father     Diabetes Father     Hypertension Father     Breast cancer Maternal Grandmother     Heart attack Maternal Grandmother     COPD Maternal Grandmother 72    Cancer Maternal Grandmother     Cancer Paternal Grandmother 70        colon    Hypertension Brother      Social History     Tobacco Use    Smoking status: Never     Passive exposure: Never    Smokeless tobacco: Never   Substance Use Topics    Alcohol use: No    Drug use: No       Objective:     Physical Exam  Constitutional:       General: She is not in acute distress.     Appearance: Normal appearance. She is well-developed. She is not ill-appearing or diaphoretic.   HENT:      Head: Normocephalic and atraumatic.      Right Ear: External ear normal.      Left Ear: External ear normal.      Nose: Nose normal.   Eyes:      General: No scleral icterus.        Right eye: No discharge.         Left eye: No discharge.      Extraocular Movements: Extraocular movements intact.      Conjunctiva/sclera: Conjunctivae normal.   Pulmonary:      Effort: Pulmonary effort is normal. No respiratory distress.      Breath sounds: No stridor.    Musculoskeletal:         General: Normal range of motion.   Skin:     Findings: No erythema or rash.   Neurological:      General: No focal deficit present.      Mental Status: She is alert and oriented to person, place, and time. Mental status is at baseline.      Cranial Nerves: No cranial nerve deficit.   Psychiatric:         Mood and Affect: Mood normal.         Behavior: Behavior normal.         Thought Content: Thought content normal.         Judgment: Judgment normal.         Data:    All data, including recent labs, radiology, and pathology, has been independently reviewed.    Labs:    Recent Labs   Lab Result Units 12/19/23  1306 01/19/24  1000 02/09/24  0837   Sodium mmol/L  --   --  137   Potassium mmol/L  --   --  3.9   Chloride mmol/L  --   --  104   BUN mg/dL  --   --  20   Creatinine mg/dL  --   --  1.2   AST U/L  --  32 28   ALT U/L  --  28 43   Alkaline Phosphatase U/L  --  36* 38*   Total Bilirubin mg/dL  --  0.4 0.3   CRP mg/L <0.3  --   --    HIV 1/2 Ag/Ab  Non-reactive  --   --         Radiology:    No results found in the last 24 hours.     Assessment:    1. Mycobacterial infection  Discussed all culture results with patient, and the challenges presented with treatment of these infections  Given that 2 cultures have grown 2 different organisms, I feel these most likely represent colonization of inflamed airways at this time.   We discussed obtaining additional cultures to assess overall burden of infection  We also discussed initiating an airway clearance regimen. This will include albuterol inhaler, followed by hypertonic saline nebulizer treatment, followed by PEP therapy.   I have sent patient links with instructions on use of PEP therapy and different pulmonary clearance techniques. She will try to start this regimen once per day and see how she tolerates it.     Will follow up with patient in 1 month to assess how airway clearance is going. Will plan to repeat a CT in 3 months to assess  response to nucala therapy, as well as re-assess for evolving infectious process    AFB Culture & Smear    albuterol (VENTOLIN HFA) 90 mcg/actuation inhaler    sodium chloride for inhalation (SODIUM CHLORIDE 10%) 10 % nebulizer solution           Follow up in 1 month    The total time for evaluation and management services performed on 3/7/24 was greater than 60 minutes.     Visit today included increased complexity associated with the care of the episodic problem (pulmonary mycobacterial infection) addressed and managing the longitudinal care of the patient due to the serious and/or complex managed problem(s) (EGPA).    Daysi Saini DO  Transplant Infectious Disease

## 2024-03-10 ENCOUNTER — PATIENT MESSAGE (OUTPATIENT)
Dept: PULMONOLOGY | Facility: CLINIC | Age: 59
End: 2024-03-10
Payer: COMMERCIAL

## 2024-03-11 DIAGNOSIS — D72.18 EOSINOPHILIC GRANULOMATOSIS WITH POLYANGIITIS (EGPA): Primary | ICD-10-CM

## 2024-03-11 DIAGNOSIS — M30.1 EOSINOPHILIC GRANULOMATOSIS WITH POLYANGIITIS (EGPA): Primary | ICD-10-CM

## 2024-03-13 ENCOUNTER — OFFICE VISIT (OUTPATIENT)
Dept: PRIMARY CARE CLINIC | Facility: CLINIC | Age: 59
End: 2024-03-13
Payer: COMMERCIAL

## 2024-03-13 VITALS
TEMPERATURE: 97 F | HEIGHT: 67 IN | BODY MASS INDEX: 30.5 KG/M2 | DIASTOLIC BLOOD PRESSURE: 62 MMHG | HEART RATE: 87 BPM | RESPIRATION RATE: 18 BRPM | SYSTOLIC BLOOD PRESSURE: 106 MMHG | OXYGEN SATURATION: 97 % | WEIGHT: 194.31 LBS

## 2024-03-13 DIAGNOSIS — R74.8 ELEVATED LIVER ENZYMES: ICD-10-CM

## 2024-03-13 DIAGNOSIS — D84.9 IMMUNOSUPPRESSION: ICD-10-CM

## 2024-03-13 DIAGNOSIS — Z92.29 HISTORY OF POSTMENOPAUSAL HRT: ICD-10-CM

## 2024-03-13 DIAGNOSIS — B37.0 ORAL PHARYNGEAL CANDIDIASIS: ICD-10-CM

## 2024-03-13 DIAGNOSIS — J02.9 SORE THROAT: Primary | ICD-10-CM

## 2024-03-13 LAB
CTP QC/QA: YES
S PYO RRNA THROAT QL PROBE: NEGATIVE

## 2024-03-13 PROCEDURE — 99214 OFFICE O/P EST MOD 30 MIN: CPT | Mod: S$GLB,,, | Performed by: FAMILY MEDICINE

## 2024-03-13 PROCEDURE — 87880 STREP A ASSAY W/OPTIC: CPT | Mod: QW,S$GLB,, | Performed by: FAMILY MEDICINE

## 2024-03-13 PROCEDURE — 99999 PR PBB SHADOW E&M-EST. PATIENT-LVL V: CPT | Mod: PBBFAC,,, | Performed by: FAMILY MEDICINE

## 2024-03-13 PROCEDURE — 1160F RVW MEDS BY RX/DR IN RCRD: CPT | Mod: CPTII,S$GLB,, | Performed by: FAMILY MEDICINE

## 2024-03-13 PROCEDURE — 3078F DIAST BP <80 MM HG: CPT | Mod: CPTII,S$GLB,, | Performed by: FAMILY MEDICINE

## 2024-03-13 PROCEDURE — 3074F SYST BP LT 130 MM HG: CPT | Mod: CPTII,S$GLB,, | Performed by: FAMILY MEDICINE

## 2024-03-13 PROCEDURE — 1159F MED LIST DOCD IN RCRD: CPT | Mod: CPTII,S$GLB,, | Performed by: FAMILY MEDICINE

## 2024-03-13 PROCEDURE — 3008F BODY MASS INDEX DOCD: CPT | Mod: CPTII,S$GLB,, | Performed by: FAMILY MEDICINE

## 2024-03-13 PROCEDURE — G2211 COMPLEX E/M VISIT ADD ON: HCPCS | Mod: S$GLB,,, | Performed by: FAMILY MEDICINE

## 2024-03-13 RX ORDER — NYSTATIN 100000 [USP'U]/ML
6 SUSPENSION ORAL
Qty: 473 ML | Refills: 1 | Status: SHIPPED | OUTPATIENT
Start: 2024-03-13

## 2024-03-13 NOTE — PROGRESS NOTES
Subjective:      Patient ID: Jaylin Murguia is a 59 y.o. female.    Chief Complaint: Oral Swelling (When she swallows its like stabbing pain on the left side of her throat  look down her throat with a light and notice a little puss spot a size of a point of a pencil very small )      Patient is a 59 y.o. female coming in today for sore throat.   Pt reports sx is located on left side of neck. She is concerned it may due to a cyst or ulcer due to crohn's history, and her  noticed what he thought was a  small puss like area in her throat. Denies recent sick contacts. States she has been going through increase in stress due to 2 wk old grandchild being in the hospital. Sx likely secondary with pharyngeal candidiasis. Is immunosuppressed and on chronic prednisone. Strep swab was administered in clinic; test resulted negative. Discussed at length sx etiology and plan of care. Pt is wanting referral for hot flashes. She used to be on HRT therapy, but has since stopped. On notriptyline minoo. No prior use of estrogen patches gris age 33. Has mycobacterium in sputum and awaiting 3rd culture to determine abx regimen with Dr. Roger.  No other health concern at this time.       1. Sore throat    2. Oral pharyngeal candidiasis    3. Immunosuppression    4. History of postmenopausal HRT    5. Elevated liver enzymes       No questionnaires on file.    Pmh, Psh, Family Hx, Social Hx, HM updated in Epic Tabs today.   Review of Systems   Constitutional:  Negative for chills, fatigue and fever.   HENT:  Positive for sore throat and voice change. Negative for ear pain and trouble swallowing.    Eyes:  Negative for pain and visual disturbance.   Respiratory:  Negative for cough and shortness of breath.    Cardiovascular:  Negative for chest pain and leg swelling.   Gastrointestinal:  Negative for abdominal pain, blood in stool, nausea and vomiting.   Endocrine: Negative for cold intolerance and heat intolerance.  "  Genitourinary:  Negative for dysuria and frequency.   Musculoskeletal:  Negative for joint swelling, myalgias and neck pain.   Skin:  Negative for color change and rash.   Neurological:  Negative for dizziness and headaches.   Psychiatric/Behavioral:  Negative for behavioral problems and sleep disturbance.      Objective:     Vitals:    03/13/24 1320   BP: 106/62   BP Location: Left arm   Patient Position: Sitting   BP Method: Large (Manual)   Pulse: 87   Resp: 18   Temp: 97.4 °F (36.3 °C)   TempSrc: Tympanic   SpO2: 97%   Weight: 88.2 kg (194 lb 5.4 oz)   Height: 5' 7" (1.702 m)     Wt Readings from Last 10 Encounters:   03/13/24 88.2 kg (194 lb 5.4 oz)   03/06/24 86.9 kg (191 lb 9.3 oz)   01/18/24 79.4 kg (175 lb)   12/21/23 83.2 kg (183 lb 6.8 oz)   12/19/23 83.6 kg (184 lb 4.9 oz)   12/11/23 82.6 kg (182 lb 1.6 oz)   12/05/23 85 kg (187 lb 6.3 oz)   11/03/23 79.8 kg (175 lb 14.8 oz)   11/03/23 79.8 kg (175 lb 14.8 oz)   10/13/23 78.9 kg (174 lb)     Physical Exam  Vitals reviewed.   Constitutional:       Appearance: Normal appearance. She is obese.   HENT:      Head: Normocephalic and atraumatic.      Salivary Glands: Right salivary gland is not diffusely enlarged or tender. Left salivary gland is tender. Left salivary gland is not diffusely enlarged.        Right Ear: External ear normal.      Left Ear: External ear normal.      Nose: No congestion or rhinorrhea.      Mouth/Throat:      Mouth: Mucous membranes are moist.      Pharynx: Posterior oropharyngeal erythema present. No oropharyngeal exudate.      Tonsils: No tonsillar exudate or tonsillar abscesses.        Comments: Yeast, white dots   Eyes:      Conjunctiva/sclera: Conjunctivae normal.   Neurological:      Mental Status: She is alert.         Assessment:     1. Sore throat    2. Oral pharyngeal candidiasis    3. Immunosuppression    4. History of postmenopausal HRT    5. Elevated liver enzymes        Plan:   Jaylin was seen today for oral " swelling.    Diagnoses and all orders for this visit:    Sore throat  -     POCT rapid strep A    Oral pharyngeal candidiasis  -     nystatin (MYCOSTATIN) 100,000 unit/mL suspension; Take 6 mLs (600,000 Units total) by mouth 4 (four) times daily with meals and nightly.    Immunosuppression    History of postmenopausal HRT  -     Ambulatory referral/consult to [facility]; Future    Elevated liver enzymes  -     Ambulatory referral/consult to [facility]; Future      Sore throat is new problem and is not controlled at this time. Sx consistent with pharyngeal candidiasis. Strep test resulted negative in clinic.   New Rx Nystatin suspension 6 mL QID for pharyngeal candidiasis treatment.   Advised on habit changes to help with bacterial infection treatment.   Hot flashes are exacerbated and not properly controlled at this time.   Referral given to [facility] for hot flashes assessment and for discussion of restarting HRT possibly as topical or patch therapy.   Instructed to f/u if sx persist or worsen.     Patient Instructions   Dr. Maharaj- Ob/gyn- [place], [facility] hormone replacement options that would effective for you for hot flashes, maybe topical? Or patches??    Follow up if symptoms worsen or fail to improve.      LABS:   Lab Results   Component Value Date    HGBA1C 5.3 11/28/2023    HGBA1C 5.3 09/02/2021    HGBA1C 5.2 11/14/2017      Lab Results   Component Value Date    CHOL 226 (H) 11/28/2023    CHOL 172 12/06/2021    CHOL 229 (H) 09/02/2021     Lab Results   Component Value Date    LDLCALC 126.4 11/28/2023    LDLCALC 84.8 12/06/2021    LDLCALC 141.8 09/02/2021     Lab Results   Component Value Date    WBC 5.14 11/28/2023    HGB 13.1 11/28/2023    HCT 41.1 11/28/2023     11/28/2023    CHOL 226 (H) 11/28/2023    TRIG 118 11/28/2023    HDL 76 (H) 11/28/2023    ALT 43 02/09/2024    AST 28 02/09/2024     02/09/2024    K 3.9 02/09/2024      02/09/2024    CREATININE 1.2 02/09/2024    BUN 20 02/09/2024    CO2 23 02/09/2024    TSH 0.615 02/09/2024    INR 1.0 11/20/2014    HGBA1C 5.3 11/28/2023       The 10-year ASCVD risk score (Hill URSSELL, et al., 2019) is: 2.4%    Values used to calculate the score:      Age: 59 years      Sex: Female      Is Non- : No      Diabetic: No      Tobacco smoker: No      Systolic Blood Pressure: 106 mmHg      Is BP treated: Yes      HDL Cholesterol: 76 mg/dL      Total Cholesterol: 226 mg/dL  X-Ray Chest AP Portable  Narrative: EXAMINATION:  XR CHEST AP PORTABLE    CLINICAL HISTORY:  post transbronchial biopsy;    TECHNIQUE:  Single frontal view of the chest was performed.    COMPARISON:  Chest CT 10/11/2023    FINDINGS:  Cardiomediastinal silhouettewithin normal limits for age.    Extensive patchy bilateral lung opacities, right greater than left redemonstrated.  No pneumothorax is appreciated.    Gaseous distension of the stomach incidentally noted.  Impression: No evidence of complication following lung biopsy    Multifocal pulmonary opacities appear worse than on the recent CT.    Electronically signed by: Cali Frausto Jr  Date:    10/14/2023  Time:    09:53  Visit today included increased complexity associated with the care of the episodic problem sinusitis, this time with sore throat,  addressed and managing the longitudinal care of the patient due to the serious and/or complex managed problem(s) immunocomprised state, crohn's, asthma, migraines, and fibromyalgia.    Scribe Attestation:   I, David Restrepo, am scribing for, and in the presence of, Dr. Esthela Hu MD. I performed the above scribed service and the documentation accurately describes the services I performed. I attest to the accuracy of the note.    I, Dr. Esthela Hu MD, reviewed documentation as scribed above. I personally performed the services described in this documentation.  I agree that the record reflects my  personal performance and is accurate and complete. Esthela Hu MD.    03/13/2024

## 2024-03-13 NOTE — PATIENT INSTRUCTIONS
Dr. Maharaj- Ob/gyn- [place], [facility] hormone replacement options that would effective for you for hot flashes, maybe topical? Or patches??

## 2024-03-15 ENCOUNTER — LAB VISIT (OUTPATIENT)
Dept: LAB | Facility: HOSPITAL | Age: 59
End: 2024-03-15
Attending: INTERNAL MEDICINE
Payer: COMMERCIAL

## 2024-03-15 DIAGNOSIS — A31.9 MYCOBACTERIAL INFECTION: ICD-10-CM

## 2024-03-15 PROCEDURE — 87118 MYCOBACTERIC IDENTIFICATION: CPT

## 2024-03-15 PROCEDURE — 87206 SMEAR FLUORESCENT/ACID STAI: CPT | Performed by: INTERNAL MEDICINE

## 2024-03-15 PROCEDURE — 87118 MYCOBACTERIC IDENTIFICATION: CPT | Mod: 91 | Performed by: INTERNAL MEDICINE

## 2024-03-15 PROCEDURE — 87186 SC STD MICRODIL/AGAR DIL: CPT

## 2024-03-15 PROCEDURE — 87015 SPECIMEN INFECT AGNT CONCNTJ: CPT | Performed by: INTERNAL MEDICINE

## 2024-03-15 PROCEDURE — 87150 DNA/RNA AMPLIFIED PROBE: CPT

## 2024-03-15 PROCEDURE — 87116 MYCOBACTERIA CULTURE: CPT | Performed by: INTERNAL MEDICINE

## 2024-03-15 PROCEDURE — 87118 MYCOBACTERIC IDENTIFICATION: CPT | Performed by: INTERNAL MEDICINE

## 2024-03-22 ENCOUNTER — PATIENT MESSAGE (OUTPATIENT)
Dept: INFECTIOUS DISEASES | Facility: CLINIC | Age: 59
End: 2024-03-22
Payer: COMMERCIAL

## 2024-03-22 DIAGNOSIS — R05.8 PRODUCTIVE COUGH: Primary | ICD-10-CM

## 2024-03-26 ENCOUNTER — LAB VISIT (OUTPATIENT)
Dept: LAB | Facility: HOSPITAL | Age: 59
End: 2024-03-26
Attending: INTERNAL MEDICINE
Payer: COMMERCIAL

## 2024-03-26 DIAGNOSIS — Z22.8 PSEUDOMONAS AERUGINOSA RESISTANT CARRIER: ICD-10-CM

## 2024-03-26 DIAGNOSIS — J15.211 PNEUMONIA OF BOTH LUNGS DUE TO METHICILLIN SUSCEPTIBLE STAPHYLOCOCCUS AUREUS (MSSA), UNSPECIFIED PART OF LUNG: ICD-10-CM

## 2024-03-26 DIAGNOSIS — R05.8 PRODUCTIVE COUGH: ICD-10-CM

## 2024-03-26 PROCEDURE — 87015 SPECIMEN INFECT AGNT CONCNTJ: CPT | Performed by: INTERNAL MEDICINE

## 2024-03-26 PROCEDURE — 87206 SMEAR FLUORESCENT/ACID STAI: CPT | Performed by: INTERNAL MEDICINE

## 2024-03-26 PROCEDURE — 87116 MYCOBACTERIA CULTURE: CPT | Performed by: INTERNAL MEDICINE

## 2024-03-26 PROCEDURE — 87205 SMEAR GRAM STAIN: CPT | Performed by: INTERNAL MEDICINE

## 2024-03-27 ENCOUNTER — PATIENT MESSAGE (OUTPATIENT)
Dept: INFECTIOUS DISEASES | Facility: CLINIC | Age: 59
End: 2024-03-27
Payer: COMMERCIAL

## 2024-03-27 DIAGNOSIS — A31.9 MYCOBACTERIAL INFECTION: Primary | ICD-10-CM

## 2024-03-27 LAB
BACTERIA SPEC AEROBE CULT: NORMAL
GRAM STN SPEC: NORMAL
GRAM STN SPEC: NORMAL

## 2024-03-27 PROCEDURE — 99358 PROLONG SERVICE W/O CONTACT: CPT | Mod: S$GLB,,, | Performed by: INTERNAL MEDICINE

## 2024-03-28 ENCOUNTER — LAB VISIT (OUTPATIENT)
Dept: LAB | Facility: HOSPITAL | Age: 59
End: 2024-03-28
Attending: INTERNAL MEDICINE
Payer: COMMERCIAL

## 2024-03-28 ENCOUNTER — OFFICE VISIT (OUTPATIENT)
Dept: GASTROENTEROLOGY | Facility: CLINIC | Age: 59
End: 2024-03-28
Payer: COMMERCIAL

## 2024-03-28 VITALS
HEART RATE: 68 BPM | BODY MASS INDEX: 30.2 KG/M2 | OXYGEN SATURATION: 98 % | WEIGHT: 192.44 LBS | DIASTOLIC BLOOD PRESSURE: 85 MMHG | SYSTOLIC BLOOD PRESSURE: 149 MMHG | TEMPERATURE: 98 F | HEIGHT: 67 IN

## 2024-03-28 DIAGNOSIS — R19.7 DIARRHEA, UNSPECIFIED TYPE: Primary | ICD-10-CM

## 2024-03-28 DIAGNOSIS — R19.7 DIARRHEA, UNSPECIFIED TYPE: ICD-10-CM

## 2024-03-28 DIAGNOSIS — K50.00 CROHN'S DISEASE OF SMALL INTESTINE WITHOUT COMPLICATION: ICD-10-CM

## 2024-03-28 DIAGNOSIS — J82.81 EOSINOPHILIC PNEUMONIA: ICD-10-CM

## 2024-03-28 DIAGNOSIS — D80.1 HYPOGAMMAGLOBULINEMIA: ICD-10-CM

## 2024-03-28 LAB
25(OH)D3+25(OH)D2 SERPL-MCNC: 71 NG/ML (ref 30–96)
ALBUMIN SERPL BCP-MCNC: 4.1 G/DL (ref 3.5–5.2)
ALP SERPL-CCNC: 54 U/L (ref 55–135)
ALT SERPL W/O P-5'-P-CCNC: 48 U/L (ref 10–44)
ANION GAP SERPL CALC-SCNC: 7 MMOL/L (ref 8–16)
AST SERPL-CCNC: 34 U/L (ref 10–40)
BASOPHILS # BLD AUTO: 0.03 K/UL (ref 0–0.2)
BASOPHILS NFR BLD: 0.4 % (ref 0–1.9)
BILIRUB SERPL-MCNC: 0.3 MG/DL (ref 0.1–1)
BUN SERPL-MCNC: 16 MG/DL (ref 6–20)
CALCIUM SERPL-MCNC: 9.5 MG/DL (ref 8.7–10.5)
CHLORIDE SERPL-SCNC: 105 MMOL/L (ref 95–110)
CO2 SERPL-SCNC: 24 MMOL/L (ref 23–29)
CREAT SERPL-MCNC: 1 MG/DL (ref 0.5–1.4)
CRP SERPL-MCNC: 0.6 MG/L (ref 0–8.2)
DIFFERENTIAL METHOD BLD: ABNORMAL
EOSINOPHIL # BLD AUTO: 0 K/UL (ref 0–0.5)
EOSINOPHIL NFR BLD: 0.1 % (ref 0–8)
ERYTHROCYTE [DISTWIDTH] IN BLOOD BY AUTOMATED COUNT: 12.2 % (ref 11.5–14.5)
EST. GFR  (NO RACE VARIABLE): >60 ML/MIN/1.73 M^2
GLUCOSE SERPL-MCNC: 86 MG/DL (ref 70–110)
HAV IGG SER QL IA: REACTIVE
HCT VFR BLD AUTO: 43.9 % (ref 37–48.5)
HGB BLD-MCNC: 14.1 G/DL (ref 12–16)
IGA SERPL-MCNC: 232 MG/DL (ref 40–350)
IMM GRANULOCYTES # BLD AUTO: 0.03 K/UL (ref 0–0.04)
IMM GRANULOCYTES NFR BLD AUTO: 0.4 % (ref 0–0.5)
LYMPHOCYTES # BLD AUTO: 1.1 K/UL (ref 1–4.8)
LYMPHOCYTES NFR BLD: 14.5 % (ref 18–48)
MCH RBC QN AUTO: 31.8 PG (ref 27–31)
MCHC RBC AUTO-ENTMCNC: 32.1 G/DL (ref 32–36)
MCV RBC AUTO: 99 FL (ref 82–98)
MONOCYTES # BLD AUTO: 0.3 K/UL (ref 0.3–1)
MONOCYTES NFR BLD: 4.4 % (ref 4–15)
NEUTROPHILS # BLD AUTO: 6 K/UL (ref 1.8–7.7)
NEUTROPHILS NFR BLD: 80.2 % (ref 38–73)
NRBC BLD-RTO: 0 /100 WBC
PLATELET # BLD AUTO: 250 K/UL (ref 150–450)
PMV BLD AUTO: 10.3 FL (ref 9.2–12.9)
POTASSIUM SERPL-SCNC: 3.6 MMOL/L (ref 3.5–5.1)
PROT SERPL-MCNC: 8.2 G/DL (ref 6–8.4)
RBC # BLD AUTO: 4.44 M/UL (ref 4–5.4)
SODIUM SERPL-SCNC: 136 MMOL/L (ref 136–145)
VIT B12 SERPL-MCNC: 1888 PG/ML (ref 210–950)
WBC # BLD AUTO: 7.47 K/UL (ref 3.9–12.7)

## 2024-03-28 PROCEDURE — 86762 RUBELLA ANTIBODY: CPT | Performed by: INTERNAL MEDICINE

## 2024-03-28 PROCEDURE — 82306 VITAMIN D 25 HYDROXY: CPT | Performed by: INTERNAL MEDICINE

## 2024-03-28 PROCEDURE — G2211 COMPLEX E/M VISIT ADD ON: HCPCS | Mod: S$GLB,,, | Performed by: INTERNAL MEDICINE

## 2024-03-28 PROCEDURE — 86787 VARICELLA-ZOSTER ANTIBODY: CPT | Performed by: INTERNAL MEDICINE

## 2024-03-28 PROCEDURE — 1159F MED LIST DOCD IN RCRD: CPT | Mod: CPTII,S$GLB,, | Performed by: INTERNAL MEDICINE

## 2024-03-28 PROCEDURE — 84165 PROTEIN E-PHORESIS SERUM: CPT | Performed by: INTERNAL MEDICINE

## 2024-03-28 PROCEDURE — 84165 PROTEIN E-PHORESIS SERUM: CPT | Mod: 26,,, | Performed by: PATHOLOGY

## 2024-03-28 PROCEDURE — 86140 C-REACTIVE PROTEIN: CPT | Performed by: INTERNAL MEDICINE

## 2024-03-28 PROCEDURE — 86765 RUBEOLA ANTIBODY: CPT | Performed by: INTERNAL MEDICINE

## 2024-03-28 PROCEDURE — 82308 ASSAY OF CALCITONIN: CPT | Performed by: INTERNAL MEDICINE

## 2024-03-28 PROCEDURE — 3008F BODY MASS INDEX DOCD: CPT | Mod: CPTII,S$GLB,, | Performed by: INTERNAL MEDICINE

## 2024-03-28 PROCEDURE — 86735 MUMPS ANTIBODY: CPT | Performed by: INTERNAL MEDICINE

## 2024-03-28 PROCEDURE — 86790 VIRUS ANTIBODY NOS: CPT | Performed by: INTERNAL MEDICINE

## 2024-03-28 PROCEDURE — 36415 COLL VENOUS BLD VENIPUNCTURE: CPT | Performed by: INTERNAL MEDICINE

## 2024-03-28 PROCEDURE — 99215 OFFICE O/P EST HI 40 MIN: CPT | Mod: S$GLB,,, | Performed by: INTERNAL MEDICINE

## 2024-03-28 PROCEDURE — 80053 COMPREHEN METABOLIC PANEL: CPT | Performed by: INTERNAL MEDICINE

## 2024-03-28 PROCEDURE — 82607 VITAMIN B-12: CPT | Performed by: INTERNAL MEDICINE

## 2024-03-28 PROCEDURE — 3077F SYST BP >= 140 MM HG: CPT | Mod: CPTII,S$GLB,, | Performed by: INTERNAL MEDICINE

## 2024-03-28 PROCEDURE — 84307 ASSAY OF SOMATOSTATIN: CPT | Performed by: INTERNAL MEDICINE

## 2024-03-28 PROCEDURE — 86682 HELMINTH ANTIBODY: CPT | Performed by: INTERNAL MEDICINE

## 2024-03-28 PROCEDURE — 86364 TISS TRNSGLTMNASE EA IG CLAS: CPT | Performed by: INTERNAL MEDICINE

## 2024-03-28 PROCEDURE — 3079F DIAST BP 80-89 MM HG: CPT | Mod: CPTII,S$GLB,, | Performed by: INTERNAL MEDICINE

## 2024-03-28 PROCEDURE — 82784 ASSAY IGA/IGD/IGG/IGM EACH: CPT | Performed by: INTERNAL MEDICINE

## 2024-03-28 PROCEDURE — 84586 ASSAY OF VIP: CPT | Performed by: INTERNAL MEDICINE

## 2024-03-28 PROCEDURE — 85025 COMPLETE CBC W/AUTO DIFF WBC: CPT | Performed by: INTERNAL MEDICINE

## 2024-03-28 PROCEDURE — 1160F RVW MEDS BY RX/DR IN RCRD: CPT | Mod: CPTII,S$GLB,, | Performed by: INTERNAL MEDICINE

## 2024-03-28 PROCEDURE — 86316 IMMUNOASSAY TUMOR OTHER: CPT | Performed by: INTERNAL MEDICINE

## 2024-03-28 RX ORDER — DIPHENOXYLATE HYDROCHLORIDE AND ATROPINE SULFATE 2.5; .025 MG/1; MG/1
1 TABLET ORAL 4 TIMES DAILY PRN
Qty: 120 TABLET | Refills: 5 | Status: SHIPPED | OUTPATIENT
Start: 2024-03-28

## 2024-03-28 RX ORDER — DIPHENOXYLATE HYDROCHLORIDE AND ATROPINE SULFATE 2.5; .025 MG/1; MG/1
1 TABLET ORAL 4 TIMES DAILY PRN
COMMUNITY
Start: 2024-02-27 | End: 2024-03-28 | Stop reason: SDUPTHER

## 2024-03-28 RX ORDER — PROMETHAZINE HYDROCHLORIDE AND DEXTROMETHORPHAN HYDROBROMIDE 6.25; 15 MG/5ML; MG/5ML
5 SYRUP ORAL EVERY 4 HOURS PRN
COMMUNITY
Start: 2024-03-22

## 2024-03-28 NOTE — PROGRESS NOTES
I spent 45 minutes on 3/27/24 reviewing Jaylin Murguia medical records prior to clinic visit on 3/28/24.

## 2024-03-28 NOTE — PROGRESS NOTES
[facility]          Inflammatory Bowel Disease New Patient Consultation Note         TODAY'S VISIT DATE:  3/28/2024    Reason for Consult:    Chief Complaint   Patient presents with    Crohn's Disease       PCP: Esthela Hu      Referring MD:   Dr. Nicole Leal    History of Present Illness:  Jaylin Murguia who is a 59 y.o. female is being seen today at the [facility] on 03/28/2024 for inflammatory bowel disease- Crohn's disease.  I last saw her in 2017.  She has been seeing other gastroenterologist since that time.  Her history is noted below.  She reports that she continues have ongoing gastrointestinal issues.  On good days she will have 6-7 soft to watery bowel movements in a day.  On a bad day she may go 12-15 times a day.  She uses Lomotil as needed but can take this up to 4 times a day on bad days.  It does help when she takes it.  She does note an increase in urgency after some meals but not all meals.  All meals we will result in a bowel movement but not necessarily abdominal cramping.  She does occasionally have abdominal cramping as well as increased gas.  She avoids fried foods.  She does not ingest any sugar alcohols but does use Monk fruit and Stevia as artificial sweeteners.  She is currently taking prednisone 15 mg daily for the Churg-Jasper disease issues.  She feels like her pulmonary and sinus infuse have improved significantly since starting Nucala.  She is also taking Bactrim because of the chronic steroid use.  She does note a family history of both type 1 and type 2 diabetes.  She is not taking pantoprazole currently.    IBD History:  She has a history of ileal Crohn's disease that was diagnosed around the year 2000.  She has always had disease isolated to the ileum.  She was initially treated with Asacol and Apriso without any improvement.  At 1 point with prednisone without any significant improvement.  She  was on infliximab for about 6 months and does not recall any side effects but did not provide any improvement in her symptoms so it was discontinued.  She has also been on budesonide at times which did not help with her diarrhea symptoms.  She was on Humira in 2017 in 2018 with evidence of endoscopic remission but she never had any symptomatic improvement.  This was discontinued because of recurrent infectious issues.  Most recently she has been on Skyrizi in late 2023.  She never noticed any improvement from a symptom standpoint but this was discontinued after about 4 months when she was found to have a positive hepatitis-B core antibody in December of 2023.  Follow-up hepatitis-B viral DNA was negative.  She has had multiple scopes with the most recent being in February of 2003.  At that time there was evidence of endoscopic remission.  In spite of that she has continued to have longstanding diarrhea issues.  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  She takes Hizentra weekly.  She was also recently diagnosed with Churg-Jasper disease and has been taking Nucala for 3 months.  She has noticed significant improvement in sinus and respiratory symptoms but no change in her gastrointestinal symptoms.    IBD Details:  Dx Date:  2000  Disease type/distribution:  Crohn's disease/ileal inflammation only  Current Treatment:  Prednisone 15 mg daily (not for Crohn's disease specifically)  Start Date:  Response:   Optimized:   Adverse reactions:   Prior surgeries:  None  CRP Elevation: Y  calprotectin:  Low at baseline  Disease Complications:  None  Extraintestinal manifestations:  None  Prior treatments:   Steroids:  Unclear response  5ASA:  Inadequate response  IMM:  None  TNF Inh:  Infliximab-no improvement in symptoms, no objective assessment of response    Humira-endoscopic remission but no symptom improvement   Anti-Integrin:  None   IL 12/23:  Skyrizi-no symptom  "improvement, no objective assessment, discontinued because of positive hepatitis B core antibody  BRIAN Inh:  None    Previous Clinical Trials:  None    Last Colonoscopy:  February 2023-no evidence of active disease endoscopically, mild nonspecific colitis noted but no chronic inflammatory changes    Other Endoscopies:  Upper endoscopy reports reviewed and normal.  This includes duodenal biopsies to rule out celiac disease    Imaging:   MRE:  2018-thickening of the distal ileum, questionable stricture (not found during endoscopic evaluation)   CT:  2019-CT enterography normal   Other:  Other pertinent imaging evaluated    Pertinent Labs:  Lab Results   Component Value Date    SEDRATE 6 12/19/2023    CRP <0.3 12/19/2023     Lab Results   Component Value Date     02/20/2023     Lab Results   Component Value Date    TSH 0.615 02/09/2024    FREET4 0.64 (L) 02/09/2024     Lab Results   Component Value Date    UAHRSRGS01BG 77 01/23/2023    PRLWRUTC76 >2000 (H) 01/23/2023     Lab Results   Component Value Date    HEPBSAG Non-reactive 01/19/2024    HEPBCAB Reactive (A) 01/19/2024    HEPCAB Non-reactive 12/19/2023     Lab Results   Component Value Date    BIZ37SQOD Non-reactive 12/19/2023     Lab Results   Component Value Date    NIL 0.21996 12/19/2023    TBAG 0.069 03/13/2018    TBAGNIL -0.008 03/13/2018    MITOGENNIL 3.974 12/19/2023    TBGOLD Negative 03/13/2018     Lab Results   Component Value Date    TPTMINTERP SEE BELOW 03/09/2018    TPTMINTERP SEE BELOW 03/09/2018    TPMTRESULT 12.1 (L) 04/20/2015     Lab Results   Component Value Date    CDIFFICILEAN Negative 02/24/2022    CDIFFTOX Negative 02/24/2022    CDIFFICILEBY Negative 01/26/2023     Lab Results   Component Value Date    CALPROTECTIN <27.1 01/26/2023       Therapeutic Drug Monitoring Labs:  No results found for: "PROMETH"  No results found for: "ANSADAINIT", "INFLIXIMAB", "INFLIXINTERP"    Vaccinations:  Lab Results   Component Value Date    HEPBSAB " "436.40 01/19/2024    HEPBSAB Reactive 01/19/2024     Lab Results   Component Value Date    HEPAIGG Reactive 01/19/2024     No results found for: "VARICELLAZOS", "VARICELLAINT"  Immunization History   Administered Date(s) Administered    COVID-19, MRNA, LN-S, PF (Pfizer) (Purple Cap) 03/05/2021, 04/15/2021    COVID-19, mRNA, LNP-S, bivalent booster, PF (PFIZER OMICRON) 01/13/2023    Hepatitis A / Hepatitis B 12/12/2019    Influenza 10/29/2013    Influenza - Quadrivalent - PF *Preferred* (6 months and older) 11/15/2017, 01/28/2020, 02/11/2021, 10/15/2023    Influenza Split 10/29/2013    PPD Test 05/22/2017    Pneumococcal Conjugate - 13 Valent 12/23/2019    Pneumococcal Conjugate - 20 Valent 12/05/2023    Pneumococcal Polysaccharide - 23 Valent 10/29/2013    Tdap 02/18/2014         Review of Systems  Review of Systems   Constitutional:  Negative for chills, fever and weight loss.   HENT:  Negative for sore throat.    Eyes:  Negative for pain, discharge and redness.   Respiratory:  Negative for cough, shortness of breath and wheezing.    Cardiovascular:  Negative for chest pain and leg swelling.   Gastrointestinal:  Positive for diarrhea. Negative for abdominal pain, blood in stool, constipation, heartburn, melena, nausea and vomiting.   Genitourinary:  Negative for dysuria and frequency.   Musculoskeletal:  Negative for back pain, joint pain and myalgias.   Skin:  Negative for rash.        Easy bruising   Neurological:  Negative for focal weakness and seizures.   Endo/Heme/Allergies:  Does not bruise/bleed easily.   Psychiatric/Behavioral:  Negative for depression. The patient is not nervous/anxious.        Medical History:   Past Medical History:   Diagnosis Date    Allergic rhinitis     Asthma     Chronic pansinusitis     Crohn's disease     Ileal involvement, previously on Remicade, Asacol, Prednisone    Fibromyalgia     Hormone replacement therapy (postmenopausal) 11/19/2017    Hyperlipidemia     Hypertension     " Immunosuppression     Migraine     Obstructive sleep apnea     CPAP at night    Sciatica        Surgical History:  Past Surgical History:   Procedure Laterality Date    BLADDER SURGERY      sling was created by her muscles     BRONCHOSCOPY N/A 2023    Procedure: BRONCHOSCOPY;  Surgeon: [facility];  Location: [facility] OR 2ND FLR;  Service: Anesthesiology;  Laterality: N/A;    BRONCHOSCOPY WITH FLUOROSCOPY N/A 10/13/2023    Procedure: BRONCHOSCOPY, WITH FLUOROSCOPY;  Surgeon: Mayr Molina MD;  Location: [facility] OR 2ND FLR;  Service: Pulmonary;  Laterality: N/A;     SECTION      COLONOSCOPY N/A 2017    Procedure: COLONOSCOPY;  Surgeon: Kin Dyer MD;  Location: [facility] ENDO;  Service: Endoscopy;  Laterality: N/A;    COLONOSCOPY N/A 2018    Procedure: COLONOSCOPY;  Surgeon: Kyra Vallecillo MD;  Location: [facility];  Service: Endoscopy;  Laterality: N/A;    COLONOSCOPY N/A 2020    Procedure: COLONOSCOPY;  Surgeon: Nicole Leal MD;  Location: [facility];  Service: Endoscopy;  Laterality: N/A;    COLONOSCOPY N/A 2022    Procedure: COLONOSCOPY;  Surgeon: Shay Bruce MD;  Location: [facility];  Service: Endoscopy;  Laterality: N/A;    COLONOSCOPY N/A 2023    Procedure: COLONOSCOPY;  Surgeon: Nicole Leal MD;  Location: [facility];  Service: Endoscopy;  Laterality: N/A;    DEBRIDEMENT Bilateral 2020    Procedure: DEBRIDEMENT;  Surgeon: Matthias Roach MD;  Location: [facility] OR 2ND FLR;  Service: ENT;  Laterality: Bilateral;    ESOPHAGOGASTRODUODENOSCOPY N/A 2020    Procedure: ESOPHAGOGASTRODUODENOSCOPY (EGD);  Surgeon: Nicole Leal MD;  Location: [facility];  Service: Endoscopy;  Laterality: N/A;    ESOPHAGOGASTRODUODENOSCOPY N/A 2022    Procedure: EGD (ESOPHAGOGASTRODUODENOSCOPY);  Surgeon: Shay Bruce MD;  Location: [facility];  Service: Endoscopy;  Laterality: N/A;    ESOPHAGOGASTRODUODENOSCOPY N/A  02/02/2023    Procedure: EGD (ESOPHAGOGASTRODUODENOSCOPY);  Surgeon: Nicole Leal MD;  Location: [facility];  Service: Endoscopy;  Laterality: N/A;    FINGER SURGERY      joint relpacement, left hand index finger    FUNCTIONAL ENDOSCOPIC SINUS SURGERY (FESS) USING COMPUTER-ASSISTED NAVIGATION Bilateral 07/31/2019    Procedure: FESS, USING COMPUTER-ASSISTED NAVIGATION;  Surgeon: Manish Shaffer MD;  Location: [facility] OR;  Service: ENT;  Laterality: Bilateral;    FUNCTIONAL ENDOSCOPIC SINUS SURGERY (FESS) USING COMPUTER-ASSISTED NAVIGATION Bilateral 09/25/2020    Procedure: FESS, USING COMPUTER-ASSISTED NAVIGATION SPHENOID;  Surgeon: Matthias Roach MD;  Location: [facility] OR [facility] FLR;  Service: ENT;  Laterality: Bilateral;  TIVA    FUNCTIONAL ENDOSCOPIC SINUS SURGERY (FESS) USING COMPUTER-ASSISTED NAVIGATION Bilateral 09/30/2022    Procedure: FESS, USING COMPUTER-ASSISTED NAVIGATION;  Surgeon: Matthias Roach MD;  Location: [facility] OR 2ND FLR;  Service: ENT;  Laterality: Bilateral;    HYSTERECTOMY  2001    INTRALUMINAL GASTROINTESTINAL TRACT IMAGING VIA CAPSULE N/A 03/03/2023    Procedure: IMAGING PROCEDURE, GI TRACT, INTRALUMINAL, VIA CAPSULE;  Surgeon: First Available Gordon;  Location: [facility];  Service: Endoscopy;  Laterality: N/A;    SINUS SURGERY      WISDOM TOOTH EXTRACTION         Family History:   Family History   Problem Relation Age of Onset    Breast cancer Mother     Hypertension Mother     Allergies Mother     Cancer Mother     Depression Mother     Kidney disease Father 64        ESRD on HD    Scleroderma Father     Arthritis Father     Diabetes Father     Hypertension Father     Breast cancer Maternal Grandmother     Heart attack Maternal Grandmother     COPD Maternal Grandmother 72    Cancer Maternal Grandmother     Cancer Paternal Grandmother 70        colon    Hypertension Brother        Social History:   Social History     Tobacco Use    Smoking status: Never     Passive exposure: Never    Smokeless  tobacco: Never   Substance Use Topics    Alcohol use: No    Drug use: No       Allergies: Reviewed    Home Medications:   Medication List with Changes/Refills   Current Medications    ACETAMINOPHEN (TYLENOL) 500 MG TABLET    Take 2 tablets (1,000 mg total) by mouth every 6 (six) hours as needed for Pain.    ALBUTEROL (VENTOLIN HFA) 90 MCG/ACTUATION INHALER    Inhale 2 puffs into the lungs every 6 (six) hours as needed for Wheezing. Rescue    ALBUTEROL-IPRATROPIUM (DUO-NEB) 2.5 MG-0.5 MG/3 ML NEBULIZER SOLUTION    Take 3 mLs by nebulization every 6 (six) hours as needed for Wheezing. Rescue    B COMPLEX VITAMINS CAPSULE    Take 1 capsule by mouth once daily.    CALCIUM CARBONATE/VITAMIN D3 (VITAMIN D-3 ORAL)    Take 2 tablets by mouth once daily.    CETIRIZINE (ZYRTEC) 10 MG TABLET    Take 10 mg by mouth 2 (two) times a day.    DILTIAZEM HCL (DILTIAZEM 2% - LIDOCAINE 5% CREAM)    Apply peasize amount topically to anal area.    DULOXETINE (CYMBALTA) 60 MG CAPSULE    Take 1 capsule (60 mg total) by mouth 2 (two) times daily.    FLUTICASONE PROPIONATE (FLONASE) 50 MCG/ACTUATION NASAL SPRAY    2 sprays (100 mcg total) by Each Nostril route once daily.    IMMUN GLOB G,IGG,-PRO-IGA 0-50 (HIZENTRA) 10 GRAM/50 ML (20 %) SOLN    Inject 70 mLs (14 g total) into the skin every 7 days.    IPRATROPIUM (ATROVENT) 0.02 % NEBULIZER SOLUTION    Take by nebulization 4 (four) times daily as needed for Wheezing.    LACTOBACILLUS RHAMNOSUS GG (CULTURELLE) 10 BILLION CELL CAPSULE    Take 1 capsule by mouth once daily.    LOSARTAN (COZAAR) 100 MG TABLET    Take 1 tablet (100 mg total) by mouth daily as needed (high blood pressure (>120/80)).    MEPOLIZUMAB (NUCALA) 100 MG/ML SYRG    Inject 3 mLs (300 mg total) into the skin every 28 days.    NORTRIPTYLINE (PAMELOR) 25 MG CAPSULE    Take 1 capsule (25 mg total) by mouth every evening.    NYSTATIN (MYCOSTATIN) 100,000 UNIT/ML SUSPENSION    Take 6 mLs (600,000 Units total) by mouth 4 (four)  times daily with meals and nightly.    PANTOPRAZOLE (PROTONIX) 40 MG TABLET    Take 1 tablet (40 mg total) by mouth once daily.    PREDNISONE (DELTASONE) 10 MG TABLET    Take 25 mg daily for 2 weeks and then 20mg daily until further instructions    PREGABALIN (LYRICA) 150 MG CAPSULE    Take 1 capsule (150 mg total) by mouth 3 (three) times daily.    PROMETHAZINE-DEXTROMETHORPHAN (PROMETHAZINE-DM) 6.25-15 MG/5 ML SYRP    Take 5 mLs by mouth every 4 (four) hours as needed.    PROPRANOLOL (INDERAL LA) 60 MG 24 HR CAPSULE    Take 1 capsule (60 mg total) by mouth once daily. For headache prevention    RIZATRIPTAN (MAXALT) 10 MG TABLET    TAKE 1 TABLET IF NEEDED FOR MIGRAINES. MAX 2 TABLETS IN 24 HOURS.    SOD CHLOR,SOD BICARB/NETI POT (SINUS WASH NETI POT TERRELL)    use as directed    SODIUM CHLORIDE FOR INHALATION (SODIUM CHLORIDE 10%) 10 % NEBULIZER SOLUTION    Take 4 mLs by nebulization 2 (two) times a day.    SULFAMETHOXAZOLE-TRIMETHOPRIM 800-160MG (BACTRIM DS) 800-160 MG TAB    **AVOID EXPOSURE TO SUNLIGHT AND DRINK PLENTY OF WATER** TAKE 1 TABLET BY MOUTH EVERY DAY    TOPIRAMATE (TOPAMAX) 100 MG TABLET    Take 1 tablet (100 mg total) by mouth 2 (two) times daily.    TRAZODONE (DESYREL) 50 MG TABLET    Take 1 tablet (50 mg total) by mouth every evening.    TRIAMTERENE-HYDROCHLOROTHIAZIDE 37.5-25 MG (DYAZIDE) 37.5-25 MG PER CAPSULE    Take 1 capsule by mouth daily as needed (for wt fluctuation > 3 lbs in 24 hrs).    ZINC ORAL    Take 1 tablet by mouth once daily.   Changed and/or Refilled Medications    Modified Medication Previous Medication    DIPHENOXYLATE-ATROPINE 2.5-0.025 MG (LOMOTIL) 2.5-0.025 MG PER TABLET diphenoxylate-atropine 2.5-0.025 mg (LOMOTIL) 2.5-0.025 mg per tablet       Take 1 tablet by mouth 4 (four) times daily as needed for Diarrhea.    Take 1 tablet by mouth 4 (four) times daily as needed.       Physical Exam:  Vital Signs:  BP (!) 149/85 (BP Location: Left arm, Patient Position: Sitting, BP  "Method: Large (Automatic))   Pulse 68   Temp 97.5 °F (36.4 °C) (Temporal)   Ht 5' 7" (1.702 m)   Wt 87.3 kg (192 lb 7.4 oz)   SpO2 98%   BMI 30.14 kg/m²   Body mass index is 30.14 kg/m².    Physical Exam  Vitals and nursing note reviewed.   Constitutional:       General: She is not in acute distress.     Appearance: Normal appearance. She is well-developed. She is not ill-appearing or toxic-appearing.   Eyes:      Conjunctiva/sclera: Conjunctivae normal.      Pupils: Pupils are equal, round, and reactive to light.   Neck:      Thyroid: No thyromegaly.      Comments: Fullness in the left supraclavicular space, no definitive lymph node enlargement  Cardiovascular:      Rate and Rhythm: Normal rate and regular rhythm.      Heart sounds: Normal heart sounds. No murmur heard.  Pulmonary:      Effort: Pulmonary effort is normal.      Breath sounds: Normal breath sounds. No wheezing or rales.   Abdominal:      General: Bowel sounds are normal. There is no distension.      Palpations: Abdomen is soft. There is no mass.      Tenderness: There is no abdominal tenderness.   Musculoskeletal:         General: No tenderness. Normal range of motion.   Lymphadenopathy:      Cervical: No cervical adenopathy.   Skin:     Findings: Bruising present. No erythema or rash.   Neurological:      General: No focal deficit present.      Mental Status: She is alert and oriented to person, place, and time.   Psychiatric:         Mood and Affect: Mood normal.         Behavior: Behavior normal.         Thought Content: Thought content normal.         Judgment: Judgment normal.         Labs: reviewed and pertinent noted above    Assessment/Plan:  Jaylin Murguia is a 59 y.o. female with Crohn's disease, Churg-Jasper disease, immunodeficiency, chronic diarrhea. The following issues were addresssed:    1. Diarrhea, unspecified type    2. Crohn's disease of small intestine without complication    3. Eosinophilic pneumonia    4. " Hypogammaglobulinemia      1. Crohn's disease:  Her Crohn's disease has been extremely mild over its course.  Even at times of endoscopic and histologic remission she has continued to have diarrhea.  I do not think that her diarrhea issues are related to her Crohn's disease.  The diarrhea that she has is out of proportion to the mild nature of her Crohn's disease.  I do not recommend starting new therapy right now with other issues going on currently.  She is on prednisone currently so we will monitor for any signs of recurrence.  I did recommend that we update some basic labs and check a stool calprotectin level at this time.      2. Chronic diarrhea:  She has had chronic diarrhea that has never resolved even when she was in endoscopic and histologic remission from her Crohn's disease.  I suspect that this is not related to Crohn's disease and warrants further evaluation.  While a lot of her symptoms related to Churg-Jasper disease have resolved with Nucala her diarrhea has not.  I do not feel like this is necessarily a failure of the Nucala but rather suggest that the diarrhea issues she is not related to Churg-Jasper disease.  I recommend that we proceed with further evaluation including multiple lab and stool studies to look for more obscure possible causes of diarrhea.  In the meantime we will plan to start Lomotil twice daily and add a 3rd or 4th dose as necessary on bad days.    3. Eosinophilic pneumonia/Churg-Jasper disease:  Continue to follow-up with pulmonology.      4. Hypogammaglobulinemia:  Continue to follow up with Allergy and immunology.      5. Positive hepatitis-B core antibody:  This is probably from her Hizentra therapy.  She was negative prior to starting Hizentra and now has a positive core antibody.  Hepatitis-B viral DNA is negative.      # IBD specific health maintenance:  Colon cancer surveillance:  Up-to-date    Annual:  - Eye exam:  Not applicable  - Skin exam (if on IMM/TNF):   Up-to-date  - reminded pt to use sunblock/hats/sunprotective clothing  - PAP (if immunosuppressed):  Review in the future    DEXA:  Would likely benefit from a DEXA scan in the future    Vitamin D:  Check today    Vaccines:    Influenza:  Up-to-date   Pneumovax:  Up-to-date   HAV:  Check serology   HBV:  Immune   Tdap:  Review in the future   MMR:  Check serologies   VZV:  Check serology   HZV:  Recommend vaccination   HPV:  Not applicable   Meningococcus:  Not applicable   COVID: Vaccinated    Follow up: Follow up in about 6 months (around 9/28/2024).    Visit today is associated with current or anticipated ongoing medical care related to this patient's single serious condition/complex condition (Crohn's disease and chronic diarrhea).      Thank you again for sending Jaylin Murguia to see Dr. Woody Dyer today at the [facility]. Please don't hesitate to contact Dr. Dyer if there are any questions regarding this evaluation, or if you have any other patients with inflammatory bowel disease for whom you would like a consultation. You can reach Dr. Dyer at [phone] or by email at [email or web address]    Kin Dyer MD  Department of Gastroenterology  Inflammatory Bowel Disease

## 2024-03-29 LAB
CALCIT SERPL-MCNC: <5 PG/ML
CGA SERPL-MCNC: 106 NG/ML

## 2024-04-01 LAB
ACID FAST MOD KINY STN SPEC: ABNORMAL
ALBUMIN SERPL ELPH-MCNC: 4.59 G/DL (ref 3.35–5.55)
ALPHA1 GLOB SERPL ELPH-MCNC: 0.17 G/DL (ref 0.17–0.41)
ALPHA2 GLOB SERPL ELPH-MCNC: 0.53 G/DL (ref 0.43–0.99)
B-GLOBULIN SERPL ELPH-MCNC: 0.73 G/DL (ref 0.5–1.1)
GAMMA GLOB SERPL ELPH-MCNC: 1.79 G/DL (ref 0.67–1.58)
MUMPS IGG INTERPRETATION: POSITIVE
MUMPS IGG SCREEN: 187 AU/ML
MYCOBACTERIUM SPEC QL CULT: ABNORMAL
MYCOBACTERIUM SPEC QL CULT: ABNORMAL
PROT SERPL-MCNC: 7.8 G/DL (ref 6–8.4)
RUBEOLA IGG ANTIBODY: >300 AU/ML
RUBEOLA INTERPRETATION: POSITIVE
RUBV IGG SER-ACNC: 118 IU/ML
RUBV IGG SER-IMP: REACTIVE
STRONGYLOIDES ANTIBODY IGG: NEGATIVE
TTG IGA SER-ACNC: 0.4 U/ML
VARICELLA INTERPRETATION: POSITIVE
VARICELLA ZOSTER IGG: >4000 AU/ML

## 2024-04-01 NOTE — PROGRESS NOTES
RHEUMATOLOGY CLINIC ESTABLISHED PATIENT VISIT    Reason for consult:- EGPA    Chief complaints, HPI, ROS, EXAM, Assessment & Plans:-    Jaylin Murguia is a 59 y.o. pleasant female who presents to follow up from her previous visit in December for management of EGPA.  She has started the Nucala and so far has done 2 doses.  She is down to 20 mg daily of prednisone.  Overall she does seem to have some improvement in her respiratory symptoms.  Not as much shortness of breath with activity.  Has noticed a little bit of resumed cough.  She is using Flonase which is helpful.  She is still having diarrhea.  Denies any new symptoms in the interim.  Of note, did have positive mycobacterium cultures and is awaiting further testing and treatment recommendations per ID.  Rheumatologic review of systems otherwise negative.  No synovitis, dactylitis or enthesitis.  No respiratory distress.  No rashes noted.    Reviewed all available old and outside pertinent medical records.    All lab results personally reviewed and interpreted by me.    1. Eosinophilic granulomatosis with polyangiitis (EGPA)        Problem List Items Addressed This Visit          Immunology/Multi System    Eosinophilic granulomatosis with polyangiitis (EGPA) - Primary       Patient following up today for management of EGPA  Now doing somewhat better on Nucala status post 2 doses  Seems to be having more ease tapering down on steroids  Would recommend trying to discontinue Singulair as this has been associated with worsening EGPA in case reports  Would continue tapering prednisone down to 15 mg for 2 weeks then 10 mg daily until follow-up  Continue current therapy otherwise.  Continue follow up with ID, pulmonology and gastro  Drug therapy requiring intensive monitoring for toxicity  High Risk Medication Monitoring encounter  No current medication related issues, no evidence of toxicity  Appropriate labs ordered for toxicity monitoring  Compromised immune  system secondary to autoimmune disease and/or use of immunosuppressive drugs.  Monitor carefully for infections.  Advised patient to get immediate medical care if any infection arises.  Also advised strict adherence age-appropriate vaccinations and cancer screenings with PCP.  Patient advised to hold DMARD and/or biologic therapy for signs of infection or for surgery. If you are unsure what to do please call our office for instruction.[facility] [phone]      # Follow up in about 3 months (around 2024).    Chronic comorbid conditions affecting medical decision making today:    Past Medical History:   Diagnosis Date    Allergic rhinitis     Asthma     Chronic pansinusitis     Crohn's disease     Ileal involvement, previously on Remicade, Asacol, Prednisone    Fibromyalgia     Hormone replacement therapy (postmenopausal) 2017    Hyperlipidemia     Hypertension     Immunosuppression     Migraine     Obstructive sleep apnea     CPAP at night    Sciatica        Past Surgical History:   Procedure Laterality Date    BLADDER SURGERY      sling was created by her muscles     BRONCHOSCOPY N/A 2023    Procedure: BRONCHOSCOPY;  Surgeon: [facility] Provider;  Location: [facility] OR [address] [place] [address];  Service: Anesthesiology;  Laterality: N/A;    BRONCHOSCOPY WITH FLUOROSCOPY N/A 10/13/2023    Procedure: BRONCHOSCOPY, WITH FLUOROSCOPY;  Surgeon: Mary Molina MD;  Location: [facility] OR [address] [place] [address];  Service: Pulmonary;  Laterality: N/A;     SECTION      COLONOSCOPY N/A 2017    Procedure: COLONOSCOPY;  Surgeon: Kin Dyer MD;  Location: [facility];  Service: Endoscopy;  Laterality: N/A;    COLONOSCOPY N/A 2018    Procedure: COLONOSCOPY;  Surgeon: Kyra Vallecillo MD;  Location: [facility];  Service: Endoscopy;  Laterality: N/A;    COLONOSCOPY N/A 2020    Procedure: COLONOSCOPY;  Surgeon: Nicole Leal MD;  Location: [facility];  Service: Endoscopy;  Laterality: N/A;     COLONOSCOPY N/A 03/16/2022    Procedure: COLONOSCOPY;  Surgeon: Shay Bruce MD;  Location: [facility] ENDO;  Service: Endoscopy;  Laterality: N/A;    COLONOSCOPY N/A 02/02/2023    Procedure: COLONOSCOPY;  Surgeon: Nicole Leal MD;  Location: [facility] ENDO;  Service: Endoscopy;  Laterality: N/A;    DEBRIDEMENT Bilateral 12/21/2020    Procedure: DEBRIDEMENT;  Surgeon: Matthias Roach MD;  Location: [facility] OR 2ND FLR;  Service: ENT;  Laterality: Bilateral;    ESOPHAGOGASTRODUODENOSCOPY N/A 03/12/2020    Procedure: ESOPHAGOGASTRODUODENOSCOPY (EGD);  Surgeon: Nicole Leal MD;  Location: [facility];  Service: Endoscopy;  Laterality: N/A;    ESOPHAGOGASTRODUODENOSCOPY N/A 03/16/2022    Procedure: EGD (ESOPHAGOGASTRODUODENOSCOPY);  Surgeon: Shay Bruce MD;  Location: [facility];  Service: Endoscopy;  Laterality: N/A;    ESOPHAGOGASTRODUODENOSCOPY N/A 02/02/2023    Procedure: EGD (ESOPHAGOGASTRODUODENOSCOPY);  Surgeon: Nicole Leal MD;  Location: [facility];  Service: Endoscopy;  Laterality: N/A;    FINGER SURGERY      joint relpacement, left hand index finger    FUNCTIONAL ENDOSCOPIC SINUS SURGERY (FESS) USING COMPUTER-ASSISTED NAVIGATION Bilateral 07/31/2019    Procedure: FESS, USING COMPUTER-ASSISTED NAVIGATION;  Surgeon: Manish Shaffer MD;  Location: [facility];  Service: ENT;  Laterality: Bilateral;    FUNCTIONAL ENDOSCOPIC SINUS SURGERY (FESS) USING COMPUTER-ASSISTED NAVIGATION Bilateral 09/25/2020    Procedure: FESS, USING COMPUTER-ASSISTED NAVIGATION SPHENOID;  Surgeon: Matthias Roach MD;  Location: [facility] OR 2ND FLR;  Service: ENT;  Laterality: Bilateral;  TIVA    FUNCTIONAL ENDOSCOPIC SINUS SURGERY (FESS) USING COMPUTER-ASSISTED NAVIGATION Bilateral 09/30/2022    Procedure: FESS, USING COMPUTER-ASSISTED NAVIGATION;  Surgeon: Matthias Roach MD;  Location: [facility] OR 2ND FLR;  Service: ENT;  Laterality: Bilateral;    HYSTERECTOMY  2001    INTRALUMINAL GASTROINTESTINAL TRACT IMAGING VIA CAPSULE  N/A 03/03/2023    Procedure: IMAGING PROCEDURE, GI TRACT, INTRALUMINAL, VIA CAPSULE;  Surgeon: First Available Ninfa Gillespie;  Location: [facility];  Service: Endoscopy;  Laterality: N/A;    SINUS SURGERY      WISDOM TOOTH EXTRACTION          Social History     Tobacco Use    Smoking status: Never     Passive exposure: Never    Smokeless tobacco: Never   Substance Use Topics    Alcohol use: No    Drug use: No       Family History   Problem Relation Age of Onset    Breast cancer Mother     Hypertension Mother     Allergies Mother     Cancer Mother     Depression Mother     Kidney disease Father 64        ESRD on HD    Scleroderma Father     Arthritis Father     Diabetes Father     Hypertension Father     Breast cancer Maternal Grandmother     Heart attack Maternal Grandmother     COPD Maternal Grandmother 72    Cancer Maternal Grandmother     Cancer Paternal Grandmother 70        colon    Hypertension Brother        Review of patient's allergies indicates:   Allergen Reactions    Fentanyl Other (See Comments)     Rigid Chest Syndrome       Medication List with Changes/Refills   New Medications    ALBUTEROL (VENTOLIN HFA) 90 MCG/ACTUATION INHALER    Inhale 2 puffs into the lungs every 6 (six) hours as needed for Wheezing. Rescue    NYSTATIN (MYCOSTATIN) 100,000 UNIT/ML SUSPENSION    Take 6 mLs (600,000 Units total) by mouth 4 (four) times daily with meals and nightly.    SODIUM CHLORIDE FOR INHALATION (SODIUM CHLORIDE 10%) 10 % NEBULIZER SOLUTION    Take 4 mLs by nebulization 2 (two) times a day.   Current Medications    ACETAMINOPHEN (TYLENOL) 500 MG TABLET    Take 2 tablets (1,000 mg total) by mouth every 6 (six) hours as needed for Pain.    ALBUTEROL-IPRATROPIUM (DUO-NEB) 2.5 MG-0.5 MG/3 ML NEBULIZER SOLUTION    Take 3 mLs by nebulization every 6 (six) hours as needed for Wheezing. Rescue    B COMPLEX VITAMINS CAPSULE    Take 1 capsule by mouth once daily.    CALCIUM CARBONATE/VITAMIN D3 (VITAMIN D-3 ORAL)    Take 2  tablets by mouth once daily.    CETIRIZINE (ZYRTEC) 10 MG TABLET    Take 10 mg by mouth 2 (two) times a day.    DILTIAZEM HCL (DILTIAZEM 2% - LIDOCAINE 5% CREAM)    Apply peasize amount topically to anal area.    DULOXETINE (CYMBALTA) 60 MG CAPSULE    Take 1 capsule (60 mg total) by mouth 2 (two) times daily.    FLUTICASONE PROPIONATE (FLONASE) 50 MCG/ACTUATION NASAL SPRAY    2 sprays (100 mcg total) by Each Nostril route once daily.    IMMUN GLOB G,IGG,-PRO-IGA 0-50 (HIZENTRA) 10 GRAM/50 ML (20 %) SOLN    Inject 70 mLs (14 g total) into the skin every 7 days.    IPRATROPIUM (ATROVENT) 0.02 % NEBULIZER SOLUTION    Take by nebulization 4 (four) times daily as needed for Wheezing.    LACTOBACILLUS RHAMNOSUS GG (CULTURELLE) 10 BILLION CELL CAPSULE    Take 1 capsule by mouth once daily.    LOSARTAN (COZAAR) 100 MG TABLET    Take 1 tablet (100 mg total) by mouth daily as needed (high blood pressure (>120/80)).    MEPOLIZUMAB (NUCALA) 100 MG/ML SYRG    Inject 3 mLs (300 mg total) into the skin every 28 days.    NORTRIPTYLINE (PAMELOR) 25 MG CAPSULE    Take 1 capsule (25 mg total) by mouth every evening.    PANTOPRAZOLE (PROTONIX) 40 MG TABLET    Take 1 tablet (40 mg total) by mouth once daily.    PREDNISONE (DELTASONE) 10 MG TABLET    Take 25 mg daily for 2 weeks and then 20mg daily until further instructions    PREGABALIN (LYRICA) 150 MG CAPSULE    Take 1 capsule (150 mg total) by mouth 3 (three) times daily.    PROMETHAZINE-DEXTROMETHORPHAN (PROMETHAZINE-DM) 6.25-15 MG/5 ML SYRP    Take 5 mLs by mouth every 4 (four) hours as needed.    PROPRANOLOL (INDERAL LA) 60 MG 24 HR CAPSULE    Take 1 capsule (60 mg total) by mouth once daily. For headache prevention    RIZATRIPTAN (MAXALT) 10 MG TABLET    TAKE 1 TABLET IF NEEDED FOR MIGRAINES. MAX 2 TABLETS IN 24 HOURS.    SOD CHLOR,SOD BICARB/NETI POT (SINUS WASH NETI POT TERRELL)    use as directed    SULFAMETHOXAZOLE-TRIMETHOPRIM 800-160MG (BACTRIM DS) 800-160 MG TAB    **AVOID  EXPOSURE TO SUNLIGHT AND DRINK PLENTY OF WATER** TAKE 1 TABLET BY MOUTH EVERY DAY    TOPIRAMATE (TOPAMAX) 100 MG TABLET    Take 1 tablet (100 mg total) by mouth 2 (two) times daily.    TRAZODONE (DESYREL) 50 MG TABLET    Take 1 tablet (50 mg total) by mouth every evening.    TRIAMTERENE-HYDROCHLOROTHIAZIDE 37.5-25 MG (DYAZIDE) 37.5-25 MG PER CAPSULE    Take 1 capsule by mouth daily as needed (for wt fluctuation > 3 lbs in 24 hrs).    ZINC ORAL    Take 1 tablet by mouth once daily.   Changed and/or Refilled Medications    Modified Medication Previous Medication    DIPHENOXYLATE-ATROPINE 2.5-0.025 MG (LOMOTIL) 2.5-0.025 MG PER TABLET diphenoxylate-atropine 2.5-0.025 mg (LOMOTIL) 2.5-0.025 mg per tablet       Take 1 tablet by mouth 4 (four) times daily as needed for Diarrhea.    Take 1 tablet by mouth 4 (four) times daily as needed.   Discontinued Medications    FISH OIL/BORAGE/FLAX/OM3,6,9 1 (OMEGA 3-6-9 ORAL)    Take 2 tablets by mouth once daily.    MONTELUKAST (SINGULAIR) 10 MG TABLET    Take 1 tablet (10 mg total) by mouth every evening.         Disclaimer: This note was prepared using voice recognition system and is likely to have sound alike errors and is not proofread.  Please message me with any questions.    32 minutes of total time spent on the encounter, which includes face to face time and non-face to face time preparing to see the patient (eg, review of tests), Obtaining and/or reviewing separately obtained history, Documenting clinical information in the electronic or other health record, Independently interpreting results (not separately reported) and communicating results to the patient/family/caregiver, or Care coordination (not separately reported).     Thank you for allowing me to participate in the care of Jaylin Murguia.    Aramis Chand MD

## 2024-04-02 ENCOUNTER — LAB VISIT (OUTPATIENT)
Dept: LAB | Facility: HOSPITAL | Age: 59
End: 2024-04-02
Attending: INTERNAL MEDICINE
Payer: COMMERCIAL

## 2024-04-02 DIAGNOSIS — R19.7 DIARRHEA, UNSPECIFIED TYPE: ICD-10-CM

## 2024-04-02 LAB — PATHOLOGIST INTERPRETATION SPE: NORMAL

## 2024-04-02 PROCEDURE — 83993 ASSAY FOR CALPROTECTIN FECAL: CPT | Performed by: INTERNAL MEDICINE

## 2024-04-02 PROCEDURE — 87328 CRYPTOSPORIDIUM AG IA: CPT | Performed by: INTERNAL MEDICINE

## 2024-04-02 PROCEDURE — 82653 EL-1 FECAL QUANTITATIVE: CPT | Performed by: INTERNAL MEDICINE

## 2024-04-03 LAB
CRYPTOSP AG STL QL IA: NEGATIVE
G LAMBLIA AG STL QL IA: NEGATIVE

## 2024-04-05 LAB
CALPROTECTIN STL-MCNT: 9.9 MCG/G
ELASTASE 1, FECAL: 436 MCG/G

## 2024-04-09 ENCOUNTER — PATIENT MESSAGE (OUTPATIENT)
Dept: PRIMARY CARE CLINIC | Facility: CLINIC | Age: 59
End: 2024-04-09
Payer: COMMERCIAL

## 2024-04-09 NOTE — TELEPHONE ENCOUNTER
No care due was identified.  [facility] Embedded Care Due Messages. Reference number: [id number].   4/09/2024 2:46:25 PM CDT

## 2024-04-10 ENCOUNTER — OFFICE VISIT (OUTPATIENT)
Dept: OTOLARYNGOLOGY | Facility: CLINIC | Age: 59
End: 2024-04-10
Payer: COMMERCIAL

## 2024-04-10 DIAGNOSIS — J31.0 NONALLERGIC RHINITIS: ICD-10-CM

## 2024-04-10 DIAGNOSIS — K21.9 LARYNGOPHARYNGEAL REFLUX (LPR): Primary | ICD-10-CM

## 2024-04-10 DIAGNOSIS — J32.4 CHRONIC PANSINUSITIS: ICD-10-CM

## 2024-04-10 PROCEDURE — 99999 PR PBB SHADOW E&M-EST. PATIENT-LVL III: CPT | Mod: PBBFAC,,, | Performed by: OTOLARYNGOLOGY

## 2024-04-10 PROCEDURE — 1159F MED LIST DOCD IN RCRD: CPT | Mod: CPTII,S$GLB,, | Performed by: OTOLARYNGOLOGY

## 2024-04-10 PROCEDURE — 99214 OFFICE O/P EST MOD 30 MIN: CPT | Mod: S$GLB,,, | Performed by: OTOLARYNGOLOGY

## 2024-04-10 RX ORDER — FAMOTIDINE 20 MG/1
20 TABLET, FILM COATED ORAL 2 TIMES DAILY
Qty: 60 TABLET | Refills: 5 | Status: SHIPPED | OUTPATIENT
Start: 2024-04-10 | End: 2024-06-18 | Stop reason: SDUPTHER

## 2024-04-10 RX ORDER — DULOXETIN HYDROCHLORIDE 60 MG/1
60 CAPSULE, DELAYED RELEASE ORAL 2 TIMES DAILY
Qty: 180 CAPSULE | Refills: 3 | Status: SHIPPED | OUTPATIENT
Start: 2024-04-10

## 2024-04-10 NOTE — TELEPHONE ENCOUNTER
Refill Routing Note   Medication(s) are not appropriate for processing by [facility] for the following reason(s):        Required vitals abnormal    [facility] action(s):  Defer             Appointments  past 12m or future 3m with PCP    Date Provider   Last Visit   3/13/2024 Esthela Hu MD   Next Visit   4/29/2024 Esthela Hu MD   ED visits in past 90 days: 0        Note composed:7:19 AM 04/10/2024

## 2024-04-11 RX ORDER — FLUTICASONE PROPIONATE 50 MCG
SPRAY, SUSPENSION (ML) NASAL
Qty: 16 G | Refills: 0 | Status: SHIPPED | OUTPATIENT
Start: 2024-04-11 | End: 2024-05-21

## 2024-04-11 NOTE — PROGRESS NOTES
Referring Provider:    No referring provider defined for this encounter.  Subjective:   Patient: Jaylin Murguia [id number], :1965   Visit date:4/10/2024 7:43 AM    Chief Complaint:  Sore Throat (Left side throat pain, stabbing pain, onset 1.5 months ago. )    HPI:    Prior notes reviewed by myself.  Clinical documentation obtained by nursing staff reviewed.     59-year-old female presents for evaluation of chronic sore throat and intermittent stabbing pain on the left side of her neck.  This started roughly 6 weeks ago and has fluctuated in intensity.  She denies any episodes of choking or eating anything with bones.  She does have a history of Crohn's disease.  She also has a history of chronic sinusitis and has had several endoscopic sinus surgeries with Dr. Roach.  She does admit to frequent throat clearing but no recent episodes of heartburn.        Objective:     Physical Exam:  Vitals:  There were no vitals taken for this visit.  General appearance:  Well developed, well nourished    Ears:  Otoscopy of external auditory canals and tympanic membranes was normal, clinical speech reception thresholds grossly intact, no mass/lesion of auricle.    Nose:  No masses/lesions of external nose, nasal mucosa, septum, and turbinates were within normal limits.    Mouth:  No mass/lesion of lips, teeth, gums, hard/soft palate, tongue, tonsils, or oropharynx.    Neck & Lymphatics:  No cervical lymphadenopathy, no neck mass/crepitus/ asymmetry, trachea is midline, no thyroid enlargement/tenderness/mass.    Due to indication in patient's history, presentation or risk factors,  a fiber optic exam was performed.    SEPARATE PROCEDURE NOTE:    ANESTHESIA:  Topical xylocaine with sammy-synephrine  FINDINGS:  Normal exam      PROCEDURE:  After verbal consent was obtained, the flexible scope was passed through the patient's nasal cavity without difficulty, she has extensive postsurgical changes from her prior sinus surgeries  but no evidence of infection or drainage.  The nasopharynx (adenoid pad) and eustachian tube orifices were first visualized and were found to be normal, without masses or irregularity.  The posterior pharyngeal wall and base of tongue were then examined and no mass or irregular tissue was seen.  The scope was then advanced to the larynx, and the epiglottis, valleculae, and piriform sinuses were normal, without masses or mucosal irregularity.  The false vocal folds and true vocal folds were then examined and were found to have normal mobility (full abduction and adduction) and no masses or mucosal irregularity was seen.  The arytenoids and the interarytenoid area were normal.      [x]  Data Reviewed:    Lab Results   Component Value Date    WBC 7.47 03/28/2024    HGB 14.1 03/28/2024    HCT 43.9 03/28/2024    MCV 99 (H) 03/28/2024    EOSINOPHIL 0.1 03/28/2024             Assessment & Plan:   Laryngopharyngeal reflux (LPR)  -     famotidine (PEPCID) 20 MG tablet; Take 1 tablet (20 mg total) by mouth 2 (two) times daily.  Dispense: 60 tablet; Refill: 5    Chronic pansinusitis    Nonallergic rhinitis        We discussed her history and exam.  I encouraged her to discuss her symptoms with her GI due to her complex history.  I explained that there are areas in her throat and esophagus that I can not see with flexible laryngoscopy.  Her laryngoscopy was essentially normal with no visible pathology or convincing evidence of reflux, but her history is consistent with LPR.  I recommended that she try using Pepcid for the next 3-4 weeks to see if it would make a difference.    Gary Mendez M.D.  Department of Otolaryngology - Head & Neck Surgery  [address] [place] [address]  P: [phone]  F: [phone]        DISCLAIMER: This note was prepared with [employer] voice recognition transcription software. Garbled syntax, mangled pronouns, and other bizarre constructions may be attributed to that software system.  While efforts were made to correct any mistakes made by this voice recognition program, some errors and/or omissions may remain in the note that were missed when the note was originally created.

## 2024-04-18 ENCOUNTER — PATIENT MESSAGE (OUTPATIENT)
Dept: INFECTIOUS DISEASES | Facility: CLINIC | Age: 59
End: 2024-04-18
Payer: COMMERCIAL

## 2024-04-19 DIAGNOSIS — Z79.52 CURRENT USE OF STEROID MEDICATION: ICD-10-CM

## 2024-04-19 LAB — MAYO MISCELLANEOUS RESULT (REF): NORMAL

## 2024-04-19 RX ORDER — SULFAMETHOXAZOLE AND TRIMETHOPRIM 800; 160 MG/1; MG/1
TABLET ORAL
Qty: 30 TABLET | Refills: 1 | Status: SHIPPED | OUTPATIENT
Start: 2024-04-19 | End: 2024-05-20 | Stop reason: SDUPTHER

## 2024-04-25 NOTE — TELEPHONE ENCOUNTER
No care due was identified.  [facility] Embedded Care Due Messages. Reference number: [id number].   4/25/2024 2:38:16 PM CDT

## 2024-04-26 LAB — MYCOBACTERIUM SPEC CULT: ABNORMAL

## 2024-04-26 RX ORDER — NORTRIPTYLINE HYDROCHLORIDE 25 MG/1
25 CAPSULE ORAL NIGHTLY
Qty: 90 CAPSULE | Refills: 3 | Status: SHIPPED | OUTPATIENT
Start: 2024-04-26

## 2024-04-26 NOTE — TELEPHONE ENCOUNTER
Refill Decision Note   Jaylin Blade  is requesting a refill authorization.  Brief Assessment and Rationale for Refill:  Approve     Medication Therapy Plan:         Comments:     Note composed:7:44 AM 04/26/2024

## 2024-04-28 LAB — SOMATOSTAT PLAS-MCNC: 28 PG/ML

## 2024-04-29 ENCOUNTER — PATIENT MESSAGE (OUTPATIENT)
Dept: GASTROENTEROLOGY | Facility: HOSPITAL | Age: 59
End: 2024-04-29
Payer: COMMERCIAL

## 2024-04-29 DIAGNOSIS — E34.2 EXCESSIVE VIP SECRETION: ICD-10-CM

## 2024-04-29 DIAGNOSIS — K50.00 CROHN'S DISEASE OF SMALL INTESTINE WITHOUT COMPLICATION: ICD-10-CM

## 2024-04-29 DIAGNOSIS — R19.7 DIARRHEA, UNSPECIFIED TYPE: Primary | ICD-10-CM

## 2024-04-30 ENCOUNTER — TELEPHONE (OUTPATIENT)
Dept: GASTROENTEROLOGY | Facility: CLINIC | Age: 59
End: 2024-04-30
Payer: COMMERCIAL

## 2024-04-30 ENCOUNTER — PATIENT MESSAGE (OUTPATIENT)
Dept: INFECTIOUS DISEASES | Facility: CLINIC | Age: 59
End: 2024-04-30
Payer: COMMERCIAL

## 2024-04-30 DIAGNOSIS — K50.00 CROHN'S DISEASE OF SMALL INTESTINE WITHOUT COMPLICATION: Primary | ICD-10-CM

## 2024-04-30 NOTE — TELEPHONE ENCOUNTER
Spoke with patient to let her know she will need to  at stool kit at any [facility] lab to do a home collect. Patient stated that she had spoke with the provider on 4/29 in regards to her results. Patient was informed on 4/30 that that the lab has not received the stool collect. Patient stated that the lab orders will be sent to her portal, and that she will need to show the lab the orders when she go to turn in the home collect stool kit. Patient stated that she will go to the [facility] in [place], [place] for a stool kit.

## 2024-04-30 NOTE — TELEPHONE ENCOUNTER
Spoke with Nicolasa to get an update on lab results. Nicolasa stated that both Fecal Fat ,and GI cultures has not been tracked, and has not been in transit because someone has sent it back to [facility] because it has been labeled and tracked as the correct protocol for specimen to be resulted in the correct protocol.

## 2024-05-01 ENCOUNTER — PATIENT MESSAGE (OUTPATIENT)
Dept: INFECTIOUS DISEASES | Facility: CLINIC | Age: 59
End: 2024-05-01
Payer: COMMERCIAL

## 2024-05-02 ENCOUNTER — PATIENT MESSAGE (OUTPATIENT)
Dept: RHEUMATOLOGY | Facility: CLINIC | Age: 59
End: 2024-05-02
Payer: COMMERCIAL

## 2024-05-06 ENCOUNTER — LAB VISIT (OUTPATIENT)
Dept: LAB | Facility: HOSPITAL | Age: 59
End: 2024-05-06
Attending: INTERNAL MEDICINE
Payer: COMMERCIAL

## 2024-05-06 DIAGNOSIS — K50.00 CROHN'S DISEASE OF SMALL INTESTINE WITHOUT COMPLICATION: ICD-10-CM

## 2024-05-06 LAB
ASTROVIRUS: NOT DETECTED
C COLI+JEJ+UPSA DNA STL QL NAA+NON-PROBE: NOT DETECTED
CYCLOSPORA CAYETANENSIS: NOT DETECTED
ENTEROAGGREGATIVE E COLI: NOT DETECTED
ENTEROPATHOGENIC E COLI: NOT DETECTED
GPP - ADENOVIRUS 40/41: NOT DETECTED
GPP - CRYPTOSPORIDIUM: NOT DETECTED
GPP - ENTAMOEBA HISTOLYTICA: NOT DETECTED
GPP - ENTEROTOXIGENIC E COLI (ETEC): NOT DETECTED
GPP - GIARDIA LAMBLIA: NOT DETECTED
GPP - NOROVIRUS GI/GII: NOT DETECTED
GPP - ROTAVIRUS A: NOT DETECTED
GPP - SALMONELLA: NOT DETECTED
GPP - VIBRIO CHOLERA: NOT DETECTED
GPP - YERSINIA ENTEROCOLITICA: NOT DETECTED
LACTATE PLASV-SCNC: NOT DETECTED MMOL/L
PLESIOMONAS SHIGELLOIDES: NOT DETECTED
SAPOVIRUS: NOT DETECTED
SHIGELLA SP+EIEC IPAH STL QL NAA+PROBE: NOT DETECTED
VIBRIO: NOT DETECTED

## 2024-05-06 PROCEDURE — 87507 IADNA-DNA/RNA PROBE TQ 12-25: CPT | Performed by: INTERNAL MEDICINE

## 2024-05-06 PROCEDURE — 82705 FATS/LIPIDS FECES QUAL: CPT | Performed by: INTERNAL MEDICINE

## 2024-05-09 ENCOUNTER — OFFICE VISIT (OUTPATIENT)
Dept: INFECTIOUS DISEASES | Facility: CLINIC | Age: 59
End: 2024-05-09
Payer: COMMERCIAL

## 2024-05-09 DIAGNOSIS — A31.9 MYCOBACTERIAL INFECTION: Primary | ICD-10-CM

## 2024-05-09 LAB
FAT STL QL: NORMAL
NEUTRAL FAT STL QL: NORMAL

## 2024-05-09 PROCEDURE — 99215 OFFICE O/P EST HI 40 MIN: CPT | Mod: 95,,, | Performed by: INTERNAL MEDICINE

## 2024-05-09 NOTE — PROGRESS NOTES
The patient location is: [place]  The chief complaint leading to consultation is: mycobacterial infection    Visit type: audiovisual    Face to Face time with patient: 30  40 minutes of total time spent on the encounter, which includes face to face time and non-face to face time preparing to see the patient (eg, review of tests), Obtaining and/or reviewing separately obtained history, Documenting clinical information in the electronic or other health record, Independently interpreting results (not separately reported) and communicating results to the patient/family/caregiver, or Care coordination (not separately reported).     Each patient to whom he or she provides medical services by telemedicine is:  (1) informed of the relationship between the physician and patient and the respective role of any other health care provider with respect to management of the patient; and (2) notified that he or she may decline to receive medical services by telemedicine and may withdraw from such care at any time.    Notes:     Subjective:     Patient ID: Jaylin Murguia is a 59 y.o. female    Chief Complaint: mycobacterial infection    HPI:  59F well known to me w/ hx of recurrent sinusitis and pneumonia, underwent bronchoscopy, with findings consistent w/ eosinophilic pneumonia and was initiated on high dose prednisone with improvement in symptoms, IBD previously on skirizi, chronic IVIG replacement, now seen by rheumatology who believes all sinus / pulmonary / GI issues may be due to unifying diagnosis of EGPA. Her steroids have been tapered to 5mg, she has stopped skirizi and started nucala.      On her prior visit, we discussed an AFB cultures done from her Bal in October 2023 that was positive for MAC. At that time, we opted to obtain more cultures and monitor off of treatment. She submitted an additional sputum AFB culture in January. That culture is positive for m. Abscessus.  We decided at that time to monitor with additional  cultures. Repeat cultures were obtained and returned positive for m. Abscessus. She is seen today to discuss results.    She is currently tapered down to 5mg of prednisone. She notes that w/ the recent decrease, she noticed some increase in respiratory symptoms. Notes ongoing productive cough. She has been using hypertonic saline nebs and PEP therapy which has been helping with pulmonary clearance.       Immunization History   Administered Date(s) Administered    COVID-19, MRNA, LN-S, PF (Pfizer) (Purple Cap) 2021, 04/15/2021    Hepatitis A / Hepatitis B 2019    Influenza 10/29/2013    Influenza - Quadrivalent - PF *Preferred* (6 months and older) 11/15/2017, 2020, 2021, 10/15/2023    Influenza Split 10/29/2013    PPD Test 2017    Pneumococcal Conjugate - 13 Valent 2019    Pneumococcal Conjugate - 20 Valent 2023    Pneumococcal Polysaccharide - 23 Valent 10/29/2013    Tdap 2014        Review of Systems   All other systems reviewed and are negative.       Past Medical History:   Diagnosis Date    Allergic rhinitis     Asthma     Chronic pansinusitis     Crohn's disease     Ileal involvement, previously on Remicade, Asacol, Prednisone    Fibromyalgia     Hormone replacement therapy (postmenopausal) 2017    Hyperlipidemia     Hypertension     Immunosuppression     Migraine     Obstructive sleep apnea     CPAP at night    Sciatica      Past Surgical History:   Procedure Laterality Date    BLADDER SURGERY      sling was created by her muscles     BRONCHOSCOPY N/A 2023    Procedure: BRONCHOSCOPY;  Surgeon: Kajal Diagnostic Provider;  Location: [facility] OR [address] [place] [address];  Service: Anesthesiology;  Laterality: N/A;    BRONCHOSCOPY WITH FLUOROSCOPY N/A 10/13/2023    Procedure: BRONCHOSCOPY, WITH FLUOROSCOPY;  Surgeon: Mary Molina MD;  Location: [facility] OR [address] [place] [address];  Service: Pulmonary;  Laterality: N/A;     SECTION      COLONOSCOPY N/A 2017    Procedure:  COLONOSCOPY;  Surgeon: Kin Dyer MD;  Location: [facility];  Service: Endoscopy;  Laterality: N/A;    COLONOSCOPY N/A 03/27/2018    Procedure: COLONOSCOPY;  Surgeon: Kyra Vallecillo MD;  Location: [facility];  Service: Endoscopy;  Laterality: N/A;    COLONOSCOPY N/A 03/12/2020    Procedure: COLONOSCOPY;  Surgeon: Nicole Leal MD;  Location: [facility];  Service: Endoscopy;  Laterality: N/A;    COLONOSCOPY N/A 03/16/2022    Procedure: COLONOSCOPY;  Surgeon: Shay Bruce MD;  Location: [facility];  Service: Endoscopy;  Laterality: N/A;    COLONOSCOPY N/A 02/02/2023    Procedure: COLONOSCOPY;  Surgeon: Nicole Leal MD;  Location: [facility];  Service: Endoscopy;  Laterality: N/A;    DEBRIDEMENT Bilateral 12/21/2020    Procedure: DEBRIDEMENT;  Surgeon: Matthias Roach MD;  Location: [address];  Service: ENT;  Laterality: Bilateral;    ESOPHAGOGASTRODUODENOSCOPY N/A 03/12/2020    Procedure: ESOPHAGOGASTRODUODENOSCOPY (EGD);  Surgeon: Nicole Leal MD;  Location: [facility];  Service: Endoscopy;  Laterality: N/A;    ESOPHAGOGASTRODUODENOSCOPY N/A 03/16/2022    Procedure: EGD (ESOPHAGOGASTRODUODENOSCOPY);  Surgeon: Shay Bruce MD;  Location: [facility];  Service: Endoscopy;  Laterality: N/A;    ESOPHAGOGASTRODUODENOSCOPY N/A 02/02/2023    Procedure: EGD (ESOPHAGOGASTRODUODENOSCOPY);  Surgeon: Nicole Leal MD;  Location: [facility];  Service: Endoscopy;  Laterality: N/A;    FINGER SURGERY      joint relpacement, left hand index finger    FUNCTIONAL ENDOSCOPIC SINUS SURGERY (FESS) USING COMPUTER-ASSISTED NAVIGATION Bilateral 07/31/2019    Procedure: FESS, USING COMPUTER-ASSISTED NAVIGATION;  Surgeon: Manish Shaffer MD;  Location: [facility];  Service: ENT;  Laterality: Bilateral;    FUNCTIONAL ENDOSCOPIC SINUS SURGERY (FESS) USING COMPUTER-ASSISTED NAVIGATION Bilateral 09/25/2020    Procedure: FESS, USING COMPUTER-ASSISTED NAVIGATION SPHENOID;  Surgeon: Matthias Roach MD;   Location: [facility] OR 2ND FLR;  Service: ENT;  Laterality: Bilateral;  TIVA    FUNCTIONAL ENDOSCOPIC SINUS SURGERY (FESS) USING COMPUTER-ASSISTED NAVIGATION Bilateral 09/30/2022    Procedure: FESS, USING COMPUTER-ASSISTED NAVIGATION;  Surgeon: Matthias Roach MD;  Location: [facility] OR 2ND FLR;  Service: ENT;  Laterality: Bilateral;    HYSTERECTOMY  2001    INTRALUMINAL GASTROINTESTINAL TRACT IMAGING VIA CAPSULE N/A 03/03/2023    Procedure: IMAGING PROCEDURE, GI TRACT, INTRALUMINAL, VIA CAPSULE;  Surgeon: First Available Ninfa Gillespie;  Location: [facility] ENDO;  Service: Endoscopy;  Laterality: N/A;    SINUS SURGERY      WISDOM TOOTH EXTRACTION       Family History   Problem Relation Name Age of Onset    Breast cancer Mother Sole     Hypertension Mother Sole     Allergies Mother Sole     Cancer Mother Sole     Depression Mother Sole     Kidney disease Father Peña 64        ESRD on HD    Scleroderma Father Peña     Arthritis Father Peña     Diabetes Father Peña     Hypertension Father Peña     Breast cancer Maternal Grandmother Ade     Heart attack Maternal Grandmother Ade     COPD Maternal Grandmother Ade 72    Cancer Maternal Grandmother Ade     Cancer Paternal Grandmother Frost 70        colon    Hypertension Brother Kevin      Social History     Tobacco Use    Smoking status: Never     Passive exposure: Never    Smokeless tobacco: Never   Substance Use Topics    Alcohol use: No    Drug use: No       Objective:     Physical Exam  Constitutional:       General: She is not in acute distress.     Appearance: Normal appearance. She is well-developed. She is not ill-appearing or diaphoretic.   HENT:      Head: Normocephalic and atraumatic.      Right Ear: External ear normal.      Left Ear: External ear normal.      Nose: Nose normal.   Eyes:      General: No scleral icterus.        Right eye: No discharge.         Left eye: No discharge.      Extraocular Movements: Extraocular movements intact.       Conjunctiva/sclera: Conjunctivae normal.   Pulmonary:      Effort: Pulmonary effort is normal. No respiratory distress.      Breath sounds: No stridor.   Musculoskeletal:         General: Normal range of motion.   Skin:     Findings: No erythema or rash.   Neurological:      General: No focal deficit present.      Mental Status: She is alert and oriented to person, place, and time. Mental status is at baseline.      Cranial Nerves: No cranial nerve deficit.   Psychiatric:         Mood and Affect: Mood normal.         Behavior: Behavior normal.         Thought Content: Thought content normal.         Judgment: Judgment normal.         Data:    All data, including recent labs, radiology, and pathology, has been independently reviewed.    Labs:    Recent Labs   Lab Result Units 03/28/24  1428   WBC K/uL 7.47   Hemoglobin g/dL 14.1   Hematocrit % 43.9   Sodium mmol/L 136   Potassium mmol/L 3.6   Chloride mmol/L 105   BUN mg/dL 16   Creatinine mg/dL 1.0   AST U/L 34   ALT U/L 48*   Alkaline Phosphatase U/L 54*   Total Bilirubin mg/dL 0.3   CRP mg/L 0.6        Radiology:    No results found in the last 24 hours.     Assessment:    1. Mycobacterial infection  Long conversation with patient regarding very limited treatment options.  The options available include tigecycline (prefer omadacycline or eravacycline but will have to check insurance coverage), IV/neb amikacin, and clofazimine.    We discussed the toxicities associated with these antibiotics, including GI side effects, hearing loss and kidney issues.     At this time I feel like risk of side effects may outweigh benefit of treatment. Patient agrees.    Will obtain a repeat CT chest to assess for findings consistent w/ invasive/progressive infection. If imaging is stable, will plan to monitor w/ serial imaging and symptom management and re-consider starting antibiotics if findings worsen.     CT Chest Without Contrast         Follow up in 3 months    The total time  for evaluation and management services performed on 5/9/24 was greater than 40 minutes.     Daysi Saini DO  Infectious Disease

## 2024-05-13 ENCOUNTER — PATIENT MESSAGE (OUTPATIENT)
Dept: RHEUMATOLOGY | Facility: CLINIC | Age: 59
End: 2024-05-13
Payer: COMMERCIAL

## 2024-05-15 LAB
ACID FAST MOD KINY STN SPEC: NORMAL
MYCOBACTERIUM SPEC QL CULT: NORMAL

## 2024-05-16 ENCOUNTER — HOSPITAL ENCOUNTER (OUTPATIENT)
Dept: RADIOLOGY | Facility: HOSPITAL | Age: 59
Discharge: HOME | End: 2024-05-16
Attending: INTERNAL MEDICINE
Payer: COMMERCIAL

## 2024-05-16 ENCOUNTER — PATIENT MESSAGE (OUTPATIENT)
Dept: INFECTIOUS DISEASES | Facility: CLINIC | Age: 59
End: 2024-05-16
Payer: COMMERCIAL

## 2024-05-16 DIAGNOSIS — R19.7 DIARRHEA, UNSPECIFIED TYPE: ICD-10-CM

## 2024-05-16 DIAGNOSIS — A31.9 MYCOBACTERIAL INFECTION: ICD-10-CM

## 2024-05-16 DIAGNOSIS — E34.2 EXCESSIVE VIP SECRETION: ICD-10-CM

## 2024-05-16 PROCEDURE — 74177 CT ABD & PELVIS W/CONTRAST: CPT | Mod: 26,,, | Performed by: RADIOLOGY

## 2024-05-16 PROCEDURE — 71250 CT THORAX DX C-: CPT | Mod: TC,PN

## 2024-05-16 PROCEDURE — 74177 CT ABD & PELVIS W/CONTRAST: CPT | Mod: TC,PN

## 2024-05-16 PROCEDURE — 71250 CT THORAX DX C-: CPT | Mod: 26,,, | Performed by: RADIOLOGY

## 2024-05-16 PROCEDURE — 25500020 PHARM REV CODE 255: Mod: PN | Performed by: INTERNAL MEDICINE

## 2024-05-16 RX ADMIN — IOHEXOL 100 ML: 350 INJECTION, SOLUTION INTRAVENOUS at 09:05

## 2024-05-20 ENCOUNTER — PATIENT MESSAGE (OUTPATIENT)
Dept: RHEUMATOLOGY | Facility: CLINIC | Age: 59
End: 2024-05-20
Payer: COMMERCIAL

## 2024-05-20 DIAGNOSIS — M30.1 EOSINOPHILIC GRANULOMATOSIS WITH POLYANGIITIS (EGPA): ICD-10-CM

## 2024-05-20 DIAGNOSIS — D72.18 EOSINOPHILIC GRANULOMATOSIS WITH POLYANGIITIS (EGPA): ICD-10-CM

## 2024-05-20 DIAGNOSIS — Z79.52 CURRENT USE OF STEROID MEDICATION: ICD-10-CM

## 2024-05-20 DIAGNOSIS — J82.81 EOSINOPHILIC PNEUMONIA: ICD-10-CM

## 2024-05-21 ENCOUNTER — PATIENT MESSAGE (OUTPATIENT)
Dept: RHEUMATOLOGY | Facility: CLINIC | Age: 59
End: 2024-05-21
Payer: COMMERCIAL

## 2024-05-21 DIAGNOSIS — J82.81 EOSINOPHILIC PNEUMONIA: ICD-10-CM

## 2024-05-21 DIAGNOSIS — J31.0 NONALLERGIC RHINITIS: ICD-10-CM

## 2024-05-21 DIAGNOSIS — M30.1 EOSINOPHILIC GRANULOMATOSIS WITH POLYANGIITIS (EGPA): ICD-10-CM

## 2024-05-21 DIAGNOSIS — D72.18 EOSINOPHILIC GRANULOMATOSIS WITH POLYANGIITIS (EGPA): ICD-10-CM

## 2024-05-21 DIAGNOSIS — Z79.52 CURRENT USE OF STEROID MEDICATION: ICD-10-CM

## 2024-05-21 RX ORDER — FLUTICASONE PROPIONATE 50 MCG
2 SPRAY, SUSPENSION (ML) NASAL
Qty: 16 G | Refills: 11 | Status: SHIPPED | OUTPATIENT
Start: 2024-05-21

## 2024-05-21 RX ORDER — PREDNISONE 10 MG/1
TABLET ORAL
Qty: 150 TABLET | Refills: 1 | Status: SHIPPED | OUTPATIENT
Start: 2024-05-21 | End: 2024-05-22 | Stop reason: SDUPTHER

## 2024-05-21 RX ORDER — SULFAMETHOXAZOLE AND TRIMETHOPRIM 800; 160 MG/1; MG/1
1 TABLET ORAL DAILY
Qty: 30 TABLET | Refills: 1 | Status: SHIPPED | OUTPATIENT
Start: 2024-05-21 | End: 2024-05-22 | Stop reason: SDUPTHER

## 2024-05-21 RX ORDER — RIZATRIPTAN BENZOATE 10 MG/1
10 TABLET ORAL ONCE AS NEEDED
Qty: 27 TABLET | Refills: 3 | Status: SHIPPED | OUTPATIENT
Start: 2024-05-21

## 2024-05-21 NOTE — TELEPHONE ENCOUNTER
Refill Decision Note   Jaylin Blade  is requesting a refill authorization.    Brief Assessment and Rationale for Refill:   Approve       Medication Therapy Plan:         Comments:     Note composed:4:40 PM 05/21/2024

## 2024-05-21 NOTE — TELEPHONE ENCOUNTER
No care due was identified.  [facility] Embedded Care Due Messages. Reference number: [id number].   5/21/2024 8:24:50 AM CDT

## 2024-05-22 RX ORDER — PREDNISONE 10 MG/1
TABLET ORAL
Qty: 150 TABLET | Refills: 1 | Status: SHIPPED | OUTPATIENT
Start: 2024-05-22

## 2024-05-22 RX ORDER — SULFAMETHOXAZOLE AND TRIMETHOPRIM 800; 160 MG/1; MG/1
1 TABLET ORAL DAILY
Qty: 30 TABLET | Refills: 1 | Status: SHIPPED | OUTPATIENT
Start: 2024-05-22

## 2024-06-11 DIAGNOSIS — G43.809 OTHER MIGRAINE WITHOUT STATUS MIGRAINOSUS, NOT INTRACTABLE: ICD-10-CM

## 2024-06-12 RX ORDER — TOPIRAMATE 100 MG/1
100 TABLET, FILM COATED ORAL 2 TIMES DAILY
Qty: 180 TABLET | Refills: 3 | Status: SHIPPED | OUTPATIENT
Start: 2024-06-12

## 2024-06-18 ENCOUNTER — LAB VISIT (OUTPATIENT)
Dept: LAB | Facility: HOSPITAL | Age: 59
End: 2024-06-18
Attending: STUDENT IN AN ORGANIZED HEALTH CARE EDUCATION/TRAINING PROGRAM
Payer: COMMERCIAL

## 2024-06-18 ENCOUNTER — OFFICE VISIT (OUTPATIENT)
Dept: RHEUMATOLOGY | Facility: CLINIC | Age: 59
End: 2024-06-18
Payer: COMMERCIAL

## 2024-06-18 VITALS
DIASTOLIC BLOOD PRESSURE: 83 MMHG | HEIGHT: 67 IN | WEIGHT: 186.06 LBS | HEART RATE: 79 BPM | BODY MASS INDEX: 29.2 KG/M2 | SYSTOLIC BLOOD PRESSURE: 128 MMHG

## 2024-06-18 DIAGNOSIS — Z79.899 IMMUNODEFICIENCY DUE TO DRUGS: ICD-10-CM

## 2024-06-18 DIAGNOSIS — D72.18 EOSINOPHILIC GRANULOMATOSIS WITH POLYANGIITIS (EGPA): ICD-10-CM

## 2024-06-18 DIAGNOSIS — K21.9 LARYNGOPHARYNGEAL REFLUX (LPR): ICD-10-CM

## 2024-06-18 DIAGNOSIS — M30.1 EOSINOPHILIC GRANULOMATOSIS WITH POLYANGIITIS (EGPA): ICD-10-CM

## 2024-06-18 DIAGNOSIS — D84.821 IMMUNODEFICIENCY DUE TO DRUGS: ICD-10-CM

## 2024-06-18 DIAGNOSIS — M30.1 EOSINOPHILIC GRANULOMATOSIS WITH POLYANGIITIS (EGPA): Primary | ICD-10-CM

## 2024-06-18 DIAGNOSIS — D72.18 EOSINOPHILIC GRANULOMATOSIS WITH POLYANGIITIS (EGPA): Primary | ICD-10-CM

## 2024-06-18 LAB
BILIRUB UR QL STRIP: NEGATIVE
CLARITY UR: CLEAR
COLOR UR: YELLOW
CREAT UR-MCNC: 20 MG/DL (ref 15–325)
ERYTHROCYTE [SEDIMENTATION RATE] IN BLOOD BY PHOTOMETRIC METHOD: 11 MM/HR (ref 0–36)
GLUCOSE UR QL STRIP: NEGATIVE
HGB UR QL STRIP: NEGATIVE
KETONES UR QL STRIP: NEGATIVE
LEUKOCYTE ESTERASE UR QL STRIP: NEGATIVE
NITRITE UR QL STRIP: NEGATIVE
PH UR STRIP: 6 [PH] (ref 5–8)
PROT UR QL STRIP: NEGATIVE
PROT UR-MCNC: <7 MG/DL (ref 0–15)
PROT/CREAT UR: NORMAL MG/G{CREAT} (ref 0–0.2)
SP GR UR STRIP: <=1.005 (ref 1–1.03)
URN SPEC COLLECT METH UR: ABNORMAL

## 2024-06-18 PROCEDURE — 1159F MED LIST DOCD IN RCRD: CPT | Mod: CPTII,S$GLB,, | Performed by: STUDENT IN AN ORGANIZED HEALTH CARE EDUCATION/TRAINING PROGRAM

## 2024-06-18 PROCEDURE — 4010F ACE/ARB THERAPY RXD/TAKEN: CPT | Mod: CPTII,S$GLB,, | Performed by: STUDENT IN AN ORGANIZED HEALTH CARE EDUCATION/TRAINING PROGRAM

## 2024-06-18 PROCEDURE — 81003 URINALYSIS AUTO W/O SCOPE: CPT | Performed by: STUDENT IN AN ORGANIZED HEALTH CARE EDUCATION/TRAINING PROGRAM

## 2024-06-18 PROCEDURE — 36415 COLL VENOUS BLD VENIPUNCTURE: CPT | Performed by: STUDENT IN AN ORGANIZED HEALTH CARE EDUCATION/TRAINING PROGRAM

## 2024-06-18 PROCEDURE — 1160F RVW MEDS BY RX/DR IN RCRD: CPT | Mod: CPTII,S$GLB,, | Performed by: STUDENT IN AN ORGANIZED HEALTH CARE EDUCATION/TRAINING PROGRAM

## 2024-06-18 PROCEDURE — 85652 RBC SED RATE AUTOMATED: CPT | Performed by: STUDENT IN AN ORGANIZED HEALTH CARE EDUCATION/TRAINING PROGRAM

## 2024-06-18 PROCEDURE — 3074F SYST BP LT 130 MM HG: CPT | Mod: CPTII,S$GLB,, | Performed by: STUDENT IN AN ORGANIZED HEALTH CARE EDUCATION/TRAINING PROGRAM

## 2024-06-18 PROCEDURE — 3079F DIAST BP 80-89 MM HG: CPT | Mod: CPTII,S$GLB,, | Performed by: STUDENT IN AN ORGANIZED HEALTH CARE EDUCATION/TRAINING PROGRAM

## 2024-06-18 PROCEDURE — 3008F BODY MASS INDEX DOCD: CPT | Mod: CPTII,S$GLB,, | Performed by: STUDENT IN AN ORGANIZED HEALTH CARE EDUCATION/TRAINING PROGRAM

## 2024-06-18 PROCEDURE — 99999 PR PBB SHADOW E&M-EST. PATIENT-LVL V: CPT | Mod: PBBFAC,,, | Performed by: STUDENT IN AN ORGANIZED HEALTH CARE EDUCATION/TRAINING PROGRAM

## 2024-06-18 PROCEDURE — 83516 IMMUNOASSAY NONANTIBODY: CPT | Mod: 59 | Performed by: STUDENT IN AN ORGANIZED HEALTH CARE EDUCATION/TRAINING PROGRAM

## 2024-06-18 PROCEDURE — 86140 C-REACTIVE PROTEIN: CPT | Performed by: STUDENT IN AN ORGANIZED HEALTH CARE EDUCATION/TRAINING PROGRAM

## 2024-06-18 PROCEDURE — 83516 IMMUNOASSAY NONANTIBODY: CPT | Performed by: STUDENT IN AN ORGANIZED HEALTH CARE EDUCATION/TRAINING PROGRAM

## 2024-06-18 PROCEDURE — 85025 COMPLETE CBC W/AUTO DIFF WBC: CPT | Performed by: STUDENT IN AN ORGANIZED HEALTH CARE EDUCATION/TRAINING PROGRAM

## 2024-06-18 PROCEDURE — 84156 ASSAY OF PROTEIN URINE: CPT | Performed by: STUDENT IN AN ORGANIZED HEALTH CARE EDUCATION/TRAINING PROGRAM

## 2024-06-18 PROCEDURE — 80053 COMPREHEN METABOLIC PANEL: CPT | Performed by: STUDENT IN AN ORGANIZED HEALTH CARE EDUCATION/TRAINING PROGRAM

## 2024-06-18 PROCEDURE — 99215 OFFICE O/P EST HI 40 MIN: CPT | Mod: S$GLB,,, | Performed by: STUDENT IN AN ORGANIZED HEALTH CARE EDUCATION/TRAINING PROGRAM

## 2024-06-18 RX ORDER — MINERAL OIL
180 ENEMA (ML) RECTAL DAILY
Qty: 180 TABLET | Refills: 1 | Status: SHIPPED | OUTPATIENT
Start: 2024-06-18

## 2024-06-18 RX ORDER — FAMOTIDINE 20 MG/1
20 TABLET, FILM COATED ORAL 2 TIMES DAILY
Qty: 60 TABLET | Refills: 5 | Status: SHIPPED | OUTPATIENT
Start: 2024-06-18 | End: 2025-06-18

## 2024-06-19 LAB
ALBUMIN SERPL BCP-MCNC: 3.7 G/DL (ref 3.5–5.2)
ALP SERPL-CCNC: 62 U/L (ref 55–135)
ALT SERPL W/O P-5'-P-CCNC: 29 U/L (ref 10–44)
ANION GAP SERPL CALC-SCNC: 8 MMOL/L (ref 8–16)
AST SERPL-CCNC: 27 U/L (ref 10–40)
BASOPHILS # BLD AUTO: 0.04 K/UL (ref 0–0.2)
BASOPHILS NFR BLD: 0.8 % (ref 0–1.9)
BILIRUB SERPL-MCNC: 0.2 MG/DL (ref 0.1–1)
BUN SERPL-MCNC: 15 MG/DL (ref 6–20)
CALCIUM SERPL-MCNC: 9 MG/DL (ref 8.7–10.5)
CHLORIDE SERPL-SCNC: 105 MMOL/L (ref 95–110)
CO2 SERPL-SCNC: 22 MMOL/L (ref 23–29)
CREAT SERPL-MCNC: 1.1 MG/DL (ref 0.5–1.4)
CRP SERPL-MCNC: 0.4 MG/L (ref 0–8.2)
DIFFERENTIAL METHOD BLD: ABNORMAL
EOSINOPHIL # BLD AUTO: 0.1 K/UL (ref 0–0.5)
EOSINOPHIL NFR BLD: 1 % (ref 0–8)
ERYTHROCYTE [DISTWIDTH] IN BLOOD BY AUTOMATED COUNT: 12.2 % (ref 11.5–14.5)
EST. GFR  (NO RACE VARIABLE): 57.9 ML/MIN/1.73 M^2
GLUCOSE SERPL-MCNC: 81 MG/DL (ref 70–110)
HCT VFR BLD AUTO: 39.6 % (ref 37–48.5)
HGB BLD-MCNC: 12.9 G/DL (ref 12–16)
IMM GRANULOCYTES # BLD AUTO: 0 K/UL (ref 0–0.04)
IMM GRANULOCYTES NFR BLD AUTO: 0 % (ref 0–0.5)
LYMPHOCYTES # BLD AUTO: 2.4 K/UL (ref 1–4.8)
LYMPHOCYTES NFR BLD: 45.4 % (ref 18–48)
MCH RBC QN AUTO: 31.9 PG (ref 27–31)
MCHC RBC AUTO-ENTMCNC: 32.6 G/DL (ref 32–36)
MCV RBC AUTO: 98 FL (ref 82–98)
MONOCYTES # BLD AUTO: 0.5 K/UL (ref 0.3–1)
MONOCYTES NFR BLD: 9.5 % (ref 4–15)
NEUTROPHILS # BLD AUTO: 2.3 K/UL (ref 1.8–7.7)
NEUTROPHILS NFR BLD: 43.3 % (ref 38–73)
NRBC BLD-RTO: 0 /100 WBC
PLATELET # BLD AUTO: 236 K/UL (ref 150–450)
PMV BLD AUTO: 11.5 FL (ref 9.2–12.9)
POTASSIUM SERPL-SCNC: 3.7 MMOL/L (ref 3.5–5.1)
PROT SERPL-MCNC: 7.4 G/DL (ref 6–8.4)
RBC # BLD AUTO: 4.05 M/UL (ref 4–5.4)
SODIUM SERPL-SCNC: 135 MMOL/L (ref 136–145)
WBC # BLD AUTO: 5.18 K/UL (ref 3.9–12.7)

## 2024-06-19 NOTE — PROGRESS NOTES
RHEUMATOLOGY CLINIC ESTABLISHED PATIENT VISIT    Reason for consult:- EGPA    Chief complaints, HPI, ROS, EXAM, Assessment & Plans:-    Jaylin Murguia is a 59 y.o. pleasant female who presents to follow up from her previous visit March for management of EGPA.  Overall doing quite well since starting Nucala.  Feels her sinuses and her lungs have improved.  She had repeat CT of lungs which showed almost complete resolution of previous changes.  Did notice some worsening when she decreased to 5 mg and 10 mg of daily prednisone alternating dosages.  Currently on 10 mg daily of prednisone.  Does note that may be related to differences in weather as she was traveling.  Uses Flonase.  Does notice she has some congestion and postnasal drip.  May contribute to cough and hoarseness.  Rheumatologic review of systems otherwise negative.  No synovitis, dactylitis or enthesitis.  No respiratory distress.  No rashes noted.    Reviewed all available old and outside pertinent medical records.    All lab results personally reviewed and interpreted by me.    1. 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        Problem List Items Addressed This Visit          Immunology/Multi System    Eosinophilic granulomatosis with polyangiitis (EGPA) - Primary    Relevant Orders    Comprehensive Metabolic Panel    Sedimentation rate    CBC Auto Differential    Proteinase 3 Autoantibodies    Myeloperoxidase Antibody (MPO)    C-Reactive Protein    Protein/Creatinine Ratio, Urine    Urinalysis     Other Visit Diagnoses       Immunodeficiency due to drugs        Relevant Orders    Comprehensive Metabolic Panel    Sedimentation rate    CBC Auto Differential    Proteinase 3 Autoantibodies    Myeloperoxidase Antibody (MPO)    C-Reactive Protein    Protein/Creatinine Ratio, Urine    Urinalysis            Patient following up today for management of EGPA  Has seemed to have very good improvement in her  respiratory symptoms and sinus issues since starting Nucala  Continue current therapy with Nucala  Continue to slowly try to wean down prednisone  Continue current therapy otherwise  Suggest trial of fexofenadine instead of cetirizine  Drug therapy requiring intensive monitoring for toxicity  High Risk Medication Monitoring encounter  No current medication related issues, no evidence of toxicity  Appropriate labs ordered for toxicity monitoring  Compromised immune system secondary to autoimmune disease and/or use of immunosuppressive drugs.  Monitor carefully for infections.  Advised patient to get immediate medical care if any infection arises.  Also advised strict adherence age-appropriate vaccinations and cancer screenings with PCP.  Patient advised to hold DMARD and/or biologic therapy for signs of infection or for surgery. If you are unsure what to do please call our office for instruction.[facility] [phone]      # Follow up in about 4 months (around 10/18/2024).    Chronic comorbid conditions affecting medical decision making today:    Past Medical History:   Diagnosis Date    Allergic rhinitis     Asthma     Chronic pansinusitis     Crohn's disease     Ileal involvement, previously on Remicade, Asacol, Prednisone    Fibromyalgia     Hormone replacement therapy (postmenopausal) 11/19/2017    Hyperlipidemia     Hypertension     Immunosuppression 20020    Migraine     Obstructive sleep apnea     CPAP at night    Sciatica        Past Surgical History:   Procedure Laterality Date    BLADDER SURGERY      sling was created by her muscles     BRONCHOSCOPY N/A 03/23/2023    Procedure: BRONCHOSCOPY;  [facility];  Location: [facility] OR [address] [place] [address];  Service: Anesthesiology;  Laterality: N/A;    BRONCHOSCOPY WITH FLUOROSCOPY N/A 10/13/2023    Procedure: BRONCHOSCOPY, WITH FLUOROSCOPY;  Surgeon: Mary Molina MD;  Location: [facility] OR [address] [place] [address];  Service: Pulmonary;  Laterality: N/A;      SECTION      COLONOSCOPY N/A 2017    Procedure: COLONOSCOPY;  Surgeon: Kin Dyer MD;  Location: [facility];  Service: Endoscopy;  Laterality: N/A;    COLONOSCOPY N/A 2018    Procedure: COLONOSCOPY;  Surgeon: Kyra Vallecillo MD;  Location: [facility];  Service: Endoscopy;  Laterality: N/A;    COLONOSCOPY N/A 2020    Procedure: COLONOSCOPY;  Surgeon: Nicole Leal MD;  Location: [facility];  Service: Endoscopy;  Laterality: N/A;    COLONOSCOPY N/A 2022    Procedure: COLONOSCOPY;  Surgeon: Shay Bruce MD;  Location: [facility];  Service: Endoscopy;  Laterality: N/A;    COLONOSCOPY N/A 2023    Procedure: COLONOSCOPY;  Surgeon: Nicole Leal MD;  Location: [facility];  Service: Endoscopy;  Laterality: N/A;    DEBRIDEMENT Bilateral 2020    Procedure: DEBRIDEMENT;  Surgeon: Matthias Roach MD;  Location: [address];  Service: ENT;  Laterality: Bilateral;    ESOPHAGOGASTRODUODENOSCOPY N/A 2020    Procedure: ESOPHAGOGASTRODUODENOSCOPY (EGD);  Surgeon: Nicole Leal MD;  Location: [facility];  Service: Endoscopy;  Laterality: N/A;    ESOPHAGOGASTRODUODENOSCOPY N/A 2022    Procedure: EGD (ESOPHAGOGASTRODUODENOSCOPY);  Surgeon: Shay Bruce MD;  Location: [facility];  Service: Endoscopy;  Laterality: N/A;    ESOPHAGOGASTRODUODENOSCOPY N/A 2023    Procedure: EGD (ESOPHAGOGASTRODUODENOSCOPY);  Surgeon: Nicole Leal MD;  Location: [facility];  Service: Endoscopy;  Laterality: N/A;    FINGER SURGERY      joint relpacement, left hand index finger    FUNCTIONAL ENDOSCOPIC SINUS SURGERY (FESS) USING COMPUTER-ASSISTED NAVIGATION Bilateral 2019    Procedure: FESS, USING COMPUTER-ASSISTED NAVIGATION;  Surgeon: Manish Shaffer MD;  Location: [facility];  Service: ENT;  Laterality: Bilateral;    FUNCTIONAL ENDOSCOPIC SINUS SURGERY (FESS) USING COMPUTER-ASSISTED NAVIGATION Bilateral 2020    Procedure: FESS, USING  COMPUTER-ASSISTED NAVIGATION SPHENOID;  Surgeon: Matthias Roach MD;  Location: [facility] OR 2ND FLR;  Service: ENT;  Laterality: Bilateral;  TIVA    FUNCTIONAL ENDOSCOPIC SINUS SURGERY (FESS) USING COMPUTER-ASSISTED NAVIGATION Bilateral 09/30/2022    Procedure: FESS, USING COMPUTER-ASSISTED NAVIGATION;  Surgeon: Matthias Roach MD;  Location: [facility] OR 2ND FLR;  Service: ENT;  Laterality: Bilateral;    HYSTERECTOMY  2001    INTRALUMINAL GASTROINTESTINAL TRACT IMAGING VIA CAPSULE N/A 03/03/2023    Procedure: IMAGING PROCEDURE, GI TRACT, INTRALUMINAL, VIA CAPSULE;  Surgeon: First Available [place];  Location: [facility] ENDO;  Service: Endoscopy;  Laterality: N/A;    SINUS SURGERY      WISDOM TOOTH EXTRACTION          Social History     Tobacco Use    Smoking status: Never     Passive exposure: Never    Smokeless tobacco: Never   Substance Use Topics    Alcohol use: No    Drug use: No       Family History   Problem Relation Name Age of Onset    Breast cancer Mother Sole     Hypertension Mother Sole     Allergies Mother Sole     Cancer Mother Sole     Depression Mother Sole     Kidney disease Father Mack 64        ESRD on HD    Scleroderma Father Mack     Arthritis Father Mack     Diabetes Father Mack     Hypertension Father Mack     Breast cancer Maternal Grandmother Ade     Heart attack Maternal Grandmother Ade     COPD Maternal Grandmother Ade 72    Cancer Maternal Grandmother Ade     Cancer Paternal Grandmother Frost 70        colon    Hypertension Brother Kevin        Review of patient's allergies indicates:   Allergen Reactions    Fentanyl Other (See Comments)     Rigid Chest Syndrome       Medication List with Changes/Refills   New Medications    FEXOFENADINE (ALLEGRA) 180 MG TABLET    Take 1 tablet (180 mg total) by mouth once daily.   Current Medications    ACETAMINOPHEN (TYLENOL) 500 MG TABLET    Take 2 tablets (1,000 mg total) by mouth every 6 (six) hours as needed for Pain.    ALBUTEROL (VENTOLIN  HFA) 90 MCG/ACTUATION INHALER    Inhale 2 puffs into the lungs every 6 (six) hours as needed for Wheezing. Rescue    ALBUTEROL-IPRATROPIUM (DUO-NEB) 2.5 MG-0.5 MG/3 ML NEBULIZER SOLUTION    Take 3 mLs by nebulization every 6 (six) hours as needed for Wheezing. Rescue    B COMPLEX VITAMINS CAPSULE    Take 1 capsule by mouth once daily.    CALCIUM CARBONATE/VITAMIN D3 (VITAMIN D-3 ORAL)    Take 2 tablets by mouth once daily.    DILTIAZEM HCL (DILTIAZEM 2% - LIDOCAINE 5% CREAM)    Apply peasize amount topically to anal area.    DIPHENOXYLATE-ATROPINE 2.5-0.025 MG (LOMOTIL) 2.5-0.025 MG PER TABLET    Take 1 tablet by mouth 4 (four) times daily as needed for Diarrhea.    DULOXETINE (CYMBALTA) 60 MG CAPSULE    Take 1 capsule (60 mg total) by mouth 2 (two) times daily.    FLUTICASONE PROPIONATE (FLONASE) 50 MCG/ACTUATION NASAL SPRAY    USE 2 SPRAYS IN EACH NOSTRIL ONCE DAILY    IMMUN GLOB G,IGG,-PRO-IGA 0-50 (HIZENTRA) 10 GRAM/50 ML (20 %) SOLN    Inject 70 mLs (14 g total) into the skin every 7 days.    IPRATROPIUM (ATROVENT) 0.02 % NEBULIZER SOLUTION    Take by nebulization 4 (four) times daily as needed for Wheezing.    LACTOBACILLUS RHAMNOSUS GG (CULTURELLE) 10 BILLION CELL CAPSULE    Take 1 capsule by mouth once daily.    LOSARTAN (COZAAR) 100 MG TABLET    Take 1 tablet (100 mg total) by mouth daily as needed (high blood pressure (>120/80)).    MEPOLIZUMAB (NUCALA) 100 MG/ML SYRG    Inject 3 mLs (300 mg total) into the skin every 28 days.    NORTRIPTYLINE (PAMELOR) 25 MG CAPSULE    Take 1 capsule (25 mg total) by mouth every evening.    NYSTATIN (MYCOSTATIN) 100,000 UNIT/ML SUSPENSION    Take 6 mLs (600,000 Units total) by mouth 4 (four) times daily with meals and nightly.    PANTOPRAZOLE (PROTONIX) 40 MG TABLET    Take 1 tablet (40 mg total) by mouth once daily.    PREDNISONE (DELTASONE) 10 MG TABLET    Take 25 mg daily for 2 weeks and then 20mg daily until further instructions    PREGABALIN (LYRICA) 150 MG CAPSULE     Take 1 capsule (150 mg total) by mouth 3 (three) times daily.    PROMETHAZINE-DEXTROMETHORPHAN (PROMETHAZINE-DM) 6.25-15 MG/5 ML SYRP    Take 5 mLs by mouth every 4 (four) hours as needed.    PROPRANOLOL (INDERAL LA) 60 MG 24 HR CAPSULE    Take 1 capsule (60 mg total) by mouth once daily. For headache prevention    RIZATRIPTAN (MAXALT) 10 MG TABLET    Take 1 tablet (10 mg total) by mouth 1 (one) time if needed for Migraine (Max 2 tablets in 24 hours.).    SOD CHLOR,SOD BICARB/NETI POT (SINUS WASH NETI POT TERRELL)    use as directed    SODIUM CHLORIDE FOR INHALATION (SODIUM CHLORIDE 10%) 10 % NEBULIZER SOLUTION    Take 4 mLs by nebulization 2 (two) times a day.    SULFAMETHOXAZOLE-TRIMETHOPRIM 800-160MG (BACTRIM DS) 800-160 MG TAB    Take 1 tablet by mouth once daily.    TOPIRAMATE (TOPAMAX) 100 MG TABLET    Take 1 tablet (100 mg total) by mouth 2 (two) times daily.    TRAZODONE (DESYREL) 50 MG TABLET    Take 1 tablet (50 mg total) by mouth every evening.    TRIAMTERENE-HYDROCHLOROTHIAZIDE 37.5-25 MG (DYAZIDE) 37.5-25 MG PER CAPSULE    Take 1 capsule by mouth daily as needed (for wt fluctuation > 3 lbs in 24 hrs).    ZINC ORAL    Take 1 tablet by mouth once daily.   Changed and/or Refilled Medications    Modified Medication Previous Medication    FAMOTIDINE (PEPCID) 20 MG TABLET famotidine (PEPCID) 20 MG tablet       Take 1 tablet (20 mg total) by mouth 2 (two) times daily.    Take 1 tablet (20 mg total) by mouth 2 (two) times daily.   Discontinued Medications    CETIRIZINE (ZYRTEC) 10 MG TABLET    Take 10 mg by mouth 2 (two) times a day.         Disclaimer: This note was prepared using voice recognition system and is likely to have sound alike errors and is not proofread.  Please message me with any questions.    32 minutes of total time spent on the encounter, which includes face to face time and non-face to face time preparing to see the patient (eg, review of tests), Obtaining and/or reviewing separately obtained  history, Documenting clinical information in the electronic or other health record, Independently interpreting results (not separately reported) and communicating results to the patient/family/caregiver, or Care coordination (not separately reported).     Thank you for allowing me to participate in the care of Jaylin Murguia.    Aramis Chand MD

## 2024-06-21 LAB — PROTEINASE3 IGG SER-ACNC: <0.2 U

## 2024-06-24 LAB — MYELOPEROXIDASE AB SER-ACNC: 2.2 U/ML

## 2024-07-01 ENCOUNTER — E-VISIT (OUTPATIENT)
Dept: PRIMARY CARE CLINIC | Facility: CLINIC | Age: 59
End: 2024-07-01
Payer: COMMERCIAL

## 2024-07-01 ENCOUNTER — PATIENT MESSAGE (OUTPATIENT)
Dept: PRIMARY CARE CLINIC | Facility: CLINIC | Age: 59
End: 2024-07-01

## 2024-07-01 DIAGNOSIS — M79.644 PAIN IN FINGER OF RIGHT HAND: Primary | ICD-10-CM

## 2024-07-01 DIAGNOSIS — D84.9 IMMUNOSUPPRESSION: ICD-10-CM

## 2024-07-01 DIAGNOSIS — G43.819 OTHER MIGRAINE WITHOUT STATUS MIGRAINOSUS, INTRACTABLE: Primary | ICD-10-CM

## 2024-07-01 RX ORDER — RIMEGEPANT SULFATE 75 MG/75MG
75 TABLET, ORALLY DISINTEGRATING ORAL ONCE AS NEEDED
Qty: 9 TABLET | Refills: 1 | Status: SHIPPED | OUTPATIENT
Start: 2024-07-01 | End: 2024-07-01

## 2024-07-01 NOTE — TELEPHONE ENCOUNTER
I have signed for the following orders AND/OR meds.  Please call the patient and ask the patient to schedule the testing AND/OR inform about any medications that were sent.      Orders Placed This Encounter   Procedures    Ambulatory referral/consult to Hand Surgery     Standing Status:   Future     Standing Expiration Date:   8/1/2025     Referral Priority:   Routine     Referral Type:   Surgical     Referral Reason:   Specialty Services Required     Requested Specialty:   Orthopedic Surgery     Number of Visits Requested:   1

## 2024-07-01 NOTE — PATIENT INSTRUCTIONS
Jaylin Murguia        Thank you for using the e-visit platform to address your migraine concerns.  Please see the below information in regards to your medication, refills and follow-up.      Medications Ordered This Encounter   Medications    rimegepant (NURTEC) 75 mg odt     Sig: Take 1 tablet (75 mg total) by mouth once as needed for Migraine (max dose 1 tablet per day). Place ODT tablet on the tongue; alternatively the ODT tablet may be placed under the tongue     Dispense:  9 tablet     Refill:  1    I have sent in nurtec for you to try to help with the acute migraine. ( to abort the migraine). Continue still with your topamax at 100mg twice a day for prevention of the migraines. Nurtec is to replace the maxalt for now. Please f/u if not improving in 7 days.       [facility] [address] [place] [address]  [address] [address] [address]  Phone: [phone] Fax: [phone]       Please let me know if you have further questions. Thank you for allowing me to care for you!     Sincerely,   Esthela Hu MD

## 2024-07-01 NOTE — PROGRESS NOTES
Patient ID: Jaylin Murguia is a 59 y.o. female.    Chief Complaint: Medication Management (Entered automatically based on patient selection in Patient Portal.)    The patient initiated a request through [employer] on 7/1/2024 for evaluation and management with a chief complaint of Medication Management (Entered automatically based on patient selection in Patient Portal.)     I evaluated the questionnaire submission on 07/01/2024.    [facility] Peq Evisit Medication    7/1/2024  2:12 PM CDT - Filed by Patient   Do you agree to participate in an E-Visit? Yes   If you have any of the following symptoms, please present to your local emergency room or call 911:  I acknowledge   Medication requests for narcotics will not be addressed via an E-Visit.  Please schedule an appointment. I acknowledge   Choose the state of your primary residence [place]   Do you want to address a new or existing medication? I would like to address a medication I currently take   What is the main issue you would like addressed today? Migraines   Would you like to change or continue your medication? Continue medication   What medication would you like to continue?  Topomax   Are you taking it as prescribed? Yes    What medical condition is the  medication intended to treat? Migraines   Is the medication helping your condition? Yes   Are you having any side effects from the medication? No   Provide any additional information you feel is important. Of course it has been helping for a long time. But for the ladt few montbs my migraines have gotten worse. The oadt minth i e had 3-4 a week. I cant keep taking Tylenol amd Maxalt. Then still go to bed hurting.   Please attach any relevant images or files    Are you able to take your vital signs? 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 Follow up annually in the sleep clinic.        Crohn's disease 03/27/2018    Long-term use of immunosuppressant medication 11/19/2017    Insomnia due to medical condition 10/20/2016     Worsened due to fibromyalgia. Continue with prn ambien.       Diarrhea 05/07/2015    Fibromyalgia     Migraine     Crohn's disease of small intestine     Sciatica     Allergic rhinitis     Essential hypertension 07/30/2013     Overview:   ICD-10 Transition    Formatting of this note might be different from the original.  ICD-10 Transition        Recent Labs Obtained:  No visits with results within 7 Day(s) from this visit.   Latest known visit with results is:   Lab Visit on 06/18/2024   Component Date Value Ref Range Status    Sodium 06/18/2024 135 (L)  136 - 145 mmol/L Final    Potassium 06/18/2024 3.7  3.5 - 5.1 mmol/L Final    Chloride 06/18/2024 105  95 - 110 mmol/L Final    CO2 06/18/2024 22 (L)  23 - 29 mmol/L Final    Glucose 06/18/2024 81  70 - 110 mg/dL Final    BUN 06/18/2024 15  6 - 20 mg/dL Final    Creatinine 06/18/2024 1.1  0.5 - 1.4 mg/dL Final    Calcium 06/18/2024 9.0  8.7 - 10.5  mg/dL Final    Total Protein 06/18/2024 7.4  6.0 - 8.4 g/dL Final    Albumin 06/18/2024 3.7  3.5 - 5.2 g/dL Final    Total Bilirubin 06/18/2024 0.2  0.1 - 1.0 mg/dL Final    Comment: For infants and newborns, interpretation of results should be based  on gestational age, weight and in agreement with clinical  observations.    Premature Infant recommended reference ranges:  Up to 24 hours.............<8.0 mg/dL  Up to 48 hours............<12.0 mg/dL  3-5 days..................<15.0 mg/dL  6-29 days.................<15.0 mg/dL      Alkaline Phosphatase 06/18/2024 62  55 - 135 U/L Final    AST 06/18/2024 27  10 - 40 U/L Final    ALT 06/18/2024 29  10 - 44 U/L Final    eGFR 06/18/2024 57.9 (A)  >60 mL/min/1.73 m^2 Final    Anion Gap 06/18/2024 8  8 - 16 mmol/L Final    Sed Rate 06/18/2024 11  0 - 36 mm/Hr Final    WBC 06/18/2024 5.18  3.90 - 12.70 K/uL Final    RBC 06/18/2024 4.05  4.00 - 5.40 M/uL Final    Hemoglobin 06/18/2024 12.9  12.0 - 16.0 g/dL Final    Hematocrit 06/18/2024 39.6  37.0 - 48.5 % Final    MCV 06/18/2024 98  82 - 98 fL Final    MCH 06/18/2024 31.9 (H)  27.0 - 31.0 pg Final    MCHC 06/18/2024 32.6  32.0 - 36.0 g/dL Final    RDW 06/18/2024 12.2  11.5 - 14.5 % Final    Platelets 06/18/2024 236  150 - 450 K/uL Final    MPV 06/18/2024 11.5  9.2 - 12.9 fL Final    Immature Granulocytes 06/18/2024 0.0  0.0 - 0.5 % Final    Gran # (ANC) 06/18/2024 2.3  1.8 - 7.7 K/uL Final    Immature Grans (Abs) 06/18/2024 0.00  0.00 - 0.04 K/uL Final    Comment: Mild elevation in immature granulocytes is non specific and   can be seen in a variety of conditions including stress response,   acute inflammation, trauma and pregnancy. Correlation with other   laboratory and clinical findings is essential.      Lymph # 06/18/2024 2.4  1.0 - 4.8 K/uL Final    Mono # 06/18/2024 0.5  0.3 - 1.0 K/uL Final    Eos # 06/18/2024 0.1  0.0 - 0.5 K/uL Final    Baso # 06/18/2024 0.04  0.00 - 0.20 K/uL Final    nRBC 06/18/2024 0  0 /100  WBC Final    Gran % 06/18/2024 43.3  38.0 - 73.0 % Final    Lymph % 06/18/2024 45.4  18.0 - 48.0 % Final    Mono % 06/18/2024 9.5  4.0 - 15.0 % Final    Eosinophil % 06/18/2024 1.0  0.0 - 8.0 % Final    Basophil % 06/18/2024 0.8  0.0 - 1.9 % Final    Differential Method 06/18/2024 Automated   Final    ANCA Proteinase 3 06/18/2024 <0.2  <0.4 (Negative) U Final    Comment: Test Performed by:  [facility]  [address] [place] [address]  : Bryan Cantu Ph.D.; CLIA# [id number]      MPO 06/18/2024 2.2  <3.5 U/mL Final    Comment: INTERPRETATION: Negative    Test performed at [facility],  [address] , [place] [id number]     [phone]  Lilia Haji MD, PhD - Medical Director      CRP 06/18/2024 0.4  0.0 - 8.2 mg/L Final    Protein, Urine Random 06/18/2024 <7  0 - 15 mg/dL Final    Creatinine, Urine 06/18/2024 20.0  15.0 - 325.0 mg/dL Final    Prot/Creat Ratio, Urine 06/18/2024 Unable to calculate  0.00 - 0.20 Final    Specimen UA 06/18/2024 Urine, Clean Catch   Final    Color, UA 06/18/2024 Yellow  Yellow, Straw, Gisselle Final    Appearance, UA 06/18/2024 Clear  Clear Final    pH, UA 06/18/2024 6.0  5.0 - 8.0 Final    Specific Gravity, UA 06/18/2024 <=1.005 (A)  1.005 - 1.030 Final    Protein, UA 06/18/2024 Negative  Negative Final    Comment: Recommend a 24 hour urine protein or a urine   protein/creatinine ratio if globulin induced proteinuria is  clinically suspected.      Glucose, UA 06/18/2024 Negative  Negative Final    Ketones, UA 06/18/2024 Negative  Negative Final    Bilirubin (UA) 06/18/2024 Negative  Negative Final    Occult Blood UA 06/18/2024 Negative  Negative Final    Nitrite, UA 06/18/2024 Negative  Negative Final    Leukocytes, UA 06/18/2024 Negative  Negative Final       Encounter Diagnoses   Name Primary?    Other migraine without status migrainosus, intractable Yes    Immunosuppression         No orders of  the defined types were placed in this encounter.     Medications Ordered This Encounter   Medications    rimegepant (NURTEC) 75 mg odt     Sig: Take 1 tablet (75 mg total) by mouth once as needed for Migraine (max dose 1 tablet per day). Place ODT tablet on the tongue; alternatively the ODT tablet may be placed under the tongue     Dispense:  9 tablet     Refill:  1      Jaylin was seen today for medication management.    Diagnoses and all orders for this visit:    Other migraine without status migrainosus, intractable  Comments:  nurtec sent in. cont with topamax 100mg bid for prevention. f/u in 7 days if not affective. ePA completed.  Orders:  -     rimegepant (NURTEC) 75 mg odt; Take 1 tablet (75 mg total) by mouth once as needed for Migraine (max dose 1 tablet per day). Place ODT tablet on the tongue; alternatively the ODT tablet may be placed under the tongue    Immunosuppression  Comments:  complicating medical prescribing and decision making       No follow-ups on file.      E-Visit Time Tracking:    Day 1 Time (in minutes): 15    Total Time (in minutes): 15              Patient Instructions   Jaylin Murguia        Thank you for using the e-visit platform to address your migraine concerns.  Please see the below information in regards to your medication, refills and follow-up.      Medications Ordered This Encounter   Medications    rimegepant (NURTEC) 75 mg odt     Sig: Take 1 tablet (75 mg total) by mouth once as needed for Migraine (max dose 1 tablet per day). Place ODT tablet on the tongue; alternatively the ODT tablet may be placed under the tongue     Dispense:  9 tablet     Refill:  1    I have sent in nurtec for you to try to help with the acute migraine. ( to abort the migraine). Continue still with your topamax at 100mg twice a day for prevention of the migraines. Nurtec is to replace the maxalt for now. Please f/u if not improving in 7 days.       [facility], [place] - [address]  [address]  [address]  [facility] [address]  Phone: [phone] Fax: [phone]       Please let me know if you have further questions. Thank you for allowing me to care for you!     Sincerely,   Esthela Hu MD

## 2024-07-15 DIAGNOSIS — Z79.52 CURRENT USE OF STEROID MEDICATION: Primary | ICD-10-CM

## 2024-07-15 RX ORDER — SULFAMETHOXAZOLE AND TRIMETHOPRIM 800; 160 MG/1; MG/1
1 TABLET ORAL DAILY
Qty: 90 TABLET | Refills: 3 | Status: SHIPPED | OUTPATIENT
Start: 2024-07-15

## 2024-07-15 RX ORDER — SULFAMETHOXAZOLE AND TRIMETHOPRIM 800; 160 MG/1; MG/1
1 TABLET ORAL DAILY
Qty: 30 TABLET | Refills: 0 | Status: SHIPPED | OUTPATIENT
Start: 2024-07-15

## 2024-07-16 ENCOUNTER — TELEPHONE (OUTPATIENT)
Dept: SPORTS MEDICINE | Facility: CLINIC | Age: 59
End: 2024-07-16
Payer: COMMERCIAL

## 2024-07-16 DIAGNOSIS — M79.644 FINGER PAIN, RIGHT: Primary | ICD-10-CM

## 2024-07-16 NOTE — TELEPHONE ENCOUNTER
Called pt regarding upcoming appointment. Pt provided clarification that her middle finger on her right hand was injured about 2 months ago.  Notified pt of xr arrival time. Pt verbalized understanding

## 2024-07-19 ENCOUNTER — OFFICE VISIT (OUTPATIENT)
Dept: SPORTS MEDICINE | Facility: CLINIC | Age: 59
End: 2024-07-19
Payer: COMMERCIAL

## 2024-07-19 ENCOUNTER — HOSPITAL ENCOUNTER (OUTPATIENT)
Dept: RADIOLOGY | Facility: HOSPITAL | Age: 59
Discharge: HOME | End: 2024-07-19
Attending: STUDENT IN AN ORGANIZED HEALTH CARE EDUCATION/TRAINING PROGRAM
Payer: COMMERCIAL

## 2024-07-19 DIAGNOSIS — S63.632A SPRAIN OF INTERPHALANGEAL JOINT OF RIGHT MIDDLE FINGER, INITIAL ENCOUNTER: Primary | ICD-10-CM

## 2024-07-19 DIAGNOSIS — M79.644 PAIN IN FINGER OF RIGHT HAND: ICD-10-CM

## 2024-07-19 DIAGNOSIS — M79.644 FINGER PAIN, RIGHT: ICD-10-CM

## 2024-07-19 PROCEDURE — 73140 X-RAY EXAM OF FINGER(S): CPT | Mod: TC,FY,PO,RT

## 2024-07-19 PROCEDURE — 73140 X-RAY EXAM OF FINGER(S): CPT | Mod: 26,RT,, | Performed by: RADIOLOGY

## 2024-07-19 PROCEDURE — 99999 PR PBB SHADOW E&M-EST. PATIENT-LVL V: CPT | Mod: PBBFAC,,, | Performed by: STUDENT IN AN ORGANIZED HEALTH CARE EDUCATION/TRAINING PROGRAM

## 2024-07-19 PROCEDURE — 99203 OFFICE O/P NEW LOW 30 MIN: CPT | Mod: S$GLB,,, | Performed by: STUDENT IN AN ORGANIZED HEALTH CARE EDUCATION/TRAINING PROGRAM

## 2024-07-19 PROCEDURE — 1159F MED LIST DOCD IN RCRD: CPT | Mod: CPTII,S$GLB,, | Performed by: STUDENT IN AN ORGANIZED HEALTH CARE EDUCATION/TRAINING PROGRAM

## 2024-07-19 PROCEDURE — 4010F ACE/ARB THERAPY RXD/TAKEN: CPT | Mod: CPTII,S$GLB,, | Performed by: STUDENT IN AN ORGANIZED HEALTH CARE EDUCATION/TRAINING PROGRAM

## 2024-07-19 PROCEDURE — 1160F RVW MEDS BY RX/DR IN RCRD: CPT | Mod: CPTII,S$GLB,, | Performed by: STUDENT IN AN ORGANIZED HEALTH CARE EDUCATION/TRAINING PROGRAM

## 2024-07-19 NOTE — PATIENT INSTRUCTIONS
Assessment:  Jaylin Murguia is a 59 y.o. female with a chief complaint of Pain and Injury of the Right Hand    Encounter Diagnoses   Name Primary?    Sprain of interphalangeal joint of right middle finger, initial encounter Yes    Pain in finger of right hand       Plan:  XR reviewed - no significant abnormalities about the right middle finger, including D IP and PIP joints.    History and clinical exam suggestive of sprains of the D IP and PIP joint, can not exclude an occult fracture, however we are now 9 months out from the injury so any bony healing should be well on its way to complete by now.    Recommend conservative management.    Recommend to obtain a stress ball, this will work range of motion and strength through the finger.    If needed, can refer for formal hand therapy.    Would recommend use of ice over the finger, given the swelling.  Swelling and finger injuries like this may persist for some time, and there may be some element of permanent swelling going forward.    Can use Tylenol and/or ibuprofen, as needed for pain and discomfort.    Follow-up:  As needed or sooner if there are any problems between now and then.    Thank you for choosing [facility] and Dr. Aramis Leonard for your orthopedic & sports medicine care. It is our goal to provide you with exceptional care that will help keep you healthy, active, and get you back in the game.    Please do not hesitate to reach out to us via email, phone, or MyChart with any questions, concerns, or feedback.    If you are experiencing pain/discomfort ,or have questions after 5pm and would like to be connected to the [facility] on-call team, please call this number and specify which Sports Medicine provider is treating you: [phone]

## 2024-07-19 NOTE — PROGRESS NOTES
Patient ID: Jaylin Murguia  YOB: 1965  MRN: [id number]    Chief Complaint: Pain and Injury of the Right Hand    Referred By: Esthela Hu MD    Occupation: retired      History of Present Illness: Jaylin Murguia is a right-hand dominant 59 y.o. female who presents today with Pain and Injury of the Right Hand    Jaylin Murguia states it is Chronic in nature and there was a specific mechanism. Smashed her right third DIP with a crowbar about 9 weeks prior.  Jaylin Murguia describes the pain as a intermittent throb and burn at 3rd PIP and DIP. Current pain level at rest is 4/10 (Numeric Pain Rating Scale).  Associated symptoms include: Swelling Yes, Instability No, Pain that affects your sleep No, Mechanical No, locking/catching No, Neurological No, limited range of motion Yes, weakness Yes. Aggravating activities include touch and grasping. They have tried  OTC tylenol, ice, OTC splint and lyrica so far for this. They believe that they are unchanged with this treatment. They denies formal physical therapy for this.    Past Medical History:   Past Medical History:   Diagnosis Date    Allergic rhinitis     Asthma     Chronic pansinusitis     Crohn's disease     Ileal involvement, previously on Remicade, Asacol, Prednisone    Fibromyalgia     Hormone replacement therapy (postmenopausal) 11/19/2017    Hyperlipidemia     Hypertension     Immunosuppression 20020    Migraine     Obstructive sleep apnea     CPAP at night    Sciatica      Past Surgical History:   Procedure Laterality Date    BLADDER SURGERY      sling was created by her muscles     BRONCHOSCOPY N/A 03/23/2023    Procedure: BRONCHOSCOPY;  Surgeon: Kajal Diagnostic Provider;  Location: [facility] OR [address] [place] [address];  Service: Anesthesiology;  Laterality: N/A;    BRONCHOSCOPY WITH FLUOROSCOPY N/A 10/13/2023    Procedure: BRONCHOSCOPY, WITH FLUOROSCOPY;  Surgeon: Mary Molina MD;  Location: [facility] OR [address] [place] [address];  Service: Pulmonary;  Laterality:  N/A;     SECTION      COLONOSCOPY N/A 2017    Procedure: COLONOSCOPY;  Surgeon: Kin Dyer MD;  Location: [facility];  Service: Endoscopy;  Laterality: N/A;    COLONOSCOPY N/A 2018    Procedure: COLONOSCOPY;  Surgeon: Kyra Vallecillo MD;  Location: [facility];  Service: Endoscopy;  Laterality: N/A;    COLONOSCOPY N/A 2020    Procedure: COLONOSCOPY;  Surgeon: Nicole Leal MD;  Location: [facility] ENDO;  Service: Endoscopy;  Laterality: N/A;    COLONOSCOPY N/A 2022    Procedure: COLONOSCOPY;  Surgeon: Shay Bruce MD;  Location: [facility];  Service: Endoscopy;  Laterality: N/A;    COLONOSCOPY N/A 2023    Procedure: COLONOSCOPY;  Surgeon: Nicole Leal MD;  Location: [facility];  Service: Endoscopy;  Laterality: N/A;    DEBRIDEMENT Bilateral 2020    Procedure: DEBRIDEMENT;  Surgeon: Matthias Roach MD;  Location: [address];  Service: ENT;  Laterality: Bilateral;    ESOPHAGOGASTRODUODENOSCOPY N/A 2020    Procedure: ESOPHAGOGASTRODUODENOSCOPY (EGD);  Surgeon: Nicole Leal MD;  Location: [facility];  Service: Endoscopy;  Laterality: N/A;    ESOPHAGOGASTRODUODENOSCOPY N/A 2022    Procedure: EGD (ESOPHAGOGASTRODUODENOSCOPY);  Surgeon: Shay Bruce MD;  Location: [facility];  Service: Endoscopy;  Laterality: N/A;    ESOPHAGOGASTRODUODENOSCOPY N/A 2023    Procedure: EGD (ESOPHAGOGASTRODUODENOSCOPY);  Surgeon: Nicole Leal MD;  Location: [facility];  Service: Endoscopy;  Laterality: N/A;    FINGER SURGERY      joint relpacement, left hand index finger    FUNCTIONAL ENDOSCOPIC SINUS SURGERY (FESS) USING COMPUTER-ASSISTED NAVIGATION Bilateral 2019    Procedure: FESS, USING COMPUTER-ASSISTED NAVIGATION;  Surgeon: Manish Shaffer MD;  Location: [facility];  Service: ENT;  Laterality: Bilateral;    FUNCTIONAL ENDOSCOPIC SINUS SURGERY (FESS) USING COMPUTER-ASSISTED NAVIGATION Bilateral 2020    Procedure: FESS, USING  COMPUTER-ASSISTED NAVIGATION SPHENOID;  Surgeon: Matthias Roach MD;  Location: [facility] OR 2ND FLR;  Service: ENT;  Laterality: Bilateral;  TIVA    FUNCTIONAL ENDOSCOPIC SINUS SURGERY (FESS) USING COMPUTER-ASSISTED NAVIGATION Bilateral 09/30/2022    Procedure: FESS, USING COMPUTER-ASSISTED NAVIGATION;  Surgeon: Matthias Roach MD;  Location: [facility] OR 2ND FLR;  Service: ENT;  Laterality: Bilateral;    HYSTERECTOMY  2001    INTRALUMINAL GASTROINTESTINAL TRACT IMAGING VIA CAPSULE N/A 03/03/2023    Procedure: IMAGING PROCEDURE, GI TRACT, INTRALUMINAL, VIA CAPSULE;  Surgeon: First Available Elmo;  Location: [facility] ENDO;  Service: Endoscopy;  Laterality: N/A;    SINUS SURGERY      WISDOM TOOTH EXTRACTION       Family History   Problem Relation Name Age of Onset    Breast cancer Mother Sole     Hypertension Mother Sole     Allergies Mother Sole     Cancer Mother Sole     Depression Mother Sole     Kidney disease Father Peña 64        ESRD on HD    Scleroderma Father Peña     Arthritis Father Peña     Diabetes Father Peña     Hypertension Father Peña     Breast cancer Maternal Grandmother Ade     Heart attack Maternal Grandmother Ade     COPD Maternal Grandmother Ade 72    Cancer Maternal Grandmother Ade     Cancer Paternal Grandmother Frost 70        colon    Hypertension Brother Kevin      Social History     Socioeconomic History    Marital status:     Number of children: 2   Occupational History    Occupation: teacher   Tobacco Use    Smoking status: Never     Passive exposure: Never    Smokeless tobacco: Never   Substance and Sexual Activity    Alcohol use: No    Drug use: No    Sexual activity: Yes     Partners: Male     Birth control/protection: None     Comment: hysterectomy   Social History Narrative    Surrogate Decision Maker: , Cristobal Murguia, [phone]     Social Determinants of Health     Financial Resource Strain: Low Risk  (1/29/2024)    Overall Financial Resource  Strain (CARDIA)     Difficulty of Paying Living Expenses: Not hard at all   Food Insecurity: No Food Insecurity (1/29/2024)    Hunger Vital Sign     Worried About Running Out of Food in the Last Year: Never true     Ran Out of Food in the Last Year: Never true   Transportation Needs: No Transportation Needs (1/29/2024)    PRAPARE - Transportation     Lack of Transportation (Medical): No     Lack of Transportation (Non-Medical): No   Physical Activity: Insufficiently Active (1/29/2024)    Exercise Vital Sign     Days of Exercise per Week: 3 days     Minutes of Exercise per Session: 30 min   Stress: Stress Concern Present (1/29/2024)    [employer] - Occupational Stress Questionnaire     Feeling of Stress : To some extent   Housing Stability: Low Risk  (1/29/2024)    Housing Stability Vital Sign     Unable to Pay for Housing in the Last Year: No     Number of Places Lived in the Last Year: 1     Unstable Housing in the Last Year: No     Medication List with Changes/Refills   Current Medications    ACETAMINOPHEN (TYLENOL) 500 MG TABLET    Take 2 tablets (1,000 mg total) by mouth every 6 (six) hours as needed for Pain.    ALBUTEROL (VENTOLIN HFA) 90 MCG/ACTUATION INHALER    Inhale 2 puffs into the lungs every 6 (six) hours as needed for Wheezing. Rescue    ALBUTEROL-IPRATROPIUM (DUO-NEB) 2.5 MG-0.5 MG/3 ML NEBULIZER SOLUTION    Take 3 mLs by nebulization every 6 (six) hours as needed for Wheezing. Rescue    B COMPLEX VITAMINS CAPSULE    Take 1 capsule by mouth once daily.    CALCIUM CARBONATE/VITAMIN D3 (VITAMIN D-3 ORAL)    Take 2 tablets by mouth once daily.    DILTIAZEM HCL (DILTIAZEM 2% - LIDOCAINE 5% CREAM)    Apply peasize amount topically to anal area.    DIPHENOXYLATE-ATROPINE 2.5-0.025 MG (LOMOTIL) 2.5-0.025 MG PER TABLET    Take 1 tablet by mouth 4 (four) times daily as needed for Diarrhea.    DULOXETINE (CYMBALTA) 60 MG CAPSULE    Take 1 capsule (60 mg total) by mouth 2 (two)  times daily.    FAMOTIDINE (PEPCID) 20 MG TABLET    Take 1 tablet (20 mg total) by mouth 2 (two) times daily.    FEXOFENADINE (ALLEGRA) 180 MG TABLET    Take 1 tablet (180 mg total) by mouth once daily.    FLUTICASONE PROPIONATE (FLONASE) 50 MCG/ACTUATION NASAL SPRAY    USE 2 SPRAYS IN EACH NOSTRIL ONCE DAILY    IMMUN GLOB G,IGG,-PRO-IGA 0-50 (HIZENTRA) 10 GRAM/50 ML (20 %) SOLN    Inject 70 mLs (14 g total) into the skin every 7 days.    IPRATROPIUM (ATROVENT) 0.02 % NEBULIZER SOLUTION    Take by nebulization 4 (four) times daily as needed for Wheezing.    LACTOBACILLUS RHAMNOSUS GG (CULTURELLE) 10 BILLION CELL CAPSULE    Take 1 capsule by mouth once daily.    LOSARTAN (COZAAR) 100 MG TABLET    Take 1 tablet (100 mg total) by mouth daily as needed (high blood pressure (>120/80)).    MEPOLIZUMAB (NUCALA) 100 MG/ML SYRG    Inject 3 mLs (300 mg total) into the skin every 28 days.    NORTRIPTYLINE (PAMELOR) 25 MG CAPSULE    Take 1 capsule (25 mg total) by mouth every evening.    NYSTATIN (MYCOSTATIN) 100,000 UNIT/ML SUSPENSION    Take 6 mLs (600,000 Units total) by mouth 4 (four) times daily with meals and nightly.    PANTOPRAZOLE (PROTONIX) 40 MG TABLET    Take 1 tablet (40 mg total) by mouth once daily.    PREDNISONE (DELTASONE) 10 MG TABLET    Take 25 mg daily for 2 weeks and then 20mg daily until further instructions    PREGABALIN (LYRICA) 150 MG CAPSULE    Take 1 capsule (150 mg total) by mouth 3 (three) times daily.    PROPRANOLOL (INDERAL LA) 60 MG 24 HR CAPSULE    Take 1 capsule (60 mg total) by mouth once daily. For headache prevention    RIZATRIPTAN (MAXALT) 10 MG TABLET    Take 1 tablet (10 mg total) by mouth 1 (one) time if needed for Migraine (Max 2 tablets in 24 hours.).    SOD CHLOR,SOD BICARB/NETI POT (SINUS WASH NETI POT TRERELL)    use as directed    SODIUM CHLORIDE FOR INHALATION (SODIUM CHLORIDE 10%) 10 % NEBULIZER SOLUTION    Take 4 mLs by nebulization 2 (two) times a day.     SULFAMETHOXAZOLE-TRIMETHOPRIM 800-160MG (BACTRIM DS) 800-160 MG TAB    Take 1 tablet by mouth once daily.    SULFAMETHOXAZOLE-TRIMETHOPRIM 800-160MG (BACTRIM DS) 800-160 MG TAB    Take 1 tablet by mouth once daily.    TOPIRAMATE (TOPAMAX) 100 MG TABLET    Take 1 tablet (100 mg total) by mouth 2 (two) times daily.    TRAZODONE (DESYREL) 50 MG TABLET    Take 1 tablet (50 mg total) by mouth every evening.    TRIAMTERENE-HYDROCHLOROTHIAZIDE 37.5-25 MG (DYAZIDE) 37.5-25 MG PER CAPSULE    Take 1 capsule by mouth daily as needed (for wt fluctuation > 3 lbs in 24 hrs).    ZINC ORAL    Take 1 tablet by mouth once daily.     Review of patient's allergies indicates:   Allergen Reactions    Fentanyl Other (See Comments)     Rigid Chest Syndrome       Physical Exam:   There is no height or weight on file to calculate BMI.    Physical Exam  Detailed MSK exam:               Right Hand/Wrist Exam     Inspection   Effusion:  Hand -  absent    Pain   Hand - The patient exhibits pain of the middle IP.    Swelling   Hand - The patient is swollen on the middle IP.    Tenderness   The patient is tender to palpation of the radial area and dorsal area.    Comments:  Intact finger flexion and extension   Negative varus and valgus at DIP and PIP  Positive squeeze test at DIP and PIP  Pain with resisted flexion at PIP             Imaging:  X-Ray Finger 2 or More Views Right  Narrative: EXAMINATION:  XR FINGER 2 OR MORE VIEWS RIGHT    CLINICAL HISTORY:  Pain in right finger(s)    TECHNIQUE:  Three view finger    COMPARISON:  None    FINDINGS:  No acute fracture or dislocation with mild OA changes noted.  Impression: As above    Electronically signed by: Nash Wang MD  Date:    07/19/2024  Time:    15:11    Relevant imaging results were reviewed and interpreted by me and per my read:  Very mild degenerative changes in the hands.  Normal appearance of the D IP and PIP joints of the 3rd finger.  No alignment abnormalities.  No signs of  fracture or cortical irregularity.    This was discussed with the patient and / or family today.     Patient Instructions   Assessment:  Jaylin Murguia is a 59 y.o. female with a chief complaint of Pain and Injury of the Right Hand    Encounter Diagnoses   Name Primary?    Sprain of interphalangeal joint of right middle finger, initial encounter Yes    Pain in finger of right hand       Plan:  XR reviewed - no significant abnormalities about the right middle finger, including D IP and PIP joints.    History and clinical exam suggestive of sprains of the D IP and PIP joint, can not exclude an occult fracture, however we are now 9 months out from the injury so any bony healing should be well on its way to complete by now.    Recommend conservative management.    Recommend to obtain a stress ball, this will work range of motion and strength through the finger.    If needed, can refer for formal hand therapy.    Would recommend use of ice over the finger, given the swelling.  Swelling and finger injuries like this may persist for some time, and there may be some element of permanent swelling going forward.    Can use Tylenol and/or ibuprofen, as needed for pain and discomfort.    Follow-up:  As needed or sooner if there are any problems between now and then.    Thank you for choosing [facility] and Dr. Aramis Leonard for your orthopedic & sports medicine care. It is our goal to provide you with exceptional care that will help keep you healthy, active, and get you back in the game.    Please do not hesitate to reach out to us via email, phone, or MyChart with any questions, concerns, or feedback.    If you are experiencing pain/discomfort ,or have questions after 5pm and would like to be connected to the [facility] on-call team, please call this number and specify which Sports Medicine provider is treating you: [phone]       A copy of today's visit note  has been sent to the referring provider.           Aramis Leonard MD  Primary Care Sports Medicine    Disclaimer: This note was prepared using a voice recognition system and is likely to have sound alike errors within the text.

## 2024-07-24 ENCOUNTER — PATIENT MESSAGE (OUTPATIENT)
Dept: HEPATOLOGY | Facility: CLINIC | Age: 59
End: 2024-07-24
Payer: COMMERCIAL

## 2024-07-25 ENCOUNTER — PATIENT MESSAGE (OUTPATIENT)
Dept: ALLERGY | Facility: CLINIC | Age: 59
End: 2024-07-25
Payer: COMMERCIAL

## 2024-07-25 ENCOUNTER — DOCUMENTATION ONLY (OUTPATIENT)
Dept: ALLERGY | Facility: CLINIC | Age: 59
End: 2024-07-25
Payer: COMMERCIAL

## 2024-07-25 NOTE — PROGRESS NOTES
Received prescription request from [facility] for Hizentra pre-filled syringe. Order placed on Dr. Bender's desk for signature.

## 2024-07-29 ENCOUNTER — DOCUMENTATION ONLY (OUTPATIENT)
Dept: ALLERGY | Facility: CLINIC | Age: 59
End: 2024-07-29
Payer: COMMERCIAL

## 2024-07-29 NOTE — PROGRESS NOTES
New rx request for Hizentra was signed by Provider and faxed back to [facility] at [phone]

## 2024-08-08 ENCOUNTER — OFFICE VISIT (OUTPATIENT)
Dept: ALLERGY | Facility: CLINIC | Age: 59
End: 2024-08-08
Payer: COMMERCIAL

## 2024-08-08 DIAGNOSIS — D80.3 IGG2 SUBCLASS DEFICIENCY: ICD-10-CM

## 2024-08-08 DIAGNOSIS — J32.9 CHRONIC SINUSITIS, UNSPECIFIED LOCATION: ICD-10-CM

## 2024-08-08 DIAGNOSIS — D80.6 ANTI-POLYSACCHARIDE ANTIBODY DEFICIENCY: Primary | ICD-10-CM

## 2024-08-08 DIAGNOSIS — D72.18 EOSINOPHILIC GRANULOMATOSIS WITH POLYANGIITIS (EGPA): ICD-10-CM

## 2024-08-08 DIAGNOSIS — M30.1 EOSINOPHILIC GRANULOMATOSIS WITH POLYANGIITIS (EGPA): ICD-10-CM

## 2024-08-08 DIAGNOSIS — A31.9 MYCOBACTERIAL INFECTION: ICD-10-CM

## 2024-08-08 PROCEDURE — 99214 OFFICE O/P EST MOD 30 MIN: CPT | Mod: 95,,, | Performed by: ALLERGY & IMMUNOLOGY

## 2024-08-08 PROCEDURE — 4010F ACE/ARB THERAPY RXD/TAKEN: CPT | Mod: CPTII,95,, | Performed by: ALLERGY & IMMUNOLOGY

## 2024-08-08 NOTE — PROGRESS NOTES
The patient location is: [place]  The chief complaint leading to consultation is: fu specific antibody def    Visit type: audiovisual    Face to Face time with patient: 20  30 minutes of total time spent on the encounter, which includes face to face time and non-face to face time preparing to see the patient (eg, review of tests), Obtaining and/or reviewing separately obtained history, Documenting clinical information in the electronic or other health record, Independently interpreting results (not separately reported) and communicating results to the patient/family/caregiver, or Care coordination (not separately reported).     Each patient to whom he or she provides medical services by telemedicine is:  (1) informed of the relationship between the physician and patient and the respective role of any other health care provider with respect to management of the patient; and (2) notified that he or she may decline to receive medical services by telemedicine and may withdraw from such care at any time.    Notes:       Patient ID: Jaylin Murguia is a 59 y.o. female.        Chief Complaint:  No chief complaint on file.  FU recurrent sinusitis, anti-polysaccharide antibody deficiency, IgG2 subclass def    Follow-up  Associated symptoms include congestion and coughing. Pertinent negatives include no arthralgias, chest pain, fatigue, fever, headaches, joint swelling, nausea, rash, sore throat or vomiting.   Asthma  She complains of cough. There is no shortness of breath or wheezing. Associated symptoms include postnasal drip. Pertinent negatives include no chest pain, ear pain, fever, headaches, rhinorrhea, sneezing, sore throat or trouble swallowing. Her past medical history is significant for asthma.     Pt presents for fu anti-polysaccharide antibody def and IgG2 subclass deficiency. Since LV here has been dx'd w EGPA by rheumatology. Since starting on prednisone and nucala for this, respiratory sx's are markedly improved.  Currently on prednisone alternating 5 mg and 10 mg daily. Has had difficulty weaning below this recently. Still finds Hizentra helpful. With gaps in administration has noted she is still more likely to develop upper or lower respiratory infections.  Also being followed by ID for MAC. Currently observing d/t concern that risk of toxicity of tx't may outweigh potential benefit.  Continues Hizentra 14 g weekly (663 mg/kg/mo)    Hx from 12/15/22:  Since  pt notes some improvement w increase in Hizentra dose from 12 to 14 g weekly (693 mg/kg/mo) but still needing/using antibiotic sinus rinse, rx'd by ENT. Has had increase in cough, wheeze. Would like 2nd opinion from pulmonary.  Sx's worse w recent delay in obtaining Hizentra d/t insurance issues. Has been off x 2 mo and was dx'd w pneumonia in the interval.    Hx from 7/7:  Pt presents for fu anti-polysaccharide antibody def and IgG2 subclass deficiency. Initially did find IgG replacement helpful. At  we increased Hizentra dose (12g q 7d /  580 mg/kg/mo). Since then, though, continues w chronic/recurrent sinusitis sx's and concern of poorly controlled asthma. She is following w pulmonary. Has needed interval prednisone for cough, wheeze.  Has seen ENT. Is restarting cipro sinus rinses.        Hx from  9/23/21:  Since  has restarted IgG replacement therapy, Hizentra 10 g q 7 days (465 mg/kg/mo). Has been on it for about 6 months.  On Hizentra she does find that freq and severity of rhinosinusitis sx's has decreased. She recalls only one course of abx for sinusitis in the last 6 months. She usually infuses Hizentra on Tuesdays and notes increased energy in the days after. Often, on Sunday and Monday starts to notice increased fatigue. Sometimes, on Sunday and Monday, may notice slight increase in rhinitis sx's.  Continues to follow w ENT. Still using nasal steroids rinses and nasally instilled abx.  Has been advised to start Humira by GI, for Crohn's dis.    4-5  courses abx over last year, more sinus than lower res    Dog cat, dust, edmonds,     Initial hx from 2/3/21:  Pt presents, referred by ENT, for re-evaluation of immune system, given hx of recurrent sinusitis. Has had sinus surgery x 3 over last 2 years and recurrent sinusitis continues, some w staph, pseudomonas. Currently on levoflofloxacin.  Had been followed by AI, Dr. Fletcher, in [place]. Was diagnosed with IgG subclass deficiency and was on IgG replacement therapy for about 2 months. Recalls 1-2 IV infusions, then switched to SQ IgG weekly x 8-10 weeks. Estimates about 4 mo total of IgG replacement therapy. She recalls that during this time she did have fewer sinus infections, and that she discontinued b/c she had noted fewer infections. Over the last approx 10 mo since stopping IgG therapy, frequent sinus infections have recurred.  She recalls distant SCIT x 3 years over 7 years ago. She doesn't recall wether it was helpful. ImmunoCAPs 12/19 were negative.  Hx GERD, though protonix was sedating. Trying to manage w lifestyle modification.  Also has hx wheeze w resp infections.      Past Medical History:   Diagnosis Date    Allergic rhinitis     Asthma     Chronic pansinusitis     Crohn's disease     Ileal involvement, previously on Remicade, Asacol, Prednisone    Fibromyalgia     Hormone replacement therapy (postmenopausal) 11/19/2017    Hyperlipidemia     Hypertension     Immunosuppression 20020    Migraine     Obstructive sleep apnea     CPAP at night    Sciatica        Family History   Problem Relation Name Age of Onset    Breast cancer Mother Sole     Hypertension Mother Sole     Allergies Mother Sole     Cancer Mother Sole     Depression Mother Sole     Kidney disease Father Peña 64        ESRD on HD    Scleroderma Father Peña     Arthritis Father Peña     Diabetes Father Peña     Hypertension Father Peña     Breast cancer Maternal Grandmother Ade     Heart attack Maternal Grandmother Ade      COPD Maternal Grandmother Ade 72    Cancer Maternal Grandmother Ade     Cancer Paternal Grandmother Frost 70        colon    Hypertension Brother Kevin          Review of Systems   Constitutional:  Negative for activity change, fatigue and fever.   HENT:  Positive for congestion, postnasal drip, sinus pressure and sinus pain. Negative for ear discharge, ear pain, facial swelling, hearing loss, nosebleeds, rhinorrhea, sneezing, sore throat, trouble swallowing and voice change.    Eyes:  Positive for photophobia. Negative for pain, discharge, redness and itching.   Respiratory:  Positive for cough. Negative for apnea, choking, chest tightness, shortness of breath and wheezing.    Cardiovascular:  Negative for chest pain.   Gastrointestinal:  Negative for diarrhea, nausea and vomiting.   Genitourinary:  Negative for dysuria.   Musculoskeletal:  Negative for arthralgias and joint swelling.   Skin:  Negative for color change and rash.   Neurological:  Negative for headaches.   Hematological:  Does not bruise/bleed easily.   Psychiatric/Behavioral:  Negative for behavioral problems and sleep disturbance.         Objective:   Physical Exam  Constitutional:       General: She is not in acute distress.     Appearance: She is not ill-appearing or toxic-appearing.   Eyes:      General:         Right eye: No discharge.         Left eye: No discharge.   Pulmonary:      Effort: No respiratory distress.   Neurological:      Mental Status: She is alert and oriented to person, place, and time.   Psychiatric:         Mood and Affect: Mood normal.         Behavior: Behavior normal.       Results for DARIAN VALDERRAMA (MRN [id number]) as of 2/6/2021 16:20   Ref. Range 12/12/2019 16:02   IgG Latest Ref Range: 650 - 1600 mg/dL 941   IgM Latest Ref Range: 50 - 300 mg/dL 133   IgA Latest Ref Range: 40 - 350 mg/dL 235   IgG 1 Latest Ref Range: 382 - 929 mg/dL 556   IgG 2 Latest Ref Range: 242 - 700 mg/dL 153 (L)   IgG 3 Latest Ref Range: 22 -  176 mg/dL 16 (L)   IgG 4 Latest Ref Range: 4 - 86 mg/dL 22     Results for DARIAN VALDERRAMA (MRN [id number]) as of 2/6/2021 16:20   Ref. Range 2/3/2021 11:06   Absolute CD19 Latest Ref Range: 100 - 500 cells/ul 484   Absolute CD3 Latest Ref Range: 700 - 2100 cells/ul 1902   Absolute CD4 Latest Ref Range: 300 - 1400 cells/ul 1294   Absolute CD56 + CD16 Latest Ref Range: 90 - 600 cells/ul 122   Absolute CD8 Latest Ref Range: 200 - 900 cells/ul 526   CD19 B Cells Latest Ref Range: 6 - 19 % 20.3 (H)   CD3 % Total T Cell Latest Ref Range: 55 - 83 % 74.0   CD4 % [place] T Cell Latest Ref Range: 28 - 57 % 50.4   CD4/CD8 Ratio Latest Ref Range: 0.9 - 3.6  2.46   CD56 + CD16 Natural Killers Latest Ref Range: 7 - 31 % 5.1 (L)   CD8 % Suppressor T Cell Latest Ref Range: 10 - 39 % 20.5   Results for DARIAN VALDERRAMA (MRN [id number]) as of 2/6/2021 16:20   Ref. Range 12/12/2019 16:37 4/20/2020 11:16   S.pneumoniae Type 1 Latest Units: mcg/mL <0.3 5.4   S.pneumoniae Type 12F Latest Units: mcg/mL 1.5 1.1   S.pneumoniae Type 18C Latest Units: mcg/mL 0.5 5.0   S.pneumoniae Type 19F Latest Units: mcg/mL 6.1 21.4   S.pneumoniae Type 23F Latest Units: mcg/mL 0.3 3.8   S.pneumoniae Type 3 Latest Units: mcg/mL 0.8 2.5   S.pneumoniae Type 5 Latest Units: mcg/mL 0.5 3.4   S.pneumoniae Type 6B Latest Units: mcg/mL 1.9 39.8   S.pneumoniae Type 7F Latest Units: mcg/mL 1.5 4.2   S.pneumoniae Type 8 Latest Units: mcg/mL 1.3 1.6   S.pneumoniae Type 9N Latest Units: mcg/mL <0.3 1.6   S.pneumoniae Type 9V Abs Latest Units: mcg/mL <0.3 3.8   Strep pneumo Type 14 Latest Units: mcg/mL 1.8 6.0   Strep pneumo Type 4 Latest Units: mcg/mL <0.3 1.5     Prevnar 12/19  Pneumovax 10/13    4/20/20 pneumo titers drawn while on IgG replacement    Prev AI notes in Epic reviewd    Assessment:       1. Anti-polysaccharide antibody deficiency    2. IgG2 subclass deficiency    3. Chronic sinusitis, unspecified location    4. Eosinophilic granulomatosis with  polyangiitis (EGPA)    5. Mycobacterial infection             Plan:         Cont  Hizentra 14 g SQ q 7 days (663 mg/kg/mo).  Check quant immunoglobulins  Steroids and nucala per rheum for EGPA  Cont fu w pulm, ID  RTC 1 year, sooner prn

## 2024-08-15 ENCOUNTER — PATIENT MESSAGE (OUTPATIENT)
Dept: ALLERGY | Facility: CLINIC | Age: 59
End: 2024-08-15
Payer: COMMERCIAL

## 2024-08-15 ENCOUNTER — DOCUMENTATION ONLY (OUTPATIENT)
Dept: ALLERGY | Facility: CLINIC | Age: 59
End: 2024-08-15
Payer: COMMERCIAL

## 2024-08-15 NOTE — PROGRESS NOTES
Called and spoke with Pam from [facility] about the Hizenta , pam said a new prescription was sent in for Hizentra. Pam read back verbal order over the phone along with premedications to be given 30 minutes prior to infusions, medications to be used as needed, and adverse event  medications to keep on hand at all times.     [facility] specialty pharmacy  Phone#[phone] option 2, option 2      Milan Bender MD [place], Charlette, LPN  Caller: Unspecified (Yesterday,  2:11 PM)  Phone Number: [phone]     Giovanna, Could you please call in this rx for me and this pt to [facility] as below. Hizentra 14 grams every 7 days. 1 mo supply, 11 refills. I will send in Erx as well. But if you could also please call to ensure she doesn't miss a dose.  Thank you, LM          Previous Messages    Approved Prescriptions     immun glob G,IgG,-pro-IgA 0-50 (HIZENTRA) 10 gram/50 mL (20 %) Soln         Sig: Inject 70 mLs (14 g total) into the skin every 7 days.    Disp: 280 mL    Refills: 11    Start: 8/15/2024    Class: Normal    Authorized by: Milan Bender MD    Non-formulary For: Anti-polysaccharide antibody deficiency, IgG2 subclass deficiency        To be filled at: [address]

## 2024-08-29 NOTE — TELEPHONE ENCOUNTER
No care due was identified.  [facility] Embedded Care Due Messages. Reference number: [id number].   8/29/2024 1:27:54 PM CDT

## 2024-08-30 RX ORDER — TRAZODONE HYDROCHLORIDE 50 MG/1
TABLET ORAL
Refills: 0 | OUTPATIENT
Start: 2024-08-30

## 2024-08-30 NOTE — TELEPHONE ENCOUNTER
Refill Decision Note   Jaylin Murguia  is requesting a refill authorization.  Brief Assessment and Rationale for Refill:  Quick Discontinue     Medication Therapy Plan:  No sig   Pharmacy is requesting new scripts for the following medications without required information, (sig/ frequency/qty/etc)      Medication Reconciliation Completed: No     Comments: Pharmacies have been requesting medications for patients without required information, (sig, frequency, qty, etc.). In addition, requests are sent for medication(s) pt. are currently not taking, and medications patients have never taken.    We have spoken to the pharmacies about these request types and advised their teams previously that we are unable to assess these New Script requests and require all details for these requests. This is a known issue and has been reported.     Note composed:3:35 AM 08/30/2024

## 2024-09-03 ENCOUNTER — PATIENT MESSAGE (OUTPATIENT)
Dept: RHEUMATOLOGY | Facility: CLINIC | Age: 59
End: 2024-09-03
Payer: COMMERCIAL

## 2024-09-03 ENCOUNTER — PATIENT MESSAGE (OUTPATIENT)
Dept: PULMONOLOGY | Facility: CLINIC | Age: 59
End: 2024-09-03
Payer: COMMERCIAL

## 2024-09-04 ENCOUNTER — TELEPHONE (OUTPATIENT)
Dept: PULMONOLOGY | Facility: CLINIC | Age: 59
End: 2024-09-04
Payer: COMMERCIAL

## 2024-09-04 NOTE — TELEPHONE ENCOUNTER
----- Message from Ivan Riley MD sent at 9/3/2024  2:26 PM CDT -----  Regarding: follow up  Shequita,        Can we get this patient follow up at next open EP visit and place her on the wait list for a sooner appointment.     Thanks,   Ivan

## 2024-09-04 NOTE — TELEPHONE ENCOUNTER
I spoke with patient in regards to scheduling her an follow up visit. Patient is scheduled with Dr Riley available appointment on 1/6/24 at 11 AM. Patient is added to the waiting list. Appointment mailed. Patient confirmed and verbalized understanding.

## 2024-10-16 ENCOUNTER — OFFICE VISIT (OUTPATIENT)
Dept: GASTROENTEROLOGY | Facility: CLINIC | Age: 59
End: 2024-10-16
Payer: COMMERCIAL

## 2024-10-16 VITALS — BODY MASS INDEX: 27.47 KG/M2 | WEIGHT: 175 LBS | HEIGHT: 67 IN

## 2024-10-16 DIAGNOSIS — D72.18 EOSINOPHILIC GRANULOMATOSIS WITH POLYANGIITIS (EGPA): ICD-10-CM

## 2024-10-16 DIAGNOSIS — K50.00 CROHN'S DISEASE OF SMALL INTESTINE WITHOUT COMPLICATION: ICD-10-CM

## 2024-10-16 DIAGNOSIS — M30.1 EOSINOPHILIC GRANULOMATOSIS WITH POLYANGIITIS (EGPA): ICD-10-CM

## 2024-10-16 DIAGNOSIS — R19.7 DIARRHEA, UNSPECIFIED TYPE: Primary | ICD-10-CM

## 2024-10-16 DIAGNOSIS — D84.9 IMMUNOSUPPRESSION: ICD-10-CM

## 2024-10-16 PROCEDURE — 1159F MED LIST DOCD IN RCRD: CPT | Mod: CPTII,95,, | Performed by: INTERNAL MEDICINE

## 2024-10-16 PROCEDURE — G2211 COMPLEX E/M VISIT ADD ON: HCPCS | Mod: 95,,, | Performed by: INTERNAL MEDICINE

## 2024-10-16 PROCEDURE — 3008F BODY MASS INDEX DOCD: CPT | Mod: CPTII,95,, | Performed by: INTERNAL MEDICINE

## 2024-10-16 PROCEDURE — 99214 OFFICE O/P EST MOD 30 MIN: CPT | Mod: 95,,, | Performed by: INTERNAL MEDICINE

## 2024-10-16 PROCEDURE — 4010F ACE/ARB THERAPY RXD/TAKEN: CPT | Mod: CPTII,95,, | Performed by: INTERNAL MEDICINE

## 2024-10-16 PROCEDURE — 1160F RVW MEDS BY RX/DR IN RCRD: CPT | Mod: CPTII,95,, | Performed by: INTERNAL MEDICINE

## 2024-10-16 RX ORDER — FLUTICASONE FUROATE, UMECLIDINIUM BROMIDE AND VILANTEROL TRIFENATATE 100; 62.5; 25 UG/1; UG/1; UG/1
1 POWDER RESPIRATORY (INHALATION)
COMMUNITY
Start: 2024-08-29

## 2024-10-16 RX ORDER — RIMEGEPANT SULFATE 75 MG/75MG
75 TABLET, ORALLY DISINTEGRATING ORAL DAILY PRN
COMMUNITY
Start: 2024-08-15

## 2024-10-16 NOTE — PROGRESS NOTES
[facility]          Inflammatory Bowel Disease Follow Up Note         TODAY'S VISIT DATE:  10/16/2024    Reason for Consult:    Chief Complaint   Patient presents with    Crohn's Disease       PCP: Esthela Hu      Referring MD:   No ref. provider found    History of Present Illness:  Jaylin Murguia who is a 59 y.o. female is being seen today at the [facility] on 10/16/2024 for inflammatory bowel disease- Crohn's disease.  She is here today for a follow-up visit.  After our last visit she did multiple stool test that were negative including a stool calprotectin level.  Serologic workup for her ongoing diarrhea issues did reveal a mildly elevated chromogranin a level but she was taking a PPI at the time.  She changed her diet after our visit and was eating a predominantly plant based diet and at 1 point her stool frequency decreased significantly to 4 or 5 bowel movements a day that were formed and she was not needing to use any Lomotil.  Over the last few weeks she has noticed an increase in stool frequency again back to 7 or 8 bowel movements a day with a pasty consistency.  This is still improved over the loose consistency it was in the past but she has been taking Lomotil 2 to 3 times a day again to keep it under control.  She also felt like when she was having 4 or 5 bowel movements a day she was having more complete bowel evacuations where as now she has a less complete bowel evacuation.  She denies any major changes other than her dietary changes.  She continues to take Bactrim for PCP prophylaxis.  She has had a minor decrease in her steroid dose and is taking prednisone 10 mg 1 day, 5 mg the next.  When she has tried to taper to 5 mg daily she has had a worsening of her EGPA symptoms.  She also does report starting a new herbal supplement for menopause.  This contains multiple different herbal treatments including fenugreek and  black cohosh.    IBD History:  She has a history of ileal Crohn's disease that was diagnosed around the year 2000.  She has always had disease isolated to the ileum.  She was initially treated with Asacol and Apriso without any improvement.  At 1 point with prednisone without any significant improvement.  She was on infliximab for about 6 months and does not recall any side effects but did not provide any improvement in her symptoms so it was discontinued.  She has also been on budesonide at times which did not help with her diarrhea symptoms.  She was on Humira in 2017 in 2018 with evidence of endoscopic remission but she never had any symptomatic improvement.  This was discontinued because of recurrent infectious issues.  Most recently she has been on Skyrizi in late 2023.  She never noticed any improvement from a symptom standpoint but this was discontinued after about 4 months when she was found to have a positive hepatitis-B core antibody in December of 2023.  Follow-up hepatitis-B viral DNA was negative.  She has had multiple scopes with the most recent being in February of 2003.  At that time there was evidence of endoscopic remission.  In spite of that she has continued to have longstanding diarrhea issues.  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  She takes Hizentra weekly.  She was also recently diagnosed with Churg-Jasper disease and has been taking Nucala for 3 months.  She has noticed significant improvement in sinus and respiratory symptoms but no change in her gastrointestinal symptoms.  After our visit in March of 2024 multiple stool studies were negative.  Serologic workup for diarrhea revealed a mildly elevated chromogranin a level but she was taking a PPI at the time.    IBD Details:  Dx Date:  2000  Disease type/distribution:  Crohn's disease/ileal inflammation only  Current Treatment:  Prednisone 10/5 mg daily (not for Crohn's disease  specifically)  Start Date:  Response:   Optimized:   Adverse reactions:   Prior surgeries:  None  CRP Elevation: Y  calprotectin:  Low at baseline  Disease Complications:  None  Extraintestinal manifestations:  None  Prior treatments:   Steroids:  Unclear response  5ASA:  Inadequate response  IMM:  None  TNF Inh:  Infliximab-no improvement in symptoms, no objective assessment of response    Humira-endoscopic remission but no symptom improvement   Anti-Integrin:  None   IL 12/23:  Skyrizi-no symptom improvement, no objective assessment, discontinued because of positive hepatitis B core antibody  BRIAN Inh:  None    Previous Clinical Trials:  None    Last Colonoscopy:  February 2023-no evidence of active disease endoscopically, mild nonspecific colitis noted but no chronic inflammatory changes    Other Endoscopies:  Upper endoscopy reports reviewed and normal.  This includes duodenal biopsies to rule out celiac disease    Imaging:   MRE:  2018-thickening of the distal ileum, questionable stricture (not found during endoscopic evaluation)   CT:  2019-CT enterography normal   Other:  Other pertinent imaging evaluated    Pertinent Labs:  Lab Results   Component Value Date    SEDRATE 11 06/18/2024    CRP 0.4 06/18/2024     Lab Results   Component Value Date    TTGIGA 0.4 03/28/2024     03/28/2024     Lab Results   Component Value Date    TSH 0.615 02/09/2024    FREET4 0.64 (L) 02/09/2024     Lab Results   Component Value Date    DSBEGJNW59SD 71 03/28/2024    HDUNZLHP32 1888 (H) 03/28/2024     Lab Results   Component Value Date    HEPBSAG Non-reactive 01/19/2024    HEPBCAB Reactive (A) 01/19/2024    HEPCAB Non-reactive 12/19/2023     Lab Results   Component Value Date    LPI88PJGW Non-reactive 12/19/2023     Lab Results   Component Value Date    NIL 0.96801 12/19/2023    TBAG 0.069 03/13/2018    TBAGNIL -0.008 03/13/2018    MITOGENNIL 3.974 12/19/2023    TBGOLD Negative 03/13/2018     Lab Results   Component Value  "Date    TPTMINTERP SEE BELOW 03/09/2018    TPTMINTERP SEE BELOW 03/09/2018    TPMTRESULT 12.1 (L) 04/20/2015     Lab Results   Component Value Date    CDIFFICILEAN Negative 02/24/2022    CDIFFTOX Negative 02/24/2022    CDIFFICILEBY Negative 01/26/2023     Lab Results   Component Value Date    CALPROTECTIN 9.9 04/02/2024       Therapeutic Drug Monitoring Labs:  No results found for: "PROMETH"  No results found for: "ANSADAINIT", "INFLIXIMAB", "INFLIXINTERP"    Vaccinations:  Lab Results   Component Value Date    HEPBSAB 436.40 01/19/2024    HEPBSAB Reactive 01/19/2024     Lab Results   Component Value Date    HEPAIGG Reactive 03/28/2024     Lab Results   Component Value Date    VARICELLAZOS >4000.00 03/28/2024    VARICELLAINT Positive 03/28/2024     Immunization History   Administered Date(s) Administered    COVID-19, MRNA, LN-S, PF (Pfizer) (Purple Cap) 03/05/2021, 04/15/2021    Hepatitis A / Hepatitis B 12/12/2019    Influenza 10/29/2013    Influenza - Quadrivalent - PF *Preferred* (6 months and older) 11/15/2017, 01/28/2020, 02/11/2021, 10/15/2023    Influenza Split 10/29/2013    PPD Test 05/22/2017    Pneumococcal Conjugate - 13 Valent 12/23/2019    Pneumococcal Conjugate - 20 Valent 12/05/2023    Pneumococcal Polysaccharide - 23 Valent 10/29/2013    Tdap 02/18/2014         Review of Systems  Review of Systems   Constitutional:  Negative for chills, fever and weight loss.   HENT:  Negative for sore throat.    Eyes:  Negative for pain, discharge and redness.   Respiratory:  Positive for cough and shortness of breath. Negative for wheezing.    Cardiovascular:  Negative for chest pain, orthopnea and leg swelling.   Gastrointestinal:  Positive for abdominal pain and diarrhea. Negative for blood in stool, constipation, heartburn, melena, nausea and vomiting.   Genitourinary:  Negative for dysuria, frequency and urgency.   Musculoskeletal:  Negative for back pain, joint pain and myalgias.   Skin:  Negative for itching " and rash.   Neurological:  Negative for focal weakness and seizures.   Endo/Heme/Allergies:  Does not bruise/bleed easily.   Psychiatric/Behavioral:  Positive for depression. The patient is not nervous/anxious.        Medical History:   Past Medical History:   Diagnosis Date    Allergic rhinitis     Asthma     Chronic pansinusitis     Crohn's disease     Ileal involvement, previously on Remicade, Asacol, Prednisone    Fibromyalgia     Hormone replacement therapy (postmenopausal) 2017    Hyperlipidemia     Hypertension     Immunosuppression     Migraine     Obstructive sleep apnea     CPAP at night    Sciatica        Surgical History:  Past Surgical History:   Procedure Laterality Date    BLADDER SURGERY      sling was created by her muscles     BRONCHOSCOPY N/A 2023    Procedure: BRONCHOSCOPY;  Surgeon: [facility] Diagnostic Provider;  [place] [facility] [place] [address] [place] [address];  Service: Anesthesiology;  Laterality: N/A;    BRONCHOSCOPY WITH FLUOROSCOPY N/A 10/13/2023    Procedure: BRONCHOSCOPY, WITH FLUOROSCOPY;  Surgeon: Mary Molina MD;  Location[place] [facility] [address] [place] [address];  Service: Pulmonary;  Laterality: N/A;     SECTION      COLONOSCOPY N/A 2017    Procedure: COLONOSCOPY;  Surgeon: Kin Dyer MD;  Location: [facility];  Service: Endoscopy;  Laterality: N/A;    COLONOSCOPY N/A 2018    Procedure: COLONOSCOPY;  Surgeon: Kyra Vallecillo MD;  Location: [facility];  Service: Endoscopy;  Laterality: N/A;    COLONOSCOPY N/A 2020    Procedure: COLONOSCOPY;  Surgeon: Nicole Leal MD;  Location: [facility];  Service: Endoscopy;  Laterality: N/A;    COLONOSCOPY N/A 2022    Procedure: COLONOSCOPY;  Surgeon: Shay Bruce MD;  Location: [facility];  Service: Endoscopy;  Laterality: N/A;    COLONOSCOPY N/A 2023    Procedure: COLONOSCOPY;  Surgeon: Nicole Leal MD;  Location: [facility];  Service: Endoscopy;  Laterality: N/A;    DEBRIDEMENT Bilateral 2020     Procedure: DEBRIDEMENT;  Surgeon: Matthias Roach MD;  [facility];  Service: ENT;  Laterality: Bilateral;    ESOPHAGOGASTRODUODENOSCOPY N/A 03/12/2020    Procedure: ESOPHAGOGASTRODUODENOSCOPY (EGD);  Surgeon: Nicole Leal MD;  Location: [facility];  Service: Endoscopy;  Laterality: N/A;    ESOPHAGOGASTRODUODENOSCOPY N/A 03/16/2022    Procedure: EGD (ESOPHAGOGASTRODUODENOSCOPY);  Surgeon: Shay Bruce MD;  Location: [facility];  Service: Endoscopy;  Laterality: N/A;    ESOPHAGOGASTRODUODENOSCOPY N/A 02/02/2023    Procedure: EGD (ESOPHAGOGASTRODUODENOSCOPY);  Surgeon: Nicole Leal MD;  Location: [facility];  Service: Endoscopy;  Laterality: N/A;    FINGER SURGERY      joint relpacement, left hand index finger    FUNCTIONAL ENDOSCOPIC SINUS SURGERY (FESS) USING COMPUTER-ASSISTED NAVIGATION Bilateral 07/31/2019    Procedure: FESS, USING COMPUTER-ASSISTED NAVIGATION;  Surgeon: Manish Shaffer MD;  Location: [facility];  Service: ENT;  Laterality: Bilateral;    FUNCTIONAL ENDOSCOPIC SINUS SURGERY (FESS) USING COMPUTER-ASSISTED NAVIGATION Bilateral 09/25/2020    Procedure: FESS, USING COMPUTER-ASSISTED NAVIGATION SPHENOID;  Surgeon: Matthias Roach MD;  Location: [facility] OR [address] [place] [address];  Service: ENT;  Laterality: Bilateral;  TIVA    FUNCTIONAL ENDOSCOPIC SINUS SURGERY (FESS) USING COMPUTER-ASSISTED NAVIGATION Bilateral 09/30/2022    Procedure: FESS, USING COMPUTER-ASSISTED NAVIGATION;  Surgeon: Matthias Roach MD;  Location: [facility] OR [address] [place] [address];  Service: ENT;  Laterality: Bilateral;    HYSTERECTOMY  2001    INTRALUMINAL GASTROINTESTINAL TRACT IMAGING VIA CAPSULE N/A 03/03/2023    Procedure: IMAGING PROCEDURE, GI TRACT, INTRALUMINAL, VIA CAPSULE;  Surgeon: First Available [place];  Location: [facility];  Service: Endoscopy;  Laterality: N/A;    SINUS SURGERY      WISDOM TOOTH EXTRACTION         Family History:   Family History   Problem Relation Name Age of Onset    Breast cancer Mother Sole      Hypertension Mother Sole     Allergies Mother Sole     Cancer Mother Sole     Depression Mother Sole     Kidney disease Father Mack 64        ESRD on HD    Scleroderma Father Mack     Arthritis Father Mack     Diabetes Father Mack     Hypertension Father Mack     Breast cancer Maternal Grandmother Ade     Heart attack Maternal Grandmother Ade     COPD Maternal Grandmother Ade 72    Cancer Maternal Grandmother Ade     Cancer Paternal Grandmother Frost 70        colon    Hypertension Brother Kevin        Social History:   Social History     Tobacco Use    Smoking status: Never     Passive exposure: Never    Smokeless tobacco: Never   Substance Use Topics    Alcohol use: No    Drug use: No       Allergies: Reviewed    Home Medications:   Medication List with Changes/Refills   Current Medications    ACETAMINOPHEN (TYLENOL) 500 MG TABLET    Take 2 tablets (1,000 mg total) by mouth every 6 (six) hours as needed for Pain.    ALBUTEROL (VENTOLIN HFA) 90 MCG/ACTUATION INHALER    Inhale 2 puffs into the lungs every 6 (six) hours as needed for Wheezing. Rescue    ALBUTEROL-IPRATROPIUM (DUO-NEB) 2.5 MG-0.5 MG/3 ML NEBULIZER SOLUTION    Take 3 mLs by nebulization every 6 (six) hours as needed for Wheezing. Rescue    B COMPLEX VITAMINS CAPSULE    Take 1 capsule by mouth once daily.    CALCIUM CARBONATE/VITAMIN D3 (VITAMIN D-3 ORAL)    Take 2 tablets by mouth once daily.    DILTIAZEM HCL (DILTIAZEM 2% - LIDOCAINE 5% CREAM)    Apply peasize amount topically to anal area.    DIPHENOXYLATE-ATROPINE 2.5-0.025 MG (LOMOTIL) 2.5-0.025 MG PER TABLET    Take 1 tablet by mouth 4 (four) times daily as needed for Diarrhea.    DULOXETINE (CYMBALTA) 60 MG CAPSULE    Take 1 capsule (60 mg total) by mouth 2 (two) times daily.    FAMOTIDINE (PEPCID) 20 MG TABLET    Take 1 tablet (20 mg total) by mouth 2 (two) times daily.    FEXOFENADINE (ALLEGRA) 180 MG TABLET    Take 1 tablet (180 mg total) by mouth once daily.    FLUTICASONE  PROPIONATE (FLONASE) 50 MCG/ACTUATION NASAL SPRAY    USE 2 SPRAYS IN EACH NOSTRIL ONCE DAILY    IMMUN GLOB G,IGG,-PRO-IGA 0-50 (HIZENTRA) 10 GRAM/50 ML (20 %) SOLN    Inject 70 mLs (14 g total) into the skin every 7 days.    IPRATROPIUM (ATROVENT) 0.02 % NEBULIZER SOLUTION    Take by nebulization 4 (four) times daily as needed for Wheezing.    LACTOBACILLUS RHAMNOSUS GG (CULTURELLE) 10 BILLION CELL CAPSULE    Take 1 capsule by mouth once daily.    LOSARTAN (COZAAR) 100 MG TABLET    Take 1 tablet (100 mg total) by mouth daily as needed (high blood pressure (>120/80)).    MEPOLIZUMAB (NUCALA) 100 MG/ML SYRG    Inject 3 mLs (300 mg total) into the skin every 28 days.    NORTRIPTYLINE (PAMELOR) 25 MG CAPSULE    Take 1 capsule (25 mg total) by mouth every evening.    NURTEC 75 MG ODT    Take 75 mg by mouth daily as needed.    NYSTATIN (MYCOSTATIN) 100,000 UNIT/ML SUSPENSION    Take 6 mLs (600,000 Units total) by mouth 4 (four) times daily with meals and nightly.    PANTOPRAZOLE (PROTONIX) 40 MG TABLET    Take 1 tablet (40 mg total) by mouth once daily.    PREDNISONE (DELTASONE) 10 MG TABLET    Take 25 mg daily for 2 weeks and then 20mg daily until further instructions    PREGABALIN (LYRICA) 150 MG CAPSULE    Take 1 capsule (150 mg total) by mouth 3 (three) times daily.    PROPRANOLOL (INDERAL LA) 60 MG 24 HR CAPSULE    Take 1 capsule (60 mg total) by mouth once daily. For headache prevention    RIZATRIPTAN (MAXALT) 10 MG TABLET    Take 1 tablet (10 mg total) by mouth 1 (one) time if needed for Migraine (Max 2 tablets in 24 hours.).    SOD CHLOR,SOD BICARB/NETI POT (SINUS WASH NETI POT TERRELL)    use as directed    SODIUM CHLORIDE FOR INHALATION (SODIUM CHLORIDE 10%) 10 % NEBULIZER SOLUTION    Take 4 mLs by nebulization 2 (two) times a day.    SULFAMETHOXAZOLE-TRIMETHOPRIM 800-160MG (BACTRIM DS) 800-160 MG TAB    Take 1 tablet by mouth once daily.    SULFAMETHOXAZOLE-TRIMETHOPRIM 800-160MG (BACTRIM DS) 800-160 MG TAB     "Take 1 tablet by mouth once daily.    TOPIRAMATE (TOPAMAX) 100 MG TABLET    Take 1 tablet (100 mg total) by mouth 2 (two) times daily.    TRAZODONE (DESYREL) 50 MG TABLET    Take 1 tablet (50 mg total) by mouth every evening.    TRELEGY ELLIPTA 100-62.5-25 MCG DSDV    Inhale 1 puff into the lungs.    TRIAMTERENE-HYDROCHLOROTHIAZIDE 37.5-25 MG (DYAZIDE) 37.5-25 MG PER CAPSULE    Take 1 capsule by mouth daily as needed (for wt fluctuation > 3 lbs in 24 hrs).    ZINC ORAL    Take 1 tablet by mouth once daily.       Physical Exam:  Vital Signs:  Ht 5' 7" (1.702 m)   Wt 79.4 kg (175 lb)   BMI 27.41 kg/m²   Body mass index is 27.41 kg/m².    Physical Exam  Nursing note reviewed.   Constitutional:       General: She is not in acute distress.     Appearance: Normal appearance. She is well-developed. She is not ill-appearing.   Skin:     Findings: No rash.   Neurological:      Mental Status: She is alert.   Psychiatric:         Mood and Affect: Mood normal.         Behavior: Behavior normal.         Thought Content: Thought content normal.         Judgment: Judgment normal.         Labs: reviewed and pertinent noted above    Assessment/Plan:  Jaylin Murguia is a 59 y.o. female with Crohn's disease, Churg-Jasper disease, immunodeficiency, chronic diarrhea. The following issues were addresssed:    1. Diarrhea, unspecified type    2. Crohn's disease of small intestine without complication    3. Eosinophilic granulomatosis with polyangiitis (EGPA)    4. Immunosuppression      1. Crohn's disease:  Not currently on treatment.  Reassess with colonoscopy early next year.  Recent calprotectin level was normal.    2. Chronic diarrhea:  She continues to have diarrhea.  She definitely had some improvement with dietary changes earlier this year but these have regressed.  We discussed a few different possible causes.  It could just be routine fluctuations in her body but given the chronic use of Bactrim I did recommend that we check " stool for C diff.  If this is negative then it is possible that the Bactrim could be causing changes in her gastrointestinal binu and she may want to consider a trial of Florastor probiotics.  In addition to that, 2 ingredients in the herbal supplement that she is taking for hot flashes (fenugreek and black cohosh) have been associated with diarrhea.  She may want to hold that for a few weeks to see if that helps with the stool frequency.  Ultimately, it sounds like some of her issues may be more related to the consistency of the bowel movements as the softer stools seems not be evacuating as easily.  Can potentially consider a trial of a fiber supplement as well.    3. Eosinophilic pneumonia/Churg-Jasper disease:  Continue to follow-up with Rheumatology.      4. Hypogammaglobulinemia:  Continue to follow up with Allergy and immunology.      5. Positive hepatitis-B core antibody:  This is probably from her Hizentra therapy.  She was negative prior to starting Hizentra and now has a positive core antibody.  Hepatitis-B viral DNA is negative.      # IBD specific health maintenance:  Colon cancer surveillance:  Up-to-date    Annual:  - Eye exam:  Not applicable  - Skin exam (if on IMM/TNF):  Up-to-date  - reminded pt to use sunblock/hats/sunprotective clothing  - PAP (if immunosuppressed):  Review in the future    DEXA:  Would likely benefit from a DEXA scan in the future    Vitamin D:  Check today    Vaccines:    Influenza:  Up-to-date   Pneumovax:  Up-to-date   HAV:  Check serology   HBV:  Immune   Tdap:  Review in the future   MMR:  Check serologies   VZV:  Check serology   HZV:  Recommend vaccination   HPV:  Not applicable   Meningococcus:  Not applicable   COVID: Vaccinated    Follow up: Follow up in about 6 months (around 4/16/2025).    Visit today is associated with current or anticipated ongoing medical care related to this patient's single serious condition/complex condition (Crohn's disease and chronic  diarrhea).      Thank you again for sending Jaylin VICKY Murguia to see Dr. Woody Dyer today at the [facility]. Please don't hesitate to contact Dr. Dyer if there are any questions regarding this evaluation, or if you have any other patients with inflammatory bowel disease for whom you would like a consultation. You can reach Dr. Dyer at [phone] or by email at [email or web address]    Kin Dyer MD  Department of Gastroenterology  Inflammatory Bowel Disease    The patient location is: [place]  The chief complaint leading to consultation is: Crohn's disease    Visit type: audiovisual    Face to Face time with patient: 15 minutes  25 minutes of total time spent on the encounter, which includes face to face time and non-face to face time preparing to see the patient (eg, review of tests), Obtaining and/or reviewing separately obtained history, Documenting clinical information in the electronic or other health record, Independently interpreting results (not separately reported) and communicating results to the patient/family/caregiver, or Care coordination (not separately reported).         Each patient to whom he or she provides medical services by telemedicine is:  (1) informed of the relationship between the physician and patient and the respective role of any other health care provider with respect to management of the patient; and (2) notified that he or she may decline to receive medical services by telemedicine and may withdraw from such care at any time.    Notes:

## 2024-10-16 NOTE — PATIENT INSTRUCTIONS
Submit stool sample  Try changes we discussed today  Colonoscopy in February  Follow up in 6 months

## 2024-10-17 ENCOUNTER — TELEPHONE (OUTPATIENT)
Dept: GASTROENTEROLOGY | Facility: CLINIC | Age: 59
End: 2024-10-17
Payer: COMMERCIAL

## 2024-10-19 DIAGNOSIS — B37.0 ORAL PHARYNGEAL CANDIDIASIS: ICD-10-CM

## 2024-10-21 ENCOUNTER — TELEPHONE (OUTPATIENT)
Dept: GASTROENTEROLOGY | Facility: CLINIC | Age: 59
End: 2024-10-21
Payer: COMMERCIAL

## 2024-10-21 RX ORDER — NYSTATIN 100000 [USP'U]/ML
6 SUSPENSION ORAL
Qty: 473 ML | Refills: 1 | Status: SHIPPED | OUTPATIENT
Start: 2024-10-21

## 2024-10-28 ENCOUNTER — LAB VISIT (OUTPATIENT)
Dept: LAB | Facility: HOSPITAL | Age: 59
End: 2024-10-28
Attending: INTERNAL MEDICINE
Payer: COMMERCIAL

## 2024-10-28 DIAGNOSIS — R19.7 DIARRHEA, UNSPECIFIED TYPE: ICD-10-CM

## 2024-10-28 PROCEDURE — 87324 CLOSTRIDIUM AG IA: CPT | Performed by: INTERNAL MEDICINE

## 2024-10-28 PROCEDURE — 87449 NOS EACH ORGANISM AG IA: CPT | Performed by: INTERNAL MEDICINE

## 2024-10-29 LAB
C DIFF GDH STL QL: NEGATIVE
C DIFF TOX A+B STL QL IA: NEGATIVE

## 2024-10-30 ENCOUNTER — TELEPHONE (OUTPATIENT)
Dept: GASTROENTEROLOGY | Facility: CLINIC | Age: 59
End: 2024-10-30
Payer: COMMERCIAL

## 2024-11-05 DIAGNOSIS — M79.7 FIBROMYALGIA: ICD-10-CM

## 2024-11-05 RX ORDER — DULOXETIN HYDROCHLORIDE 60 MG/1
CAPSULE, DELAYED RELEASE ORAL
Qty: 180 CAPSULE | Refills: 1 | Status: SHIPPED | OUTPATIENT
Start: 2024-11-05

## 2024-11-05 NOTE — TELEPHONE ENCOUNTER
Refill Decision Note   Jaylin Blade  is requesting a refill authorization.  Brief Assessment and Rationale for Refill:  Approve     Medication Therapy Plan:         Comments:     Note composed:12:14 PM 11/05/2024

## 2024-11-05 NOTE — TELEPHONE ENCOUNTER
No care due was identified.  [facility] Embedded Care Due Messages. Reference number: [id number].   11/05/2024 8:25:29 AM CST

## 2024-11-06 RX ORDER — PREGABALIN 150 MG/1
150 CAPSULE ORAL 3 TIMES DAILY
Qty: 270 CAPSULE | Refills: 1 | Status: SHIPPED | OUTPATIENT
Start: 2024-11-06

## 2024-11-07 ENCOUNTER — PATIENT MESSAGE (OUTPATIENT)
Dept: PULMONOLOGY | Facility: CLINIC | Age: 59
End: 2024-11-07
Payer: COMMERCIAL

## 2024-11-11 ENCOUNTER — PATIENT MESSAGE (OUTPATIENT)
Dept: ALLERGY | Facility: CLINIC | Age: 59
End: 2024-11-11
Payer: COMMERCIAL

## 2024-11-12 ENCOUNTER — PATIENT MESSAGE (OUTPATIENT)
Dept: ALLERGY | Facility: CLINIC | Age: 59
End: 2024-11-12
Payer: COMMERCIAL

## 2024-11-12 ENCOUNTER — DOCUMENTATION ONLY (OUTPATIENT)
Dept: ALLERGY | Facility: CLINIC | Age: 59
End: 2024-11-12
Payer: COMMERCIAL

## 2024-11-12 RX ORDER — FLUTICASONE FUROATE, UMECLIDINIUM BROMIDE AND VILANTEROL TRIFENATATE 100; 62.5; 25 UG/1; UG/1; UG/1
POWDER RESPIRATORY (INHALATION)
Qty: 60 EACH | Refills: 3 | Status: SHIPPED | OUTPATIENT
Start: 2024-11-12

## 2024-11-12 RX ORDER — BUDESONIDE AND FORMOTEROL FUMARATE 80; 4.5 UG/1; UG/1
AEROSOL, METERED RESPIRATORY (INHALATION)
Qty: 41.2 G | Refills: 3 | Status: CANCELLED | OUTPATIENT
Start: 2024-11-12 | End: 2025-11-12

## 2024-11-12 NOTE — PROGRESS NOTES
[facility] to fax number [phone] so that patient can get her Hizentra.    Your fax has been successfully sent to [phone] at [phone].  ------------------------------------------------------------  From: [id number]  ------------------------------------------------------------  11/12/2024 1:25:48 PM Transmission Record          Sent to [phone] with remote ID "          Result: (0/339;0/0) Success          Page record: 1 - 11          Elapsed time: 04:30 on channel 64

## 2024-11-13 ENCOUNTER — TELEPHONE (OUTPATIENT)
Dept: ALLERGY | Facility: CLINIC | Age: 59
End: 2024-11-13
Payer: COMMERCIAL

## 2024-11-13 NOTE — TELEPHONE ENCOUNTER
Returned patient call, no answered LVM to office back @ [phone].      ----- Message from Visual Realma sent at 11/13/2024  8:45 AM CST -----  Regarding: Returning call  Contact: [phone]  Type:  Patient Returning Call    Who Called: Jaylin Murguia     Who Left Message for Patient:Giovanna    Does the patient know what this is regarding?: Medication     Would the patient rather a call back or a response via [facility]?      Best Call Back Number: [phone]     Additional Information:  [place] .

## 2024-11-13 NOTE — TELEPHONE ENCOUNTER
----- Message from Nilesa sent at 11/13/2024  8:45 AM CST -----  Regarding: Returning call  Contact: [phone]  Type:  Patient Returning Call    Who Called: Jaylin Murguia     Who Left Message for Patient:Giovanna    Does the patient know what this is regarding?: Medication     Would the patient rather a call back or a response via [facility]?      Best Call Back Number: [phone]     Additional Information:  Pt [place] .

## 2024-12-11 ENCOUNTER — PATIENT MESSAGE (OUTPATIENT)
Dept: PULMONOLOGY | Facility: CLINIC | Age: 59
End: 2024-12-11
Payer: COMMERCIAL

## 2024-12-12 DIAGNOSIS — M30.1 EOSINOPHILIC GRANULOMATOSIS WITH POLYANGIITIS (EGPA): Primary | ICD-10-CM

## 2024-12-12 DIAGNOSIS — D72.18 EOSINOPHILIC GRANULOMATOSIS WITH POLYANGIITIS (EGPA): Primary | ICD-10-CM

## 2024-12-12 RX ORDER — PREDNISONE 5 MG/1
10 TABLET ORAL DAILY
Qty: 60 TABLET | Refills: 1 | Status: SHIPPED | OUTPATIENT
Start: 2024-12-12 | End: 2025-02-10

## 2024-12-12 RX ORDER — PREDNISONE 5 MG/1
10 TABLET ORAL DAILY
Qty: 60 TABLET | Refills: 1 | Status: SHIPPED | OUTPATIENT
Start: 2024-12-12 | End: 2024-12-12 | Stop reason: RX

## 2024-12-12 RX ORDER — TRAZODONE HYDROCHLORIDE 50 MG/1
50 TABLET ORAL NIGHTLY
Qty: 90 TABLET | Refills: 0 | Status: SHIPPED | OUTPATIENT
Start: 2024-12-12

## 2024-12-12 NOTE — TELEPHONE ENCOUNTER
No care due was identified.  [facility] Embedded Care Due Messages. Reference number: [id number].   12/12/2024 8:24:14 AM CST

## 2024-12-12 NOTE — TELEPHONE ENCOUNTER
Refill Decision Note   Jaylin Blade  is requesting a refill authorization.  Brief Assessment and Rationale for Refill:  Approve     Medication Therapy Plan:         Comments:     Note composed:12:38 PM 12/12/2024

## 2024-12-16 ENCOUNTER — TELEPHONE (OUTPATIENT)
Dept: OTOLARYNGOLOGY | Facility: CLINIC | Age: 59
End: 2024-12-16
Payer: COMMERCIAL

## 2024-12-18 DIAGNOSIS — M30.1 EOSINOPHILIC GRANULOMATOSIS WITH POLYANGIITIS (EGPA): ICD-10-CM

## 2024-12-18 DIAGNOSIS — D72.18 EOSINOPHILIC GRANULOMATOSIS WITH POLYANGIITIS (EGPA): ICD-10-CM

## 2024-12-18 DIAGNOSIS — J82.81 EOSINOPHILIC PNEUMONIA: ICD-10-CM

## 2024-12-18 NOTE — TELEPHONE ENCOUNTER
Last Office visit: 06/18/2024  Last labs:  06/18/2024      Next office visit: None  Next labs:  None    Patient had an appointment with Dr. Ortez on 12/10/2024, but cancelled though the tony. She was suppose to follow up in 4 months.    Last filled:   01/10/2024      *Eber JONES CMA [facility]  Department of Rheumatology

## 2024-12-19 ENCOUNTER — OFFICE VISIT (OUTPATIENT)
Dept: OTOLARYNGOLOGY | Facility: CLINIC | Age: 59
End: 2024-12-19
Payer: COMMERCIAL

## 2024-12-19 VITALS
BODY MASS INDEX: 28 KG/M2 | DIASTOLIC BLOOD PRESSURE: 87 MMHG | WEIGHT: 178.81 LBS | SYSTOLIC BLOOD PRESSURE: 136 MMHG | HEART RATE: 61 BPM

## 2024-12-19 DIAGNOSIS — D72.18 EOSINOPHILIC GRANULOMATOSIS WITH POLYANGIITIS (EGPA): ICD-10-CM

## 2024-12-19 DIAGNOSIS — M30.1 EOSINOPHILIC GRANULOMATOSIS WITH POLYANGIITIS (EGPA): ICD-10-CM

## 2024-12-19 DIAGNOSIS — J31.0 NONALLERGIC RHINITIS: Primary | ICD-10-CM

## 2024-12-19 DIAGNOSIS — J32.4 CHRONIC PANSINUSITIS: ICD-10-CM

## 2024-12-19 PROCEDURE — 99999 PR PBB SHADOW E&M-EST. PATIENT-LVL V: CPT | Mod: PBBFAC,,, | Performed by: OTOLARYNGOLOGY

## 2024-12-19 PROCEDURE — 87102 FUNGUS ISOLATION CULTURE: CPT | Performed by: OTOLARYNGOLOGY

## 2024-12-19 PROCEDURE — 1159F MED LIST DOCD IN RCRD: CPT | Mod: CPTII,S$GLB,, | Performed by: OTOLARYNGOLOGY

## 2024-12-19 PROCEDURE — 3075F SYST BP GE 130 - 139MM HG: CPT | Mod: CPTII,S$GLB,, | Performed by: OTOLARYNGOLOGY

## 2024-12-19 PROCEDURE — 87075 CULTR BACTERIA EXCEPT BLOOD: CPT | Performed by: OTOLARYNGOLOGY

## 2024-12-19 PROCEDURE — 1160F RVW MEDS BY RX/DR IN RCRD: CPT | Mod: CPTII,S$GLB,, | Performed by: OTOLARYNGOLOGY

## 2024-12-19 PROCEDURE — 31231 NASAL ENDOSCOPY DX: CPT | Mod: S$GLB,,, | Performed by: OTOLARYNGOLOGY

## 2024-12-19 PROCEDURE — 3008F BODY MASS INDEX DOCD: CPT | Mod: CPTII,S$GLB,, | Performed by: OTOLARYNGOLOGY

## 2024-12-19 PROCEDURE — 4010F ACE/ARB THERAPY RXD/TAKEN: CPT | Mod: CPTII,S$GLB,, | Performed by: OTOLARYNGOLOGY

## 2024-12-19 PROCEDURE — 3079F DIAST BP 80-89 MM HG: CPT | Mod: CPTII,S$GLB,, | Performed by: OTOLARYNGOLOGY

## 2024-12-19 PROCEDURE — 87070 CULTURE OTHR SPECIMN AEROBIC: CPT | Performed by: OTOLARYNGOLOGY

## 2024-12-19 PROCEDURE — 87186 SC STD MICRODIL/AGAR DIL: CPT | Performed by: OTOLARYNGOLOGY

## 2024-12-19 PROCEDURE — 99214 OFFICE O/P EST MOD 30 MIN: CPT | Mod: 25,S$GLB,, | Performed by: OTOLARYNGOLOGY

## 2024-12-19 NOTE — PROCEDURES
Nasal/sinus endoscopy    Date/Time: 12/19/2024 9:45 AM    Performed by: Matthias Roach MD  Authorized by: Matthias Roach MD    Anesthesia:     Local anesthetic:  Lidocaine 4% and Jose-Synephrine 1/2%    Patient tolerance:  Patient tolerated the procedure well with no immediate complications  Nose:     Procedure Performed:  Nasal Endoscopy  External:      No external nasal deformity  Intranasal:      Mucosa no polyps     Mucosa ulcers not present     No mucosa lesions present     Turbinates not enlarged     No septum gross deformity  Nasopharynx:      No mucosa lesions     Adenoids not present     Posterior choanae patent     Eustachian tube not patent     Sinuses all patent  Scanty crust at left sphenoid  Mucus swabbed from left frontal recess  No polyps

## 2024-12-19 NOTE — PROGRESS NOTES
Subjective:      Jaylin is a 59 y.o. female who comes for follow-up of sinusitis. Her last visit with me was on 12/21/2023. Now over 2 years status-post revision endoscopic sinus surgery.  On Nucala for 12 months now, seen at [place] several months ago, productive visit.  Using sinus rinse with budesonide, no recent antibiotics, currently on steroids 15mg every other day.  Hizentra ongoing.        %       The patient's medications, allergies, past medical, surgical, social and family histories were reviewed and updated as appropriate.    A detailed review of systems was obtained with pertinent positives as per the above HPI, and otherwise negative.        Objective:     /87 (BP Location: Right arm)   Pulse 61   Wt 81.1 kg (178 lb 12.7 oz)   BMI 28.00 kg/m²        Constitutional:   She appears well-developed. She is cooperative.     Head:  Normocephalic.     Nose:  No mucosal edema, rhinorrhea, septal deviation or polyps. No epistaxis. Turbinates normal, no turbinate masses and no turbinate hypertrophy.  Right sinus exhibits no maxillary sinus tenderness and no frontal sinus tenderness. Left sinus exhibits no maxillary sinus tenderness and no frontal sinus tenderness.     Mouth/Throat  Oropharynx clear and moist without lesions or asymmetry. No oropharyngeal exudate or posterior oropharyngeal erythema.     Neck:  No adenopathy. Normal range of motion present.     She has no cervical adenopathy.       Procedure    Nasal endoscopy performed.  See procedure note.        Data Reviewed    WBC (K/uL)   Date Value   06/18/2024 5.18     Eosinophil % (%)   Date Value   06/18/2024 1.0     Eos, Fluid (%)   Date Value   10/13/2023 66     Eos # (K/uL)   Date Value   06/18/2024 0.1     Platelets (K/uL)   Date Value   06/18/2024 236     Glucose (mg/dL)   Date Value   06/18/2024 81     Total IgE (IU/mL)   Date Value   10/12/2023 234 (H)       Pathology report indicated chronic inflammation with eosinophilia.  Cultures showed  Pseudomonas and X maltophilia.      Assessment:     1. Nonallergic rhinitis    2. Chronic pansinusitis    3. Eosinophilic granulomatosis with polyangiitis (EGPA)         Plan:     Cultures taken, will consider topical antibiotics.  Continue Nucala and Hizentra per other prescribers.  Follow up in about 6 months (around 6/19/2025) for nasal endoscopy.

## 2024-12-20 ENCOUNTER — PATIENT MESSAGE (OUTPATIENT)
Dept: RHEUMATOLOGY | Facility: CLINIC | Age: 59
End: 2024-12-20
Payer: COMMERCIAL

## 2024-12-20 ENCOUNTER — PATIENT MESSAGE (OUTPATIENT)
Dept: PULMONOLOGY | Facility: CLINIC | Age: 59
End: 2024-12-20
Payer: COMMERCIAL

## 2024-12-20 ENCOUNTER — TELEPHONE (OUTPATIENT)
Dept: PULMONOLOGY | Facility: CLINIC | Age: 59
End: 2024-12-20
Payer: COMMERCIAL

## 2024-12-20 LAB — BACTERIA SPEC ANAEROBE CULT: NORMAL

## 2024-12-20 NOTE — TELEPHONE ENCOUNTER
Call was returned to patient in regards to her mepolizumab (NUCALA) 100 mg/mL Syrg. Patient states her Rheumatology provider is not with [facility] anymore. Patient was told that Dr. Riley will refill her Rx. Patient have a appointment on 1/6/25 with Dr. Riley. I informed patient that I will send Dr. Riley a message to review and advise. Patient verbalized understanding.

## 2024-12-20 NOTE — TELEPHONE ENCOUNTER
----- Message from [employer] sent at 12/20/2024  9:52 AM CST -----  Regarding: Patient Advice  Contact: Pt  [phone]  Pt is calling to ask nurse to call her about a new prescription please call

## 2024-12-21 LAB — BACTERIA SPEC AEROBE CULT: ABNORMAL

## 2024-12-27 ENCOUNTER — PATIENT MESSAGE (OUTPATIENT)
Dept: PRIMARY CARE CLINIC | Facility: CLINIC | Age: 59
End: 2024-12-27
Payer: COMMERCIAL

## 2024-12-27 NOTE — TELEPHONE ENCOUNTER
I am happy to place the order but need additional information to complete the order. I sent her the information.     We may be able to find this information from a past sleep study. But can forward also to her pulmonary team as well. Her pulmonary team is not with [facility].

## 2025-01-02 ENCOUNTER — PATIENT MESSAGE (OUTPATIENT)
Dept: OTOLARYNGOLOGY | Facility: CLINIC | Age: 60
End: 2025-01-02
Payer: COMMERCIAL

## 2025-01-05 DIAGNOSIS — G43.809 OTHER MIGRAINE WITHOUT STATUS MIGRAINOSUS, NOT INTRACTABLE: ICD-10-CM

## 2025-01-05 PROBLEM — J82.81 EOSINOPHILIC PNEUMONIA: Status: RESOLVED | Noted: 2023-04-24 | Resolved: 2025-01-05

## 2025-01-06 ENCOUNTER — OFFICE VISIT (OUTPATIENT)
Dept: PULMONOLOGY | Facility: CLINIC | Age: 60
End: 2025-01-06
Payer: COMMERCIAL

## 2025-01-06 VITALS
WEIGHT: 177.25 LBS | BODY MASS INDEX: 27.82 KG/M2 | OXYGEN SATURATION: 99 % | DIASTOLIC BLOOD PRESSURE: 82 MMHG | HEIGHT: 67 IN | SYSTOLIC BLOOD PRESSURE: 132 MMHG | HEART RATE: 61 BPM

## 2025-01-06 DIAGNOSIS — D72.18 EOSINOPHILIC GRANULOMATOSIS WITH POLYANGIITIS (EGPA): Primary | ICD-10-CM

## 2025-01-06 DIAGNOSIS — G47.33 OSA ON CPAP: ICD-10-CM

## 2025-01-06 DIAGNOSIS — M30.1 EOSINOPHILIC GRANULOMATOSIS WITH POLYANGIITIS (EGPA): Primary | ICD-10-CM

## 2025-01-06 DIAGNOSIS — J47.9 BRONCHIECTASIS WITHOUT COMPLICATION: ICD-10-CM

## 2025-01-06 DIAGNOSIS — J45.50 SEVERE PERSISTENT ASTHMA WITHOUT COMPLICATION: ICD-10-CM

## 2025-01-06 DIAGNOSIS — D84.9 IMMUNOSUPPRESSION: ICD-10-CM

## 2025-01-06 DIAGNOSIS — A31.0 PULMONARY INFECTION DUE TO MYCOBACTERIUM AVIUM: ICD-10-CM

## 2025-01-06 PROCEDURE — 3079F DIAST BP 80-89 MM HG: CPT | Mod: CPTII,S$GLB,, | Performed by: INTERNAL MEDICINE

## 2025-01-06 PROCEDURE — 1159F MED LIST DOCD IN RCRD: CPT | Mod: CPTII,S$GLB,, | Performed by: INTERNAL MEDICINE

## 2025-01-06 PROCEDURE — 99214 OFFICE O/P EST MOD 30 MIN: CPT | Mod: S$GLB,,, | Performed by: INTERNAL MEDICINE

## 2025-01-06 PROCEDURE — 3008F BODY MASS INDEX DOCD: CPT | Mod: CPTII,S$GLB,, | Performed by: INTERNAL MEDICINE

## 2025-01-06 PROCEDURE — 99999 PR PBB SHADOW E&M-EST. PATIENT-LVL III: CPT | Mod: PBBFAC,,, | Performed by: INTERNAL MEDICINE

## 2025-01-06 PROCEDURE — 3075F SYST BP GE 130 - 139MM HG: CPT | Mod: CPTII,S$GLB,, | Performed by: INTERNAL MEDICINE

## 2025-01-06 RX ORDER — MEPOLIZUMAB 100 MG/ML
100 INJECTION, SOLUTION SUBCUTANEOUS
Qty: 3 ML | Refills: 11 | Status: SHIPPED | OUTPATIENT
Start: 2025-01-06 | End: 2025-01-06

## 2025-01-06 RX ORDER — FLUTICASONE FUROATE, UMECLIDINIUM BROMIDE AND VILANTEROL TRIFENATATE 100; 62.5; 25 UG/1; UG/1; UG/1
1 POWDER RESPIRATORY (INHALATION) DAILY
Qty: 90 EACH | Refills: 3 | Status: SHIPPED | OUTPATIENT
Start: 2025-01-06

## 2025-01-06 RX ORDER — PROPRANOLOL HYDROCHLORIDE 60 MG/1
CAPSULE, EXTENDED RELEASE ORAL
Qty: 90 CAPSULE | Refills: 0 | Status: SHIPPED | OUTPATIENT
Start: 2025-01-06

## 2025-01-06 RX ORDER — MEPOLIZUMAB 100 MG/ML
300 INJECTION, SOLUTION SUBCUTANEOUS
Qty: 3 ML | Refills: 11 | Status: ACTIVE | OUTPATIENT
Start: 2025-01-06

## 2025-01-06 NOTE — ASSESSMENT & PLAN NOTE
MDRO limiting treatment options. Currently without significant symptoms or findings on CT chest. Will continue to monitor along side ID. Will limit immunosuppression options for EGPA.

## 2025-01-06 NOTE — TELEPHONE ENCOUNTER
Refill Decision Note   Jaylin Blade  is requesting a refill authorization.  Brief Assessment and Rationale for Refill:  Approve     Medication Therapy Plan:        Pharmacist review requested: Yes   Comments:     Note composed:12:40 PM 01/06/2025

## 2025-01-06 NOTE — ASSESSMENT & PLAN NOTE
WBC with 66% eosinophils on BAL. 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 She has Asthma that is poorly controlled. Pt may have not had vasculitic findings on biopsies as it is a late stage presentation. Is very responsive to steroids.    Seen by Rheumatology and started on Nucala. Last dose was 12/1/24 and needs new order as still seeking new Rheumatologist. Has noticed worsening symptoms due to missing dose. Will continue low dose prednisone in addition to Nucala  - PFTs in 3 months

## 2025-01-06 NOTE — ASSESSMENT & PLAN NOTE
IgG deficiency with infusions qweek. On chronic prednisone for EGPA. On Bactrim for PJP ppx MWF in setting of IgG def and prednisone use.

## 2025-01-06 NOTE — TELEPHONE ENCOUNTER
No care due was identified.  [facility] Embedded Care Due Messages. Reference number: [id number].   1/05/2025 11:09:39 PM CST

## 2025-01-06 NOTE — PROGRESS NOTES
fSubjective:      Patient ID: Jaylin Murguia is a 59 y.o. female.    Chief Complaint: Asthma and Cough    Pt is a 58 yo CW pmh Asthma, allergic rhinitis, chronic pansinusitis, hypogammaglobulinemia, EGPA, fibromyalgia, chronic immunosuppression, TAMIKA on CPAP who presents follow up of EGPA. Patient last seen 12/22/23 and was started on Nucala by Rheumatology with significant improvement in symptoms.     Per chart review:   On IgG replacement therapy, Hizentra  Recurrent nasal pseudomonas infections: ( 1/2022) treated with ceftazadime/avibactam x5 weeks with PICC (2/15/22-3/15/22)  Meropenam nasal rinses (as of 12/27/22)     Past Medications:  Remicade (in remote past)-- ineffective  Asacol 4.8 gm-- ineffective  Entocort-- effective  Prednisone  Humira (started July 17, stopped 2/2018)-- stopped 2/2 multiple infections.      FESS: Stenotrophomonas and Pseudomonas s/p cipro/bactrim  FeNO 78  Eos: as high as 2700  IgE: <35  CFTR (-), alpha-1-antitrypsin wnl  Normal IgG1/4, low or low normal IgG 2/3     Inhaler use: Trelegy   PRN inhaler use: has not used in last 3 weeks.    Smoking hx: never smoker  Work hx: previous teacher  Exposure hx: none  Pets (dog),  birds(none), down furniture: pillows- has removed  Family hx lung disease: none  Family hx:   - father: scleroderma, father smoked  - mother: breast cancer  Personal hx:   Preeclampsia, eclampsia with second child. Pulmonary edema, acute heart failure?     Immunology:   Positive: MPO (9.0)   Negative: P-ANCA    Since last being seen, patient has been doing fairly well. Her symptoms are much improved on Nucala.   Last dose of nucala 12/1/24 and needs prescription updated.     Review of Systems   Respiratory:  Positive for cough, sputum production and use of rescue inhaler. Negative for shortness of breath and wheezing.    Cardiovascular:  Negative for chest pain, palpitations and leg swelling.   Gastrointestinal:  Negative for nausea and vomiting.    Psychiatric/Behavioral:  Negative for confusion and sleep disturbance. The patient is not nervous/anxious.      Objective:     Physical Exam   Constitutional: She is oriented to person, place, and time. She appears well-developed and well-nourished. No distress. She is not obese.   HENT:   Head: Normocephalic.   Cardiovascular: Normal rate, regular rhythm and normal heart sounds. Exam reveals no gallop and no friction rub.   No murmur heard.  Pulmonary/Chest: Normal expansion, symmetric chest wall expansion and effort normal. No stridor. No respiratory distress. She has no decreased breath sounds. She has no wheezes. She has no rhonchi. She has no rales.   Musculoskeletal:         General: No edema.   Neurological: She is alert and oriented to person, place, and time. Gait normal.   Skin: She is not diaphoretic. No cyanosis. Nails show no clubbing.   Psychiatric: She has a normal mood and affect. Her behavior is normal. Judgment and thought content normal.   Vitals reviewed.    Personal Diagnostic Review    Chest x-ray: 11/23/22  Right infrahilar atelectasis vs opacification. Hazy left suprahilar opacification concerning for developing PNA.      CT of chest performed on 5/6/22 without contrast revealed bibasilar posterior predominant tree and bud indicative of infectious vs inflammatory source.      CT chest 3/6/23:   Interval worsening of GGO and consolidative processes bilaterally, particularly in bilateral upper lobes and lower lobes.      CT chest 10/11/23: significant increase in Mosaic attenuation in all lung fields compared to 9/27/23      Echocardiogram: 4/19/22    1 - Mild left atrial enlargement.     2 - Concentric hypertrophy.     3 - No wall motion abnormalities.     4 - Normal left ventricular systolic function (EF 60-65%).     5 - Normal left ventricular diastolic function.     6 - Normal right ventricular systolic function .     7 - The estimated PA systolic pressure is 28 mmHg.     8 - Trivial to  "mild aortic regurgitation.     9 - Trivial to mild mitral regurgitation.      Pulmonary function tests:     11/3/23  FEV1: 2.45L (89.2%)  FVC: 3.00L (84.7%)  FEV1/FVC: 82  T.95L (90.9%)  DLCO: 13.55 (54.4%)     22  FEV1: 1.86L (65.9%)  FVC: 2.19L (61%)     22  FEV1: 1.86L  (65.5 % predicted),   FVC:  2.32L (64.4 % predicted),   FEV1/FVC:  80,   T.26L (78.3% predicted),   DLCO: 16.53 (66 % predicted)    Test Results:     The test was completed without stopping. The total time walked was 360 seconds. During walking, the patient reported:  No complaints. The patient used no assistive devices during testing.      2023---------Distance: 320.04 meters (1050 feet)       O2 Sat % Supplemental Oxygen Heart Rate Blood Pressure Robert Scale   Pre-exercise  (Resting) 99 % Room Air 69 bpm 116/72 mmHg 0   During Exercise 99 % Room Air 76 bpm 129/77 mmHg 1   Post-exercise  (Recovery) 99 % Room Air  74 bpm          Recovery Time: 78 seconds     Performing nurse/tech: Estopinal RRT     PREVIOUS STUDY:   The patient has not had a previous study.     CLINICAL INTERPRETATION:  Six minute walk distance is 320.04 meters (1050 feet) with very light dyspnea.  During exercise, there was no desaturation while breathing room air.  Both blood pressure and heart rate remained stable with walking.  The patient did not report non-pulmonary symptoms during exercise.  No previous study performed.  Based upon age and body mass index, exercise capacity is less than predicted.        2024     9:57 AM 10/16/2024     2:25 PM 2024     2:55 PM 3/28/2024    12:57 PM 3/13/2024     1:20 PM 3/6/2024    12:42 PM 2024     9:36 AM   Pulmonary Function Tests   SpO2    98 % 97 %     Height  5' 7" (1.702 m) 5' 7" (1.702 m) 5' 7" (1.702 m) 5' 7" (1.702 m) 5' 7" (1.702 m) 5' 7" (1.702 m)   Weight 81.1 kg (178 lb 12.7 oz) 79.4 kg (175 lb) 84.4 kg (186 lb 1.1 oz) 87.3 kg (192 lb 7.4 oz) 88.2 kg (194 lb 5.4 oz) 86.9 kg (191 lb 9.3 " oz) 79.4 kg (175 lb)   BMI (Calculated)  27.4 29.1 30.1 30.4 30 27.4     Assessment:     1. Eosinophilic granulomatosis with polyangiitis (EGPA)    2. Immunosuppression    3. Bronchiectasis without complication    4. TAMIKA on CPAP    5. Severe persistent asthma without complication    6. Pulmonary infection due to Mycobacterium avium      Outpatient Encounter Medications as of 1/6/2025   Medication Sig Dispense Refill    acetaminophen (TYLENOL) 500 MG tablet Take 2 tablets (1,000 mg total) by mouth every 6 (six) hours as needed for Pain. 60 tablet 0    albuterol (VENTOLIN HFA) 90 mcg/actuation inhaler Inhale 2 puffs into the lungs every 6 (six) hours as needed for Wheezing. Rescue 18 g 3    b complex vitamins capsule Take 1 capsule by mouth once daily.      calcium carbonate/vitamin D3 (VITAMIN D-3 ORAL) Take 2 tablets by mouth once daily.      diltiazem HCl (DILTIAZEM 2% - LIDOCAINE 5% CREAM) Apply peasize amount topically to anal area. 30 g 2    diphenoxylate-atropine 2.5-0.025 mg (LOMOTIL) 2.5-0.025 mg per tablet Take 1 tablet by mouth 4 (four) times daily as needed for Diarrhea. 120 tablet 5    DULoxetine (CYMBALTA) 60 MG capsule TAKE 1 CAPSULE BY MOUTH TWICE DAILY **MAY CAUSE DROWSINESS** 180 capsule 1    famotidine (PEPCID) 20 MG tablet Take 1 tablet (20 mg total) by mouth 2 (two) times daily. 60 tablet 5    fexofenadine (ALLEGRA) 180 MG tablet Take 1 tablet (180 mg total) by mouth once daily. 180 tablet 1    fluticasone propionate (FLONASE) 50 mcg/actuation nasal spray USE 2 SPRAYS IN EACH NOSTRIL ONCE DAILY 16 g 11    immun glob G,IgG,-pro-IgA 0-50 (HIZENTRA) 10 gram/50 mL (20 %) Soln Inject 70 mLs (14 g total) into the skin every 7 days. 280 mL 11    ipratropium (ATROVENT) 0.02 % nebulizer solution Take by nebulization 4 (four) times daily as needed for Wheezing.      Lactobacillus rhamnosus GG (CULTURELLE) 10 billion cell capsule Take 1 capsule by mouth once daily.      losartan (COZAAR) 100 MG tablet Take 1  tablet (100 mg total) by mouth daily as needed (high blood pressure (>120/80)). 90 tablet 3    NURTEC 75 mg odt Take 75 mg by mouth daily as needed.      nystatin (MYCOSTATIN) 100,000 unit/mL suspension Take 6 mLs (600,000 Units total) by mouth 4 (four) times daily with meals and nightly. 473 mL 1    predniSONE (DELTASONE) 5 MG tablet Take 2 tablets (10 mg total) by mouth once daily. 60 tablet 1    pregabalin (LYRICA) 150 MG capsule Take 1 capsule (150 mg total) by mouth 3 (three) times daily. 270 capsule 1    rizatriptan (MAXALT) 10 MG tablet Take 1 tablet (10 mg total) by mouth 1 (one) time if needed for Migraine (Max 2 tablets in 24 hours.). 27 tablet 3    sod chlor,sod bicarb/neti pot (SINUS WASH NETI POT TERRELL) use as directed      sodium chloride for inhalation (SODIUM CHLORIDE 10%) 10 % nebulizer solution Take 4 mLs by nebulization 2 (two) times a day. 240 mL 3    sulfamethoxazole-trimethoprim 800-160mg (BACTRIM DS) 800-160 mg Tab Take 1 tablet by mouth once daily. 30 tablet 0    topiramate (TOPAMAX) 100 MG tablet Take 1 tablet (100 mg total) by mouth 2 (two) times daily. 180 tablet 3    traZODone (DESYREL) 50 MG tablet Take 1 tablet (50 mg total) by mouth every evening. 90 tablet 0    ZINC ORAL Take 1 tablet by mouth once daily.      [DISCONTINUED] fluticasone-umeclidin-vilanter (TRELEGY ELLIPTA) 100-62.5-25 mcg DsDv USE 1 INHALATION DAILY 60 each 3    [DISCONTINUED] mepolizumab (NUCALA) 100 mg/mL Syrg Inject 3 mLs (300 mg total) into the skin every 28 days. 3 mL 11    [DISCONTINUED] propranoloL (INDERAL LA) 60 MG 24 hr capsule Take 1 capsule (60 mg total) by mouth once daily. For headache prevention 90 capsule 3    albuterol-ipratropium (DUO-NEB) 2.5 mg-0.5 mg/3 mL nebulizer solution Take 3 mLs by nebulization every 6 (six) hours as needed for Wheezing. Rescue (Patient not taking: Reported on 10/16/2024) 270 mL 0    fluticasone-umeclidin-vilanter (TRELEGY ELLIPTA) 100-62.5-25 mcg DsDv Inhale 1 puff into the  lungs once daily. 90 each 3    mepolizumab 100 mg/mL AtIn Inject 3 mLs (300 mg total) into the skin every 28 days. 3 mL 11    pantoprazole (PROTONIX) 40 MG tablet Take 1 tablet (40 mg total) by mouth once daily. 30 tablet 11    triamterene-hydrochlorothiazide 37.5-25 mg (DYAZIDE) 37.5-25 mg per capsule Take 1 capsule by mouth daily as needed (for wt fluctuation > 3 lbs in 24 hrs). 30 capsule 11    [DISCONTINUED] mepolizumab 100 mg/mL AtIn Inject 1 mL (100 mg total) into the skin every 28 days. 3 mL 11    [DISCONTINUED] predniSONE (DELTASONE) 10 MG tablet Take 25 mg daily for 2 weeks and then 20mg daily until further instructions 150 tablet 1    [DISCONTINUED] traZODone (DESYREL) 50 MG tablet Take 1 tablet (50 mg total) by mouth every evening. 90 tablet 3     Facility-Administered Encounter Medications as of 1/6/2025   Medication Dose Route Frequency Provider Last Rate Last Admin    lactated ringers infusion   Intravenous Continuous Mary Leiva MD   New Bag at 03/12/20 0923    lactated ringers infusion   Intravenous Continuous Shay Bruce MD   Stopped at 03/16/22 1342    lidocaine (PF) 10 mg/ml (1%) injection 10 mg  1 mL Intradermal Once Mary Leiva MD        LIDOcaine (PF) 10 mg/ml (1%) injection 10 mg  1 mL Intradermal Once Johan Baez MD        sodium chloride 0.9% flush 10 mL  10 mL Intravenous PRN Johan Baez MD         Orders Placed This Encounter   Procedures    Spirometry with/without bronchodilator     Standing Status:   Future     Standing Expiration Date:   1/6/2026     Order Specific Question:   Release to patient     Answer:   Immediate    Lung Volumes     Standing Status:   Future     Standing Expiration Date:   1/6/2026     Order Specific Question:   Release to patient     Answer:   Immediate    DLCO-Carbon Monoxide Diffusing Capacity     Standing Status:   Future     Standing Expiration Date:   1/6/2026     Order Specific Question:   Release to patient      Answer:   Immediate    Home Sleep Study     Standing Status:   Future     Standing Expiration Date:   1/6/2026     Plan:     TAMIKA on CPAP  Mild TAMIKA on previous Home Study. Repeat study to determine need.    Immunosuppression  IgG deficiency with infusions qweek. On chronic prednisone for EGPA. On Bactrim for PJP ppx MWF in setting of IgG def and prednisone use.     Bronchiectasis without complication  Mild bronchiectasis noted on previous CT chest. Will monitor. Continue a cappella device.     Eosinophilic granulomatosis with polyangiitis (EGPA)  WBC with 66% eosinophils on BAL. 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 She has Asthma that is poorly controlled. Pt may have not had vasculitic findings on biopsies as it is a late stage presentation. Is very responsive to steroids.    Seen by Rheumatology and started on Nucala. Last dose was 12/1/24 and needs new order as still seeking new Rheumatologist. Has noticed worsening symptoms due to missing dose. Will continue low dose prednisone in addition to Nucala  - PFTs in 3 months    Severe persistent asthma without complication  Currently on trelegy and singulair. Initially concerned that uncontrolled Crohn's disease was contributing to lack of asthma control. Now likely due to EGPA.  Pt has great response to prednisone with AE of swelling in her face. Pt had previously been on it for 4 months with significant improvement in symptoms with relapse after stopping. currently taking alternating 10 mg and 5 mg each on alternating days. Had improvement in symptoms on Nucala, but has been unable to wean off prednisone.   - refill trelegy, singulair and PRN duonebs/combivent.   - Plan to restart on Nucala.       Pulmonary infection due to Mycobacterium avium  MDRO limiting treatment options.  Currently without significant symptoms or findings on CT chest. Will continue to monitor along side ID. Will limit immunosuppression options for EGPA.    Follow up in 3 months with PFTs.     Ivan Riley MD  [facility]

## 2025-01-06 NOTE — TELEPHONE ENCOUNTER
Refill Routing Note   Medication(s) are not appropriate for processing by [facility] for the following reason(s):        Drug-disease interaction  Drug-Disease: propranoloL and Severe persistent asthma without complication    [facility] action(s):  Defer             Pharmacist review requested: Yes     Appointments  past 12m or future 3m with PCP    Date Provider   Last Visit   7/1/2024 Esthela Hu MD   Next Visit   Visit date not found Esthela Hu MD   ED visits in past 90 days: 0        Note composed:12:10 PM 01/06/2025

## 2025-01-06 NOTE — ASSESSMENT & PLAN NOTE
Currently on trelegy and singulair. Initially concerned that uncontrolled Crohn's disease was contributing to lack of asthma control. Now likely due to EGPA.  Pt has great response to prednisone with AE of swelling in her face. Pt had previously been on it for 4 months with significant improvement in symptoms with relapse after stopping. currently taking alternating 10 mg and 5 mg each on alternating days. Had improvement in symptoms on Nucala, but has been unable to wean off prednisone.   - refill trelegy, singulair and PRN duonebs/combivent.   - Plan to restart on Nucala.

## 2025-01-09 ENCOUNTER — TELEPHONE (OUTPATIENT)
Dept: SLEEP MEDICINE | Facility: OTHER | Age: 60
End: 2025-01-09
Payer: COMMERCIAL

## 2025-01-28 RX ORDER — LOSARTAN POTASSIUM 100 MG/1
TABLET ORAL
Qty: 90 TABLET | Refills: 1 | Status: SHIPPED | OUTPATIENT
Start: 2025-01-28

## 2025-01-28 NOTE — TELEPHONE ENCOUNTER
No care due was identified.  [facility] Embedded Care Due Messages. Reference number: [id number].   1/27/2025 11:11:06 PM CST

## 2025-01-28 NOTE — TELEPHONE ENCOUNTER
Refill Routing Note   Medication(s) are not appropriate for processing by [facility] for the following reason(s):        Outside of protocol  Losartan is prescribed for as needed use; ROUTE    [facility] action(s):  Route               Appointments  past 12m or future 3m with PCP    Date Provider   Last Visit   7/1/2024 Esthela Hu MD   Next Visit   Visit date not found Esthela Hu MD   ED visits in past 90 days: 0        Note composed:8:12 AM 01/28/2025

## 2025-01-29 DIAGNOSIS — G43.819 OTHER MIGRAINE, INTRACTABLE, WITHOUT STATUS MIGRAINOSUS: ICD-10-CM

## 2025-01-29 RX ORDER — RIMEGEPANT SULFATE 75 MG/75MG
TABLET, ORALLY DISINTEGRATING ORAL
Qty: 8 TABLET | Refills: 1 | Status: SHIPPED | OUTPATIENT
Start: 2025-01-29

## 2025-03-12 DIAGNOSIS — M30.1 EOSINOPHILIC GRANULOMATOSIS WITH POLYANGIITIS (EGPA): ICD-10-CM

## 2025-03-12 DIAGNOSIS — B37.0 ORAL PHARYNGEAL CANDIDIASIS: ICD-10-CM

## 2025-03-12 DIAGNOSIS — D72.18 EOSINOPHILIC GRANULOMATOSIS WITH POLYANGIITIS (EGPA): ICD-10-CM

## 2025-03-12 RX ORDER — NYSTATIN 100000 [USP'U]/ML
SUSPENSION ORAL
Qty: 473 ML | Refills: 1 | Status: SHIPPED | OUTPATIENT
Start: 2025-03-12

## 2025-03-12 RX ORDER — PREDNISONE 5 MG/1
10 TABLET ORAL
Qty: 60 TABLET | Refills: 1 | Status: SHIPPED | OUTPATIENT
Start: 2025-03-12

## 2025-03-14 RX ORDER — TRAZODONE HYDROCHLORIDE 50 MG/1
TABLET ORAL
Qty: 90 TABLET | Refills: 0 | Status: SHIPPED | OUTPATIENT
Start: 2025-03-14

## 2025-03-14 NOTE — TELEPHONE ENCOUNTER
Care Due:                  Date            Visit Type   Department     Provider  --------------------------------------------------------------------------------                                MYCHART                              FOLLOWUP/OF  [facility]  Last Visit: 03-      [facility] VISIT   CARE           Esthela Hu  Next Visit: None Scheduled  None         None Found                                                            Last  Test          Frequency    Reason                     Performed    Due Date  --------------------------------------------------------------------------------    Office Visit  15 months..  losartan, rizatriptan,     03- 06-                             traZODone,                             triamterene-hydrochloroth                             iazide...................    [employer] Embedded Care Due Messages. Reference number: [id number].   3/14/2025 3:05:48 PM CDT

## 2025-03-15 NOTE — TELEPHONE ENCOUNTER
Provider Staff:  Action required for this patient    Requires appointment      Please see care gap opportunities below in Care Due Message.    Thanks!  [facility]     Appointments      Date Provider   Last Visit   7/1/2024 Esthela Hu MD   Next Visit   Visit date not found Esthela Hu MD     Refill Decision Note   Jaylin Stauffernchard  is requesting a refill authorization.  Brief Assessment and Rationale for Refill:  Approve     Medication Therapy Plan:         Comments:     Note composed:7:34 PM 03/14/2025

## 2025-04-05 ENCOUNTER — PATIENT MESSAGE (OUTPATIENT)
Dept: ADMINISTRATIVE | Facility: OTHER | Age: 60
End: 2025-04-05
Payer: COMMERCIAL

## 2025-04-10 DIAGNOSIS — G43.819 OTHER MIGRAINE, INTRACTABLE, WITHOUT STATUS MIGRAINOSUS: ICD-10-CM

## 2025-04-10 RX ORDER — RIMEGEPANT SULFATE 75 MG/75MG
75 TABLET, ORALLY DISINTEGRATING ORAL ONCE AS NEEDED
Qty: 8 TABLET | Refills: 2 | Status: SHIPPED | OUTPATIENT
Start: 2025-04-10 | End: 2025-04-10

## 2025-04-14 ENCOUNTER — PATIENT MESSAGE (OUTPATIENT)
Dept: GASTROENTEROLOGY | Facility: CLINIC | Age: 60
End: 2025-04-14
Payer: COMMERCIAL

## 2025-04-23 ENCOUNTER — PATIENT MESSAGE (OUTPATIENT)
Dept: PRIMARY CARE CLINIC | Facility: CLINIC | Age: 60
End: 2025-04-23
Payer: COMMERCIAL

## 2025-04-23 ENCOUNTER — PATIENT MESSAGE (OUTPATIENT)
Dept: PULMONOLOGY | Facility: CLINIC | Age: 60
End: 2025-04-23
Payer: COMMERCIAL

## 2025-04-23 DIAGNOSIS — D72.18 EOSINOPHILIC GRANULOMATOSIS WITH POLYANGIITIS (EGPA): ICD-10-CM

## 2025-04-23 DIAGNOSIS — M30.1 EOSINOPHILIC GRANULOMATOSIS WITH POLYANGIITIS (EGPA): ICD-10-CM

## 2025-04-23 DIAGNOSIS — D84.9 IMMUNOSUPPRESSION: Primary | ICD-10-CM

## 2025-04-23 DIAGNOSIS — R06.02 SOB (SHORTNESS OF BREATH): ICD-10-CM

## 2025-04-23 NOTE — TELEPHONE ENCOUNTER
I have signed for the following orders AND/OR meds.  Please call the patient and ask the patient to schedule the testing AND/OR inform about any medications that were sent.      Orders Placed This Encounter   Procedures    X-Ray Chest PA And Lateral     Standing Status:   Future     Expected Date:   4/23/2025     Expiration Date:   4/23/2026     Please call to see if pt can schedule at [facility] today or if not today early tomorrow.

## 2025-04-24 ENCOUNTER — HOSPITAL ENCOUNTER (OUTPATIENT)
Dept: RADIOLOGY | Facility: HOSPITAL | Age: 60
Discharge: HOME | End: 2025-04-24
Attending: FAMILY MEDICINE
Payer: COMMERCIAL

## 2025-04-24 DIAGNOSIS — D72.18 EOSINOPHILIC GRANULOMATOSIS WITH POLYANGIITIS (EGPA): ICD-10-CM

## 2025-04-24 DIAGNOSIS — J45.50 SEVERE PERSISTENT ASTHMA WITHOUT COMPLICATION: Primary | ICD-10-CM

## 2025-04-24 DIAGNOSIS — D84.9 IMMUNOSUPPRESSION: ICD-10-CM

## 2025-04-24 DIAGNOSIS — R06.02 SOB (SHORTNESS OF BREATH): ICD-10-CM

## 2025-04-24 DIAGNOSIS — M30.1 EOSINOPHILIC GRANULOMATOSIS WITH POLYANGIITIS (EGPA): ICD-10-CM

## 2025-04-24 PROCEDURE — 71046 X-RAY EXAM CHEST 2 VIEWS: CPT | Mod: TC,PN

## 2025-04-24 PROCEDURE — 71046 X-RAY EXAM CHEST 2 VIEWS: CPT | Mod: 26,,, | Performed by: RADIOLOGY

## 2025-04-25 ENCOUNTER — RESULTS FOLLOW-UP (OUTPATIENT)
Dept: INTERNAL MEDICINE | Facility: CLINIC | Age: 60
End: 2025-04-25

## 2025-04-25 DIAGNOSIS — A31.9 MYCOBACTERIAL INFECTION: ICD-10-CM

## 2025-04-25 RX ORDER — ALBUTEROL SULFATE 90 UG/1
2 INHALANT RESPIRATORY (INHALATION) EVERY 6 HOURS PRN
Qty: 18 G | Refills: 3 | Status: SHIPPED | OUTPATIENT
Start: 2025-04-25 | End: 2026-04-25

## 2025-04-25 NOTE — PROGRESS NOTES
No acute findings. I hope you are feeling better. Sorry I'm not in the office today, but if you are not doing well then please go on in to urgent care if you haven't done so already.     Esthela Hu MD

## 2025-04-30 ENCOUNTER — PATIENT MESSAGE (OUTPATIENT)
Dept: PULMONOLOGY | Facility: CLINIC | Age: 60
End: 2025-04-30
Payer: COMMERCIAL

## 2025-05-05 RX ORDER — DULOXETIN HYDROCHLORIDE 60 MG/1
CAPSULE, DELAYED RELEASE ORAL
Qty: 180 CAPSULE | Refills: 0 | Status: SHIPPED | OUTPATIENT
Start: 2025-05-05

## 2025-05-05 NOTE — TELEPHONE ENCOUNTER
Refill Decision Note   Jaylin Blade  is requesting a refill authorization.  Brief Assessment and Rationale for Refill:  Approve     Medication Therapy Plan:         Comments:     Note composed:2:57 PM 05/05/2025

## 2025-05-05 NOTE — TELEPHONE ENCOUNTER
No care due was identified.  [facility] Embedded Care Due Messages. Reference number: [id number].   5/05/2025 9:45:27 AM CDT

## 2025-05-09 ENCOUNTER — OFFICE VISIT (OUTPATIENT)
Dept: GASTROENTEROLOGY | Facility: CLINIC | Age: 60
End: 2025-05-09
Payer: COMMERCIAL

## 2025-05-09 ENCOUNTER — HOSPITAL ENCOUNTER (OUTPATIENT)
Dept: PULMONOLOGY | Facility: CLINIC | Age: 60
Discharge: HOME | End: 2025-05-09
Payer: COMMERCIAL

## 2025-05-09 ENCOUNTER — OFFICE VISIT (OUTPATIENT)
Dept: PULMONOLOGY | Facility: CLINIC | Age: 60
End: 2025-05-09
Payer: COMMERCIAL

## 2025-05-09 VITALS
OXYGEN SATURATION: 97 % | HEIGHT: 67 IN | DIASTOLIC BLOOD PRESSURE: 80 MMHG | BODY MASS INDEX: 26.89 KG/M2 | WEIGHT: 171.31 LBS | HEART RATE: 59 BPM | SYSTOLIC BLOOD PRESSURE: 140 MMHG

## 2025-05-09 DIAGNOSIS — J45.50 SEVERE PERSISTENT ASTHMA WITHOUT COMPLICATION: Primary | ICD-10-CM

## 2025-05-09 DIAGNOSIS — G43.809 OTHER MIGRAINE WITHOUT STATUS MIGRAINOSUS, NOT INTRACTABLE: ICD-10-CM

## 2025-05-09 DIAGNOSIS — D80.1 HYPOGAMMAGLOBULINEMIA: ICD-10-CM

## 2025-05-09 DIAGNOSIS — M30.1 EOSINOPHILIC GRANULOMATOSIS WITH POLYANGIITIS (EGPA): ICD-10-CM

## 2025-05-09 DIAGNOSIS — R10.12 LUQ ABDOMINAL PAIN: ICD-10-CM

## 2025-05-09 DIAGNOSIS — D72.18 EOSINOPHILIC GRANULOMATOSIS WITH POLYANGIITIS (EGPA): ICD-10-CM

## 2025-05-09 DIAGNOSIS — R19.7 DIARRHEA, UNSPECIFIED TYPE: Primary | ICD-10-CM

## 2025-05-09 DIAGNOSIS — K50.00 CROHN'S DISEASE OF SMALL INTESTINE WITHOUT COMPLICATION: ICD-10-CM

## 2025-05-09 LAB
DLCO SINGLE BREATH LLN: 18.87
DLCO SINGLE BREATH PRE REF: 76.1 %
DLCO SINGLE BREATH REF: 24.61
DLCOC SBVA LLN: 3.18
DLCOC SBVA REF: 4.52
DLCOC SINGLE BREATH LLN: 18.87
DLCOC SINGLE BREATH REF: 24.61
DLCOCSBVAULN: 5.86
DLCOCSINGLEBREATHULN: 30.34
DLCOSINGLEBREATHULN: 30.34
DLCOSINGLEBREATHZSCORE: -1.69
DLCOVA LLN: 3.18
DLCOVA PRE REF: 90.9 %
DLCOVA PRE: 4.11 ML/(MIN*MMHG*L) (ref 3.18–5.86)
DLCOVA REF: 4.52
DLCOVAULN: 5.86
ERV LLN: -16449.17
ERV PRE REF: 97.7 %
ERV REF: 0.83
ERVULN: ABNORMAL
FEF 25 75 LLN: 1.61
FEF 25 75 PRE REF: 106.8 %
FEF 25 75 REF: 3.01
FEV05 LLN: 1.13
FEV05 REF: 1.99
FEV1 FVC LLN: 67
FEV1 FVC PRE REF: 106.8 %
FEV1 FVC REF: 79
FEV1 LLN: 2.07
FEV1 PRE REF: 95.5 %
FEV1 REF: 2.75
FEV1FVCZSCORE: 0.84
FEV1ZSCORE: -0.31
FRCPLETH LLN: 2.05
FRCPLETH PREREF: 78.4 %
FRCPLETH REF: 2.87
FRCPLETHULN: 3.69
FVC LLN: 2.66
FVC PRE REF: 88.7 %
FVC REF: 3.51
FVCZSCORE: -0.76
IVC PRE: 3.22 L (ref 2.66–4.4)
IVC SINGLE BREATH LLN: 2.66
IVC SINGLE BREATH PRE REF: 91.9 %
IVC SINGLE BREATH REF: 3.51
IVCSINGLEBREATHULN: 4.4
LLN IC: -16447.62
PEF LLN: 4.89
PEF PRE REF: 121.5 %
PEF REF: 6.77
PHYSICIAN COMMENT: ABNORMAL
PRE DLCO: 18.73 ML/(MIN*MMHG) (ref 18.87–30.34)
PRE ERV: 0.81 L (ref -16449.17–16450.83)
PRE FEF 25 75: 3.21 L/S (ref 1.61–4.41)
PRE FET 100: 7.13 SEC
PRE FEV05 REF: 112.3 %
PRE FEV1 FVC: 84.29 % (ref 66.94–89.14)
PRE FEV1: 2.62 L (ref 2.07–3.39)
PRE FEV5: 2.23 L (ref 1.13–2.84)
PRE FRC PL: 2.25 L (ref 2.05–3.69)
PRE FVC: 3.11 L (ref 2.66–4.4)
PRE IC: 2.41 L (ref -16447.62–16452.38)
PRE PEF: 8.22 L/S (ref 4.89–8.64)
PRE REF IC: 101.3 %
PRE RV: 1.44 L (ref 1.46–2.62)
PRE TLC: 4.66 L (ref 4.45–6.43)
RAW PRE REF: 49.5 %
RAW PRE: 1.52 CMH2O*S/L (ref 3.06–3.06)
RAW REF: 3.06
REF IC: 2.38
RV LLN: 1.46
RV PRE REF: 70.6 %
RV REF: 2.04
RVTLC LLN: 30
RVTLC PRE REF: 78.5 %
RVTLC PRE: 30.89 % (ref 29.77–48.95)
RVTLC REF: 39
RVTLCULN: 49
RVULN: 2.62
SGAW PRE REF: 226.9 %
SGAW PRE: 0.23 1/(CMH2O*S) (ref 0.1–0.1)
SGAW REF: 0.1
TLC LLN: 4.45
TLC PRE REF: 85.7 %
TLC REF: 5.44
TLC ULN: 6.43
TLCZSCORE: -1.3
ULN IC: ABNORMAL
VA PRE: 4.56 L (ref 5.29–5.29)
VA SINGLE BREATH LLN: 5.29
VA SINGLE BREATH PRE REF: 86.1 %
VA SINGLE BREATH REF: 5.29
VASINGLEBREATHULN: 5.29
VC LLN: 2.66
VC PRE REF: 91.9 %
VC PRE: 3.22 L (ref 2.66–4.4)
VC REF: 3.51
VC ULN: 4.4

## 2025-05-09 PROCEDURE — 99214 OFFICE O/P EST MOD 30 MIN: CPT | Mod: 25,S$GLB,, | Performed by: INTERNAL MEDICINE

## 2025-05-09 PROCEDURE — 3008F BODY MASS INDEX DOCD: CPT | Mod: CPTII,S$GLB,, | Performed by: INTERNAL MEDICINE

## 2025-05-09 PROCEDURE — 94726 PLETHYSMOGRAPHY LUNG VOLUMES: CPT | Mod: S$GLB,,, | Performed by: INTERNAL MEDICINE

## 2025-05-09 PROCEDURE — 94010 BREATHING CAPACITY TEST: CPT | Mod: S$GLB,,, | Performed by: INTERNAL MEDICINE

## 2025-05-09 PROCEDURE — 3077F SYST BP >= 140 MM HG: CPT | Mod: CPTII,S$GLB,, | Performed by: INTERNAL MEDICINE

## 2025-05-09 PROCEDURE — 94729 DIFFUSING CAPACITY: CPT | Mod: S$GLB,,, | Performed by: INTERNAL MEDICINE

## 2025-05-09 PROCEDURE — 4010F ACE/ARB THERAPY RXD/TAKEN: CPT | Mod: CPTII,S$GLB,, | Performed by: INTERNAL MEDICINE

## 2025-05-09 PROCEDURE — 99999 PR PBB SHADOW E&M-EST. PATIENT-LVL V: CPT | Mod: PBBFAC,,, | Performed by: INTERNAL MEDICINE

## 2025-05-09 PROCEDURE — 3079F DIAST BP 80-89 MM HG: CPT | Mod: CPTII,S$GLB,, | Performed by: INTERNAL MEDICINE

## 2025-05-09 RX ORDER — DIPHENOXYLATE HYDROCHLORIDE AND ATROPINE SULFATE 2.5; .025 MG/1; MG/1
1 TABLET ORAL 4 TIMES DAILY PRN
Qty: 120 TABLET | Refills: 5 | Status: SHIPPED | OUTPATIENT
Start: 2025-05-09

## 2025-05-09 RX ORDER — PREDNISONE 5 MG/1
TABLET ORAL
Qty: 45 TABLET | Refills: 0 | Status: SHIPPED | OUTPATIENT
Start: 2025-05-09 | End: 2025-07-04

## 2025-05-09 RX ORDER — TOPIRAMATE 100 MG/1
100 TABLET, FILM COATED ORAL 2 TIMES DAILY
Qty: 180 TABLET | Refills: 3 | Status: SHIPPED | OUTPATIENT
Start: 2025-05-09

## 2025-05-09 NOTE — PROGRESS NOTES
Subjective:       Patient ID: Jaylin Murguia is a 60 y.o. female.    HPI: Jaylin Murguia is a 60 y.o. female Asthma, allergic rhinitis, chronic pansinusitis, hypogammaglobulinemia, EGPA, fibromyalgia, chronic immunosuppression, TAMIKA on CPAP who presents follow up of EGPA.    History of Present Illness    CHIEF COMPLAINT:  Patient presents today for follow up of EGPA (eosinophilic granulomatosis with polyangiitis).    EGPA MANAGEMENT:  She has been on Nucala for over 1 year and 4 months with good response. She is currently tapering Prednisone from 25 mg to 20 mg, then to 15 mg, with goal to reach 10 mg. She continues Bactrim on Monday, Wednesday, and Friday as prescribed and is receiving IgG infusions.    RESPIRATORY SYMPTOMS:  She experienced a respiratory flare approximately 1.5 weeks ago requiring albuterol twice daily. During flares, she experiences shortness of breath, chest tightening, and a gargling sensation. She uses Trelegy inhaler without issues and albuterol only during symptomatic episodes.    ALLERGIC RHINITIS:  She experiences nasal stuffiness which is managed with daily Allegra. She performs nasal rinses twice daily.    DERMATOLOGIC:  She reports new skin irritation during baths that began 3 months ago. She manages symptoms with witch hazel and frankincense oil applications which provides relief. She currently uses herbal soap for bathing.      Pt is a 58 yo CW pmh Asthma, allergic rhinitis, chronic pansinusitis, hypogammaglobulinemia, EGPA, fibromyalgia, chronic immunosuppression, TAMIKA on CPAP who presents follow up of EGPA. Patient last seen 12/22/23 and was started on Nucala by Rheumatology with significant improvement in symptoms.      Per chart review:   On IgG replacement therapy, Hizentra  Recurrent nasal pseudomonas infections: ( 1/2022) treated with ceftazadime/avibactam x5 weeks with PICC (2/15/22-3/15/22)  Meropenam nasal rinses (as of 12/27/22)     Past Medications:  Remicade (in  remote past)-- ineffective  Asacol 4.8 gm-- ineffective  Entocort-- effective  Prednisone  Humira (started July 17, stopped 2/2018)-- stopped 2/2 multiple infections.      FESS: Stenotrophomonas and Pseudomonas s/p cipro/bactrim  FeNO 78  Eos: as high as 2700, last 40   IgE: <35  CFTR (-), alpha-1-antitrypsin wnl  Normal IgG1/4, low or low normal IgG 2/3     Inhaler use: Trelegy   PRN inhaler use: albuterol: when flairs 1-2ce a day.     Smoking hx: never smoker  Work hx: previous teacher  Exposure hx: none  Pets (dog),  birds(none), down furniture: pillows- has removed  Family hx lung disease: none  Family hx:   - father: scleroderma, father smoked  - mother: breast cancer  Personal hx:   Preeclampsia, eclampsia with second child. Pulmonary edema, acute heart failure?     Immunology:   Positive: MPO (9.0)   Negative: P-ANCA    Review of Systems   Constitutional:  Negative for weight loss, weight gain, activity change and weakness.   HENT:  Positive for congestion. Negative for postnasal drip.    Respiratory:  Positive for cough, sputum production and use of rescue inhaler. Negative for shortness of breath and wheezing.    Cardiovascular:  Negative for chest pain, palpitations and leg swelling.   Gastrointestinal:  Negative for nausea and vomiting.   Psychiatric/Behavioral:  Negative for confusion and sleep disturbance. The patient is not nervous/anxious.          As above    Social History     Tobacco Use    Smoking status: Never     Passive exposure: Never    Smokeless tobacco: Never   Substance Use Topics    Alcohol use: No     Review of patient's allergies indicates:   Allergen Reactions    Fentanyl Other (See Comments)     Rigid Chest Syndrome     Past Medical History:   Diagnosis Date    Allergic rhinitis     Asthma     Chronic pansinusitis     Crohn's disease     Ileal involvement, previously on Remicade, Asacol, Prednisone    Eosinophilic granulomatosis with polyangiitis (EGPA)     Fibromyalgia     Hormone  replacement therapy (postmenopausal) 2017    Hyperlipidemia     Hypertension     Immunosuppression     Migraine     Obstructive sleep apnea     CPAP at night    Sciatica      Past Surgical History:   Procedure Laterality Date    BLADDER SURGERY      sling was created by her muscles     BRONCHOSCOPY N/A 2023    Procedure: BRONCHOSCOPY;  Surgeon: Kajal Diagnostic Provider;  Location: [facility] OR 2ND FLR;  Service: Anesthesiology;  Laterality: N/A;    BRONCHOSCOPY WITH FLUOROSCOPY N/A 10/13/2023    Procedure: BRONCHOSCOPY, WITH FLUOROSCOPY;  Surgeon: Mary Molina MD;  Location: [facility] OR 2ND FLR;  Service: Pulmonary;  Laterality: N/A;     SECTION      COLONOSCOPY N/A 2017    Procedure: COLONOSCOPY;  Surgeon: Kin Dyer MD;  Location: [facility];  Service: Endoscopy;  Laterality: N/A;    COLONOSCOPY N/A 2018    Procedure: COLONOSCOPY;  Surgeon: Kyra Vallecillo MD;  Location: [facility];  Service: Endoscopy;  Laterality: N/A;    COLONOSCOPY N/A 2020    Procedure: COLONOSCOPY;  Surgeon: Nicole Leal MD;  Location: [facility];  Service: Endoscopy;  Laterality: N/A;    COLONOSCOPY N/A 2022    Procedure: COLONOSCOPY;  Surgeon: Shay Bruce MD;  Location: [facility];  Service: Endoscopy;  Laterality: N/A;    COLONOSCOPY N/A 2023    Procedure: COLONOSCOPY;  Surgeon: Nicole Leal MD;  Location: [facility];  Service: Endoscopy;  Laterality: N/A;    DEBRIDEMENT Bilateral 2020    Procedure: DEBRIDEMENT;  Surgeon: Matthias Roach MD;  Location: [facility] OR 2ND FLR;  Service: ENT;  Laterality: Bilateral;    ESOPHAGOGASTRODUODENOSCOPY N/A 2020    Procedure: ESOPHAGOGASTRODUODENOSCOPY (EGD);  Surgeon: Nicole Leal MD;  Location: [facility];  Service: Endoscopy;  Laterality: N/A;    ESOPHAGOGASTRODUODENOSCOPY N/A 2022    Procedure: EGD (ESOPHAGOGASTRODUODENOSCOPY);  Surgeon: Shay Bruce MD;  Location: [facility];  Service:  Endoscopy;  Laterality: N/A;    ESOPHAGOGASTRODUODENOSCOPY N/A 02/02/2023    Procedure: EGD (ESOPHAGOGASTRODUODENOSCOPY);  Surgeon: Nicole Leal MD;  Location: [facility];  Service: Endoscopy;  Laterality: N/A;    FINGER SURGERY      joint relpacement, left hand index finger    FUNCTIONAL ENDOSCOPIC SINUS SURGERY (FESS) USING COMPUTER-ASSISTED NAVIGATION Bilateral 07/31/2019    Procedure: FESS, USING COMPUTER-ASSISTED NAVIGATION;  Surgeon: Manish Shaffer MD;  Location: [facility];  Service: ENT;  Laterality: Bilateral;    FUNCTIONAL ENDOSCOPIC SINUS SURGERY (FESS) USING COMPUTER-ASSISTED NAVIGATION Bilateral 09/25/2020    Procedure: FESS, USING COMPUTER-ASSISTED NAVIGATION SPHENOID;  Surgeon: Matthias Roach MD;  [facility];  Service: ENT;  Laterality: Bilateral;  TIVA    FUNCTIONAL ENDOSCOPIC SINUS SURGERY (FESS) USING COMPUTER-ASSISTED NAVIGATION Bilateral 09/30/2022    Procedure: FESS, USING COMPUTER-ASSISTED NAVIGATION;  Surgeon: Matthias Roach MD;  [facility];  Service: ENT;  Laterality: Bilateral;    HYSTERECTOMY  2001    INTRALUMINAL GASTROINTESTINAL TRACT IMAGING VIA CAPSULE N/A 03/03/2023    Procedure: IMAGING PROCEDURE, GI TRACT, INTRALUMINAL, VIA CAPSULE;  Surgeon: First Available [place];  Location: [facility];  Service: Endoscopy;  Laterality: N/A;    SINUS SURGERY      WISDOM TOOTH EXTRACTION       Current Outpatient Medications on File Prior to Visit   Medication Sig    acetaminophen (TYLENOL) 500 MG tablet Take 2 tablets (1,000 mg total) by mouth every 6 (six) hours as needed for Pain.    albuterol (VENTOLIN HFA) 90 mcg/actuation inhaler Inhale 2 puffs into the lungs every 6 (six) hours as needed for Wheezing. Rescue    b complex vitamins capsule Take 1 capsule by mouth once daily.    calcium carbonate (OS-ECE) 600 mg calcium (1,500 mg) Tab Take 600 mg by mouth 2 (two) times daily with meals.    calcium carbonate/vitamin D3 (VITAMIN D-3 ORAL) Take 2 tablets by mouth  once daily.    diltiazem HCl (DILTIAZEM 2% - LIDOCAINE 5% CREAM) Apply peasize amount topically to anal area. (Patient not taking: Reported on 5/9/2025)    DULoxetine (CYMBALTA) 60 MG capsule TAKE 1 CAPSULE BY MOUTH TWICE DAILY **MAY CAUSE DROWSINESS**    estradioL (ESTRACE) 1 MG tablet Take 1 tablet (1 mg total) by mouth once daily.    fexofenadine (ALLEGRA) 180 MG tablet Take 1 tablet (180 mg total) by mouth once daily.    fluticasone propionate (FLONASE) 50 mcg/actuation nasal spray USE 2 SPRAYS IN EACH NOSTRIL ONCE DAILY    fluticasone-umeclidin-vilanter (TRELEGY ELLIPTA) 100-62.5-25 mcg DsDv Inhale 1 puff into the lungs once daily.    immun glob G,IgG,-pro-IgA 0-50 (HIZENTRA) 10 gram/50 mL (20 %) Soln Inject 70 mLs (14 g total) into the skin every 7 days.    ipratropium (ATROVENT) 0.02 % nebulizer solution Take by nebulization 4 (four) times daily as needed for Wheezing.    Lactobacillus rhamnosus GG (CULTURELLE) 10 billion cell capsule Take 1 capsule by mouth once daily.    losartan (COZAAR) 100 MG tablet TAKE 1 TABLET DAILY AS NEEDED FOR HIGH BLOOD PRESSURE (ABOVE 120/80)    mepolizumab 100 mg/mL AtIn Inject 3 mLs (300 mg total) into the skin every 28 days.    nystatin (MYCOSTATIN) 100,000 unit/mL suspension TAKE 6 MILLILITERS BY MOUTH 4 TIMES A DAY WITH MEALS AND NIGHTLY    predniSONE (DELTASONE) 5 MG tablet TAKE 2 TABLETS BY MOUTH ONCE A DAY    pregabalin (LYRICA) 150 MG capsule Take 1 capsule (150 mg total) by mouth 3 (three) times daily.    rizatriptan (MAXALT) 10 MG tablet Take 1 tablet (10 mg total) by mouth 1 (one) time if needed for Migraine (Max 2 tablets in 24 hours.).    sod chlor,sod bicarb/neti pot (SINUS WASH NETI POT TERRELL) use as directed    sodium chloride for inhalation (SODIUM CHLORIDE 10%) 10 % nebulizer solution Take 4 mLs by nebulization 2 (two) times a day.    sulfamethoxazole-trimethoprim 800-160mg (BACTRIM DS) 800-160 mg Tab Take 1 tablet by mouth once daily. (Patient not taking:  "Reported on 5/9/2025)    traZODone (DESYREL) 50 MG tablet Take 1 tablet by mouth once daily IN THE EVENING **MAY CAUSE DROWSINESS**    ZINC ORAL Take 1 tablet by mouth once daily.    albuterol-ipratropium (DUO-NEB) 2.5 mg-0.5 mg/3 mL nebulizer solution Take 3 mLs by nebulization every 6 (six) hours as needed for Wheezing. Rescue (Patient not taking: Reported on 10/16/2024)     Current Facility-Administered Medications on File Prior to Visit   Medication    lactated ringers infusion    lactated ringers infusion    lidocaine (PF) 10 mg/ml (1%) injection 10 mg    LIDOcaine (PF) 10 mg/ml (1%) injection 10 mg    sodium chloride 0.9% flush 10 mL     Objective:      Vitals:    05/09/25 1053   BP: (!) 140/80   BP Location: Left arm   Patient Position: Sitting   Pulse: (!) 59   SpO2: 97%   Weight: 77.7 kg (171 lb 4.8 oz)   Height: 5' 7" (1.702 m)     Physical Exam   Constitutional: She is oriented to person, place, and time. She appears well-developed and well-nourished. No distress. She is not obese.   HENT:   Head: Normocephalic.   Cardiovascular: Normal rate, regular rhythm and normal heart sounds. Exam reveals no gallop and no friction rub.   No murmur heard.  Pulmonary/Chest: Normal expansion, symmetric chest wall expansion and effort normal. No stridor. No respiratory distress. She has no decreased breath sounds. She has no wheezes. She has no rhonchi. She has no rales.   Musculoskeletal:         General: No edema.   Neurological: She is alert and oriented to person, place, and time. Gait normal.   Skin: She is not diaphoretic. No cyanosis. Nails show no clubbing.   Psychiatric: She has a normal mood and affect. Her behavior is normal. Judgment and thought content normal.   Vitals reviewed.    Personal Diagnostic Review    Laboratory:    Bicarb- 26  Eosinophils- 40    Chest x-ray: 11/23/22  Right infrahilar atelectasis vs opacification. Hazy left suprahilar opacification concerning for developing PNA.      CT of chest " performed on 22 without contrast revealed bibasilar posterior predominant tree and bud indicative of infectious vs inflammatory source.      CT chest 3/6/23:   Interval worsening of GGO and consolidative processes bilaterally, particularly in bilateral upper lobes and lower lobes.      CT chest 10/11/23: significant increase in Mosaic attenuation in all lung fields compared to 23      Echocardiogram: 22    1 - Mild left atrial enlargement.     2 - Concentric hypertrophy.     3 - No wall motion abnormalities.     4 - Normal left ventricular systolic function (EF 60-65%).     5 - Normal left ventricular diastolic function.     6 - Normal right ventricular systolic function .     7 - The estimated PA systolic pressure is 28 mmHg.     8 - Trivial to mild aortic regurgitation.     9 - Trivial to mild mitral regurgitation.      Pulmonary function tests:      25  FEV1: 2.62L (95.5%)  FVC: 3.11L (88.7%)  FEV1/FVC:   T.66L (85.7%)  DLCO: 18.73 (76.1%)    11/3/23  FEV1: 2.45L (89.2%)  FVC: 3.00L (84.7%)  FEV1/FVC: 82  T.95L (90.9%)  DLCO: 13.55 (54.4%)     22  FEV1: 1.86L (65.9%)  FVC: 2.19L (61%)     22  FEV1: 1.86L  (65.5 % predicted),   FVC:  2.32L (64.4 % predicted),   FEV1/FVC:  80,   T.26L (78.3% predicted),   DLCO: 16.53 (66 % predicted)    No echocardiogram results found for the past 12 months     Assessment:     1. Severe persistent asthma without complication  Assessment & Plan:  Continue Trelegy and PRN albuterol. Attempt to titrate prednisone.   - continue Nucala  - continue singulair       2. Eosinophilic granulomatosis with polyangiitis (EGPA)  -     predniSONE (DELTASONE) 5 MG tablet; Take 1.5 tablets (7.5 mg total) by mouth once daily for 14 days, THEN 1 tablet (5 mg total) once daily for 14 days, THEN 0.5 tablets (2.5 mg total) once daily for 14 days, THEN 0.5 tablets (2.5 mg total) every Mon, Wed, Fri for 14 days.  Dispense: 45 tablet; Refill: 0       Assessment &  Plan    J45.50 Severe persistent asthma without complication  M30.1, D72.18 Eosinophilic granulomatosis with polyangiitis (EGPA)    IMPRESSION:  - Pulmonary function test results show significant improvement, with DLCO 22% higher than previous tests.  - Nucala (mepolizumab) appears to be effective in managing respiratory condition and will be continued.  - Discontinue Bactrim (trimethoprim/sulfamethoxazole) prophylaxis due to relatively low steroid dose and planned taper.  - Considered EGPA (eosinophilic granulomatosis with polyangiitis) as a potential cause for recent skin irritation, but exploring other possibilities first.    EOSINOPHILIC GRANULOMATOSIS WITH POLYANGIITIS (EGPA):  - Explained potential side effects of steroid withdrawal, including dizziness, poor concentration, and general malaise.  - Discussed the importance of slow steroid tapering to allow adrenal glands to compensate for reduced exogenous steroids.  - Started prednisone taper due to prolonged steroid use: 10 mg daily for 14 days, 7.5 mg daily for 14 days, 5 mg daily for 14 days, 2.5 mg daily for 14 days, with close monitoring for adrenal insufficiency symptoms.  - Patient to monitor for any new or worsening symptoms during prednisone taper.    LIFESTYLE CHANGES:  - Advised on potential causes of skin irritation, including new soaps, detergents, lotions, or hair products.  - Patient to switch to unscented or oatmeal-based soap to address skin irritation.  - Follow up in December.  - Patient advised to start looking for available December appointments in September.  - Patient can schedule appointment through the portal or by calling the office.         30 minutes of total time spent on the encounter, which includes face to face time and non-face to face time preparing to see the patient (eg, review of tests), Obtaining and/or reviewing separately obtained history, Documenting clinical information in the electronic or other health record,  Independently interpreting results (not separately reported) and communicating results to the patient/family/caregiver, or Care coordination (not separately reported).      This note was generated with the assistance of ambient listening technology. Verbal consent was obtained by the patient and accompanying visitor(s) for the recording of patient appointment to facilitate this note. I attest to having reviewed and edited the generated note for accuracy, though some syntax or spelling errors may persist. Please contact the author of this note for any clarification.

## 2025-05-09 NOTE — PROGRESS NOTES
[facility]          Inflammatory Bowel Disease Follow Up Note         TODAY'S VISIT DATE:  5/9/2025    Reason for Consult:    Chief Complaint   Patient presents with    Follow-up    Crohn's Disease       PCP: Esthela Hu      Referring MD:   No ref. provider found    History of Present Illness:  Jaylin Murguia who is a 60 y.o. female is being seen today at the [facility] on 05/09/2025 for inflammatory bowel disease- Crohn's disease.  Today she reports that she has actually been doing relatively well.  She notes that she is having to use less Lomotil than she was previously.  She also feels like her episodes of diarrhea are occurring much less frequently.  She is only having an episode about once a month and that typically last about 2-3 days.  In between the episodes of diarrhea she typically has 4 or 5 soft bowel movements daily.  She does not have watery stools between these episodes.  She also has felt like there has been some improvement in completeness of her bowel evacuation but this is inconsistent.  She reports some episodes of left upper quadrant pain that tends to be sharp and occurs at random.  She attributes these improvements to dietary changes but thinks that Nucala what she has been taking for eosinophilic granulomatosis with polyangiitis has also provided some benefit.  She denies any new issues today.    IBD History:  She has a history of ileal Crohn's disease that was diagnosed around the year 2000.  She has always had disease isolated to the ileum.  She was initially treated with Asacol and Apriso without any improvement.  At 1 point with prednisone without any significant improvement.  She was on infliximab for about 6 months and does not recall any side effects but did not provide any improvement in her symptoms so it was discontinued.  She has also been on budesonide at times which did not help with her diarrhea  symptoms.  She was on Humira in 2017 in 2018 with evidence of endoscopic remission but she never had any symptomatic improvement.  This was discontinued because of recurrent infectious issues.  Most recently she has been on Skyrizi in late 2023.  She never noticed any improvement from a symptom standpoint but this was discontinued after about 4 months when she was found to have a positive hepatitis-B core antibody in December of 2023.  Follow-up hepatitis-B viral DNA was negative.  She has had multiple scopes with the most recent being in February of 2003.  At that time there was evidence of endoscopic remission.  In spite of that she has continued to have longstanding diarrhea issues.  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  She takes Hizentra weekly.  She was also diagnosed with Churg-Jasper disease and has been taking Nucala for since early 2024.  She has noticed significant improvement in sinus and respiratory symptoms and improvement in her gastrointestinal symptoms (not initially).  After our visit in March of 2024 multiple stool studies were negative.  Serologic workup for diarrhea revealed a mildly elevated chromogranin a level but she was taking a PPI at the time.    IBD Details:  Dx Date:  2000  Disease type/distribution:  Crohn's disease/ileal inflammation only  Current Treatment:  Prednisone 10/5 mg daily (not for Crohn's disease specifically)  Start Date:  Response:   Optimized:   Adverse reactions:   Prior surgeries:  None  CRP Elevation: Y  calprotectin:  Low at baseline  Disease Complications:  None  Extraintestinal manifestations:  None  Prior treatments:   Steroids:  Unclear response  5ASA:  Inadequate response  IMM:  None  TNF Inh:  Infliximab-no improvement in symptoms, no objective assessment of response    Humira-endoscopic remission but no symptom improvement   Anti-Integrin:  None   IL 12/23:  Skyrizi-no symptom improvement, no  "objective assessment, discontinued because of positive hepatitis B core antibody  BRIAN Inh:  None    Previous Clinical Trials:  None    Last Colonoscopy:  February 2023-no evidence of active disease endoscopically, mild nonspecific colitis noted but no chronic inflammatory changes    Other Endoscopies:  Upper endoscopy reports reviewed and normal.  This includes duodenal biopsies to rule out celiac disease    Imaging:   MRE:  2018-thickening of the distal ileum, questionable stricture (not found during endoscopic evaluation)   CT:  2019-CT enterography normal   Other:  Other pertinent imaging evaluated    Pertinent Labs:  Lab Results   Component Value Date    SEDRATE 11 06/18/2024    CRP 0.4 06/18/2024     Lab Results   Component Value Date    TTGIGA 0.4 03/28/2024     03/28/2024     Lab Results   Component Value Date    TSH 0.615 02/09/2024    FREET4 0.64 (L) 02/09/2024     Lab Results   Component Value Date    IBWAKCRM96ZS 71 03/28/2024    WWARGEFY29 1888 (H) 03/28/2024     Lab Results   Component Value Date    HEPBSAG Non-reactive 01/19/2024    HEPBCAB Reactive (A) 01/19/2024    HEPCAB Non-reactive 12/19/2023     Lab Results   Component Value Date    HDO59JFND Non-reactive 12/19/2023     Lab Results   Component Value Date    NIL 0.17902 12/19/2023    TBAG 0.069 03/13/2018    TBAGNIL -0.008 03/13/2018    MITOGENNIL 3.974 12/19/2023    TBGOLD Negative 03/13/2018     Lab Results   Component Value Date    TPTMINTERP SEE BELOW 03/09/2018    TPTMINTERP SEE BELOW 03/09/2018    TPMTRESULT 12.1 (L) 04/20/2015     Lab Results   Component Value Date    CDIFFICILEAN Negative 10/28/2024    CDIFFTOX Negative 10/28/2024    CDIFFICILEBY Negative 01/26/2023     Lab Results   Component Value Date    CALPROTECTIN 9.9 04/02/2024       Therapeutic Drug Monitoring Labs:  No results found for: "PROMETH"  No results found for: "ANSADAINIT", "INFLIXIMAB", "INFLIXINTERP"    Vaccinations:  Lab Results   Component Value Date    " HEPBSAB 436.40 01/19/2024    HEPBSAB Reactive 01/19/2024     Lab Results   Component Value Date    HEPAIGG Reactive 03/28/2024     Lab Results   Component Value Date    VARICELLAZOS >4000.00 03/28/2024    VARICELLAINT Positive 03/28/2024     Immunization History   Administered Date(s) Administered    COVID-19, MRNA, LN-S, PF (Pfizer) (Purple Cap) 03/05/2021, 04/15/2021    Hepatitis A / Hepatitis B 12/12/2019    Influenza 10/29/2013    Influenza - Quadrivalent - PF *Preferred* (6 months and older) 11/15/2017, 01/28/2020, 02/11/2021, 10/15/2023    Influenza Split 10/29/2013    PPD Test 05/22/2017    Pneumococcal Conjugate - 13 Valent 12/23/2019    Pneumococcal Conjugate - 20 Valent 12/05/2023    Pneumococcal Polysaccharide - 23 Valent 10/29/2013    Tdap 02/18/2014         Review of Systems  Review of Systems   Constitutional:  Negative for chills, fever and weight loss.   HENT:  Negative for sore throat.    Eyes:  Negative for pain, discharge and redness.   Respiratory:  Positive for cough and shortness of breath. Negative for wheezing.    Cardiovascular:  Negative for chest pain, orthopnea and leg swelling.   Gastrointestinal:  Negative for abdominal pain, blood in stool, constipation, diarrhea, heartburn, melena, nausea and vomiting.   Genitourinary:  Negative for dysuria, frequency and urgency.   Musculoskeletal:  Negative for back pain, joint pain and myalgias.   Skin:  Negative for itching and rash.   Neurological:  Negative for focal weakness and seizures.   Endo/Heme/Allergies:  Does not bruise/bleed easily.   Psychiatric/Behavioral:  Negative for depression. The patient is not nervous/anxious.        Medical History:   Past Medical History:   Diagnosis Date    Allergic rhinitis     Asthma     Chronic pansinusitis     Crohn's disease     Ileal involvement, previously on Remicade, Asacol, Prednisone    Eosinophilic granulomatosis with polyangiitis (EGPA)     Fibromyalgia     Hormone replacement therapy  (postmenopausal) 2017    Hyperlipidemia     Hypertension     Immunosuppression     Migraine     Obstructive sleep apnea     CPAP at night    Sciatica        Surgical History:  Past Surgical History:   Procedure Laterality Date    BLADDER SURGERY      sling was created by her muscles     BRONCHOSCOPY N/A 2023    Procedure: BRONCHOSCOPY;  Surgeon: Kajal Diagnostic Provider;  Location: [facility] OR 2ND FLR;  Service: Anesthesiology;  Laterality: N/A;    BRONCHOSCOPY WITH FLUOROSCOPY N/A 10/13/2023    Procedure: BRONCHOSCOPY, WITH FLUOROSCOPY;  Surgeon: Mary Molina MD;  Location: [facility] OR [facility] FLR;  Service: Pulmonary;  Laterality: N/A;     SECTION      COLONOSCOPY N/A 2017    Procedure: COLONOSCOPY;  Surgeon: Kin Dyer MD;  Location: [facility];  Service: Endoscopy;  Laterality: N/A;    COLONOSCOPY N/A 2018    Procedure: COLONOSCOPY;  Surgeon: Kyra Vallecillo MD;  Location: [facility];  Service: Endoscopy;  Laterality: N/A;    COLONOSCOPY N/A 2020    Procedure: COLONOSCOPY;  Surgeon: Nicole Leal MD;  Location: [facility];  Service: Endoscopy;  Laterality: N/A;    COLONOSCOPY N/A 2022    Procedure: COLONOSCOPY;  Surgeon: Shay Bruce MD;  Location: [facility];  Service: Endoscopy;  Laterality: N/A;    COLONOSCOPY N/A 2023    Procedure: COLONOSCOPY;  Surgeon: Nicole Leal MD;  Location: [facility];  Service: Endoscopy;  Laterality: N/A;    DEBRIDEMENT Bilateral 2020    Procedure: DEBRIDEMENT;  Surgeon: Matthias Roach MD;  Location: [facility] OR 2ND FLR;  Service: ENT;  Laterality: Bilateral;    ESOPHAGOGASTRODUODENOSCOPY N/A 2020    Procedure: ESOPHAGOGASTRODUODENOSCOPY (EGD);  Surgeon: Nicole Leal MD;  Location: [facility];  Service: Endoscopy;  Laterality: N/A;    ESOPHAGOGASTRODUODENOSCOPY N/A 2022    Procedure: EGD (ESOPHAGOGASTRODUODENOSCOPY);  Surgeon: Shay Bruce MD;  Location: [facility];  Service:  Endoscopy;  Laterality: N/A;    ESOPHAGOGASTRODUODENOSCOPY N/A 02/02/2023    Procedure: EGD (ESOPHAGOGASTRODUODENOSCOPY);  Surgeon: Nicole Leal MD;  Location: [facility];  Service: Endoscopy;  Laterality: N/A;    FINGER SURGERY      joint relpacement, left hand index finger    FUNCTIONAL ENDOSCOPIC SINUS SURGERY (FESS) USING COMPUTER-ASSISTED NAVIGATION Bilateral 07/31/2019    Procedure: FESS, USING COMPUTER-ASSISTED NAVIGATION;  Surgeon: Manish Shaffer MD;  Location: [facility];  Service: ENT;  Laterality: Bilateral;    FUNCTIONAL ENDOSCOPIC SINUS SURGERY (FESS) USING COMPUTER-ASSISTED NAVIGATION Bilateral 09/25/2020    Procedure: FESS, USING COMPUTER-ASSISTED NAVIGATION SPHENOID;  Surgeon: Matthias Roach MD;  [facility];  Service: ENT;  Laterality: Bilateral;  TIVA    FUNCTIONAL ENDOSCOPIC SINUS SURGERY (FESS) USING COMPUTER-ASSISTED NAVIGATION Bilateral 09/30/2022    Procedure: FESS, USING COMPUTER-ASSISTED NAVIGATION;  Surgeon: Matthias Roach MD;  [facility];  Service: ENT;  Laterality: Bilateral;    HYSTERECTOMY  2001    INTRALUMINAL GASTROINTESTINAL TRACT IMAGING VIA CAPSULE N/A 03/03/2023    Procedure: IMAGING PROCEDURE, GI TRACT, INTRALUMINAL, VIA CAPSULE;  Surgeon: First Available Walcott;  Location: [facility];  Service: Endoscopy;  Laterality: N/A;    SINUS SURGERY      WISDOM TOOTH EXTRACTION         Family History:   Family History   Problem Relation Name Age of Onset    Breast cancer Mother Sole     Hypertension Mother Sole     Allergies Mother Sole     Cancer Mother Sole     Depression Mother Sole     Kidney disease Father Peña 64        ESRD on HD    Scleroderma Father Peña     Arthritis Father Peña     Diabetes Father Peña     Hypertension Father Peña     Breast cancer Maternal Grandmother Ade     Heart attack Maternal Grandmother Ade     COPD Maternal Grandmother Ade 72    Cancer Maternal Grandmother Ade     Cancer Paternal Grandmother Frost 70         colon    Hypertension Brother Kevin        Social History:   Social History     Tobacco Use    Smoking status: Never     Passive exposure: Never    Smokeless tobacco: Never   Substance Use Topics    Alcohol use: No    Drug use: Never       Allergies: Reviewed    Home Medications:   Medication List with Changes/Refills   Current Medications    ACETAMINOPHEN (TYLENOL) 500 MG TABLET    Take 2 tablets (1,000 mg total) by mouth every 6 (six) hours as needed for Pain.    ALBUTEROL (VENTOLIN HFA) 90 MCG/ACTUATION INHALER    Inhale 2 puffs into the lungs every 6 (six) hours as needed for Wheezing. Rescue    ALBUTEROL-IPRATROPIUM (DUO-NEB) 2.5 MG-0.5 MG/3 ML NEBULIZER SOLUTION    Take 3 mLs by nebulization every 6 (six) hours as needed for Wheezing. Rescue    B COMPLEX VITAMINS CAPSULE    Take 1 capsule by mouth once daily.    CALCIUM CARBONATE (OS-CEE) 600 MG CALCIUM (1,500 MG) TAB    Take 600 mg by mouth 2 (two) times daily with meals.    CALCIUM CARBONATE/VITAMIN D3 (VITAMIN D-3 ORAL)    Take 2 tablets by mouth once daily.    DILTIAZEM HCL (DILTIAZEM 2% - LIDOCAINE 5% CREAM)    Apply peasize amount topically to anal area.    DULOXETINE (CYMBALTA) 60 MG CAPSULE    TAKE 1 CAPSULE BY MOUTH TWICE DAILY **MAY CAUSE DROWSINESS**    ESTRADIOL (ESTRACE) 1 MG TABLET    Take 1 tablet (1 mg total) by mouth once daily.    FAMOTIDINE (PEPCID) 20 MG TABLET    Take 1 tablet (20 mg total) by mouth 2 (two) times daily.    FEXOFENADINE (ALLEGRA) 180 MG TABLET    Take 1 tablet (180 mg total) by mouth once daily.    FLUTICASONE PROPIONATE (FLONASE) 50 MCG/ACTUATION NASAL SPRAY    USE 2 SPRAYS IN EACH NOSTRIL ONCE DAILY    FLUTICASONE-UMECLIDIN-VILANTER (TRELEGY ELLIPTA) 100-62.5-25 MCG DSDV    Inhale 1 puff into the lungs once daily.    IMMUN GLOB G,IGG,-PRO-IGA 0-50 (HIZENTRA) 10 GRAM/50 ML (20 %) SOLN    Inject 70 mLs (14 g total) into the skin every 7 days.    IPRATROPIUM (ATROVENT) 0.02 % NEBULIZER SOLUTION    Take by nebulization 4  (four) times daily as needed for Wheezing.    LACTOBACILLUS RHAMNOSUS GG (CULTURELLE) 10 BILLION CELL CAPSULE    Take 1 capsule by mouth once daily.    LOSARTAN (COZAAR) 100 MG TABLET    TAKE 1 TABLET DAILY AS NEEDED FOR HIGH BLOOD PRESSURE (ABOVE 120/80)    MEPOLIZUMAB 100 MG/ML ATIN    Inject 3 mLs (300 mg total) into the skin every 28 days.    NYSTATIN (MYCOSTATIN) 100,000 UNIT/ML SUSPENSION    TAKE 6 MILLILITERS BY MOUTH 4 TIMES A DAY WITH MEALS AND NIGHTLY    PANTOPRAZOLE (PROTONIX) 40 MG TABLET    Take 1 tablet (40 mg total) by mouth once daily.    PREDNISONE (DELTASONE) 5 MG TABLET    TAKE 2 TABLETS BY MOUTH ONCE A DAY    PREDNISONE (DELTASONE) 5 MG TABLET    Take 1.5 tablets (7.5 mg total) by mouth once daily for 14 days, THEN 1 tablet (5 mg total) once daily for 14 days, THEN 0.5 tablets (2.5 mg total) once daily for 14 days, THEN 0.5 tablets (2.5 mg total) every Mon, Wed, Fri for 14 days.    PREGABALIN (LYRICA) 150 MG CAPSULE    Take 1 capsule (150 mg total) by mouth 3 (three) times daily.    PROPRANOLOL (INDERAL LA) 60 MG 24 HR CAPSULE    TAKE 1 CAPSULE DAILY FOR HEADACHE PREVENTION    RIZATRIPTAN (MAXALT) 10 MG TABLET    Take 1 tablet (10 mg total) by mouth 1 (one) time if needed for Migraine (Max 2 tablets in 24 hours.).    SOD CHLOR,SOD BICARB/NETI POT (SINUS WASH NETI POT TERRELL)    use as directed    SODIUM CHLORIDE FOR INHALATION (SODIUM CHLORIDE 10%) 10 % NEBULIZER SOLUTION    Take 4 mLs by nebulization 2 (two) times a day.    SULFAMETHOXAZOLE-TRIMETHOPRIM 800-160MG (BACTRIM DS) 800-160 MG TAB    Take 1 tablet by mouth once daily.    TOPIRAMATE (TOPAMAX) 100 MG TABLET    Take 1 tablet (100 mg total) by mouth 2 (two) times daily.    TRAZODONE (DESYREL) 50 MG TABLET    Take 1 tablet by mouth once daily IN THE EVENING **MAY CAUSE DROWSINESS**    ZINC ORAL    Take 1 tablet by mouth once daily.   Changed and/or Refilled Medications    Modified Medication Previous Medication    DIPHENOXYLATE-ATROPINE  2.5-0.025 MG (LOMOTIL) 2.5-0.025 MG PER TABLET diphenoxylate-atropine 2.5-0.025 mg (LOMOTIL) 2.5-0.025 mg per tablet       Take 1 tablet by mouth 4 (four) times daily as needed for Diarrhea.    Take 1 tablet by mouth 4 (four) times daily as needed for Diarrhea.   Discontinued Medications    FLUCONAZOLE (DIFLUCAN) 150 MG TAB    Take 1 tablet by mouth on day 1 and 1 tablet by mouth on day 3    TRIAMTERENE-HYDROCHLOROTHIAZIDE 37.5-25 MG (DYAZIDE) 37.5-25 MG PER CAPSULE    Take 1 capsule by mouth daily as needed (for wt fluctuation > 3 lbs in 24 hrs).       Physical Exam:  Vital Signs:  There were no vitals taken for this visit.  There is no height or weight on file to calculate BMI.    Physical Exam  Nursing note reviewed.   Constitutional:       General: She is not in acute distress.     Appearance: Normal appearance. She is well-developed. She is not ill-appearing.   Skin:     Findings: No rash.   Neurological:      Mental Status: She is alert.   Psychiatric:         Mood and Affect: Mood normal.         Behavior: Behavior normal.         Thought Content: Thought content normal.         Judgment: Judgment normal.         Labs: reviewed and pertinent noted above    Assessment/Plan:  Jaylin Murguia is a 60 y.o. female with Crohn's disease, Churg-Jasper disease, immunodeficiency, chronic diarrhea. The following issues were addresssed:    1. Diarrhea, unspecified type    2. Crohn's disease of small intestine without complication    3. Eosinophilic granulomatosis with polyangiitis (EGPA)    4. Hypogammaglobulinemia    5. LUQ abdominal pain      1. Crohn's disease:  Overall she seems to be doing pretty well.  We will plan for a follow-up colonoscopy in the near future.    2. Chronic diarrhea:  Doing better recently.  Plan for repeat upper endoscopy.  Plan for small-bowel biopsies as well as colonic biopsies during the procedures.    3. Eosinophilic granulomatosis with polyangiitis:  Continue to follow-up with  Rheumatology/pulmonology.      4. Hypogammaglobulinemia:  Continue to follow up with Allergy and immunology.      5. Positive hepatitis-B core antibody:  This is probably from her Hizentra therapy.  She was negative prior to starting Hizentra and now has a positive core antibody.  Hepatitis-B viral DNA is negative.    6. Left upper quadrant pain:  EGD at the time of colonoscopy.      # IBD specific health maintenance:  Colon cancer surveillance:  Up-to-date    Annual:  - Eye exam:  Not applicable  - Skin exam (if on IMM/TNF):  Up-to-date  - reminded pt to use sunblock/hats/sunprotective clothing  - PAP (if immunosuppressed):  Review in the future    DEXA:  Would likely benefit from a DEXA scan in the future    Vitamin D:  Check today    Vaccines:    Influenza:  Up-to-date   Pneumovax:  Up-to-date   HAV:  Check serology   HBV:  Immune   Tdap:  Review in the future   MMR:  Check serologies   VZV:  Check serology   HZV:  Recommend vaccination   HPV:  Not applicable   Meningococcus:  Not applicable   COVID: Vaccinated    Follow up: Follow up in about 6 months (around 11/9/2025).    Visit today is associated with current or anticipated ongoing medical care related to this patient's single serious condition/complex condition (Crohn's disease and chronic diarrhea).      Thank you again for sending Jaylin Murguia to see Dr. Woody Dyer today at the [facility]. Please don't hesitate to contact Dr. Dyer if there are any questions regarding this evaluation, or if you have any other patients with inflammatory bowel disease for whom you would like a consultation. You can reach Dr. Dyer at [phone] or by email at [email or web address]    Kin Dyer MD  Department of Gastroenterology  Inflammatory Bowel Disease    The patient location is: [place]  The chief complaint leading to consultation is: Crohn's disease    Visit type: audiovisual    Face to Face time with patient: 15  minutes  25 minutes of total time spent on the encounter, which includes face to face time and non-face to face time preparing to see the patient (eg, review of tests), Obtaining and/or reviewing separately obtained history, Documenting clinical information in the electronic or other health record, Independently interpreting results (not separately reported) and communicating results to the patient/family/caregiver, or Care coordination (not separately reported).         Each patient to whom he or she provides medical services by telemedicine is:  (1) informed of the relationship between the physician and patient and the respective role of any other health care provider with respect to management of the patient; and (2) notified that he or she may decline to receive medical services by telemedicine and may withdraw from such care at any time.    Notes:

## 2025-05-09 NOTE — PATIENT INSTRUCTIONS
1. Plan for upper endoscopy and colonoscopy in the near future-diagnosis left upper quadrant pain, Crohn's disease-Suprep  2. Further plans based on results

## 2025-05-12 ENCOUNTER — TELEPHONE (OUTPATIENT)
Dept: GASTROENTEROLOGY | Facility: CLINIC | Age: 60
End: 2025-05-12
Payer: COMMERCIAL

## 2025-05-12 NOTE — ASSESSMENT & PLAN NOTE
Continue Trelegy and PRN albuterol. Attempt to titrate prednisone.   - continue Nucala  - continue singulair

## 2025-05-13 ENCOUNTER — TELEPHONE (OUTPATIENT)
Dept: GASTROENTEROLOGY | Facility: CLINIC | Age: 60
End: 2025-05-13
Payer: COMMERCIAL

## 2025-05-13 NOTE — TELEPHONE ENCOUNTER
"----- Message from ROBERTA Barragan sent at 5/9/2025  4:17 PM CDT -----  Regarding: EGD/Colonoscopy May Corry  Procedure: EGD/ColonoscopyDiagnosis: Crohn's diseaseProcedure Timing: "within the near future" Provider: Dr. Doe to call pt to pick a date/time/ ask questionsLocation: Any SiteAdditional Scheduling Information: No scheduling concernsPrep Specifications:Standard prep: suprepIs the patient taking a GLP-1 Agonist:noHave you attached a patient to this message: yes  "

## 2025-05-14 ENCOUNTER — TELEPHONE (OUTPATIENT)
Dept: GASTROENTEROLOGY | Facility: CLINIC | Age: 60
End: 2025-05-14
Payer: COMMERCIAL

## 2025-05-14 DIAGNOSIS — K50.00 CROHN'S DISEASE OF SMALL INTESTINE WITHOUT COMPLICATION: Primary | ICD-10-CM

## 2025-05-14 DIAGNOSIS — R10.12 LUQ ABDOMINAL PAIN: ICD-10-CM

## 2025-05-14 DIAGNOSIS — Z12.11 SPECIAL SCREENING FOR MALIGNANT NEOPLASMS, COLON: ICD-10-CM

## 2025-05-14 RX ORDER — SODIUM, POTASSIUM,MAG SULFATES 17.5-3.13G
1 SOLUTION, RECONSTITUTED, ORAL ORAL DAILY
Qty: 1 KIT | Refills: 0 | Status: SHIPPED | OUTPATIENT
Start: 2025-05-14 | End: 2025-05-16

## 2025-05-14 NOTE — TELEPHONE ENCOUNTER
"----- Message from ROBERTA Henry sent at 5/13/2025 12:41 PM CDT -----  Regarding: May/June 2025 EGD/Colonoscopy    ----- Message -----  From: Elaine Anders RN  Sent: 5/13/2025  12:41 PM CDT  To: Manisha VELASQUEZ Staff    5/13/25 - LVM. PM -BF  ----- Message -----  From: Laurel Wills RN  Sent: 5/9/2025   4:20 PM CDT  To: Manisha VELASQUEZ Staff  Subject: EGD/Colonoscopy May Corry                         Procedure: EGD/ColonoscopyDiagnosis: Crohn's diseaseProcedure Timing: "within the near future" Provider: Dr. Doe to call pt to pick a date/time/ ask questionsLocation: Any SiteAdditional Scheduling Information: No scheduling concernsPrep Specifications:Standard prep: suprepIs the patient taking a GLP-1 Agonist:noHave you attached a patient to this message: yes  "

## 2025-05-14 NOTE — TELEPHONE ENCOUNTER
Patient is scheduled for a Colonoscopy/EGD on 6/20/2025 with Dr. GUERDA Dyer  Referral for procedure from Telephone call

## 2025-05-20 ENCOUNTER — PATIENT MESSAGE (OUTPATIENT)
Dept: PULMONOLOGY | Facility: CLINIC | Age: 60
End: 2025-05-20
Payer: COMMERCIAL

## 2025-05-22 ENCOUNTER — PATIENT MESSAGE (OUTPATIENT)
Dept: PULMONOLOGY | Facility: CLINIC | Age: 60
End: 2025-05-22
Payer: COMMERCIAL

## 2025-05-23 ENCOUNTER — TELEPHONE (OUTPATIENT)
Dept: CRITICAL CARE MEDICINE | Facility: HOSPITAL | Age: 60
End: 2025-05-23
Payer: COMMERCIAL

## 2025-05-23 DIAGNOSIS — B96.89 ACUTE BACTERIAL SINUSITIS: Primary | ICD-10-CM

## 2025-05-23 DIAGNOSIS — J01.90 ACUTE BACTERIAL SINUSITIS: Primary | ICD-10-CM

## 2025-05-23 RX ORDER — AMOXICILLIN AND CLAVULANATE POTASSIUM 875; 125 MG/1; MG/1
1 TABLET, FILM COATED ORAL EVERY 12 HOURS
Qty: 10 TABLET | Refills: 0 | Status: SHIPPED | OUTPATIENT
Start: 2025-05-23 | End: 2025-05-28

## 2025-05-23 NOTE — TELEPHONE ENCOUNTER
Spoke with patient. Worsening productive cough. Woke up today with fatigue and low grade fever. Has intermittent wheezing that is cleared with coughing as well as use of nebulizer treatment. Will start Augmentin today. Will message on Monday how she is doing. Instructed to go to urgent care if worsening over the weekend and would have them start levofloxacin as patient has a history of Pseudomonas cultured from her sinuses.     Ivan Riley MD  [facility]

## 2025-05-27 RX ORDER — IPRATROPIUM BROMIDE AND ALBUTEROL 20; 100 UG/1; UG/1
1 SPRAY, METERED RESPIRATORY (INHALATION) 4 TIMES DAILY
Qty: 4 G | Refills: 6 | Status: SHIPPED | OUTPATIENT
Start: 2025-05-27

## 2025-05-28 RX ORDER — ALBUTEROL SULFATE 0.83 MG/ML
2.5 SOLUTION RESPIRATORY (INHALATION) EVERY 4 HOURS PRN
Qty: 540 ML | Refills: 3 | Status: SHIPPED | OUTPATIENT
Start: 2025-05-28

## 2025-05-30 ENCOUNTER — PATIENT MESSAGE (OUTPATIENT)
Dept: PULMONOLOGY | Facility: CLINIC | Age: 60
End: 2025-05-30
Payer: COMMERCIAL

## 2025-06-02 DIAGNOSIS — J47.9 BRONCHIECTASIS WITHOUT COMPLICATION: Primary | ICD-10-CM

## 2025-06-03 RX ORDER — SODIUM CHLORIDE FOR INHALATION 3 %
4 VIAL, NEBULIZER (ML) INHALATION 3 TIMES DAILY
Qty: 360 ML | Refills: 3 | Status: SHIPPED | OUTPATIENT
Start: 2025-06-03 | End: 2025-10-01

## 2025-06-09 DIAGNOSIS — G43.809 OTHER MIGRAINE WITHOUT STATUS MIGRAINOSUS, NOT INTRACTABLE: ICD-10-CM

## 2025-06-09 RX ORDER — TOPIRAMATE 100 MG/1
100 TABLET, FILM COATED ORAL 2 TIMES DAILY
Qty: 180 TABLET | Refills: 0 | Status: SHIPPED | OUTPATIENT
Start: 2025-06-09

## 2025-06-09 RX ORDER — TRAZODONE HYDROCHLORIDE 50 MG/1
50 TABLET ORAL NIGHTLY
Qty: 90 TABLET | Refills: 0 | Status: SHIPPED | OUTPATIENT
Start: 2025-06-09

## 2025-06-09 NOTE — TELEPHONE ENCOUNTER
Care Due:                  Date            Visit Type   Department     Provider  --------------------------------------------------------------------------------                                MYCHART                              FOLLOWUP/OF  [facility]  Last Visit: 03-      [facility] VISIT   CARE           Esthela Hu                              EP -                              PRIMARY      [facility]  Next Visit: 07-      CARE ([facility])   CARE           Esthela Hu                                                            Last  Test          Frequency    Reason                     Performed    Due Date  --------------------------------------------------------------------------------    CMP.........  12 months..  losartan.................  06- 06-    [facility] Embedded Care Due Messages. Reference number: [id number].   6/09/2025 10:50:57 AM MARINET

## 2025-06-16 ENCOUNTER — TELEPHONE (OUTPATIENT)
Dept: GASTROENTEROLOGY | Facility: CLINIC | Age: 60
End: 2025-06-16
Payer: COMMERCIAL

## 2025-06-16 NOTE — TELEPHONE ENCOUNTER
Copied from CRM #[id number]. Topic: Appointments - Appointment Access  >> Jun 16, 2025 12:17 PM Dinorah wrote:  Type:  Needs Medical Advice    Who Called: Jaylin called in regards colonoscopy scheduled for Fri 6/20 pt need some information in regards to appt   Would the patient rather a call back or a response via [facility]? Call back   Best Call Back Number: pt [phone]   Additional Information: pt would like a call back

## 2025-06-16 NOTE — TELEPHONE ENCOUNTER
Called and spoke with the patient  - Low residue diet and 1 cap miralax starting now  - Clear liquid diet 2 days prior to colonoscopy.

## 2025-06-16 NOTE — TELEPHONE ENCOUNTER
Called and spoke with the patient  - She would like to know about her BP medicine the day of her scope. Advised her yes to take her BP medicine before.  - Helped locate prep instructions on [employer] portal  - She is concerned that her past  bowel prep wasn't good enough, even following the prep exactly as instructed. She's wanted to know if she should do anything in addition to ensure the prep is good.  - Message to be sent to Dr. Dyer

## 2025-06-20 ENCOUNTER — ANESTHESIA EVENT (OUTPATIENT)
Dept: ENDOSCOPY | Facility: HOSPITAL | Age: 60
End: 2025-06-20
Payer: COMMERCIAL

## 2025-06-20 ENCOUNTER — HOSPITAL ENCOUNTER (OUTPATIENT)
Facility: HOSPITAL | Age: 60
Discharge: HOME | End: 2025-06-20
Attending: INTERNAL MEDICINE | Admitting: INTERNAL MEDICINE
Payer: COMMERCIAL

## 2025-06-20 ENCOUNTER — ANESTHESIA (OUTPATIENT)
Dept: ENDOSCOPY | Facility: HOSPITAL | Age: 60
End: 2025-06-20
Payer: COMMERCIAL

## 2025-06-20 VITALS
RESPIRATION RATE: 16 BRPM | BODY MASS INDEX: 25.88 KG/M2 | OXYGEN SATURATION: 98 % | HEIGHT: 67 IN | HEART RATE: 65 BPM | DIASTOLIC BLOOD PRESSURE: 82 MMHG | TEMPERATURE: 98 F | SYSTOLIC BLOOD PRESSURE: 152 MMHG | WEIGHT: 164.88 LBS

## 2025-06-20 DIAGNOSIS — K50.90 CROHN'S DISEASE: ICD-10-CM

## 2025-06-20 DIAGNOSIS — R10.12 LUQ ABDOMINAL PAIN: ICD-10-CM

## 2025-06-20 DIAGNOSIS — K50.00 CROHN'S DISEASE OF SMALL INTESTINE WITHOUT COMPLICATION: ICD-10-CM

## 2025-06-20 PROCEDURE — 45385 COLONOSCOPY W/LESION REMOVAL: CPT | Mod: ,,, | Performed by: INTERNAL MEDICINE

## 2025-06-20 PROCEDURE — 88305 TISSUE EXAM BY PATHOLOGIST: CPT | Mod: TC,91 | Performed by: INTERNAL MEDICINE

## 2025-06-20 PROCEDURE — 27201012 HC FORCEPS, HOT/COLD, DISP: Performed by: INTERNAL MEDICINE

## 2025-06-20 PROCEDURE — 25000003 PHARM REV CODE 250: Performed by: NURSE ANESTHETIST, CERTIFIED REGISTERED

## 2025-06-20 PROCEDURE — 43239 EGD BIOPSY SINGLE/MULTIPLE: CPT | Mod: 51,,, | Performed by: INTERNAL MEDICINE

## 2025-06-20 PROCEDURE — 88305 TISSUE EXAM BY PATHOLOGIST: CPT | Mod: 26,,, | Performed by: PATHOLOGY

## 2025-06-20 PROCEDURE — 63600175 PHARM REV CODE 636 W HCPCS: Performed by: NURSE ANESTHETIST, CERTIFIED REGISTERED

## 2025-06-20 PROCEDURE — 37000008 HC ANESTHESIA 1ST 15 MINUTES: Performed by: INTERNAL MEDICINE

## 2025-06-20 PROCEDURE — 37000009 HC ANESTHESIA EA ADD 15 MINS: Performed by: INTERNAL MEDICINE

## 2025-06-20 PROCEDURE — 27201089 HC SNARE, DISP (ANY): Performed by: INTERNAL MEDICINE

## 2025-06-20 PROCEDURE — 45385 COLONOSCOPY W/LESION REMOVAL: CPT | Performed by: INTERNAL MEDICINE

## 2025-06-20 PROCEDURE — 45380 COLONOSCOPY AND BIOPSY: CPT | Mod: 59,,, | Performed by: INTERNAL MEDICINE

## 2025-06-20 PROCEDURE — 43239 EGD BIOPSY SINGLE/MULTIPLE: CPT | Performed by: INTERNAL MEDICINE

## 2025-06-20 RX ORDER — PROPOFOL 10 MG/ML
VIAL (ML) INTRAVENOUS
Status: DISCONTINUED | OUTPATIENT
Start: 2025-06-20 | End: 2025-06-20

## 2025-06-20 RX ORDER — LIDOCAINE HYDROCHLORIDE 20 MG/ML
INJECTION, SOLUTION EPIDURAL; INFILTRATION; INTRACAUDAL; PERINEURAL
Status: DISCONTINUED | OUTPATIENT
Start: 2025-06-20 | End: 2025-06-20

## 2025-06-20 RX ORDER — FLUCONAZOLE 200 MG/1
200 TABLET ORAL DAILY
Qty: 14 TABLET | Refills: 0 | Status: SHIPPED | OUTPATIENT
Start: 2025-06-20 | End: 2025-07-04

## 2025-06-20 RX ORDER — SODIUM CHLORIDE 9 MG/ML
INJECTION, SOLUTION INTRAVENOUS CONTINUOUS
Status: DISCONTINUED | OUTPATIENT
Start: 2025-06-20 | End: 2025-06-20 | Stop reason: HOSPADM

## 2025-06-20 RX ORDER — PANTOPRAZOLE SODIUM 40 MG/1
40 TABLET, DELAYED RELEASE ORAL DAILY
Qty: 90 TABLET | Refills: 3 | Status: SHIPPED | OUTPATIENT
Start: 2025-06-20 | End: 2026-06-20

## 2025-06-20 RX ADMIN — PROPOFOL 150 MCG/KG/MIN: 10 INJECTION, EMULSION INTRAVENOUS at 08:06

## 2025-06-20 RX ADMIN — SODIUM CHLORIDE: 0.9 INJECTION, SOLUTION INTRAVENOUS at 08:06

## 2025-06-20 RX ADMIN — LIDOCAINE HYDROCHLORIDE 100 MG: 20 INJECTION, SOLUTION EPIDURAL; INFILTRATION; INTRACAUDAL; PERINEURAL at 08:06

## 2025-06-20 RX ADMIN — PROPOFOL 60 MG: 10 INJECTION, EMULSION INTRAVENOUS at 08:06

## 2025-06-20 RX ADMIN — GLYCOPYRROLATE 0.2 MG: 0.2 INJECTION, SOLUTION INTRAMUSCULAR; INTRAVENOUS at 08:06

## 2025-06-20 NOTE — PROVATION PATIENT INSTRUCTIONS
Discharge Summary/Instructions after an Endoscopic Procedure  Patient Name: Jaylin Murguia  Patient MRN: [id number]  Patient YOB: 1965 Friday, June 20, 2025  Kin Dyer MD  Dear patient,  As a result of recent federal legislation (The Federal Cures Act), you may   receive lab or pathology results from your procedure in your [facility]   account before your physician is able to contact you. Your physician or   their representative will relay the results to you with their   recommendations at their soonest availability.  Thank you,  RESTRICTIONS:  During your procedure today, you received medications for sedation.  These   medications may affect your judgment, balance and coordination.  Therefore,   for 24 hours, you have the following restrictions:   - DO NOT drive a car, operate machinery, make legal/financial decisions,   sign important papers or drink alcohol.    ACTIVITY:  Today: no heavy lifting, straining or running due to procedural   sedation/anesthesia.  The following day: return to full activity including work.  DIET:  Eat and drink normally unless instructed otherwise.     TREATMENT FOR COMMON SIDE EFFECTS:  - Mild abdominal pain, nausea, belching, bloating or excessive gas:  rest,   eat lightly and use a heating pad.  - Sore Throat: treat with throat lozenges and/or gargle with warm salt   water.  - Because air was used during the procedure, expelling large amounts of air   from your rectum or belching is normal.  - If a bowel prep was taken, you may not have a bowel movement for 1-3 days.    This is normal.  SYMPTOMS TO WATCH FOR AND REPORT TO YOUR PHYSICIAN:  1. Abdominal pain or bloating, other than gas cramps.  2. Chest pain.  3. Back pain.  4. Signs of infection such as: chills or fever occurring within 24 hours   after the procedure.  5. Rectal bleeding, which would show as bright red, maroon, or black stools.   (A tablespoon of blood from the rectum is not serious, especially  if   hemorrhoids are present.)  6. Vomiting.  7. Weakness or dizziness.  GO DIRECTLY TO THE NEAREST EMERGENCY ROOM IF YOU HAVE ANY OF THE FOLLOWING:      Difficulty breathing              Chills and/or fever over 101 F   Persistent vomiting and/or vomiting blood   Severe abdominal pain   Severe chest pain   Black, tarry stools   Bleeding- more than one tablespoon   Any other symptom or condition that you feel may need urgent attention  Your doctor recommends these additional instructions:  If any biopsies were taken, your doctors clinic will contact you in 1 to 2   weeks with any results.  - Discharge patient to home.   - Perform a colonoscopy today.   - Await pathology results.   - Use Protonix (pantoprazole) 40 mg PO daily for 12 weeks.   - Diflucan (fluconazole) 200 mg PO daily for 2 weeks.   - Return to referring provider as previously scheduled.  For questions, problems or results please call your physician - Kin Dyer MD at Work:  [phone].  [facility], EMERGENCY ROOM PHONE NUMBER: [phone]  IF A COMPLICATION OR EMERGENCY SITUATION ARISES AND YOU ARE UNABLE TO REACH   YOUR PHYSICIAN - GO DIRECTLY TO THE EMERGENCY ROOM.  Kin Dyer MD  6/20/2025 9:06:08 AM  This report has been verified and signed electronically.  Dear patient,  As a result of recent federal legislation (The Federal Cures Act), you may   receive lab or pathology results from your procedure in your [facility]   account before your physician is able to contact you. Your physician or   their representative will relay the results to you with their   recommendations at their soonest availability.  Thank you,  PROVATION

## 2025-06-20 NOTE — ANESTHESIA POSTPROCEDURE EVALUATION
Anesthesia Post Evaluation    Patient: Jaylin Murguia    Procedure(s) Performed: Procedure(s) (LRB):  COLONOSCOPY (N/A)  EGD (ESOPHAGOGASTRODUODENOSCOPY) (N/A)    Final Anesthesia Type: general      Patient location during evaluation: PACU  Patient participation: Yes- Able to Participate  Level of consciousness: awake and alert  Post-procedure vital signs: reviewed and stable  Pain management: adequate  Airway patency: patent    PONV status at discharge: No PONV  Anesthetic complications: no      Cardiovascular status: blood pressure returned to baseline  Respiratory status: unassisted  Follow-up not needed.              Vitals Value Taken Time   /76 06/20/25 09:31   Temp 36.4 °C (97.5 °F) 06/20/25 09:31   Pulse 77 06/20/25 09:46   Resp 16 06/20/25 09:31   SpO2 98 % 06/20/25 09:31         No case tracking events are documented in the log.      Pain/Ramya Score: Ramya Score: 10 (6/20/2025  9:31 AM)

## 2025-06-20 NOTE — ANESTHESIA PREPROCEDURE EVALUATION
2025  Pre-operative evaluation for Procedure(s) (LRB):  COLONOSCOPY (N/A)  EGD (ESOPHAGOGASTRODUODENOSCOPY) (N/A)    Jaylin Murguia is a 60 y.o. female     Problem List[1]    Review of patient's allergies indicates:   Allergen Reactions    Fentanyl Other (See Comments)     Rigid Chest Syndrome       Medications Ordered Prior to Encounter[2]    Past Surgical History:   Procedure Laterality Date    BLADDER SURGERY      sling was created by her muscles     BRONCHOSCOPY N/A 2023    Procedure: BRONCHOSCOPY;  Surgeon: [facility] Diagnostic Provider;  [facility];  Service: Anesthesiology;  Laterality: N/A;    BRONCHOSCOPY WITH FLUOROSCOPY N/A 10/13/2023    Procedure: BRONCHOSCOPY, WITH FLUOROSCOPY;  Surgeon: Mary Molina MD;  Location: [address];  Service: Pulmonary;  Laterality: N/A;     SECTION      COLONOSCOPY N/A 2017    Procedure: COLONOSCOPY;  Surgeon: Kin Dyer MD;  Location: [facility];  Service: Endoscopy;  Laterality: N/A;    COLONOSCOPY N/A 2018    Procedure: COLONOSCOPY;  Surgeon: Kyra Vallecillo MD;  Location: [facility];  Service: Endoscopy;  Laterality: N/A;    COLONOSCOPY N/A 2020    Procedure: COLONOSCOPY;  Surgeon: Nicole Leal MD;  Location: [facility];  Service: Endoscopy;  Laterality: N/A;    COLONOSCOPY N/A 2022    Procedure: COLONOSCOPY;  Surgeon: Shay Bruce MD;  Location: [facility];  Service: Endoscopy;  Laterality: N/A;    COLONOSCOPY N/A 2023    Procedure: COLONOSCOPY;  Surgeon: Nicole Leal MD;  Location: [facility];  Service: Endoscopy;  Laterality: N/A;    DEBRIDEMENT Bilateral 2020    Procedure: DEBRIDEMENT;  Surgeon: Matthias Roach MD;  Location: [address];  Service: ENT;  Laterality: Bilateral;    ESOPHAGOGASTRODUODENOSCOPY N/A 2020    Procedure:  ESOPHAGOGASTRODUODENOSCOPY (EGD);  Surgeon: Nicole Leal MD;  Location: [facility];  Service: Endoscopy;  Laterality: N/A;    ESOPHAGOGASTRODUODENOSCOPY N/A 03/16/2022    Procedure: EGD (ESOPHAGOGASTRODUODENOSCOPY);  Surgeon: Shay Bruce MD;  Location: [facility];  Service: Endoscopy;  Laterality: N/A;    ESOPHAGOGASTRODUODENOSCOPY N/A 02/02/2023    Procedure: EGD (ESOPHAGOGASTRODUODENOSCOPY);  Surgeon: Nicole Leal MD;  Location: [facility];  Service: Endoscopy;  Laterality: N/A;    FINGER SURGERY      joint relpacement, left hand index finger    FUNCTIONAL ENDOSCOPIC SINUS SURGERY (FESS) USING COMPUTER-ASSISTED NAVIGATION Bilateral 07/31/2019    Procedure: FESS, USING COMPUTER-ASSISTED NAVIGATION;  Surgeon: Manish Shaffer MD;  Location: [facility];  Service: ENT;  Laterality: Bilateral;    FUNCTIONAL ENDOSCOPIC SINUS SURGERY (FESS) USING COMPUTER-ASSISTED NAVIGATION Bilateral 09/25/2020    Procedure: FESS, USING COMPUTER-ASSISTED NAVIGATION SPHENOID;  Surgeon: Matthias Roach MD;  Location: [facility] OR [facility] FLR;  Service: ENT;  Laterality: Bilateral;  TIVA    FUNCTIONAL ENDOSCOPIC SINUS SURGERY (FESS) USING COMPUTER-ASSISTED NAVIGATION Bilateral 09/30/2022    Procedure: FESS, USING COMPUTER-ASSISTED NAVIGATION;  Surgeon: Matthias Roach MD;  Location: [facility] OR 2ND FLR;  Service: ENT;  Laterality: Bilateral;    HYSTERECTOMY  2001    INTRALUMINAL GASTROINTESTINAL TRACT IMAGING VIA CAPSULE N/A 03/03/2023    Procedure: IMAGING PROCEDURE, GI TRACT, INTRALUMINAL, VIA CAPSULE;  Surgeon: First Available [place];  Location: [facility];  Service: Endoscopy;  Laterality: N/A;    SINUS SURGERY      WISDOM TOOTH EXTRACTION         Social History[3]          Pre-op Assessment    I have reviewed the Patient Summary Reports.     I have reviewed the Nursing Notes. I have reviewed the NPO Status.      Review of Systems  Anesthesia Hx:  No problems with previous Anesthesia                Cardiovascular:     Hypertension                                           Pulmonary:    Asthma    Sleep Apnea                Musculoskeletal:  Arthritis               Neurological:    Neuromuscular Disease,  Headaches                                     Physical Exam    Airway:  Mallampati: II   Mouth Opening: Normal  Tongue: Normal    Chest/Lungs:  Normal Respiratory Rate    Heart:  Rhythm: Regular Rhythm        Anesthesia Plan  Type of Anesthesia, risks & benefits discussed:    Anesthesia Type: Gen Natural Airway  Intra-op Monitoring Plan: Standard ASA Monitors  Induction:  IV  Informed Consent: Informed consent signed with the Patient and all parties understand the risks and agree with anesthesia plan.  All questions answered.   ASA Score: 3    Ready For Surgery From Anesthesia Perspective.     .           [1]   Patient Active Problem List  Diagnosis    Fibromyalgia    Migraine    Crohn's disease of small intestine    Sciatica    Allergic rhinitis    Diarrhea    Essential hypertension    Insomnia due to medical condition    Long-term use of immunosuppressant medication    Crohn's disease    TAMIKA on CPAP    Bilateral primary osteoarthritis of knee    Primary osteoarthritis of right knee    Primary osteoarthritis of left knee    Other hyperlipidemia    Tortuous aorta    Mild aortic insufficiency    Chronic pansinusitis    Hypogammaglobulinemia    Abdominal pain    Chronic rhinosinusitis    Abnormal CT scan, lung    Chronic cough    Thrush    Severe persistent asthma without complication    Immunosuppression    Elevated liver enzymes    Eosinophilic granulomatosis with polyangiitis (EGPA)    IgG2 subclass deficiency    Bronchiectasis without complication    Pulmonary infection due to Mycobacterium avium   [2]   Current Facility-Administered Medications on File Prior to Encounter   Medication Dose Route Frequency Provider Last Rate Last Admin    lactated ringers infusion   Intravenous Continuous Mary Leiva MD   New Bag at 03/12/20 5588     lactated ringers infusion   Intravenous Continuous Shay Bruce MD   Stopped at 03/16/22 1342    lidocaine (PF) 10 mg/ml (1%) injection 10 mg  1 mL Intradermal Once Mary Leiva MD        LIDOcaine (PF) 10 mg/ml (1%) injection 10 mg  1 mL Intradermal Once Johan Baez MD        sodium chloride 0.9% flush 10 mL  10 mL Intravenous PRN Johan Baez MD         Current Outpatient Medications on File Prior to Encounter   Medication Sig Dispense Refill    acetaminophen (TYLENOL) 500 MG tablet Take 2 tablets (1,000 mg total) by mouth every 6 (six) hours as needed for Pain. 60 tablet 0    albuterol (VENTOLIN HFA) 90 mcg/actuation inhaler Inhale 2 puffs into the lungs every 6 (six) hours as needed for Wheezing. Rescue 18 g 3    b complex vitamins capsule Take 1 capsule by mouth once daily.      calcium carbonate (OS-CEE) 600 mg calcium (1,500 mg) Tab Take 600 mg by mouth 2 (two) times daily with meals.      calcium carbonate/vitamin D3 (VITAMIN D-3 ORAL) Take 2 tablets by mouth once daily.      diltiazem HCl (DILTIAZEM 2% - LIDOCAINE 5% CREAM) Apply peasize amount topically to anal area. (Patient not taking: Reported on 5/9/2025) 30 g 2    diphenoxylate-atropine 2.5-0.025 mg (LOMOTIL) 2.5-0.025 mg per tablet Take 1 tablet by mouth 4 (four) times daily as needed for Diarrhea. 120 tablet 5    DULoxetine (CYMBALTA) 60 MG capsule TAKE 1 CAPSULE BY MOUTH TWICE DAILY **MAY CAUSE DROWSINESS** 180 capsule 0    estradioL (ESTRACE) 1 MG tablet Take 1 tablet (1 mg total) by mouth once daily. 90 tablet 3    fexofenadine (ALLEGRA) 180 MG tablet Take 1 tablet (180 mg total) by mouth once daily. 180 tablet 1    fluticasone propionate (FLONASE) 50 mcg/actuation nasal spray USE 2 SPRAYS IN EACH NOSTRIL ONCE DAILY 16 g 11    fluticasone-umeclidin-vilanter (TRELEGY ELLIPTA) 100-62.5-25 mcg DsDv Inhale 1 puff into the lungs once daily. 90 each 3    immun glob G,IgG,-pro-IgA 0-50 (HIZENTRA) 10 gram/50 mL (20 %)  Soln Inject 70 mLs (14 g total) into the skin every 7 days. 280 mL 11    ipratropium (ATROVENT) 0.02 % nebulizer solution Take by nebulization 4 (four) times daily as needed for Wheezing.      Lactobacillus rhamnosus GG (CULTURELLE) 10 billion cell capsule Take 1 capsule by mouth once daily.      losartan (COZAAR) 100 MG tablet TAKE 1 TABLET DAILY AS NEEDED FOR HIGH BLOOD PRESSURE (ABOVE 120/80) 90 tablet 1    mepolizumab 100 mg/mL AtIn Inject 3 mLs (300 mg total) into the skin every 28 days. 3 mL 11    nystatin (MYCOSTATIN) 100,000 unit/mL suspension TAKE 6 MILLILITERS BY MOUTH 4 TIMES A DAY WITH MEALS AND NIGHTLY 473 mL 1    predniSONE (DELTASONE) 5 MG tablet TAKE 2 TABLETS BY MOUTH ONCE A DAY 60 tablet 1    predniSONE (DELTASONE) 5 MG tablet Take 1.5 tablets (7.5 mg total) by mouth once daily for 14 days, THEN 1 tablet (5 mg total) once daily for 14 days, THEN 0.5 tablets (2.5 mg total) once daily for 14 days, THEN 0.5 tablets (2.5 mg total) every Mon, Wed, Fri for 14 days. 45 tablet 0    pregabalin (LYRICA) 150 MG capsule Take 1 capsule (150 mg total) by mouth 3 (three) times daily. 270 capsule 1    rizatriptan (MAXALT) 10 MG tablet Take 1 tablet (10 mg total) by mouth 1 (one) time if needed for Migraine (Max 2 tablets in 24 hours.). 27 tablet 3    sod chlor,sod bicarb/neti pot (SINUS WASH NETI POT TERRELL) use as directed      sulfamethoxazole-trimethoprim 800-160mg (BACTRIM DS) 800-160 mg Tab Take 1 tablet by mouth once daily. (Patient not taking: Reported on 5/9/2025) 30 tablet 0    ZINC ORAL Take 1 tablet by mouth once daily.     [3]   Social History  Socioeconomic History    Marital status:     Number of children: 2   Occupational History    Occupation: teacher   Tobacco Use    Smoking status: Never     Passive exposure: Never    Smokeless tobacco: Never   Substance and Sexual Activity    Alcohol use: No    Drug use: Never    Sexual activity: Yes     Partners: Male     Birth control/protection: See  Surgical Hx     Comment: hysterectomy   Social History Narrative    Surrogate Decision Maker: , Cristobal Murguia, [phone]     Social Drivers of Health     Financial Resource Strain: Low Risk  (1/29/2024)    Overall Financial Resource Strain (CARDIA)     Difficulty of Paying Living Expenses: Not hard at all   Food Insecurity: No Food Insecurity (1/29/2024)    Hunger Vital Sign     Worried About Running Out of Food in the Last Year: Never true     Ran Out of Food in the Last Year: Never true   Transportation Needs: No Transportation Needs (1/29/2024)    PRAPARE - Transportation     Lack of Transportation (Medical): No     Lack of Transportation (Non-Medical): No   Physical Activity: Insufficiently Active (1/29/2024)    Exercise Vital Sign     Days of Exercise per Week: 3 days     Minutes of Exercise per Session: 30 min   Stress: Stress Concern Present (1/29/2024)    [employer] [place] [employer] - Occupational Stress Questionnaire     Feeling of Stress : To some extent   Housing Stability: Low Risk  (1/29/2024)    Housing Stability Vital Sign     Unable to Pay for Housing in the Last Year: No     Number of Places Lived in the Last Year: 1     Unstable Housing in the Last Year: No

## 2025-06-20 NOTE — PROVATION PATIENT INSTRUCTIONS
Discharge Summary/Instructions after an Endoscopic Procedure  Patient Name: Jaylin Murguia  Patient MRN: [id number]  Patient YOB: 1965 Friday, June 20, 2025  Kin Dyer MD  Dear patient,  As a result of recent federal legislation (The Federal Cures Act), you may   receive lab or pathology results from your procedure in your [facility]   account before your physician is able to contact you. Your physician or   their representative will relay the results to you with their   recommendations at their soonest availability.  Thank you,  RESTRICTIONS:  During your procedure today, you received medications for sedation.  These   medications may affect your judgment, balance and coordination.  Therefore,   for 24 hours, you have the following restrictions:   - DO NOT drive a car, operate machinery, make legal/financial decisions,   sign important papers or drink alcohol.    ACTIVITY:  Today: no heavy lifting, straining or running due to procedural   sedation/anesthesia.  The following day: return to full activity including work.  DIET:  Eat and drink normally unless instructed otherwise.     TREATMENT FOR COMMON SIDE EFFECTS:  - Mild abdominal pain, nausea, belching, bloating or excessive gas:  rest,   eat lightly and use a heating pad.  - Sore Throat: treat with throat lozenges and/or gargle with warm salt   water.  - Because air was used during the procedure, expelling large amounts of air   from your rectum or belching is normal.  - If a bowel prep was taken, you may not have a bowel movement for 1-3 days.    This is normal.  SYMPTOMS TO WATCH FOR AND REPORT TO YOUR PHYSICIAN:  1. Abdominal pain or bloating, other than gas cramps.  2. Chest pain.  3. Back pain.  4. Signs of infection such as: chills or fever occurring within 24 hours   after the procedure.  5. Rectal bleeding, which would show as bright red, maroon, or black stools.   (A tablespoon of blood from the rectum is not serious, especially  if   hemorrhoids are present.)  6. Vomiting.  7. Weakness or dizziness.  GO DIRECTLY TO THE NEAREST EMERGENCY ROOM IF YOU HAVE ANY OF THE FOLLOWING:      Difficulty breathing              Chills and/or fever over 101 F   Persistent vomiting and/or vomiting blood   Severe abdominal pain   Severe chest pain   Black, tarry stools   Bleeding- more than one tablespoon   Any other symptom or condition that you feel may need urgent attention  Your doctor recommends these additional instructions:  If any biopsies were taken, your doctors clinic will contact you in 1 to 2   weeks with any results.  - Discharge patient to home.   - Resume previous diet today.   - Continue present medications.   - Await pathology results.   - Repeat colonoscopy in 2 years for surveillance.   - Return to GI office at appointment to be scheduled.   - No evidence of active Crohn's disease.  - Patient has a contact number available for emergencies.  The signs and   symptoms of potential delayed complications were discussed with the   patient.  Return to normal activities tomorrow.  Written discharge   instructions were provided to the patient.  For questions, problems or results please call your physician - Kin Dyer MD at Work:  [phone].  [facility], EMERGENCY ROOM PHONE NUMBER: [phone]  IF A COMPLICATION OR EMERGENCY SITUATION ARISES AND YOU ARE UNABLE TO REACH   YOUR PHYSICIAN - GO DIRECTLY TO THE EMERGENCY ROOM.  Kin Dyer MD  6/20/2025 9:30:12 AM  This report has been verified and signed electronically.  Dear patient,  As a result of recent federal legislation (The Federal Cures Act), you may   receive lab or pathology results from your procedure in your [facility]   account before your physician is able to contact you. Your physician or   their representative will relay the results to you with their   recommendations at their soonest availability.  Thank you,  PROVATION

## 2025-06-20 NOTE — H&P
Short Stay Endoscopy History and Physical    PCP - Esthela Hu MD    Procedure - EGD and colonoscopy  ASA - 2  Mallampati - per anesthesia  History of Anesthesia problems - no  Family history Anesthesia problems -  no     HPI:  This is a 60 y.o. female here for evaluation of :     EGD  Reflux - no  Dysphagia - no  Abdominal pain - yes  Diarrhea - no  Anemia - no  GI bleeding - no  Other - no    Colonoscopy  Screening - no  History of polyps - no  Diarrhea - no  Anemia - no  Blood in stools - no  Abdominal pain - no  Other - Crohn's disease    ROS:  CONSTITUTIONAL: Denies weight change,  fatigue, fevers, chills, night sweats.  CARDIOVASCULAR: Denies chest pain, shortness of breath, orthopnea and edema.  RESPIRATORY: Denies cough, hemoptysis, dyspnea, and wheezing.  GI: See HPI.    Medical History:   Past Medical History:   Diagnosis Date    Allergic rhinitis     Asthma     Chronic pansinusitis     Crohn's disease     Ileal involvement, previously on Remicade, Asacol, Prednisone    Eosinophilic granulomatosis with polyangiitis (EGPA)     Fibromyalgia     Hormone replacement therapy (postmenopausal) 2017    Hyperlipidemia     Hypertension     Immunosuppression     Migraine     Obstructive sleep apnea     CPAP at night    Sciatica        Surgical History:   Past Surgical History:   Procedure Laterality Date    BLADDER SURGERY      sling was created by her muscles     BRONCHOSCOPY N/A 2023    Procedure: BRONCHOSCOPY;  Surgeon: [facility] Diagnostic Provider;  Location: [address];  Service: Anesthesiology;  Laterality: N/A;    BRONCHOSCOPY WITH FLUOROSCOPY N/A 10/13/2023    Procedure: BRONCHOSCOPY, WITH FLUOROSCOPY;  Surgeon: Mray Molina MD;  Location: [address];  Service: Pulmonary;  Laterality: N/A;     SECTION      COLONOSCOPY N/A 2017    Procedure: COLONOSCOPY;  Surgeon: Kin Dyer MD;  Location: [facility];  Service: Endoscopy;  Laterality: N/A;    COLONOSCOPY  N/A 03/27/2018    Procedure: COLONOSCOPY;  Surgeon: Kyra Vallecillo MD;  Location: [facility] ENDO;  Service: Endoscopy;  Laterality: N/A;    COLONOSCOPY N/A 03/12/2020    Procedure: COLONOSCOPY;  Surgeon: Nicole Leal MD;  Location: [facility] ENDO;  Service: Endoscopy;  Laterality: N/A;    COLONOSCOPY N/A 03/16/2022    Procedure: COLONOSCOPY;  Surgeon: Shay Bruce MD;  Location: [facility];  Service: Endoscopy;  Laterality: N/A;    COLONOSCOPY N/A 02/02/2023    Procedure: COLONOSCOPY;  Surgeon: Nicole Leal MD;  Location: [facility];  Service: Endoscopy;  Laterality: N/A;    DEBRIDEMENT Bilateral 12/21/2020    Procedure: DEBRIDEMENT;  Surgeon: Matthias Roach MD;  Location: [facility] FLR;  Service: ENT;  Laterality: Bilateral;    ESOPHAGOGASTRODUODENOSCOPY N/A 03/12/2020    Procedure: ESOPHAGOGASTRODUODENOSCOPY (EGD);  Surgeon: Nicole Leal MD;  Location: [facility];  Service: Endoscopy;  Laterality: N/A;    ESOPHAGOGASTRODUODENOSCOPY N/A 03/16/2022    Procedure: EGD (ESOPHAGOGASTRODUODENOSCOPY);  Surgeon: Shay Bruce MD;  Location: [facility];  Service: Endoscopy;  Laterality: N/A;    ESOPHAGOGASTRODUODENOSCOPY N/A 02/02/2023    Procedure: EGD (ESOPHAGOGASTRODUODENOSCOPY);  Surgeon: Nicole Leal MD;  Location: [facility];  Service: Endoscopy;  Laterality: N/A;    FINGER SURGERY      joint relpacement, left hand index finger    FUNCTIONAL ENDOSCOPIC SINUS SURGERY (FESS) USING COMPUTER-ASSISTED NAVIGATION Bilateral 07/31/2019    Procedure: FESS, USING COMPUTER-ASSISTED NAVIGATION;  Surgeon: Manish Shaffer MD;  Location: [facility];  Service: ENT;  Laterality: Bilateral;    FUNCTIONAL ENDOSCOPIC SINUS SURGERY (FESS) USING COMPUTER-ASSISTED NAVIGATION Bilateral 09/25/2020    Procedure: FESS, USING COMPUTER-ASSISTED NAVIGATION SPHENOID;  Surgeon: Matthias Roach MD;  Location: [facility] OR 2ND FLR;  Service: ENT;  Laterality: Bilateral;  TIVA    FUNCTIONAL ENDOSCOPIC SINUS SURGERY (FESS)  USING COMPUTER-ASSISTED NAVIGATION Bilateral 09/30/2022    Procedure: FESS, USING COMPUTER-ASSISTED NAVIGATION;  Surgeon: Matthias Roach MD;  Location: [facility] OR [address] [place] [address];  Service: ENT;  Laterality: Bilateral;    HYSTERECTOMY  2001    INTRALUMINAL GASTROINTESTINAL TRACT IMAGING VIA CAPSULE N/A 03/03/2023    Procedure: IMAGING PROCEDURE, GI TRACT, INTRALUMINAL, VIA CAPSULE;  Surgeon: First Available Ismay;  Location: [facility] ENDO;  Service: Endoscopy;  Laterality: N/A;    SINUS SURGERY      WISDOM TOOTH EXTRACTION         Family History:   Family History   Problem Relation Name Age of Onset    Breast cancer Mother Sole     Hypertension Mother Sole     Allergies Mother Sole     Cancer Mother Sole     Depression Mother Sole     Kidney disease Father Peña 64        ESRD on HD    Scleroderma Father Peña     Arthritis Father Peña     Diabetes Father Peña     Hypertension Father Peña     Breast cancer Maternal Grandmother Ade     Heart attack Maternal Grandmother Ade     COPD Maternal Grandmother Ade 72    Cancer Maternal Grandmother Ade     Cancer Paternal Grandmother Frost 70        colon    Hypertension Brother Kevin        Social History:   Social History[1]    Allergies: Reviewed    Medications:   Medications Ordered Prior to Encounter[2]    Physical Exam:  Vital Signs:   Vitals:    06/20/25 0813   BP: 127/66   Pulse: 72   Resp: 16   Temp: 97.7 °F (36.5 °C)     General Appearance: Well appearing in no acute distress  ENT: OP clear  Chest: CTA B  CV: RRR, no m/r/g  Abd: s/nt/nd/nabs  Ext: no edema    Labs:Reviewed    Plan:   I have explained the risks and benefits of upper endoscopy and colonoscopy to the patient including but not limited to bleeding, perforation, infection, and death. The patient wishes to proceed.         [1]   Social History  Tobacco Use    Smoking status: Never     Passive exposure: Never    Smokeless tobacco: Never   Substance Use Topics    Alcohol use: No    Drug use: Never    [2]   Current Facility-Administered Medications on File Prior to Encounter   Medication Dose Route Frequency Provider Last Rate Last Admin    lactated ringers infusion   Intravenous Continuous Mary Leiva MD   New Bag at 03/12/20 0923    lactated ringers infusion   Intravenous Continuous Shay Bruce MD   Stopped at 03/16/22 1342    lidocaine (PF) 10 mg/ml (1%) injection 10 mg  1 mL Intradermal Once Mary Leiva MD        LIDOcaine (PF) 10 mg/ml (1%) injection 10 mg  1 mL Intradermal Once Johan Baez MD        sodium chloride 0.9% flush 10 mL  10 mL Intravenous PRN Johan Baez MD         Current Outpatient Medications on File Prior to Encounter   Medication Sig Dispense Refill    b complex vitamins capsule Take 1 capsule by mouth once daily.      calcium carbonate (OS-CEE) 600 mg calcium (1,500 mg) Tab Take 600 mg by mouth 2 (two) times daily with meals.      calcium carbonate/vitamin D3 (VITAMIN D-3 ORAL) Take 2 tablets by mouth once daily.      diphenoxylate-atropine 2.5-0.025 mg (LOMOTIL) 2.5-0.025 mg per tablet Take 1 tablet by mouth 4 (four) times daily as needed for Diarrhea. 120 tablet 5    DULoxetine (CYMBALTA) 60 MG capsule TAKE 1 CAPSULE BY MOUTH TWICE DAILY **MAY CAUSE DROWSINESS** 180 capsule 0    estradioL (ESTRACE) 1 MG tablet Take 1 tablet (1 mg total) by mouth once daily. 90 tablet 3    fexofenadine (ALLEGRA) 180 MG tablet Take 1 tablet (180 mg total) by mouth once daily. 180 tablet 1    fluticasone-umeclidin-vilanter (TRELEGY ELLIPTA) 100-62.5-25 mcg DsDv Inhale 1 puff into the lungs once daily. 90 each 3    ipratropium (ATROVENT) 0.02 % nebulizer solution Take by nebulization 4 (four) times daily as needed for Wheezing.      losartan (COZAAR) 100 MG tablet TAKE 1 TABLET DAILY AS NEEDED FOR HIGH BLOOD PRESSURE (ABOVE 120/80) 90 tablet 1    mepolizumab 100 mg/mL AtIn Inject 3 mLs (300 mg total) into the skin every 28 days. 3 mL 11    nystatin (MYCOSTATIN)  100,000 unit/mL suspension TAKE 6 MILLILITERS BY MOUTH 4 TIMES A DAY WITH MEALS AND NIGHTLY 473 mL 1    predniSONE (DELTASONE) 5 MG tablet TAKE 2 TABLETS BY MOUTH ONCE A DAY 60 tablet 1    pregabalin (LYRICA) 150 MG capsule Take 1 capsule (150 mg total) by mouth 3 (three) times daily. 270 capsule 1    sulfamethoxazole-trimethoprim 800-160mg (BACTRIM DS) 800-160 mg Tab Take 1 tablet by mouth once daily. 30 tablet 0    ZINC ORAL Take 1 tablet by mouth once daily.      acetaminophen (TYLENOL) 500 MG tablet Take 2 tablets (1,000 mg total) by mouth every 6 (six) hours as needed for Pain. 60 tablet 0    albuterol (VENTOLIN HFA) 90 mcg/actuation inhaler Inhale 2 puffs into the lungs every 6 (six) hours as needed for Wheezing. Rescue 18 g 3    diltiazem HCl (DILTIAZEM 2% - LIDOCAINE 5% CREAM) Apply peasize amount topically to anal area. (Patient not taking: Reported on 5/9/2025) 30 g 2    fluticasone propionate (FLONASE) 50 mcg/actuation nasal spray USE 2 SPRAYS IN EACH NOSTRIL ONCE DAILY 16 g 11    immun glob G,IgG,-pro-IgA 0-50 (HIZENTRA) 10 gram/50 mL (20 %) Soln Inject 70 mLs (14 g total) into the skin every 7 days. 280 mL 11    Lactobacillus rhamnosus GG (CULTURELLE) 10 billion cell capsule Take 1 capsule by mouth once daily.      predniSONE (DELTASONE) 5 MG tablet Take 1.5 tablets (7.5 mg total) by mouth once daily for 14 days, THEN 1 tablet (5 mg total) once daily for 14 days, THEN 0.5 tablets (2.5 mg total) once daily for 14 days, THEN 0.5 tablets (2.5 mg total) every Mon, Wed, Fri for 14 days. 45 tablet 0    rizatriptan (MAXALT) 10 MG tablet Take 1 tablet (10 mg total) by mouth 1 (one) time if needed for Migraine (Max 2 tablets in 24 hours.). 27 tablet 3    sod chlor,sod bicarb/neti pot (SINUS WASH NETI POT TERRELL) use as directed

## 2025-06-20 NOTE — PLAN OF CARE
Discharge summary and Provation reviewed with patient. Verbalizes understanding. PIV removed, cannula intact. NAD noted. Denies pain or discomfort    1020 Escorted to lobby to spouse via w/c due to fatigue sedated related

## 2025-06-20 NOTE — TRANSFER OF CARE
"Anesthesia Transfer of Care Note    Patient: Jaylin Murguia    Procedure(s) Performed: Procedure(s) (LRB):  COLONOSCOPY (N/A)  EGD (ESOPHAGOGASTRODUODENOSCOPY) (N/A)    Patient location: GI    Anesthesia Type: general    Transport from OR: Transported from OR on room air with adequate spontaneous ventilation    Post pain: adequate analgesia    Post assessment: no apparent anesthetic complications    Post vital signs: stable    Level of consciousness: awake and alert    Nausea/Vomiting: no nausea/vomiting    Complications: none    Transfer of care protocol was followed      Last vitals: Visit Vitals  /66 (BP Location: Left arm, Patient Position: Lying)   Pulse 72   Temp 36.5 °C (97.7 °F) (Temporal)   Resp 16   Ht 5' 7" (1.702 m)   Wt 74.8 kg (164 lb 14.5 oz)   SpO2 95%   Breastfeeding No   BMI 25.83 kg/m²     "

## 2025-06-23 LAB
ESTROGEN SERPL-MCNC: NORMAL PG/ML
INSULIN SERPL-ACNC: NORMAL U[IU]/ML
LAB AP CLINICAL INFORMATION: NORMAL
LAB AP GROSS DESCRIPTION: NORMAL
LAB AP PERFORMING LOCATION(S): NORMAL
LAB AP REPORT FOOTNOTES: NORMAL

## 2025-06-24 ENCOUNTER — RESULTS FOLLOW-UP (OUTPATIENT)
Dept: GASTROENTEROLOGY | Facility: HOSPITAL | Age: 60
End: 2025-06-24

## 2025-07-06 DIAGNOSIS — G43.809 OTHER MIGRAINE WITHOUT STATUS MIGRAINOSUS, NOT INTRACTABLE: ICD-10-CM

## 2025-07-07 ENCOUNTER — OFFICE VISIT (OUTPATIENT)
Dept: PRIMARY CARE CLINIC | Facility: CLINIC | Age: 60
End: 2025-07-07
Payer: COMMERCIAL

## 2025-07-07 ENCOUNTER — PATIENT MESSAGE (OUTPATIENT)
Dept: OTOLARYNGOLOGY | Facility: CLINIC | Age: 60
End: 2025-07-07
Payer: COMMERCIAL

## 2025-07-07 DIAGNOSIS — E78.49 OTHER HYPERLIPIDEMIA: ICD-10-CM

## 2025-07-07 DIAGNOSIS — I35.1 MILD AORTIC INSUFFICIENCY: ICD-10-CM

## 2025-07-07 DIAGNOSIS — J32.4 CHRONIC PANSINUSITIS: ICD-10-CM

## 2025-07-07 DIAGNOSIS — D72.18 EOSINOPHILIC GRANULOMATOSIS WITH POLYANGIITIS (EGPA): ICD-10-CM

## 2025-07-07 DIAGNOSIS — G43.819 OTHER MIGRAINE, INTRACTABLE, WITHOUT STATUS MIGRAINOSUS: ICD-10-CM

## 2025-07-07 DIAGNOSIS — D80.1 HYPOGAMMAGLOBULINEMIA: ICD-10-CM

## 2025-07-07 DIAGNOSIS — M79.7 FIBROMYALGIA: ICD-10-CM

## 2025-07-07 DIAGNOSIS — D84.9 IMMUNOSUPPRESSION: ICD-10-CM

## 2025-07-07 DIAGNOSIS — E06.3 HASHIMOTO'S THYROIDITIS: ICD-10-CM

## 2025-07-07 DIAGNOSIS — G47.33 OSA ON CPAP: ICD-10-CM

## 2025-07-07 DIAGNOSIS — G43.809 OTHER MIGRAINE WITHOUT STATUS MIGRAINOSUS, NOT INTRACTABLE: Primary | ICD-10-CM

## 2025-07-07 DIAGNOSIS — J45.50 SEVERE PERSISTENT ASTHMA WITHOUT COMPLICATION: ICD-10-CM

## 2025-07-07 DIAGNOSIS — K50.00 CROHN'S DISEASE OF SMALL INTESTINE WITHOUT COMPLICATION: ICD-10-CM

## 2025-07-07 DIAGNOSIS — M30.1 EOSINOPHILIC GRANULOMATOSIS WITH POLYANGIITIS (EGPA): ICD-10-CM

## 2025-07-07 PROCEDURE — G2211 COMPLEX E/M VISIT ADD ON: HCPCS | Mod: S$GLB,,, | Performed by: FAMILY MEDICINE

## 2025-07-07 PROCEDURE — 3075F SYST BP GE 130 - 139MM HG: CPT | Mod: CPTII,S$GLB,, | Performed by: FAMILY MEDICINE

## 2025-07-07 PROCEDURE — 4010F ACE/ARB THERAPY RXD/TAKEN: CPT | Mod: CPTII,S$GLB,, | Performed by: FAMILY MEDICINE

## 2025-07-07 PROCEDURE — 99999 PR PBB SHADOW E&M-EST. PATIENT-LVL III: CPT | Mod: PBBFAC,,, | Performed by: FAMILY MEDICINE

## 2025-07-07 PROCEDURE — 3008F BODY MASS INDEX DOCD: CPT | Mod: CPTII,S$GLB,, | Performed by: FAMILY MEDICINE

## 2025-07-07 PROCEDURE — 1159F MED LIST DOCD IN RCRD: CPT | Mod: CPTII,S$GLB,, | Performed by: FAMILY MEDICINE

## 2025-07-07 PROCEDURE — 3079F DIAST BP 80-89 MM HG: CPT | Mod: CPTII,S$GLB,, | Performed by: FAMILY MEDICINE

## 2025-07-07 PROCEDURE — 1160F RVW MEDS BY RX/DR IN RCRD: CPT | Mod: CPTII,S$GLB,, | Performed by: FAMILY MEDICINE

## 2025-07-07 PROCEDURE — 99215 OFFICE O/P EST HI 40 MIN: CPT | Mod: S$GLB,,, | Performed by: FAMILY MEDICINE

## 2025-07-07 RX ORDER — PROPRANOLOL HYDROCHLORIDE 60 MG/1
CAPSULE, EXTENDED RELEASE ORAL
Qty: 90 CAPSULE | Refills: 3 | Status: SHIPPED | OUTPATIENT
Start: 2025-07-07

## 2025-07-07 RX ORDER — RIMEGEPANT SULFATE 75 MG/75MG
75 TABLET, ORALLY DISINTEGRATING ORAL DAILY
Qty: 9 TABLET | Refills: 11 | Status: SHIPPED | OUTPATIENT
Start: 2025-07-07

## 2025-07-07 RX ORDER — LEVOCETIRIZINE DIHYDROCHLORIDE 5 MG/1
5 TABLET, FILM COATED ORAL NIGHTLY
Qty: 30 TABLET | Refills: 11 | Status: SHIPPED | OUTPATIENT
Start: 2025-07-07 | End: 2026-07-07

## 2025-07-07 RX ORDER — RIZATRIPTAN BENZOATE 10 MG/1
10 TABLET ORAL ONCE AS NEEDED
Qty: 27 TABLET | Refills: 3 | Status: SHIPPED | OUTPATIENT
Start: 2025-07-08

## 2025-07-07 RX ORDER — PHENYLEPHRINE HCL 10 MG/1
10 TABLET, FILM COATED ORAL EVERY 4 HOURS PRN
Qty: 20 TABLET | COMMUNITY
Start: 2025-07-07 | End: 2025-07-17

## 2025-07-07 NOTE — PROGRESS NOTES
Chief Complaint  Chief Complaint   Patient presents with    Annual Exam    Headache     Day 5 headaches - pain rating at 8       HPI  Jaylin Murguia is a 60 y.o. female with multiple medical diagnoses as listed in the medical history and problem list that presents for  in person visit.     History of Present Illness    CHIEF COMPLAINT:  - Patient presents for her annual exam with complaints of severe sinus pressure and headache symptoms lasting for five days.    HPI:  Patient is a 60-year-old female presenting with a severe headache and sinus pressure ongoing for 5 days. She reports eye pain upon waking, which progresses to pain in the back of her neck and up her face to her skull by the afternoon. She has been having increased sinus pressure and symptoms lately. She has attempted to use Maxalt for the headache without relief. Her usual sinus rinses, which typically provide significant relief, have not been producing much discharge this time.    Allegra was changed from Zyrtec by Dr. Watson about a year ago. She has also been using Nurtec for migraines in the past, which she reports was effective, but her insurance recently denied coverage for a refill. She took oxycodone from a previous surgery last night due to severe pain, which only provided relief for 1.5 hours.    She performs 2 breathing treatments daily for her severe persistent asthma, which are essential for her respiratory function. She believes the Nucala shots she is receiving are improving her condition. She is currently tapering down her prednisone dose following an episode 3 weeks ago when she had to increase to 35mg. She is now alternating between 10mg one night and 5mg the next night.    She is evaluated by Dr. Dyer in GI for Crohn's disease, and she recently underwent a colonoscopy and EGD on June 20th. Dr. Riley in pulmonary manages her severe persistent asthma and eosinophilic granulomatosis with polyangiitis (EGPA). She is also evaluated  by Dr. Wilson for otolaryngology related to her recurrent sinus problems.    She denies any purulent discharge from her sinuses.    MEDICATIONS:  - Nucala, for EGPA (eosinophilic granulomatosis with polyangiitis)  - Prednisone, 10 mg one night and 5 mg the next night, for breathing issues  - Trazodone, at night  - Topamax, at night  - Bactrim, half tablet Monday, Wednesday, Friday  - Maxalt, for migraines (not effective for current headache)  - Losartan, for blood pressure  - Allegra, for allergies/sinus problems  - Prednisone dosage increased to 35 mg 3 weeks ago due to an episode, now tapering back down    PMH:  - Crohn's disease  - EGPA (Eosinophilic granulomatosis with polyangiitis)  - Recurrent sinus problems  - Recurrent migraines  - Hyperlipidemia  - Hashimoto's thyroid condition  - Severe persistent asthma  - Hypogammaglobulinemia  - Anemia  - Obstructive sleep apnea    RECENT/REMOTE SURGICAL HISTORY:  - Colonoscopy and EGD: June 20th (year not specified)    HEALTH MAINTENAINCE:  - Prevnar 20 received on December 5th, 2023         [facility]    7/5/2025 10:21 AM CDT - Filed by Patient   This questionnaire should take approximately 5 to 10 minutes to complete.  To begin, press Let's Begin and then press Continue. Let's Begin   On average, how many days per week do you engage in moderate to strenuous exercise (like a brisk walk)? 2 days   On average, how many minutes do you engage in exercise at this level? 30 min   Do you feel stress - tense, restless, nervous, or anxious, or unable to sleep at night because your mind is troubled all the time - these days? To some extent   How often do you feel lonely or isolated from those around you? Rarely   How often do you need to have someone help you when you read instructions, pamphlets, or other written material from your doctor or pharmacy? Never   How hard is it for you to pay for the very basics like food, housing, medical care, and heating? Not hard at all   In  the past 12 months has the electric, gas, oil, or water company threatened to shut off services in your home? No   Within the past 12 months, you worried that your food would run out before you got the money to buy more. Never true   Within the past 12 months, the food you bought just didn't last and you didn't have money to get more. Never true   In the past 12 months, has lack of transportation kept you from medical appointments or from getting medications? No   In the past 12 months, has lack of transportation kept you from meetings, work, or from getting things needed for daily living? No   In the last 12 months, was there a time when you were not able to pay the mortgage or rent on time? No   In the past 12 months, how many times have you moved where you were living? 0   At any time in the past 12 months, were you homeless or living in a shelter (including now)? No   How often do you have a drink containing alcohol? Never   How many drinks containing alcohol do you have on a typical day when you are drinking? Patient does not drink   How often do you have six or more drinks on one occasion? Never            Pmh, Psh, Family Hx, Social Hx, HM updated in Epic Tabs today.    Review of Systems   Constitutional:  Positive for activity change and fatigue. Negative for appetite change.   HENT:  Positive for sinus pressure and sinus pain.    Eyes:  Positive for pain. Negative for photophobia and visual disturbance.   Respiratory:  Positive for shortness of breath. Negative for cough and wheezing.    Cardiovascular:  Negative for chest pain and palpitations.   Gastrointestinal:  Negative for abdominal distention and abdominal pain.   Musculoskeletal:  Positive for myalgias, neck pain and neck stiffness. Negative for arthralgias and back pain.   Neurological:  Positive for headaches.   Psychiatric/Behavioral:  Positive for sleep disturbance. Negative for decreased concentration and dysphoric mood. The patient is not  nervous/anxious.         Objective:     Vitals:    07/07/25 1416   BP: 136/89   BP Location: Right arm   Patient Position: Sitting   Pulse: (!) 59   Temp: 97.2 °F (36.2 °C)   TempSrc: Tympanic   SpO2: 98%   Weight: 80.4 kg (177 lb 5.8 oz)     Wt Readings from Last 10 Encounters:   07/07/25 80.4 kg (177 lb 5.8 oz)   06/20/25 74.8 kg (164 lb 14.5 oz)   05/09/25 77.7 kg (171 lb 4.8 oz)   01/17/25 80.3 kg (177 lb)   01/06/25 80.4 kg (177 lb 4 oz)   12/19/24 81.1 kg (178 lb 12.7 oz)   10/16/24 79.4 kg (175 lb)   06/18/24 84.4 kg (186 lb 1.1 oz)   03/28/24 87.3 kg (192 lb 7.4 oz)   03/13/24 88.2 kg (194 lb 5.4 oz)     Physical Exam    Vitals: Blood pressure: 136/90.  Nose: Nasal mucosa mildly erythematous.  TEST RESULTS:  - Cholesterol: Not recently done  - Thyroid numbers: Not recently done  - Blood counts: Recently done by Dr. Watson  - Kidney function/electrolytes: Recently done by Dr. Watson  - Colonoscopy: June 20th (year not specified)  - EGD (Esophagogastroduodenoscopy): June 20th (year not specified)       Physical Exam  Vitals reviewed.   Constitutional:       Appearance: Normal appearance. She is well-developed. She is obese.   HENT:      Head: Normocephalic and atraumatic.      Right Ear: Tympanic membrane and external ear normal.      Left Ear: Tympanic membrane and external ear normal.      Nose: Nose normal.      Mouth/Throat:      Mouth: Mucous membranes are moist.      Pharynx: Oropharynx is clear.   Eyes:      Conjunctiva/sclera: Conjunctivae normal.      Pupils: Pupils are equal, round, and reactive to light.   Neck:      Thyroid: No thyromegaly.   Cardiovascular:      Rate and Rhythm: Normal rate and regular rhythm.      Heart sounds: Normal heart sounds. No murmur heard.     No friction rub. No gallop.   Pulmonary:      Effort: Pulmonary effort is normal. No respiratory distress.      Breath sounds: Normal breath sounds. No wheezing or rales.   Abdominal:      General: Bowel sounds are normal. There is no  distension.      Palpations: Abdomen is soft.      Tenderness: There is no abdominal tenderness. There is no rebound.   Musculoskeletal:         General: Tenderness present.        Arms:       Cervical back: Neck supple. Spasms and tenderness present. Pain with movement present. Decreased range of motion.   Lymphadenopathy:      Cervical: No cervical adenopathy.   Skin:     General: Skin is warm and dry.      Findings: No rash.   Neurological:      Mental Status: She is alert and oriented to person, place, and time.      Cranial Nerves: No cranial nerve deficit.      Motor: No weakness.   Psychiatric:         Attention and Perception: Attention and perception normal.         Mood and Affect: Mood and affect normal.         Speech: Speech normal.         Behavior: Behavior normal.         Thought Content: Thought content normal.         Cognition and Memory: Cognition and memory normal.         Judgment: Judgment normal.         Assessment:   LABS:   Lab Results   Component Value Date    HGBA1C 5.3 11/28/2023    HGBA1C 5.3 09/02/2021    HGBA1C 5.2 11/14/2017      Lab Results   Component Value Date    CHOL 226 (H) 11/28/2023    CHOL 172 12/06/2021    CHOL 229 (H) 09/02/2021     Lab Results   Component Value Date    LDLCALC 126.4 11/28/2023    LDLCALC 84.8 12/06/2021    LDLCALC 141.8 09/02/2021     Lab Results   Component Value Date    WBC 5 06/30/2025    HGB 12.9 06/30/2025    HCT 40.3 06/30/2025     06/30/2025    CHOL 226 (H) 11/28/2023    TRIG 118 11/28/2023    HDL 76 (H) 11/28/2023    ALT 29 06/18/2024    AST 27 06/18/2024     (L) 06/18/2024    K 3.7 06/18/2024     06/18/2024    CREATININE 1.1 06/18/2024    BUN 15 06/18/2024    CO2 22 (L) 06/18/2024    TSH 0.615 02/09/2024    INR 1.0 11/20/2014    HGBA1C 5.3 11/28/2023       Plan:   1. Other migraine without status migrainosus, not intractable  -     rimegepant (NURTEC) 75 mg odt; Take 1 tablet (75 mg total) by mouth once daily. Place ODT tablet  "on the tongue; alternatively the ODT tablet may be placed under the tongue. "MAX dose of 1 tablet per 24h/or day"  Dispense: 9 tablet; Refill: 11  -     rizatriptan (MAXALT) 10 MG tablet; Take 1 tablet (10 mg total) by mouth 1 (one) time if needed for Migraine (Max 2 tablets in 24 hours.).  Dispense: 27 tablet; Refill: 3  -     Hemoglobin A1C; Future; Expected date: 07/11/2025  -     Comprehensive Metabolic Panel; Future; Expected date: 07/11/2025  -     TSH; Future; Expected date: 07/11/2025  -     T4, Free; Future; Expected date: 07/11/2025  -     Lipid Panel; Future; Expected date: 07/11/2025    2. Immunosuppression  -     Hemoglobin A1C; Future; Expected date: 07/11/2025  -     Comprehensive Metabolic Panel; Future; Expected date: 07/11/2025  -     TSH; Future; Expected date: 07/11/2025  -     T4, Free; Future; Expected date: 07/11/2025  -     Lipid Panel; Future; Expected date: 07/11/2025    3. Eosinophilic granulomatosis with polyangiitis (EGPA)  -     Hemoglobin A1C; Future; Expected date: 07/11/2025  -     Comprehensive Metabolic Panel; Future; Expected date: 07/11/2025  -     TSH; Future; Expected date: 07/11/2025  -     T4, Free; Future; Expected date: 07/11/2025  -     Lipid Panel; Future; Expected date: 07/11/2025    4. Other migraine, intractable, without status migrainosus    5. Other hyperlipidemia  -     Hemoglobin A1C; Future; Expected date: 07/11/2025  -     Comprehensive Metabolic Panel; Future; Expected date: 07/11/2025  -     TSH; Future; Expected date: 07/11/2025  -     T4, Free; Future; Expected date: 07/11/2025  -     Lipid Panel; Future; Expected date: 07/11/2025    6. Crohn's disease of small intestine without complication  -     Hemoglobin A1C; Future; Expected date: 07/11/2025  -     Comprehensive Metabolic Panel; Future; Expected date: 07/11/2025  -     TSH; Future; Expected date: 07/11/2025  -     T4, Free; Future; Expected date: 07/11/2025  -     Lipid Panel; Future; Expected date: " 07/11/2025    7. Hashimoto's thyroiditis  -     Hemoglobin A1C; Future; Expected date: 07/11/2025  -     Comprehensive Metabolic Panel; Future; Expected date: 07/11/2025  -     TSH; Future; Expected date: 07/11/2025  -     T4, Free; Future; Expected date: 07/11/2025  -     Lipid Panel; Future; Expected date: 07/11/2025    8. Severe persistent asthma without complication  -     Hemoglobin A1C; Future; Expected date: 07/11/2025  -     Comprehensive Metabolic Panel; Future; Expected date: 07/11/2025  -     TSH; Future; Expected date: 07/11/2025  -     T4, Free; Future; Expected date: 07/11/2025  -     Lipid Panel; Future; Expected date: 07/11/2025    9. Hypogammaglobulinemia    10. TAMIKA on CPAP  Overview:  Compliant with PAP and benefits from use. Follow up annually in the sleep clinic.        11. Mild aortic insufficiency    12. Fibromyalgia    13. Chronic pansinusitis  -     levocetirizine (XYZAL) 5 MG tablet; Take 1 tablet (5 mg total) by mouth every evening.  Dispense: 30 tablet; Refill: 11    Other orders  -     phenylephrine (SUDAFED PE) 10 MG Tab; Take 1 tablet (10 mg total) by mouth every 4 (four) hours as needed (for sinus headache).  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  Current Allegra regimen insufficient  for symptom control.  Decided against Toradol injection, as it is unlikely to address the underlying cause of the headache.  Currently tapering prednisone following a recent exacerbation.    PLAN SUMMARY:  - Continue Nucala for eosinophilic granulomatosis with polyangiitis (EGPA)  - Ordered fasting lipid panel, deferred to later date  - Ordered thyroid function tests, deferred to later date  - Initiated Xyzal to replace Allegra for stronger antihistamine effect  - Refilled Maxalt, to start tomorrow  - Prescribed Sudafed PE as needed for sinus headache  - Refilled Nurtec, 1 tablet daily as needed for migraine  - Increase frequency of nasal irrigations  - Follow-up for labs on Friday at [facility]  - Complete lipid panel on Friday at [facility]  - Contact office if symptoms do not improve with new medication regimen    OTHER MIGRAINE, INTRACTABLE, WITHOUT STATUS MIGRAINOSUS:  - Refilled Nurtec with instructions to take 1 tablet daily as needed for migraine, not to exceed 1 per day.  - Refilled Maxalt with instructions to start tomorrow.    OTHER HYPERLIPIDEMIA:  - Ordered fasting lipid panel, deferring to a later date due to current symptoms.  - Scheduled follow-up for labs on Friday at [facility].    HASHIMOTO'S THYROIDITIS:  - Ordered thyroid function tests, deferring to a later date due to current symptoms.  - Will be done with the lipid panel on Friday at [facility].    CHRONIC PANSINUSITIS:  - Educated the patient about the difference between Sudafed PE (phenylephrine) and pseudoephedrine, emphasizing the OTC availability of Sudafed PE.  - Advised to increase frequency of nasal irrigations.  - Initiated Xyzal as a replacement for Allegra to provide stronger antihistamine effect for improved symptom control.  - Prescribed Sudafed PE to take as needed for sinus headache with caution due to slightly elevated blood pressure.  - Instructed patient to monitor blood pressure while using  Sudafed PE.    IGG2 SUBCLASS DEFICIENCY:  - Monitored patient who is on long-term immunosuppressant medication.  - Evaluated hypogammaglobulinemia and discussed management with specialists.  - Continued Nucala for eosinophilic granulomatosis with polyangiitis (EGPA).    LIFESTYLE CHANGES:  - Contact the office if symptoms do not improve with new medication regimen.           X-Ray Chest PA And Lateral  Narrative: EXAMINATION:  XR CHEST PA AND LATERAL    CLINICAL HISTORY  Shortness of breath.,    COMPARISON:  October 13, 2023 x-ray    FINDINGS:  Heart size is normal.  Mild aortic atherosclerosis is present.    The lung fields are clear, improved since prior exam.    No pleural effusion.  Impression: No acute findings.    Electronically signed by: Gaston Bloom MD  Date:    04/24/2025  Time:    11:15    The 10-year ASCVD risk score (Hill RUSSELL, et al., 2019) is: 4.4%    Values used to calculate the score:      Age: 60 years      Sex: Female      Is Non- : No      Diabetic: No      Tobacco smoker: No      Systolic Blood Pressure: 136 mmHg      Is BP treated: Yes      HDL Cholesterol: 76 mg/dL      Total Cholesterol: 226 mg/dL    Follow-up: Follow up in about 1 year (around 7/7/2026) for physical with Dr DAMON.    I spent a total of    45   minutes face to face and non-face to face on the date of this visit.This includes time preparing to see the patient (eg, review of tests, notes), obtaining and/or reviewing additional history from an independent historian and/or outside medical records, documenting clinical information in the electronic health record, independently interpreting results and/or communicating results to the patient/family/caregiver, or care coordinator.  Visit today included increased complexity associated with the care of the episodic problem addressed and managing the longitudinal care of the patient due to the serious and/or complex managed problem(s).    This note was  generated with the assistance of ambient listening technology. Verbal consent was obtained by the patient and accompanying visitor(s) for the recording of patient appointment to facilitate this note. I attest to having reviewed and edited the generated note for accuracy, though some syntax or spelling errors may persist. Please contact the author of this note for any clarification.       There are no Patient Instructions on file for this visit.

## 2025-07-07 NOTE — TELEPHONE ENCOUNTER
No care due was identified.  [facility] Embedded Care Due Messages. Reference number: [id number].   7/06/2025 11:09:22 PM CDT

## 2025-07-08 VITALS
TEMPERATURE: 97 F | DIASTOLIC BLOOD PRESSURE: 89 MMHG | SYSTOLIC BLOOD PRESSURE: 136 MMHG | BODY MASS INDEX: 27.78 KG/M2 | WEIGHT: 177.38 LBS | HEART RATE: 59 BPM | OXYGEN SATURATION: 98 %

## 2025-07-11 ENCOUNTER — LAB VISIT (OUTPATIENT)
Dept: LAB | Facility: HOSPITAL | Age: 60
End: 2025-07-11
Attending: FAMILY MEDICINE
Payer: COMMERCIAL

## 2025-07-11 DIAGNOSIS — J45.50 SEVERE PERSISTENT ASTHMA WITHOUT COMPLICATION: ICD-10-CM

## 2025-07-11 DIAGNOSIS — M30.1 EOSINOPHILIC GRANULOMATOSIS WITH POLYANGIITIS (EGPA): ICD-10-CM

## 2025-07-11 DIAGNOSIS — E78.49 OTHER HYPERLIPIDEMIA: ICD-10-CM

## 2025-07-11 DIAGNOSIS — D72.18 EOSINOPHILIC GRANULOMATOSIS WITH POLYANGIITIS (EGPA): ICD-10-CM

## 2025-07-11 DIAGNOSIS — G43.809 OTHER MIGRAINE WITHOUT STATUS MIGRAINOSUS, NOT INTRACTABLE: ICD-10-CM

## 2025-07-11 DIAGNOSIS — D84.9 IMMUNOSUPPRESSION: ICD-10-CM

## 2025-07-11 DIAGNOSIS — E06.3 HASHIMOTO'S THYROIDITIS: ICD-10-CM

## 2025-07-11 DIAGNOSIS — K50.00 CROHN'S DISEASE OF SMALL INTESTINE WITHOUT COMPLICATION: ICD-10-CM

## 2025-07-11 LAB
ALBUMIN SERPL BCP-MCNC: 3.8 G/DL (ref 3.5–5.2)
ALP SERPL-CCNC: 65 UNIT/L (ref 40–150)
ALT SERPL W/O P-5'-P-CCNC: 49 UNIT/L (ref 10–44)
ANION GAP (OHS): 5 MMOL/L (ref 8–16)
AST SERPL-CCNC: 37 UNIT/L (ref 11–45)
BILIRUB SERPL-MCNC: 0.2 MG/DL (ref 0.1–1)
BUN SERPL-MCNC: 12 MG/DL (ref 6–20)
CALCIUM SERPL-MCNC: 8.9 MG/DL (ref 8.7–10.5)
CHLORIDE SERPL-SCNC: 111 MMOL/L (ref 95–110)
CHOLEST SERPL-MCNC: 276 MG/DL (ref 120–199)
CHOLEST/HDLC SERPL: 3.7 {RATIO} (ref 2–5)
CO2 SERPL-SCNC: 24 MMOL/L (ref 23–29)
CREAT SERPL-MCNC: 0.9 MG/DL (ref 0.5–1.4)
EAG (OHS): 108 MG/DL (ref 68–131)
GFR SERPLBLD CREATININE-BSD FMLA CKD-EPI: >60 ML/MIN/1.73/M2
GLUCOSE SERPL-MCNC: 95 MG/DL (ref 70–110)
HBA1C MFR BLD: 5.4 % (ref 4–5.6)
HDLC SERPL-MCNC: 74 MG/DL (ref 40–75)
HDLC SERPL: 26.8 % (ref 20–50)
LDLC SERPL CALC-MCNC: 185.2 MG/DL (ref 63–159)
NONHDLC SERPL-MCNC: 202 MG/DL
POTASSIUM SERPL-SCNC: 4.2 MMOL/L (ref 3.5–5.1)
PROT SERPL-MCNC: 7.6 GM/DL (ref 6–8.4)
SODIUM SERPL-SCNC: 140 MMOL/L (ref 136–145)
T4 FREE SERPL-MCNC: 0.71 NG/DL (ref 0.71–1.51)
TRIGL SERPL-MCNC: 84 MG/DL (ref 30–150)
TSH SERPL-ACNC: 0.43 UIU/ML (ref 0.4–4)

## 2025-07-11 PROCEDURE — 36415 COLL VENOUS BLD VENIPUNCTURE: CPT | Mod: PN

## 2025-07-11 PROCEDURE — 83036 HEMOGLOBIN GLYCOSYLATED A1C: CPT

## 2025-07-11 PROCEDURE — 84439 ASSAY OF FREE THYROXINE: CPT

## 2025-07-11 PROCEDURE — 84443 ASSAY THYROID STIM HORMONE: CPT

## 2025-07-11 PROCEDURE — 80061 LIPID PANEL: CPT

## 2025-07-11 PROCEDURE — 80053 COMPREHEN METABOLIC PANEL: CPT

## 2025-07-25 DIAGNOSIS — D80.6 ANTI-POLYSACCHARIDE ANTIBODY DEFICIENCY: ICD-10-CM

## 2025-07-25 DIAGNOSIS — D80.3 IGG2 SUBCLASS DEFICIENCY: ICD-10-CM

## 2025-07-25 RX ORDER — HUMAN IMMUNOGLOBULIN G 0.2 G/ML
14 LIQUID SUBCUTANEOUS
Qty: 280 ML | Refills: 11 | Status: SHIPPED | OUTPATIENT
Start: 2025-07-25

## 2025-07-27 DIAGNOSIS — D72.18 EOSINOPHILIC GRANULOMATOSIS WITH POLYANGIITIS (EGPA): ICD-10-CM

## 2025-07-27 DIAGNOSIS — M30.1 EOSINOPHILIC GRANULOMATOSIS WITH POLYANGIITIS (EGPA): ICD-10-CM

## 2025-07-27 DIAGNOSIS — G43.809 OTHER MIGRAINE WITHOUT STATUS MIGRAINOSUS, NOT INTRACTABLE: ICD-10-CM

## 2025-07-27 NOTE — TELEPHONE ENCOUNTER
No care due was identified.  [facility] Embedded Care Due Messages. Reference number: [id number].   7/27/2025 5:35:56 PM CDT

## 2025-07-28 ENCOUNTER — RESULTS FOLLOW-UP (OUTPATIENT)
Dept: PRIMARY CARE CLINIC | Facility: CLINIC | Age: 60
End: 2025-07-28
Payer: COMMERCIAL

## 2025-07-28 DIAGNOSIS — K50.00 CROHN'S DISEASE OF SMALL INTESTINE WITHOUT COMPLICATION: ICD-10-CM

## 2025-07-28 DIAGNOSIS — E78.5 HYPERLIPIDEMIA, UNSPECIFIED HYPERLIPIDEMIA TYPE: Primary | ICD-10-CM

## 2025-07-28 DIAGNOSIS — G43.819 OTHER MIGRAINE, INTRACTABLE, WITHOUT STATUS MIGRAINOSUS: ICD-10-CM

## 2025-07-28 DIAGNOSIS — E78.49 OTHER HYPERLIPIDEMIA: ICD-10-CM

## 2025-07-28 DIAGNOSIS — D84.9 IMMUNOSUPPRESSION: ICD-10-CM

## 2025-07-28 DIAGNOSIS — B37.0 ORAL PHARYNGEAL CANDIDIASIS: ICD-10-CM

## 2025-07-28 DIAGNOSIS — M30.1 EOSINOPHILIC GRANULOMATOSIS WITH POLYANGIITIS (EGPA): ICD-10-CM

## 2025-07-28 DIAGNOSIS — D72.18 EOSINOPHILIC GRANULOMATOSIS WITH POLYANGIITIS (EGPA): ICD-10-CM

## 2025-07-28 RX ORDER — RIZATRIPTAN BENZOATE 10 MG/1
TABLET ORAL
Qty: 27 TABLET | Refills: 9 | Status: SHIPPED | OUTPATIENT
Start: 2025-07-28

## 2025-07-28 NOTE — TELEPHONE ENCOUNTER
Refill Routing Note   Medication(s) are not appropriate for processing by [facility] for the following reason(s):        Outside of protocol    [facility] action(s):  Route  Approve        Medication Therapy Plan: prn in sig for losartan    Extended chart review required: Yes     Appointments  past 12m or future 3m with PCP    Date Provider   Last Visit   7/7/2025 Esthela Hu MD   Next Visit   7/27/2025 Esthela Hu MD   ED visits in past 90 days: 0        Note composed:2:18 PM 07/28/2025

## 2025-07-29 RX ORDER — LOSARTAN POTASSIUM 100 MG/1
TABLET ORAL
Qty: 90 TABLET | Refills: 3 | Status: SHIPPED | OUTPATIENT
Start: 2025-07-29

## 2025-07-29 RX ORDER — TOPIRAMATE 100 MG/1
100 TABLET, FILM COATED ORAL 2 TIMES DAILY
Qty: 180 TABLET | Refills: 3 | Status: SHIPPED | OUTPATIENT
Start: 2025-07-29

## 2025-07-29 RX ORDER — NYSTATIN 100000 [USP'U]/ML
SUSPENSION ORAL
Qty: 473 ML | Refills: 1 | Status: SHIPPED | OUTPATIENT
Start: 2025-07-29

## 2025-07-29 RX ORDER — PREDNISONE 5 MG/1
TABLET ORAL
Qty: 45 TABLET | Refills: 5 | Status: SHIPPED | OUTPATIENT
Start: 2025-07-29

## 2025-07-30 RX ORDER — DULOXETIN HYDROCHLORIDE 60 MG/1
60 CAPSULE, DELAYED RELEASE ORAL 2 TIMES DAILY
Qty: 180 CAPSULE | Refills: 3 | Status: SHIPPED | OUTPATIENT
Start: 2025-07-30

## 2025-07-30 NOTE — TELEPHONE ENCOUNTER
Schedule fasting labs for lipids in 1 month at [place].     Can do OV afterwards with me or NP Tanya or Sb.

## 2025-07-30 NOTE — TELEPHONE ENCOUNTER
No care due was identified.  [facility] Embedded Care Due Messages. Reference number: [id number].   7/30/2025 9:52:38 AM CDT

## 2025-07-31 NOTE — TELEPHONE ENCOUNTER
Added crp,.     Can sched the lipid and crp on 8/29 or after at [place] fasting please thanks.    Immediate family member

## 2025-08-01 ENCOUNTER — PATIENT MESSAGE (OUTPATIENT)
Dept: PULMONOLOGY | Facility: CLINIC | Age: 60
End: 2025-08-01
Payer: COMMERCIAL

## (undated) DEVICE — SPONGE PATTY SURGICAL .5X3IN

## (undated) DEVICE — CONTAINER SPECIMEN STRL 4OZ

## (undated) DEVICE — DRAPE THREE-QTR REINF 53X77IN

## (undated) DEVICE — KIT ANTIFOG

## (undated) DEVICE — DRAPE CORETEMP FLD WRM 56X62IN

## (undated) DEVICE — APPLICATOR ARISTA FLEX XL

## (undated) DEVICE — NDL HYPO 27G X 1 1/2

## (undated) DEVICE — ADAPTER SWIVEL

## (undated) DEVICE — SEE MEDLINE ITEM 157144

## (undated) DEVICE — TRACKER PATIENT NON INVASIVE

## (undated) DEVICE — TUBING XPS IRRIG TO STRAIGHTSH

## (undated) DEVICE — TUBING SUC UNIV W/CONN 12FT

## (undated) DEVICE — KIT COLLECTION E SWAB REGULAR

## (undated) DEVICE — POWDER ARISTA AH 3G

## (undated) DEVICE — TRAP MUCUS SPECIMEN 80CC

## (undated) DEVICE — PATIENT TRACKER ENT

## (undated) DEVICE — DRESSING TELFA STRL 4X3 LF

## (undated) DEVICE — COVER MAYO STND XL 30X57IN

## (undated) DEVICE — LUBRICANT SURGILUBE 2 OZ

## (undated) DEVICE — TRACKER PATIENT VPAD

## (undated) DEVICE — COVER LIGHT HANDLE 80/CA

## (undated) DEVICE — SEE MEDLINE ITEM 152622

## (undated) DEVICE — BOWL STERILE LARGE 32OZ

## (undated) DEVICE — ELECTRODE REM PLYHSV RETURN 9

## (undated) DEVICE — DRESSING NASOPORE 8CM BIORSRBL

## (undated) DEVICE — CYTOSPIN COLLECTION FLUID BLT

## (undated) DEVICE — KIT SINUS OMC

## (undated) DEVICE — KIT ANTIFOG W/SPONG & FLUID

## (undated) DEVICE — SYR 50CC LL

## (undated) DEVICE — BLADE TRICUT

## (undated) DEVICE — MANIFOLD 4 PORT

## (undated) DEVICE — SUT PROLENE 2-0 SH 36IN BLU

## (undated) DEVICE — CATH BRONCHOSCOPE F/BF

## (undated) DEVICE — SYR SLIP TIP 20CC

## (undated) DEVICE — SYR B-D DISP CONTROL 10CC100/C

## (undated) DEVICE — WARMER DRAPE STERILE LF

## (undated) DEVICE — SEE MEDLINE ITEM 157027

## (undated) DEVICE — SPONGE COTTON TRAY 4X4IN

## (undated) DEVICE — SYR 30CC LUER LOCK

## (undated) DEVICE — HANDSWITCH SUCTION COAG

## (undated) DEVICE — SYR 10CC LUER LOCK

## (undated) DEVICE — RAD 40 CVD SINUS BLADE

## (undated) DEVICE — ALCOHOL BLEND 95%

## (undated) DEVICE — SEE MEDLINE ITEM 156913

## (undated) DEVICE — PADS ADHESIVE

## (undated) DEVICE — PACK ECLIPSE SET-UP W/O DRAPE

## (undated) DEVICE — SYS LABEL CORRECT MED

## (undated) DEVICE — SEE MEDLINE ITEM 157166

## (undated) DEVICE — NDL SPINAL SPINOCAN 22GX3.5

## (undated) DEVICE — DRESSING TRANS 6X8 TEGADERM

## (undated) DEVICE — GOWN POLY REINF BRTH SLV XL

## (undated) DEVICE — SPLINT NASAL AIRWAY SEPTAL SIL

## (undated) DEVICE — PENCIL GOLF STERILE

## (undated) DEVICE — FORCEP JAW RADIAL PULM 2X100CM

## (undated) DEVICE — TOWEL OR DISP STRL BLUE 4/PK

## (undated) DEVICE — TRACKER ENT INSTRUMENT

## (undated) DEVICE — SYR IRRIGATION BULB STER 60ML

## (undated) DEVICE — GLOVE 5.5 PROTEXIS PI MICRO

## (undated) DEVICE — GLOVE SURG BIOGEL LATEX SZ 7.5

## (undated) DEVICE — NDL ASPIRATING VIZISHOT 20-40M